# Patient Record
Sex: MALE | Race: BLACK OR AFRICAN AMERICAN | NOT HISPANIC OR LATINO | Employment: OTHER | URBAN - METROPOLITAN AREA
[De-identification: names, ages, dates, MRNs, and addresses within clinical notes are randomized per-mention and may not be internally consistent; named-entity substitution may affect disease eponyms.]

---

## 2017-05-02 RX ORDER — AMLODIPINE AND BENAZEPRIL HYDROCHLORIDE 10; 20 MG/1; MG/1
1 CAPSULE ORAL DAILY
Qty: 90 CAPSULE | Refills: 0 | Status: SHIPPED | OUTPATIENT
Start: 2017-05-02 | End: 2017-08-30 | Stop reason: SDUPTHER

## 2017-05-02 RX ORDER — ATORVASTATIN CALCIUM 40 MG/1
40 TABLET, FILM COATED ORAL DAILY
Qty: 90 TABLET | Refills: 0 | Status: SHIPPED | OUTPATIENT
Start: 2017-05-02 | End: 2017-08-30 | Stop reason: SDUPTHER

## 2017-05-04 ENCOUNTER — LAB VISIT (OUTPATIENT)
Dept: LAB | Facility: OTHER | Age: 67
End: 2017-05-04
Attending: INTERNAL MEDICINE
Payer: MEDICARE

## 2017-05-04 ENCOUNTER — OFFICE VISIT (OUTPATIENT)
Dept: INTERNAL MEDICINE | Facility: CLINIC | Age: 67
End: 2017-05-04
Payer: MEDICARE

## 2017-05-04 VITALS
DIASTOLIC BLOOD PRESSURE: 90 MMHG | SYSTOLIC BLOOD PRESSURE: 158 MMHG | HEART RATE: 73 BPM | WEIGHT: 293.88 LBS | HEIGHT: 71 IN | BODY MASS INDEX: 41.14 KG/M2 | OXYGEN SATURATION: 95 %

## 2017-05-04 DIAGNOSIS — E11.9 CONTROLLED TYPE 2 DIABETES MELLITUS WITHOUT COMPLICATION, WITHOUT LONG-TERM CURRENT USE OF INSULIN: ICD-10-CM

## 2017-05-04 DIAGNOSIS — Z86.711 HISTORY OF PULMONARY EMBOLISM: ICD-10-CM

## 2017-05-04 DIAGNOSIS — E66.01 MORBID OBESITY WITH BMI OF 40.0-44.9, ADULT: ICD-10-CM

## 2017-05-04 DIAGNOSIS — I10 ESSENTIAL HYPERTENSION: ICD-10-CM

## 2017-05-04 DIAGNOSIS — Z80.42 FAMILY HISTORY OF PROSTATE CANCER: ICD-10-CM

## 2017-05-04 DIAGNOSIS — Z79.01 CURRENT USE OF LONG TERM ANTICOAGULATION: ICD-10-CM

## 2017-05-04 DIAGNOSIS — E11.9 TYPE 2 DIABETES MELLITUS WITHOUT COMPLICATION: ICD-10-CM

## 2017-05-04 DIAGNOSIS — E78.00 PURE HYPERCHOLESTEROLEMIA: ICD-10-CM

## 2017-05-04 DIAGNOSIS — R74.8 ELEVATED LIVER ENZYMES: Primary | ICD-10-CM

## 2017-05-04 DIAGNOSIS — I10 ESSENTIAL HYPERTENSION: Primary | ICD-10-CM

## 2017-05-04 LAB
ALBUMIN SERPL BCP-MCNC: 3.7 G/DL
ALP SERPL-CCNC: 68 U/L
ALT SERPL W/O P-5'-P-CCNC: 120 U/L
ANION GAP SERPL CALC-SCNC: 8 MMOL/L
AST SERPL-CCNC: 118 U/L
BILIRUB SERPL-MCNC: 0.4 MG/DL
BUN SERPL-MCNC: 9 MG/DL
CALCIUM SERPL-MCNC: 9.6 MG/DL
CHLORIDE SERPL-SCNC: 103 MMOL/L
CHOLEST/HDLC SERPL: 4.5 {RATIO}
CO2 SERPL-SCNC: 32 MMOL/L
COMPLEXED PSA SERPL-MCNC: 2 NG/ML
CREAT SERPL-MCNC: 0.8 MG/DL
EST. GFR  (AFRICAN AMERICAN): >60 ML/MIN/1.73 M^2
EST. GFR  (NON AFRICAN AMERICAN): >60 ML/MIN/1.73 M^2
GLUCOSE SERPL-MCNC: 73 MG/DL
HDL/CHOLESTEROL RATIO: 22.4 %
HDLC SERPL-MCNC: 161 MG/DL
HDLC SERPL-MCNC: 36 MG/DL
LDLC SERPL CALC-MCNC: 101.8 MG/DL
NONHDLC SERPL-MCNC: 125 MG/DL
POTASSIUM SERPL-SCNC: 4.9 MMOL/L
PROT SERPL-MCNC: 7.1 G/DL
SODIUM SERPL-SCNC: 143 MMOL/L
TRIGL SERPL-MCNC: 116 MG/DL

## 2017-05-04 PROCEDURE — 3080F DIAST BP >= 90 MM HG: CPT | Mod: S$GLB,,, | Performed by: INTERNAL MEDICINE

## 2017-05-04 PROCEDURE — 80053 COMPREHEN METABOLIC PANEL: CPT

## 2017-05-04 PROCEDURE — 99999 PR PBB SHADOW E&M-EST. PATIENT-LVL III: CPT | Mod: PBBFAC,,, | Performed by: INTERNAL MEDICINE

## 2017-05-04 PROCEDURE — 3077F SYST BP >= 140 MM HG: CPT | Mod: S$GLB,,, | Performed by: INTERNAL MEDICINE

## 2017-05-04 PROCEDURE — 1157F ADVNC CARE PLAN IN RCRD: CPT | Mod: S$GLB,,, | Performed by: INTERNAL MEDICINE

## 2017-05-04 PROCEDURE — 84153 ASSAY OF PSA TOTAL: CPT

## 2017-05-04 PROCEDURE — 3046F HEMOGLOBIN A1C LEVEL >9.0%: CPT | Mod: S$GLB,,, | Performed by: INTERNAL MEDICINE

## 2017-05-04 PROCEDURE — 83036 HEMOGLOBIN GLYCOSYLATED A1C: CPT

## 2017-05-04 PROCEDURE — 36415 COLL VENOUS BLD VENIPUNCTURE: CPT

## 2017-05-04 PROCEDURE — 99215 OFFICE O/P EST HI 40 MIN: CPT | Mod: S$GLB,,, | Performed by: INTERNAL MEDICINE

## 2017-05-04 PROCEDURE — 1160F RVW MEDS BY RX/DR IN RCRD: CPT | Mod: S$GLB,,, | Performed by: INTERNAL MEDICINE

## 2017-05-04 PROCEDURE — 1126F AMNT PAIN NOTED NONE PRSNT: CPT | Mod: S$GLB,,, | Performed by: INTERNAL MEDICINE

## 2017-05-04 PROCEDURE — 1159F MED LIST DOCD IN RCRD: CPT | Mod: S$GLB,,, | Performed by: INTERNAL MEDICINE

## 2017-05-04 PROCEDURE — 4010F ACE/ARB THERAPY RXD/TAKEN: CPT | Mod: S$GLB,,, | Performed by: INTERNAL MEDICINE

## 2017-05-04 PROCEDURE — 80061 LIPID PANEL: CPT

## 2017-05-04 RX ORDER — METFORMIN HYDROCHLORIDE 1000 MG/1
TABLET ORAL
Qty: 180 TABLET | Refills: 1 | Status: ON HOLD | OUTPATIENT
Start: 2017-05-04 | End: 2017-12-15 | Stop reason: HOSPADM

## 2017-05-04 NOTE — MR AVS SNAPSHOT
[facility]  [address]  [place] [address]  Phone: [phone]  Fax: [phone]                  Serafin Tapia   2017 10:20 AM   Office Visit    Description:  Male : 1950   Provider:  Hector Fuchs MD   Department:  [facility]           Reason for Visit     Hypertension           Diagnoses this Visit        Comments    Essential hypertension    -  Primary     Controlled type 2 diabetes mellitus without complication, without long-term current use of insulin         Pure hypercholesterolemia         Morbid obesity with BMI of 40.0-44.9, adult         History of pulmonary embolism         Current use of long term anticoagulation         Family history of prostate cancer                To Do List           Future Appointments        Provider Department Dept Phone    2017 12:30 PM LAB, SAME DAY [facility] [phone]    2017 11:00 AM Hector Fuchs MD [facility] [phone]      Goals (5 Years of Data)     None      Follow-Up and Disposition     Return in about 3 months (around 2017), or if symptoms worsen or fail to improve.      [facility]     [facility] Nurse Care Line -  Assistance  Unless otherwise directed by your provider, please contact [facility], our nurse care line that is available for  assistance.     Registered nurses in the [facility] provide: appointment scheduling, clinical advisement, health education, and other advisory services.  Call: [phone] (toll free)               Medications           Message regarding Medications     Verify the changes and/or additions to your medication regime listed below are the same as discussed with your clinician today.  If any of these changes or additions are incorrect, please notify your healthcare provider.             Verify that the below list of medications is an accurate representation of the  "medications you are currently taking.  If none reported, the list may be blank. If incorrect, please contact your healthcare provider. Carry this list with you in case of emergency.           Current Medications     amlodipine-benazepril 10-20mg (LOTREL) 10-20 mg per capsule Take 1 capsule by mouth once daily.    atorvastatin (LIPITOR) 40 MG tablet Take 1 tablet (40 mg total) by mouth once daily.    carvedilol (COREG) 25 MG tablet Take 1 tablet (25 mg total) by mouth 2 (two) times daily with meals.    glimepiride (AMARYL) 4 MG tablet Take 1 tablet (4 mg total) by mouth before breakfast.    hydrochlorothiazide (HYDRODIURIL) 25 MG tablet TAKE ONE TABLET BY MOUTH DAILY    metformin (GLUCOPHAGE) 1000 MG tablet Take 1 tablet (1,000 mg total) by mouth 2 (two) times daily with meals.    rivaroxaban (XARELTO) 20 mg Tab Take 1 tablet (20 mg total) by mouth once daily.    spironolactone (ALDACTONE) 25 MG tablet Take 1 tablet (25 mg total) by mouth once daily.           Clinical Reference Information           Your Vitals Were     BP Pulse Height Weight SpO2 BMI    158/90 73 5' 11" (1.803 m) 133.3 kg (293 lb 14 oz) 95% 40.99 kg/m2      Blood Pressure          Most Recent Value    BP  (!)  158/90      Allergies as of 5/4/2017     No Known Allergies      Immunizations Administered on Date of Encounter - 5/4/2017     None      Orders Placed During Today's Visit     Future Labs/Procedures Expected by Expires    Comprehensive metabolic panel  5/4/2017 7/3/2018    PSA, Screening  5/4/2017 7/3/2018      Language Assistance Services     ATTENTION: Language assistance services are available, free of charge. Please call [phone].      ATENCIÓN: Si humblela j luis, tiene a dsouza disposición servicios gratuitos de asistencia lingüística. Llame al [phone].     LIGIA Ý: N?u b?n nói Ti?ng Vi?t, có các d?ch v? h? tr? ngôn ng? mi?n phí dành cho b?n. G?i s? [phone].         [facility] complies with applicable " Federal civil rights laws and does not discriminate on the basis of race, color, national origin, age, disability, or sex.

## 2017-05-04 NOTE — PROGRESS NOTES
"Subjective:       Patient ID: Serafin Tapia is a 66 y.o. male.    Chief Complaint: Hypertension    HPI Comments:   Pt here for f/u HTN and other issues.     Last visit was 1 yr ago.    C/o 'disequilibrium' x 1 mo.  This is intermittent and occurs sometimes upon bending over and rising up.  Sx is transient.  No LOC or falls.  No HA.      Pt believes he is currently taking 7 different meds.  Our list has 8.  He acknowledges missing his meds approx 2x/wk.  He is unsure if he is taking spirono.        Hypertension       Review of Systems   Constitutional: Negative.    HENT: Negative.    Eyes: Negative.    Respiratory: Negative.    Cardiovascular: Negative.    Gastrointestinal: Negative.    Genitourinary: Negative.    Musculoskeletal: Negative.    Skin: Negative.    Neurological: Negative.    Psychiatric/Behavioral: Negative.        Objective:       Vitals:    05/04/17 1043   BP: (!) 158/90   Pulse: 73   SpO2: 95%   Weight: 133.3 kg (293 lb 14 oz)   Height: 5' 11" (1.803 m)     Physical Exam   Constitutional: He is oriented to person, place, and time. He appears well-developed and well-nourished. No distress.   HENT:   Head: Normocephalic and atraumatic.   Right Ear: Tympanic membrane, external ear and ear canal normal.   Left Ear: Tympanic membrane, external ear and ear canal normal.   Mouth/Throat: Oropharynx is clear and moist and mucous membranes are normal. No oropharyngeal exudate or posterior oropharyngeal erythema.   Eyes: Conjunctivae and EOM are normal. Pupils are equal, round, and reactive to light.   Neck: Normal range of motion. Neck supple. No thyromegaly present.   Cardiovascular: Normal rate, regular rhythm and normal heart sounds.  Exam reveals no gallop and no friction rub.    No murmur heard.  Pulmonary/Chest: Effort normal and breath sounds normal. No respiratory distress. He has no wheezes. He has no rhonchi. He has no rales.   Lymphadenopathy:     He has no cervical adenopathy.   Neurological: He is " alert and oriented to person, place, and time.   Skin: He is not diaphoretic.       Assessment:       1. Essential hypertension    2. Controlled type 2 diabetes mellitus without complication, without long-term current use of insulin    3. Pure hypercholesterolemia    4. Morbid obesity with BMI of 40.0-44.9, adult    5. History of pulmonary embolism    6. Current use of long term anticoagulation    7. Family history of prostate cancer        Plan:           HTN - Uncontrolled and pt is not consistently taking his meds and seemingly also not taking all that are prescribed.  Pt is aware of the risks of uncontrolled HTN.  Pt to let us know his accurate med list and then we can advise on his tx.    DM2 -  Controlled when last tested 1 yr ago.  Cont current tx and check new A1c.    HLD - Cont statin and check lipids.    Obesity - Some improvement since last visit.  Pt advised to exercise more and to lose weight.      H/o PE - Pt to cont indefinite anticoagulation on Xarelto.      Fam h/o prost CA - Chk PSA.

## 2017-05-05 LAB
ESTIMATED AVG GLUCOSE: 146 MG/DL
HBA1C MFR BLD HPLC: 6.7 %

## 2017-05-05 NOTE — TELEPHONE ENCOUNTER
Pt inform of liverresults and to d/c if he is drinking any etoh. Pt deny any etoh at this time and is schedule for labs and U/s on 05/09. Pt states he could not make it on today.FYI.

## 2017-05-05 NOTE — TELEPHONE ENCOUNTER
Inform pt that I am covering for Dr Fuchs who isn't available today. His labs indicate liver enzymes to be high. Is he drinking etoh? If so needs to stop. Also needs to get repeat labs done today or this weekend. Please schedule as well as liver u/s.

## 2017-05-08 RX ORDER — HYDROCHLOROTHIAZIDE 25 MG/1
25 TABLET ORAL DAILY
Qty: 90 TABLET | Refills: 1 | Status: ON HOLD | OUTPATIENT
Start: 2017-05-08 | End: 2017-11-13 | Stop reason: HOSPADM

## 2017-05-09 ENCOUNTER — HOSPITAL ENCOUNTER (OUTPATIENT)
Dept: RADIOLOGY | Facility: OTHER | Age: 67
Discharge: HOME | End: 2017-05-09
Attending: INTERNAL MEDICINE
Payer: MEDICARE

## 2017-05-09 DIAGNOSIS — R74.8 ELEVATED LIVER ENZYMES: ICD-10-CM

## 2017-05-09 PROCEDURE — 76705 ECHO EXAM OF ABDOMEN: CPT | Mod: 26,,, | Performed by: RADIOLOGY

## 2017-05-09 PROCEDURE — 76705 ECHO EXAM OF ABDOMEN: CPT | Mod: TC

## 2017-05-10 ENCOUNTER — TELEPHONE (OUTPATIENT)
Dept: INTERNAL MEDICINE | Facility: CLINIC | Age: 67
End: 2017-05-10

## 2017-05-10 NOTE — TELEPHONE ENCOUNTER
Pt inform of U/S was normal but his liver test remains abnormal also dr suggest that he does not take any OTC ibuprophen/naproxen. Pt agrees and verbally understand and states he is not currently taking any of these meds.RJ.

## 2017-05-15 NOTE — TELEPHONE ENCOUNTER
I called Radiology Dept to ask them to go over pt's U/S another time to see if he might have hepatic steatosis as a cause of his abnormal LFTs.  I was told this is present on pt's scan.  Therefore, no further w/u is needed.  Pt has previously been counseled on need for weight loss and this remains true still with this finding.

## 2017-05-18 ENCOUNTER — OFFICE VISIT (OUTPATIENT)
Dept: INTERNAL MEDICINE | Facility: CLINIC | Age: 67
End: 2017-05-18
Payer: MEDICARE

## 2017-05-18 VITALS
RESPIRATION RATE: 18 BRPM | HEIGHT: 70 IN | WEIGHT: 283.5 LBS | SYSTOLIC BLOOD PRESSURE: 122 MMHG | BODY MASS INDEX: 40.59 KG/M2 | HEART RATE: 76 BPM | OXYGEN SATURATION: 95 % | DIASTOLIC BLOOD PRESSURE: 74 MMHG

## 2017-05-18 DIAGNOSIS — Z79.01 CURRENT USE OF LONG TERM ANTICOAGULATION: ICD-10-CM

## 2017-05-18 DIAGNOSIS — Z86.711 HISTORY OF PULMONARY EMBOLISM: ICD-10-CM

## 2017-05-18 DIAGNOSIS — Z80.42 FAMILY HISTORY OF PROSTATE CANCER: ICD-10-CM

## 2017-05-18 DIAGNOSIS — E66.01 MORBID OBESITY WITH BMI OF 40.0-44.9, ADULT: ICD-10-CM

## 2017-05-18 DIAGNOSIS — K63.5 POLYP OF COLON, UNSPECIFIED PART OF COLON, UNSPECIFIED TYPE: ICD-10-CM

## 2017-05-18 DIAGNOSIS — E78.00 PURE HYPERCHOLESTEROLEMIA: ICD-10-CM

## 2017-05-18 DIAGNOSIS — I10 ESSENTIAL HYPERTENSION: ICD-10-CM

## 2017-05-18 DIAGNOSIS — E11.9 CONTROLLED TYPE 2 DIABETES MELLITUS WITHOUT COMPLICATION, WITHOUT LONG-TERM CURRENT USE OF INSULIN: ICD-10-CM

## 2017-05-18 DIAGNOSIS — Z00.00 ENCOUNTER FOR PREVENTIVE HEALTH EXAMINATION: Primary | ICD-10-CM

## 2017-05-18 PROCEDURE — G0439 PPPS, SUBSEQ VISIT: HCPCS | Mod: S$GLB,,, | Performed by: NURSE PRACTITIONER

## 2017-05-18 PROCEDURE — 99999 PR PBB SHADOW E&M-EST. PATIENT-LVL III: CPT | Mod: PBBFAC,,, | Performed by: NURSE PRACTITIONER

## 2017-05-18 PROCEDURE — 99499 UNLISTED E&M SERVICE: CPT | Mod: S$GLB,,, | Performed by: NURSE PRACTITIONER

## 2017-05-18 PROCEDURE — 3074F SYST BP LT 130 MM HG: CPT | Mod: S$GLB,,, | Performed by: NURSE PRACTITIONER

## 2017-05-18 PROCEDURE — 3078F DIAST BP <80 MM HG: CPT | Mod: S$GLB,,, | Performed by: NURSE PRACTITIONER

## 2017-05-18 NOTE — MR AVS SNAPSHOT
[facility]  [address]  [place] [address]  Phone: [phone]  Fax: [phone]                  Serafin Tapia   2017 10:30 AM   Office Visit    Description:  Male : 1950   Provider:  Janie Cervantes NP   Department:  [facility]           Reason for Visit     Health Risk Assessment           Diagnoses this Visit        Comments    Encounter for preventive health examination    -  Primary     Controlled type 2 diabetes mellitus without complication, without long-term current use of insulin         Essential hypertension         Polyp of colon, unspecified part of colon, unspecified type         Pure hypercholesterolemia         Morbid obesity with BMI of 40.0-44.9, adult         Current use of long term anticoagulation         History of pulmonary embolism                To Do List           Future Appointments        Provider Department Dept Phone    2017 10:45 AM Vidhya Osorio OD [facility] [phone]    2017 11:00 AM Hector Fuchs MD [facility] [phone]      Goals (5 Years of Data)     None      Follow-Up and Disposition     Return in about 1 year (around 2018) for your next Health Risk Assessment visit.      [facility]     [facility] Nurse Care Line -  Assistance  Unless otherwise directed by your provider, please contact [facility], our nurse care line that is available for  assistance.     Registered nurses in the [facility] provide: appointment scheduling, clinical advisement, health education, and other advisory services.  Call: [phone] (toll free)               Medications           Message regarding Medications     Verify the changes and/or additions to your medication regime listed below are the same as discussed with your clinician today.  If any of these changes or additions are incorrect, please notify your healthcare provider.             Verify  "that the below list of medications is an accurate representation of the medications you are currently taking.  If none reported, the list may be blank. If incorrect, please contact your healthcare provider. Carry this list with you in case of emergency.           Current Medications     amlodipine-benazepril 10-20mg (LOTREL) 10-20 mg per capsule Take 1 capsule by mouth once daily.    atorvastatin (LIPITOR) 40 MG tablet Take 1 tablet (40 mg total) by mouth once daily.    carvedilol (COREG) 25 MG tablet Take 1 tablet (25 mg total) by mouth 2 (two) times daily with meals.    glimepiride (AMARYL) 4 MG tablet Take 1 tablet (4 mg total) by mouth before breakfast.    hydrochlorothiazide (HYDRODIURIL) 25 MG tablet Take 1 tablet (25 mg total) by mouth once daily.    metformin (GLUCOPHAGE) 1000 MG tablet TAKE ONE TABLET BY MOUTH TWICE A DAY WITH MEALS    rivaroxaban (XARELTO) 20 mg Tab Take 1 tablet (20 mg total) by mouth once daily.    spironolactone (ALDACTONE) 25 MG tablet Take 1 tablet (25 mg total) by mouth once daily.           Clinical Reference Information           Your Vitals Were     BP Pulse Resp Height Weight SpO2    122/74 (BP Location: Left arm, Patient Position: Sitting, BP Method: Manual) 76 18 5' 10" (1.778 m) 128.6 kg (283 lb 8.2 oz) 95%    BMI                40.68 kg/m2          Blood Pressure          Most Recent Value    BP  122/74      Allergies as of 5/18/2017     No Known Allergies      Immunizations Administered on Date of Encounter - 5/18/2017     None      Instructions      Diet: Diabetes  Food is an important tool that you can use to control diabetes and stay healthy. Eating well-balanced meals in the correct amounts will help you control your blood glucose levels and prevent low blood sugar reactions. It will also help you reduce the health risks of diabetes. There is no one specific diet that is right for everyone with diabetes. But there are general guidelines to follow. A registered dietitian " (CHANELL) will create a tailored diet approach thats just right for you. He or she will also help you plan healthy meals and snacks. If you have any questions, call your dietitian for advice.    Guidelines for success  Talk with your healthcare provider before starting a diabetes diet or weight loss program. If you haven't talked with a dietitian yet, ask your provider for a referral. The following guidelines can help you succeed:  · Select foods from the 6 food groups below. Your dietitian will help you find food choices within each group. He or she will also show you serving sizes and how many servings you can have at each meal.  ¨ Grains, beans, and starchy vegetables  ¨ Vegetables  ¨ Fruit  ¨ Milk or yogurt  ¨ Meat, poultry, fish, or tofu  ¨ Healthy fats  · Check your blood sugar levels as directed by your provider. Take any medicine as prescribed by your provider.  · Learn to read food labels and pick the right portion sizes.  · Eat only the amount of food in your meal plan. Eat about the same amount of food at regular times each day. Dont skip meals. Eat meals 4 to 5 hours apart, with snacks in between.  · Limit alcohol. It raises blood sugar levels. Drink water or calorie-free diet drinks that use safe sweeteners.  · Eat less fat to help lower your risk of heart disease. Use nonfat or low-fat dairy products and lean meats. Avoid fried foods. Use cooking oils that are unsaturated, such as olive, canola, or peanut oil.  · Talk with your dietitian about safe sugar substitutes.  · Avoid added salt. It can contribute to high blood pressure, which can cause heart disease. People with diabetes already have a risk of high blood pressure and heart disease.  · Stay at a healthy weight. If you need to lose weight, cut down on your portion sizes. But dont skip meals. Exercise is an important part of any weight management program. Talk with your provider about an exercise program thats right for you.  · For more  information about the best diet plan for you, talk with a registered dietitian (RD). To find an RD in your area, contact:  ¨ Academy of Nutrition and Dietetics www.eatright.org  ¨ The American Diabetes Association 566-068-6725 www.diabetes.org  Date Last Reviewed: 8/1/2016 © 2000-2016 [employer]. [address] [place] [address], [place] [address]. All rights reserved. This information is not intended as a substitute for professional medical care. Always follow your healthcare professional's instructions.        Exercise for a Healthier Heart  You may wonder how you can improve the health of your heart. If youre thinking about exercise, youre on the right track. You dont need to become an athlete, but you do need a certain amount of brisk exercise to help strengthen your heart. If you have been diagnosed with a heart condition, your doctor may recommend exercise to help stabilize your condition. To help make exercise a habit, choose safe, fun activities.     Exercise with a friend. When activity is fun, you're more likely to stick with it.     Be sure to check with your healthcare provider before starting an exercise program.   Why exercise?  Exercising regularly offers many healthy rewards. It can help you do all of the following:  · Improve your blood cholesterol level to help prevent further heart trouble  · Lower your blood pressure to help prevent a stroke or heart attack  · Control diabetes, or reduce your risk of getting this disease  · Improve your heart and lung function  · Reach and maintain a healthy weight  · Make your muscles stronger and more limber so you can stay active  · Prevent falls and fractures by slowing the loss of bone mass (osteoporosis)  · Manage stress better  · Reduce your blood pressure  · Improve your sense of self and your body image  Exercise tips  Ease into your routine. Set small goals. Then build on them.  Exercise on most days. Aim for a total of 150 or more minutes of  moderate to  vigorous intensity activity each week. Consider 40 minutes, 3 to 4 times a week. For best results, activity should last for 40 minutes on average. It is OK to work up to the 40 minute period over time. Examples of moderate-intensity activity is walking 1 mile in 15 minutes or 30 to 45 minutes of yard work.  Step up your daily activity level. Along with your exercise program, try being more active throughout the day. Walk instead of drive. Do more household tasks or yard work.  Choose one or more activities you enjoy. Walking is one of the easiest things you can do. You can also try swimming, riding a bike, dancing, or taking an exercise class.  Stop exercising and call your doctor if you:  · Have chest pain or feel dizzy or lightheaded  · Feel burning, tightness, pressure, or heaviness in your chest, neck, shoulders, back, or arms  · Have unusual shortness of breath  · Have increased joint or muscle pain  · Have palpitations or an irregular heartbeat   Date Last Reviewed: 5/1/2016  © 3037-0300 [employer]. [address] [place] [address]. All rights reserved. This information is not intended as a substitute for professional medical care. Always follow your healthcare professional's instructions.        Counseling and Referral of Other Preventative  (Italic type indicates deductible and co-insurance are waived)    Patient Name: Serafin Tapia  Today's Date: 5/18/2017      SERVICE LIMITATIONS RECOMMENDATION    Vaccines    · Pneumococcal (once after 65)    · Influenza (annually)    · Hepatitis B (if medium/high risk)    · Prevnar 13      Hepatitis B medium/high risk factors:       - End-stage renal disease       - Hemophiliacs who received Factor VII or         IX concentrates       - Clients of institutions for the mentally             retarded       - Persons who live in the same house as          a HepB carrier       - Homosexual men       - Illicit injectable drug abusers      Pneumococcal: N/A  Will receive in 5/2018     Influenza: N/A     Hepatitis B: N/A     Prevnar 13: Done, no repeat necessary    Prostate cancer screening (annually to age 75)     Prostate specific antigen (PSA) Shared decision making with Provider. Sometimes a co-pay may be required if the patient decides to have this test. The USPSTF no longer recommends prostate cancer screening routinely in medicine: every 1 year    Colorectal cancer screening (to age 75)    · Fecal occult blood test (annual)  · Flexible sigmoidoscopy (5y)  · Screening colonoscopy (10y)  · Barium enema   Last done 6/2016, recommend to repeat every 3  years or as recommended by provider    Diabetes self-management training (no USPSTF recommendations)  Requires referral by treating physician for patient with diabetes or renal disease. 10 hours of initial DSMT sessions of no less than 30 minutes each in a continuous 12-month period. 2 hours of follow-up DSMT in subsequent years.  Recommended to patient, declined    Glaucoma screening (no USPSTF recommendation)  Diabetes mellitus, family history   , age 50 or over    American, age 65 or over  Scheduled, see appointments    Medical nutrition therapy for diabetes or renal disease (no recommended schedule)  Requires referral by treating physician for patient with diabetes or renal disease or kidney transplant within the past 3 years.  Can be provided in same year as diabetes self-management training (DSMT), and CMS recommends medical nutrition therapy take place after DSMT. Up to 3 hours for initial year and 2 hours in subsequent years.  Recommended to patient, declined    Cardiovascular screening blood tests (every 5 years)  · Fasting lipid panel  Order as a panel if possible  Last done 5/2017, recommend to repeat every 1  years    Diabetes screening tests (at least every 3 years, Medicare covers annually or at 6-month intervals for prediabetic patients)  · Fasting blood sugar  (FBS) or glucose tolerance test (GTT)  Patient must be diagnosed with one of the following:       - Hypertension       - Dyslipidemia       - Obesity (BMI 30kg/m2)       - Previous elevated impaired FBS or GTT       ... or any two of the following:       - Overweight (BMI 25 but <30)       - Family history of diabetes       - Age 65 or older       - History of gestational diabetes or birth of baby weighing more than 9 pounds  N/A    Abdominal aortic aneurysm screening (once)  · Sonogram   Limited to patients who meet one of the following criteria:       - Men who are 65-75 years old and have smoked more than 100 cigarette in their lifetime       - Anyone with a family history of abdominal aortic aneurysm       - Anyone recommended for screening by the USPSTF  N/A  Completed 5/2017    HIV screening (annually for increased risk patients)  · HIV-1 and HIV-2 by EIA, or WADE, rapid antibody test or oral mucosa transudate  Patients must be at increased risk for HIV infection per USPSTF guidelines or pregnant. Tests covered annually for patient at increased risk or as requested by the patient. Pregnant patients may receive up to 3 tests during pregnancy.  Risks discussed, screening is not recommended    Smoking cessation counseling (up to 8 sessions per year)  Patients must be asymptomatic of tobacco-related conditions to receive as a preventative service.  Non-smoker    Subsequent annual wellness visit  At least 12 months since last AWV  Return in one year     The following information is provided to all patients.  This information is to help you find resources for any of the problems found today that may be affecting your health:                Living healthy guide: www.[facility].[place].gov      Understanding Diabetes: www.diabetes.org      Eating healthy: www.cdc.gov/healthyweight      CDC home safety checklist: www.cdc.gov/steadi/patient.html      Agency on Aging: www.goea.[place].gov      Alcoholics anonymous (AA):  www.aa.org      Physical Activity: www.cherry.nih.gov/il3ekhp      Tobacco use: www.quitwithusla.org          Language Assistance Services     ATTENTION: Language assistance services are available, free of charge. Please call [phone].      ATENCIÓN: Si harrison dietz, tiene a dsouza disposición servicios gratuitos de asistencia lingüística. Llame al [phone].     CHÚ Ý: N?u b?n nói Ti?ng Vi?t, có các d?ch v? h? tr? ngôn ng? mi?n phí dành cho b?n. G?i s? [phone].         [facility] complies with applicable Federal civil rights laws and does not discriminate on the basis of race, color, national origin, age, disability, or sex.

## 2017-05-18 NOTE — PATIENT INSTRUCTIONS
Diet: Diabetes  Food is an important tool that you can use to control diabetes and stay healthy. Eating well-balanced meals in the correct amounts will help you control your blood glucose levels and prevent low blood sugar reactions. It will also help you reduce the health risks of diabetes. There is no one specific diet that is right for everyone with diabetes. But there are general guidelines to follow. A registered dietitian (RD) will create a tailored diet approach thats just right for you. He or she will also help you plan healthy meals and snacks. If you have any questions, call your dietitian for advice.    Guidelines for success  Talk with your healthcare provider before starting a diabetes diet or weight loss program. If you haven't talked with a dietitian yet, ask your provider for a referral. The following guidelines can help you succeed:  · Select foods from the 6 food groups below. Your dietitian will help you find food choices within each group. He or she will also show you serving sizes and how many servings you can have at each meal.  ¨ Grains, beans, and starchy vegetables  ¨ Vegetables  ¨ Fruit  ¨ Milk or yogurt  ¨ Meat, poultry, fish, or tofu  ¨ Healthy fats  · Check your blood sugar levels as directed by your provider. Take any medicine as prescribed by your provider.  · Learn to read food labels and pick the right portion sizes.  · Eat only the amount of food in your meal plan. Eat about the same amount of food at regular times each day. Dont skip meals. Eat meals 4 to 5 hours apart, with snacks in between.  · Limit alcohol. It raises blood sugar levels. Drink water or calorie-free diet drinks that use safe sweeteners.  · Eat less fat to help lower your risk of heart disease. Use nonfat or low-fat dairy products and lean meats. Avoid fried foods. Use cooking oils that are unsaturated, such as olive, canola, or peanut oil.  · Talk with your dietitian about safe sugar substitutes.  · Avoid  added salt. It can contribute to high blood pressure, which can cause heart disease. People with diabetes already have a risk of high blood pressure and heart disease.  · Stay at a healthy weight. If you need to lose weight, cut down on your portion sizes. But dont skip meals. Exercise is an important part of any weight management program. Talk with your provider about an exercise program thats right for you.  · For more information about the best diet plan for you, talk with a registered dietitian (RD). To find an RD in your area, contact:  ¨ Academy of Nutrition and Dietetics www.eatright.org  ¨ The American Diabetes Association [phone] www.diabetes.org  Date Last Reviewed: 8/1/2016 © 2000-2016 [employer]. [address] [place] [address], [place] [address]. All rights reserved. This information is not intended as a substitute for professional medical care. Always follow your healthcare professional's instructions.        Exercise for a Healthier Heart  You may wonder how you can improve the health of your heart. If youre thinking about exercise, youre on the right track. You dont need to become an athlete, but you do need a certain amount of brisk exercise to help strengthen your heart. If you have been diagnosed with a heart condition, your doctor may recommend exercise to help stabilize your condition. To help make exercise a habit, choose safe, fun activities.     Exercise with a friend. When activity is fun, you're more likely to stick with it.     Be sure to check with your healthcare provider before starting an exercise program.   Why exercise?  Exercising regularly offers many healthy rewards. It can help you do all of the following:  · Improve your blood cholesterol level to help prevent further heart trouble  · Lower your blood pressure to help prevent a stroke or heart attack  · Control diabetes, or reduce your risk of getting this disease  · Improve your heart and lung function  · Reach  and maintain a healthy weight  · Make your muscles stronger and more limber so you can stay active  · Prevent falls and fractures by slowing the loss of bone mass (osteoporosis)  · Manage stress better  · Reduce your blood pressure  · Improve your sense of self and your body image  Exercise tips  Ease into your routine. Set small goals. Then build on them.  Exercise on most days. Aim for a total of 150 or more minutes of moderate to  vigorous intensity activity each week. Consider 40 minutes, 3 to 4 times a week. For best results, activity should last for 40 minutes on average. It is OK to work up to the 40 minute period over time. Examples of moderate-intensity activity is walking 1 mile in 15 minutes or 30 to 45 minutes of yard work.  Step up your daily activity level. Along with your exercise program, try being more active throughout the day. Walk instead of drive. Do more household tasks or yard work.  Choose one or more activities you enjoy. Walking is one of the easiest things you can do. You can also try swimming, riding a bike, dancing, or taking an exercise class.  Stop exercising and call your doctor if you:  · Have chest pain or feel dizzy or lightheaded  · Feel burning, tightness, pressure, or heaviness in your chest, neck, shoulders, back, or arms  · Have unusual shortness of breath  · Have increased joint or muscle pain  · Have palpitations or an irregular heartbeat   Date Last Reviewed: 5/1/2016  © 6578-3383 [employer]. [address] [place] [address]. All rights reserved. This information is not intended as a substitute for professional medical care. Always follow your healthcare professional's instructions.        Counseling and Referral of Other Preventative  (Italic type indicates deductible and co-insurance are waived)    Patient Name: Serafin Tapia  Today's Date: 5/18/2017      SERVICE LIMITATIONS RECOMMENDATION    Vaccines    · Pneumococcal (once after 65)    · Influenza  (annually)    · Hepatitis B (if medium/high risk)    · Prevnar 13      Hepatitis B medium/high risk factors:       - End-stage renal disease       - Hemophiliacs who received Factor VII or         IX concentrates       - Clients of institutions for the mentally             retarded       - Persons who live in the same house as          a HepB carrier       - Homosexual men       - Illicit injectable drug abusers     Pneumococcal: N/A  Will receive in 5/2018     Influenza: N/A     Hepatitis B: N/A     Prevnar 13: Done, no repeat necessary    Prostate cancer screening (annually to age 75)     Prostate specific antigen (PSA) Shared decision making with Provider. Sometimes a co-pay may be required if the patient decides to have this test. The USPSTF no longer recommends prostate cancer screening routinely in medicine: every 1 year    Colorectal cancer screening (to age 75)    · Fecal occult blood test (annual)  · Flexible sigmoidoscopy (5y)  · Screening colonoscopy (10y)  · Barium enema   Last done 6/2016, recommend to repeat every 3  years or as recommended by provider    Diabetes self-management training (no USPSTF recommendations)  Requires referral by treating physician for patient with diabetes or renal disease. 10 hours of initial DSMT sessions of no less than 30 minutes each in a continuous 12-month period. 2 hours of follow-up DSMT in subsequent years.  Recommended to patient, declined    Glaucoma screening (no USPSTF recommendation)  Diabetes mellitus, family history   , age 50 or over    American, age 65 or over  Scheduled, see appointments    Medical nutrition therapy for diabetes or renal disease (no recommended schedule)  Requires referral by treating physician for patient with diabetes or renal disease or kidney transplant within the past 3 years.  Can be provided in same year as diabetes self-management training (DSMT), and CMS recommends medical nutrition therapy take place after  DSMT. Up to 3 hours for initial year and 2 hours in subsequent years.  Recommended to patient, declined    Cardiovascular screening blood tests (every 5 years)  · Fasting lipid panel  Order as a panel if possible  Last done 5/2017, recommend to repeat every 1  years    Diabetes screening tests (at least every 3 years, Medicare covers annually or at 6-month intervals for prediabetic patients)  · Fasting blood sugar (FBS) or glucose tolerance test (GTT)  Patient must be diagnosed with one of the following:       - Hypertension       - Dyslipidemia       - Obesity (BMI 30kg/m2)       - Previous elevated impaired FBS or GTT       ... or any two of the following:       - Overweight (BMI 25 but <30)       - Family history of diabetes       - Age 65 or older       - History of gestational diabetes or birth of baby weighing more than 9 pounds  N/A    Abdominal aortic aneurysm screening (once)  · Sonogram   Limited to patients who meet one of the following criteria:       - Men who are 65-75 years old and have smoked more than 100 cigarette in their lifetime       - Anyone with a family history of abdominal aortic aneurysm       - Anyone recommended for screening by the USPSTF  N/A  Completed 5/2017    HIV screening (annually for increased risk patients)  · HIV-1 and HIV-2 by EIA, or WADE, rapid antibody test or oral mucosa transudate  Patients must be at increased risk for HIV infection per USPSTF guidelines or pregnant. Tests covered annually for patient at increased risk or as requested by the patient. Pregnant patients may receive up to 3 tests during pregnancy.  Risks discussed, screening is not recommended    Smoking cessation counseling (up to 8 sessions per year)  Patients must be asymptomatic of tobacco-related conditions to receive as a preventative service.  Non-smoker    Subsequent annual wellness visit  At least 12 months since last AWV  Return in one year     The following information is provided to all  patients.  This information is to help you find resources for any of the problems found today that may be affecting your health:                Living healthy guide: www.[facility]      Understanding Diabetes: www.diabetes.org      Eating healthy: www.cdc.gov/healthyweight      CDC home safety checklist: www.cdc.gov/steadi/patient.html      Agency on Aging: www.goea.[place].[facility]      Alcoholics anonymous (AA): www.aa.org      Physical Activity: www.cherry.nih.gov/dm0yibh      Tobacco use: www.quitwithusla.org

## 2017-05-18 NOTE — PROGRESS NOTES
"Serafin Tapia presented for a  Medicare AWV and comprehensive Health Risk Assessment today. The following components were reviewed and updated:    · Medical history  · Family History  · Social history  · Allergies and Current Medications  · Health Risk Assessment  · Health Maintenance  · Care Team     ** See Completed Assessments for Annual Wellness Visit within the encounter summary.**       The following assessments were completed:  · Living Situation  · CAGE  · Depression Screening  · Timed Get Up and Go  · Whisper Test  · Cognitive Function Screening  · Nutrition Screening  · ADL Screening   · PAQ Screening      Vitals:    05/18/17 1029   BP: 122/74   BP Location: Left arm   Patient Position: Sitting   BP Method: Manual   Pulse: 76   Resp: 18   SpO2: 95%   Weight: 128.6 kg (283 lb 8.2 oz)   Height: 5' 10" (1.778 m)     Body mass index is 40.68 kg/(m^2).  Physical Exam   Constitutional: He is oriented to person, place, and time. He appears well-developed and well-nourished. No distress.   HENT:   Head: Normocephalic.   Right Ear: External ear normal.   Left Ear: External ear normal.   Nose: Nose normal.   Eyes: Conjunctivae are normal. No scleral icterus.   Neck: Normal range of motion. Neck supple.   Cardiovascular: Normal rate, regular rhythm, normal heart sounds and intact distal pulses.  Exam reveals no gallop and no friction rub.    No murmur heard.  Pulses:       Dorsalis pedis pulses are 2+ on the right side, and 2+ on the left side.        Posterior tibial pulses are 1+ on the right side, and 1+ on the left side.   Pulmonary/Chest: Effort normal and breath sounds normal. No respiratory distress. He has no wheezes. He has no rales. He exhibits no tenderness.   Abdominal: Soft. Bowel sounds are normal.   Musculoskeletal: Normal range of motion. He exhibits no edema.   Feet:   Right Foot:   Protective Sensation: 8 sites tested. 8 sites sensed.   Skin Integrity: Positive for callus and dry skin. Negative for " ulcer, blister, skin breakdown, erythema or warmth.   Left Foot:   Protective Sensation: 8 sites tested. 8 sites sensed.   Skin Integrity: Positive for callus and dry skin. Negative for ulcer, blister, skin breakdown, erythema or warmth.   Neurological: He is alert and oriented to person, place, and time.   Skin: Skin is warm and dry. He is not diaphoretic. No erythema.   Psychiatric: He has a normal mood and affect. His behavior is normal. Judgment and thought content normal.   Vitals reviewed.        Diagnoses and health risks identified today and associated recommendations/orders:    1. Controlled type 2 diabetes mellitus without complication, without long-term current use of insulin  Chronic.  Controlled.  Last Hemoglobin A1C was 6.7% from 5/4/2017. Treated with metformin and glimepiride.  Encouraged to increase exercise as tolerated to at least 2 hours and 30 minutes of moderate-intensity aerobic activity per week and  2 days per week of muscle-strengthening activities and to improve diet to diabetic diet. Education provided.  Encouraged to continue treatment plan.  Monitored by PCP.      2. Morbid obesity with BMI of 40.0-44.9, adult  Chronic.  Weight has been improving.  He has lost 30 pounds in the past year from eating smaller food portions.  Encouraged to increase exercise as tolerated and improve diet to heart healthy, diabetic diet.  Education provided.  Declined medical fitness referral, states he will think about it.   Monitored by PCP.     3. Encounter for preventive health examination  Annual Health Risk Assessment (HRA) visit today.  Counseling and referral of health maintenance and preventative health measures performed.  Patient given annual wellness paperwork to take home.  Encouraged to return in 1 year for subsequent HRA visit.     4. Essential hypertension  Chronic.  Controlled.  Treated with amlodipine-benazepril, carvedilol, HCTZ, and spironolactone.  Encouraged to increase exercise as  tolerated to at least 2 hours and 30 minutes of moderate-intensity aerobic activity per week and  2 days per week of muscle-strengthening activities and to improve diet to heart healthy, low sodium diet.  Education provided.  Encouraged to continue treatment plan.  Monitored by PCP.     5. Polyp of colon, unspecified part of colon, unspecified type  Last colonoscopy was on 6/13/2016 with [facility].  Report shows 2 polyps that were removed.  Recommended repeat colonoscopy in 2 years, per report.  Chronic.  Stable.  Monitored by Gastroenterology.     6. Pure hypercholesterolemia  Chronic.  Stable and controlled.  Treated with atorvastatin.  Encouraged patient to continue treatment plan. Monitored by PCP.      7. Current use of long term anticoagulation  Chronic.  Stable.  On rivaroxaban since May 2014.  Monitored by PCP.      8. History of pulmonary embolism  Diagnosed in 05/2014 with PE.  Stable and controlled.  Treated with rivaroxaban.  Encouraged to continue treatment plan.  Monitored by PCP.      9. Family history of prostate cancer  Father had prostate cancer. Stable.  Last PSA = 2.0 from 5/4/2017.  Monitored by PCP.      Provided Serafin with a 5-10 year written screening schedule and personal prevention plan. Recommendations were developed using the USPSTF age appropriate recommendations. Education, counseling, and referrals were provided as needed. After Visit Summary printed and given to patient which includes a list of additional screenings\tests needed.    Return in about 1 year (around 5/18/2018) for your next Health Risk Assessment visit.    Janie Cervantes NP

## 2017-05-31 ENCOUNTER — OFFICE VISIT (OUTPATIENT)
Dept: OPTOMETRY | Facility: CLINIC | Age: 67
End: 2017-05-31
Payer: MEDICARE

## 2017-05-31 DIAGNOSIS — E11.9 TYPE 2 DIABETES MELLITUS WITHOUT OPHTHALMIC MANIFESTATIONS: Primary | ICD-10-CM

## 2017-05-31 DIAGNOSIS — H25.13 NUCLEAR SCLEROSIS, BILATERAL: ICD-10-CM

## 2017-05-31 DIAGNOSIS — H40.053 OCULAR HYPERTENSION, BILATERAL: ICD-10-CM

## 2017-05-31 DIAGNOSIS — H52.4 PRESBYOPIA: ICD-10-CM

## 2017-05-31 PROCEDURE — 99999 PR PBB SHADOW E&M-EST. PATIENT-LVL I: CPT | Mod: PBBFAC,,, | Performed by: OPTOMETRIST

## 2017-05-31 PROCEDURE — 99499 UNLISTED E&M SERVICE: CPT | Mod: S$GLB,,, | Performed by: OPTOMETRIST

## 2017-05-31 PROCEDURE — 92014 COMPRE OPH EXAM EST PT 1/>: CPT | Mod: S$GLB,,, | Performed by: OPTOMETRIST

## 2017-05-31 NOTE — PROGRESS NOTES
"HPI     Diabetic Eye Exam    Additional comments: overdue for diabetic eye check           Comments   Last eye exam was 1/7/15 with Dr. Osorio.  Patient states no vision changes since last exam. Happy with +1.75-+2.00   OTC readers. No distance vision complaints. Will see floaters "every now   and then."  Patient denies diplopia, headaches, flashes, and pain.    Hemoglobin A1C       Date                     Value               Ref Range             Status                05/04/2017               6.7 (H)             4.5 - 6.2 %           Final                    Last edited by Graciela Rodriguez on 5/31/2017 10:37 AM. (History)        ROS     Negative for: Constitutional, Gastrointestinal, Neurological, Skin,   Genitourinary, Musculoskeletal, HENT, Endocrine, Cardiovascular, Eyes,   Respiratory, Psychiatric, Allergic/Imm, Heme/Lymph    Last edited by Vidhya Osorio, OD on 5/31/2017 11:17 AM. (History)        Assessment /Plan     For exam results, see Encounter Report.    Type 2 diabetes mellitus without ophthalmic manifestations    Nuclear sclerosis, bilateral    Ocular hypertension, bilateral    Presbyopia            1.  No retinopathy--monitor yearly.   Educated pt eye health correlates with blood sugar control.    2.  Educated on cataracts and affects on vision.  Early-monitor.  3.  Eye pressure stable OU.  Family history of glaucoma.  Thin cornea.  Optic nerves healthy OU.  Continue to monitor yearly.  4.  No rx given.  Continue with OTC readers.      RTC 1 year for dm exam.                 "

## 2017-06-07 ENCOUNTER — TELEPHONE (OUTPATIENT)
Dept: INTERNAL MEDICINE | Facility: CLINIC | Age: 67
End: 2017-06-07

## 2017-06-07 NOTE — TELEPHONE ENCOUNTER
Pt daughter is requesting that you give her a call to discuss personal matters she can be reached at [phone].

## 2017-06-07 NOTE — TELEPHONE ENCOUNTER
----- Message from Guille Price sent at 6/7/2017  4:51 PM CDT -----  Contact: patient daughter Raj gallo_ 1st Request   _ 2nd Request   _ 3rd Request     Who: VALDO VEGA [[id number]]    Why: patients daughter is requesting a call back in reference to personal matters.    What Number to Call Back: [phone]    When to Expect a call back: (Before the end of the day)   -- if call after 3:00 call back will be tomorrow.

## 2017-06-08 ENCOUNTER — HOSPITAL ENCOUNTER (OUTPATIENT)
Dept: RADIOLOGY | Facility: OTHER | Age: 67
Discharge: HOME | End: 2017-06-08
Attending: INTERNAL MEDICINE
Payer: MEDICARE

## 2017-06-08 ENCOUNTER — OFFICE VISIT (OUTPATIENT)
Dept: INTERNAL MEDICINE | Facility: CLINIC | Age: 67
End: 2017-06-08
Payer: MEDICARE

## 2017-06-08 VITALS
WEIGHT: 280.19 LBS | DIASTOLIC BLOOD PRESSURE: 80 MMHG | OXYGEN SATURATION: 97 % | HEART RATE: 78 BPM | SYSTOLIC BLOOD PRESSURE: 122 MMHG | HEIGHT: 70 IN | BODY MASS INDEX: 40.11 KG/M2

## 2017-06-08 DIAGNOSIS — M54.50 CHRONIC MIDLINE LOW BACK PAIN WITHOUT SCIATICA: ICD-10-CM

## 2017-06-08 DIAGNOSIS — G89.29 CHRONIC MIDLINE LOW BACK PAIN WITHOUT SCIATICA: Primary | ICD-10-CM

## 2017-06-08 DIAGNOSIS — M54.50 CHRONIC MIDLINE LOW BACK PAIN WITHOUT SCIATICA: Primary | ICD-10-CM

## 2017-06-08 DIAGNOSIS — G89.29 CHRONIC MIDLINE LOW BACK PAIN WITHOUT SCIATICA: ICD-10-CM

## 2017-06-08 DIAGNOSIS — I10 ESSENTIAL HYPERTENSION: ICD-10-CM

## 2017-06-08 PROCEDURE — 99999 PR PBB SHADOW E&M-EST. PATIENT-LVL III: CPT | Mod: PBBFAC,,, | Performed by: INTERNAL MEDICINE

## 2017-06-08 PROCEDURE — 72110 X-RAY EXAM L-2 SPINE 4/>VWS: CPT | Mod: 26,,, | Performed by: RADIOLOGY

## 2017-06-08 PROCEDURE — 1159F MED LIST DOCD IN RCRD: CPT | Mod: S$GLB,,, | Performed by: INTERNAL MEDICINE

## 2017-06-08 PROCEDURE — 99214 OFFICE O/P EST MOD 30 MIN: CPT | Mod: S$GLB,,, | Performed by: INTERNAL MEDICINE

## 2017-06-08 PROCEDURE — 1125F AMNT PAIN NOTED PAIN PRSNT: CPT | Mod: S$GLB,,, | Performed by: INTERNAL MEDICINE

## 2017-06-08 PROCEDURE — 72110 X-RAY EXAM L-2 SPINE 4/>VWS: CPT | Mod: TC

## 2017-06-08 RX ORDER — GLIMEPIRIDE 4 MG/1
4 TABLET ORAL
Qty: 90 TABLET | Refills: 3 | Status: ON HOLD | OUTPATIENT
Start: 2017-06-08 | End: 2017-11-13 | Stop reason: HOSPADM

## 2017-06-08 NOTE — PROGRESS NOTES
"Subjective:       Patient ID: Serafin Tapia is a 66 y.o. male.    Chief Complaint: Hypertension      Pt here for f/u HTN and other issues.    He is accompanied by his sister.    Pt continues to have midline LBP.  This is moderate, sharp, nonradiating, and worse w/walking.  It is absent at rest.  He doesn't take anything for it.  No LE weakness or numbness.  He is being less active on account of this.           Review of Systems   Constitutional: Negative.    HENT: Negative.    Eyes: Negative.    Respiratory: Negative.    Musculoskeletal: Positive for back pain.       Objective:       Vitals:    06/08/17 1052   BP: 122/80   Pulse: 78   SpO2: 97%   Weight: 127.1 kg (280 lb 3.3 oz)   Height: 5' 10" (1.778 m)     Physical Exam   Constitutional: He is oriented to person, place, and time. He appears well-developed and well-nourished. No distress.   HENT:   Head: Normocephalic and atraumatic.   Right Ear: Tympanic membrane, external ear and ear canal normal.   Left Ear: Tympanic membrane, external ear and ear canal normal.   Mouth/Throat: Oropharynx is clear and moist and mucous membranes are normal. No oropharyngeal exudate or posterior oropharyngeal erythema.   Eyes: Conjunctivae and EOM are normal. Pupils are equal, round, and reactive to light.   Neck: Normal range of motion. Neck supple. No thyromegaly present.   Cardiovascular: Normal rate, regular rhythm and normal heart sounds.  Exam reveals no gallop and no friction rub.    No murmur heard.  Pulmonary/Chest: Effort normal and breath sounds normal. No respiratory distress. He has no wheezes. He has no rhonchi. He has no rales.   Musculoskeletal:        Lumbar back: He exhibits normal range of motion, no tenderness and no bony tenderness.   Lymphadenopathy:     He has no cervical adenopathy.   Neurological: He is alert and oriented to person, place, and time.   Reflex Scores:       Patellar reflexes are 0 on the right side and 0 on the left side.  5/5 strength " throughout BLE   Skin: He is not diaphoretic.       Assessment:       1. Chronic midline low back pain without sciatica    2. Essential hypertension        Plan:           HTN - At goal.  Cont current tx.    LBP - Chronic.  Stable.  No red flags.  Likely DDD/DJD.  Will get x-rays.  Pt advised to do PT.  OTC meds PRN.

## 2017-06-08 NOTE — TELEPHONE ENCOUNTER
Sorry i thought that she may have been able to discuss this with her. I did call pt to ask him if it was ok but i got no answer. Ill let marci know we have to have a Involvment of Care form and permission from him.

## 2017-06-09 ENCOUNTER — PATIENT MESSAGE (OUTPATIENT)
Dept: INTERNAL MEDICINE | Facility: CLINIC | Age: 67
End: 2017-06-09

## 2017-08-04 ENCOUNTER — OFFICE VISIT (OUTPATIENT)
Dept: INTERNAL MEDICINE | Facility: CLINIC | Age: 67
End: 2017-08-04
Payer: MEDICARE

## 2017-08-04 VITALS
WEIGHT: 277.13 LBS | SYSTOLIC BLOOD PRESSURE: 114 MMHG | HEART RATE: 66 BPM | OXYGEN SATURATION: 95 % | HEIGHT: 70 IN | BODY MASS INDEX: 39.67 KG/M2 | DIASTOLIC BLOOD PRESSURE: 70 MMHG

## 2017-08-04 DIAGNOSIS — M47.816 SPONDYLOSIS OF LUMBAR REGION WITHOUT MYELOPATHY OR RADICULOPATHY: Primary | ICD-10-CM

## 2017-08-04 DIAGNOSIS — I10 ESSENTIAL HYPERTENSION: ICD-10-CM

## 2017-08-04 PROCEDURE — 3008F BODY MASS INDEX DOCD: CPT | Mod: S$GLB,,, | Performed by: INTERNAL MEDICINE

## 2017-08-04 PROCEDURE — 1159F MED LIST DOCD IN RCRD: CPT | Mod: S$GLB,,, | Performed by: INTERNAL MEDICINE

## 2017-08-04 PROCEDURE — 1125F AMNT PAIN NOTED PAIN PRSNT: CPT | Mod: S$GLB,,, | Performed by: INTERNAL MEDICINE

## 2017-08-04 PROCEDURE — 99214 OFFICE O/P EST MOD 30 MIN: CPT | Mod: S$GLB,,, | Performed by: INTERNAL MEDICINE

## 2017-08-04 PROCEDURE — 99999 PR PBB SHADOW E&M-EST. PATIENT-LVL III: CPT | Mod: PBBFAC,,, | Performed by: INTERNAL MEDICINE

## 2017-08-04 NOTE — PROGRESS NOTES
"Subjective:       Patient ID: Serafin Tapia is a 66 y.o. male.    Chief Complaint: Dizziness      Pt here for f/u lumbar back pain.  He is interested to know the results of the x-rays he had a couple of months ago.  Back pain is stable, unchanged.  He hasn't started any PT.     No new c/o.           Review of Systems   Constitutional: Negative.    HENT: Negative.    Eyes: Negative.    Respiratory: Negative.    Musculoskeletal: Positive for back pain.       Objective:       Vitals:    08/04/17 1057   BP: 114/70   Pulse: 66   SpO2: 95%   Weight: 125.7 kg (277 lb 1.9 oz)   Height: 5' 10" (1.778 m)     Physical Exam   Constitutional: He is oriented to person, place, and time. He appears well-developed and well-nourished. No distress.   HENT:   Head: Normocephalic and atraumatic.   Eyes: Conjunctivae and EOM are normal. Pupils are equal, round, and reactive to light.   Neurological: He is alert and oriented to person, place, and time.   Skin: No rash noted. He is not diaphoretic.   Psychiatric: He has a normal mood and affect. His behavior is normal. Thought content normal.       Assessment:       1. Spondylosis of lumbar region without myelopathy or radiculopathy    2. Essential hypertension        Plan:           Lumbar DJD - Pt never rec'd MyChart message with x-ray results.  We discussed results and pt again was advised to do PT.  He plans to arrange for this.     HTN - At goal.  Cont current tx.       "

## 2017-08-23 ENCOUNTER — PATIENT MESSAGE (OUTPATIENT)
Dept: INTERNAL MEDICINE | Facility: CLINIC | Age: 67
End: 2017-08-23

## 2017-08-25 PROBLEM — M47.816 SPONDYLOSIS OF LUMBAR REGION WITHOUT MYELOPATHY OR RADICULOPATHY: Status: ACTIVE | Noted: 2017-08-25

## 2017-08-26 ENCOUNTER — HOSPITAL ENCOUNTER (EMERGENCY)
Facility: OTHER | Age: 67
Discharge: HOME | End: 2017-08-26
Attending: EMERGENCY MEDICINE
Payer: MEDICARE

## 2017-08-26 VITALS
HEIGHT: 70 IN | WEIGHT: 274 LBS | RESPIRATION RATE: 16 BRPM | HEART RATE: 80 BPM | TEMPERATURE: 99 F | BODY MASS INDEX: 39.22 KG/M2 | DIASTOLIC BLOOD PRESSURE: 76 MMHG | SYSTOLIC BLOOD PRESSURE: 177 MMHG | OXYGEN SATURATION: 96 %

## 2017-08-26 DIAGNOSIS — G89.29 CHRONIC BILATERAL LOW BACK PAIN WITHOUT SCIATICA: Primary | ICD-10-CM

## 2017-08-26 DIAGNOSIS — R53.83 FATIGUE: ICD-10-CM

## 2017-08-26 DIAGNOSIS — M54.50 CHRONIC BILATERAL LOW BACK PAIN WITHOUT SCIATICA: Primary | ICD-10-CM

## 2017-08-26 DIAGNOSIS — R29.898 LOWER EXTREMITY WEAKNESS: ICD-10-CM

## 2017-08-26 LAB
ALBUMIN SERPL BCP-MCNC: 3.8 G/DL
ALP SERPL-CCNC: 82 U/L
ALT SERPL W/O P-5'-P-CCNC: 167 U/L
ANION GAP SERPL CALC-SCNC: 10 MMOL/L
AST SERPL-CCNC: 225 U/L
BACTERIA #/AREA URNS HPF: NORMAL /HPF
BASOPHILS # BLD AUTO: 0 K/UL
BASOPHILS NFR BLD: 0 %
BILIRUB SERPL-MCNC: 0.4 MG/DL
BILIRUB UR QL STRIP: NEGATIVE
BUN SERPL-MCNC: 21 MG/DL
CALCIUM SERPL-MCNC: 9.7 MG/DL
CHLORIDE SERPL-SCNC: 104 MMOL/L
CLARITY UR: CLEAR
CO2 SERPL-SCNC: 28 MMOL/L
COLOR UR: YELLOW
CREAT SERPL-MCNC: 0.8 MG/DL
DIFFERENTIAL METHOD: ABNORMAL
EOSINOPHIL # BLD AUTO: 0.1 K/UL
EOSINOPHIL NFR BLD: 1.3 %
ERYTHROCYTE [DISTWIDTH] IN BLOOD BY AUTOMATED COUNT: 14.2 %
EST. GFR  (AFRICAN AMERICAN): >60 ML/MIN/1.73 M^2
EST. GFR  (NON AFRICAN AMERICAN): >60 ML/MIN/1.73 M^2
GLUCOSE SERPL-MCNC: 147 MG/DL
GLUCOSE UR QL STRIP: NEGATIVE
HCT VFR BLD AUTO: 38.3 %
HGB BLD-MCNC: 13 G/DL
HGB UR QL STRIP: ABNORMAL
HYALINE CASTS #/AREA URNS LPF: 1 /LPF
KETONES UR QL STRIP: NEGATIVE
LEUKOCYTE ESTERASE UR QL STRIP: NEGATIVE
LYMPHOCYTES # BLD AUTO: 1.6 K/UL
LYMPHOCYTES NFR BLD: 33.5 %
MCH RBC QN AUTO: 28.1 PG
MCHC RBC AUTO-ENTMCNC: 33.9 G/DL
MCV RBC AUTO: 83 FL
MICROSCOPIC COMMENT: NORMAL
MONOCYTES # BLD AUTO: 0.4 K/UL
MONOCYTES NFR BLD: 8 %
NEUTROPHILS # BLD AUTO: 2.6 K/UL
NEUTROPHILS NFR BLD: 57 %
NITRITE UR QL STRIP: NEGATIVE
PH UR STRIP: 6 [PH] (ref 5–8)
PLATELET # BLD AUTO: 285 K/UL
PMV BLD AUTO: 11 FL
POTASSIUM SERPL-SCNC: 4.6 MMOL/L
PROT SERPL-MCNC: 7.4 G/DL
PROT UR QL STRIP: ABNORMAL
RBC # BLD AUTO: 4.62 M/UL
RBC #/AREA URNS HPF: 0 /HPF (ref 0–4)
SODIUM SERPL-SCNC: 142 MMOL/L
SP GR UR STRIP: 1.01 (ref 1–1.03)
SQUAMOUS #/AREA URNS HPF: 5 /HPF
URN SPEC COLLECT METH UR: ABNORMAL
UROBILINOGEN UR STRIP-ACNC: NEGATIVE EU/DL
WBC # BLD AUTO: 4.63 K/UL

## 2017-08-26 PROCEDURE — 99284 EMERGENCY DEPT VISIT MOD MDM: CPT | Mod: 25

## 2017-08-26 PROCEDURE — 93010 ELECTROCARDIOGRAM REPORT: CPT | Mod: ,,, | Performed by: INTERNAL MEDICINE

## 2017-08-26 PROCEDURE — 80053 COMPREHEN METABOLIC PANEL: CPT

## 2017-08-26 PROCEDURE — 81000 URINALYSIS NONAUTO W/SCOPE: CPT

## 2017-08-26 PROCEDURE — 93005 ELECTROCARDIOGRAM TRACING: CPT

## 2017-08-26 PROCEDURE — 85025 COMPLETE CBC W/AUTO DIFF WBC: CPT

## 2017-08-26 RX ORDER — METHOCARBAMOL 500 MG/1
1000 TABLET, FILM COATED ORAL 3 TIMES DAILY
Qty: 30 TABLET | Refills: 0 | Status: SHIPPED | OUTPATIENT
Start: 2017-08-26 | End: 2017-08-31

## 2017-08-26 NOTE — ED TRIAGE NOTES
Pt reports gradual onset of lower ext weakness over the last few months. Denies any trauma/injury. Reports low back pain with walking.

## 2017-08-26 NOTE — ED PROVIDER NOTES
"Encounter Date: 8/26/2017    SCRIBE #1 NOTE: I, Santa Miles, am scribing for, and in the presence of, Dr. Garcia.       History     Chief Complaint   Patient presents with    Extremity Weakness     + bilateral upper and lower extremity weakness progressing x four months. " I saw my doctor for this and he said it was becuase of all of the medications I am on". + recent falls, denies hitting head or LOC. Denies chest pain, SOB, N/V, abdominal pains, HA, blurred vision.  + intermittent dizziness upon standing       Time seen by provider: 6:58 PM on 08/26/2017    Serafin Tapia is a 66 y.o. male with DM2, HTN, and HLD who presents to the ED with an onset of gradually worsening BUE and BLE weakness over the last 4-5 months. He states that he has difficulty getting up and with lifting things. Per sister-in-law, the family is concerned for a weight loss, loss of appetite, depression, gait changes, and wears diapers for urinary incontinence. The patient states that he wears adult diapers while out of the house but "not for dribbling." He denies numbness/tingling or any other symptoms at this time. No pertinent SHx noted.      The history is provided by the patient and a relative (JOSE A).     Review of patient's allergies indicates:  No Known Allergies  Past Medical History:   Diagnosis Date    Asthma     Diabetes mellitus type 2 in obese 5/3/2014    Elevated troponin 5/3/2014    Hyperlipidemia     Hypertension     Obesity     Pulmonary embolism 05/2014    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     Past Surgical History:   Procedure Laterality Date    TONSILLECTOMY       Family History   Problem Relation Age of Onset    Diabetes Mother     Heart disease Mother     Hypertension Mother     Stroke Mother     Heart disease Father     Hypertension Father     Diabetes Father     Cancer Father     Cataracts Sister     Hypertension Sister     Diabetes Sister     Glaucoma " Maternal Aunt     Glaucoma Paternal Aunt     Hypertension Brother     Diabetes Brother     No Known Problems Maternal Grandmother     No Known Problems Maternal Grandfather     No Known Problems Paternal Grandmother     No Known Problems Paternal Grandfather     Hypertension Son     Hypertension Daughter      Social History   Substance Use Topics    Smoking status: Former Smoker     Types: Cigarettes    Smokeless tobacco: Never Used      Comment: Quit smoking as a teenager    Alcohol use Yes      Comment: 1-2 drinks per week     Review of Systems   Constitutional: Negative for activity change, appetite change, chills, diaphoresis and fever.   HENT: Negative for congestion, sore throat and trouble swallowing.    Eyes: Negative for photophobia and visual disturbance.   Respiratory: Negative for cough, chest tightness and shortness of breath.    Cardiovascular: Negative for chest pain.   Gastrointestinal: Negative for abdominal pain, nausea and vomiting.   Endocrine: Negative for polydipsia, polyphagia and polyuria.   Genitourinary: Negative for difficulty urinating and flank pain.   Musculoskeletal: Negative for back pain and neck pain.   Skin: Negative for pallor.   Neurological: Positive for weakness (BUE & BLE). Negative for numbness (or tingling) and headaches.   Psychiatric/Behavioral: Negative for confusion.     Physical Exam     Initial Vitals [08/26/17 1729]   BP Pulse Resp Temp SpO2   (!) 182/84 88 16 98.4 °F (36.9 °C) 96 %      MAP       116.67         Physical Exam    Nursing note and vitals reviewed.  Constitutional: He appears well-developed and well-nourished. He is Obese . No distress.   HENT:   Head: Normocephalic and atraumatic.   Eyes: Conjunctivae and EOM are normal. Pupils are equal, round, and reactive to light.   Neck: Normal range of motion. Neck supple.   Cardiovascular: Normal rate, regular rhythm and normal heart sounds. Exam reveals no gallop and no friction rub.    No murmur  heard.  Pulmonary/Chest: Effort normal and breath sounds normal. He has no wheezes. He has no rhonchi. He has no rales.   Abdominal: Soft. Normal appearance and bowel sounds are normal. There is no tenderness.   Genitourinary:   Genitourinary Comments: Diminished rectal tone.    Musculoskeletal: He exhibits tenderness.        Right ankle: Normal. Normal range of motion.        Left ankle: Normal. Normal range of motion.        Lumbar back: He exhibits pain.        Right hand: Motor /Testing: The patient has normal right wrist strength.        Left hand: Motor /Testing: The patient has normal left wrist strength.   Neurological: He is alert and oriented to person, place, and time. No cranial nerve deficit or sensory deficit.   Normal  strength. Normal finger-to-nose. 5/5 UE strength.  Diminished flexion strength to bilateral hips. Normal plantar flexion. Slow, unsteady gait.    Skin: Skin is warm and dry.   Psychiatric: He has a normal mood and affect. His behavior is normal. Thought content normal.   Denies depressed mood, no current worsened stressors or SI       ED Course   Procedures  Labs Reviewed   URINALYSIS - Abnormal; Notable for the following:        Result Value    Protein, UA Trace (*)     Occult Blood UA 3+ (*)     All other components within normal limits   CBC W/ AUTO DIFFERENTIAL - Abnormal; Notable for the following:     Hemoglobin 13.0 (*)     Hematocrit 38.3 (*)     All other components within normal limits   COMPREHENSIVE METABOLIC PANEL - Abnormal; Notable for the following:     Glucose 147 (*)      (*)      (*)     All other components within normal limits   URINALYSIS MICROSCOPIC     Imaging Results          MRI Lumbar Spine Without Contrast (Final result)  Result time 08/26/17 21:55:20    Final result by Cristian Jacobsen MD (08/26/17 21:55:20)                 Impression:       1.  No evidence of fracture or listhesis of the lumbar spine.    2. L4-L5 Central disc extrusion with  significant narrowing of the lateral recesses.  Associated mild spinal canal narrowing.    3. Moderate bilateral neural foramina narrowing at the L4-5 level.        Electronically signed by: GIOVANNI TESFAYE MD  Date:     08/26/17  Time:    21:55              Narrative:    Exam: [id number]  08/26/17  21:02:24 [id number] ([facility]) : MRI LUMBAR SPINE WITHOUT CONTRAST    Technique:    Multiplanar and multisequence MRI of the lumbar spine was obtained without intravenous contrast.    Comparison:    CT scan of the lumbar spine dated 08/26/2017.    Findings:      There is straightening of the normal lumbar lordosis.  The vertebral body heights are maintained.  The bone marrow signal is within normal limits.  There is hypertrophy of the posterior elements.    The conus terminates at level of L1.  The cauda equina nerve roots are within normal limits.  There are no intraspinal collections.    There is multilevel disc desiccation.  This is most significant at the L4-L5 level.  Evaluation of individual disc levels reveals the following:    L1-L2, there is minimal disc bulge and facet hypertrophy.  The spinal canal and the neural foraminal are within normal limits.    L2-L3, there is minimal disc bulge along with facet hypertrophy and ligamentum flavum hypertrophy.  The spinal canal and the neural foraminal are within normal limits.    L3-L4, there is minimal disc bulge along with facet hypertrophy and ligamentum flavum hypertrophy.  The spinal canal is within normal limits.  There is mild bilateral neural foraminal narrowing.      L4-L5, there is a central disc extrusion.  There is significant narrowing of the lateral recesses. There is mild narrowing of the spinal canal.  There is moderate bilateral neural foraminal narrowing.    L5-S1, there is minimal disc bulge along with facet hypertrophy and ligamentum flavum hypertrophy.  The spinal canal and the neural foraminal are within normal limits.    The visualized paraspinal soft tissues are  within normal limits.                             CT Lumbar Spine Without Contrast (Final result)  Result time 08/26/17 19:56:12    Final result by Cristian Jacobsen MD (08/26/17 19:56:12)                 Impression:       No evidence of acute fracture or listhesis of the lumbar spine.    Multilevel degenerative changes of the lumbar spine with central disc extrusion at the L4-L5 level with associated moderate to severe spinal canal narrowing.  Noncontrast MRI of the lumbar spine may be obtained for further assessment.    Atherosclerotic disease of the abdominal aorta.              Electronically signed by: CRISTIAN JACOBSEN MD  Date:     08/26/17  Time:    19:56              Narrative:    Exam: [id number]  08/26/17  19:30:13 [id number] ([facility]) : CT LUMBAR SPINE WITHOUT CONTRAST    Technique:    Axial CT scan of the lumbar spine was performed without intravenous contrast. Coronal and Sagittal Reformats were also obtained.     Comparison:    X-rays of the lumbar spine dated 6/8/17.    Findings:      There are scattered atherosclerotic changes in the abdominal aorta and its branching vessels.  There is no evidence of lymphadenopathy in the retroperitoneal regions.  Subcentimeter lymph nodes are present. These are not enlarged by size criteria.  There is colonic diverticula without evidence of acute diverticulitis.  The visualized portions of the kidneys are unremarkable.    There is straightening of the normal lumbar lordosis.  There are 5 lumbar-type vertebral bodies.  The vertebral body heights are maintained.  There is hypertrophy of the posterior elements.    There is disc desiccation at multiple levels.  Evaluation of individual disc levels reveals the following:    L1-L2, there is a disc bulge along with facet hypertrophy and ligamentum flavum hypertrophy.  The spinal canal appears to be within normal limits.  The neural foraminal are within normal limits.    L2-3, there is diffuse disc bulge along with facet hypertrophy and  ligamentum hypertrophy.  The spinal canal is within normal limits.  There is mild bilateral neural foraminal narrowing.    L3-4, there is diffuse disc bulge along with facet hypertrophy and ligamentum flavum hypertrophy.  There is mild spinal canal narrowing.  There is mild bilateral neural foraminal narrowing.    L4-5, there is diffuse disc bulge along with facet hypertrophy and ligamentum flavum hypertrophy.  There is partially calcified extruded disc.  There is suggestion of a moderate to severe spinal canal narrowing.  There is moderate bilateral neural foraminal narrowing.    L5-S1, there is diffuse disc bulge along with facet hypertrophy and ligamentum hypertrophy.  The spinal canal is within normal limits.  There is moderate bilateral neural foraminal narrowing.                            EKG Readings: (Independently Interpreted)   Initial Reading: No STEMI.   Normal sinus rhythm at a rate of 88 bpm with 3 PVC's present.         Medical Decision Making:   History:   Old Medical Records: I decided to obtain old medical records.  Initial Assessment:   Urgent evaluation of 66-year-old gentleman brought in with complaints of generalized weakness.  Patient presents with his sister-in-law, who endorses family concerns for weight loss, abnormal gait, concern for depression.  Patient endorses chronic back pain, and reports general weakness to bilateral upper and lower extremities.  Patient has been seen PCP for same complaints, had lumbar imaging notable for stenosis at L4-S1. On exam pt does have slowed narrow gait and diminished rectal tone with weakened B hip flexion strength. Will plan to obtain imaging, consider MRI to spinal cord pathology.  Clinical Tests:   Lab Tests: Ordered and Reviewed  Radiological Study: Ordered and Reviewed  Medical Tests: Reviewed and Ordered  ED Management:  Pt made aware of elevated liver enzymes . UA w 3+ blood noted and need to follow w PCP. Lumbar MRI w mild canal narrowing and  plan established to follow up with back and spine clinic to establish need for PT and Neuro for further eval of perceived muscle weakness.            Scribe Attestation:   Scribe #1: I performed the above scribed service and the documentation accurately describes the services I performed. I attest to the accuracy of the note.    Attending Attestation:           Physician Attestation for Scribe:  Physician Attestation Statement for Scribe #1: I, Dr. Garcia, reviewed documentation, as scribed by Santa Miles in my presence, and it is both accurate and complete.                 ED Course     Clinical Impression:     1. Chronic bilateral low back pain without sciatica    2. Fatigue    3. Lower extremity weakness          Disposition:   Disposition: Discharged  Condition: Fair                        Destiny Garcia MD  08/27/17 1039

## 2017-08-30 DIAGNOSIS — I10 ESSENTIAL HYPERTENSION: Primary | ICD-10-CM

## 2017-08-30 DIAGNOSIS — E78.5 HYPERLIPIDEMIA, UNSPECIFIED HYPERLIPIDEMIA TYPE: ICD-10-CM

## 2017-08-30 RX ORDER — AMLODIPINE AND BENAZEPRIL HYDROCHLORIDE 10; 20 MG/1; MG/1
1 CAPSULE ORAL DAILY
Qty: 90 CAPSULE | Refills: 1 | Status: ON HOLD | OUTPATIENT
Start: 2017-08-30 | End: 2017-12-15 | Stop reason: HOSPADM

## 2017-08-30 RX ORDER — ATORVASTATIN CALCIUM 40 MG/1
40 TABLET, FILM COATED ORAL DAILY
Qty: 90 TABLET | Refills: 1 | Status: ON HOLD | OUTPATIENT
Start: 2017-08-30 | End: 2017-11-13 | Stop reason: HOSPADM

## 2017-08-30 NOTE — TELEPHONE ENCOUNTER
----- Message from Collene Tariq sent at 8/30/2017  9:54 AM CDT -----  Contact: Patient   x_  1st Request  _  2nd Request  _  3rd Request    Please refill the medication(s) listed below. Please call the patient when the prescription(s) is ready for  at the phone number (___[phone] .    Medication #1 amlodipine-benazepril 10-20mg (LOTREL) 10-20 mg per capsule 90 capsule     Medication #2 atorvastatin (LIPITOR) 40 MG tablet 90 tablet       Preferred Pharmacy:

## 2017-08-31 ENCOUNTER — OFFICE VISIT (OUTPATIENT)
Dept: SPINE | Facility: CLINIC | Age: 67
End: 2017-08-31
Attending: PHYSICAL MEDICINE & REHABILITATION
Payer: MEDICARE

## 2017-08-31 VITALS
SYSTOLIC BLOOD PRESSURE: 147 MMHG | WEIGHT: 270 LBS | HEIGHT: 70 IN | DIASTOLIC BLOOD PRESSURE: 65 MMHG | BODY MASS INDEX: 38.65 KG/M2 | HEART RATE: 80 BPM

## 2017-08-31 DIAGNOSIS — M48.061 SPINAL STENOSIS, LUMBAR: ICD-10-CM

## 2017-08-31 DIAGNOSIS — M51.36 DDD (DEGENERATIVE DISC DISEASE), LUMBAR: ICD-10-CM

## 2017-08-31 DIAGNOSIS — R53.1 GENERALIZED WEAKNESS: Primary | ICD-10-CM

## 2017-08-31 PROCEDURE — 3078F DIAST BP <80 MM HG: CPT | Mod: S$GLB,,, | Performed by: PHYSICAL MEDICINE & REHABILITATION

## 2017-08-31 PROCEDURE — 99204 OFFICE O/P NEW MOD 45 MIN: CPT | Mod: S$GLB,,, | Performed by: PHYSICAL MEDICINE & REHABILITATION

## 2017-08-31 PROCEDURE — 3077F SYST BP >= 140 MM HG: CPT | Mod: S$GLB,,, | Performed by: PHYSICAL MEDICINE & REHABILITATION

## 2017-08-31 PROCEDURE — 1159F MED LIST DOCD IN RCRD: CPT | Mod: S$GLB,,, | Performed by: PHYSICAL MEDICINE & REHABILITATION

## 2017-08-31 PROCEDURE — 3008F BODY MASS INDEX DOCD: CPT | Mod: S$GLB,,, | Performed by: PHYSICAL MEDICINE & REHABILITATION

## 2017-08-31 PROCEDURE — 1125F AMNT PAIN NOTED PAIN PRSNT: CPT | Mod: S$GLB,,, | Performed by: PHYSICAL MEDICINE & REHABILITATION

## 2017-08-31 PROCEDURE — 99999 PR PBB SHADOW E&M-EST. PATIENT-LVL III: CPT | Mod: PBBFAC,,, | Performed by: PHYSICAL MEDICINE & REHABILITATION

## 2017-08-31 NOTE — PROGRESS NOTES
Subjective:      Patient ID: Serafin Tapia is a 66 y.o. male.    Chief Complaint: Low-back Pain    Mr Tapia is a 67 yo male here for evaluation of low back pain.  He was in the ER for generalized weakness weight loss and functional decline on 8/26/2017.  He had an MRi lumbar spine.  No evidence of cauda equina.  He has low back pain pain since March 2017.  He feels like the pain was tolerable prior to that.  The pain is just in the lower back in the center of the back.  He does not have pain going down his legs.  He does feel like his arms and legs are weak.  He is limping.  He has had gait changes.  He feels like his balance is shaky.  He does not have numbness and tingling.  He has not had any changes in handwriting or ability to do buttons.  The back pain is not constant.  It goes away with sitting.  The pain is achy and pins and needles.  The pain is 0/10, pain is 6/10 with walking, he has to sit or stand still to get relief.  The legs do not get weaker with walking.  He feels like leg weakness is about the same.  He  Was given a muscle relaxer at er, but has not filled it.      X-ray lumbar 6/8/2017  There is normal alignment of the lumbar spine.  Vertebral body heights are preserved.  No fracture or dislocation.  There is mild disc space narrowing throughout, most pronounced at L4-S1, with facet degeneration L3-S1 and suggestion of foraminal stenoses L4-S1.  Impression       Disc and facet degeneration of the lower lumbar spine.      MRI lumbar 8/26/2017  There is straightening of the normal lumbar lordosis.  The vertebral body heights are maintained.  The bone marrow signal is within normal limits.  There is hypertrophy of the posterior elements.    The conus terminates at level of L1.  The cauda equina nerve roots are within normal limits.  There are no intraspinal collections.    There is multilevel disc desiccation.  This is most significant at the L4-L5 level.  Evaluation of individual disc levels reveals  the following:    L1-L2, there is minimal disc bulge and facet hypertrophy.  The spinal canal and the neural foraminal are within normal limits.    L2-L3, there is minimal disc bulge along with facet hypertrophy and ligamentum flavum hypertrophy.  The spinal canal and the neural foraminal are within normal limits.    L3-L4, there is minimal disc bulge along with facet hypertrophy and ligamentum flavum hypertrophy.  The spinal canal is within normal limits.  There is mild bilateral neural foraminal narrowing.      L4-L5, there is a central disc extrusion.  There is significant narrowing of the lateral recesses. There is mild narrowing of the spinal canal.  There is moderate bilateral neural foraminal narrowing.    L5-S1, there is minimal disc bulge along with facet hypertrophy and ligamentum flavum hypertrophy.  The spinal canal and the neural foraminal are within normal limits.    The visualized paraspinal soft tissues are within normal limits.  Impression        1.  No evidence of fracture or listhesis of the lumbar spine.    2. L4-L5 Central disc extrusion with significant narrowing of the lateral recesses.  Associated mild spinal canal narrowing.    3. Moderate bilateral neural foramina narrowing at the L4-5 level.    CT lumbar 8/26/2017  Findings:      There are scattered atherosclerotic changes in the abdominal aorta and its branching vessels.  There is no evidence of lymphadenopathy in the retroperitoneal regions.  Subcentimeter lymph nodes are present. These are not enlarged by size criteria.  There is colonic diverticula without evidence of acute diverticulitis.  The visualized portions of the kidneys are unremarkable.    There is straightening of the normal lumbar lordosis.  There are 5 lumbar-type vertebral bodies.  The vertebral body heights are maintained.  There is hypertrophy of the posterior elements.    There is disc desiccation at multiple levels.  Evaluation of individual disc levels reveals the  following:    L1-L2, there is a disc bulge along with facet hypertrophy and ligamentum flavum hypertrophy.  The spinal canal appears to be within normal limits.  The neural foraminal are within normal limits.    L2-3, there is diffuse disc bulge along with facet hypertrophy and ligamentum hypertrophy.  The spinal canal is within normal limits.  There is mild bilateral neural foraminal narrowing.    L3-4, there is diffuse disc bulge along with facet hypertrophy and ligamentum flavum hypertrophy.  There is mild spinal canal narrowing.  There is mild bilateral neural foraminal narrowing.    L4-5, there is diffuse disc bulge along with facet hypertrophy and ligamentum flavum hypertrophy.  There is partially calcified extruded disc.  There is suggestion of a moderate to severe spinal canal narrowing.  There is moderate bilateral neural foraminal narrowing.    L5-S1, there is diffuse disc bulge along with facet hypertrophy and ligamentum hypertrophy.  The spinal canal is within normal limits.  There is moderate bilateral neural foraminal narrowing.  Impression        No evidence of acute fracture or listhesis of the lumbar spine.    Multilevel degenerative changes of the lumbar spine with central disc extrusion at the L4-L5 level with associated moderate to severe spinal canal narrowing.  Noncontrast MRI of the lumbar spine may be obtained for further assessment.    Atherosclerotic disease of the abdominal aorta.      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*    Past Surgical History:  No date: TONSILLECTOMY    Review of patient's family history indicates:    Diabetes                       Mother                    Heart disease                  Mother                    Hypertension                    Mother                    Stroke                         Mother                    Heart disease                  Father                    Hypertension                   Father                    Diabetes                       Father                    Cancer                         Father                    Cataracts                      Sister                    Hypertension                   Sister                    Diabetes                       Sister                    Glaucoma                       Maternal Aunt             Glaucoma                       Paternal Aunt             Hypertension                   Brother                   Diabetes                       Brother                   No Known Problems              Maternal Grandmother      No Known Problems              Maternal Grandfather      No Known Problems              Paternal Grandmother      No Known Problems              Paternal Grandfather      Hypertension                   Son                       Hypertension                   Daughter                    Social History    Marital status: Single              Spouse name:                       Years of education:                 Number of children:               Occupational History  Occupation          Employer            Comment               Retired constructi*                         Social History Main Topics    Smoking status: Former Smoker                                                                Packs/day: 0.00      Years: 0.00           Types: Cigarettes    Smokeless tobacco: Never Used                        Comment: Quit smoking as a teenager    Alcohol use: Yes                Comment: 1-2 drinks per week    Drug use: No              Sexual activity: Yes               Partners with: Female       Birth control/protection: Condom    Social History Narrative    Lives w/daughter.         Current Outpatient Prescriptions:  amlodipine-benazepril 10-20mg (LOTREL) 10-20 mg  per capsule, Take 1 capsule by mouth once daily., Disp: 90 capsule, Rfl: 1  atorvastatin (LIPITOR) 40 MG tablet, Take 1 tablet (40 mg total) by mouth once daily., Disp: 90 tablet, Rfl: 1  carvedilol (COREG) 25 MG tablet, Take 1 tablet (25 mg total) by mouth 2 (two) times daily with meals., Disp: 180 tablet, Rfl: 1  glimepiride (AMARYL) 4 MG tablet, Take 1 tablet (4 mg total) by mouth before breakfast., Disp: 90 tablet, Rfl: 3  hydrochlorothiazide (HYDRODIURIL) 25 MG tablet, Take 1 tablet (25 mg total) by mouth once daily., Disp: 90 tablet, Rfl: 1  metformin (GLUCOPHAGE) 1000 MG tablet, TAKE ONE TABLET BY MOUTH TWICE A DAY WITH MEALS, Disp: 180 tablet, Rfl: 1  methocarbamol (ROBAXIN) 500 MG Tab, Take 2 tablets (1,000 mg total) by mouth 3 (three) times daily., Disp: 30 tablet, Rfl: 0  rivaroxaban (XARELTO) 20 mg Tab, Take 1 tablet (20 mg total) by mouth once daily., Disp: 30 tablet, Rfl: 3  spironolactone (ALDACTONE) 25 MG tablet, Take 1 tablet (25 mg total) by mouth once daily., Disp: 90 tablet, Rfl: 1    No current facility-administered medications for this visit.       Review of patient's allergies indicates:  No Known Allergies          Review of Systems   Constitution: Positive for weakness and weight loss.   Cardiovascular: Negative for chest pain.   Respiratory: Negative for shortness of breath.    Musculoskeletal: Positive for back pain and muscle weakness. Negative for joint pain and joint swelling.   Gastrointestinal: Negative for abdominal pain and bowel incontinence.   Genitourinary: Negative for bladder incontinence.   Neurological: Positive for disturbances in coordination. Negative for numbness.         Objective:        General: Serafin is well-developed, well-nourished, appears stated age, in no acute distress, alert and oriented to time, place and person.     General    Vitals reviewed.  Constitutional: He is oriented to person, place, and time. He appears well-developed and well-nourished.   HENT:    Head: Normocephalic and atraumatic.   Pulmonary/Chest: Effort normal.   Neurological: He is alert and oriented to person, place, and time.   Psychiatric: He has a normal mood and affect. His behavior is normal. Judgment and thought content normal.     General Musculoskeletal Exam   Gait: normal     Right Ankle/Foot Exam     Tests   Heel Walk: unable to perform  Tiptoe Walk: able to perform    Left Ankle/Foot Exam     Tests   Heel Walk: unable to perform  Tiptoe Walk: able to perform  Back (L-Spine & T-Spine) / Neck (C-Spine) Exam     Tenderness Posterior midline palpation reveals tenderness of the Lower L-Spine.     Back (L-Spine & T-Spine) Range of Motion   Extension: 20   Flexion: 80   Lateral Bend Right: 10   Lateral Bend Left: 10   Rotation Right: 30   Rotation Left: 30     Spinal Sensation   Right Side Sensation  C-Spine Level: normal   L-Spine Level: normal  S-Spine Level: normal  Left Side Sensation  C-Spine Level: normal  L-Spine Level: normal  S-Spine Level: normal    Back (L-Spine & T-Spine) Tests   Right Side Tests  Straight leg raise:      Sitting SLR: > 70 degrees      Left Side Tests  Straight leg raise:     Sitting SLR: > 70 degrees          Other He has no scoliosis .  Spinal Kyphosis:  Absent      Muscle Strength   Right Upper Extremity   Biceps: 5/5/5   Deltoid:  5/5  Triceps:  5/5  Wrist Extension: 5/5/5   Finger Flexors:  5/5  Left Upper Extremity  Biceps: 5/5/5   Deltoid:  5/5  Triceps:  5/5  Wrist Extension: 5/5/5   Finger Flexors:  5/5  Right Lower Extremity   Hip Flexion: 3/5   Quadriceps:  5/5   Anterior tibial:  5/5/5  Left Lower Extremity   Hip Flexion: 3/5   Quadriceps:  5/5   Anterior tibial:  5/5/5     Reflexes     Left Side  Biceps:  2+  Triceps:  2+  Brachioradialis:  2+  Quadriceps:  2+  Achilles:  2+  Left Munson's Sign:  Absent  Babinski Sign:  absent    Right Side   Biceps:  2+  Triceps:  2+  Brachioradialis:  2+  Quadriceps:  2+  Achilles:  2+  Right Musnon's Sign:   absent  Babinski Sign:  absent    Vascular Exam     Right Pulses        Carotid:                  2+    Left Pulses        Carotid:                  2+              Assessment:       1. Generalized weakness    2. Spinal stenosis, lumbar    3. DDD (degenerative disc disease), lumbar           Plan:       Orders Placed This Encounter    MRI Cervical Spine Without Contrast    MRI Thoracic Spine Without Contrast    Ambulatory Referral to Physical/Occupational Therapy     More than 50% of the total time of 45 minutes was spent in counseling on diagnosis and treatment options. He was in the ER for generalized weakness weight loss and functional decline on 8/26/2017.  MRi of the lumbar spine was reviewed.  Weakness is upper and lower extremity.  We need to get cervical and thoracic imaging, to rule out myelopathy.  We discussed back pain and claudication.  He has symptoms of claudication, but pain is midline and non radiating in lower lumbar, he does have moderate stenosis at L4-5.    We discussed the benefits of therapy and exercise and continuing to move.  1.  MRI cervical and thoracic  2.  PT for gait training, flexion exercises core strengthening and general strengthening at [facility]  3.  Tylenol 2 pills twice a day  4.  He would like to see Dr. Falcon  5.  We discussed following with Dr. Fuchs about the concerns about decline  6.  He will wait on robaxin, he has not gotten it filled  7.  rtc 4-6 weeks      Follow-up: Return in about 4 weeks (around 9/28/2017). If there are any questions prior to this, the patient was instructed to contact the office.

## 2017-09-01 ENCOUNTER — TELEPHONE (OUTPATIENT)
Dept: NEUROLOGY | Facility: CLINIC | Age: 67
End: 2017-09-01

## 2017-09-01 NOTE — TELEPHONE ENCOUNTER
LM for patient to contact our office to cancel appt. 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 Also patient is scheduled as a EP, and has not seen . Should continue care with current treating doctor/.

## 2017-09-07 ENCOUNTER — HOSPITAL ENCOUNTER (OUTPATIENT)
Dept: RADIOLOGY | Facility: OTHER | Age: 67
Discharge: HOME | End: 2017-09-07
Attending: PHYSICAL MEDICINE & REHABILITATION
Payer: MEDICARE

## 2017-09-07 DIAGNOSIS — M48.061 SPINAL STENOSIS, LUMBAR: ICD-10-CM

## 2017-09-07 DIAGNOSIS — R53.1 GENERALIZED WEAKNESS: ICD-10-CM

## 2017-09-07 PROCEDURE — 72146 MRI CHEST SPINE W/O DYE: CPT | Mod: 26,,, | Performed by: RADIOLOGY

## 2017-09-07 PROCEDURE — 72146 MRI CHEST SPINE W/O DYE: CPT | Mod: TC

## 2017-09-07 PROCEDURE — 72141 MRI NECK SPINE W/O DYE: CPT | Mod: TC

## 2017-09-07 PROCEDURE — 72141 MRI NECK SPINE W/O DYE: CPT | Mod: 26,,, | Performed by: RADIOLOGY

## 2017-09-13 NOTE — TELEPHONE ENCOUNTER
----- Message from Ron Hernandez sent at 5/2/2017 11:57 AM CDT -----  Contact: VALDO VEGA [[id number]]  X_  1st Request  _  2nd Request  _  3rd Request    Please refill the medication(s) listed below. Please call the patient when the prescription(s) is ready for  at the phone number [phone]    Medication #1    amlodipine-benazepril 10-20mg (LOTREL) 10-20 mg per capsule 90 capsule 1 8/24/2016  No  Sig - Route: Take 1 capsule by mouth once daily. - Oral  Class: Normal  Order: [id number]  Date/Time Signed: 8/24/2016  8:16 AM      E-Prescribing Status: Receipt confirmed by pharmacy (8/24/2016  8:16 AM CDT)        Medication #2     Disp Refills Start End MOODY  XARELTO 20 mg Tab (Discontinued) 30 tablet 5 11/27/2015 7/5/2016 No  Sig: TAKE ONE TABLET BY MOUTH DAILY  Class: Normal  Reason for Discontinue: Reorder  Order: [id number]  Date/Time Signed: 11/27/2015 12:55 PM      E-Prescribing Status: Receipt confirmed by pharmacy (11/27/2015 12:55 PM CST)    Medication #3     atorvastatin (LIPITOR) 40 MG tablet 90 tablet 3 8/24/2016  No  Sig - Route: Take 1 tablet (40 mg total) by mouth once daily. - Oral  Class: Normal  Order: [id number]  Date/Time Signed: 8/24/2016  8:16 AM      E-Prescribing Status: Receipt confirmed by pharmacy (8/24/2016  8:16 AM CDT)              Preferred Pharmacy:    [id number] - [address]   Statement Selected

## 2017-09-18 ENCOUNTER — TELEPHONE (OUTPATIENT)
Dept: SPINE | Facility: CLINIC | Age: 67
End: 2017-09-18

## 2017-09-18 DIAGNOSIS — M51.36 DDD (DEGENERATIVE DISC DISEASE), LUMBAR: ICD-10-CM

## 2017-09-18 DIAGNOSIS — M48.061 SPINAL STENOSIS, LUMBAR: ICD-10-CM

## 2017-09-18 DIAGNOSIS — R53.1 GENERALIZED WEAKNESS: Primary | ICD-10-CM

## 2017-09-18 NOTE — TELEPHONE ENCOUNTER
Patient wants PT orders for [id number] [facility] [id number]  ----- Message from Sumaya Argueta sent at 9/18/2017 11:02 AM CDT -----  _x  1st Request  _  2nd Request  _  3rd Request        Who:  arcadio    Why: pt. Needs referral for physical therapy [facility]. Please call to discuss     What Number to Call Back:[phone]    When to Expect a call back: (With in 24 hours)

## 2017-09-18 NOTE — TELEPHONE ENCOUNTER
Pt orders were reprinted for gait training, flexion exercises core strengthening and general strengthening and will be faxed to [facility] [place]

## 2017-09-20 ENCOUNTER — TELEPHONE (OUTPATIENT)
Dept: NEUROLOGY | Facility: CLINIC | Age: 67
End: 2017-09-20

## 2017-09-20 ENCOUNTER — LAB VISIT (OUTPATIENT)
Dept: LAB | Facility: OTHER | Age: 67
End: 2017-09-20
Attending: PSYCHIATRY & NEUROLOGY
Payer: MEDICARE

## 2017-09-20 ENCOUNTER — OFFICE VISIT (OUTPATIENT)
Dept: NEUROLOGY | Facility: CLINIC | Age: 67
End: 2017-09-20
Payer: MEDICARE

## 2017-09-20 VITALS
HEART RATE: 84 BPM | BODY MASS INDEX: 39.33 KG/M2 | WEIGHT: 274.69 LBS | HEIGHT: 70 IN | SYSTOLIC BLOOD PRESSURE: 157 MMHG | DIASTOLIC BLOOD PRESSURE: 73 MMHG

## 2017-09-20 DIAGNOSIS — E11.42 CONTROLLED TYPE 2 DIABETES MELLITUS WITH DIABETIC POLYNEUROPATHY, WITHOUT LONG-TERM CURRENT USE OF INSULIN: Primary | ICD-10-CM

## 2017-09-20 DIAGNOSIS — E78.00 PURE HYPERCHOLESTEROLEMIA: ICD-10-CM

## 2017-09-20 DIAGNOSIS — I10 ESSENTIAL HYPERTENSION: ICD-10-CM

## 2017-09-20 DIAGNOSIS — M47.816 SPONDYLOSIS OF LUMBAR REGION WITHOUT MYELOPATHY OR RADICULOPATHY: ICD-10-CM

## 2017-09-20 DIAGNOSIS — E11.42 CONTROLLED TYPE 2 DIABETES MELLITUS WITH DIABETIC POLYNEUROPATHY, WITHOUT LONG-TERM CURRENT USE OF INSULIN: ICD-10-CM

## 2017-09-20 LAB
ERYTHROCYTE [SEDIMENTATION RATE] IN BLOOD BY WESTERGREN METHOD: 23 MM/HR
VIT B12 SERPL-MCNC: 207 PG/ML

## 2017-09-20 PROCEDURE — 99204 OFFICE O/P NEW MOD 45 MIN: CPT | Mod: S$GLB,,, | Performed by: PSYCHIATRY & NEUROLOGY

## 2017-09-20 PROCEDURE — 3077F SYST BP >= 140 MM HG: CPT | Mod: S$GLB,,, | Performed by: PSYCHIATRY & NEUROLOGY

## 2017-09-20 PROCEDURE — 3008F BODY MASS INDEX DOCD: CPT | Mod: S$GLB,,, | Performed by: PSYCHIATRY & NEUROLOGY

## 2017-09-20 PROCEDURE — 82607 VITAMIN B-12: CPT

## 2017-09-20 PROCEDURE — 1159F MED LIST DOCD IN RCRD: CPT | Mod: S$GLB,,, | Performed by: PSYCHIATRY & NEUROLOGY

## 2017-09-20 PROCEDURE — 3078F DIAST BP <80 MM HG: CPT | Mod: S$GLB,,, | Performed by: PSYCHIATRY & NEUROLOGY

## 2017-09-20 PROCEDURE — 86038 ANTINUCLEAR ANTIBODIES: CPT

## 2017-09-20 PROCEDURE — 3044F HG A1C LEVEL LT 7.0%: CPT | Mod: S$GLB,,, | Performed by: PSYCHIATRY & NEUROLOGY

## 2017-09-20 PROCEDURE — 36415 COLL VENOUS BLD VENIPUNCTURE: CPT

## 2017-09-20 PROCEDURE — 99999 PR PBB SHADOW E&M-EST. PATIENT-LVL III: CPT | Mod: PBBFAC,,, | Performed by: PSYCHIATRY & NEUROLOGY

## 2017-09-20 PROCEDURE — 4010F ACE/ARB THERAPY RXD/TAKEN: CPT | Mod: S$GLB,,, | Performed by: PSYCHIATRY & NEUROLOGY

## 2017-09-20 PROCEDURE — 1125F AMNT PAIN NOTED PAIN PRSNT: CPT | Mod: S$GLB,,, | Performed by: PSYCHIATRY & NEUROLOGY

## 2017-09-20 PROCEDURE — 83519 RIA NONANTIBODY: CPT

## 2017-09-20 PROCEDURE — 85651 RBC SED RATE NONAUTOMATED: CPT

## 2017-09-20 PROCEDURE — 99499 UNLISTED E&M SERVICE: CPT | Mod: S$GLB,,, | Performed by: PSYCHIATRY & NEUROLOGY

## 2017-09-20 NOTE — PATIENT INSTRUCTIONS
Discussed with patient.  His symptoms of weakness in the lower extremities appears to be a combination of a diabetic peripheral polyneuropathy and lumbar spine disease with associated radiculopathy.  We will get an EMG/NCS done, lower extremities.  Blood work included B12, myasthenia gravis panel, HALLE and sedimentation rate.

## 2017-09-20 NOTE — PROGRESS NOTES
Subjective:       Patient ID: Serafin Tapia is a 66 y.o. male.    Chief Complaint:  Numbness and Back Pain      History of Present Illness  HPI   This is a 66-year-old -American male who was referred for evaluation of weakness of the lower extremities and chronic low back pain.  The patient reports that he first noted problems about a year ago however over the past 6 months symptoms have progressively become worse.  He lives alone and is able to take care of his day-to-day needs at home without any problems as he does not have to climb any steps.  He has had no falls.  However he does have difficulty putting on his socks as he has to call his daughter who lives a couple of houses down, to help him out.  The weakness is primarily distally as he reports that he tends to drag his feet primarily on the right.  He has had back problems for a couple of years and this fluctuates in intensity.  He has occasional pain radiating down the right leg.  He has had burning and numbness in both feet though more prominent on the right.  He denies any problems with the upper extremities.  He ambulates using a cane for stability.  He has had a history of diabetes for a few years.  His last hemoglobin A1c was 6.7.  He denies any history of trauma.  He denies any bowel or bladder difficulties.    An MRI scan of the cervical spine done on 09/7/2017 showed multilevel degenerative changes of the cervical spine with moderate to severe spinal canal narrowing extending from C3-C6.  No evidence of cord edema.  Variable moderate to severe neural foraminal narrowing extending from C4-C6. An MRI scan of the thoracic spine showed multilevel degenerative disc disease however, this does not result in central canal or neural foraminal stenosis.  An MRI scan of the lumbar spine showed no evidence of fracture or listhesis of the lumbar spine.  L4-L5 Central disc extrusion with significant narrowing of the lateral recesses.  Associated mild spinal  canal narrowing.  Moderate bilateral neural foramina narrowing at the L4-5 level.       Review of Systems  Review of Systems   HENT: Negative.  Negative for hearing loss.    Eyes: Negative.  Negative for visual disturbance.   Respiratory: Negative.  Negative for shortness of breath.    Cardiovascular: Negative.  Negative for chest pain and palpitations.   Gastrointestinal: Negative.    Endocrine: Negative.    Genitourinary: Negative.    Musculoskeletal: Positive for back pain, gait problem and neck pain.   Skin: Negative.    Allergic/Immunologic: Negative.    Neurological: Positive for weakness and numbness. Negative for dizziness, tremors, seizures, syncope, speech difficulty and headaches.   Hematological: Negative.    Psychiatric/Behavioral: Negative.  Negative for decreased concentration and sleep disturbance.       Objective:      Neurologic Exam     Mental Status   Oriented to person, place, and time.   Registration: recalls 3 of 3 objects. Follows 3 step commands.   Attention: normal. Concentration: normal.   Speech: speech is normal   Level of consciousness: alert  Knowledge: good.   Able to name object. Able to read. Able to repeat. Able to write. Normal comprehension.     Cranial Nerves   Cranial nerves II through XII intact.     Motor Exam   Muscle bulk: normal  Overall muscle tone: normal    Strength   Strength 5/5 except as noted. The patient has weakness of lower extremities predominantly distal, with dorsiflexion affected more than plantar flexion, with right greater than left weakness.  He also has some proximal muscle weakness mild difficulty getting up from a chair.  He can ambulate without assistance slowly and tends to drag his right foot a little.  He uses a cane for stability.  He has difficulty getting up a step    No weakness in the upper extremities..     Sensory Exam   Right leg light touch: decreased from toes  Left leg light touch: decreased from toes  Right arm vibration: normal  Left  arm vibration: normal  Right leg vibration: decreased from ankle (Vibration sense less than 5 seconds at the toes)  Left leg vibration: decreased from ankle  Right arm proprioception: normal  Left arm proprioception: normal  Right leg proprioception: decreased from toes  Left leg proprioception: decreased from toes  Right arm pinprick: normal  Left arm pinprick: normal  Right leg pinprick: decreased from ankle  Left leg pinprick: decreased from ankle    Gait, Coordination, and Reflexes     Gait  Gait: (Walks slowly and tends to drag his feet especially the right)    Coordination   Positive Romberg's test: Equivocal with eyes closed.  Finger to nose coordination: normal    Tremor   Resting tremor: absent  Intention tremor: absent  Action tremor: absent    Reflexes   Right brachioradialis: 0  Left brachioradialis: 0  Right biceps: 0  Left biceps: 0  Right triceps: 0  Left triceps: 0  Right patellar: 0  Left patellar: 0  Right achilles: 0  Left achilles: 0  Right plantar: normal  Left plantar: normal      Physical Exam   Constitutional: He is oriented to person, place, and time. He appears well-developed and well-nourished.   HENT:   Head: Normocephalic and atraumatic.   Neck: Normal range of motion. Neck supple. Carotid bruit is not present.   Neurological: He is oriented to person, place, and time. He has a normal Finger-Nose-Finger Test. Positive Romberg's test: Equivocal with eyes closed.   Reflex Scores:       Tricep reflexes are 0 on the right side and 0 on the left side.       Bicep reflexes are 0 on the right side and 0 on the left side.       Brachioradialis reflexes are 0 on the right side and 0 on the left side.       Patellar reflexes are 0 on the right side and 0 on the left side.       Achilles reflexes are 0 on the right side and 0 on the left side.  Psychiatric: His speech is normal.   Vitals reviewed.        Assessment:        1. Controlled type 2 diabetes mellitus with diabetic polyneuropathy, without  long-term current use of insulin    2. 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 Essential hypertension    4. Pure hypercholesterolemia            Plan:       Discussed with patient.  His symptoms of weakness in the lower extremities appears to be a combination of a diabetic peripheral polyneuropathy and lumbar spine disease with associated radiculopathy.  We will get an EMG/NCS done, lower extremities.  Blood work included B12, myasthenia gravis panel, HALLE and sedimentation rate.  He will follow-up after the testing.

## 2017-09-21 LAB — ANA SER QL IF: NORMAL

## 2017-09-22 RX ORDER — SPIRONOLACTONE 25 MG/1
TABLET ORAL
Qty: 90 TABLET | Refills: 0 | Status: ON HOLD | OUTPATIENT
Start: 2017-09-22 | End: 2017-12-15 | Stop reason: HOSPADM

## 2017-09-25 LAB
ACHR BIND AB SER-SCNC: 0 NMOL/L
ACHR MOD AB/ACHR TOTAL SFR SER: 0 %
STRIA MUS AB TITR SER: NEGATIVE TITER
VGCC-N BIND AB SER-SCNC: NORMAL PMOL/L

## 2017-10-29 ENCOUNTER — HOSPITAL ENCOUNTER (INPATIENT)
Facility: OTHER | Age: 67
LOS: 15 days | Discharge: NURSING, REHAB OR LONG-TERM CARE | DRG: 637 | End: 2017-11-13
Attending: EMERGENCY MEDICINE | Admitting: HOSPITALIST
Payer: MEDICARE

## 2017-10-29 DIAGNOSIS — E53.8 FOLATE DEFICIENCY: ICD-10-CM

## 2017-10-29 DIAGNOSIS — E55.9 VITAMIN D DEFICIENCY: ICD-10-CM

## 2017-10-29 DIAGNOSIS — E03.9 HYPOTHYROIDISM (ACQUIRED): ICD-10-CM

## 2017-10-29 DIAGNOSIS — M62.82 NON-TRAUMATIC RHABDOMYOLYSIS: ICD-10-CM

## 2017-10-29 DIAGNOSIS — E78.5 DYSLIPIDEMIA: ICD-10-CM

## 2017-10-29 DIAGNOSIS — N17.9 ACUTE KIDNEY INJURY: ICD-10-CM

## 2017-10-29 DIAGNOSIS — I51.89 DIASTOLIC DYSFUNCTION: ICD-10-CM

## 2017-10-29 DIAGNOSIS — Z86.711 HISTORY OF PULMONARY EMBOLISM: ICD-10-CM
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
## 2017-10-29 DIAGNOSIS — E66.01 MORBID OBESITY WITH BMI OF 40.0-44.9, ADULT: ICD-10-CM

## 2017-10-29 DIAGNOSIS — T38.3X5A: Primary | ICD-10-CM

## 2017-10-29 DIAGNOSIS — E11.42 CONTROLLED TYPE 2 DIABETES MELLITUS WITH DIABETIC POLYNEUROPATHY, WITHOUT LONG-TERM CURRENT USE OF INSULIN: ICD-10-CM

## 2017-10-29 DIAGNOSIS — I10 ESSENTIAL HYPERTENSION: ICD-10-CM

## 2017-10-29 LAB
ALBUMIN SERPL BCP-MCNC: 3.3 G/DL
ALP SERPL-CCNC: 85 U/L
ALT SERPL W/O P-5'-P-CCNC: 98 U/L
ANION GAP SERPL CALC-SCNC: 10 MMOL/L
AST SERPL-CCNC: 118 U/L
BACTERIA #/AREA URNS HPF: NORMAL /HPF
BASOPHILS # BLD AUTO: 0 K/UL
BASOPHILS NFR BLD: 0 %
BILIRUB SERPL-MCNC: 0.4 MG/DL
BILIRUB UR QL STRIP: NEGATIVE
BNP SERPL-MCNC: 92 PG/ML
BUN SERPL-MCNC: 43 MG/DL
CALCIUM SERPL-MCNC: 9.5 MG/DL
CHLORIDE SERPL-SCNC: 98 MMOL/L
CLARITY UR: CLEAR
CO2 SERPL-SCNC: 29 MMOL/L
COLOR UR: YELLOW
CREAT SERPL-MCNC: 2.3 MG/DL
CREAT UR-MCNC: 67 MG/DL
DIFFERENTIAL METHOD: ABNORMAL
EOSINOPHIL # BLD AUTO: 0 K/UL
EOSINOPHIL NFR BLD: 0.1 %
ERYTHROCYTE [DISTWIDTH] IN BLOOD BY AUTOMATED COUNT: 13.7 %
EST. GFR  (AFRICAN AMERICAN): 33 ML/MIN/1.73 M^2
EST. GFR  (NON AFRICAN AMERICAN): 29 ML/MIN/1.73 M^2
GLUCOSE SERPL-MCNC: 257 MG/DL
GLUCOSE UR QL STRIP: NEGATIVE
HCT VFR BLD AUTO: 44.1 %
HGB BLD-MCNC: 14.4 G/DL
HGB UR QL STRIP: ABNORMAL
HYALINE CASTS #/AREA URNS LPF: 1 /LPF
KETONES UR QL STRIP: NEGATIVE
LEUKOCYTE ESTERASE UR QL STRIP: NEGATIVE
LYMPHOCYTES # BLD AUTO: 0.6 K/UL
LYMPHOCYTES NFR BLD: 8.7 %
MCH RBC QN AUTO: 27.6 PG
MCHC RBC AUTO-ENTMCNC: 32.7 G/DL
MCV RBC AUTO: 85 FL
MICROSCOPIC COMMENT: NORMAL
MONOCYTES # BLD AUTO: 0.2 K/UL
MONOCYTES NFR BLD: 3.6 %
NEUTROPHILS # BLD AUTO: 5.9 K/UL
NEUTROPHILS NFR BLD: 87.6 %
NITRITE UR QL STRIP: NEGATIVE
PH UR STRIP: 6 [PH] (ref 5–8)
PLATELET # BLD AUTO: 335 K/UL
PMV BLD AUTO: 11.4 FL
POCT GLUCOSE: 113 MG/DL (ref 70–110)
POCT GLUCOSE: 123 MG/DL (ref 70–110)
POCT GLUCOSE: 180 MG/DL (ref 70–110)
POCT GLUCOSE: 37 MG/DL (ref 70–110)
POCT GLUCOSE: 69 MG/DL (ref 70–110)
POCT GLUCOSE: 80 MG/DL (ref 70–110)
POCT GLUCOSE: 88 MG/DL (ref 70–110)
POCT GLUCOSE: 99 MG/DL (ref 70–110)
POTASSIUM SERPL-SCNC: 4.9 MMOL/L
PROT SERPL-MCNC: 7.2 G/DL
PROT UR QL STRIP: ABNORMAL
RBC # BLD AUTO: 5.21 M/UL
RBC #/AREA URNS HPF: 2 /HPF (ref 0–4)
SODIUM SERPL-SCNC: 137 MMOL/L
SODIUM UR-SCNC: 53 MMOL/L
SP GR UR STRIP: 1.01 (ref 1–1.03)
SQUAMOUS #/AREA URNS HPF: 6 /HPF
URN SPEC COLLECT METH UR: ABNORMAL
UROBILINOGEN UR STRIP-ACNC: NEGATIVE EU/DL
UUN UR-MCNC: 428 MG/DL
WBC # BLD AUTO: 6.76 K/UL
WBC #/AREA URNS HPF: 1 /HPF (ref 0–5)

## 2017-10-29 PROCEDURE — 96374 THER/PROPH/DIAG INJ IV PUSH: CPT

## 2017-10-29 PROCEDURE — 25000003 PHARM REV CODE 250: Performed by: HOSPITALIST

## 2017-10-29 PROCEDURE — 11000001 HC ACUTE MED/SURG PRIVATE ROOM

## 2017-10-29 PROCEDURE — 99285 EMERGENCY DEPT VISIT HI MDM: CPT | Mod: 25

## 2017-10-29 PROCEDURE — 84540 ASSAY OF URINE/UREA-N: CPT

## 2017-10-29 PROCEDURE — 36415 COLL VENOUS BLD VENIPUNCTURE: CPT

## 2017-10-29 PROCEDURE — 80053 COMPREHEN METABOLIC PANEL: CPT

## 2017-10-29 PROCEDURE — 81000 URINALYSIS NONAUTO W/SCOPE: CPT

## 2017-10-29 PROCEDURE — 83036 HEMOGLOBIN GLYCOSYLATED A1C: CPT

## 2017-10-29 PROCEDURE — 96361 HYDRATE IV INFUSION ADD-ON: CPT

## 2017-10-29 PROCEDURE — 99223 1ST HOSP IP/OBS HIGH 75: CPT | Mod: ,,, | Performed by: HOSPITALIST

## 2017-10-29 PROCEDURE — 82962 GLUCOSE BLOOD TEST: CPT

## 2017-10-29 PROCEDURE — 85025 COMPLETE CBC W/AUTO DIFF WBC: CPT

## 2017-10-29 PROCEDURE — 25000003 PHARM REV CODE 250

## 2017-10-29 PROCEDURE — 63600175 PHARM REV CODE 636 W HCPCS: Performed by: HOSPITALIST

## 2017-10-29 PROCEDURE — 82570 ASSAY OF URINE CREATININE: CPT

## 2017-10-29 PROCEDURE — 84300 ASSAY OF URINE SODIUM: CPT

## 2017-10-29 PROCEDURE — 83880 ASSAY OF NATRIURETIC PEPTIDE: CPT

## 2017-10-29 RX ORDER — ACETAMINOPHEN 325 MG/1
650 TABLET ORAL EVERY 4 HOURS PRN
Status: DISCONTINUED | OUTPATIENT
Start: 2017-10-29 | End: 2017-11-13 | Stop reason: HOSPADM

## 2017-10-29 RX ORDER — ONDANSETRON 2 MG/ML
4 INJECTION INTRAMUSCULAR; INTRAVENOUS EVERY 12 HOURS PRN
Status: DISCONTINUED | OUTPATIENT
Start: 2017-10-29 | End: 2017-11-13 | Stop reason: HOSPADM

## 2017-10-29 RX ORDER — DEXTROSE MONOHYDRATE AND SODIUM CHLORIDE 5; .9 G/100ML; G/100ML
INJECTION, SOLUTION INTRAVENOUS CONTINUOUS
Status: DISCONTINUED | OUTPATIENT
Start: 2017-10-29 | End: 2017-10-31

## 2017-10-29 RX ORDER — IBUPROFEN 200 MG
16 TABLET ORAL
Status: DISCONTINUED | OUTPATIENT
Start: 2017-10-29 | End: 2017-11-13 | Stop reason: HOSPADM

## 2017-10-29 RX ORDER — OXYCODONE HYDROCHLORIDE 5 MG/1
10 TABLET ORAL EVERY 4 HOURS PRN
Status: DISCONTINUED | OUTPATIENT
Start: 2017-10-29 | End: 2017-11-13 | Stop reason: HOSPADM

## 2017-10-29 RX ORDER — ATORVASTATIN CALCIUM 20 MG/1
40 TABLET, FILM COATED ORAL DAILY
Status: DISCONTINUED | OUTPATIENT
Start: 2017-10-29 | End: 2017-11-01

## 2017-10-29 RX ORDER — CARVEDILOL 12.5 MG/1
25 TABLET ORAL 2 TIMES DAILY WITH MEALS
Status: DISCONTINUED | OUTPATIENT
Start: 2017-10-29 | End: 2017-11-13 | Stop reason: HOSPADM

## 2017-10-29 RX ORDER — IBUPROFEN 200 MG
24 TABLET ORAL
Status: DISCONTINUED | OUTPATIENT
Start: 2017-10-29 | End: 2017-11-13 | Stop reason: HOSPADM

## 2017-10-29 RX ORDER — OXYCODONE HYDROCHLORIDE 5 MG/1
5 TABLET ORAL EVERY 4 HOURS PRN
Status: DISCONTINUED | OUTPATIENT
Start: 2017-10-29 | End: 2017-11-13 | Stop reason: HOSPADM

## 2017-10-29 RX ORDER — HEPARIN SODIUM 5000 [USP'U]/ML
5000 INJECTION, SOLUTION INTRAVENOUS; SUBCUTANEOUS EVERY 8 HOURS
Status: DISCONTINUED | OUTPATIENT
Start: 2017-10-29 | End: 2017-10-31

## 2017-10-29 RX ORDER — DEXTROSE 50 % IN WATER (D50W) INTRAVENOUS SYRINGE
Status: COMPLETED
Start: 2017-10-29 | End: 2017-10-29

## 2017-10-29 RX ORDER — GLUCAGON 1 MG
1 KIT INJECTION
Status: DISCONTINUED | OUTPATIENT
Start: 2017-10-29 | End: 2017-11-13 | Stop reason: HOSPADM

## 2017-10-29 RX ADMIN — DEXTROSE MONOHYDRATE 25 G: 25 INJECTION, SOLUTION INTRAVENOUS at 09:10

## 2017-10-29 RX ADMIN — OXYCODONE HYDROCHLORIDE 5 MG: 5 TABLET ORAL at 09:10

## 2017-10-29 RX ADMIN — HEPARIN SODIUM 5000 UNITS: 5000 INJECTION, SOLUTION INTRAVENOUS; SUBCUTANEOUS at 09:10

## 2017-10-29 RX ADMIN — DEXTROSE AND SODIUM CHLORIDE: 5; .9 INJECTION, SOLUTION INTRAVENOUS at 02:10

## 2017-10-29 RX ADMIN — ATORVASTATIN CALCIUM 40 MG: 20 TABLET, FILM COATED ORAL at 05:10

## 2017-10-29 RX ADMIN — CARVEDILOL 25 MG: 12.5 TABLET, FILM COATED ORAL at 05:10

## 2017-10-29 NOTE — ASSESSMENT & PLAN NOTE
Hypoglycemia due to decreased clearance due to renal failure.  Will start intravenous dextrose as sulfonylurea has a long half life to prevent further hypoglycemia in the setting of renal failure.

## 2017-10-29 NOTE — PLAN OF CARE
10/29/17 1226   ED Admissions Case Approval   ED Admissions Case Approval CM Approved  (Inpatient for [facility])

## 2017-10-29 NOTE — HPI
Patient is a 66 year-old male with hypertension, diabetes mellitus type II, dyslipidemia who presents to the emergency department by the emergency medical services after he was found to be confused with capillary blood glucose measurement of 40 mg/dL.  Patient was given an ampule of dextrose with resolution of hypoglycemia and normalization of his mental status.  However while in the emergency department he had recurrence of hypoglycemia requiring further intervenous dextrose.  He denies any recent changes in his medications or recent illnesses.  He reports no changes in his diet.  He reports that he has not been checking his blood glucose as his home glucometer is broken.

## 2017-10-29 NOTE — ASSESSMENT & PLAN NOTE
Trigger for kidney injury unclear.  Clinically euvolemic or mildly hypovolemic on exam.  Check renal ultrasound and urine electrolytes.  Hold spironolactone, ACE inhibitor, and diuretic therapy for now.  Will give gentle fluid challenge.  Consult nephrology for evaluation and further recommendations.

## 2017-10-29 NOTE — ED NOTES
Arrived by EMS. Per EMS, family called 911 after finding pt confused and not responding. CBg 44 on arrival. Given 1 amp D50 and brought CBG up to 74. Pt awake and alert on arrival. NAD. Will cont to monitor.

## 2017-10-29 NOTE — ASSESSMENT & PLAN NOTE
Continue with amlodipine and carvedilol for blood pressure control.  Adjust further as needed to maintain reasonable blood pressure control.

## 2017-10-29 NOTE — SUBJECTIVE & OBJECTIVE
Past Medical History:   Diagnosis Date    Asthma     Diabetes mellitus type 2 in obese 5/3/2014    Elevated troponin 5/3/2014    Hyperlipidemia     Hypertension     Obesity     Pulmonary embolism 05/2014    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       Past Surgical History:   Procedure Laterality Date    TONSILLECTOMY         Review of patient's allergies indicates:  No Known Allergies    No current facility-administered medications on file prior to encounter.      Current Outpatient Prescriptions on File Prior to Encounter   Medication Sig    amlodipine-benazepril 10-20mg (LOTREL) 10-20 mg per capsule Take 1 capsule by mouth once daily.    atorvastatin (LIPITOR) 40 MG tablet Take 1 tablet (40 mg total) by mouth once daily.    carvedilol (COREG) 25 MG tablet Take 1 tablet (25 mg total) by mouth 2 (two) times daily with meals.    glimepiride (AMARYL) 4 MG tablet Take 1 tablet (4 mg total) by mouth before breakfast.    hydrochlorothiazide (HYDRODIURIL) 25 MG tablet Take 1 tablet (25 mg total) by mouth once daily.    metformin (GLUCOPHAGE) 1000 MG tablet TAKE ONE TABLET BY MOUTH TWICE A DAY WITH MEALS    rivaroxaban (XARELTO) 20 mg Tab Take 1 tablet (20 mg total) by mouth once daily.    spironolactone (ALDACTONE) 25 MG tablet TAKE ONE TABLET BY MOUTH DAILY     Family History     Problem Relation (Age of Onset)    Cancer Father    Cataracts Sister    Diabetes Mother, Father, Sister, Brother    Glaucoma Maternal Aunt, Paternal Aunt    Heart disease Mother, Father    Hypertension Mother, Father, Sister, Brother, Son, Daughter    No Known Problems Maternal Grandmother, Maternal Grandfather, Paternal Grandmother, Paternal Grandfather    Stroke Mother        Social History Main Topics    Smoking status: Former Smoker     Types: Cigarettes    Smokeless tobacco: Never Used      Comment: Quit smoking as a teenager    Alcohol use Yes      Comment: 1-2 drinks per week     Drug use: No    Sexual activity: Yes     Partners: Female     Birth control/ protection: Condom     Review of Systems   Constitutional: Negative for chills, diaphoresis, fatigue and fever.   HENT: Negative for congestion, ear pain, hearing loss and tinnitus.    Eyes: Negative for photophobia, pain, discharge and visual disturbance.   Respiratory: Negative for chest tightness, shortness of breath and wheezing.    Cardiovascular: Negative for chest pain, palpitations and leg swelling.   Gastrointestinal: Negative for abdominal distention, abdominal pain, blood in stool, constipation, diarrhea, nausea and vomiting.   Endocrine: Negative for cold intolerance, heat intolerance, polydipsia, polyphagia and polyuria.   Genitourinary: Negative for dysuria, flank pain, frequency, hematuria and urgency.   Musculoskeletal: Negative for arthralgias, back pain, gait problem, myalgias and neck pain.   Skin: Negative for color change, pallor, rash and wound.   Allergic/Immunologic: Negative for environmental allergies, food allergies and immunocompromised state.   Neurological: Negative for seizures, syncope, facial asymmetry, weakness and headaches.   Psychiatric/Behavioral: Positive for confusion. Negative for agitation, behavioral problems and hallucinations.     Objective:     Vital Signs (Most Recent):  Temp: 98.8 °F (37.1 °C) (10/29/17 1334)  Pulse: 78 (10/29/17 1334)  Resp: 13 (10/29/17 1334)  BP: (!) 141/97 (10/29/17 1334)  SpO2: 98 % (10/29/17 1334) Vital Signs (24h Range):  Temp:  [97.6 °F (36.4 °C)-98.8 °F (37.1 °C)] 98.8 °F (37.1 °C)  Pulse:  [71-78] 78  Resp:  [13-21] 13  SpO2:  [90 %-98 %] 98 %  BP: (127-188)/(70-97) 141/97     Weight: 122.5 kg (270 lb)  Body mass index is 38.74 kg/m².    Physical Exam   Constitutional: He is oriented to person, place, and time. He appears well-developed and well-nourished. No distress.   HENT:   Head: Normocephalic and atraumatic.   Nose: Nose normal.   Mouth/Throat: Oropharynx is  clear and moist. No oropharyngeal exudate.   Eyes: Conjunctivae and EOM are normal. Pupils are equal, round, and reactive to light. Right eye exhibits no discharge. Left eye exhibits no discharge. No scleral icterus.   Neck: Normal range of motion. Neck supple. No JVD present. No tracheal deviation present. No thyromegaly present.   Cardiovascular: Normal rate, regular rhythm, normal heart sounds and intact distal pulses.  Exam reveals no gallop and no friction rub.    No murmur heard.  Pulmonary/Chest: Effort normal and breath sounds normal. No stridor. No respiratory distress. He has no wheezes. He has no rales. He exhibits no tenderness.   Abdominal: Soft. Bowel sounds are normal. He exhibits no distension and no mass. There is no tenderness. There is no rebound and no guarding.   Musculoskeletal: Normal range of motion. He exhibits edema. He exhibits no tenderness.   Trace bilateral lower extremity edema.   Lymphadenopathy:     He has no cervical adenopathy.   Neurological: He is alert and oriented to person, place, and time. He has normal reflexes. He displays normal reflexes. No cranial nerve deficit. He exhibits normal muscle tone. Coordination normal.   Skin: Skin is warm and dry. No rash noted. He is not diaphoretic. No erythema. No pallor.   Psychiatric: He has a normal mood and affect. His behavior is normal. Judgment and thought content normal.        Significant Labs: All pertinent labs within the past 24 hours have been reviewed.    Significant Imaging: I have reviewed all pertinent imaging results/findings within the past 24 hours.

## 2017-10-29 NOTE — ED PROVIDER NOTES
Encounter Date: 10/29/2017    SCRIBE #1 NOTE: I, Mehdi Montoya, am scribing for, and in the presence of, Dr. Dominguez.       History     Chief Complaint   Patient presents with    Hypoglycemia     pt with low glucose 44 given one amp d 50 glucose only 74.     9:22 AM    Patient is a 66 y.o. male with a history of Dm, HTN, and HLD who presents to the ED via EMS with complaint of hypoglycemia. Symptoms began this morning. His daughter reports finding him confused in bed unable to talk or get out of bed. She checked his blood sugar which showed a CBG of 40. He was given one amp of D50 by EMS en route. He also reports a cough beginning this morning, as well as a history of chronic weakness. He denies fever, chills, congestion, shortness of breath, nausea, vomiting, or diarrhea. He takes amlodipine-benazapine 10-20 mg and metformin 1000 mg BID, and is compliant with medication regimen. He does not take insulin. He last took metformin yesterday evening, and reports only eating a small amount off food later last night. He reports no history of CAD, asthma,COPD or known allergies. He denies use of tobacco, alcohol, or illicit drugs. His daughter reports that he lives on his own, and expresses concern about his living situation with regard to his chronic weakness.       The history is provided by a relative and the patient (daughter).     Review of patient's allergies indicates:  No Known Allergies  Past Medical History:   Diagnosis Date    Asthma     Diabetes mellitus type 2 in obese 5/3/2014    Elevated troponin 5/3/2014    Hyperlipidemia     Hypertension     Obesity     Pulmonary embolism 05/2014    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     Past Surgical History:   Procedure Laterality Date    TONSILLECTOMY       Family History   Problem Relation Age of Onset    Diabetes Mother     Heart disease Mother     Hypertension Mother     Stroke Mother     Heart disease Father      Hypertension Father     Diabetes Father     Cancer Father     Cataracts Sister     Hypertension Sister     Diabetes Sister     Glaucoma Maternal Aunt     Glaucoma Paternal Aunt     Hypertension Brother     Diabetes Brother     No Known Problems Maternal Grandmother     No Known Problems Maternal Grandfather     No Known Problems Paternal Grandmother     No Known Problems Paternal Grandfather     Hypertension Son     Hypertension Daughter      Social History   Substance Use Topics    Smoking status: Former Smoker     Types: Cigarettes    Smokeless tobacco: Never Used      Comment: Quit smoking as a teenager    Alcohol use Yes      Comment: 1-2 drinks per week     Review of Systems   Constitutional: Negative for fever.   HENT: Negative for sore throat.    Respiratory: Positive for cough. Negative for shortness of breath.    Cardiovascular: Negative for chest pain.   Gastrointestinal: Negative for nausea.   Genitourinary: Negative for dysuria.   Musculoskeletal: Negative for back pain.   Skin: Negative for rash.   Neurological: Positive for weakness (chronic).   Hematological: Does not bruise/bleed easily.   Psychiatric/Behavioral: Positive for confusion (resolved).       Physical Exam     Initial Vitals   BP Pulse Resp Temp SpO2   -- -- -- -- --      MAP       --         Physical Exam    Nursing note and vitals reviewed.  Constitutional: He appears well-developed and well-nourished. He is not diaphoretic. No distress.   HENT:   Head: Normocephalic and atraumatic.   Mouth/Throat: Oropharynx is clear and moist.   Eyes: Conjunctivae and EOM are normal. Pupils are equal, round, and reactive to light.   Conjunctiva are pink, clear, and intact.   Neck: Normal range of motion. Neck supple.   Cardiovascular: Normal rate, regular rhythm and normal heart sounds.   Normal S1, S2. No murmurs, no rubs, no gallops.    Pulmonary/Chest: Breath sounds normal. No respiratory distress. He has no wheezes. He has no  rhonchi. He has no rales.   Clear to ascultation bilaterally.   Abdominal: Soft. There is no tenderness.   Musculoskeletal: Normal range of motion. He exhibits no edema.   Neurological: He is alert and oriented to person, place, and time.   Skin:   Warm and dry. No rashes, no lesions, or skin tenting.   Psychiatric: He has a normal mood and affect. His behavior is normal. Judgment and thought content normal.         ED Course   Procedures  Labs Reviewed   CBC W/ AUTO DIFFERENTIAL - Abnormal; Notable for the following:        Result Value    Lymph # 0.6 (*)     Mono # 0.2 (*)     Gran% 87.6 (*)     Lymph% 8.7 (*)     Mono% 3.6 (*)     All other components within normal limits   COMPREHENSIVE METABOLIC PANEL - Abnormal; Notable for the following:     Glucose 257 (*)     BUN, Bld 43 (*)     Creatinine 2.3 (*)     Albumin 3.3 (*)      (*)     ALT 98 (*)     eGFR if  33 (*)     eGFR if non  29 (*)     All other components within normal limits   URINALYSIS - Abnormal; Notable for the following:     Protein, UA Trace (*)     Occult Blood UA 3+ (*)     All other components within normal limits   POCT GLUCOSE - Abnormal; Notable for the following:     POCT Glucose 37 (*)     All other components within normal limits   POCT GLUCOSE - Abnormal; Notable for the following:     POCT Glucose 180 (*)     All other components within normal limits   POCT GLUCOSE - Abnormal; Notable for the following:     POCT Glucose 123 (*)     All other components within normal limits   POCT GLUCOSE - Abnormal; Notable for the following:     POCT Glucose 113 (*)     All other components within normal limits   POCT GLUCOSE - Abnormal; Notable for the following:     POCT Glucose 69 (*)     All other components within normal limits   B-TYPE NATRIURETIC PEPTIDE   URINALYSIS MICROSCOPIC   SODIUM, URINE, RANDOM   CREATININE, URINE, RANDOM   UREA NITROGEN, URINE, RANDOM   HEMOGLOBIN A1C   POCT GLUCOSE   POCT GLUCOSE    POCT GLUCOSE   POCT GLUCOSE MONITORING CONTINUOUS   POCT GLUCOSE MONITORING CONTINUOUS     Imaging Results          US Retroperitoneal Complete (Final result)  Result time 10/29/17 15:01:31    Final result by Bret Currie MD (10/29/17 15:01:31)                 Impression:         1. Slightly decreased perfusion to the bilateral kidneys with otherwise normal resistive indices, which could be artifactual versus sequela of nonspecific acute medical renal disease. Correlate clinically.    2. Otherwise, normal renal morphology bilaterally without hydronephrosis.      Electronically signed by: BRET CURRIE MD, MD  Date:     10/29/17  Time:    15:01              Narrative:    COMPARISON: CT thorax 5/2/14 and right upper quadrant ultrasound 5/9/17    FINDINGS:     The kidneys are normal in size measuring 10.4 cm on the right and 11 cm on the left. There is good corticomedullary differentiation and normal cortical thickness.  No solid renal masses, nephrolithiasis, or hydronephrosis. Perfusion to the kidneys is slightly decreased. Resistive indices are normal and as follow: 0.63 on the right and 0.67 on the left. The urinary bladder outline is within normal limits.                             X-Ray Chest AP Portable (Final result)  Result time 10/29/17 09:51:08    Final result by Fabián Doan MD (10/29/17 09:51:08)                 Impression:     Scattered interstitial opacities in the right lung, possibly asymmetric pulmonary edema or infectious process.      Electronically signed by: FABIÁN DOAN MD  Date:     10/29/17  Time:    09:51              Narrative:    Chest x-ray, 1 view    Comparison: 5/2/14.    Findings: Scattered interstitial opacities noted in the right lung. No confluent consolidation, pleural effusion, or pneumothorax.  Cardiac silhouette is unremarkable.                                   X-Rays:   Independently Interpreted Readings:   Chest X-Ray: No acute findings visualized.     Medical Decision  Making:   Independently Interpreted Test(s):   I have ordered and independently interpreted X-rays - see prior notes.  Clinical Tests:   Lab Tests: Ordered and Reviewed  Radiological Study: Reviewed and Ordered  ED Management:  12:10 PM - Consulted and discussed the case with Dr. Colon who agrees to admit the patient for further evaluation and treatment of acute kidney disease which may be causing metformin to remain in his system a bit longer and causing him to become hypoglycemic.    12:21 PM - Consulted and discussed the case with Dr. Bardley who will admit the patient to Dr. Bradley.            Scribe Attestation:   Scribe #1: I performed the above scribed service and the documentation accurately describes the services I performed. I attest to the accuracy of the note.    Attending Attestation:             Attending ED Notes:   Emergent evaluation of 66-year-old male with past medical history of diabetes on oral diabetic agents presents emergency department with complaint of hypoglycemia.  Patient is afebrile, nontoxic-appearing with stable vital signs.  Repeat oxygen saturation is 97% on room air.  Patient in no acute respiratory distress.  Urinary analysis reveals 3+ blood.  There is no elevation of white blood cell count.  H&H within normal limits.  No acute findings on CMP except for elevation in liver enzymes, BUN/creatinine of 43 and 2.3, albumin 3.3.  No acute findings on chest x-ray.  The patient is extensively counseled on his diagnosis and treatment including all diagnostic, laboratory and physical exam findings.  The patient is admitted in stable condition.          ED Course      Clinical Impression:     1. Adverse reaction to oral hypoglycemic drug, initial encounter    2. Acute kidney injury                                 Jose Raul Obrien MD  10/29/17 [id number]

## 2017-10-29 NOTE — H&P
[facility] Hospital Medicine  History & Physical    Patient Name: Serafin Tapia  MRN: [id number]  Admission Date: 10/29/2017  Attending Physician: Jose Raul Obrien MD   Primary Care Provider: Hector Fuchs MD (Inactive)         Patient information was obtained from patient and past medical records.     Subjective:     Principal Problem:Adverse reaction to oral hypoglycemic drug, initial encounter    Chief Complaint:   Chief Complaint   Patient presents with    Hypoglycemia     pt with low glucose 44 given one amp d 50 glucose only 74.        HPI: Patient is a 66 year-old woman with a history hypertension, diabetes mellitus type II, dyslipidemia who presents to the emergency department by the emergency medical services after he was found to be confused with capillary blood glucose measurement of 40 mg/dL.  Patient was given an ampule of dextrose with resolution of hypoglycemia and normalization of his mental status.  However while in the emergency department he had recurrence of hypoglycemia requiring further intervenous dextrose.  He denies any recent changes in his medications or recent illnesses.  He reports no changes in his diet.  He reports that he has not been checking his blood glucose as his home glucometer is broken.    Past Medical History:   Diagnosis Date    Asthma     Diabetes mellitus type 2 in obese 5/3/2014    Elevated troponin 5/3/2014    Hyperlipidemia     Hypertension     Obesity     Pulmonary embolism 05/2014    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       Past Surgical History:   Procedure Laterality Date    TONSILLECTOMY         Review of patient's allergies indicates:  No Known Allergies    No current facility-administered medications on file prior to encounter.      Current Outpatient Prescriptions on File Prior to Encounter   Medication Sig    amlodipine-benazepril 10-20mg (LOTREL) 10-20 mg per capsule Take 1 capsule by  mouth once daily.    atorvastatin (LIPITOR) 40 MG tablet Take 1 tablet (40 mg total) by mouth once daily.    carvedilol (COREG) 25 MG tablet Take 1 tablet (25 mg total) by mouth 2 (two) times daily with meals.    glimepiride (AMARYL) 4 MG tablet Take 1 tablet (4 mg total) by mouth before breakfast.    hydrochlorothiazide (HYDRODIURIL) 25 MG tablet Take 1 tablet (25 mg total) by mouth once daily.    metformin (GLUCOPHAGE) 1000 MG tablet TAKE ONE TABLET BY MOUTH TWICE A DAY WITH MEALS    rivaroxaban (XARELTO) 20 mg Tab Take 1 tablet (20 mg total) by mouth once daily.    spironolactone (ALDACTONE) 25 MG tablet TAKE ONE TABLET BY MOUTH DAILY     Family History     Problem Relation (Age of Onset)    Cancer Father    Cataracts Sister    Diabetes Mother, Father, Sister, Brother    Glaucoma Maternal Aunt, Paternal Aunt    Heart disease Mother, Father    Hypertension Mother, Father, Sister, Brother, Son, Daughter    No Known Problems Maternal Grandmother, Maternal Grandfather, Paternal Grandmother, Paternal Grandfather    Stroke Mother        Social History Main Topics    Smoking status: Former Smoker     Types: Cigarettes    Smokeless tobacco: Never Used      Comment: Quit smoking as a teenager    Alcohol use Yes      Comment: 1-2 drinks per week    Drug use: No    Sexual activity: Yes     Partners: Female     Birth control/ protection: Condom     Review of Systems   Constitutional: Negative for chills, diaphoresis, fatigue and fever.   HENT: Negative for congestion, ear pain, hearing loss and tinnitus.    Eyes: Negative for photophobia, pain, discharge and visual disturbance.   Respiratory: Negative for chest tightness, shortness of breath and wheezing.    Cardiovascular: Negative for chest pain, palpitations and leg swelling.   Gastrointestinal: Negative for abdominal distention, abdominal pain, blood in stool, constipation, diarrhea, nausea and vomiting.   Endocrine: Negative for cold intolerance, heat  intolerance, polydipsia, polyphagia and polyuria.   Genitourinary: Negative for dysuria, flank pain, frequency, hematuria and urgency.   Musculoskeletal: Negative for arthralgias, back pain, gait problem, myalgias and neck pain.   Skin: Negative for color change, pallor, rash and wound.   Allergic/Immunologic: Negative for environmental allergies, food allergies and immunocompromised state.   Neurological: Negative for seizures, syncope, facial asymmetry, weakness and headaches.   Psychiatric/Behavioral: Positive for confusion. Negative for agitation, behavioral problems and hallucinations.     Objective:     Vital Signs (Most Recent):  Temp: 98.8 °F (37.1 °C) (10/29/17 1334)  Pulse: 78 (10/29/17 1334)  Resp: 13 (10/29/17 1334)  BP: (!) 141/97 (10/29/17 1334)  SpO2: 98 % (10/29/17 1334) Vital Signs (24h Range):  Temp:  [97.6 °F (36.4 °C)-98.8 °F (37.1 °C)] 98.8 °F (37.1 °C)  Pulse:  [71-78] 78  Resp:  [13-21] 13  SpO2:  [90 %-98 %] 98 %  BP: (127-188)/(70-97) 141/97     Weight: 122.5 kg (270 lb)  Body mass index is 38.74 kg/m².    Physical Exam   Constitutional: He is oriented to person, place, and time. He appears well-developed and well-nourished. No distress.   HENT:   Head: Normocephalic and atraumatic.   Nose: Nose normal.   Mouth/Throat: Oropharynx is clear and moist. No oropharyngeal exudate.   Eyes: Conjunctivae and EOM are normal. Pupils are equal, round, and reactive to light. Right eye exhibits no discharge. Left eye exhibits no discharge. No scleral icterus.   Neck: Normal range of motion. Neck supple. No JVD present. No tracheal deviation present. No thyromegaly present.   Cardiovascular: Normal rate, regular rhythm, normal heart sounds and intact distal pulses.  Exam reveals no gallop and no friction rub.    No murmur heard.  Pulmonary/Chest: Effort normal and breath sounds normal. No stridor. No respiratory distress. He has no wheezes. He has no rales. He exhibits no tenderness.   Abdominal: Soft.  Bowel sounds are normal. He exhibits no distension and no mass. There is no tenderness. There is no rebound and no guarding.   Musculoskeletal: Normal range of motion. He exhibits edema. He exhibits no tenderness.   Trace bilateral lower extremity edema.   Lymphadenopathy:     He has no cervical adenopathy.   Neurological: He is alert and oriented to person, place, and time. He has normal reflexes. He displays normal reflexes. No cranial nerve deficit. He exhibits normal muscle tone. Coordination normal.   Skin: Skin is warm and dry. No rash noted. He is not diaphoretic. No erythema. No pallor.   Psychiatric: He has a normal mood and affect. His behavior is normal. Judgment and thought content normal.        Significant Labs: All pertinent labs within the past 24 hours have been reviewed.    Significant Imaging: I have reviewed all pertinent imaging results/findings within the past 24 hours.    Assessment/Plan:     * Adverse reaction to oral hypoglycemic drug, initial encounter    Hypoglycemia due to decreased clearance due to renal failure.  Will start intravenous dextrose as sulfonylurea has a long half life to prevent further hypoglycemia in the setting of renal failure.        Acute kidney injury    Trigger for kidney injury unclear.  Clinically euvolemic or mildly hypovolemic on exam.  Check renal ultrasound and urine electrolytes.  Hold spironolactone, ACE inhibitor, and diuretic therapy for now.  Will give gentle fluid challenge.  Consult nephrology for evaluation and further recommendations.        Essential hypertension    Continue with amlodipine and carvedilol for blood pressure control.  Adjust further as needed to maintain reasonable blood pressure control.        Dyslipidemia    Continue with statin therapy.          Diastolic dysfunction    Well compensated without shortness of breath and only trace lower extremity edema. Continue with medical therapy with beta-blocker therapy and will monitor volume  status.        Controlled type 2 diabetes mellitus with diabetic polyneuropathy, without long-term current use of insulin    Well controlled based on recent hemoglobin A1c.  Holding metformin and sulfonylurea and now.  Restart sliding scale insulin once hypoglycemia resolves.        History of pulmonary embolism    Pulmonary embolus in 2014.  Hold anticoagulation in the setting of renal failure.          VTE Risk Mitigation     DVT prophylaxis.  Subcutaneous heparin.               Eleno Bradley MD  Department of Hospital Medicine   [facility]

## 2017-10-29 NOTE — ED NOTES
Dr. Obrien notified and aware of pt decreasing CBG 88. Will reassess in 30 minutes per MD request.

## 2017-10-29 NOTE — ASSESSMENT & PLAN NOTE
Well controlled based on recent hemoglobin A1c.  Holding metformin and sulfonylurea and now.  Restart sliding scale insulin once hypoglycemia resolves.

## 2017-10-29 NOTE — ASSESSMENT & PLAN NOTE
Well compensated without shortness of breath and only trace lower extremity edema. Continue with medical therapy with beta-blocker therapy and will monitor volume status.

## 2017-10-29 NOTE — ED NOTES
Rounding on the patient has been done. The patient has been updated on the plan of care and current status. Pain was assessed and is currently a 0/10. Comfort positioning and restroom needs were addressed. Assisted pt to use urinal. 500 cc clear yellow urine voided per pt.  Necessary items were placed with in reach and was advised when a reassessment would take place. The call bell remains at the bedside for any additional patient needs.  Informed pt that a lunch tray has been ordered, awaiting delivery. Pt without c/o. The patient is resting comfortably on the stretcher, respirations are even and unlabored, skin warm and dry. EKG NSR with frequent PVCs. Will continue to monitor.

## 2017-10-30 LAB
ANION GAP SERPL CALC-SCNC: 8 MMOL/L
BASOPHILS # BLD AUTO: 0.01 K/UL
BASOPHILS NFR BLD: 0.2 %
BUN SERPL-MCNC: 38 MG/DL
CALCIUM SERPL-MCNC: 8.9 MG/DL
CHLORIDE SERPL-SCNC: 102 MMOL/L
CO2 SERPL-SCNC: 30 MMOL/L
CREAT SERPL-MCNC: 1.7 MG/DL
DIFFERENTIAL METHOD: ABNORMAL
EOSINOPHIL # BLD AUTO: 0.1 K/UL
EOSINOPHIL NFR BLD: 1.6 %
ERYTHROCYTE [DISTWIDTH] IN BLOOD BY AUTOMATED COUNT: 13.9 %
EST. GFR  (AFRICAN AMERICAN): 48 ML/MIN/1.73 M^2
EST. GFR  (NON AFRICAN AMERICAN): 41 ML/MIN/1.73 M^2
ESTIMATED AVG GLUCOSE: 111 MG/DL
GLUCOSE SERPL-MCNC: 68 MG/DL
HBA1C MFR BLD HPLC: 5.5 %
HCT VFR BLD AUTO: 35.2 %
HGB BLD-MCNC: 11.4 G/DL
LYMPHOCYTES # BLD AUTO: 1.7 K/UL
LYMPHOCYTES NFR BLD: 26.2 %
MAGNESIUM SERPL-MCNC: 1.7 MG/DL
MCH RBC QN AUTO: 27.5 PG
MCHC RBC AUTO-ENTMCNC: 32.4 G/DL
MCV RBC AUTO: 85 FL
MONOCYTES # BLD AUTO: 0.4 K/UL
MONOCYTES NFR BLD: 6.5 %
NEUTROPHILS # BLD AUTO: 4.1 K/UL
NEUTROPHILS NFR BLD: 65.2 %
PHOSPHATE SERPL-MCNC: 3.7 MG/DL
PLATELET # BLD AUTO: 328 K/UL
PMV BLD AUTO: 11.3 FL
POCT GLUCOSE: 104 MG/DL (ref 70–110)
POCT GLUCOSE: 127 MG/DL (ref 70–110)
POCT GLUCOSE: 135 MG/DL (ref 70–110)
POCT GLUCOSE: 150 MG/DL (ref 70–110)
POCT GLUCOSE: 69 MG/DL (ref 70–110)
POCT GLUCOSE: 69 MG/DL (ref 70–110)
POCT GLUCOSE: 72 MG/DL (ref 70–110)
POCT GLUCOSE: 73 MG/DL (ref 70–110)
POCT GLUCOSE: 84 MG/DL (ref 70–110)
POTASSIUM SERPL-SCNC: 4.6 MMOL/L
RBC # BLD AUTO: 4.14 M/UL
SODIUM SERPL-SCNC: 140 MMOL/L
URATE SERPL-MCNC: 9.7 MG/DL
WBC # BLD AUTO: 6.33 K/UL

## 2017-10-30 PROCEDURE — 25000003 PHARM REV CODE 250: Performed by: NURSE PRACTITIONER

## 2017-10-30 PROCEDURE — 99233 SBSQ HOSP IP/OBS HIGH 50: CPT | Mod: ,,, | Performed by: HOSPITALIST

## 2017-10-30 PROCEDURE — 83735 ASSAY OF MAGNESIUM: CPT

## 2017-10-30 PROCEDURE — 85025 COMPLETE CBC W/AUTO DIFF WBC: CPT

## 2017-10-30 PROCEDURE — 84550 ASSAY OF BLOOD/URIC ACID: CPT

## 2017-10-30 PROCEDURE — 63600175 PHARM REV CODE 636 W HCPCS: Performed by: HOSPITALIST

## 2017-10-30 PROCEDURE — 84100 ASSAY OF PHOSPHORUS: CPT

## 2017-10-30 PROCEDURE — 11000001 HC ACUTE MED/SURG PRIVATE ROOM

## 2017-10-30 PROCEDURE — 80048 BASIC METABOLIC PNL TOTAL CA: CPT

## 2017-10-30 PROCEDURE — 36415 COLL VENOUS BLD VENIPUNCTURE: CPT

## 2017-10-30 PROCEDURE — 25000003 PHARM REV CODE 250: Performed by: HOSPITALIST

## 2017-10-30 RX ADMIN — HEPARIN SODIUM 5000 UNITS: 5000 INJECTION, SOLUTION INTRAVENOUS; SUBCUTANEOUS at 09:10

## 2017-10-30 RX ADMIN — HEPARIN SODIUM 5000 UNITS: 5000 INJECTION, SOLUTION INTRAVENOUS; SUBCUTANEOUS at 05:10

## 2017-10-30 RX ADMIN — DEXTROSE AND SODIUM CHLORIDE: 5; .9 INJECTION, SOLUTION INTRAVENOUS at 11:10

## 2017-10-30 RX ADMIN — CARVEDILOL 25 MG: 12.5 TABLET, FILM COATED ORAL at 08:10

## 2017-10-30 RX ADMIN — ATORVASTATIN CALCIUM 40 MG: 20 TABLET, FILM COATED ORAL at 08:10

## 2017-10-30 RX ADMIN — DEXTROSE AND SODIUM CHLORIDE: 5; .9 INJECTION, SOLUTION INTRAVENOUS at 05:10

## 2017-10-30 RX ADMIN — HEPARIN SODIUM 5000 UNITS: 5000 INJECTION, SOLUTION INTRAVENOUS; SUBCUTANEOUS at 01:10

## 2017-10-30 NOTE — ASSESSMENT & PLAN NOTE
Pulmonary embolus in 2014.  Hold anticoagulation in the setting of renal failure.  May be reasonable to discontinue in light of remote history pulmonary embolus and now increase risk of bleeding complications due to impaired kidney function.

## 2017-10-30 NOTE — SUBJECTIVE & OBJECTIVE
Interval History: No acute events.  Patient continues to require dextrose infusion to maintain reasonable capillary blood glucose measurements.      Review of Systems   Constitutional: Negative for chills and fever.   Respiratory: Negative for shortness of breath.    Cardiovascular: Negative for chest pain.   Gastrointestinal: Negative for abdominal pain, constipation, diarrhea, nausea and vomiting.     Objective:     Vital Signs (Most Recent):  Temp: 98.2 °F (36.8 °C) (10/30/17 1118)  Pulse: 79 (10/30/17 1400)  Resp: 18 (10/30/17 1118)  BP: (!) 122/59 (10/30/17 1118)  SpO2: (!) 90 % (10/30/17 1118) Vital Signs (24h Range):  Temp:  [96.9 °F (36.1 °C)-98.3 °F (36.8 °C)] 98.2 °F (36.8 °C)  Pulse:  [60-83] 79  Resp:  [18-20] 18  SpO2:  [90 %-92 %] 90 %  BP: ()/(47-60) 122/59     Weight: 114.9 kg (253 lb 6.4 oz)  Body mass index is 36.36 kg/m².    Intake/Output Summary (Last 24 hours) at 10/30/17 1544  Last data filed at 10/30/17 1000   Gross per 24 hour   Intake                0 ml   Output              500 ml   Net             -500 ml      Physical Exam   Constitutional: He is oriented to person, place, and time.   Cardiovascular: Normal rate, regular rhythm, normal heart sounds and intact distal pulses.  Exam reveals no gallop and no friction rub.    No murmur heard.  Pulmonary/Chest: Effort normal and breath sounds normal. No respiratory distress. He has no wheezes. He has no rales.   Abdominal: Soft. Bowel sounds are normal. He exhibits no distension. There is no tenderness.   Musculoskeletal: Normal range of motion. He exhibits no edema or tenderness.   Neurological: He is alert and oriented to person, place, and time.   Skin: Skin is warm and dry. No rash noted. No erythema. No pallor.       Significant Labs: All pertinent labs within the past 24 hours have been reviewed.    Significant Imaging: I have reviewed all pertinent imaging results/findings within the past 24 hours.

## 2017-10-30 NOTE — PLAN OF CARE
Problem: Patient Care Overview  Goal: Plan of Care Review  Outcome: Ongoing (interventions implemented as appropriate)  No significant events overnight. Remains free from fall, injury, and skin breakdown.Patient hypotensive, though aysymtomatic. NP notified and patient monitored closely. Positions self with assistance. Tele maintained; all alarms active and audible. Plan of care reviewed with patient and all questions answered. Bed low, locked w/ bed alarm on. Call light within reach. Purposeful rounding performed. Resting comfortably in bed, no other complaints at this time.

## 2017-10-30 NOTE — HOSPITAL COURSE
Patient admitted to the hospital for treatment of acute renal failure and refractory hypoglycemia secondary to sulfonylurea use.  Patient given isotonic intravenous with dextrose fluids with improvement in serum creatinine.  The patient also has suspected rhabdomyolysis with initial CPK >5000.  Aggressive hydration was not tolerated well,  and patient clinically has signs of fluid overload with pulmonary and peripheral edema.  He was then diuresed with Lasix IV and was negative about 4L during his hospitalization.   The patient was treated for possible decompensated diastolic heart failure.  CPK remained elevated, and was 5553 on the day of discharge.   The patient will have Metformin resumed, but will not be restarted on the sulfonylurea.  CRP and ESR were done and elevated, so an HALLE was also performed and this was negative.  Patient may have an underlying neurodegenerative disorder which is linked to the persistently elevated CPK.  The patient is deconditioned and will benefit from continued PT/OT in a skilled nursing setting.  He will also need Neurology follow up.

## 2017-10-30 NOTE — ASSESSMENT & PLAN NOTE
Hypoglycemia due to decreased clearance due to renal failure.  Will continue with intravenous dextrose as his sulfonylureas has a long half life to prevent further hypoglycemia in the setting of renal failure.  Once capillary blood glucose measurements consistent normal or mildly elevated will discontinue dextrose infusion.

## 2017-10-30 NOTE — PROGRESS NOTES
[facility] Hospital Medicine  Progress Note    Patient Name: Serafin Tapia  MRN: [id number]  Patient Class: IP- Inpatient   Admission Date: 10/29/2017  Length of Stay: 1 days  Attending Physician: Eleno Bradley MD  Primary Care Provider: Hector Fuchs MD (Inactive)        Subjective:     Principal Problem:Adverse reaction to oral hypoglycemic drug, initial encounter    HPI:  Patient is a 66 year-old woman with a history hypertension, diabetes mellitus type II, dyslipidemia who presents to the emergency department by the emergency medical services after he was found to be confused with capillary blood glucose measurement of 40 mg/dL.  Patient was given an ampule of dextrose with resolution of hypoglycemia and normalization of his mental status.  However while in the emergency department he had recurrence of hypoglycemia requiring further intervenous dextrose.  He denies any recent changes in his medications or recent illnesses.  He reports no changes in his diet.  He reports that he has not been checking his blood glucose as his home glucometer is broken.    Hospital Course:  Patient admitted to the hospital for treatment of acute renal failure and refractory hypoglycemia secondary to sulfonylurea use.  Patient given isotonic intravenous with dextrose fluids with improvement in serum creatinine.    Interval History: No acute events.  Patient continues to require dextrose infusion to maintain reasonable capillary blood glucose measurements.      Review of Systems   Constitutional: Negative for chills and fever.   Respiratory: Negative for shortness of breath.    Cardiovascular: Negative for chest pain.   Gastrointestinal: Negative for abdominal pain, constipation, diarrhea, nausea and vomiting.     Objective:     Vital Signs (Most Recent):  Temp: 98.2 °F (36.8 °C) (10/30/17 1118)  Pulse: 79 (10/30/17 1400)  Resp: 18 (10/30/17 1118)  BP: (!) 122/59 (10/30/17 1118)  SpO2: (!) 90 % (10/30/17  1118) Vital Signs (24h Range):  Temp:  [96.9 °F (36.1 °C)-98.3 °F (36.8 °C)] 98.2 °F (36.8 °C)  Pulse:  [60-83] 79  Resp:  [18-20] 18  SpO2:  [90 %-92 %] 90 %  BP: ()/(47-60) 122/59     Weight: 114.9 kg (253 lb 6.4 oz)  Body mass index is 36.36 kg/m².    Intake/Output Summary (Last 24 hours) at 10/30/17 154  Last data filed at 10/30/17 1000   Gross per 24 hour   Intake                0 ml   Output              500 ml   Net             -500 ml      Physical Exam   Constitutional: He is oriented to person, place, and time.   Cardiovascular: Normal rate, regular rhythm, normal heart sounds and intact distal pulses.  Exam reveals no gallop and no friction rub.    No murmur heard.  Pulmonary/Chest: Effort normal and breath sounds normal. No respiratory distress. He has no wheezes. He has no rales.   Abdominal: Soft. Bowel sounds are normal. He exhibits no distension. There is no tenderness.   Musculoskeletal: Normal range of motion. He exhibits no edema or tenderness.   Neurological: He is alert and oriented to person, place, and time.   Skin: Skin is warm and dry. No rash noted. No erythema. No pallor.       Significant Labs: All pertinent labs within the past 24 hours have been reviewed.    Significant Imaging: I have reviewed all pertinent imaging results/findings within the past 24 hours.    Assessment/Plan:      * Adverse reaction to oral hypoglycemic drug, initial encounter    Hypoglycemia due to decreased clearance due to renal failure.  Will continue with intravenous dextrose as his sulfonylureas has a long half life to prevent further hypoglycemia in the setting of renal failure.  Once capillary blood glucose measurements consistent normal or mildly elevated will discontinue dextrose infusion.        Acute kidney injury    Improving.  Trigger for kidney injury unclear but likely multifactorial including non-steroidal anti-inflammatory drugs use.   Clinically euvolemic or mildly hypovolemic on exam.  Renal  ultrasound without evidence of obstruction.  Continue to hold spironolactone, ACE inhibitor, and diuretic therapy for now.  Will continue with gentle isotonic intravenous fluids.        Essential hypertension    Discontinue amlodipine to avoid hypotension.  Continue with carvedilol and intravenous fluids for now.  Adjust further as needed to maintain reasonable blood pressure control.        Dyslipidemia    Continue with statin therapy.          Diastolic dysfunction    Well compensated without shortness of breath and only trace lower extremity edema. Continue with medical therapy with beta-blocker therapy and will monitor volume status.        Controlled type 2 diabetes mellitus with diabetic polyneuropathy, without long-term current use of insulin    Well controlled based on recent hemoglobin A1c.  Holding metformin and sulfonylurea and now.  Restart sliding scale insulin once hypoglycemia resolves.        History of pulmonary embolism    Pulmonary embolus in 2014.  Hold anticoagulation in the setting of renal failure.  May be reasonable to discontinue in light of remote history pulmonary embolus and now increase risk of bleeding complications due to impaired kidney function.          VTE Risk Mitigation         Ordered     heparin (porcine) injection 5,000 Units  Every 8 hours     Route:  Subcutaneous        10/29/17 1529     Medium Risk of VTE  Once      10/29/17 1529              Eleno Bradley MD  Department of Hospital Medicine   [facility]

## 2017-10-30 NOTE — ASSESSMENT & PLAN NOTE
Discontinue amlodipine to avoid hypotension.  Continue with carvedilol and intravenous fluids for now.  Adjust further as needed to maintain reasonable blood pressure control.

## 2017-10-30 NOTE — ASSESSMENT & PLAN NOTE
Improving.  Trigger for kidney injury unclear but likely multifactorial including non-steroidal anti-inflammatory drugs use.   Clinically euvolemic or mildly hypovolemic on exam.  Renal ultrasound without evidence of obstruction.  Continue to hold spironolactone, ACE inhibitor, and diuretic therapy for now.  Will continue with gentle isotonic intravenous fluids.

## 2017-10-30 NOTE — PLAN OF CARE
10/30/17 1346   Discharge Assessment   Assessment Type Discharge Planning Assessment   Assessment information obtained from? Patient;Caregiver;Medical Record   Prior to hospitilization cognitive status: Alert/Oriented   Prior to hospitalization functional status: Assistive Equipment   Current cognitive status: Alert/Oriented   Current Functional Status: Assistive Equipment   Lives With child(keke), adult   Able to Return to Prior Arrangements yes   Is patient able to care for self after discharge? Yes   Patient currently being followed by outpatient case management? No   Patient currently receives any other outside agency services? No   Equipment Currently Used at Home other (see comments)  (diabetic supplies)   Discharge Plan A Home with family   Patient/Family In Agreement With Plan yes
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
## 2017-10-31 PROBLEM — E79.0 HYPERURICEMIA: Status: ACTIVE | Noted: 2017-10-31

## 2017-10-31 PROBLEM — R29.6 FALLS: Status: ACTIVE | Noted: 2017-10-31

## 2017-10-31 LAB
ANION GAP SERPL CALC-SCNC: 7 MMOL/L
BASOPHILS # BLD AUTO: 0 K/UL
BASOPHILS NFR BLD: 0 %
BUN SERPL-MCNC: 20 MG/DL
CALCIUM SERPL-MCNC: 8.9 MG/DL
CHLORIDE SERPL-SCNC: 105 MMOL/L
CO2 SERPL-SCNC: 30 MMOL/L
CREAT SERPL-MCNC: 0.8 MG/DL
DIFFERENTIAL METHOD: ABNORMAL
EOSINOPHIL # BLD AUTO: 0.1 K/UL
EOSINOPHIL NFR BLD: 2.2 %
ERYTHROCYTE [DISTWIDTH] IN BLOOD BY AUTOMATED COUNT: 14 %
EST. GFR  (AFRICAN AMERICAN): >60 ML/MIN/1.73 M^2
EST. GFR  (NON AFRICAN AMERICAN): >60 ML/MIN/1.73 M^2
GLUCOSE SERPL-MCNC: 112 MG/DL
HCT VFR BLD AUTO: 35.2 %
HGB BLD-MCNC: 11.6 G/DL
LYMPHOCYTES # BLD AUTO: 0.9 K/UL
LYMPHOCYTES NFR BLD: 18.4 %
MAGNESIUM SERPL-MCNC: 1.7 MG/DL
MCH RBC QN AUTO: 27.9 PG
MCHC RBC AUTO-ENTMCNC: 33 G/DL
MCV RBC AUTO: 85 FL
MONOCYTES # BLD AUTO: 0.3 K/UL
MONOCYTES NFR BLD: 6.9 %
NEUTROPHILS # BLD AUTO: 3.3 K/UL
NEUTROPHILS NFR BLD: 72.3 %
PHOSPHATE SERPL-MCNC: 3 MG/DL
PLATELET # BLD AUTO: 279 K/UL
PMV BLD AUTO: 10.8 FL
POCT GLUCOSE: 100 MG/DL (ref 70–110)
POCT GLUCOSE: 113 MG/DL (ref 70–110)
POCT GLUCOSE: 114 MG/DL (ref 70–110)
POCT GLUCOSE: 129 MG/DL (ref 70–110)
POCT GLUCOSE: 147 MG/DL (ref 70–110)
POCT GLUCOSE: 197 MG/DL (ref 70–110)
POTASSIUM SERPL-SCNC: 4.2 MMOL/L
RBC # BLD AUTO: 4.16 M/UL
SODIUM SERPL-SCNC: 142 MMOL/L
WBC # BLD AUTO: 4.61 K/UL

## 2017-10-31 PROCEDURE — 97166 OT EVAL MOD COMPLEX 45 MIN: CPT

## 2017-10-31 PROCEDURE — G8978 MOBILITY CURRENT STATUS: HCPCS | Mod: CK

## 2017-10-31 PROCEDURE — 99233 SBSQ HOSP IP/OBS HIGH 50: CPT | Mod: ,,, | Performed by: HOSPITALIST

## 2017-10-31 PROCEDURE — 11000001 HC ACUTE MED/SURG PRIVATE ROOM

## 2017-10-31 PROCEDURE — 84100 ASSAY OF PHOSPHORUS: CPT

## 2017-10-31 PROCEDURE — 85025 COMPLETE CBC W/AUTO DIFF WBC: CPT

## 2017-10-31 PROCEDURE — 36415 COLL VENOUS BLD VENIPUNCTURE: CPT

## 2017-10-31 PROCEDURE — 97530 THERAPEUTIC ACTIVITIES: CPT

## 2017-10-31 PROCEDURE — 97116 GAIT TRAINING THERAPY: CPT

## 2017-10-31 PROCEDURE — 63600175 PHARM REV CODE 636 W HCPCS: Performed by: HOSPITALIST

## 2017-10-31 PROCEDURE — 80048 BASIC METABOLIC PNL TOTAL CA: CPT

## 2017-10-31 PROCEDURE — 97162 PT EVAL MOD COMPLEX 30 MIN: CPT

## 2017-10-31 PROCEDURE — 25000003 PHARM REV CODE 250: Performed by: HOSPITALIST

## 2017-10-31 PROCEDURE — 25000003 PHARM REV CODE 250: Performed by: NURSE PRACTITIONER

## 2017-10-31 PROCEDURE — G8979 MOBILITY GOAL STATUS: HCPCS | Mod: CI

## 2017-10-31 PROCEDURE — 83735 ASSAY OF MAGNESIUM: CPT

## 2017-10-31 PROCEDURE — 97161 PT EVAL LOW COMPLEX 20 MIN: CPT

## 2017-10-31 RX ORDER — METFORMIN HYDROCHLORIDE 500 MG/1
500 TABLET ORAL 2 TIMES DAILY WITH MEALS
Status: DISCONTINUED | OUTPATIENT
Start: 2017-10-31 | End: 2017-11-09

## 2017-10-31 RX ORDER — BENAZEPRIL HYDROCHLORIDE 10 MG/1
10 TABLET ORAL DAILY
Status: DISCONTINUED | OUTPATIENT
Start: 2017-10-31 | End: 2017-11-13 | Stop reason: HOSPADM

## 2017-10-31 RX ORDER — AMLODIPINE BESYLATE 5 MG/1
10 TABLET ORAL DAILY
Status: DISCONTINUED | OUTPATIENT
Start: 2017-10-31 | End: 2017-11-13 | Stop reason: HOSPADM

## 2017-10-31 RX ADMIN — CARVEDILOL 25 MG: 12.5 TABLET, FILM COATED ORAL at 08:10

## 2017-10-31 RX ADMIN — CARVEDILOL 25 MG: 12.5 TABLET, FILM COATED ORAL at 05:10

## 2017-10-31 RX ADMIN — RIVAROXABAN 20 MG: 10 TABLET, FILM COATED ORAL at 05:10

## 2017-10-31 RX ADMIN — ATORVASTATIN CALCIUM 40 MG: 20 TABLET, FILM COATED ORAL at 08:10

## 2017-10-31 RX ADMIN — HEPARIN SODIUM 5000 UNITS: 5000 INJECTION, SOLUTION INTRAVENOUS; SUBCUTANEOUS at 05:10

## 2017-10-31 RX ADMIN — OXYCODONE HYDROCHLORIDE 10 MG: 5 TABLET ORAL at 08:10

## 2017-10-31 RX ADMIN — METFORMIN HYDROCHLORIDE 500 MG: 500 TABLET, FILM COATED ORAL at 01:10

## 2017-10-31 NOTE — PLAN OF CARE
Problem: Physical Therapy Goal  Goal: Physical Therapy Goal  Goals to be met by 11-10-17.  1. Sup<>sit mod I  2. Sit<>stand with RW mod I  3. amb 150' with RW mod I    -    Comments: Physical therapy eval completed.  Recommend ortho consult for L knee pain and home with OP PT.

## 2017-10-31 NOTE — PROGRESS NOTES
[facility] Hospital Medicine  Progress Note    Patient Name: Serafin Tapia  MRN: [id number]  Patient Class: IP- Inpatient   Admission Date: 10/29/2017  Length of Stay: 2 days  Attending Physician: Dedrick Price MD  Primary Care Provider: John Vargas MD        Subjective:     Principal Problem:Adverse reaction to oral hypoglycemic drug, initial encounter    HPI:  Patient is a 66 year-old woman with a history hypertension, diabetes mellitus type II, dyslipidemia who presents to the emergency department by the emergency medical services after he was found to be confused with capillary blood glucose measurement of 40 mg/dL.  Patient was given an ampule of dextrose with resolution of hypoglycemia and normalization of his mental status.  However while in the emergency department he had recurrence of hypoglycemia requiring further intervenous dextrose.  He denies any recent changes in his medications or recent illnesses.  He reports no changes in his diet.  He reports that he has not been checking his blood glucose as his home glucometer is broken.    Hospital Course:  Patient admitted to the hospital for treatment of acute renal failure and refractory hypoglycemia secondary to sulfonylurea use.  Patient given isotonic intravenous with dextrose fluids with improvement in serum creatinine.        BILAT RENAL US 10/29/17:  IMPRESSION:  1. Slightly decreased perfusion to the bilateral kidneys with otherwise normal resistive indices, which could be artifactual versus sequela of nonspecific acute medical renal disease. Correlate clinically.  2. Otherwise, normal renal morphology bilaterally without hydronephrosis.        CXR 10/29/17:  Findings: Scattered interstitial opacities noted in the right lung. No confluent consolidation, pleural effusion, or pneumothorax.  Cardiac silhouette is unremarkable.  IMPRESSION:  Scattered interstitial opacities in the right lung, possibly asymmetric pulmonary edema or  infectious process.            Interval History:   Pt new to me.  Doing better.  Son states he has had some falls.     Review of Systems   Constitutional: Negative for appetite change and fever.   Respiratory: Negative for cough and shortness of breath.    Cardiovascular: Negative for chest pain and palpitations.   Gastrointestinal: Negative for nausea and vomiting.   Skin: Negative for color change and rash.   Neurological: Negative for dizziness and weakness.   Psychiatric/Behavioral: Negative for agitation and confusion.     Objective:     Vital Signs (Most Recent):  Temp: 98.4 °F (36.9 °C) (10/31/17 1130)  Pulse: 74 (10/31/17 1130)  Resp: 20 (10/31/17 0834)  BP: 135/68 (10/31/17 1130)  SpO2: 95 % (10/31/17 1130) Vital Signs (24h Range):  Temp:  [97.8 °F (36.6 °C)-98.5 °F (36.9 °C)] 98.4 °F (36.9 °C)  Pulse:  [70-82] 74  Resp:  [18-20] 20  SpO2:  [92 %-96 %] 95 %  BP: (106-143)/(58-73) 135/68     Weight: 116.6 kg (257 lb 1.6 oz)  Body mass index is 36.89 kg/m².    Intake/Output Summary (Last 24 hours) at 10/31/17 1304  Last data filed at 10/31/17 0607   Gross per 24 hour   Intake          1799.17 ml   Output             2200 ml   Net          -400.83 ml      Physical Exam   Constitutional: He is oriented to person, place, and time. He appears well-developed and well-nourished.   HENT:   Head: Normocephalic and atraumatic.   Eyes: Conjunctivae are normal. Pupils are equal, round, and reactive to light.   Neck: Normal range of motion. Neck supple.   Cardiovascular: Normal rate and regular rhythm.    Pulmonary/Chest: Effort normal and breath sounds normal.   Abdominal: Soft.   Neurological: He is alert and oriented to person, place, and time.   Skin: Skin is warm and dry.       Significant Labs:   A1C:   Recent Labs  Lab 10/29/17  1409   HGBA1C 5.5     BMP:   Recent Labs  Lab 10/31/17  0541   *      K 4.2      CO2 30*   BUN 20   CREATININE 0.8   CALCIUM 8.9   MG 1.7     CBC:   Recent Labs  Lab  10/30/17  0432 10/31/17  0541   WBC 6.33 4.61   HGB 11.4* 11.6*   HCT 35.2* 35.2*    279       Significant Imaging: I have reviewed all pertinent imaging results/findings within the past 24 hours.    Assessment/Plan:      * Adverse reaction to oral hypoglycemic drug, initial encounter    - Hypoglycemia due to decreased clearance due to renal failure.   - Rehydrated and renal function at baseline.   - Discontinue sulfonylurea.    - Appears resolved.         Falls    - PT OT          Acute kidney injury    - Improving.    - Trigger for kidney injury unclear but likely multifactorial including non-steroidal anti-inflammatory drugs use.    - Renal ultrasound without evidence of obstruction.    - Discontinue HCTZ, spironolactone.          History of pulmonary embolism    - Pulmonary embolus in 2014.    - Held anticoagulation in the setting of renal failure, now resolved.   - Restart Xarelto.   - Follow up with PCP.         Controlled type 2 diabetes mellitus with diabetic polyneuropathy, without long-term current use of insulin    - A1c = 5.5%.  - Well controlled.    - Holding metformin and sulfonylurea for now.    - Sulfonylurea to be discontinued.   - Metformin to 500 mg BID.   - Patient in agreement and follow up with PCP.         Diastolic dysfunction    - Well compensated without shortness of breath and only trace lower extremity edema.   - Continue with medical therapy with beta-blocker therapy and will monitor volume status.        Dyslipidemia    - Continue with statin therapy.          Essential hypertension    - Restart home meds   - Discontinue HCTZ / spironolactone.          VTE Risk Mitigation         Ordered     rivaroxaban tablet 20 mg  With dinner     Route:  Oral        10/31/17 1303     Medium Risk of VTE  Once      10/29/17 1529              Dedrick Price MD  Department of Hospital Medicine   [facility]

## 2017-10-31 NOTE — ASSESSMENT & PLAN NOTE
- Well compensated without shortness of breath and only trace lower extremity edema.   - Continue with medical therapy with beta-blocker therapy and will monitor volume status.

## 2017-10-31 NOTE — ASSESSMENT & PLAN NOTE
- Hypoglycemia due to decreased clearance due to renal failure.   - Rehydrated and renal function at baseline.   - Discontinue sulfonylurea.    - Appears resolved.

## 2017-10-31 NOTE — PT/OT/SLP EVAL
Physical Therapy  Evaluation and Treatment    Serafin Tapia   MRN: [id number]   Admitting Diagnosis: Adverse reaction to oral hypoglycemic drug, initial encounter    PT Received On: 10/31/17  PT Start Time: 1533     PT Stop Time: 1616    PT Total Time (min): 43 min       Billable Minutes:  Evaluation 28 and Gait [id number]    Diagnosis: Adverse reaction to oral hypoglycemic drug, initial encounter      Past Medical History:   Diagnosis Date    Asthma     Diabetes mellitus type 2 in obese 5/3/2014    Elevated troponin 5/3/2014    Hyperlipidemia     Hypertension     Obesity     Pulmonary embolism 05/2014    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      Past Surgical History:   Procedure Laterality Date    TONSILLECTOMY         Referring physician: KATHE Price  Date referred to PT: 10-31-17    General Precautions: Standard, fall  Orthopedic Precautions:     Braces:         Do you have any cultural, spiritual, Roman Catholic conflicts, given your current situation?: none     Patient History:  Lives With: alone  Living Arrangements: apartment  Home Layout: Able to live on 1st floor  Transportation Available: car  Living Environment Comment: pt lives alone in apt  amb with with either a cane or RW and drives.  Has daughter and son who looks after him   Equipment Currently Used at Home: shower chair, walker, rolling, cane, straight  DME owned (not currently used): none    Previous Level of Function:  Ambulation Skills: needs device  Transfer Skills: needs device  ADL Skills: needs device  Work/Leisure Activity: needs device    Subjective:  Communicated with patient prior to session.    Chief Complaint: generalized weakness and L knee pain  Patient goals: to improve mobility    Pain/Comfort  Pain Rating 1: 1/10  Location - Side 1: Left  Location 1: knee  Pain Addressed 1: Distraction  Pain Rating Post-Intervention 1: 8/10 (pain in L knee increased greatly with ambulation)      Objective:    Patient found with: peripheral IV     Cognitive Exam:  Oriented to: Person, Place, Time and Situation    Follows Commands/attention: Follows multistep  commands  Communication: clear/fluent  Safety awareness/insight to disability: intact    Physical Exam:  Postural examination/scapula alignment: Head forward    Skin integrity: Visible skin intact  Edema: None noted B LE    Sensation:   Intact      Lower Extremity Range of Motion:  Right Lower Extremity: WFL  Left Lower Extremity: WFL    Lower Extremity Strength:  Right Lower Extremity: WNL  Left Lower Extremity: WNL     Fine motor coordination:  Intact    Gross motor coordination: WFL    Functional Mobility:  Bed Mobility:       Transfers:  Sit <> Stand Assistance: Minimum Assistance  Sit <> Stand Assistive Device: Rolling Walker    Gait:   Gait Distance: 60  Assistance 1: Stand by Assistance  Gait Assistive Device: Rolling walker      Balance:   Static Sit: GOOD+: Takes MAXIMAL challenges from all directions.    Dynamic Sit: GOOD: Maintains balance through MODERATE excursions of active trunk movement  Static Stand: FAIR+: Takes MINIMAL challenges from all directions  Dynamic stand: FAIR+: Needs CLOSE SUPERVISION during gait and is able to right self with minor LOB      AM-PAC 6 CLICK MOBILITY  How much help from another person does this patient currently need?   1 = Unable, Total/Dependent Assistance  2 = A lot, Maximum/Moderate Assistance  3 = A little, Minimum/Contact Guard/Supervision  4 = None, Modified [place]/Independent    Turning over in bed (including adjusting bedclothes, sheets and blankets)?: 3  Sitting down on and standing up from a chair with arms (e.g., wheelchair, bedside commode, etc.): 3  Moving from lying on back to sitting on the side of the bed?: 3  Moving to and from a bed to a chair (including a wheelchair)?: 3  Need to walk in hospital room?: 3  Climbing 3-5 steps with a railing?: 3  Total Score: 18     AM-PAC Raw Score CMS G-Code  Modifier Level of Impairment Assistance   6 % Total / Unable   7 - 9 CM 80 - 100% Maximal Assist   10 - 14 CL 60 - 80% Moderate Assist   15 - 19 CK 40 - 60% Moderate Assist   20 - 22 CJ 20 - 40% Minimal Assist   23 CI 1-20% SBA / CGA   24 CH 0% Independent/ Mod I     Patient left up in chair with all lines intact, call button in reach, nurse notified and son and OT present.    Assessment:   Serafin Tapia is a 66 y.o. male with a medical diagnosis of Adverse reaction to oral hypoglycemic drug, initial encounter. Pt describes a recent decline in general strength and episodes of acute loss of strength to the point of not being able to move arms and legs.  Upon evaluation his LE muscle strength is normal which is consistent with the generalized weakness being related to hypoglycemia that is currently being addressed medically.  When he ambulated he complained of severe L knee pain that significantly limited is ability to walk.   We feel that he would benefit from an orthopedic consult to evaluate the cause of his knee pain which is likely due to arthritis given his age and history of physical activity as a .  He will benefit from hospital PT and OP PT to improve functional mobility and safety.      Rehab identified problem list/impairments: Rehab identified problem list/impairments: weakness, impaired functional mobilty, gait instability, pain, decreased lower extremity function, impaired balance    Rehab potential is excellent.    Activity tolerance: Good    Discharge recommendations: Discharge Facility/Level Of Care Needs: home, outpatient PT     Barriers to discharge: Barriers to Discharge: Decreased caregiver support    Equipment recommendations: Equipment Needed After Discharge: none     GOALS:    Physical Therapy Goals        Problem: Physical Therapy Goal    Goal Priority Disciplines Outcome Goal Variances Interventions   Physical Therapy Goal     PT/OT, PT      Description:  Goals to be  met by 11-10-17.  1. Sup<>sit mod I  2. Sit<>stand with RW mod I  3. amb 150' with RW mod I                      PLAN:    Patient to be seen daily to address the above listed problems via gait training, therapeutic activities, therapeutic exercises  Plan of Care expires: 11/30/17  Plan of Care reviewed with: patient, son    Functional Assessment Tool Used: AM-PAC  Score: 18  Functional Limitation: Mobility: Walking and moving around  Mobility: Walking and Moving Around Current Status (): CK  Mobility: Walking and Moving Around Goal Status (): DARVIN Broussard, PT  10/31/2017

## 2017-10-31 NOTE — ASSESSMENT & PLAN NOTE
- A1c = 5.5%.  - Well controlled.    - Holding metformin and sulfonylurea for now.    - Sulfonylurea to be discontinued.   - Metformin to 500 mg BID.   - Patient in agreement and follow up with PCP.

## 2017-10-31 NOTE — ASSESSMENT & PLAN NOTE
- Improving.    - Trigger for kidney injury unclear but likely multifactorial including non-steroidal anti-inflammatory drugs use.    - Renal ultrasound without evidence of obstruction.    - Discontinue HCTZ, spironolactone.

## 2017-10-31 NOTE — PROGRESS NOTES
"Nephrology  Progress Note    Admit Date: 10/29/2017   LOS: 2 days     SUBJECTIVE:     Follow-up For:  Adverse reaction to oral hypoglycemic drug, initial encounter/CLARE    Interval History:     Uneventful night.  Eating regular diet.  Multiple family members at bedside.  Questions/concerns addressed.  No CP/SOB.  Renal fxn back to baseline and UOP adequate.      Review of Systems:  Constitutional: No fever or chills  Respiratory: No cough or shortness of breath  Cardiovascular: No chest pain or palpitations  Gastrointestinal: No nausea or vomiting  Neurological: No confusion or weakness    OBJECTIVE:     Vital Signs Range (Last 24H):  /67   Pulse 72   Temp 98 °F (36.7 °C)   Resp 20   Ht 5' 10" (1.778 m)   Wt 116.6 kg (257 lb 1.6 oz)   SpO2 96%   BMI 36.89 kg/m²     Temp:  [97.8 °F (36.6 °C)-98.5 °F (36.9 °C)]   Pulse:  [70-82]   Resp:  [18-20]   BP: (106-143)/(58-73)   SpO2:  [90 %-96 %]     I & O (Last 24H):  Intake/Output Summary (Last 24 hours) at 10/31/17 0934  Last data filed at 10/31/17 0607   Gross per 24 hour   Intake          1799.17 ml   Output             2700 ml   Net          -900.83 ml       Physical Exam:  General appearance: Well developed, well nourished  Eyes:  Conjunctivae/corneas clear. PERRL.  Lungs: Normal respiratory effort,   clear to auscultation bilaterally   Heart: Regular rate and rhythm, S1, S2 normal, no murmur, rub or tj.  Abdomen: Soft, non-tender non-distended; bowel sounds normal; no masses,  no organomegaly, obese  Extremities: No cyanosis or clubbing. Trace edema.    Skin: Skin color, texture, turgor normal. No rashes or lesions  Neurologic: Normal strength and tone. No focal numbness or weakness     Laboratory Data:    Recent Labs  Lab 10/31/17  0541   WBC 4.61   RBC 4.16*   HGB 11.6*   HCT 35.2*      MCV 85   MCH 27.9   MCHC 33.0       BMP:   Recent Labs  Lab 10/31/17  0541   *      K 4.2      CO2 30*   BUN 20   CREATININE 0.8   CALCIUM " 8.9   MG 1.7     Lab Results   Component Value Date    CALCIUM 8.9 10/31/2017    PHOS 3.0 10/31/2017       POCT Glucose   Date Value Ref Range Status   10/31/2017 100 70 - 110 mg/dL Final   10/31/2017 113 (H) 70 - 110 mg/dL Final   10/31/2017 129 (H) 70 - 110 mg/dL Final   10/30/2017 150 (H) 70 - 110 mg/dL Final   10/30/2017 135 (H) 70 - 110 mg/dL Final   10/30/2017 84 70 - 110 mg/dL Final   10/30/2017 72 70 - 110 mg/dL Final   10/30/2017 73 70 - 110 mg/dL Final   10/29/2017 104 70 - 110 mg/dL Final   10/29/2017 127 (H) 70 - 110 mg/dL Final   10/29/2017 69 (L) 70 - 110 mg/dL Final   10/29/2017 69 (L) 70 - 110 mg/dL Final   10/29/2017 80 70 - 110 mg/dL Final   10/29/2017 69 (L) 70 - 110 mg/dL Final   10/29/2017 99 70 - 110 mg/dL Final   10/29/2017 113 (H) 70 - 110 mg/dL Final   10/29/2017 88 70 - 110 mg/dL Final   10/29/2017 123 (H) 70 - 110 mg/dL Final   10/29/2017 180 (H) 70 - 110 mg/dL Final   10/29/2017 37 (LL) 70 - 110 mg/dL Final           Medications:  Medication list was reviewed and changes noted under Assessment/Plan.    Diagnostic Results:        ASSESSMENT/PLAN:     1. Resolved nonoliguric multifactorial CLARE unspecified on essentially normal renal hx secondary to NSAIDS, hypoglycemia, labile BP, while on ACE/HCTZ/Roberto (N17.9):  U/S and urine lytes noted.  Renal fxn back to baseline and UOP excellent.  Holding NSAIDS/HCTZ/Roberto/ACE for now but restart low dose ACE.  Renally dose meds, avoid nephrotoxins, and monitor I/O's closely.  2. Hypoglycemia secondary to poor oral intake while on Amaryl (T38.3X5A):  Hold Metformin and would not use amaryl type agents in this age group.  A1C perfect so suspect can get away with gentle DDP4/Metformin only when needed. DC IVF's and monitor CBG Q3.     3. Hx of HTN (I95.9, I10):  Hypotension resolved with IVF's.  DC and monitor.  Restart ACE with hold parameters for BP meds until SBP >140.    4. Mild hyperuricemia likely from dehydration/CLARE (E79.0):  No need for  treatment at this time.  Diet modification and recheck by PCP few weeks.          Will follow from afar for Renal needs  Ok to DC from our perspective.   Thanks  See above and call with questions.

## 2017-10-31 NOTE — PLAN OF CARE
Problem: Patient Care Overview  Goal: Plan of Care Review  Outcome: Ongoing (interventions implemented as appropriate)  Pt remains free from injury or falls. Vital signs stable throughout night on room air. Positions self independently and with assist. Telemetry maintained, blood sugar checked every 3 hours per order.  No complaints of pain or nausea.  Bed in low locked position and call light within reach.  Will continue to monitor.

## 2017-10-31 NOTE — SUBJECTIVE & OBJECTIVE
Interval History:   Pt new to me.  Doing better.  Son states he has had some falls.     Review of Systems   Constitutional: Negative for appetite change and fever.   Respiratory: Negative for cough and shortness of breath.    Cardiovascular: Negative for chest pain and palpitations.   Gastrointestinal: Negative for nausea and vomiting.   Skin: Negative for color change and rash.   Neurological: Negative for dizziness and weakness.   Psychiatric/Behavioral: Negative for agitation and confusion.     Objective:     Vital Signs (Most Recent):  Temp: 98.4 °F (36.9 °C) (10/31/17 1130)  Pulse: 74 (10/31/17 1130)  Resp: 20 (10/31/17 0834)  BP: 135/68 (10/31/17 1130)  SpO2: 95 % (10/31/17 1130) Vital Signs (24h Range):  Temp:  [97.8 °F (36.6 °C)-98.5 °F (36.9 °C)] 98.4 °F (36.9 °C)  Pulse:  [70-82] 74  Resp:  [18-20] 20  SpO2:  [92 %-96 %] 95 %  BP: (106-143)/(58-73) 135/68     Weight: 116.6 kg (257 lb 1.6 oz)  Body mass index is 36.89 kg/m².    Intake/Output Summary (Last 24 hours) at 10/31/17 1304  Last data filed at 10/31/17 0607   Gross per 24 hour   Intake          1799.17 ml   Output             2200 ml   Net          -400.83 ml      Physical Exam   Constitutional: He is oriented to person, place, and time. He appears well-developed and well-nourished.   HENT:   Head: Normocephalic and atraumatic.   Eyes: Conjunctivae are normal. Pupils are equal, round, and reactive to light.   Neck: Normal range of motion. Neck supple.   Cardiovascular: Normal rate and regular rhythm.    Pulmonary/Chest: Effort normal and breath sounds normal.   Abdominal: Soft.   Neurological: He is alert and oriented to person, place, and time.   Skin: Skin is warm and dry.       Significant Labs:   A1C:   Recent Labs  Lab 10/29/17  1409   HGBA1C 5.5     BMP:   Recent Labs  Lab 10/31/17  0541   *      K 4.2      CO2 30*   BUN 20   CREATININE 0.8   CALCIUM 8.9   MG 1.7     CBC:   Recent Labs  Lab 10/30/17  0432 10/31/17  0501    WBC 6.33 4.61   HGB 11.4* 11.6*   HCT 35.2* 35.2*    279       Significant Imaging: I have reviewed all pertinent imaging results/findings within the past 24 hours.

## 2017-10-31 NOTE — PHYSICIAN QUERY
"PT Name: Serafin Tapia  MR #: [id number]  Physician Query Form - Renal Clarification     CDS: Nadya Rogers RN, CCDS         Contact information :[phone]  [email or web address]       This form is a permanent document in the medical record.     QueryDate: October 31, 2017    By submitting this query, we are merely seeking further clarification of documentation. Please utilize your independent clinical judgment when addressing the question(s) below.    The Medical record contains the following:   Indicator Supporting Clinical Findings Location in Medical Record    Kidney (Renal) Insufficiency     x Kidney (Renal) Failure / Injury "Acute kidney Injury" H&P 10/29/17    Nephrotoxic Agents     x BUN/Creatinine GFR BUN 43/creatinine 2.3, GFR 33  BUN 38/creatinine 1.7, GFR 48  BUN 20/creatinine 0.8, GFR>60 Lab 10/29/17  Lab 10/30/17  Lab 10/31/17   x Urine: Casts         Eosinophils    Hyaline casts=1     UA report 10/29/17   x Dehydration "Mild hyperuricemia likely from dehydration/CLARE " Nephrology progress note 10/31/17    Nausea/Vomiting      Dialysis/CRRT      Treatment:     x Other:  "Also found to have mild CLARE on essentially normal kidney hx likely secondary to NSAIDS (Ibuprofen 800 mg over past week for knee pain) while on ACE, diuretics with poor oral intake.  Renal fxn improved overnight but I/O's not documented.  Did have some labile hypotension last night requiring saline boluses." Nephrology consult 10/30/17       Provider, please specify the diagnosis or diagnoses associated with above clinical findings.      [ ] Acute Kidney Failure/Injury with Tubular Necrosis    [ ] Other Acute Kidney Failure/Injury (please specify): ____________    [ ] Unspecified Acute Kidney Failure/Injury    [ ] Other (please specify): _______________________________    [ X] Clinically Undetermined    Please document in your progress notes daily for the duration of treatment, until resolved, and include in your discharge " summary.

## 2017-10-31 NOTE — ASSESSMENT & PLAN NOTE
- Pulmonary embolus in 2014.    - Held anticoagulation in the setting of renal failure, now resolved.   - Restart Xarelto.   - Follow up with PCP.

## 2017-10-31 NOTE — PLAN OF CARE
Problem: Occupational Therapy Goal  Goal: Occupational Therapy Goal  Goals to be met by: 11/7/17     Patient will increase functional independence with ADLs by performing:    LE Dressing with Stand-by Assistance and use of AE.  Grooming while standing at sink with Stand-by Assistance.  Toileting from bedside commode with Stand-by Assistance for hygiene and clothing management.   Supine to sit with Stand-by Assistance.  Stand pivot transfers with Stand-by Assistance.  Increased functional strength to 3/5 for increased (I) with bed mobility and transfers, as well as ADL.  Patient is (S) with UE therex program.  .    Outcome: Ongoing (interventions implemented as appropriate)  OT eval completed. Patient seen back to back with PT, and raciel from PT eval affecting performance with OT evaluation. Patient requiring Max(A) for bed mobility, Min(A) with transfers, with standing time limited due to pain in (L) knee. Patient unable to bend to attend to LE dressing, reports getting light headed.     Will benefit from further therapy services. Recommend either HH if patient has help from family, due to difficulties with bed mobility, or short inpatient stay at SNF.Will reassess in AM of 11/1/17. Thank you for the consult.    ALLA Villa, JUAN JOSE/L  10/31/2017

## 2017-11-01 PROBLEM — N17.9 ACUTE KIDNEY INJURY: Status: RESOLVED | Noted: 2017-10-29 | Resolved: 2017-11-01

## 2017-11-01 PROBLEM — E03.9 HYPOTHYROIDISM (ACQUIRED): Status: ACTIVE | Noted: 2017-11-01

## 2017-11-01 PROBLEM — R29.898 WEAKNESS OF BOTH HIPS: Status: ACTIVE | Noted: 2017-11-01

## 2017-11-01 PROBLEM — E53.8 VITAMIN B 12 DEFICIENCY: Status: ACTIVE | Noted: 2017-11-01

## 2017-11-01 PROBLEM — M62.82 NON-TRAUMATIC RHABDOMYOLYSIS: Status: ACTIVE | Noted: 2017-11-01

## 2017-11-01 PROBLEM — D51.9 ANEMIA DUE TO VITAMIN B12 DEFICIENCY: Status: ACTIVE | Noted: 2017-11-01

## 2017-11-01 LAB
ANION GAP SERPL CALC-SCNC: 5 MMOL/L
BUN SERPL-MCNC: 16 MG/DL
CALCIUM SERPL-MCNC: 9.1 MG/DL
CHLORIDE SERPL-SCNC: 104 MMOL/L
CK MB SERPL-MCNC: 188 NG/ML
CK MB SERPL-RTO: 3.2 %
CK SERPL-CCNC: 4936 U/L
CK SERPL-CCNC: 5889 U/L
CK SERPL-CCNC: 5889 U/L
CO2 SERPL-SCNC: 30 MMOL/L
CREAT SERPL-MCNC: 0.7 MG/DL
CRP SERPL-MCNC: 95.8 MG/L
ERYTHROCYTE [SEDIMENTATION RATE] IN BLOOD BY WESTERGREN METHOD: 34 MM/HR
EST. GFR  (AFRICAN AMERICAN): >60 ML/MIN/1.73 M^2
EST. GFR  (NON AFRICAN AMERICAN): >60 ML/MIN/1.73 M^2
GLUCOSE SERPL-MCNC: 97 MG/DL
LDH SERPL L TO P-CCNC: 529 U/L
MAGNESIUM SERPL-MCNC: 1.5 MG/DL
PHOSPHATE SERPL-MCNC: 3.1 MG/DL
POCT GLUCOSE: 116 MG/DL (ref 70–110)
POCT GLUCOSE: 173 MG/DL (ref 70–110)
POCT GLUCOSE: 173 MG/DL (ref 70–110)
POCT GLUCOSE: 98 MG/DL (ref 70–110)
POTASSIUM SERPL-SCNC: 4 MMOL/L
SODIUM SERPL-SCNC: 139 MMOL/L
T4 FREE SERPL-MCNC: 0.86 NG/DL
TROPONIN I SERPL DL<=0.01 NG/ML-MCNC: 0.03 NG/ML
TSH SERPL DL<=0.005 MIU/L-ACNC: 8.52 UIU/ML

## 2017-11-01 PROCEDURE — 99232 SBSQ HOSP IP/OBS MODERATE 35: CPT | Mod: ,,, | Performed by: HOSPITALIST

## 2017-11-01 PROCEDURE — 25000003 PHARM REV CODE 250: Performed by: HOSPITALIST

## 2017-11-01 PROCEDURE — 63600175 PHARM REV CODE 636 W HCPCS: Performed by: HOSPITALIST

## 2017-11-01 PROCEDURE — 86580 TB INTRADERMAL TEST: CPT | Performed by: HOSPITALIST

## 2017-11-01 PROCEDURE — 25000003 PHARM REV CODE 250: Performed by: NURSE PRACTITIONER

## 2017-11-01 PROCEDURE — 97530 THERAPEUTIC ACTIVITIES: CPT

## 2017-11-01 PROCEDURE — 85651 RBC SED RATE NONAUTOMATED: CPT

## 2017-11-01 PROCEDURE — 97535 SELF CARE MNGMENT TRAINING: CPT

## 2017-11-01 PROCEDURE — 83615 LACTATE (LD) (LDH) ENZYME: CPT

## 2017-11-01 PROCEDURE — 82550 ASSAY OF CK (CPK): CPT

## 2017-11-01 PROCEDURE — 80048 BASIC METABOLIC PNL TOTAL CA: CPT

## 2017-11-01 PROCEDURE — 97110 THERAPEUTIC EXERCISES: CPT

## 2017-11-01 PROCEDURE — 11000001 HC ACUTE MED/SURG PRIVATE ROOM

## 2017-11-01 PROCEDURE — 86140 C-REACTIVE PROTEIN: CPT

## 2017-11-01 PROCEDURE — 97116 GAIT TRAINING THERAPY: CPT

## 2017-11-01 PROCEDURE — 83735 ASSAY OF MAGNESIUM: CPT

## 2017-11-01 PROCEDURE — 82085 ASSAY OF ALDOLASE: CPT

## 2017-11-01 PROCEDURE — 86038 ANTINUCLEAR ANTIBODIES: CPT

## 2017-11-01 PROCEDURE — 84439 ASSAY OF FREE THYROXINE: CPT

## 2017-11-01 PROCEDURE — 84484 ASSAY OF TROPONIN QUANT: CPT

## 2017-11-01 PROCEDURE — 84443 ASSAY THYROID STIM HORMONE: CPT

## 2017-11-01 PROCEDURE — 36415 COLL VENOUS BLD VENIPUNCTURE: CPT

## 2017-11-01 PROCEDURE — 82553 CREATINE MB FRACTION: CPT

## 2017-11-01 PROCEDURE — 84100 ASSAY OF PHOSPHORUS: CPT

## 2017-11-01 RX ORDER — MAGNESIUM SULFATE HEPTAHYDRATE 40 MG/ML
2 INJECTION, SOLUTION INTRAVENOUS ONCE
Status: COMPLETED | OUTPATIENT
Start: 2017-11-01 | End: 2017-11-01

## 2017-11-01 RX ORDER — SODIUM CHLORIDE 9 MG/ML
INJECTION, SOLUTION INTRAVENOUS CONTINUOUS
Status: DISCONTINUED | OUTPATIENT
Start: 2017-11-01 | End: 2017-11-07

## 2017-11-01 RX ORDER — LANOLIN ALCOHOL/MO/W.PET/CERES
400 CREAM (GRAM) TOPICAL ONCE
Status: COMPLETED | OUTPATIENT
Start: 2017-11-01 | End: 2017-11-01

## 2017-11-01 RX ORDER — LEVOTHYROXINE SODIUM 25 UG/1
25 TABLET ORAL
Status: DISCONTINUED | OUTPATIENT
Start: 2017-11-02 | End: 2017-11-13 | Stop reason: HOSPADM

## 2017-11-01 RX ADMIN — OXYCODONE HYDROCHLORIDE 10 MG: 5 TABLET ORAL at 11:11

## 2017-11-01 RX ADMIN — ATORVASTATIN CALCIUM 40 MG: 20 TABLET, FILM COATED ORAL at 08:11

## 2017-11-01 RX ADMIN — Medication 400 MG: at 08:11

## 2017-11-01 RX ADMIN — CARVEDILOL 25 MG: 12.5 TABLET, FILM COATED ORAL at 05:11

## 2017-11-01 RX ADMIN — BENAZEPRIL HYDROCHLORIDE 10 MG: 10 TABLET, FILM COATED ORAL at 08:11

## 2017-11-01 RX ADMIN — RIVAROXABAN 20 MG: 10 TABLET, FILM COATED ORAL at 06:11

## 2017-11-01 RX ADMIN — SODIUM CHLORIDE: 0.9 INJECTION, SOLUTION INTRAVENOUS at 08:11

## 2017-11-01 RX ADMIN — METFORMIN HYDROCHLORIDE 500 MG: 500 TABLET, FILM COATED ORAL at 08:11

## 2017-11-01 RX ADMIN — CARVEDILOL 25 MG: 12.5 TABLET, FILM COATED ORAL at 08:11

## 2017-11-01 RX ADMIN — SODIUM CHLORIDE: 0.9 INJECTION, SOLUTION INTRAVENOUS at 05:11

## 2017-11-01 RX ADMIN — MAGNESIUM SULFATE IN WATER 2 G: 40 INJECTION, SOLUTION INTRAVENOUS at 11:11

## 2017-11-01 RX ADMIN — METFORMIN HYDROCHLORIDE 500 MG: 500 TABLET, FILM COATED ORAL at 05:11

## 2017-11-01 RX ADMIN — TUBERCULIN PURIFIED PROTEIN DERIVATIVE 5 UNITS: 5 INJECTION, SOLUTION INTRADERMAL at 11:11

## 2017-11-01 RX ADMIN — AMLODIPINE BESYLATE 10 MG: 5 TABLET ORAL at 08:11

## 2017-11-01 NOTE — PT/OT/SLP PROGRESS
"Occupational Therapy  Treatment    Serafin Tapia   MRN: [id number]   Admitting Diagnosis: Adverse reaction to oral hypoglycemic drug, initial encounter    OT Date of Treatment: 11/01/17   OT Start Time: 1118  OT Stop Time: 1212  OT Total Time (min): 54 min    Billable Minutes:  Self Care/Home Management 30 and Therapeutic Activity 15 (stepped out while RN starting IV; discuss with MD and son regarding discharge plan)    General Precautions: Standard, fall, diabetic  Orthopedic Precautions: N/A  Braces: N/A    Do you have any cultural, spiritual, Tenriism conflicts, given your current situation?: none reported    Subjective:  Communicated with RN prior to session.  My leg doesn't hurt as much if I don't bend it; I'm not breaking out in sweats if it doesn't hurt as much. My friend is giving me a lift chair. If I would have one I could use it during the day, and get up to open the door. I wouldn't sleep in it though."    Pain/Comfort  Pain Rating 1: 0/10 in rest  Location - Side 1: Left  Location 1: knee  Pain Addressed 1: Distraction, Reposition, Cessation of Activity  Pain Rating Post-Intervention 1: 8/10 with transfer. 5/10 with functional mobility.    Objective:  Patient found with: telemetry, peripheral IV     Functional Mobility:  Bed Mobility:  Did not occur this AM; patient up in chair pre and post session.    Transfers:   Sit <> Stand Assistance: Moderate Assistance - Mod(A) to bring trunk forward; min(A) to stand.   Sit <> Stand Assistive Device: Rolling Walker  Toilet Transfer Assistance: Contact Guard Assistance  Toilet Transfer Assistive Device: bedside commode; placed over commode.    Functional Ambulation: CGA with RW. (A) needed to manage IV pole.    Activities of Daily Living:  Feeding Level of Assistance: Independent  UE Dressing Level of Assistance: Supervision  LE Dressing Level of Assistance: Moderate assistance (with use of AE )  LE adaptive equipment: Reacher and Sock aid. Patient stating at home he " "would take socks of in standing, by using tandem stance on sock and pulling feet back. Unsuccessful with attempt using this technique this session.  Grooming Position: Seated  Grooming Level of Assistance: Supervision  Toileting Where Assessed: Bedside commode (placed over toilet)  Toileting Level of Assistance: Activity did not occur (false alarm; no hygiene performed)     Reviewed home situation regarding cooking; patient reports using barstool in the kitchen, as he is limited in using AE in standing due to LE pain.    AM-PAC 6 CLICK ADL   How much help from another person does this patient currently need?   1 = Unable, Total/Dependent Assistance  2 = A lot, Maximum/Moderate Assistance  3 = A little, Minimum/Contact Guard/Supervision  4 = None, Modified [place]/Independent    Putting on and taking off regular lower body clothing? : 2  Bathing (including washing, rinsing, drying)?: 2  Toileting, which includes using toilet, bedpan, or urinal? : 2  Putting on and taking off regular upper body clothing?: 3  Taking care of personal grooming such as brushing teeth?: 3  Eating meals?: 4  Total Score: 16     AM-PAC Raw Score CMS "G-Code Modifier Level of Impairment Assistance   6 % Total / Unable   7 - 8 CM 80 - 100% Maximal Assist   9-13 CL 60 - 80% Moderate Assist   14 - 19 CK 40 - 60% Moderate Assist   20 - 22 CJ 20 - 40% Minimal Assist   23 CI 1-20% SBA / CGA   24 CH 0% Independent/ Mod I       Patient left up in chair with all lines intact, call button in reach, RN notified and Son present    ASSESSMENT:  Serafin Tapia is a 66 y.o. male with a medical diagnosis of Adverse reaction to oral hypoglycemic drug, initial encounter and presents with proximal weakness, affecting (I) with transfers, and ADL. Patient able to use AE for LE dressing to don/doff socks, but needs (A) to complete sit to stand transfer to be able to manage pants. Patient would benefit from therapy services to address goals and return to " living at home (I)ly    Rehab identified problem list/impairments: Rehab identified problem list/impairments: weakness, impaired self care skills, decreased lower extremity function, decreased upper extremity function, gait instability, impaired functional mobilty, pain    Rehab potential is good.  Activity tolerance: Good  Discharge recommendations: Discharge Facility/Level Of Care Needs: nursing facility, skilled   Barriers to discharge: Barriers to Discharge: Decreased caregiver support  Equipment recommendations: bath bench, dressing device, hip kit     GOALS:    Occupational Therapy Goals        Problem: Occupational Therapy Goal    Goal Priority Disciplines Outcome Interventions   Occupational Therapy Goal     OT, PT/OT Ongoing (interventions implemented as appropriate)    Description:  Goals to be met by: 11/7/17     Patient will increase functional independence with ADLs by performing:    LE Dressing with Stand-by Assistance and use of AE. In progress; not met  Grooming while standing at sink with Stand-by Assistance. NOT MET  Toileting from bedside commode with Stand-by Assistance for hygiene and clothing management.  NOT MET  Supine to sit with Stand-by Assistance.   Stand pivot transfers with Stand-by Assistance. NOT MET  Increased functional strength to 3/5 for increased (I) with bed mobility and transfers, as well as ADL. NOT MET  Patient is (S) with UE therex program.   .                       Plan:  Patient to be seen 6 x/week to address the above listed problems via self-care/home management, therapeutic exercises, therapeutic activities  Plan of Care expires: 01/30/18  Plan of Care reviewed with: patient, jessica ARCHULETA KATHE Raygoza, OTR/L  11/01/2017

## 2017-11-01 NOTE — PROGRESS NOTES
[facility] Hospital Medicine  Progress Note    Patient Name: Serafin Tapia  MRN: [id number]  Patient Class: IP- Inpatient   Admission Date: 10/29/2017  Length of Stay: 3 days  Attending Physician: Dedrick Price MD  Primary Care Provider: John Vargas MD        Subjective:     Principal Problem:Adverse reaction to oral hypoglycemic drug, initial encounter    HPI:  Patient is a 66 year-old woman with a history hypertension, diabetes mellitus type II, dyslipidemia who presents to the emergency department by the emergency medical services after he was found to be confused with capillary blood glucose measurement of 40 mg/dL.  Patient was given an ampule of dextrose with resolution of hypoglycemia and normalization of his mental status.  However while in the emergency department he had recurrence of hypoglycemia requiring further intervenous dextrose.  He denies any recent changes in his medications or recent illnesses.  He reports no changes in his diet.  He reports that he has not been checking his blood glucose as his home glucometer is broken.    Hospital Course:  Patient admitted to the hospital for treatment of acute renal failure and refractory hypoglycemia secondary to sulfonylurea use.  Patient given isotonic intravenous with dextrose fluids with improvement in serum creatinine.        BILAT RENAL US 10/29/17:  IMPRESSION:  1. Slightly decreased perfusion to the bilateral kidneys with otherwise normal resistive indices, which could be artifactual versus sequela of nonspecific acute medical renal disease. Correlate clinically.  2. Otherwise, normal renal morphology bilaterally without hydronephrosis.        CXR 10/29/17:  Findings: Scattered interstitial opacities noted in the right lung. No confluent consolidation, pleural effusion, or pneumothorax.  Cardiac silhouette is unremarkable.  IMPRESSION:  Scattered interstitial opacities in the right lung, possibly asymmetric pulmonary edema or  infectious process.            Interval History:   Doing okay.  Labs better.  Has some muscular weakness noted on exam.   Discussed with son and PT.      Review of Systems   Constitutional: Negative for appetite change and fever.   Respiratory: Negative for cough and shortness of breath.    Cardiovascular: Negative for chest pain and palpitations.   Gastrointestinal: Negative for nausea and vomiting.   Skin: Negative for color change and rash.   Neurological: Negative for dizziness and weakness.   Psychiatric/Behavioral: Negative for agitation and confusion.     Objective:     Vital Signs (Most Recent):  Temp: 98.2 °F (36.8 °C) (11/01/17 1142)  Pulse: 66 (11/01/17 1200)  Resp: 18 (11/01/17 1142)  BP: 120/65 (11/01/17 1142)  SpO2: 98 % (11/01/17 1142) Vital Signs (24h Range):  Temp:  [98.1 °F (36.7 °C)-98.9 °F (37.2 °C)] 98.2 °F (36.8 °C)  Pulse:  [65-91] 66  Resp:  [18] 18  SpO2:  [91 %-98 %] 98 %  BP: (120-174)/(65-80) 120/65     Weight: 116.6 kg (257 lb 1.6 oz)  Body mass index is 36.89 kg/m².    Intake/Output Summary (Last 24 hours) at 11/01/17 1230  Last data filed at 11/01/17 1000   Gross per 24 hour   Intake                0 ml   Output              200 ml   Net             -200 ml      Physical Exam   Constitutional: He is oriented to person, place, and time. He appears well-developed and well-nourished.   HENT:   Head: Normocephalic and atraumatic.   Eyes: Conjunctivae are normal. Pupils are equal, round, and reactive to light.   Neck: Normal range of motion. Neck supple.   Cardiovascular: Normal rate and regular rhythm.    Pulmonary/Chest: Effort normal and breath sounds normal.   Abdominal: Soft.   Neurological: He is alert and oriented to person, place, and time.   Skin: Skin is warm and dry.   Has some proximal hip and shoulder weakness bilat.     Significant Labs:   BMP:   Recent Labs  Lab 11/01/17  0510   GLU 97      K 4.0      CO2 30*   BUN 16   CREATININE 0.7   CALCIUM 9.1   MG 1.5*      CBC:   Recent Labs  Lab 10/31/17  0541   WBC 4.61   HGB 11.6*   HCT 35.2*          Significant Imaging: I have reviewed all pertinent imaging results/findings within the past 24 hours.    Assessment/Plan:      * Adverse reaction to oral hypoglycemic drug, initial encounter    - Hypoglycemia due to decreased clearance due to renal failure.   - Rehydrated and renal function at baseline.   - Discontinue sulfonylurea.    - Appears resolved.         Weakness of both hips    - Check CK  - PT OT recs SNF          Falls    - PT OT          Acute kidney injury    - Resolved.    - Trigger for kidney injury unclear but likely multifactorial including non-steroidal anti-inflammatory drugs use.    - Renal ultrasound without evidence of obstruction.    - Discontinue HCTZ, spironolactone.          History of pulmonary embolism    - Pulmonary embolus in 2014.    - Held anticoagulation in the setting of renal failure, now resolved.   - Xarelto.   - Follow up with PCP.         Controlled type 2 diabetes mellitus with diabetic polyneuropathy, without long-term current use of insulin    - A1c = 5.5%.  - Well controlled.    - Sulfonylurea to be discontinued.   - Metformin to 500 mg BID.   - Patient in agreement and follow up with PCP.         Diastolic dysfunction    - Well compensated without shortness of breath and only trace lower extremity edema.   - Continue with medical therapy with beta-blocker therapy and will monitor volume status.        Dyslipidemia    - Continue with statin therapy.          Essential hypertension    - Restart home meds   - Discontinue HCTZ / spironolactone.  - Good control on current regimen.          VTE Risk Mitigation         Ordered     rivaroxaban tablet 20 mg  With dinner     Route:  Oral        10/31/17 1303     Medium Risk of VTE  Once      10/29/17 1529              Dedrick Price MD  Department of Hospital Medicine   [facility]

## 2017-11-01 NOTE — ASSESSMENT & PLAN NOTE
- Resolved.    - Trigger for kidney injury unclear but likely multifactorial including non-steroidal anti-inflammatory drugs use.    - Renal ultrasound without evidence of obstruction.    - Discontinue HCTZ, spironolactone.

## 2017-11-01 NOTE — SUBJECTIVE & OBJECTIVE
Interval History:   Doing okay.  Labs better.  Has some muscular weakness noted on exam.   Discussed with son and PT.      Review of Systems   Constitutional: Negative for appetite change and fever.   Respiratory: Negative for cough and shortness of breath.    Cardiovascular: Negative for chest pain and palpitations.   Gastrointestinal: Negative for nausea and vomiting.   Skin: Negative for color change and rash.   Neurological: Negative for dizziness and weakness.   Psychiatric/Behavioral: Negative for agitation and confusion.     Objective:     Vital Signs (Most Recent):  Temp: 98.2 °F (36.8 °C) (11/01/17 1142)  Pulse: 66 (11/01/17 1200)  Resp: 18 (11/01/17 1142)  BP: 120/65 (11/01/17 1142)  SpO2: 98 % (11/01/17 1142) Vital Signs (24h Range):  Temp:  [98.1 °F (36.7 °C)-98.9 °F (37.2 °C)] 98.2 °F (36.8 °C)  Pulse:  [65-91] 66  Resp:  [18] 18  SpO2:  [91 %-98 %] 98 %  BP: (120-174)/(65-80) 120/65     Weight: 116.6 kg (257 lb 1.6 oz)  Body mass index is 36.89 kg/m².    Intake/Output Summary (Last 24 hours) at 11/01/17 1230  Last data filed at 11/01/17 1000   Gross per 24 hour   Intake                0 ml   Output              200 ml   Net             -200 ml      Physical Exam   Constitutional: He is oriented to person, place, and time. He appears well-developed and well-nourished.   HENT:   Head: Normocephalic and atraumatic.   Eyes: Conjunctivae are normal. Pupils are equal, round, and reactive to light.   Neck: Normal range of motion. Neck supple.   Cardiovascular: Normal rate and regular rhythm.    Pulmonary/Chest: Effort normal and breath sounds normal.   Abdominal: Soft.   Neurological: He is alert and oriented to person, place, and time.   Skin: Skin is warm and dry.   Has some proximal hip and shoulder weakness bilat.     Significant Labs:   BMP:   Recent Labs  Lab 11/01/17  0510   GLU 97      K 4.0      CO2 30*   BUN 16   CREATININE 0.7   CALCIUM 9.1   MG 1.5*     CBC:   Recent Labs  Lab  10/31/17  0541   WBC 4.61   HGB 11.6*   HCT 35.2*          Significant Imaging: I have reviewed all pertinent imaging results/findings within the past 24 hours.

## 2017-11-01 NOTE — PLAN OF CARE
Problem: Occupational Therapy Goal  Goal: Occupational Therapy Goal  Goals to be met by: 11/7/17     Patient will increase functional independence with ADLs by performing:    LE Dressing with Stand-by Assistance and use of AE. In progress; not met  Grooming while standing at sink with Stand-by Assistance. NOT MET  Toileting from bedside commode with Stand-by Assistance for hygiene and clothing management.  NOT MET  Supine to sit with Stand-by Assistance.   Stand pivot transfers with Stand-by Assistance. NOT MET  Increased functional strength to 3/5 for increased (I) with bed mobility and transfers, as well as ADL. NOT MET  Patient is (S) with UE therex program.   .     Outcome: Ongoing (interventions implemented as appropriate)  Patient with fluctuating pain in (L) knee during session. Conitnues to need (A) with sit to stand transfers due to poor proximal and core strength. Recommend SNF.     JUAN JOSE Thompson/L  11/1/2017

## 2017-11-01 NOTE — PLAN OF CARE
Problem: Patient Care Overview  Goal: Plan of Care Review  Outcome: Ongoing (interventions implemented as appropriate)  Plan of care reviewed with pt. NAD noted at this time. Denies any cp or sob. Resp even and unlabored. Incentive spirometer encouraged while awake. VSS, afebrile. Denies N/V. AAO x 4. PPP sylvie. SCD's remain in place. Pain controlled with current pain meds. Accu checks q 3 hrs remain WNL. Remains free from injury. Safety precautions remain in place. Call light in reach. Will continue to monitor.

## 2017-11-01 NOTE — PLAN OF CARE
Problem: Physical Therapy Goal  Goal: Physical Therapy Goal  Goals to be met by 11-10-17.  1. Sup<>sit mod I  2. Sit<>stand with RW mod I  3. amb 150' with RW mod I     Outcome: Ongoing (interventions implemented as appropriate)    Patient required Max A for transfer from bedside chair. 75 feet with RW with CGA/Min A

## 2017-11-01 NOTE — ASSESSMENT & PLAN NOTE
- Has proximal muscle weakness.  - CK, LDH elevated, aldolase pending.  - Discontinue atorvastatin.  - ESR = 34; CRP = 95.8.  - HALLE; anti-Cinthia-1 pending.   - NS IVF  - Monitor CPK

## 2017-11-01 NOTE — ASSESSMENT & PLAN NOTE
- Pulmonary embolus in 2014.    - Held anticoagulation in the setting of renal failure, now resolved.   - Xarelto.   - Follow up with PCP.

## 2017-11-01 NOTE — PT/OT/SLP EVAL
Occupational Therapy  Evaluation    Serafin Tapia   MRN: [id number]   Admitting Diagnosis: Adverse reaction to oral hypoglycemic drug, initial encounter    OT Date of Treatment: 10/31/17   OT Start Time: 1607  OT Stop Time: 1722  OT Total Time (min): 75 min    Billable Minutes:  Evaluation 45  Therapeutic Activity 30    Diagnosis: Adverse reaction to oral hypoglycemic drug, initial encounter     Past Medical History:   Diagnosis Date    Asthma     Diabetes mellitus type 2 in obese 5/3/2014    Elevated troponin 5/3/2014    Hyperlipidemia     Hypertension     Obesity     Pulmonary embolism 05/2014    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      Past Surgical History:   Procedure Laterality Date    TONSILLECTOMY         Referring physician: Albert  Date referred to OT: 10/31/7    General Precautions: Standard, fall  Orthopedic Precautions: N/A  Braces: N/A    Do you have any cultural, spiritual, Restorationist conflicts, given your current situation?: none reported     Patient History:   Patient lives alone in apartment, with daughter living nearby, who might be able to stop by in AM before she goes to work, and will help patient with LE dressing as needed at that time (socks), patient is able to alen pants, doff socks using a long-handled shoe horn. Patient owns a RW and cane, uses either dependent on level of pain in (L)knee, and also uses a shower chair and grabbar in the tub/shower set up. He reports having slid off the bed a few times, and reports difficulty getting in and out of bed, and has unsuccesfullky attempted use of a stepstool for this. patient is an , and has been attending outpatient PT, to focus on improving his strenght, which in both UE and LE, has been decreasing as of march of this year. Patient does his own cooking and grocery shopping. He is a retired  and . He has been considering moving in with his family,  however, it would still mean he would be home alone during the day. Patient report that he has only a few chairs in the house that are tall enough, with a straight back, that he can (I)ly can get to stand from.   Equipment Currently Used at Home: grab bar, shower chair, walker, rolling, cane, straight, 3-in-1 commode    Dominant hand: left    Subjective:  Communicated with RN, PT prior to session.  I've been getting weaker since March. My daughter asked me to drill a hole, and I couldn't even manage the drill. I haven't been falling though, I only slip out of the bed. I don't bend over because I get light headed. I get light headed when I first get up out of the bed. We have been thinking about me moving in with my family, but I will still be home alone during the day.    Chief Complaint: pain in (L)knee  Patient/Family stated goals: to get stronger to be able to be alone during the day.    Pain/Comfort  Pain Rating 1: 8/10 (with transfeers pain increasing to 8/10)  Location - Side 1: Left  Location 1: knee  Pain Addressed 1: Reposition, Distraction  Pain Rating Post-Intervention 1: 1/10 (in rest)    Objective:  Patient found with: telemetry    Cognitive Exam:  Oriented to: Person, Place, Time and Situation  Follows Commands/attention: Follows multistep  commands  Communication: clear/fluent  Memory:  No Deficits noted  Safety awareness/insight to disability: intact  Coping skills/emotional control: Appropriate to situation    Visual/perceptual:  Intact    Physical Exam:  Postural examination/scapula alignment: Rounded shoulder and Posterior pelvic tilt  Skin integrity: Visible skin intact    Upper Extremity Range of Motion:  Right Upper Extremity: WFL  Left Upper Extremity: WFL    Upper Extremity Strength:  Right Upper Extremity: 3-/5 forward shoulder flexion, with improving strenght distally  Left Upper Extremity: 3-/5 distal strength, but worse than (R) . 4-/5 distal strength.   Strength: 4/5 left, 5/5  "right.    Fine motor coordination:   Intact    Gross motor coordination: decreased trunk strength, affecting mobility and transfers    Functional Mobility:  Bed Mobility: Due to decreased proximal LE  strength, patient unable to (I) flex hip to be able to initiate log rolling.  Rolling/Turning to Left: With side rail, Moderate assistance  Supine to Sit: Maximum Assistance (unable to elevate trunk)  Sit to Supine: Moderate Assistance (unable to elevate LE into bed.)    Transfers:  Sit <> Stand Assistance: Moderate Assistance ((A) needed for flexion at weight and forward weight shift due to decreased abdominal strenght.)  Sit <> Stand Assistive Device: Rolling Walker (Once standing, patient shifting to lean onto window sill, due to increased pain. Decreased standing tolerance, limiting abiliity to use UE in functional task.)  Bed <> Chair Technique: Stand Pivot  Bed <> Chair Transfer Assistance: Contact Guard Assistance  Bed <> Chair Assistive Device: Rolling Walker    Functional Ambulation: CGA with RW once standing, but limited in duration, due to pain in (L)knee.  C/o light headedness after transfer supine >sit,   146/78 in seated  152/67 in supine  155/73 in seated after 2nd time of bed mobility, with patient stating: "I don't always get dizzy". Unable to take BP in standing, due to decreased standing tolerance.    Activities of Daily Living:  Feeding Level of Assistance: Independent  UE Dressing Level of Assistance: Supervision  LE Dressing Level of Assistance: Maximum assistance  Grooming Position: Seated  Grooming Level of Assistance: Supervision  Toileting Level of Assistance: Activity did not occur (has been using 3-1 with nursing). Reports normally performing hygiene in seated.    Balance:   Static Sit: FAIR: Maintains without assist, but unable to take any challenges   Dynamic Sit: FAIR+: Maintains balance through MINIMAL excursions of active trunk motion  Static Stand: FAIR+: Takes MINIMAL challenges from " "all directions  Dynamic stand: FAIR: Needs CONTACT GUARD during gait    Therapeutic Activities and Exercises:  Problem solving regarding bed mobility, with use of log rolling, however, patient continues to need max(A). Review of BP with mobility (see above). Discussed case with MD after completion of eval/treat. Returned to room, to discuss ability of family to be at the house, to consider home health before return to outpatient, however, son stating only able to stay 1-2 days. Patient stating ":I can leave the key somewhere".    AM-PAC 6 CLICK ADL  How much help from another person does this patient currently need?  1 = Unable, Total/Dependent Assistance  2 = A lot, Maximum/Moderate Assistance  3 = A little, Minimum/Contact Guard/Supervision  4 = None, Modified [place]/Independent    Putting on and taking off regular lower body clothing? : 1  Bathing (including washing, rinsing, drying)?: 2  Toileting, which includes using toilet, bedpan, or urinal? : 2  Putting on and taking off regular upper body clothing?: 3  Taking care of personal grooming such as brushing teeth?: 3  Eating meals?: 4  Total Score: 15    AM-PAC Raw Score CMS "G-Code Modifier Level of Impairment Assistance   6 % Total / Unable   7 - 9 CM 80 - 100% Maximal Assist   10-14 CL 60 - 80% Moderate Assist   15 - 19 CK 40 - 60% Moderate Assist   20 - 22 CJ 20 - 40% Minimal Assist   23 CI 1-20% SBA / CGA   24 CH 0% Independent/ Mod I       Patient left up in chair with all lines intact, call button in reach, RN and MD notified and son presents    Assessment:  Serafin Tapia is a 66 y.o. male with a medical diagnosis of Adverse reaction to oral hypoglycemic drug, initial encounter and presents with overal weakness, worse proximal than distally, affecting (I) with bedmobility, transfers, and basic & instrumental ADL. Due to OT evaluation taken place right after patient's participation in PT evaluation, patient's decreased levels of performance " could be related to that. Will work with PT for next OT session to take place prior to their session. Patient would continue to benefit from therapy services to address goals.     Rehab identified problem list/impairments: Rehab identified problem list/impairments: weakness, impaired endurance, impaired self care skills, gait instability, impaired functional mobilty, decreased upper extremity function, decreased lower extremity function, pain    Rehab potential is good.  Activity tolerance: Good  Discharge recommendations: Discharge Facility/Level Of Care Needs: nursing facility, skilled, based on performance during evaluation. Recommend patient and family to consider options to have increased assistance during day time while patient is regaining strength and subsequent (I) with activities, at which time therapy intervention at home, prior to returning to OPPT would be an option.  Barriers to discharge: Barriers to Discharge: Decreased caregiver support  Equipment recommendations:  (recommend consideration to use TTB instead of Showerchair)     GOALS:    Occupational Therapy Goals        Problem: Occupational Therapy Goal    Goal Priority Disciplines Outcome Interventions   Occupational Therapy Goal     OT, PT/OT Ongoing (interventions implemented as appropriate)    Description:  Goals to be met by: 11/7/17     Patient will increase functional independence with ADLs by performing:    LE Dressing with Stand-by Assistance and use of AE.  Grooming while standing at sink with Stand-by Assistance.  Toileting from bedside commode with Stand-by Assistance for hygiene and clothing management.   Supine to sit with Stand-by Assistance.  Stand pivot transfers with Stand-by Assistance.  Increased functional strength to 3/5 for increased (I) with bed mobility and transfers, as well as ADL.  Patient is (S) with UE therex program.  .                      PLAN:  Patient to be seen 6 x/week to address the above listed problems via  self-care/home management, therapeutic exercises, therapeutic activities  Plan of Care expires: 01/30/18  Plan of Care reviewed with: patient, son    J J Dwight Olguin, OTR/L  11/01/2017

## 2017-11-01 NOTE — PT/OT/SLP PROGRESS
"Physical Therapy  Treatment    Serafin Tapia   MRN: [id number]   Admitting Diagnosis: Adverse reaction to oral hypoglycemic drug, initial encounter    PT Received On: 11/01/17  PT Start Time: 1423     PT Stop Time: 1501    PT Total Time (min): 38 min       Billable Minutes:  Gait [id number], Therapeutic Activity 15 and Therapeutic Exercise 8    Treatment Type: Treatment  PT/PTA: PTA     PTA Visit Number: 1       General Precautions: Standard, fall, diabetic  Orthopedic Precautions: N/A   Braces: N/A    Do you have any cultural, spiritual, Rastafari conflicts, given your current situation?: none     Subjective:  Communicated with nurse prior to session. Patient stated " I just have been getting weaker at home" patient also reported my left knee gives me problems.     Pain/Comfort  Pain Rating 1: 5/10  Location - Side 1: Left  Location 1: knee  Pain Addressed 1: Distraction, Reposition, Cessation of Activity  Pain Rating Post-Intervention 1: 5/10    Objective:   Patient found with: telemetry, peripheral IV seated in bedside chair.     Functional Mobility:  Bed Mobility:     NT     Transfers: patient was educated on proper technique to bring trunk forward.   Sit <> Stand Assistance: Maximum Assistance (required Max A to pull trunk forward then Moderate A to elevate hips and stand X 2 trials )  Sit <> Stand Assistive Device: Rolling Walker    Gait:  Patient demo antalgic gait secondary pain in left knee; followed with bedside chair for safety. Constant verbal cues to stay inside walker  Gait Distance:  (75 feet )  Assistance 1: Contact Guard Assistance, Minimum assistance  Gait Assistive Device: Rolling walker  Gait Pattern: reciprocal  Gait Deviation(s): decreased kadeem, decreased step length (antalgic gait secondary to increase pain in left knee )    Therapeutic Activities and Exercises:  Patient performed LAQ, AP, QS, GS X 20 reps AROM     AM-PAC 6 CLICK MOBILITY  How much help from another person does this patient " currently need?   1 = Unable, Total/Dependent Assistance  2 = A lot, Maximum/Moderate Assistance  3 = A little, Minimum/Contact Guard/Supervision  4 = None, Modified [place]/Independent    Turning over in bed (including adjusting bedclothes, sheets and blankets)?: 2  Sitting down on and standing up from a chair with arms (e.g., wheelchair, bedside commode, etc.): 2  Moving from lying on back to sitting on the side of the bed?: 2  Moving to and from a bed to a chair (including a wheelchair)?: 3  Need to walk in hospital room?: 3  Climbing 3-5 steps with a railing?: 2  Total Score: 14    AM-PAC Raw Score CMS G-Code Modifier Level of Impairment Assistance   6 % Total / Unable   7 - 9 CM 80 - 100% Maximal Assist   10 - 14 CL 60 - 80% Moderate Assist   15 - 19 CK 40 - 60% Moderate Assist   20 - 22 CJ 20 - 40% Minimal Assist   23 CI 1-20% SBA / CGA   24 CH 0% Independent/ Mod I     Patient left up in chair with all lines intact, call button in reach and nurse notified.    Assessment:  Serafin Tapia is a 66 y.o. male with a medical diagnosis of Adverse reaction to oral hypoglycemic drug, initial encounter patient demo antalgic gait, difficulty with transfers. Patient will cont. To benefit from skilled PT services to increase strength, endurance and functional mobility.     Rehab identified problem list/impairments: Rehab identified problem list/impairments: weakness, impaired endurance, impaired self care skills, gait instability, impaired functional mobilty, impaired balance, pain, decreased ROM    Rehab potential is good.    Activity tolerance: Good    Discharge recommendations: Discharge Facility/Level Of Care Needs: nursing facility, skilled     Barriers to discharge: Barriers to Discharge: Decreased caregiver support    Equipment recommendations: Equipment Needed After Discharge:  (possibly RW )     GOALS:    Physical Therapy Goals        Problem: Physical Therapy Goal    Goal Priority Disciplines Outcome  Goal Variances Interventions   Physical Therapy Goal     PT/OT, PT      Description:  Goals to be met by 11-10-17.  1. Sup<>sit mod I  2. Sit<>stand with RW mod I  3. amb 150' with RW mod I                      PLAN:    Patient to be seen daily  to address the above listed problems via gait training, therapeutic activities, therapeutic exercises  Plan of Care expires: 11/30/17  Plan of Care reviewed with: patient, jessica MAC Kyle, PTA  11/01/2017

## 2017-11-01 NOTE — PROGRESS NOTES
"Nephrology  Progress Note    Admit Date: 10/29/2017   LOS: 3 days     SUBJECTIVE:     Follow-up For:  Adverse reaction to oral hypoglycemic drug, initial encounter/CLARE    Interval History:     Uneventful night.  Renal fxn remains stable.  No CP/SOB.  Working with therapy about leg weakness.      Review of Systems:  Constitutional: No fever or chills  Respiratory: No cough or shortness of breath  Cardiovascular: No chest pain or palpitations  Gastrointestinal: No nausea or vomiting  Neurological: No confusion or weakness    OBJECTIVE:     Vital Signs Range (Last 24H):  /69   Pulse 73   Temp 98.4 °F (36.9 °C)   Resp 18   Ht 5' 10" (1.778 m)   Wt 116.6 kg (257 lb 1.6 oz)   SpO2 (!) 91%   BMI 36.89 kg/m²     Temp:  [98 °F (36.7 °C)-98.9 °F (37.2 °C)]   Pulse:  [70-91]   Resp:  [18-20]   BP: (128-174)/(65-80)   SpO2:  [91 %-96 %]     I & O (Last 24H):No intake or output data in the 24 hours ending 11/01/17 0753    Physical Exam:  General appearance: Well developed, well nourished  Eyes:  Conjunctivae/corneas clear. PERRL.  Lungs: Normal respiratory effort,   clear to auscultation bilaterally   Heart: Regular rate and rhythm, S1, S2 normal, no murmur, rub or tj.  Abdomen: Soft, non-tender non-distended; bowel sounds normal; no masses,  no organomegaly, obese  Extremities: No cyanosis or clubbing. Trace edema.    Skin: Skin color, texture, turgor normal. No rashes or lesions  Neurologic: Normal strength and tone. No focal numbness or weakness     Laboratory Data:  No results for input(s): WBC, RBC, HGB, HCT, PLT, MCV, MCH, MCHC in the last 24 hours.    BMP:     Recent Labs  Lab 11/01/17  0510   GLU 97      K 4.0      CO2 30*   BUN 16   CREATININE 0.7   CALCIUM 9.1   MG 1.5*     Lab Results   Component Value Date    CALCIUM 9.1 11/01/2017    PHOS 3.1 11/01/2017       POCT Glucose   Date Value Ref Range Status   11/01/2017 98 70 - 110 mg/dL Final   11/01/2017 116 (H) 70 - 110 mg/dL Final "   10/31/2017 114 (H) 70 - 110 mg/dL Final   10/31/2017 147 (H) 70 - 110 mg/dL Final   10/31/2017 197 (H) 70 - 110 mg/dL Final   10/31/2017 100 70 - 110 mg/dL Final   10/31/2017 113 (H) 70 - 110 mg/dL Final   10/31/2017 129 (H) 70 - 110 mg/dL Final   10/30/2017 150 (H) 70 - 110 mg/dL Final   10/30/2017 135 (H) 70 - 110 mg/dL Final   10/30/2017 84 70 - 110 mg/dL Final   10/30/2017 72 70 - 110 mg/dL Final   10/30/2017 73 70 - 110 mg/dL Final   10/29/2017 104 70 - 110 mg/dL Final   10/29/2017 127 (H) 70 - 110 mg/dL Final   10/29/2017 69 (L) 70 - 110 mg/dL Final   10/29/2017 69 (L) 70 - 110 mg/dL Final   10/29/2017 80 70 - 110 mg/dL Final   10/29/2017 69 (L) 70 - 110 mg/dL Final   10/29/2017 99 70 - 110 mg/dL Final   10/29/2017 113 (H) 70 - 110 mg/dL Final   10/29/2017 88 70 - 110 mg/dL Final   10/29/2017 123 (H) 70 - 110 mg/dL Final   10/29/2017 180 (H) 70 - 110 mg/dL Final   10/29/2017 37 (LL) 70 - 110 mg/dL Final           Medications:  Medication list was reviewed and changes noted under Assessment/Plan.    Diagnostic Results:        ASSESSMENT/PLAN:     1. Resolved nonoliguric multifactorial CLARE unspecified on essentially normal renal hx secondary to NSAIDS, hypoglycemia, labile BP, while on ACE/HCTZ/Roberto (N17.9):  U/S and urine lytes noted.  Renal fxn back to baseline and UOP excellent.  Holding NSAIDS/HCTZ/Roberto for now but restarted low dose ACE and remains stable.  Renally dose meds, avoid nephrotoxins, and monitor I/O's closely.  2. Hypoglycemia secondary to poor oral intake while on Amaryl (T38.3X5A):  Held Metformin and would not use amaryl type agents in this age group.  A1C perfect so suspect can get away with gentle DDP4/Metformin only.     3. Hx of HTN (I95.9, I10):  Hypotension resolved with IVF's.  DC and monitor.  Restarted ACE/CCB with hold parameters for BP meds until SBP >140/90.    4. Mild hyperuricemia likely from dehydration/CLARE (E79.0):  No need for treatment at this time.  Diet modification  and recheck by PCP few weeks.          Will sign off.  Ok to DC from our perspective.   Thanks  See above and call with questions.       Patient seen and examined with NP. Labs reviewed. I personally made plan as above. NP acted solely as a scribe.

## 2017-11-01 NOTE — ASSESSMENT & PLAN NOTE
- A1c = 5.5%.  - Well controlled.    - Sulfonylurea to be discontinued.   - Metformin to 500 mg BID.   - Patient in agreement and follow up with PCP.

## 2017-11-02 PROBLEM — E53.8 FOLATE DEFICIENCY: Status: ACTIVE | Noted: 2017-11-02

## 2017-11-02 PROBLEM — E55.9 VITAMIN D DEFICIENCY: Status: ACTIVE | Noted: 2017-11-02

## 2017-11-02 LAB
25(OH)D3+25(OH)D2 SERPL-MCNC: 5 NG/ML
ALBUMIN SERPL BCP-MCNC: 2.6 G/DL
ALP SERPL-CCNC: 60 U/L
ALT SERPL W/O P-5'-P-CCNC: 68 U/L
ANA SER QL IF: NORMAL
ANION GAP SERPL CALC-SCNC: 5 MMOL/L
AST SERPL-CCNC: 81 U/L
BILIRUB DIRECT SERPL-MCNC: 0.2 MG/DL
BILIRUB SERPL-MCNC: 0.3 MG/DL
BUN SERPL-MCNC: 17 MG/DL
CALCIUM SERPL-MCNC: 9.1 MG/DL
CHLORIDE SERPL-SCNC: 106 MMOL/L
CK SERPL-CCNC: 4609 U/L
CO2 SERPL-SCNC: 30 MMOL/L
CREAT SERPL-MCNC: 0.7 MG/DL
EST. GFR  (AFRICAN AMERICAN): >60 ML/MIN/1.73 M^2
EST. GFR  (NON AFRICAN AMERICAN): >60 ML/MIN/1.73 M^2
FERRITIN SERPL-MCNC: 344 NG/ML
FOLATE SERPL-MCNC: 3.1 NG/ML
GLUCOSE SERPL-MCNC: 110 MG/DL
HCYS SERPL-SCNC: 15.9 UMOL/L
IRON SERPL-MCNC: 31 UG/DL
MAGNESIUM SERPL-MCNC: 2 MG/DL
PHOSPHATE SERPL-MCNC: 3.6 MG/DL
POCT GLUCOSE: 104 MG/DL (ref 70–110)
POCT GLUCOSE: 120 MG/DL (ref 70–110)
POCT GLUCOSE: 125 MG/DL (ref 70–110)
POCT GLUCOSE: 155 MG/DL (ref 70–110)
POCT GLUCOSE: 182 MG/DL (ref 70–110)
POCT GLUCOSE: 95 MG/DL (ref 70–110)
POTASSIUM SERPL-SCNC: 4.8 MMOL/L
PROT SERPL-MCNC: 6.1 G/DL
SATURATED IRON: 14 %
SODIUM SERPL-SCNC: 141 MMOL/L
TOTAL IRON BINDING CAPACITY: 221 UG/DL
TRANSFERRIN SERPL-MCNC: 149 MG/DL

## 2017-11-02 PROCEDURE — 80048 BASIC METABOLIC PNL TOTAL CA: CPT

## 2017-11-02 PROCEDURE — 97110 THERAPEUTIC EXERCISES: CPT

## 2017-11-02 PROCEDURE — 82306 VITAMIN D 25 HYDROXY: CPT

## 2017-11-02 PROCEDURE — 82728 ASSAY OF FERRITIN: CPT

## 2017-11-02 PROCEDURE — 83921 ORGANIC ACID SINGLE QUANT: CPT

## 2017-11-02 PROCEDURE — 97535 SELF CARE MNGMENT TRAINING: CPT

## 2017-11-02 PROCEDURE — 84100 ASSAY OF PHOSPHORUS: CPT

## 2017-11-02 PROCEDURE — 83540 ASSAY OF IRON: CPT

## 2017-11-02 PROCEDURE — 86235 NUCLEAR ANTIGEN ANTIBODY: CPT

## 2017-11-02 PROCEDURE — 99232 SBSQ HOSP IP/OBS MODERATE 35: CPT | Mod: ,,, | Performed by: HOSPITALIST

## 2017-11-02 PROCEDURE — 80076 HEPATIC FUNCTION PANEL: CPT

## 2017-11-02 PROCEDURE — 83735 ASSAY OF MAGNESIUM: CPT

## 2017-11-02 PROCEDURE — 97530 THERAPEUTIC ACTIVITIES: CPT

## 2017-11-02 PROCEDURE — 25000003 PHARM REV CODE 250: Performed by: HOSPITALIST

## 2017-11-02 PROCEDURE — 63600175 PHARM REV CODE 636 W HCPCS: Performed by: HOSPITALIST

## 2017-11-02 PROCEDURE — 83090 ASSAY OF HOMOCYSTEINE: CPT

## 2017-11-02 PROCEDURE — 82550 ASSAY OF CK (CPK): CPT

## 2017-11-02 PROCEDURE — 11000001 HC ACUTE MED/SURG PRIVATE ROOM

## 2017-11-02 PROCEDURE — 82746 ASSAY OF FOLIC ACID SERUM: CPT

## 2017-11-02 PROCEDURE — 25000003 PHARM REV CODE 250: Performed by: NURSE PRACTITIONER

## 2017-11-02 RX ORDER — CYANOCOBALAMIN 1000 UG/ML
1000 INJECTION, SOLUTION INTRAMUSCULAR; SUBCUTANEOUS DAILY
Status: COMPLETED | OUTPATIENT
Start: 2017-11-02 | End: 2017-11-06

## 2017-11-02 RX ORDER — ERGOCALCIFEROL 1.25 MG/1
50000 CAPSULE ORAL
Status: DISCONTINUED | OUTPATIENT
Start: 2017-11-02 | End: 2017-11-13 | Stop reason: HOSPADM

## 2017-11-02 RX ORDER — FOLIC ACID 1 MG/1
1 TABLET ORAL DAILY
Status: DISCONTINUED | OUTPATIENT
Start: 2017-11-02 | End: 2017-11-13 | Stop reason: HOSPADM

## 2017-11-02 RX ORDER — FUROSEMIDE 10 MG/ML
20 INJECTION INTRAMUSCULAR; INTRAVENOUS ONCE
Status: COMPLETED | OUTPATIENT
Start: 2017-11-02 | End: 2017-11-02

## 2017-11-02 RX ADMIN — METFORMIN HYDROCHLORIDE 500 MG: 500 TABLET, FILM COATED ORAL at 04:11

## 2017-11-02 RX ADMIN — RIVAROXABAN 20 MG: 10 TABLET, FILM COATED ORAL at 08:11

## 2017-11-02 RX ADMIN — OXYCODONE HYDROCHLORIDE 10 MG: 5 TABLET ORAL at 04:11

## 2017-11-02 RX ADMIN — FUROSEMIDE 20 MG: 10 INJECTION, SOLUTION INTRAMUSCULAR; INTRAVENOUS at 12:11

## 2017-11-02 RX ADMIN — METFORMIN HYDROCHLORIDE 500 MG: 500 TABLET, FILM COATED ORAL at 08:11

## 2017-11-02 RX ADMIN — SODIUM CHLORIDE: 0.9 INJECTION, SOLUTION INTRAVENOUS at 08:11

## 2017-11-02 RX ADMIN — ERGOCALCIFEROL 50000 UNITS: 1.25 CAPSULE ORAL at 11:11

## 2017-11-02 RX ADMIN — SODIUM CHLORIDE: 0.9 INJECTION, SOLUTION INTRAVENOUS at 05:11

## 2017-11-02 RX ADMIN — CARVEDILOL 25 MG: 12.5 TABLET, FILM COATED ORAL at 08:11

## 2017-11-02 RX ADMIN — LEVOTHYROXINE SODIUM 25 MCG: 25 TABLET ORAL at 05:11

## 2017-11-02 RX ADMIN — CARVEDILOL 25 MG: 12.5 TABLET, FILM COATED ORAL at 04:11

## 2017-11-02 RX ADMIN — FOLIC ACID 1 MG: 1 TABLET ORAL at 11:11

## 2017-11-02 RX ADMIN — CYANOCOBALAMIN 1000 MCG: 1000 INJECTION, SOLUTION INTRAMUSCULAR; SUBCUTANEOUS at 12:11

## 2017-11-02 NOTE — PLAN OF CARE
Problem: Physical Therapy Goal  Goal: Physical Therapy Goal  Goals to be met by 11-10-17.  1. Sup<>sit mod I  2. Sit<>stand with RW mod I  3. amb 150' with RW mod I     Outcome: Ongoing (interventions implemented as appropriate)    Still required Min A to Moderate A  with transfers

## 2017-11-02 NOTE — SUBJECTIVE & OBJECTIVE
Interval History:   Doing okay.    Review of Systems   Constitutional: Negative for appetite change and fever.   Respiratory: Negative for cough and shortness of breath.    Cardiovascular: Negative for chest pain and palpitations.   Gastrointestinal: Negative for nausea and vomiting.   Skin: Negative for color change and rash.   Neurological: Negative for dizziness and weakness.   Psychiatric/Behavioral: Negative for agitation and confusion.     Objective:     Vital Signs (Most Recent):  Temp: 97.6 °F (36.4 °C) (11/02/17 1156)  Pulse: (!) 57 (11/02/17 1156)  Resp: 20 (11/02/17 1156)  BP: (!) 157/70 (11/02/17 1156)  SpO2: (!) 94 % (11/02/17 1156) Vital Signs (24h Range):  Temp:  [97.5 °F (36.4 °C)-97.9 °F (36.6 °C)] 97.6 °F (36.4 °C)  Pulse:  [53-81] 57  Resp:  [18-20] 20  SpO2:  [91 %-94 %] 94 %  BP: (100-157)/(55-70) 157/70     Weight: 116.6 kg (257 lb 1.6 oz)  Body mass index is 36.89 kg/m².    Intake/Output Summary (Last 24 hours) at 11/02/17 1212  Last data filed at 11/02/17 0600   Gross per 24 hour   Intake           2182.5 ml   Output             1250 ml   Net            932.5 ml      Physical Exam   Constitutional: He is oriented to person, place, and time. He appears well-developed and well-nourished.   HENT:   Head: Normocephalic and atraumatic.   Eyes: Conjunctivae are normal. Pupils are equal, round, and reactive to light.   Neck: Normal range of motion. Neck supple.   Cardiovascular: Normal rate and regular rhythm.    Pulmonary/Chest: Effort normal and breath sounds normal.   Abdominal: Soft.   Neurological: He is alert and oriented to person, place, and time.   Proximal muscular weakness shoulder and hip bilat.   Skin: Skin is warm and dry.       Significant Labs:   BMP:   Recent Labs  Lab 11/02/17  0505         K 4.8      CO2 30*   BUN 17   CREATININE 0.7   CALCIUM 9.1   MG 2.0     CBC: No results for input(s): WBC, HGB, HCT, PLT in the last 48 hours.    Significant Imaging: I have  reviewed all pertinent imaging results/findings within the past 24 hours.

## 2017-11-02 NOTE — PT/OT/SLP PROGRESS
Occupational Therapy  Treatment    Serafin Tapia   MRN: [id number]   Admitting Diagnosis: Adverse reaction to oral hypoglycemic drug, initial encounter    OT Date of Treatment: 11/02/17   OT Start Time: 1129  OT Stop Time: 1155  OT Total Time (min): 26 min    Billable Minutes:  Self Care/Home Management 11 and Therapeutic Exercise 15    General Precautions: Standard, fall, diabetic  Orthopedic Precautions: N/A  Braces: N/A    Do you have any cultural, spiritual, Presybeterian conflicts, given your current situation?: none reported    Subjective:  Communicated with RN prior to session.  I feel a little better; they gave me more medicine.     Pain/Comfort  Pain Rating 1: 0/10  Location - Side 1: Left  Location 1: knee  Pain Addressed 1: Reposition, Cessation of Activity  Pain Rating Post-Intervention 1: 7/10 (with transfers and mobility. Returns to no c/o pain in seated.)    Objective:  Patient found with: peripheral IV, telemetry     Functional Mobility:  Transfers:   Sit <> Stand Assistance: Moderate Assistance. (A) needed with forward weight shift and to elevate to elevate to standing. Able to control descend.  Sit <> Stand Assistive Device: Rolling Walker    Functional Ambulation: CGA with RW to bathroom.    Activities of Daily Living:  Feeding Level of Assistance: Activity did not occur  UE Dressing Level of Assistance: Activity did not occur  LE Dressing Level of Assistance: Activity did not occur  Grooming Position: Seated to wash face; as patient unable to complete 2 task in standing at sink.   Grooming Level of Assistance: Supervision  Toileting Level of Assistance: Activity did not occur  Bathing Where Assessed: Standing sinkside  Bathing Level of Assistance: Maximum assistance. For bathing of privates at sink. Patient needing standing rest breaks during part of this task.    Balance:   Static Sit: FAIR+: Able to take MINIMAL challenges from all directions  Dynamic Sit: FAIR+: Maintains balance through MINIMAL  "excursions of active trunk motion  Static Stand: FAIR: Maintains without assist but unable to take challenges  Dynamic stand: FAIR: Needs CONTACT GUARD during gait    Therapeutic Activities and Exercises:  UE AAROM while patient holding to weighted dowel stick against gravity, but able to control descend of movements 2 x 3 reps shoulder flexion and chest presses with 3# dowel stick. 2 x 7 with 3# dowel stick elbow curls. Rest breaks between sets.     Abdominal crunches x 3 to prep for weight shifting in seated, with min(A) needed, as patient stating he's been sleeping in the bed side chair, because "the bed is so uncomfortable". Educated patient on importance to prevent skin breakdown.    AM-PAC 6 CLICK ADL   How much help from another person does this patient currently need?   1 = Unable, Total/Dependent Assistance  2 = A lot, Maximum/Moderate Assistance  3 = A little, Minimum/Contact Guard/Supervision  4 = None, Modified [place]/Independent    Putting on and taking off regular lower body clothing? : 2  Bathing (including washing, rinsing, drying)?: 2  Toileting, which includes using toilet, bedpan, or urinal? : 2  Putting on and taking off regular upper body clothing?: 3  Taking care of personal grooming such as brushing teeth?: 3  Eating meals?: 4  Total Score: 16     AM-PAC Raw Score CMS "G-Code Modifier Level of Impairment Assistance   6 % Total / Unable   7 - 8 CM 80 - 100% Maximal Assist   9-13 CL 60 - 80% Moderate Assist   14 - 19 CK 40 - 60% Moderate Assist   20 - 22 CJ 20 - 40% Minimal Assist   23 CI 1-20% SBA / CGA   24 CH 0% Independent/ Mod I       Patient left up in chair with all lines and call bell intact. PCT present to take vitals.    ASSESSMENT:  Serafin Tapia is a 66 y.o. male with a medical diagnosis of Adverse reaction to oral hypoglycemic drug, initial encounter and presents with weakness (proximal > distally), affecting (I) with transfers and task performance. Patient motivated to " participate in therapy to regain function and functionality and would benefit from futher therapy intervention to reach goals.     Rehab identified problem list/impairments: Rehab identified problem list/impairments: weakness, impaired endurance, impaired self care skills, decreased lower extremity function, decreased upper extremity function, impaired functional mobilty, gait instability, pain  Rehab potential is good.  Activity tolerance: Good  Discharge recommendations: Discharge Facility/Level Of Care Needs: nursing facility, skilled   Barriers to discharge: Barriers to Discharge: Decreased caregiver support  Equipment recommendations: hip kit, dressing device, bath bench (recommend TTB; patient has shower chair in tub set up with grabbar.)     GOALS:    Occupational Therapy Goals        Problem: Occupational Therapy Goal    Goal Priority Disciplines Outcome Interventions   Occupational Therapy Goal     OT, PT/OT Ongoing (interventions implemented as appropriate)    Description:  Goals to be met by: 11/7/17     Patient will increase functional independence with ADLs by performing:    LE Dressing with Stand-by Assistance and use of AE. In progress; NOT MET  Grooming while standing at sink with Stand-by Assistance. In progress; NOT MET  Toileting from bedside commode with Stand-by Assistance for hygiene and clothing management. In progress; NOT MET  Supine to sit with Stand-by Assistance. In Progress; NOT MET  Stand pivot transfers with Stand-by Assistance. In Progress, NOT MET  Increased functional strength to 3/5 for increased (I) with bed mobility and transfers, as well as ADL. In progress: NOT MET  Patient is (S) with UE therex program. In progress; NOT MET  .                        Plan:  Patient to be seen 6 x/week to address the above listed problems via self-care/home management, therapeutic exercises, therapeutic activities  Plan of Care expires: 01/30/18  Plan of Care reviewed with: zachary Quintanilla  Wilfrido Davis OTR/L   11/02/2017

## 2017-11-02 NOTE — PLAN OF CARE
Problem: Occupational Therapy Goal  Goal: Occupational Therapy Goal  Goals to be met by: 11/7/17     Patient will increase functional independence with ADLs by performing:    LE Dressing with Stand-by Assistance and use of AE. In progress; NOT MET  Grooming while standing at sink with Stand-by Assistance. In progress; NOT MET  Toileting from bedside commode with Stand-by Assistance for hygiene and clothing management. In progress; NOT MET  Supine to sit with Stand-by Assistance. In Progress; NOT MET  Stand pivot transfers with Stand-by Assistance. In Progress, NOT MET  Increased functional strength to 3/5 for increased (I) with bed mobility and transfers, as well as ADL. In progress: NOT MET  Patient is (S) with UE therex program. In progress; NOT MET  .      Outcome: Ongoing (interventions implemented as appropriate)  Patient improving with overall strength, however, still not proficient for use with transfers and ADL. Recommend SNF stay before return to home.     JUAN JOSE Thompson/L  11/2/2017

## 2017-11-02 NOTE — PLAN OF CARE
Problem: Patient Care Overview  Goal: Plan of Care Review  No significant events this shift. Free from falls, skin breakdown or injury. Sitting up in chair at bedside. NADN. Resp even and unlabored. Generalized weakness noted with knee pain controlled by po pain management. Safety measures in use. Telemetry noted in sinus shawn, hr 62. PIV to right forearm infusing Ns@150. Bed in lowest position, siderails up, cb in reach.

## 2017-11-02 NOTE — PROGRESS NOTES
[facility] Hospital Medicine  Progress Note    Patient Name: Serafin Tapia  MRN: [id number]  Patient Class: IP- Inpatient   Admission Date: 10/29/2017  Length of Stay: 4 days  Attending Physician: Dedrick Price MD  Primary Care Provider: John Vargas MD        Subjective:     Principal Problem:Adverse reaction to oral hypoglycemic drug, initial encounter    HPI:  Patient is a 66 year-old woman with a history hypertension, diabetes mellitus type II, dyslipidemia who presents to the emergency department by the emergency medical services after he was found to be confused with capillary blood glucose measurement of 40 mg/dL.  Patient was given an ampule of dextrose with resolution of hypoglycemia and normalization of his mental status.  However while in the emergency department he had recurrence of hypoglycemia requiring further intervenous dextrose.  He denies any recent changes in his medications or recent illnesses.  He reports no changes in his diet.  He reports that he has not been checking his blood glucose as his home glucometer is broken.    Hospital Course:  Patient admitted to the hospital for treatment of acute renal failure and refractory hypoglycemia secondary to sulfonylurea use.  Patient given isotonic intravenous with dextrose fluids with improvement in serum creatinine.        BILAT RENAL US 10/29/17:  IMPRESSION:  1. Slightly decreased perfusion to the bilateral kidneys with otherwise normal resistive indices, which could be artifactual versus sequela of nonspecific acute medical renal disease. Correlate clinically.  2. Otherwise, normal renal morphology bilaterally without hydronephrosis.        CXR 10/29/17:  Findings: Scattered interstitial opacities noted in the right lung. No confluent consolidation, pleural effusion, or pneumothorax.  Cardiac silhouette is unremarkable.  IMPRESSION:  Scattered interstitial opacities in the right lung, possibly asymmetric pulmonary edema or  infectious process.            Interval History:   Doing okay.    Review of Systems   Constitutional: Negative for appetite change and fever.   Respiratory: Negative for cough and shortness of breath.    Cardiovascular: Negative for chest pain and palpitations.   Gastrointestinal: Negative for nausea and vomiting.   Skin: Negative for color change and rash.   Neurological: Negative for dizziness and weakness.   Psychiatric/Behavioral: Negative for agitation and confusion.     Objective:     Vital Signs (Most Recent):  Temp: 97.6 °F (36.4 °C) (11/02/17 1156)  Pulse: (!) 57 (11/02/17 1156)  Resp: 20 (11/02/17 1156)  BP: (!) 157/70 (11/02/17 1156)  SpO2: (!) 94 % (11/02/17 1156) Vital Signs (24h Range):  Temp:  [97.5 °F (36.4 °C)-97.9 °F (36.6 °C)] 97.6 °F (36.4 °C)  Pulse:  [53-81] 57  Resp:  [18-20] 20  SpO2:  [91 %-94 %] 94 %  BP: (100-157)/(55-70) 157/70     Weight: 116.6 kg (257 lb 1.6 oz)  Body mass index is 36.89 kg/m².    Intake/Output Summary (Last 24 hours) at 11/02/17 1212  Last data filed at 11/02/17 0600   Gross per 24 hour   Intake           2182.5 ml   Output             1250 ml   Net            932.5 ml      Physical Exam   Constitutional: He is oriented to person, place, and time. He appears well-developed and well-nourished.   HENT:   Head: Normocephalic and atraumatic.   Eyes: Conjunctivae are normal. Pupils are equal, round, and reactive to light.   Neck: Normal range of motion. Neck supple.   Cardiovascular: Normal rate and regular rhythm.    Pulmonary/Chest: Effort normal and breath sounds normal.   Abdominal: Soft.   Neurological: He is alert and oriented to person, place, and time.   Proximal muscular weakness shoulder and hip bilat.   Skin: Skin is warm and dry.       Significant Labs:   BMP:   Recent Labs  Lab 11/02/17  0505         K 4.8      CO2 30*   BUN 17   CREATININE 0.7   CALCIUM 9.1   MG 2.0     CBC: No results for input(s): WBC, HGB, HCT, PLT in the last 48  hours.    Significant Imaging: I have reviewed all pertinent imaging results/findings within the past 24 hours.    Assessment/Plan:      * Adverse reaction to oral hypoglycemic drug, initial encounter    - Hypoglycemia due to decreased clearance due to renal failure.   - Rehydrated and renal function at baseline.   - Discontinue sulfonylurea.    - Appears resolved.         Vitamin D deficiency    - Replace          Folate deficiency    - Replace.          Anemia due to vitamin B12 deficiency    - Ferritin elevated.    - Also replacing folic acid.         Vitamin B 12 deficiency    - MMA pending.  Homocysteine WNL.   - Start B-12 replacement.          Hypothyroidism (acquired)    - Start levothyroxine 25          Non-traumatic rhabdomyolysis    - Has proximal muscle weakness.  - CK, LDH elevated, aldolase pending.  - Discontinue atorvastatin.  - ESR = 34; CRP = 95.8.  - HALLE; anti-Cinthia-1 pending.   - NS IVF  - Monitor CPK              Weakness of both hips    - Check CK  - PT OT recs SNF          Falls    - PT OT          History of pulmonary embolism    - Pulmonary embolus in 2014.    - Held anticoagulation in the setting of renal failure, now resolved.   - Xarelto.   - Follow up with PCP.         Controlled type 2 diabetes mellitus with diabetic polyneuropathy, without long-term current use of insulin    - A1c = 5.5%.  - Well controlled.    - Sulfonylurea to be discontinued.   - Metformin to 500 mg BID.   - Patient in agreement and follow up with PCP.         Diastolic dysfunction    - Well compensated without shortness of breath and only trace lower extremity edema.   - Continue with medical therapy with beta-blocker therapy and will monitor volume status.        Dyslipidemia    - Atorvastatin discontinued for elevated CPK.    - May consider pravastatin in future pending resolution of elevated CPK.          Essential hypertension    - Restart home meds   - Discontinue HCTZ / spironolactone.  - Good control on current  regimen.          VTE Risk Mitigation         Ordered     rivaroxaban tablet 20 mg  With dinner     Route:  Oral        10/31/17 1303     Medium Risk of VTE  Once      10/29/17 1529              Dedrick Price MD  Department of Hospital Medicine   [facility]

## 2017-11-02 NOTE — PT/OT/SLP PROGRESS
"Physical Therapy  Treatment    Serafin Tapia   MRN: [id number]   Admitting Diagnosis: Adverse reaction to oral hypoglycemic drug, initial encounter    PT Received On: 11/02/17  PT Start Time: 1355     PT Stop Time: 1420    PT Total Time (min): 25 min       Billable Minutes:  Therapeutic Activity 15 and Therapeutic Exercise 10    Treatment Type: Treatment  PT/PTA: PTA     PTA Visit Number: 2       General Precautions: Standard, fall, diabetic  Orthopedic Precautions: N/A   Braces: N/A    Do you have any cultural, spiritual, [employer]ian conflicts, given your current situation?: none     Subjective:  Communicated with nurse prior to session. Patient stated " I have been walking several times today"     Pain/Comfort  Pain Rating 1: 5/10  Location - Side 1: Bilateral  Location - Orientation 1: generalized  Location 1: knee  Pain Addressed 1: Reposition, Cessation of Activity  Pain Rating Post-Intervention 1: 4/10    Objective:   Patient found with: peripheral IV, telemetry seated in bedside chair     Functional Mobility:  Bed Mobility:     NT     Transfers: X 5 trials verbal cues for hand placement and proper technique.   Sit <> Stand Assistance: Minimum Assistance, Moderate Assistance (patient fluctuated with transfers)  Sit <> Stand Assistive Device: Rolling Walker    Gait:   Gait Distance:  (Marching in place with RW )  Assistance 1: Contact Guard Assistance    Therapeutic Activities and Exercises:  Patient performed AP, LAQ, Hip Flexion (AAROM) X 20 reps B LE     AM-PAC 6 CLICK MOBILITY  How much help from another person does this patient currently need?   1 = Unable, Total/Dependent Assistance  2 = A lot, Maximum/Moderate Assistance  3 = A little, Minimum/Contact Guard/Supervision  4 = None, Modified [place]/Independent    Turning over in bed (including adjusting bedclothes, sheets and blankets)?: 2  Sitting down on and standing up from a chair with arms (e.g., wheelchair, bedside commode, etc.): 2  Moving from lying " on back to sitting on the side of the bed?: 2  Moving to and from a bed to a chair (including a wheelchair)?: 3  Need to walk in hospital room?: 3  Climbing 3-5 steps with a railing?: 2  Total Score: 14    AM-PAC Raw Score CMS G-Code Modifier Level of Impairment Assistance   6 % Total / Unable   7 - 9 CM 80 - 100% Maximal Assist   10 - 14 CL 60 - 80% Moderate Assist   15 - 19 CK 40 - 60% Moderate Assist   20 - 22 CJ 20 - 40% Minimal Assist   23 CI 1-20% SBA / CGA   24 CH 0% Independent/ Mod I     Patient left up in chair with all lines intact, call button in reach, nurse notified and brother present.    Assessment:  Serafin Tapia is a 66 y.o. male with a medical diagnosis of Adverse reaction to oral hypoglycemic drug, initial encounter patient complained of low back pain and left knee pain. Patient still presents with bilateral upper and lower extremity weakness. Patient will cont. To benefit from skilled PT services to increase strength, endurance and functional mobility.     Rehab identified problem list/impairments: Rehab identified problem list/impairments: weakness, impaired endurance, gait instability, impaired functional mobilty, impaired balance, pain, decreased ROM, edema, decreased lower extremity function    Rehab potential is good.    Activity tolerance: Good    Discharge recommendations: Discharge Facility/Level Of Care Needs: nursing facility, skilled     Barriers to discharge: Barriers to Discharge: Decreased caregiver support    Equipment recommendations: Equipment Needed After Discharge:  (TBD pending SNF discharge )     GOALS:    Physical Therapy Goals        Problem: Physical Therapy Goal    Goal Priority Disciplines Outcome Goal Variances Interventions   Physical Therapy Goal     PT/OT, PT Ongoing (interventions implemented as appropriate)     Description:  Goals to be met by 11-10-17.  1. Sup<>sit mod I  2. Sit<>stand with RW mod I  3. amb 150' with RW mod I                      PLAN:     Patient to be seen daily  to address the above listed problems via gait training, therapeutic activities, therapeutic exercises  Plan of Care expires: 11/30/17  Plan of Care reviewed with: patient         Deirdre Wright, PTA  11/02/2017

## 2017-11-03 PROBLEM — E83.42 HYPOMAGNESEMIA: Status: ACTIVE | Noted: 2017-11-03

## 2017-11-03 PROBLEM — R74.01 ELEVATED TRANSAMINASE LEVEL: Status: ACTIVE | Noted: 2017-11-03

## 2017-11-03 LAB
ALDOLASE SERPL-CCNC: 29.2 U/L
ANION GAP SERPL CALC-SCNC: 11 MMOL/L
BUN SERPL-MCNC: 14 MG/DL
CALCIUM SERPL-MCNC: 8.6 MG/DL
CHLORIDE SERPL-SCNC: 106 MMOL/L
CK SERPL-CCNC: 5131 U/L
CO2 SERPL-SCNC: 22 MMOL/L
CREAT SERPL-MCNC: 0.6 MG/DL
EST. GFR  (AFRICAN AMERICAN): >60 ML/MIN/1.73 M^2
EST. GFR  (NON AFRICAN AMERICAN): >60 ML/MIN/1.73 M^2
GLUCOSE SERPL-MCNC: 78 MG/DL
MAGNESIUM SERPL-MCNC: 1.5 MG/DL
PHOSPHATE SERPL-MCNC: 3.2 MG/DL
POCT GLUCOSE: 102 MG/DL (ref 70–110)
POCT GLUCOSE: 112 MG/DL (ref 70–110)
POCT GLUCOSE: 93 MG/DL (ref 70–110)
POTASSIUM SERPL-SCNC: 3.9 MMOL/L
SODIUM SERPL-SCNC: 139 MMOL/L

## 2017-11-03 PROCEDURE — 25000003 PHARM REV CODE 250: Performed by: NURSE PRACTITIONER

## 2017-11-03 PROCEDURE — 97530 THERAPEUTIC ACTIVITIES: CPT

## 2017-11-03 PROCEDURE — 36415 COLL VENOUS BLD VENIPUNCTURE: CPT

## 2017-11-03 PROCEDURE — 83735 ASSAY OF MAGNESIUM: CPT

## 2017-11-03 PROCEDURE — 63600175 PHARM REV CODE 636 W HCPCS: Performed by: HOSPITALIST

## 2017-11-03 PROCEDURE — 25000003 PHARM REV CODE 250: Performed by: HOSPITALIST

## 2017-11-03 PROCEDURE — 99232 SBSQ HOSP IP/OBS MODERATE 35: CPT | Mod: ,,, | Performed by: HOSPITALIST

## 2017-11-03 PROCEDURE — 97535 SELF CARE MNGMENT TRAINING: CPT

## 2017-11-03 PROCEDURE — 80048 BASIC METABOLIC PNL TOTAL CA: CPT

## 2017-11-03 PROCEDURE — 11000001 HC ACUTE MED/SURG PRIVATE ROOM

## 2017-11-03 PROCEDURE — 82550 ASSAY OF CK (CPK): CPT

## 2017-11-03 PROCEDURE — 84100 ASSAY OF PHOSPHORUS: CPT

## 2017-11-03 PROCEDURE — 97116 GAIT TRAINING THERAPY: CPT

## 2017-11-03 RX ORDER — MAGNESIUM SULFATE HEPTAHYDRATE 40 MG/ML
2 INJECTION, SOLUTION INTRAVENOUS ONCE
Status: COMPLETED | OUTPATIENT
Start: 2017-11-03 | End: 2017-11-03

## 2017-11-03 RX ADMIN — CARVEDILOL 25 MG: 12.5 TABLET, FILM COATED ORAL at 04:11

## 2017-11-03 RX ADMIN — BENAZEPRIL HYDROCHLORIDE 10 MG: 10 TABLET, FILM COATED ORAL at 09:11

## 2017-11-03 RX ADMIN — MAGNESIUM SULFATE IN WATER 2 G: 40 INJECTION, SOLUTION INTRAVENOUS at 12:11

## 2017-11-03 RX ADMIN — AMLODIPINE BESYLATE 10 MG: 5 TABLET ORAL at 09:11

## 2017-11-03 RX ADMIN — OXYCODONE HYDROCHLORIDE 10 MG: 5 TABLET ORAL at 04:11

## 2017-11-03 RX ADMIN — LEVOTHYROXINE SODIUM 25 MCG: 25 TABLET ORAL at 06:11

## 2017-11-03 RX ADMIN — CARVEDILOL 25 MG: 12.5 TABLET, FILM COATED ORAL at 09:11

## 2017-11-03 RX ADMIN — FOLIC ACID 1 MG: 1 TABLET ORAL at 09:11

## 2017-11-03 RX ADMIN — METFORMIN HYDROCHLORIDE 500 MG: 500 TABLET, FILM COATED ORAL at 04:11

## 2017-11-03 RX ADMIN — CYANOCOBALAMIN 1000 MCG: 1000 INJECTION, SOLUTION INTRAMUSCULAR; SUBCUTANEOUS at 09:11

## 2017-11-03 RX ADMIN — METFORMIN HYDROCHLORIDE 500 MG: 500 TABLET, FILM COATED ORAL at 09:11

## 2017-11-03 NOTE — PT/OT/SLP PROGRESS
Physical Therapy  Treatment    Serafin Tapia   MRN: [id number]   Admitting Diagnosis: Adverse reaction to oral hypoglycemic drug, initial encounter    PT Received On: 11/03/17  PT Start Time: 1118     PT Stop Time: 1141    PT Total Time (min): 23 min       Billable Minutes:  Gait [id number] and Therapeutic Activity 8    Treatment Type: Treatment  PT/PTA: PT     PTA Visit Number: 2       General Precautions: Standard, fall, diabetic  Orthopedic Precautions: N/A   Braces:      Do you have any cultural, spiritual, Taoism conflicts, given your current situation?: none     Subjective:  Communicated with patient prior to session.      Pain/Comfort  Pain Rating 1: 2/10  Location - Side 1: Left  Location 1: knee  Pain Addressed 1: Pre-medicate for activity, Distraction  Pain Rating Post-Intervention 1: 4/10    Objective:   Patient found with: peripheral IV, telemetry    Functional Mobility:  Bed Mobility:        Transfers:  Sit <> Stand Assistance: Minimum Assistance  Sit <> Stand Assistive Device: Rolling Walker    Gait:   Gait Distance: 30  Assistance 1: Contact Guard Assistance  Gait Assistive Device: Rolling walker  Gait Pattern: reciprocal  Gait Deviation(s): decreased kadeem, decreased step length      Balance:   Static Sit: FAIR+: Able to take MINIMAL challenges from all directions  Dynamic Sit: FAIR+: Maintains balance through MINIMAL excursions of active trunk motion  Static Stand: FAIR: Maintains without assist but unable to take challenges  Dynamic stand: POOR: N/A       AM-PAC 6 CLICK MOBILITY  How much help from another person does this patient currently need?   1 = Unable, Total/Dependent Assistance  2 = A lot, Maximum/Moderate Assistance  3 = A little, Minimum/Contact Guard/Supervision  4 = None, Modified [place]/Independent    Turning over in bed (including adjusting bedclothes, sheets and blankets)?: 2  Sitting down on and standing up from a chair with arms (e.g., wheelchair, bedside commode, etc.):  2  Moving from lying on back to sitting on the side of the bed?: 2  Moving to and from a bed to a chair (including a wheelchair)?: 3  Need to walk in hospital room?: 3  Climbing 3-5 steps with a railing?: 2  Total Score: 14    AM-PAC Raw Score CMS G-Code Modifier Level of Impairment Assistance   6 % Total / Unable   7 - 9 CM 80 - 100% Maximal Assist   10 - 14 CL 60 - 80% Moderate Assist   15 - 19 CK 40 - 60% Moderate Assist   20 - 22 CJ 20 - 40% Minimal Assist   23 CI 1-20% SBA / CGA   24 CH 0% Independent/ Mod I     Patient left up in chair with all lines intact, call button in reach, nurse notified and nurse present.    Assessment:  Serafin Tapia is a 67 y.o. male with a medical diagnosis of Adverse reaction to oral hypoglycemic drug, initial encounter and presents with pain and impaired edurance and functional mobility.  Pt will continue to benefit from physical therapy to regain full independent functional mobility.    Rehab identified problem list/impairments: Rehab identified problem list/impairments: weakness, impaired endurance, gait instability, impaired balance, impaired functional mobilty    Rehab potential is excellent.    Activity tolerance: Fair    Discharge recommendations: Discharge Facility/Level Of Care Needs: nursing facility, skilled     Barriers to discharge: Barriers to Discharge: Decreased caregiver support    Equipment recommendations: Equipment Needed After Discharge: none     GOALS:    Physical Therapy Goals        Problem: Physical Therapy Goal    Goal Priority Disciplines Outcome Goal Variances Interventions   Physical Therapy Goal     PT/OT, PT Ongoing (interventions implemented as appropriate)     Description:  Goals to be met by 11-10-17.  1. Sup<>sit mod I  2. Sit<>stand with RW mod I  3. amb 150' with RW mod I                      PLAN:    Patient to be seen daily  to address the above listed problems via gait training, therapeutic activities, therapeutic exercises  Plan of  Care expires: 11/30/17  Plan of Care reviewed with: patient         Jass Broussard, PT  11/03/2017

## 2017-11-03 NOTE — SUBJECTIVE & OBJECTIVE
Interval History:   Doing about the same.         Review of Systems   Constitutional: Negative for appetite change and fever.   Respiratory: Negative for cough and shortness of breath.    Cardiovascular: Negative for chest pain and palpitations.   Gastrointestinal: Negative for nausea and vomiting.   Skin: Negative for color change and rash.   Neurological: Negative for dizziness and weakness.   Psychiatric/Behavioral: Negative for agitation and confusion.     Objective:     Vital Signs (Most Recent):  Temp: 97.6 °F (36.4 °C) (11/03/17 0721)  Pulse: 64 (11/03/17 1200)  Resp: 18 (11/03/17 0721)  BP: 135/63 (11/03/17 0721)  SpO2: (!) 93 % (11/03/17 0721) Vital Signs (24h Range):  Temp:  [97.6 °F (36.4 °C)-98.3 °F (36.8 °C)] 97.6 °F (36.4 °C)  Pulse:  [57-78] 64  Resp:  [18] 18  SpO2:  [91 %-97 %] 93 %  BP: (112-143)/(55-65) 135/63     Weight: 116.6 kg (257 lb 1.6 oz)  Body mass index is 36.89 kg/m².    Intake/Output Summary (Last 24 hours) at 11/03/17 1239  Last data filed at 11/03/17 0600   Gross per 24 hour   Intake             1670 ml   Output              900 ml   Net              770 ml      Physical Exam   Constitutional: He is oriented to person, place, and time. He appears well-developed and well-nourished.   HENT:   Head: Normocephalic and atraumatic.   Eyes: Conjunctivae are normal. Pupils are equal, round, and reactive to light.   Neck: Normal range of motion. Neck supple.   Cardiovascular: Normal rate and regular rhythm.    Pulmonary/Chest: Effort normal and breath sounds normal.   Abdominal: Soft.   Neurological: He is alert and oriented to person, place, and time.   Proximal muscular weakness shoulder and hip bilat.   Skin: Skin is warm and dry.       Significant Labs:   BMP:   Recent Labs  Lab 11/03/17  0546   GLU 78      K 3.9      CO2 22*   BUN 14   CREATININE 0.6   CALCIUM 8.6*   MG 1.5*     Cardiac Markers: No results for input(s): CKMB, MYOGLOBIN, BNP, TROPISTAT in the last 48  hours.    Significant Imaging: I have reviewed all pertinent imaging results/findings within the past 24 hours.

## 2017-11-03 NOTE — PLAN OF CARE
Problem: Physical Therapy Goal  Goal: Physical Therapy Goal  Goals to be met by 11-10-17.  1. Sup<>sit mod I  2. Sit<>stand with RW mod I  3. amb 150' with RW mod I     -    Comments: Good participation in therapy. Pt reports that L knee pain is well controlled with pain meds.  Pt continues to have poor endurance.  Discussed case with Dr. Price. Recommend SNF.

## 2017-11-03 NOTE — PLAN OF CARE
Problem: Occupational Therapy Goal  Goal: Occupational Therapy Goal  Goals to be met by: 11/7/17     Patient will increase functional independence with ADLs by performing:    LE Dressing with Stand-by Assistance and use of AE. In progress; NOT MET  Grooming while standing at sink with Stand-by Assistance. In progress; NOT MET  Toileting from bedside commode with Stand-by Assistance for hygiene and clothing management. In progress; NOT MET  Supine to sit with Stand-by Assistance. In Progress; NOT MET  Stand pivot transfers with Stand-by Assistance. In Progress, NOT MET  Increased functional strength to 3/5 for increased (I) with bed mobility and transfers, as well as ADL. In progress: NOT MET  Patient is (S) with UE therex program. In progress; NOT MET  .       Outcome: Ongoing (interventions implemented as appropriate)  The patient reported fatigue after a long day OOB.  He was Mod A sit>stand/CGA stand>sit, ambulated chair around bed to get back into bed SBA with RW, with self-care Total A toilet clean-up and Mod A LBD with use of AE.  RAMON Collins 11/3/2017

## 2017-11-03 NOTE — PT/OT/SLP PROGRESS
Occupational Therapy  Treatment    Serafin Tapia   MRN: [id number]   Admitting Diagnosis: Adverse reaction to oral hypoglycemic drug, initial encounter    OT Date of Treatment: 11/03/17   OT Start Time: 1359  OT Stop Time: 1441  OT Total Time (min): 42 min    Billable Minutes:  Self Care/Home Management 42    General Precautions: Standard, diabetic, fall  Orthopedic Precautions: N/A  Braces: N/A    Do you have any cultural, spiritual, Synagogue conflicts, given your current situation?: none reported    Subjective:  The patient was found seated in the chair tired from a long day OOB.  He was agreeable to OT treatment, although activities were limited due to low endurance during the session.    Pain/Comfort  Pain Rating 1: 0/10  Pain Rating Post-Intervention 1: 0/10 (knee and low back pain reported with sit>stand, susided with ambulation and rest)    Objective:  Patient found with: peripheral IV, telemetry     Functional Mobility:  Bed Mobility:  Sit to Supine: Moderate Assistance (not able to lift legs into bed, assist with lifting legs needed to reposition in bed)    Transfers:    Mod A sit>stand/CGA stand>sit with RW      Functional Ambulation: ambulated chair>around bed to get back into bed close SBA.  He reported fatigue, back stiffness and an inability to do more than go back to bed.    Activities of Daily Living:    LE Dressing Level of Assistance:  (Supervision doff/don socks, Max A don/doff slacks with AE seated in chair with RW used for standing)    Grooming Level of Assistance:  (declined task)  Toileting Level of Assistance: Total assistance (defication clean-up standing EOB pt leaning on wall for support)       Balance:   Static Sit: Fair + able to reposition seated edge of chair and perform LBD tasks with difficulty  Dynamic Sit: FAIR+: Maintains balance through MINIMAL excursions of active trunk motion  Static Stand: POOR+: Needs MINIMAL assist to maintain (external support on RW or wall needed to retain  "balance standing)  Dynamic stand: FAIR+: Needs CLOSE SUPERVISION during gait and is able to right self with minor LOB wit RW    Therapeutic Activities and Exercises:  VC needed to pace activity for fatigue.    AM-PAC 6 CLICK ADL   How much help from another person does this patient currently need?   1 = Unable, Total/Dependent Assistance  2 = A lot, Maximum/Moderate Assistance  3 = A little, Minimum/Contact Guard/Supervision  4 = None, Modified [place]/Independent    Putting on and taking off regular lower body clothing? : 2  Bathing (including washing, rinsing, drying)?: 2  Toileting, which includes using toilet, bedpan, or urinal? : 2  Putting on and taking off regular upper body clothing?: 3  Taking care of personal grooming such as brushing teeth?: 3  Eating meals?: 4  Total Score: 16     AM-PAC Raw Score CMS "G-Code Modifier Level of Impairment Assistance   6 % Total / Unable   7 - 8 CM 80 - 100% Maximal Assist   9-13 CL 60 - 80% Moderate Assist   14 - 19 CK 40 - 60% Moderate Assist   20 - 22 CJ 20 - 40% Minimal Assist   23 CI 1-20% SBA / CGA   24 CH 0% Independent/ Mod I       Patient left supine with all lines intact, call button in reach, bed alarm on and nurse notified    ASSESSMENT:  Serafin Tapia is a 67 y.o. male with a medical diagnosis of Adverse reaction to oral hypoglycemic drug, initial encounter and presents with weakness, impaired endurance, impaired self care skills, impaired functional mobility, gait instability, impaired balance, decreased lower extremity function, pain, decreased safety awareness effected performance during the session. He was Mod A sit>stand/CGA stand>sit, ambulated chair around bed to get back into bed SBA with RW, with self-care Total A toilet clean-up and Mod A LBD with use of AE.  Continuation of OT treatment is needed to maximize function while in the hospital.   .  Rehab identified problem list/impairments: Rehab identified problem list/impairments: weakness, " impaired endurance, impaired self care skills, impaired functional mobility, gait instability, impaired balance, decreased lower extremity function, pain, decreased safety awareness    Rehab potential is good.    Activity tolerance: Fair    Discharge recommendations: Discharge Facility/Level Of Care Needs: nursing facility, skilled     Barriers to discharge: Barriers to Discharge: Decreased caregiver support    Equipment recommendations:  (TBD)     GOALS:    Occupational Therapy Goals        Problem: Occupational Therapy Goal    Goal Priority Disciplines Outcome Interventions   Occupational Therapy Goal     OT, PT/OT Ongoing (interventions implemented as appropriate)    Description:  Goals to be met by: 11/7/17     Patient will increase functional independence with ADLs by performing:    LE Dressing with Stand-by Assistance and use of AE. In progress; NOT MET  Grooming while standing at sink with Stand-by Assistance. In progress; NOT MET  Toileting from bedside commode with Stand-by Assistance for hygiene and clothing management. In progress; NOT MET  Supine to sit with Stand-by Assistance. In Progress; NOT MET  Stand pivot transfers with Stand-by Assistance. In Progress, NOT MET  Increased functional strength to 3/5 for increased (I) with bed mobility and transfers, as well as ADL. In progress: NOT MET  Patient is (S) with UE therex program. In progress; NOT MET  .                        Plan:  Patient to be seen 6 x/week to address the above listed problems via self-care/home management, therapeutic activities, therapeutic exercises  Plan of Care expires: 11/30/17  Plan of Care reviewed with: patient         RAMON Collins  11/03/2017

## 2017-11-03 NOTE — PLAN OF CARE
Problem: Patient Care Overview  Goal: Plan of Care Review  No significant events this shift. Free from falls, skin breakdown or injury. Ambulated with PT in halls, tolerated well. Pain controlled by po pain management. PIV to right forearm intact and infusing Ns@150cc/hr. Remains on telemetry in SB-Sr. Generalized weakness and edema noted, 2+. NADN. Safety measures on going. Bed in low position, siderails up x2, cb in reach

## 2017-11-03 NOTE — ASSESSMENT & PLAN NOTE
- Atorvastatin discontinued for elevated CPK.    - May consider pravastatin in future pending resolution of elevated CPK.

## 2017-11-03 NOTE — ASSESSMENT & PLAN NOTE
- Has proximal muscle weakness.  - CK, LDH elevated, aldolase pending.  - Discontinue atorvastatin.  - ESR = 34; CRP = 95.8.  - HALLE negative; anti-Cinthia-1 pending.   - NS IVF  - Monitor CPK

## 2017-11-03 NOTE — PLAN OF CARE
[facility]    HOME HEALTH ORDERS  FACE TO FACE ENCOUNTER    Patient Name: Serafin Tapia  YOB: 1950    PCP: John Vargas MD   PCP Address: [address] [address] / [facility] [address]  PCP Phone Number: [phone]  PCP Fax: [phone]    Encounter Date: 11/03/2017    Admit to Home Health    Diagnoses:  Active Hospital Problems    Diagnosis  POA    *Adverse reaction to oral hypoglycemic drug, initial encounter [T38.3X5A]  Yes    Hypomagnesemia [E83.42]  Yes    Elevated transaminase level [R74.0]  Yes    Folate deficiency [E53.8]  Yes    Vitamin D deficiency [E55.9]  Yes    Weakness of both hips [R29.898]  Yes    Non-traumatic rhabdomyolysis [M62.82]  Yes    Hypothyroidism (acquired) [E03.9]  Yes    Vitamin B 12 deficiency [E53.8]  Yes    Anemia due to vitamin B12 deficiency [D51.9]  Yes    Falls [W19.XXXA]  Yes    Hyperuricemia [E79.0]  Yes    History of pulmonary embolism [Z86.711]  Yes    Controlled type 2 diabetes mellitus with diabetic polyneuropathy, without long-term current use of insulin [E11.42]  Yes    Diastolic dysfunction [I51.9]  Yes    Dyslipidemia [E78.5]  Yes    Essential hypertension [I10]  Yes      Resolved Hospital Problems    Diagnosis Date Resolved POA    Acute kidney injury [N17.9] 11/01/2017 Yes       Future Appointments  Date Time Provider Department Center   12/5/2017 10:20 AM Craig Avina MD [facility] NEURO [place]           I have seen and examined this patient face to face today. My clinical findings that support the need for the home health skilled services and home bound status are the following:  {SELECT AT LEAST ONE HOME HEALTH QUALIFYING STATUS:34733}    Allergies:Review of patient's allergies indicates:  No Known Allergies    Diet: {diet:695196123}    Activities: {activity:85711}    Nursing:   SN to complete comprehensive assessment including routine vital signs. Instruct on disease process and s/s of  complications to report to MD. Review/verify medication list sent home with the patient at time of discharge  and instruct patient/caregiver as needed. Frequency may be adjusted depending on start of care date.    Notify MD if SBP > 160 or < 90; DBP > 90 or < 50; HR > 120 or < 50; Temp > 101; Other:   ***      CONSULTS:    { CONSULTS:55594}    MISCELLANEOUS CARE:  { MISC:95369}    WOUND CARE ORDERS  { WOUND CARE ORDERS:48490}      Medications: Review discharge medications with patient and family and provide education.      Current Discharge Medication List      CONTINUE these medications which have NOT CHANGED    Details   amlodipine-benazepril 10-20mg (LOTREL) 10-20 mg per capsule Take 1 capsule by mouth once daily.  Qty: 90 capsule, Refills: 1    Associated Diagnoses: Essential hypertension      atorvastatin (LIPITOR) 40 MG tablet Take 1 tablet (40 mg total) by mouth once daily.  Qty: 90 tablet, Refills: 1    Associated Diagnoses: Hyperlipidemia, unspecified hyperlipidemia type      carvedilol (COREG) 25 MG tablet Take 1 tablet (25 mg total) by mouth 2 (two) times daily with meals.  Qty: 180 tablet, Refills: 1      glimepiride (AMARYL) 4 MG tablet Take 1 tablet (4 mg total) by mouth before breakfast.  Qty: 90 tablet, Refills: 3      hydrochlorothiazide (HYDRODIURIL) 25 MG tablet Take 1 tablet (25 mg total) by mouth once daily.  Qty: 90 tablet, Refills: 1      metformin (GLUCOPHAGE) 1000 MG tablet TAKE ONE TABLET BY MOUTH TWICE A DAY WITH MEALS  Qty: 180 tablet, Refills: 1      rivaroxaban (XARELTO) 20 mg Tab Take 1 tablet (20 mg total) by mouth once daily.  Qty: 30 tablet, Refills: 3      spironolactone (ALDACTONE) 25 MG tablet TAKE ONE TABLET BY MOUTH DAILY  Qty: 90 tablet, Refills: 0             I certify that this patient is confined to his home and needs { Certification Criteria:90080}.

## 2017-11-03 NOTE — PROGRESS NOTES
[facility] Hospital Medicine  Progress Note    Patient Name: Serafin Tapia  MRN: [id number]  Patient Class: IP- Inpatient   Admission Date: 10/29/2017  Length of Stay: 5 days  Attending Physician: Dedrick Price MD  Primary Care Provider: John Vargas MD        Subjective:     Principal Problem:Adverse reaction to oral hypoglycemic drug, initial encounter    HPI:  Patient is a 66 year-old woman with a history hypertension, diabetes mellitus type II, dyslipidemia who presents to the emergency department by the emergency medical services after he was found to be confused with capillary blood glucose measurement of 40 mg/dL.  Patient was given an ampule of dextrose with resolution of hypoglycemia and normalization of his mental status.  However while in the emergency department he had recurrence of hypoglycemia requiring further intervenous dextrose.  He denies any recent changes in his medications or recent illnesses.  He reports no changes in his diet.  He reports that he has not been checking his blood glucose as his home glucometer is broken.    Hospital Course:  Patient admitted to the hospital for treatment of acute renal failure and refractory hypoglycemia secondary to sulfonylurea use.  Patient given isotonic intravenous with dextrose fluids with improvement in serum creatinine.        BILAT RENAL US 10/29/17:  IMPRESSION:  1. Slightly decreased perfusion to the bilateral kidneys with otherwise normal resistive indices, which could be artifactual versus sequela of nonspecific acute medical renal disease. Correlate clinically.  2. Otherwise, normal renal morphology bilaterally without hydronephrosis.        CXR 10/29/17:  Findings: Scattered interstitial opacities noted in the right lung. No confluent consolidation, pleural effusion, or pneumothorax.  Cardiac silhouette is unremarkable.  IMPRESSION:  Scattered interstitial opacities in the right lung, possibly asymmetric pulmonary edema or  infectious process.            Interval History:   Doing about the same.         Review of Systems   Constitutional: Negative for appetite change and fever.   Respiratory: Negative for cough and shortness of breath.    Cardiovascular: Negative for chest pain and palpitations.   Gastrointestinal: Negative for nausea and vomiting.   Skin: Negative for color change and rash.   Neurological: Negative for dizziness and weakness.   Psychiatric/Behavioral: Negative for agitation and confusion.     Objective:     Vital Signs (Most Recent):  Temp: 97.6 °F (36.4 °C) (11/03/17 0721)  Pulse: 64 (11/03/17 1200)  Resp: 18 (11/03/17 0721)  BP: 135/63 (11/03/17 0721)  SpO2: (!) 93 % (11/03/17 0721) Vital Signs (24h Range):  Temp:  [97.6 °F (36.4 °C)-98.3 °F (36.8 °C)] 97.6 °F (36.4 °C)  Pulse:  [57-78] 64  Resp:  [18] 18  SpO2:  [91 %-97 %] 93 %  BP: (112-143)/(55-65) 135/63     Weight: 116.6 kg (257 lb 1.6 oz)  Body mass index is 36.89 kg/m².    Intake/Output Summary (Last 24 hours) at 11/03/17 1239  Last data filed at 11/03/17 0600   Gross per 24 hour   Intake             1670 ml   Output              900 ml   Net              770 ml      Physical Exam   Constitutional: He is oriented to person, place, and time. He appears well-developed and well-nourished.   HENT:   Head: Normocephalic and atraumatic.   Eyes: Conjunctivae are normal. Pupils are equal, round, and reactive to light.   Neck: Normal range of motion. Neck supple.   Cardiovascular: Normal rate and regular rhythm.    Pulmonary/Chest: Effort normal and breath sounds normal.   Abdominal: Soft.   Neurological: He is alert and oriented to person, place, and time.   Proximal muscular weakness shoulder and hip bilat.   Skin: Skin is warm and dry.       Significant Labs:   BMP:   Recent Labs  Lab 11/03/17  0546   GLU 78      K 3.9      CO2 22*   BUN 14   CREATININE 0.6   CALCIUM 8.6*   MG 1.5*     Cardiac Markers: No results for input(s): CKMB, MYOGLOBIN, BNP,  TROPISTAT in the last 48 hours.    Significant Imaging: I have reviewed all pertinent imaging results/findings within the past 24 hours.    Assessment/Plan:      * Adverse reaction to oral hypoglycemic drug, initial encounter    - Hypoglycemia due to decreased clearance due to renal failure.   - Rehydrated and renal function at baseline.   - Discontinue sulfonylurea.    - Appears resolved.         Elevated transaminase level    - Hepatitis panel negative.   - Suspect from rhabdo.           Vitamin D deficiency    - Replace          Folate deficiency    - Replace.          Anemia due to vitamin B12 deficiency    - Ferritin elevated.    - Also replacing folic acid.         Vitamin B 12 deficiency    - MMA pending.  Homocysteine WNL.   - Start B-12 replacement.          Hypothyroidism (acquired)    - Start levothyroxine 25          Non-traumatic rhabdomyolysis    - Has proximal muscle weakness.  - CK, LDH elevated, aldolase pending.  - Discontinue atorvastatin.  - ESR = 34; CRP = 95.8.  - HALLE negative; anti-Cinthia-1 pending.   - NS IVF  - Monitor CPK              Weakness of both hips    - Check CK  - PT OT recs SNF          Falls    - PT OT          History of pulmonary embolism    - Pulmonary embolus in 2014.    - Held anticoagulation in the setting of renal failure, now resolved.   - Xarelto.   - Follow up with PCP.         Controlled type 2 diabetes mellitus with diabetic polyneuropathy, without long-term current use of insulin    - A1c = 5.5%.  - Well controlled.    - Sulfonylurea to be discontinued.   - Metformin to 500 mg BID.   - Patient in agreement and follow up with PCP.         Diastolic dysfunction    - Well compensated without shortness of breath and only trace lower extremity edema.   - Continue with medical therapy with beta-blocker therapy and will monitor volume status.        Dyslipidemia    - Atorvastatin discontinued for elevated CPK.    - May consider pravastatin in future pending resolution of  elevated CPK.          Essential hypertension    - Restart home meds   - Discontinue HCTZ / spironolactone.  - Good control on current regimen.          VTE Risk Mitigation         Ordered     rivaroxaban tablet 20 mg  With dinner     Route:  Oral        10/31/17 1303     Medium Risk of VTE  Once      10/29/17 1529              Dedrick Price MD  Department of Hospital Medicine   [facility]

## 2017-11-04 LAB
ANION GAP SERPL CALC-SCNC: 6 MMOL/L
BUN SERPL-MCNC: 12 MG/DL
CALCIUM SERPL-MCNC: 8.5 MG/DL
CHLORIDE SERPL-SCNC: 108 MMOL/L
CK SERPL-CCNC: 5457 U/L
CO2 SERPL-SCNC: 27 MMOL/L
CREAT SERPL-MCNC: 0.6 MG/DL
EST. GFR  (AFRICAN AMERICAN): >60 ML/MIN/1.73 M^2
EST. GFR  (NON AFRICAN AMERICAN): >60 ML/MIN/1.73 M^2
GLUCOSE SERPL-MCNC: 99 MG/DL
MAGNESIUM SERPL-MCNC: 1.6 MG/DL
METHYLMALONATE SERPL-SCNC: 0.14 UMOL/L
PHOSPHATE SERPL-MCNC: 3.6 MG/DL
POCT GLUCOSE: 107 MG/DL (ref 70–110)
POCT GLUCOSE: 122 MG/DL (ref 70–110)
POCT GLUCOSE: 132 MG/DL (ref 70–110)
POTASSIUM SERPL-SCNC: 4.5 MMOL/L
SODIUM SERPL-SCNC: 141 MMOL/L
TB INDURATION 48 - 72 HR READ: 0 MM

## 2017-11-04 PROCEDURE — 36415 COLL VENOUS BLD VENIPUNCTURE: CPT

## 2017-11-04 PROCEDURE — 99232 SBSQ HOSP IP/OBS MODERATE 35: CPT | Mod: ,,, | Performed by: HOSPITALIST

## 2017-11-04 PROCEDURE — 25000003 PHARM REV CODE 250: Performed by: HOSPITALIST

## 2017-11-04 PROCEDURE — 11000001 HC ACUTE MED/SURG PRIVATE ROOM

## 2017-11-04 PROCEDURE — 84100 ASSAY OF PHOSPHORUS: CPT

## 2017-11-04 PROCEDURE — 83735 ASSAY OF MAGNESIUM: CPT

## 2017-11-04 PROCEDURE — 63600175 PHARM REV CODE 636 W HCPCS: Performed by: HOSPITALIST

## 2017-11-04 PROCEDURE — 80048 BASIC METABOLIC PNL TOTAL CA: CPT

## 2017-11-04 PROCEDURE — 99900035 HC TECH TIME PER 15 MIN (STAT)

## 2017-11-04 PROCEDURE — 25000003 PHARM REV CODE 250: Performed by: NURSE PRACTITIONER

## 2017-11-04 PROCEDURE — 82550 ASSAY OF CK (CPK): CPT

## 2017-11-04 PROCEDURE — 27000221 HC OXYGEN, UP TO 24 HOURS

## 2017-11-04 RX ORDER — MAGNESIUM SULFATE HEPTAHYDRATE 40 MG/ML
2 INJECTION, SOLUTION INTRAVENOUS ONCE
Status: COMPLETED | OUTPATIENT
Start: 2017-11-04 | End: 2017-11-04

## 2017-11-04 RX ORDER — FUROSEMIDE 10 MG/ML
20 INJECTION INTRAMUSCULAR; INTRAVENOUS ONCE
Status: COMPLETED | OUTPATIENT
Start: 2017-11-04 | End: 2017-11-04

## 2017-11-04 RX ORDER — FUROSEMIDE 10 MG/ML
10 INJECTION INTRAMUSCULAR; INTRAVENOUS ONCE
Status: COMPLETED | OUTPATIENT
Start: 2017-11-04 | End: 2017-11-04

## 2017-11-04 RX ORDER — MAGNESIUM SULFATE HEPTAHYDRATE 40 MG/ML
2 INJECTION, SOLUTION INTRAVENOUS ONCE
Status: DISCONTINUED | OUTPATIENT
Start: 2017-11-04 | End: 2017-11-04

## 2017-11-04 RX ADMIN — CYANOCOBALAMIN 1000 MCG: 1000 INJECTION, SOLUTION INTRAMUSCULAR; SUBCUTANEOUS at 08:11

## 2017-11-04 RX ADMIN — MAGNESIUM SULFATE IN WATER 2 G: 40 INJECTION, SOLUTION INTRAVENOUS at 10:11

## 2017-11-04 RX ADMIN — FUROSEMIDE 20 MG: 10 INJECTION, SOLUTION INTRAMUSCULAR; INTRAVENOUS at 10:11

## 2017-11-04 RX ADMIN — FUROSEMIDE 10 MG: 10 INJECTION, SOLUTION INTRAMUSCULAR; INTRAVENOUS at 10:11

## 2017-11-04 RX ADMIN — FOLIC ACID 1 MG: 1 TABLET ORAL at 08:11

## 2017-11-04 RX ADMIN — SODIUM CHLORIDE: 0.9 INJECTION, SOLUTION INTRAVENOUS at 05:11

## 2017-11-04 RX ADMIN — CARVEDILOL 25 MG: 12.5 TABLET, FILM COATED ORAL at 05:11

## 2017-11-04 RX ADMIN — SODIUM CHLORIDE: 0.9 INJECTION, SOLUTION INTRAVENOUS at 10:11

## 2017-11-04 RX ADMIN — LEVOTHYROXINE SODIUM 25 MCG: 25 TABLET ORAL at 05:11

## 2017-11-04 RX ADMIN — METFORMIN HYDROCHLORIDE 500 MG: 500 TABLET, FILM COATED ORAL at 08:11

## 2017-11-04 RX ADMIN — RIVAROXABAN 20 MG: 10 TABLET, FILM COATED ORAL at 05:11

## 2017-11-04 RX ADMIN — METFORMIN HYDROCHLORIDE 500 MG: 500 TABLET, FILM COATED ORAL at 05:11

## 2017-11-04 NOTE — ASSESSMENT & PLAN NOTE
- Has proximal muscle weakness.  - CK, LDH elevated, aldolase pending.  - Discontinue atorvastatin.  - ESR = 34; CRP = 95.8.  - HALLE negative; anti-Cinthia-1 pending.   - NS IVF @ 150  - Give Lasix 30 mg IV for mild SOB, mild rales.  May need dose of Lasix daily while on IVF.   - Monitor CPK

## 2017-11-04 NOTE — PLAN OF CARE
Problem: Patient Care Overview  Goal: Plan of Care Review  Outcome: Ongoing (interventions implemented as appropriate)  Plan of care reviewed with the pt. Pt resting comfortably in bed with no complaints at this time. VSS. Pt remains free from falls and injuries. Accuchecks completed per order. Medication given as ordered. Telemetry monitoring maintained. IV fluids infusing. Pt voids spontaneously without difficulty. Purposeful rounding performed. Bed in lowest position. Call light in reach. Will continue to monitor.

## 2017-11-04 NOTE — PROGRESS NOTES
[facility] Hospital Medicine  Progress Note    Patient Name: Serafin Tapia  MRN: [id number]  Patient Class: IP- Inpatient   Admission Date: 10/29/2017  Length of Stay: 6 days  Attending Physician: Dedrick Price MD  Primary Care Provider: John Vargas MD        Subjective:     Principal Problem:Adverse reaction to oral hypoglycemic drug, initial encounter    HPI:  Patient is a 66 year-old woman with a history hypertension, diabetes mellitus type II, dyslipidemia who presents to the emergency department by the emergency medical services after he was found to be confused with capillary blood glucose measurement of 40 mg/dL.  Patient was given an ampule of dextrose with resolution of hypoglycemia and normalization of his mental status.  However while in the emergency department he had recurrence of hypoglycemia requiring further intervenous dextrose.  He denies any recent changes in his medications or recent illnesses.  He reports no changes in his diet.  He reports that he has not been checking his blood glucose as his home glucometer is broken.    Hospital Course:  Patient admitted to the hospital for treatment of acute renal failure and refractory hypoglycemia secondary to sulfonylurea use.  Patient given isotonic intravenous with dextrose fluids with improvement in serum creatinine.        BILAT RENAL US 10/29/17:  IMPRESSION:  1. Slightly decreased perfusion to the bilateral kidneys with otherwise normal resistive indices, which could be artifactual versus sequela of nonspecific acute medical renal disease. Correlate clinically.  2. Otherwise, normal renal morphology bilaterally without hydronephrosis.        CXR 10/29/17:  Findings: Scattered interstitial opacities noted in the right lung. No confluent consolidation, pleural effusion, or pneumothorax.  Cardiac silhouette is unremarkable.  IMPRESSION:  Scattered interstitial opacities in the right lung, possibly asymmetric pulmonary edema or  infectious process.            Interval History:   IVF held overnight for patient getting a little SOB.   He reports a little SOB.  Otherwise okay.       Review of Systems   Constitutional: Negative for appetite change and fever.   Respiratory: Positive for shortness of breath. Negative for cough.    Cardiovascular: Negative for chest pain and palpitations.   Gastrointestinal: Negative for nausea and vomiting.   Skin: Negative for color change and rash.   Neurological: Negative for dizziness and weakness.   Psychiatric/Behavioral: Negative for agitation and confusion.     Objective:     Vital Signs (Most Recent):  Temp: 98.2 °F (36.8 °C) (11/04/17 0705)  Pulse: 73 (11/04/17 0933)  Resp: 18 (11/04/17 0933)  BP: (!) 105/53 (11/04/17 0705)  SpO2: 95 % (11/04/17 0933) Vital Signs (24h Range):  Temp:  [97.4 °F (36.3 °C)-98.3 °F (36.8 °C)] 98.2 °F (36.8 °C)  Pulse:  [58-76] 73  Resp:  [16-18] 18  SpO2:  [92 %-97 %] 95 %  BP: (105-144)/(53-69) 105/53     Weight: 116.6 kg (257 lb 1.6 oz)  Body mass index is 36.89 kg/m².    Intake/Output Summary (Last 24 hours) at 11/04/17 1020  Last data filed at 11/04/17 0947   Gross per 24 hour   Intake             2100 ml   Output              975 ml   Net             1125 ml      Physical Exam   Constitutional: He is oriented to person, place, and time. He appears well-developed and well-nourished.   HENT:   Head: Normocephalic and atraumatic.   Eyes: Conjunctivae are normal. Pupils are equal, round, and reactive to light.   Neck: Normal range of motion. Neck supple.   Cardiovascular: Normal rate and regular rhythm.    Pulmonary/Chest: Effort normal. He has rales.   Mild rales at bases.   Abdominal: Soft.   Neurological: He is alert and oriented to person, place, and time.   Proximal muscular weakness shoulder and hip bilat.   Skin: Skin is warm and dry.       Significant Labs:   BMP:   Recent Labs  Lab 11/04/17  0318   GLU 99      K 4.5      CO2 27   BUN 12   CREATININE 0.6    CALCIUM 8.5*   MG 1.6     Cardiac Markers: No results for input(s): CKMB, MYOGLOBIN, BNP, TROPISTAT in the last 48 hours.    Significant Imaging: I have reviewed all pertinent imaging results/findings within the past 24 hours.    Assessment/Plan:      * Adverse reaction to oral hypoglycemic drug, initial encounter    - Hypoglycemia due to decreased clearance due to renal failure.   - Rehydrated and renal function at baseline.   - Discontinue sulfonylurea.    - Appears resolved.         Elevated transaminase level    - Hepatitis panel negative.   - Suspect from rhabdo.           Vitamin D deficiency    - Replace          Folate deficiency    - Replace.          Anemia due to vitamin B12 deficiency    - Ferritin elevated.    - Also replacing folic acid.         Vitamin B 12 deficiency    - MMA pending.  Homocysteine WNL.   - Start B-12 replacement.          Hypothyroidism (acquired)    - Start levothyroxine 25          Non-traumatic rhabdomyolysis    - Has proximal muscle weakness.  - CK, LDH elevated, aldolase pending.  - Discontinue atorvastatin.  - ESR = 34; CRP = 95.8.  - HALLE negative; anti-Cinthia-1 pending.   - NS IVF @ 150  - Give Lasix 30 mg IV for mild SOB, mild rales.  May need dose of Lasix daily while on IVF.   - Monitor CPK              Weakness of both hips    - Check CK  - PT OT recs SNF          Falls    - PT OT          History of pulmonary embolism    - Pulmonary embolus in 2014.    - Held anticoagulation in the setting of renal failure, now resolved.   - Xarelto.   - Follow up with PCP.         Controlled type 2 diabetes mellitus with diabetic polyneuropathy, without long-term current use of insulin    - A1c = 5.5%.  - Well controlled.    - Sulfonylurea to be discontinued.   - Metformin to 500 mg BID.   - Patient in agreement and follow up with PCP.         Diastolic dysfunction    - Well compensated without shortness of breath and only trace lower extremity edema.   - Continue with medical therapy  with beta-blocker therapy and will monitor volume status.        Dyslipidemia    - Atorvastatin discontinued for elevated CPK.    - May consider pravastatin in future pending resolution of elevated CPK.          Essential hypertension    - Restart home meds   - Discontinue HCTZ / spironolactone.  - Good control on current regimen.          VTE Risk Mitigation         Ordered     rivaroxaban tablet 20 mg  With dinner     Route:  Oral        10/31/17 1303     Medium Risk of VTE  Once      10/29/17 1529              Dedrick Price MD  Department of Hospital Medicine   [facility]

## 2017-11-04 NOTE — PLAN OF CARE
Problem: Patient Care Overview  Goal: Plan of Care Review  Outcome: Ongoing (interventions implemented as appropriate)  POC updated and reviewed with pt. Verbalized understanding. pt's VS stable throughout this shift. Pt felt SOB, PRN oxygen given per order. No falls or injury.  PPD skin test read at 0012, normal reading. Hourly rounding done. Call light within reach. TM

## 2017-11-05 LAB
ANION GAP SERPL CALC-SCNC: 8 MMOL/L
BUN SERPL-MCNC: 11 MG/DL
CALCIUM SERPL-MCNC: 8.5 MG/DL
CHLORIDE SERPL-SCNC: 107 MMOL/L
CK SERPL-CCNC: 5025 U/L
CO2 SERPL-SCNC: 28 MMOL/L
CREAT SERPL-MCNC: 0.6 MG/DL
EST. GFR  (AFRICAN AMERICAN): >60 ML/MIN/1.73 M^2
EST. GFR  (NON AFRICAN AMERICAN): >60 ML/MIN/1.73 M^2
GLUCOSE SERPL-MCNC: 97 MG/DL
MAGNESIUM SERPL-MCNC: 1.6 MG/DL
PHOSPHATE SERPL-MCNC: 3.4 MG/DL
POCT GLUCOSE: 103 MG/DL (ref 70–110)
POCT GLUCOSE: 105 MG/DL (ref 70–110)
POCT GLUCOSE: 128 MG/DL (ref 70–110)
POCT GLUCOSE: 161 MG/DL (ref 70–110)
POCT GLUCOSE: 98 MG/DL (ref 70–110)
POTASSIUM SERPL-SCNC: 4.1 MMOL/L
SODIUM SERPL-SCNC: 143 MMOL/L

## 2017-11-05 PROCEDURE — 36415 COLL VENOUS BLD VENIPUNCTURE: CPT

## 2017-11-05 PROCEDURE — 99232 SBSQ HOSP IP/OBS MODERATE 35: CPT | Mod: ,,, | Performed by: HOSPITALIST

## 2017-11-05 PROCEDURE — 11000001 HC ACUTE MED/SURG PRIVATE ROOM

## 2017-11-05 PROCEDURE — 83735 ASSAY OF MAGNESIUM: CPT

## 2017-11-05 PROCEDURE — 97535 SELF CARE MNGMENT TRAINING: CPT

## 2017-11-05 PROCEDURE — 97530 THERAPEUTIC ACTIVITIES: CPT

## 2017-11-05 PROCEDURE — 25000003 PHARM REV CODE 250: Performed by: NURSE PRACTITIONER

## 2017-11-05 PROCEDURE — 94761 N-INVAS EAR/PLS OXIMETRY MLT: CPT

## 2017-11-05 PROCEDURE — 80048 BASIC METABOLIC PNL TOTAL CA: CPT

## 2017-11-05 PROCEDURE — 27000221 HC OXYGEN, UP TO 24 HOURS

## 2017-11-05 PROCEDURE — 82550 ASSAY OF CK (CPK): CPT

## 2017-11-05 PROCEDURE — 84100 ASSAY OF PHOSPHORUS: CPT

## 2017-11-05 PROCEDURE — 63600175 PHARM REV CODE 636 W HCPCS: Performed by: HOSPITALIST

## 2017-11-05 PROCEDURE — 25000003 PHARM REV CODE 250: Performed by: HOSPITALIST

## 2017-11-05 PROCEDURE — 97110 THERAPEUTIC EXERCISES: CPT

## 2017-11-05 RX ORDER — FUROSEMIDE 10 MG/ML
20 INJECTION INTRAMUSCULAR; INTRAVENOUS ONCE
Status: COMPLETED | OUTPATIENT
Start: 2017-11-05 | End: 2017-11-05

## 2017-11-05 RX ORDER — MAGNESIUM SULFATE HEPTAHYDRATE 40 MG/ML
2 INJECTION, SOLUTION INTRAVENOUS ONCE
Status: COMPLETED | OUTPATIENT
Start: 2017-11-05 | End: 2017-11-05

## 2017-11-05 RX ORDER — FUROSEMIDE 10 MG/ML
40 INJECTION INTRAMUSCULAR; INTRAVENOUS ONCE
Status: COMPLETED | OUTPATIENT
Start: 2017-11-05 | End: 2017-11-05

## 2017-11-05 RX ORDER — FUROSEMIDE 10 MG/ML
40 INJECTION INTRAMUSCULAR; INTRAVENOUS ONCE
Status: DISCONTINUED | OUTPATIENT
Start: 2017-11-05 | End: 2017-11-05

## 2017-11-05 RX ADMIN — SODIUM CHLORIDE: 0.9 INJECTION, SOLUTION INTRAVENOUS at 10:11

## 2017-11-05 RX ADMIN — FUROSEMIDE 40 MG: 10 INJECTION, SOLUTION INTRAVENOUS at 11:11

## 2017-11-05 RX ADMIN — CARVEDILOL 25 MG: 12.5 TABLET, FILM COATED ORAL at 05:11

## 2017-11-05 RX ADMIN — MAGNESIUM SULFATE IN WATER 2 G: 40 INJECTION, SOLUTION INTRAVENOUS at 11:11

## 2017-11-05 RX ADMIN — FUROSEMIDE 20 MG: 10 INJECTION, SOLUTION INTRAMUSCULAR; INTRAVENOUS at 05:11

## 2017-11-05 RX ADMIN — CARVEDILOL 25 MG: 12.5 TABLET, FILM COATED ORAL at 08:11

## 2017-11-05 RX ADMIN — FOLIC ACID 1 MG: 1 TABLET ORAL at 08:11

## 2017-11-05 RX ADMIN — CYANOCOBALAMIN 1000 MCG: 1000 INJECTION, SOLUTION INTRAMUSCULAR; SUBCUTANEOUS at 08:11

## 2017-11-05 RX ADMIN — METFORMIN HYDROCHLORIDE 500 MG: 500 TABLET, FILM COATED ORAL at 05:11

## 2017-11-05 RX ADMIN — SODIUM CHLORIDE: 0.9 INJECTION, SOLUTION INTRAVENOUS at 05:11

## 2017-11-05 RX ADMIN — RIVAROXABAN 20 MG: 10 TABLET, FILM COATED ORAL at 05:11

## 2017-11-05 RX ADMIN — LEVOTHYROXINE SODIUM 25 MCG: 25 TABLET ORAL at 05:11

## 2017-11-05 RX ADMIN — METFORMIN HYDROCHLORIDE 500 MG: 500 TABLET, FILM COATED ORAL at 08:11

## 2017-11-05 RX ADMIN — SODIUM CHLORIDE: 0.9 INJECTION, SOLUTION INTRAVENOUS at 11:11

## 2017-11-05 NOTE — SUBJECTIVE & OBJECTIVE
Interval History: Doing okay.  No SOB now, but at night states breathing feels little more difficult than usual.      Review of Systems   Constitutional: Negative for appetite change and fever.   Respiratory: Negative for cough and shortness of breath.    Cardiovascular: Negative for chest pain and palpitations.   Gastrointestinal: Negative for nausea and vomiting.   Skin: Negative for color change and rash.   Neurological: Negative for dizziness and weakness.   Psychiatric/Behavioral: Negative for agitation and confusion.     Objective:     Vital Signs (Most Recent):  Temp: 97.6 °F (36.4 °C) (11/05/17 0725)  Pulse: 67 (11/05/17 1000)  Resp: 18 (11/05/17 0855)  BP: (!) 117/58 (11/05/17 0725)  SpO2: 97 % (11/05/17 0855) Vital Signs (24h Range):  Temp:  [97.5 °F (36.4 °C)-98.5 °F (36.9 °C)] 97.6 °F (36.4 °C)  Pulse:  [62-75] 67  Resp:  [16-18] 18  SpO2:  [94 %-97 %] 97 %  BP: (117-139)/(57-76) 117/58     Weight: 116.6 kg (257 lb 1.6 oz)  Body mass index is 36.89 kg/m².    Intake/Output Summary (Last 24 hours) at 11/05/17 1057  Last data filed at 11/05/17 0955   Gross per 24 hour   Intake             2240 ml   Output             1150 ml   Net             1090 ml      Physical Exam   Constitutional: He is oriented to person, place, and time. He appears well-developed and well-nourished.   HENT:   Head: Normocephalic and atraumatic.   Eyes: Conjunctivae are normal. Pupils are equal, round, and reactive to light.   Neck: Normal range of motion. Neck supple.   Cardiovascular: Normal rate and regular rhythm.    Pulmonary/Chest: Effort normal. He has rales.   Mild rales at bases?.   Abdominal: Soft.   Musculoskeletal: He exhibits edema.   Mild to arms.   Neurological: He is alert and oriented to person, place, and time.   Proximal muscular weakness shoulder and hip bilat.   Skin: Skin is warm and dry.       Significant Labs:   CMP:   Recent Labs  Lab 11/04/17 0318 11/05/17  0236    143   K 4.5 4.1    107   CO2  27 28   GLU 99 97   BUN 12 11   CREATININE 0.6 0.6   CALCIUM 8.5* 8.5*   ANIONGAP 6* 8   EGFRNONAA >60 >60       Significant Imaging: I have reviewed all pertinent imaging results/findings within the past 24 hours.

## 2017-11-05 NOTE — PLAN OF CARE
Problem: Patient Care Overview  Goal: Plan of Care Review  Outcome: Ongoing (interventions implemented as appropriate)  Pt in no distress on 1.5 L Nc, will continue to monitor.

## 2017-11-05 NOTE — PT/OT/SLP PROGRESS
Physical Therapy  Treatment    Serafin Tapia   MRN: [id number]   Admitting Diagnosis: Adverse reaction to oral hypoglycemic drug, initial encounter    PT Received On: 11/05/17  PT Start Time: 1708     PT Stop Time: 1737    PT Total Time (min): 29 min       Billable Minutes:  Therapeutic Activity 19 and Therapeutic Exercise 10    Treatment Type: Treatment  PT/PTA: PTA     PTA Visit Number: 1       General Precautions: Standard, fall, diabetic  Orthopedic Precautions: N/A   Braces: N/A    Do you have any cultural, spiritual, Holiness conflicts, given your current situation?: none     Subjective:  Communicated with nurse prior to session.  Pt c/o fatigue and weakness this afternoon.  Family was present earlier in day and requesting that he be allowed to sit up for [employer] game and to visit. No family present now.    Pain/Comfort  Pain Rating 1: 0/10  Pain Rating Post-Intervention 1: 0/10    Objective:   Patient found with: peripheral IV, telemetry    Functional Mobility:  Bed Mobility:   Scooting/Bridging: Minimum Assistance (with bed in trendelenburg and pt using UE's to assist on rails and min.A for LE's)  Sit to Supine: Maximum Assistance, With leg lift    Transfers:  Sit <> Stand Assistance: Moderate Assistance (x 2 people from chair (attempted with max/totA x 1, but pt unable to achieve full stand position))  Sit <> Stand Assistive Device: Rolling Walker  Bed <> Chair Technique: Stand Pivot  Bed <> Chair Assistance: Moderate Assistance (x 1 (SBA x 1 for safety))  Bed <> Chair Assistive Device: Rolling Walker    Gait:   Gait Distance: a few steps only from chair to bed  Assistance 1: Minimum assistance, Moderate assistance  Gait Assistive Device: Rolling walker    Stairs:  n/a    Balance:   Static Sit: FAIR+: Able to take MINIMAL challenges from all directions  Dynamic Sit: FAIR+: Maintains balance through MINIMAL excursions of active trunk motion  Static Stand: FAIR: Maintains without assist but unable to take  challenges  Dynamic stand: POOR: N/A     Therapeutic Activities and Exercises:  Pt required max.A for leaning forward from reclined chair position, sit to stand attempted with RW and max/totA x 1, but pt unable to achieve full upright position. Sit to stand with RW and modA x 2, pt stood ~3 min. For hygeine purposes 2/2 having a BM. PCT present to assist. Pt needing to sit and rest ~5 min., perf'd AAROM LAQ's x 5 ea. , stood again with modA x 2, able to take a few steps to bedside with RW and modA x 1(+1 for safety). Sit to supine with max.A at LE's. Perf'd scooting up in bed with use of UE's and LE's and bed in trendelenburg.  Perf'd AAROM ex's of AP's, QS, heelslides x 10 ea. In supine.    AM-PAC 6 CLICK MOBILITY  How much help from another person does this patient currently need?   1 = Unable, Total/Dependent Assistance  2 = A lot, Maximum/Moderate Assistance  3 = A little, Minimum/Contact Guard/Supervision  4 = None, Modified [place]/Independent    Turning over in bed (including adjusting bedclothes, sheets and blankets)?: 2  Sitting down on and standing up from a chair with arms (e.g., wheelchair, bedside commode, etc.): 2  Moving from lying on back to sitting on the side of the bed?: 2  Moving to and from a bed to a chair (including a wheelchair)?: 2  Need to walk in hospital room?: 2  Climbing 3-5 steps with a railing?: 1  Total Score: 11    AM-PAC Raw Score CMS G-Code Modifier Level of Impairment Assistance   6 % Total / Unable   7 - 9 CM 80 - 100% Maximal Assist   10 - 14 CL 60 - 80% Moderate Assist   15 - 19 CK 40 - 60% Moderate Assist   20 - 22 CJ 20 - 40% Minimal Assist   23 CI 1-20% SBA / CGA   24 CH 0% Independent/ Mod I     Patient left HOB elevated with all lines intact, call button in reach, nurse notified and LE's elevated on pillow.    Assessment:  Serafin Tapia is a 67 y.o. male with a medical diagnosis of Adverse reaction to oral hypoglycemic drug, initial encounter and presents with  decreased mobility, decreased stength/endurance. Pt motivated in session.    Rehab identified problem list/impairments: Rehab identified problem list/impairments: weakness, impaired endurance, impaired self care skills, impaired functional mobilty, gait instability, impaired balance, decreased coordination, edema, decreased lower extremity function, decreased ROM, decreased safety awareness    Rehab potential is good.    Activity tolerance: Good    Discharge recommendations: Discharge Facility/Level Of Care Needs: nursing facility, skilled     Barriers to discharge: Barriers to Discharge: Decreased caregiver support    Equipment recommendations: Equipment Needed After Discharge:  (TBD)     GOALS:    Physical Therapy Goals        Problem: Physical Therapy Goal    Goal Priority Disciplines Outcome Goal Variances Interventions   Physical Therapy Goal     PT/OT, PT Ongoing (interventions implemented as appropriate)     Description:  Goals to be met by 11-10-17.  1. Sup<>sit mod I  2. Sit<>stand with RW mod I  3. amb 150' with RW mod I                      PLAN:    Patient to be seen daily  to address the above listed problems via gait training, therapeutic activities, therapeutic exercises  Plan of Care expires: 11/30/17  Plan of Care reviewed with: patient         Roselyn Saleem, PTA  11/05/2017

## 2017-11-05 NOTE — ASSESSMENT & PLAN NOTE
- Has proximal muscle weakness.  - CK persistent at ~5000.  - Discontinue atorvastatin.  - ESR = 34; CRP = 95.8.  - HALLE negative; anti-Cinthia-1 pending.   - NS IVF @ 150  - Will give Lasix to avoid fluid overload.  - I&O   - Monitor CPK daily.

## 2017-11-05 NOTE — PROGRESS NOTES
[facility] Hospital Medicine  Progress Note    Patient Name: Serafin Tapia  MRN: [id number]  Patient Class: IP- Inpatient   Admission Date: 10/29/2017  Length of Stay: 7 days  Attending Physician: Dedrick Price MD  Primary Care Provider: John Vargas MD        Subjective:     Principal Problem:Adverse reaction to oral hypoglycemic drug, initial encounter    HPI:  Patient is a 66 year-old woman with a history hypertension, diabetes mellitus type II, dyslipidemia who presents to the emergency department by the emergency medical services after he was found to be confused with capillary blood glucose measurement of 40 mg/dL.  Patient was given an ampule of dextrose with resolution of hypoglycemia and normalization of his mental status.  However while in the emergency department he had recurrence of hypoglycemia requiring further intervenous dextrose.  He denies any recent changes in his medications or recent illnesses.  He reports no changes in his diet.  He reports that he has not been checking his blood glucose as his home glucometer is broken.    Hospital Course:  Patient admitted to the hospital for treatment of acute renal failure and refractory hypoglycemia secondary to sulfonylurea use.  Patient given isotonic intravenous with dextrose fluids with improvement in serum creatinine.        BILAT RENAL US 10/29/17:  IMPRESSION:  1. Slightly decreased perfusion to the bilateral kidneys with otherwise normal resistive indices, which could be artifactual versus sequela of nonspecific acute medical renal disease. Correlate clinically.  2. Otherwise, normal renal morphology bilaterally without hydronephrosis.        CXR 10/29/17:  Findings: Scattered interstitial opacities noted in the right lung. No confluent consolidation, pleural effusion, or pneumothorax.  Cardiac silhouette is unremarkable.  IMPRESSION:  Scattered interstitial opacities in the right lung, possibly asymmetric pulmonary edema or  infectious process.            Interval History: Doing okay.  No SOB now, but at night states breathing feels little more difficult than usual.      Review of Systems   Constitutional: Negative for appetite change and fever.   Respiratory: Negative for cough and shortness of breath.    Cardiovascular: Negative for chest pain and palpitations.   Gastrointestinal: Negative for nausea and vomiting.   Skin: Negative for color change and rash.   Neurological: Negative for dizziness and weakness.   Psychiatric/Behavioral: Negative for agitation and confusion.     Objective:     Vital Signs (Most Recent):  Temp: 97.6 °F (36.4 °C) (11/05/17 0725)  Pulse: 67 (11/05/17 1000)  Resp: 18 (11/05/17 0855)  BP: (!) 117/58 (11/05/17 0725)  SpO2: 97 % (11/05/17 0855) Vital Signs (24h Range):  Temp:  [97.5 °F (36.4 °C)-98.5 °F (36.9 °C)] 97.6 °F (36.4 °C)  Pulse:  [62-75] 67  Resp:  [16-18] 18  SpO2:  [94 %-97 %] 97 %  BP: (117-139)/(57-76) 117/58     Weight: 116.6 kg (257 lb 1.6 oz)  Body mass index is 36.89 kg/m².    Intake/Output Summary (Last 24 hours) at 11/05/17 1057  Last data filed at 11/05/17 0955   Gross per 24 hour   Intake             2240 ml   Output             1150 ml   Net             1090 ml      Physical Exam   Constitutional: He is oriented to person, place, and time. He appears well-developed and well-nourished.   HENT:   Head: Normocephalic and atraumatic.   Eyes: Conjunctivae are normal. Pupils are equal, round, and reactive to light.   Neck: Normal range of motion. Neck supple.   Cardiovascular: Normal rate and regular rhythm.    Pulmonary/Chest: Effort normal. He has rales.   Mild rales at bases?.   Abdominal: Soft.   Musculoskeletal: He exhibits edema.   Mild to arms.   Neurological: He is alert and oriented to person, place, and time.   Proximal muscular weakness shoulder and hip bilat.   Skin: Skin is warm and dry.       Significant Labs:   CMP:   Recent Labs  Lab 11/04/17  0318 11/05/17  0236    143    K 4.5 4.1    107   CO2 27 28   GLU 99 97   BUN 12 11   CREATININE 0.6 0.6   CALCIUM 8.5* 8.5*   ANIONGAP 6* 8   EGFRNONAA >60 >60       Significant Imaging: I have reviewed all pertinent imaging results/findings within the past 24 hours.    Assessment/Plan:      * Adverse reaction to oral hypoglycemic drug, initial encounter    - Hypoglycemia due to decreased clearance due to renal failure.   - Rehydrated and renal function at baseline.   - Discontinue sulfonylurea.    - Resolved.         Elevated transaminase level    - Hepatitis panel negative.   - Suspect from rhabdo.           Hypomagnesemia    - Monitor and replace electrolytes PRN.             Vitamin D deficiency    - Replace          Folate deficiency    - Replace.          Anemia due to vitamin B12 deficiency    - Ferritin elevated.    - Also replacing folic acid.         Vitamin B 12 deficiency    - MMA WNL.  Homocysteine WNL.   - B-12 1000 mcg daily for 5 days.            Hypothyroidism (acquired)    - Start levothyroxine 25          Non-traumatic rhabdomyolysis    - Has proximal muscle weakness.  - CK persistent at ~5000.  - Discontinue atorvastatin.  - ESR = 34; CRP = 95.8.  - HALLE negative; anti-Cinthia-1 pending.   - NS IVF @ 150  - Will give Lasix to avoid fluid overload.  - I&O   - Monitor CPK daily.               Weakness of both hips    - Check CK  - PT OT recs SNF          Falls    - PT OT          History of pulmonary embolism    - Pulmonary embolus in 2014.    - Held anticoagulation in the setting of renal failure, now resolved.   - Xarelto.   - Follow up with PCP.         Controlled type 2 diabetes mellitus with diabetic polyneuropathy, without long-term current use of insulin    - A1c = 5.5%.  - Well controlled.    - Sulfonylurea to be discontinued.   - Metformin to 500 mg BID.   - Patient in agreement and follow up with PCP.   - Appears well controlled on metformin.           Diastolic dysfunction    - Well compensated without shortness of  breath and only trace lower extremity edema.   - Continue with medical therapy with beta-blocker therapy and will monitor volume status.        Dyslipidemia    - Atorvastatin discontinued for elevated CPK.    - May consider pravastatin in future pending resolution of elevated CPK.          Essential hypertension    - Restart home meds   - Discontinue HCTZ / spironolactone.  - Good control on current regimen.          VTE Risk Mitigation         Ordered     rivaroxaban tablet 20 mg  With dinner     Route:  Oral        10/31/17 1303     Medium Risk of VTE  Once      10/29/17 1529              Dedrick Price MD  Department of Hospital Medicine   [facility]

## 2017-11-05 NOTE — ASSESSMENT & PLAN NOTE
- Hypoglycemia due to decreased clearance due to renal failure.   - Rehydrated and renal function at baseline.   - Discontinue sulfonylurea.    - Resolved.

## 2017-11-05 NOTE — ASSESSMENT & PLAN NOTE
- A1c = 5.5%.  - Well controlled.    - Sulfonylurea to be discontinued.   - Metformin to 500 mg BID.   - Patient in agreement and follow up with PCP.   - Appears well controlled on metformin.

## 2017-11-05 NOTE — PLAN OF CARE
Problem: Physical Therapy Goal  Goal: Physical Therapy Goal  Goals to be met by 11-10-17.  1. Sup<>sit mod I  2. Sit<>stand with RW mod I  3. amb 150' with RW mod I     Pt with slow progression towards goals today, limited by weakness and fatigue. Sat up ~7 hours today (pt and family requesting he sit up in family waiting room and watch [employer] game). Pt modA x 2 for sit to stand from chair, modA with RW for a few steps to bedside,sit to supine with max.A at LE's. Recommend cont PT in SNF.

## 2017-11-05 NOTE — PT/OT/SLP PROGRESS
Occupational Therapy  Treatment     Justen Grossman   MRN: [id number]   Admitting Diagnosis: Large bowel perforation     OT Date of Treatment: 11/05/17   OT Start Time: 0923  OT Stop Time: 1006  OT Total Time (min): 43 min     Billable Minutes:  Self Care/Home Management 17 and Therapeutic Activity 26     General Precautions: Standard, fall  Orthopedic Precautions: N/A  Braces: N/A     Do you have any cultural, spiritual, Restoration conflicts, given your current situation?: none     Subjective:  The patient was found supine in bed concerned about generalized body edema (reported that he had discussed this with his  Physician).  He was agreeable to OT treatment.     Pain/Comfort  Pain Rating 1: 0/10  Pain Rating Post-Intervention 1: 0/10      Supine in bed beginning of session: Sp02 94% 2L 02  Seated in chair at end of session Sp02 92% RA (02 left off at pt request, nurse notified)     Objective:  Patient found with: telemetry, peripheral IV     Functional Mobility:  Bed Mobility:  Supine to Sit: Maximum Assistance     Transfers:    CGA sit><stand (from very high bed) with RW  CGA bed>chair transfer with RW     Functional Ambulation: none     Activities of Daily Living:  Feeding Level of Assistance: Activity did not occur (did not want to eat breakfast, Mi drink coffee seated in chair)  UE Dressing Level of Assistance:  (don hospital gown as robe sitting EOB)  LE Dressing Level of Assistance: Maximum assistance  Grooming Position: Seated, EOB  Grooming Level of Assistance: Stand by assistance (brush teeth, wash face and hands - slow response frequent  VC to initiate)     Balance:   Static Sit: FAIR: Maintains without assist, but unable to take any challenges   Dynamic Sit: FAIR: Cannot move trunk without losing balance  Static Stand: POOR+: Needs MINIMAL assist to maintain  (UE support on RW)  Dynamic stand: FAIR: Needs CONTACT GUARD during gait with bed>chair transfer using RW     Therapeutic Activities and  "Exercises:  Hand gripping exercises to reduce hand edema (washcloth gripper provided)     AM-PAC 6 CLICK ADL   How much help from another person does this patient currently need?   1 = Unable, Total/Dependent Assistance  2 = A lot, Maximum/Moderate Assistance  3 = A little, Minimum/Contact Guard/Supervision  4 = None, Modified [place]/Independent     Putting on and taking off regular lower body clothing? : 1  Bathing (including washing, rinsing, drying)?: 1  Toileting, which includes using toilet, bedpan, or urinal? : 1  Putting on and taking off regular upper body clothing?: 2  Taking care of personal grooming such as brushing teeth?: 3  Eating meals?: 4  Total Score: 12      AM-PAC Raw Score CMS "G-Code Modifier Level of Impairment Assistance   6 % Total / Unable   7 - 8 CM 80 - 100% Maximal Assist   9-13 CL 60 - 80% Moderate Assist   14 - 19 CK 40 - 60% Moderate Assist   20 - 22 CJ 20 - 40% Minimal Assist   23 CI 1-20% SBA / CGA   24 CH 0% Independent/ Mod I         Patient left up in chair with all lines intact, call button in reach and nurse notified     ASSESSMENT:  Justen Grossman is a 74 y.o. male with a medical diagnosis of Large bowel perforation and presents with weakness, impaired endurance, gait instability, impaired self care skills, impaired functional mobility, impaired balance, decreased lower extremity function, decreased safety awareness, edema effected performance during the session.  He moves very slowly with VC and encouragement needed to initiate action during most tasks.  He was Max A supine>sit EOB, sat EOB with self-care (G/H SBA, UBD Max A) 36 minutes Supervision Assist), transferred bed>[facility] (very high bed) CGA with RW and was given hand gripping exercise to assist with hand edema. Continuation of OT treatment is needed to maximize function while in the hospital.   .  Rehab identified problem list/impairments: Rehab identified problem list/impairments: weakness, impaired " endurance, gait instability, impaired self care skills, impaired functional mobility, impaired balance, decreased lower extremity function, decreased safety awareness, edema     Rehab potential is good.     Activity tolerance: Fair     Discharge recommendations: Discharge Facility/Level Of Care Needs: nursing facility, skilled      Barriers to discharge: Barriers to Discharge: Decreased caregiver support     Equipment recommendations: walker, rolling, 3-in-1 commode      GOALS:          Occupational Therapy Goals                 Problem: Occupational Therapy Goal      Goal Priority Disciplines Outcome Interventions   Occupational Therapy Goal       OT, PT/OT Ongoing (interventions implemented as appropriate)     Description:  Goals to be met by: 11/16/2017     Patient will increase functional independence with ADLs by performing:     UE Dressing with Supervision.  LE Dressing with Moderate Assistance.  Grooming while seated with Stand-by Assistance.(MET 11/5/17)      REVISED GOAL: G/H seated EOB with set-up assist  Toileting from bedside commode with Minimal Assistance for hygiene and clothing management.   Sitting at edge of bed x20 minutes with Supervision. (MET 11/5/17)  Supine to sit with Stand-by Assistance.  Stand pivot transfers with Contact Guard Assistance.  Toilet transfer to bedside commode with Contact Guard Assistance.  Upper extremity exercise program x10 reps per handout, with supervision.                              Plan:  Patient to be seen 6 x/week to address the above listed problems via self-care/home management, therapeutic activities, therapeutic exercises  Plan of Care expires: 12/02/17  Plan of Care reviewed with: patient           RAMON Collins

## 2017-11-05 NOTE — PLAN OF CARE
Problem: Patient Care Overview  Goal: Plan of Care Review  Outcome: Ongoing (interventions implemented as appropriate)  Pt on NC 2L;SPO2 97% decreased flow to 1L.Will continue to monitor.

## 2017-11-05 NOTE — PLAN OF CARE
Problem: Occupational Therapy Goal  Goal: Occupational Therapy Goal  Goals to be met by: 11/7/17     Patient will increase functional independence with ADLs by performing:    LE Dressing with Stand-by Assistance and use of AE. In progress; NOT MET  Grooming while standing at sink with Stand-by Assistance. In progress; NOT MET  Toileting from bedside commode with Stand-by Assistance for hygiene and clothing management. In progress; NOT MET  Supine to sit with Stand-by Assistance. In Progress; NOT MET  Stand pivot transfers with Stand-by Assistance. In Progress, NOT MET  Increased functional strength to 3/5 for increased (I) with bed mobility and transfers, as well as ADL. In progress: NOT MET  Patient is (S) with UE therex program. In progress; NOT MET  .       Outcome: Ongoing (interventions implemented as appropriate)  Outcome: Ongoing (interventions implemented as appropriate)  The patient reported concern about generalized edema.  He was Max A supine>sit EOB, sat EOB with self-care (G/H SBA, UBD Max A) 36 minutes Supervision Assist), transferred bed>[facility] (very high bed) CGA with RW and was given hand gripping exercise to assist with hand edema.  RAMON Collins 11/5/2017

## 2017-11-06 LAB
ANION GAP SERPL CALC-SCNC: 5 MMOL/L
BUN SERPL-MCNC: 9 MG/DL
CALCIUM SERPL-MCNC: 8.6 MG/DL
CHLORIDE SERPL-SCNC: 107 MMOL/L
CK SERPL-CCNC: 4793 U/L
CO2 SERPL-SCNC: 31 MMOL/L
CREAT SERPL-MCNC: 0.6 MG/DL
EST. GFR  (AFRICAN AMERICAN): >60 ML/MIN/1.73 M^2
EST. GFR  (NON AFRICAN AMERICAN): >60 ML/MIN/1.73 M^2
GLUCOSE SERPL-MCNC: 97 MG/DL
MAGNESIUM SERPL-MCNC: 1.3 MG/DL
PHOSPHATE SERPL-MCNC: 3.7 MG/DL
POCT GLUCOSE: 106 MG/DL (ref 70–110)
POCT GLUCOSE: 135 MG/DL (ref 70–110)
POCT GLUCOSE: 138 MG/DL (ref 70–110)
POCT GLUCOSE: 92 MG/DL (ref 70–110)
POTASSIUM SERPL-SCNC: 4.2 MMOL/L
SODIUM SERPL-SCNC: 143 MMOL/L

## 2017-11-06 PROCEDURE — 97110 THERAPEUTIC EXERCISES: CPT

## 2017-11-06 PROCEDURE — 25000003 PHARM REV CODE 250: Performed by: HOSPITALIST

## 2017-11-06 PROCEDURE — 97530 THERAPEUTIC ACTIVITIES: CPT

## 2017-11-06 PROCEDURE — 25000003 PHARM REV CODE 250: Performed by: NURSE PRACTITIONER

## 2017-11-06 PROCEDURE — 99232 SBSQ HOSP IP/OBS MODERATE 35: CPT | Mod: ,,, | Performed by: HOSPITALIST

## 2017-11-06 PROCEDURE — 63600175 PHARM REV CODE 636 W HCPCS: Performed by: HOSPITALIST

## 2017-11-06 PROCEDURE — 36415 COLL VENOUS BLD VENIPUNCTURE: CPT

## 2017-11-06 PROCEDURE — 83735 ASSAY OF MAGNESIUM: CPT

## 2017-11-06 PROCEDURE — 11000001 HC ACUTE MED/SURG PRIVATE ROOM

## 2017-11-06 PROCEDURE — 80048 BASIC METABOLIC PNL TOTAL CA: CPT

## 2017-11-06 PROCEDURE — 82550 ASSAY OF CK (CPK): CPT

## 2017-11-06 PROCEDURE — 99900035 HC TECH TIME PER 15 MIN (STAT)

## 2017-11-06 PROCEDURE — 84100 ASSAY OF PHOSPHORUS: CPT

## 2017-11-06 RX ORDER — FUROSEMIDE 10 MG/ML
40 INJECTION INTRAMUSCULAR; INTRAVENOUS ONCE
Status: COMPLETED | OUTPATIENT
Start: 2017-11-06 | End: 2017-11-06

## 2017-11-06 RX ADMIN — CARVEDILOL 25 MG: 12.5 TABLET, FILM COATED ORAL at 08:11

## 2017-11-06 RX ADMIN — CARVEDILOL 25 MG: 12.5 TABLET, FILM COATED ORAL at 05:11

## 2017-11-06 RX ADMIN — CYANOCOBALAMIN 1000 MCG: 1000 INJECTION, SOLUTION INTRAMUSCULAR; SUBCUTANEOUS at 08:11

## 2017-11-06 RX ADMIN — MAGNESIUM SULFATE HEPTAHYDRATE 3 G: 500 INJECTION, SOLUTION INTRAMUSCULAR; INTRAVENOUS at 01:11

## 2017-11-06 RX ADMIN — METFORMIN HYDROCHLORIDE 500 MG: 500 TABLET, FILM COATED ORAL at 08:11

## 2017-11-06 RX ADMIN — RIVAROXABAN 20 MG: 10 TABLET, FILM COATED ORAL at 05:11

## 2017-11-06 RX ADMIN — LEVOTHYROXINE SODIUM 25 MCG: 25 TABLET ORAL at 05:11

## 2017-11-06 RX ADMIN — SODIUM CHLORIDE: 0.9 INJECTION, SOLUTION INTRAVENOUS at 04:11

## 2017-11-06 RX ADMIN — SODIUM CHLORIDE: 0.9 INJECTION, SOLUTION INTRAVENOUS at 01:11

## 2017-11-06 RX ADMIN — METFORMIN HYDROCHLORIDE 500 MG: 500 TABLET, FILM COATED ORAL at 05:11

## 2017-11-06 RX ADMIN — FOLIC ACID 1 MG: 1 TABLET ORAL at 08:11

## 2017-11-06 RX ADMIN — SODIUM CHLORIDE: 0.9 INJECTION, SOLUTION INTRAVENOUS at 08:11

## 2017-11-06 RX ADMIN — FUROSEMIDE 40 MG: 10 INJECTION, SOLUTION INTRAVENOUS at 01:11

## 2017-11-06 NOTE — PROGRESS NOTES
[facility] Hospital Medicine  Progress Note    Patient Name: Serafin Tapia  MRN: [id number]  Patient Class: IP- Inpatient   Admission Date: 10/29/2017  Length of Stay: 8 days  Attending Physician: Dedrick Price MD  Primary Care Provider: John Vargas MD        Subjective:     Principal Problem:Adverse reaction to oral hypoglycemic drug, initial encounter    HPI:  Patient is a 66 year-old woman with a history hypertension, diabetes mellitus type II, dyslipidemia who presents to the emergency department by the emergency medical services after he was found to be confused with capillary blood glucose measurement of 40 mg/dL.  Patient was given an ampule of dextrose with resolution of hypoglycemia and normalization of his mental status.  However while in the emergency department he had recurrence of hypoglycemia requiring further intervenous dextrose.  He denies any recent changes in his medications or recent illnesses.  He reports no changes in his diet.  He reports that he has not been checking his blood glucose as his home glucometer is broken.    Hospital Course:  Patient admitted to the hospital for treatment of acute renal failure and refractory hypoglycemia secondary to sulfonylurea use.  Patient given isotonic intravenous with dextrose fluids with improvement in serum creatinine.        BILAT RENAL US 10/29/17:  IMPRESSION:  1. Slightly decreased perfusion to the bilateral kidneys with otherwise normal resistive indices, which could be artifactual versus sequela of nonspecific acute medical renal disease. Correlate clinically.  2. Otherwise, normal renal morphology bilaterally without hydronephrosis.        CXR 10/29/17:  Findings: Scattered interstitial opacities noted in the right lung. No confluent consolidation, pleural effusion, or pneumothorax.  Cardiac silhouette is unremarkable.  IMPRESSION:  Scattered interstitial opacities in the right lung, possibly asymmetric pulmonary edema or  infectious process.            Interval History:   Feels about the same.  CK declining today.      Review of Systems   Constitutional: Negative for appetite change and fever.   Respiratory: Negative for cough and shortness of breath.    Cardiovascular: Negative for chest pain and palpitations.   Gastrointestinal: Negative for nausea and vomiting.   Skin: Negative for color change and rash.   Neurological: Negative for dizziness and weakness.   Psychiatric/Behavioral: Negative for agitation and confusion.     Objective:     Vital Signs (Most Recent):  Temp: 98 °F (36.7 °C) (11/06/17 1103)  Pulse: 65 (11/06/17 1103)  Resp: 18 (11/06/17 1103)  BP: 126/65 (11/06/17 1103)  SpO2: (!) 94 % (11/06/17 1103) Vital Signs (24h Range):  Temp:  [98 °F (36.7 °C)-99.1 °F (37.3 °C)] 98 °F (36.7 °C)  Pulse:  [64-83] 65  Resp:  [16-18] 18  SpO2:  [90 %-97 %] 94 %  BP: (126-146)/(63-66) 126/65     Weight: 116.6 kg (257 lb 1.6 oz)  Body mass index is 36.89 kg/m².    Intake/Output Summary (Last 24 hours) at 11/06/17 1243  Last data filed at 11/06/17 0951   Gross per 24 hour   Intake             1000 ml   Output             3300 ml   Net            -2300 ml      Physical Exam   Constitutional: He is oriented to person, place, and time. He appears well-developed and well-nourished.   HENT:   Head: Normocephalic and atraumatic.   Eyes: Conjunctivae are normal. Pupils are equal, round, and reactive to light.   Neck: Normal range of motion. Neck supple.   Cardiovascular: Normal rate and regular rhythm.    Pulmonary/Chest: Effort normal and breath sounds normal. He has no rales.   Mild rales at bases?.   Abdominal: Soft.   Musculoskeletal: He exhibits edema.   Mild to arms.   Neurological: He is alert and oriented to person, place, and time.   Proximal muscular weakness shoulder and hip bilat.   Skin: Skin is warm and dry.       Significant Labs:   BMP:   Recent Labs  Lab 11/06/17  0500   GLU 97      K 4.2      CO2 31*   BUN 9    CREATININE 0.6   CALCIUM 8.6*   MG 1.3*     CBC: No results for input(s): WBC, HGB, HCT, PLT in the last 48 hours.  Cardiac Markers: No results for input(s): CKMB, MYOGLOBIN, BNP, TROPISTAT in the last 48 hours.    Significant Imaging: I have reviewed all pertinent imaging results/findings within the past 24 hours.    Assessment/Plan:      * Adverse reaction to oral hypoglycemic drug, initial encounter    - Hypoglycemia due to decreased clearance due to renal failure.   - Rehydrated and renal function at baseline.   - Discontinue sulfonylurea.    - Resolved.         Elevated transaminase level    - Hepatitis panel negative.   - Suspect from rhabdo.           Hypomagnesemia    - Monitor and replace electrolytes PRN.             Vitamin D deficiency    - Replace          Folate deficiency    - Replace.          Anemia due to vitamin B12 deficiency    - Ferritin elevated.    - Also replacing folic acid.         Vitamin B 12 deficiency    - MMA WNL.  Homocysteine WNL.   - B-12 1000 mcg daily for 5 days.            Hypothyroidism (acquired)    - Start levothyroxine 25          Non-traumatic rhabdomyolysis    - Has proximal muscle weakness.  - CK little lower today at 4793.  - Discontinued atorvastatin.  - ESR = 34; CRP = 95.8.  - HALLE negative; anti-Cinthia-1 pending.   - NS IVF @ 150  - Will give Lasix to avoid fluid overload.  - I&O   - Monitor CPK daily.   - If trend of decreasing continues may be able to send to SNF soon.               Weakness of both hips    - Check CK  - PT OT recs SNF          Falls    - PT OT          History of pulmonary embolism    - Pulmonary embolus in 2014.    - Held anticoagulation in the setting of renal failure, now resolved.   - Xarelto.   - Follow up with PCP.         Controlled type 2 diabetes mellitus with diabetic polyneuropathy, without long-term current use of insulin    - A1c = 5.5%.  - Well controlled.    - Sulfonylurea to be discontinued.   - Metformin to 500 mg BID.   - Patient in  agreement and follow up with PCP.   - Appears well controlled on metformin.           Diastolic dysfunction    - Well compensated without shortness of breath and only trace lower extremity edema.   - Continue with medical therapy with beta-blocker therapy and will monitor volume status.        Dyslipidemia    - Atorvastatin discontinued for elevated CPK.    - May consider pravastatin in future pending resolution of elevated CPK.          Essential hypertension    - Restart home meds   - Discontinue HCTZ / spironolactone.  - Good control on current regimen.          VTE Risk Mitigation         Ordered     rivaroxaban tablet 20 mg  With dinner     Route:  Oral        10/31/17 1303     Medium Risk of VTE  Once      10/29/17 1529              Dedrick Price MD  Department of Hospital Medicine   [facility]

## 2017-11-06 NOTE — PLAN OF CARE
Discharge Planning:  Patient admitted on 10-30-17  LOS-day 8  Chart reviewed  Care plan discussed  Discussed care plan with treatment team  Discussed care plan with the attending Dr Price  Current dispo , SNF vs Home with home health  SNF packet fwd via . [facility]  Consulting SNF's are:  1. [facility] - accepted  2. [facility]-pending  3. [facility]- pending  4. [facility] - pending  5. [facility]- pending   Case management  to follow   Consults following are: nephrology, PT, OT, case mgt

## 2017-11-06 NOTE — PLAN OF CARE
Problem: Occupational Therapy Goal  Goal: Occupational Therapy Goal  Goals to be met by: 11/7/17     Patient will increase functional independence with ADLs by performing:    LE Dressing with Stand-by Assistance and use of AE. In progress; NOT MET  Grooming while standing at sink with Stand-by Assistance. In progress; NOT MET  Toileting from bedside commode with Stand-by Assistance for hygiene and clothing management. In progress; NOT MET  Supine to sit with Stand-by Assistance. In Progress; NOT MET  Stand pivot transfers with Stand-by Assistance. In Progress, NOT MET  Increased functional strength to 3/5 for increased (I) with bed mobility and transfers, as well as ADL. In progress: NOT MET  Patient is (S) with UE therex program. In progress; NOT MET  .       Outcome: Ongoing (interventions implemented as appropriate)  Pt required increased assistance for sit > stand transfer from bedside chair today after sitting up for the day. Pt complained of increased difficulty secondary to edema in RLE.  Pt would benefit from skilled occupational therapy intervention for increased activity tolerance and independence in ADL's.

## 2017-11-06 NOTE — PT/OT/SLP PROGRESS
"Occupational Therapy  Treatment    Serafin Tapia   MRN: [id number]   Admitting Diagnosis: Adverse reaction to oral hypoglycemic drug, initial encounter    OT Date of Treatment: 11/06/17   OT Start Time: 1502  OT Stop Time: 1523  OT Total Time (min): 21 min    Billable Minutes:  Therapeutic Exercise 21    General Precautions: Standard, fall, diabetic  Orthopedic Precautions: N/A  Braces: N/A    Do you have any cultural, spiritual, Mosque conflicts, given your current situation?: none reported    Subjective:  Communicated with nursing prior to session.  Pt cooperative and agreeable to treatment.   Pain/Comfort  Pain Rating 1: 0/10  Pain Rating Post-Intervention 1: 0/10    Objective:  Patient found with: peripheral IV, telemetry     Functional Mobility:  Bed Mobility:       Transfers:        Functional Ambulation: n/a      Therapeutic Activities and Exercises:  8 sets of 10 with BUE's using orange thera-band exercises for increased strength and endurance and overall activity tolerance for ADL's and functional transfers. Education on HEP and issued thera-band. Education on OT POC.     AM-PAC 6 CLICK ADL   How much help from another person does this patient currently need?   1 = Unable, Total/Dependent Assistance  2 = A lot, Maximum/Moderate Assistance  3 = A little, Minimum/Contact Guard/Supervision  4 = None, Modified [place]/Independent    Putting on and taking off regular lower body clothing? : 2  Bathing (including washing, rinsing, drying)?: 2  Toileting, which includes using toilet, bedpan, or urinal? : 2  Putting on and taking off regular upper body clothing?: 3  Taking care of personal grooming such as brushing teeth?: 3  Eating meals?: 4  Total Score: 16     AM-PAC Raw Score CMS "G-Code Modifier Level of Impairment Assistance   6 % Total / Unable   7 - 8 CM 80 - 100% Maximal Assist   9-13 CL 60 - 80% Moderate Assist   14 - 19 CK 40 - 60% Moderate Assist   20 - 22 CJ 20 - 40% Minimal Assist   23 CI " 1-20% SBA / CGA   24 CH 0% Independent/ Mod I       Patient left HOB elevated with call button in reach, bed alarm on and nursing and dauuter present present    ASSESSMENT:  Serafin Tapia is a 67 y.o. male with a medical diagnosis of Adverse reaction to oral hypoglycemic drug, initial encounter and presents with the below impairments. Pt participated in thera-band exercises for BUE's with occasional support at elbow and shoulder of LUE. Pt utilized UE to aide LUE for /grasp. Pt completed 8 sets of 10 for increased strength and endurance and overall activity tolerance with ADL's and functional transfers.  Pt would benefit from skilled occupational therapy intervention for increased activity tolerance and independence in ADL's.    Rehab identified problem list/impairments: Rehab identified problem list/impairments: weakness, impaired endurance, impaired functional mobilty, decreased safety awareness, pain, impaired self care skills, decreased upper extremity function, decreased lower extremity function, decreased ROM, edema, impaired fine motor    Rehab potential is good.    Activity tolerance: Good    Discharge recommendations: Discharge Facility/Level Of Care Needs: nursing facility, skilled     Barriers to discharge: Barriers to Discharge: Decreased caregiver support    Equipment recommendations:  (TBD)     GOALS:    Occupational Therapy Goals        Problem: Occupational Therapy Goal    Goal Priority Disciplines Outcome Interventions   Occupational Therapy Goal     OT, PT/OT Ongoing (interventions implemented as appropriate)    Description:  Goals to be met by: 11/7/17     Patient will increase functional independence with ADLs by performing:    LE Dressing with Stand-by Assistance and use of AE. In progress; NOT MET  Grooming while standing at sink with Stand-by Assistance. In progress; NOT MET  Toileting from bedside commode with Stand-by Assistance for hygiene and clothing management. In progress; NOT  MET  Supine to sit with Stand-by Assistance. In Progress; NOT MET  Stand pivot transfers with Stand-by Assistance. In Progress, NOT MET  Increased functional strength to 3/5 for increased (I) with bed mobility and transfers, as well as ADL. In progress: NOT MET  Patient is (S) with UE therex program. In progress; NOT MET  .                        Plan:  Patient to be seen 6 x/week to address the above listed problems via self-care/home management, therapeutic activities, therapeutic exercises  Plan of Care expires: 11/30/17  Plan of Care reviewed with: patient         Maddison BLOOD Stanislav, OT  11/06/2017

## 2017-11-06 NOTE — SUBJECTIVE & OBJECTIVE
Interval History:   Feels about the same.  CK declining today.      Review of Systems   Constitutional: Negative for appetite change and fever.   Respiratory: Negative for cough and shortness of breath.    Cardiovascular: Negative for chest pain and palpitations.   Gastrointestinal: Negative for nausea and vomiting.   Skin: Negative for color change and rash.   Neurological: Negative for dizziness and weakness.   Psychiatric/Behavioral: Negative for agitation and confusion.     Objective:     Vital Signs (Most Recent):  Temp: 98 °F (36.7 °C) (11/06/17 1103)  Pulse: 65 (11/06/17 1103)  Resp: 18 (11/06/17 1103)  BP: 126/65 (11/06/17 1103)  SpO2: (!) 94 % (11/06/17 1103) Vital Signs (24h Range):  Temp:  [98 °F (36.7 °C)-99.1 °F (37.3 °C)] 98 °F (36.7 °C)  Pulse:  [64-83] 65  Resp:  [16-18] 18  SpO2:  [90 %-97 %] 94 %  BP: (126-146)/(63-66) 126/65     Weight: 116.6 kg (257 lb 1.6 oz)  Body mass index is 36.89 kg/m².    Intake/Output Summary (Last 24 hours) at 11/06/17 1243  Last data filed at 11/06/17 0951   Gross per 24 hour   Intake             1000 ml   Output             3300 ml   Net            -2300 ml      Physical Exam   Constitutional: He is oriented to person, place, and time. He appears well-developed and well-nourished.   HENT:   Head: Normocephalic and atraumatic.   Eyes: Conjunctivae are normal. Pupils are equal, round, and reactive to light.   Neck: Normal range of motion. Neck supple.   Cardiovascular: Normal rate and regular rhythm.    Pulmonary/Chest: Effort normal and breath sounds normal. He has no rales.   Mild rales at bases?.   Abdominal: Soft.   Musculoskeletal: He exhibits edema.   Mild to arms.   Neurological: He is alert and oriented to person, place, and time.   Proximal muscular weakness shoulder and hip bilat.   Skin: Skin is warm and dry.       Significant Labs:   BMP:   Recent Labs  Lab 11/06/17  0500   GLU 97      K 4.2      CO2 31*   BUN 9   CREATININE 0.6   CALCIUM 8.6*    MG 1.3*     CBC: No results for input(s): WBC, HGB, HCT, PLT in the last 48 hours.  Cardiac Markers: No results for input(s): CKMB, MYOGLOBIN, BNP, TROPISTAT in the last 48 hours.    Significant Imaging: I have reviewed all pertinent imaging results/findings within the past 24 hours.

## 2017-11-06 NOTE — PLAN OF CARE
Problem: Physical Therapy Goal  Goal: Physical Therapy Goal  Goals to be met by 11-10-17.  1. Sup<>sit mod I  2. Sit<>stand with RW mod I  3. amb 150' with RW mod I     Outcome: Ongoing (interventions implemented as appropriate)    Slow progression towards goals.

## 2017-11-06 NOTE — PLAN OF CARE
Problem: Occupational Therapy Goal  Goal: Occupational Therapy Goal  Goals to be met by: 11/7/17     Patient will increase functional independence with ADLs by performing:    LE Dressing with Stand-by Assistance and use of AE. In progress; NOT MET  Grooming while standing at sink with Stand-by Assistance. In progress; NOT MET  Toileting from bedside commode with Stand-by Assistance for hygiene and clothing management. In progress; NOT MET  Supine to sit with Stand-by Assistance. In Progress; NOT MET  Stand pivot transfers with Stand-by Assistance. In Progress, NOT MET  Increased functional strength to 3/5 for increased (I) with bed mobility and transfers, as well as ADL. In progress: NOT MET  Patient is (S) with UE therex program. In progress; NOT MET  .       Outcome: Ongoing (interventions implemented as appropriate)  Pt participated in thera-band exercises for BUE's with occasional support at elbow and shoulder of LUE. Pt utilized UE to aide LUE for /grasp. Pt completed 8 sets of 10.  Pt would benefit from skilled occupational therapy intervention for increased activity tolerance and independence in ADL's.

## 2017-11-06 NOTE — PT/OT/SLP PROGRESS
"Physical Therapy  Treatment    Serafin Tapia   MRN: [id number]   Admitting Diagnosis: Adverse reaction to oral hypoglycemic drug, initial encounter    PT Received On: 11/06/17  PT Start Time: 0747     PT Stop Time: 0821    PT Total Time (min): 34 min       Billable Minutes:  Therapeutic Activity 25 and Therapeutic Exercise 9    Treatment Type: Treatment  PT/PTA: PTA     PTA Visit Number: 2       General Precautions: Standard, fall, diabetic  Orthopedic Precautions: N/A   Braces: N/A    Do you have any cultural, spiritual, Mormon conflicts, given your current situation?: none     Subjective:  Communicated with nurse prior to session. Patient stated " I'm really swollen I can't move my arms much"  Patient also complained of right great toe pain nurse was notified.     Pain/Comfort  Pain Rating 1: 6/10  Location - Side 1: Right  Location - Orientation 1: generalized  Location 1:  (great toe)  Pain Addressed 1: Distraction, Cessation of Activity  Pain Rating Post-Intervention 1: 3/10    Objective:   Patient found with: telemetry, peripheral IV supine in bed with oxygen 1L and increase edema noted nurse was aware.     Functional Mobility:  Bed Mobility:   Scooting/Bridging: Stand by Assistance (scoot to EOB)  Supine to Sit: Maximum Assistance, With side rail trunk and B LE management     Transfers:  Sit <> Stand Assistance: Minimum Assistance, Moderate Assistance (elevated bed X 4 trials )  Sit <> Stand Assistive Device: Rolling Walker  Bed <> Chair Technique: Stand Pivot  Bed <> Chair Assistance: Minimum Assistance  Bed <> Chair Assistive Device: Rolling Walker    Gait:   Gait Distance:  (5 steps with rolling walker )  Assistance 1: Minimum assistance, Moderate assistance  Gait Assistive Device: Rolling walker  Gait Pattern: reciprocal  Gait Deviation(s): decreased kadeem, decreased step length    Therapeutic Activities and Exercises:  Patient sat on EOB for 15 minutes performed AP, LAQ, Hip Flexion X 15 reps B LE " AAROM  Patient required assistance to alen/doff gown limited ROM in B UE's and LE's   Patient static stood at bedside with RW with CGA/Min A forward flex trunk required verbal and tactile cues for upright posture.     AM-PAC 6 CLICK MOBILITY  How much help from another person does this patient currently need?   1 = Unable, Total/Dependent Assistance  2 = A lot, Maximum/Moderate Assistance  3 = A little, Minimum/Contact Guard/Supervision  4 = None, Modified [place]/Independent    Turning over in bed (including adjusting bedclothes, sheets and blankets)?: 2  Sitting down on and standing up from a chair with arms (e.g., wheelchair, bedside commode, etc.): 2  Moving from lying on back to sitting on the side of the bed?: 2  Moving to and from a bed to a chair (including a wheelchair)?: 2  Need to walk in hospital room?: 2  Climbing 3-5 steps with a railing?: 1  Total Score: 11    AM-PAC Raw Score CMS G-Code Modifier Level of Impairment Assistance   6 % Total / Unable   7 - 9 CM 80 - 100% Maximal Assist   10 - 14 CL 60 - 80% Moderate Assist   15 - 19 CK 40 - 60% Moderate Assist   20 - 22 CJ 20 - 40% Minimal Assist   23 CI 1-20% SBA / CGA   24 CH 0% Independent/ Mod I     Patient left up in chair with all lines intact, call button in reach and nurse notified.    Assessment:  Serafin Tapia is a 67 y.o. male with a medical diagnosis of Adverse reaction to oral hypoglycemic drug, initial encounter patient with increase edema entire body, increase pain in right LE/great toe with ROM. Patient was unable to ambulate on this day and will cont. To benefit from skilled PT services to increase strength, endurance and functional mobility.     Rehab identified problem list/impairments: Rehab identified problem list/impairments: weakness, impaired endurance, impaired functional mobilty, gait instability, pain, decreased lower extremity function, decreased upper extremity function, edema, decreased ROM    Rehab potential is  fair.    Activity tolerance: Fair    Discharge recommendations: Discharge Facility/Level Of Care Needs: nursing facility, skilled     Barriers to discharge: Barriers to Discharge: Decreased caregiver support    Equipment recommendations: Equipment Needed After Discharge:  (TBD pending skilled Inpatient therapy setting )     GOALS:    Physical Therapy Goals        Problem: Physical Therapy Goal    Goal Priority Disciplines Outcome Goal Variances Interventions   Physical Therapy Goal     PT/OT, PT Ongoing (interventions implemented as appropriate)     Description:  Goals to be met by 11-10-17.  1. Sup<>sit mod I  2. Sit<>stand with RW mod I  3. amb 150' with RW mod I                      PLAN:    Patient to be seen daily  to address the above listed problems via gait training, therapeutic activities, therapeutic exercises  Plan of Care expires: 11/30/17  Plan of Care reviewed with: patient         Deirdre M Kyle, PTA  11/06/2017

## 2017-11-06 NOTE — ASSESSMENT & PLAN NOTE
- Has proximal muscle weakness.  - CK little lower today at 4793.  - Discontinued atorvastatin.  - ESR = 34; CRP = 95.8.  - HALLE negative; anti-Cinthia-1 pending.   - NS IVF @ 150  - Will give Lasix to avoid fluid overload.  - I&O   - Monitor CPK daily.   - If trend of decreasing continues may be able to send to SNF soon.

## 2017-11-06 NOTE — PLAN OF CARE
Problem: Patient Care Overview  Goal: Plan of Care Review  Outcome: Ongoing (interventions implemented as appropriate)  Pt in no distress on 1.5L [place], will continue to monitor.

## 2017-11-06 NOTE — PT/OT/SLP PROGRESS
Occupational Therapy  Treatment    Serafin Tapia   MRN: [id number]   Admitting Diagnosis: Adverse reaction to oral hypoglycemic drug, initial encounter    OT Date of Treatment: 11/06/17   OT Start Time: 1424  OT Stop Time: 1500  OT Total Time (min): 36 min    Billable Minutes:  Therapeutic Activity 36    General Precautions: Standard, fall, diabetic  Orthopedic Precautions: N/A  Braces: N/A    Do you have any cultural, spiritual, Pentecostal conflicts, given your current situation?: none reported    Subjective:  Communicated with nursing prior to session.  Pt agreeable to treatment, however, complaining on increased RLE edema and decreased mobility.   Pain/Comfort  Pain Rating 1: 0/10  Pain Rating Post-Intervention 1: 0/10    Objective:  Patient found with: peripheral IV, telemetry     Functional Mobility:  Bed Mobility:  Sit to Supine: Moderate Assistance, With side rail (for BLE's )    Transfers:   Sit <> Stand Assistance: Maximum Assistance (with assist of 2 from bedside chair. x3 attempts prior with one person assist)  Sit <> Stand Assistive Device: Rolling Walker  Bed <> Chair Technique: Stand Pivot (with side stepping)  Bed <> Chair Transfer Assistance: Contact Guard Assistance  Bed <> Chair Assistive Device: Rolling Walker    Balance:   Static Sit: GOOD-: Takes MODERATE challenges from all directions but inconsistently  Static Stand: FAIR: Maintains without assist but unable to take challenges  CGA for side steps with transfers    Therapeutic Activities and Exercises:  Functional transfers, education on transferees and body positioning, bed mobility and OT POC.     AM-PAC 6 CLICK ADL   How much help from another person does this patient currently need?   1 = Unable, Total/Dependent Assistance  2 = A lot, Maximum/Moderate Assistance  3 = A little, Minimum/Contact Guard/Supervision  4 = None, Modified [place]/Independent    Putting on and taking off regular lower body clothing? : 2  Bathing (including washing,  "rinsing, drying)?: 2  Toileting, which includes using toilet, bedpan, or urinal? : 2  Putting on and taking off regular upper body clothing?: 3  Taking care of personal grooming such as brushing teeth?: 3  Eating meals?: 4  Total Score: 16     AM-PAC Raw Score CMS "G-Code Modifier Level of Impairment Assistance   6 % Total / Unable   7 - 8 CM 80 - 100% Maximal Assist   9-13 CL 60 - 80% Moderate Assist   14 - 19 CK 40 - 60% Moderate Assist   20 - 22 CJ 20 - 40% Minimal Assist   23 CI 1-20% SBA / CGA   24 CH 0% Independent/ Mod I       Patient left HOB elevated with call button in reach, bed alarm on and nursing notified    ASSESSMENT:  Serafin Tapia is a 67 y.o. male with a medical diagnosis of Adverse reaction to oral hypoglycemic drug, initial encounter and presents with the below impairments. Pt required increased assistance for sit > stand transfer from bedside chair today after sitting up for the day. Pt complained of increased difficulty secondary to edema in RLE.  Pt would benefit from skilled occupational therapy intervention for increased activity tolerance and independence in ADL's.    Rehab identified problem list/impairments: Rehab identified problem list/impairments: weakness, impaired endurance, impaired functional mobilty, decreased safety awareness, pain, impaired self care skills, decreased upper extremity function, decreased lower extremity function, decreased ROM, edema, impaired fine motor    Rehab potential is good-    Activity tolerance: Good-    Discharge recommendations: Discharge Facility/Level Of Care Needs: nursing facility, skilled     Barriers to discharge: Barriers to Discharge: Decreased caregiver support    Equipment recommendations:  (TBD)     GOALS:    Occupational Therapy Goals        Problem: Occupational Therapy Goal    Goal Priority Disciplines Outcome Interventions   Occupational Therapy Goal     OT, PT/OT Ongoing (interventions implemented as appropriate)    Description:  " Goals to be met by: 11/7/17     Patient will increase functional independence with ADLs by performing:    LE Dressing with Stand-by Assistance and use of AE. In progress; NOT MET  Grooming while standing at sink with Stand-by Assistance. In progress; NOT MET  Toileting from bedside commode with Stand-by Assistance for hygiene and clothing management. In progress; NOT MET  Supine to sit with Stand-by Assistance. In Progress; NOT MET  Stand pivot transfers with Stand-by Assistance. In Progress, NOT MET  Increased functional strength to 3/5 for increased (I) with bed mobility and transfers, as well as ADL. In progress: NOT MET  Patient is (S) with UE therex program. In progress; NOT MET  .                        Plan:  Patient to be seen 6 x/week to address the above listed problems via self-care/home management, therapeutic activities, therapeutic exercises  Plan of Care expires: 11/30/17  Plan of Care reviewed with: patient         Agawayne JEREMI [place], OT  11/06/2017

## 2017-11-06 NOTE — PLAN OF CARE
11/06/17 1104   Discharge Reassessment   Assessment Type Discharge Planning Reassessment   Provided patient/caregiver education on the expected discharge date and the discharge plan Yes   Do you have any problems affording any of your prescribed medications? No   Discharge Plan A Skilled Nursing Facility   Discharge Plan B Home Health   Can the patient answer the patient profile reliably? Yes, cognitively intact   How does the patient rate their overall health at the present time? Fair   Describe the patient's ability to walk at the present time. Walks with the help of equipment   How often would a person be available to care for the patient? Whenever needed   Number of comorbid conditions (as recorded on the chart) Four   During the past month, has the patient often been bothered by feeling down, depressed or hopeless? No   During the past month, has the patient often been bothered by little interest or pleasure in doing things? No

## 2017-11-07 LAB
ANION GAP SERPL CALC-SCNC: 4 MMOL/L
BUN SERPL-MCNC: 6 MG/DL
CALCIUM SERPL-MCNC: 8.5 MG/DL
CHLORIDE SERPL-SCNC: 105 MMOL/L
CK SERPL-CCNC: 4855 U/L
CO2 SERPL-SCNC: 32 MMOL/L
CREAT SERPL-MCNC: 0.5 MG/DL
ENA JO1 AB SER IA-ACNC: <1 INDEX
EST. GFR  (AFRICAN AMERICAN): >60 ML/MIN/1.73 M^2
EST. GFR  (NON AFRICAN AMERICAN): >60 ML/MIN/1.73 M^2
GLUCOSE SERPL-MCNC: 89 MG/DL
MAGNESIUM SERPL-MCNC: 1.5 MG/DL
PHOSPHATE SERPL-MCNC: 3.5 MG/DL
POCT GLUCOSE: 118 MG/DL (ref 70–110)
POCT GLUCOSE: 134 MG/DL (ref 70–110)
POCT GLUCOSE: 169 MG/DL (ref 70–110)
POCT GLUCOSE: 85 MG/DL (ref 70–110)
POTASSIUM SERPL-SCNC: 3.6 MMOL/L
SODIUM SERPL-SCNC: 141 MMOL/L

## 2017-11-07 PROCEDURE — 94761 N-INVAS EAR/PLS OXIMETRY MLT: CPT

## 2017-11-07 PROCEDURE — 36415 COLL VENOUS BLD VENIPUNCTURE: CPT

## 2017-11-07 PROCEDURE — 97530 THERAPEUTIC ACTIVITIES: CPT

## 2017-11-07 PROCEDURE — 11000001 HC ACUTE MED/SURG PRIVATE ROOM

## 2017-11-07 PROCEDURE — 25000003 PHARM REV CODE 250: Performed by: HOSPITALIST

## 2017-11-07 PROCEDURE — 25000003 PHARM REV CODE 250: Performed by: NURSE PRACTITIONER

## 2017-11-07 PROCEDURE — 97110 THERAPEUTIC EXERCISES: CPT

## 2017-11-07 PROCEDURE — 97535 SELF CARE MNGMENT TRAINING: CPT

## 2017-11-07 PROCEDURE — 83735 ASSAY OF MAGNESIUM: CPT

## 2017-11-07 PROCEDURE — 63600175 PHARM REV CODE 636 W HCPCS: Performed by: HOSPITALIST

## 2017-11-07 PROCEDURE — 82550 ASSAY OF CK (CPK): CPT

## 2017-11-07 PROCEDURE — 80048 BASIC METABOLIC PNL TOTAL CA: CPT

## 2017-11-07 PROCEDURE — 84100 ASSAY OF PHOSPHORUS: CPT

## 2017-11-07 PROCEDURE — 99233 SBSQ HOSP IP/OBS HIGH 50: CPT | Mod: ,,, | Performed by: HOSPITALIST

## 2017-11-07 PROCEDURE — 27000221 HC OXYGEN, UP TO 24 HOURS

## 2017-11-07 RX ORDER — FUROSEMIDE 10 MG/ML
80 INJECTION INTRAMUSCULAR; INTRAVENOUS ONCE
Status: DISCONTINUED | OUTPATIENT
Start: 2017-11-07 | End: 2017-11-07

## 2017-11-07 RX ORDER — FUROSEMIDE 10 MG/ML
80 INJECTION INTRAMUSCULAR; INTRAVENOUS ONCE
Status: COMPLETED | OUTPATIENT
Start: 2017-11-07 | End: 2017-11-07

## 2017-11-07 RX ADMIN — FUROSEMIDE 80 MG: 10 INJECTION, SOLUTION INTRAVENOUS at 02:11

## 2017-11-07 RX ADMIN — CARVEDILOL 25 MG: 12.5 TABLET, FILM COATED ORAL at 08:11

## 2017-11-07 RX ADMIN — METFORMIN HYDROCHLORIDE 500 MG: 500 TABLET, FILM COATED ORAL at 05:11

## 2017-11-07 RX ADMIN — AMLODIPINE BESYLATE 10 MG: 5 TABLET ORAL at 08:11

## 2017-11-07 RX ADMIN — SODIUM CHLORIDE: 0.9 INJECTION, SOLUTION INTRAVENOUS at 05:11

## 2017-11-07 RX ADMIN — LEVOTHYROXINE SODIUM 25 MCG: 25 TABLET ORAL at 05:11

## 2017-11-07 RX ADMIN — METFORMIN HYDROCHLORIDE 500 MG: 500 TABLET, FILM COATED ORAL at 08:11

## 2017-11-07 RX ADMIN — BENAZEPRIL HYDROCHLORIDE 10 MG: 10 TABLET, FILM COATED ORAL at 08:11

## 2017-11-07 RX ADMIN — CARVEDILOL 25 MG: 12.5 TABLET, FILM COATED ORAL at 05:11

## 2017-11-07 RX ADMIN — RIVAROXABAN 20 MG: 10 TABLET, FILM COATED ORAL at 05:11

## 2017-11-07 RX ADMIN — FOLIC ACID 1 MG: 1 TABLET ORAL at 08:11

## 2017-11-07 NOTE — PT/OT/SLP PROGRESS
"Physical Therapy  Treatment    Serafin Tapia   MRN: [id number]   Admitting Diagnosis: Adverse reaction to oral hypoglycemic drug, initial encounter    PT Received On: 11/07/17  PT Start Time: 1430     PT Stop Time: 1500    PT Total Time (min): 30 min       Billable Minutes:  Therapeutic Activity 30    Treatment Type: Treatment  PT/PTA: PTA     PTA Visit Number: 3       General Precautions: Standard, fall, diabetic  Orthopedic Precautions: N/A   Braces: N/A    Do you have any cultural, spiritual, Yarsani conflicts, given your current situation?: none     Subjective:  Communicated with nurse prior to session. Patient stated " I need to pee" patient also reported that he was feeling nauseated towards end of session.     Pain/Comfort  Pain Rating 1:  (complained of pain in R LE )    Objective:   Patient found with: peripheral IV, telemetry seated in bedside chair with daughter present     Functional Mobility:  Bed Mobility:   Sit to Supine: Maximum Assistance, With assist of  2    Transfers: patient required maximum assist to bring trunk forward in bedside chair   Sit <> Stand Assistance: Maximum Assistance of 2 people for hand placement and hip elevation  Sit <> Stand Assistive Device: Rolling Walker    Gait:     unable to ambulate secondary to pain in R LE nurse was notified     Therapeutic Activities and Exercises:  Patient performed AP, LAQ X 20 reps B LE AROM then AAROM/PROM hip flexion.     Patient required total A to use urinal in bedside chair     AM-PAC 6 CLICK MOBILITY  How much help from another person does this patient currently need?   1 = Unable, Total/Dependent Assistance  2 = A lot, Maximum/Moderate Assistance  3 = A little, Minimum/Contact Guard/Supervision  4 = None, Modified [facility]/Independent    Turning over in bed (including adjusting bedclothes, sheets and blankets)?: 2  Sitting down on and standing up from a chair with arms (e.g., wheelchair, bedside commode, etc.): 2  Moving from lying on back " to sitting on the side of the bed?: 2  Moving to and from a bed to a chair (including a wheelchair)?: 3  Need to walk in hospital room?: 1  Climbing 3-5 steps with a railing?: 1  Total Score: 11    AM-PAC Raw Score CMS G-Code Modifier Level of Impairment Assistance   6 % Total / Unable   7 - 9 CM 80 - 100% Maximal Assist   10 - 14 CL 60 - 80% Moderate Assist   15 - 19 CK 40 - 60% Moderate Assist   20 - 22 CJ 20 - 40% Minimal Assist   23 CI 1-20% SBA / CGA   24 CH 0% Independent/ Mod I     Patient left supine with all lines intact, call button in reach, nurse notified and nurse and daughter  present.    Assessment:  Serafin Tapia is a 67 y.o. male with a medical diagnosis of Adverse reaction to oral hypoglycemic drug, initial encounter  Patient required maximum assistance of 2 people for sit to stand transfer on this day. Patient still requires maximum assistance of 1 to 2 people for bed mobility. Patient is unable to gait train secondary to pain in RLE. Patient will cont. To benefit from skilled PT services to increase strength, endurance and functional mobility.     Rehab identified problem list/impairments: Rehab identified problem list/impairments: weakness, impaired endurance, decreased ROM, impaired balance, pain, edema    Rehab potential is good.    Activity tolerance: Fair    Discharge recommendations: Discharge Facility/Level Of Care Needs: nursing facility, skilled     Barriers to discharge: Barriers to Discharge: Decreased caregiver support    Equipment recommendations: Equipment Needed After Discharge:  (TBD pending skilled Inpatient therapy setting )     GOALS:    Physical Therapy Goals        Problem: Physical Therapy Goal    Goal Priority Disciplines Outcome Goal Variances Interventions   Physical Therapy Goal     PT/OT, PT Ongoing (interventions implemented as appropriate)     Description:  Goals to be met by 11-10-17.  1. Sup<>sit mod I  2. Sit<>stand with RW mod I  3. amb 150' with RW mod I                       PLAN:    Patient to be seen daily  to address the above listed problems via gait training, therapeutic activities, therapeutic exercises  Plan of Care expires: 11/30/17  Plan of Care reviewed with: patient         Deirdre M Kyle, PTA  11/07/2017

## 2017-11-07 NOTE — PLAN OF CARE
Problem: Patient Care Overview  Goal: Plan of Care Review  Outcome: Ongoing (interventions implemented as appropriate)  Patient on oxygen; no change in therapy

## 2017-11-07 NOTE — PT/OT/SLP PROGRESS
Occupational Therapy  Treatment    Serafin Tapia   MRN: [id number]   Admitting Diagnosis: Adverse reaction to oral hypoglycemic drug, initial encounter    OT Date of Treatment: 11/07/17   OT Start Time: 1113  OT Stop Time: 1227  OT Total Time (min): 74 min    Billable Minutes:  Self Care/Home Management 14, Therapeutic Activity 30, Therapeutic Exercise 30 and Total Time 74    General Precautions: Standard, fall, diabetic  Orthopedic Precautions: N/A  Braces: N/A    Do you have any cultural, spiritual, Alevism conflicts, given your current situation?: none reported    Subjective:  Communicated with nurse prior to session.    Pain/Comfort  Pain Rating 1: 8/10 (shoulder pain with ROM)  Location - Side 1: Bilateral  Location - Orientation 1: generalized  Location 1: shoulder  Pain Addressed 1: Reposition, Nurse notified (moist warm compress x 10 min B shld )  Pain Rating Post-Intervention 1: 5/10  Pain Rating 2:  (c/o R leg pain with WB during t/f but not rated  not at rest)  Location - Side 2: Right  Location - Orientation 2: generalized  Location 2: leg  Pain Addressed 2: Reposition, Distraction, Nurse notified  Pain Rating Post-Intervention 2:  (none at rest but pain with weightbearing during transfers)    Objective:  Patient found with: oxygen, telemetry, peripheral IV     Functional Mobility:  Bed Mobility:  Scooting/Bridging: Moderate Assistance  Supine to Sit: Maximum Assistance (HOB elevated and bed rails used )  Sit to Supine: Moderate Assistance (BLE assist)    Transfers:   Sit <> Stand Assistance: Moderate Assistance (from low BS chair; mod assist fro elevated bed and BSc)  Sit <> Stand Assistive Device: Rolling Walker  Bed <> Chair Technique: Stand Pivot  Bed <> Chair Transfer Assistance: Minimum Assistance  Bed <> Chair Assistive Device: Rolling Walker  Toilet Transfer Technique: Stand Pivot  Toilet Transfer Assistance: Moderate Assistance (pain RLE )  Toilet Transfer Assistive Device: Rolling Walker, bedside  commode    Functional Ambulation: NA    Activities of Daily Living:  Feeding Level of Assistance: Set-up Assistance    Grooming Position: bedside chair     Toileting Where Assessed: Bedside commode  Toileting Level of Assistance: Maximum assistance (setup urinal seated on BSc;  max assist for resr hygiene and clothes management after BM)            Balance:   Static Sit: FAIR+: Able to take MINIMAL challenges from all directions  Dynamic Sit: FAIR+: Maintains balance through MINIMAL excursions of active trunk motion  Static Stand: POOR+: Needs MINIMAL assist to maintain  Dynamic stand: POOR: N/A    Therapeutic Activities and Exercises:   Pt. Instructed in bed mobility with max assist supine>sit  with HOB elevated and need for bed rail use. He performed sit<>stand from raised EOB with max assist to RW x 1 trial and mod assist x 2nd trial . Bed<>BSC stand step t/tf with RW and min assist-mod assist on return to bed from BSC 2/2 increased fatigue. Completed toileting with max assist for clothes management and rear hygiene after BM; setup for uriunal use seated on BSC.  Bed >chair Stand step t/f with RW and min-mod assist; slow and painful with WB'ing on R foot. Pt. received low load stretch B shld all planes 4 sec hold; followed by AAROM ex with 4 sec hold and slow eccentric contraction using dowel (cane) 5 reps x 2 sets with assist to achieve full ROM while seated in BS chair.  10 moist warm compress applied to B shld while performing B hand active pumping ex for edema and AROM ex.  Low load stretch to B shld after warm compress.        AM-PAC 6 CLICK ADL   How much help from another person does this patient currently need?   1 = Unable, Total/Dependent Assistance  2 = A lot, Maximum/Moderate Assistance  3 = A little, Minimum/Contact Guard/Supervision  4 = None, Modified [place]/Independent    Putting on and taking off regular lower body clothing? : 2  Bathing (including washing, rinsing, drying)?: 2  Toileting,  "which includes using toilet, bedpan, or urinal? : 2  Putting on and taking off regular upper body clothing?: 3  Taking care of personal grooming such as brushing teeth?: 3  Eating meals?: 4  Total Score: 16     AM-PAC Raw Score CMS "G-Code Modifier Level of Impairment Assistance   6 % Total / Unable   7 - 8 CM 80 - 100% Maximal Assist   9-13 CL 60 - 80% Moderate Assist   14 - 19 CK 40 - 60% Moderate Assist   20 - 22 CJ 20 - 40% Minimal Assist   23 CI 1-20% SBA / CGA   24 CH 0% Independent/ Mod I       Patient left up in chair with all lines intact, call button in reach and nurse notified    ASSESSMENT:  Serafin Tapia is a 67 y.o. male with a medical diagnosis of Adverse reaction to oral hypoglycemic drug, initial encounter and presents with  slow progress 2/2 stiffness BUE, edema, pain, decreased ROM , decreased strength and endurance , impaired standing balance limiting indep and safety in ADLs and fx mobility. Pt. appeared frustrated by  Stiffness, edema and pain in R leg and R foot. Pain and stiffness improved with warm compress to B shlds and pt reported pain down to 5/10 from  8/10. Pt. would benefit  SNF. Continue with OT POC.      Rehab identified problem list/impairments: Rehab identified problem list/impairments: weakness, gait instability, decreased upper extremity function, decreased ROM, decreased lower extremity function, impaired balance, impaired endurance, pain, impaired self care skills, impaired functional mobilty, edema    Rehab potential is good.    Activity tolerance: Fair    Discharge recommendations: Discharge Facility/Level Of Care Needs: nursing facility, skilled     Barriers to discharge: Barriers to Discharge: Decreased caregiver support    Equipment recommendations:  (TBD)     GOALS:    Occupational Therapy Goals        Problem: Occupational Therapy Goal    Goal Priority Disciplines Outcome Interventions   Occupational Therapy Goal     OT, PT/OT Ongoing (interventions implemented as " appropriate)    Description:  Goals to be met by: 11/7/17     Patient will increase functional independence with ADLs by performing:    LE Dressing with Stand-by Assistance and use of AE. In progress; NOT MET  Grooming while standing at sink with Stand-by Assistance. In progress; NOT MET  Toileting from bedside commode with Stand-by Assistance for hygiene and clothing management. In progress; NOT MET  Supine to sit with Stand-by Assistance. In Progress; NOT MET  Stand pivot transfers with Stand-by Assistance. In Progress, NOT MET  Increased functional strength to 3/5 for increased (I) with bed mobility and transfers, as well as ADL. In progress: NOT MET  Patient is (S) with UE therex program. In progress; NOT MET  .                        Plan:  Patient to be seen 6 x/week to address the above listed problems via self-care/home management, therapeutic activities, therapeutic exercises  Plan of Care expires: 11/30/17  Plan of Care reviewed with: patient         Genevieve Haider OT  11/07/2017

## 2017-11-07 NOTE — PLAN OF CARE
Problem: Physical Therapy Goal  Goal: Physical Therapy Goal  Goals to be met by 11-10-17.  1. Sup<>sit mod I  2. Sit<>stand with RW mod I  3. amb 150' with RW mod I     Outcome: Ongoing (interventions implemented as appropriate)    Limited with PT secondary to right knee pain increase edema.

## 2017-11-07 NOTE — PLAN OF CARE
Problem: Occupational Therapy Goal  Goal: Occupational Therapy Goal  Goals to be met by: 11/7/17     Patient will increase functional independence with ADLs by performing:    LE Dressing with Stand-by Assistance and use of AE. In progress; NOT MET  Grooming while standing at sink with Stand-by Assistance. In progress; NOT MET  Toileting from bedside commode with Stand-by Assistance for hygiene and clothing management. In progress; NOT MET  Supine to sit with Stand-by Assistance. In Progress; NOT MET  Stand pivot transfers with Stand-by Assistance. In Progress, NOT MET  Increased functional strength to 3/5 for increased (I) with bed mobility and transfers, as well as ADL. In progress: NOT MET  Patient is (S) with UE therex program. In progress; NOT MET  .       Outcome: Ongoing (interventions implemented as appropriate)  Pt. making slow progress 2/2 stiffness BUE, edema, pain, decreased ROM , decreased strength and endurance , impaired standing balance limiting indep and safety in ADLs and fx mobility. Pt. Requires max assist supine>sit  with HOB elevated and need for bed rail use. Pt. appeared frustrated by  Stiffness, edema and pain in R leg and arms. He performed sit<>stand from \raised EOB with max assist to RW x 1 trial and mod assist x 2nd trial . Bed<>BSC stand step t/tf with RW and min assist-mod assist on return [place] [facility] [place]2 increased fatigue. Completed toileting with max assist for clothes management and rear hygiene after BM; setup for uriunal use seated on C.  Bed >chair Stand step t/f with RW and min-mod assist; slow and painful with WB'ing on R foot. Pt. received low load stretch B shld all planes 4 sec hold; followed by AAROM ex with 4 sec hold and slow eccentric contraction using dowel (cane) 5 reps x 2 sets with assist to achieve full ROM while seated in BS chair.  10 moist warm compress applied to B shld while performing B hand active pumping ex for edema and AROM ex.  Low load stretch to B  shld after warm compress.  Pt. Reported pain down to 5/10 from  8/10  In B shld after warm compress and stretch.  Pt. would benefit  SNF. Continue with OT POC.

## 2017-11-08 LAB
ANION GAP SERPL CALC-SCNC: 6 MMOL/L
BUN SERPL-MCNC: 8 MG/DL
CALCIUM SERPL-MCNC: 8.4 MG/DL
CHLORIDE SERPL-SCNC: 104 MMOL/L
CK SERPL-CCNC: 4555 U/L
CO2 SERPL-SCNC: 34 MMOL/L
CREAT SERPL-MCNC: 0.6 MG/DL
EST. GFR  (AFRICAN AMERICAN): >60 ML/MIN/1.73 M^2
EST. GFR  (NON AFRICAN AMERICAN): >60 ML/MIN/1.73 M^2
GLUCOSE SERPL-MCNC: 104 MG/DL
MAGNESIUM SERPL-MCNC: 1.2 MG/DL
PHOSPHATE SERPL-MCNC: 3.7 MG/DL
POCT GLUCOSE: 109 MG/DL (ref 70–110)
POCT GLUCOSE: 112 MG/DL (ref 70–110)
POCT GLUCOSE: 131 MG/DL (ref 70–110)
POCT GLUCOSE: 135 MG/DL (ref 70–110)
POCT GLUCOSE: 138 MG/DL (ref 70–110)
POTASSIUM SERPL-SCNC: 3.5 MMOL/L
SODIUM SERPL-SCNC: 144 MMOL/L

## 2017-11-08 PROCEDURE — 84100 ASSAY OF PHOSPHORUS: CPT

## 2017-11-08 PROCEDURE — 63600175 PHARM REV CODE 636 W HCPCS: Performed by: HOSPITALIST

## 2017-11-08 PROCEDURE — 36415 COLL VENOUS BLD VENIPUNCTURE: CPT

## 2017-11-08 PROCEDURE — 99233 SBSQ HOSP IP/OBS HIGH 50: CPT | Mod: ,,, | Performed by: HOSPITALIST

## 2017-11-08 PROCEDURE — 11000001 HC ACUTE MED/SURG PRIVATE ROOM

## 2017-11-08 PROCEDURE — 94761 N-INVAS EAR/PLS OXIMETRY MLT: CPT

## 2017-11-08 PROCEDURE — 97535 SELF CARE MNGMENT TRAINING: CPT

## 2017-11-08 PROCEDURE — 80048 BASIC METABOLIC PNL TOTAL CA: CPT

## 2017-11-08 PROCEDURE — 83735 ASSAY OF MAGNESIUM: CPT

## 2017-11-08 PROCEDURE — 25000003 PHARM REV CODE 250: Performed by: NURSE PRACTITIONER

## 2017-11-08 PROCEDURE — 25000003 PHARM REV CODE 250: Performed by: HOSPITALIST

## 2017-11-08 PROCEDURE — 82550 ASSAY OF CK (CPK): CPT

## 2017-11-08 PROCEDURE — 97802 MEDICAL NUTRITION INDIV IN: CPT

## 2017-11-08 PROCEDURE — 97110 THERAPEUTIC EXERCISES: CPT

## 2017-11-08 RX ORDER — FUROSEMIDE 10 MG/ML
80 INJECTION INTRAMUSCULAR; INTRAVENOUS ONCE
Status: COMPLETED | OUTPATIENT
Start: 2017-11-08 | End: 2017-11-08

## 2017-11-08 RX ADMIN — AMLODIPINE BESYLATE 10 MG: 5 TABLET ORAL at 08:11

## 2017-11-08 RX ADMIN — SODIUM BICARBONATE: 84 INJECTION, SOLUTION INTRAVENOUS at 02:11

## 2017-11-08 RX ADMIN — RIVAROXABAN 20 MG: 10 TABLET, FILM COATED ORAL at 05:11

## 2017-11-08 RX ADMIN — METFORMIN HYDROCHLORIDE 500 MG: 500 TABLET, FILM COATED ORAL at 08:11

## 2017-11-08 RX ADMIN — CARVEDILOL 25 MG: 12.5 TABLET, FILM COATED ORAL at 08:11

## 2017-11-08 RX ADMIN — BENAZEPRIL HYDROCHLORIDE 10 MG: 10 TABLET, FILM COATED ORAL at 08:11

## 2017-11-08 RX ADMIN — METFORMIN HYDROCHLORIDE 500 MG: 500 TABLET, FILM COATED ORAL at 05:11

## 2017-11-08 RX ADMIN — FOLIC ACID 1 MG: 1 TABLET ORAL at 08:11

## 2017-11-08 RX ADMIN — CARVEDILOL 25 MG: 12.5 TABLET, FILM COATED ORAL at 05:11

## 2017-11-08 RX ADMIN — FUROSEMIDE 80 MG: 10 INJECTION, SOLUTION INTRAVENOUS at 10:11

## 2017-11-08 RX ADMIN — LEVOTHYROXINE SODIUM 25 MCG: 25 TABLET ORAL at 06:11

## 2017-11-08 NOTE — PROGRESS NOTES
"[facility]  Adult Nutrition  Progress Note    SUMMARY     Recommendations    Recommendation/Intervention:   1. Consider 2000kcal diet to better meet needs   2. Consider Boost Glucose Control supplement if PO intake does not improve    Goals:   1. Meet >85% EEN and EPN   2. Prevent >2% wt loss x 1 week   3. Promote nutrition related labs wnl    Nutrition Goal Status: new  Communication of CHANELL Recs: other (comment) (care plan)    Reason for Assessment    Reason for Assessment: length of stay    Diagnosis:   1. Adverse reaction to oral hypoglycemic drug, initial encounter    2. Acute kidney injury    3. Controlled type 2 diabetes mellitus with diabetic polyneuropathy, without long-term current use of insulin    4. Diastolic dysfunction    5. Dyslipidemia    6. Essential hypertension    7. Fall, initial encounter    8. History of pulmonary embolism    9. Folate deficiency      Past Medical History:   Diagnosis Date    Asthma     Diabetes mellitus type 2 in obese 5/3/2014    Elevated troponin 5/3/2014    Hyperlipidemia     Hypertension     Hypothyroidism (acquired) 11/1/2017    Obesity     Pulmonary embolism 05/2014    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       General Information Comments: Pt endorses decreased appetite, finishing ~50% of meals. Pt c/o "coughing up fluid" past couple of days, which has affected PO intake. Pt endorses recent significant wt loss, does state he has been trying to cut back on portion sizes, but unclear if loss was fully intentional. Per chart, pt has had ~13% wt loss x 6 months. Denies any difficulty chewing/swallowing. Pt has not been checking blood sugar because of broken home glucometer.     Nutrition Discharge Planning: d/c on DM diet    Nutrition Prescription Ordered    Current Diet Order: DM 1800kcal    Evaluation of Received Nutrients/Fluid Intake    % Intake of Estimated Energy Needs: 25 - 50 %  % Meal Intake: 50%   "   Nutrition Risk Screen     Nutrition Risk Screen: no indicators present    Nutrition/Diet History    Patient Reported Diet/Restrictions/Preferences: diabetic diet  Factors Affecting Nutritional Intake: decreased appetite    Labs/Tests/Procedures/Meds     Pertinent Labs Reviewed: reviewed  Lab Results   Component Value Date     11/08/2017    K 3.5 11/08/2017     11/08/2017    CO2 34 (H) 11/08/2017    BUN 8 11/08/2017    CREATININE 0.6 11/08/2017    CALCIUM 8.4 (L) 11/08/2017    ESTGFRAFRICA >60 11/08/2017    EGFRNONAA >60 11/08/2017     Lab Results   Component Value Date    ALT 68 (H) 11/02/2017    AST 81 (H) 11/02/2017    ALKPHOS 60 11/02/2017     POCT Glucose   Date Value Ref Range Status   11/08/2017 135 (H) 70 - 110 mg/dL Final   11/08/2017 109 70 - 110 mg/dL Final   11/07/2017 138 (H) 70 - 110 mg/dL Final   11/07/2017 169 (H) 70 - 110 mg/dL Final   11/07/2017 134 (H) 70 - 110 mg/dL Final   11/07/2017 118 (H) 70 - 110 mg/dL Final   11/07/2017 85 70 - 110 mg/dL Final   11/06/2017 92 70 - 110 mg/dL Final   11/06/2017 138 (H) 70 - 110 mg/dL Final   11/06/2017 135 (H) 70 - 110 mg/dL Final   11/06/2017 106 70 - 110 mg/dL Final   11/05/2017 103 70 - 110 mg/dL Final   11/05/2017 161 (H) 70 - 110 mg/dL Final     Lab Results   Component Value Date    HGBA1C 5.5 10/29/2017     Lab Results   Component Value Date    HCT 35.2 (L) 10/31/2017     Lab Results   Component Value Date    HGB 11.6 (L) 10/31/2017       Pertinent Medications Reviewed: reviewed  Scheduled Meds:   amLODIPine  10 mg Oral Daily    benazepril  10 mg Oral Daily    carvedilol  25 mg Oral BID WM    ergocalciferol  50,000 Units Oral Q7 Days    folic acid  1 mg Oral Daily    levothyroxine  25 mcg Oral Before breakfast    metFORMIN  500 mg Oral BID WM    rivaroxaban  20 mg Oral Daily with dinner       Physical Findings    Overall Physical Appearance: obese  Oral/Mouth Cavity: tooth/teeth loose, tooh/teeth broken  Skin:  "intact    Anthropometrics    Temp: 98.7 °F (37.1 °C)  Height: 5' 10" (177.8 cm)  Weight Method: Bed Scale  Weight: 116.6 kg (257 lb 1.6 oz)  Ideal Body Weight (IBW), Male: 166 lb  % Ideal Body Weight, Male (lb): 154.88 lb  BMI (Calculated): 37  BMI Grade: 35 - 39.9 - obesity - grade II     Estimated/Assessed Needs    Weight Used For Calorie Calculations: 116.6 kg (257 lb 0.9 oz)   Height (cm): 177.8 cm  Energy Calorie Requirements (kcal): 2335  Energy Need Method: [place]-St Jeor (stress factor 1.2)    Weight Used For Protein Calculations: 116.6 kg (257 lb 0.9 oz)  Protein Requirements:  gm/d (0.8-1 gm/kg)    Fluid Requirements (mL): 2435 (or per team)  Fluid Need Method: RDA Method     Assessment and Plan    * Adverse reaction to oral hypoglycemic drug, initial encounter    Nutrition Diagnosis:  Inadequate oral intake    Related to (etiology):   Decreased appetite    Signs and Symptoms (as evidenced by):   Pt report of decreased appetite, finishing ~50% of meals since admission, significant wt loss PTA     Interventions/Recommendations (treatment strategy):  2000kcal DM diet + consider Boost Glucose Control if PO intake does not improve    Nutrition Diagnosis Status:   New            Monitor and Evaluation    Food and Nutrient Intake: energy intake, food and beverage intake  Food and Nutrient Adminstration: diet order  Physical Activity and Function: nutrition-related ADLs and IADLs  Anthropometric Measurements: weight, weight change  Biochemical Data, Medical Tests and Procedures: electrolyte and renal panel, gastrointestinal profile, glucose/endocrine profile, inflammatory profile  Nutrition-Focused Physical Findings: overall appearance, extremities, muscles and bones, skin    Nutrition Risk    Level of Risk: other (see comments) (f/u 1x/wk)    Nutrition Follow-Up    RD Follow-up?: Yes    Araceli Gerard, MS, RD, LDN   Dietitian, [facility]  [phone]      "

## 2017-11-08 NOTE — PLAN OF CARE
Problem: Fall Risk (Adult)  Intervention: Reduce Risk/Promote Restraint Free Environment  Patient reminded to call for assistance.

## 2017-11-08 NOTE — PLAN OF CARE
Problem: Patient Care Overview  Goal: Plan of Care Review  Outcome: Ongoing (interventions implemented as appropriate)  Recommendation/Intervention:   1. Consider 2000kcal diet to better meet needs   2. Consider Boost Glucose Control supplement if PO intake does not improve     Goals:   1. Meet >85% EEN and EPN   2. Prevent >2% wt loss x 1 week   3. Promote nutrition related labs wnl

## 2017-11-08 NOTE — PLAN OF CARE
Problem: Fall Risk (Adult)  Goal: Identify Related Risk Factors and Signs and Symptoms  Related risk factors and signs and symptoms are identified upon initiation of Human [employer] Clinical Practice Guideline (CPG)   Outcome: Ongoing (interventions implemented as appropriate)  Patient has remained free from injury or fall. Patient has had no issues today.

## 2017-11-08 NOTE — PLAN OF CARE
"Problem: Occupational Therapy Goal  Goal: Occupational Therapy Goal  Goals reviewed and updated. Goals to be met by: 11/15/17     Patient will increase functional independence with ADLs by performing:    LE Dressing with Stand-by Assistance and use of AE. In progress; NOT MET  Grooming while standing at sink with Stand-by Assistance. In progress; NOT MET  Toileting from bedside commode with Stand-by Assistance for hygiene and clothing management. In progress; NOT MET  Supine to sit with Stand-by Assistance. In Progress; NOT MET  Stand pivot transfers with Stand-by Assistance. In Progress, NOT MET  Increased functional strength to 3/5 for increased (I) with bed mobility and transfers, as well as ADL. In progress: NOT MET  Patient is (S) with UE therex program. In progress; NOT MET  .       Outcome: Ongoing (interventions implemented as appropriate)  Patient fatiqued this session, due to having had "rough night", but agreeable to therapy this PM. Patient tolerated sitting at EOB with NCo2 x 3 liters in place during session. Unable to take steps along side EOB while erect, due to poor activity tolerance.     Comments: Patient will continue to benefit from OT services to help regain function and independence. Recommend SNF placement.     JUAN JOSE Thompson/L  11/8/2017       "

## 2017-11-08 NOTE — PT/OT/SLP PROGRESS
"Occupational Therapy      Serafin Tapia  MRN: [id number]    Patient not seen today secondary to Other (patient not feeling good, and requesting another time for therapy today). Will follow-up later this PM, or tmw if needed.  "I'm so swollen, but I'm trying to keep my mood up".    JUAN JOSE Singh/L   11/8/2017  "

## 2017-11-08 NOTE — PHYSICIAN QUERY
"PT Name: Serafin Tapia  MR #: [id number]    Physician Query Form - Nutrition Clarification     CDS: Nadya Rogers RN, CCDS         Contact information :[phone]  [email or web address]       This form is a permanent document in the medical record.     Query Date: November 8, 2017    By submitting this query, we are merely seeking further clarification of documentation.. Please utilize your independent clinical judgment when addressing the question(s) below.    The Medical record contains the following:   Indicators  Supporting Clinical Findings Location in Medical Record   x % of Estimated Energy Intake over a time frame from p.o., TF, or TPN "% Intake of Estimated Energy Needs: 25 - 50 %  % Meal Intake: 50%" RD consult 11/8/17   x Weight Status over a time frame "Per chart, pt has had ~13% wt loss x 6 months" RD consult 11/8/17    Subcutaneous Fat and/or Muscle Loss      Fluid Accumulation or Edema      Reduced  Strength     x Wt / BMI / Usual Body Weight BMI 37 RD consult 11/8/17    Delayed Wound Healing / Failure to Thrive     x Acute or Chronic Illness "history hypertension, diabetes mellitus type II, dyslipidemia" H&P 10/29/17    Medication     x Treatment "2000kcal DM diet + consider Boost Glucose Control if PO intake does not improve" RD consult 11/8/17   x Other "Pt endorses decreased appetite, finishing ~50% of meals. Pt c/o "coughing up fluid" past couple of days, which has affected PO intake."  "Pt report of decreased appetite, finishing ~50% of meals since admission, significant wt loss PTA "  "Pt endorses recent significant wt loss, does state he has been trying to cut back on portion sizes, but unclear if loss was fully intentional. Per chart, pt has had ~13% wt loss x 6 months." RD consult 11/8/17     AND / NO Clinical Characteristics (October 2011)  A minimum of two characteristics is recommended for diagnosing either moderate or severe malnutrition   Mild Malnutrition Moderate " Malnutrition Severe Malnutrition   Energy Intake from p.o., TF or TPN. < 75% intake of estimated energy needs for less than 7 days < 75% intake of estimated energy needs for greater than 7 days < 50% intake of estimated energy needs for > 5 days   Weight Loss 1-2% in 1 month  5% in 3 months  7.5% in 6 months  10% in 1 year 1-2 % in 1 week  5% in 1 month  7.5% in 3 months  10% in 6 months  20% in 1 year > 2% in 1 week  > 5% in 1 month  > 7.5% in 3 months  > 10% in 6 months  > 20% in 1 year   Physical Findings     None *Mild subcutaneous fat and/or muscle loss  *Mild fluid accumulation  *Stage II decubitus  *Surgical wound or non-healing wound *Mod/severe subcutaneous fat and/or muscle loss  *Mod/severe fluid accumulation  *Stage III or IV decubitus  *Non-healing surgical wound     Provider, please specify diagnosis or diagnoses associated with above clinical findings.    [ ] Mild Protein-Calorie Malnutrition    [ ] Moderate Protein-Calorie Malnutrition    [ ] Abnormal Weight Loss    [ ] Other Nutritional Diagnosis (please specify): ____________________________________    [ ] Other: ________________________________    [x ] Clinically Undetermined    Please document in your progress notes daily for the duration of treatment until resolved and include in your discharge summary.

## 2017-11-08 NOTE — PT/OT/SLP PROGRESS
"Physical Therapy      Serafin Tapia  MRN: [id number]    Patient not seen today secondary to  (Pt reporting "I didn't sleep well last night, can we do this another time?" Pt educated that therapy may attempt again today if time permits. Pt lunch tray present but pt not interested. Lights off in room and patient in supine position. RN notified.). Will follow-up as appropriate.    Aylin Dawson, PT    "

## 2017-11-08 NOTE — PT/OT/SLP PROGRESS
Occupational Therapy  Treatment    Serafin Tapia   MRN: [id number]   Admitting Diagnosis: Adverse reaction to oral hypoglycemic drug, initial encounter    OT Date of Treatment: 11/08/17   OT Start Time: 1629  OT Stop Time: 1703  OT Total Time (min): 34 min    Billable Minutes:  Self Care/Home Management 20 and Therapeutic Exercise 14    General Precautions: Standard, fall, diabetic  Orthopedic Precautions: N/A  Braces: N/A    Do you have any cultural, spiritual, Scientology conflicts, given your current situation?: none reported    Subjective:  Communicated with RN prior to session.  My feet feel a bit numb. (heels floated at end of session). I couldn't sleep. I was gagging, and they finally took the IV, and once that was stopped I started feeling better.    Pain/Comfort  Pain Rating 1: 6/10  Location - Side 1: Left  Location 1: knee  Pain Addressed 1: Reposition, Distraction  Pain Rating Post-Intervention 1: 6/10  Patient also c/o pain (L) ear due to O2 tubing. Nursing notified, who stated to get tube sleeves. [place] placed around tubing in meantime, with improvement with pain per patient report.    Objective:  Patient found with: oxygen, telemetry (NCo2 x 3Liters)     Functional Mobility:  Bed Mobility:  Rolling/Turning Right: Maximum assistance, With side rail  Supine to Sit: Maximum Assistance, WIth side rail  Sit to Supine: Maximum Assistance    Transfers:   Sit <> Stand Assistance: Moderate Assistance (from EOB; slightly elevated. )  Sit <> Stand Assistive Device: No Assistive Device (initally, as therapist providing frontal (A), however once cleared from EOB, patient reaching for walker nearby to lean on; unable to erect trunk.)    Functional Ambulation: Unable to participate in functional mobility, but during descend to sitting EOB, patient able to take step to HOB while leaning with elbows on RW. Attempted to stand, but unable to tolerate.    Activities of Daily Living:  Feeding Level of Assistance: Activity did  "not occur (reports increased difficulty with coordination when eating other than sitting EOB)  UE Dressing Level of Assistance: Moderate assistance  LE Dressing Level of Assistance: Activity did not occur  Grooming Position:  (supported seating)  Grooming Level of Assistance: Moderate assistance (decreased reach to head due to edema)  Toileting Level of Assistance:  (spillage with use of urinal while in supine.)   Bathing Level of Assistance: Maximum assistance in standing for rear washing. Able to wash privates while sitting on edge of EOB.    Balance:   Static Sit: FAIR+: Able to take MINIMAL challenges from all directions  Dynamic Sit: FAIR+: Maintains balance through MINIMAL excursions of active trunk motion  Static Stand: POOR: Needs MODERATE assist to maintain  Dynamic stand: 0: N/A    Therapeutic Activities and Exercises:  AAROM 1 x 10 reps (B)UE all planes with gentle stretching overhead shoulder flexion. Educated patient on importance of doing elbow curls throughout the day, for activity tolerance. Patient verbalized agreement.    AM-PAC 6 CLICK ADL   How much help from another person does this patient currently need?   1 = Unable, Total/Dependent Assistance  2 = A lot, Maximum/Moderate Assistance  3 = A little, Minimum/Contact Guard/Supervision  4 = None, Modified [place]/Independent    Putting on and taking off regular lower body clothing? : 1  Bathing (including washing, rinsing, drying)?: 2  Toileting, which includes using toilet, bedpan, or urinal? : 2  Putting on and taking off regular upper body clothing?: 3  Taking care of personal grooming such as brushing teeth?: 3  Eating meals?: 3  Total Score: 14     AM-PAC Raw Score CMS "G-Code Modifier Level of Impairment Assistance   6 % Total / Unable   7 - 8 CM 80 - 100% Maximal Assist   9-13 CL 60 - 80% Moderate Assist   14 - 19 CK 40 - 60% Moderate Assist   20 - 22 CJ 20 - 40% Minimal Assist   23 CI 1-20% SBA / CGA   24 CH 0% Independent/ Mod " I       Patient left supine with HOB elevated with all lines intact, call button in reach, bed alarm on and RN notified    ASSESSMENT:  Serafin Tapia is a 67 y.o. male with a medical diagnosis of Adverse reaction to oral hypoglycemic drug, initial encounter and presents with increasing edema since this therapist worked with patient last, affecting activity tolerance, transfers and ability to attend to functional activities. Patient would continue to benefit from therapeutic intervention to help regain (I) and reach goals, which were reviewed and which remain appropriate.    Rehab identified problem list/impairments: Rehab identified problem list/impairments: weakness, impaired endurance, impaired sensation, impaired self care skills, impaired balance, gait instability, impaired functional mobilty, decreased upper extremity function, decreased lower extremity function, edema, impaired cardiopulmonary response to activity    Rehab potential is good.  Activity tolerance: Fair  Discharge recommendations: Discharge Facility/Level Of Care Needs: nursing facility, skilled   Barriers to discharge: Barriers to Discharge: Decreased caregiver support  Equipment recommendations:  (TBD at next level of care)     GOALS:    Occupational Therapy Goals        Problem: Occupational Therapy Goal    Goal Priority Disciplines Outcome Interventions   Occupational Therapy Goal     OT, PT/OT Ongoing (interventions implemented as appropriate)    Description:  Goals reviewed and updated. Goals to be met by: 11/15/17     Patient will increase functional independence with ADLs by performing:    LE Dressing with Stand-by Assistance and use of AE. In progress; NOT MET  Grooming while standing at sink with Stand-by Assistance. In progress; NOT MET  Toileting from bedside commode with Stand-by Assistance for hygiene and clothing management. In progress; NOT MET  Supine to sit with Stand-by Assistance. In Progress; NOT MET  Stand pivot transfers with  Stand-by Assistance. In Progress, NOT MET  Increased functional strength to 3/5 for increased (I) with bed mobility and transfers, as well as ADL. In progress: NOT MET  Patient is (S) with UE therex program. In progress; NOT MET  .                         Plan:  Patient to be seen 6 x/week to address the above listed problems via self-care/home management, therapeutic exercises, therapeutic activities  Plan of Care expires: 11/30/17  Plan of Care reviewed with: patient         JUAN JOSE Singh/SAMUEL   11/08/2017

## 2017-11-08 NOTE — ASSESSMENT & PLAN NOTE
Nutrition Diagnosis:  Inadequate oral intake    Related to (etiology):   Decreased appetite    Signs and Symptoms (as evidenced by):   Pt report of decreased appetite, finishing ~50% of meals since admission, significant wt loss PTA     Interventions/Recommendations (treatment strategy):  2000kcal DM diet + consider Boost Glucose Control if PO intake does not improve    Nutrition Diagnosis Status:   New

## 2017-11-09 PROBLEM — E87.70 FLUID OVERLOAD: Status: ACTIVE | Noted: 2017-11-09

## 2017-11-09 LAB
ANION GAP SERPL CALC-SCNC: 7 MMOL/L
BUN SERPL-MCNC: 7 MG/DL
CALCIUM SERPL-MCNC: 8.9 MG/DL
CHLORIDE SERPL-SCNC: 100 MMOL/L
CK SERPL-CCNC: 5974 U/L
CO2 SERPL-SCNC: 35 MMOL/L
CREAT SERPL-MCNC: 0.6 MG/DL
EST. GFR  (AFRICAN AMERICAN): >60 ML/MIN/1.73 M^2
EST. GFR  (NON AFRICAN AMERICAN): >60 ML/MIN/1.73 M^2
GLUCOSE SERPL-MCNC: 101 MG/DL
MAGNESIUM SERPL-MCNC: 1.2 MG/DL
PHOSPHATE SERPL-MCNC: 3.5 MG/DL
POCT GLUCOSE: 101 MG/DL (ref 70–110)
POCT GLUCOSE: 109 MG/DL (ref 70–110)
POCT GLUCOSE: 123 MG/DL (ref 70–110)
POCT GLUCOSE: 89 MG/DL (ref 70–110)
POTASSIUM SERPL-SCNC: 3.7 MMOL/L
SODIUM SERPL-SCNC: 142 MMOL/L

## 2017-11-09 PROCEDURE — 97530 THERAPEUTIC ACTIVITIES: CPT

## 2017-11-09 PROCEDURE — 84100 ASSAY OF PHOSPHORUS: CPT

## 2017-11-09 PROCEDURE — 83735 ASSAY OF MAGNESIUM: CPT

## 2017-11-09 PROCEDURE — 11000001 HC ACUTE MED/SURG PRIVATE ROOM

## 2017-11-09 PROCEDURE — 36415 COLL VENOUS BLD VENIPUNCTURE: CPT

## 2017-11-09 PROCEDURE — 97110 THERAPEUTIC EXERCISES: CPT

## 2017-11-09 PROCEDURE — 94761 N-INVAS EAR/PLS OXIMETRY MLT: CPT

## 2017-11-09 PROCEDURE — 99233 SBSQ HOSP IP/OBS HIGH 50: CPT | Mod: ,,, | Performed by: HOSPITALIST

## 2017-11-09 PROCEDURE — 25000003 PHARM REV CODE 250: Performed by: NURSE PRACTITIONER

## 2017-11-09 PROCEDURE — 25000003 PHARM REV CODE 250: Performed by: HOSPITALIST

## 2017-11-09 PROCEDURE — 99900035 HC TECH TIME PER 15 MIN (STAT)

## 2017-11-09 PROCEDURE — 97535 SELF CARE MNGMENT TRAINING: CPT

## 2017-11-09 PROCEDURE — 86038 ANTINUCLEAR ANTIBODIES: CPT

## 2017-11-09 PROCEDURE — 63600175 PHARM REV CODE 636 W HCPCS: Performed by: HOSPITALIST

## 2017-11-09 PROCEDURE — 82550 ASSAY OF CK (CPK): CPT

## 2017-11-09 PROCEDURE — 80048 BASIC METABOLIC PNL TOTAL CA: CPT

## 2017-11-09 PROCEDURE — 27000221 HC OXYGEN, UP TO 24 HOURS

## 2017-11-09 RX ORDER — FUROSEMIDE 10 MG/ML
80 INJECTION INTRAMUSCULAR; INTRAVENOUS ONCE
Status: COMPLETED | OUTPATIENT
Start: 2017-11-09 | End: 2017-11-09

## 2017-11-09 RX ADMIN — LEVOTHYROXINE SODIUM 25 MCG: 25 TABLET ORAL at 07:11

## 2017-11-09 RX ADMIN — RIVAROXABAN 20 MG: 10 TABLET, FILM COATED ORAL at 05:11

## 2017-11-09 RX ADMIN — CARVEDILOL 25 MG: 12.5 TABLET, FILM COATED ORAL at 05:11

## 2017-11-09 RX ADMIN — CARVEDILOL 25 MG: 12.5 TABLET, FILM COATED ORAL at 09:11

## 2017-11-09 RX ADMIN — FOLIC ACID 1 MG: 1 TABLET ORAL at 09:11

## 2017-11-09 RX ADMIN — FUROSEMIDE 80 MG: 10 INJECTION, SOLUTION INTRAVENOUS at 09:11

## 2017-11-09 RX ADMIN — ERGOCALCIFEROL 50000 UNITS: 1.25 CAPSULE ORAL at 09:11

## 2017-11-09 NOTE — ASSESSMENT & PLAN NOTE
Address the importance of a prudent 2000 ADA, 2 gram sodium diet upon discharge with some aerobic exercise

## 2017-11-09 NOTE — PT/OT/SLP PROGRESS
Occupational Therapy  Treatment    Serafin Tapia   MRN: [id number]   Admitting Diagnosis: Non-traumatic rhabdomyolysis    OT Date of Treatment: 11/09/17   OT Start Time: 1153  OT Stop Time: 1253  OT Total Time (min): 60 min    Billable Minutes:  Self Care/Home Management 15, Therapeutic Activity 15, Therapeutic Exercise 30 and Total Time 60    General Precautions: Standard, fall, diabetic  Orthopedic Precautions: N/A  Braces: N/A    Do you have any cultural, spiritual, Catholic conflicts, given your current situation?: none reported    Subjective:  Communicated with nurse prior to session.    Pain/Comfort  Pain Rating 1:  (npo rated but only with shld stretchs)  Location - Side 1: Bilateral  Location - Orientation 1: generalized  Location 1: shoulder  Pain Addressed 1: Reposition, Distraction  Pain Rating Post-Intervention 1: 0/10    Objective:  Patient found with: telemetry, oxygen (3 L NC)     Functional Mobility:  Bed Mobility:  Sit to Supine: Moderate Assistance, With side rail (for legs only to lift to bed)    Transfers:   Sit <> Stand Assistance: Maximum Assistance (2 person assist from low bs chair)  Sit <> Stand Assistive Device: Rolling Walker  Bed <> Chair Transfer Assistance: Minimum Assistance  Toilet Transfer Assistive Device:  (to fatigued after sitting up in BS chair for few hrs; not safe)    Functional Ambulation: NA    Activities of Daily Living:  Feeding Level of Assistance: Set-up Assistance (in bed)      Grooming Position: Seated, EOB  Grooming Level of Assistance: Minimum assistance (decreased reach to head to comb hair but washed face, brushed teeth seated EOB)     Toileting Level of Assistance:  (used urinal setup in bed)      Balance:   Static Sit: FAIR+: Able to take MINIMAL challenges from all directions  Dynamic Sit: FAIR: Cannot move trunk without losing balance  Static Stand: POOR+: Needs MINIMAL assist to maintain  Dynamic stand: POOR: N/A    Therapeutic Activities and Exercises:   Max  "assist x 2 sit>stand from low BS chair; min assist SPT with RW back to bed.  Sat EOB for grooming min assist to reach/comb hair; setup/sup wash face. Brushed teeth; AAROM dowel ex supine in bed:  5 reps with 4 sec stretch hold and slow reversal; AROM 10 x 2 sets chest press, hor add/abd, ext/int rot, seated upright in bed with HOB elevated for elbow flex/ext 10 x 2 sets supervision.     AM-PAC 6 CLICK ADL   How much help from another person does this patient currently need?   1 = Unable, Total/Dependent Assistance  2 = A lot, Maximum/Moderate Assistance  3 = A little, Minimum/Contact Guard/Supervision  4 = None, Modified [place]/Independent    Putting on and taking off regular lower body clothing? : 1  Bathing (including washing, rinsing, drying)?: 2  Toileting, which includes using toilet, bedpan, or urinal? : 2  Putting on and taking off regular upper body clothing?: 3  Taking care of personal grooming such as brushing teeth?: 3  Eating meals?: 3  Total Score: 14     AM-PAC Raw Score CMS "G-Code Modifier Level of Impairment Assistance   6 % Total / Unable   7 - 8 CM 80 - 100% Maximal Assist   9-13 CL 60 - 80% Moderate Assist   14 - 19 CK 40 - 60% Moderate Assist   20 - 22 CJ 20 - 40% Minimal Assist   23 CI 1-20% SBA / CGA   24 CH 0% Independent/ Mod I       Patient left supine with all lines intact, call button in reach, bed alarm on and nurse notified    ASSESSMENT:  Serafin Tapia is a 67 y.o. male with a medical diagnosis of Non-traumatic rhabdomyolysis and presents with Slow progress; weak and fatigued after sitting up in BS chair a few hrs.  Pt. Is still very weak, stiff, depress affect.  Continue with OT POc.      Rehab identified problem list/impairments: Rehab identified problem list/impairments: weakness, impaired functional mobilty, decreased safety awareness, impaired coordination, pain, decreased coordination, gait instability, impaired endurance, impaired balance, decreased upper extremity " function, decreased lower extremity function, impaired self care skills, decreased ROM, impaired fine motor, impaired joint extensibility    Rehab potential is good.    Activity tolerance: Fair    Discharge recommendations: Discharge Facility/Level Of Care Needs: nursing facility, skilled     Barriers to discharge: Barriers to Discharge: Decreased caregiver support    Equipment recommendations:  (TBD)     GOALS:    Occupational Therapy Goals        Problem: Occupational Therapy Goal    Goal Priority Disciplines Outcome Interventions   Occupational Therapy Goal     OT, PT/OT Ongoing (interventions implemented as appropriate)    Description:  Goals reviewed and updated. Goals to be met by: 11/15/17     Patient will increase functional independence with ADLs by performing:    LE Dressing with Stand-by Assistance and use of AE. In progress; NOT MET  Grooming while standing at sink with Stand-by Assistance. In progress; NOT MET  Toileting from bedside commode with Stand-by Assistance for hygiene and clothing management. In progress; NOT MET  Supine to sit with Stand-by Assistance. In Progress; NOT MET  Stand pivot transfers with Stand-by Assistance. In Progress, NOT MET  Increased functional strength to 3/5 for increased (I) with bed mobility and transfers, as well as ADL. In progress: NOT MET  Patient is (S) with UE therex program. In progress; NOT MET  .                         Plan:  Patient to be seen 6 x/week to address the above listed problems via self-care/home management, therapeutic activities, therapeutic exercises  Plan of Care expires: 12/30/17  Plan of Care reviewed with: patient         Genevieve Vitaly, OT  11/09/2017

## 2017-11-09 NOTE — ASSESSMENT & PLAN NOTE
- Hypoglycemia due to decreased clearance due to renal failure.   - Rehydrated and renal function at baseline.   - Discontinued sulfonylurea.    - Resolved.

## 2017-11-09 NOTE — SUBJECTIVE & OBJECTIVE
Interval History: The patient has complaints of cough and difficulty lying flat    Review of Systems   Constitutional: Negative for appetite change and fever.   Respiratory: Positive for cough and shortness of breath.    Cardiovascular: Positive for leg swelling. Negative for chest pain.   Gastrointestinal: Negative for nausea and vomiting.   Skin: Negative for color change and rash.   Neurological: Negative for dizziness and weakness.   Psychiatric/Behavioral: Negative for agitation and confusion.     Objective:     Vital Signs (Most Recent):  Temp: 96.3 °F (35.7 °C) (11/09/17 0724)  Pulse: 71 (11/09/17 0724)  Resp: 16 (11/09/17 0724)  BP: 131/70 (11/09/17 0724)  SpO2: 95 % (11/09/17 0724) Vital Signs (24h Range):  Temp:  [96.3 °F (35.7 °C)-98.7 °F (37.1 °C)] 96.3 °F (35.7 °C)  Pulse:  [62-87] 71  Resp:  [14-18] 16  SpO2:  [92 %-96 %] 95 %  BP: (101-161)/(51-74) 131/70     Weight: 123.7 kg (272 lb 11.3 oz)  Body mass index is 39.13 kg/m².    Intake/Output Summary (Last 24 hours) at 11/09/17 0726  Last data filed at 11/09/17 0100   Gross per 24 hour   Intake             1080 ml   Output             3125 ml   Net            -2045 ml      Physical Exam   Constitutional: He is oriented to person, place, and time. He appears well-developed and well-nourished.   HENT:   Head: Normocephalic and atraumatic.   Eyes: Conjunctivae are normal. Pupils are equal, round, and reactive to light.   Neck: Normal range of motion. Neck supple.   Unable to assess for the presence of JVD as the patient is obese   Cardiovascular: Normal rate, regular rhythm, normal heart sounds and intact distal pulses.  Exam reveals no gallop and no friction rub.    No murmur heard.  Pulmonary/Chest: Effort normal and breath sounds normal. He has no rales.   Diminished BS at the bases   Abdominal: Soft. Bowel sounds are normal.   Musculoskeletal: He exhibits edema.   Mild to arms.   Neurological: He is alert and oriented to person, place, and time.    Proximal muscular weakness shoulder and hip bilat.   Skin: Skin is warm and dry.   Psychiatric: He has a normal mood and affect. His behavior is normal. Judgment and thought content normal.         Significant Imaging: I have reviewed and interpreted all pertinent imaging results/findings within the past 24 hours.

## 2017-11-09 NOTE — SUBJECTIVE & OBJECTIVE
Interval History: The patient continues to have labored breathing, SOB, and cough    Review of Systems   Constitutional: Positive for fatigue.   Respiratory: Positive for cough, choking and shortness of breath.    Cardiovascular: Positive for leg swelling.     Objective:     Vital Signs (Most Recent):  Temp: 96.3 °F (35.7 °C) (11/09/17 0724)  Pulse: 71 (11/09/17 0724)  Resp: 16 (11/09/17 0724)  BP: 131/70 (11/09/17 0724)  SpO2: 95 % (11/09/17 0724) Vital Signs (24h Range):  Temp:  [96.3 °F (35.7 °C)-98.7 °F (37.1 °C)] 96.3 °F (35.7 °C)  Pulse:  [62-87] 71  Resp:  [14-18] 16  SpO2:  [92 %-96 %] 95 %  BP: (101-161)/(51-74) 131/70     Weight: 123.7 kg (272 lb 11.3 oz)  Body mass index is 39.13 kg/m².    Intake/Output Summary (Last 24 hours) at 11/09/17 0745  Last data filed at 11/09/17 0100   Gross per 24 hour   Intake             1080 ml   Output             2825 ml   Net            -1745 ml      Physical Exam   Constitutional: He is oriented to person, place, and time. He appears well-developed and well-nourished.   Morbidly obese male with breathlessness and SOB   HENT:   Head: Normocephalic and atraumatic.   Eyes: Conjunctivae are normal. Pupils are equal, round, and reactive to light.   Neck: Normal range of motion. Neck supple.   Unable to assess for the presence of JVD as the patient is obese   Cardiovascular: Normal rate, regular rhythm, normal heart sounds and intact distal pulses.  Exam reveals no gallop and no friction rub.    No murmur heard.  Pulmonary/Chest: Breath sounds normal. He has no rales.   Diminished BS at the bases   Abdominal: Soft. Bowel sounds are normal.   Musculoskeletal: He exhibits edema.   Upper extremity edema bilaterally and 2+ pitting edema of legs bilaterally   Neurological: He is alert and oriented to person, place, and time.   Proximal muscular weakness shoulder and hip bilat.   Skin: Skin is warm and dry.   Psychiatric: He has a normal mood and affect. His behavior is normal.  Judgment and thought content normal.       Significant Labs:     CMP:   Recent Labs  Lab 11/08/17  0523      K 3.5      CO2 34*      BUN 8   CREATININE 0.6   CALCIUM 8.4*   ANIONGAP 6*   EGFRNONAA >60

## 2017-11-09 NOTE — PLAN OF CARE
Problem: Occupational Therapy Goal  Goal: Occupational Therapy Goal  Goals reviewed and updated. Goals to be met by: 11/15/17     Patient will increase functional independence with ADLs by performing:    LE Dressing with Stand-by Assistance and use of AE. In progress; NOT MET  Grooming while standing at sink with Stand-by Assistance. In progress; NOT MET  Toileting from bedside commode with Stand-by Assistance for hygiene and clothing management. In progress; NOT MET  Supine to sit with Stand-by Assistance. In Progress; NOT MET  Stand pivot transfers with Stand-by Assistance. In Progress, NOT MET  Increased functional strength to 3/5 for increased (I) with bed mobility and transfers, as well as ADL. In progress: NOT MET  Patient is (S) with UE therex program. In progress; NOT MET  .        Outcome: Ongoing (interventions implemented as appropriate)  Slow progress; weak and fatigued after sitting up in BS chair a few hrs.  Max assist x 2 siot>stand from low BS chair; min assist SPT with RW back to bed.  Sat EOB for grooming min assist to reach/comb hair; setup/sup wash face. Brushed teeth; AAROM dowel ex supine in bed:  5 reps with 4 sec stretch hold and slow reversal; AROM 10 x 2 sets chest press, hor add/abd, ext/int rot, seated upright in bed with HOB elevated for elbow flex/ext 10 x 2 sets supervision.  Pt. Still very weak, stiff, depress affect.  Continue with OT POc.

## 2017-11-09 NOTE — ASSESSMENT & PLAN NOTE
Unsure if patient has a decompensation of chronic diastolic dysfunction  Will give Lasix 80mg IV x1  Monitor strict I/Os and may need to decrease IV fluids

## 2017-11-09 NOTE — PT/OT/SLP PROGRESS
"Physical Therapy  Treatment    Serafin Tapia   MRN: [id number]   Admitting Diagnosis: Non-traumatic rhabdomyolysis    PT Received On: 11/09/17  PT Start Time: 0834     PT Stop Time: 0909    PT Total Time (min): 35 min       Billable Minutes:  Therapeutic Activity 20 and Therapeutic Exercise 15    Treatment Type: Treatment  PT/PTA: PT       General Precautions: Standard, fall, diabetic    Do you have any cultural, spiritual, Mormon conflicts, given your current situation?: none     Subjective:  Communicated with RN prior to session. Floor nurse, Marcel, not on floor after session, communicated with second floor nurse, Marga.    Pt reports that he was "sleepy" this morning. After the session, he stated that his most limiting factor during activity and transfers is a feeling of weakness and stiffness in his extremities.     Pain/Comfort  Pain Rating 1: 0/10  Pain Rating Post-Intervention 1: 0/10    Objective:   Patient found with: oxygen, telemetry ((3L))     Edema: BLE 4+ (ankle, dorsal foot)    SpO2:  98% sitting at rest EOB (3L O2)  95% sitting at rest in chair (room air)  92-95% with light activity and rest breaks sitting in chair (room air)  97% sitting at rest in chair (3L O2) O2 donned after session     MMT:  RLE: DF 5/5; knee extension 4-/5; hip flexion 2+/5  LLE: DF 5/5; knee extension 4/5; hip flexion 2+/5    Functional Mobility:  Bed Mobility:   Supine to Sit: Maximum Assistance  HOB slightly elevated; pt required verbal cues for hand placement, log roll technique and safety. Increased time needed to perform task and achieve upright seated posture with BLE on floor for support.     Transfers:  Sit <> Stand Assistance: Maximum Assistance (2 attemps from lowest bed height, 1 attempt elevated bed (25.5") verbal and tactile cues for upright posture and hand placement on walker)  Sit <> Stand Assistive Device: Rolling Walker (Bariatric)  Bed <> Chair Technique: Stand Pivot (~3 small shuffled steps during transfer, " assistance needed to prevent postural sway. Pt required verbal cues for safety and proper technique to perform stand <sit. Required verbal cues for proper hand placement and controlled descent into chair. Pt able to navigate walker without assistance during transfer)  Bed <> Chair Assistance: Moderate Assistance (at trunk)  Bed <> Chair Assistive Device: Rolling Walker (bariatric)    Balance:   Static Sit: FAIR: Maintains without assist, but unable to take any challenges   Dynamic Sit: FAIR+: Maintains balance through MINIMAL excursions of active trunk motion  Static Stand: POOR: Needs MODERATE assist to maintain  Dynamic stand: POOR: N/A     Therapeutic Activities and Exercises:  Pt performed 1 set of 10 reps of BLE    1. Ankle pumps  2. Long arc quads    Pt required verbal cues, tactile cues and visual cues for exercise and breathing techniques in order to complete.     AM-PAC 6 CLICK MOBILITY  How much help from another person does this patient currently need?   1 = Unable, Total/Dependent Assistance  2 = A lot, Maximum/Moderate Assistance  3 = A little, Minimum/Contact Guard/Supervision  4 = None, Modified [place]/Independent    Turning over in bed (including adjusting bedclothes, sheets and blankets)?: 2  Sitting down on and standing up from a chair with arms (e.g., wheelchair, bedside commode, etc.): 2  Moving from lying on back to sitting on the side of the bed?: 2  Moving to and from a bed to a chair (including a wheelchair)?: 2  Need to walk in hospital room?: 2  Climbing 3-5 steps with a railing?: 1  Total Score: 11    AM-PAC Raw Score CMS G-Code Modifier Level of Impairment Assistance   6 % Total / Unable   7 - 9 CM 80 - 100% Maximal Assist   10 - 14 CL 60 - 80% Moderate Assist   15 - 19 CK 40 - 60% Moderate Assist   20 - 22 CJ 20 - 40% Minimal Assist   23 CI 1-20% SBA / CGA   24 CH 0% Independent/ Mod I     Patient left up in chair with call button in reach and 2nd floor nurse (Marga)  "notified.    Assessment:  Serafin Tapia is a 67 y.o. male with a medical diagnosis of Non-traumatic rhabdomyolysis and presents with below impairments. Pt goals updated this session. Pt performing gait and transfers with minimal assistance 10/31 with PT evaluation, throughout the course of his stay he has been gradually declining in functional mobility and strength of BLE. At this time he is unable to perform gait. Noted in labs in chart review pt CPK levels 5889 on 11/2/2017 and 5974 11/9/2017.  Pt continues to require maximum assist for bed mobility and transfers at this time. Pt unable to perform gait secondary to a feeling of "weakness" in pt BLE. Pt using a bariatric walker with improvements with transfers. PTA pt was mod (I) with all mobility and ADLs including cooking, driving, and grocery shopping with the use of a RW. Patient will continue to benefit from skilled PT services to increase strength, endurance and functional mobility.     Rehab identified problem list/impairments: Rehab identified problem list/impairments: weakness, impaired endurance, impaired self care skills, impaired functional mobilty, gait instability, impaired balance, decreased lower extremity function, decreased upper extremity function, pain, decreased ROM, edema, impaired cardiopulmonary response to activity    Rehab potential is good.    Activity tolerance: Good    Discharge recommendations: Discharge Facility/Level Of Care Needs: nursing facility, skilled     Barriers to discharge: Barriers to Discharge: Decreased caregiver support    Equipment recommendations: Equipment Needed After Discharge:  (TBD)     GOALS:    Physical Therapy Goals        Problem: Physical Therapy Goal    Goal Priority Disciplines Outcome Goal Variances Interventions   Physical Therapy Goal     PT/OT, PT Ongoing (interventions implemented as appropriate)     Description:  Goals to be met by 11-10-17.  1. Sup<>sit mod I  2. Sit<>stand with RW mod I  3. amb 150' " with RW mod I    Discontinue above goals secondary to decline in function status    Goals to be met by: 2017    Patient will increase functional independence with mobility by performin. Supine to sit with min A.  2. Sit to supine with min A.   3. Rolling R and L with min A.   5. Sit <> stand with min A  4. Ambulate 30' with rolling walker CGA                  PLAN:    Patient to be seen daily  to address the above listed problems via gait training, therapeutic activities, therapeutic exercises, neuromuscular re-education  Plan of Care expires: 17  Plan of Care reviewed with: patient    Lizz Lagos, SPT  2017     I certify that I was present in the room directing the student in service delivery and guiding them using my skilled judgment. As the co-signing therapist I have reviewed the students documentation and am responsible for the treatment, assessment, and plan.     Aylin Dawson, PT, DPT  2017

## 2017-11-09 NOTE — PLAN OF CARE
Problem: Patient Care Overview  Goal: Plan of Care Review  Outcome: Ongoing (interventions implemented as appropriate)  No injuries. SR on telemetry. Plan of care reviewed with patient. Verbalized understanding. Vitals stable. Will continue to monitor.

## 2017-11-09 NOTE — ASSESSMENT & PLAN NOTE
- Has proximal muscle weakness.  - CPK has made minimal progress.  -Change IVFs to NS with 50mEq of Sodium Bicarb at 150cc/hr and continue to follow electrolytes

## 2017-11-09 NOTE — PROGRESS NOTES
[facility] Hospital Medicine  Progress Note    Patient Name: Serafin Tapia  MRN: [id number]  Patient Class: IP- Inpatient   Admission Date: 10/29/2017  Length of Stay: 11 days  Attending Physician: Henrietta Currie MD  Primary Care Provider: John Vargas MD        Subjective:     Principal Problem:Non-traumatic rhabdomyolysis    HPI:  Patient is a 66 year-old male with hypertension, diabetes mellitus type II, dyslipidemia who presents to the emergency department by the emergency medical services after he was found to be confused with capillary blood glucose measurement of 40 mg/dL.  Patient was given an ampule of dextrose with resolution of hypoglycemia and normalization of his mental status.  However while in the emergency department he had recurrence of hypoglycemia requiring further intervenous dextrose.  He denies any recent changes in his medications or recent illnesses.  He reports no changes in his diet.  He reports that he has not been checking his blood glucose as his home glucometer is broken.    Hospital Course:  Patient admitted to the hospital for treatment of acute renal failure and refractory hypoglycemia secondary to sulfonylurea use.  Patient given isotonic intravenous with dextrose fluids with improvement in serum creatinine.  The patient also has suspected rhabdomyolysis with initial CPK >5000.  Aggressive hydration not tolerated well and patient clinically has signs of fluid overload with pulmonary and peripheral edema.  Suspect decompensated diastolic heart failure, so fluids discontinued and patient will continue IV Lasix.        Interval History: The patient continues to have labored breathing, SOB, and cough    Review of Systems   Constitutional: Positive for fatigue.   Respiratory: Positive for cough, choking and shortness of breath.    Cardiovascular: Positive for leg swelling.     Objective:     Vital Signs (Most Recent):  Temp: 96.3 °F (35.7 °C) (11/09/17  0724)  Pulse: 71 (11/09/17 0724)  Resp: 16 (11/09/17 0724)  BP: 131/70 (11/09/17 0724)  SpO2: 95 % (11/09/17 0724) Vital Signs (24h Range):  Temp:  [96.3 °F (35.7 °C)-98.7 °F (37.1 °C)] 96.3 °F (35.7 °C)  Pulse:  [62-87] 71  Resp:  [14-18] 16  SpO2:  [92 %-96 %] 95 %  BP: (101-161)/(51-74) 131/70     Weight: 123.7 kg (272 lb 11.3 oz)  Body mass index is 39.13 kg/m².    Intake/Output Summary (Last 24 hours) at 11/09/17 0745  Last data filed at 11/09/17 0100   Gross per 24 hour   Intake             1080 ml   Output             2825 ml   Net            -1745 ml      Physical Exam   Constitutional: He is oriented to person, place, and time. He appears well-developed and well-nourished.   Morbidly obese male with breathlessness and SOB   HENT:   Head: Normocephalic and atraumatic.   Eyes: Conjunctivae are normal. Pupils are equal, round, and reactive to light.   Neck: Normal range of motion. Neck supple.   Unable to assess for the presence of JVD as the patient is obese   Cardiovascular: Normal rate, regular rhythm, normal heart sounds and intact distal pulses.  Exam reveals no gallop and no friction rub.    No murmur heard.  Pulmonary/Chest: Breath sounds normal. He has no rales.   Diminished BS at the bases   Abdominal: Soft. Bowel sounds are normal.   Musculoskeletal: He exhibits edema.   Upper extremity edema bilaterally and 2+ pitting edema of legs bilaterally   Neurological: He is alert and oriented to person, place, and time.   Proximal muscular weakness shoulder and hip bilat.   Skin: Skin is warm and dry.   Psychiatric: He has a normal mood and affect. His behavior is normal. Judgment and thought content normal.       Significant Labs:     CMP:   Recent Labs  Lab 11/08/17  0523      K 3.5      CO2 34*      BUN 8   CREATININE 0.6   CALCIUM 8.4*   ANIONGAP 6*   EGFRNONAA >60           Assessment/Plan:      * Non-traumatic rhabdomyolysis    - Has proximal muscle weakness.  - CPK has made minimal  progress.  -Discontinue IV fluids  -Starting to question this diagnosis, with elevated ESR and CRP will check HALLE for other etiologies of elevated CPK                Adverse reaction to oral hypoglycemic drug, initial encounter    - Hypoglycemia due to decreased clearance due to renal failure.   - Rehydrated and renal function at baseline.   - Discontinued sulfonylurea.    - Resolved.         Acute diastolic heart failure    Clinical signs of fluid overload  Continue Lasix 80mg IV  Follow electrolytes        Elevated transaminase level    - Hepatitis panel negative.   - Suspect from rhabdo.           Hypomagnesemia    Oral replacement of magnesium and continue to monitor.             Vitamin D deficiency    Continue Vitamin D supplementation          Folate deficiency    - Replace.          Anemia due to vitamin B12 deficiency    - Ferritin elevated.    - Also replacing folic acid.   -No need for transfusion        Vitamin B 12 deficiency    - MMA WNL.  Homocysteine WNL.   - B-12 1000 mcg daily for 5 days, then stop.            Hypothyroidism (acquired)    - Continue levothyroxine 25mcg          Weakness of both hips    - Check CK  - PT OT recs SNF          Falls    - PT OT          History of pulmonary embolism    - Pulmonary embolus in 2014.    - Held anticoagulation in the setting of renal failure, now resolved.   - Xarelto.   - Follow up with PCP.         Morbid obesity with BMI of 40.0-44.9, adult    Address the importance of a prudent 2000 ADA, 2 gram sodium diet upon discharge with some aerobic exercise          Controlled type 2 diabetes mellitus with diabetic polyneuropathy, without long-term current use of insulin    - A1c = 5.5%.  - Well controlled.    - Discontinue Metformin             Dyslipidemia    - Atorvastatin discontinued for elevated CPK.    -Continue to hold          Essential hypertension    Discontinued HCTZ / spironolactone.  Good control on current regimen.          VTE Risk Mitigation          Ordered     rivaroxaban tablet 20 mg  With dinner     Route:  Oral        10/31/17 1303     Medium Risk of VTE  Once      10/29/17 1529              Henrietta Currie MD  Department of Hospital Medicine   [facility]

## 2017-11-09 NOTE — PLAN OF CARE
Problem: Physical Therapy Goal  Goal: Physical Therapy Goal  Goals to be met by 11-10-17.  1. Sup<>sit mod I  2. Sit<>stand with RW mod I  3. amb 150' with RW mod I    Discontinue above goals secondary to decline in function status    Goals to be met by: 2017    Patient will increase functional independence with mobility by performin. Supine to sit with min A.  2. Sit to supine with min A.   3. Rolling R and L with min A.   5. Sit <> stand with min A  4. Ambulate 30' with rolling walker CGA     Outcome: Ongoing (interventions implemented as appropriate)  Pt tolerated session well, goals updated. Please see progress note for details, POC, and recommendations.

## 2017-11-09 NOTE — PROGRESS NOTES
[facility] Hospital Medicine  Progress Note    Patient Name: Serafin Tapia  MRN: [id number]  Patient Class: IP- Inpatient   Admission Date: 10/29/2017  Length of Stay: 11 days  Attending Physician: Henrietta Currie MD  Primary Care Provider: John Vargas MD        Subjective:     Principal Problem:Non-traumatic rhabdomyolysis    HPI:  Patient is a 66 year-old male with hypertension, diabetes mellitus type II, dyslipidemia who presents to the emergency department by the emergency medical services after he was found to be confused with capillary blood glucose measurement of 40 mg/dL.  Patient was given an ampule of dextrose with resolution of hypoglycemia and normalization of his mental status.  However while in the emergency department he had recurrence of hypoglycemia requiring further intervenous dextrose.  He denies any recent changes in his medications or recent illnesses.  He reports no changes in his diet.  He reports that he has not been checking his blood glucose as his home glucometer is broken.    Hospital Course:  Patient admitted to the hospital for treatment of acute renal failure and refractory hypoglycemia secondary to sulfonylurea use.  Patient given isotonic intravenous with dextrose fluids with improvement in serum creatinine.  The patient also has suspected rhabdomyolysis with initial CPK >5000.  Aggressive hydration not tolerated well and patient clinically has signs of fluid overload with pulmonary and peripheral edema.         Interval History: The patient has complaints of cough and difficulty lying flat    Review of Systems   Constitutional: Negative for appetite change and fever.   Respiratory: Positive for cough and shortness of breath.    Cardiovascular: Positive for leg swelling. Negative for chest pain.   Gastrointestinal: Negative for nausea and vomiting.   Skin: Negative for color change and rash.   Neurological: Negative for dizziness and weakness.    Psychiatric/Behavioral: Negative for agitation and confusion.     Objective:     Vital Signs (Most Recent):  Temp: 96.3 °F (35.7 °C) (11/09/17 0724)  Pulse: 71 (11/09/17 0724)  Resp: 16 (11/09/17 0724)  BP: 131/70 (11/09/17 0724)  SpO2: 95 % (11/09/17 0724) Vital Signs (24h Range):  Temp:  [96.3 °F (35.7 °C)-98.7 °F (37.1 °C)] 96.3 °F (35.7 °C)  Pulse:  [62-87] 71  Resp:  [14-18] 16  SpO2:  [92 %-96 %] 95 %  BP: (101-161)/(51-74) 131/70     Weight: 123.7 kg (272 lb 11.3 oz)  Body mass index is 39.13 kg/m².    Intake/Output Summary (Last 24 hours) at 11/09/17 0726  Last data filed at 11/09/17 0100   Gross per 24 hour   Intake             1080 ml   Output             3125 ml   Net            -2045 ml      Physical Exam   Constitutional: He is oriented to person, place, and time. He appears well-developed and well-nourished.   HENT:   Head: Normocephalic and atraumatic.   Eyes: Conjunctivae are normal. Pupils are equal, round, and reactive to light.   Neck: Normal range of motion. Neck supple.   Unable to assess for the presence of JVD as the patient is obese   Cardiovascular: Normal rate, regular rhythm, normal heart sounds and intact distal pulses.  Exam reveals no gallop and no friction rub.    No murmur heard.  Pulmonary/Chest: Effort normal and breath sounds normal. He has no rales.   Diminished BS at the bases   Abdominal: Soft. Bowel sounds are normal.   Musculoskeletal: He exhibits edema.   Mild to arms.   Neurological: He is alert and oriented to person, place, and time.   Proximal muscular weakness shoulder and hip bilat.   Skin: Skin is warm and dry.   Psychiatric: He has a normal mood and affect. His behavior is normal. Judgment and thought content normal.         Significant Imaging: I have reviewed and interpreted all pertinent imaging results/findings within the past 24 hours.    Assessment/Plan:      * Non-traumatic rhabdomyolysis    - Has proximal muscle weakness.  - CPK has made minimal  progress.  -Change IVFs to NS with 50mEq of Sodium Bicarb at 150cc/hr and continue to follow electrolytes              Adverse reaction to oral hypoglycemic drug, initial encounter    - Hypoglycemia due to decreased clearance due to renal failure.   - Rehydrated and renal function at baseline.   - Discontinued sulfonylurea.    - Resolved.         Fluid overload    Unsure if patient has a decompensation of chronic diastolic dysfunction  Will give Lasix 80mg IV x1  Monitor strict I/Os and may need to decrease IV fluids          Elevated transaminase level    - Hepatitis panel negative.   - Suspect from rhabdo.           Hypomagnesemia    Oral replacement of magnesium and continue to monitor.             Vitamin D deficiency    Continue Vitamin D supplementation          Folate deficiency    - Replace.          Anemia due to vitamin B12 deficiency    - Ferritin elevated.    - Also replacing folic acid.   -No need for transfusion        Vitamin B 12 deficiency    - MMA WNL.  Homocysteine WNL.   - B-12 1000 mcg daily for 5 days, then stop.            Hypothyroidism (acquired)    - Continue levothyroxine 25mcg          History of pulmonary embolism    - Pulmonary embolus in 2014.    - Held anticoagulation in the setting of renal failure, now resolved.   - Xarelto.   - Follow up with PCP.         Morbid obesity with BMI of 40.0-44.9, adult    Address the importance of a prudent 2000 ADA, 2 gram sodium diet upon discharge with some aerobic exercise          Controlled type 2 diabetes mellitus with diabetic polyneuropathy, without long-term current use of insulin    - A1c = 5.5%.  - Well controlled.    - Discontinue Metformin             Dyslipidemia    - Atorvastatin discontinued for elevated CPK.    -Continue to hold          Essential hypertension    Discontinued HCTZ / spironolactone.  Good control on current regimen.          VTE Risk Mitigation         Ordered     rivaroxaban tablet 20 mg  With dinner     Route:  Oral         10/31/17 1303     Medium Risk of VTE  Once      10/29/17 1529              Henrietta Currie MD  Department of Hospital Medicine   [facility]

## 2017-11-09 NOTE — ASSESSMENT & PLAN NOTE
- Has proximal muscle weakness.  - CPK has made minimal progress.  -Discontinue IV fluids  -Starting to question this diagnosis, with elevated ESR and CRP will check HALLE for other etiologies of elevated CPK

## 2017-11-09 NOTE — PLAN OF CARE
"Problem: Fall Risk (Adult)  Goal: Absence of Falls  Patient will demonstrate the desired outcomes by discharge/transition of care.   Outcome: Ongoing (interventions implemented as appropriate)  Patient has remained free from fall or injury today. Patient requires two person assist at this time. He has remained very "tired" today despite that he was able to work with PT/OT. Remains at 2 liters Nc. Will continue to monitor.       "

## 2017-11-10 LAB
ANA SER QL IF: NORMAL
ANION GAP SERPL CALC-SCNC: 6 MMOL/L
BNP SERPL-MCNC: 33 PG/ML
BUN SERPL-MCNC: 9 MG/DL
CALCIUM SERPL-MCNC: 9.1 MG/DL
CHLORIDE SERPL-SCNC: 96 MMOL/L
CK SERPL-CCNC: 5549 U/L
CO2 SERPL-SCNC: 39 MMOL/L
CREAT SERPL-MCNC: 0.6 MG/DL
EST. GFR  (AFRICAN AMERICAN): >60 ML/MIN/1.73 M^2
EST. GFR  (NON AFRICAN AMERICAN): >60 ML/MIN/1.73 M^2
GLUCOSE SERPL-MCNC: 112 MG/DL
MAGNESIUM SERPL-MCNC: 1.3 MG/DL
PHOSPHATE SERPL-MCNC: 3.7 MG/DL
POCT GLUCOSE: 105 MG/DL (ref 70–110)
POCT GLUCOSE: 127 MG/DL (ref 70–110)
POCT GLUCOSE: 143 MG/DL (ref 70–110)
POCT GLUCOSE: 150 MG/DL (ref 70–110)
POTASSIUM SERPL-SCNC: 3.9 MMOL/L
SODIUM SERPL-SCNC: 141 MMOL/L

## 2017-11-10 PROCEDURE — 83735 ASSAY OF MAGNESIUM: CPT

## 2017-11-10 PROCEDURE — 83880 ASSAY OF NATRIURETIC PEPTIDE: CPT

## 2017-11-10 PROCEDURE — 80048 BASIC METABOLIC PNL TOTAL CA: CPT

## 2017-11-10 PROCEDURE — 63600175 PHARM REV CODE 636 W HCPCS: Performed by: HOSPITALIST

## 2017-11-10 PROCEDURE — 25000003 PHARM REV CODE 250: Performed by: NURSE PRACTITIONER

## 2017-11-10 PROCEDURE — 82550 ASSAY OF CK (CPK): CPT

## 2017-11-10 PROCEDURE — 97535 SELF CARE MNGMENT TRAINING: CPT

## 2017-11-10 PROCEDURE — 27000221 HC OXYGEN, UP TO 24 HOURS

## 2017-11-10 PROCEDURE — 84100 ASSAY OF PHOSPHORUS: CPT

## 2017-11-10 PROCEDURE — 99900035 HC TECH TIME PER 15 MIN (STAT)

## 2017-11-10 PROCEDURE — 11000001 HC ACUTE MED/SURG PRIVATE ROOM

## 2017-11-10 PROCEDURE — 36415 COLL VENOUS BLD VENIPUNCTURE: CPT

## 2017-11-10 PROCEDURE — 99233 SBSQ HOSP IP/OBS HIGH 50: CPT | Mod: ,,, | Performed by: HOSPITALIST

## 2017-11-10 PROCEDURE — 25000003 PHARM REV CODE 250: Performed by: HOSPITALIST

## 2017-11-10 PROCEDURE — 94761 N-INVAS EAR/PLS OXIMETRY MLT: CPT

## 2017-11-10 RX ORDER — LANOLIN ALCOHOL/MO/W.PET/CERES
400 CREAM (GRAM) TOPICAL 2 TIMES DAILY
Status: DISCONTINUED | OUTPATIENT
Start: 2017-11-10 | End: 2017-11-13 | Stop reason: HOSPADM

## 2017-11-10 RX ORDER — FUROSEMIDE 10 MG/ML
40 INJECTION INTRAMUSCULAR; INTRAVENOUS ONCE
Status: COMPLETED | OUTPATIENT
Start: 2017-11-10 | End: 2017-11-10

## 2017-11-10 RX ADMIN — LEVOTHYROXINE SODIUM 25 MCG: 25 TABLET ORAL at 05:11

## 2017-11-10 RX ADMIN — CARVEDILOL 25 MG: 12.5 TABLET, FILM COATED ORAL at 04:11

## 2017-11-10 RX ADMIN — Medication 400 MG: at 09:11

## 2017-11-10 RX ADMIN — RIVAROXABAN 20 MG: 10 TABLET, FILM COATED ORAL at 04:11

## 2017-11-10 RX ADMIN — FUROSEMIDE 40 MG: 10 INJECTION, SOLUTION INTRAVENOUS at 01:11

## 2017-11-10 RX ADMIN — Medication 400 MG: at 01:11

## 2017-11-10 RX ADMIN — FOLIC ACID 1 MG: 1 TABLET ORAL at 08:11

## 2017-11-10 RX ADMIN — CARVEDILOL 25 MG: 12.5 TABLET, FILM COATED ORAL at 08:11

## 2017-11-10 NOTE — PLAN OF CARE
Problem: Patient Care Overview  Goal: Plan of Care Review  Outcome: Ongoing (interventions implemented as appropriate)  Plan of care reviewed with patient.  VSS on 2 L/m O2.  Patient voids to urinal.  Patient denies pain.  Call bell within reach, no needs at this time, will continue to monitor.

## 2017-11-10 NOTE — ASSESSMENT & PLAN NOTE
Clinical signs of fluid overload  Continue Lasix 80mg IV  Follow electrolytes and will review CXR today which shows some signs of clearing

## 2017-11-10 NOTE — PT/OT/SLP PROGRESS
"Physical Therapy  Treatment    Serafin Tapia   MRN: [id number]   Admitting Diagnosis: Non-traumatic rhabdomyolysis    PT Received On: 11/10/17  PT Start Time: 1307     PT Stop Time: 1340    PT Total Time (min): 33 min       Billable Minutes:  Gait Training 15 and Therapeutic Activity 18    Treatment Type: Treatment  PT/PTA: PT     PTA Visit Number: 3       General Precautions: Standard, diabetic, fall  Orthopedic Precautions: N/A   Braces:      Do you have any cultural, spiritual, Church conflicts, given your current situation?: none     Subjective:  Communicated with RN prior to session, RN okayed pt to eat lunch after session.    Pt reports that he was "feeling okay" today, pt stated that he was tired from his OT session earlier today. Pt will often rush transfers, stating "hurry, hurry," or "let me go, I'm sitting." Pt educated on appropriate expression of wants and needs during activities for safety.     Pain/Comfort  Pain Rating 1: 0/10  Pain Addressed 1: Nurse notified  Pain Rating Post-Intervention 1: 0/10    Objective:   Patient found with: oxygen, peripheral IV, telemetry ((2L O2))    Functional Mobility:  Transfers:  Sit <> Stand Assistance: Moderate Assistance (with assist of 2)  Sit <> Stand Assistive Device: Rolling Walker (bariatric)  Sit <> stand: 2 attempts, 1st unsuccessful. Verbal and visual cues for forward weight shift before pushing into standing. Verbal and tactile cues for hand placement on walker for upright posture, verbal cues to keep eyes open when standing achieved.    Bed <> Chair Technique: Stand Pivot  Bed <> Chair Assistance: Moderate Assistance   Bed <> Chair Assistive Device: Rolling Walker (Bariatric)  Chair to bed x1 trial: Assistance of two; Mod assist for Rolling walker management: verbal and tactile cues for placement of feet, technique and safety for controlled decent to EOB    Gait:   Gait Distance: 3 steps    Assistance 1: With assist of two, Moderate assistance  Gait " Assistive Device: Rolling walker (Bariatric)  Gait Pattern: reciprocal  Gait Deviation(s): decreased kadeem, increased time in double stance, decreased step length, decreased weight-shifting ability, forward lean  3 steps x 1 trial: Verbal and tactile cues for walker management, step sequencing, and appropriate weight shift    Balance:   Static Sit: NORMAL: No deviations seen in posture held statically  Dynamic Sit: FAIR+: Maintains balance through MINIMAL excursions of active trunk motion  Static Stand: FAIR: Maintains without assist but unable to take challenges required CGA secondary to safety  Dynamic stand: POOR: N/A     AM-PAC 6 CLICK MOBILITY  How much help from another person does this patient currently need?   1 = Unable, Total/Dependent Assistance  2 = A lot, Maximum/Moderate Assistance  3 = A little, Minimum/Contact Guard/Supervision  4 = None, Modified [place]/Independent    Turning over in bed (including adjusting bedclothes, sheets and blankets)?: 2  Sitting down on and standing up from a chair with arms (e.g., wheelchair, bedside commode, etc.): 2  Moving from lying on back to sitting on the side of the bed?: 2  Moving to and from a bed to a chair (including a wheelchair)?: 2  Need to walk in hospital room?: 2  Climbing 3-5 steps with a railing?: 1  Total Score: 11    AM-PAC Raw Score CMS G-Code Modifier Level of Impairment Assistance   6 % Total / Unable   7 - 9 CM 80 - 100% Maximal Assist   10 - 14 CL 60 - 80% Moderate Assist   15 - 19 CK 40 - 60% Moderate Assist   20 - 22 CJ 20 - 40% Minimal Assist   23 CI 1-20% SBA / CGA   24 CH 0% Independent/ Mod I     Patient left sitting EOB to eat lunch  with call button in reach and RN notified.    Assessment:  Serafin Tapia is a 67 y.o. male with a medical diagnosis of Non-traumatic rhabdomyolysis and presents with below impairments. Pt demonstrated an increased tolerance to standing transfers and gait, although pt reported that he was tired  from his previous OT session. Pt able to take limited steps before fatiguing and requiring rest. Pt requires frequent long rest breaks in chair for recovery between activities and trials. Pt would benefit from continued skilled PT to maximize independence and safety with functional mobility.    Rehab identified problem list/impairments: Rehab identified problem list/impairments: weakness, impaired endurance, impaired self care skills, impaired balance, gait instability, impaired functional mobilty, decreased coordination, decreased upper extremity function, decreased lower extremity function    Rehab potential is good.    Activity tolerance: Good    Discharge recommendations: Discharge Facility/Level Of Care Needs: nursing facility, skilled     Barriers to discharge: Barriers to Discharge: Decreased caregiver support    Equipment recommendations: Equipment Needed After Discharge: walker, rolling (Bariatric)     GOALS:    Physical Therapy Goals        Problem: Physical Therapy Goal    Goal Priority Disciplines Outcome Goal Variances Interventions   Physical Therapy Goal     PT/OT, PT Ongoing (interventions implemented as appropriate)     Description:  Goals to be met by 11-10-17.  1. Sup<>sit mod I  2. Sit<>stand with RW mod I  3. amb 150' with RW mod I    Discontinue above goals secondary to decline in function status    Goals to be met by: 2017    Patient will increase functional independence with mobility by performin. Supine to sit with min A.  2. Sit to supine with min A.   3. Rolling R and L with min A.   5. Sit <> stand with min A  4. Ambulate 30' with rolling walker CGA                          PLAN:    Patient to be seen daily  to address the above listed problems via gait training, therapeutic activities, neuromuscular re-education  Plan of Care expires: 17  Plan of Care reviewed with: patient         Lizz Demond, SPT  11/10/2017

## 2017-11-10 NOTE — PLAN OF CARE
Problem: Occupational Therapy Goal  Goal: Occupational Therapy Goal  Goals reviewed and updated. Goals to be met by: 11/15/17     Patient will increase functional independence with ADLs by performing:    LE Dressing with Stand-by Assistance and use of AE. In progress; NOT MET  Grooming while standing at sink with Stand-by Assistance. In progress; NOT MET  Toileting from bedside commode with Stand-by Assistance for hygiene and clothing management. In progress; NOT MET  Supine to sit with Stand-by Assistance. In Progress; NOT MET  Stand pivot transfers with Stand-by Assistance. In Progress, NOT MET  Increased functional strength to 3/5 for increased (I) with bed mobility and transfers, as well as ADL. In progress: NOT MET  Patient is (S) with UE therex program. In progress; NOT MET  .        Outcome: Ongoing (interventions implemented as appropriate)  The patient was tired with low energy and activity tolerance during OT treatment this morning.  He was Max A supine>sit EOB, Mod/Min A sit>stand/CGA stand>sit and CGA bed>chair transfer with RW.  He was set-up assist -sitting EOB for G/H and Total A toilet hygiene standing EOB.  RAMON Collins 11/10/2017

## 2017-11-10 NOTE — SUBJECTIVE & OBJECTIVE
Interval History: The patient has improved respirations today    Review of Systems   Constitutional: Positive for fatigue.   Respiratory: Positive for cough, choking and shortness of breath.    Cardiovascular: Positive for leg swelling.     Objective:     Vital Signs (Most Recent):  Temp: 98.3 °F (36.8 °C) (11/10/17 1559)  Pulse: 73 (11/10/17 1559)  Resp: 18 (11/10/17 1133)  BP: 124/62 (11/10/17 1559)  SpO2: 96 % (11/10/17 1559) Vital Signs (24h Range):  Temp:  [97.6 °F (36.4 °C)-98.4 °F (36.9 °C)] 98.3 °F (36.8 °C)  Pulse:  [61-76] 73  Resp:  [16-18] 18  SpO2:  [93 %-98 %] 96 %  BP: (109-131)/(54-69) 124/62     Weight: 120.9 kg (266 lb 9.6 oz)  Body mass index is 38.25 kg/m².    Intake/Output Summary (Last 24 hours) at 11/10/17 1631  Last data filed at 11/10/17 1400   Gross per 24 hour   Intake             2020 ml   Output             2001 ml   Net               19 ml      Physical Exam   Constitutional: He is oriented to person, place, and time. He appears well-developed and well-nourished.   Morbidly obese male with breathlessness and SOB   HENT:   Head: Normocephalic and atraumatic.   Eyes: Conjunctivae are normal. Pupils are equal, round, and reactive to light.   Neck: Normal range of motion. Neck supple.   Unable to assess for the presence of JVD as the patient is obese   Cardiovascular: Normal rate, regular rhythm, normal heart sounds and intact distal pulses.  Exam reveals no gallop and no friction rub.    No murmur heard.  Pulmonary/Chest: Breath sounds normal. He has no rales.   Diminished BS at the bases   Abdominal: Soft. Bowel sounds are normal.   Musculoskeletal: He exhibits edema.   Upper extremity edema bilaterally and 2+ pitting edema of legs bilaterally   Neurological: He is alert and oriented to person, place, and time.   Proximal muscular weakness shoulder and hip bilat.   Skin: Skin is warm and dry.   Psychiatric: He has a normal mood and affect. His behavior is normal. Judgment and thought  content normal.       Significant Labs:   CBC: No results for input(s): WBC, HGB, HCT, PLT in the last 48 hours.  CMP:   Recent Labs  Lab 11/09/17  0512 11/10/17  0422    141   K 3.7 3.9    96   CO2 35* 39*    112*   BUN 7* 9   CREATININE 0.6 0.6   CALCIUM 8.9 9.1   ANIONGAP 7* 6*   EGFRNONAA >60 >60       Significant Imaging: I have reviewed and interpreted all pertinent imaging results/findings within the past 24 hours.

## 2017-11-10 NOTE — SUBJECTIVE & OBJECTIVE
Interval History: The patient still complains of some SOB and cough with labored respirations last pm    Review of Systems   Constitutional: Positive for fatigue.   Respiratory: Positive for cough, choking and shortness of breath.    Cardiovascular: Positive for leg swelling.     Objective:     Vital Signs (Most Recent):  Temp: 97.9 °F (36.6 °C) (11/10/17 0430)  Pulse: 70 (11/10/17 0430)  Resp: 16 (11/09/17 2003)  BP: 131/69 (11/10/17 0430)  SpO2: 95 % (11/10/17 0430) Vital Signs (24h Range):  Temp:  [96.3 °F (35.7 °C)-98.4 °F (36.9 °C)] 97.9 °F (36.6 °C)  Pulse:  [64-78] 70  Resp:  [16] 16  SpO2:  [95 %-98 %] 95 %  BP: (103-131)/(54-70) 131/69     Weight: 120.9 kg (266 lb 9.6 oz)  Body mass index is 38.25 kg/m².    Intake/Output Summary (Last 24 hours) at 11/10/17 0557  Last data filed at 11/10/17 0500   Gross per 24 hour   Intake             1300 ml   Output             2750 ml   Net            -1450 ml      Physical Exam   Constitutional: He is oriented to person, place, and time. He appears well-developed and well-nourished.   Morbidly obese male with breathlessness and SOB   HENT:   Head: Normocephalic and atraumatic.   Eyes: Conjunctivae are normal. Pupils are equal, round, and reactive to light.   Neck: Normal range of motion. Neck supple.   Unable to assess for the presence of JVD as the patient is obese   Cardiovascular: Normal rate, regular rhythm, normal heart sounds and intact distal pulses.  Exam reveals no gallop and no friction rub.    No murmur heard.  Pulmonary/Chest: Breath sounds normal. He has no rales.   Diminished BS at the bases   Abdominal: Soft. Bowel sounds are normal.   Musculoskeletal: He exhibits edema.   Upper extremity edema bilaterally and 2+ pitting edema of legs bilaterally   Neurological: He is alert and oriented to person, place, and time.   Proximal muscular weakness shoulder and hip bilat.   Skin: Skin is warm and dry.   Psychiatric: He has a normal mood and affect. His behavior  is normal. Judgment and thought content normal.       Significant Labs: All pertinent labs within the past 24 hours have been reviewed.    Significant Imaging: I have reviewed and interpreted all pertinent imaging results/findings within the past 24 hours.

## 2017-11-10 NOTE — PLAN OF CARE
Problem: Patient Care Overview  Goal: Plan of Care Review  Outcome: Ongoing (interventions implemented as appropriate)  Pt in no distress on 3L Nc, will continue to monitor.

## 2017-11-10 NOTE — ASSESSMENT & PLAN NOTE
- Has proximal muscle weakness.  - CPK elevated more today.  -Discontinued IV fluids  -Question this diagnosis, with elevated ESR and CRP checked HALLE for other etiologies of elevated CPK and this is still pending  -

## 2017-11-10 NOTE — PLAN OF CARE
Problem: Patient Care Overview  Goal: Plan of Care Review  Outcome: Ongoing (interventions implemented as appropriate)  Pt free from falls, injury, and skin breakdown this shift. VS stable on O2 2L, re-positions per self. Tele maintained, all alarms active and audible. Blood sugars checked. No complaints this shift. I/O recorded. All questions answered. Bed locked and low, call light within reach, bed alarm set, purposeful hourly rounding completed. Will continue to monitor

## 2017-11-10 NOTE — PT/OT/SLP PROGRESS
Occupational Therapy  Treatment    Serafin Tapia   MRN: [id number]   Admitting Diagnosis: Non-traumatic rhabdomyolysis    OT Date of Treatment: 11/10/17   OT Start Time: 1052  OT Stop Time: 1128  OT Total Time (min): 36 min    Billable Minutes:  Self Care/Home Management 36    General Precautions: Standard, diabetic, fall  Orthopedic Precautions: N/A  Braces: N/A    Do you have any cultural, spiritual, Mosque conflicts, given your current situation?: none reported    Subjective:  He was found supine in bed reporting to be very sleepy.  He agreed to OT treatment, uncertain whether he was going to be able to participate.    Pain/Comfort  Pain Rating 1: 0/10  Pain Rating Post-Intervention 1: 0/10    Objective:  Patient found with: telemetry, peripheral IV     Functional Mobility:  Bed Mobility:  Supine to Sit: Maximum Assistance    Transfers:    Mod/Min A sit>stand with RW  CGA (poor safety)stand>sit with RW  CGA bed>chair transfer with RW    Functional Ambulation: none    Activities of Daily Living:     Grooming Position: EOB, Seated  Grooming Level of Assistance:  (wash face and hands, brush teeth, mouthwash)  Toileting Level of Assistance: Set-up Assistance    Balance:   Static Sit: FAIR: Maintains without assist, but unable to take any challenges   Dynamic Sit: FAIR+: Maintains balance through MINIMAL excursions of active trunk motion  Static Stand: POOR: Needs MODERATE assist to maintain (UE support on RW)  Dynamic stand: FAIR: Needs CONTACT GUARD during gait with bed>chair transfer using RW    Therapeutic Activities and Exercises:  RW safety with standing and self-care    AM-PAC 6 CLICK ADL   How much help from another person does this patient currently need?   1 = Unable, Total/Dependent Assistance  2 = A lot, Maximum/Moderate Assistance  3 = A little, Minimum/Contact Guard/Supervision  4 = None, Modified [place]cock/Independent    Putting on and taking off regular lower body clothing? : 1  Bathing (including  "washing, rinsing, drying)?: 2  Toileting, which includes using toilet, bedpan, or urinal? : 1  Putting on and taking off regular upper body clothing?: 3  Taking care of personal grooming such as brushing teeth?: 3  Eating meals?: 3  Total Score: 13     AM-PAC Raw Score CMS "G-Code Modifier Level of Impairment Assistance   6 % Total / Unable   7 - 8 CM 80 - 100% Maximal Assist   9-13 CL 60 - 80% Moderate Assist   14 - 19 CK 40 - 60% Moderate Assist   20 - 22 CJ 20 - 40% Minimal Assist   23 CI 1-20% SBA / CGA   24 CH 0% Independent/ Mod I       Patient left up in chair with all lines intact, call button in reach and nurse notified    ASSESSMENT:  Serafin Tapia is a 67 y.o. male with a medical diagnosis of Non-traumatic rhabdomyolysis and presents with weakness, impaired endurance, impaired functional mobility, gait instability, impaired balance, decreased upper extremity function, decreased lower extremity function, decreased safety awareness, impaired fine motor, orthopedic precautions effecting performance during the session..  The patient was tired with low energy and activity tolerance during OT treatment this morning.  He was Max A supine>sit EOB, Mod/Min A sit>stand/CGA stand>sit and CGA bed>chair transfer with RW.  He was set-up assist -sitting EOB for G/H and Total A toilet hygiene standing EOB.    Rehab identified problem list/impairments: Rehab identified problem list/impairments: weakness, impaired endurance, impaired functional mobility, gait instability, impaired balance, decreased upper extremity function, decreased lower extremity function, decreased safety awareness, impaired fine motor, orthopedic precautions    Rehab potential is good.    Activity tolerance: Fair    Discharge recommendations: Discharge Facility/Level Of Care Needs: nursing facility, skilled     Barriers to discharge: Barriers to Discharge: Decreased caregiver support    Equipment recommendations:  (TBD)     GOALS:    " Occupational Therapy Goals        Problem: Occupational Therapy Goal    Goal Priority Disciplines Outcome Interventions   Occupational Therapy Goal     OT, PT/OT Ongoing (interventions implemented as appropriate)    Description:  Goals reviewed and updated. Goals to be met by: 11/15/17     Patient will increase functional independence with ADLs by performing:    LE Dressing with Stand-by Assistance and use of AE. In progress; NOT MET  Grooming while standing at sink with Stand-by Assistance. In progress; NOT MET  Toileting from bedside commode with Stand-by Assistance for hygiene and clothing management. In progress; NOT MET  Supine to sit with Stand-by Assistance. In Progress; NOT MET  Stand pivot transfers with Stand-by Assistance. In Progress, NOT MET  Increased functional strength to 3/5 for increased (I) with bed mobility and transfers, as well as ADL. In progress: NOT MET  Patient is (S) with UE therex program. In progress; NOT MET  .                         Plan:  Patient to be seen 6 x/week to address the above listed problems via self-care/home management, therapeutic activities, therapeutic exercises  Plan of Care expires: 11/30/17  Plan of Care reviewed with: patient         RAMON Collins  11/10/2017

## 2017-11-10 NOTE — PLAN OF CARE
Problem: Physical Therapy Goal  Goal: Physical Therapy Goal  Goals to be met by 11-10-17.  1. Sup<>sit mod I  2. Sit<>stand with RW mod I  3. amb 150' with RW mod I    Discontinue above goals secondary to decline in function status    Goals to be met by: 2017    Patient will increase functional independence with mobility by performin. Supine to sit with min A.  2. Sit to supine with min A.   3. Rolling R and L with min A.   5. Sit <> stand with min A  4. Ambulate 30' with rolling walker CGA         Outcome: Ongoing (interventions implemented as appropriate)  Pt demonstrated increased tolerance to activity, continues to progress toward goals.

## 2017-11-10 NOTE — PROGRESS NOTES
[facility] Hospital Medicine  Progress Note    Patient Name: Serafin Tapia  MRN: [id number]  Patient Class: IP- Inpatient   Admission Date: 10/29/2017  Length of Stay: 12 days  Attending Physician: Henrietta Currie MD  Primary Care Provider: John Vargas MD        Subjective:     Principal Problem:Non-traumatic rhabdomyolysis    HPI:  Patient is a 66 year-old male with hypertension, diabetes mellitus type II, dyslipidemia who presents to the emergency department by the emergency medical services after he was found to be confused with capillary blood glucose measurement of 40 mg/dL.  Patient was given an ampule of dextrose with resolution of hypoglycemia and normalization of his mental status.  However while in the emergency department he had recurrence of hypoglycemia requiring further intervenous dextrose.  He denies any recent changes in his medications or recent illnesses.  He reports no changes in his diet.  He reports that he has not been checking his blood glucose as his home glucometer is broken.    Hospital Course:  Patient admitted to the hospital for treatment of acute renal failure and refractory hypoglycemia secondary to sulfonylurea use.  Patient given isotonic intravenous with dextrose fluids with improvement in serum creatinine.  The patient also has suspected rhabdomyolysis with initial CPK >5000.  Aggressive hydration not tolerated well and patient clinically has signs of fluid overload with pulmonary and peripheral edema.  Suspect decompensated diastolic heart failure, so fluids discontinued and patient will continue IV Lasix.  CPK remains elevated.        Interval History: The patient still complains of some SOB and cough with labored respirations last pm    Review of Systems   Constitutional: Positive for fatigue.   Respiratory: Positive for cough, choking and shortness of breath.    Cardiovascular: Positive for leg swelling.     Objective:     Vital Signs (Most  Recent):  Temp: 97.9 °F (36.6 °C) (11/10/17 0430)  Pulse: 70 (11/10/17 0430)  Resp: 16 (11/09/17 2003)  BP: 131/69 (11/10/17 0430)  SpO2: 95 % (11/10/17 0430) Vital Signs (24h Range):  Temp:  [96.3 °F (35.7 °C)-98.4 °F (36.9 °C)] 97.9 °F (36.6 °C)  Pulse:  [64-78] 70  Resp:  [16] 16  SpO2:  [95 %-98 %] 95 %  BP: (103-131)/(54-70) 131/69     Weight: 120.9 kg (266 lb 9.6 oz)  Body mass index is 38.25 kg/m².    Intake/Output Summary (Last 24 hours) at 11/10/17 0557  Last data filed at 11/10/17 0500   Gross per 24 hour   Intake             1300 ml   Output             2750 ml   Net            -1450 ml      Physical Exam   Constitutional: He is oriented to person, place, and time. He appears well-developed and well-nourished.   Morbidly obese male with breathlessness and SOB   HENT:   Head: Normocephalic and atraumatic.   Eyes: Conjunctivae are normal. Pupils are equal, round, and reactive to light.   Neck: Normal range of motion. Neck supple.   Unable to assess for the presence of JVD as the patient is obese   Cardiovascular: Normal rate, regular rhythm, normal heart sounds and intact distal pulses.  Exam reveals no gallop and no friction rub.    No murmur heard.  Pulmonary/Chest: Breath sounds normal. He has no rales.   Diminished BS at the bases   Abdominal: Soft. Bowel sounds are normal.   Musculoskeletal: He exhibits edema.   Upper extremity edema bilaterally and 2+ pitting edema of legs bilaterally   Neurological: He is alert and oriented to person, place, and time.   Proximal muscular weakness shoulder and hip bilat.   Skin: Skin is warm and dry.   Psychiatric: He has a normal mood and affect. His behavior is normal. Judgment and thought content normal.       Significant Labs: All pertinent labs within the past 24 hours have been reviewed.    Significant Imaging: I have reviewed and interpreted all pertinent imaging results/findings within the past 24 hours.    Assessment/Plan:      * Non-traumatic rhabdomyolysis     - Has proximal muscle weakness.  - CPK elevated more today.  -Discontinued IV fluids  -Question this diagnosis, with elevated ESR and CRP checked HALLE for other etiologies of elevated CPK and this is still pending  -                Adverse reaction to oral hypoglycemic drug, initial encounter    - Hypoglycemia due to decreased clearance due to renal failure.   - Rehydrated and renal function at baseline.   - Discontinued sulfonylurea.    - Resolved.         Acute diastolic heart failure    Clinical signs of fluid overload  Continue Lasix 80mg IV  Follow electrolytes and will review CXR today which shows some signs of clearing        Elevated transaminase level    - Hepatitis panel negative.   - Suspect from rhabdo.           Hypomagnesemia    Oral replacement of magnesium and continue to monitor.             Vitamin D deficiency    Continue Vitamin D supplementation          Folate deficiency    - Replace.          Anemia due to vitamin B12 deficiency    - Ferritin elevated.    - Also replacing folic acid.   -No need for transfusion        Vitamin B 12 deficiency    - MMA WNL.  Homocysteine WNL.   - B-12 1000 mcg daily for 5 days, then stop.            Hypothyroidism (acquired)    - Continue levothyroxine 25mcg          Weakness of both hips    - Check CK  - PT OT recs SNF          Falls    - PT OT          History of pulmonary embolism    - Pulmonary embolus in 2014.    - Held anticoagulation in the setting of renal failure, now resolved.   - Xarelto.   - Follow up with PCP.         Morbid obesity with BMI of 40.0-44.9, adult    Address the importance of a prudent 2000 ADA, 2 gram sodium diet upon discharge with some aerobic exercise          Controlled type 2 diabetes mellitus with diabetic polyneuropathy, without long-term current use of insulin    - A1c = 5.5%.  - Well controlled.    - Discontinue Metformin             Dyslipidemia    - Atorvastatin discontinued for elevated CPK.    -Continue to hold           Essential hypertension    Discontinued HCTZ / spironolactone.  Good control on current regimen.          VTE Risk Mitigation         Ordered     rivaroxaban tablet 20 mg  With dinner     Route:  Oral        10/31/17 1303     Medium Risk of VTE  Once      10/29/17 1529              Henrietta Currie MD  Department of Hospital Medicine   [facility]

## 2017-11-11 LAB
ANION GAP SERPL CALC-SCNC: 8 MMOL/L
BUN SERPL-MCNC: 11 MG/DL
CALCIUM SERPL-MCNC: 9.2 MG/DL
CHLORIDE SERPL-SCNC: 94 MMOL/L
CK SERPL-CCNC: 4620 U/L
CO2 SERPL-SCNC: 41 MMOL/L
CREAT SERPL-MCNC: 0.6 MG/DL
EST. GFR  (AFRICAN AMERICAN): >60 ML/MIN/1.73 M^2
EST. GFR  (NON AFRICAN AMERICAN): >60 ML/MIN/1.73 M^2
GLUCOSE SERPL-MCNC: 112 MG/DL
MAGNESIUM SERPL-MCNC: 1.5 MG/DL
PHOSPHATE SERPL-MCNC: 3.7 MG/DL
POCT GLUCOSE: 117 MG/DL (ref 70–110)
POCT GLUCOSE: 127 MG/DL (ref 70–110)
POCT GLUCOSE: 154 MG/DL (ref 70–110)
POTASSIUM SERPL-SCNC: 3.7 MMOL/L
SODIUM SERPL-SCNC: 143 MMOL/L

## 2017-11-11 PROCEDURE — 27000221 HC OXYGEN, UP TO 24 HOURS

## 2017-11-11 PROCEDURE — 25000003 PHARM REV CODE 250: Performed by: NURSE PRACTITIONER

## 2017-11-11 PROCEDURE — 25000003 PHARM REV CODE 250: Performed by: HOSPITALIST

## 2017-11-11 PROCEDURE — 83735 ASSAY OF MAGNESIUM: CPT

## 2017-11-11 PROCEDURE — 36415 COLL VENOUS BLD VENIPUNCTURE: CPT

## 2017-11-11 PROCEDURE — 99232 SBSQ HOSP IP/OBS MODERATE 35: CPT | Mod: ,,, | Performed by: HOSPITALIST

## 2017-11-11 PROCEDURE — 94761 N-INVAS EAR/PLS OXIMETRY MLT: CPT

## 2017-11-11 PROCEDURE — 82550 ASSAY OF CK (CPK): CPT

## 2017-11-11 PROCEDURE — 11000001 HC ACUTE MED/SURG PRIVATE ROOM

## 2017-11-11 PROCEDURE — 80048 BASIC METABOLIC PNL TOTAL CA: CPT

## 2017-11-11 PROCEDURE — 99900035 HC TECH TIME PER 15 MIN (STAT)

## 2017-11-11 PROCEDURE — 84100 ASSAY OF PHOSPHORUS: CPT

## 2017-11-11 RX ADMIN — Medication 400 MG: at 08:11

## 2017-11-11 RX ADMIN — FOLIC ACID 1 MG: 1 TABLET ORAL at 08:11

## 2017-11-11 RX ADMIN — CARVEDILOL 25 MG: 12.5 TABLET, FILM COATED ORAL at 04:11

## 2017-11-11 RX ADMIN — RIVAROXABAN 20 MG: 10 TABLET, FILM COATED ORAL at 06:11

## 2017-11-11 RX ADMIN — LEVOTHYROXINE SODIUM 25 MCG: 25 TABLET ORAL at 06:11

## 2017-11-11 RX ADMIN — BENAZEPRIL HYDROCHLORIDE 10 MG: 10 TABLET, FILM COATED ORAL at 08:11

## 2017-11-11 RX ADMIN — CARVEDILOL 25 MG: 12.5 TABLET, FILM COATED ORAL at 08:11

## 2017-11-11 RX ADMIN — AMLODIPINE BESYLATE 10 MG: 5 TABLET ORAL at 08:11

## 2017-11-11 RX ADMIN — Medication 400 MG: at 09:11

## 2017-11-11 NOTE — PLAN OF CARE
Problem: Patient Care Overview  Goal: Plan of Care Review  Outcome: Ongoing (interventions implemented as appropriate)  Pt in no distress on 1L Nc, will continue to monitor.

## 2017-11-11 NOTE — PLAN OF CARE
Problem: Patient Care Overview  Goal: Plan of Care Review  Outcome: Ongoing (interventions implemented as appropriate)  PLAN OF CARE REVIEWED WITH PT.  PT AA+OX4.  ABLE TO MAKE NEEDS KNOWN.  DOES NOT APPEAR TO BE IN ANY DISTRESS.  NO C/O PAIN.  REQUIRES MODERATE ASSIST WITH ADLS.  PT HAS PERIODS OF INCONT.  JAI CARE PROVIDED.  PT REMAINS FREE FROM FALLS, INJURY, AND TRAUMA.  FALL PRECAUTIONS IN PLACE.  BED IN LOWEST POSITION WITH WHEELS LOCKED.  CALL LIGHT WITHIN REACH.  WILL CONTINUE TO MONITOR.

## 2017-11-12 LAB
ANION GAP SERPL CALC-SCNC: 5 MMOL/L
BUN SERPL-MCNC: 10 MG/DL
CALCIUM SERPL-MCNC: 9 MG/DL
CHLORIDE SERPL-SCNC: 95 MMOL/L
CK SERPL-CCNC: 4890 U/L
CO2 SERPL-SCNC: 41 MMOL/L
CREAT SERPL-MCNC: 0.6 MG/DL
EST. GFR  (AFRICAN AMERICAN): >60 ML/MIN/1.73 M^2
EST. GFR  (NON AFRICAN AMERICAN): >60 ML/MIN/1.73 M^2
GLUCOSE SERPL-MCNC: 103 MG/DL
MAGNESIUM SERPL-MCNC: 1.7 MG/DL
PHOSPHATE SERPL-MCNC: 3.3 MG/DL
POCT GLUCOSE: 109 MG/DL (ref 70–110)
POCT GLUCOSE: 138 MG/DL (ref 70–110)
POCT GLUCOSE: 145 MG/DL (ref 70–110)
POCT GLUCOSE: 171 MG/DL (ref 70–110)
POTASSIUM SERPL-SCNC: 3.9 MMOL/L
SODIUM SERPL-SCNC: 141 MMOL/L

## 2017-11-12 PROCEDURE — 97110 THERAPEUTIC EXERCISES: CPT

## 2017-11-12 PROCEDURE — 11000001 HC ACUTE MED/SURG PRIVATE ROOM

## 2017-11-12 PROCEDURE — 84100 ASSAY OF PHOSPHORUS: CPT

## 2017-11-12 PROCEDURE — 25000003 PHARM REV CODE 250: Performed by: NURSE PRACTITIONER

## 2017-11-12 PROCEDURE — 99232 SBSQ HOSP IP/OBS MODERATE 35: CPT | Mod: ,,, | Performed by: HOSPITALIST

## 2017-11-12 PROCEDURE — 97530 THERAPEUTIC ACTIVITIES: CPT

## 2017-11-12 PROCEDURE — 36415 COLL VENOUS BLD VENIPUNCTURE: CPT

## 2017-11-12 PROCEDURE — 27000221 HC OXYGEN, UP TO 24 HOURS

## 2017-11-12 PROCEDURE — 80048 BASIC METABOLIC PNL TOTAL CA: CPT

## 2017-11-12 PROCEDURE — 97116 GAIT TRAINING THERAPY: CPT

## 2017-11-12 PROCEDURE — 25000003 PHARM REV CODE 250: Performed by: HOSPITALIST

## 2017-11-12 PROCEDURE — 82550 ASSAY OF CK (CPK): CPT

## 2017-11-12 PROCEDURE — 99900035 HC TECH TIME PER 15 MIN (STAT)

## 2017-11-12 PROCEDURE — 83735 ASSAY OF MAGNESIUM: CPT

## 2017-11-12 RX ADMIN — FOLIC ACID 1 MG: 1 TABLET ORAL at 08:11

## 2017-11-12 RX ADMIN — LEVOTHYROXINE SODIUM 25 MCG: 25 TABLET ORAL at 05:11

## 2017-11-12 RX ADMIN — CARVEDILOL 25 MG: 12.5 TABLET, FILM COATED ORAL at 07:11

## 2017-11-12 RX ADMIN — RIVAROXABAN 20 MG: 10 TABLET, FILM COATED ORAL at 06:11

## 2017-11-12 RX ADMIN — Medication 400 MG: at 08:11

## 2017-11-12 RX ADMIN — BENAZEPRIL HYDROCHLORIDE 10 MG: 10 TABLET, FILM COATED ORAL at 08:11

## 2017-11-12 RX ADMIN — CARVEDILOL 25 MG: 12.5 TABLET, FILM COATED ORAL at 04:11

## 2017-11-12 RX ADMIN — AMLODIPINE BESYLATE 10 MG: 5 TABLET ORAL at 08:11

## 2017-11-12 NOTE — PLAN OF CARE
Problem: Fall Risk (Adult)  Goal: Absence of Falls  Patient will demonstrate the desired outcomes by discharge/transition of care.   Outcome: Ongoing (interventions implemented as appropriate)  Patient is a fall risk.  Socks,bracelet and bed alarm in place.    Problem: Patient Care Overview  Goal: Plan of Care Review  Outcome: Ongoing (interventions implemented as appropriate)  Current plan of care reviewed with the patient.  All questions answered.  He is not too interested in speaking this evening.    Problem: Infection, Risk/Actual (Adult)  Goal: Infection Prevention/Resolution  Patient will demonstrate the desired outcomes by discharge/transition of care.   Outcome: Ongoing (interventions implemented as appropriate)  No temps or chills voiced/noted.  WBCs wnl.    Problem: Pressure Ulcer Risk (Johny Scale) (Adult,Obstetrics,Pediatric)  Goal: Skin Integrity  Patient will demonstrate the desired outcomes by discharge/transition of care.   Outcome: Ongoing (interventions implemented as appropriate)  Turning q two hours (or less) to prevent skin breakdown.

## 2017-11-12 NOTE — SUBJECTIVE & OBJECTIVE
Interval History: The patient has no complaints today    Review of Systems   Respiratory: Positive for shortness of breath.    Cardiovascular: Positive for leg swelling.     Objective:     Vital Signs (Most Recent):  Temp: 98.5 °F (36.9 °C) (11/11/17 1944)  Pulse: 67 (11/11/17 2000)  Resp: 20 (11/11/17 1944)  BP: (!) 111/58 (11/11/17 1944)  SpO2: (!) 94 % (11/11/17 1944) Vital Signs (24h Range):  Temp:  [97.9 °F (36.6 °C)-98.6 °F (37 °C)] 98.5 °F (36.9 °C)  Pulse:  [58-78] 67  Resp:  [16-20] 20  SpO2:  [83 %-98 %] 94 %  BP: (107-130)/(53-62) 111/58     Weight: 123.8 kg (272 lb 14.4 oz)  Body mass index is 39.16 kg/m².    Intake/Output Summary (Last 24 hours) at 11/11/17 2035  Last data filed at 11/11/17 1948   Gross per 24 hour   Intake             1220 ml   Output             1200 ml   Net               20 ml      Physical Exam   Constitutional: He is oriented to person, place, and time. He appears well-developed and well-nourished.   Morbidly obese male with breathlessness and SOB   HENT:   Head: Normocephalic and atraumatic.   Eyes: Conjunctivae are normal. Pupils are equal, round, and reactive to light.   Neck: Normal range of motion. Neck supple.   Unable to assess for the presence of JVD as the patient is obese   Cardiovascular: Normal rate, regular rhythm, normal heart sounds and intact distal pulses.  Exam reveals no gallop and no friction rub.    No murmur heard.  Pulmonary/Chest: Breath sounds normal. He has no rales.   Diminished BS at the bases   Abdominal: Soft. Bowel sounds are normal.   Musculoskeletal: He exhibits edema.   Upper extremity edema has decreased bilaterally and 1+ pitting edema of legs    Neurological: He is alert and oriented to person, place, and time.   Proximal muscular weakness shoulder and hip bilat.   Skin: Skin is warm and dry.   Psychiatric: He has a normal mood and affect. His behavior is normal. Judgment and thought content normal.       Significant Labs:   CBC: No results for  input(s): WBC, HGB, HCT, PLT in the last 48 hours.  CMP:   Recent Labs  Lab 11/10/17  0422 11/11/17  0447    143   K 3.9 3.7   CL 96 94*   CO2 39* 41*   * 112*   BUN 9 11   CREATININE 0.6 0.6   CALCIUM 9.1 9.2   ANIONGAP 6* 8   EGFRNONAA >60 >60       Significant Imaging: I have reviewed and interpreted all pertinent imaging results/findings within the past 24 hours.

## 2017-11-12 NOTE — SUBJECTIVE & OBJECTIVE
Interval History: The patient still has some SOB and weakness.    Review of Systems   Respiratory: Positive for shortness of breath.    Cardiovascular: Positive for leg swelling.     Objective:     Vital Signs (Most Recent):  Temp: 97.6 °F (36.4 °C) (11/12/17 1626)  Pulse: 75 (11/12/17 1626)  Resp: 17 (11/12/17 1626)  BP: 123/60 (11/12/17 1626)  SpO2: (!) 94 % (11/12/17 1626) Vital Signs (24h Range):  Temp:  [97.6 °F (36.4 °C)-98.5 °F (36.9 °C)] 97.6 °F (36.4 °C)  Pulse:  [61-82] 75  Resp:  [16-20] 17  SpO2:  [83 %-96 %] 94 %  BP: (103-123)/(53-60) 123/60     Weight: 120.7 kg (266 lb 3.2 oz)  Body mass index is 38.2 kg/m².    Intake/Output Summary (Last 24 hours) at 11/12/17 1649  Last data filed at 11/12/17 0800   Gross per 24 hour   Intake              400 ml   Output              800 ml   Net             -400 ml      Physical Exam   Constitutional: He is oriented to person, place, and time. He appears well-developed and well-nourished.   Morbidly obese male with breathlessness and SOB   HENT:   Head: Normocephalic and atraumatic.   Eyes: Conjunctivae are normal. Pupils are equal, round, and reactive to light.   Neck: Normal range of motion. Neck supple.   Unable to assess for the presence of JVD as the patient is obese   Cardiovascular: Normal rate, regular rhythm, normal heart sounds and intact distal pulses.  Exam reveals no gallop and no friction rub.    No murmur heard.  Pulmonary/Chest: Breath sounds normal. He has no rales.   Diminished BS at the bases   Abdominal: Soft. Bowel sounds are normal.   Musculoskeletal: He exhibits edema.   Upper extremity edema has decreased bilaterally and 1+ pitting edema of legs which is decreasing    Neurological: He is alert and oriented to person, place, and time.   Proximal muscular weakness shoulder and hip bilat.   Skin: Skin is warm and dry.   Psychiatric: He has a normal mood and affect. His behavior is normal. Judgment and thought content normal.       Significant  Labs:   CBC: No results for input(s): WBC, HGB, HCT, PLT in the last 48 hours.  CMP:   Recent Labs  Lab 11/11/17  0447 11/12/17  0433    141   K 3.7 3.9   CL 94* 95   CO2 41* 41*   * 103   BUN 11 10   CREATININE 0.6 0.6   CALCIUM 9.2 9.0   ANIONGAP 8 5*   EGFRNONAA >60 >60       Significant Imaging: I have reviewed and interpreted all pertinent imaging results/findings within the past 24 hours.

## 2017-11-12 NOTE — PT/OT/SLP PROGRESS
Physical Therapy  Treatment    Serafin Tapia   MRN: [id number]   Admitting Diagnosis: Non-traumatic rhabdomyolysis    PT Received On: 11/12/17  PT Start Time: 1049     PT Stop Time: 1133    PT Total Time (min): 44 min       Billable Minutes:  Gait [id number], Therapeutic Activity 15 and Therapeutic Exercise 19    Treatment Type: Treatment  PT/PTA: PT     PTA Visit Number: 3       General Precautions: Standard, diabetic, fall  Orthopedic Precautions: N/A   Braces:      Do you have any cultural, spiritual, L[employer] day conflicts, given your current situation?: none     Subjective:  Communicated with patient prior to session.      Pain/Comfort  Pain Rating 1: 0/10  Pain Rating Post-Intervention 1:  (pt report muscle pain with movement and exercise)    Objective:   Patient found with: oxygen, peripheral IV, telemetry    Functional Mobility:  Bed Mobility:        Transfers:  Sit <> Stand Assistance: Moderate Assistance  Sit <> Stand Assistive Device: Rolling Walker    Gait:   Gait Distance: 3 steps from Bed to BSC  Assistance 1: Moderate assistance  Gait Assistive Device: Rolling walker      Balance:   Static Sit: FAIR-: Maintains without assist but inconsistent   Dynamic Sit: FAIR+: Maintains balance through MINIMAL excursions of active trunk motion  Static Stand: POOR: Needs MODERATE assist to maintain  Dynamic stand: POOR: N/A     Therapeutic Activities and Exercises:  In BS chair: ap x 20; B TKE  With QS x 10; B SLR x 15; gs x 10     AM-PAC 6 CLICK MOBILITY  How much help from another person does this patient currently need?   1 = Unable, Total/Dependent Assistance  2 = A lot, Maximum/Moderate Assistance  3 = A little, Minimum/Contact Guard/Supervision  4 = None, Modified [place]/Independent    Turning over in bed (including adjusting bedclothes, sheets and blankets)?: 2  Sitting down on and standing up from a chair with arms (e.g., wheelchair, bedside commode, etc.): 2  Moving from lying on back to sitting on the side  of the bed?: 2  Moving to and from a bed to a chair (including a wheelchair)?: 2  Need to walk in hospital room?: 2  Climbing 3-5 steps with a railing?: 1  Total Score: 11    AM-PAC Raw Score CMS G-Code Modifier Level of Impairment Assistance   6 % Total / Unable   7 - 9 CM 80 - 100% Maximal Assist   10 - 14 CL 60 - 80% Moderate Assist   15 - 19 CK 40 - 60% Moderate Assist   20 - 22 CJ 20 - 40% Minimal Assist   23 CI 1-20% SBA / CGA   24 CH 0% Independent/ Mod I     Patient left up in chair with all lines intact, call button in reach, nurse notified and brother present.    Assessment:  Serafin Tapia is a 67 y.o. male with a medical diagnosis of Non-traumatic rhabdomyolysis and presents with increasing weakness and decreasing functional mobility since admission.  This seems to be greater than normally seen with a diagnosis of statin induced rhabdomyolysis.  He will continue to benefit from hospital PT and SNF to improve functional mobility.      Rehab identified problem list/impairments: Rehab identified problem list/impairments: weakness, impaired endurance, gait instability, impaired balance, decreased lower extremity function, decreased upper extremity function, pain, impaired fine motor    Rehab potential is good.    Activity tolerance: Fair    Discharge recommendations: Discharge Facility/Level Of Care Needs: nursing facility, skilled     Barriers to discharge: Barriers to Discharge: Decreased caregiver support    Equipment recommendations: Equipment Needed After Discharge: walker, rolling     GOALS:    Physical Therapy Goals        Problem: Physical Therapy Goal    Goal Priority Disciplines Outcome Goal Variances Interventions   Physical Therapy Goal     PT/OT, PT Ongoing (interventions implemented as appropriate)     Description:  Goals to be met by 11-10-17.  1. Sup<>sit mod I  2. Sit<>stand with RW mod I  3. amb 150' with RW mod I    Discontinue above goals secondary to decline in function  status    Goals to be met by: 2017    Patient will increase functional independence with mobility by performin. Supine to sit with min A.  2. Sit to supine with min A.   3. Rolling R and L with min A.   5. Sit <> stand with min A  4. Ambulate 30' with rolling walker CGA                          PLAN:    Patient to be seen daily  to address the above listed problems via gait training, therapeutic activities, therapeutic exercises, neuromuscular re-education  Plan of Care expires: 17  Plan of Care reviewed with: patient         Jass Broussard, PT  2017

## 2017-11-12 NOTE — PT/OT/SLP PROGRESS
Occupational Therapy  Treatment    Serafin Tapia   MRN: [id number]   Admitting Diagnosis: Non-traumatic rhabdomyolysis    OT Date of Treatment: 11/12/17   OT Start Time: 1535  OT Stop Time: 1612  OT Total Time (min): 37 min    Billable Minutes:  Therapeutic Activity 22 and Therapeutic Exercise 15    General Precautions: Standard, diabetic, fall  Orthopedic Precautions: N/A  Braces: N/A    Do you have any cultural, spiritual, Worship conflicts, given your current situation?: none reported    Subjective:  The patient was seen on the second attempt.  He was agreeable to OT treatment, but wanted to finish watching the [employer] football game too.    Pain/Comfort  Pain Rating 1: 0/10 (seated in chair)  Location - Side 1: Bilateral  Location 1: shoulder (elbow)  Pain Addressed 1: Reposition, Cessation of Activity  Pain Rating Post-Intervention 1: 2/10 (burning sensation after UB exercise)    Objective:  Patient found with: telemetry, peripheral IV, oxygen     Functional Mobility:  Bed Mobility:  Sit to Supine: Moderate Assistance    Transfers:    Total A (Mod A x3) sit>stand from chair with RW  Min A stand>sit with RW at EOB  Min A chair>bed transfer with RW once in standing    Functional Ambulation: 6 steps with transfer chair>bed.  He stepped quickly, knees beginning to buckle with need to sit after 4 steps.    Activities of Daily Living:  None     Balance:   Static Sit: FAIR-: Maintains without assist but inconsistent    Dynamic Sit: FAIR: Cannot move trunk without losing balance (UE support needed to assist balance seated Edge of Chair)  Static Stand: POOR: Needs MODERATE assist to maintain (UE support on RW needed)  Dynamic stand: FAIR: Needs CONTACT GUARD during gait 6 steps with RW    Therapeutic Activities and Exercises:  UE orange theraband exercises in bed (HHA LUE/Min A RUE) 12 reps shoulder and elbow planes of movement    AM-PAC 6 CLICK ADL   How much help from another person does this patient currently need?   1 =  "Unable, Total/Dependent Assistance  2 = A lot, Maximum/Moderate Assistance  3 = A little, Minimum/Contact Guard/Supervision  4 = None, Modified [place]/Independent    Putting on and taking off regular lower body clothing? : 1  Bathing (including washing, rinsing, drying)?: 1  Toileting, which includes using toilet, bedpan, or urinal? : 2  Putting on and taking off regular upper body clothing?: 3  Taking care of personal grooming such as brushing teeth?: 3  Eating meals?: 4  Total Score: 14     AM-PAC Raw Score CMS "G-Code Modifier Level of Impairment Assistance   6 % Total / Unable   7 - 8 CM 80 - 100% Maximal Assist   9-13 CL 60 - 80% Moderate Assist   14 - 19 CK 40 - 60% Moderate Assist   20 - 22 CJ 20 - 40% Minimal Assist   23 CI 1-20% SBA / CGA   24 CH 0% Independent/ Mod I       Patient left supine with all lines intact, call button in reach, nurse notified and family present    ASSESSMENT:  Serafin Tapia is a 67 y.o. male with a medical diagnosis of Non-traumatic rhabdomyolysis and presents with weakness, impaired endurance, impaired self care skills, impaired functional mobility, gait instability, impaired balance, decreased upper extremity function, decreased lower extremity function, decreased safety awareness, pain, decreased ROM effected performance during the session.  He continues to she progressive loss of motor function.  He was total A sit>stand (Mod A x3) from the chair/Min A chair>bed transfer once standing (6 steps to need to sit due to low of leg strength), and Mod A sit>supine in bed.  UE exercise was included in the session once in the bed.   Continuation of OT treatment is needed to maximize function while in the hospital.    Rehab identified problem list/impairments: Rehab identified problem list/impairments: weakness, impaired endurance, impaired self care skills, impaired functional mobility, gait instability, impaired balance, decreased upper extremity function, decreased lower " extremity function, decreased safety awareness, pain, decreased ROM    Rehab potential is fair.    Activity tolerance: Fair    Discharge recommendations: Discharge Facility/Level Of Care Needs: nursing facility, skilled     Barriers to discharge: Barriers to Discharge: Decreased caregiver support    Equipment recommendations: walker, rolling     GOALS:    Occupational Therapy Goals        Problem: Occupational Therapy Goal    Goal Priority Disciplines Outcome Interventions   Occupational Therapy Goal     OT, PT/OT Ongoing (interventions implemented as appropriate)    Description:  Goals reviewed and updated. Goals to be met by: 11/15/17     Patient will increase functional independence with ADLs by performing:    LE Dressing with Stand-by Assistance and use of AE. In progress; NOT MET  Grooming while standing at sink with Stand-by Assistance. In progress; NOT MET  Toileting from bedside commode with Stand-by Assistance for hygiene and clothing management. In progress; NOT MET  Supine to sit with Stand-by Assistance. In Progress; NOT MET  Stand pivot transfers with Stand-by Assistance. In Progress, NOT MET  Increased functional strength to 3/5 for increased (I) with bed mobility and transfers, as well as ADL. In progress: NOT MET  Patient is (S) with UE therex program. In progress; NOT MET  .                         Plan:  Patient to be seen 6 x/week to address the above listed problems via self-care/home management, therapeutic activities, therapeutic exercises  Plan of Care expires: 11/30/17  Plan of Care reviewed with: patient         RAMON Collins  11/12/2017

## 2017-11-12 NOTE — PROGRESS NOTES
[facility] Hospital Medicine  Progress Note    Patient Name: Serafin Tapia  MRN: [id number]  Patient Class: IP- Inpatient   Admission Date: 10/29/2017  Length of Stay: 13 days  Attending Physician: Henrietta Currie MD  Primary Care Provider: John Vargas MD        Subjective:     Principal Problem:Non-traumatic rhabdomyolysis    HPI:  Patient is a 66 year-old male with hypertension, diabetes mellitus type II, dyslipidemia who presents to the emergency department by the emergency medical services after he was found to be confused with capillary blood glucose measurement of 40 mg/dL.  Patient was given an ampule of dextrose with resolution of hypoglycemia and normalization of his mental status.  However while in the emergency department he had recurrence of hypoglycemia requiring further intervenous dextrose.  He denies any recent changes in his medications or recent illnesses.  He reports no changes in his diet.  He reports that he has not been checking his blood glucose as his home glucometer is broken.    Hospital Course:  Patient admitted to the hospital for treatment of acute renal failure and refractory hypoglycemia secondary to sulfonylurea use.  Patient given isotonic intravenous with dextrose fluids with improvement in serum creatinine.  The patient also has suspected rhabdomyolysis with initial CPK >5000.  Aggressive hydration not tolerated well and patient clinically has signs of fluid overload with pulmonary and peripheral edema.  Suspect decompensated diastolic heart failure, so fluids discontinued and patient will continue IV Lasix.  CPK remains elevated, but is decreasing.        Interval History: The patient has no complaints today    Review of Systems   Respiratory: Positive for shortness of breath.    Cardiovascular: Positive for leg swelling.     Objective:     Vital Signs (Most Recent):  Temp: 98.5 °F (36.9 °C) (11/11/17 1944)  Pulse: 67 (11/11/17 2000)  Resp: 20 (11/11/17  1944)  BP: (!) 111/58 (11/11/17 1944)  SpO2: (!) 94 % (11/11/17 1944) Vital Signs (24h Range):  Temp:  [97.9 °F (36.6 °C)-98.6 °F (37 °C)] 98.5 °F (36.9 °C)  Pulse:  [58-78] 67  Resp:  [16-20] 20  SpO2:  [83 %-98 %] 94 %  BP: (107-130)/(53-62) 111/58     Weight: 123.8 kg (272 lb 14.4 oz)  Body mass index is 39.16 kg/m².    Intake/Output Summary (Last 24 hours) at 11/11/17 2035  Last data filed at 11/11/17 1948   Gross per 24 hour   Intake             1220 ml   Output             1200 ml   Net               20 ml      Physical Exam   Constitutional: He is oriented to person, place, and time. He appears well-developed and well-nourished.   Morbidly obese male with breathlessness and SOB   HENT:   Head: Normocephalic and atraumatic.   Eyes: Conjunctivae are normal. Pupils are equal, round, and reactive to light.   Neck: Normal range of motion. Neck supple.   Unable to assess for the presence of JVD as the patient is obese   Cardiovascular: Normal rate, regular rhythm, normal heart sounds and intact distal pulses.  Exam reveals no gallop and no friction rub.    No murmur heard.  Pulmonary/Chest: Breath sounds normal. He has no rales.   Diminished BS at the bases   Abdominal: Soft. Bowel sounds are normal.   Musculoskeletal: He exhibits edema.   Upper extremity edema has decreased bilaterally and 1+ pitting edema of legs    Neurological: He is alert and oriented to person, place, and time.   Proximal muscular weakness shoulder and hip bilat.   Skin: Skin is warm and dry.   Psychiatric: He has a normal mood and affect. His behavior is normal. Judgment and thought content normal.       Significant Labs:   CBC: No results for input(s): WBC, HGB, HCT, PLT in the last 48 hours.  CMP:   Recent Labs  Lab 11/10/17  0422 11/11/17  0447    143   K 3.9 3.7   CL 96 94*   CO2 39* 41*   * 112*   BUN 9 11   CREATININE 0.6 0.6   CALCIUM 9.1 9.2   ANIONGAP 6* 8   EGFRNONAA >60 >60       Significant Imaging: I have reviewed  and interpreted all pertinent imaging results/findings within the past 24 hours.    Assessment/Plan:      * Non-traumatic rhabdomyolysis    - Has proximal muscle weakness.  - CPK elevated more today.  -Discontinued IV fluids  -Question this diagnosis, with elevated ESR and CRP checked HALLE for other etiologies of elevated CPK and this is still pending  -                Adverse reaction to oral hypoglycemic drug, initial encounter    - Hypoglycemia due to decreased clearance due to renal failure.   - Rehydrated and renal function at baseline.   - Discontinued sulfonylurea.    - Resolved.         Acute diastolic heart failure    Decreased peripheral edema  CXR improved  Discontinue Lasix          Elevated transaminase level    - Hepatitis panel negative.   - Suspect from rhabdo.           Hypomagnesemia    Oral replacement of magnesium and continue to monitor.             Vitamin D deficiency    Continue Vitamin D supplementation          Folate deficiency    - Replace.          Anemia due to vitamin B12 deficiency    - Ferritin elevated.    - Also replacing folic acid.   -No need for transfusion        Vitamin B 12 deficiency    - MMA WNL.  Homocysteine WNL.   - B-12 1000 mcg daily for 5 days, then stop.            Hypothyroidism (acquired)    - Continue levothyroxine 25mcg          Weakness of both hips    - Check CK  - PT OT recs SNF          Falls    - PT OT          History of pulmonary embolism    - Pulmonary embolus in 2014.    - Held anticoagulation in the setting of renal failure, now resolved.   - Xarelto.   - Follow up with PCP.         Morbid obesity with BMI of 40.0-44.9, adult    Address the importance of a prudent 2000 ADA, 2 gram sodium diet upon discharge with some aerobic exercise          Controlled type 2 diabetes mellitus with diabetic polyneuropathy, without long-term current use of insulin    - A1c = 5.5%.  - Well controlled.    - Continue to hold Metformin             Dyslipidemia    -  Atorvastatin discontinued for elevated CPK.    -Continue to hold          Essential hypertension    Discontinued HCTZ / spironolactone.  Good control on current regimen.          VTE Risk Mitigation         Ordered     rivaroxaban tablet 20 mg  With dinner     Route:  Oral        10/31/17 1303     Medium Risk of VTE  Once      10/29/17 1529              Henrietta Currie MD  Department of Hospital Medicine   [facility]

## 2017-11-12 NOTE — PROGRESS NOTES
[facility] Hospital Medicine  Progress Note    Patient Name: Serafin Tapia  MRN: [id number]  Patient Class: IP- Inpatient   Admission Date: 10/29/2017  Length of Stay: 14 days  Attending Physician: Henrietta Currie MD  Primary Care Provider: John Vargas MD        Subjective:     Principal Problem:Non-traumatic rhabdomyolysis    HPI:  Patient is a 66 year-old male with hypertension, diabetes mellitus type II, dyslipidemia who presents to the emergency department by the emergency medical services after he was found to be confused with capillary blood glucose measurement of 40 mg/dL.  Patient was given an ampule of dextrose with resolution of hypoglycemia and normalization of his mental status.  However while in the emergency department he had recurrence of hypoglycemia requiring further intervenous dextrose.  He denies any recent changes in his medications or recent illnesses.  He reports no changes in his diet.  He reports that he has not been checking his blood glucose as his home glucometer is broken.    Hospital Course:  Patient admitted to the hospital for treatment of acute renal failure and refractory hypoglycemia secondary to sulfonylurea use.  Patient given isotonic intravenous with dextrose fluids with improvement in serum creatinine.  The patient also has suspected rhabdomyolysis with initial CPK >5000.  Aggressive hydration not tolerated well and patient clinically has signs of fluid overload with pulmonary and peripheral edema.  Suspect decompensated diastolic heart failure, so fluids discontinued and patient will continue IV Lasix.  CPK remains elevated, but is slowly decreasing.  The patient is deconditioned and will benefit from continued PT/OT.  Discharge planning in progress.        Interval History: The patient still has some SOB and weakness.    Review of Systems   Respiratory: Positive for shortness of breath.    Cardiovascular: Positive for leg swelling.     Objective:      Vital Signs (Most Recent):  Temp: 97.6 °F (36.4 °C) (11/12/17 1626)  Pulse: 75 (11/12/17 1626)  Resp: 17 (11/12/17 1626)  BP: 123/60 (11/12/17 1626)  SpO2: (!) 94 % (11/12/17 1626) Vital Signs (24h Range):  Temp:  [97.6 °F (36.4 °C)-98.5 °F (36.9 °C)] 97.6 °F (36.4 °C)  Pulse:  [61-82] 75  Resp:  [16-20] 17  SpO2:  [83 %-96 %] 94 %  BP: (103-123)/(53-60) 123/60     Weight: 120.7 kg (266 lb 3.2 oz)  Body mass index is 38.2 kg/m².    Intake/Output Summary (Last 24 hours) at 11/12/17 1649  Last data filed at 11/12/17 0800   Gross per 24 hour   Intake              400 ml   Output              800 ml   Net             -400 ml      Physical Exam   Constitutional: He is oriented to person, place, and time. He appears well-developed and well-nourished.   Morbidly obese male with breathlessness and SOB   HENT:   Head: Normocephalic and atraumatic.   Eyes: Conjunctivae are normal. Pupils are equal, round, and reactive to light.   Neck: Normal range of motion. Neck supple.   Unable to assess for the presence of JVD as the patient is obese   Cardiovascular: Normal rate, regular rhythm, normal heart sounds and intact distal pulses.  Exam reveals no gallop and no friction rub.    No murmur heard.  Pulmonary/Chest: Breath sounds normal. He has no rales.   Diminished BS at the bases   Abdominal: Soft. Bowel sounds are normal.   Musculoskeletal: He exhibits edema.   Upper extremity edema has decreased bilaterally and 1+ pitting edema of legs which is decreasing    Neurological: He is alert and oriented to person, place, and time.   Proximal muscular weakness shoulder and hip bilat.   Skin: Skin is warm and dry.   Psychiatric: He has a normal mood and affect. His behavior is normal. Judgment and thought content normal.       Significant Labs:   CBC: No results for input(s): WBC, HGB, HCT, PLT in the last 48 hours.  CMP:   Recent Labs  Lab 11/11/17  0447 11/12/17  0433    141   K 3.7 3.9   CL 94* 95   CO2 41* 41*   *  103   BUN 11 10   CREATININE 0.6 0.6   CALCIUM 9.2 9.0   ANIONGAP 8 5*   EGFRNONAA >60 >60       Significant Imaging: I have reviewed and interpreted all pertinent imaging results/findings within the past 24 hours.    Assessment/Plan:      * Non-traumatic rhabdomyolysis    - Has proximal muscle weakness.  - CPK elevated more today.  -Discontinued IV fluids  -Question this diagnosis, with elevated ESR and CRP checked HALLE, but it is negative                 Adverse reaction to oral hypoglycemic drug, initial encounter    - Hypoglycemia due to decreased clearance due to renal failure.   - Rehydrated and renal function at baseline.   - Discontinued sulfonylurea.    - Resolved.         Acute diastolic heart failure    Decreased peripheral edema  CXR improved  Discontinue Lasix          Elevated transaminase level    - Hepatitis panel negative.   - Suspect from rhabdo.           Hypomagnesemia    Oral replacement of magnesium and continue to monitor.             Vitamin D deficiency    Continue Vitamin D supplementation          Folate deficiency    - Replace.          Anemia due to vitamin B12 deficiency    - Ferritin elevated.    - Also replacing folic acid.   -No need for transfusion        Vitamin B 12 deficiency    - MMA WNL.  Homocysteine WNL.   - B-12 1000 mcg daily for 5 days, then stop.            Hypothyroidism (acquired)    - Continue levothyroxine 25mcg          Weakness of both hips    - Check CK  - PT OT recs SNF          Falls    - PT OT          History of pulmonary embolism    - Pulmonary embolus in 2014.    - Held anticoagulation in the setting of renal failure, now resolved.   - Xarelto.   - Follow up with PCP.         Morbid obesity with BMI of 40.0-44.9, adult    Address the importance of a prudent 2000 ADA, 2 gram sodium diet upon discharge with some aerobic exercise          Controlled type 2 diabetes mellitus with diabetic polyneuropathy, without long-term current use of insulin    - A1c = 5.5%.  -  Well controlled.    - Continue to hold Metformin             Dyslipidemia    - Atorvastatin discontinued for elevated CPK.    -Continue to hold          Essential hypertension    Discontinued HCTZ / spironolactone.  Good control on current regimen.          VTE Risk Mitigation         Ordered     rivaroxaban tablet 20 mg  With dinner     Route:  Oral        10/31/17 1303     Medium Risk of VTE  Once      10/29/17 1529              Henrietta Currie MD  Department of Hospital Medicine   [facility]

## 2017-11-12 NOTE — PLAN OF CARE
Problem: Physical Therapy Goal  Goal: Physical Therapy Goal  Goals to be met by 11-10-17.  1. Sup<>sit mod I  2. Sit<>stand with RW mod I  3. amb 150' with RW mod I    Discontinue above goals secondary to decline in function status    Goals to be met by: 2017    Patient will increase functional independence with mobility by performin. Supine to sit with min A.  2. Sit to supine with min A.   3. Rolling R and L with min A.   5. Sit <> stand with min A  4. Ambulate 30' with rolling walker CGA         -    Comments: Good participation in therapy.  Pt with significant decline in general muscle strength and functional mobility since admission 10-29-17.   Now needing high level of assistance for sit<>stand and ambulating short distances.

## 2017-11-12 NOTE — PLAN OF CARE
Problem: Occupational Therapy Goal  Goal: Occupational Therapy Goal  Goals reviewed and updated. Goals to be met by: 11/15/17     Patient will increase functional independence with ADLs by performing:    LE Dressing with Stand-by Assistance and use of AE. In progress; NOT MET  Grooming while standing at sink with Stand-by Assistance. In progress; NOT MET  Toileting from bedside commode with Stand-by Assistance for hygiene and clothing management. In progress; NOT MET  Supine to sit with Stand-by Assistance. In Progress; NOT MET  Stand pivot transfers with Stand-by Assistance. In Progress, NOT MET  Increased functional strength to 3/5 for increased (I) with bed mobility and transfers, as well as ADL. In progress: NOT MET  Patient is (S) with UE therex program. In progress; NOT MET  .        Outcome: Ongoing (interventions implemented as appropriate)  The kevon coinues to she progressive loss of motor function.  He was total A sit>stand (Mod A x3) from the chair/Min A chair>bed transfer once standing (6 steps to need to sit due to low of leg strength), and Mod A sit>supine in bed.  UE exercise was included in the session once in the bed.  RAMON Collins 11/12/2017

## 2017-11-12 NOTE — ASSESSMENT & PLAN NOTE
- Has proximal muscle weakness.  - CPK elevated more today.  -Discontinued IV fluids  -Question this diagnosis, with elevated ESR and CRP checked HALLE, but it is negative

## 2017-11-13 VITALS
OXYGEN SATURATION: 90 % | SYSTOLIC BLOOD PRESSURE: 109 MMHG | TEMPERATURE: 97 F | WEIGHT: 268.81 LBS | RESPIRATION RATE: 18 BRPM | DIASTOLIC BLOOD PRESSURE: 57 MMHG | HEART RATE: 77 BPM | HEIGHT: 70 IN | BODY MASS INDEX: 38.48 KG/M2

## 2017-11-13 PROBLEM — E83.42 HYPOMAGNESEMIA: Status: RESOLVED | Noted: 2017-11-03 | Resolved: 2017-11-13

## 2017-11-13 PROBLEM — R74.01 ELEVATED TRANSAMINASE LEVEL: Status: RESOLVED | Noted: 2017-11-03 | Resolved: 2017-11-13

## 2017-11-13 LAB
ANION GAP SERPL CALC-SCNC: 3 MMOL/L
BUN SERPL-MCNC: 11 MG/DL
CALCIUM SERPL-MCNC: 9.1 MG/DL
CHLORIDE SERPL-SCNC: 95 MMOL/L
CK SERPL-CCNC: 5553 U/L
CO2 SERPL-SCNC: 42 MMOL/L
CREAT SERPL-MCNC: 0.6 MG/DL
EST. GFR  (AFRICAN AMERICAN): >60 ML/MIN/1.73 M^2
EST. GFR  (NON AFRICAN AMERICAN): >60 ML/MIN/1.73 M^2
GLUCOSE SERPL-MCNC: 105 MG/DL
MAGNESIUM SERPL-MCNC: 1.7 MG/DL
PHOSPHATE SERPL-MCNC: 3.2 MG/DL
POCT GLUCOSE: 115 MG/DL (ref 70–110)
POTASSIUM SERPL-SCNC: 4.1 MMOL/L
SODIUM SERPL-SCNC: 140 MMOL/L

## 2017-11-13 PROCEDURE — 25000003 PHARM REV CODE 250: Performed by: HOSPITALIST

## 2017-11-13 PROCEDURE — 97535 SELF CARE MNGMENT TRAINING: CPT

## 2017-11-13 PROCEDURE — 84100 ASSAY OF PHOSPHORUS: CPT

## 2017-11-13 PROCEDURE — 27000221 HC OXYGEN, UP TO 24 HOURS

## 2017-11-13 PROCEDURE — 99239 HOSP IP/OBS DSCHRG MGMT >30: CPT | Mod: ,,, | Performed by: HOSPITALIST

## 2017-11-13 PROCEDURE — 25000003 PHARM REV CODE 250: Performed by: NURSE PRACTITIONER

## 2017-11-13 PROCEDURE — 36415 COLL VENOUS BLD VENIPUNCTURE: CPT

## 2017-11-13 PROCEDURE — 97530 THERAPEUTIC ACTIVITIES: CPT

## 2017-11-13 PROCEDURE — 80048 BASIC METABOLIC PNL TOTAL CA: CPT

## 2017-11-13 PROCEDURE — 83735 ASSAY OF MAGNESIUM: CPT

## 2017-11-13 PROCEDURE — 94761 N-INVAS EAR/PLS OXIMETRY MLT: CPT

## 2017-11-13 PROCEDURE — 97110 THERAPEUTIC EXERCISES: CPT

## 2017-11-13 PROCEDURE — 82550 ASSAY OF CK (CPK): CPT

## 2017-11-13 RX ORDER — FOLIC ACID 1 MG/1
1 TABLET ORAL DAILY
Qty: 30 TABLET | Refills: 0 | Status: SHIPPED | OUTPATIENT
Start: 2017-11-14 | End: 2017-12-28 | Stop reason: SDUPTHER

## 2017-11-13 RX ORDER — LEVOTHYROXINE SODIUM 25 UG/1
25 TABLET ORAL
Qty: 30 TABLET | Refills: 11 | Status: SHIPPED | OUTPATIENT
Start: 2017-11-14 | End: 2017-12-28 | Stop reason: SDUPTHER

## 2017-11-13 RX ORDER — FUROSEMIDE 20 MG/1
20 TABLET ORAL DAILY
Qty: 30 TABLET | Refills: 11
Start: 2017-11-13 | End: 2017-12-28 | Stop reason: SDUPTHER

## 2017-11-13 RX ADMIN — LEVOTHYROXINE SODIUM 25 MCG: 25 TABLET ORAL at 05:11

## 2017-11-13 RX ADMIN — BENAZEPRIL HYDROCHLORIDE 10 MG: 10 TABLET, FILM COATED ORAL at 09:11

## 2017-11-13 RX ADMIN — AMLODIPINE BESYLATE 10 MG: 5 TABLET ORAL at 09:11

## 2017-11-13 RX ADMIN — Medication 400 MG: at 09:11

## 2017-11-13 RX ADMIN — CARVEDILOL 25 MG: 12.5 TABLET, FILM COATED ORAL at 09:11

## 2017-11-13 RX ADMIN — FOLIC ACID 1 MG: 1 TABLET ORAL at 09:11

## 2017-11-13 NOTE — PLAN OF CARE
[facility]  [address]  [place] [address]  [phone] after hours  [phone]  Fax      Facility Transfer Orders                        11/13/2017    Admit to: SNF    Diagnoses:  Active Hospital Problems    Diagnosis  POA    *Non-traumatic rhabdomyolysis [M62.82]  Yes     Priority: 1 - High    Adverse reaction to oral hypoglycemic drug, initial encounter [T38.3X5A]  Yes     Priority: 2     Acute diastolic heart failure [I50.31]  Yes     Priority: 3     Folate deficiency [E53.8]  Yes    Vitamin D deficiency [E55.9]  Yes    Weakness of both hips [R29.898]  Yes    Hypothyroidism (acquired) [E03.9]  Yes    Vitamin B 12 deficiency [E53.8]  Yes    Anemia due to vitamin B12 deficiency [D51.9]  Yes    Falls [W19.XXXA]  Yes    Hyperuricemia [E79.0]  Yes    History of pulmonary embolism [Z86.711]  Yes    Controlled type 2 diabetes mellitus with diabetic polyneuropathy, without long-term current use of insulin [E11.42]  Yes    Morbid obesity with BMI of 40.0-44.9, adult [E66.01, Z68.41]  Not Applicable    Dyslipidemia [E78.5]  Yes    Essential hypertension [I10]  Yes      Resolved Hospital Problems    Diagnosis Date Resolved POA    Hypomagnesemia [E83.42] 11/13/2017 Yes    Elevated transaminase level [R74.0] 11/13/2017 Yes    Acute kidney injury [N17.9] 11/01/2017 Yes       Allergies:Review of patient's allergies indicates:  No Known Allergies    Vitals:      Every shift (Skilled Nursing patients)    Diet: 2 gram sodium, 2000 ADA    Activity:      - Up in a chair each morning as tolerated   - Ambulate with assistance to bathroom    Nursing Precautions:        - Fall precautions     - Decubitus precautions:        -  for positioning   - Pressure reducing foam mattress   - Turn patient every two hours. Use wedge pillows to anchor patient    CONSULTS:      PT to evaluate and treat - five times a week     OT to evaluate and treat - five times a  week    LABS:  CMP, CPK Qweek    Medications: Discontinue all previous medication orders, if any. See new list below.     Serafin Tapia   Home Medication Instructions ELVIA:[id number]    Printed on:11/13/17 1412   Medication Information                      amlodipine-benazepril 10-20mg (LOTREL) 10-20 mg per capsule  Take 1 capsule by mouth once daily.             carvedilol (COREG) 25 MG tablet  Take 1 tablet (25 mg total) by mouth 2 (two) times daily with meals.             folic acid (FOLVITE) 1 MG tablet  Take 1 tablet (1 mg total) by mouth once daily.             furosemide (LASIX) 20 MG tablet  Take 1 tablet (20 mg total) by mouth once daily.             levothyroxine (SYNTHROID) 25 MCG tablet  Take 1 tablet (25 mcg total) by mouth before breakfast.             metformin (GLUCOPHAGE) 1000 MG tablet  TAKE ONE TABLET BY MOUTH TWICE A DAY WITH MEALS             rivaroxaban (XARELTO) 20 mg Tab  Take 1 tablet (20 mg total) by mouth once daily.             spironolactone (ALDACTONE) 25 MG tablet  TAKE ONE TABLET BY MOUTH DAILY                       _________________________________  Henrietta Currie MD  11/13/2017

## 2017-11-13 NOTE — PT/OT/SLP PROGRESS
"Physical Therapy  Treatment    Serafin Tapia   MRN: [id number]   Admitting Diagnosis: Non-traumatic rhabdomyolysis    PT Received On: 11/13/17  PT Start Time: 1015     PT Stop Time: 1040    PT Total Time (min): 25 min       Billable Minutes:  Therapeutic Activity 12 and Therapeutic Exercise 13    Treatment Type: Treatment  PT/PTA: PTA     PTA Visit Number: 1       General Precautions: Standard, diabetic, fall  Orthopedic Precautions: N/A   Braces: N/A    Do you have any cultural, spiritual, Congregational conflicts, given your current situation?: none     Subjective:  Communicated with nurse prior to session.  Pt up in chair , just finished bathing with nurse aide, pt requesting to "scoot back in chair" (recline as well).     Pain/Comfort  Pain Rating 1: 0/10  Pain Rating Post-Intervention 1: 0/10    Objective:   Patient found with: telemetry, peripheral IV, oxygen    Functional Mobility:  Bed Mobility:        Transfers:  Sit <> Stand Assistance: Maximum Assistance (x 2 persons, unable to fully achieve upright position)  Sit <> Stand Assistive Device: Rolling Walker    Gait:   Gait Distance: unable to at this time, pt just had a bath up in chair and may be fatigued, will attempt later    Stairs:  n/a    Balance:   Static Sit: FAIR+: Able to take MINIMAL challenges from all directions  Dynamic Sit: FAIR+: Maintains balance through MINIMAL excursions of active trunk motion  Static Stand: n/a  Dynamic stand: n/a    Therapeutic Activities and Exercises:  Attempting to stand with RW x 2 attempts with max.A x 2 people (ns aide assisting), but unable to achieve full upright position. Pt. Fatigued and requesting to recline in chair. Pt. inst'd in scooting back in chair using UE's to assist (took pt a few minutes to perform with max. Cueing for technique). Pt. perf'd seated heelraises,AROM/AAROM LAQ's, and PROM hip flex x 10 ea., reclined LE ex's of QS, and hip abd/add x 10 ea.     AM-PAC 6 CLICK MOBILITY  How much help from another " person does this patient currently need?   1 = Unable, Total/Dependent Assistance  2 = A lot, Maximum/Moderate Assistance  3 = A little, Minimum/Contact Guard/Supervision  4 = None, Modified [place]/Independent    Turning over in bed (including adjusting bedclothes, sheets and blankets)?: 2  Sitting down on and standing up from a chair with arms (e.g., wheelchair, bedside commode, etc.): 2  Moving from lying on back to sitting on the side of the bed?: 2  Moving to and from a bed to a chair (including a wheelchair)?: 2  Need to walk in hospital room?: 2  Climbing 3-5 steps with a railing?: 1  Total Score: 11    AM-PAC Raw Score CMS G-Code Modifier Level of Impairment Assistance   6 % Total / Unable   7 - 9 CM 80 - 100% Maximal Assist   10 - 14 CL 60 - 80% Moderate Assist   15 - 19 CK 40 - 60% Moderate Assist   20 - 22 CJ 20 - 40% Minimal Assist   23 CI 1-20% SBA / CGA   24 CH 0% Independent/ Mod I     Patient left up in chair with all lines intact, call button in reach and nurse notified.    Assessment:  Serafin Tapia is a 67 y.o. male with a medical diagnosis of Non-traumatic rhabdomyolysis and presents with dec mobility, dec strength, jb hip flexors. Pt with good participation, though unable to stand up fully at this time. Will return with OT after pt rests.    Rehab identified problem list/impairments: Rehab identified problem list/impairments: weakness, impaired endurance, impaired functional mobilty, impaired self care skills, gait instability, impaired balance, decreased upper extremity function, decreased lower extremity function, edema, decreased coordination, decreased ROM    Rehab potential is good.    Activity tolerance: Fair    Discharge recommendations: Discharge Facility/Level Of Care Needs: nursing facility, skilled     Barriers to discharge: Barriers to Discharge: Decreased caregiver support    Equipment recommendations: Equipment Needed After Discharge: walker, rolling (bariatric)      GOALS:    Physical Therapy Goals        Problem: Physical Therapy Goal    Goal Priority Disciplines Outcome Goal Variances Interventions   Physical Therapy Goal     PT/OT, PT Ongoing (interventions implemented as appropriate)     Description:  Goals to be met by 11-10-17.  1. Sup<>sit mod I  2. Sit<>stand with RW mod I  3. amb 150' with RW mod I    Discontinue above goals secondary to decline in function status    Goals to be met by: 2017    Patient will increase functional independence with mobility by performin. Supine to sit with min A.  2. Sit to supine with min A.   3. Rolling R and L with min A.   5. Sit <> stand with min A  4. Ambulate 30' with rolling walker CGA                          PLAN:    Patient to be seen daily  to address the above listed problems via gait training, therapeutic activities, therapeutic exercises, neuromuscular re-education  Plan of Care expires: 17  Plan of Care reviewed with: patient         Roselyn Monge, PTA  2017  [id number]  There Act 15 min.  Pt sat up ~5 hrs today, sit to stand (with OT assisting) with max.A x 2 for 2 attempts,lots of cueing for hand and foot placement, manual assistance needed to place feet in position,  pt able to stand on second attempt with bariatric RW, pt took a few small steps chair to bed with modA x 2, unable to fully step to bed, sat quickly at EOB, sit to supine with max.A x 2 at trunk and LE's.   Roselyn Monge, PTA  2017

## 2017-11-13 NOTE — PLAN OF CARE
11/13/17 1705   Final Note   Assessment Type Final Discharge Note   Discharge Disposition SNF   Hospital Follow Up  Appt(s) scheduled? Yes   Discharge plans and expectations educations in teach back method with documentation complete? Yes   Right Care Referral Info   Post Acute Recommendation SNF / Sub-Acute Rehab   Referral Type SNF   Facility Name [facility]

## 2017-11-13 NOTE — PLAN OF CARE
Problem: Patient Care Overview  Goal: Plan of Care Review  Outcome: Ongoing (interventions implemented as appropriate)  Pt up in chair on NC 2 L sat's 98%.No changes in pt status .Will continue to monitor.

## 2017-11-13 NOTE — PLAN OF CARE
11/13/17 1703   Final Note   Assessment Type Discharge Planning Reassessment   Discharge Disposition SNF   Hospital Follow Up  Appt(s) scheduled? Yes   Discharge plans and expectations educations in teach back method with documentation complete? Yes   Right Care Referral Info   Post Acute Recommendation SNF / Sub-Acute Rehab   Referral Type SNF   Facility Name [facility]

## 2017-11-13 NOTE — PT/OT/SLP PROGRESS
"Occupational Therapy  Treatment    Serafin Tapia   MRN: [id number]   Admitting Diagnosis: Non-traumatic rhabdomyolysis    OT Date of Treatment: 11/13/17   OT Start Time: 0755  OT Stop Time: 0842  OT Total Time (min): 47 min    Billable Minutes:  Self Care/Home Management 23 and Therapeutic Activity 24    General Precautions: Standard, diabetic, fall  Orthopedic Precautions: N/A  Braces: N/A    Do you have any cultural, spiritual, Christianity conflicts, given your current situation?: none reported    Subjective:  Communicated with RN (Nava) prior to session.  Pt reports, "I'm going to try and sit up through lunch."    Pain/Comfort  Pain Rating 1: 0/10  Location - Side 1: Bilateral  Location - Orientation 1: generalized  Location 1: shoulder  Pain Addressed 1: Reposition, Distraction  Pain Rating Post-Intervention 1: 3/10    Objective:  Patient found with: telemetry, peripheral IV, oxygen     Functional Mobility:  Bed Mobility:  Scooting/Bridging: Moderate Assistance (min (A) to EOB, mod (A) to position self in bedside chair)  Supine to Sit: Maximum Assistance (for trunk elevation)    Transfers:   Sit <> Stand Assistance: Minimum Assistance (from elevated EOB x2 trials)  Sit <> Stand Assistive Device: Rolling Walker (cues for hand placemnt)  Bed <> Chair Technique: Stand Pivot  Bed <> Chair Transfer Assistance: Minimum Assistance (required cues for safety- mild impulsivity during transfer secondary to B LE weakness )  Bed <> Chair Assistive Device: Rolling Walker    Functional Ambulation: pt took 3-4 steps within stand-turn transfer from EOB to bedside chair using RW with min (A)    Activities of Daily Living:  Feeding Level of Assistance: Independent (pt demonstrated ability to self-feed using utensils; pt reports increased difficulty holding utensils secondary to swelling. pt able to (I)'ly hold and maneuver utensils- did provide education on builtup handle if needed; pt declines need at this time)  Feeding adaptive " "equipment: None  UE Dressing Level of Assistance: Moderate assistance ((A) to lace gown across his backside)  UE adaptive equipment: None  LE Dressing Level of Assistance: Total assistance (to doff/don sylvie socks while seated EOB)  LE adaptive equipment: None  Grooming Position: Seated  Grooming Level of Assistance: Supervision (s/u for oral hygiene and face washing s/p breakfast)    Balance:   Static Sit: FAIR+: Able to take MINIMAL challenges from all directions  Dynamic Sit: FAIR+: Maintains balance through MINIMAL excursions of active trunk motion  Static Stand: POOR+: Needs MINIMAL assist to maintain  Dynamic stand: POOR: min (A) for stand-pivot transfer     Therapeutic Activities and Exercises:  Pt motivated for OOB and UIC through lunch this date.     AM-PAC 6 CLICK ADL   How much help from another person does this patient currently need?   1 = Unable, Total/Dependent Assistance  2 = A lot, Maximum/Moderate Assistance  3 = A little, Minimum/Contact Guard/Supervision  4 = None, Modified [place]/Independent    Putting on and taking off regular lower body clothing? : 1  Bathing (including washing, rinsing, drying)?: 1  Toileting, which includes using toilet, bedpan, or urinal? : 2  Putting on and taking off regular upper body clothing?: 3  Taking care of personal grooming such as brushing teeth?: 3  Eating meals?: 4  Total Score: 14     AM-PAC Raw Score CMS "G-Code Modifier Level of Impairment Assistance   6 % Total / Unable   7 - 8 CM 80 - 100% Maximal Assist   9-13 CL 60 - 80% Moderate Assist   14 - 19 CK 40 - 60% Moderate Assist   20 - 22 CJ 20 - 40% Minimal Assist   23 CI 1-20% SBA / CGA   24 CH 0% Independent/ Mod I       Patient left up in chair with all lines intact, call button in reach and RN notified    ASSESSMENT:  Serafin Tapia is a 67 y.o. male with a medical diagnosis of Non-traumatic rhabdomyolysis. Pt is making steady progress towards his OT goals. Pt continues to require min (A) for " functional transfers from elevated surface. Min (A) also required for stand-turn transfer from EOB to bedside chair with cues for safety secondary to impulsivity to sit down. Pt remains motivated for UIC. Pt continues to be most limited by decreased strength and endurance. Feel that pt remains appropriate for acute OT services. Continue to recommend SNF upon D/C to maximize return to PLOF.     Rehab identified problem list/impairments: Rehab identified problem list/impairments: weakness, impaired endurance, impaired self care skills, impaired functional mobilty, gait instability, impaired balance, decreased upper extremity function, decreased lower extremity function, decreased coordination, pain, decreased ROM, edema, impaired cardiopulmonary response to activity    Rehab potential is good.    Activity tolerance: Good    Discharge recommendations: Discharge Facility/Level Of Care Needs: nursing facility, skilled     Barriers to discharge: Barriers to Discharge: Decreased caregiver support    Equipment recommendations: walker, rolling (bariatric)     GOALS:    Occupational Therapy Goals        Problem: Occupational Therapy Goal    Goal Priority Disciplines Outcome Interventions   Occupational Therapy Goal     OT, PT/OT Ongoing (interventions implemented as appropriate)    Description:  Goals reviewed and updated. Goals to be met by: 11/15/17     Patient will increase functional independence with ADLs by performing:    LE Dressing with Stand-by Assistance and use of AE. In progress; NOT MET  Grooming while standing at sink with Stand-by Assistance. In progress; NOT MET  Toileting from bedside commode with Stand-by Assistance for hygiene and clothing management. In progress; NOT MET  Supine to sit with Stand-by Assistance. In Progress; NOT MET  Stand pivot transfers with Stand-by Assistance. In Progress, NOT MET  Increased functional strength to 3/5 for increased (I) with bed mobility and transfers, as well as ADL. In  progress: NOT MET  Patient is (S) with UE therex program. In progress; NOT MET  .                         Plan:  Patient to be seen 6 x/week to address the above listed problems via self-care/home management, therapeutic activities, therapeutic exercises  Plan of Care expires: 11/30/17  Plan of Care reviewed with: patient         Dipti LEONA Mustafa, OTR/SAMUEL  11/13/2017

## 2017-11-13 NOTE — PLAN OF CARE
Problem: Occupational Therapy Goal  Goal: Occupational Therapy Goal  Goals reviewed and updated. Goals to be met by: 11/15/17     Patient will increase functional independence with ADLs by performing:    LE Dressing with Stand-by Assistance and use of AE. In progress; NOT MET  Grooming while standing at sink with Stand-by Assistance. In progress; NOT MET  Toileting from bedside commode with Stand-by Assistance for hygiene and clothing management. In progress; NOT MET  Supine to sit with Stand-by Assistance. In Progress; NOT MET  Stand pivot transfers with Stand-by Assistance. In Progress, NOT MET  Increased functional strength to 3/5 for increased (I) with bed mobility and transfers, as well as ADL. In progress: NOT MET  Patient is (S) with UE therex program. In progress; NOT MET  .        Outcome: Ongoing (interventions implemented as appropriate)  Pt is making steady progress towards his OT goals. Goals remain appropriate at this time.     Dipti Mustafa, JUAN JOSE/SAMUEL  11/13/2017

## 2017-11-13 NOTE — DISCHARGE SUMMARY
[facility] Hospital Medicine  Discharge Summary      Patient Name: Serafin Tapia  MRN: [id number]  Admission Date: 10/29/2017  Hospital Length of Stay: 15 days  Discharge Date and Time:  11/13/2017 1:32 PM  Attending Physician: Henrietta Currie MD   Discharging Provider: Henrietta Currie MD  Primary Care Provider: John Vargas MD      HPI:   Patient is a 66 year-old male with hypertension, diabetes mellitus type II, dyslipidemia who presents to the emergency department by the emergency medical services after he was found to be confused with capillary blood glucose measurement of 40 mg/dL.  Patient was given an ampule of dextrose with resolution of hypoglycemia and normalization of his mental status.  However while in the emergency department he had recurrence of hypoglycemia requiring further intervenous dextrose.  He denies any recent changes in his medications or recent illnesses.  He reports no changes in his diet.  He reports that he has not been checking his blood glucose as his home glucometer is broken.    * No surgery found *      Hospital Course:   Patient admitted to the hospital for treatment of acute renal failure and refractory hypoglycemia secondary to sulfonylurea use.  Patient given isotonic intravenous with dextrose fluids with improvement in serum creatinine.  The patient also has suspected rhabdomyolysis with initial CPK >5000.  Aggressive hydration was not tolerated well,  and patient clinically has signs of fluid overload with pulmonary and peripheral edema.  He was then diuresed with Lasix IV and was negative about 4L during his hospitalization.   The patient was treated for possible decompensated diastolic heart failure.  CPK remained elevated, and was 5553 on the day of discharge.   The patient will have Metformin resumed, but will not be restarted on the sulfonylurea.  CRP and ESR were done and elevated, so an HALLE was also performed and this was negative.  Patient may  have an underlying neurodegenerative disorder which is linked to the persistently elevated CPK.  The patient is deconditioned and will benefit from continued PT/OT in a skilled nursing setting.  He will also need Neurology follow up.           Consults:   Consults         Status Ordering Provider     Inpatient consult to Nephrology-[place]  Once     Provider:  Daniel Colon MD    Completed ZAINA MARTINEZ     Inpatient consult to Social Work/Case Management  Once     Provider:  (Not yet assigned)    PABLO King          No new Assessment & Plan notes have been filed under this hospital service since the last note was generated.  Service: Hospital Medicine    Final Active Diagnoses:    Diagnosis Date Noted POA    PRINCIPAL PROBLEM:  Non-traumatic rhabdomyolysis [M62.82] 11/01/2017 Yes    Adverse reaction to oral hypoglycemic drug, initial encounter [T38.3X5A] 10/29/2017 Yes    Acute diastolic heart failure [I50.31] 02/19/2015 Yes    Folate deficiency [E53.8] 11/02/2017 Yes    Vitamin D deficiency [E55.9] 11/02/2017 Yes    Weakness of both hips [R29.898] 11/01/2017 Yes    Hypothyroidism (acquired) [E03.9] 11/01/2017 Yes    Vitamin B 12 deficiency [E53.8] 11/01/2017 Yes    Anemia due to vitamin B12 deficiency [D51.9] 11/01/2017 Yes    Falls [W19.XXXA] 10/31/2017 Yes    Hyperuricemia [E79.0] 10/31/2017 Yes    History of pulmonary embolism [Z86.711] 05/04/2017 Yes    Controlled type 2 diabetes mellitus with diabetic polyneuropathy, without long-term current use of insulin [E11.42] 05/04/2017 Yes    Morbid obesity with BMI of 40.0-44.9, adult [E66.01, Z68.41] 05/04/2017 Not Applicable    Dyslipidemia [E78.5] 05/05/2014 Yes    Essential hypertension [I10] 05/03/2014 Yes      Problems Resolved During this Admission:    Diagnosis Date Noted Date Resolved POA    Hypomagnesemia [E83.42] 11/03/2017 11/13/2017 Yes    Elevated transaminase level [R74.0] 11/03/2017 11/13/2017 Yes    Acute kidney  injury [N17.9] 10/29/2017 11/01/2017 Yes       Discharged Condition: good    Disposition: Skilled Nursing Facility    Follow Up:  Follow-up Information     John Vargas MD In 2 weeks.    Specialty:  Internal Medicine  Contact information:  [address]  [address]  [facility] [address]  [phone]                 Patient Instructions:     Diet general   Order Specific Question Answer Comments   Total calories: 2000 Calorie    Na restriction, if any: 2gNa      Activity as tolerated         Significant Diagnostic Studies: Labs:   CMP   Recent Labs  Lab 11/12/17  0433 11/13/17  0534    140   K 3.9 4.1   CL 95 95   CO2 41* 42*    105   BUN 10 11   CREATININE 0.6 0.6   CALCIUM 9.0 9.1   ANIONGAP 5* 3*   ESTGFRAFRICA >60 >60   EGFRNONAA >60 >60    and CBC No results for input(s): WBC, HGB, HCT, PLT in the last 48 hours.    Pending Diagnostic Studies:     None         Medications:  Reconciled Home Medications:   Current Discharge Medication List      START taking these medications    Details   folic acid (FOLVITE) 1 MG tablet Take 1 tablet (1 mg total) by mouth once daily.  Qty: 30 tablet, Refills: 0      furosemide (LASIX) 20 MG tablet Take 1 tablet (20 mg total) by mouth once daily.  Qty: 30 tablet, Refills: 11      levothyroxine (SYNTHROID) 25 MCG tablet Take 1 tablet (25 mcg total) by mouth before breakfast.  Qty: 30 tablet, Refills: 11         CONTINUE these medications which have NOT CHANGED    Details   amlodipine-benazepril 10-20mg (LOTREL) 10-20 mg per capsule Take 1 capsule by mouth once daily.  Qty: 90 capsule, Refills: 1    Associated Diagnoses: Essential hypertension      carvedilol (COREG) 25 MG tablet Take 1 tablet (25 mg total) by mouth 2 (two) times daily with meals.  Qty: 180 tablet, Refills: 1      metformin (GLUCOPHAGE) 1000 MG tablet TAKE ONE TABLET BY MOUTH TWICE A DAY WITH MEALS  Qty: 180 tablet, Refills: 1      rivaroxaban (XARELTO) 20 mg Tab Take 1 tablet (20 mg total) by  mouth once daily.  Qty: 30 tablet, Refills: 3      spironolactone (ALDACTONE) 25 MG tablet TAKE ONE TABLET BY MOUTH DAILY  Qty: 90 tablet, Refills: 0         STOP taking these medications       atorvastatin (LIPITOR) 40 MG tablet Comments:   Reason for Stopping:         glimepiride (AMARYL) 4 MG tablet Comments:   Reason for Stopping:         hydrochlorothiazide (HYDRODIURIL) 25 MG tablet Comments:   Reason for Stopping:               Indwelling Lines/Drains at time of discharge:   Lines/Drains/Airways          No matching active lines, drains, or airways          Time spent on the discharge of patient: 35 minutes  Patient was seen and examined on the date of discharge and determined to be suitable for discharge.         Henrietta Currie MD  Department of Hospital Medicine  [facility]

## 2017-11-14 ENCOUNTER — PATIENT OUTREACH (OUTPATIENT)
Dept: ADMINISTRATIVE | Facility: CLINIC | Age: 67
End: 2017-11-14

## 2017-11-14 LAB — POCT GLUCOSE: 127 MG/DL (ref 70–110)

## 2017-11-14 NOTE — PT/OT/SLP DISCHARGE
Occupational Therapy Discharge Summary    Serafin Tapia  MRN: [id number]   Non-traumatic rhabdomyolysis   Patient Discharged from acute Occupational Therapy on 11/13/2017.  Please refer to prior OT note dated on 11/13/2017 for functional status.     Assessment:   Patient appropriate for care in another setting.  GOALS:    Occupational Therapy Goals        Problem: Occupational Therapy Goal    Goal Priority Disciplines Outcome Interventions   Occupational Therapy Goal     OT, PT/OT Ongoing (interventions implemented as appropriate)    Description:  Goals reviewed and updated. Goals to be met by: 11/15/17     Patient will increase functional independence with ADLs by performing:    LE Dressing with Stand-by Assistance and use of AE. In progress; NOT MET  Grooming while standing at sink with Stand-by Assistance. In progress; NOT MET  Toileting from bedside commode with Stand-by Assistance for hygiene and clothing management. In progress; NOT MET  Supine to sit with Stand-by Assistance. In Progress; NOT MET  Stand pivot transfers with Stand-by Assistance. In Progress, NOT MET  Increased functional strength to 3/5 for increased (I) with bed mobility and transfers, as well as ADL. In progress: NOT MET  Patient is (S) with UE therex program. In progress; NOT MET  .                       Reasons for Discontinuation of Therapy Services  Transfer to alternate level of care.      Plan:  Patient Discharged to: Skilled Nursing Facility.    Dipti Mustafa, OTR/L  11/14/2017

## 2017-11-14 NOTE — PLAN OF CARE
11/13/17 1818   Medicare Message   Important Message from Medicare regarding Discharge Appeal Rights Given to patient/caregiver;Explained to patient/caregiver;Signed/date by patient/caregiver   Date IMM was signed 11/13/17   Time IMM was signed 1215        11/13/17 1818   Medicare Message   Important Message from Medicare regarding Discharge Appeal Rights Given to patient/caregiver;Explained to patient/caregiver;Signed/date by patient/caregiver   Date IMM was signed 11/13/17   Time IMM was signed 1215

## 2017-11-14 NOTE — PT/OT/SLP DISCHARGE
Physical Therapy Discharge Summary    Serafin Tapia  MRN: [id number]   Non-traumatic rhabdomyolysis   Patient Discharged from acute Physical Therapy on 17.  Please refer to prior PT noted date on 17 for functional status.     Assessment:   Goals partially met.  GOALS:    Physical Therapy Goals        Problem: Physical Therapy Goal    Goal Priority Disciplines Outcome Goal Variances Interventions   Physical Therapy Goal     PT/OT, PT Ongoing (interventions implemented as appropriate)     Description:  Goals to be met by 11-10-17.  1. Sup<>sit mod I  2. Sit<>stand with RW mod I  3. amb 150' with RW mod I    Discontinue above goals secondary to decline in function status    Goals to be met by: 2017    Patient will increase functional independence with mobility by performin. Supine to sit with min A.  2. Sit to supine with min A.   3. Rolling R and L with min A.   5. Sit <> stand with min A  4. Ambulate 30' with rolling walker CGA                        Reasons for Discontinuation of Therapy Services  Transfer to alternate level of care.      Plan:  Patient Discharged to: Skilled Nursing Facility.    PT of record not available for documentation.

## 2017-12-05 ENCOUNTER — TELEPHONE (OUTPATIENT)
Dept: INTERNAL MEDICINE | Facility: CLINIC | Age: 67
End: 2017-12-05

## 2017-12-05 NOTE — TELEPHONE ENCOUNTER
----- Message from Colleen Tariq sent at 12/5/2017  8:42 AM CST -----  Contact: Raj/[phone]  x_  1st Request  _  2nd Request  _  3rd Request        Who: Raj/Daughter    Why: Requesting a call back in regards to the patient getting a hospital follow up he was in the hospital and then form there he went to Rehab. His daughter called and stated that he need to see Dr. Vargas before January. Please call him or his family member.     Pt. Is a former pt of 's    What Number to Call Back: [phone] or [phone] Raj/Daughter    When to Expect a call back: (Within 24 hours)    Please return the call at earliest convenience. Thanks!

## 2017-12-05 NOTE — TELEPHONE ENCOUNTER
Pt has been scheduled accordingly. Appt slip mailed. Patient has no further questions or concerns.

## 2017-12-07 ENCOUNTER — PATIENT OUTREACH (OUTPATIENT)
Dept: ADMINISTRATIVE | Facility: CLINIC | Age: 67
End: 2017-12-07

## 2017-12-07 ENCOUNTER — TELEPHONE (OUTPATIENT)
Dept: INTERNAL MEDICINE | Facility: CLINIC | Age: 67
End: 2017-12-07

## 2017-12-07 RX ORDER — ASCORBIC ACID 500 MG
500 TABLET ORAL 2 TIMES DAILY
COMMUNITY
End: 2017-12-29 | Stop reason: SDUPTHER

## 2017-12-07 RX ORDER — MULTIVITAMIN
1 TABLET ORAL DAILY
COMMUNITY

## 2017-12-07 RX ORDER — ZINC SULFATE 50(220)MG
220 CAPSULE ORAL DAILY
COMMUNITY
End: 2020-07-24 | Stop reason: SDUPTHER

## 2017-12-07 NOTE — TELEPHONE ENCOUNTER
Sorry I would like to help but pt has not yet established care with me. I recommend contacting pt current PCP, pulmonologist/cardiolgist/hospitalist from most recent visit

## 2017-12-07 NOTE — TELEPHONE ENCOUNTER
----- Message from Colleen Tariq sent at 12/6/2017  3:11 PM CST -----  Contact: [facility]  x_  1st Request  _  2nd Request  _  3rd Request        Who: Bella    Why: Requesting a call back in regards to pt was on oxygen, but not discharged with the oxygen and would like to know if his appointment could be pushed up to a sooner date and time. Please call.     What Number to Call Back: [phone] Ext [phone]    When to Expect a call back: (Within 24 hours)    Please return the call at earliest convenience. Thanks!

## 2017-12-07 NOTE — TELEPHONE ENCOUNTER
Please forward this important TCC information to your provider in order to maximize the post discharge care delivery of this patient.     C3 nurse spoke with Ms. Juan JLEIGH ANN at [facility], for a TCC Post Acute 2 discharge follow up call. Patient was discharged home from SNF on 12/3/17.     During SNF discharge medications review, Ms. Arita reports that patient was instructed to take zinc sulfate 220mg QD, multivitamin QD, and vitamin C 500mg BID.  These medications were not previously listed among current meds in [facility].     Please contact pt directly with any clarification or care advice.     The patient does not have a scheduled HOSFU appointment with John Vargas MD within 7 days post acute discharge date, 12/3/17. C3 nurse was unable to schedule HOSFU appointment in [facility].   Please contact patient and schedule follow up appointment using HOSFU visit type on or before 12/10/17.     Respectfully,   Ginger DEL RIO, RN   [facility] C[facility]   [email or web address]           Please advise/authorize?  Pt has appt scheduled 12/29/2017

## 2017-12-07 NOTE — TELEPHONE ENCOUNTER
Spoke with this pt Home Health nurse. The pt has recently been in the hospital was on o2 prn while in the hospital. Had not order to go home on o2. Home health check pt o2 sat it was 87-88% at rest. They was check to see if you wanted to order home o2. Please adivise and authorize

## 2017-12-08 ENCOUNTER — PATIENT OUTREACH (OUTPATIENT)
Dept: ADMINISTRATIVE | Facility: CLINIC | Age: 67
End: 2017-12-08

## 2017-12-08 ENCOUNTER — NURSE TRIAGE (OUTPATIENT)
Dept: ADMINISTRATIVE | Facility: CLINIC | Age: 67
End: 2017-12-08

## 2017-12-08 NOTE — PROGRESS NOTES
Unable to complete TCC Post Acute Discharge follow up call.  Pt triaged per OOC protocol, due to symptomms.  See triage note.

## 2017-12-08 NOTE — TELEPHONE ENCOUNTER
"C3 nurse contacted Serafin Tapia for a TCC Post Acute Discharge follow up call.  Patient was discharged from [facility] on 12/3/17.    Pt was audibly having difficulty breathing during call.  Pt states that difficulty breathing "comes and goes" over last month.  Pt also has a cough, producing "phlegm." States this episode began about 30 minutes ago.  States he has someone in the home with him now.  Reports he was on oxygen off and on while in hospital, but does not have any oxygen now.  Pt also reports swelling to right foot that extends up to his knee, as well as pain to right foot, rating 7-10/10.  Patient advised to call 911 per OOC protocol.  Pt verbalized understanding.  C3 nurse offered to call 911 for pt.  Pt declined and states he is able to call and will.    Please contact pt directly with any further care advice or clarification.      Reason for Disposition   SEVERE difficulty breathing (e.g., struggling for each breath, speaks in single words, pulse > 120)    Protocols used: ST BREATHING DIFFICULTY-A-OH    "

## 2017-12-10 ENCOUNTER — HOSPITAL ENCOUNTER (INPATIENT)
Facility: HOSPITAL | Age: 67
LOS: 4 days | Discharge: HOME HEALTH | DRG: 641 | End: 2017-12-15
Attending: EMERGENCY MEDICINE | Admitting: HOSPITALIST
Payer: MEDICARE

## 2017-12-10 DIAGNOSIS — L97.509 FOOT ULCER: ICD-10-CM

## 2017-12-10 DIAGNOSIS — E87.70 HYPERVOLEMIA: ICD-10-CM

## 2017-12-10 DIAGNOSIS — L97.511 SKIN ULCER OF RIGHT FOOT, LIMITED TO BREAKDOWN OF SKIN: ICD-10-CM

## 2017-12-10 DIAGNOSIS — M79.89 SWELLING OF RIGHT LOWER EXTREMITY: ICD-10-CM

## 2017-12-10 DIAGNOSIS — R74.8 ELEVATED LIVER ENZYMES: Primary | ICD-10-CM

## 2017-12-10 DIAGNOSIS — R06.02 SHORTNESS OF BREATH: ICD-10-CM

## 2017-12-10 LAB
ALBUMIN SERPL BCP-MCNC: 3.3 G/DL
ALP SERPL-CCNC: 86 U/L
ALT SERPL W/O P-5'-P-CCNC: 96 U/L
ANION GAP SERPL CALC-SCNC: 8 MMOL/L
AST SERPL-CCNC: 94 U/L
BASOPHILS # BLD AUTO: 0.02 K/UL
BASOPHILS NFR BLD: 0.3 %
BILIRUB SERPL-MCNC: 0.2 MG/DL
BNP SERPL-MCNC: 21 PG/ML
BUN SERPL-MCNC: 17 MG/DL
CALCIUM SERPL-MCNC: 9.7 MG/DL
CHLORIDE SERPL-SCNC: 99 MMOL/L
CK SERPL-CCNC: 4251 U/L
CO2 SERPL-SCNC: 36 MMOL/L
CREAT SERPL-MCNC: 0.7 MG/DL
CRP SERPL-MCNC: 16.7 MG/L
DIFFERENTIAL METHOD: ABNORMAL
EOSINOPHIL # BLD AUTO: 0.2 K/UL
EOSINOPHIL NFR BLD: 2.2 %
ERYTHROCYTE [DISTWIDTH] IN BLOOD BY AUTOMATED COUNT: 14 %
EST. GFR  (AFRICAN AMERICAN): >60 ML/MIN/1.73 M^2
EST. GFR  (NON AFRICAN AMERICAN): >60 ML/MIN/1.73 M^2
GLUCOSE SERPL-MCNC: 129 MG/DL
HCT VFR BLD AUTO: 38.3 %
HGB BLD-MCNC: 12.2 G/DL
IMM GRANULOCYTES # BLD AUTO: 0.02 K/UL
IMM GRANULOCYTES NFR BLD AUTO: 0.3 %
INR PPP: 1.3
LYMPHOCYTES # BLD AUTO: 1.6 K/UL
LYMPHOCYTES NFR BLD: 23.6 %
MCH RBC QN AUTO: 27.6 PG
MCHC RBC AUTO-ENTMCNC: 31.9 G/DL
MCV RBC AUTO: 87 FL
MONOCYTES # BLD AUTO: 0.4 K/UL
MONOCYTES NFR BLD: 5.8 %
NEUTROPHILS # BLD AUTO: 4.7 K/UL
NEUTROPHILS NFR BLD: 67.8 %
NRBC BLD-RTO: 0 /100 WBC
PLATELET # BLD AUTO: 435 K/UL
PMV BLD AUTO: 10.5 FL
POTASSIUM SERPL-SCNC: 4.1 MMOL/L
PROT SERPL-MCNC: 7.1 G/DL
PROTHROMBIN TIME: 13.6 SEC
RBC # BLD AUTO: 4.42 M/UL
SODIUM SERPL-SCNC: 143 MMOL/L
TROPONIN I SERPL DL<=0.01 NG/ML-MCNC: 0.03 NG/ML
WBC # BLD AUTO: 6.92 K/UL

## 2017-12-10 PROCEDURE — 93005 ELECTROCARDIOGRAM TRACING: CPT

## 2017-12-10 PROCEDURE — 85025 COMPLETE CBC W/AUTO DIFF WBC: CPT

## 2017-12-10 PROCEDURE — 80053 COMPREHEN METABOLIC PANEL: CPT

## 2017-12-10 PROCEDURE — 99285 EMERGENCY DEPT VISIT HI MDM: CPT | Mod: ,,, | Performed by: EMERGENCY MEDICINE

## 2017-12-10 PROCEDURE — 85610 PROTHROMBIN TIME: CPT

## 2017-12-10 PROCEDURE — 96374 THER/PROPH/DIAG INJ IV PUSH: CPT

## 2017-12-10 PROCEDURE — 99285 EMERGENCY DEPT VISIT HI MDM: CPT | Mod: 25

## 2017-12-10 PROCEDURE — 11000001 HC ACUTE MED/SURG PRIVATE ROOM

## 2017-12-10 PROCEDURE — 85651 RBC SED RATE NONAUTOMATED: CPT

## 2017-12-10 PROCEDURE — 83880 ASSAY OF NATRIURETIC PEPTIDE: CPT

## 2017-12-10 PROCEDURE — 87040 BLOOD CULTURE FOR BACTERIA: CPT

## 2017-12-10 PROCEDURE — 82550 ASSAY OF CK (CPK): CPT

## 2017-12-10 PROCEDURE — 96375 TX/PRO/DX INJ NEW DRUG ADDON: CPT

## 2017-12-10 PROCEDURE — 86140 C-REACTIVE PROTEIN: CPT

## 2017-12-10 PROCEDURE — 93010 ELECTROCARDIOGRAM REPORT: CPT | Mod: ,,, | Performed by: INTERNAL MEDICINE

## 2017-12-10 PROCEDURE — 84484 ASSAY OF TROPONIN QUANT: CPT

## 2017-12-10 RX ORDER — MORPHINE SULFATE 4 MG/ML
4 INJECTION, SOLUTION INTRAMUSCULAR; INTRAVENOUS
Status: COMPLETED | OUTPATIENT
Start: 2017-12-10 | End: 2017-12-11

## 2017-12-11 PROBLEM — R06.02 SHORTNESS OF BREATH: Status: ACTIVE | Noted: 2017-12-11

## 2017-12-11 PROBLEM — L97.511 SKIN ULCER OF RIGHT FOOT, LIMITED TO BREAKDOWN OF SKIN: Status: ACTIVE | Noted: 2017-12-11

## 2017-12-11 PROBLEM — E87.70 HYPERVOLEMIA: Status: ACTIVE | Noted: 2017-12-11

## 2017-12-11 PROBLEM — R53.1 WEAKNESS: Status: ACTIVE | Noted: 2017-12-11

## 2017-12-11 PROBLEM — R05.9 COUGH: Status: ACTIVE | Noted: 2017-12-11

## 2017-12-11 PROBLEM — R74.8 ELEVATED LIVER ENZYMES: Status: ACTIVE | Noted: 2017-12-11

## 2017-12-11 LAB
ERYTHROCYTE [SEDIMENTATION RATE] IN BLOOD BY WESTERGREN METHOD: 24 MM/HR
HIV 1+2 AB+HIV1 P24 AG SERPL QL IA: NEGATIVE
LDH SERPL L TO P-CCNC: 470 U/L
POCT GLUCOSE: 100 MG/DL (ref 70–110)
POCT GLUCOSE: 110 MG/DL (ref 70–110)
POCT GLUCOSE: 142 MG/DL (ref 70–110)
POCT GLUCOSE: 77 MG/DL (ref 70–110)
T4 FREE SERPL-MCNC: 0.9 NG/DL
TSH SERPL DL<=0.005 MIU/L-ACNC: 4.82 UIU/ML
VIT B12 SERPL-MCNC: 503 PG/ML

## 2017-12-11 PROCEDURE — 97161 PT EVAL LOW COMPLEX 20 MIN: CPT

## 2017-12-11 PROCEDURE — 83516 IMMUNOASSAY NONANTIBODY: CPT | Mod: 59

## 2017-12-11 PROCEDURE — 99223 1ST HOSP IP/OBS HIGH 75: CPT | Mod: GC,,, | Performed by: INTERNAL MEDICINE

## 2017-12-11 PROCEDURE — 99232 SBSQ HOSP IP/OBS MODERATE 35: CPT | Mod: ,,, | Performed by: HOSPITALIST

## 2017-12-11 PROCEDURE — 63600175 PHARM REV CODE 636 W HCPCS: Performed by: EMERGENCY MEDICINE

## 2017-12-11 PROCEDURE — 25000003 PHARM REV CODE 250: Performed by: EMERGENCY MEDICINE

## 2017-12-11 PROCEDURE — 97530 THERAPEUTIC ACTIVITIES: CPT

## 2017-12-11 PROCEDURE — 84443 ASSAY THYROID STIM HORMONE: CPT

## 2017-12-11 PROCEDURE — 83520 IMMUNOASSAY QUANT NOS NONAB: CPT

## 2017-12-11 PROCEDURE — 36415 COLL VENOUS BLD VENIPUNCTURE: CPT

## 2017-12-11 PROCEDURE — 11000001 HC ACUTE MED/SURG PRIVATE ROOM

## 2017-12-11 PROCEDURE — 82085 ASSAY OF ALDOLASE: CPT

## 2017-12-11 PROCEDURE — 99222 1ST HOSP IP/OBS MODERATE 55: CPT | Mod: ,,, | Performed by: PHYSICIAN ASSISTANT

## 2017-12-11 PROCEDURE — 99222 1ST HOSP IP/OBS MODERATE 55: CPT | Mod: ,,, | Performed by: PODIATRIST

## 2017-12-11 PROCEDURE — 82607 VITAMIN B-12: CPT

## 2017-12-11 PROCEDURE — 94761 N-INVAS EAR/PLS OXIMETRY MLT: CPT

## 2017-12-11 PROCEDURE — 97166 OT EVAL MOD COMPLEX 45 MIN: CPT

## 2017-12-11 PROCEDURE — 83615 LACTATE (LD) (LDH) ENZYME: CPT

## 2017-12-11 PROCEDURE — 63600175 PHARM REV CODE 636 W HCPCS: Performed by: HOSPITALIST

## 2017-12-11 PROCEDURE — 86235 NUCLEAR ANTIGEN ANTIBODY: CPT

## 2017-12-11 PROCEDURE — 84439 ASSAY OF FREE THYROXINE: CPT

## 2017-12-11 PROCEDURE — 25000003 PHARM REV CODE 250: Performed by: PHYSICIAN ASSISTANT

## 2017-12-11 PROCEDURE — 86703 HIV-1/HIV-2 1 RESULT ANTBDY: CPT

## 2017-12-11 RX ORDER — INSULIN ASPART 100 [IU]/ML
0-5 INJECTION, SOLUTION INTRAVENOUS; SUBCUTANEOUS
Status: DISCONTINUED | OUTPATIENT
Start: 2017-12-11 | End: 2017-12-15 | Stop reason: HOSPADM

## 2017-12-11 RX ORDER — CARVEDILOL 25 MG/1
25 TABLET ORAL 2 TIMES DAILY WITH MEALS
Status: DISCONTINUED | OUTPATIENT
Start: 2017-12-11 | End: 2017-12-11

## 2017-12-11 RX ORDER — FUROSEMIDE 10 MG/ML
40 INJECTION INTRAMUSCULAR; INTRAVENOUS DAILY
Status: COMPLETED | OUTPATIENT
Start: 2017-12-11 | End: 2017-12-12

## 2017-12-11 RX ORDER — AMLODIPINE AND BENAZEPRIL HYDROCHLORIDE 5; 10 MG/1; MG/1
2 CAPSULE ORAL DAILY
Status: DISCONTINUED | OUTPATIENT
Start: 2017-12-11 | End: 2017-12-12

## 2017-12-11 RX ORDER — LEVOTHYROXINE SODIUM 25 UG/1
25 TABLET ORAL
Status: DISCONTINUED | OUTPATIENT
Start: 2017-12-11 | End: 2017-12-15 | Stop reason: HOSPADM

## 2017-12-11 RX ORDER — ACETAMINOPHEN 325 MG/1
650 TABLET ORAL EVERY 4 HOURS PRN
Status: DISCONTINUED | OUTPATIENT
Start: 2017-12-11 | End: 2017-12-15 | Stop reason: HOSPADM

## 2017-12-11 RX ORDER — PROCHLORPERAZINE MALEATE 10 MG
10 TABLET ORAL EVERY 6 HOURS PRN
Status: DISCONTINUED | OUTPATIENT
Start: 2017-12-11 | End: 2017-12-15 | Stop reason: HOSPADM

## 2017-12-11 RX ORDER — ONDANSETRON 4 MG/1
4 TABLET, ORALLY DISINTEGRATING ORAL EVERY 8 HOURS PRN
Status: DISCONTINUED | OUTPATIENT
Start: 2017-12-11 | End: 2017-12-15 | Stop reason: HOSPADM

## 2017-12-11 RX ORDER — RAMELTEON 8 MG/1
8 TABLET ORAL NIGHTLY PRN
Status: DISCONTINUED | OUTPATIENT
Start: 2017-12-11 | End: 2017-12-15 | Stop reason: HOSPADM

## 2017-12-11 RX ORDER — GLUCAGON 1 MG
1 KIT INJECTION
Status: DISCONTINUED | OUTPATIENT
Start: 2017-12-11 | End: 2017-12-15 | Stop reason: HOSPADM

## 2017-12-11 RX ORDER — FOLIC ACID 1 MG/1
1 TABLET ORAL DAILY
Status: DISCONTINUED | OUTPATIENT
Start: 2017-12-11 | End: 2017-12-15 | Stop reason: HOSPADM

## 2017-12-11 RX ORDER — SODIUM CHLORIDE 0.9 % (FLUSH) 0.9 %
5 SYRINGE (ML) INJECTION
Status: DISCONTINUED | OUTPATIENT
Start: 2017-12-11 | End: 2017-12-15 | Stop reason: HOSPADM

## 2017-12-11 RX ORDER — HYDROCODONE BITARTRATE AND ACETAMINOPHEN 5; 325 MG/1; MG/1
1 TABLET ORAL EVERY 6 HOURS PRN
Status: DISCONTINUED | OUTPATIENT
Start: 2017-12-11 | End: 2017-12-15 | Stop reason: HOSPADM

## 2017-12-11 RX ORDER — ASCORBIC ACID 500 MG
500 TABLET ORAL 2 TIMES DAILY
Status: DISCONTINUED | OUTPATIENT
Start: 2017-12-11 | End: 2017-12-15 | Stop reason: HOSPADM

## 2017-12-11 RX ORDER — SPIRONOLACTONE 25 MG/1
25 TABLET ORAL DAILY
Status: DISCONTINUED | OUTPATIENT
Start: 2017-12-11 | End: 2017-12-12

## 2017-12-11 RX ORDER — HYDROMORPHONE HYDROCHLORIDE 2 MG/ML
0.5 INJECTION, SOLUTION INTRAMUSCULAR; INTRAVENOUS; SUBCUTANEOUS
Status: ACTIVE | OUTPATIENT
Start: 2017-12-11 | End: 2017-12-11

## 2017-12-11 RX ORDER — FUROSEMIDE 20 MG/1
20 TABLET ORAL DAILY
Status: DISCONTINUED | OUTPATIENT
Start: 2017-12-11 | End: 2017-12-11

## 2017-12-11 RX ORDER — IPRATROPIUM BROMIDE AND ALBUTEROL SULFATE 2.5; .5 MG/3ML; MG/3ML
3 SOLUTION RESPIRATORY (INHALATION)
Status: DISCONTINUED | OUTPATIENT
Start: 2017-12-11 | End: 2017-12-15 | Stop reason: HOSPADM

## 2017-12-11 RX ORDER — IBUPROFEN 200 MG
24 TABLET ORAL
Status: DISCONTINUED | OUTPATIENT
Start: 2017-12-11 | End: 2017-12-15 | Stop reason: HOSPADM

## 2017-12-11 RX ORDER — AMOXICILLIN 250 MG
1 CAPSULE ORAL 2 TIMES DAILY PRN
Status: DISCONTINUED | OUTPATIENT
Start: 2017-12-11 | End: 2017-12-15 | Stop reason: HOSPADM

## 2017-12-11 RX ORDER — ZINC SULFATE 50(220)MG
220 CAPSULE ORAL DAILY
Status: DISCONTINUED | OUTPATIENT
Start: 2017-12-11 | End: 2017-12-15 | Stop reason: HOSPADM

## 2017-12-11 RX ORDER — IBUPROFEN 200 MG
16 TABLET ORAL
Status: DISCONTINUED | OUTPATIENT
Start: 2017-12-11 | End: 2017-12-15 | Stop reason: HOSPADM

## 2017-12-11 RX ORDER — MORPHINE SULFATE 2 MG/ML
4 INJECTION, SOLUTION INTRAMUSCULAR; INTRAVENOUS EVERY 4 HOURS PRN
Status: DISCONTINUED | OUTPATIENT
Start: 2017-12-11 | End: 2017-12-15 | Stop reason: HOSPADM

## 2017-12-11 RX ADMIN — CARVEDILOL 25 MG: 25 TABLET, FILM COATED ORAL at 10:12

## 2017-12-11 RX ADMIN — Medication 220 MG: at 10:12

## 2017-12-11 RX ADMIN — RIVAROXABAN 20 MG: 20 TABLET, FILM COATED ORAL at 10:12

## 2017-12-11 RX ADMIN — THERA TABS 1 TABLET: TAB at 10:12

## 2017-12-11 RX ADMIN — OXYCODONE HYDROCHLORIDE AND ACETAMINOPHEN 500 MG: 500 TABLET ORAL at 09:12

## 2017-12-11 RX ADMIN — FOLIC ACID 1 MG: 1 TABLET ORAL at 10:12

## 2017-12-11 RX ADMIN — HYDROCODONE BITARTRATE AND ACETAMINOPHEN 1 TABLET: 5; 325 TABLET ORAL at 11:12

## 2017-12-11 RX ADMIN — FUROSEMIDE 20 MG: 20 TABLET ORAL at 10:12

## 2017-12-11 RX ADMIN — MORPHINE SULFATE 4 MG: 4 INJECTION, SOLUTION INTRAMUSCULAR; INTRAVENOUS at 12:12

## 2017-12-11 RX ADMIN — AMLODIPINE AND BENAZEPRIL HYDROCHLORIDE 2 CAPSULE: 5; 10 CAPSULE ORAL at 10:12

## 2017-12-11 RX ADMIN — SPIRONOLACTONE 25 MG: 25 TABLET, FILM COATED ORAL at 10:12

## 2017-12-11 RX ADMIN — VANCOMYCIN HYDROCHLORIDE 1750 MG: 10 INJECTION, POWDER, LYOPHILIZED, FOR SOLUTION INTRAVENOUS at 01:12

## 2017-12-11 RX ADMIN — LEVOTHYROXINE SODIUM 25 MCG: 25 TABLET ORAL at 06:12

## 2017-12-11 RX ADMIN — FUROSEMIDE 40 MG: 10 INJECTION, SOLUTION INTRAMUSCULAR; INTRAVENOUS at 05:12

## 2017-12-11 RX ADMIN — MORPHINE SULFATE 4 MG: 2 INJECTION, SOLUTION INTRAMUSCULAR; INTRAVENOUS at 07:12

## 2017-12-11 RX ADMIN — OXYCODONE HYDROCHLORIDE AND ACETAMINOPHEN 500 MG: 500 TABLET ORAL at 10:12

## 2017-12-11 NOTE — ASSESSMENT & PLAN NOTE
- AST 94, ALT 96  - Chronic; unclear etiology  - Not currently on statin; discontinued during last admission  - Alk phos, T bili WNL  - albumin 3.3

## 2017-12-11 NOTE — CONSULTS
[facility]  Podiatry  Consult Note    Patient Name: Serafin Tapia  MRN: [id number]  Admission Date: 12/10/2017  Hospital Length of Stay: 0 days  Attending Physician: Jaime Webb MD  Primary Care Provider: John Vargas MD     Inpatient consult to Podiatry  Consult performed by: DARREL ALBARRAN  Consult ordered by: JUANITO ROBERTSON  Reason for consult: wound  Assessment/Recommendations: See a/p        Subjective:     History of Present Illness:  Pt is a 68 y/o male  has a past medical history of Asthma; Diabetes mellitus type 2 in obese; Elevated troponin; Hyperlipidemia; Hypertension; Hypothyroidism (acquired); Obesity; Pulmonary embolism; and Spondylosis of lumbar region without myelopathy or radiculopathy. Admitted with multiple complants/SOB and consulted to podiatry for non healing right dorsal foot wound. Pt relates he has had this wound for 10 years and has not been seeing a doctor for it. States he injured his right foot 10 year ago and dropped metal object on it. States it has never been infected and he has never taken ABX for it. States the wound is painful. Denies N/V/F/C, SOI. No other pedal complaints at this time.     Scheduled Meds:   amlodipine-benazepril 5-10 mg  2 capsule Oral Daily    ascorbic acid (vitamin C)  500 mg Oral BID    carvedilol  25 mg Oral BID WM    folic acid  1 mg Oral Daily    furosemide  20 mg Oral Daily    HYDROmorphone  0.5 mg Intravenous ED 1 Time    levothyroxine  25 mcg Oral Before breakfast    multivitamin  1 tablet Oral Daily    rivaroxaban  1 tablet Oral Daily    spironolactone  25 mg Oral Daily    zinc sulfate  220 mg Oral Daily     Continuous Infusions:   PRN Meds:acetaminophen, dextrose 50%, dextrose 50%, glucagon (human recombinant), glucose, glucose, hydrocodone-acetaminophen 5-325mg, influenza, insulin aspart, morphine, ondansetron, prochlorperazine, ramelteon, senna-docusate 8.6-50 mg, sodium chloride 0.9%    Review of  patient's allergies indicates:  No Known Allergies     Past Medical History:   Diagnosis Date    Asthma     Diabetes mellitus type 2 in obese 5/3/2014    Elevated troponin 5/3/2014    Hyperlipidemia     Hypertension     Hypothyroidism (acquired) 11/1/2017    Obesity     Pulmonary embolism 05/2014    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     Past Surgical History:   Procedure Laterality Date    TONSILLECTOMY         Family History     Problem Relation (Age of Onset)    Cancer Father    Cataracts Sister    Diabetes Mother, Father, Sister, Brother    Glaucoma Maternal Aunt, Paternal Aunt    Heart disease Mother, Father    Hypertension Mother, Father, Sister, Brother, Son, Daughter    No Known Problems Maternal Grandmother, Maternal Grandfather, Paternal Grandmother, Paternal Grandfather    Stroke Mother        Social History Main Topics    Smoking status: Former Smoker     Types: Cigarettes    Smokeless tobacco: Never Used      Comment: Quit smoking as a teenager    Alcohol use Yes      Comment: 1-2 drinks per week    Drug use: No    Sexual activity: Yes     Partners: Female     Birth control/ protection: Condom     Review of Systems   Skin: Positive for wound.   All other systems reviewed and are negative.    Objective:     Vital Signs (Most Recent):  Temp: 97.7 °F (36.5 °C) (12/11/17 1146)  Pulse: 70 (12/11/17 1146)  Resp: 16 (12/11/17 1146)  BP: (!) 93/52 (12/11/17 1146)  SpO2: 95 % (12/11/17 1146) Vital Signs (24h Range):  Temp:  [97.6 °F (36.4 °C)-98.5 °F (36.9 °C)] 97.7 °F (36.5 °C)  Pulse:  [70-90] 70  Resp:  [16-22] 16  SpO2:  [91 %-97 %] 95 %  BP: ()/(52-72) 93/52     Weight: 124.7 kg (275 lb)  Body mass index is 39.46 kg/m².    Foot Exam    General  General Appearance: appears stated age and healthy   Orientation: alert and oriented to person, place, and time   Affect: appropriate       Right Foot/Ankle     Inspection and Palpation  Ecchymosis:  none  Tenderness: none (Periwound)  Swelling: none   Skin Exam: ulcer; no drainage, no cellulitis and no erythema     Neurovascular  Dorsalis pedis: absent  Posterior tibial: absent  Saphenous nerve sensation: diminished  Tibial nerve sensation: diminished  Superficial peroneal nerve sensation: diminished  Deep peroneal nerve sensation: diminished  Sural nerve sensation: diminished      Left Foot/Ankle      Inspection and Palpation  Ecchymosis: none  Tenderness: none   Swelling: none   Skin Exam: skin intact;     Neurovascular  Dorsalis pedis: absent  Posterior tibial: absent  Saphenous nerve sensation: diminished  Tibial nerve sensation: diminished  Superficial peroneal nerve sensation: diminished  Deep peroneal nerve sensation: diminished  Sural nerve sensation: diminished            Laboratory:  A1C:   Recent Labs  Lab 10/29/17  1409   HGBA1C 5.5     BMP:   Recent Labs  Lab 12/10/17  2239   *      K 4.1   CL 99   CO2 36*   BUN 17   CREATININE 0.7   CALCIUM 9.7     CBC:   Recent Labs  Lab 12/10/17  2239   WBC 6.92   RBC 4.42*   HGB 12.2*   HCT 38.3*   *   MCV 87   MCH 27.6   MCHC 31.9*     CMP:   Recent Labs  Lab 12/10/17  2239   *   CALCIUM 9.7   ALBUMIN 3.3*   PROT 7.1      K 4.1   CO2 36*   CL 99   BUN 17   CREATININE 0.7   ALKPHOS 86   ALT 96*   AST 94*   BILITOT 0.2     Coagulation:   Recent Labs  Lab 12/10/17  2239   INR 1.3*     CRP:   Recent Labs  Lab 12/10/17  2239   CRP 16.7*     ESR:   Recent Labs  Lab 12/10/17  2239   SEDRATE 24*     Wound Cultures: No results for input(s): LABAERO in the last 4320 hours.    Diagnostic Results:  X-Ray: I have reviewed all pertinent results/findings within the past 24 hours.  ordered        Clinical Findings:  Dry eschar noted to dorsal lateral aspect of right foot measures 1.8x1.2x superficial. Eschar is well adhered without drainage. No periwound SOI noted. Wound without bogginess/fluctuance/crepitus noted, stable.     Assessment/Plan:      * Shortness of breath    Per primary        Skin ulcer of right foot, limited to breakdown of skin    -Pt with wound to dorsal lateral aspect of right foot, no SOI noted, stable eschar  -Pulses trace, ABIs ordered  -Xray ordered, consider MRI as this is a chronic (>10 year) wound  -Wound dressed with betadine, nursing to perform daily dressing changes, orders placed.   -Podiatry will follow         WB status: WBAT  Offloading device: Darco shoe, ordered.   Upon D/C pt will need to f/u within 1 week to podiatry clinic  Pt will need to paint wound at home with betadine daily, keep wound dry.            Morbid obesity with BMI of 40.0-44.9, adult    Per primary        Controlled type 2 diabetes mellitus with diabetic polyneuropathy, without long-term current use of insulin    Per primary            Thank you for your consult. I will follow-up with patient. Please contact us if you have any additional questions.    Ele Wilson MD  Podiatry  [facility]

## 2017-12-11 NOTE — ASSESSMENT & PLAN NOTE
Pt is a 68 yo male with DM II, PHILLIP,  HTN, DLD, h/o PE, asthma, hypothyroidism presenting due to worsening shortness of breath sx c/w HF.  Pt exhibits proximal weakness of the lower extremity. In the setting of the elevated CPKs with w/o for an inflammatory myositis as detailed below. Also for necrotizing myopathy given statin exposure. Pt also has a component of deconditioning as well. If w/u is unrevealing he would need further neurology evaluation to rule a neurogenic myopathy.  HALLE and Cinthia 1 negative     Plan  - Myomarker 3 panel, LDH, aldolase, HIV, TSH/free T4, magnesium, and phosphorus levels, HMG co A reductase ab  - Trend CPKs   - Please obtain an MRI of the thighs and EMG  to evaluate for myositis. Pt will likely need a muscle biopsy as well.   - will continue to follow

## 2017-12-11 NOTE — SUBJECTIVE & OBJECTIVE
Past Medical History:   Diagnosis Date    Asthma     Diabetes mellitus type 2 in obese 5/3/2014    Elevated troponin 5/3/2014    Hyperlipidemia     Hypertension     Hypothyroidism (acquired) 11/1/2017    Obesity     Pulmonary embolism 05/2014    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       Past Surgical History:   Procedure Laterality Date    TONSILLECTOMY         Review of patient's allergies indicates:  No Known Allergies    No current facility-administered medications on file prior to encounter.      Current Outpatient Prescriptions on File Prior to Encounter   Medication Sig    amlodipine-benazepril 10-20mg (LOTREL) 10-20 mg per capsule Take 1 capsule by mouth once daily.    carvedilol (COREG) 25 MG tablet Take 1 tablet (25 mg total) by mouth 2 (two) times daily with meals.    folic acid (FOLVITE) 1 MG tablet Take 1 tablet (1 mg total) by mouth once daily.    furosemide (LASIX) 20 MG tablet Take 1 tablet (20 mg total) by mouth once daily.    levothyroxine (SYNTHROID) 25 MCG tablet Take 1 tablet (25 mcg total) by mouth before breakfast.    metformin (GLUCOPHAGE) 1000 MG tablet TAKE ONE TABLET BY MOUTH TWICE A DAY WITH MEALS    rivaroxaban (XARELTO) 20 mg Tab Take 1 tablet (20 mg total) by mouth once daily.    spironolactone (ALDACTONE) 25 MG tablet TAKE ONE TABLET BY MOUTH DAILY    ascorbic acid, vitamin C, (VITAMIN C) 500 MG tablet Take 500 mg by mouth 2 (two) times daily. PER [facility]    multivitamin (ONE DAILY MULTIVITAMIN) per tablet Take 1 tablet by mouth once daily. PER [facility]    zinc sulfate (ZINCATE) 220 (50) mg capsule Take 220 mg by mouth once daily. PER [facility]     Family History     Problem Relation (Age of Onset)    Cancer Father    Cataracts Sister    Diabetes Mother, Father, Sister, Brother    Glaucoma Maternal Aunt, Paternal Aunt    Heart disease Mother, Father    Hypertension Mother, Father, Sister, Brother,  Son, Daughter    No Known Problems Maternal Grandmother, Maternal Grandfather, Paternal Grandmother, Paternal Grandfather    Stroke Mother        Social History Main Topics    Smoking status: Former Smoker     Types: Cigarettes    Smokeless tobacco: Never Used      Comment: Quit smoking as a teenager    Alcohol use Yes      Comment: 1-2 drinks per week    Drug use: No    Sexual activity: Yes     Partners: Female     Birth control/ protection: Condom     Review of Systems   Constitutional: Positive for fatigue. Negative for chills, diaphoresis and fever.   HENT: Negative for congestion.    Eyes: Negative for pain and visual disturbance.   Respiratory: Positive for cough and shortness of breath (mild). Negative for choking, chest tightness and wheezing.         Orthopnea   Cardiovascular: Positive for leg swelling (BLE). Negative for chest pain and palpitations.   Gastrointestinal: Negative for abdominal pain, diarrhea, nausea and vomiting.   Genitourinary: Negative for difficulty urinating, dysuria, frequency and urgency.   Musculoskeletal: Positive for gait problem. Negative for arthralgias, back pain, myalgias, neck pain and neck stiffness.   Skin: Positive for wound (R foot ). Negative for rash.   Neurological: Positive for dizziness (on exertion) and weakness (BL, upper and lower). Negative for tremors, seizures, syncope, facial asymmetry, speech difficulty, light-headedness, numbness and headaches.   Psychiatric/Behavioral: Negative for confusion. The patient is not nervous/anxious.      Objective:     Vital Signs (Most Recent):  Temp: 98.3 °F (36.8 °C) (12/11/17 0425)  Pulse: 83 (12/11/17 0425)  Resp: 20 (12/11/17 0425)  BP: (!) 157/70 (12/11/17 0425)  SpO2: 97 % (12/11/17 0425) Vital Signs (24h Range):  Temp:  [98.1 °F (36.7 °C)-98.5 °F (36.9 °C)] 98.3 °F (36.8 °C)  Pulse:  [76-90] 83  Resp:  [19-22] 20  SpO2:  [91 %-97 %] 97 %  BP: (120-157)/(63-72) 157/70     Weight: 124.7 kg (275 lb)  Body mass index  is 39.46 kg/m².    Physical Exam   Constitutional: He is oriented to person, place, and time. He appears well-developed and well-nourished.   Obese male in NAD   HENT:   Head: Normocephalic and atraumatic.   Eyes: Conjunctivae and EOM are normal.   Neck: Normal range of motion. Neck supple. No JVD present. No tracheal deviation present. No thyromegaly present.   Cardiovascular: Normal rate, regular rhythm and intact distal pulses.    No murmur heard.  Distant heart sounds  2+ pitting edema BLE (below knees)   Pulmonary/Chest: Effort normal and breath sounds normal. No stridor. No respiratory distress. He has no wheezes.   Distant lung sounds  Mild crackles to L lung   Abdominal: Soft. Bowel sounds are normal. He exhibits distension. There is no tenderness.   Musculoskeletal: Normal range of motion.   Lymphadenopathy:     He has no cervical adenopathy.   Neurological: He is alert and oriented to person, place, and time. He displays no tremor. No cranial nerve deficit or sensory deficit. Gait abnormal. Coordination normal.   5/5 Strength BUE  4/5 strength BLE (R>L)   Skin: Skin is warm and dry.   Eschar noted to R dorsal foot  Mild TTP around wound  No sacral ulcer   Psychiatric: He has a normal mood and affect. His behavior is normal.   Nursing note and vitals reviewed.        CRANIAL NERVES     CN III, IV, VI   Extraocular motions are normal.        Significant Labs:   CBC:   Recent Labs  Lab 12/10/17  2239   WBC 6.92   HGB 12.2*   HCT 38.3*   *     CMP:   Recent Labs  Lab 12/10/17  2239      K 4.1   CL 99   CO2 36*   *   BUN 17   CREATININE 0.7   CALCIUM 9.7   PROT 7.1   ALBUMIN 3.3*   BILITOT 0.2   ALKPHOS 86   AST 94*   ALT 96*   ANIONGAP 8   EGFRNONAA >60.0       Significant Imaging: I have reviewed all pertinent imaging results/findings within the past 24 hours.

## 2017-12-11 NOTE — HPI
Pt is a 66 y/o male  has a past medical history of Asthma; Diabetes mellitus type 2 in obese; Elevated troponin; Hyperlipidemia; Hypertension; Hypothyroidism (acquired); Obesity; Pulmonary embolism; and Spondylosis of lumbar region without myelopathy or radiculopathy. Admitted with multiple complants/SOB and consulted to podiatry for non healing right dorsal foot wound. Pt relates he has had this wound for 10 years and has not been seeing a doctor for it. States he injured his right foot 10 year ago and dropped metal object on it. States it has never been infected and he has never taken ABX for it. States the wound is painful. Denies N/V/F/C, SOI. No other pedal complaints at this time.

## 2017-12-11 NOTE — ASSESSMENT & PLAN NOTE
- no sign of PNA on CXR, pt denies SOB, fever, chills  - Start Duo-neb Q6, Acapella treatment Q6

## 2017-12-11 NOTE — PT/OT/SLP EVAL
Physical Therapy Evaluation    Patient Name:  Serafin Tapia   MRN:  [id number]    Recommendations:     Discharge Recommendations:  nursing facility, skilled   Discharge Equipment Recommendations: bedside commode   Barriers to discharge: Decreased caregiver support    Assessment:     Serafin Tapia is a 67 y.o. male admitted with a medical diagnosis of Shortness of breath.  He presents with the following impairments/functional limitations:  weakness, impaired endurance, impaired self care skills, impaired functional mobilty, gait instability.  Patient requires at least Max A for all bed mobility and transfers. Patient able to bear weight in standing and able to perform stand pivot transfer w/ Max A. Patient has good sitting balance. Patient reports constant foot pain that is increased w/ weight bearing, also a sore on the dorsal aspect of the R foot is present. Patient lives alone and was mostly dependant prior to admission.    Rehab Prognosis:  Fair; patient would benefit from acute skilled PT services to address these deficits and reach maximum level of function.      Recent Surgery: * No surgery found *      Plan:     During this hospitalization, patient to be seen 3 x/week to address the above listed problems via gait training, therapeutic activities, therapeutic exercises, neuromuscular re-education  · Plan of Care Expires:  01/10/18   Plan of Care Reviewed with: patient    Subjective     Communicated with nurse prior to session.  Patient found supine upon PT entry to room, agreeable to evaluation.      Chief Complaint: Decreased functional mobility and self care skills  Patient comments/goals: to get better  Pain/Comfort:  · Pain Rating 1:  (13/10)  · Location - Side 1: Right  · Location - Orientation 1: generalized  · Location 1: foot  · Pain Addressed 1: Nurse notified    Patients cultural, spiritual, Jainism conflicts given the current situation: none stated     Living Environment:  Patient lives alone in a  single story apartment w/ 0STE. Patient is dependant in self care skills and functional mobility. Patient reports that his family comes by to help him daily  Prior to admission, patients level of function was dependant.  Patient has the following equipment: walker, rolling, cane, straight, wheelchair.  DME owned (not currently used): rolling walker, wheelchair and SPC.  Upon discharge, patient will have assistance from family intermittently.    Objective:     Patient found with:  (all lines intact)     General Precautions: Standard, fall   Orthopedic Precautions:N/A   Braces:       Exams:  · Cognitive Exam:  Patient is oriented to Person, Place, Time and Situation and follows 100% of all commands   · Fine Motor Coordination:    · -       Intact  · Gross Motor Coordination:  WFL  · Sensation:    · -       Intact  · RLE ROM: WFL  · RLE Strength: WFL  · LLE ROM: WFL  · LLE Strength: WFL    Functional Mobility:  · Bed Mobility:     · Supine to Sit: maximal assistance  · Transfers:     · Sit to Stand:  maximal assistance with no AD (1 failed attempt w/ RW)  · Bed to Chair: maximal assistance with  no AD  using  Stand Pivot    AM-PAC 6 CLICK MOBILITY  Total Score:11       Therapeutic Activities and Exercises:   PT Eval completed  Patient assisted w/ stand pivot transfer to chair    Patient left up in chair with all lines intact, call button in reach and nurse notified.    GOALS:    Physical Therapy Goals        Problem: Physical Therapy Goal    Goal Priority Disciplines Outcome Goal Variances Interventions   Physical Therapy Goal     PT/OT, PT Ongoing (interventions implemented as appropriate)     Description:  Goals to be met by: 17    Patient will increase functional independence with mobility by performin. Supine to sit with MInimal Assistance  2. Sit to supine with MInimal Assistance  3. Rolling to Left and Right with Minimal Assistance.  4. Sit to stand transfer with Minimal Assistance w/ RW  5. Bed to  chair transfer with Moderate Assistance using Rolling Walker  6. Gait  x 10 feet with Moderate Assistance using Rolling Walker.   7. Lower extremity exercise program x20 reps per handout, with supervision                      History:     Past Medical History:   Diagnosis Date    Asthma     Diabetes mellitus type 2 in obese 5/3/2014    Elevated troponin 5/3/2014    Hyperlipidemia     Hypertension     Hypothyroidism (acquired) 11/1/2017    Obesity     Pulmonary embolism 05/2014    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       Past Surgical History:   Procedure Laterality Date    TONSILLECTOMY         Clinical Decision Making:     History  Co-morbidities and personal factors that may impact the plan of care Examination  Body Structures and Functions, activity limitations and participation restrictions that may impact the plan of care Clinical Presentation   Decision Making/ Complexity Score   Co-morbidities:   [] Time since onset of injury / illness / exacerbation  [] Status of current condition  []Patient's cognitive status and safety concerns    [] Multiple Medical Problems (see med hx)  Personal Factors:   [] Patient's age  [x] Prior Level of function   [x] Patient's home situation (environment and family support)  [] Patient's level of motivation  [] Expected progression of patient      HISTORY:(criteria)    [] 00530 - no personal factors/history    [x] 52461 - has 1-2 personal factor/comorbidity     [] 91014 - has >3 personal factor/comorbidity     Body Regions:  [] Objective examination findings  [] Head     []  Neck  [] Trunk   [] Upper Extremity  [] Lower Extremity    Body Systems:  [] For communication ability, affect, cognition, language, and learning style: the assessment of the ability to make needs known, consciousness, orientation (person, place, and time), expected emotional /behavioral responses, and learning preferences (eg, learning barriers,  education  needs)  [] For the neuromuscular system: a general assessment of gross coordinated movement (eg, balance, gait, locomotion, transfers, and transitions) and motor function  (motor control and motor learning)  [x] For the musculoskeletal system: the assessment of gross symmetry, gross range of motion, gross strength, height, and weight  [] For the integumentary system: the assessment of pliability(texture), presence of scar formation, skin color, and skin integrity  [] For cardiovascular/pulmonary system: the assessment of heart rate, respiratory rate, blood pressure, and edema     Activity limitations:    [] Patient's cognitive status and saf ety concerns          [] Status of current condition      [] Weight bearing restriction  [] Cardiopulmunary Restriction    Participation Restrictions:   [] Goals and goal agreement with the patient     [] Rehab potential (prognosis) and probable outcome      Examination of Body System: (criteria)    [x] 55325 - addressing 1-2 elements    [] 18369 - addressing a total of 3 or more elements     [] 47826 -  Addressing a total of 4 or more elements         Clinical Presentation: (criteria)  Stable - 13778     On examination of body system using standardized tests and measures patient presents with 1-2 elements from any of the following: body structures and functions, activity limitations, and/or participation restrictions.  Leading to a clinical presentation that is considered stable and/or uncomplicated                              Clinical Decision Making  (Eval Complexity):  Low- 27038     Time Tracking:     PT Received On: 12/11/17  PT Start Time: 0811     PT Stop Time: 0830  PT Total Time (min): 19 min     Billable Minutes: Evaluation 10 Min and Therapeutic Activity 9 Min      Jaswant Argueta, PT  12/11/2017

## 2017-12-11 NOTE — ASSESSMENT & PLAN NOTE
-Pt with wound to dorsal lateral aspect of right foot, no SOI noted, stable eschar  -Pulses trace, ABIs ordered  -Xray ordered, consider MRI as this is a chronic (>10 year) wound  -Wound dressed with betadine, nursing to perform daily dressing changes, orders placed.   -Podiatry will follow         WB status: WBAT  Offloading device: Darco shoe, ordered.   Upon D/C pt will need to f/u within 1 week to podiatry clinic  Pt will need to paint wound at home with betadine daily, keep wound dry.

## 2017-12-11 NOTE — ASSESSMENT & PLAN NOTE
- Last HgA1c 5.5 on 10/29  - Home med: meformin 1000 mg BID; held  - blood glucose AC/HS  - ISS  - repeat A1C

## 2017-12-11 NOTE — ED NOTES
Patient identifiers verified and correct for Serafin Tapia.    LOC: The patient is awake, alert and aware of environment with an appropriate affect, the patient is oriented x 3 and speaking appropriately.  APPEARANCE: Patient resting comfortably and in no acute distress, patient is clean and well groomed, patient's clothing is properly fastened.  SKIN: The skin is warm and dry, color consistent with ethnicity, patient has normal skin turgor and moist mucus membranes, right foot with necrotic wound  MUSCULOSKELETAL: Patient with generalized weakness  RESPIRATORY: Airway is open and patent, respirations are spontaneous, patient has a normal effort and rate, no accessory muscle use noted, bilateral breath sounds clear   CARDIAC: Patient has a normal rate and regular rhythm, 2+ pitting edema to BLEnoted, capillary refill < 3 seconds.  ABDOMEN: Soft and non tender to palpation, no distention noted, normoactive bowel sounds present in all four quadrants.  NEUROLOGIC: PERRL, 3mm bilaterally, eyes open spontaneously, behavior appropriate to situation, follows commands, facial expression symmetrical, bilateral hand grasp equal and even, purposeful motor response noted, normal sensation in all extremities when touched with a finger.

## 2017-12-11 NOTE — HOSPITAL COURSE
12/11/2017 admitted overnight, pt reported LE swelling,R foot pain, SOB and coughing. Reported since his admission to [facility], he was discharge to SNF to further rehabilitation. He has been home for <7 days and he was then taken back to ED and admitted last night. He report LE swelling that has been worsening. Denies myalgias. Report cough that is productive but denies fever, chills. Report compliance with lasix at home. Pt with persistent elevation in CPK since last admission, rheumatology consulted for evaluation. IV lasix started. PT/OT ordered, Podiatry consulted, according to early assessment, pt is severely debilitated

## 2017-12-11 NOTE — ASSESSMENT & PLAN NOTE
Hypervolemia  SOB with increasing peripheral edema since d/c from SNF x1 weeks ago  - BNP 21, CXR clear, Troponin negative  - mild crackles to L lung  - renal function normal  - ESR 24, CRP 16.7  - 2D ECHO ordered  - strict I's and O's, daily weights, fluid restriction 1500  - US of lower ext ordered to r/o DVT  - IV lasix 40mg QD for now

## 2017-12-11 NOTE — ASSESSMENT & PLAN NOTE
Hypervolemia  SOB with increasing peripheral edema since d/c from SNF x1 weeks ago  - BNP 21, CXR clear, Troponin negative  - mild crackles to L lung  - renal function normal  - ESR 24, CRP 16.7  - 2D ECHO ordered  - admitted 10/29-11/13/2017 for hypoglycemia and discovered rhabdomyolysis.    - CPK elevated for 4 days and was 5553 at discharge.  Likely autoimmune process involved.    - Peripheral edema during hospital stay.  No ECHO on file, but assumed heart failure so patient treated for diastolic heart failure.  Resolved with IV lasix.  - strict I's and O's, daily weights, fluid restriction 1500

## 2017-12-11 NOTE — SUBJECTIVE & OBJECTIVE
Scheduled Meds:   amlodipine-benazepril 5-10 mg  2 capsule Oral Daily    ascorbic acid (vitamin C)  500 mg Oral BID    carvedilol  25 mg Oral BID WM    folic acid  1 mg Oral Daily    furosemide  20 mg Oral Daily    HYDROmorphone  0.5 mg Intravenous ED 1 Time    levothyroxine  25 mcg Oral Before breakfast    multivitamin  1 tablet Oral Daily    rivaroxaban  1 tablet Oral Daily    spironolactone  25 mg Oral Daily    zinc sulfate  220 mg Oral Daily     Continuous Infusions:   PRN Meds:acetaminophen, dextrose 50%, dextrose 50%, glucagon (human recombinant), glucose, glucose, hydrocodone-acetaminophen 5-325mg, influenza, insulin aspart, morphine, ondansetron, prochlorperazine, ramelteon, senna-docusate 8.6-50 mg, sodium chloride 0.9%    Review of patient's allergies indicates:  No Known Allergies     Past Medical History:   Diagnosis Date    Asthma     Diabetes mellitus type 2 in obese 5/3/2014    Elevated troponin 5/3/2014    Hyperlipidemia     Hypertension     Hypothyroidism (acquired) 11/1/2017    Obesity     Pulmonary embolism 05/2014    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     Past Surgical History:   Procedure Laterality Date    TONSILLECTOMY         Family History     Problem Relation (Age of Onset)    Cancer Father    Cataracts Sister    Diabetes Mother, Father, Sister, Brother    Glaucoma Maternal Aunt, Paternal Aunt    Heart disease Mother, Father    Hypertension Mother, Father, Sister, Brother, Son, Daughter    No Known Problems Maternal Grandmother, Maternal Grandfather, Paternal Grandmother, Paternal Grandfather    Stroke Mother        Social History Main Topics    Smoking status: Former Smoker     Types: Cigarettes    Smokeless tobacco: Never Used      Comment: Quit smoking as a teenager    Alcohol use Yes      Comment: 1-2 drinks per week    Drug use: No    Sexual activity: Yes     Partners: Female     Birth control/ protection: Condom      Review of Systems   Skin: Positive for wound.   All other systems reviewed and are negative.    Objective:     Vital Signs (Most Recent):  Temp: 97.7 °F (36.5 °C) (12/11/17 1146)  Pulse: 70 (12/11/17 1146)  Resp: 16 (12/11/17 1146)  BP: (!) 93/52 (12/11/17 1146)  SpO2: 95 % (12/11/17 1146) Vital Signs (24h Range):  Temp:  [97.6 °F (36.4 °C)-98.5 °F (36.9 °C)] 97.7 °F (36.5 °C)  Pulse:  [70-90] 70  Resp:  [16-22] 16  SpO2:  [91 %-97 %] 95 %  BP: ()/(52-72) 93/52     Weight: 124.7 kg (275 lb)  Body mass index is 39.46 kg/m².    Foot Exam    General  General Appearance: appears stated age and healthy   Orientation: alert and oriented to person, place, and time   Affect: appropriate       Right Foot/Ankle     Inspection and Palpation  Ecchymosis: none  Tenderness: none (Periwound)  Swelling: none   Skin Exam: ulcer; no drainage, no cellulitis and no erythema     Neurovascular  Dorsalis pedis: absent  Posterior tibial: absent  Saphenous nerve sensation: diminished  Tibial nerve sensation: diminished  Superficial peroneal nerve sensation: diminished  Deep peroneal nerve sensation: diminished  Sural nerve sensation: diminished      Left Foot/Ankle      Inspection and Palpation  Ecchymosis: none  Tenderness: none   Swelling: none   Skin Exam: skin intact;     Neurovascular  Dorsalis pedis: absent  Posterior tibial: absent  Saphenous nerve sensation: diminished  Tibial nerve sensation: diminished  Superficial peroneal nerve sensation: diminished  Deep peroneal nerve sensation: diminished  Sural nerve sensation: diminished            Laboratory:  A1C:   Recent Labs  Lab 10/29/17  1409   HGBA1C 5.5     BMP:   Recent Labs  Lab 12/10/17  2239   *      K 4.1   CL 99   CO2 36*   BUN 17   CREATININE 0.7   CALCIUM 9.7     CBC:   Recent Labs  Lab 12/10/17  2239   WBC 6.92   RBC 4.42*   HGB 12.2*   HCT 38.3*   *   MCV 87   MCH 27.6   MCHC 31.9*     CMP:   Recent Labs  Lab 12/10/17  2239   *    CALCIUM 9.7   ALBUMIN 3.3*   PROT 7.1      K 4.1   CO2 36*   CL 99   BUN 17   CREATININE 0.7   ALKPHOS 86   ALT 96*   AST 94*   BILITOT 0.2     Coagulation:   Recent Labs  Lab 12/10/17  2239   INR 1.3*     CRP:   Recent Labs  Lab 12/10/17  2239   CRP 16.7*     ESR:   Recent Labs  Lab 12/10/17  2239   SEDRATE 24*     Wound Cultures: No results for input(s): LABAERO in the last 4320 hours.    Diagnostic Results:  X-Ray: I have reviewed all pertinent results/findings within the past 24 hours.  ordered        Clinical Findings:  Dry eschar noted to dorsal lateral aspect of right foot measures 1.8x1.2x superficial. Eschar is well adhered without drainage. No periwound SOI noted. Wound without bogginess/fluctuance/crepitus noted, stable.

## 2017-12-11 NOTE — ASSESSMENT & PLAN NOTE
- continue levothyroxine 25 mcg  - Last TSH 8.518 with normal Free T4  - May have subacute hypothyroidism; vanna dooley for further reccs

## 2017-12-11 NOTE — PLAN OF CARE
John Vargas MD  [address] / [place] [address]    Future Appointments  Date Time Provider Department Center   12/29/2017 8:00 AM John Vargas MD [facility]         [id number] - [place], [place] - [address] [facility]  [address] [facility] [place] [address]  Phone: [phone] Fax: [phone]      Payor: [employer] MEDICARE / Plan: SimpleRelevance 65 / Product Type: Medicare Advantage /     Patient recently discharged from [facility] skilled [facility] and discussed with .   to reach out to N to see if patient has any available skilled days.       12/11/17 1526   Discharge Assessment   Assessment Type Discharge Planning Assessment   Prior to hospitilization cognitive status: Alert/Oriented   Prior to hospitalization functional status: Assistive Equipment;Needs Assistance   Current cognitive status: Alert/Oriented   Current Functional Status: Assistive Equipment;Needs Assistance   Lives With alone   Able to Return to Prior Arrangements unable to determine at this time (comments)   Is patient able to care for self after discharge? Unable to determine at this time (comments)   Who are your caregiver(s) and their phone number(s)? (Raj Tapia daughter [phone])   Patient's perception of discharge disposition skilled nursing facility   Equipment Currently Used at Home cane, straight;walker, rolling;wheelchair   Does the patient have transportation home? Yes   Transportation Available family or friend will provide   Discharge Plan A Skilled Nursing Facility   Discharge Plan B Group home;Home;Home Health   Patient/Family In Agreement With Plan yes

## 2017-12-11 NOTE — PLAN OF CARE
Problem: Occupational Therapy Goal  Goal: Occupational Therapy Goal  Goals to be met by: 1/15/17    Patient will increase functional independence with ADLs by performing:    UE Dressing with [place].  LE Dressing with Minimal Assistance.  Grooming while seated with Minimal Assistance.  Toileting from toilet with Moderate Assistance for hygiene and clothing management.   Toilet transfer to toilet with Minimal Assistance.    Outcome: Ongoing (interventions implemented as appropriate)  Eval complete and goals set.  Cont with POC  Joy Pedroza OT  12/11/2017

## 2017-12-11 NOTE — SUBJECTIVE & OBJECTIVE
Past Medical History:   Diagnosis Date    Asthma     Diabetes mellitus type 2 in obese 5/3/2014    Elevated troponin 5/3/2014    Hyperlipidemia     Hypertension     Hypothyroidism (acquired) 11/1/2017    Obesity     Pulmonary embolism 05/2014    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       Past Surgical History:   Procedure Laterality Date    TONSILLECTOMY         Review of patient's allergies indicates:  No Known Allergies    No current facility-administered medications on file prior to encounter.      Current Outpatient Prescriptions on File Prior to Encounter   Medication Sig    amlodipine-benazepril 10-20mg (LOTREL) 10-20 mg per capsule Take 1 capsule by mouth once daily.    carvedilol (COREG) 25 MG tablet Take 1 tablet (25 mg total) by mouth 2 (two) times daily with meals.    folic acid (FOLVITE) 1 MG tablet Take 1 tablet (1 mg total) by mouth once daily.    furosemide (LASIX) 20 MG tablet Take 1 tablet (20 mg total) by mouth once daily.    levothyroxine (SYNTHROID) 25 MCG tablet Take 1 tablet (25 mcg total) by mouth before breakfast.    metformin (GLUCOPHAGE) 1000 MG tablet TAKE ONE TABLET BY MOUTH TWICE A DAY WITH MEALS    rivaroxaban (XARELTO) 20 mg Tab Take 1 tablet (20 mg total) by mouth once daily.    spironolactone (ALDACTONE) 25 MG tablet TAKE ONE TABLET BY MOUTH DAILY    ascorbic acid, vitamin C, (VITAMIN C) 500 MG tablet Take 500 mg by mouth 2 (two) times daily. PER [facility]    multivitamin (ONE DAILY MULTIVITAMIN) per tablet Take 1 tablet by mouth once daily. PER [facility]    zinc sulfate (ZINCATE) 220 (50) mg capsule Take 220 mg by mouth once daily. PER [facility]     Family History     Problem Relation (Age of Onset)    Cancer Father    Cataracts Sister    Diabetes Mother, Father, Sister, Brother    Glaucoma Maternal Aunt, Paternal Aunt    Heart disease Mother, Father    Hypertension Mother, Father, Sister, Brother,  Son, Daughter    No Known Problems Maternal Grandmother, Maternal Grandfather, Paternal Grandmother, Paternal Grandfather    Stroke Mother        Social History Main Topics    Smoking status: Former Smoker     Types: Cigarettes    Smokeless tobacco: Never Used      Comment: Quit smoking as a teenager    Alcohol use Yes      Comment: 1-2 drinks per week    Drug use: No    Sexual activity: Yes     Partners: Female     Birth control/ protection: Condom     Review of Systems   Constitutional: Positive for activity change and fatigue. Negative for chills, diaphoresis and fever.   HENT: Negative for ear pain and sore throat.    Eyes: Negative for pain and visual disturbance.   Respiratory: Positive for shortness of breath (mild). Negative for cough, choking and wheezing.         Orthopnea   Cardiovascular: Positive for leg swelling (BLE). Negative for chest pain and palpitations.   Gastrointestinal: Negative for abdominal pain, diarrhea, nausea and vomiting.   Endocrine: Negative for heat intolerance and polydipsia.   Genitourinary: Negative for difficulty urinating, dysuria, frequency and urgency.   Musculoskeletal: Positive for gait problem. Negative for arthralgias, back pain, myalgias, neck pain and neck stiffness.   Skin: Positive for wound (R foot ). Negative for rash.   Neurological: Positive for dizziness (on exertion) and weakness (BL, upper and lower). Negative for tremors, seizures, syncope, facial asymmetry, speech difficulty, light-headedness, numbness and headaches.   Psychiatric/Behavioral: Negative for confusion. The patient is not nervous/anxious.      Objective:     Vital Signs (Most Recent):  Temp: 98.3 °F (36.8 °C) (12/11/17 0425)  Pulse: 83 (12/11/17 0425)  Resp: 20 (12/11/17 0425)  BP: 139/72 (12/11/17 0201)  SpO2: 97 % (12/11/17 0425) Vital Signs (24h Range):  Temp:  [98.1 °F (36.7 °C)-98.5 °F (36.9 °C)] 98.3 °F (36.8 °C)  Pulse:  [76-90] 83  Resp:  [19-22] 20  SpO2:  [91 %-97 %] 97 %  BP:  (120-145)/(63-72) 139/72     Weight: 124.7 kg (275 lb)  Body mass index is 38.9 kg/m².    Physical Exam   Constitutional: He is oriented to person, place, and time. He appears well-developed and well-nourished.   Obese male in NAD   HENT:   Head: Normocephalic and atraumatic.   Eyes: Conjunctivae and EOM are normal.   Neck: Normal range of motion. Neck supple. No JVD present. No tracheal deviation present. No thyromegaly present.   Cardiovascular: Normal rate, regular rhythm and intact distal pulses.    No murmur heard.  Distant heart sounds  2+ pitting edema BLE (below knees)   Pulmonary/Chest: Effort normal and breath sounds normal. No stridor. No respiratory distress. He has no wheezes.   Distant lung sounds  Mild crackles to L lung   Abdominal: Soft. Bowel sounds are normal. He exhibits distension. There is no tenderness.   Musculoskeletal: Normal range of motion.   Lymphadenopathy:     He has no cervical adenopathy.   Neurological: He is alert and oriented to person, place, and time. He displays no tremor. No cranial nerve deficit or sensory deficit. Gait abnormal. Coordination normal.   5/5 Strength BUE  4/5 strength BLE (R>L)   Skin: Skin is warm and dry.   Eschar noted to R dorsal foot  Mild TTP around wound  No sacral ulcer   Psychiatric: He has a normal mood and affect. His behavior is normal.   Nursing note and vitals reviewed.        CRANIAL NERVES     CN III, IV, VI   Extraocular motions are normal.        Significant Labs:   CBC:   Recent Labs  Lab 12/10/17  2239   WBC 6.92   HGB 12.2*   HCT 38.3*   *     CMP:   Recent Labs  Lab 12/10/17  2239      K 4.1   CL 99   CO2 36*   *   BUN 17   CREATININE 0.7   CALCIUM 9.7   PROT 7.1   ALBUMIN 3.3*   BILITOT 0.2   ALKPHOS 86   AST 94*   ALT 96*   ANIONGAP 8   EGFRNONAA >60.0     TSH:   Recent Labs  Lab 11/01/17  1428   TSH 8.518*     All pertinent labs within the past 24 hours have been reviewed.    Significant Imaging: I have reviewed all  pertinent imaging results/findings within the past 24 hours.

## 2017-12-11 NOTE — ASSESSMENT & PLAN NOTE
- CPK 4251 today-- baseline [id number] during previous admission  - Treated for rhabdomyolysis while inpatient, but no change in values  - HALLE negative but needs further rheumatology workup for possible autoimmune process.   - Statin discontinued in setting of rhabdo and elevated liver enzymes during last admission  - no seizures, no recent increased exertion  - No recent drug use   - Renal function normal

## 2017-12-11 NOTE — ASSESSMENT & PLAN NOTE
Hypervolemia  SOB with increasing peripheral edema since d/c from SNF x1 weeks ago  - BNP 21, CXR clear, Troponin negative  - mild crackles to L lung  - ESR 24, CRP 16.7  - 2D ECHO ordered  - strict I's and O's, daily weights, fluid restriction 1500  - US of lower ext ordered to r/o DVT  - IV lasix 40mg QD for now

## 2017-12-11 NOTE — PROGRESS NOTES
[facility] Hospital Medicine  Progress Note    Patient Name: Serafin Tapia  MRN: [id number]  Patient Class: IP- Inpatient   Admission Date: 12/10/2017  Length of Stay: 0 days  Attending Physician: Jaime Webb MD  Primary Care Provider: John Vargas MD    [facility] Medicine Team: [facility] HOSP MED L Jaime Webb MD    Subjective:     Principal Problem:Shortness of breath    HPI:  Serafin Tapia is a 67 y.o. male with PMHx of HTN, HLD, diabetes mellitus and obesity who presents to the ED with an onset of intermittent SOB x 1 month with associated sx of constant leg swelling, leg sore, generalized weakness. Per brother, the home health nurses have visited twice since his discharge from [facility] 11/13. Pt was not sent home with O2 and he currently wears adult diapers for enuresis. Pt endorses painful sore with erythema on right foot that busted while at home. Per brother, he cleans the wound with medicated pads, spray, and various packing materials every 2.5 days. Pt reports the swelling is exacerbated with standing. Pt also reports the SOB is exacerbated when he lays down. Per brother, he cannot get comfortable and requests admittance to the hospital. Denies fever, urinary retention, chest pain, anasarca, abd distension. Pt self-reports hx of blood clots and is currently on blood thinners. No pertinent surgical history.    Hospital Course:  12/11/2017 admitted overnight, pt reported LE swelling,R foot pain, SOB and coughing. Reported since his admission to [facility], he was discharge to SNF to further rehabilitation. He has been home for <7 days and he was then taken back to ED and admitted last night. He report LE swelling that has been worsening. Denies myalgias. Report cough that is productive but denies fever, chills. Report compliance with lasix at home. Pt with persistent elevation in CPK since last admission, rheumatology consulted for evaluation. IV lasix started. PT/OT ordered,  Podiatry consulted, according to early assessment, pt is severely debilitated     Past Medical History:   Diagnosis Date    Asthma     Diabetes mellitus type 2 in obese 5/3/2014    Elevated troponin 5/3/2014    Hyperlipidemia     Hypertension     Hypothyroidism (acquired) 11/1/2017    Obesity     Pulmonary embolism 05/2014    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       Past Surgical History:   Procedure Laterality Date    TONSILLECTOMY         Review of patient's allergies indicates:  No Known Allergies    No current facility-administered medications on file prior to encounter.      Current Outpatient Prescriptions on File Prior to Encounter   Medication Sig    amlodipine-benazepril 10-20mg (LOTREL) 10-20 mg per capsule Take 1 capsule by mouth once daily.    carvedilol (COREG) 25 MG tablet Take 1 tablet (25 mg total) by mouth 2 (two) times daily with meals.    folic acid (FOLVITE) 1 MG tablet Take 1 tablet (1 mg total) by mouth once daily.    furosemide (LASIX) 20 MG tablet Take 1 tablet (20 mg total) by mouth once daily.    levothyroxine (SYNTHROID) 25 MCG tablet Take 1 tablet (25 mcg total) by mouth before breakfast.    metformin (GLUCOPHAGE) 1000 MG tablet TAKE ONE TABLET BY MOUTH TWICE A DAY WITH MEALS    rivaroxaban (XARELTO) 20 mg Tab Take 1 tablet (20 mg total) by mouth once daily.    spironolactone (ALDACTONE) 25 MG tablet TAKE ONE TABLET BY MOUTH DAILY    ascorbic acid, vitamin C, (VITAMIN C) 500 MG tablet Take 500 mg by mouth 2 (two) times daily. PER [facility]    multivitamin (ONE DAILY MULTIVITAMIN) per tablet Take 1 tablet by mouth once daily. PER [facility]    zinc sulfate (ZINCATE) 220 (50) mg capsule Take 220 mg by mouth once daily. PER [facility]     Family History     Problem Relation (Age of Onset)    Cancer Father    Cataracts Sister    Diabetes Mother, Father, Sister, Brother    Glaucoma Maternal Aunt, Paternal  Aunt    Heart disease Mother, Father    Hypertension Mother, Father, Sister, Brother, Son, Daughter    No Known Problems Maternal Grandmother, Maternal Grandfather, Paternal Grandmother, Paternal Grandfather    Stroke Mother        Social History Main Topics    Smoking status: Former Smoker     Types: Cigarettes    Smokeless tobacco: Never Used      Comment: Quit smoking as a teenager    Alcohol use Yes      Comment: 1-2 drinks per week    Drug use: No    Sexual activity: Yes     Partners: Female     Birth control/ protection: Condom     Review of Systems   Constitutional: Positive for fatigue. Negative for chills, diaphoresis and fever.   HENT: Negative for congestion.    Eyes: Negative for pain and visual disturbance.   Respiratory: Positive for cough and shortness of breath (mild). Negative for choking, chest tightness and wheezing.         Orthopnea   Cardiovascular: Positive for leg swelling (BLE). Negative for chest pain and palpitations.   Gastrointestinal: Negative for abdominal pain, diarrhea, nausea and vomiting.   Genitourinary: Negative for difficulty urinating, dysuria, frequency and urgency.   Musculoskeletal: Positive for gait problem. Negative for arthralgias, back pain, myalgias, neck pain and neck stiffness.   Skin: Positive for wound (R foot ). Negative for rash.   Neurological: Positive for dizziness (on exertion) and weakness (BL, upper and lower). Negative for tremors, seizures, syncope, facial asymmetry, speech difficulty, light-headedness, numbness and headaches.   Psychiatric/Behavioral: Negative for confusion. The patient is not nervous/anxious.      Objective:     Vital Signs (Most Recent):  Temp: 98.3 °F (36.8 °C) (12/11/17 0425)  Pulse: 83 (12/11/17 0425)  Resp: 20 (12/11/17 0425)  BP: (!) 157/70 (12/11/17 0425)  SpO2: 97 % (12/11/17 0425) Vital Signs (24h Range):  Temp:  [98.1 °F (36.7 °C)-98.5 °F (36.9 °C)] 98.3 °F (36.8 °C)  Pulse:  [76-90] 83  Resp:  [19-22] 20  SpO2:  [91 %-97  %] 97 %  BP: (120-157)/(63-72) 157/70     Weight: 124.7 kg (275 lb)  Body mass index is 39.46 kg/m².    Physical Exam   Constitutional: He is oriented to person, place, and time. He appears well-developed and well-nourished.   Obese male in NAD   HENT:   Head: Normocephalic and atraumatic.   Eyes: Conjunctivae and EOM are normal.   Neck: Normal range of motion. Neck supple. No JVD present. No tracheal deviation present. No thyromegaly present.   Cardiovascular: Normal rate, regular rhythm and intact distal pulses.    No murmur heard.  Distant heart sounds  2+ pitting edema BLE (below knees)   Pulmonary/Chest: Effort normal and breath sounds normal. No stridor. No respiratory distress. He has no wheezes.   Distant lung sounds  Mild crackles to L lung   Abdominal: Soft. Bowel sounds are normal. He exhibits distension. There is no tenderness.   Musculoskeletal: Normal range of motion.   Lymphadenopathy:     He has no cervical adenopathy.   Neurological: He is alert and oriented to person, place, and time. He displays no tremor. No cranial nerve deficit or sensory deficit. Gait abnormal. Coordination normal.   5/5 Strength BUE  4/5 strength BLE (R>L)   Skin: Skin is warm and dry.   Eschar noted to R dorsal foot  Mild TTP around wound  No sacral ulcer   Psychiatric: He has a normal mood and affect. His behavior is normal.   Nursing note and vitals reviewed.        CRANIAL NERVES     CN III, IV, VI   Extraocular motions are normal.        Significant Labs:   CBC:   Recent Labs  Lab 12/10/17  2239   WBC 6.92   HGB 12.2*   HCT 38.3*   *     CMP:   Recent Labs  Lab 12/10/17  2239      K 4.1   CL 99   CO2 36*   *   BUN 17   CREATININE 0.7   CALCIUM 9.7   PROT 7.1   ALBUMIN 3.3*   BILITOT 0.2   ALKPHOS 86   AST 94*   ALT 96*   ANIONGAP 8   EGFRNONAA >60.0       Significant Imaging: I have reviewed all pertinent imaging results/findings within the past 24 hours.       Assessment/Plan:      * Shortness of  breath    Hypervolemia  SOB with increasing peripheral edema since d/c from SNF x1 weeks ago  - BNP 21, CXR clear, Troponin negative  - mild crackles to L lung  - ESR 24, CRP 16.7  - 2D ECHO ordered  - strict I's and O's, daily weights, fluid restriction 1500  - US of lower ext ordered to r/o DVT  - IV lasix 40mg QD for now        Skin ulcer of right foot, limited to breakdown of skin    - R foot ulcer that began during last admission  - Eschar noted to R dorsal foot.  No signs of infection.  - Podiatry consulted,   - XR of R foot  Done  - will consider MRI foot        Weakness    - proximal weakness to BL hips and shoulders.  Increased gait instability  - Cannot perform ADLs; uses diapers  - Weakness started 3/2017 and noted to become worse in October.  - denies numbness, tingling, pain  - Unclear etiology  - Neuromuscular dz vs autoimmune vs cervical myelopathy  -  PT/OT --- will continue here   - MRI cervical spine and lumbar spine from 9/2017 show multilevel degenerative disc disease with neural foraminal stenosis. No spinal cord edema.    - HALLE negative  - rheum consult place. Will consider neuro consult         Non-traumatic rhabdomyolysis    - CPK 4251 today-- baseline [id number] during previous admission - admitted 10/29-11/13/2017 for hypoglycemia and discovered rhabdomyolysis.   - Treated for rhabdomyolysis while inpatient, but no change in values  - HALLE negative but needs further rheumatology workup for possible autoimmune process.   - Statin discontinued in setting of rhabdo and elevated liver enzymes during last admission  - Renal function normal  - Rheumatology consult         Essential hypertension    - Continue amlodipine-benazepril 10-20 mg, carvedilol 25 mg BIDWM         Controlled type 2 diabetes mellitus with diabetic polyneuropathy, without long-term current use of insulin    - Last HgA1c 5.5 on 10/29  - Home med: meformin 1000 mg BID; held  - blood glucose AC/HS  - ISS  - repeat A1C         Dyslipidemia    - statin discontinued x1 month ago in setting of rhabdo and elevated liver enzymes  - Lipid panel ordered for AM        Cough    - no sign of PNA on CXR, pt denies SOB, fever, chills  - Start Duo-neb Q6, Acapella treatment Q6          Elevated liver enzymes    - AST 94, ALT 96  - Chronic; unclear etiology  - Not currently on statin; discontinued during last admission  - Alk phos, T bili WNL  - albumin 3.3   - monitor        History of pulmonary embolism    - Pulmonary embolus in 2014.    - Held anticoagulation in the setting of renal failure, now resolved.   - Xarelto.         Anemia due to vitamin B12 deficiency    H/H 12.2/38.3  - baseline Hg 11-14  - No signs of bleeding  - 9/20/2017 it was 207; repeat B12 for AM draw  - Not currently taking B12  - On folate, vitamin C, zinc        Hypothyroidism (acquired)    - continue levothyroxine 25 mcg  - Last TSH 8.518 with normal Free T4          Morbid obesity with BMI of 40.0-44.9, adult    - Sedentary lifestyle and poor functional mobility          VTE Risk Mitigation         Ordered     rivaroxaban tablet 20 mg  Daily     Route:  Oral        12/11/17 0311     High Risk of VTE  Once      12/11/17 0316     Place ROBINSON hose  Until discontinued      12/11/17 0316     Place sequential compression device  Until discontinued      12/11/17 0311              Jaime Webb MD  Department of Hospital Medicine   [facility]

## 2017-12-11 NOTE — ASSESSMENT & PLAN NOTE
- statin discontinued x1 month ago in setting of rhabdo and elevated liver enzymes  - Lipid panel ordered for AM

## 2017-12-11 NOTE — PLAN OF CARE
Problem: Patient Care Overview  Goal: Plan of Care Review  Safety:  call light in reach, patient oriented to room & instructed how to notify nurse if assistance is needed, questions & concerns addressed, bed in lowest position with wheels locked & side rails up X 2, fall precautions followed, patient free from fall & injury thus far this shift;  VTE/bleeding precautions maintained.  Activity:  bedrest, weight shifted at least every other hour.  Neurological:  patient A&O X 4, follows commands, equal  strength & dorsi/plantarflexion, neuro checks performed as ordered & WDL.  Respiratory:  patient on NC @ 2L/M, denies SOB.  Cardiac:  Denies chest pain, BP stable.  HR stable.  NSR on telemetry.  Afebrile this shift.  GI:  Patient tolerates PO intake well, denies nausea, LBM 12/10/17.  :  patient voids clear yellow urine without foul odor spontaneously & without difficulty, adequate output for shift. Currently in a brief.  Skin:  One skin break noted to Rt upper foot, see Josue Oleary's note. Devices:  PIV CDI, negative for s/sx of infection & infiltration.  Pain:  patient denies pain. Brother @ bedside. Will continue to monitor patient.

## 2017-12-11 NOTE — ED NOTES
Patient's son reports patient was recently hospitalized at [facility] for hypoglycemia. Patient released from SNF last Sunday. Patient having persistent SOB, increased swelling, productive cough at night with clear phlegm.  Patient also has wound on right foot that is non healing.

## 2017-12-11 NOTE — SUBJECTIVE & OBJECTIVE
Past Medical History:   Diagnosis Date    Asthma     Diabetes mellitus type 2 in obese 5/3/2014    Elevated troponin 5/3/2014    Hyperlipidemia     Hypertension     Hypothyroidism (acquired) 11/1/2017    Obesity     Pulmonary embolism 05/2014    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       Past Surgical History:   Procedure Laterality Date    TONSILLECTOMY         Immunization History   Administered Date(s) Administered    Hepatitis A, Adult 09/15/2005    PPD Test 11/01/2017    Pneumococcal Conjugate - 13 Valent 12/22/2015, 05/05/2017    Tdap 05/04/2017    Zoster 12/22/2015    influenza - Quadrivalent 11/11/2014, 09/23/2015       Review of patient's allergies indicates:  No Known Allergies  Current Facility-Administered Medications   Medication Frequency    acetaminophen tablet 650 mg Q4H PRN    amlodipine-benazepril 5-10 mg per capsule 2 capsule Daily    ascorbic acid (vitamin C) tablet 500 mg BID    carvedilol tablet 25 mg BID WM    dextrose 50% injection 12.5 g PRN    dextrose 50% injection 25 g PRN    folic acid tablet 1 mg Daily    furosemide tablet 20 mg Daily    glucagon (human recombinant) injection 1 mg PRN    glucose chewable tablet 16 g PRN    glucose chewable tablet 24 g PRN    hydrocodone-acetaminophen 5-325mg per tablet 1 tablet Q6H PRN    hydromorphone (PF) injection 0.5 mg ED 1 Time    influenza (FLUZONE HIGH-DOSE) vaccine 0.5 mL vaccine x 1 dose    insulin aspart pen 0-5 Units QID (AC + HS) PRN    levothyroxine tablet 25 mcg Before breakfast    morphine injection 4 mg Q4H PRN    multivitamin tablet 1 tablet Daily    ondansetron disintegrating tablet 4 mg Q8H PRN    prochlorperazine tablet 10 mg Q6H PRN    ramelteon tablet 8 mg Nightly PRN    rivaroxaban tablet 20 mg Daily    senna-docusate 8.6-50 mg per tablet 1 tablet BID PRN    sodium chloride 0.9% flush 5 mL PRN    spironolactone tablet 25 mg Daily    zinc sulfate  capsule 220 mg Daily     Family History     Problem Relation (Age of Onset)    Cancer Father    Cataracts Sister    Diabetes Mother, Father, Sister, Brother    Glaucoma Maternal Aunt, Paternal Aunt    Heart disease Mother, Father    Hypertension Mother, Father, Sister, Brother, Son, Daughter    No Known Problems Maternal Grandmother, Maternal Grandfather, Paternal Grandmother, Paternal Grandfather    Stroke Mother        Social History Main Topics    Smoking status: Former Smoker     Types: Cigarettes    Smokeless tobacco: Never Used      Comment: Quit smoking as a teenager    Alcohol use Yes      Comment: 1-2 drinks per week    Drug use: No    Sexual activity: Yes     Partners: Female     Birth control/ protection: Condom     Review of Systems   Constitutional: Positive for activity change and unexpected weight change. Negative for appetite change, chills, diaphoresis, fatigue and fever.   HENT: Negative for hearing loss, mouth sores, nosebleeds, sinus pain, sinus pressure and trouble swallowing.    Eyes: Negative for pain, redness and visual disturbance.   Respiratory: Positive for cough, choking and shortness of breath. Negative for chest tightness and wheezing.    Cardiovascular: Positive for leg swelling. Negative for chest pain and palpitations.   Gastrointestinal: Negative for abdominal distention, abdominal pain, blood in stool, diarrhea, nausea and vomiting.   Genitourinary: Positive for enuresis. Negative for difficulty urinating, scrotal swelling and testicular pain.   Musculoskeletal: Positive for gait problem. Negative for arthralgias and joint swelling.   Skin: Positive for wound. Negative for color change, pallor and rash.   Neurological: Positive for weakness. Negative for tremors, seizures, syncope, facial asymmetry, speech difficulty, light-headedness, numbness and headaches.     Objective:     Vital Signs (Most Recent):  Temp: 97.6 °F (36.4 °C) (12/11/17 0735)  Pulse: 74 (12/11/17  0735)  Resp: 16 (12/11/17 0735)  BP: 116/60 (12/11/17 0735)  SpO2: 96 % (12/11/17 0735)  O2 Device (Oxygen Therapy): nasal cannula (12/11/17 0735) Vital Signs (24h Range):  Temp:  [97.6 °F (36.4 °C)-98.5 °F (36.9 °C)] 97.6 °F (36.4 °C)  Pulse:  [74-90] 74  Resp:  [16-22] 16  SpO2:  [91 %-97 %] 96 %  BP: (116-157)/(60-72) 116/60     Weight: 124.7 kg (275 lb) (12/11/17 0425)  Body mass index is 39.46 kg/m².  Body surface area is 2.48 meters squared.    No intake or output data in the 24 hours ending 12/11/17 1117    Physical Exam   Constitutional: He is oriented to person, place, and time and well-developed, well-nourished, and in no distress.   HENT:   Mouth/Throat: No oropharyngeal exudate.   Eyes: Conjunctivae and EOM are normal. No scleral icterus.   Neck: Normal range of motion. Neck supple.   Cardiovascular: Normal rate, regular rhythm, normal heart sounds and intact distal pulses.    Pulmonary/Chest: Effort normal and breath sounds normal. No respiratory distress. He has no wheezes. He has no rales. He exhibits no tenderness.   Abdominal: Soft. Bowel sounds are normal. He exhibits no distension. There is no tenderness. There is no rebound and no guarding.       Right Side Rheumatological Exam     Muscle Strength (0-5 scale):  Neck Flexion:  5  Neck Extension: 5  : 5/5   Quadriceps:  1   Distal Lower Extremity: 5    Left Side Rheumatological Exam     Muscle Strength (0-5 scale):  Neck Flexion:  5  Neck Extension: 5  :  5/5   Quadriceps:  1   Distal Lower Extremity: 5      Lymphadenopathy:     He has no cervical adenopathy.   Neurological: He is alert and oriented to person, place, and time.   Pt has proximal b/l LE weakness involving the hip flexors. 1/5.     UE 5/5.    Skin: Skin is warm. No rash noted. No erythema. No pallor.     Chronic venous stasis b/l  No rashes on the face, shoulders, thighs, abdomen, legs.   Dry skin involving the hands- no findings of Gottron's papules, mechanics hands.   Left  foot cooler than the right.    Musculoskeletal: He exhibits edema. He exhibits no tenderness or deformity.   Pt has 2+ pitting edema up to the b/l knees.   Right foot has a open wound, no drainage.     No synovitis on exam.            Significant Labs:  CBC:   Recent Labs  Lab 12/10/17  2239   WBC 6.92   HGB 12.2*   HCT 38.3*   *     CMP:   Recent Labs  Lab 12/10/17  2239   *   CALCIUM 9.7   ALBUMIN 3.3*   PROT 7.1      K 4.1   CO2 36*   CL 99   BUN 17   CREATININE 0.7   ALKPHOS 86   ALT 96*   AST 94*   BILITOT 0.2     Coagulation:   Recent Labs  Lab 12/10/17  2239   LABPROT 13.6*   INR 1.3*     CPK:   Recent Labs  Lab 12/10/17  2239   CPK 4251*     CRP:   Recent Labs  Lab 12/10/17  2239   CRP 16.7*     ESR:   Recent Labs  Lab 12/10/17  2239   SEDRATE 24*     All pertinent lab results from the last 24 hours have been reviewed.    Significant Imaging:  Imaging results within the past 24 hours have been reviewed.     Narrative   XR foot    History: .    Left foot 3 views:    No fractures or dislocations.  Mild hindfoot valgus.  Mild degenerative changes.  Calcaneal spurs.  Soft tissue swelling about the right foot.  No radiopaque foreign body or soft tissue gas.   Impression       No acute fracture or bone destruction.    Additional findings as above.       XR Chest: 12/11      Findings:     Heart and lungs unchanged when allowing for differences in technique and positioning.   Impression          No acute findings in the chest.  No significant change from prior.

## 2017-12-11 NOTE — ASSESSMENT & PLAN NOTE
- R foot ulcer that began during last admission  - Eschar noted to R dorsal foot.  No signs of infection.  - Podiatry consulted,   - XR of R foot  Done  - will consider MRI foot

## 2017-12-11 NOTE — ASSESSMENT & PLAN NOTE
H/H 12.2/38.3  - baseline Hg 11-14  - No signs of bleeding  - 9/20/2017 it was 207; repeat B12 for AM draw  - Not currently taking B12  - On folate, vitamin C, zinc

## 2017-12-11 NOTE — HPI
Serafin Tapia is a 67 y.o. male with PMHx of HTN, HLD, diabetes mellitus and obesity who presents to the ED with an onset of intermittent SOB x 1 month with associated sx of constant leg swelling, leg sore, generalized weakness. Per brother, the home health nurses have visited twice since his discharge from [facility] 11/13. Pt was not sent home with O2 and he currently wears adult diapers for enuresis. Pt endorses painful sore with erythema on right foot that busted while at home. Per brother, he cleans the wound with medicated pads, spray, and various packing materials every 2.5 days. Pt reports the swelling is exacerbated with standing. Pt also reports the SOB is exacerbated when he lays down. Per brother, he cannot get comfortable and requests admittance to the hospital. Denies fever, urinary retention, chest pain, anasarca, abd distension. Pt self-reports hx of blood clots and is currently on blood thinners. No pertinent surgical history.

## 2017-12-11 NOTE — ASSESSMENT & PLAN NOTE
- proximal weakness to BL hips and shoulders.  Increased gait instability  - Cannot perform ADLs; uses diapers  - Weakness started 3/2017 and noted to become worse in October.  - denies numbness, tingling, pain  - Unclear etiology  - Neuromuscular dz vs autoimmune vs cervical myelopathy  -  PT/OT --- will continue here   - MRI cervical spine and lumbar spine from 9/2017 show multilevel degenerative disc disease with neural foraminal stenosis. No spinal cord edema.    - HALLE negative  - rheum consult place. Will consider neuro consult

## 2017-12-11 NOTE — CONSULTS
[facility]  Rheumatology  Consult Note    Patient Name: Serafin Tapia  MRN: [id number]  Admission Date: 12/10/2017  Hospital Length of Stay: 0 days  Code Status: Full Code   Attending Provider: Severo Webb MD  Primary Care Physician: John Vargas MD  Principal Problem:Shortness of breath    Inpatient consult to Rheumatology  Consult performed by: OLIVIER TOBIAS  Consult ordered by: SEVERO WEBB  Reason for consult: elevated cpk         Subjective:     HPI: Pt is a 66 yo male with DM II, PHILLIP,  HTN, DLD, h/o PE, asthma, hypothyroidism presenting due to worsening shortness of breath. Pt was recently admitted to [facility] for persistent hypoglycemia( BG 40s) which was attributed to sulfonylurea  use.  During that admission he was found to have a CPK of 4000 which intermittently fluctuated in the 5000s (peak 5974). He was treated for suspected rhabdomyolysis and underwent aggressive hydration. Mr. Tapia subsequently developed diffuse anasarca, pulmonary edema. He was then diuresed with IV lasix. His CPK at the time of discharge was 5553.  He was noted to have an elevated esr and crp so an HALLE was done, which was negative. A neurodegenerative disorder was suspected at that time.     Mr. Arzola sts that prior to his hospitalization on 10/29 at [facility] he was doing fairly well.  He reports being able to walk without assistance, nevertheless he does reports feeling weakness sine March 2017.  He sts that he is now unable to walk due to weakness. He is unable to lift his thighs against gravity. He notes some SOB. He denies difficulty with over the head tasks but notes climbing stairs can be difficult. He denies any fevers, chills, Rashes, mucosal ulcers, hematuria, dysphagia, vision changes, Raynaud's, n/v/d.   Pt reports a 60-70 pound weight loss since March.      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  He developed a wound on the right foot which he reports healed. He sts that after his swelling while hospitalized caused the wound to flare up.     Reports a family history of RA (brother) and SLE (cousins). No h/o psoriasis.     Past Medical History:   Diagnosis Date    Asthma     Diabetes mellitus type 2 in obese 5/3/2014    Elevated troponin 5/3/2014    Hyperlipidemia     Hypertension     Hypothyroidism (acquired) 11/1/2017    Obesity     Pulmonary embolism 05/2014    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       Past Surgical History:   Procedure Laterality Date    TONSILLECTOMY         Immunization History   Administered Date(s) Administered    Hepatitis A, Adult 09/15/2005    PPD Test 11/01/2017    Pneumococcal Conjugate - 13 Valent 12/22/2015, 05/05/2017    Tdap 05/04/2017    Zoster 12/22/2015    influenza - Quadrivalent 11/11/2014, 09/23/2015       Review of patient's allergies indicates:  No Known Allergies  Current Facility-Administered Medications   Medication Frequency    acetaminophen tablet 650 mg Q4H PRN    amlodipine-benazepril 5-10 mg per capsule 2 capsule Daily    ascorbic acid (vitamin C) tablet 500 mg BID    carvedilol tablet 25 mg BID WM    dextrose 50% injection 12.5 g PRN    dextrose 50% injection 25 g PRN    folic acid tablet 1 mg Daily    furosemide tablet 20 mg Daily    glucagon (human recombinant) injection 1 mg PRN    glucose chewable tablet 16 g PRN    glucose chewable tablet 24 g PRN    hydrocodone-acetaminophen 5-325mg per tablet 1 tablet Q6H PRN    hydromorphone (PF) injection 0.5 mg ED 1 Time    influenza (FLUZONE HIGH-DOSE) vaccine 0.5 mL vaccine x 1 dose    insulin aspart pen 0-5 Units QID (AC + HS) PRN    levothyroxine tablet 25 mcg Before breakfast    morphine injection 4 mg Q4H PRN    multivitamin tablet 1 tablet Daily    ondansetron disintegrating tablet 4 mg Q8H PRN    prochlorperazine tablet 10 mg Q6H PRN     ramelteon tablet 8 mg Nightly PRN    rivaroxaban tablet 20 mg Daily    senna-docusate 8.6-50 mg per tablet 1 tablet BID PRN    sodium chloride 0.9% flush 5 mL PRN    spironolactone tablet 25 mg Daily    zinc sulfate capsule 220 mg Daily     Family History     Problem Relation (Age of Onset)    Cancer Father    Cataracts Sister    Diabetes Mother, Father, Sister, Brother    Glaucoma Maternal Aunt, Paternal Aunt    Heart disease Mother, Father    Hypertension Mother, Father, Sister, Brother, Son, Daughter    No Known Problems Maternal Grandmother, Maternal Grandfather, Paternal Grandmother, Paternal Grandfather    Stroke Mother        Social History Main Topics    Smoking status: Former Smoker     Types: Cigarettes    Smokeless tobacco: Never Used      Comment: Quit smoking as a teenager    Alcohol use Yes      Comment: 1-2 drinks per week    Drug use: No    Sexual activity: Yes     Partners: Female     Birth control/ protection: Condom     Review of Systems   Constitutional: Positive for activity change and unexpected weight change. Negative for appetite change, chills, diaphoresis, fatigue and fever.   HENT: Negative for hearing loss, mouth sores, nosebleeds, sinus pain, sinus pressure and trouble swallowing.    Eyes: Negative for pain, redness and visual disturbance.   Respiratory: Positive for cough, choking and shortness of breath. Negative for chest tightness and wheezing.    Cardiovascular: Positive for leg swelling. Negative for chest pain and palpitations.   Gastrointestinal: Negative for abdominal distention, abdominal pain, blood in stool, diarrhea, nausea and vomiting.   Genitourinary: Positive for enuresis. Negative for difficulty urinating, scrotal swelling and testicular pain.   Musculoskeletal: Positive for gait problem. Negative for arthralgias and joint swelling.   Skin: Positive for wound. Negative for color change, pallor and rash.   Neurological: Positive for weakness. Negative for  tremors, seizures, syncope, facial asymmetry, speech difficulty, light-headedness, numbness and headaches.     Objective:     Vital Signs (Most Recent):  Temp: 97.6 °F (36.4 °C) (12/11/17 0735)  Pulse: 74 (12/11/17 0735)  Resp: 16 (12/11/17 0735)  BP: 116/60 (12/11/17 0735)  SpO2: 96 % (12/11/17 0735)  O2 Device (Oxygen Therapy): nasal cannula (12/11/17 0735) Vital Signs (24h Range):  Temp:  [97.6 °F (36.4 °C)-98.5 °F (36.9 °C)] 97.6 °F (36.4 °C)  Pulse:  [74-90] 74  Resp:  [16-22] 16  SpO2:  [91 %-97 %] 96 %  BP: (116-157)/(60-72) 116/60     Weight: 124.7 kg (275 lb) (12/11/17 0425)  Body mass index is 39.46 kg/m².  Body surface area is 2.48 meters squared.    No intake or output data in the 24 hours ending 12/11/17 1117    Physical Exam   Constitutional: He is oriented to person, place, and time and well-developed, well-nourished, and in no distress.   HENT:   Mouth/Throat: No oropharyngeal exudate.   Eyes: Conjunctivae and EOM are normal. No scleral icterus.   Neck: Normal range of motion. Neck supple.   Cardiovascular: Normal rate, regular rhythm, normal heart sounds and intact distal pulses.    Pulmonary/Chest: Effort normal and breath sounds normal. No respiratory distress. He has no wheezes. He has no rales. He exhibits no tenderness.   Abdominal: Soft. Bowel sounds are normal. He exhibits no distension. There is no tenderness. There is no rebound and no guarding.       Right Side Rheumatological Exam     Muscle Strength (0-5 scale):  Neck Flexion:  5  Neck Extension: 5  : 5/5   Quadriceps:  1   Distal Lower Extremity: 5    Left Side Rheumatological Exam     Muscle Strength (0-5 scale):  Neck Flexion:  5  Neck Extension: 5  :  5/5   Quadriceps:  1   Distal Lower Extremity: 5      Lymphadenopathy:     He has no cervical adenopathy.   Neurological: He is alert and oriented to person, place, and time.   Pt has proximal b/l LE weakness involving the hip flexors. 1/5.     UE 5/5.    Skin: Skin is warm. No  rash noted. No erythema. No pallor.     Chronic venous stasis b/l  No rashes on the face, shoulders, thighs, abdomen, legs.   Dry skin involving the hands- no findings of Gottron's papules, mechanics hands.   Left foot cooler than the right.    Musculoskeletal: He exhibits edema. He exhibits no tenderness or deformity.   Pt has 2+ pitting edema up to the b/l knees.   Right foot has a open wound, no drainage.     No synovitis on exam.            Significant Labs:  CBC:   Recent Labs  Lab 12/10/17  2239   WBC 6.92   HGB 12.2*   HCT 38.3*   *     CMP:   Recent Labs  Lab 12/10/17  2239   *   CALCIUM 9.7   ALBUMIN 3.3*   PROT 7.1      K 4.1   CO2 36*   CL 99   BUN 17   CREATININE 0.7   ALKPHOS 86   ALT 96*   AST 94*   BILITOT 0.2     Coagulation:   Recent Labs  Lab 12/10/17  2239   LABPROT 13.6*   INR 1.3*     CPK:   Recent Labs  Lab 12/10/17  2239   CPK 4251*     CRP:   Recent Labs  Lab 12/10/17  2239   CRP 16.7*     ESR:   Recent Labs  Lab 12/10/17  2239   SEDRATE 24*     All pertinent lab results from the last 24 hours have been reviewed.    Significant Imaging:  Imaging results within the past 24 hours have been reviewed.     Narrative   XR foot    History: .    Left foot 3 views:    No fractures or dislocations.  Mild hindfoot valgus.  Mild degenerative changes.  Calcaneal spurs.  Soft tissue swelling about the right foot.  No radiopaque foreign body or soft tissue gas.   Impression       No acute fracture or bone destruction.    Additional findings as above.       XR Chest: 12/11      Findings:     Heart and lungs unchanged when allowing for differences in technique and positioning.   Impression          No acute findings in the chest.  No significant change from prior.           Assessment/Plan:     Weakness    Pt is a 66 yo male with DM II, PHILLIP,  HTN, DLD, h/o PE, asthma, hypothyroidism presenting due to worsening shortness of breath sx c/w HF.  Pt exhibits proximal weakness of the lower  extremity. In the setting of the elevated CPKs with w/o for an inflammatory myositis as detailed below. Also for necrotizing myopathy given statin exposure. Pt also has a component of deconditioning as well. HALLE and Cinthia 1 negative     Plan  - Myomarker 3 panel, LDH, aldolase, HIV, TSH/free T4, magnesium, and phosphorus levels, HMG co A reductase ab  - Trend CPKs   - Please obtain an MRI of the thighs and EMG  to evaluate for myositis. Pt will likely need a muscle biopsy as well.   - will continue to follow             Thank you for your consult. I will follow-up with patient. Please contact us if you have any additional questions.    Robles Lozano MD  Rheumatology  [facility]    RHEUMATOLOGY ATTENDING:  Patient presented, personally interviewed and examined, medical records reviewed.  67 yr old male with multiple morbidities (DMII, dyslipidemia, HTN, hypothyroidism, asthma as well as a hx of PE and more recently enuresis)  admitted for progressive SOB. We are being consulted for elevated CPK first noted during prior [facility] hospitalization on 11/1 and sustained through today. CPK maxed at 5974 with lowest today at 4251. Patient had been on atorvastatin until [facility] hospitalization.  Patient is profoundly weak in the lower extremities. He noted the onset of weakness in March and it has progressively gotten worse to the point where he cannot walk now. He denies significant upper extremity or neck weakness. May have had some upper dysphagia but not c/o it now. He denies distal weakness. He denies skin rashes. He has lost 60 - 70 lbs since March. There is a hx of SLE in some cousins and his brother has RA.   Exam with very mild upper proximal muscle weakness (4+/5); lower extremity proximal muscles 2+5; upper and lower distal strength 5/5.  Has some expiratory wheezes; has 2+ pedal & pretibial LE edema; has eschar on dorsolateral aspect of R foot (trauma in remote past).   Labs with  negative HALLE; Cinthia-1 neg; aldolase 29.2 (11/1/17) ESR 24; CRP 16.7; Hg 12.2; Ht 38.3; plt 435; ; alb. 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  Rx: labs as per Dr. Lozano including HMGCR, myomarker panel, MRI, EMG/NCV and muscle bx.  Findings discussed with patient and Dr. Lozano whose note and assessment I agree with.

## 2017-12-11 NOTE — PLAN OF CARE
Problem: Physical Therapy Goal  Goal: Physical Therapy Goal  Goals to be met by: 17    Patient will increase functional independence with mobility by performin. Supine to sit with MInimal Assistance  2. Sit to supine with MInimal Assistance  3. Rolling to Left and Right with Minimal Assistance.  4. Sit to stand transfer with Minimal Assistance w/ RW  5. Bed to chair transfer with Moderate Assistance using Rolling Walker  6. Gait  x 10 feet with Moderate Assistance using Rolling Walker.   7. Lower extremity exercise program x20 reps per handout, with supervision    Outcome: Ongoing (interventions implemented as appropriate)  PT Goals established following initial vikash Argueta, PT  2017

## 2017-12-11 NOTE — ED PROVIDER NOTES
"Encounter Date: 12/10/2017    SCRIBE #1 NOTE: I, Deirdre Rocha, am scribing for, and in the presence of, Dr. Kuhn.       History     Chief Complaint   Patient presents with    Multiple Complaints     brother reports pt is "making no progress with his walking". states pt is retaining fluid, has a sore on the right foot, has a cough and SOB. pt was recently at [facility] and [facility].        12/10/2017 10:03 PM     Chief complaint: Multiple Complaints      Serafin Tapia is a 67 y.o. male with PMHx of HTN, HLD, diabetes mellitus and obesity who presents to the ED with an onset of intermittent SOB x 1 month with associated sx of constant leg swelling, leg sore, generalized weakness. Per brother, the home health nurses have visited twice since his discharge from [facility] 11/13.  He was discharged from [facility] about 1 week ago.  He has had pulse ox of 87 to 88 percent when home health has been there. Pt was not sent home with O2 and he currently wears adult diapers for enuresis. Pt endorses painful sore with erythema on right foot that busted while at home. It developed while he was in the hospital being hydrated for rhabdo and developed volume overload. Per brother, he cleans the wound with medicated pads, spray, and various packing materials every 2.5 days. Pt reports the swelling is exacerbated with standing. Pt also reports the SOB is exacerbated when he lays down. Per brother, he cannot get comfortable and requests admittance to the hospital. Denies fever, urinary retention, chest pain, anasarca, abd distension. Pt self-reports hx of blood clots and is currently on blood thinners. No pertinent surgical history.      The history is provided by the patient and a relative (Brother).     Review of patient's allergies indicates:  No Known Allergies  Past Medical History:   Diagnosis Date    Asthma     Diabetes mellitus type 2 in obese 5/3/2014    Elevated troponin 5/3/2014    Hyperlipidemia     Hypertension     " Hypothyroidism (acquired) 11/1/2017    Obesity     Pulmonary embolism 05/2014    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     Past Surgical History:   Procedure Laterality Date    TONSILLECTOMY       Family History   Problem Relation Age of Onset    Diabetes Mother     Heart disease Mother     Hypertension Mother     Stroke Mother     Heart disease Father     Hypertension Father     Diabetes Father     Cancer Father     Cataracts Sister     Hypertension Sister     Diabetes Sister     Glaucoma Maternal Aunt     Glaucoma Paternal Aunt     Hypertension Brother     Diabetes Brother     No Known Problems Maternal Grandmother     No Known Problems Maternal Grandfather     No Known Problems Paternal Grandmother     No Known Problems Paternal Grandfather     Hypertension Son     Hypertension Daughter      Social History   Substance Use Topics    Smoking status: Former Smoker     Types: Cigarettes    Smokeless tobacco: Never Used      Comment: Quit smoking as a teenager    Alcohol use Yes      Comment: 1-2 drinks per week     Review of Systems   Constitutional: Negative for fever.   HENT: Negative for rhinorrhea.    Eyes: Negative for redness.   Respiratory: Positive for shortness of breath.    Cardiovascular: Positive for leg swelling. Negative for chest pain.   Gastrointestinal: Negative for abdominal distention.   Genitourinary: Positive for enuresis. Negative for decreased urine volume.   Skin: Positive for color change (Erythema, Right Foot) and wound (Sore, Right Foot.).   Neurological: Positive for weakness.   Psychiatric/Behavioral: Negative for confusion.       Physical Exam     Initial Vitals [12/10/17 1708]   BP Pulse Resp Temp SpO2   (!) 145/66 76 (!) 22 98.5 °F (36.9 °C) (!) 94 %      MAP       92.33         Physical Exam    Nursing note and vitals reviewed.  Constitutional: He appears well-developed and well-nourished. No distress.   Pt is in a  wheelchair.   HENT:   Head: Normocephalic and atraumatic.   Mouth/Throat: Oropharynx is clear and moist.   Eyes: EOM are normal. Pupils are equal, round, and reactive to light.   Neck: Normal range of motion. Neck supple.   Cardiovascular: Normal rate and regular rhythm. Exam reveals no gallop and no friction rub.    Murmur heard.   Systolic murmur is present with a grade of 2/6   Pulmonary/Chest: No respiratory distress. He has no wheezes. He has no rhonchi. He has rales.   Bibasilar rales. Distant breath sounds.    Abdominal: Soft. He exhibits no distension. There is no tenderness.   Musculoskeletal: Normal range of motion.   2+ pitting edema of the bilateral knees. Right foot is swollen.  Pain with movement of the 4th toe.   Neurological: He is alert and oriented to person, place, and time. He has normal strength. No cranial nerve deficit or sensory deficit.   Skin: Skin is warm and dry.   Erythema of the right foot with ulceration over dorsum just proximal to the 4th MTP with surrounding erythema. This area is exquisitely tender to touch. There is an eschar tender to touch.   Psychiatric: His behavior is normal. Thought content normal.         ED Course   Procedures  Labs Reviewed   CBC W/ AUTO DIFFERENTIAL - Abnormal; Notable for the following:        Result Value    RBC 4.42 (*)     Hemoglobin 12.2 (*)     Hematocrit 38.3 (*)     MCHC 31.9 (*)     Platelets 435 (*)     All other components within normal limits   COMPREHENSIVE METABOLIC PANEL - Abnormal; Notable for the following:     CO2 36 (*)     Glucose 129 (*)     Albumin 3.3 (*)     AST 94 (*)     ALT 96 (*)     All other components within normal limits   PROTIME-INR - Abnormal; Notable for the following:     Prothrombin Time 13.6 (*)     INR 1.3 (*)     All other components within normal limits   CK - Abnormal; Notable for the following:     CPK 4251 (*)     All other components within normal limits   SEDIMENTATION RATE, MANUAL - Abnormal; Notable for  the following:     Sed Rate 24 (*)     All other components within normal limits   C-REACTIVE PROTEIN - Abnormal; Notable for the following:     CRP 16.7 (*)     All other components within normal limits   CULTURE, BLOOD   CULTURE, BLOOD   TROPONIN I   B-TYPE NATRIURETIC PEPTIDE     EKG Readings: (Independently Interpreted)   Initial Reading: No STEMI. Rhythm: Normal Sinus Rhythm. Heart Rate: 73 bpm. T Waves Flipped: III and AVF.   Changed compared to EKG from August.     Imaging Results          X-Ray Foot Complete Right (Final result)  Result time 12/11/17 00:36:16    Final result by Tera Lopez MD (12/11/17 00:36:16)                 Impression:      No acute fracture or bone destruction.    Additional findings as above.      Electronically signed by: TERA LOPEZ MD  Date:     12/11/17  Time:    00:36              Narrative:    History: .    Left foot 3 views:    No fractures or dislocations.  Mild hindfoot valgus.  Mild degenerative changes.  Calcaneal spurs.  Soft tissue swelling about the right foot.  No radiopaque foreign body or soft tissue gas.                             X-Ray Chest PA And Lateral (Final result)  Result time 12/11/17 00:28:32    Final result by Tera Lopez MD (12/11/17 00:28:32)                 Impression:         No acute findings in the chest.  No significant change from prior.        Electronically signed by: TERA LOPZE MD  Date:     12/11/17  Time:    00:28              Narrative:    Chest PA and Lateral    Indication:CHF.    Comparison:November 9, 2017.    Findings:     Heart and lungs unchanged when allowing for differences in technique and positioning.                              X-Rays:   Independently Interpreted Readings:   Other Readings:  CXR, showed no giovanni pulm edema.  Some increased markings.    Medical Decision Making:   History:   Old Medical Records: I decided to obtain old medical records.  Old Records Summarized: other records.       <> Summary of  Records: Discharge summary from recent admittance to [facility] from 10/29 to 11/13 for acute renal failure and refractory hypoglycemia secondary to sulfonylurea use. Also suspected Rhabdo with CPK greater than 5000. Pt did not tolerate hydration well and developed volume overload. Multiple telephone encounters from the past several days from home health regarding O2SATs in the upper 80s.  Initial Assessment:   This is an emergent evaluation of an 67 y.o. Male with recent admittance with c/o progressive weakness, SOB, painful right foot, LE swelling. Concerns for worsening volume overload and cellulitis with ulceration.   Independently Interpreted Test(s):   I have ordered and independently interpreted EKG Reading(s) - see prior notes  Clinical Tests:   Lab Tests: Reviewed and Ordered  Radiological Study: Ordered and Reviewed  Medical Tests: Ordered and Reviewed  ED Management:  Given that patient is a diabetic, he will most likely require admittance for IV abx. Will check labs for kidney function; BMP, ESR, CRP. Will check CXR and foot Xray.            Scribe Attestation:   Scribe #1: I performed the above scribed service and the documentation accurately describes the services I performed. I attest to the accuracy of the note.    Attending Attestation:           Physician Attestation for Scribe:      Comments: I, Dr. Lou Sanders, personally performed the services described in this documentation. All medical record entries made by the scribe were at my direction and in my presence.  I have reviewed the chart and agree that the record reflects my personal performance and is accurate and complete. Lou Sanders MD.     Attending ED Notes:   Discussed with Dr. Mon who wants to hold off on abx for now until DVT study can be completed.  I orderd a lower extremity ultrasound and pt was taken for ultrasound prior to my being able to tell patient of the plan.          ED Course      Clinical  Impression:   The primary encounter diagnosis was Elevated liver enzymes. Diagnoses of Shortness of breath, Foot ulcer, Swelling of right lower extremity, Hypervolemia, and Skin ulcer of right foot, limited to breakdown of skin were also pertinent to this visit.    Disposition:   Disposition: Admitted                        Lou Sanders MD  12/13/17 1342       Lou Sanders MD  12/13/17 1343       Lou Sanders MD  12/13/17 1348

## 2017-12-11 NOTE — ASSESSMENT & PLAN NOTE
Pt is a 66 yo male with DM II, PHILLIP,  HTN, DLD, h/o PE, asthma, hypothyroidism presenting due to worsening shortness of breath sx c/w HF.  Pt exhibits proximal weakness of the lower extremity. In the setting of the elevated CPKs with w/o for an inflammatory myositis as detailed below. Also for necrotizing myopathy given statin exposure. Pt also has a component of deconditioning as well.  HALLE and Cinthia 1 negative     Plan  - f/u Myomarker 3 panel,  HMG co A reductase ab  - Plan for EMG/NCS. Will need muscle biopsy as well.   - no further intervention at this time.   - Continue PT/OT  - Will have pt return to Rheumatology Clinic in 4 weeks for further evaluation of weakness. Will repeat aldolase, CPK, CBC, CMP.

## 2017-12-11 NOTE — PT/OT/SLP EVAL
"Occupational Therapy   Evaluation    Name: Serafin Tapia  MRN: [id number]  Admitting Diagnosis:  Shortness of breath      Recommendations:     Discharge Recommendations: nursing facility, skilled  Discharge Equipment Recommendations:  bedside commode  Barriers to discharge:       History:     Occupational Profile:  Living Environment: Pt lives alone and with multiple siblings who "come over to clean me up and give me food"  Previous level of function: Pt received max A for self care completion  Equipment Owned:  walker, rolling, wheelchair, cane, straight  Assistance upon Discharge: Pt would benefit from increased and consistent care     Past Medical History:   Diagnosis Date    Asthma     Diabetes mellitus type 2 in obese 5/3/2014    Elevated troponin 5/3/2014    Hyperlipidemia     Hypertension     Hypothyroidism (acquired) 11/1/2017    Obesity     Pulmonary embolism 05/2014    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       Past Surgical History:   Procedure Laterality Date    TONSILLECTOMY         Subjective     Chief Complaint: weakness  Patient/Family stated goals: return to home  Communicated with: Rn prior to session.  Pain/Comfort:  · Pain Rating 1:  (pt did not complain of pain)    Objective:     Patient found with:  (all lines intact)    General Precautions: Standard, fall   Orthopedic Precautions:N/A   Braces: N/A     Occupational Performance:    Bed Mobility:    · Patient completed Sit to Supine with maximal assistance    Functional Mobility/Transfers:  · Patient completed Sit <> Stand Transfer with maximal assistance  with  no assistive device   · Patient completed Bed <> Chair Transfer using Stand Pivot technique with maximal assistance with no assistive device x2    Activities of Daily Living:  · Bathing: maximal assistance    · UB Dressing: moderate assistance    · LB Dressing: total assistance    · Toileting: total assistance      Cognitive/Visual " "Perceptual:  Cognitive/Psychosocial Skills:     -       Oriented to: Person, Place, Time and Situation   -       Follows Commands/attention:Follows two-step commands  -       Communication: clear/fluent  -       Safety awareness/insight to disability: intact   -       Mood/Affect/Coping skills/emotional control: Appropriate to situation    Physical Exam:  Poor balance    Patient left supine with all lines intact and call button in reach    AMPA 6 Click:  [facility] Total Score: 12    Treatment & Education:  Pt educated on safety, importance of increased participation for gains, transfer training.  Sit to stand with max a x2 with 4 attempts. Chair to bed transfer with Max Ax2.  Bed mobility Max A x2.   Education:    Assessment:     Serafin Tapia is a 67 y.o. male with a medical diagnosis of Shortness of breath.  He presents with poor performance and tolerance of functional tasks.  Performance deficits affecting function are weakness, impaired endurance, impaired self care skills, impaired functional mobilty, gait instability.      Rehab Prognosis:  fair; patient would benefit from acute skilled OT services to address these deficits and reach maximum level of function.         Clinical Decision Makin.  OT Mod:  "Pt evaluation falls under moderate complexity for evaluation coding due to identification of 3-5 performance deficits noted as stated above. Eval required Min/Mod assistance to complete on this date and detailed assessment(s) were utilized. Moreover, an expanded review of history and occupational profile obtained with additional review of cognitive, physical and psychosocial hx."     Plan:     Patient to be seen 3 x/week to address the above listed problems via self-care/home management, therapeutic activities, therapeutic exercises  · Plan of Care Expires: 18  · Plan of Care Reviewed with: patient    This Plan of care has been discussed with the patient who was involved in its development and " understands and is in agreement with the identified goals and treatment plan    GOALS:    Occupational Therapy Goals        Problem: Occupational Therapy Goal    Goal Priority Disciplines Outcome Interventions   Occupational Therapy Goal     OT, PT/OT Ongoing (interventions implemented as appropriate)    Description:  Goals to be met by: 1/15/17    Patient will increase functional independence with ADLs by performing:    UE Dressing with [place].  LE Dressing with Minimal Assistance.  Grooming while seated with Minimal Assistance.  Toileting from toilet with Moderate Assistance for hygiene and clothing management.   Toilet transfer to toilet with Minimal Assistance.                      Time Tracking:     OT Date of Treatment: 12/11/17  OT Start Time: 1000  OT Stop Time: 1015  OT Total Time (min): 15 min    Billable Minutes:Evaluation 15    Joy Pedroza, OT  12/11/2017

## 2017-12-11 NOTE — H&P
"[facility] Hospital Medicine  History & Physical    Patient Name: Serafin Tapia  MRN: [id number]  Admission Date: 12/10/2017  Attending Physician: Jaime Webb MD   Primary Care Provider: John Vargas MD    [facility] Medicine Team: Networked reference to record PCT  Josue Oleary PA-C     Patient information was obtained from patient, past medical records and ER records.     Subjective:     Principal Problem:Shortness of breath    Chief Complaint:   Chief Complaint   Patient presents with    Multiple Complaints     brother reports pt is "making no progress with his walking". states pt is retaining fluid, has a sore on the right foot, has a cough and SOB. pt was recently at [facility] and [facility].         HPI: Serafin Tapia is a 67 y.o. male with PMHx of HTN, HLD, diabetes mellitus and obesity who presents to the ED with an onset of intermittent SOB x 1 month with associated sx of constant leg swelling, leg sore, generalized weakness. Per brother, the home health nurses have visited twice since his discharge from [facility] 11/13. Pt was not sent home with O2 and he currently wears adult diapers for enuresis. Pt endorses painful sore with erythema on right foot that busted while at home. Per brother, he cleans the wound with medicated pads, spray, and various packing materials every 2.5 days. Pt reports the swelling is exacerbated with standing. Pt also reports the SOB is exacerbated when he lays down. Per brother, he cannot get comfortable and requests admittance to the hospital. Denies fever, urinary retention, chest pain, anasarca, abd distension. Pt self-reports hx of blood clots and is currently on blood thinners. No pertinent surgical history.    Past Medical History:   Diagnosis Date    Asthma     Diabetes mellitus type 2 in obese 5/3/2014    Elevated troponin 5/3/2014    Hyperlipidemia     Hypertension     Hypothyroidism (acquired) 11/1/2017    Obesity     Pulmonary embolism " 05/2014    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       Past Surgical History:   Procedure Laterality Date    TONSILLECTOMY         Review of patient's allergies indicates:  No Known Allergies    No current facility-administered medications on file prior to encounter.      Current Outpatient Prescriptions on File Prior to Encounter   Medication Sig    amlodipine-benazepril 10-20mg (LOTREL) 10-20 mg per capsule Take 1 capsule by mouth once daily.    carvedilol (COREG) 25 MG tablet Take 1 tablet (25 mg total) by mouth 2 (two) times daily with meals.    folic acid (FOLVITE) 1 MG tablet Take 1 tablet (1 mg total) by mouth once daily.    furosemide (LASIX) 20 MG tablet Take 1 tablet (20 mg total) by mouth once daily.    levothyroxine (SYNTHROID) 25 MCG tablet Take 1 tablet (25 mcg total) by mouth before breakfast.    metformin (GLUCOPHAGE) 1000 MG tablet TAKE ONE TABLET BY MOUTH TWICE A DAY WITH MEALS    rivaroxaban (XARELTO) 20 mg Tab Take 1 tablet (20 mg total) by mouth once daily.    spironolactone (ALDACTONE) 25 MG tablet TAKE ONE TABLET BY MOUTH DAILY    ascorbic acid, vitamin C, (VITAMIN C) 500 MG tablet Take 500 mg by mouth 2 (two) times daily. PER [facility]    multivitamin (ONE DAILY MULTIVITAMIN) per tablet Take 1 tablet by mouth once daily. PER [facility]    zinc sulfate (ZINCATE) 220 (50) mg capsule Take 220 mg by mouth once daily. PER [facility]     Family History     Problem Relation (Age of Onset)    Cancer Father    Cataracts Sister    Diabetes Mother, Father, Sister, Brother    Glaucoma Maternal Aunt, Paternal Aunt    Heart disease Mother, Father    Hypertension Mother, Father, Sister, Brother, Son, Daughter    No Known Problems Maternal Grandmother, Maternal Grandfather, Paternal Grandmother, Paternal Grandfather    Stroke Mother        Social History Main Topics    Smoking status: Former Smoker     Types: Cigarettes    Smokeless  tobacco: Never Used      Comment: Quit smoking as a teenager    Alcohol use Yes      Comment: 1-2 drinks per week    Drug use: No    Sexual activity: Yes     Partners: Female     Birth control/ protection: Condom     Review of Systems   Constitutional: Positive for activity change and fatigue. Negative for chills, diaphoresis and fever.   HENT: Negative for ear pain and sore throat.    Eyes: Negative for pain and visual disturbance.   Respiratory: Positive for shortness of breath (mild). Negative for cough, choking and wheezing.         Orthopnea   Cardiovascular: Positive for leg swelling (BLE). Negative for chest pain and palpitations.   Gastrointestinal: Negative for abdominal pain, diarrhea, nausea and vomiting.   Endocrine: Negative for heat intolerance and polydipsia.   Genitourinary: Negative for difficulty urinating, dysuria, frequency and urgency.   Musculoskeletal: Positive for gait problem. Negative for arthralgias, back pain, myalgias, neck pain and neck stiffness.   Skin: Positive for wound (R foot ). Negative for rash.   Neurological: Positive for dizziness (on exertion) and weakness (BL, upper and lower). Negative for tremors, seizures, syncope, facial asymmetry, speech difficulty, light-headedness, numbness and headaches.   Psychiatric/Behavioral: Negative for confusion. The patient is not nervous/anxious.      Objective:     Vital Signs (Most Recent):  Temp: 98.3 °F (36.8 °C) (12/11/17 0425)  Pulse: 83 (12/11/17 0425)  Resp: 20 (12/11/17 0425)  BP: (!) 157/70 (12/11/17 0425)  SpO2: 97 % (12/11/17 0425) Vital Signs (24h Range):  Temp:  [98.1 °F (36.7 °C)-98.5 °F (36.9 °C)] 98.3 °F (36.8 °C)  Pulse:  [76-90] 83  Resp:  [19-22] 20  SpO2:  [91 %-97 %] 97 %  BP: (120-157)/(63-72) 157/70     Weight: 124.7 kg (275 lb)  Body mass index is 39.46 kg/m².    Physical Exam   Constitutional: He is oriented to person, place, and time. He appears well-developed and well-nourished.   Obese male in NAD   HENT:    Head: Normocephalic and atraumatic.   Eyes: Conjunctivae and EOM are normal.   Neck: Normal range of motion. Neck supple. No JVD present. No tracheal deviation present. No thyromegaly present.   Cardiovascular: Normal rate, regular rhythm and intact distal pulses.    No murmur heard.  Distant heart sounds  2+ pitting edema BLE (below knees)   Pulmonary/Chest: Effort normal and breath sounds normal. No stridor. No respiratory distress. He has no wheezes.   Distant lung sounds  Mild crackles to L lung   Abdominal: Soft. Bowel sounds are normal. He exhibits distension. There is no tenderness.   Musculoskeletal: Normal range of motion.   Lymphadenopathy:     He has no cervical adenopathy.   Neurological: He is alert and oriented to person, place, and time. He displays no tremor. No cranial nerve deficit or sensory deficit. Gait abnormal. Coordination normal.   5/5 Strength BUE  4/5 strength BLE (R>L)   Skin: Skin is warm and dry.   Eschar noted to R dorsal foot  Mild TTP around wound  No sacral ulcer   Psychiatric: He has a normal mood and affect. His behavior is normal.   Nursing note and vitals reviewed.        CRANIAL NERVES     CN III, IV, VI   Extraocular motions are normal.        Significant Labs:   CBC:   Recent Labs  Lab 12/10/17  2239   WBC 6.92   HGB 12.2*   HCT 38.3*   *     CMP:   Recent Labs  Lab 12/10/17  2239      K 4.1   CL 99   CO2 36*   *   BUN 17   CREATININE 0.7   CALCIUM 9.7   PROT 7.1   ALBUMIN 3.3*   BILITOT 0.2   ALKPHOS 86   AST 94*   ALT 96*   ANIONGAP 8   EGFRNONAA >60.0       Significant Imaging: I have reviewed all pertinent imaging results/findings within the past 24 hours.   X-ray Chest Pa And Lateral    Result Date: 12/11/2017  Chest PA and Lateral Indication:CHF. Comparison:November 9, 2017. Findings: Heart and lungs unchanged when allowing for differences in technique and positioning.   No acute findings in the chest.  No significant change from prior.      X-ray  Foot Complete Right    Result Date: 12/11/2017  Left foot 3 views: No fractures or dislocations.  Mild hindfoot valgus.  Mild degenerative changes.  Calcaneal spurs.  Soft tissue swelling about the right foot.  No radiopaque foreign body or soft tissue gas.    No acute fracture or bone destruction. Additional findings as above.     Us Lower Extremity Veins Right    Result Date: 12/11/2017  No evidence of acute DVT in the right lower extremity.    Assessment/Plan:     * Shortness of breath    Hypervolemia  SOB with increasing peripheral edema since d/c from SNF x1 weeks ago  - BNP 21, CXR clear, Troponin negative  - mild crackles to L lung  - renal function normal  - ESR 24, CRP 16.7  - 2D ECHO ordered  - admitted 10/29-11/13/2017 for hypoglycemia and discovered rhabdomyolysis.    - CPK elevated for 4 days and was 5553 at discharge.  Likely autoimmune process involved.    - Peripheral edema during hospital stay.  No ECHO on file, but assumed heart failure so patient treated for diastolic heart failure.  Resolved with IV lasix.  - strict I's and O's, daily weights, fluid restriction 1500  - US of lower ext ordered to r/o DVT        Skin ulcer of right foot, limited to breakdown of skin    - R foot ulcer that began during last admission  - Eschar noted to R dorsal foot.  No signs of infection.  - Podiatry consulted for wound care.  - XR of R foot WNL        Weakness    - proximal weakness to BL hips and shoulders.  Increased gait instability  - Cannot perform ADLs; uses diapers  - Weakness started 3/2017 and noted to become worse in October.  - denies numbness, tingling, pain  - Unclear etiology  - Neuromuscular dz vs autoimmune vs cervical myelopathy  -  PT/OT --- will continue here   - MRI cervical spine and lumbar spine from 9/2017 show multilevel degenerative disc disease with neural foraminal stenosis. No spinal cord edema.    -Consider Neurosurgical consult for etiology of weakness.   - HALLE negative         Elevated liver enzymes    - AST 94, ALT 96  - Chronic; unclear etiology  - Not currently on statin; discontinued during last admission  - Alk phos, T bili WNL  - albumin 3.3         Anemia due to vitamin B12 deficiency    H/H 12.2/38.3  - baseline Hg 11-14  - No signs of bleeding  - 9/20/2017 it was 207; repeat B12 for AM draw  - Not currently taking B12  - On folate, vitamin C, zinc        Hypothyroidism (acquired)    - continue levothyroxine 25 mcg  - Last TSH 8.518 with normal Free T4  - May have subacute hypothyroidism; curbside endo for further reccs        Non-traumatic rhabdomyolysis    - CPK 4251 today-- baseline 5554-7404 during previous admission  - Treated for rhabdomyolysis while inpatient, but no change in values  - HLALE negative but needs further rheumatology workup for possible autoimmune process.   - Statin discontinued in setting of rhabdo and elevated liver enzymes during last admission  - no seizures, no recent increased exertion  - No recent drug use   - Renal function normal        Morbid obesity with BMI of 40.0-44.9, adult    - Sedentary lifestyle and poor functional mobility        Controlled type 2 diabetes mellitus with diabetic polyneuropathy, without long-term current use of insulin    - Last HgA1c 5.5 on 10/29  - Home med: meformin 1000 mg BID; held  - blood glucose AC/HS  - ISS        Dyslipidemia    - statin discontinued x1 month ago in setting of rhabdo and elevated liver enzymes  - Lipid panel ordered for AM        Essential hypertension    - Continue amlodipine-benazepril 10-20 mg, carvedilol 25 mg BIDWM, furosemide 20 mg once daily          VTE Risk Mitigation         Ordered     rivaroxaban tablet 20 mg  Daily     Route:  Oral        12/11/17 0311     High Risk of VTE  Once      12/11/17 0316     Place ROBINSON hose  Until discontinued      12/11/17 0316     Place sequential compression device  Until discontinued      12/11/17 0311             Josue Oleary PA-C  Department of  Hospital Medicine [facility]

## 2017-12-11 NOTE — ASSESSMENT & PLAN NOTE
- CPK 4251 today-- baseline [id number] during previous admission - admitted 10/29-11/13/2017 for hypoglycemia and discovered rhabdomyolysis.   - Treated for rhabdomyolysis while inpatient, but no change in values  - HALLE negative but needs further rheumatology workup for possible autoimmune process.   - Statin discontinued in setting of rhabdo and elevated liver enzymes during last admission  - Renal function normal  - Rheumatology consult

## 2017-12-11 NOTE — ASSESSMENT & PLAN NOTE
- proximal weakness to BL hips and shoulders.  Increased gait instability  - Cannot perform ADLs; uses diapers  - Weakness started 3/2017 and noted to become worse in October.  - denies numbness, tingling, pain  - Unclear etiology  - Neuromuscular dz vs autoimmune vs cervical myelopathy  -  PT/OT --- will continue here   - MRI cervical spine and lumbar spine from 9/2017 show multilevel degenerative disc disease with neural foraminal stenosis. No spinal cord edema.    -Consider Neurosurgical consult for etiology of weakness.   - HALLE negative

## 2017-12-11 NOTE — ASSESSMENT & PLAN NOTE
- Pulmonary embolus in 2014.    - Held anticoagulation in the setting of renal failure, now resolved.   - Xarelto.

## 2017-12-11 NOTE — HPI
Leonidas is a 68 yo male with DM II, PHILLIP,  HTN, DLD, h/o PE, asthma, hypothyroidism presenting due to worsening shortness of breath. Leonidas was recently admitted to [facility] for persistent hypoglycemia( BG 40s) which was attributed to sulfonylurea  use.  During that admission he was found to have a CPK of 4000 which intermittently fluctuated in the 5000s (peak 5974). He was treated for suspected rhabdomyolysis and underwent aggressive hydration. Mr. Tapia subsequently developed diffuse anasarca, pulmonary edema. He was then diuresed with IV lasix. His CPK at the time of discharge was 5553.  He was noted to have an elevated esr and crp so an HALLE was done, which was negative. A neurodegenerative disorder was suspected at that time.     Mr. Arzola sts that prior to his hospitalization on 10/29 at [facility] he was doing fairly well.  He reports being able to walk without assistance, nevertheless he does reports feeling weakness sine March 2017.  He sts that he is now unable to walk due to weakness. He is unable to lift his thighs against gravity. He notes some SOB. He denies difficulty with over the head tasks but notes climbing stairs can be difficult. He denies any fevers, chills, Rashes, mucosal ulcers, hematuria, dysphagia, vision changes, Raynaud's, n/v/d.   Pt reports a 60-70 pound weight loss since March.      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 He developed a wound on the right foot which he reports healed. He sts that after his swelling while hospitalized caused the wound to flare up.     Reports a family history of RA (brother) and SLE (cousins). No h/o psoriasis.

## 2017-12-12 ENCOUNTER — DOCUMENTATION ONLY (OUTPATIENT)
Dept: CARDIOLOGY | Facility: CLINIC | Age: 67
End: 2017-12-12

## 2017-12-12 LAB
ALBUMIN SERPL BCP-MCNC: 2.9 G/DL
ALP SERPL-CCNC: 75 U/L
ALT SERPL W/O P-5'-P-CCNC: 86 U/L
ANION GAP SERPL CALC-SCNC: 8 MMOL/L
AST SERPL-CCNC: 79 U/L
BASOPHILS # BLD AUTO: 0.01 K/UL
BASOPHILS NFR BLD: 0.2 %
BILIRUB SERPL-MCNC: 0.2 MG/DL
BUN SERPL-MCNC: 18 MG/DL
CALCIUM SERPL-MCNC: 9.1 MG/DL
CHLORIDE SERPL-SCNC: 98 MMOL/L
CHOLEST SERPL-MCNC: 206 MG/DL
CHOLEST/HDLC SERPL: 5.7 {RATIO}
CK SERPL-CCNC: 2888 U/L
CO2 SERPL-SCNC: 35 MMOL/L
CREAT SERPL-MCNC: 0.7 MG/DL
DIASTOLIC DYSFUNCTION: NO
DIFFERENTIAL METHOD: ABNORMAL
EOSINOPHIL # BLD AUTO: 0.1 K/UL
EOSINOPHIL NFR BLD: 2.4 %
ERYTHROCYTE [DISTWIDTH] IN BLOOD BY AUTOMATED COUNT: 14 %
EST. GFR  (AFRICAN AMERICAN): >60 ML/MIN/1.73 M^2
EST. GFR  (NON AFRICAN AMERICAN): >60 ML/MIN/1.73 M^2
ESTIMATED AVG GLUCOSE: 117 MG/DL
ESTIMATED PA SYSTOLIC PRESSURE: 29.83
GLUCOSE SERPL-MCNC: 103 MG/DL
HBA1C MFR BLD HPLC: 5.7 %
HCT VFR BLD AUTO: 36.3 %
HDLC SERPL-MCNC: 36 MG/DL
HDLC SERPL: 17.5 %
HGB BLD-MCNC: 11.2 G/DL
IMM GRANULOCYTES # BLD AUTO: 0.02 K/UL
IMM GRANULOCYTES NFR BLD AUTO: 0.4 %
LDLC SERPL CALC-MCNC: 148.2 MG/DL
LYMPHOCYTES # BLD AUTO: 1.2 K/UL
LYMPHOCYTES NFR BLD: 22.4 %
MAGNESIUM SERPL-MCNC: 1.8 MG/DL
MCH RBC QN AUTO: 27.1 PG
MCHC RBC AUTO-ENTMCNC: 30.9 G/DL
MCV RBC AUTO: 88 FL
MONOCYTES # BLD AUTO: 0.4 K/UL
MONOCYTES NFR BLD: 7.7 %
NEUTROPHILS # BLD AUTO: 3.6 K/UL
NEUTROPHILS NFR BLD: 66.9 %
NONHDLC SERPL-MCNC: 170 MG/DL
NRBC BLD-RTO: 0 /100 WBC
PHOSPHATE SERPL-MCNC: 4.4 MG/DL
PLATELET # BLD AUTO: 348 K/UL
PMV BLD AUTO: 10.5 FL
POCT GLUCOSE: 102 MG/DL (ref 70–110)
POCT GLUCOSE: 110 MG/DL (ref 70–110)
POCT GLUCOSE: 133 MG/DL (ref 70–110)
POTASSIUM SERPL-SCNC: 4.7 MMOL/L
PROT SERPL-MCNC: 6.3 G/DL
RBC # BLD AUTO: 4.14 M/UL
RETIRED EF AND QEF - SEE NOTES: 65 (ref 55–65)
SODIUM SERPL-SCNC: 141 MMOL/L
TRICUSPID VALVE REGURGITATION: NORMAL
TRIGL SERPL-MCNC: 109 MG/DL
WBC # BLD AUTO: 5.32 K/UL

## 2017-12-12 PROCEDURE — 80061 LIPID PANEL: CPT

## 2017-12-12 PROCEDURE — 85025 COMPLETE CBC W/AUTO DIFF WBC: CPT

## 2017-12-12 PROCEDURE — 99900035 HC TECH TIME PER 15 MIN (STAT)

## 2017-12-12 PROCEDURE — 99233 SBSQ HOSP IP/OBS HIGH 50: CPT | Mod: ,,, | Performed by: HOSPITALIST

## 2017-12-12 PROCEDURE — C8929 TTE W OR WO FOL WCON,DOPPLER: HCPCS

## 2017-12-12 PROCEDURE — A9585 GADOBUTROL INJECTION: HCPCS | Performed by: HOSPITALIST

## 2017-12-12 PROCEDURE — 27000173 HC ACAPELLA DEVICE DH OR DM

## 2017-12-12 PROCEDURE — 63600175 PHARM REV CODE 636 W HCPCS: Performed by: HOSPITALIST

## 2017-12-12 PROCEDURE — 82550 ASSAY OF CK (CPK): CPT

## 2017-12-12 PROCEDURE — 25500020 PHARM REV CODE 255: Performed by: HOSPITALIST

## 2017-12-12 PROCEDURE — 94664 DEMO&/EVAL PT USE INHALER: CPT

## 2017-12-12 PROCEDURE — 94640 AIRWAY INHALATION TREATMENT: CPT

## 2017-12-12 PROCEDURE — 36415 COLL VENOUS BLD VENIPUNCTURE: CPT

## 2017-12-12 PROCEDURE — 25000003 PHARM REV CODE 250: Performed by: PHYSICIAN ASSISTANT

## 2017-12-12 PROCEDURE — 27000221 HC OXYGEN, UP TO 24 HOURS

## 2017-12-12 PROCEDURE — 84100 ASSAY OF PHOSPHORUS: CPT

## 2017-12-12 PROCEDURE — 83516 IMMUNOASSAY NONANTIBODY: CPT

## 2017-12-12 PROCEDURE — 93306 TTE W/DOPPLER COMPLETE: CPT | Mod: 26,,, | Performed by: INTERNAL MEDICINE

## 2017-12-12 PROCEDURE — 11000001 HC ACUTE MED/SURG PRIVATE ROOM

## 2017-12-12 PROCEDURE — 83036 HEMOGLOBIN GLYCOSYLATED A1C: CPT

## 2017-12-12 PROCEDURE — 25000242 PHARM REV CODE 250 ALT 637 W/ HCPCS: Performed by: HOSPITALIST

## 2017-12-12 PROCEDURE — 80053 COMPREHEN METABOLIC PANEL: CPT

## 2017-12-12 PROCEDURE — 94761 N-INVAS EAR/PLS OXIMETRY MLT: CPT

## 2017-12-12 PROCEDURE — 83735 ASSAY OF MAGNESIUM: CPT

## 2017-12-12 RX ORDER — GADOBUTROL 604.72 MG/ML
10 INJECTION INTRAVENOUS
Status: COMPLETED | OUTPATIENT
Start: 2017-12-12 | End: 2017-12-12

## 2017-12-12 RX ADMIN — LEVOTHYROXINE SODIUM 25 MCG: 25 TABLET ORAL at 05:12

## 2017-12-12 RX ADMIN — AMLODIPINE AND BENAZEPRIL HYDROCHLORIDE 2 CAPSULE: 5; 10 CAPSULE ORAL at 09:12

## 2017-12-12 RX ADMIN — THERA TABS 1 TABLET: TAB at 09:12

## 2017-12-12 RX ADMIN — SPIRONOLACTONE 25 MG: 25 TABLET, FILM COATED ORAL at 09:12

## 2017-12-12 RX ADMIN — IPRATROPIUM BROMIDE AND ALBUTEROL SULFATE 3 ML: .5; 3 SOLUTION RESPIRATORY (INHALATION) at 01:12

## 2017-12-12 RX ADMIN — OXYCODONE HYDROCHLORIDE AND ACETAMINOPHEN 500 MG: 500 TABLET ORAL at 09:12

## 2017-12-12 RX ADMIN — HYDROCODONE BITARTRATE AND ACETAMINOPHEN 1 TABLET: 5; 325 TABLET ORAL at 12:12

## 2017-12-12 RX ADMIN — Medication 220 MG: at 09:12

## 2017-12-12 RX ADMIN — RIVAROXABAN 20 MG: 20 TABLET, FILM COATED ORAL at 09:12

## 2017-12-12 RX ADMIN — IPRATROPIUM BROMIDE AND ALBUTEROL SULFATE 3 ML: .5; 3 SOLUTION RESPIRATORY (INHALATION) at 08:12

## 2017-12-12 RX ADMIN — FOLIC ACID 1 MG: 1 TABLET ORAL at 09:12

## 2017-12-12 RX ADMIN — HYDROCODONE BITARTRATE AND ACETAMINOPHEN 1 TABLET: 5; 325 TABLET ORAL at 06:12

## 2017-12-12 RX ADMIN — FUROSEMIDE 40 MG: 10 INJECTION, SOLUTION INTRAMUSCULAR; INTRAVENOUS at 09:12

## 2017-12-12 RX ADMIN — OXYCODONE HYDROCHLORIDE AND ACETAMINOPHEN 500 MG: 500 TABLET ORAL at 08:12

## 2017-12-12 RX ADMIN — GADOBUTROL 10 ML: 604.72 INJECTION INTRAVENOUS at 04:12

## 2017-12-12 NOTE — PROGRESS NOTES
[facility] Hospital Medicine  Progress Note    Patient Name: Serafin Tapia  MRN: [id number]  Patient Class: IP- Inpatient   Admission Date: 12/10/2017  Length of Stay: 1 days  Attending Physician: Pk Rosario MD  Primary Care Provider: John Vargas MD    [facility] Medicine Team: [facility] HOSP MED L Jaime Webb MD    Subjective:     Principal Problem:Shortness of breath    HPI:  Serafin Tapia is a 67 y.o. male with PMHx of HTN, HLD, diabetes mellitus and obesity who presents to the ED with an onset of intermittent SOB x 1 month with associated sx of constant leg swelling, leg sore, generalized weakness. Per brother, the home health nurses have visited twice since his discharge from [facility] 11/13. Pt was not sent home with O2 and he currently wears adult diapers for enuresis. Pt endorses painful sore with erythema on right foot that busted while at home. Per brother, he cleans the wound with medicated pads, spray, and various packing materials every 2.5 days. Pt reports the swelling is exacerbated with standing. Pt also reports the SOB is exacerbated when he lays down. Per brother, he cannot get comfortable and requests admittance to the hospital. Denies fever, urinary retention, chest pain, anasarca, abd distension. Pt self-reports hx of blood clots and is currently on blood thinners. No pertinent surgical history.    Hospital Course:  12/11/2017 admitted overnight, pt reported LE swelling,R foot pain, SOB and coughing. Reported since his admission to [facility], he was discharge to SNF to further rehabilitation. He has been home for <7 days and he was then taken back to ED and admitted last night. He report LE swelling that has been worsening. Denies myalgias. Report cough that is productive but denies fever, chills. Report compliance with lasix at home. Pt with persistent elevation in CPK since last admission, rheumatology consulted for evaluation. IV lasix started. PT/OT ordered,  "Podiatry consulted, according to early assessment, pt is severely debilitated     Pt feels that breathing is worse today. Does not feel worsening of SOB when laying flat. Has proximal muscle weakness and is unable to maneuver himself in bed. No myalgias. No Cp. Has LE edema and pain at R foot ulcer.     BP (!) 104/52 (BP Location: Right arm, Patient Position: Lying)   Pulse 82   Temp 98.5 °F (36.9 °C) (Oral)   Resp 16   Ht 5' 10" (1.778 m)   Wt 124.5 kg (274 lb 7.6 oz)   SpO2 (!) 93%   BMI 39.38 kg/m²     Physical Exam   Constitutional: He is oriented to person, place, and time. He appears well-developed and well-nourished.   Obese male in NAD   HENT:   Head: Normocephalic and atraumatic.   Eyes: Conjunctivae and EOM are normal.   Neck: Normal range of motion. Neck supple. No tracheal deviation present. No thyromegaly present.   Cardiovascular: Normal rate, regular rhythm and intact distal pulses.    No murmur heard.  Distant heart sounds  2+ pitting edema in RLE but less in LLE  Pulmonary/Chest: Effort normal and breath sounds normal. No stridor. No respiratory distress. He has no wheezes.   Distant lung sounds w/ mild crackles at bases. Cough sounds wet  Abdominal: Soft. Bowel sounds are normal. There is no tenderness.   Musculoskeletal: Dec function but good strength in UEs  Neurological: He is alert and oriented to person, place, and time. He displays no tremor. No cranial nerve deficit or sensory deficit. Gait abnormal. Coordination normal.   5/5 Strength BUE  4/5 strength BLE (R>L)   Skin: Skin is warm and dry.   R dorsal foot dressing c/d/i  Mild TTP around wound  No sacral ulcer   Psychiatric: He has a normal mood and affect. His behavior is normal.   Nursing note and vitals reviewed.      Recent Labs  Lab 12/12/17  0451   WBC 5.32   RBC 4.14*   HGB 11.2*   HCT 36.3*      MCV 88   MCH 27.1   MCHC 30.9*     All labs reviewed and pertinent ones discussed below    All imaging reviewed and " discussed below    Assessment/Plan:      * Shortness of breath  Unclear etiology. Possible from hypervolemia but no e/o effusions or vasc congestion on xr. TTE nl, LE US neg, Cr nl, RUQUS w/o liver abnormality. Possible MSK weak from underlying rheum condition causing restrictive lung dz vs ILD from rheum dz. Tn negative. ESR 24, CRP 16.7. Alb 2.9  -Rheum consult, EMG and muscle Bx       Hypervolemia  SOB with increasing peripheral edema since d/c from SNF x1 weeks ago. MRI b/l showing edema. BNP 21, CXR clear. Will stop lasix as pt seems to have improvement in peripheral edema.         Skin ulcer of right foot, limited to breakdown of skin    R foot ulcer that began during last admission. Eschar noted to R dorsal foot. Edema with mild concern for infection, but no abx for now. ESR 24  - Podiatry consulted, ABIs pending  - XR of R foot  Done  - will consider MRI foot      Weakness    proximal weakness to BL hips and shoulders.  Increased gait instability. Cannot perform ADLs; uses diapers. Weakness started 3/2017 and noted to become worse in October. +numbness, tingling, pain. Unclear etiology concern for polmyositis w/ CK elevation vs inclusion body myositis, Neuromuscular dz vs autoimmune vs cervical myelopathy. MRI cervical spine and lumbar spine from 9/2017 show multilevel degenerative disc disease with neural foraminal stenosis. No spinal cord edema.  - HH PT/OT --- will continue here     -f/u rheum labs: myomarker 3 panel pending, , aldolase 29, HIV/HALLE/Cinthia-1/AChRAbs/striated musc abs negative, TSH 4.8, Ft4 0.9, Mg/phos nl, HMGCoAredAb in process. EMG ordered      Non-traumatic rhabdomyolysis    CPK 4251 admit , improved to 2k-- baseline 4382-6299 during previous admission - admitted 10/29-11/13/2017 for hypoglycemia and discovered rhabdomyolysis. Statin discontinued in setting of rhabdo and elevated liver enzymes during last admission. Renal function normal        Essential hypertension    BP on low  side, will hold meds and monitor        Controlled type 2 diabetes mellitus with diabetic polyneuropathy, without long-term current use of insulin    Last HgA1c 5.5 on 10/29. Home med: meformin 1000 mg BID; held. stop glucose checks        Dyslipidemia    statin discontinued x1 month ago in setting of rhabdo and elevated liver enzymes. Lipid panel       Cough    no sign of PNA on CXR, pt denies SOB, fever, chills  -Duo-neb Q6, Acapella treatment Q6      Elevated liver enzymes    AST 94, ALT 96 at admit, improved this am. Chronic; unclear etiology, likely related to muscle issue and not liver.   -CTM      History of pulmonary embolism    Pulmonary embolus in 2014.  - Xarelto.       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 No signs of bleeding. 9/20/2017 B12 207; repeat 503. Not currently taking B12. MMA previously nl. Ferritin previously elevated w/ low Fe; possible ACID.   - On folate (previously <3 on labs), vitamin C, zinc      Hypothyroidism (acquired)    - continue levothyroxine 25 mcg      Morbid obesity with BMI of 40.0-44.9, adult    Sedentary lifestyle and poor functional mobility. Has used SNF days and would be >100$/d copay. Pt to have brother assist with daily function when appropriate for discharge.         VTE Risk Mitigation         Ordered     rivaroxaban tablet 20 mg  Daily     Route:  Oral        12/11/17 0311     High Risk of VTE  Once      12/11/17 0316     Place ROBINSON hose  Until discontinued      12/11/17 0316     Place sequential compression device  Until discontinued      12/11/17 0311          Pk Rosario MD  Department of Hospital Medicine   [facility]

## 2017-12-12 NOTE — PROGRESS NOTES
Pt arrived to MRI via bed, alert on 2L nc and continuous telemetry. Changed to MRI compatible telemetry and informed centralized monitoring.

## 2017-12-12 NOTE — PROGRESS NOTES
Pt tx off unit via stretcher to echo lab for 2D Echo. Pt on 2L n/c, cardiac monitor in place. Medications administered prior to transfer. Pt in no acute distress at this time. ID and Fall risk bands in place.

## 2017-12-12 NOTE — PLAN OF CARE
Problem: Diabetes, Type 2 (Adult)  Goal: Signs and Symptoms of Listed Potential Problems Will be Absent, Minimized or Managed (Diabetes, Type 2)  Signs and symptoms of listed potential problems will be absent, minimized or managed by discharge/transition of care (reference Diabetes, Type 2 (Adult) CPG).   Outcome: Ongoing (interventions implemented as appropriate)   12/11/17 7857   Diabetes, Type 2   Problems Assessed (Type 2 Diabetes) all   Problems Present (Type 2 Diabetes) situational response       Problem: Patient Care Overview  Goal: Plan of Care Review  Outcome: Ongoing (interventions implemented as appropriate)  JOSE MURPHYL

## 2017-12-12 NOTE — PLAN OF CARE
10:01 AM  TOÑITO contacted Meggan with [facility] to indicate how many SNF days pt has left. Meggan stated pt used 21 days and will now have a copay. Updated CM.     Richa Lanier LMSW   [facility]  Ext [phone]

## 2017-12-12 NOTE — PROGRESS NOTES
Spoke w/ BEE Cyr on call with RICHARD r/t administration of antihypertensive and diuretics with b/p 105/99, HR 70s. MD okay with administration. Pt to be escorted off unit to echo lab, cardiac monitor in place. Gunnar continue to closely monitor pt.

## 2017-12-12 NOTE — PLAN OF CARE
Problem: Patient Care Overview  Goal: Plan of Care Review  Outcome: Ongoing (interventions implemented as appropriate)  Safety:  call light in reach, patient oriented to room & instructed how to notify nurse if assistance is needed, questions & concerns addressed, bed in lowest position with wheels locked & side rails up X 2, fall precautions followed, patient free from fall & injury thus far this shift;  VTE/bleeding precautions maintained.  Activity:  patient on bed rest, weight shifted at least every other hour.  Neurological:  patient A&O X 4, follows commands, equal  strength & dorsi/plantarflexion, neuro checks performed as ordered & WDL.  Respiratory:  patient tolerates NC @ 2L/M without distress, denies SOB.  Cardiac:  Denies chest pain, BP stable.  HR stable.  NSR on telemetry.  Afebrile this shift.  GI:  Patient tolerates PO intake well, denies nausea, LBM 12/11/17.  :  patient voids clear yellow urine without foul odor spontaneously & without difficulty, adequate output for shift.  Skin:  CDI.  Devices:  PIV CDI, negative for s/sx of infection & infiltration.  Pain:  patient denies pain.  Will continue to monitor patient.

## 2017-12-12 NOTE — PROGRESS NOTES
Patient identified by 2 identifiers. Denies previous reactions to blood transfusions and allergies reviewed.  Procedure explained & consent obtained.  18 g IV in place to Lt FA, flushed w/ 10cc NS pre & post contrast administration.  3cc Optison administered, echo images obtained.  Pt tolerated procedure well.

## 2017-12-13 PROBLEM — M79.89 SWELLING OF RIGHT LOWER EXTREMITY: Status: ACTIVE | Noted: 2017-12-13

## 2017-12-13 LAB
ALBUMIN SERPL BCP-MCNC: 2.9 G/DL
ALDOLASE SERPL-CCNC: 44 U/L
ALLENS TEST: ABNORMAL
ALP SERPL-CCNC: 79 U/L
ALT SERPL W/O P-5'-P-CCNC: 91 U/L
ANION GAP SERPL CALC-SCNC: 7 MMOL/L
AST SERPL-CCNC: 81 U/L
BASOPHILS # BLD AUTO: 0.02 K/UL
BASOPHILS NFR BLD: 0.4 %
BILIRUB SERPL-MCNC: 0.2 MG/DL
BUN SERPL-MCNC: 16 MG/DL
CALCIUM SERPL-MCNC: 9.3 MG/DL
CHLORIDE SERPL-SCNC: 98 MMOL/L
CK SERPL-CCNC: 2544 U/L
CO2 SERPL-SCNC: 35 MMOL/L
CREAT SERPL-MCNC: 0.7 MG/DL
DELSYS: ABNORMAL
DIFFERENTIAL METHOD: ABNORMAL
EOSINOPHIL # BLD AUTO: 0.1 K/UL
EOSINOPHIL NFR BLD: 1.8 %
ERYTHROCYTE [DISTWIDTH] IN BLOOD BY AUTOMATED COUNT: 14.1 %
EST. GFR  (AFRICAN AMERICAN): >60 ML/MIN/1.73 M^2
EST. GFR  (NON AFRICAN AMERICAN): >60 ML/MIN/1.73 M^2
GLUCOSE SERPL-MCNC: 102 MG/DL
HCO3 UR-SCNC: 41.7 MMOL/L (ref 24–28)
HCT VFR BLD AUTO: 37.7 %
HGB BLD-MCNC: 11.5 G/DL
IMM GRANULOCYTES # BLD AUTO: 0.01 K/UL
IMM GRANULOCYTES NFR BLD AUTO: 0.2 %
LYMPHOCYTES # BLD AUTO: 1.2 K/UL
LYMPHOCYTES NFR BLD: 21.5 %
MAGNESIUM SERPL-MCNC: 1.6 MG/DL
MCH RBC QN AUTO: 27.5 PG
MCHC RBC AUTO-ENTMCNC: 30.5 G/DL
MCV RBC AUTO: 90 FL
MONOCYTES # BLD AUTO: 0.5 K/UL
MONOCYTES NFR BLD: 8.1 %
NEUTROPHILS # BLD AUTO: 3.9 K/UL
NEUTROPHILS NFR BLD: 68 %
NRBC BLD-RTO: 0 /100 WBC
PCO2 BLDA: 74.1 MMHG (ref 35–45)
PH SMN: 7.36 [PH] (ref 7.35–7.45)
PHOSPHATE SERPL-MCNC: 3.6 MG/DL
PLATELET # BLD AUTO: 327 K/UL
PMV BLD AUTO: 10.6 FL
PO2 BLDA: 67 MMHG (ref 80–100)
POC BE: 16 MMOL/L
POC SATURATED O2: 91 % (ref 95–100)
POC TCO2: 44 MMOL/L (ref 23–27)
POTASSIUM SERPL-SCNC: 4.8 MMOL/L
PROT SERPL-MCNC: 6.2 G/DL
RBC # BLD AUTO: 4.18 M/UL
SAMPLE: ABNORMAL
SITE: ABNORMAL
SODIUM SERPL-SCNC: 140 MMOL/L
WBC # BLD AUTO: 5.71 K/UL

## 2017-12-13 PROCEDURE — 11000001 HC ACUTE MED/SURG PRIVATE ROOM

## 2017-12-13 PROCEDURE — 27000221 HC OXYGEN, UP TO 24 HOURS

## 2017-12-13 PROCEDURE — 94664 DEMO&/EVAL PT USE INHALER: CPT

## 2017-12-13 PROCEDURE — 97110 THERAPEUTIC EXERCISES: CPT

## 2017-12-13 PROCEDURE — 92610 EVALUATE SWALLOWING FUNCTION: CPT

## 2017-12-13 PROCEDURE — G8997 SWALLOW GOAL STATUS: HCPCS | Mod: CJ

## 2017-12-13 PROCEDURE — 25500020 PHARM REV CODE 255: Performed by: HOSPITALIST

## 2017-12-13 PROCEDURE — 93922 UPR/L XTREMITY ART 2 LEVELS: CPT | Performed by: SURGERY

## 2017-12-13 PROCEDURE — 94761 N-INVAS EAR/PLS OXIMETRY MLT: CPT

## 2017-12-13 PROCEDURE — 25000242 PHARM REV CODE 250 ALT 637 W/ HCPCS: Performed by: HOSPITALIST

## 2017-12-13 PROCEDURE — 82550 ASSAY OF CK (CPK): CPT

## 2017-12-13 PROCEDURE — 36600 WITHDRAWAL OF ARTERIAL BLOOD: CPT

## 2017-12-13 PROCEDURE — A9585 GADOBUTROL INJECTION: HCPCS | Performed by: HOSPITALIST

## 2017-12-13 PROCEDURE — 84100 ASSAY OF PHOSPHORUS: CPT

## 2017-12-13 PROCEDURE — 97535 SELF CARE MNGMENT TRAINING: CPT

## 2017-12-13 PROCEDURE — 94640 AIRWAY INHALATION TREATMENT: CPT

## 2017-12-13 PROCEDURE — 97530 THERAPEUTIC ACTIVITIES: CPT

## 2017-12-13 PROCEDURE — 25000003 PHARM REV CODE 250: Performed by: PHYSICIAN ASSISTANT

## 2017-12-13 PROCEDURE — 99233 SBSQ HOSP IP/OBS HIGH 50: CPT | Mod: ,,, | Performed by: PODIATRIST

## 2017-12-13 PROCEDURE — 99232 SBSQ HOSP IP/OBS MODERATE 35: CPT | Mod: GC,,, | Performed by: HOSPITALIST

## 2017-12-13 PROCEDURE — G8996 SWALLOW CURRENT STATUS: HCPCS | Mod: CK

## 2017-12-13 PROCEDURE — 85025 COMPLETE CBC W/AUTO DIFF WBC: CPT

## 2017-12-13 PROCEDURE — 82803 BLOOD GASES ANY COMBINATION: CPT

## 2017-12-13 PROCEDURE — 36415 COLL VENOUS BLD VENIPUNCTURE: CPT

## 2017-12-13 PROCEDURE — 99900035 HC TECH TIME PER 15 MIN (STAT)

## 2017-12-13 PROCEDURE — 83735 ASSAY OF MAGNESIUM: CPT

## 2017-12-13 PROCEDURE — 80053 COMPREHEN METABOLIC PANEL: CPT

## 2017-12-13 RX ORDER — GADOBUTROL 604.72 MG/ML
10 INJECTION INTRAVENOUS
Status: COMPLETED | OUTPATIENT
Start: 2017-12-13 | End: 2017-12-13

## 2017-12-13 RX ADMIN — FOLIC ACID 1 MG: 1 TABLET ORAL at 09:12

## 2017-12-13 RX ADMIN — OXYCODONE HYDROCHLORIDE AND ACETAMINOPHEN 500 MG: 500 TABLET ORAL at 09:12

## 2017-12-13 RX ADMIN — IPRATROPIUM BROMIDE AND ALBUTEROL SULFATE 3 ML: .5; 3 SOLUTION RESPIRATORY (INHALATION) at 05:12

## 2017-12-13 RX ADMIN — Medication 220 MG: at 09:12

## 2017-12-13 RX ADMIN — LEVOTHYROXINE SODIUM 25 MCG: 25 TABLET ORAL at 05:12

## 2017-12-13 RX ADMIN — IPRATROPIUM BROMIDE AND ALBUTEROL SULFATE 3 ML: .5; 3 SOLUTION RESPIRATORY (INHALATION) at 07:12

## 2017-12-13 RX ADMIN — IPRATROPIUM BROMIDE AND ALBUTEROL SULFATE 3 ML: .5; 3 SOLUTION RESPIRATORY (INHALATION) at 01:12

## 2017-12-13 RX ADMIN — HYDROCODONE BITARTRATE AND ACETAMINOPHEN 1 TABLET: 5; 325 TABLET ORAL at 09:12

## 2017-12-13 RX ADMIN — HYDROCODONE BITARTRATE AND ACETAMINOPHEN 1 TABLET: 5; 325 TABLET ORAL at 08:12

## 2017-12-13 RX ADMIN — OXYCODONE HYDROCHLORIDE AND ACETAMINOPHEN 500 MG: 500 TABLET ORAL at 08:12

## 2017-12-13 RX ADMIN — HYDROCODONE BITARTRATE AND ACETAMINOPHEN 1 TABLET: 5; 325 TABLET ORAL at 01:12

## 2017-12-13 RX ADMIN — RIVAROXABAN 20 MG: 20 TABLET, FILM COATED ORAL at 09:12

## 2017-12-13 RX ADMIN — GADOBUTROL 10 ML: 604.72 INJECTION INTRAVENOUS at 04:12

## 2017-12-13 RX ADMIN — THERA TABS 1 TABLET: TAB at 09:12

## 2017-12-13 NOTE — PLAN OF CARE
Met w pt and called brotherNegro re: future dc plans.  Per LOGAN Chaudhry in [facility], pt will have a high daily copay at SNF should they decide on this.  CM spoke with brother, Negro, who was not real interested in discussing future dc plans w me. However,he is open to paying the higher copay.  CM mentioned other options as well : [facility] program  Brother would like to speak with LOGAN Chaudhry in about an hour.      Cm touched base w daughter, Raj and she stated that she, Flo,and brother, Jose Raul, all care for him.  Option C : family/hh     12/13/17 1040   Right Care Assessment   Can the patient answer the patient profile reliably? Yes, cognitively intact   How often would a person be available to care for the patient? Whenever needed   Describe the patient's ability to walk at the present time. Major restrictions/daily assistance from another person   How does the patient rate their overall health at the present time? Fair   Number of comorbid conditions (as recorded on the chart) Two   During the past month, has the patient often been bothered by feeling down, depressed or hopeless? No   During the past month, has the patient often been bothered by little interest or pleasure in doing things? No

## 2017-12-13 NOTE — PLAN OF CARE
Problem: Patient Care Overview  Goal: Plan of Care Review  Pt following plan of care well. Pt remained free from falls: bed kept low position, call bell in reach and audible, non skid socks bilaterally. Incontinence care performed when necessary: unable to maintain strict outputs, though pt voiding without complications frequently (briefs saturated). Pt reports worsening in breathing status. Wet cough noted at noon, fair and unproductive. Suction placed at bedside, pt encouraged to cough and expectorate when able. Pt currently remains on 2L n/c. Will continue to monitor breathing status closely. Pt received 2d Echo this shift. Pending MIHIR and EMG per rheumatology reqs. Heel boots in place to prevent heel breakdown. Wound care performed to Right foot wound. Turned pt q2, no new skin breakdown evident. BSs monitored, no SSI required this shift. Pt with good appetite.

## 2017-12-13 NOTE — PT/OT/SLP EVAL
"Speech Language Pathology Evaluation  Bedside Swallow    Patient Name:  Serafin Tapia   MRN:  [id number]   [id number] B    Admitting Diagnosis: Shortness of breath    Recommendations:                 General Recommendations:  Dysphagia therapy and Modified barium swallow study  Diet recommendations:  Dental Soft, [place] Thick   Aspiration Precautions:   · Thicken all liquids to nectar thick consistency: 6oz: 1 packet thickener  · Pt requires assistance to thicken liquids  · NO straws  · PO meds whole, 1 @ a time, with cup sip nectar thickened water   · Fully awake and alert for PO intake  · Fully upright position for PO intake  · Small bites/ sips  · Slow rate of eating/ drinking  · 1 bite/ sip @ a time  · Refrain from talking prior to swallow completion   · Remain upright for 20-30 min following PO intake   · Discontinue PO upon: coughing, throat clearing, choking, wet vocal quality, wet vocal quality, watery eyes, reddened facial features  General Precautions: Standard, aspiration, nectar thick, fall, respiratory    History:     Past Medical History:   Diagnosis Date    Asthma     Diabetes mellitus type 2 in obese 5/3/2014    Elevated troponin 5/3/2014    Hyperlipidemia     Hypertension     Hypothyroidism (acquired) 11/1/2017    Obesity     Pulmonary embolism 05/2014    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       Past Surgical History:   Procedure Laterality Date    TONSILLECTOMY       Prior Intubation HX: Pt without intubations this hospitalization.     Prior diet: Per pt, dental soft diet and thin liquids. Reported he also ate regular consistencies that were able to be become soft/ soggy when sucked on.     Subjective     "A little later after lunch and around 9:30-12pm I would get SOB."    Objective:     Oral Musculature Evaluation  · Oral Musculature: WFL  · Dentition: scattered dentition (Pt with 2 upper teeth, both of which are loose. Does not have upper or lower " dentures. )  · Mucosal Quality: good  · Mandibular Strength and Mobility: WFL  · Oral Labial Strength and Mobility: WFL  · Lingual Strength and Mobility: WFL  · Velar Elevation: WFL  · Buccal Strength and Mobility: WFL  · Volitional Cough: Weak  · Volitional Swallow: WFL  · Voice Prior to PO Intake: Dry, hoarse    Bedside Swallow Eval:   Consistencies Assessed:  · Thin water- ~6oz: tsp x2, cup sip x9   · Nectar thickened water- ~12oz: cup sip x5, consecutive cup sip x2   · Pureed chocolate pudding- tsp x2  · Dental soft strawberry shortcake from lunch tray- tsp bite x8    Oral Phase:   · WFL    Pharyngeal Phase:   · Thin liquids- Throat clear observed with thin cup sip as liquid wash x2, following pureed and dental soft x1, each.   · Nectar thick liquids- Concern for intermittent wet vocal quality observed following cup sips in isolation, however unable to determine if wet vocal quality 2/2 initiation of phonation prior to swallow completion. With consecutive cup sips as liquid wash following dental soft x2, throat clear and delayed cough observed.   · Pureed- No overt clinical signs of aspiration  · Dental soft- No overt clinical signs of aspiration    Treatment:   Pt awake and alert upon entry. Education provided re: role of SLP, definition/ risks/ overt clinical signs of aspiration, thickened liquids, MBSS, and SLP POC. White board updated. No further questions.     Results discussed with NSG and Dr. Rosario: reported nectar thick/ dental soft and recommendation for MBSS to be completed to determine safest, least restrictive diet.     Assessment:     Serafin Tapia is a 67 y.o. male with an SLP diagnosis of dysphagia.     Goals:    SLP Goals        Problem: SLP Goal    Goal Priority Disciplines Outcome   SLP Goal     SLP Ongoing (interventions implemented as appropriate)   Description:  Speech Language Pathology  Goal expected to be met by 12/20:  1. Pt will participate in MBSS in order to determine safest, least  restrictive diet.                     Plan:     · Patient to be seen:  5 x/week   · Plan of Care expires:  01/11/18  · Plan of Care reviewed with:  patient   · SLP Follow-Up:  Yes       Discharge recommendations:  nursing facility, skilled     Time Tracking:     SLP Treatment Date:   12/06/17  Speech Start Time:  1435  Speech Stop Time:  1511     Speech Total Time (min):  36 min    Billable Minutes: Eval Swallow and Oral Function 23 and Seld Care/Home Management Training 13    TOM Elliott, CCC-SLP  [phone]  12/13/2017

## 2017-12-13 NOTE — PLAN OF CARE
Problem: Patient Care Overview  Goal: Plan of Care Review  Pt following plan of care well, white board and pt questions addressed. Pt remained free from falls: bed kept low position, call bell in reach and audible, non skid socks bilaterally, bed alarm activated. PT/OT consulted. Pt continues to have lingering coughing. Early this morning, pt appeared to have mild choking episode after first bite of food (excessive coughing noted). Sats remained stable on n/c support. Relayed concern to MD to order SLP eval. SLP evaluated with reqs to demote pt to dental soft with nectar thick consistencies. MBSS pending.  Heel boots remained in place to prevent heel breakdown. Pt turned q2, incontinence care performed as necessary. Outstanding EMG needing completion. Rheumatology continues to follow.    Respiratory status to be closely monitored, O2 to be weaned for O2 maintained <88%. RT continues to perform scheduled nebulization treatments. ABG obtained indicating respiratory acidosis with CO2 elevated at 74.1, MD notified. Will continue to closely monitor pt.

## 2017-12-13 NOTE — PLAN OF CARE
Problem: Patient Care Overview  Goal: Plan of Care Review  Outcome: Ongoing (interventions implemented as appropriate)  Plan of care reviewed, pt verbalizes understanding. VSS. AAOx4. Denies SOB, LOC, chills, dizziness, n/v. Pain controlled with PRN medication NORCO; tolerated well with full relief. Remained free of falls/trauma/injury; call bell and personal belongings within reach. Aseptic technique utilized throughout. Turned Q2. No new skin breakdown. Pt progressing towards goals. See flowsheet for assessment and I/Os. No acute events.

## 2017-12-13 NOTE — PROGRESS NOTES
[facility] Hospital Medicine  Progress Note    Patient Name: Serafin Tapia  MRN: [id number]  Patient Class: IP- Inpatient   Admission Date: 12/10/2017  Length of Stay: 2 days  Attending Physician: Pk Rosario MD  Primary Care Provider: John Vargas MD    [facility] Medicine Team: [facility] HOSP MED L Jaime Webb MD    Subjective:     Principal Problem:Shortness of breath    HPI:  Serafin Tapia is a 67 y.o. male with PMHx of HTN, HLD, diabetes mellitus and obesity who presents to the ED with an onset of intermittent SOB x 1 month with associated sx of constant leg swelling, leg sore, generalized weakness. Per brother, the home health nurses have visited twice since his discharge from [facility] 11/13. Pt was not sent home with O2 and he currently wears adult diapers for enuresis. Pt endorses painful sore with erythema on right foot that busted while at home. Per brother, he cleans the wound with medicated pads, spray, and various packing materials every 2.5 days. Pt reports the swelling is exacerbated with standing. Pt also reports the SOB is exacerbated when he lays down. Per brother, he cannot get comfortable and requests admittance to the hospital. Denies fever, urinary retention, chest pain, anasarca, abd distension. Pt self-reports hx of blood clots and is currently on blood thinners. No pertinent surgical history.    Hospital Course:  12/11/2017 admitted overnight, pt reported LE swelling,R foot pain, SOB and coughing. Reported since his admission to [facility], he was discharge to SNF to further rehabilitation. He has been home for <7 days and he was then taken back to ED and admitted last night. He report LE swelling that has been worsening. Denies myalgias. Report cough that is productive but denies fever, chills. Report compliance with lasix at home. Pt with persistent elevation in CPK since last admission, rheumatology consulted for evaluation. IV lasix started. PT/OT ordered,  "Podiatry consulted, according to early assessment, pt is severely debilitated     12/12 Pt feels that breathing is worse today. Does not feel worsening of SOB when laying flat. Has proximal muscle weakness and is unable to maneuver himself in bed. No myalgias. No Cp. Has LE edema and pain at R foot ulcer.     Pt says breathing is improved. LBP and causes him to lay down and breathing worse at that time. Still feels weak and has trouble maneuvering in bed. No f/c.     /73 (BP Location: Right arm, Patient Position: Sitting)   Pulse 91   Temp 98.5 °F (36.9 °C) (Oral)   Resp 18   Ht 5' 10" (1.778 m)   Wt 115.6 kg (254 lb 13.6 oz)   SpO2 (!) 94%   BMI 36.57 kg/m²     Physical Exam   Constitutional: He is oriented to person, place, and time. He appears well-developed and well-nourished.   Obese male in NAD   HENT:   Head: Normocephalic and atraumatic.   Eyes: Conjunctivae and EOM are normal.   Neck: Normal range of motion. Neck supple. No tracheal deviation present. No thyromegaly present.   Cardiovascular: Normal rate, regular rhythm and intact distal pulses.    No murmur heard.  Distant heart sounds  1+ pitting edema in RLE and less in LLE  Pulmonary/Chest: Effort normal and breath sounds normal. No stridor. No respiratory distress. He has no wheezes.   Distant lung sounds. Raspy cough.  Abdominal: Soft. Bowel sounds are normal. There is no tenderness.   Musculoskeletal: Dec function but good strength in UEs  Neurological: He is alert and oriented to person, place, and time. He displays no tremor. No cranial nerve deficit or sensory deficit. Gait abnormal. Coordination normal.   5/5 Strength BUE  4/5 strength BLE (R>L) 2/2 pain  Skin: Skin is warm and dry.   R dorsal foot dressing c/d/i  Mild TTP around wound  Psychiatric: He has a normal mood and affect. His behavior is normal.   Nursing note and vitals reviewed.      Recent Labs  Lab 12/13/17  0445   WBC 5.71   RBC 4.18*   HGB 11.5*   HCT 37.7*    "   MCV 90   MCH 27.5   MCHC 30.5*     All labs reviewed and pertinent ones discussed below    All imaging reviewed and discussed below    Assessment/Plan:      * Shortness of breath  Unclear etiology. Possible from hypervolemia but no e/o effusions or vasc congestion on xr. TTE nl, LE US neg, Cr nl, RUQUS w/o liver abnormality. Possible MSK weak from underlying rheum condition causing restrictive lung dz vs ILD from rheum dz. Tn negative. ESR 24, CRP 16.7. Alb 2.9  -Rheum consult, EMG (unclear why this study was not completed yesterday, called and left message with EMG  and tried to contact a second time w/o success). Plan for muscle Bx out-pt.   -Likely from MSK weakness and poor diaphragmatic excursion. Statin myopathy, rheum cause, myositis, thyroid (TSH high but T4 nl)      Hypervolemia  SOB with increasing peripheral edema since d/c from SNF x1 weeks ago. MRI b/l showing edema. BNP 21, CXR clear. improvement in peripheral edema.         Skin ulcer of right foot, limited to breakdown of skin    R foot ulcer that began during last admission. Eschar noted to R dorsal foot. Edema with mild concern for infection, but no abx for now. ESR 24  - Podiatry consulted, ABIs mostly nl  - XR of R foot  Done      Weakness    proximal weakness to BL hips and shoulders.  Increased gait instability. Cannot perform ADLs; uses diapers. Weakness started 3/2017 and noted to become worse in October. +numbness, tingling, pain. Unclear etiology concern for polmyositis w/ CK elevation vs inclusion body myositis, Neuromuscular dz vs autoimmune vs cervical myelopathy. MRI cervical spine and lumbar spine from 9/2017 show multilevel degenerative disc disease with neural foraminal stenosis. No spinal cord edema.  -  PT/OT --- will continue here     -f/u rheum labs: myomarker 3 panel pending, , aldolase 29, HIV/HALLE/Cinthia-1/AChRAbs/striated musc abs negative, TSH 4.8, Ft4 0.9, Mg/phos nl, HMGCoAredAb in process. EMG  ordered  -likely statin induced vs autoimmune, will need out-pt f/u      Non-traumatic rhabdomyolysis    CPK 4251 admit , improved to 2k-- baseline 3760-1032 during previous admission - admitted 10/29-11/13/2017 for hypoglycemia and discovered rhabdomyolysis. Statin discontinued in setting of rhabdo and elevated liver enzymes during last admission. Renal function normal        Essential hypertension    BP on low side, will hold meds and monitor        Controlled type 2 diabetes mellitus with diabetic polyneuropathy, without long-term current use of insulin    Last HgA1c 5.5 on 10/29. Home med: meformin 1000 mg BID; held. stop glucose checks        Dyslipidemia    statin discontinued x1 month ago in setting of rhabdo and elevated liver enzymes. Lipid panel       Cough    no sign of PNA on CXR, pt denies SOB, fever, chills  -Duo-neb Q6, Acapella treatment Q6  -speech eval and some concerns aspiration, downgrade diet and MBS ordered.       Elevated liver enzymes    AST 94, ALT 96 at admit, improved this am. Chronic; unclear etiology, likely related to muscle issue and not liver.   -CTM      History of pulmonary embolism    Pulmonary embolus in 2014.  - Xarelto.       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 No signs of bleeding. 9/20/2017 B12 207; repeat 503. Not currently taking B12. MMA previously nl. Ferritin previously elevated w/ low Fe; possible ACID.   - On folate (previously <3 on labs), vitamin C, zinc      Hypothyroidism (acquired)    - continue levothyroxine 25 mcg      Morbid obesity with BMI of 40.0-44.9, adult    Sedentary lifestyle and poor functional mobility. Has used SNF days and would be >100$/d copay. Pt to have brother assist with daily function when appropriate for discharge.         VTE Risk Mitigation         Ordered     rivaroxaban tablet 20 mg  Daily     Route:  Oral        12/11/17 0311     High Risk of VTE  Once      12/11/17 0316     Place ROBINSON hose  Until discontinued       12/11/17 0316     Place sequential compression device  Until discontinued      12/11/17 0311          Pk Rosario MD  Department of Hospital Medicine   [facility]

## 2017-12-13 NOTE — PT/OT/SLP PROGRESS
Physical Therapy Treatment    Patient Name:  Serafin Tapia   MRN:  [id number]    Recommendations:     Discharge Recommendations:  nursing facility, skilled   Discharge Equipment Recommendations: bedside commode   Barriers to discharge: Decreased caregiver support    Assessment:     Serafin Tapia is a 67 y.o. male admitted with a medical diagnosis of Shortness of breath.  He presents with the following impairments/functional limitations:  weakness, impaired endurance, impaired self care skills, impaired functional mobilty, gait instability. Pt continues to require max assist for bed mobility. Pt unable to perform t/fs on this date 2* to pain (R) foot. Pt will continue to benefit from PT services at this time. Continue with PT POC as indicated.     Rehab Prognosis: fair; patient would benefit from acute skilled PT services to address these deficits and reach maximum level of function.      Recent Surgery: * No surgery found *      Plan:     During this hospitalization, patient to be seen 3 x/week to address the above listed problems via gait training, therapeutic activities, neuromuscular re-education, therapeutic exercises  · Plan of Care Expires:  01/10/18   Plan of Care Reviewed with: patient    Subjective     Communicated with nursing prior to session.  Patient found supine in bed with all lines intact upon PT entry to room, agreeable to treatment.      Chief Complaint: Right foot pain  Patient comments/goals: none stated  Pain/Comfort:  · Pain Rating 1:  (Pt did not rate. )  · Location - Side 1: Right  · Location - Orientation 1: generalized  · Location 1: foot  · Pain Addressed 1: Reposition, Distraction  · Pain Rating Post-Intervention 1:  (Pt did not rate. )    Patients cultural, spiritual, Muslim conflicts given the current situation: none stated    Objective:     Patient found with:  (all lines intact)     General Precautions: Standard, aspiration, fall, nectar thick, respiratory   Orthopedic Precautions:N/A    Braces: N/A     Functional Mobility:  · Bed Mobility:     · Scooting: maximal assistance  · Supine to Sit: maximal assistance  · Sit to Supine: maximal assistance  · Balance: Pt sat EOB ~15 min with BUE support and SBA-Mod A 2* to (R) lateral lean.       AM-PAC 6 CLICK MOBILITY  Turning over in bed (including adjusting bedclothes, sheets and blankets)?: 2  Sitting down on and standing up from a chair with arms (e.g., wheelchair, bedside commode, etc.): 2  Moving from lying on back to sitting on the side of the bed?: 2  Moving to and from a bed to a chair (including a wheelchair)?: 2  Need to walk in hospital room?: 2  Climbing 3-5 steps with a railing?: 1  Total Score: 11       Therapeutic Activities and Exercises:   B LE therex 2x10 reps with assistance: AP, QS, GS, Hip Abd/Add, Hip Flexion, and LAQ.     Patient left supine with all lines intact and call button in reach..    GOALS:    Physical Therapy Goals        Problem: Physical Therapy Goal    Goal Priority Disciplines Outcome Goal Variances Interventions   Physical Therapy Goal     PT/OT, PT Ongoing (interventions implemented as appropriate)     Description:  Goals to be met by: 17    Patient will increase functional independence with mobility by performin. Supine to sit with MInimal Assistance  2. Sit to supine with MInimal Assistance  3. Rolling to Left and Right with Minimal Assistance.  4. Sit to stand transfer with Minimal Assistance w/ RW  5. Bed to chair transfer with Moderate Assistance using Rolling Walker  6. Gait  x 10 feet with Moderate Assistance using Rolling Walker.   7. Lower extremity exercise program x20 reps per handout, with supervision                      Time Tracking:     PT Received On: 17  PT Start Time: 1500     PT Stop Time: 1528  PT Total Time (min): 28 min     Billable Minutes: Therapeutic Activity 18 and Therapeutic Exercise 10    Treatment Type: Treatment  PT/PTA: PTA     PTA Visit Number: 1     Sophie  Eloisa, PTA  12/13/2017

## 2017-12-13 NOTE — SUBJECTIVE & OBJECTIVE
Interval History:   No issues or changes.   Pt still has weakness but he is now moving his lower legs more compared to admission.   CPK trending down w/o intervention, today cpk stable.   MRI did not support diagnosis of myositis.   MBBS normal     Current Facility-Administered Medications   Medication Frequency    acetaminophen tablet 650 mg Q4H PRN    albuterol-ipratropium 2.5mg-0.5mg/3mL nebulizer solution 3 mL Q6H WAKE    ascorbic acid (vitamin C) tablet 500 mg BID    dextrose 50% injection 12.5 g PRN    dextrose 50% injection 25 g PRN    folic acid tablet 1 mg Daily    glucagon (human recombinant) injection 1 mg PRN    glucose chewable tablet 16 g PRN    glucose chewable tablet 24 g PRN    hydrocodone-acetaminophen 5-325mg per tablet 1 tablet Q6H PRN    influenza (FLUZONE HIGH-DOSE) vaccine 0.5 mL vaccine x 1 dose    insulin aspart pen 0-5 Units QID (AC + HS) PRN    levothyroxine tablet 25 mcg Before breakfast    morphine injection 4 mg Q4H PRN    multivitamin tablet 1 tablet Daily    ondansetron disintegrating tablet 4 mg Q8H PRN    prochlorperazine tablet 10 mg Q6H PRN    ramelteon tablet 8 mg Nightly PRN    rivaroxaban tablet 20 mg Daily    senna-docusate 8.6-50 mg per tablet 1 tablet BID PRN    sodium chloride 0.9% flush 5 mL PRN    zinc sulfate capsule 220 mg Daily     Objective:     Vital Signs (Most Recent):  Temp: 98.4 °F (36.9 °C) (12/13/17 0904)  Pulse: 93 (12/13/17 0904)  Resp: 18 (12/13/17 0904)  BP: 119/67 (12/13/17 0904)  SpO2: 95 % (12/13/17 0904)  O2 Device (Oxygen Therapy): nasal cannula (12/13/17 0904) Vital Signs (24h Range):  Temp:  [97.8 °F (36.6 °C)-99.5 °F (37.5 °C)] 98.4 °F (36.9 °C)  Pulse:  [60-93] 93  Resp:  [16-18] 18  SpO2:  [93 %-96 %] 95 %  BP: (102-136)/(52-67) 119/67     Weight: 115.6 kg (254 lb 13.6 oz) (12/13/17 0400)  Body mass index is 36.57 kg/m².  Body surface area is 2.39 meters squared.      Intake/Output Summary (Last 24 hours) at 12/13/17  0954  Last data filed at 12/13/17 0553   Gross per 24 hour   Intake              325 ml   Output                0 ml   Net              325 ml       Physical Exam   Constitutional: He is oriented to person, place, and time and well-developed, well-nourished, and in no distress.   HENT:   Mouth/Throat: No oropharyngeal exudate.   Eyes: Conjunctivae and EOM are normal. No scleral icterus.   Neck: Normal range of motion. Neck supple.   Cardiovascular: Normal rate, regular rhythm, normal heart sounds and intact distal pulses.    Pulmonary/Chest: Effort normal and breath sounds normal. No respiratory distress. He has no wheezes. He has no rales. He exhibits no tenderness.   Abdominal: Soft. Bowel sounds are normal. He exhibits no distension. There is no tenderness. There is no rebound and no guarding.       Right Side Rheumatological Exam     Muscle Strength (0-5 scale):  Neck Flexion:  5  Neck Extension: 5  : 5/5   Quadriceps:  1   Distal Lower Extremity: 5    Left Side Rheumatological Exam     Muscle Strength (0-5 scale):  Neck Flexion:  5  Neck Extension: 5  :  5/5   Quadriceps:  1   Distal Lower Extremity: 5      Lymphadenopathy:     He has no cervical adenopathy.   Neurological: He is alert and oriented to person, place, and time.   Pt has proximal b/l LE weakness involving the hip flexors. 1/5.     UE 5/5.    Skin: Skin is warm. No rash noted. No erythema. No pallor.     Chronic venous stasis b/l  No rashes on the face, shoulders, thighs, abdomen, legs.   Dry skin involving the hands- no findings of Gottron's papules, mechanics hands.   Left foot cooler than the right.    Musculoskeletal: He exhibits edema. He exhibits no tenderness or deformity.   Pt has 2+ pitting edema up to the b/l knees.   Right foot has a open wound, no drainage.     No synovitis on exam.            Significant Labs:  CBC:   Recent Labs  Lab 12/13/17  0445   WBC 5.71   HGB 11.5*   HCT 37.7*        CMP:   Recent Labs  Lab  12/13/17  0445      CALCIUM 9.3   ALBUMIN 2.9*   PROT 6.2      K 4.8   CO2 35*   CL 98   BUN 16   CREATININE 0.7   ALKPHOS 79   ALT 91*   AST 81*   BILITOT 0.2     CRP: No results for input(s): CRP in the last 24 hours.  ESR: No results for input(s): SEDRATE in the last 24 hours.  Urinalysis: No results for input(s): COLORU, CLARITYU, SPECGRAV, PHUR, PROTEINUA, GLUCOSEU, BILIRUBINCON, BLOODU, WBCU, RBCU, BACTERIA, MUCUS, NITRITE, LEUKOCYTESUR, UROBILINOGEN, HYALINECASTS in the last 24 hours.  Urine protein creatinine: No results for input(s): UTPCR in the last 24 hours.  All pertinent lab results from the last 24 hours have been reviewed.       Ref. Range 11/13/2017 05:34 12/10/2017 22:39 12/12/2017 08:58 12/13/2017 04:45 12/14/2017 11:15 12/15/2017 09:37   CPK Latest Ref Range: 20 - 200 U/L 5553 (H) 4251 (H) 2888 (H) 2544 (H) 2344 (H) 2513 (H)     Significant Imaging:  Imaging results within the past 24 hours have been reviewed.     MRI  R LE w/o w/ contrast 12/13/17:   Impression         No imaging findings to suggest osteomyelitis.    Cellulitis versus noninfectious edema along the dorsum of the foot.  No focal drainable collections.            LE : 12/12/17:     Impression         Diffuse circumferential subcutaneous edema about the bilateral lower extremities. There is minimal diffuse intramuscular edema with more mild fluid tracking along the superficial fascial planes. No abnormal muscular enhancement or age advanced muscular atrophy is appreciated. Findings are favored to be related to volume status as opposed to myositis. Further evaluation as warranted.    No evidence of diffuse bone marrow process.

## 2017-12-13 NOTE — SUBJECTIVE & OBJECTIVE
Interval Hx:  Patient seen at bedside for dressing change.  Patient Denies F/C/N/V.  States his ulcer is getting better.  Denies pain in B/L feet.    Scheduled Meds:   albuterol-ipratropium 2.5mg-0.5mg/3mL  3 mL Nebulization Q6H WAKE    ascorbic acid (vitamin C)  500 mg Oral BID    folic acid  1 mg Oral Daily    levothyroxine  25 mcg Oral Before breakfast    multivitamin  1 tablet Oral Daily    rivaroxaban  1 tablet Oral Daily    zinc sulfate  220 mg Oral Daily     Continuous Infusions:   PRN Meds:acetaminophen, dextrose 50%, dextrose 50%, glucagon (human recombinant), glucose, glucose, hydrocodone-acetaminophen 5-325mg, influenza, insulin aspart, morphine, ondansetron, prochlorperazine, ramelteon, senna-docusate 8.6-50 mg, sodium chloride 0.9%    Review of patient's allergies indicates:  No Known Allergies     Past Medical History:   Diagnosis Date    Asthma     Diabetes mellitus type 2 in obese 5/3/2014    Elevated troponin 5/3/2014    Hyperlipidemia     Hypertension     Hypothyroidism (acquired) 11/1/2017    Obesity     Pulmonary embolism 05/2014    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     Past Surgical History:   Procedure Laterality Date    TONSILLECTOMY         Family History     Problem Relation (Age of Onset)    Cancer Father    Cataracts Sister    Diabetes Mother, Father, Sister, Brother    Glaucoma Maternal Aunt, Paternal Aunt    Heart disease Mother, Father    Hypertension Mother, Father, Sister, Brother, Son, Daughter    No Known Problems Maternal Grandmother, Maternal Grandfather, Paternal Grandmother, Paternal Grandfather    Stroke Mother        Social History Main Topics    Smoking status: Former Smoker     Types: Cigarettes    Smokeless tobacco: Never Used      Comment: Quit smoking as a teenager    Alcohol use Yes      Comment: 1-2 drinks per week    Drug use: No    Sexual activity: Yes     Partners: Female     Birth control/ protection:  Condom     Review of Systems   Skin: Positive for wound.   All other systems reviewed and are negative.    Objective:     Vital Signs (Most Recent):  Temp: 98.4 °F (36.9 °C) (12/13/17 0904)  Pulse: 93 (12/13/17 0904)  Resp: 18 (12/13/17 0904)  BP: 119/67 (12/13/17 0904)  SpO2: 95 % (12/13/17 0904) Vital Signs (24h Range):  Temp:  [97.8 °F (36.6 °C)-99.5 °F (37.5 °C)] 98.4 °F (36.9 °C)  Pulse:  [60-93] 93  Resp:  [16-18] 18  SpO2:  [93 %-96 %] 95 %  BP: (102-136)/(52-67) 119/67     Weight: 115.6 kg (254 lb 13.6 oz)  Body mass index is 36.57 kg/m².    Foot Exam    General  General Appearance: appears stated age and healthy   Orientation: alert and oriented to person, place, and time   Affect: appropriate       Right Foot/Ankle     Inspection and Palpation  Ecchymosis: none  Tenderness: none (Periwound)  Swelling: none   Skin Exam: ulcer; no drainage, no cellulitis and no erythema     Neurovascular  Dorsalis pedis: absent  Posterior tibial: absent  Saphenous nerve sensation: diminished  Tibial nerve sensation: diminished  Superficial peroneal nerve sensation: diminished  Deep peroneal nerve sensation: diminished  Sural nerve sensation: diminished      Left Foot/Ankle      Inspection and Palpation  Ecchymosis: none  Tenderness: none   Swelling: none   Skin Exam: skin intact;     Neurovascular  Dorsalis pedis: absent  Posterior tibial: absent  Saphenous nerve sensation: diminished  Tibial nerve sensation: diminished  Superficial peroneal nerve sensation: diminished  Deep peroneal nerve sensation: diminished  Sural nerve sensation: diminished        Right foot          Laboratory:  A1C:     Recent Labs  Lab 10/29/17  1409 12/12/17  0451   HGBA1C 5.5 5.7*     BMP:     Recent Labs  Lab 12/13/17  0445         K 4.8   CL 98   CO2 35*   BUN 16   CREATININE 0.7   CALCIUM 9.3   MG 1.6     CBC:     Recent Labs  Lab 12/13/17  0445   WBC 5.71   RBC 4.18*   HGB 11.5*   HCT 37.7*      MCV 90   MCH 27.5   MCHC  30.5*     CMP:     Recent Labs  Lab 12/13/17  0445      CALCIUM 9.3   ALBUMIN 2.9*   PROT 6.2      K 4.8   CO2 35*   CL 98   BUN 16   CREATININE 0.7   ALKPHOS 79   ALT 91*   AST 81*   BILITOT 0.2     Coagulation:     Recent Labs  Lab 12/10/17  2239   INR 1.3*     CRP:     Recent Labs  Lab 12/10/17  2239   CRP 16.7*     ESR:     Recent Labs  Lab 12/10/17  2239   SEDRATE 24*     Wound Cultures: No results for input(s): LABAERO in the last 4320 hours.    Diagnostic Results:  MRI Right foot:  No imaging findings to suggest osteomyelitis.    Cellulitis versus noninfectious edema along the dorsum of the foot.  No focal drainable collections.    MRI Left foot  Diffuse circumferential subcutaneous edema about the bilateral lower extremities. There is minimal diffuse intramuscular edema with more mild fluid tracking along the superficial fascial planes. No abnormal muscular enhancement or age advanced muscular atrophy is appreciated. Findings are favored to be related to volume status as opposed to myositis. Further evaluation as warranted.    No evidence of diffuse bone marrow process.    MIHIR:  Impression:    Right Leg: Segmental pressures are mildly reduced at DPA,however the PVR waveforms suggest minimal peripheral arterial occlusive disease.     Left Leg: Segmental pressures and are mildly reduced at DPA,however the PVR waveforms suggest minimal peripheral arterial occlusive disease.    Clinical Findings:  Dry eschar noted to dorsal lateral aspect of right foot measures 1.8x1.2x superficial. Eschar is well adhered without drainage. No periwound SOI noted. Wound without bogginess/fluctuance/crepitus noted, stable.

## 2017-12-13 NOTE — PLAN OF CARE
Problem: SLP Goal  Goal: SLP Goal  Outcome: Ongoing (interventions implemented as appropriate)  Clinical evaluation of swallow complete. Recommend diet downgrade to dental soft diet with nectar thick liquids. Recommend MBSS to be completed tomorrow to determine safest, least restrictive diet.     TOM Elliott, CCC-SLP  [phone]  12/13/2017

## 2017-12-13 NOTE — PLAN OF CARE
Problem: Physical Therapy Goal  Goal: Physical Therapy Goal  Goals to be met by: 17    Patient will increase functional independence with mobility by performin. Supine to sit with MInimal Assistance  2. Sit to supine with MInimal Assistance  3. Rolling to Left and Right with Minimal Assistance.  4. Sit to stand transfer with Minimal Assistance w/ RW  5. Bed to chair transfer with Moderate Assistance using Rolling Walker  6. Gait  x 10 feet with Moderate Assistance using Rolling Walker.   7. Lower extremity exercise program x20 reps per handout, with supervision     Goals remain appropriate at time. Continue with PT POC as indicated.

## 2017-12-13 NOTE — PROGRESS NOTES
[facility]  Podiatry  Progress Note    Patient Name: Serafin Tapia  MRN: [id number]  Admission Date: 12/10/2017  Hospital Length of Stay: 2 days  Attending Physician: Pk Rosario MD  Primary Care Provider: John Vargas MD   Interval Hx:  Patient seen at bedside for dressing change.  Patient Denies F/C/N/V.  States his ulcer is getting better.  Denies pain in B/L feet.    Scheduled Meds:   albuterol-ipratropium 2.5mg-0.5mg/3mL  3 mL Nebulization Q6H WAKE    ascorbic acid (vitamin C)  500 mg Oral BID    folic acid  1 mg Oral Daily    levothyroxine  25 mcg Oral Before breakfast    multivitamin  1 tablet Oral Daily    rivaroxaban  1 tablet Oral Daily    zinc sulfate  220 mg Oral Daily     Continuous Infusions:   PRN Meds:acetaminophen, dextrose 50%, dextrose 50%, glucagon (human recombinant), glucose, glucose, hydrocodone-acetaminophen 5-325mg, influenza, insulin aspart, morphine, ondansetron, prochlorperazine, ramelteon, senna-docusate 8.6-50 mg, sodium chloride 0.9%    Review of patient's allergies indicates:  No Known Allergies     Past Medical History:   Diagnosis Date    Asthma     Diabetes mellitus type 2 in obese 5/3/2014    Elevated troponin 5/3/2014    Hyperlipidemia     Hypertension     Hypothyroidism (acquired) 11/1/2017    Obesity     Pulmonary embolism 05/2014    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     Past Surgical History:   Procedure Laterality Date    TONSILLECTOMY         Family History     Problem Relation (Age of Onset)    Cancer Father    Cataracts Sister    Diabetes Mother, Father, Sister, Brother    Glaucoma Maternal Aunt, Paternal Aunt    Heart disease Mother, Father    Hypertension Mother, Father, Sister, Brother, Son, Daughter    No Known Problems Maternal Grandmother, Maternal Grandfather, Paternal Grandmother, Paternal Grandfather    Stroke Mother        Social History Main Topics    Smoking status: Former  Smoker     Types: Cigarettes    Smokeless tobacco: Never Used      Comment: Quit smoking as a teenager    Alcohol use Yes      Comment: 1-2 drinks per week    Drug use: No    Sexual activity: Yes     Partners: Female     Birth control/ protection: Condom     Review of Systems   Skin: Positive for wound.   All other systems reviewed and are negative.    Objective:     Vital Signs (Most Recent):  Temp: 98.4 °F (36.9 °C) (12/13/17 0904)  Pulse: 93 (12/13/17 0904)  Resp: 18 (12/13/17 0904)  BP: 119/67 (12/13/17 0904)  SpO2: 95 % (12/13/17 0904) Vital Signs (24h Range):  Temp:  [97.8 °F (36.6 °C)-99.5 °F (37.5 °C)] 98.4 °F (36.9 °C)  Pulse:  [60-93] 93  Resp:  [16-18] 18  SpO2:  [93 %-96 %] 95 %  BP: (102-136)/(52-67) 119/67     Weight: 115.6 kg (254 lb 13.6 oz)  Body mass index is 36.57 kg/m².    Foot Exam    General  General Appearance: appears stated age and healthy   Orientation: alert and oriented to person, place, and time   Affect: appropriate       Right Foot/Ankle     Inspection and Palpation  Ecchymosis: none  Tenderness: none (Periwound)  Swelling: none   Skin Exam: ulcer; no drainage, no cellulitis and no erythema     Neurovascular  Dorsalis pedis: absent  Posterior tibial: absent  Saphenous nerve sensation: diminished  Tibial nerve sensation: diminished  Superficial peroneal nerve sensation: diminished  Deep peroneal nerve sensation: diminished  Sural nerve sensation: diminished      Left Foot/Ankle      Inspection and Palpation  Ecchymosis: none  Tenderness: none   Swelling: none   Skin Exam: skin intact;     Neurovascular  Dorsalis pedis: absent  Posterior tibial: absent  Saphenous nerve sensation: diminished  Tibial nerve sensation: diminished  Superficial peroneal nerve sensation: diminished  Deep peroneal nerve sensation: diminished  Sural nerve sensation: diminished        Right foot          Laboratory:  A1C:     Recent Labs  Lab 10/29/17  1409 12/12/17  0451   HGBA1C 5.5 5.7*     BMP:     Recent  Labs  Lab 12/13/17 0445         K 4.8   CL 98   CO2 35*   BUN 16   CREATININE 0.7   CALCIUM 9.3   MG 1.6     CBC:     Recent Labs  Lab 12/13/17 0445   WBC 5.71   RBC 4.18*   HGB 11.5*   HCT 37.7*      MCV 90   MCH 27.5   MCHC 30.5*     CMP:     Recent Labs  Lab 12/13/17 0445      CALCIUM 9.3   ALBUMIN 2.9*   PROT 6.2      K 4.8   CO2 35*   CL 98   BUN 16   CREATININE 0.7   ALKPHOS 79   ALT 91*   AST 81*   BILITOT 0.2     Coagulation:     Recent Labs  Lab 12/10/17  2239   INR 1.3*     CRP:     Recent Labs  Lab 12/10/17  2239   CRP 16.7*     ESR:     Recent Labs  Lab 12/10/17  2239   SEDRATE 24*     Wound Cultures: No results for input(s): LABAERO in the last 4320 hours.    Diagnostic Results:  MRI Right foot:  No imaging findings to suggest osteomyelitis.    Cellulitis versus noninfectious edema along the dorsum of the foot.  No focal drainable collections.    MRI Left foot  Diffuse circumferential subcutaneous edema about the bilateral lower extremities. There is minimal diffuse intramuscular edema with more mild fluid tracking along the superficial fascial planes. No abnormal muscular enhancement or age advanced muscular atrophy is appreciated. Findings are favored to be related to volume status as opposed to myositis. Further evaluation as warranted.    No evidence of diffuse bone marrow process.    MIHIR:  Impression:    Right Leg: Segmental pressures are mildly reduced at DPA,however the PVR waveforms suggest minimal peripheral arterial occlusive disease.     Left Leg: Segmental pressures and are mildly reduced at DPA,however the PVR waveforms suggest minimal peripheral arterial occlusive disease.    Clinical Findings:  Dry eschar noted to dorsal lateral aspect of right foot measures 1.8x1.2x superficial. Eschar is well adhered without drainage. No periwound SOI noted. Wound without bogginess/fluctuance/crepitus noted, stable.     Assessment/Plan:     * Shortness of breath     Per primary        Skin ulcer of right foot, limited to breakdown of skin    Serafin Tapia is a 67 y.o. male with Ulcer right dorsal lateral foot, stable, no SOI.  -MRI negative for osteo, suspicious for cellulitis of dorsal foot.  -Pulses trace, ABIs show minimal PAD  -Wound dressed with betadine, nursing to perform daily dressing changes, orders placed.   -Podiatry will follow     WB status: WBAT  Offloading device: Darco shoe.   Upon D/C pt will need to f/u within 1 week to podiatry clinic  Pt will need to paint wound at home with betadine daily, keep wound dry.            Morbid obesity with BMI of 40.0-44.9, adult    Per primary        Controlled type 2 diabetes mellitus with diabetic polyneuropathy, without long-term current use of insulin    Per primary            Lb Rosales MD  Podiatry  [facility]

## 2017-12-13 NOTE — ASSESSMENT & PLAN NOTE
Serafin Tapia is a 67 y.o. male with Ulcer right dorsal lateral foot, stable, no SOI.  -MRI negative for osteo, suspicious for cellulitis of dorsal foot.  -Pulses trace, ABIs ordered, pending  -Wound dressed with betadine, nursing to perform daily dressing changes, orders placed.   -Podiatry will follow     WB status: WBAT  Offloading device: Darco shoe.   Upon D/C pt will need to f/u within 1 week to podiatry clinic  Pt will need to paint wound at home with betadine daily, keep wound dry.

## 2017-12-14 LAB — CK SERPL-CCNC: 2344 U/L

## 2017-12-14 PROCEDURE — 82550 ASSAY OF CK (CPK): CPT

## 2017-12-14 PROCEDURE — 25000003 PHARM REV CODE 250: Performed by: PHYSICIAN ASSISTANT

## 2017-12-14 PROCEDURE — 36415 COLL VENOUS BLD VENIPUNCTURE: CPT

## 2017-12-14 PROCEDURE — 94640 AIRWAY INHALATION TREATMENT: CPT

## 2017-12-14 PROCEDURE — 85613 RUSSELL VIPER VENOM DILUTED: CPT

## 2017-12-14 PROCEDURE — 27000173 HC ACAPELLA DEVICE DH OR DM

## 2017-12-14 PROCEDURE — 94761 N-INVAS EAR/PLS OXIMETRY MLT: CPT

## 2017-12-14 PROCEDURE — 25000242 PHARM REV CODE 250 ALT 637 W/ HCPCS: Performed by: HOSPITALIST

## 2017-12-14 PROCEDURE — 27000221 HC OXYGEN, UP TO 24 HOURS

## 2017-12-14 PROCEDURE — 92611 MOTION FLUOROSCOPY/SWALLOW: CPT

## 2017-12-14 PROCEDURE — 86147 CARDIOLIPIN ANTIBODY EA IG: CPT

## 2017-12-14 PROCEDURE — 85598 HEXAGNAL PHOSPH PLTLT NEUTRL: CPT

## 2017-12-14 PROCEDURE — 94664 DEMO&/EVAL PT USE INHALER: CPT

## 2017-12-14 PROCEDURE — 86146 BETA-2 GLYCOPROTEIN ANTIBODY: CPT | Mod: 59

## 2017-12-14 PROCEDURE — 99232 SBSQ HOSP IP/OBS MODERATE 35: CPT | Mod: ,,, | Performed by: HOSPITALIST

## 2017-12-14 PROCEDURE — 11000001 HC ACUTE MED/SURG PRIVATE ROOM

## 2017-12-14 RX ADMIN — THERA TABS 1 TABLET: TAB at 11:12

## 2017-12-14 RX ADMIN — Medication 220 MG: at 11:12

## 2017-12-14 RX ADMIN — OXYCODONE HYDROCHLORIDE AND ACETAMINOPHEN 500 MG: 500 TABLET ORAL at 10:12

## 2017-12-14 RX ADMIN — IPRATROPIUM BROMIDE AND ALBUTEROL SULFATE 3 ML: .5; 3 SOLUTION RESPIRATORY (INHALATION) at 08:12

## 2017-12-14 RX ADMIN — LEVOTHYROXINE SODIUM 25 MCG: 25 TABLET ORAL at 05:12

## 2017-12-14 RX ADMIN — OXYCODONE HYDROCHLORIDE AND ACETAMINOPHEN 500 MG: 500 TABLET ORAL at 11:12

## 2017-12-14 RX ADMIN — FOLIC ACID 1 MG: 1 TABLET ORAL at 11:12

## 2017-12-14 RX ADMIN — HYDROCODONE BITARTRATE AND ACETAMINOPHEN 1 TABLET: 5; 325 TABLET ORAL at 01:12

## 2017-12-14 RX ADMIN — IPRATROPIUM BROMIDE AND ALBUTEROL SULFATE 3 ML: .5; 3 SOLUTION RESPIRATORY (INHALATION) at 01:12

## 2017-12-14 RX ADMIN — RIVAROXABAN 20 MG: 20 TABLET, FILM COATED ORAL at 11:12

## 2017-12-14 NOTE — PROGRESS NOTES
[facility] Hospital Medicine  Progress Note    Patient Name: Serafin Tapia  MRN: [id number]  Patient Class: IP- Inpatient   Admission Date: 12/10/2017  Length of Stay: 3 days  Attending Physician: Pk Rosario MD  Primary Care Provider: John Vargas MD    [facility] Medicine Team: [facility] HOSP MED L Jaime Webb MD    Subjective:     Principal Problem:Shortness of breath    HPI:  Serafin Tapia is a 67 y.o. male with PMHx of HTN, HLD, diabetes mellitus and obesity who presents to the ED with an onset of intermittent SOB x 1 month with associated sx of constant leg swelling, leg sore, generalized weakness. Per brother, the home health nurses have visited twice since his discharge from [facility] 11/13. Pt was not sent home with O2 and he currently wears adult diapers for enuresis. Pt endorses painful sore with erythema on right foot that busted while at home. Per brother, he cleans the wound with medicated pads, spray, and various packing materials every 2.5 days. Pt reports the swelling is exacerbated with standing. Pt also reports the SOB is exacerbated when he lays down. Per brother, he cannot get comfortable and requests admittance to the hospital. Denies fever, urinary retention, chest pain, anasarca, abd distension. Pt self-reports hx of blood clots and is currently on blood thinners. No pertinent surgical history.    Hospital Course:  12/11/2017 admitted overnight, pt reported LE swelling,R foot pain, SOB and coughing. Reported since his admission to [facility], he was discharge to SNF to further rehabilitation. He has been home for <7 days and he was then taken back to ED and admitted last night. He report LE swelling that has been worsening. Denies myalgias. Report cough that is productive but denies fever, chills. Report compliance with lasix at home. Pt with persistent elevation in CPK since last admission, rheumatology consulted for evaluation. IV lasix started. PT/OT ordered,  "Podiatry consulted, according to early assessment, pt is severely debilitated     12/12 Pt feels that breathing is worse today. Does not feel worsening of SOB when laying flat. Has proximal muscle weakness and is unable to maneuver himself in bed. No myalgias. No Cp. Has LE edema and pain at R foot ulcer.     12/13: Pt says breathing is improved. LBP and causes him to lay down and breathing worse at that time. Still feels weak and has trouble maneuvering in bed. No f/c.     12/14 felling better today and not requiring O2. Less pain in LLE. Still w/ weakness but good spirits.     /70 (BP Location: Left arm, Patient Position: Lying)   Pulse 86   Temp 97.9 °F (36.6 °C) (Oral)   Resp 12   Ht 5' 10" (1.778 m)   Wt 115.7 kg (255 lb)   SpO2 (!) 92%   BMI 36.59 kg/m²     Physical Exam   Constitutional: He is oriented to person, place, and time. He appears well-developed and well-nourished.   Obese male in NAD   HENT:   Head: Normocephalic and atraumatic.   Eyes: Conjunctivae and EOM are normal.   Neck: Normal range of motion. Neck supple. No tracheal deviation present. No thyromegaly present.   Cardiovascular: Normal rate, regular rhythm and intact distal pulses.    No murmur heard.  Distant heart sounds  1+ pitting edema in RLE and less in LLE  Pulmonary/Chest: Effort normal and breath sounds normal. No stridor. No respiratory distress. He has no wheezes.   Distant lung sounds. Raspy cough.  Abdominal: Soft. Bowel sounds are normal. There is no tenderness.   Musculoskeletal: Dec function but good strength in UEs  Neurological: He is alert and oriented to person, place, and time. He displays no tremor. No cranial nerve deficit or sensory deficit. Gait abnormal. Coordination normal.   5/5 Strength BUE  4/5 strength BLE (R>L) 2/2 pain  Skin: Skin is warm and dry.   R dorsal foot dressing c/d/i  Mild TTP around wound  Psychiatric: He has a normal mood and affect. His behavior is normal.   Nursing note and vitals " reviewed.    No results for input(s): WBC, RBC, HGB, HCT, PLT, MCV, MCH, MCHC in the last 24 hours.  All labs reviewed and pertinent ones discussed below    All imaging reviewed and discussed below    Assessment/Plan:      * Shortness of breath  Unclear etiology. Possible from hypervolemia but no e/o effusions or vasc congestion on xr. TTE nl, LE US neg, Cr nl, RUQUS w/o liver abnormality. Possible MSK weak from underlying rheum condition causing restrictive lung dz vs ILD from rheum dz. Tn negative. ESR 24, CRP 16.7. Alb 2.9  -Rheum consult, EMG (unclear why this study was not completed yesterday, called and left message with EMG  and tried to contact a second time w/o success). Plan for muscle Bx out-pt.   -Likely from MSK weakness and poor diaphragmatic excursion. Statin myopathy, rheum cause, myositis, thyroid (TSH high but T4 nl)  -ABG shows e/o chronic hypoxemia, hypercapnia, likely OHS      Hypervolemia  SOB with increasing peripheral edema since d/c from SNF x1 weeks ago. MRI b/l showing edema. BNP 21, CXR clear. improvement in peripheral edema.         Skin ulcer of right foot, limited to breakdown of skin    R foot ulcer that began during last admission. Eschar noted to R dorsal foot. Edema with mild concern for infection, but no abx for now. ESR 24  - Podiatry consulted, ABIs mostly nl  - XR of R foot  Done      Weakness    proximal weakness to BL hips and shoulders.  Increased gait instability. Cannot perform ADLs; uses diapers. Weakness started 3/2017 and noted to become worse in October. +numbness, tingling, pain. Unclear etiology concern for polmyositis w/ CK elevation vs inclusion body myositis, Neuromuscular dz vs autoimmune vs cervical myelopathy. MRI cervical spine and lumbar spine from 9/2017 show multilevel degenerative disc disease with neural foraminal stenosis. No spinal cord edema.  - HH PT/OT --- will continue here     -f/u rheum labs: myomarker 3 panel pending, , aldolase  29, HIV/HALLE/Cinthia-1/AChRAbs/striated musc abs negative, TSH 4.8, Ft4 0.9, Mg/phos nl, HMGCoAredAb in process. EMG ordered  -likely statin induced vs autoimmune, will need out-pt f/u      Non-traumatic rhabdomyolysis    CPK 4251 admit , improved to 2k-- baseline [id number] during previous admission - admitted 10/29-11/13/2017 for hypoglycemia and discovered rhabdomyolysis. Statin discontinued in setting of rhabdo and elevated liver enzymes during last admission. Renal function normal        Essential hypertension    BP on low side, will hold meds and monitor        Controlled type 2 diabetes mellitus with diabetic polyneuropathy, without long-term current use of insulin    Last HgA1c 5.5 on 10/29. Home med: meformin 1000 mg BID; held. stop glucose checks        Dyslipidemia    statin discontinued x1 month ago in setting of rhabdo and elevated liver enzymes. Lipid panel       Cough    no sign of PNA on CXR, pt denies SOB, fever, chills  -Duo-neb Q6, Acapella treatment Q6  -speech eval and some concerns aspiration, cleared MBS and back on regular diet.       Elevated liver enzymes    AST 94, ALT 96 at admit, improved this am. Chronic; unclear etiology, likely related to muscle issue and not liver.   -CTM      History of pulmonary embolism    Pulmonary embolus in 2014.  - Xarelto.       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 No signs of bleeding. 9/20/2017 B12 207; repeat 503. Not currently taking B12. MMA previously nl. Ferritin previously elevated w/ low Fe; possible ACID.   - On folate (previously <3 on labs), vitamin C, zinc      Hypothyroidism (acquired)    - continue levothyroxine 25 mcg      Morbid obesity with BMI of 40.0-44.9, adult    Sedentary lifestyle and poor functional mobility. Has used SNF days and would be >100$/d copay. Pt to have brother assist with daily function when appropriate for discharge.         VTE Risk Mitigation         Ordered     rivaroxaban tablet 20 mg  Daily     Route:   Oral        12/11/17 0311     High Risk of VTE  Once      12/11/17 0316     Place ROBINSON hose  Until discontinued      12/11/17 0316     Place sequential compression device  Until discontinued      12/11/17 0311          Pk Rosario MD  Department of Hospital Medicine   [facility]

## 2017-12-14 NOTE — PLAN OF CARE
Problem: SLP Goal  Goal: SLP Goal  Speech Language Pathology Goals  Goals expected to be met by 12/21  1. Pt will tolerate Dental soft diet w/ thin liquids w/ no overt s/s of aspiration.  2. Pt will use safe swallow strategies independently following education.    Speech Language Pathology  Goal expected to be met by 12/20:  1. Pt will participate in MBSS in order to determine safest, least restrictive diet.  -Goal Met  Outcome: Ongoing (interventions implemented as appropriate)  MBSS completed.  REC:  Advance pt diet to Dental Soft diet w/ thin liquids, oral meds whole 1 at a time, upright, small bites/sips, multiple swallows per sip, aspiration precautions.  Goals updated.  Will review results w/ team.    Rachel Galvin CCC-SLP  12/14/2017

## 2017-12-14 NOTE — PLAN OF CARE
Problem: Patient Care Overview  Goal: Plan of Care Review  Outcome: Ongoing (interventions implemented as appropriate)  Plan of care reviewed with pt, verbalized understanding. VSS. AAOx4. Turned Q2 with wedge, no new skin breakdown. Remained free from trauma/fall/injury. No BMs this shift, passing flatus and bowel sounds normal active. Nectar thick fluids continued; Explained and demonstrated how to use thickener, verbalized understanding. No acute events.

## 2017-12-14 NOTE — PROCEDURES
Modified Barium Swallow    Patient Name:  Serafin Tapia   MRN:  [id number]      Recommendations:     Recommendations:                General Recommendations:  Dysphagia therapy  Diet recommendations:  Dental Soft, Thin   Aspiration Precautions: 1 bite/sip at a time, Double swallow with each bite/sip, HOB to 90 degrees, Meds whole 1 at a time, Small bites/sips and Standard aspiration precautions   General Precautions: Standard, aspiration, fall, nectar thick, respiratory  Communication strategies:  none    Referral     Reason for Referral  Patient was referred for a Modified Barium Swallow Study to assess the efficiency of his/her swallow function, rule out aspiration and make recommendations regarding safe dietary consistencies, effective compensatory strategies, and safe eating environment.     Diagnosis: Shortness of breath       History:     Past Medical History:   Diagnosis Date    Asthma     Diabetes mellitus type 2 in obese 5/3/2014    Elevated troponin 5/3/2014    Hyperlipidemia     Hypertension     Hypothyroidism (acquired) 11/1/2017    Obesity     Pulmonary embolism 05/2014    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       Objective:     Current Respiratory Status: 12/14/17    Alert: yes    Cooperative: yes    Follows Directions: yes    Visualization  · Patient was seen in the lateral view    Oral Peripheral Examination  · Oral Musculature: WFL  · Dentition: scattered dentition  · Mucosal Quality: good  · Mandibular Strength and Mobility: WFL  · Oral Labial Strength and Mobility: WFL  · Lingual Strength and Mobility: WFL  · Velar Elevation: WFL  · Buccal Strength and Mobility: WFL  · Volitional Cough: adequate  · Volitional Swallow: timely  · Voice Prior to PO Intake: clear    Consistencies Assessed  · Thin tsp sip x1, cup sip x1, straw sip x1  · Nectar thick tsp sip x1, cup sip x1  · Thin-Honey Thick tsp sip x1  · Puree tsp bite x1  · Solids half of a cracker coated in  pudding barium    Oral Preparation/Oral Phase  · Poor bolus formation and control w/ all consistencies  · Decreased base of tongue mobility  · Premature spillage w/ pooling in the valleculae w/ all trials w/ spillover to the pyriforms w/ thin and nectar consistencies only  · No presence of naso-pharyngeal reflux  · Mastication was adequate    Pharyngeal Phase   Pt demonstrated mild pharyngeal dysphagia characterized by delayed swallow reflex initiation speed and reduced/inconsistent epiglottic inversion.  Pt demonstrated no penetration/aspiration w/ any consistency.  He exhibited mild vallecular stasis following the swallow w/ thin, nectar and honey thick liquids reduced and eliminated w/ cued multiple swallows.  Pt epiglottic inversion reduced d/t presence of cervical osteophyte.  Hyolaryngeal elevation and pharyngeal constriction were adequate    Cervical Esophageal Phase  · UES appeared to accommodate all bolus types without stasis or retrograde movement observed     Assessment:     Impressions  ·   Patient demonstrates mild Oropharyngeal  dysphagia characterized by reduced oral bolus formation and control, decreased tongue base retraction/driving force, delayed swallow reflex initiation, and reduced/inconsistent epiglottic inversion.  He exhibited no penetration/aspiration w/ any trial and benefited from multiple swallows per sip to clear mild thin liquid stasis.    Prognosis: Good    Barriers:  · None    Plan  Cont Skilled Speech services for ongoing monitoring of diet tolerance and pt and family education.    Education  Results were discussed with patient.  He indicated good understanding of the information provided and agreed w/ recommendations.  He was able to recall aspiration precautions via teachback independently.    Goals:    SLP Goals        Problem: SLP Goal    Goal Priority Disciplines Outcome   SLP Goal     SLP Ongoing (interventions implemented as appropriate)   Description:  Speech Language  Pathology Goals  Goals expected to be met by 12/21  1. Pt will tolerate Dental soft diet w/ thin liquids w/ no overt s/s of aspiration.  2. Pt will use safe swallow strategies independently following education.    Speech Language Pathology  Goal expected to be met by 12/20:  1. Pt will participate in MBSS in order to determine safest, least restrictive diet.  -Goal Met                    Plan:   · Patient to be seen:  Therapy Frequency: 5 x/week   · Plan of Care expires:  01/11/17  · Plan of Care reviewed with:  patient        Discharge recommendations:  nursing facility, skilled   Barriers to Discharge:  unknown    Time Tracking:   SLP Treatment Date:   12/14/17  Speech Start Time:  1023  Speech Stop Time:  1043     Speech Total Time (min):  20 min    Rachel Galvin CCC-SLP  12/14/2017

## 2017-12-15 ENCOUNTER — PATIENT OUTREACH (OUTPATIENT)
Dept: INTERNAL MEDICINE | Facility: CLINIC | Age: 67
End: 2017-12-15

## 2017-12-15 VITALS
RESPIRATION RATE: 18 BRPM | OXYGEN SATURATION: 88 % | HEIGHT: 70 IN | BODY MASS INDEX: 36.51 KG/M2 | DIASTOLIC BLOOD PRESSURE: 65 MMHG | HEART RATE: 91 BPM | WEIGHT: 255 LBS | SYSTOLIC BLOOD PRESSURE: 139 MMHG | TEMPERATURE: 98 F

## 2017-12-15 LAB
CARDIOLIPIN IGG SER IA-ACNC: <9.4 GPL
CARDIOLIPIN IGM SER IA-ACNC: <9.4 MPL
CK SERPL-CCNC: 2513 U/L
HMGCR ANTIBODY: >200 UNITS (ref 0–19)

## 2017-12-15 PROCEDURE — 94664 DEMO&/EVAL PT USE INHALER: CPT

## 2017-12-15 PROCEDURE — 82550 ASSAY OF CK (CPK): CPT

## 2017-12-15 PROCEDURE — 99900035 HC TECH TIME PER 15 MIN (STAT)

## 2017-12-15 PROCEDURE — 92526 ORAL FUNCTION THERAPY: CPT

## 2017-12-15 PROCEDURE — 97535 SELF CARE MNGMENT TRAINING: CPT

## 2017-12-15 PROCEDURE — 99239 HOSP IP/OBS DSCHRG MGMT >30: CPT | Mod: ,,, | Performed by: HOSPITALIST

## 2017-12-15 PROCEDURE — 25000003 PHARM REV CODE 250: Performed by: PHYSICIAN ASSISTANT

## 2017-12-15 PROCEDURE — 94640 AIRWAY INHALATION TREATMENT: CPT

## 2017-12-15 PROCEDURE — 36415 COLL VENOUS BLD VENIPUNCTURE: CPT

## 2017-12-15 PROCEDURE — 94761 N-INVAS EAR/PLS OXIMETRY MLT: CPT

## 2017-12-15 PROCEDURE — 25000242 PHARM REV CODE 250 ALT 637 W/ HCPCS: Performed by: HOSPITALIST

## 2017-12-15 RX ORDER — ASCORBIC ACID 500 MG
500 TABLET ORAL 2 TIMES DAILY
COMMUNITY
Start: 2017-12-15

## 2017-12-15 RX ORDER — LEVOTHYROXINE SODIUM 25 UG/1
25 TABLET ORAL
Qty: 30 TABLET | Refills: 11 | Status: SHIPPED | OUTPATIENT
Start: 2017-12-16 | End: 2018-01-17 | Stop reason: SDUPTHER

## 2017-12-15 RX ORDER — ZINC SULFATE 50(220)MG
220 CAPSULE ORAL DAILY
COMMUNITY
Start: 2017-12-16 | End: 2017-12-28 | Stop reason: SDUPTHER

## 2017-12-15 RX ADMIN — IPRATROPIUM BROMIDE AND ALBUTEROL SULFATE 3 ML: .5; 3 SOLUTION RESPIRATORY (INHALATION) at 11:12

## 2017-12-15 RX ADMIN — RIVAROXABAN 20 MG: 20 TABLET, FILM COATED ORAL at 09:12

## 2017-12-15 RX ADMIN — IPRATROPIUM BROMIDE AND ALBUTEROL SULFATE 3 ML: .5; 3 SOLUTION RESPIRATORY (INHALATION) at 07:12

## 2017-12-15 RX ADMIN — LEVOTHYROXINE SODIUM 25 MCG: 25 TABLET ORAL at 05:12

## 2017-12-15 RX ADMIN — FOLIC ACID 1 MG: 1 TABLET ORAL at 09:12

## 2017-12-15 RX ADMIN — THERA TABS 1 TABLET: TAB at 09:12

## 2017-12-15 RX ADMIN — OXYCODONE HYDROCHLORIDE AND ACETAMINOPHEN 500 MG: 500 TABLET ORAL at 09:12

## 2017-12-15 RX ADMIN — Medication 220 MG: at 09:12

## 2017-12-15 NOTE — PT/OT/SLP PROGRESS
"Speech Language Pathology Treatment    Patient Name:  Serafin Tapia   MRN:  [id number]  Admitting Diagnosis: Shortness of breath    Recommendations:                 General Recommendations:  Dysphagia therapy  Diet recommendations:  Dental Soft, Liquid Diet Level: Thin   Aspiration Precautions: 1 bite/sip at a time, Double swallow with each bite/sip, HOB to 90 degrees, Meds whole 1 at a time, Small bites/sips and Standard aspiration precautions   General Precautions: Standard, aspiration, fall  Communication strategies:  none    Subjective     "It was fine."  Pt stated re: breakfast meal  Patient goals: none stated     Pain/Comfort:  · Pain Rating 1: 0/10  · Pain Rating Post-Intervention 1: 0/10    Objective:     Has the patient been evaluated by SLP for swallowing?   Yes  Keep patient NPO? No   Current Respiratory Status: room air      Pt was seated upright for optimal swallowing safety.  He reported no difficulty w/ am meal.  He was provided education re: MBSS results, normal swallow mechanics, s/s of aspiration, risk of aspiration and aspiration precautions.  He indicated good understanding of the information provided.  He was offered and accepted po trials of thin liquids by cup edge which he tolerated well using cued double swallows.  He declined soft solid trials.  HOB was lowered per pt comfort.    Assessment:     Serafin Tapia is a 67 y.o. male with an SLP diagnosis of Dysphagia.  He presents with good tolerance of current diet.  He would benefit from ongoing Skilled Speech services for monitoring of diet tolerance.    Goals:    SLP Goals        Problem: SLP Goal    Goal Priority Disciplines Outcome   SLP Goal     SLP Ongoing (interventions implemented as appropriate)   Description:  Speech Language Pathology Goals  Goals expected to be met by 12/21  1. Pt will tolerate Dental soft diet w/ thin liquids w/ no overt s/s of aspiration.  2. Pt will use safe swallow strategies independently following " education.    Speech Language Pathology  Goal expected to be met by 12/20:  1. Pt will participate in MBSS in order to determine safest, least restrictive diet.  -Goal Met                    Plan:     · Patient to be seen:  5 x/week   · Plan of Care expires:  01/11/17  · Plan of Care reviewed with:  patient   · SLP Follow-Up:  Yes       Discharge recommendations:  nursing facility, skilled   Barriers to Discharge:  Level of Skilled Assistance Needed SNF    Time Tracking:     SLP Treatment Date:   12/15/17  Speech Start Time:  1138  Speech Stop Time:  1155     Speech Total Time (min):  17 min    Billable Minutes: Treatment Swallowing Dysfunction 9 and Seld Care/Home Management Training 8    Rachel Galvin CCC-SLP  12/15/2017

## 2017-12-15 NOTE — PT/OT/SLP PROGRESS
Physical Therapy      Patient Name:  Serafin Tapai   MRN:  [id number]    Patient not seen today secondary to pt being seen by speech first attempt, and in care of nursing second attempt. Will follow-up at next scheduled visit per PT POC.    Sophie Amin PTA

## 2017-12-15 NOTE — PROGRESS NOTES
[facility]  Podiatry  Progress Note    Patient Name: Serafin Tapia  MRN: [id number]  Admission Date: 12/10/2017  Hospital Length of Stay: 4 days  Attending Physician: Pk Rosario MD  Primary Care Provider: John Vragas MD   Interval Hx: Pt. Seen bedside no offloading boots in place. Bandage intact dorsal right foot.   Scheduled Meds:   albuterol-ipratropium 2.5mg-0.5mg/3mL  3 mL Nebulization Q6H WAKE    ascorbic acid (vitamin C)  500 mg Oral BID    folic acid  1 mg Oral Daily    levothyroxine  25 mcg Oral Before breakfast    multivitamin  1 tablet Oral Daily    rivaroxaban  1 tablet Oral Daily    zinc sulfate  220 mg Oral Daily     Continuous Infusions:  PRN Meds:acetaminophen, dextrose 50%, dextrose 50%, glucagon (human recombinant), glucose, glucose, hydrocodone-acetaminophen 5-325mg, influenza, insulin aspart, morphine, ondansetron, prochlorperazine, ramelteon, senna-docusate 8.6-50 mg, sodium chloride 0.9%    Review of patient's allergies indicates:  No Known Allergies     Past Medical History:   Diagnosis Date    Asthma     Diabetes mellitus type 2 in obese 5/3/2014    Elevated troponin 5/3/2014    Hyperlipidemia     Hypertension     Hypothyroidism (acquired) 11/1/2017    Obesity     Pulmonary embolism 05/2014    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     Past Surgical History:   Procedure Laterality Date    TONSILLECTOMY         Family History     Problem Relation (Age of Onset)    Cancer Father    Cataracts Sister    Diabetes Mother, Father, Sister, Brother    Glaucoma Maternal Aunt, Paternal Aunt    Heart disease Mother, Father    Hypertension Mother, Father, Sister, Brother, Son, Daughter    No Known Problems Maternal Grandmother, Maternal Grandfather, Paternal Grandmother, Paternal Grandfather    Stroke Mother        Social History Main Topics    Smoking status: Former Smoker     Types: Cigarettes    Smokeless tobacco: Never  Used      Comment: Quit smoking as a teenager    Alcohol use Yes      Comment: 1-2 drinks per week    Drug use: No    Sexual activity: Yes     Partners: Female     Birth control/ protection: Condom     Review of Systems   Skin: Positive for wound.   All other systems reviewed and are negative.    Objective:     Vital Signs (Most Recent):  Temp: 97.5 °F (36.4 °C) (12/15/17 1156)  Pulse: 91 (12/15/17 1156)  Resp: 18 (12/15/17 1156)  BP: 139/65 (12/15/17 1156)  SpO2: (!) 88 % (12/15/17 1156) Vital Signs (24h Range):  Temp:  [97.4 °F (36.3 °C)-98.5 °F (36.9 °C)] 97.5 °F (36.4 °C)  Pulse:  [] 91  Resp:  [15-20] 18  SpO2:  [88 %-95 %] 88 %  BP: (128-144)/(65-74) 139/65     Weight: 115.7 kg (255 lb)  Body mass index is 36.59 kg/m².    Foot Exam    General  General Appearance: appears stated age and healthy   Orientation: alert and oriented to person, place, and time   Affect: appropriate       Right Foot/Ankle     Inspection and Palpation  Ecchymosis: none  Tenderness: none (Periwound)  Swelling: none   Skin Exam: ulcer; no drainage, no cellulitis and no erythema     Neurovascular  Dorsalis pedis: absent  Posterior tibial: absent  Saphenous nerve sensation: diminished  Tibial nerve sensation: diminished  Superficial peroneal nerve sensation: diminished  Deep peroneal nerve sensation: diminished  Sural nerve sensation: diminished      Left Foot/Ankle      Inspection and Palpation  Ecchymosis: none  Tenderness: none   Swelling: none   Skin Exam: skin intact;     Neurovascular  Dorsalis pedis: absent  Posterior tibial: absent  Saphenous nerve sensation: diminished  Tibial nerve sensation: diminished  Superficial peroneal nerve sensation: diminished  Deep peroneal nerve sensation: diminished  Sural nerve sensation: diminished          12/15/17        Laboratory:  A1C:     Recent Labs  Lab 10/29/17  1409 12/12/17  0451   HGBA1C 5.5 5.7*     BMP:     Recent Labs  Lab 12/13/17  0445         K 4.8   CL 98    CO2 35*   BUN 16   CREATININE 0.7   CALCIUM 9.3   MG 1.6     CBC:     Recent Labs  Lab 12/13/17 0445   WBC 5.71   RBC 4.18*   HGB 11.5*   HCT 37.7*      MCV 90   MCH 27.5   MCHC 30.5*     CMP:     Recent Labs  Lab 12/13/17 0445      CALCIUM 9.3   ALBUMIN 2.9*   PROT 6.2      K 4.8   CO2 35*   CL 98   BUN 16   CREATININE 0.7   ALKPHOS 79   ALT 91*   AST 81*   BILITOT 0.2     Coagulation:     Recent Labs  Lab 12/10/17  2239   INR 1.3*     CRP:     Recent Labs  Lab 12/10/17  2239   CRP 16.7*     ESR:     Recent Labs  Lab 12/10/17  2239   SEDRATE 24*     Wound Cultures: No results for input(s): LABAERO in the last 4320 hours.    Diagnostic Results:  X-Ray: I have reviewed all pertinent results/findings within the past 24 hours.  ordered      Clinical Findings:  Dry eschar noted to dorsal lateral aspect of right foot measures 1.8x1.2x superficial. Eschar is well adhered without drainage. No periwound SOI noted. Wound without bogginess/fluctuance/crepitus noted, stable.     Assessment/Plan:     * Shortness of breath    Per primary        Skin ulcer of right foot, limited to breakdown of skin    Serafin Tapia is a 67 y.o. male with Ulcer right dorsal lateral foot, stable, no SOI.  -MRI negative for osteo, suspicious for cellulitis of dorsal foot.  -Wound dressed with betadine, nursing to perform daily dressing changes, orders placed.   -Podiatry will follow     WB status: WBAT  Offloading device: Darco shoe.   Upon D/C pt will need to f/u within 1 week to podiatry clinic  Pt will need to paint wound at home with betadine daily, keep wound dry.    Roxanna Tyler DPM PGY-3  Pager: [phone]            Morbid obesity with BMI of 40.0-44.9, adult    Per primary        Controlled type 2 diabetes mellitus with diabetic polyneuropathy, without long-term current use of insulin    Per primary            Roxanna Tyler MD  Podiatry  [facility]

## 2017-12-15 NOTE — PLAN OF CARE
Future Appointments  Date Time Provider Department Center   12/29/2017 8:00 AM John Vargas MD [facility] IM [facility]   MSW Jef notified that MD is writing hh orders  CM spoke with pt and called his brother, Jose Raul, from the room at   [facility] Brother [phone]     So he will  pt later today once hh set up per MSW.     12/15/17 1107   Final Note   Assessment Type Final Discharge Note   Discharge Disposition Home-Health   Hospital Follow Up  Appt(s) scheduled? Yes   Discharge plans and expectations educations in teach back method with documentation complete? Yes   Right Care Referral Info   Post Acute Recommendation Home-care

## 2017-12-15 NOTE — PLAN OF CARE
[facility]    HOME HEALTH ORDERS  FACE TO FACE ENCOUNTER    Patient Name: Serafin Tapia  YOB: 1950    PCP: John Vargas MD   PCP Address: [address] [address] / [facility] [address]  PCP Phone Number: [phone]  PCP Fax: [phone]    Discharging Team(s):    [facility] HOSP MED L  [facility] RHEUMATOLOGY    Encounter Date: 12/15/2017    Admit to Home Health    Diagnoses:  Active Hospital Problems    Diagnosis  POA    *Shortness of breath [R06.02]  Yes    Swelling of right lower extremity [M79.89]  Yes    Hypervolemia [E87.70]  Yes    Elevated liver enzymes [R74.8]  Unknown    Weakness [R53.1]  Yes    Skin ulcer of right foot, limited to breakdown of skin [L97.511]  Yes    Cough [R05]  Yes    Non-traumatic rhabdomyolysis [M62.82]  Yes    Anemia due to vitamin B12 deficiency [D51.9]  Yes    Hypothyroidism (acquired) [E03.9]  Yes    Controlled type 2 diabetes mellitus with diabetic polyneuropathy, without long-term current use of insulin [E11.42]  Yes    Morbid obesity with BMI of 40.0-44.9, adult [E66.01, Z68.41]  Not Applicable    History of pulmonary embolism [Z86.711]  Yes    Dyslipidemia [E78.5]  Yes    Essential hypertension [I10]  Yes      Resolved Hospital Problems    Diagnosis Date Resolved POA   No resolved problems to display.       Future Appointments  Date Time Provider Department Center   12/29/2017 8:00 AM [facility]           I have seen and examined this patient face to face today. My clinical findings that support the need for the home health skilled services and home bound status are the following:  Weakness/numbness causing balance and gait disturbance due to Weakness/Debility making it taxing to leave home.    Allergies:Review of patient's allergies indicates:  No Known Allergies    Diet: regular diet    Activities: activity as tolerated    Nursing:   SN to complete comprehensive assessment including  routine vital signs. Instruct on disease process and s/s of complications to report to MD. Review/verify medication list sent home with the patient at time of discharge  and instruct patient/caregiver as needed. Frequency may be adjusted depending on start of care date.    Notify MD if SBP > 160 or < 90; DBP > 90 or < 50; HR > 120 or < 50; Temp > 101; Other:         CONSULTS:    Physical Therapy to evaluate and treat. Evaluate for home safety and equipment needs; Establish/upgrade home exercise program. Perform / instruct on therapeutic exercises, gait training, transfer training, and Range of Motion.  Occupational Therapy to evaluate and treat. Evaluate home environment for safety and equipment needs. Perform/Instruct on transfers, ADL training, ROM, and therapeutic exercises.   to evaluate for community resources/long-range planning.  Aide to provide assistance with personal care, ADLs, and vital signs.    MISCELLANEOUS CARE:  Wound Care Orders:  yes:  Neuropathic Wound:  Location: R foot    Consult ET nurse        Apply the following to wound:   Other: betadine (frequency), daily    WOUND CARE ORDERS  yes:  Foot Ulcer:  Location: R foot            Medications: Review discharge medications with patient and family and provide education.      Current Discharge Medication List      START taking these medications    Details   multivitamin (THERAGRAN) tablet Take 1 tablet by mouth once daily.         CONTINUE these medications which have CHANGED    Details   !! ascorbic acid, vitamin C, (VITAMIN C) 500 MG tablet Take 1 tablet (500 mg total) by mouth 2 (two) times daily.      !! levothyroxine (SYNTHROID) 25 MCG tablet Take 1 tablet (25 mcg total) by mouth before breakfast.  Qty: 30 tablet, Refills: 11      !! rivaroxaban (XARELTO) 20 mg Tab Take 1 tablet (20 mg total) by mouth once daily.  Qty: 30 tablet, Refills: 3      !! zinc sulfate (ZINCATE) 220 (50) mg capsule Take 1 capsule (220 mg total) by mouth  once daily.       !! - Potential duplicate medications found. Please discuss with provider.      CONTINUE these medications which have NOT CHANGED    Details   folic acid (FOLVITE) 1 MG tablet Take 1 tablet (1 mg total) by mouth once daily.  Qty: 30 tablet, Refills: 0      furosemide (LASIX) 20 MG tablet Take 1 tablet (20 mg total) by mouth once daily.  Qty: 30 tablet, Refills: 11      !! levothyroxine (SYNTHROID) 25 MCG tablet Take 1 tablet (25 mcg total) by mouth before breakfast.  Qty: 30 tablet, Refills: 11      !! rivaroxaban (XARELTO) 20 mg Tab Take 1 tablet (20 mg total) by mouth once daily.  Qty: 30 tablet, Refills: 3      !! ascorbic acid, vitamin C, (VITAMIN C) 500 MG tablet Take 500 mg by mouth 2 (two) times daily. PER [facility]      multivitamin (ONE DAILY MULTIVITAMIN) per tablet Take 1 tablet by mouth once daily. PER [facility]      !! zinc sulfate (ZINCATE) 220 (50) mg capsule Take 220 mg by mouth once daily. PER [facility]       !! - Potential duplicate medications found. Please discuss with provider.      STOP taking these medications       amlodipine-benazepril 10-20mg (LOTREL) 10-20 mg per capsule Comments:   Reason for Stopping:         carvedilol (COREG) 25 MG tablet Comments:   Reason for Stopping:         metformin (GLUCOPHAGE) 1000 MG tablet Comments:   Reason for Stopping:         spironolactone (ALDACTONE) 25 MG tablet Comments:   Reason for Stopping:               I certify that this patient is confined to his home and needs physical therapy and occupational therapy.

## 2017-12-15 NOTE — PROGRESS NOTES
[facility] is committed to your overall health.  To help you get the most out of each of your visits, we will review your information to make sure you are up to date on all of your recommended tests and/or procedures.       Your PCP  John Vargas MD   found that you may be due for:       Health Maintenance Due   Topic Date Due    Urine Microalbumin  11/11/2015    Influenza Vaccine  08/01/2017             If you have had any of the above done at another facility, please bring the records or information with you so that your record at [facility] will be complete.  If you would like to schedule any of these, please contact me.     If you are currently taking medication, please bring it with you to your appointment for review.     Also, if you have any type of Advanced Directives, please bring them with you to your office visit so we may scan them into your chart.     Thank you for Choosing [facility] for your healthcare needs.      Additional Information  If you have questions, you can email [email or web address] or call [phone]  to talk to our [facility] staff. Remember, [facility] is NOT to be used for urgent needs. For medical emergencies, dial 911.

## 2017-12-15 NOTE — ASSESSMENT & PLAN NOTE
Serafin Tapia is a 67 y.o. male with Ulcer right dorsal lateral foot, stable, no SOI.  -MRI negative for osteo, suspicious for cellulitis of dorsal foot.  -Wound dressed with betadine, nursing to perform daily dressing changes, orders placed.   -Podiatry will follow     WB status: WBAT  Offloading device: Darco shoe.   Upon D/C pt will need to f/u within 1 week to podiatry clinic  Pt will need to paint wound at home with betadine daily, keep wound dry.    Roxanna Tyler DPM PGY-3  Pager: [phone]

## 2017-12-15 NOTE — PROGRESS NOTES
[facility]  Rheumatology  Progress Note    Patient Name: Serafin Tapia  MRN: [id number]  Admission Date: 12/10/2017  Hospital Length of Stay: 4 days  Code Status: Full Code   Attending Provider: No att. providers found  Primary Care Physician: John Vargas MD  Principal Problem: Shortness of breath    Subjective:     HPI: Pt is a 66 yo male with DM II, PHILLIP,  HTN, DLD, h/o PE, asthma, hypothyroidism presenting due to worsening shortness of breath. Pt was recently admitted to [facility] for persistent hypoglycemia( BG 40s) which was attributed to sulfonylurea  use.  During that admission he was found to have a CPK of 4000 which intermittently fluctuated in the 5000s (peak 5974). He was treated for suspected rhabdomyolysis and underwent aggressive hydration. Mr. Tapia subsequently developed diffuse anasarca, pulmonary edema. He was then diuresed with IV lasix. His CPK at the time of discharge was 5553.  He was noted to have an elevated esr and crp so an HALLE was done, which was negative. A neurodegenerative disorder was suspected at that time.     Mr. Arzola sts that prior to his hospitalization on 10/29 at [facility], operated by [facility] he was doing fairly well.  He reports being able to walk without assistance, nevertheless he does reports feeling weakness sine March 2017.  He sts that he is now unable to walk due to weakness. He is unable to lift his thighs against gravity. He notes some SOB. He denies difficulty with over the head tasks but notes climbing stairs can be difficult. He denies any fevers, chills, Rashes, mucosal ulcers, hematuria, dysphagia, vision changes, Raynaud's, n/v/d.   Pt reports a 60-70 pound weight loss since March.      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 He developed a wound on the right foot which he reports healed. He sts that after his swelling while hospitalized caused the wound to flare up.      Reports a family history of RA (brother) and SLE (cousins). No h/o psoriasis.     Interval History:   No issues or changes.   Pt still has weakness but he is now moving his lower legs more compared to admission.   CPK trending down w/o intervention, today cpk stable.   MRI did not support diagnosis of myositis.   MBBS normal     Current Facility-Administered Medications   Medication Frequency    acetaminophen tablet 650 mg Q4H PRN    albuterol-ipratropium 2.5mg-0.5mg/3mL nebulizer solution 3 mL Q6H WAKE    ascorbic acid (vitamin C) tablet 500 mg BID    dextrose 50% injection 12.5 g PRN    dextrose 50% injection 25 g PRN    folic acid tablet 1 mg Daily    glucagon (human recombinant) injection 1 mg PRN    glucose chewable tablet 16 g PRN    glucose chewable tablet 24 g PRN    hydrocodone-acetaminophen 5-325mg per tablet 1 tablet Q6H PRN    influenza (FLUZONE HIGH-DOSE) vaccine 0.5 mL vaccine x 1 dose    insulin aspart pen 0-5 Units QID (AC + HS) PRN    levothyroxine tablet 25 mcg Before breakfast    morphine injection 4 mg Q4H PRN    multivitamin tablet 1 tablet Daily    ondansetron disintegrating tablet 4 mg Q8H PRN    prochlorperazine tablet 10 mg Q6H PRN    ramelteon tablet 8 mg Nightly PRN    rivaroxaban tablet 20 mg Daily    senna-docusate 8.6-50 mg per tablet 1 tablet BID PRN    sodium chloride 0.9% flush 5 mL PRN    zinc sulfate capsule 220 mg Daily     Objective:     Vital Signs (Most Recent):  Temp: 98.4 °F (36.9 °C) (12/13/17 0904)  Pulse: 93 (12/13/17 0904)  Resp: 18 (12/13/17 0904)  BP: 119/67 (12/13/17 0904)  SpO2: 95 % (12/13/17 0904)  O2 Device (Oxygen Therapy): nasal cannula (12/13/17 0904) Vital Signs (24h Range):  Temp:  [97.8 °F (36.6 °C)-99.5 °F (37.5 °C)] 98.4 °F (36.9 °C)  Pulse:  [60-93] 93  Resp:  [16-18] 18  SpO2:  [93 %-96 %] 95 %  BP: (102-136)/(52-67) 119/67     Weight: 115.6 kg (254 lb 13.6 oz) (12/13/17 0400)  Body mass index is 36.57 kg/m².  Body surface area  is 2.39 meters squared.      Intake/Output Summary (Last 24 hours) at 12/13/17 0954  Last data filed at 12/13/17 0553   Gross per 24 hour   Intake              325 ml   Output                0 ml   Net              325 ml       Physical Exam   Constitutional: He is oriented to person, place, and time and well-developed, well-nourished, and in no distress.   HENT:   Mouth/Throat: No oropharyngeal exudate.   Eyes: Conjunctivae and EOM are normal. No scleral icterus.   Neck: Normal range of motion. Neck supple.   Cardiovascular: Normal rate, regular rhythm, normal heart sounds and intact distal pulses.    Pulmonary/Chest: Effort normal and breath sounds normal. No respiratory distress. He has no wheezes. He has no rales. He exhibits no tenderness.   Abdominal: Soft. Bowel sounds are normal. He exhibits no distension. There is no tenderness. There is no rebound and no guarding.       Right Side Rheumatological Exam     Muscle Strength (0-5 scale):  Neck Flexion:  5  Neck Extension: 5  : 5/5   Quadriceps:  1   Distal Lower Extremity: 5    Left Side Rheumatological Exam     Muscle Strength (0-5 scale):  Neck Flexion:  5  Neck Extension: 5  :  5/5   Quadriceps:  1   Distal Lower Extremity: 5      Lymphadenopathy:     He has no cervical adenopathy.   Neurological: He is alert and oriented to person, place, and time.   Pt has proximal b/l LE weakness involving the hip flexors. 1/5.     UE 5/5.    Skin: Skin is warm. No rash noted. No erythema. No pallor.     Chronic venous stasis b/l  No rashes on the face, shoulders, thighs, abdomen, legs.   Dry skin involving the hands- no findings of Gottron's papules, mechanics hands.   Left foot cooler than the right.    Musculoskeletal: He exhibits edema. He exhibits no tenderness or deformity.   Pt has 2+ pitting edema up to the b/l knees.   Right foot has a open wound, no drainage.     No synovitis on exam.            Significant Labs:  CBC:   Recent Labs  Lab 12/13/17  0857    WBC 5.71   HGB 11.5*   HCT 37.7*        CMP:   Recent Labs  Lab 12/13/17  0445      CALCIUM 9.3   ALBUMIN 2.9*   PROT 6.2      K 4.8   CO2 35*   CL 98   BUN 16   CREATININE 0.7   ALKPHOS 79   ALT 91*   AST 81*   BILITOT 0.2     CRP: No results for input(s): CRP in the last 24 hours.  ESR: No results for input(s): SEDRATE in the last 24 hours.  Urinalysis: No results for input(s): COLORU, CLARITYU, SPECGRAV, PHUR, PROTEINUA, GLUCOSEU, BILIRUBINCON, BLOODU, WBCU, RBCU, BACTERIA, MUCUS, NITRITE, LEUKOCYTESUR, UROBILINOGEN, HYALINECASTS in the last 24 hours.  Urine protein creatinine: No results for input(s): UTPCR in the last 24 hours.  All pertinent lab results from the last 24 hours have been reviewed.       Ref. Range 11/13/2017 05:34 12/10/2017 22:39 12/12/2017 08:58 12/13/2017 04:45 12/14/2017 11:15 12/15/2017 09:37   CPK Latest Ref Range: 20 - 200 U/L 5553 (H) 4251 (H) 2888 (H) 2544 (H) 2344 (H) 2513 (H)     Significant Imaging:  Imaging results within the past 24 hours have been reviewed.     MRI  R LE w/o w/ contrast 12/13/17:   Impression         No imaging findings to suggest osteomyelitis.    Cellulitis versus noninfectious edema along the dorsum of the foot.  No focal drainable collections.            LE : 12/12/17:     Impression         Diffuse circumferential subcutaneous edema about the bilateral lower extremities. There is minimal diffuse intramuscular edema with more mild fluid tracking along the superficial fascial planes. No abnormal muscular enhancement or age advanced muscular atrophy is appreciated. Findings are favored to be related to volume status as opposed to myositis. Further evaluation as warranted.    No evidence of diffuse bone marrow process.           Assessment/Plan:     Weakness    Pt is a 66 yo male with DM II, PHILLIP,  HTN, DLD, h/o PE, asthma, hypothyroidism presenting due to worsening shortness of breath sx c/w HF.  Pt exhibits proximal weakness of the lower  extremity. In the setting of the elevated CPKs with w/o for an inflammatory myositis as detailed below. Also for necrotizing myopathy given statin exposure. Pt also has a component of deconditioning as well.  HALLE and Cinthia 1 negative     Plan  - f/u Myomarker 3 panel,  HMG co A reductase ab  - Plan for EMG/NCS. Will need muscle biopsy as well.   - no further intervention at this time.   - Continue PT/OT  - Will have pt return to Rheumatology Clinic in 4 weeks for further evaluation of weakness. Will repeat aldolase, CPK, CBC, CMP.               Robles Lozano MD  Rheumatology  [facility]    Patient presented, but discharged before we could see him again on rounds.  We will follow him in our clinic.

## 2017-12-15 NOTE — SUBJECTIVE & OBJECTIVE
Interval Hx: Pt. Seen bedside no offloading boots in place. Bandage intact dorsal right foot.   Scheduled Meds:   albuterol-ipratropium 2.5mg-0.5mg/3mL  3 mL Nebulization Q6H WAKE    ascorbic acid (vitamin C)  500 mg Oral BID    folic acid  1 mg Oral Daily    levothyroxine  25 mcg Oral Before breakfast    multivitamin  1 tablet Oral Daily    rivaroxaban  1 tablet Oral Daily    zinc sulfate  220 mg Oral Daily     Continuous Infusions:  PRN Meds:acetaminophen, dextrose 50%, dextrose 50%, glucagon (human recombinant), glucose, glucose, hydrocodone-acetaminophen 5-325mg, influenza, insulin aspart, morphine, ondansetron, prochlorperazine, ramelteon, senna-docusate 8.6-50 mg, sodium chloride 0.9%    Review of patient's allergies indicates:  No Known Allergies     Past Medical History:   Diagnosis Date    Asthma     Diabetes mellitus type 2 in obese 5/3/2014    Elevated troponin 5/3/2014    Hyperlipidemia     Hypertension     Hypothyroidism (acquired) 11/1/2017    Obesity     Pulmonary embolism 05/2014    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     Past Surgical History:   Procedure Laterality Date    TONSILLECTOMY         Family History     Problem Relation (Age of Onset)    Cancer Father    Cataracts Sister    Diabetes Mother, Father, Sister, Brother    Glaucoma Maternal Aunt, Paternal Aunt    Heart disease Mother, Father    Hypertension Mother, Father, Sister, Brother, Son, Daughter    No Known Problems Maternal Grandmother, Maternal Grandfather, Paternal Grandmother, Paternal Grandfather    Stroke Mother        Social History Main Topics    Smoking status: Former Smoker     Types: Cigarettes    Smokeless tobacco: Never Used      Comment: Quit smoking as a teenager    Alcohol use Yes      Comment: 1-2 drinks per week    Drug use: No    Sexual activity: Yes     Partners: Female     Birth control/ protection: Condom     Review of Systems   Skin: Positive for wound.    All other systems reviewed and are negative.    Objective:     Vital Signs (Most Recent):  Temp: 97.5 °F (36.4 °C) (12/15/17 1156)  Pulse: 91 (12/15/17 1156)  Resp: 18 (12/15/17 1156)  BP: 139/65 (12/15/17 1156)  SpO2: (!) 88 % (12/15/17 1156) Vital Signs (24h Range):  Temp:  [97.4 °F (36.3 °C)-98.5 °F (36.9 °C)] 97.5 °F (36.4 °C)  Pulse:  [] 91  Resp:  [15-20] 18  SpO2:  [88 %-95 %] 88 %  BP: (128-144)/(65-74) 139/65     Weight: 115.7 kg (255 lb)  Body mass index is 36.59 kg/m².    Foot Exam    General  General Appearance: appears stated age and healthy   Orientation: alert and oriented to person, place, and time   Affect: appropriate       Right Foot/Ankle     Inspection and Palpation  Ecchymosis: none  Tenderness: none (Periwound)  Swelling: none   Skin Exam: ulcer; no drainage, no cellulitis and no erythema     Neurovascular  Dorsalis pedis: absent  Posterior tibial: absent  Saphenous nerve sensation: diminished  Tibial nerve sensation: diminished  Superficial peroneal nerve sensation: diminished  Deep peroneal nerve sensation: diminished  Sural nerve sensation: diminished      Left Foot/Ankle      Inspection and Palpation  Ecchymosis: none  Tenderness: none   Swelling: none   Skin Exam: skin intact;     Neurovascular  Dorsalis pedis: absent  Posterior tibial: absent  Saphenous nerve sensation: diminished  Tibial nerve sensation: diminished  Superficial peroneal nerve sensation: diminished  Deep peroneal nerve sensation: diminished  Sural nerve sensation: diminished          12/15/17        Laboratory:  A1C:     Recent Labs  Lab 10/29/17  1409 12/12/17  0451   HGBA1C 5.5 5.7*     BMP:     Recent Labs  Lab 12/13/17  0445         K 4.8   CL 98   CO2 35*   BUN 16   CREATININE 0.7   CALCIUM 9.3   MG 1.6     CBC:     Recent Labs  Lab 12/13/17  0445   WBC 5.71   RBC 4.18*   HGB 11.5*   HCT 37.7*      MCV 90   MCH 27.5   MCHC 30.5*     CMP:     Recent Labs  Lab 12/13/17  0445       CALCIUM 9.3   ALBUMIN 2.9*   PROT 6.2      K 4.8   CO2 35*   CL 98   BUN 16   CREATININE 0.7   ALKPHOS 79   ALT 91*   AST 81*   BILITOT 0.2     Coagulation:     Recent Labs  Lab 12/10/17  2239   INR 1.3*     CRP:     Recent Labs  Lab 12/10/17  2239   CRP 16.7*     ESR:     Recent Labs  Lab 12/10/17  2239   SEDRATE 24*     Wound Cultures: No results for input(s): LABAERO in the last 4320 hours.    Diagnostic Results:  X-Ray: I have reviewed all pertinent results/findings within the past 24 hours.  ordered      Clinical Findings:  Dry eschar noted to dorsal lateral aspect of right foot measures 1.8x1.2x superficial. Eschar is well adhered without drainage. No periwound SOI noted. Wound without bogginess/fluctuance/crepitus noted, stable.

## 2017-12-15 NOTE — PLAN OF CARE
12:38 PM  SW received home health orders. Faxed orders to [facility] as pt's insurance chooses home health company. Informed Meggan of home health orders sent.     Richa Lanier LMSW   [facility]  Ext [phone]

## 2017-12-15 NOTE — PROGRESS NOTES
Pt with increased gait instability; Cannot perform ADLs; uses diapers. Primary diagnosis at this time coded as SOB. Not a candidate for DMHF program.    Removed from suspect list.

## 2017-12-15 NOTE — PLAN OF CARE
Problem: SLP Goal  Goal: SLP Goal  Speech Language Pathology Goals  Goals expected to be met by 12/21  1. Pt will tolerate Dental soft diet w/ thin liquids w/ no overt s/s of aspiration.  2. Pt will use safe swallow strategies independently following education.    Speech Language Pathology  Goal expected to be met by 12/20:  1. Pt will participate in MBSS in order to determine safest, least restrictive diet.  -Goal Met   Outcome: Ongoing (interventions implemented as appropriate)  Pt is tolerating current diet.  Cont ST per POC.    Rachel Galvin CCC-SLP  12/15/2017

## 2017-12-16 LAB
B2 GLYCOPROT1 IGA SER QL: <9 SAU
B2 GLYCOPROT1 IGG SER QL: <9 SGU
B2 GLYCOPROT1 IGM SER QL: <9 SMU
BACTERIA BLD CULT: NORMAL
BACTERIA BLD CULT: NORMAL

## 2017-12-16 NOTE — DISCHARGE SUMMARY
[facility] Hospital Medicine  Discharge Summary      Patient Name: Serafin Tapia  MRN: [id number]  Admission Date: 12/10/2017  Hospital Length of Stay: 4 days  Discharge Date and Time:  12/15/2017 7:54 PM  Attending Physician: No att. providers found   Discharging Provider: Pk Rosario MD  Primary Care Provider: John Vargas MD    [facility] Medicine Team: [facility] [place] [facility] to the [facility] Pk Rosario MD    HPI: Serafin Tapia is a 67 y.o. male with PMHx of HTN, HLD, diabetes mellitus and obesity who presents to the ED with an onset of intermittent SOB x 1 month with associated sx of constant leg swelling, leg sore, generalized weakness. Per brother, the home health nurses have visited twice since his discharge from [facility] 11/13. Pt was not sent home with O2 and he currently wears adult diapers for enuresis. Pt endorses painful sore with erythema on right foot that busted while at home. Per brother, he cleans the wound with medicated pads, spray, and various packing materials every 2.5 days. Pt reports the swelling is exacerbated with standing. Pt also reports the SOB is exacerbated when he lays down. Per brother, he cannot get comfortable and requests admittance to the hospital. Denies fever, urinary retention, chest pain, anasarca, abd distension. Pt self-reports hx of blood clots and is currently on blood thinners. No pertinent surgical history.     Hospital Course:  12/11/2017 admitted overnight, pt reported LE swelling,R foot pain, SOB and coughing. Reported since his admission to [facility], he was discharge to SNF to further rehabilitation. He has been home for <7 days and he was then taken back to ED and admitted last night. He report LE swelling that has been worsening. Denies myalgias. Report cough that is productive but denies fever, chills. Report compliance with lasix at home. Pt with persistent elevation in CPK since last admission, rheumatology consulted for evaluation. IV lasix started.  "PT/OT ordered, Podiatry consulted, according to early assessment, pt is severely debilitated      12/12 Pt feels that breathing is worse today. Does not feel worsening of SOB when laying flat. Has proximal muscle weakness and is unable to maneuver himself in bed. No myalgias. No Cp. Has LE edema and pain at R foot ulcer.      12/13: Pt says breathing is improved. LBP and causes him to lay down and breathing worse at that time. Still feels weak and has trouble maneuvering in bed. No f/c.      12/14 felling better today and not requiring O2. Less pain in LLE. Still w/ weakness but good spirits.      Improved today off home o2 w/o sob    /65 (BP Location: Right arm, Patient Position: Lying)   Pulse 91   Temp 97.5 °F (36.4 °C) (Oral)   Resp 18   Ht 5' 10" (1.778 m)   Wt 115.7 kg (255 lb)   SpO2 (!) 88%   BMI 36.59 kg/m² \    Constitutional: He is oriented to person, place, and time. He appears well-developed and well-nourished.   Obese male in NAD   HENT:   Head: Normocephalic and atraumatic.   Eyes: Conjunctivae and EOM are normal.   Neck: Normal range of motion. Neck supple. No tracheal deviation present. No thyromegaly present.   Cardiovascular: Normal rate, regular rhythm and intact distal pulses.    No murmur heard.  Distant heart sounds  1+ pitting edema in RLE and less in LLE  Pulmonary/Chest: Effort normal and breath sounds normal. No stridor. No respiratory distress. He has no wheezes.   Distant lung sounds. Raspy cough.  Abdominal: Soft. Bowel sounds are normal. There is no tenderness.   Musculoskeletal: Dec function but good strength in UEs  Neurological: He is alert and oriented to person, place, and time. He displays no tremor. No cranial nerve deficit or sensory deficit. Gait abnormal. Coordination normal.   5/5 Strength BUE  4/5 strength BLE (R>L) 2/2 pain  Skin: Skin is warm and dry.   R dorsal foot dressing c/d/i  Mild TTP around wound  Psychiatric: He has a normal mood and affect. His " behavior is normal.   Nursing note and vitals reviewed.    * Shortness of breath  Unclear etiology. Possible from hypervolemia but no e/o effusions or vasc congestion on xr. TTE nl, LE US neg, Cr nl, RUQUS w/o liver abnormality. Possible MSK weak from underlying rheum condition causing restrictive lung dz vs ILD from rheum dz. Tn negative. ESR 24, CRP 16.7. Alb 2.9  -Rheum consult, EMG (unclear why this study was not completed yesterday, called and left message with EMG  and tried to contact a second time w/o success). Plan for muscle Bx out-pt.   -Likely from MSK weakness and poor diaphragmatic excursion. Statin myopathy, rheum cause, myositis, thyroid (TSH high but T4 nl)  -ABG shows e/o chronic hypoxemia, hypercapnia, likely OHS     Hypervolemia  SOB with increasing peripheral edema since d/c from SNF x1 weeks ago. MRI b/l showing edema. BNP 21, CXR clear. improvement in peripheral edema.        Skin ulcer of right foot, limited to breakdown of skin     R foot ulcer that began during last admission. Eschar noted to R dorsal foot. Edema with mild concern for infection, but no abx for now. ESR 24  - Podiatry consulted, ABIs mostly nl  - XR of R foot  Done       Weakness     proximal weakness to BL hips and shoulders.  Increased gait instability. Cannot perform ADLs; uses diapers. Weakness started 3/2017 and noted to become worse in October. +numbness, tingling, pain. Unclear etiology concern for polmyositis w/ CK elevation vs inclusion body myositis, Neuromuscular dz vs autoimmune vs cervical myelopathy. MRI cervical spine and lumbar spine from 9/2017 show multilevel degenerative disc disease with neural foraminal stenosis. No spinal cord edema.  -  PT/OT --- will continue here     -f/u rheum labs: myomarker 3 panel pending, , aldolase 29, HIV/HALLE/Cinthia-1/AChRAbs/striated musc abs negative, TSH 4.8, Ft4 0.9, Mg/phos nl, HMGCoAredAb in process. EMG ordered  -likely statin induced vs autoimmune, will  need out-pt f/u       Non-traumatic rhabdomyolysis     CPK 4251 admit , improved to 2k-- baseline [id number] during previous admission - admitted 10/29-11/13/2017 for hypoglycemia and discovered rhabdomyolysis. Statin discontinued in setting of rhabdo and elevated liver enzymes during last admission. Renal function normal       Essential hypertension     BP on low side, will hold meds and monitor       Controlled type 2 diabetes mellitus with diabetic polyneuropathy, without long-term current use of insulin     Last HgA1c 5.5 on 10/29. Home med: meformin 1000 mg BID; held. stop glucose checks       Dyslipidemia     statin discontinued x1 month ago in setting of rhabdo and elevated liver enzymes. Lipid panel        Cough     no sign of PNA on CXR, pt denies SOB, fever, chills  -Duo-neb Q6, Acapella treatment Q6  -speech eval and some concerns aspiration, cleared MBS and back on regular diet.        Elevated liver enzymes     AST 94, ALT 96 at admit, improved this am. Chronic; unclear etiology, likely related to muscle issue and not liver.   -CTM       History of pulmonary embolism     Pulmonary embolus in 2014.  - Xarelto.        Anemia due to folate deficiency, ACID     Hgb 11-12. No signs of bleeding. 9/20/2017 B12 207; repeat 503. Not currently taking B12. MMA previously nl. Ferritin previously elevated w/ low Fe; possible ACID.   - On folate (previously <3 on labs), vitamin C, zinc       Hypothyroidism (acquired)     - continue levothyroxine 25 mcg       Morbid obesity with BMI of 40.0-44.9, adult     Sedentary lifestyle and poor functional mobility. Has used SNF days and would be >100$/d copay. Pt to have brother assist with daily function when appropriate for discharge.          Consults         Status Ordering Provider     Inpatient consult to Podiatry  Once     Provider:  (Not yet assigned)    Completed JUANITO ROBERTSON     Inpatient consult to Rheumatology  Once     Provider:  (Not yet assigned)     Completed SEVERO HICKMAN          Final Active Diagnoses:    Diagnosis Date Noted POA    PRINCIPAL PROBLEM:  Shortness of breath [R06.02] 12/11/2017 Yes    Swelling of right lower extremity [M79.89] 12/13/2017 Yes    Hypervolemia [E87.70] 12/11/2017 Yes    Elevated liver enzymes [R74.8] 12/11/2017 Unknown    Weakness [R53.1] 12/11/2017 Yes    Skin ulcer of right foot, limited to breakdown of skin [L97.511] 12/11/2017 Yes    Cough [R05] 12/11/2017 Yes    Non-traumatic rhabdomyolysis [M62.82] 11/01/2017 Yes    Anemia due to vitamin B12 deficiency [D51.9] 11/01/2017 Yes    Hypothyroidism (acquired) [E03.9] 11/01/2017 Yes    Controlled type 2 diabetes mellitus with diabetic polyneuropathy, without long-term current use of insulin [E11.42] 05/04/2017 Yes    Morbid obesity with BMI of 40.0-44.9, adult [E66.01, Z68.41] 05/04/2017 Not Applicable    History of pulmonary embolism [Z86.711] 05/04/2017 Yes    Dyslipidemia [E78.5] 05/05/2014 Yes    Essential hypertension [I10] 05/03/2014 Yes      Problems Resolved During this Admission:    Diagnosis Date Noted Date Resolved POA      Discharged Condition: stable    Disposition: Home or Self Care w/ home health    Follow Up: pods in 1 wk, PCP, rheum    Patient Instructions:     Diet general       Medications:  Reconciled Home Medications:   Discharge Medication List as of 12/15/2017  2:19 PM      START taking these medications    Details   multivitamin (THERAGRAN) tablet Take 1 tablet by mouth once daily., Starting Sat 12/16/2017, OTC         CONTINUE these medications which have CHANGED    Details   !! ascorbic acid, vitamin C, (VITAMIN C) 500 MG tablet Take 1 tablet (500 mg total) by mouth 2 (two) times daily., Starting Fri 12/15/2017, OTC      !! levothyroxine (SYNTHROID) 25 MCG tablet Take 1 tablet (25 mcg total) by mouth before breakfast., Starting Sat 12/16/2017, Until Sun 12/16/2018, Normal      !! rivaroxaban (XARELTO) 20 mg Tab Take 1 tablet (20 mg total)  by mouth once daily., Starting Sat 12/16/2017, Normal      !! zinc sulfate (ZINCATE) 220 (50) mg capsule Take 1 capsule (220 mg total) by mouth once daily., Starting Sat 12/16/2017, OTC       !! - Potential duplicate medications found. Please discuss with provider.      CONTINUE these medications which have NOT CHANGED    Details   folic acid (FOLVITE) 1 MG tablet Take 1 tablet (1 mg total) by mouth once daily., Starting Tue 11/14/2017, Until Wed 11/14/2018, Normal      furosemide (LASIX) 20 MG tablet Take 1 tablet (20 mg total) by mouth once daily., Starting Mon 11/13/2017, Until Tue 11/13/2018, No Print      !! levothyroxine (SYNTHROID) 25 MCG tablet Take 1 tablet (25 mcg total) by mouth before breakfast., Starting Tue 11/14/2017, Until Wed 11/14/2018, Normal      !! rivaroxaban (XARELTO) 20 mg Tab Take 1 tablet (20 mg total) by mouth once daily., Starting u 6/8/2017, Normal      !! ascorbic acid, vitamin C, (VITAMIN C) 500 MG tablet Take 500 mg by mouth 2 (two) times daily. PER [facility], Historical Med      multivitamin (ONE DAILY MULTIVITAMIN) per tablet Take 1 tablet by mouth once daily. PER [facility], Historical Med      !! zinc sulfate (ZINCATE) 220 (50) mg capsule Take 220 mg by mouth once daily. PER [facility], Historical Med       !! - Potential duplicate medications found. Please discuss with provider.      STOP taking these medications       amlodipine-benazepril 10-20mg (LOTREL) 10-20 mg per capsule Comments:   Reason for Stopping:         carvedilol (COREG) 25 MG tablet Comments:   Reason for Stopping:         metformin (GLUCOPHAGE) 1000 MG tablet Comments:   Reason for Stopping:         spironolactone (ALDACTONE) 25 MG tablet Comments:   Reason for Stopping:               Significant Diagnostic Studies: Labs: All labs within the past 24 hours have been reviewed    Pending Diagnostic Studies:     Procedure Component Value Units Date/Time    Beta-2 glycoprotein antibodies  [[id number]] Collected:  12/14/17 0444    Order Status:  Sent Lab Status:  In process Updated:  12/14/17 0546    Specimen:  Blood from Blood     DRVVT [[id number]] Collected:  12/14/17 0444    Order Status:  Sent Lab Status:  In process Updated:  12/14/17 0546    Specimen:  Blood from Blood     MyoMarker Panel 3 [[id number]] Collected:  12/11/17 1148    Order Status:  Sent Lab Status:  In process Updated:  12/11/17 1209    Specimen:  Blood         Indwelling Lines/Drains at time of discharge:   Lines/Drains/Airways          No matching active lines, drains, or airways          Time spent on the discharge of patient: >30 minutes  Patient was seen and examined on the date of discharge and determined to be suitable for discharge.         Pk Rosario MD  Department of Hospital Medicine  [facility]

## 2017-12-18 LAB — APTT HEX PL PPP: NEGATIVE S

## 2017-12-19 LAB — LA PPP-IMP: NEGATIVE

## 2017-12-28 ENCOUNTER — OFFICE VISIT (OUTPATIENT)
Dept: INTERNAL MEDICINE | Facility: CLINIC | Age: 67
End: 2017-12-28
Attending: INTERNAL MEDICINE
Payer: MEDICARE

## 2017-12-28 ENCOUNTER — LAB VISIT (OUTPATIENT)
Dept: LAB | Facility: HOSPITAL | Age: 67
End: 2017-12-28
Attending: INTERNAL MEDICINE
Payer: MEDICARE

## 2017-12-28 VITALS
BODY MASS INDEX: 39.37 KG/M2 | RESPIRATION RATE: 16 BRPM | HEART RATE: 100 BPM | WEIGHT: 275 LBS | SYSTOLIC BLOOD PRESSURE: 140 MMHG | TEMPERATURE: 98 F | DIASTOLIC BLOOD PRESSURE: 105 MMHG | HEIGHT: 70 IN

## 2017-12-28 DIAGNOSIS — Z00.00 ANNUAL PHYSICAL EXAM: ICD-10-CM

## 2017-12-28 DIAGNOSIS — R53.1 WEAKNESS: ICD-10-CM

## 2017-12-28 DIAGNOSIS — E03.9 HYPOTHYROIDISM (ACQUIRED): ICD-10-CM

## 2017-12-28 DIAGNOSIS — E11.42 CONTROLLED TYPE 2 DIABETES MELLITUS WITH DIABETIC POLYNEUROPATHY, WITHOUT LONG-TERM CURRENT USE OF INSULIN: ICD-10-CM

## 2017-12-28 DIAGNOSIS — E66.01 SEVERE OBESITY (BMI 35.0-39.9) WITH COMORBIDITY: ICD-10-CM

## 2017-12-28 DIAGNOSIS — S91.301D OPEN WOUND OF RIGHT FOOT, SUBSEQUENT ENCOUNTER: ICD-10-CM

## 2017-12-28 DIAGNOSIS — D53.9 NUTRITIONAL ANEMIA: ICD-10-CM

## 2017-12-28 DIAGNOSIS — R74.8 ABNORMAL CK: ICD-10-CM

## 2017-12-28 DIAGNOSIS — M62.81 GENERALIZED MUSCLE WEAKNESS: Primary | ICD-10-CM

## 2017-12-28 DIAGNOSIS — Z86.711 HISTORY OF PULMONARY EMBOLISM: ICD-10-CM

## 2017-12-28 LAB
25(OH)D3+25(OH)D2 SERPL-MCNC: 13 NG/ML
ALBUMIN SERPL BCP-MCNC: 3.1 G/DL
ALP SERPL-CCNC: 86 U/L
ALT SERPL W/O P-5'-P-CCNC: 99 U/L
ANION GAP SERPL CALC-SCNC: 9 MMOL/L
AST SERPL-CCNC: 100 U/L
BASOPHILS # BLD AUTO: 0.02 K/UL
BASOPHILS NFR BLD: 0.4 %
BILIRUB SERPL-MCNC: 0.2 MG/DL
BUN SERPL-MCNC: 11 MG/DL
CALCIUM SERPL-MCNC: 10 MG/DL
CHLORIDE SERPL-SCNC: 100 MMOL/L
CO2 SERPL-SCNC: 34 MMOL/L
CREAT SERPL-MCNC: 0.6 MG/DL
CRP SERPL-MCNC: 9.5 MG/L
DIFFERENTIAL METHOD: ABNORMAL
EOSINOPHIL # BLD AUTO: 0.1 K/UL
EOSINOPHIL NFR BLD: 1.9 %
ERYTHROCYTE [DISTWIDTH] IN BLOOD BY AUTOMATED COUNT: 14.6 %
ERYTHROCYTE [SEDIMENTATION RATE] IN BLOOD BY WESTERGREN METHOD: 21 MM/HR
EST. GFR  (AFRICAN AMERICAN): >60 ML/MIN/1.73 M^2
EST. GFR  (NON AFRICAN AMERICAN): >60 ML/MIN/1.73 M^2
FERRITIN SERPL-MCNC: 287 NG/ML
GLUCOSE SERPL-MCNC: 119 MG/DL
HCT VFR BLD AUTO: 41.7 %
HGB BLD-MCNC: 12.9 G/DL
IMM GRANULOCYTES # BLD AUTO: 0 K/UL
IMM GRANULOCYTES NFR BLD AUTO: 0 %
IRON SERPL-MCNC: 37 UG/DL
LYMPHOCYTES # BLD AUTO: 1.4 K/UL
LYMPHOCYTES NFR BLD: 26 %
MCH RBC QN AUTO: 27.1 PG
MCHC RBC AUTO-ENTMCNC: 30.9 G/DL
MCV RBC AUTO: 88 FL
MONOCYTES # BLD AUTO: 0.3 K/UL
MONOCYTES NFR BLD: 6.1 %
NEUTROPHILS # BLD AUTO: 3.5 K/UL
NEUTROPHILS NFR BLD: 65.6 %
NRBC BLD-RTO: 0 /100 WBC
PLATELET # BLD AUTO: 425 K/UL
PMV BLD AUTO: 10.3 FL
POTASSIUM SERPL-SCNC: 4.7 MMOL/L
PROT SERPL-MCNC: 7 G/DL
RBC # BLD AUTO: 4.76 M/UL
SATURATED IRON: 14 %
SODIUM SERPL-SCNC: 143 MMOL/L
TOTAL IRON BINDING CAPACITY: 269 UG/DL
TRANSFERRIN SERPL-MCNC: 182 MG/DL
WBC # BLD AUTO: 5.26 K/UL

## 2017-12-28 PROCEDURE — 83540 ASSAY OF IRON: CPT

## 2017-12-28 PROCEDURE — 82306 VITAMIN D 25 HYDROXY: CPT

## 2017-12-28 PROCEDURE — 85025 COMPLETE CBC W/AUTO DIFF WBC: CPT

## 2017-12-28 PROCEDURE — 85651 RBC SED RATE NONAUTOMATED: CPT

## 2017-12-28 PROCEDURE — 82728 ASSAY OF FERRITIN: CPT

## 2017-12-28 PROCEDURE — 99999 PR PBB SHADOW E&M-EST. PATIENT-LVL III: CPT | Mod: PBBFAC,,, | Performed by: INTERNAL MEDICINE

## 2017-12-28 PROCEDURE — 80053 COMPREHEN METABOLIC PANEL: CPT

## 2017-12-28 PROCEDURE — 36415 COLL VENOUS BLD VENIPUNCTURE: CPT | Mod: PO

## 2017-12-28 PROCEDURE — 99499 UNLISTED E&M SERVICE: CPT | Mod: S$GLB,,, | Performed by: INTERNAL MEDICINE

## 2017-12-28 PROCEDURE — 86140 C-REACTIVE PROTEIN: CPT

## 2017-12-28 PROCEDURE — 99215 OFFICE O/P EST HI 40 MIN: CPT | Mod: S$GLB,,, | Performed by: INTERNAL MEDICINE

## 2017-12-28 RX ORDER — FUROSEMIDE 20 MG/1
20 TABLET ORAL DAILY
Qty: 30 TABLET | Refills: 2 | Status: SHIPPED | OUTPATIENT
Start: 2017-12-28 | End: 2018-04-10 | Stop reason: SDUPTHER

## 2017-12-28 RX ORDER — FOLIC ACID 1 MG/1
1 TABLET ORAL DAILY
Qty: 30 TABLET | Refills: 0 | Status: SHIPPED | OUTPATIENT
Start: 2017-12-28 | End: 2018-01-31 | Stop reason: SDUPTHER

## 2017-12-28 NOTE — PROGRESS NOTES
Subjective:      Serafin Tapia is a 67 y.o. male who presents for annual exam, a worsening right foot wound with pain and generalized weakness. He is accompanied by his brother.    Patient with HTN, HLD and controlled diabetes who presents to [facility].  He recently has 2 hospitalizations for hypoglycemia and later SOB.     Foot wound was evaluated by podiatry during hospitalization but appears to have worsened. He has increasing tenderness surrounding the lesion. Patient says that wound care has not assessed his wound. Denies fever or chills, no sweats. Recent foot MRI showed no signs of osteomyelitis. Patient was seen in Neurology clinic for progressive weakness affecting lower extremities greater than upper extremities. Progressive weakness worsened a few months ago and patient now unable to ambulate. He was evaluated by neurology but has not continued with the workup. BP is normal and patient has been taken off diabetes medication.    Family History:  family history includes Cancer in his father; Cataracts in his sister; Diabetes in his brother, father, mother, and sister; Glaucoma in his maternal aunt and paternal aunt; Heart disease in his father and mother; Hypertension in his brother, daughter, father, mother, sister, and son; No Known Problems in his maternal grandfather, maternal grandmother, paternal grandfather, and paternal grandmother; Stroke in his mother.    Health Maintenance:  Health Maintenance       Date Due Completion Date    Urine Microalbumin 11/11/2015 11/11/2014    Influenza Vaccine 08/01/2017 9/23/2015    Override on 9/23/2015: Done    Pneumococcal (65+) (2 of 2 - PPSV23) 05/05/2018 5/5/2017    Eye Exam 05/31/2018 5/31/2017    Hemoglobin A1c 06/12/2018 12/12/2017    Colonoscopy 06/13/2018 6/13/2016 (Done)    Override on 6/13/2016: Done    Foot Exam 12/11/2018 12/11/2017    Override on 5/18/2017: Done    Override on 9/23/2015: Done    Lipid Panel 12/12/2018 12/12/2017    TETANUS VACCINE  2027        Eye exam: 2017  Dental Exam: not recently    Tdap: 2017  Prevnar 12 2017  Shingles: 2015    Hepatitis C testin2014    Body mass index is 39.46 kg/m².    ADL's: impaired due to weakness, arrives in wheelchair  Memory: no issues  Mental health: no complaints  Falls: none  Nutrition: no issues  Home Safety:    Meds:   Current Outpatient Prescriptions:     ascorbic acid, vitamin C, (VITAMIN C) 500 MG tablet, Take 500 mg by mouth 2 (two) times daily. PER [facility], Disp: , Rfl:     ascorbic acid, vitamin C, (VITAMIN C) 500 MG tablet, Take 1 tablet (500 mg total) by mouth 2 (two) times daily., Disp: , Rfl:     folic acid (FOLVITE) 1 MG tablet, Take 1 tablet (1 mg total) by mouth once daily., Disp: 30 tablet, Rfl: 0    furosemide (LASIX) 20 MG tablet, Take 1 tablet (20 mg total) by mouth once daily., Disp: 30 tablet, Rfl: 2    levothyroxine (SYNTHROID) 25 MCG tablet, Take 1 tablet (25 mcg total) by mouth before breakfast., Disp: 30 tablet, Rfl: 11    multivitamin (ONE DAILY MULTIVITAMIN) per tablet, Take 1 tablet by mouth once daily. PER [facility], Disp: , Rfl:     multivitamin (THERAGRAN) tablet, Take 1 tablet by mouth once daily., Disp: , Rfl:     rivaroxaban (XARELTO) 20 mg Tab, Take 1 tablet (20 mg total) by mouth once daily., Disp: 30 tablet, Rfl: 3    zinc sulfate (ZINCATE) 220 (50) mg capsule, Take 220 mg by mouth once daily. PER [facility], Disp: , Rfl:     PMHx:   Past Medical History:   Diagnosis Date    Asthma     Diabetes mellitus type 2 in obese 5/3/2014    Elevated troponin 5/3/2014    Hyperlipidemia     Hypertension     Hypothyroidism (acquired) 2017    Obesity     Pulmonary embolism 2014    Spondylosis of lumbar region without myelopathy or radiculopathy 2017       PSHx:  Past Surgical History:   Procedure Laterality Date    TONSILLECTOMY         SocHx:   Social History     Social History    Marital status:  Single     Spouse name: N/A    Number of children: N/A    Years of education: N/A     Occupational History    Retired       Social History Main Topics    Smoking status: Former Smoker     Types: Cigarettes    Smokeless tobacco: Never Used      Comment: Quit smoking as a teenager    Alcohol use Yes      Comment: 1-2 drinks per week    Drug use: No    Sexual activity: Yes     Partners: Female     Birth control/ protection: Condom     Other Topics Concern    None     Social History Narrative    Lives w/daughter.        Review of Systems   Constitutional: Negative for chills, fatigue and fever.   HENT: Negative for congestion, rhinorrhea and sinus pressure.    Eyes: Negative for visual disturbance.   Respiratory: Negative for cough, chest tightness and shortness of breath.    Cardiovascular: Negative for chest pain, palpitations and leg swelling.   Gastrointestinal: Negative for abdominal pain, constipation, diarrhea, nausea and vomiting.   Genitourinary: Negative for dysuria.   Musculoskeletal: Negative for arthralgias and myalgias.   Skin: Negative for rash.   Neurological: Negative for dizziness, weakness, light-headedness and headaches.   Hematological: Negative for adenopathy.       Objective:      Physical Exam   Constitutional: He is oriented to person, place, and time. Vital signs are normal. He appears well-developed and well-nourished. No distress.   HENT:   Head: Normocephalic and atraumatic.   Right Ear: Hearing and external ear normal.   Left Ear: Hearing and external ear normal.   Nose: Nose normal.   Mouth/Throat: Uvula is midline, oropharynx is clear and moist and mucous membranes are normal. No oropharyngeal exudate.   Eyes: EOM and lids are normal. No scleral icterus.   Neck: Normal range of motion. Neck supple. No thyroid mass and no thyromegaly present.   Cardiovascular: Normal rate, regular rhythm, normal heart sounds, intact distal pulses and normal pulses.    No murmur  heard.  Pulmonary/Chest: Effort normal and breath sounds normal. He has no wheezes.   Abdominal: Soft. Bowel sounds are normal. He exhibits no distension. There is no tenderness.   Musculoskeletal: Normal range of motion. He exhibits no edema.   Lymphadenopathy:     He has no cervical adenopathy.        Right: No supraclavicular adenopathy present.        Left: No supraclavicular adenopathy present.   Neurological: He is alert and oriented to person, place, and time.   Unable to lift legs against gravity,    Skin: Skin is warm, dry and intact. Lesion (5x3.5 cm lesion with mildly erythematous borders, some granulation, +ttp, serous drainage on bandage, no swelling, + eschar at center of wound) noted. No rash noted.   Psychiatric: He has a normal mood and affect.   Vitals reviewed.      Assessment:       1. Generalized muscle weakness    2. Open wound of right foot, subsequent encounter    3. Hypothyroidism (acquired)    4. Anemia due to vitamin B12 deficiency, unspecified B12 deficiency type    5. Controlled type 2 diabetes mellitus with diabetic polyneuropathy, without long-term current use of insulin    6. History of pulmonary embolism    7. Abnormal CK    8. Severe obesity (BMI 35.0-39.9) with comorbidity    9. Annual physical exam    10. Family history of prostate cancer        Plan:       1. Generalized muscle weaknes  - Vitamin D; Future  - Ferritin; Future  - Iron and TIBC; Future  - SUBSEQUENT HOME HEALTH ORDERS  - continue workup with Neurology    2. Open wound of right foot, subsequent encounter  - Sedimentation rate, manual; Future  - C-reactive protein; Future  - subsequent home health orders for wound car    3. Hypothyroidism (acquired)  - Comprehensive metabolic panel; Future  - continue synthroid 25 mcg daily    4. Nutritional Anemia  -  Check cbc    5. Controlled type 2 diabetes mellitus with diabetic polyneuropathy, without long-term current use of insulin  - medication stopped after hypoglycemic,  last HbA1c 5.7  - CBC auto differential; Future    6. History of pulmonary embolism  - PE on 2014, on long term therapy with Xarelto    7. Abnormal CK  - CK; Future    8. Severe obesity (BMI 35.0-39.9) with comorbidity  - patient has lost weight but unable to exercise due to weakness, will monitor    9. Annual physical exam  - reviewed recent labs, check cbc, cmp    RTC in 3 months or sooner if needed    Meghan Davies MD          Over 50% of 40 minute visit was spent reviewing medical records, education and discussion of patient's medical conditions and medical management.

## 2017-12-29 ENCOUNTER — TELEPHONE (OUTPATIENT)
Dept: INTERNAL MEDICINE | Facility: CLINIC | Age: 67
End: 2017-12-29

## 2017-12-29 PROBLEM — R06.02 SHORTNESS OF BREATH: Status: RESOLVED | Noted: 2017-12-11 | Resolved: 2017-12-29

## 2017-12-29 PROBLEM — T38.3X5A: Status: RESOLVED | Noted: 2017-10-29 | Resolved: 2017-12-29

## 2017-12-29 PROBLEM — E66.01 MORBID OBESITY WITH BMI OF 40.0-44.9, ADULT: Status: RESOLVED | Noted: 2017-05-04 | Resolved: 2017-12-29

## 2017-12-29 LAB
ANTI-PM/SCL AB: <20 UNITS
ANTI-SS-A 52 KD AB, IGG: <20 UNITS
EJ AB SER QL: NEGATIVE
ENA JO1 AB SER IA-ACNC: <20 UNITS
ENA SM+RNP AB SER IA-ACNC: <20 UNITS
FIBRILLARIN (U3 RNP): NEGATIVE
KU AB SER QL: NEGATIVE
MDA-5 (P140): <20 UNITS
MI2 AB SER QL: NEGATIVE
NXP-2 (P140): <20 UNITS
OJ AB SER QL: NEGATIVE
PL12 AB SER QL: NEGATIVE
PL7 AB SER QL: NEGATIVE
SRP AB SERPL QL: NEGATIVE
TIF1 GAMMA (P155/140): <20 UNITS
U2 SNRNP: NEGATIVE

## 2017-12-29 NOTE — TELEPHONE ENCOUNTER
Spoke with [facility], patient has home health services through [facility]. Contacted [facility], wound care orders faxed and will be intiated.

## 2018-01-01 DIAGNOSIS — R74.8 ELEVATED LIVER ENZYMES: ICD-10-CM

## 2018-01-01 DIAGNOSIS — E55.9 VITAMIN D INSUFFICIENCY: Primary | ICD-10-CM

## 2018-01-01 RX ORDER — ERGOCALCIFEROL 1.25 MG/1
50000 CAPSULE ORAL
Qty: 4 CAPSULE | Refills: 1 | Status: SHIPPED | OUTPATIENT
Start: 2018-01-01 | End: 2018-02-27 | Stop reason: SDUPTHER

## 2018-01-02 ENCOUNTER — TELEPHONE (OUTPATIENT)
Dept: INTERNAL MEDICINE | Facility: CLINIC | Age: 68
End: 2018-01-02

## 2018-01-02 ENCOUNTER — DOCUMENTATION ONLY (OUTPATIENT)
Dept: TRANSPLANT | Facility: CLINIC | Age: 68
End: 2018-01-02

## 2018-01-02 RX ORDER — TRAMADOL HYDROCHLORIDE 50 MG/1
50 TABLET ORAL EVERY 12 HOURS PRN
Qty: 20 TABLET | Refills: 0 | Status: SHIPPED | OUTPATIENT
Start: 2018-01-02 | End: 2018-01-12

## 2018-01-02 NOTE — NURSING
Pt records reviewed.   Pt will be referred to Hepatology.    Initial referral received  from the workque.   Referring Provider/diagnosis   SYLVAIN OLIVARES   Diagnosis: Elevated liver enzymes       Referral letter sent to provider and patient.

## 2018-01-02 NOTE — LETTER
January 2, 2018    Serafin Tapia  [id number]  [place] [address]      Dear Serafin Tapia:    Your doctor has referred you to the [facility]. You will be contacted by our office and an initial appointment will then be scheduled for you.    We look forward to seeing you soon. If you have any further questions, please contact us at [phone].       Sincerely,        [facility]   [address] [place], [place] [id number] [phone]

## 2018-01-02 NOTE — TELEPHONE ENCOUNTER
----- Message from Wally Nagel sent at 1/2/2018 11:41 AM CST -----  Contact: Patient [phone]  The patient states that he saw you on Thursday and you were supposed to call in a prescription for Tramadol to the [facility] [address] and it hasn't been called in.    Thank you

## 2018-01-10 ENCOUNTER — TELEPHONE (OUTPATIENT)
Dept: INTERNAL MEDICINE | Facility: CLINIC | Age: 68
End: 2018-01-10

## 2018-01-10 NOTE — TELEPHONE ENCOUNTER
Spoke with patient. Results reviewed per Dr. Davies notes. Verbalized understanding. Will call with further concerns

## 2018-01-10 NOTE — TELEPHONE ENCOUNTER
----- Message from Meghan Davies MD sent at 1/1/2018  7:03 PM CST -----  Message sent via patient portal.    - Vitamin D, Hepatology

## 2018-01-15 ENCOUNTER — OFFICE VISIT (OUTPATIENT)
Dept: SURGERY | Facility: CLINIC | Age: 68
End: 2018-01-15
Payer: MEDICARE

## 2018-01-15 ENCOUNTER — OFFICE VISIT (OUTPATIENT)
Dept: PODIATRY | Facility: CLINIC | Age: 68
End: 2018-01-15
Payer: MEDICARE

## 2018-01-15 ENCOUNTER — INITIAL CONSULT (OUTPATIENT)
Dept: RHEUMATOLOGY | Facility: CLINIC | Age: 68
End: 2018-01-15
Payer: MEDICARE

## 2018-01-15 ENCOUNTER — OFFICE VISIT (OUTPATIENT)
Dept: HEPATOLOGY | Facility: CLINIC | Age: 68
End: 2018-01-15
Payer: MEDICARE

## 2018-01-15 VITALS
DIASTOLIC BLOOD PRESSURE: 84 MMHG | HEIGHT: 70 IN | SYSTOLIC BLOOD PRESSURE: 141 MMHG | BODY MASS INDEX: 39.37 KG/M2 | HEART RATE: 104 BPM | WEIGHT: 275 LBS

## 2018-01-15 VITALS — SYSTOLIC BLOOD PRESSURE: 150 MMHG | DIASTOLIC BLOOD PRESSURE: 94 MMHG | HEART RATE: 92 BPM | RESPIRATION RATE: 20 BRPM

## 2018-01-15 VITALS
DIASTOLIC BLOOD PRESSURE: 83 MMHG | WEIGHT: 275 LBS | TEMPERATURE: 97 F | SYSTOLIC BLOOD PRESSURE: 154 MMHG | RESPIRATION RATE: 16 BRPM | HEIGHT: 70 IN | HEART RATE: 93 BPM | OXYGEN SATURATION: 96 % | BODY MASS INDEX: 39.37 KG/M2

## 2018-01-15 VITALS
HEIGHT: 70 IN | SYSTOLIC BLOOD PRESSURE: 161 MMHG | WEIGHT: 275 LBS | BODY MASS INDEX: 39.37 KG/M2 | DIASTOLIC BLOOD PRESSURE: 80 MMHG | TEMPERATURE: 98 F | HEART RATE: 97 BPM

## 2018-01-15 DIAGNOSIS — L97.512 RIGHT FOOT ULCER, WITH FAT LAYER EXPOSED: Primary | ICD-10-CM

## 2018-01-15 DIAGNOSIS — E03.9 HYPOTHYROIDISM (ACQUIRED): ICD-10-CM

## 2018-01-15 DIAGNOSIS — R74.8 ELEVATED CK: ICD-10-CM

## 2018-01-15 DIAGNOSIS — G72.0 STATIN MYOPATHY: ICD-10-CM

## 2018-01-15 DIAGNOSIS — G72.9 MYOPATHY: Primary | ICD-10-CM

## 2018-01-15 DIAGNOSIS — M62.82 NON-TRAUMATIC RHABDOMYOLYSIS: ICD-10-CM

## 2018-01-15 DIAGNOSIS — L97.511 SKIN ULCER OF RIGHT FOOT, LIMITED TO BREAKDOWN OF SKIN: ICD-10-CM

## 2018-01-15 DIAGNOSIS — E11.51 TYPE II DIABETES MELLITUS WITH PERIPHERAL CIRCULATORY DISORDER: ICD-10-CM

## 2018-01-15 DIAGNOSIS — E78.5 DYSLIPIDEMIA: ICD-10-CM

## 2018-01-15 DIAGNOSIS — T46.6X5A STATIN MYOPATHY: ICD-10-CM

## 2018-01-15 DIAGNOSIS — R74.8 ELEVATED LIVER ENZYMES: Primary | ICD-10-CM

## 2018-01-15 DIAGNOSIS — I10 ESSENTIAL HYPERTENSION: ICD-10-CM

## 2018-01-15 DIAGNOSIS — R53.1 WEAKNESS: Primary | ICD-10-CM

## 2018-01-15 DIAGNOSIS — L97.519 ULCER OF RIGHT FOOT, UNSPECIFIED ULCER STAGE: ICD-10-CM

## 2018-01-15 PROCEDURE — 99214 OFFICE O/P EST MOD 30 MIN: CPT | Mod: 25,S$GLB,, | Performed by: PODIATRIST

## 2018-01-15 PROCEDURE — 99999 PR PBB SHADOW E&M-EST. PATIENT-LVL V: CPT | Mod: PBBFAC,,, | Performed by: NURSE PRACTITIONER

## 2018-01-15 PROCEDURE — 11042 DBRDMT SUBQ TIS 1ST 20SQCM/<: CPT | Mod: S$GLB,,, | Performed by: PODIATRIST

## 2018-01-15 PROCEDURE — 99999 PR PBB SHADOW E&M-EST. PATIENT-LVL IV: CPT | Mod: PBBFAC,,, | Performed by: PODIATRIST

## 2018-01-15 PROCEDURE — 99999 PR PBB SHADOW E&M-EST. PATIENT-LVL III: CPT | Mod: PBBFAC,,, | Performed by: SURGERY

## 2018-01-15 PROCEDURE — 87075 CULTR BACTERIA EXCEPT BLOOD: CPT

## 2018-01-15 PROCEDURE — 99999 PR PBB SHADOW E&M-EST. PATIENT-LVL V: CPT | Mod: PBBFAC,,, | Performed by: INTERNAL MEDICINE

## 2018-01-15 PROCEDURE — 99215 OFFICE O/P EST HI 40 MIN: CPT | Mod: 25,S$GLB,, | Performed by: INTERNAL MEDICINE

## 2018-01-15 PROCEDURE — 87077 CULTURE AEROBIC IDENTIFY: CPT

## 2018-01-15 PROCEDURE — 99499 UNLISTED E&M SERVICE: CPT | Mod: S$GLB,,, | Performed by: PODIATRIST

## 2018-01-15 PROCEDURE — 99204 OFFICE O/P NEW MOD 45 MIN: CPT | Mod: S$GLB,,, | Performed by: NURSE PRACTITIONER

## 2018-01-15 PROCEDURE — 87186 SC STD MICRODIL/AGAR DIL: CPT

## 2018-01-15 PROCEDURE — 87070 CULTURE OTHR SPECIMN AEROBIC: CPT

## 2018-01-15 PROCEDURE — 99203 OFFICE O/P NEW LOW 30 MIN: CPT | Mod: S$GLB,,, | Performed by: SURGERY

## 2018-01-15 PROCEDURE — 99499 UNLISTED E&M SERVICE: CPT | Mod: S$GLB,,, | Performed by: INTERNAL MEDICINE

## 2018-01-15 PROCEDURE — 99499 UNLISTED E&M SERVICE: CPT | Mod: S$GLB,,, | Performed by: NURSE PRACTITIONER

## 2018-01-15 PROCEDURE — 96372 THER/PROPH/DIAG INJ SC/IM: CPT | Mod: S$GLB,,, | Performed by: INTERNAL MEDICINE

## 2018-01-15 RX ORDER — AMLODIPINE AND BENAZEPRIL HYDROCHLORIDE 10; 20 MG/1; MG/1
1 CAPSULE ORAL DAILY
COMMUNITY
End: 2018-02-19 | Stop reason: SDUPTHER

## 2018-01-15 RX ORDER — KETOROLAC TROMETHAMINE 30 MG/ML
15 INJECTION, SOLUTION INTRAMUSCULAR; INTRAVENOUS
Status: DISCONTINUED | OUTPATIENT
Start: 2018-01-15 | End: 2018-01-15

## 2018-01-15 RX ORDER — FERROUS SULFATE 325(65) MG
325 TABLET ORAL 3 TIMES DAILY
Qty: 270 TABLET | Refills: 0 | COMMUNITY
Start: 2018-01-15 | End: 2018-02-15 | Stop reason: ALTCHOICE

## 2018-01-15 RX ORDER — LIDOCAINE HYDROCHLORIDE 10 MG/ML
1 INJECTION, SOLUTION EPIDURAL; INFILTRATION; INTRACAUDAL; PERINEURAL ONCE
Status: CANCELLED | OUTPATIENT
Start: 2018-01-15 | End: 2018-01-15

## 2018-01-15 RX ORDER — KETOROLAC TROMETHAMINE 30 MG/ML
60 INJECTION, SOLUTION INTRAMUSCULAR; INTRAVENOUS
Status: COMPLETED | OUTPATIENT
Start: 2018-01-15 | End: 2018-01-15

## 2018-01-15 RX ORDER — KETOROLAC TROMETHAMINE 30 MG/ML
30 INJECTION, SOLUTION INTRAMUSCULAR; INTRAVENOUS
Status: DISCONTINUED | OUTPATIENT
Start: 2018-01-15 | End: 2018-01-15

## 2018-01-15 RX ADMIN — KETOROLAC TROMETHAMINE 30 MG: 30 INJECTION, SOLUTION INTRAMUSCULAR; INTRAVENOUS at 02:01

## 2018-01-15 ASSESSMENT — ROUTINE ASSESSMENT OF PATIENT INDEX DATA (RAPID3)
PAIN SCORE: 10
TOTAL RAPID3 SCORE: 8.66
PSYCHOLOGICAL DISTRESS SCORE: 1.1
MDHAQ FUNCTION SCORE: 2.1
PATIENT GLOBAL ASSESSMENT SCORE: 9
AM STIFFNESS SCORE: 1, YES
FATIGUE SCORE: 6
WHEN YOU AWAKENED IN THE MORNING OVER THE LAST WEEK, PLEASE INDICATE THE AMOUNT OF TIME IT TAKES UNTIL YOU ARE AS LIMBER AS YOU WILL BE FOR THE DAY: 2-4 HRS

## 2018-01-15 NOTE — PROGRESS NOTES
Patient, Serafin Tapia (MRN #[id number]), presented with a recorded BMI of 39.46 kg/m^2 and a documented comorbidity(s):  - Diabetes Mellitus Type 2  to which the severe obesity is a contributing factor. This is consistent with the definition of severe obesity (BMI 35.0-35.9) with comorbidity (ICD-10 E66.01, Z68.35). The patient's severe obesity was monitored, evaluated, addressed and/or treated. This addendum to the medical record is made on 01/15/2018.

## 2018-01-15 NOTE — PROGRESS NOTES
CC:   right  foot wound    HPI:   Serafin Tapia is a 67 y.o. male who presents to clinic today for evaluation of right dorsal foot wound. Patient states wound has been present a few months but getting worse in the past two weeks.  He is receiving Home health wound care.   No acute reported to the area.     Date of Initial encounter of wound:  1/15/18      PCP:  John Vargas MD  Last PCP visit:    Chief Complaint   Patient presents with    toe ulcer     12/28/17 dr Davies    Diabetes Mellitus     rt foot           Past Medical History:   Diagnosis Date    Asthma     Diabetes mellitus type 2 in obese 5/3/2014    Elevated troponin 5/3/2014    Hyperlipidemia     Hypertension     Hypothyroidism (acquired) 11/1/2017    Obesity     Pulmonary embolism 05/2014    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             Current Outpatient Prescriptions on File Prior to Visit   Medication Sig Dispense Refill    amlodipine-benazepril 10-20mg (LOTREL) 10-20 mg per capsule Take 1 capsule by mouth once daily.      ascorbic acid, vitamin C, (VITAMIN C) 500 MG tablet Take 1 tablet (500 mg total) by mouth 2 (two) times daily.      ergocalciferol (ERGOCALCIFEROL) 50,000 unit Cap Take 1 capsule (50,000 Units total) by mouth every 7 days. 4 capsule 1    ferrous sulfate 325 mg (65 mg iron) Tab tablet Take 1 tablet (325 mg total) by mouth 3 (three) times daily. 270 tablet 0    folic acid (FOLVITE) 1 MG tablet Take 1 tablet (1 mg total) by mouth once daily. 30 tablet 0    furosemide (LASIX) 20 MG tablet Take 1 tablet (20 mg total) by mouth once daily. 30 tablet 2    levothyroxine (SYNTHROID) 25 MCG tablet Take 1 tablet (25 mcg total) by mouth before breakfast. 30 tablet 11    multivitamin (ONE DAILY MULTIVITAMIN) per tablet Take 1 tablet by mouth once daily. PER [facility]      rivaroxaban (XARELTO) 20 mg Tab Take 1 tablet (20 mg total) by mouth once daily. 30 tablet 3     "zinc sulfate (ZINCATE) 220 (50) mg capsule Take 220 mg by mouth once daily. PER [facility]      [DISCONTINUED] multivitamin (THERAGRAN) tablet Take 1 tablet by mouth once daily.       Current Facility-Administered Medications on File Prior to Visit   Medication Dose Route Frequency Provider Last Rate Last Dose    [DISCONTINUED] ketorolac injection 15 mg  15 mg Intravenous 1 time in Clinic/HOD Samson Chiang MD           Review of patient's allergies indicates:  No Known Allergies        Social History     Social History    Marital status: Single     Spouse name: N/A    Number of children: N/A    Years of education: N/A     Occupational History    Retired       Social History Main Topics    Smoking status: Former Smoker     Types: Cigarettes    Smokeless tobacco: Never Used      Comment: Quit smoking as a teenager    Alcohol use 0.6 - 1.2 oz/week     1 - 2 Cans of beer per week      Comment: 1-2 drinks occasionally    Drug use: No    Sexual activity: Yes     Partners: Female     Birth control/ protection: Condom     Other Topics Concern    Not on file     Social History Narrative    Lives w/daughter.            ROS:  Negative for fever, chills, nights sweats, nausea, vomiting, disorientation        EXAM:  Vitals:    01/15/18 1332   BP: (!) 141/84   Pulse: 104   Weight: 124.7 kg (275 lb)   Height: 5' 10" (1.778 m)        General:   fatigued, no distress    Right Lower extremity physical exam:  Vascular: Dorsalis Pedis:  absent   Posterior Tibial:  absent  capillary refill time:  3 seconds  Temperature of toes cool to touch  Hair on toes:  absent    There is minimal edema.  no varicosities.      Neurological:     sharp dull - R decreased and light touch - R decreased  SWMF: Absent      Musculoskeletal:    Right foot: no gross deformity      Dermatological:     Location:   Dorsal right foot  Wound: Dimensions: 3.1 cm x 1.9 cm x 0.2cm (pre debridement).    See post " "debride size below  Depth: 2 mm.  Base: open, minimal, serous drainage and fibrotic base  Exudate: Serous  Deisy-wound: Normal  Deisy-wound erythema: none   Edema: {Mild   Wound site does not probe to bone  Odor is present   Cellulitis: none  right foot      12/15/17    1/15/18          12/13/17 [facility] Impression:    Right Leg: Segmental pressures are mildly reduced at DPA,however the PVR waveforms suggest minimal peripheral arterial occlusive disease.         ASSESSMENT / PLAN:     Right foot ulcer, with fat layer exposed  -     Aerobic culture  -     Culture, Anaerobic    Type II diabetes mellitus with peripheral circulatory disorder  -     Aerobic culture  -     Culture, Anaerobic      I counseled the patient on the patient's conditions, their implications and medical management.    Discussed appropriate home care of this wound., Wound redressed.    Offloading:   surgical shoe      Follow-up:Patient is to return to the clinic in one week for follow-up but should call [facility] immediately if any signs of infection, such as fever, chills, sweats, increased redness or pain.    Short-term goals include maintaining good offloading and minimizing bioburden, promoting granulation and epithelialization to healing.  Long-term goals include keeping the wound healed by good offloading and medical management under the direction of internist.        Wound Debridement  Date/Time: 1/15/2018 2:33 PM  Performed by: MATTY ANDERSON  Authorized by: MATTY ANDERSON     Time out: Immediately prior to procedure a "time out" was called to verify the correct patient, procedure, equipment, support staff and site/side marked as required.    Consent Done?:  Yes (Verbal)    Preparation: Patient was prepped and draped in usual sterile fashion    Local anesthesia used?: No      Wound Details:    Location:  Right foot    Location:  Right Dorsal Foot    Type of Debridement:  Excisional       Length (cm):  3.2       Area (sq cm):  6.4       Width (cm):  " 2       Percent Debrided (%):  100       Depth (cm):  0.2       Total Area Debrided (sq cm):  6.4    Depth of debridement:  Subcutaneous tissue    Tissue debrided:  Subcutaneous, Epidermis and Dermis    Devitalized tissue debrided:  Sough, Fibrin, Callus and Biofilm    Instruments:  Curette    Bleeding:  Minimal  Hemostasis Achieved: Yes    Method Used:  Pressure  Patient tolerance:  Patient tolerated the procedure well with no immediate complications     The wound was irrigated with sterile normal saline.  Iodosorb was applied.

## 2018-01-15 NOTE — PROGRESS NOTES
I have personally performed a face to face diagnostic evaluation on this patient. I have reviewed and agree with today's findings and the care plan outlined by Joleen Matthews NP      My findings are as follows:  Patient presents with elevated AST/ALT. Chronic liver disease work up negative. U/S shows normal liver morphology. Likely related to on-going muscle injury. Will refer to rheumatology.

## 2018-01-15 NOTE — LETTER
January 15, 2018      Meghan Davies MD  [address]  [place] [address]           [facility] - Hepatology  [address]  [place] [address]  Phone: [phone]  Fax: [phone]          Patient: Serafin Tapia   MR Number: [id number]   YOB: 1950   Date of Visit: 1/15/2018       Dear Dr. Meghan Davies:    Thank you for referring Serafin Tapia to me for evaluation. Attached you will find relevant portions of my assessment and plan of care.    If you have questions, please do not hesitate to call me. I look forward to following Serafin Tapia along with you.    Sincerely,    Joleen Matthews, NP    Enclosure  CC:  No Recipients    If you would like to receive this communication electronically, please contact [email or web address] or [phone] to request more information on [employer] Link access.    For providers and/or their staff who would like to refer a patient to [facility], please contact us through our one-stop-shop provider referral line, [facility] , at [phone].    If you feel you have received this communication in error or would no longer like to receive these types of communications, please e-mail [email or web address]

## 2018-01-15 NOTE — PROGRESS NOTES
Chief Complaint   Patient presents with    Disease Management       Patient was referred by Dr.Alicia ISABEL Matthews    History of presenting illness    Patient with long standing :  HTN, HLD, diabetes mellitus and obesity,asthma,hypothyroidism    He was seen in the hospital due to worsening shortness of breath      Prior to that he was at [facility] for hypoglycemia due to sulfonylurea and he was noted to have CK of 4000 to 5000.He got hydration and he developed anasarca and pulmonary edema and then got diuresed.  He was diagnosed to have rhabdomyolysis, His CPK at the time of discharge was 5553.   He had elevated ESR,CRP  HALLE negative     Since March 2017 he has been weak  Prior to the hospitalisations he was very mobile,no weakness,ambulated with no asistance  During this admission he couldn't walk,he couldn't stand,he couldn't lift thighs,couldnt climb stairs    He denies any fevers, chills, rashes, mucosal ulcers, hematuria, dysphagia, vision changes, Raynaud's, n/v/d.     Pt reports a 60-70 pound weight loss since March.       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 He developed a wound on the right foot which he reports healed. He sts that after his swelling while hospitalized caused the wound to flare up.      He had MRI inpatient and that didn't support myositis  Cks trended down to 2513     Rheumatology team sent off : myomarker panel and HMG co-a reductase antibodies and asked him to come back  EMG and muscle biopsy planned but didn't happen  His sob was attributed to heart failure  Necrotising myopathy due to statins considered  Deconditioning considered  PT suggested      Today he comes back  -feeling better  -he gets only 2 days of home PT  He has days when he can stand up and walk 30 feet and there are days when he is weak  He is off statins for > 6 weeks  He is moving in the right direction but not completely  normal  The wound on the foot has gotten worse again,it is foul smelling    LABS THAT WERE PENDING    Myomarker panel negative  APLAS negative  HMG co-a reductase ab > 200 units     Past history : HTN, HLD, diabetes mellitus and obesity,asthma,hypothyroidism    Family history:RA (brother) and SLE (cousins).    Social history : not a current smoker and alcoholic    Review of Systems   Constitutional: Negative for activity change, appetite change, chills, diaphoresis, fatigue, fever and unexpected weight change.   HENT: Negative for congestion, dental problem, drooling, ear discharge, ear pain, facial swelling, hearing loss, mouth sores, nosebleeds, postnasal drip, rhinorrhea, sinus pain, sinus pressure, sneezing, sore throat, tinnitus, trouble swallowing and voice change.    Eyes: Negative for photophobia, pain, discharge, redness, itching and visual disturbance.   Respiratory: Negative for apnea, cough, choking, chest tightness, shortness of breath, wheezing and stridor.    Cardiovascular: Negative for chest pain, palpitations and leg swelling.   Gastrointestinal: Negative for abdominal distention, abdominal pain, anal bleeding, blood in stool, constipation, diarrhea, nausea, rectal pain and vomiting.   Endocrine: Negative for cold intolerance, heat intolerance, polydipsia, polyphagia and polyuria.   Genitourinary: Negative for decreased urine volume, difficulty urinating, dysuria, enuresis, flank pain, frequency, genital sores, hematuria and urgency.   Musculoskeletal: Negative for arthralgias, back pain, gait problem, joint swelling, myalgias, neck pain and neck stiffness.   Skin: Negative for color change, pallor, rash and wound.   Allergic/Immunologic: Negative for environmental allergies, food allergies and immunocompromised state.   Neurological: Positive for weakness. Negative for dizziness, tremors, seizures, syncope, facial asymmetry, speech difficulty, light-headedness, numbness and headaches.    Hematological: Negative for adenopathy. Does not bruise/bleed easily.   Psychiatric/Behavioral: Negative for agitation, behavioral problems, confusion, decreased concentration, dysphoric mood, hallucinations, self-injury, sleep disturbance and suicidal ideas. The patient is not nervous/anxious and is not hyperactive.         Physical Exam     LUCIANO-28 tender joint count: 0  LUCIANO-28 swollen joint count: 0     UE    B/l he can use his hand  5/5  Forearm strength :flexion and extension 5/5  Biceps b/l 4/5,rest 5/5    LE    B/L ankle DF,PF 5/5  Leg flexion and extension 4/5  Thigh flexion and extension 4/5    Physical Exam   Constitutional: He is oriented to person, place, and time and well-developed, well-nourished, and in no distress. No distress.   HENT:   Head: Normocephalic.   Mouth/Throat: Oropharynx is clear and moist.   Eyes: Conjunctivae are normal. Pupils are equal, round, and reactive to light. Right eye exhibits no discharge. Left eye exhibits no discharge. No scleral icterus.   Neck: Normal range of motion. No thyromegaly present.   Cardiovascular: Normal rate, regular rhythm, normal heart sounds and intact distal pulses.    Pulmonary/Chest: Effort normal and breath sounds normal. No stridor.   Abdominal: Soft. Bowel sounds are normal.   Lymphadenopathy:     He has no cervical adenopathy.   Neurological: He is alert and oriented to person, place, and time.   Skin: Skin is warm. No rash noted. He is not diaphoretic.     Psychiatric: Affect and judgment normal.   Musculoskeletal: Normal range of motion.       Laboratory abnormalities noted    Vit d low,replacement started  Iron deficiency    Mild anemia :   Renal function normal    ALT 99  H/H 12.9/41.7  CRP 9.5  ESR 21    Assessment     67 year old black male comes with weakness in the proximal and distal upper and lower extremities with sob   Patient seen inpatient last month and was evaluated extensively by our rheumatology team  From all the w/u  he seems to be HALLE and armaan-1 negative,myomarker panel negative  He has very high titres of HMG-COA reductase antibodies positivity  He has been on statins for > 10 years with no change in dose of the medication,no change in type of statin    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 Myopathy    2. Statin myopathy        Reviewed all inpatient records    New problem to the clinic    Plan    Will rpt all the labs today : ck,aldolase,ESR,CRP,CBC,CMP    He has a foul smelling wound on the right foot that needs immediate attention,want to make sure there is no infection before I immunosuppress him  Walked to the podiatry and wound clinic and they talked to him,today 1.45 he has an appointment    Will arrange for muscle biopsy    Will arrange for IVIG infusions,IgA sent off  Discussed side effects of IVIG     See him back in 4 weeks to discuss biopsy results and talk about immunosuppression if the wound is not infected    Arrange for more aggressive physical therapy,have given him a PT referral    Serafin was seen today for disease management.    Diagnoses and all orders for this visit:    Myopathy  -     CK; Future  -     Aldolase; Future  -     CBC auto differential; Future  -     Comprehensive metabolic panel; Future  -     Ambulatory referral to Physical Medicine Rehab  -     Sedimentation rate, manual; Future  -     C-reactive protein; Standing  -     Ambulatory referral to Physical Medicine Rehab  -     Ambulatory consult to Physical Therapy  -     IMMUNOGLOBULINS (IGG, IGA, IGM) QUANTITATIVE; Future    Statin myopathy  -     Ambulatory referral to Neurosurgery  -     IMMUNOGLOBULINS (IGG, IGA, IGM)  QUANTITATIVE; Future  -     Medication Pre-Authorization    Other orders  -     ferrous sulfate 325 mg (65 mg iron) Tab tablet; Take 1 tablet (325 mg total) by mouth 3 (three) times daily.  -     ketorolac injection 15 mg; Inject 15 mg into the vein one time.      Follow up in 4 weeks     Notes will be sent to PCP

## 2018-01-15 NOTE — LETTER
January 15, 2018      Samson Chiang MD  [address] [address]           [facility] - General Surgery  [address]  [place] [address]  Phone: [phone]          Patient: Serafin Tapia   MR Number: [id number]   YOB: 1950   Date of Visit: 1/15/2018       Dear Dr. Samson Chiang:    Thank you for referring Serafin Tapia to me for evaluation. Attached you will find relevant portions of my assessment and plan of care.    If you have questions, please do not hesitate to call me. I look forward to following Serafin Tapia along with you.    Sincerely,    Felton Foster Jr., MD    Enclosure  CC:  No Recipients    If you would like to receive this communication electronically, please contact [email or web address] or [phone] to request more information on [employer] Link access.    For providers and/or their staff who would like to refer a patient to [facility], please contact us through our one-stop-shop provider referral line, [facility], at [phone].    If you feel you have received this communication in error or would no longer like to receive these types of communications, please e-mail [email or web address]

## 2018-01-15 NOTE — LETTER
January 15, 2018      Joleen Matthews, NP  [address] [address]           [facility] - Rheumatology  [address]  [place] [address]  Phone: [phone]  Fax: [phone]          Patient: Serafin Tapia   MR Number: [id number]   YOB: 1950   Date of Visit: 1/15/2018       Dear Joleen Matthews:    Thank you for referring Serafin Tapia to me for evaluation. Attached you will find relevant portions of my assessment and plan of care.    If you have questions, please do not hesitate to call me. I look forward to following Serafin Tapia along with you.    Sincerely,    Samson Chiang MD    Enclosure  CC:  No Recipients    If you would like to receive this communication electronically, please contact [email or web address] or [phone] to request more information on [employer] Link access.    For providers and/or their staff who would like to refer a patient to [facility], please contact us through our one-stop-shop provider referral line, [facility], at [phone].    If you feel you have received this communication in error or would no longer like to receive these types of communications, please e-mail [email or web address]

## 2018-01-15 NOTE — Clinical Note
Please advise. Unsure of basis for nephrology consult. CLARE resolved now. Pt and brother would like to cancel appt if not necessary. They thought it was a nerve specialist. Ok to cancel or did you want him to see them for something else?  Joleen

## 2018-01-15 NOTE — PROGRESS NOTES
[facility] VISIT NEW PT NOTE    REFERRING PROVIDER: Meghan Davies MD     CHIEF COMPLAINT: elevated liver enzymes    HPI: This is a 67 y.o. Black or  male with PMH as below referred for elevated liver enzymes. He is here with his brother today. He was hospitalized  10/29-11/13/2017 for hypoglycemia and discovered rhabdomyolysis. Statin discontinued in setting of rhabdo. CPK 4251 admit, improved to 2k during admission in 12/2017 for diabetic wound. Baseline [id number] during previous admission. His statin was d/c'd in 10/2017 with first admission but CK remains elevated despite this. He was evaluated by rheumatology in hospital but has not had any outpt f/u with them. Unclear etiology concern for polmyositis w/ CK elevation vs inclusion body myositis, Neuromuscular dz vs autoimmune vs cervical myelopathy. Rhabdo thought to be statin induced vs autoimmune. He remains off statin and CK has improved some but remains elevated. Will facilitate rheumatology f/u.    His transaminases remain elevated from muscle etiology. He has had negative viral hepatitis panel and normal abd u/s in 5/2017. Ferritin nl. His TSH was also elevated in Nov-Dec with normal free T4. Brother reports continued muscle weakness. He is wheelchair bound currently. He also has a right diabetic foot wound that brother thinks is getting worse. Currently getting home wound care but not following in wound care clinic. Will facilitate. He has an appt with nephrology but they are unsure reason for that. Cr WNL. He did have CLARE in 10/2017 when admitted with rhabdo but Cr has returned to baseline. Pt and brother thought they were going see nerve specialist.    Pt denies any significant alcohol intake. 2-3 beers occasionally, no daily or heavy intake. Denies jaundice, dark urine, hematemesis, melena, slowed mentation, abdominal distention.          Review of patient's allergies indicates:  No Known Allergies    Current Outpatient  Prescriptions on File Prior to Visit   Medication Sig Dispense Refill    ascorbic acid, vitamin C, (VITAMIN C) 500 MG tablet Take 1 tablet (500 mg total) by mouth 2 (two) times daily.      ergocalciferol (ERGOCALCIFEROL) 50,000 unit Cap Take 1 capsule (50,000 Units total) by mouth every 7 days. 4 capsule 1    folic acid (FOLVITE) 1 MG tablet Take 1 tablet (1 mg total) by mouth once daily. 30 tablet 0    furosemide (LASIX) 20 MG tablet Take 1 tablet (20 mg total) by mouth once daily. 30 tablet 2    levothyroxine (SYNTHROID) 25 MCG tablet Take 1 tablet (25 mcg total) by mouth before breakfast. 30 tablet 11    multivitamin (ONE DAILY MULTIVITAMIN) per tablet Take 1 tablet by mouth once daily. PER [facility]      rivaroxaban (XARELTO) 20 mg Tab Take 1 tablet (20 mg total) by mouth once daily. 30 tablet 3    zinc sulfate (ZINCATE) 220 (50) mg capsule Take 220 mg by mouth once daily. PER [facility]      [DISCONTINUED] multivitamin (THERAGRAN) tablet Take 1 tablet by mouth once daily.       No current facility-administered medications on file prior to visit.        PMHX:  has a past medical history of Asthma; Diabetes mellitus type 2 in obese (5/3/2014); Elevated troponin (5/3/2014); Hyperlipidemia; Hypertension; Hypothyroidism (acquired) (11/1/2017); Obesity; Pulmonary embolism (05/2014); and Spondylosis of lumbar region without myelopathy or radiculopathy (8/25/2017).    PSHX:  has a past surgical history that includes Tonsillectomy.    FAMILY HISTORY: Negative for liver disease, reviewed in [facility]    SOCIAL HISTORY:   History   Smoking Status    Former Smoker    Types: Cigarettes   Smokeless Tobacco    Never Used     Comment: Quit smoking as a teenager       History   Alcohol Use    0.6 - 1.2 oz/week    1 - 2 Cans of beer per week     Comment: 1-2 drinks occasionally       History   Drug Use No         ROS:   GENERAL: Denies fever, chills, weight loss/gain, (+) fatigue  HEENT: Denies  "headaches, dizziness, vision/hearing changes  CARDIOVASCULAR: Denies chest pain, palpitations, (+) edema, stable on lasix  RESPIRATORY: (+) dyspnea, no cough  GI: Denies abdominal pain, rectal bleeding, nausea, vomiting. No change in bowel pattern or color  : Denies dysuria, hematuria   SKIN: Denies rash, itching, (+) foot wound  NEURO: Denies confusion, memory loss, or mood changes, (+) weakness  PSYCH: Denies depression or anxiety  HEME/LYMPH: Denies easy bruising or bleeding        PHYSICAL EXAM:   Friendly Black or  male, in no acute distress; alert and oriented to person, place and time, seated in wheelchair  VITALS: BP (!) 154/83 (BP Location: Left arm, Patient Position: Sitting, BP Method: Medium (Automatic))   Pulse 93   Temp 96.9 °F (36.1 °C) (Oral)   Resp 16   Ht 5' 10" (1.778 m)   Wt 124.7 kg (275 lb)   SpO2 96%   BMI 39.46 kg/m²   HENT: Normocephalic, without obvious abnormality. Oral mucosa pink and moist. Dentition good.  EYES: Sclerae anicteric. No conjunctival pallor.   NECK: Supple. No masses or cervical adenopathy.  CARDIOVASCULAR: Regular rate and rhythm. No murmurs.  RESPIRATORY: Normal respiratory effort. BBS CTA. No wheezes or crackles.  GI: Soft, non-tender, non-distended. No hepatosplenomegaly. No masses palpable. No ascites.  EXTREMITIES:  No clubbing, cyanosis or edema.  SKIN: Warm and dry. No jaundice. No rashes noted to exposed skin. No telangectasias noted. No palmar erythema.  NEURO:  No asterixis.  PSYCH:  Memory intact. Thought and speech pattern appropriate. Behavior normal. No depression or anxiety noted.      RECENT LABS:    Labs:  Lab Results   Component Value Date    WBC 5.26 12/28/2017    HGB 12.9 (L) 12/28/2017    HCT 41.7 12/28/2017     (H) 12/28/2017    CHOL 206 (H) 12/12/2017    TRIG 109 12/12/2017    HDL 36 (L) 12/12/2017     12/28/2017    K 4.7 12/28/2017    CREATININE 0.6 12/28/2017    ALT 99 (H) 12/28/2017     (H) 12/28/2017 "    ALKPHOS 86 12/28/2017    BILITOT 0.2 12/28/2017    ALBUMIN 3.1 (L) 12/28/2017    INR 1.3 (H) 12/10/2017       DIAGNOSTIC STUDIES:  ABD. U/S- 5/9/17  FINDINGS:    Pancreas (visualized portion): Normal    Liver: Normal size with homogeneous echotexture.  No focal lesions.      Gallbladder: No calculi.  No wall thickening.  No sonographic Dennis sign.       Bile ducts: Common bile duct is not dilated, 3 mm.  No dilated intrahepatic radicles.     Right Kidney: 10.8 cm. No focal abnormalities. No pelvocaliectasis.    General: No ascites.   Impression       No significant abnormality.       ASSESSMENT:  67 y.o. Black or  male with:  1.  Elevated liver enzymes, due to rhabdomyolysis, possible statin induced vs autoimmune per hospital d/c summary but CK remains elevated despite him being off statin x 1.5 months  -- needs f/u with rheumatology  -- remain off statin  -- normal abd u/s and negative viral hepatitis panel, normal ferritin  2. Rhabdomyolysis   -- CK remains elevated so need to evaluate for secondary causes. F/u with rheumatology  3. Diabetic foot wound  -- referral to wound care  4. Dyslipidemia  -- recommend to remain off statin since rhabdo could have been related to statin use  5. Hypothyroid  -- TSH elevated, repeat today      EDUCATION:   Reassured pt and brother that transaminases are elevated d/t muscle etiology and not liver cause. Will ensure enzymes normalize once CK elevation is fully evaluated by rheumatology.      PLAN:  1. Labs today  2. Referral to rheumatology  3. Referral to wound care  4. Unsure of basis for nephrology consult. Will d/w referring provider. CLARE resolved now. Pt and brother would like to cancel appt if not necessary  5. Will trend enzymes with rheumatology labs and ensure they normalize      Thank you for allowing me to participate in the care of NATALIE Sharif

## 2018-01-15 NOTE — PROGRESS NOTES
History & Physical    SUBJECTIVE:     History of Present Illness:  Patient is a 67 y.o. male presents with Bilateral lower>upper extremity weakness.  Followed by leyla who has sent him over and asked for a muscle biopsy.  The muscle weakness bagan last year and has improved somewhat but is still present.  He has some days that are better than others.    Chief Complaint   Patient presents with    Consult       Review of patient's allergies indicates:  No Known Allergies    Current Outpatient Prescriptions   Medication Sig Dispense Refill    amlodipine-benazepril 10-20mg (LOTREL) 10-20 mg per capsule Take 1 capsule by mouth once daily.      ascorbic acid, vitamin C, (VITAMIN C) 500 MG tablet Take 1 tablet (500 mg total) by mouth 2 (two) times daily.      ergocalciferol (ERGOCALCIFEROL) 50,000 unit Cap Take 1 capsule (50,000 Units total) by mouth every 7 days. 4 capsule 1    ferrous sulfate 325 mg (65 mg iron) Tab tablet Take 1 tablet (325 mg total) by mouth 3 (three) times daily. 270 tablet 0    folic acid (FOLVITE) 1 MG tablet Take 1 tablet (1 mg total) by mouth once daily. 30 tablet 0    furosemide (LASIX) 20 MG tablet Take 1 tablet (20 mg total) by mouth once daily. 30 tablet 2    levothyroxine (SYNTHROID) 25 MCG tablet Take 1 tablet (25 mcg total) by mouth before breakfast. 30 tablet 11    multivitamin (ONE DAILY MULTIVITAMIN) per tablet Take 1 tablet by mouth once daily. PER [facility]      rivaroxaban (XARELTO) 20 mg Tab Take 1 tablet (20 mg total) by mouth once daily. 30 tablet 3    zinc sulfate (ZINCATE) 220 (50) mg capsule Take 220 mg by mouth once daily. PER [facility]       No current facility-administered medications for this visit.        Past Medical History:   Diagnosis Date    Asthma     Diabetes mellitus type 2 in obese 5/3/2014    Elevated troponin 5/3/2014    Hyperlipidemia     Hypertension     Hypothyroidism (acquired) 11/1/2017    Obesity     Pulmonary embolism  05/2014    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     Past Surgical History:   Procedure Laterality Date    TONSILLECTOMY       Family History   Problem Relation Age of Onset    Diabetes Mother     Heart disease Mother     Hypertension Mother     Stroke Mother     Heart disease Father     Hypertension Father     Diabetes Father     Cancer Father     Cataracts Sister     Hypertension Sister     Diabetes Sister     Glaucoma Maternal Aunt     Glaucoma Paternal Aunt     Hypertension Brother     Diabetes Brother     No Known Problems Maternal Grandmother     No Known Problems Maternal Grandfather     No Known Problems Paternal Grandmother     No Known Problems Paternal Grandfather     Hypertension Son     Hypertension Daughter     Cirrhosis Neg Hx      Social History   Substance Use Topics    Smoking status: Former Smoker     Types: Cigarettes    Smokeless tobacco: Never Used      Comment: Quit smoking as a teenager    Alcohol use 0.6 - 1.2 oz/week     1 - 2 Cans of beer per week      Comment: 1-2 drinks occasionally        Review of Systems:  Review of Systems   Constitutional: Negative for activity change, appetite change, chills, diaphoresis, fatigue and fever.   HENT: Negative for congestion, sinus pressure, sneezing, sore throat and tinnitus.    Eyes: Negative for pain, discharge, redness, itching and visual disturbance.   Respiratory: Negative for apnea, cough, choking, chest tightness and shortness of breath.    Cardiovascular: Negative for chest pain, palpitations and leg swelling.   Gastrointestinal: Negative for abdominal distention, abdominal pain, blood in stool, constipation, diarrhea and nausea.   Musculoskeletal: Positive for gait problem. Negative for arthralgias and back pain.   Neurological: Positive for weakness. Negative for dizziness, facial asymmetry, light-headedness and headaches.   Psychiatric/Behavioral: Negative for agitation,  "behavioral problems and confusion.       OBJECTIVE:     Vital Signs (Most Recent)  Temp: 98.3 °F (36.8 °C) (01/15/18 1534)  Pulse: 97 (01/15/18 1534)  BP: (!) 161/80 (01/15/18 1534)  5' 10" (1.778 m)  124.7 kg (275 lb)     Physical Exam:  Physical Exam   Constitutional: He is oriented to person, place, and time. He appears well-developed and well-nourished. No distress.   HENT:   Head: Normocephalic and atraumatic.   Right Ear: External ear normal.   Left Ear: External ear normal.   Eyes: EOM are normal. Right eye exhibits no discharge. Left eye exhibits no discharge. No scleral icterus.   Neck: Neck supple.   Cardiovascular: Normal rate, regular rhythm and normal heart sounds.  Exam reveals no gallop and no friction rub.    No murmur heard.  Pulmonary/Chest: Effort normal and breath sounds normal. No respiratory distress. He has no wheezes. He has no rales.   Abdominal: Soft. Bowel sounds are normal. He exhibits no distension and no mass. There is no tenderness. There is no rebound and no guarding. No hernia.   Musculoskeletal: Normal range of motion.   B Extremtiy weakness.  L>U   Neurological: He is alert and oriented to person, place, and time.   Skin: Skin is warm and dry. No rash noted. He is not diaphoretic. No erythema. No pallor.   Psychiatric: He has a normal mood and affect. His behavior is normal. Judgment and thought content normal.       ASSESSMENT/PLAN:     Extremity weakness    PLAN:Plan     To OR for muscle biopsy  Consent obtained       Felton Foster MD    "

## 2018-01-16 ENCOUNTER — TELEPHONE (OUTPATIENT)
Dept: INTERNAL MEDICINE | Facility: CLINIC | Age: 68
End: 2018-01-16

## 2018-01-16 DIAGNOSIS — G72.0 STATIN MYOPATHY: ICD-10-CM

## 2018-01-16 DIAGNOSIS — E03.9 HYPOTHYROIDISM (ACQUIRED): Primary | ICD-10-CM

## 2018-01-16 DIAGNOSIS — T46.6X5A STATIN MYOPATHY: ICD-10-CM

## 2018-01-16 RX ORDER — FAMOTIDINE 10 MG/ML
20 INJECTION INTRAVENOUS 2 TIMES DAILY
Status: CANCELLED | OUTPATIENT
Start: 2018-02-19

## 2018-01-16 RX ORDER — ACETAMINOPHEN 325 MG/1
650 TABLET ORAL
Status: CANCELLED | OUTPATIENT
Start: 2018-02-19

## 2018-01-16 RX ORDER — SODIUM CHLORIDE 0.9 % (FLUSH) 0.9 %
10 SYRINGE (ML) INJECTION
Status: CANCELLED | OUTPATIENT
Start: 2018-02-19

## 2018-01-16 RX ORDER — HEPARIN 100 UNIT/ML
500 SYRINGE INTRAVENOUS
Status: CANCELLED | OUTPATIENT
Start: 2018-02-19

## 2018-01-16 NOTE — TELEPHONE ENCOUNTER
Spoke with patient regarding Nephrology referral since there was no referral to Nephrology entered. Patient was told that since he was an established patient with Neurology prior to hospitalizations, he could call and make Neurology appt. Not sure if misunderstood but will cancel Neurology appointment.    TSH also previously measured during hospitalization. TSH was repeated after discharge and remains elevated, FT4 is normal. Could increase to synthroid 37 mcg daily.     Called on 1/17 but left message. During exam, denied cold intolerance, no weight gain. No symptoms but will increase to levothyroxine 50 mcg daily. Repeat labs in 6 weeks.

## 2018-01-17 ENCOUNTER — TELEPHONE (OUTPATIENT)
Dept: INTERNAL MEDICINE | Facility: CLINIC | Age: 68
End: 2018-01-17

## 2018-01-17 RX ORDER — LEVOTHYROXINE SODIUM 50 UG/1
25 CAPSULE ORAL
Qty: 30 TABLET | Refills: 2 | Status: SHIPPED | OUTPATIENT
Start: 2018-01-17 | End: 2018-02-20 | Stop reason: SDUPTHER

## 2018-01-17 NOTE — TELEPHONE ENCOUNTER
Hold Xarelto on 1/20 and 1/21 before procedure and hold the day of procedure. Consider resuming on 1/24.

## 2018-01-17 NOTE — TELEPHONE ENCOUNTER
----- Message from Mary Dejesus RN sent at 1/16/2018  9:42 AM CST -----  Pt is scheduled to have a muscle biopsy with Dr. Foster on Monday, 1/22/18.  Dr. Foster is wanting recommendations on when pt can stop his xarelto prior to proceeding.      Please advise.    Thanks,  Mary

## 2018-01-18 ENCOUNTER — TELEPHONE (OUTPATIENT)
Dept: INFECTIOUS DISEASES | Facility: CLINIC | Age: 68
End: 2018-01-18

## 2018-01-18 LAB
BACTERIA SPEC AEROBE CULT: NORMAL

## 2018-01-19 ENCOUNTER — TELEPHONE (OUTPATIENT)
Dept: SURGERY | Facility: CLINIC | Age: 68
End: 2018-01-19

## 2018-01-19 DIAGNOSIS — E11.51 TYPE II DIABETES MELLITUS WITH PERIPHERAL CIRCULATORY DISORDER: ICD-10-CM

## 2018-01-19 DIAGNOSIS — L97.512 RIGHT FOOT ULCER, WITH FAT LAYER EXPOSED: Primary | ICD-10-CM

## 2018-01-19 LAB — BACTERIA SPEC ANAEROBE CULT: NORMAL

## 2018-01-19 NOTE — TELEPHONE ENCOUNTER
----- Message from Meghan Davies MD sent at 1/16/2018  1:54 PM CST -----  Should be ok to hold Xarelto on 1/20 and 1/21. May resume 2 days after the procedure if no complications.    LP    ----- Message -----  From: Kelsi Munson LPN  Sent: 1/16/2018  10:00 AM  To: Meghan Davies MD    When can patient stop xeralto prior to proceeding with muscle biopsy with Dr Foster?      ----- Message -----  From: Mary Dejesus RN  Sent: 1/16/2018   9:42 AM  To: Keke DIALLO Staff    Pt is scheduled to have a muscle biopsy with Dr. Foster on Monday, 1/22/18.  Dr. Foster is wanting recommendations on when pt can stop his xarelto prior to proceeding.      Please advise.    Thanks,  Mary

## 2018-01-22 ENCOUNTER — OFFICE VISIT (OUTPATIENT)
Dept: PODIATRY | Facility: CLINIC | Age: 68
End: 2018-01-22
Payer: MEDICARE

## 2018-01-22 ENCOUNTER — ANESTHESIA (OUTPATIENT)
Dept: SURGERY | Facility: HOSPITAL | Age: 68
End: 2018-01-22
Payer: MEDICARE

## 2018-01-22 ENCOUNTER — HOSPITAL ENCOUNTER (OUTPATIENT)
Facility: HOSPITAL | Age: 68
Discharge: HOME | End: 2018-01-22
Attending: SURGERY | Admitting: SURGERY
Payer: MEDICARE

## 2018-01-22 ENCOUNTER — SURGERY (OUTPATIENT)
Age: 68
End: 2018-01-22

## 2018-01-22 ENCOUNTER — ANESTHESIA EVENT (OUTPATIENT)
Dept: SURGERY | Facility: HOSPITAL | Age: 68
End: 2018-01-22
Payer: MEDICARE

## 2018-01-22 VITALS
HEART RATE: 91 BPM | DIASTOLIC BLOOD PRESSURE: 92 MMHG | SYSTOLIC BLOOD PRESSURE: 164 MMHG | BODY MASS INDEX: 39.37 KG/M2 | HEIGHT: 70 IN | WEIGHT: 275 LBS

## 2018-01-22 VITALS
SYSTOLIC BLOOD PRESSURE: 136 MMHG | HEIGHT: 70 IN | OXYGEN SATURATION: 95 % | DIASTOLIC BLOOD PRESSURE: 76 MMHG | WEIGHT: 275 LBS | TEMPERATURE: 98 F | RESPIRATION RATE: 16 BRPM | HEART RATE: 92 BPM | BODY MASS INDEX: 39.37 KG/M2

## 2018-01-22 DIAGNOSIS — R53.1 WEAKNESS: ICD-10-CM

## 2018-01-22 DIAGNOSIS — E11.51 TYPE II DIABETES MELLITUS WITH PERIPHERAL CIRCULATORY DISORDER: ICD-10-CM

## 2018-01-22 DIAGNOSIS — L97.512 RIGHT FOOT ULCER, WITH FAT LAYER EXPOSED: Primary | ICD-10-CM

## 2018-01-22 LAB
POCT GLUCOSE: 71 MG/DL (ref 70–110)
POCT GLUCOSE: 96 MG/DL (ref 70–110)

## 2018-01-22 PROCEDURE — 36000704 HC OR TIME LEV I 1ST 15 MIN: Performed by: SURGERY

## 2018-01-22 PROCEDURE — 27200750 HC INSULATED NEEDLE/ STIMUPLEX: Performed by: ANESTHESIOLOGY

## 2018-01-22 PROCEDURE — 71000015 HC POSTOP RECOV 1ST HR: Performed by: SURGERY

## 2018-01-22 PROCEDURE — 63600175 PHARM REV CODE 636 W HCPCS: Performed by: ANESTHESIOLOGY

## 2018-01-22 PROCEDURE — 25000003 PHARM REV CODE 250: Performed by: SURGERY

## 2018-01-22 PROCEDURE — 71000044 HC DOSC ROUTINE RECOVERY FIRST HOUR: Performed by: SURGERY

## 2018-01-22 PROCEDURE — 37000008 HC ANESTHESIA 1ST 15 MINUTES: Performed by: SURGERY

## 2018-01-22 PROCEDURE — 11042 DBRDMT SUBQ TIS 1ST 20SQCM/<: CPT | Mod: S$GLB,,, | Performed by: PODIATRIST

## 2018-01-22 PROCEDURE — 36000705 HC OR TIME LEV I EA ADD 15 MIN: Performed by: SURGERY

## 2018-01-22 PROCEDURE — 82962 GLUCOSE BLOOD TEST: CPT | Performed by: SURGERY

## 2018-01-22 PROCEDURE — 99499 UNLISTED E&M SERVICE: CPT | Mod: S$GLB,,, | Performed by: PODIATRIST

## 2018-01-22 PROCEDURE — D9220A PRA ANESTHESIA: Mod: ANES,,, | Performed by: ANESTHESIOLOGY

## 2018-01-22 PROCEDURE — 20205 DEEP MUSCLE BIOPSY: CPT | Mod: GC,,, | Performed by: SURGERY

## 2018-01-22 PROCEDURE — 37000009 HC ANESTHESIA EA ADD 15 MINS: Performed by: SURGERY

## 2018-01-22 PROCEDURE — D9220A PRA ANESTHESIA: Mod: CRNA,,, | Performed by: NURSE ANESTHETIST, CERTIFIED REGISTERED

## 2018-01-22 PROCEDURE — 99999 PR PBB SHADOW E&M-EST. PATIENT-LVL III: CPT | Mod: PBBFAC,,, | Performed by: PODIATRIST

## 2018-01-22 RX ORDER — PROPOFOL 10 MG/ML
VIAL (ML) INTRAVENOUS CONTINUOUS PRN
Status: DISCONTINUED | OUTPATIENT
Start: 2018-01-22 | End: 2018-01-22

## 2018-01-22 RX ORDER — ONDANSETRON 2 MG/ML
INJECTION INTRAMUSCULAR; INTRAVENOUS
Status: DISCONTINUED | OUTPATIENT
Start: 2018-01-22 | End: 2018-01-22

## 2018-01-22 RX ORDER — FENTANYL CITRATE 50 UG/ML
INJECTION, SOLUTION INTRAMUSCULAR; INTRAVENOUS
Status: DISCONTINUED | OUTPATIENT
Start: 2018-01-22 | End: 2018-01-22

## 2018-01-22 RX ORDER — DOCUSATE SODIUM 100 MG/1
100 CAPSULE, LIQUID FILLED ORAL 2 TIMES DAILY
Refills: 0 | COMMUNITY
Start: 2018-01-22 | End: 2018-02-15 | Stop reason: ALTCHOICE

## 2018-01-22 RX ORDER — CEFAZOLIN SODIUM 1 G/3ML
2 INJECTION, POWDER, FOR SOLUTION INTRAMUSCULAR; INTRAVENOUS
Status: DISCONTINUED | OUTPATIENT
Start: 2018-01-22 | End: 2018-01-22 | Stop reason: HOSPADM

## 2018-01-22 RX ORDER — LIDOCAINE HCL/PF 100 MG/5ML
SYRINGE (ML) INTRAVENOUS
Status: DISCONTINUED | OUTPATIENT
Start: 2018-01-22 | End: 2018-01-22

## 2018-01-22 RX ORDER — LIDOCAINE HYDROCHLORIDE 10 MG/ML
1 INJECTION, SOLUTION EPIDURAL; INFILTRATION; INTRACAUDAL; PERINEURAL ONCE
Status: DISCONTINUED | OUTPATIENT
Start: 2018-01-22 | End: 2018-01-22 | Stop reason: HOSPADM

## 2018-01-22 RX ORDER — OXYCODONE AND ACETAMINOPHEN 5; 325 MG/1; MG/1
1-2 TABLET ORAL EVERY 4 HOURS PRN
Qty: 15 TABLET | Refills: 0 | Status: SHIPPED | OUTPATIENT
Start: 2018-01-22 | End: 2018-05-09

## 2018-01-22 RX ORDER — LIDOCAINE HYDROCHLORIDE 10 MG/ML
INJECTION INFILTRATION; PERINEURAL
Status: DISCONTINUED | OUTPATIENT
Start: 2018-01-22 | End: 2018-01-22 | Stop reason: HOSPADM

## 2018-01-22 RX ORDER — CEFAZOLIN SODIUM 1 G/3ML
INJECTION, POWDER, FOR SOLUTION INTRAMUSCULAR; INTRAVENOUS
Status: DISCONTINUED | OUTPATIENT
Start: 2018-01-22 | End: 2018-01-22

## 2018-01-22 RX ORDER — MIDAZOLAM HYDROCHLORIDE 1 MG/ML
INJECTION, SOLUTION INTRAMUSCULAR; INTRAVENOUS
Status: DISCONTINUED | OUTPATIENT
Start: 2018-01-22 | End: 2018-01-22

## 2018-01-22 RX ADMIN — MIDAZOLAM HYDROCHLORIDE 1 MG: 1 INJECTION, SOLUTION INTRAMUSCULAR; INTRAVENOUS at 10:01

## 2018-01-22 RX ADMIN — PROPOFOL 50 MCG/KG/MIN: 10 INJECTION, EMULSION INTRAVENOUS at 10:01

## 2018-01-22 RX ADMIN — ONDANSETRON 4 MG: 2 INJECTION INTRAMUSCULAR; INTRAVENOUS at 10:01

## 2018-01-22 RX ADMIN — CEFAZOLIN 2 G: 330 INJECTION, POWDER, FOR SOLUTION INTRAMUSCULAR; INTRAVENOUS at 10:01

## 2018-01-22 RX ADMIN — FENTANYL CITRATE 100 MCG: 50 INJECTION, SOLUTION INTRAMUSCULAR; INTRAVENOUS at 10:01

## 2018-01-22 RX ADMIN — LIDOCAINE HYDROCHLORIDE 60 MG: 20 INJECTION, SOLUTION INTRAVENOUS at 10:01

## 2018-01-22 RX ADMIN — LIDOCAINE HYDROCHLORIDE 5 ML: 10 INJECTION, SOLUTION INFILTRATION; PERINEURAL at 11:01

## 2018-01-22 NOTE — PROGRESS NOTES
Patient non-ambulatory at home and transfers with assistance with brother.  Discussed risk for falling if attempt to bear weight on right leg after femoral nerve block.  Patient voiced understanding of risk of falling and stated that he transfers all of the time with assistance and uses wheelchair for getting around.  Will defer placement of knee immobilizer.

## 2018-01-22 NOTE — PROGRESS NOTES
CC:   right  foot wound    HPI:   Serafin Tapia is a 67 y.o. male who presents to clinic today for evaluation of right dorsal foot wound. Patient states wound has been present a few months.   He is receiving Home health wound care.   His family is changing his bandages at home using Iodosorb.   No acute trauma reported to the area.     Date of Initial encounter of wound:  1/15/18        PCP:  Meghan Davies MD  Last PCP visit:    Chief Complaint   Patient presents with    Right foot ulcer, with fat layer exposed     rt foot ulcer          Past Medical History:   Diagnosis Date    Asthma     Diabetes mellitus type 2 in obese 5/3/2014    Elevated troponin 5/3/2014    Hyperlipidemia     Hypertension     Hypothyroidism (acquired) 11/1/2017    Obesity     Pulmonary embolism 05/2014    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             Current Outpatient Prescriptions on File Prior to Visit   Medication Sig Dispense Refill    amlodipine-benazepril 10-20mg (LOTREL) 10-20 mg per capsule Take 1 capsule by mouth once daily.      ascorbic acid, vitamin C, (VITAMIN C) 500 MG tablet Take 1 tablet (500 mg total) by mouth 2 (two) times daily.      docusate sodium (COLACE) 100 MG capsule Take 1 capsule (100 mg total) by mouth 2 (two) times daily.  0    ergocalciferol (ERGOCALCIFEROL) 50,000 unit Cap Take 1 capsule (50,000 Units total) by mouth every 7 days. 4 capsule 1    ferrous sulfate 325 mg (65 mg iron) Tab tablet Take 1 tablet (325 mg total) by mouth 3 (three) times daily. 270 tablet 0    folic acid (FOLVITE) 1 MG tablet Take 1 tablet (1 mg total) by mouth once daily. 30 tablet 0    furosemide (LASIX) 20 MG tablet Take 1 tablet (20 mg total) by mouth once daily. 30 tablet 2    levothyroxine 50 mcg Cap Take 25 mcg by mouth before breakfast. 30 tablet 2    multivitamin (ONE DAILY MULTIVITAMIN) per tablet Take 1 tablet by mouth once daily. PER [facility]       oxyCODONE-acetaminophen (PERCOCET) 5-325 mg per tablet Take 1-2 tablets by mouth every 4 (four) hours as needed. 15 tablet 0    rivaroxaban (XARELTO) 20 mg Tab Take 1 tablet (20 mg total) by mouth once daily. (Patient taking differently: Take 20 mg by mouth once daily. ) 30 tablet 3    zinc sulfate (ZINCATE) 220 (50) mg capsule Take 220 mg by mouth once daily. PER [facility]       Current Facility-Administered Medications on File Prior to Visit   Medication Dose Route Frequency Provider Last Rate Last Dose    [DISCONTINUED] ceFAZolin injection 2 g  2 g Intravenous On Call Procedure Felton Foster Jr., MD        [DISCONTINUED] ceFAZolin injection    PRN Silver Hernández MD   2 g at 01/22/18 1023    [DISCONTINUED] fentaNYL injection    PRN Silver Hernández MD   100 mcg at 01/22/18 1016    [DISCONTINUED] lidocaine (cardiac) injection    PRN Silver Hernández MD   60 mg at 01/22/18 1028    [DISCONTINUED] lidocaine (PF) 10 mg/ml (1%) injection 10 mg  1 mL Intradermal Once Felton Foster Jr., MD        [DISCONTINUED] lidocaine HCL 10 mg/ml (1%) injection    PRN Felton Foster Jr., MD   5 mL at 01/22/18 1101    [DISCONTINUED] midazolam injection   Intravenous PRN Silver Hernández MD   1 mg at 01/22/18 1023    [DISCONTINUED] ondansetron injection    PRN Silver Hernández MD   4 mg at 01/22/18 1056    [DISCONTINUED] propofol (DIPRIVAN) 10 mg/mL infusion    Continuous PRN Silver Hernández MD   Stopped at 01/22/18 1051       Review of patient's allergies indicates:  No Known Allergies        Social History     Social History    Marital status: Single     Spouse name: N/A    Number of children: N/A    Years of education: N/A     Occupational History    Retired       Social History Main Topics    Smoking status: Former Smoker     Types: Cigarettes    Smokeless tobacco: Never Used      Comment: Quit smoking as a teenager    Alcohol use 0.6 - 1.2 oz/week     1 -  "2 Cans of beer per week      Comment: 1-2 drinks occasionally    Drug use: No    Sexual activity: Yes     Partners: Female     Birth control/ protection: Condom     Other Topics Concern    Not on file     Social History Narrative    Lives w/daughter.            ROS:  Negative for fever, chills, nights sweats, nausea, vomiting, disorientation        EXAM:  Vitals:    01/22/18 1530   BP: (!) 164/92   Pulse: 91   Weight: 124.7 kg (275 lb)   Height: 5' 10" (1.778 m)        General:   fatigued, no distress    Right Lower extremity physical exam:  Vascular: Dorsalis Pedis:  absent   Posterior Tibial:  absent  capillary refill time:  3 seconds  Temperature of toes cool to touch  Hair on toes:  absent    There is minimal edema.  no varicosities.      Neurological:     sharp dull - R decreased and light touch - R decreased  SWMF: Absent      Musculoskeletal:    Right foot: no gross deformity      Dermatological:     Location:   Dorsal right foot  Wound: Dimensions: 2.9 cm x 1.9 cm x 0.2cm (pre debridement).    See post debride size below  Depth: 2 mm.  Base: open, minimal, serous drainage and fibrotic base; 40% granular   Exudate: minimal Serous  Deisy-wound: Normal  Deisy-wound erythema: none   Edema: {Mild   Wound site does not probe to bone  Odor is not present   Cellulitis: none  right foot      12/15/17    1/15/18          12/13/17 MIHIR Impression:    Right Leg: Segmental pressures are mildly reduced at DPA,however the PVR waveforms suggest minimal peripheral arterial occlusive disease.       Microbiology:  Aerobic Bacterial Culture CITROBACTER KOSERI   Moderate        Aerobic Bacterial Culture ENTEROBACTER AEROGENES   Moderate        Aerobic Bacterial Culture PROTEUS MIRABILIS   Moderate                 ASSESSMENT / PLAN:     Right foot ulcer, with fat layer exposed    Type II diabetes mellitus with peripheral circulatory disorder           I counseled the patient on the patient's conditions, their implications and " "medical management.    Discussed appropriate home care of this wound., Wound redressed.    Offloading:   surgical shoe      Follow-up:Patient is to return to the clinic in one week for follow-up but should call [facility] immediately if any signs of infection, such as fever, chills, sweats, increased redness or pain.    Short-term goals include maintaining good offloading and minimizing bioburden, promoting granulation and epithelialization to healing.  Long-term goals include keeping the wound healed by good offloading and medical management under the direction of internist.        Wound Debridement  Date/Time: 1/22/2018 4:28 PM  Performed by: MATTY ANDERSON  Authorized by: MATTY ANDERSON     Time out: Immediately prior to procedure a "time out" was called to verify the correct patient, procedure, equipment, support staff and site/side marked as required.    Consent Done?:  Yes (Verbal)    Preparation: Patient was prepped and draped in usual sterile fashion    Local anesthesia used?: No      Wound Details:    Location:  Right foot    Type of Debridement:  Excisional       Length (cm):  3       Area (sq cm):  6       Width (cm):  2       Percent Debrided (%):  100       Depth (cm):  0.2       Total Area Debrided (sq cm):  6    Depth of debridement:  Subcutaneous tissue    Tissue debrided:  Subcutaneous, Epidermis and Dermis    Devitalized tissue debrided:  Sough, Fibrin, Callus and Biofilm    Instruments:  Forceps and Blade    Bleeding:  Minimal  Patient tolerance:  Patient tolerated the procedure well with no immediate complications     The wound was irrigated with sterile normal saline.  Iodosorb and football dresisng was applied.         "

## 2018-01-22 NOTE — DISCHARGE INSTRUCTIONS
Non-weight bearing to RLE without full assist for 24 hours    Ok to remove dressing in 48 hours and shower.  Steri strips stay on for 2 weeks.  No soaking or swimming      Discharge Instructions: After Your Surgery  Youve just had surgery. During surgery, you were given medicine called anesthesia to keep you relaxed and free of pain. After surgery, you may have some pain or nausea. This is common. Here are some tips for feeling better and getting well after surgery.     Stay on schedule with your medicine.   Going home  Your healthcare provider will show you how to take care of yourself when you go home. He or she will also answer your questions. Have an adult family member or friend drive you home. For the first 24 hours after your surgery:  · Do not drive or use heavy equipment.  · Do not make important decisions or sign legal papers.  · Do not drink alcohol.  · Have someone stay with you, if needed. He or she can watch for problems and help keep you safe.  Be sure to go to all follow-up visits with your healthcare provider. And rest after your surgery for as long as your healthcare provider tells you to.  Coping with pain  If you have pain after surgery, pain medicine will help you feel better. Take it as told, before pain becomes severe. Also, ask your healthcare provider or pharmacist about other ways to control pain. This might be with heat, ice, or relaxation. And follow any other instructions your surgeon or nurse gives you.  Tips for taking pain medicine  To get the best relief possible, remember these points:  · Pain medicines can upset your stomach. Taking them with a little food may help.  · Most pain relievers taken by mouth need at least 20 to 30 minutes to start to work.  · Taking medicine on a schedule can help you remember to take it. Try to time your medicine so that you can take it before starting an activity. This might be before you get dressed, go for a walk, or sit down for  dinner.  · Constipation is a common side effect of pain medicines. Call your healthcare provider before taking any medicines such as laxatives or stool softeners to help ease constipation. Also ask if you should skip any foods. Drinking lots of fluids and eating foods such as fruits and vegetables that are high in fiber can also help. Remember, do not take laxatives unless your surgeon has prescribed them.  · Drinking alcohol and taking pain medicine can cause dizziness and slow your breathing. It can even be deadly. Do not drink alcohol while taking pain medicine.  · Pain medicine can make you react more slowly to things. Do not drive or run machinery while taking pain medicine.  Your healthcare provider may tell you to take acetaminophen to help ease your pain. Ask him or her how much you are supposed to take each day. Acetaminophen or other pain relievers may interact with your prescription medicines or other over-the-counter (OTC) medicines. Some prescription medicines have acetaminophen and other ingredients. Using both prescription and OTC acetaminophen for pain can cause you to overdose. Read the labels on your OTC medicines with care. This will help you to clearly know the list of ingredients, how much to take, and any warnings. It may also help you not take too much acetaminophen. If you have questions or do not understand the information, ask your pharmacist or healthcare provider to explain it to you before you take the OTC medicine.  Managing nausea  Some people have an upset stomach after surgery. This is often because of anesthesia, pain, or pain medicine, or the stress of surgery. These tips will help you handle nausea and eat healthy foods as you get better. If you were on a special food plan before surgery, ask your healthcare provider if you should follow it while you get better. These tips may help:  · Do not push yourself to eat. Your body will tell you when to eat and how much.  · Start off with  clear liquids and soup. They are easier to digest.  · Next try semi-solid foods, such as mashed potatoes, applesauce, and gelatin, as you feel ready.  · Slowly move to solid foods. Dont eat fatty, rich, or spicy foods at first.  · Do not force yourself to have 3 large meals a day. Instead eat smaller amounts more often.  · Take pain medicines with a small amount of solid food, such as crackers or toast, to avoid nausea.     Call your surgeon if  · You still have pain an hour after taking medicine. The medicine may not be strong enough.  · You feel too sleepy, dizzy, or groggy. The medicine may be too strong.  · You have side effects like nausea, vomiting, or skin changes, such as rash, itching, or hives.       If you have obstructive sleep apnea  You were given anesthesia medicine during surgery to keep you comfortable and free of pain. After surgery, you may have more apnea spells because of this medicine and other medicines you were given. The spells may last longer than usual.   At home:  · Keep using the continuous positive airway pressure (CPAP) device when you sleep. Unless your healthcare provider tells you not to, use it when you sleep, day or night. CPAP is a common device used to treat obstructive sleep apnea.  · Talk with your provider before taking any pain medicine, muscle relaxants, or sedatives. Your provider will tell you about the possible dangers of taking these medicines.  Date Last Reviewed: 12/1/2016  © 8417-7820 [employer]. [address] [place] [address], [place] [address]. All rights reserved. This information is not intended as a substitute for professional medical care. Always follow your healthcare professional's instructions.

## 2018-01-22 NOTE — H&P (VIEW-ONLY)
History & Physical    SUBJECTIVE:     History of Present Illness:  Patient is a 67 y.o. male presents with Bilateral lower>upper extremity weakness.  Followed by leyla who has sent him over and asked for a muscle biopsy.  The muscle weakness bagan last year and has improved somewhat but is still present.  He has some days that are better than others.    Chief Complaint   Patient presents with    Consult       Review of patient's allergies indicates:  No Known Allergies    Current Outpatient Prescriptions   Medication Sig Dispense Refill    amlodipine-benazepril 10-20mg (LOTREL) 10-20 mg per capsule Take 1 capsule by mouth once daily.      ascorbic acid, vitamin C, (VITAMIN C) 500 MG tablet Take 1 tablet (500 mg total) by mouth 2 (two) times daily.      ergocalciferol (ERGOCALCIFEROL) 50,000 unit Cap Take 1 capsule (50,000 Units total) by mouth every 7 days. 4 capsule 1    ferrous sulfate 325 mg (65 mg iron) Tab tablet Take 1 tablet (325 mg total) by mouth 3 (three) times daily. 270 tablet 0    folic acid (FOLVITE) 1 MG tablet Take 1 tablet (1 mg total) by mouth once daily. 30 tablet 0    furosemide (LASIX) 20 MG tablet Take 1 tablet (20 mg total) by mouth once daily. 30 tablet 2    levothyroxine (SYNTHROID) 25 MCG tablet Take 1 tablet (25 mcg total) by mouth before breakfast. 30 tablet 11    multivitamin (ONE DAILY MULTIVITAMIN) per tablet Take 1 tablet by mouth once daily. PER [facility]      rivaroxaban (XARELTO) 20 mg Tab Take 1 tablet (20 mg total) by mouth once daily. 30 tablet 3    zinc sulfate (ZINCATE) 220 (50) mg capsule Take 220 mg by mouth once daily. PER [facility]       No current facility-administered medications for this visit.        Past Medical History:   Diagnosis Date    Asthma     Diabetes mellitus type 2 in obese 5/3/2014    Elevated troponin 5/3/2014    Hyperlipidemia     Hypertension     Hypothyroidism (acquired) 11/1/2017    Obesity     Pulmonary embolism  05/2014    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     Past Surgical History:   Procedure Laterality Date    TONSILLECTOMY       Family History   Problem Relation Age of Onset    Diabetes Mother     Heart disease Mother     Hypertension Mother     Stroke Mother     Heart disease Father     Hypertension Father     Diabetes Father     Cancer Father     Cataracts Sister     Hypertension Sister     Diabetes Sister     Glaucoma Maternal Aunt     Glaucoma Paternal Aunt     Hypertension Brother     Diabetes Brother     No Known Problems Maternal Grandmother     No Known Problems Maternal Grandfather     No Known Problems Paternal Grandmother     No Known Problems Paternal Grandfather     Hypertension Son     Hypertension Daughter     Cirrhosis Neg Hx      Social History   Substance Use Topics    Smoking status: Former Smoker     Types: Cigarettes    Smokeless tobacco: Never Used      Comment: Quit smoking as a teenager    Alcohol use 0.6 - 1.2 oz/week     1 - 2 Cans of beer per week      Comment: 1-2 drinks occasionally        Review of Systems:  Review of Systems   Constitutional: Negative for activity change, appetite change, chills, diaphoresis, fatigue and fever.   HENT: Negative for congestion, sinus pressure, sneezing, sore throat and tinnitus.    Eyes: Negative for pain, discharge, redness, itching and visual disturbance.   Respiratory: Negative for apnea, cough, choking, chest tightness and shortness of breath.    Cardiovascular: Negative for chest pain, palpitations and leg swelling.   Gastrointestinal: Negative for abdominal distention, abdominal pain, blood in stool, constipation, diarrhea and nausea.   Musculoskeletal: Positive for gait problem. Negative for arthralgias and back pain.   Neurological: Positive for weakness. Negative for dizziness, facial asymmetry, light-headedness and headaches.   Psychiatric/Behavioral: Negative for agitation,  "behavioral problems and confusion.       OBJECTIVE:     Vital Signs (Most Recent)  Temp: 98.3 °F (36.8 °C) (01/15/18 1534)  Pulse: 97 (01/15/18 1534)  BP: (!) 161/80 (01/15/18 1534)  5' 10" (1.778 m)  124.7 kg (275 lb)     Physical Exam:  Physical Exam   Constitutional: He is oriented to person, place, and time. He appears well-developed and well-nourished. No distress.   HENT:   Head: Normocephalic and atraumatic.   Right Ear: External ear normal.   Left Ear: External ear normal.   Eyes: EOM are normal. Right eye exhibits no discharge. Left eye exhibits no discharge. No scleral icterus.   Neck: Neck supple.   Cardiovascular: Normal rate, regular rhythm and normal heart sounds.  Exam reveals no gallop and no friction rub.    No murmur heard.  Pulmonary/Chest: Effort normal and breath sounds normal. No respiratory distress. He has no wheezes. He has no rales.   Abdominal: Soft. Bowel sounds are normal. He exhibits no distension and no mass. There is no tenderness. There is no rebound and no guarding. No hernia.   Musculoskeletal: Normal range of motion.   B Extremtiy weakness.  L>U   Neurological: He is alert and oriented to person, place, and time.   Skin: Skin is warm and dry. No rash noted. He is not diaphoretic. No erythema. No pallor.   Psychiatric: He has a normal mood and affect. His behavior is normal. Judgment and thought content normal.       ASSESSMENT/PLAN:     Extremity weakness    PLAN:Plan     To OR for muscle biopsy  Consent obtained       Felton Foster MD    "

## 2018-01-22 NOTE — OP NOTE
[facility]  Surgery Department  Operative Note    SUMMARY     Date of Procedure: 1/22/2018     Procedure: Procedure(s) (LRB):  BIOPSY-MUSCLE (Right)     Surgeon(s) and Role:     * Nehemias Canseco MD - Resident - Assisting     * Felton Foster Jr., MD - Primary        Pre-Operative Diagnosis: Weakness [R53.1]    Post-Operative Diagnosis: Post-Op Diagnosis Codes:     * Weakness [R53.1]    Anesthesia: Local MAC    Technical Procedures Used: 68 y/o M referred for lower> upper muscle weakness for biopsy.  Pt did consent to the procedure.  He was taken to the OR, placed supine where he was sedated. The right leg was prepped and draped.  A timeout was completed.   An incision was made over the lateral margin of the vastus lateralis.  This was carried deep to the tensor fascia and the muscle fascia which were each opened.  A chord of muscle was elevated in line with its fibers, tied with silk ties both proximally and distally, and cut.  Cut surfaces were cauterized.  Hemostasis was achieved.  The muscle fascia layers were closed with 2-0 vicryl and skin was closed in layers with 3-0 vicryl and 4-0 moncryl.  Dressing were applied.  Pt tolerated the procedure without apparent complication.  All instrument and sponge counts were correct.  Pt was transported to PACU in stable condition.      Description of the Findings of the Procedure: Edematous tissue, [id number] of muscle excisional biopsy    Significant Surgical Tasks Conducted by the Assistant(s), if Applicable: Assist    Complications: No    Estimated Blood Loss (EBL): minimal           Implants: * No implants in log *    Specimens:   Specimen (12h ago through future)    Start     Ordered    01/22/18 1058  Specimen to Pathology - Surgery  Once     Comments:  1. Right Thigh- MUSCLE BIOPSY-FRESH PLEASE SEND TO [facility]      01/22/18 4742                  Condition: Good    Disposition: PACU - hemodynamically stable.    Attestation: I was  present and scrubbed for the entire procedure.

## 2018-01-22 NOTE — PROGRESS NOTES
Patient tolerating oral liquids without difficulty. No apparent s&s of distress noted at this time, no complaints voiced at this time. Discharge instructions reviewed with patient/family/friend with good verbal feedback received. Reinforced no weight bearing for 24 hours to right lower extremity. Patient ready for discharge.

## 2018-01-22 NOTE — TRANSFER OF CARE
"Anesthesia Transfer of Care Note    Patient: Serafin Tapia    Procedure(s) Performed: Procedure(s) (LRB):  BIOPSY-MUSCLE (Right)    Patient location: PACU    Anesthesia Type: regional and MAC    Transport from OR: Transported from OR on 6-10 L/min O2 by face mask with adequate spontaneous ventilation    Post pain: adequate analgesia    Post assessment: no apparent anesthetic complications and tolerated procedure well    Post vital signs: stable    Level of consciousness: lethargic, awake and alert    Nausea/Vomiting: no nausea/vomiting    Complications: none    Transfer of care protocol was followed      Last vitals:   Visit Vitals  BP (!) 157/107 (BP Location: Right arm, Patient Position: Lying)   Pulse 93   Temp 36.5 °C (97.7 °F) (Oral)   Resp 16   Ht 5' 10" (1.778 m)   Wt 124.7 kg (275 lb)   SpO2 100%   BMI 39.46 kg/m²     "

## 2018-01-22 NOTE — BRIEF OP NOTE
[facility]  Brief Operative Note     SUMMARY     Surgery Date: 1/22/2018     Surgeon(s) and Role:     * Nehemias Canseco MD - Resident - Assisting     * Felton Foster Jr., MD - Primary        Pre-op Diagnosis:  Weakness [R53.1]    Post-op Diagnosis:  Post-Op Diagnosis Codes:     * Weakness [R53.1]    Procedure(s) (LRB):  BIOPSY-MUSCLE (Right)    Anesthesia: Local MAC    Description of the findings of the procedure: Right vastus lateralis muscle biopsy    Findings/Key Components: As above, edematous tissue superficial and within muscle compartment    Estimated Blood Loss: minimal         Specimens:   Specimen (12h ago through future)    Start     Ordered    01/22/18 1058  Specimen to Pathology - Surgery  Once     Comments:  1. Right Thigh- MUSCLE BIOPSY-FRESH PLEASE SEND TO [facility]      01/22/18 1059          Discharge Note    SUMMARY     Admit Date: 1/22/2018    Discharge Date and Time:  01/22/2018 11:18 AM    Hospital Course (synopsis of major diagnoses, care, treatment, and services provided during the course of the hospital stay): OP surgery     Final Diagnosis: Post-Op Diagnosis Codes:     * Weakness [R53.1]    Disposition: Home or Self Care    Follow Up/Patient Instructions:     Medications:  Reconciled Home Medications:   Current Discharge Medication List      START taking these medications    Details   docusate sodium (COLACE) 100 MG capsule Take 1 capsule (100 mg total) by mouth 2 (two) times daily.  Refills: 0      oxyCODONE-acetaminophen (PERCOCET) 5-325 mg per tablet Take 1-2 tablets by mouth every 4 (four) hours as needed.  Qty: 15 tablet, Refills: 0         CONTINUE these medications which have NOT CHANGED    Details   furosemide (LASIX) 20 MG tablet Take 1 tablet (20 mg total) by mouth once daily.  Qty: 30 tablet, Refills: 2      rivaroxaban (XARELTO) 20 mg Tab Take 1 tablet (20 mg total) by mouth once daily.  Qty: 30 tablet, Refills: 3      amlodipine-benazepril  10-20mg (LOTREL) 10-20 mg per capsule Take 1 capsule by mouth once daily.      ascorbic acid, vitamin C, (VITAMIN C) 500 MG tablet Take 1 tablet (500 mg total) by mouth 2 (two) times daily.      ergocalciferol (ERGOCALCIFEROL) 50,000 unit Cap Take 1 capsule (50,000 Units total) by mouth every 7 days.  Qty: 4 capsule, Refills: 1    Associated Diagnoses: Vitamin D insufficiency      ferrous sulfate 325 mg (65 mg iron) Tab tablet Take 1 tablet (325 mg total) by mouth 3 (three) times daily.  Qty: 270 tablet, Refills: 0      folic acid (FOLVITE) 1 MG tablet Take 1 tablet (1 mg total) by mouth once daily.  Qty: 30 tablet, Refills: 0      levothyroxine 50 mcg Cap Take 25 mcg by mouth before breakfast.  Qty: 30 tablet, Refills: 2      multivitamin (ONE DAILY MULTIVITAMIN) per tablet Take 1 tablet by mouth once daily. PER [facility]      zinc sulfate (ZINCATE) 220 (50) mg capsule Take 220 mg by mouth once daily. PER [facility]             Discharge Procedure Orders  Notify your health care provider if you experience any of the following:  increased confusion or weakness     Notify your health care provider if you experience any of the following:  persistent dizziness, light-headedness, or visual disturbances     Notify your health care provider if you experience any of the following:  worsening rash     Notify your health care provider if you experience any of the following:  severe persistent headache     Notify your health care provider if you experience any of the following:  difficulty breathing or increased cough     Notify your health care provider if you experience any of the following:  redness, tenderness, or signs of infection (pain, swelling, redness, odor or green/yellow discharge around incision site)     Notify your health care provider if you experience any of the following:  severe uncontrolled pain     Notify your health care provider if you experience any of the following:  persistent nausea  and vomiting or diarrhea     Notify your health care provider if you experience any of the following:  temperature >100.4     Remove dressing in 48 hours   Order Comments: Ok to remove dressing in 48 hours and shower.  Steri strips stay on for 2 weeks.  No soaking or swimming     Activity as tolerated   Order Comments: Non-weight bearing to RLE without full assist for 24 hours       Follow-up Information     Felton Foster Jr, MD. Schedule an appointment as soon as possible for a visit in 2 weeks.    Specialties:  General Surgery, Bariatrics  Contact information:  [address]  [facility] [address] [phone]

## 2018-01-22 NOTE — ANESTHESIA PROCEDURE NOTES
Femoral Nerve Single Injection Block    Patient location during procedure: OR    Reason for block: primary anesthetic   Diagnosis: right thigh pain   Start time: 1/22/2018 10:28 AM  Timeout: 1/22/2018 10:27 AM   End time: 1/22/2018 10:30 AM  Staffing  Anesthesiologist: KEKE RESENDEZ  Resident/CRNA: JAYA ROBERTS  Performed: resident/CRNA   Preanesthetic Checklist  Completed: patient identified, site marked, surgical consent, pre-op evaluation, timeout performed, IV checked, risks and benefits discussed and monitors and equipment checked  Peripheral Block  Patient position: supine  Prep: ChloraPrep  Patient monitoring: heart rate, cardiac monitor, continuous pulse ox, continuous capnometry and frequent blood pressure checks  Block type: femoral  Laterality: right  Injection technique: single shot  Needle  Needle type: Stimuplex   Needle gauge: 22 G  Needle length: 2 in  Needle localization: anatomical landmarks and ultrasound guidance   -ultrasound image captured on disc.  Assessment  Injection assessment: negative aspiration, negative parasthesia and local visualized surrounding nerve  Paresthesia pain: none  Heart rate change: no  Slow fractionated injection: yes  Medications:  Bolus administered: 20 mL of 2.0 mepivacaine  Epinephrine added: 3.75 mcg/mL (1/300,000)  Additional Notes  VSS.  [facility] RN monitoring vitals throughout procedure.  Patient tolerated procedure well.

## 2018-01-22 NOTE — INTERVAL H&P NOTE
Lower > Upper muscle weakness  Will plan on right (primary)  vs left thigh biopsy    The patient has been examined and the H&P has been reviewed:    I concur with the findings and no changes have occurred since H&P was written.    Anesthesia/Surgery risks, benefits and alternative options discussed and understood by patient/family.          Active Hospital Problems    Diagnosis  POA    Weakness [R53.1]  Yes      Resolved Hospital Problems    Diagnosis Date Resolved POA   No resolved problems to display.

## 2018-01-22 NOTE — ANESTHESIA PREPROCEDURE EVALUATION
01/22/2018  Serafin Tapia is a 67 y.o., male with history of CPK elevation.  Thought to be due to statin exposure but patient has been off statin for months and continues with symptoms and lab abnormalities.  Here for muscle biopsy.    Anesthesia Evaluation    I have reviewed the Patient Summary Reports.    I have reviewed the Nursing Notes.   I have reviewed the Medications.     Review of Systems  Anesthesia Hx:  No problems with previous Anesthesia    Cardiovascular:   Hypertension Echo result from 12/17 reviewed.  CONCLUSIONS     1 - Normal left ventricular systolic function (EF 60-65%).     2 - No wall motion abnormalities.     3 - Normal left ventricular diastolic function.     4 - Normal right ventricular systolic function .     5 - The estimated PA systolic pressure is 30 mmHg.     6 - Mild tricuspid regurgitation   Pulmonary:   Asthma    Musculoskeletal:   Arthritis     Neurological:   Neuromuscular Disease,    Endocrine:   Diabetes Hypothyroidism        Physical Exam  General:  Obesity, Well nourished    Airway/Jaw/Neck:  Airway Findings: Mouth Opening: Normal Tongue: Normal  General Airway Assessment: Adult  Mallampati: III  Improves to II, III with phonation.  TM Distance: Normal, at least 6 cm      Dental:  Dental Findings: Periodontal disease, Severe Numerous missing. All left are loose   Chest/Lungs:  Chest/Lungs Findings: Clear to auscultation     Heart/Vascular:  Heart Findings: Rate: Normal  Rhythm: Regular Rhythm  Sounds: Normal        Mental Status:  Mental Status Findings:  Cooperative, Alert and Oriented         Anesthesia Plan  Type of Anesthesia, risks & benefits discussed:  Anesthesia Type:  general  Patient's Preference:   Intra-op Monitoring Plan:   Intra-op Monitoring Plan Comments:   Post Op Pain Control Plan:   Post Op Pain Control Plan Comments:   Induction:   IV  Beta Blocker:   Patient is not currently on a Beta-Blocker (No further documentation required).       Informed Consent: Patient understands risks and agrees with Anesthesia plan.  Questions answered. Anesthesia consent signed with patient.  ASA Score: 3     Day of Surgery Review of History & Physical:    H&P update referred to the surgeon.     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  Still possible that he has some other type of inherited myopathy or other intrinsic muscle disorder.  This could potentially predisposed him to a hyperkalemic crisis if exposed to certain anesthetics.  Will plan to avoid inhaled agents except for Nitrous Oxide as well as avoid succinylcholine.  Will avoid non depolarizer NMB as much as possible.          Ready For Surgery From Anesthesia Perspective.

## 2018-01-22 NOTE — ANESTHESIA RELEASE NOTE
Anesthesia Release from PACU Note    Patient name: Serafin Tapia    Procedure(s): Procedure(s) (LRB):  BIOPSY-MUSCLE (Right)    Anesthesia type: mac    Post pain: adequate analgesia    Post assessment: no apparent complications    Last vitals:   Vitals:    01/22/18 1145   BP: 135/81   Pulse: 90   Resp: 16   Temp:        Post vital signs: stable    Level of consciousness: alert     Nausea/Vomiting: no nausea/no vomiting    Complications: none    Airway Patency:  patent    Respiratory: unassisted    Cardiovascular: stable and blood pressure at baseline    Hydration: euvolemic

## 2018-01-22 NOTE — ANESTHESIA POSTPROCEDURE EVALUATION
"Anesthesia Post Evaluation    Patient: Serafin Tapia    Procedure(s) Performed: Procedure(s) (LRB):  BIOPSY-MUSCLE (Right)    Final Anesthesia Type: MAC  Patient participation: No - Unable to Participate, Other Reason (see comments)  Level of consciousness: awake  Post-procedure vital signs: reviewed and stable  Pain management: adequate  Airway patency: patent  PONV status at discharge: No PONV  Anesthetic complications: no      Cardiovascular status: stable  Respiratory status: unassisted  Hydration status: euvolemic  Follow-up not needed.        Visit Vitals  /81 (BP Location: Right arm, Patient Position: Sitting)   Pulse 90   Temp 36.4 °C (97.6 °F) (Skin)   Resp 16   Ht 5' 10" (1.778 m)   Wt 124.7 kg (275 lb)   SpO2 100%   BMI 39.46 kg/m²       Pain/Hadley Score: Pain Assessment Performed: Yes (1/22/2018 11:23 AM)  Presence of Pain: denies (1/22/2018 11:23 AM)  Hadley Score: 10 (1/22/2018 11:23 AM)      "

## 2018-01-30 ENCOUNTER — PATIENT MESSAGE (OUTPATIENT)
Dept: INTERNAL MEDICINE | Facility: CLINIC | Age: 68
End: 2018-01-30

## 2018-01-31 ENCOUNTER — TELEPHONE (OUTPATIENT)
Dept: INTERNAL MEDICINE | Facility: CLINIC | Age: 68
End: 2018-01-31

## 2018-01-31 DIAGNOSIS — R53.1 WEAKNESS: ICD-10-CM

## 2018-01-31 DIAGNOSIS — T46.6X5A STATIN MYOPATHY: ICD-10-CM

## 2018-01-31 DIAGNOSIS — G72.0 STATIN MYOPATHY: ICD-10-CM

## 2018-01-31 DIAGNOSIS — L97.511 SKIN ULCER OF RIGHT FOOT, LIMITED TO BREAKDOWN OF SKIN: Primary | ICD-10-CM

## 2018-01-31 RX ORDER — FOLIC ACID 1 MG/1
1 TABLET ORAL DAILY
Qty: 30 TABLET | Refills: 5 | Status: SHIPPED | OUTPATIENT
Start: 2018-01-31 | End: 2018-04-10 | Stop reason: SDUPTHER

## 2018-01-31 NOTE — TELEPHONE ENCOUNTER
Hi Dr. Barrera:         This is Serafin Tapia.  Dr. Mccarthy wants to schedule me for some infusion therapy to try and reboot my immune system. It'll require five daily sessions. Since my mobility is so limited, I would like to have it done in the hospital. Dr. Mccarthy claims the insurance company won't pay for it but I don't think she even submitted it or inquired.           I believe the infusion will help, but I  can't afford to travel back and forth. I'm also concerned that a compromised immune system needs a sterile environment.  Any help you can provide would be greatly appreciated.  Thanks.     Message forwarded to Dr barrera

## 2018-01-31 NOTE — TELEPHONE ENCOUNTER
----- Message from Kathryn Abdi sent at 1/31/2018 12:26 PM CST -----  Contact: pt [phone]  RX request - refill or new RX.  Is this a refill or new RX:  refill  RX name and strength: folic acid (FOLVITE) 1 MG tablet  Directions:   Is this a 30 day or 90 day RX:    Pharmacy name and phone # [id number] [phone]  Comments:

## 2018-01-31 NOTE — TELEPHONE ENCOUNTER
Spoke with patient, instructed to contact medical services number to ask if they can provide 5 days of transport for IVIG infusion due to muscle weakness and need for w/c transport. Also, patient can notify [facility] that PCP to Dr Davies.

## 2018-01-31 NOTE — TELEPHONE ENCOUNTER
Admission would likely not be possible if it is just for the infusions. Will contact [employer] rep to determine other methods to arrange help with transportation.

## 2018-01-31 NOTE — TELEPHONE ENCOUNTER
----- Message from Jo-Ann Escudero sent at 1/31/2018 12:00 PM CST -----  Contact: saul with [facility] [phone] ext [phone]  _  1st Request  _  2nd Request  _  3rd Request        Who: saul with [facility] [phone] ext [phone]    Why: Requesting a call back in regards to recertification order for home health. Faxed it on Monday and didn't get it back. Please call     What Number to Call Back:saul with [facility] [phone] ext [phone]    When to Expect a call back: (Within 24 hours)    Please return the call at earliest convenience. Thanks!

## 2018-01-31 NOTE — TELEPHONE ENCOUNTER
----- Message from Jose Ramon Butler sent at 1/31/2018  1:58 PM CST -----  Contact: Bella - [facility] at [phone] ext [phone]  Bella calling to request orders to be put in to recertify pt for home health wound care.

## 2018-02-02 DIAGNOSIS — E11.9 TYPE 2 DIABETES MELLITUS WITHOUT COMPLICATION: ICD-10-CM

## 2018-02-05 ENCOUNTER — OFFICE VISIT (OUTPATIENT)
Dept: SURGERY | Facility: CLINIC | Age: 68
End: 2018-02-05
Payer: MEDICARE

## 2018-02-05 ENCOUNTER — OFFICE VISIT (OUTPATIENT)
Dept: PODIATRY | Facility: CLINIC | Age: 68
End: 2018-02-05
Payer: MEDICARE

## 2018-02-05 VITALS
HEIGHT: 70 IN | HEIGHT: 70 IN | TEMPERATURE: 98 F | DIASTOLIC BLOOD PRESSURE: 89 MMHG | HEART RATE: 86 BPM | BODY MASS INDEX: 39.37 KG/M2 | WEIGHT: 275 LBS | HEART RATE: 83 BPM | SYSTOLIC BLOOD PRESSURE: 183 MMHG | DIASTOLIC BLOOD PRESSURE: 88 MMHG | TEMPERATURE: 98 F | SYSTOLIC BLOOD PRESSURE: 155 MMHG

## 2018-02-05 DIAGNOSIS — Z09 POSTOP CHECK: ICD-10-CM

## 2018-02-05 DIAGNOSIS — L97.512 RIGHT FOOT ULCER, WITH FAT LAYER EXPOSED: Primary | ICD-10-CM

## 2018-02-05 DIAGNOSIS — E11.51 TYPE II DIABETES MELLITUS WITH PERIPHERAL CIRCULATORY DISORDER: ICD-10-CM

## 2018-02-05 PROCEDURE — 99999 PR PBB SHADOW E&M-EST. PATIENT-LVL III: CPT | Mod: PBBFAC,,, | Performed by: PODIATRIST

## 2018-02-05 PROCEDURE — 11042 DBRDMT SUBQ TIS 1ST 20SQCM/<: CPT | Mod: S$GLB,,, | Performed by: PODIATRIST

## 2018-02-05 PROCEDURE — 99999 PR PBB SHADOW E&M-EST. PATIENT-LVL II: CPT | Mod: PBBFAC,,, | Performed by: SURGERY

## 2018-02-05 PROCEDURE — 99024 POSTOP FOLLOW-UP VISIT: CPT | Mod: S$GLB,,, | Performed by: SURGERY

## 2018-02-05 PROCEDURE — 99499 UNLISTED E&M SERVICE: CPT | Mod: S$GLB,,, | Performed by: PODIATRIST

## 2018-02-05 NOTE — PROGRESS NOTES
"Serafin Tapia is a 67 y.o. male patient.   1. Postop check      Past Medical History:   Diagnosis Date    Asthma     Diabetes mellitus type 2 in obese 5/3/2014    Elevated troponin 5/3/2014    Hyperlipidemia     Hypertension     Hypothyroidism (acquired) 11/1/2017    Obesity     Postop check 2/5/2018    Pulmonary embolism 05/2014    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     Past Surgical History Pertinent Negatives:   Procedure Date Noted    ABDOMINAL SURGERY 05/02/2014    APPENDECTOMY 05/02/2014    BACK SURGERY 05/02/2014    BRAIN SURGERY 05/02/2014    BREAST SURGERY 05/02/2014    CARDIAC SURGERY 05/02/2014    COLON SURGERY 05/02/2014    COSMETIC SURGERY 05/02/2014    EYE SURGERY 05/02/2014    FRACTURE SURGERY 05/02/2014    GASTRECTOMY 05/02/2014    HERNIA REPAIR 05/02/2014    HYSTERECTOMY 05/02/2014    JOINT REPLACEMENT 05/02/2014    KIDNEY TRANSPLANT 05/02/2014    MASTECTOMY 05/02/2014    SKIN BIOPSY 05/02/2014    VASCULAR SURGERY 05/02/2014       Review of patient's allergies indicates:  No Known Allergies  There are no hospital problems to display for this patient.    Blood pressure (!) 183/88, pulse 83, temperature 97.6 °F (36.4 °C), height 5' 10" (1.778 m), weight 124.7 kg (275 lb).    Subjective   Doing well.  No pain.  Incision well healed.  Pathology pending    Objective   NAD  R lateral thigh incision well healed, no infection     Assessment & Plan   66 yo M 2 weeks s/p R lateral thigh muscle biopsy    Regular diet and activity  F/u PRN    Scott Hale MD  2/5/2018     I have personally taken the history and examined this patient and agree with the resident's note as stated above.         Felton Foster MD      "

## 2018-02-05 NOTE — PROGRESS NOTES
Chief Complaint   Patient presents with    Foot Ulcer     PCP Dr. Davies 12/28/17    Foot Problem    Follow-up    Wound Care    Dressing Change             HPI:   Serafin Tapia is a 67 y.o. male who presents to clinic today for evaluation of right dorsal foot wound. Patient states wound has been present a few months.   He is receiving Home health wound care.   His family is changing his bandages at home using Santyl.   No acute trauma reported to the area.     Date of Initial encounter of wound:  1/15/18    No new issues.           PCP:  Meghan Davies MD  Last PCP visit:    Chief Complaint   Patient presents with    Foot Ulcer     PCP Dr. Davies 12/28/17    Foot Problem    Follow-up    Wound Care    Dressing Change          Past Medical History:   Diagnosis Date    Asthma     Diabetes mellitus type 2 in obese 5/3/2014    Elevated troponin 5/3/2014    Hyperlipidemia     Hypertension     Hypothyroidism (acquired) 11/1/2017    Obesity     Pulmonary embolism 05/2014    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             Current Outpatient Prescriptions on File Prior to Visit   Medication Sig Dispense Refill    amlodipine-benazepril 10-20mg (LOTREL) 10-20 mg per capsule Take 1 capsule by mouth once daily.      ascorbic acid, vitamin C, (VITAMIN C) 500 MG tablet Take 1 tablet (500 mg total) by mouth 2 (two) times daily.      docusate sodium (COLACE) 100 MG capsule Take 1 capsule (100 mg total) by mouth 2 (two) times daily.  0    ergocalciferol (ERGOCALCIFEROL) 50,000 unit Cap Take 1 capsule (50,000 Units total) by mouth every 7 days. 4 capsule 1    ferrous sulfate 325 mg (65 mg iron) Tab tablet Take 1 tablet (325 mg total) by mouth 3 (three) times daily. 270 tablet 0    folic acid (FOLVITE) 1 MG tablet Take 1 tablet (1 mg total) by mouth once daily. 30 tablet 5    furosemide (LASIX) 20 MG tablet Take 1 tablet (20 mg total) by mouth once daily. 30 tablet 2  "   levothyroxine 50 mcg Cap Take 25 mcg by mouth before breakfast. 30 tablet 2    multivitamin (ONE DAILY MULTIVITAMIN) per tablet Take 1 tablet by mouth once daily. PER [facility]      oxyCODONE-acetaminophen (PERCOCET) 5-325 mg per tablet Take 1-2 tablets by mouth every 4 (four) hours as needed. 15 tablet 0    rivaroxaban (XARELTO) 20 mg Tab Take 1 tablet (20 mg total) by mouth once daily. (Patient taking differently: Take 20 mg by mouth once daily. ) 30 tablet 3    zinc sulfate (ZINCATE) 220 (50) mg capsule Take 220 mg by mouth once daily. PER [facility]       No current facility-administered medications on file prior to visit.        Review of patient's allergies indicates:  No Known Allergies        Social History     Social History    Marital status: Single     Spouse name: N/A    Number of children: N/A    Years of education: N/A     Occupational History    Retired       Social History Main Topics    Smoking status: Former Smoker     Types: Cigarettes    Smokeless tobacco: Never Used      Comment: Quit smoking as a teenager    Alcohol use 0.6 - 1.2 oz/week     1 - 2 Cans of beer per week      Comment: 1-2 drinks occasionally    Drug use: No    Sexual activity: Yes     Partners: Female     Birth control/ protection: Condom     Other Topics Concern    Not on file     Social History Narrative    Lives w/daughter.            ROS:  Negative for fever, chills, nights sweats, nausea, vomiting, disorientation        EXAM:  Vitals:    02/05/18 1502   BP: (!) 155/89   Pulse: 86   Temp: 98.3 °F (36.8 °C)   Height: 5' 10" (1.778 m)        General:   fatigued, no distress    Right Lower extremity physical exam:  Vascular: Dorsalis Pedis:  absent   Posterior Tibial:  absent  capillary refill time:  3 seconds  Temperature of toes cool to touch  Hair on toes:  absent    There is minimal edema.  no varicosities.      Neurological:     sharp dull - R decreased and light touch - " "R decreased  SWMF: Absent      Musculoskeletal:    Right foot: no gross deformity      Dermatological:     Location:   Dorsal right foot  Wound: Dimensions: 0.9 cm x 0.7cm x 0.2cm (pre debridement).    See post debride size below  Depth: 1 mm.  Base: 100% granular.   Exudate:  none  Deisy-wound: Normal  Deisy-wound erythema: none   Edema: {Mild   Wound site does not probe to bone  Odor is not present   Cellulitis: none  right foot      12/15/17    1/15/18    2/5/18                12/13/17 MIHIR Impression:    Right Leg: Segmental pressures are mildly reduced at DPA,however the PVR waveforms suggest minimal peripheral arterial occlusive disease.       Microbiology:  Aerobic Bacterial Culture CITROBACTER KOSERI   Moderate        Aerobic Bacterial Culture ENTEROBACTER AEROGENES   Moderate        Aerobic Bacterial Culture PROTEUS MIRABILIS   Moderate                 ASSESSMENT / PLAN:     Right foot ulcer, with fat layer exposed  -     SUBSEQUENT HOME HEALTH ORDERS    Type II diabetes mellitus with peripheral circulatory disorder  -     SUBSEQUENT HOME HEALTH ORDERS           I counseled the patient on the patient's conditions, their implications and medical management.    Discussed appropriate home care of this wound., Wound redressed.    Offloading:   surgical shoe      Follow-up:Patient is to return to the clinic in one week for follow-up but should call [facility] immediately if any signs of infection, such as fever, chills, sweats, increased redness or pain.    Short-term goals include maintaining good offloading and minimizing bioburden, promoting granulation and epithelialization to healing.  Long-term goals include keeping the wound healed by good offloading and medical management under the direction of internist.        Wound Debridement  Date/Time: 2/5/2018 3:23 PM  Performed by: MATTY ANDERSON  Authorized by: MATTY ANDERSON     Time out: Immediately prior to procedure a "time out" was called to verify the correct " patient, procedure, equipment, support staff and site/side marked as required.    Consent Done?:  Yes (Verbal)    Preparation: Patient was prepped and draped in usual sterile fashion    Local anesthesia used?: No      Wound Details:    Location:  Right foot    Location:  Right Dorsal Foot    Type of Debridement:  Excisional       Length (cm):  2       Area (sq cm):  1.6       Width (cm):  0.8       Percent Debrided (%):  100       Depth (cm):  0.1       Total Area Debrided (sq cm):  1.6    Depth of debridement:  Subcutaneous tissue    Tissue debrided:  Subcutaneous, Epidermis and Dermis    Devitalized tissue debrided:  Sough, Fibrin, Callus and Biofilm    Instruments:  Curette    Bleeding:  Minimal  Hemostasis Achieved: Yes    Method Used:  Pressure  Patient tolerance:  Patient tolerated the procedure well with no immediate complications     The wound was irrigated with sterile normal saline.  Triple antibiotics was applied.      - With patient's permission, Annika Eisenberg MA assisted me with the application of a football dressing under my direct supervision. Darco shoe applied and patient instructed to never walk without the Darco shoe on the foot.         follow up 2 weeks or sooner if concerned.

## 2018-02-05 NOTE — LETTER
February 5, 2018      Joleen Matthews, NP  [address] [address]           [facility]  [address]  [place] [address]  Phone: [phone]          Patient: Serafin Tapia   MR Number: [id number]   YOB: 1950   Date of Visit: 2/5/2018       Dear Joleen Matthews:    Thank you for referring Serafin Tapia to me for evaluation. Attached you will find relevant portions of my assessment and plan of care.    If you have questions, please do not hesitate to call me. I look forward to following Serafin Tapia along with you.    Sincerely,    Lizzie Salguero, MORGAN    Enclosure  CC:  No Recipients    If you would like to receive this communication electronically, please contact [email or web address] or [phone] to request more information on [facility] access.    For providers and/or their staff who would like to refer a patient to [facility], please contact us through our one-stop-shop provider referral line, Tenzin Mercer, at [phone].    If you feel you have received this communication in error or would no longer like to receive these types of communications, please e-mail [email or web address]

## 2018-02-15 ENCOUNTER — OFFICE VISIT (OUTPATIENT)
Dept: RHEUMATOLOGY | Facility: CLINIC | Age: 68
End: 2018-02-15
Payer: MEDICARE

## 2018-02-15 ENCOUNTER — LAB VISIT (OUTPATIENT)
Dept: LAB | Facility: HOSPITAL | Age: 68
End: 2018-02-15
Attending: INTERNAL MEDICINE
Payer: MEDICARE

## 2018-02-15 VITALS — DIASTOLIC BLOOD PRESSURE: 83 MMHG | HEIGHT: 70 IN | SYSTOLIC BLOOD PRESSURE: 142 MMHG | HEART RATE: 81 BPM

## 2018-02-15 DIAGNOSIS — G72.49 INFLAMMATORY MYOPATHY: ICD-10-CM

## 2018-02-15 DIAGNOSIS — G72.49 INFLAMMATORY MYOPATHY: Primary | ICD-10-CM

## 2018-02-15 DIAGNOSIS — G72.49 IMMUNE MYOPATHY: ICD-10-CM

## 2018-02-15 LAB
ALBUMIN SERPL BCP-MCNC: 3.5 G/DL
ALP SERPL-CCNC: 80 U/L
ALT SERPL W/O P-5'-P-CCNC: 106 U/L
ANION GAP SERPL CALC-SCNC: 7 MMOL/L
AST SERPL-CCNC: 107 U/L
BASOPHILS # BLD AUTO: 0.01 K/UL
BASOPHILS NFR BLD: 0.2 %
BILIRUB SERPL-MCNC: 0.3 MG/DL
BUN SERPL-MCNC: 14 MG/DL
CALCIUM SERPL-MCNC: 10.2 MG/DL
CHLORIDE SERPL-SCNC: 102 MMOL/L
CK SERPL-CCNC: 4515 U/L
CO2 SERPL-SCNC: 34 MMOL/L
CREAT SERPL-MCNC: 0.9 MG/DL
CRP SERPL-MCNC: 6.5 MG/L
DIFFERENTIAL METHOD: ABNORMAL
EOSINOPHIL # BLD AUTO: 0.1 K/UL
EOSINOPHIL NFR BLD: 1.2 %
ERYTHROCYTE [DISTWIDTH] IN BLOOD BY AUTOMATED COUNT: 14.6 %
ERYTHROCYTE [SEDIMENTATION RATE] IN BLOOD BY WESTERGREN METHOD: 15 MM/HR
EST. GFR  (AFRICAN AMERICAN): >60 ML/MIN/1.73 M^2
EST. GFR  (NON AFRICAN AMERICAN): >60 ML/MIN/1.73 M^2
GLUCOSE SERPL-MCNC: 127 MG/DL
HCT VFR BLD AUTO: 43.6 %
HGB BLD-MCNC: 13.8 G/DL
IMM GRANULOCYTES # BLD AUTO: 0.01 K/UL
IMM GRANULOCYTES NFR BLD AUTO: 0.2 %
LYMPHOCYTES # BLD AUTO: 1.7 K/UL
LYMPHOCYTES NFR BLD: 33.2 %
MCH RBC QN AUTO: 27.1 PG
MCHC RBC AUTO-ENTMCNC: 31.7 G/DL
MCV RBC AUTO: 86 FL
MONOCYTES # BLD AUTO: 0.3 K/UL
MONOCYTES NFR BLD: 6.5 %
NEUTROPHILS # BLD AUTO: 3 K/UL
NEUTROPHILS NFR BLD: 58.7 %
NRBC BLD-RTO: 0 /100 WBC
PLATELET # BLD AUTO: 304 K/UL
PMV BLD AUTO: 11.1 FL
POTASSIUM SERPL-SCNC: 4.7 MMOL/L
PROT SERPL-MCNC: 7.4 G/DL
RBC # BLD AUTO: 5.09 M/UL
SODIUM SERPL-SCNC: 143 MMOL/L
WBC # BLD AUTO: 5.09 K/UL

## 2018-02-15 PROCEDURE — 1125F AMNT PAIN NOTED PAIN PRSNT: CPT | Mod: S$GLB,,, | Performed by: INTERNAL MEDICINE

## 2018-02-15 PROCEDURE — 99999 PR PBB SHADOW E&M-EST. PATIENT-LVL III: CPT | Mod: PBBFAC,,, | Performed by: INTERNAL MEDICINE

## 2018-02-15 PROCEDURE — 82550 ASSAY OF CK (CPK): CPT

## 2018-02-15 PROCEDURE — 3008F BODY MASS INDEX DOCD: CPT | Mod: S$GLB,,, | Performed by: INTERNAL MEDICINE

## 2018-02-15 PROCEDURE — 85025 COMPLETE CBC W/AUTO DIFF WBC: CPT

## 2018-02-15 PROCEDURE — 85651 RBC SED RATE NONAUTOMATED: CPT

## 2018-02-15 PROCEDURE — 1159F MED LIST DOCD IN RCRD: CPT | Mod: S$GLB,,, | Performed by: INTERNAL MEDICINE

## 2018-02-15 PROCEDURE — 80053 COMPREHEN METABOLIC PANEL: CPT

## 2018-02-15 PROCEDURE — 99214 OFFICE O/P EST MOD 30 MIN: CPT | Mod: S$GLB,,, | Performed by: INTERNAL MEDICINE

## 2018-02-15 PROCEDURE — 36415 COLL VENOUS BLD VENIPUNCTURE: CPT

## 2018-02-15 PROCEDURE — 86140 C-REACTIVE PROTEIN: CPT

## 2018-02-15 PROCEDURE — 82085 ASSAY OF ALDOLASE: CPT

## 2018-02-15 PROCEDURE — 99499 UNLISTED E&M SERVICE: CPT | Mod: S$GLB,,, | Performed by: INTERNAL MEDICINE

## 2018-02-15 RX ORDER — OMEPRAZOLE 40 MG/1
40 CAPSULE, DELAYED RELEASE ORAL DAILY
Qty: 30 CAPSULE | Refills: 3 | Status: SHIPPED | OUTPATIENT
Start: 2018-02-15 | End: 2018-07-09 | Stop reason: SDUPTHER

## 2018-02-15 RX ORDER — SULFAMETHOXAZOLE AND TRIMETHOPRIM 800; 160 MG/1; MG/1
TABLET ORAL
Qty: 15 TABLET | Refills: 3 | Status: SHIPPED | OUTPATIENT
Start: 2018-02-15 | End: 2018-08-09

## 2018-02-15 RX ORDER — ALENDRONATE SODIUM 70 MG/1
70 TABLET ORAL
Qty: 4 TABLET | Refills: 3 | Status: SHIPPED | OUTPATIENT
Start: 2018-02-15 | End: 2018-04-10 | Stop reason: SDUPTHER

## 2018-02-15 RX ORDER — PREDNISONE 20 MG/1
20 TABLET ORAL 3 TIMES DAILY
Qty: 90 TABLET | Refills: 2 | Status: SHIPPED | OUTPATIENT
Start: 2018-02-15 | End: 2018-03-17

## 2018-02-15 RX ORDER — FERROUS SULFATE, DRIED 160(50) MG
1 TABLET, EXTENDED RELEASE ORAL 2 TIMES DAILY WITH MEALS
Qty: 60 TABLET | Refills: 3 | COMMUNITY
Start: 2018-02-15 | End: 2018-02-21 | Stop reason: SDUPTHER

## 2018-02-15 ASSESSMENT — ROUTINE ASSESSMENT OF PATIENT INDEX DATA (RAPID3)
AM STIFFNESS SCORE: 1, YES
MDHAQ FUNCTION SCORE: 2.4
WHEN YOU AWAKENED IN THE MORNING OVER THE LAST WEEK, PLEASE INDICATE THE AMOUNT OF TIME IT TAKES UNTIL YOU ARE AS LIMBER AS YOU WILL BE FOR THE DAY: 20 MINUTES
PSYCHOLOGICAL DISTRESS SCORE: 3.3
PATIENT GLOBAL ASSESSMENT SCORE: 7
TOTAL RAPID3 SCORE: 7
FATIGUE SCORE: 0
PAIN SCORE: 6

## 2018-02-15 NOTE — PROGRESS NOTES
Chief Complaint   Patient presents with    Appointment       Patient is here for a follow up    History of presenting illness    Since I saw him :    He did not get the IVIG  His insurance has terminated and he says nobody called him from the infusion center  He did not get physical therapy    He has not improved,he still needs support to stand and walk  He is wasting muscles in his legs and thighs    His last labs :    ESR 20  CMP : ,,mild hypoalbuminemia   Mild anemia 13.6/42.7  plts 375  Aldolase 30.4  CK 3810  No IgA deficiency    His foot has done really well  No infection as per podiatry    Muscle biopsy : necrotising myopathy    Initial evaluation :    Patient with long standing :  HTN, HLD, diabetes mellitus and obesity,asthma,hypothyroidism    He was seen in the hospital due to worsening shortness of breath    Prior to that he was at [facility] for hypoglycemia due to sulfonylurea and he was noted to have CK of 4000 to 5000.He got hydration and he developed anasarca and pulmonary edema and then got diuresed.  He was diagnosed to have rhabdomyolysis, His CPK at the time of discharge was 5553.   He had elevated ESR,CRP  HALLE negative     Since March 2017 he has been weak  Prior to the hospitalisations he was very mobile,no weakness,ambulated with no asistance  During this admission he couldn't walk,he couldn't stand,he couldn't lift thighs,couldnt climb stairs    He denies any fevers, chills, rashes, mucosal ulcers, hematuria, dysphagia, vision changes, Raynaud's, n/v/d.     Pt reports a 60-70 pound weight loss since March.       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 He developed a wound on the right foot which he reports healed. He sts that after his swelling while hospitalized caused the wound to flare up.      He had MRI inpatient and that didn't support myositis  Cks trended down to  3867     Rheumatology team sent off : myomarker panel and HMG co-a reductase antibodies and asked him to come back  EMG and muscle biopsy planned but didn't happen  His sob was attributed to heart failure  Necrotising myopathy due to statins considered  Deconditioning considered  PT suggested      He then came back 4 weeks ago :  -feeling better  -he gets only 2 days of home PT  He has days when he can stand up and walk 30 feet and there are days when he is weak  He is off statins for > 6 weeks  He is moving in the right direction but not completely normal  The wound on the foot has gotten worse again,it is foul smelling    LABS THAT WERE PENDING    Myomarker panel negative  APLAS negative  HMG co-a reductase ab > 200 units     Past history : HTN, HLD, diabetes mellitus and obesity,asthma,hypothyroidism    Family history:RA (brother) and SLE (cousins).    Social history : not a current smoker and alcoholic    Review of Systems   Constitutional: Negative for activity change, appetite change, chills, diaphoresis, fatigue, fever and unexpected weight change.   HENT: Negative for congestion, dental problem, drooling, ear discharge, ear pain, facial swelling, hearing loss, mouth sores, nosebleeds, postnasal drip, rhinorrhea, sinus pain, sinus pressure, sneezing, sore throat, tinnitus, trouble swallowing and voice change.    Eyes: Negative for photophobia, pain, discharge, redness, itching and visual disturbance.   Respiratory: Negative for apnea, cough, choking, chest tightness, shortness of breath, wheezing and stridor.    Cardiovascular: Negative for chest pain, palpitations and leg swelling.   Gastrointestinal: Negative for abdominal distention, abdominal pain, anal bleeding, blood in stool, constipation, diarrhea, nausea, rectal pain and vomiting.   Endocrine: Negative for cold intolerance, heat intolerance, polydipsia, polyphagia and polyuria.   Genitourinary: Negative for decreased urine volume, difficulty  urinating, dysuria, enuresis, flank pain, frequency, genital sores, hematuria and urgency.   Musculoskeletal: Negative for arthralgias, back pain, gait problem, joint swelling, myalgias, neck pain and neck stiffness.   Skin: Negative for color change, pallor, rash and wound.   Allergic/Immunologic: Negative for environmental allergies, food allergies and immunocompromised state.   Neurological: Positive for weakness. Negative for dizziness, tremors, seizures, syncope, facial asymmetry, speech difficulty, light-headedness, numbness and headaches.   Hematological: Negative for adenopathy. Does not bruise/bleed easily.   Psychiatric/Behavioral: Negative for agitation, behavioral problems, confusion, decreased concentration, dysphoric mood, hallucinations, self-injury, sleep disturbance and suicidal ideas. The patient is not nervous/anxious and is not hyperactive.         Physical Exam     LUCIANO-28 tender joint count: 0  LUCIANO-28 swollen joint count: 0     UE    B/l he can use his hand  5/5  Forearm strength :flexion and extension 5/5  Biceps b/l 4/5,rest 5/5    LE    B/L ankle DF,PF 5/5  Leg flexion and extension 4/5  Thigh flexion and extension 4/5    Physical Exam   Constitutional: He is oriented to person, place, and time and well-developed, well-nourished, and in no distress. No distress.   HENT:   Head: Normocephalic.   Mouth/Throat: Oropharynx is clear and moist.   Eyes: Conjunctivae are normal. Pupils are equal, round, and reactive to light. Right eye exhibits no discharge. Left eye exhibits no discharge. No scleral icterus.   Neck: Normal range of motion. No thyromegaly present.   Cardiovascular: Normal rate, regular rhythm, normal heart sounds and intact distal pulses.    Pulmonary/Chest: Effort normal and breath sounds normal. No stridor.   Abdominal: Soft. Bowel sounds are normal.   Lymphadenopathy:     He has no cervical adenopathy.   Neurological: He is alert and oriented to person, place, and time.    Skin: Skin is warm. No rash noted. He is not diaphoretic.     Psychiatric: Affect and judgment normal.   Musculoskeletal: Normal range of motion.       Laboratory abnormalities noted    Vit d low,replacement started  Iron deficiency    Mild anemia :   Renal function normal    ALT 99  H/H 12.9/41.7  CRP 9.5  ESR 21    Assessment     67 year old black male comes with weakness in the proximal and distal upper and lower extremities with sob   Patient seen inpatient last month and was evaluated extensively by our rheumatology team  From all the w/u he seems to be HALLE and armaan-1 negative,myomarker panel negative  He has very high titres of HMG-COA reductase antibodies positivity  Muscle biopsy positive for necrotising myopathy  He has been on statins for > 10 years with no change in dose of the medication,no change in type of statin    He has immune mediated necrotising myopathy  Drug/toxin induced myopathy on the differential as per path report    He needs aggressive treatment    1. Inflammatory myopathy    2. Immune myopathy          Plan    Will rpt all the labs today : ck,aldolase,ESR,CRP,CBC,CMP    Had podiatry team come to the office today to take a look at the wound  They think there is no more infection    Patient cannot go for IVIG infusions,cannot make the trip  Suggested prednisone 60 mg daily for 4 weeks  Will introduce steroid sparing agent in 4 weeks     Calcium vit d,fosamax,omeprazole all ordered  PCP prophylaxis sent    Podiatry follow ups and diabetes control very important,emphasised    See him back in 4 weeks     Arrange for more aggressive physical therapy,have given him a PT referral    Serafin was seen today for appointment.    Diagnoses and all orders for this visit:    Inflammatory myopathy  -     CK; Future  -     Aldolase; Future  -     CBC auto differential; Future  -     Comprehensive metabolic panel; Future  -     Sedimentation rate, manual; Future  -     C-reactive protein;  Standing    Immune myopathy    Other orders  -     predniSONE (DELTASONE) 20 MG tablet; Take 1 tablet (20 mg total) by mouth 3 (three) times daily.  -     omeprazole (PRILOSEC) 40 MG capsule; Take 1 capsule (40 mg total) by mouth once daily.  -     alendronate (FOSAMAX) 70 MG tablet; Take 1 tablet (70 mg total) by mouth every 7 days.  -     calcium-vitamin D3 (CALCIUM 500 + D) 500 mg(1,250mg) -200 unit per tablet; Take 1 tablet by mouth 2 (two) times daily with meals.  -     sulfamethoxazole-trimethoprim 800-160mg (BACTRIM DS) 800-160 mg Tab; 1 tab 3 times a week      Follow up in 4 weeks

## 2018-02-16 LAB — ALDOLASE SERPL-CCNC: 30.2 U/L

## 2018-02-19 ENCOUNTER — PATIENT MESSAGE (OUTPATIENT)
Dept: INTERNAL MEDICINE | Facility: CLINIC | Age: 68
End: 2018-02-19

## 2018-02-19 ENCOUNTER — PATIENT MESSAGE (OUTPATIENT)
Dept: RHEUMATOLOGY | Facility: CLINIC | Age: 68
End: 2018-02-19

## 2018-02-19 RX ORDER — AMLODIPINE AND BENAZEPRIL HYDROCHLORIDE 10; 20 MG/1; MG/1
1 CAPSULE ORAL DAILY
Qty: 30 CAPSULE | Refills: 2 | Status: SHIPPED | OUTPATIENT
Start: 2018-02-19 | End: 2018-04-10

## 2018-02-19 NOTE — TELEPHONE ENCOUNTER
----- Message from Wally Nagel sent at 2/19/2018  1:43 PM CST -----  Contact: Patient [phone]  Is this a refill or new RX:  Refill    RX name and strength: amlodipine-benazepril 10-20mg (LOTREL) 10-20 mg per capsule    Pharmacy name and phone # [id number] - [facility] [address] [facility] [phone] (phone) [phone] (Fax)

## 2018-02-19 NOTE — TELEPHONE ENCOUNTER
irena requesting in-patient physical therapy. Reports Dr Davies stated this would be a great help at visit today. Message sent to Dr Davies.

## 2018-02-20 RX ORDER — LEVOTHYROXINE SODIUM 50 UG/1
50 CAPSULE ORAL
Qty: 30 CAPSULE | Refills: 2 | Status: SHIPPED | OUTPATIENT
Start: 2018-02-20 | End: 2018-06-06 | Stop reason: SDUPTHER

## 2018-02-20 RX ORDER — METFORMIN HYDROCHLORIDE 1000 MG/1
1000 TABLET ORAL 2 TIMES DAILY WITH MEALS
Qty: 180 TABLET | Refills: 0 | Status: SHIPPED | OUTPATIENT
Start: 2018-02-20 | End: 2019-12-19 | Stop reason: SDUPTHER

## 2018-02-20 NOTE — TELEPHONE ENCOUNTER
Spoke with patient, since starting Prednisone blood sugars have been between 160 and low 200's. Latest reading 222 today and 227 yesterday evening.  Would like to know if he should restart his Metformin 1000mg he was taking previously. Message sent to Dr Davies.

## 2018-02-20 NOTE — TELEPHONE ENCOUNTER
Spoke with patient's brother. He will notify patient that ok to start metformin.    Previously on metformin and amaryl, stopped amaryl due to hypoglycemia and metformin stopped last year. Currently on prednisone 20mg tid.      OK to resume metformin but advise patient to monitor BG closely. Discussed that might not be effective and he may need to be on insulin for better BG control,while he takes the steroid.

## 2018-02-20 NOTE — TELEPHONE ENCOUNTER
Spoke with patient's brother. PT and PM&R were arranged by Rheum. PM&R would need to evaluate him and his needs.

## 2018-02-21 DIAGNOSIS — R53.1 WEAKNESS: ICD-10-CM

## 2018-02-21 DIAGNOSIS — G72.9 MYOPATHY: Primary | ICD-10-CM

## 2018-02-21 RX ORDER — FERROUS SULFATE, DRIED 160(50) MG
1 TABLET, EXTENDED RELEASE ORAL 2 TIMES DAILY WITH MEALS
Qty: 60 TABLET | Refills: 3 | COMMUNITY
Start: 2018-02-21 | End: 2023-03-08

## 2018-02-21 NOTE — TELEPHONE ENCOUNTER
Spoke with patient, has been taking medication as presribed. Instructed to check b/p readings twice daily and call in few days with results.

## 2018-02-21 NOTE — TELEPHONE ENCOUNTER
"----- Message from Elif Ruben sent at 2/21/2018 12:47 PM CST -----  Contact: Self   [phone]  RX request - refill or new RX.  Is this a refill or new RX:  refill  RX name and strength: Calcium-vitamin D3 (CALCIUM 500 + D) 500 mg(1,250mg) -200 unit per tablet  Directions:   Is this a 30 day or 90 day RX:  30  Pharmacy name and phone # (DON'T enter "on file" or "in chart"): [facility] [phone] (Phone)  Or  [phone] (Fax)  Comments:        "

## 2018-02-22 ENCOUNTER — PATIENT MESSAGE (OUTPATIENT)
Dept: RHEUMATOLOGY | Facility: CLINIC | Age: 68
End: 2018-02-22

## 2018-02-26 ENCOUNTER — PATIENT MESSAGE (OUTPATIENT)
Dept: INTERNAL MEDICINE | Facility: CLINIC | Age: 68
End: 2018-02-26

## 2018-02-27 ENCOUNTER — TELEPHONE (OUTPATIENT)
Dept: INTERNAL MEDICINE | Facility: CLINIC | Age: 68
End: 2018-02-27

## 2018-02-27 DIAGNOSIS — E55.9 VITAMIN D INSUFFICIENCY: ICD-10-CM

## 2018-02-27 RX ORDER — ERGOCALCIFEROL 1.25 MG/1
50000 CAPSULE ORAL
Qty: 4 CAPSULE | Refills: 0 | Status: SHIPPED | OUTPATIENT
Start: 2018-02-27 | End: 2018-08-09

## 2018-02-27 NOTE — TELEPHONE ENCOUNTER
Patient feels Alendronate is making his CBG levels higher on Saturdays when he takes it.  Normal CBG range reported as , but on Saturday levels or 150 or greater.

## 2018-02-27 NOTE — TELEPHONE ENCOUNTER
Spoke with patient, instructed to begin OTC vitamin D at 2000units daily per Dr Davies instructions.

## 2018-02-27 NOTE — TELEPHONE ENCOUNTER
Spoke with patient.    The elevation in blood sugars is related to the prednisone. Fosamax is not commonly associated with elevated blood sugars although can rarely be reported.     on day of Fosamax but later in the day, it was back to 100's. Ok to continue fosamax.    Patient to keep PM&R appt on 2/28

## 2018-02-27 NOTE — TELEPHONE ENCOUNTER
"----- Message from Dana Friedman sent at 2/27/2018 11:45 AM CST -----  Contact: Self/[phone]  RX request - refill or new RX.  Is this a refill or new RX:  refill  RX name and strength: ergocalciferol (ERGOCALCIFEROL) 50,000 unit Cap  Directions: Take 1 capsule (50,000 Units total) by mouth every 7 days. - Oral  Is this a 30 day or 90 day RX:    Pharmacy name and phone # (DON'T enter "on file" or "in chart"): [facility] : [phone] (Phone) or [phone] (Fax)  Comments:        "

## 2018-02-28 ENCOUNTER — LAB VISIT (OUTPATIENT)
Dept: LAB | Facility: HOSPITAL | Age: 68
End: 2018-02-28
Attending: INTERNAL MEDICINE
Payer: MEDICARE

## 2018-02-28 ENCOUNTER — INITIAL CONSULT (OUTPATIENT)
Dept: PHYSICAL MEDICINE AND REHAB | Facility: CLINIC | Age: 68
End: 2018-02-28
Payer: MEDICARE

## 2018-02-28 VITALS
WEIGHT: 243.94 LBS | HEART RATE: 92 BPM | BODY MASS INDEX: 34.15 KG/M2 | SYSTOLIC BLOOD PRESSURE: 179 MMHG | DIASTOLIC BLOOD PRESSURE: 88 MMHG | HEIGHT: 71 IN

## 2018-02-28 DIAGNOSIS — G72.0 STATIN MYOPATHY: Primary | ICD-10-CM

## 2018-02-28 DIAGNOSIS — E03.9 HYPOTHYROIDISM (ACQUIRED): ICD-10-CM

## 2018-02-28 DIAGNOSIS — R29.898 WEAKNESS OF EXTREMITY: ICD-10-CM

## 2018-02-28 DIAGNOSIS — R26.81 GAIT INSTABILITY: ICD-10-CM

## 2018-02-28 DIAGNOSIS — T46.6X5A STATIN MYOPATHY: Primary | ICD-10-CM

## 2018-02-28 LAB — TSH SERPL DL<=0.005 MIU/L-ACNC: 1.18 UIU/ML

## 2018-02-28 PROCEDURE — 3079F DIAST BP 80-89 MM HG: CPT | Mod: S$GLB,,, | Performed by: PHYSICAL MEDICINE & REHABILITATION

## 2018-02-28 PROCEDURE — 84443 ASSAY THYROID STIM HORMONE: CPT

## 2018-02-28 PROCEDURE — 99999 PR PBB SHADOW E&M-EST. PATIENT-LVL III: CPT | Mod: PBBFAC,,, | Performed by: PHYSICAL MEDICINE & REHABILITATION

## 2018-02-28 PROCEDURE — 99499 UNLISTED E&M SERVICE: CPT | Mod: S$GLB,,, | Performed by: PHYSICAL MEDICINE & REHABILITATION

## 2018-02-28 PROCEDURE — 99214 OFFICE O/P EST MOD 30 MIN: CPT | Mod: S$GLB,,, | Performed by: PHYSICAL MEDICINE & REHABILITATION

## 2018-02-28 PROCEDURE — 3077F SYST BP >= 140 MM HG: CPT | Mod: S$GLB,,, | Performed by: PHYSICAL MEDICINE & REHABILITATION

## 2018-02-28 PROCEDURE — 36415 COLL VENOUS BLD VENIPUNCTURE: CPT

## 2018-02-28 NOTE — PROGRESS NOTES
Subjective:       Patient ID: Serafin Tapia is a 67 y.o. male.    Chief Complaint: Gait Problem and Extremity Weakness    HPI   Mr. Tapia is coming first time to clinic for evaluation of generalized weakness,   Legs > arm weakness     Referred by his Rheumatologist, Dr. Chiang for decondition and   Intensive PT. Pt would like to get into IP Rehab.     Patient is coming with his brother, that is helping him for a mobility,and ADLs,   Since pt lives alone in apartment.   Patient with PMH significant for HTN, HLD, diabetes mellitus and obesity,asthma,hypothyroidism  Patient reports beening sick since October-November 2017, and has been hospitalized multiple time since Oct. 2017 for muscle weakness in both legs > arms.   He was d/c to [facility] in Nov 2017, and had HH, PT+OT   According to Rheum note patient is being treated for inflammatory, immune   Myopathy.   Muscle bx, showed necrotising myopathy.   He has immune mediated necrotising myopathy  Drug/toxin induced myopathy on the differential as per path report  He has been on statins for > 10 years with no change in dose of the medication, no change in type of statin. He is off statins.  He did not receive IVIG infusions, and Prednisone 60 mg daily for 4 weeks was started, with plan to introduce steroid sparing agent in 4 weeks   Calcium vit D,fosamax,omeprazole given.  Deconditioning considered, and PT suggested, by Rheum.       He lost a significant > 60 -70 lbs wt ( since 03/17) and has a significant muscle wasting jb in legs, and thighs, cannot  ankles, .he has b/l foot drop.  Patient reports significant  improvment of his muscle strength, he feels much better since all new medications were started.     Prior Functional status:   Prior to the hospitalisations he was very mobile,no weakness,ambulated with no asistance  During initial hospitalization, he couldn't walk,he couldn't stand,he couldn't lift thighs,couldnt climb stairs.   He is  now able to get up, and walk 50-60 ft with RW, with MDA of his brother, and WC in behind him.  He is able to stand up, for several minutes, < 5 minutes, before he needs to sit down. He can roll himself in manual  WC.   He has all DME: RW, hospital bed, manual wheel chair.  Social Hx:   He lives alone in apartment, not a current smoker and alcoholic.   He is here for evaluation for IP Rehab.    Past Medical History:   Diagnosis Date    Asthma     Diabetes mellitus type 2 in obese 5/3/2014    Elevated troponin 5/3/2014    Hyperlipidemia     Hypertension     Hypothyroidism (acquired) 11/1/2017    Obesity     Postop check 2/5/2018    Pulmonary embolism 05/2014    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       Past Surgical History:   Procedure Laterality Date    TONSILLECTOMY         Family History   Problem Relation Age of Onset    Diabetes Mother     Heart disease Mother     Hypertension Mother     Stroke Mother     Heart disease Father     Hypertension Father     Diabetes Father     Cancer Father     Cataracts Sister     Hypertension Sister     Diabetes Sister     Glaucoma Maternal Aunt     Glaucoma Paternal Aunt     Hypertension Brother     Diabetes Brother     No Known Problems Maternal Grandmother     No Known Problems Maternal Grandfather     No Known Problems Paternal Grandmother     No Known Problems Paternal Grandfather     Hypertension Son     Hypertension Daughter     Cirrhosis Neg Hx        Social History     Social History    Marital status: Single     Spouse name: N/A    Number of children: N/A    Years of education: N/A     Occupational History    Retired       Social History Main Topics    Smoking status: Former Smoker     Types: Cigarettes    Smokeless tobacco: Never Used      Comment: Quit smoking as a teenager    Alcohol use 0.6 - 1.2 oz/week     1 - 2 Cans of beer per week      Comment: 1-2 drinks  occasionally    Drug use: No    Sexual activity: Yes     Partners: Female     Birth control/ protection: Condom     Other Topics Concern    None     Social History Narrative    Lives w/daughter.        Current Outpatient Prescriptions   Medication Sig Dispense Refill    alendronate (FOSAMAX) 70 MG tablet Take 1 tablet (70 mg total) by mouth every 7 days. 4 tablet 3    amlodipine-benazepril 10-20mg (LOTREL) 10-20 mg per capsule Take 1 capsule by mouth once daily. 30 capsule 2    ascorbic acid, vitamin C, (VITAMIN C) 500 MG tablet Take 1 tablet (500 mg total) by mouth 2 (two) times daily.      calcium-vitamin D3 (CALCIUM 500 + D) 500 mg(1,250mg) -200 unit per tablet Take 1 tablet by mouth 2 (two) times daily with meals. 60 tablet 3    ergocalciferol (ERGOCALCIFEROL) 50,000 unit Cap Take 1 capsule (50,000 Units total) by mouth every 7 days. 4 capsule 0    folic acid (FOLVITE) 1 MG tablet Take 1 tablet (1 mg total) by mouth once daily. 30 tablet 5    furosemide (LASIX) 20 MG tablet Take 1 tablet (20 mg total) by mouth once daily. 30 tablet 2    levothyroxine 50 mcg Cap Take 50 mcg by mouth before breakfast. 30 capsule 2    metFORMIN (GLUCOPHAGE) 1000 MG tablet Take 1 tablet (1,000 mg total) by mouth 2 (two) times daily with meals. 180 tablet 0    multivitamin (ONE DAILY MULTIVITAMIN) per tablet Take 1 tablet by mouth once daily. PER [facility]      omeprazole (PRILOSEC) 40 MG capsule Take 1 capsule (40 mg total) by mouth once daily. 30 capsule 3    oxyCODONE-acetaminophen (PERCOCET) 5-325 mg per tablet Take 1-2 tablets by mouth every 4 (four) hours as needed. 15 tablet 0    predniSONE (DELTASONE) 20 MG tablet Take 1 tablet (20 mg total) by mouth 3 (three) times daily. 90 tablet 2    rivaroxaban (XARELTO) 20 mg Tab Take 1 tablet (20 mg total) by mouth once daily. (Patient taking differently: Take 20 mg by mouth once daily. ) 30 tablet 3    sulfamethoxazole-trimethoprim 800-160mg (BACTRIM DS)  800-160 mg Tab 1 tab 3 times a week 15 tablet 3    zinc sulfate (ZINCATE) 220 (50) mg capsule Take 220 mg by mouth once daily. PER [facility]       No current facility-administered medications for this visit.        Review of patient's allergies indicates:  No Known Allergies    Review of Systems   Constitutional: Negative for appetite change and fatigue.   Eyes: Negative for visual disturbance.   Respiratory: Negative for shortness of breath.    Cardiovascular: Negative for chest pain.   Gastrointestinal: Negative for constipation and diarrhea.   Genitourinary: Negative for dysuria, frequency and urgency.   Musculoskeletal: Positive for back pain, gait problem and myalgias. Negative for arthralgias, joint swelling, neck pain and neck stiffness.   Neurological: Negative for dizziness, tremors, weakness, numbness and headaches.   Psychiatric/Behavioral: Negative for dysphoric mood.   All other systems reviewed and are negative.        Objective:      Physical Exam      GENERAL: The patient is alert, oriented, pleasant.  HEENT: PERRLA  NECK: supple, no masses,    CV: S1S2, RRR, no murmurs, rales.  LUNGS: CTA bilateral.   ABDOMEN: soft, non-tender, non distended, bowel sounds nl.  EXTREMITIES: no edema, +2 pulses DP, b/l  SKIN: no skin rash, no skin breakdowns.  MUSCULOSKELETAL:   Gait not ambulatory, came in manual WC, able to get up from chair with MDA, and able to stand up against exam table for 2-3 minutes, before he asked to sit down.   Decreased active range of motion in all joints x4 extremities, able to lift legs juts against gravity, but weaker roximal more than distal, although has b/l foot drop.  Both UE appeared stronger.  Muscle strength 3/5 throughout in b/l LE, and 4- in B/l UE.  + muscle atrophy b/l x 4 extr.  No  joint laxity throughout x4 extremities.   NEUROLOGIC: Cranial nerves II through XII intact.   Deep tendon reflexes is normal, +2 in the upper and lower extremities bilaterally.    Muscle tone is normal to flaccid.   Sensory is intact to light touch and pinprick throughout x4 extremities.       Assessment:       1. Statin myopathy    2. Gait instability    3. Weakness of extremity        Plan:          Statin myopathy    Gait instability    Weakness of extremity    Mr. Tapia, is 68 y/o male with gait instability, BLE > UE weakness, secondary to I   immune mediated necrotising myopathy  (Muscle bx, showed necrotising myopathy).   Drug/toxin (steroids) induced myopathy on the differential as per path report.   He was in SN ( [facility]), and is recently d/c to home with , [facility], tel.therapist Danilo, [phone].  And now he is requesting an intensive IP Rehab.   I discussed at length the possible way of entry to IP Rehab.   One option would be to contact and get  PT and PT notes,and see patient   Functional level, that seems to be low, but at least he is now able to tolerate 3 hrs of therapy.  The other way would be to get to outpatient PT/OT, but afraid that family is not able to transfer him ( he is [facility] for transfers and mobility).  And/or if he is accepted to hospital for acute disease worsening.    I promised that I will discussed with his current therapist , and submit to out IP Rehab for approval, depending of their report.  He and his brother voiced understanding.     RTC in 1- 2 months.     Total time spent face to face with patient was 45 minutes.   More than 50% of that time was spent in counseling on diagnosis , prognosis and treatment options.   I reviewed Primary care , and other specialty's notes to better coordinate patient's  care.   All questions were answered, and patient voiced understanding.

## 2018-02-28 NOTE — LETTER
March 4, 2018      Meghan Davies MD  [address]  [place] [address]           [facility]  [address]  [place] [address]  Phone: [phone]          Patient: Serafin Tapia   MR Number: [id number]   YOB: 1950   Date of Visit: 2/28/2018       Dear Dr. Meghan Davies:    Thank you for referring Serafin Tapia to me for evaluation. Attached you will find relevant portions of my assessment and plan of care.    If you have questions, please do not hesitate to call me. I look forward to following Serafin Tapia along with you.    Sincerely,    Margie Cardoso MD    Enclosure  CC:  No Recipients    If you would like to receive this communication electronically, please contact [email or web address] or [phone] to request more information on [employer] access.    For providers and/or their staff who would like to refer a patient to [facility], please contact us through our one-stop-shop provider referral line, [facility] , at [phone].    If you feel you have received this communication in error or would no longer like to receive these types of communications, please e-mail [email or web address]

## 2018-03-19 ENCOUNTER — OFFICE VISIT (OUTPATIENT)
Dept: RHEUMATOLOGY | Facility: CLINIC | Age: 68
End: 2018-03-19
Payer: MEDICARE

## 2018-03-19 ENCOUNTER — LAB VISIT (OUTPATIENT)
Dept: LAB | Facility: HOSPITAL | Age: 68
End: 2018-03-19
Attending: INTERNAL MEDICINE
Payer: MEDICARE

## 2018-03-19 VITALS
SYSTOLIC BLOOD PRESSURE: 128 MMHG | DIASTOLIC BLOOD PRESSURE: 80 MMHG | HEART RATE: 93 BPM | BODY MASS INDEX: 33.14 KG/M2 | WEIGHT: 231.5 LBS | HEIGHT: 70 IN

## 2018-03-19 DIAGNOSIS — M60.9 MYOSITIS, UNSPECIFIED MYOSITIS TYPE, UNSPECIFIED SITE: Primary | ICD-10-CM

## 2018-03-19 DIAGNOSIS — G72.49 IMMUNE MYOPATHY: ICD-10-CM

## 2018-03-19 DIAGNOSIS — M60.9 MYOSITIS, UNSPECIFIED MYOSITIS TYPE, UNSPECIFIED SITE: ICD-10-CM

## 2018-03-19 DIAGNOSIS — G72.9 MYOPATHY: ICD-10-CM

## 2018-03-19 LAB
ALBUMIN SERPL BCP-MCNC: 3.6 G/DL
ALP SERPL-CCNC: 70 U/L
ALT SERPL W/O P-5'-P-CCNC: 134 U/L
ANION GAP SERPL CALC-SCNC: 10 MMOL/L
AST SERPL-CCNC: 80 U/L
BASOPHILS # BLD AUTO: 0.01 K/UL
BASOPHILS NFR BLD: 0.1 %
BILIRUB SERPL-MCNC: 0.3 MG/DL
BUN SERPL-MCNC: 19 MG/DL
CALCIUM SERPL-MCNC: 10.2 MG/DL
CHLORIDE SERPL-SCNC: 102 MMOL/L
CK SERPL-CCNC: 1770 U/L
CO2 SERPL-SCNC: 31 MMOL/L
CREAT SERPL-MCNC: 0.7 MG/DL
CRP SERPL-MCNC: 1.6 MG/L
DIFFERENTIAL METHOD: ABNORMAL
EOSINOPHIL # BLD AUTO: 0 K/UL
EOSINOPHIL NFR BLD: 0.5 %
ERYTHROCYTE [DISTWIDTH] IN BLOOD BY AUTOMATED COUNT: 15.5 %
ERYTHROCYTE [SEDIMENTATION RATE] IN BLOOD BY WESTERGREN METHOD: 0 MM/HR
EST. GFR  (AFRICAN AMERICAN): >60 ML/MIN/1.73 M^2
EST. GFR  (NON AFRICAN AMERICAN): >60 ML/MIN/1.73 M^2
GLUCOSE SERPL-MCNC: 103 MG/DL
HCT VFR BLD AUTO: 46.2 %
HGB BLD-MCNC: 15.1 G/DL
IMM GRANULOCYTES # BLD AUTO: 0.03 K/UL
IMM GRANULOCYTES NFR BLD AUTO: 0.4 %
LYMPHOCYTES # BLD AUTO: 4.2 K/UL
LYMPHOCYTES NFR BLD: 50.5 %
MCH RBC QN AUTO: 27.9 PG
MCHC RBC AUTO-ENTMCNC: 32.7 G/DL
MCV RBC AUTO: 85 FL
MONOCYTES # BLD AUTO: 0.6 K/UL
MONOCYTES NFR BLD: 6.7 %
NEUTROPHILS # BLD AUTO: 3.5 K/UL
NEUTROPHILS NFR BLD: 41.8 %
NRBC BLD-RTO: 0 /100 WBC
PLATELET # BLD AUTO: 273 K/UL
PMV BLD AUTO: 11.1 FL
POTASSIUM SERPL-SCNC: 4.6 MMOL/L
PROT SERPL-MCNC: 6.5 G/DL
RBC # BLD AUTO: 5.42 M/UL
SODIUM SERPL-SCNC: 143 MMOL/L
WBC # BLD AUTO: 8.25 K/UL

## 2018-03-19 PROCEDURE — 82550 ASSAY OF CK (CPK): CPT

## 2018-03-19 PROCEDURE — 80053 COMPREHEN METABOLIC PANEL: CPT

## 2018-03-19 PROCEDURE — 99214 OFFICE O/P EST MOD 30 MIN: CPT | Mod: S$GLB,,, | Performed by: INTERNAL MEDICINE

## 2018-03-19 PROCEDURE — 3074F SYST BP LT 130 MM HG: CPT | Mod: CPTII,S$GLB,, | Performed by: INTERNAL MEDICINE

## 2018-03-19 PROCEDURE — 85651 RBC SED RATE NONAUTOMATED: CPT

## 2018-03-19 PROCEDURE — 85025 COMPLETE CBC W/AUTO DIFF WBC: CPT

## 2018-03-19 PROCEDURE — 3079F DIAST BP 80-89 MM HG: CPT | Mod: CPTII,S$GLB,, | Performed by: INTERNAL MEDICINE

## 2018-03-19 PROCEDURE — 99499 UNLISTED E&M SERVICE: CPT | Mod: S$GLB,,, | Performed by: INTERNAL MEDICINE

## 2018-03-19 PROCEDURE — 86140 C-REACTIVE PROTEIN: CPT

## 2018-03-19 PROCEDURE — 82085 ASSAY OF ALDOLASE: CPT

## 2018-03-19 PROCEDURE — 99999 PR PBB SHADOW E&M-EST. PATIENT-LVL III: CPT | Mod: PBBFAC,,, | Performed by: INTERNAL MEDICINE

## 2018-03-19 PROCEDURE — 36415 COLL VENOUS BLD VENIPUNCTURE: CPT

## 2018-03-19 ASSESSMENT — ROUTINE ASSESSMENT OF PATIENT INDEX DATA (RAPID3)
MDHAQ FUNCTION SCORE: 2.1
PAIN SCORE: 0
AM STIFFNESS SCORE: 0, NO
FATIGUE SCORE: 0
PSYCHOLOGICAL DISTRESS SCORE: 1.1
PATIENT GLOBAL ASSESSMENT SCORE: 3
TOTAL RAPID3 SCORE: 3.33

## 2018-03-19 NOTE — PROGRESS NOTES
Chief Complaint   Patient presents with    Appointment     inflammatory myopathy       Patient is here for a follow up    History of presenting illness    67 year old black male with HTN,HLD,obesity,LVH,DM,PE,LS spine disease,hypothyroidism, necrotising immune mediated myopathy for a follow up    He has done better than last time  He has been on 60 mg prednisone with definite improvement in symptoms    He couldn't get into PT  He is wasting muscles in his legs and thighs    His last labs :    CRP normal  ESR 15      Normal GFR  Mild anemia 13.8/43.6  Aldolase 30.2  CK 4515  No IgA deficiency    Foot continues to be infection free    Muscle biopsy : necrotising myopathy    Initial evaluation :    Patient with long standing :  HTN, HLD, diabetes mellitus and obesity,asthma,hypothyroidism    He was seen in the hospital due to worsening shortness of breath    Prior to that he was at [facility] for hypoglycemia due to sulfonylurea and he was noted to have CK of 4000 to 5000.He got hydration and he developed anasarca and pulmonary edema and then got diuresed.  He was diagnosed to have rhabdomyolysis, His CPK at the time of discharge was 5553.   He had elevated ESR,CRP  HALLE negative     Since March 2017 he has been weak  Prior to the hospitalisations he was very mobile,no weakness,ambulated with no asistance  During this admission he couldn't walk,he couldn't stand,he couldn't lift thighs,couldnt climb stairs    He denies any fevers, chills, rashes, mucosal ulcers, hematuria, dysphagia, vision changes, Raynaud's, n/v/d.     Pt reports a 60-70 pound weight loss since March.       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 He developed a wound on the right foot which he reports healed. He sts that after his swelling while hospitalized caused the wound to flare up.      He had MRI inpatient and that didn't support myositis  Cks  trended down to 2513     Rheumatology team sent off : myomarker panel and HMG co-a reductase antibodies and asked him to come back  EMG and muscle biopsy planned but didn't happen  His sob was attributed to heart failure  Necrotising myopathy due to statins considered  Deconditioning considered  PT suggested      Myomarker panel negative  APLAS negative  HMG co-a reductase ab > 200 units     Muscle biopsy as detailed above    Past history : HTN, HLD, diabetes mellitus and obesity,asthma,hypothyroidism    Family history:RA (brother) and SLE (cousins).    Social history : not a current smoker and alcoholic    Review of Systems   Constitutional: Negative for activity change, appetite change, chills, diaphoresis, fatigue, fever and unexpected weight change.   HENT: Negative for congestion, dental problem, drooling, ear discharge, ear pain, facial swelling, hearing loss, mouth sores, nosebleeds, postnasal drip, rhinorrhea, sinus pain, sinus pressure, sneezing, sore throat, tinnitus, trouble swallowing and voice change.    Eyes: Negative for photophobia, pain, discharge, redness, itching and visual disturbance.   Respiratory: Negative for apnea, cough, choking, chest tightness, shortness of breath, wheezing and stridor.    Cardiovascular: Negative for chest pain, palpitations and leg swelling.   Gastrointestinal: Negative for abdominal distention, abdominal pain, anal bleeding, blood in stool, constipation, diarrhea, nausea, rectal pain and vomiting.   Endocrine: Negative for cold intolerance, heat intolerance, polydipsia, polyphagia and polyuria.   Genitourinary: Negative for decreased urine volume, difficulty urinating, dysuria, enuresis, flank pain, frequency, genital sores, hematuria and urgency.   Musculoskeletal: Negative for arthralgias, back pain, gait problem, joint swelling, myalgias, neck pain and neck stiffness.   Skin: Negative for color change, pallor, rash and wound.   Allergic/Immunologic: Negative for  environmental allergies, food allergies and immunocompromised state.   Neurological: Positive for weakness. Negative for dizziness, tremors, seizures, syncope, facial asymmetry, speech difficulty, light-headedness, numbness and headaches.   Hematological: Negative for adenopathy. Does not bruise/bleed easily.   Psychiatric/Behavioral: Negative for agitation, behavioral problems, confusion, decreased concentration, dysphoric mood, hallucinations, self-injury, sleep disturbance and suicidal ideas. The patient is not nervous/anxious and is not hyperactive.         Physical Exam     LUCIANO-28 tender joint count: 0  LUCIANO-28 swollen joint count: 0     UE    B/l he can use his hand  5/5  Forearm strength :flexion and extension 5/5  Biceps b/l 4/5,rest 5/5    LE    B/L ankle DF,PF 5/5  Leg flexion and extension 4/5  Thigh flexion and extension 4/5     Physical Exam   Constitutional: He is oriented to person, place, and time and well-developed, well-nourished, and in no distress. No distress.   HENT:   Head: Normocephalic.   Mouth/Throat: Oropharynx is clear and moist.   Eyes: Conjunctivae are normal. Pupils are equal, round, and reactive to light. Right eye exhibits no discharge. Left eye exhibits no discharge. No scleral icterus.   Neck: Normal range of motion. No thyromegaly present.   Cardiovascular: Normal rate, regular rhythm, normal heart sounds and intact distal pulses.    Pulmonary/Chest: Effort normal and breath sounds normal. No stridor.   Abdominal: Soft. Bowel sounds are normal.   Lymphadenopathy:     He has no cervical adenopathy.   Neurological: He is alert and oriented to person, place, and time.   Skin: Skin is warm. No rash noted. He is not diaphoretic.     Psychiatric: Affect and judgment normal.   Musculoskeletal: Normal range of motion.         Assessment     67 year old black male with HTN,HLD,obesity,LVH,DM,PE,LS spine disease,hypothyroidism, necrotising immune mediated myopathy  He presented with :  weakness in the proximal and distal upper and lower extremities with sob     HALLE and armaan-1 negative,myomarker panel negative  He has very high titres of HMG-COA reductase antibodies positivity  Muscle biopsy positive for necrotising myopathy  He has been on statins for > 10 years with no change in dose of the medication,no change in type of statin    He has immune mediated necrotising myopathy  Drug/toxin induced myopathy on the differential as per path report    He has shown great improvement with 60 mg daily prednisone for 4 weeks       1. Myositis, unspecified myositis type, unspecified site    2. Immune myopathy          Plan    Will rpt all the labs today : ck,aldolase,ESR,CRP,CBC,CMP    Calcium vit d,fosamax,omeprazole all UTD  PCP prophylaxis sent    Podiatry follow ups and diabetes control very important,emphasised    Arrange for more aggressive physical therapy    IF LABS SHOW IMPROVEMENT,WILL TAPER PREDNISONE AND OFFER STEROID SPARING AGENTS     DM,HTN,HLD management aggressive while on steroids     Serafin was seen today for appointment.    Diagnoses and all orders for this visit:    Myositis, unspecified myositis type, unspecified site  -     CK; Future  -     Aldolase; Future  -     Sedimentation rate, manual; Future  -     C-reactive protein; Standing  -     CBC auto differential; Future  -     Comprehensive metabolic panel; Future    Immune myopathy      Follow up in 8 WEEKS     TOLD HIM PT IS VERY IMPORTANT

## 2018-03-20 ENCOUNTER — DOCUMENTATION ONLY (OUTPATIENT)
Dept: RHEUMATOLOGY | Facility: CLINIC | Age: 68
End: 2018-03-20

## 2018-03-20 NOTE — PROGRESS NOTES
Called patient  Cks trending down well  CK 1770  Will continue high dose prednisone for 2 more weeks and then start taper  He will start PT soon  Will introduce mtx and taper prednisone

## 2018-03-21 LAB — ALDOLASE SERPL-CCNC: 29.5 U/L

## 2018-04-02 ENCOUNTER — OFFICE VISIT (OUTPATIENT)
Dept: PHYSICAL MEDICINE AND REHAB | Facility: CLINIC | Age: 68
End: 2018-04-02
Payer: MEDICARE

## 2018-04-02 VITALS
HEIGHT: 70 IN | BODY MASS INDEX: 33.5 KG/M2 | SYSTOLIC BLOOD PRESSURE: 139 MMHG | DIASTOLIC BLOOD PRESSURE: 81 MMHG | HEART RATE: 96 BPM | WEIGHT: 234 LBS

## 2018-04-02 DIAGNOSIS — T46.6X5A STATIN MYOPATHY: ICD-10-CM

## 2018-04-02 DIAGNOSIS — R29.898 WEAKNESS OF EXTREMITY: ICD-10-CM

## 2018-04-02 DIAGNOSIS — R53.1 WEAKNESS: ICD-10-CM

## 2018-04-02 DIAGNOSIS — G72.0 STATIN MYOPATHY: ICD-10-CM

## 2018-04-02 DIAGNOSIS — R26.81 GAIT INSTABILITY: Primary | ICD-10-CM

## 2018-04-02 PROCEDURE — 3079F DIAST BP 80-89 MM HG: CPT | Mod: CPTII,S$GLB,, | Performed by: PHYSICAL MEDICINE & REHABILITATION

## 2018-04-02 PROCEDURE — 99499 UNLISTED E&M SERVICE: CPT | Mod: S$GLB,,, | Performed by: PHYSICAL MEDICINE & REHABILITATION

## 2018-04-02 PROCEDURE — 99999 PR PBB SHADOW E&M-EST. PATIENT-LVL III: CPT | Mod: PBBFAC,,, | Performed by: PHYSICAL MEDICINE & REHABILITATION

## 2018-04-02 PROCEDURE — 99214 OFFICE O/P EST MOD 30 MIN: CPT | Mod: S$GLB,,, | Performed by: PHYSICAL MEDICINE & REHABILITATION

## 2018-04-02 PROCEDURE — 3075F SYST BP GE 130 - 139MM HG: CPT | Mod: CPTII,S$GLB,, | Performed by: PHYSICAL MEDICINE & REHABILITATION

## 2018-04-02 NOTE — PROGRESS NOTES
Subjective:       Patient ID: Serafin Tapia is a 67 y.o. male.    Chief Complaint: Extremity Weakness    Extremity Weakness    Pertinent negatives include no numbness.      Mr. Tapia returns to clinic for generalized weakness in Legs more than in arms weakness.  [facility] 02/28/18.  Referred by his Rheumatologist, Dr. Chiang for decondition and   Intensive PT.  On LCV, he express his wishes to get into IP Rehab, but today, he states that he   Want to go to outpatient PT [facility].  He reports that he is getting slowly better, but still cannot get up easily, nor he can walk any longer distance.   He is able to walk ~ 30-40 ft, with RW, 3-4 times per day.   He is able to stand still ~ 5 minutes.  Now, after HH, PT, he would like to get to outpatient PT.     Patient is coming with his brother, that is helping him for a mobility,and ADLs,   Since pt lives alone in apartment.   Patient with PMH significant for HTN, HLD, diabetes mellitus and obesity, asthma, and hypothyroidism  Patient reports beening sick since October-November 2017, and has been hospitalized multiple time since Oct. 2017 for muscle weakness in both legs > arms. He states that weakness came very quickly, and is improving very slow.   He was d/c to [facility] in Nov 2017, and had HH, PT+OT   According to Rheum note patient is being treated for inflammatory, immune   Myopathy.   Muscle bx, showed necrotising myopathy.   He has immune mediated necrotising myopathy  Drug/toxin induced myopathy on the differential as per path report  He has been on statins for > 10 years with no change in dose of the medication, no change in type of statin. He is off statins.  He did not receive IVIG infusions, and Prednisone 60 mg daily for 4 weeks was started, with plan to introduce steroid sparing agent in 4 weeks   Calcium vit D, fosamax,omeprazole given.  Deconditioning considered, and PT suggested, by Rheum.       He lost a significant weight > 60 -70 lbs  wt ( since 03/17) and has a significant muscle wasting jb in legs, and thighs, cannot  ankles, .  He has b/l foot drop ( does not wear braces).   Patient reports significant  improvment of his muscle strength, he feels much better since all new medications were started.     Prior Functional status:   Prior to the hospitalisations he was very mobile,no weakness,ambulated with no asistance  During initial hospitalization, he couldn't walk,he couldn't stand,he couldn't lift thighs,couldnt climb stairs.   He is now able to get up, and walk 50-60 ft with RW, with MDA of his brother, and WC in behind him.  He is able to stand up, for several minutes, < 5 minutes, before he needs to sit down. He can roll himself in manual  WC.   He has all DME: RW, hospital bed, manual wheel chair.  Social Hx:   He lives alone in apartment, not a current smoker and alcoholic.   He is here for evaluation for IP Rehab.    Past Medical History:   Diagnosis Date    Asthma     Diabetes mellitus type 2 in obese 5/3/2014    Elevated troponin 5/3/2014    Hyperlipidemia     Hypertension     Hypothyroidism (acquired) 11/1/2017    Obesity     Postop check 2/5/2018    Pulmonary embolism 05/2014    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       Past Surgical History:   Procedure Laterality Date    TONSILLECTOMY         Family History   Problem Relation Age of Onset    Diabetes Mother     Heart disease Mother     Hypertension Mother     Stroke Mother     Heart disease Father     Hypertension Father     Diabetes Father     Cancer Father     Cataracts Sister     Hypertension Sister     Diabetes Sister     Glaucoma Maternal Aunt     Glaucoma Paternal Aunt     Hypertension Brother     Diabetes Brother     No Known Problems Maternal Grandmother     No Known Problems Maternal Grandfather     No Known Problems Paternal Grandmother     No Known Problems Paternal Grandfather      Hypertension Son     Hypertension Daughter     Cirrhosis Neg Hx        Social History     Social History    Marital status: Single     Spouse name: N/A    Number of children: N/A    Years of education: N/A     Occupational History    Retired       Social History Main Topics    Smoking status: Former Smoker     Types: Cigarettes    Smokeless tobacco: Never Used      Comment: Quit smoking as a teenager    Alcohol use 0.6 - 1.2 oz/week     1 - 2 Cans of beer per week      Comment: 1-2 drinks occasionally    Drug use: No    Sexual activity: Yes     Partners: Female     Birth control/ protection: Condom     Other Topics Concern    None     Social History Narrative    Lives w/daughter.        Current Outpatient Prescriptions   Medication Sig Dispense Refill    alendronate (FOSAMAX) 70 MG tablet Take 1 tablet (70 mg total) by mouth every 7 days. 4 tablet 3    amlodipine-benazepril 10-20mg (LOTREL) 10-20 mg per capsule Take 1 capsule by mouth once daily. 30 capsule 2    ascorbic acid, vitamin C, (VITAMIN C) 500 MG tablet Take 1 tablet (500 mg total) by mouth 2 (two) times daily.      calcium-vitamin D3 (CALCIUM 500 + D) 500 mg(1,250mg) -200 unit per tablet Take 1 tablet by mouth 2 (two) times daily with meals. 60 tablet 3    ergocalciferol (ERGOCALCIFEROL) 50,000 unit Cap Take 1 capsule (50,000 Units total) by mouth every 7 days. 4 capsule 0    folic acid (FOLVITE) 1 MG tablet Take 1 tablet (1 mg total) by mouth once daily. 30 tablet 5    furosemide (LASIX) 20 MG tablet Take 1 tablet (20 mg total) by mouth once daily. 30 tablet 2    levothyroxine 50 mcg Cap Take 50 mcg by mouth before breakfast. 30 capsule 2    metFORMIN (GLUCOPHAGE) 1000 MG tablet Take 1 tablet (1,000 mg total) by mouth 2 (two) times daily with meals. 180 tablet 0    multivitamin (ONE DAILY MULTIVITAMIN) per tablet Take 1 tablet by mouth once daily. PER [facility]      omeprazole (PRILOSEC) 40 MG capsule  Take 1 capsule (40 mg total) by mouth once daily. 30 capsule 3    oxyCODONE-acetaminophen (PERCOCET) 5-325 mg per tablet Take 1-2 tablets by mouth every 4 (four) hours as needed. 15 tablet 0    rivaroxaban (XARELTO) 20 mg Tab Take 1 tablet (20 mg total) by mouth once daily. (Patient taking differently: Take 20 mg by mouth once daily. ) 30 tablet 3    sulfamethoxazole-trimethoprim 800-160mg (BACTRIM DS) 800-160 mg Tab 1 tab 3 times a week 15 tablet 3    zinc sulfate (ZINCATE) 220 (50) mg capsule Take 220 mg by mouth once daily. PER [facility]       No current facility-administered medications for this visit.        Review of patient's allergies indicates:  No Known Allergies    Review of Systems   Constitutional: Negative for appetite change and fatigue.   Eyes: Negative for visual disturbance.   Respiratory: Negative for shortness of breath.    Cardiovascular: Negative for chest pain.   Gastrointestinal: Negative for constipation and diarrhea.   Genitourinary: Negative for dysuria, frequency and urgency.   Musculoskeletal: Positive for back pain, extremity weakness, gait problem and myalgias. Negative for arthralgias, joint swelling, neck pain and neck stiffness.   Neurological: Negative for dizziness, tremors, weakness, numbness and headaches.   Psychiatric/Behavioral: Negative for dysphoric mood.   All other systems reviewed and are negative.        Objective:      Physical Exam      GENERAL: The patient is alert, oriented, pleasant.  HEENT: PERRLA  NECK: supple, no masses,    CV: S1S2, RRR, no murmurs, rales.  LUNGS: CTA bilateral.   ABDOMEN: soft, non-tender, non distended, bowel sounds nl.  EXTREMITIES: no edema, +2 pulses DP, b/l  SKIN: no skin rash, no skin breakdowns.  MUSCULOSKELETAL:   Gait not ambulatory, came in manual WC, able to get up from chair with MDA, and able to stand up against exam table for 2-3 minutes, before he asked to sit down.   Decreased active range of motion in all joints  x4 extremities, able to lift legs juts against gravity, but weaker roximal more than distal, although has b/l foot drop.  Both UE appeared stronger.  Muscle strength 3/5 throughout in b/l LE, and 4- in B/l UE.  + muscle atrophy b/l x 4 extr.  No  joint laxity throughout x4 extremities.   NEUROLOGIC: Cranial nerves II through XII intact.   Deep tendon reflexes is normal, +2 in the upper and lower extremities bilaterally.   Muscle tone is normal to flaccid.   Sensory is intact to light touch and pinprick throughout x4 extremities.       Assessment:       1. Gait instability    2. Weakness    3. Statin myopathy    4. Weakness of extremity        Plan:          Gait instability  -     Ambulatory Referral to Physical/Occupational Therapy    Weakness  -     Ambulatory Referral to Physical/Occupational Therapy    Statin myopathy  -     Ambulatory Referral to Physical/Occupational Therapy    Weakness of extremity  -     Ambulatory Referral to Physical/Occupational Therapy    Mr. Tapia, is 68 y/o male with gait instability, BLE > UE weakness, secondary to  immune mediated necrotising myopathy  (Muscle bx, showed necrotising myopathy).   Drug/toxin (steroids) induced myopathy on the differential as per path report.   He was in [facility]), and is d/c'd home with , [facility], (tel.therapist Danilo, [phone].), that he completed.  And now he is requesting outpatient PT, at [facility].  He does not want to go any longer to [facility].  Now he wants to get to outpatient PT.    RTC in 2-3 months.     Total time spent face to face with patient was 25 minutes.   More than 50% of that time was spent in counseling on diagnosis , prognosis and treatment options.   I reviewed Primary care , and other specialty's notes to better coordinate patient's  care.   All questions were answered, and patient voiced understanding.

## 2018-04-09 ENCOUNTER — TELEPHONE (OUTPATIENT)
Dept: PHYSICAL MEDICINE AND REHAB | Facility: CLINIC | Age: 68
End: 2018-04-09

## 2018-04-09 NOTE — TELEPHONE ENCOUNTER
----- Message from Aleshia Bernstein sent at 4/6/2018  4:17 PM CDT -----  Contact: pt @ [phone]  Calling to ask that the referral that was to be faxed to [employer] for the patient to get Physical Therapy be re-faxed, because [employer] is saying that it was not received. Please call [facility] patient when done.

## 2018-04-10 ENCOUNTER — LAB VISIT (OUTPATIENT)
Dept: LAB | Facility: OTHER | Age: 68
End: 2018-04-10
Attending: INTERNAL MEDICINE
Payer: MEDICARE

## 2018-04-10 ENCOUNTER — OFFICE VISIT (OUTPATIENT)
Dept: INTERNAL MEDICINE | Facility: CLINIC | Age: 68
End: 2018-04-10
Attending: INTERNAL MEDICINE
Payer: MEDICARE

## 2018-04-10 VITALS
WEIGHT: 239.44 LBS | HEART RATE: 111 BPM | HEIGHT: 70 IN | SYSTOLIC BLOOD PRESSURE: 160 MMHG | BODY MASS INDEX: 34.28 KG/M2 | DIASTOLIC BLOOD PRESSURE: 84 MMHG

## 2018-04-10 DIAGNOSIS — E11.9 TYPE 2 DIABETES MELLITUS WITHOUT COMPLICATION, WITHOUT LONG-TERM CURRENT USE OF INSULIN: ICD-10-CM

## 2018-04-10 DIAGNOSIS — Z86.711 HISTORY OF PULMONARY EMBOLISM: Primary | ICD-10-CM

## 2018-04-10 DIAGNOSIS — E03.9 HYPOTHYROIDISM (ACQUIRED): ICD-10-CM

## 2018-04-10 DIAGNOSIS — E78.00 PURE HYPERCHOLESTEROLEMIA: ICD-10-CM

## 2018-04-10 DIAGNOSIS — E55.9 VITAMIN D INSUFFICIENCY: ICD-10-CM

## 2018-04-10 DIAGNOSIS — I10 ESSENTIAL HYPERTENSION: ICD-10-CM

## 2018-04-10 PROBLEM — Z09 POSTOP CHECK: Status: RESOLVED | Noted: 2018-02-05 | Resolved: 2018-04-10

## 2018-04-10 LAB
ESTIMATED AVG GLUCOSE: 157 MG/DL
HBA1C MFR BLD HPLC: 7.1 %

## 2018-04-10 PROCEDURE — 36415 COLL VENOUS BLD VENIPUNCTURE: CPT

## 2018-04-10 PROCEDURE — 3045F PR MOST RECENT HEMOGLOBIN A1C LEVEL 7.0-9.0%: CPT | Mod: CPTII,S$GLB,, | Performed by: INTERNAL MEDICINE

## 2018-04-10 PROCEDURE — 83036 HEMOGLOBIN GLYCOSYLATED A1C: CPT

## 2018-04-10 PROCEDURE — 99999 PR PBB SHADOW E&M-EST. PATIENT-LVL IV: CPT | Mod: PBBFAC,,, | Performed by: INTERNAL MEDICINE

## 2018-04-10 PROCEDURE — 3079F DIAST BP 80-89 MM HG: CPT | Mod: CPTII,S$GLB,, | Performed by: INTERNAL MEDICINE

## 2018-04-10 PROCEDURE — 99214 OFFICE O/P EST MOD 30 MIN: CPT | Mod: S$GLB,,, | Performed by: INTERNAL MEDICINE

## 2018-04-10 PROCEDURE — 3077F SYST BP >= 140 MM HG: CPT | Mod: CPTII,S$GLB,, | Performed by: INTERNAL MEDICINE

## 2018-04-10 RX ORDER — AMLODIPINE AND BENAZEPRIL HYDROCHLORIDE 5; 20 MG/1; MG/1
2 CAPSULE ORAL DAILY
Qty: 180 CAPSULE | Refills: 2 | Status: SHIPPED | OUTPATIENT
Start: 2018-04-10 | End: 2019-05-02 | Stop reason: SDUPTHER

## 2018-04-10 RX ORDER — FUROSEMIDE 20 MG/1
20 TABLET ORAL DAILY
Qty: 30 TABLET | Refills: 2 | Status: SHIPPED | OUTPATIENT
Start: 2018-04-10 | End: 2018-08-09 | Stop reason: SDUPTHER

## 2018-04-10 RX ORDER — AMLODIPINE AND BENAZEPRIL HYDROCHLORIDE 5; 20 MG/1; MG/1
1 CAPSULE ORAL DAILY
Qty: 180 CAPSULE | Refills: 2 | Status: SHIPPED | OUTPATIENT
Start: 2018-04-10 | End: 2018-04-10 | Stop reason: SDUPTHER

## 2018-04-10 RX ORDER — ALENDRONATE SODIUM 70 MG/1
70 TABLET ORAL
Qty: 4 TABLET | Refills: 3 | Status: SHIPPED | OUTPATIENT
Start: 2018-04-10 | End: 2018-06-06 | Stop reason: SDUPTHER

## 2018-04-10 RX ORDER — FOLIC ACID 1 MG/1
1 TABLET ORAL DAILY
Qty: 30 TABLET | Refills: 5 | Status: SHIPPED | OUTPATIENT
Start: 2018-04-10 | End: 2018-11-13

## 2018-04-10 RX ORDER — PREDNISONE 20 MG/1
20 TABLET ORAL DAILY
COMMUNITY
End: 2018-05-01 | Stop reason: SDUPTHER

## 2018-04-10 NOTE — PROGRESS NOTES
Subjective:       Patient ID: Serafin Tapia is a 67 y.o. male.    Chief Complaint: Establish Care    HPI  Here to establish care    He has immune mediated necrotizing myopathy being worked up by rheum. Followed in PM&R by Dr Cardoso. He continues to exercise as much as he can and his stamina, strength, and coordination continue to improve. He was up to using a shital but after hospitalization several months prior he had a setback in regards to overall strength. Symptoms are much worse in proximal muscle groups. He is on chronic steroids and therefore on alendronate for ppx.  His mood is stable. His brother is his primary caregiver. They have a great relationship.     DM  FBG today 150 which for him is on the high side. Due for eye exam. Denies neuropathy.   Asthma- no acute issues. Denies frequent SOB, FLOWERS, chest tightness limited by LE weakness.   Hypothyroidism  Pt denies unexplained lyn gain/loss, palpitations, tremors, diarrhea, constipation, changes to hair/skin, heat/cold intolerance, or dysphagia.     Review of Systems   Constitutional: Negative for appetite change, chills, fever and unexpected weight change.   HENT: Negative for hearing loss, sore throat and trouble swallowing.    Eyes: Negative for visual disturbance.   Respiratory: Negative for cough, chest tightness and shortness of breath.    Cardiovascular: Negative for chest pain and leg swelling.   Gastrointestinal: Negative for abdominal pain, blood in stool, constipation, diarrhea, nausea and vomiting.   Endocrine: Negative for polydipsia and polyuria.   Genitourinary: Negative for decreased urine volume, difficulty urinating, dysuria, frequency and urgency.   Musculoskeletal: Positive for gait problem and myalgias.   Skin: Negative for rash.   Neurological: Positive for weakness. Negative for dizziness, tremors, seizures, syncope, speech difficulty, light-headedness and numbness.   Psychiatric/Behavioral: The patient is not nervous/anxious.        Past  "Medical History:   Diagnosis Date    Asthma     Diabetes mellitus type 2 in obese 5/3/2014    Elevated troponin 5/3/2014    Hyperlipidemia     Hypertension     Hypothyroidism (acquired) 11/1/2017    Obesity     Postop check 2/5/2018    Pulmonary embolism 05/2014    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     Past Surgical History:   Procedure Laterality Date    TONSILLECTOMY       Family History   Problem Relation Age of Onset    Diabetes Mother     Heart disease Mother     Hypertension Mother     Stroke Mother     Heart disease Father     Hypertension Father     Diabetes Father     Cancer Father     Cataracts Sister     Hypertension Sister     Diabetes Sister     Glaucoma Maternal Aunt     Glaucoma Paternal Aunt     Hypertension Brother     Diabetes Brother     No Known Problems Maternal Grandmother     No Known Problems Maternal Grandfather     No Known Problems Paternal Grandmother     No Known Problems Paternal Grandfather     Hypertension Son     Hypertension Daughter     Cirrhosis Neg Hx      Social History     Social History    Marital status: Single     Spouse name: N/A    Number of children: N/A    Years of education: N/A     Occupational History    Retired       Social History Main Topics    Smoking status: Former Smoker     Types: Cigarettes    Smokeless tobacco: Never Used      Comment: Quit smoking as a teenager    Alcohol use 0.6 - 1.2 oz/week     1 - 2 Cans of beer per week      Comment: 1-2 drinks occasionally    Drug use: No    Sexual activity: Yes     Partners: Female     Birth control/ protection: Condom     Other Topics Concern    Not on file     Social History Narrative    Lives w/daughter.          Objective:      Vitals:    04/10/18 1450   BP: (!) 160/84   Pulse: (!) 111   Weight: 108.6 kg (239 lb 6.7 oz)   Height: 5' 10" (1.778 m)      Physical Exam   Constitutional: He is oriented to person, " place, and time. He appears well-developed and well-nourished. No distress.   HENT:   Head: Normocephalic and atraumatic.   Mouth/Throat: Oropharynx is clear and moist. No oropharyngeal exudate.   Eyes: Conjunctivae and EOM are normal. Pupils are equal, round, and reactive to light. No scleral icterus.   Neck: No thyromegaly present.   Cardiovascular: Normal rate, regular rhythm and normal heart sounds.    No murmur heard.  Pulmonary/Chest: Effort normal and breath sounds normal. He has no wheezes. He has no rales.   Abdominal: Soft. He exhibits no distension. There is no tenderness.   Musculoskeletal: He exhibits no edema or tenderness.   UE and LE proximal weakness >> distal .    Lymphadenopathy:     He has no cervical adenopathy.   Neurological: He is alert and oriented to person, place, and time.   Skin: Skin is warm and dry.   Psychiatric: He has a normal mood and affect. His behavior is normal.       Assessment:       1. History of pulmonary embolism    2. Type 2 diabetes mellitus without complication, without long-term current use of insulin    3. Hypothyroidism (acquired)    4. Vitamin D insufficiency    5. Essential hypertension    6. Pure hypercholesterolemia        Plan:       Serafin was seen today for establish care.    Diagnoses and all orders for this visit:    History of pulmonary embolism  -     Ambulatory Referral to Hematology / Oncology    Type 2 diabetes mellitus without complication, without long-term current use of insulin  -     Hemoglobin A1c; Future    Hypothyroidism (acquired)  clinically euthyroid. update labs    Vitamin D insufficiency  Continue supplementation    Essential hypertension  -     amlodipine-benazepril 5-20 mg (LOTREL) 5-20 mg per capsule; Take 2 capsules by mouth once daily.  f/u in 3-4 weeks for nurse visit for BP check    Pure hypercholesterolemia  -statin is one of leading culprits in differential for his myopathy. Continue to hold and low cholesterol diet.                     Side effects of medication(s) were discussed in detail and patient voiced understanding.  Patient will call back for any issues or complications.

## 2018-04-10 NOTE — PATIENT INSTRUCTIONS
Contact Dr Mccall at [facility] for screening colonoscopy  Low-Salt Diet  This diet removes foods that are high in salt. It also limits the amount of salt you use when cooking. It is most often used for people with high blood pressure, edema (fluid retention), and kidney, liver, or heart disease.  Table salt contains the mineral sodium. Your body needs sodium to work normally. But too much sodium can make your health problems worse. Your healthcare provider is recommending a low-salt (also called low-sodium) diet for you. Your total daily allowance of salt is 1,500 to 2,300 milligrams (mg). It is less than 1 teaspoon of table salt. This means you can have only about 500 to 700 mg of sodium at each meal. People with certain health problems should limit salt intake to the lower end of the recommended range.    When you cook, dont add much salt. If you can cook without using salt, even better. Dont add salt to your food at the table.  When shopping, read food labels. Salt is often called sodium on the label. Choose foods that are salt-free, low salt, or very low salt. Note that foods with reduced salt may not lower your salt intake enough.    Beans, potatoes, and pasta  Ok: Dry beans, split peas, lentils, potatoes, rice, macaroni, pasta, spaghetti without added salt  Avoid: Potato chips, tortilla chips, and similar products  Breads and cereals  Ok: Low-sodium breads, rolls, cereals, and cakes; low-salt crackers, matzo crackers  Avoid: Salted crackers, pretzels, popcorn, Beninese toast, pancakes, muffins  Dairy  Ok: Milk, chocolate milk, hot chocolate mix, low-salt cheeses, and yogurt  Avoid: Processed cheese and cheese spreads; Roquefort, Camembert, and cottage cheese; buttermilk, instant breakfast drink  Desserts  Ok: Ice cream, frozen yogurt, juice bars, gelatin, cookies and pies, sugar, honey, jelly, hard candy  Avoid: Most pies, cakes and cookies prepared or processed with salt; instant pudding  Drinks  Ok: Tea,  coffee, fizzy (carbonated) drinks, juices  Avoid: Flavored coffees, electrolyte replacement drinks, sports drinks  Meats  Ok: All fresh meat, fish, poultry, low-salt tuna, eggs, egg substitute  Avoid: Smoked, pickled, brine-cured, or salted meats and fish. This includes villa, chipped beef, corned beef, hot dogs, deli meats, ham, kosher meats, salt pork, sausage, canned tuna, salted codfish, smoked salmon, herring, sardines, or anchovies.  Seasonings and spices  Ok: Most seasonings are okay. Good substitutes for salt include: fresh herb blends, hot sauce, lemon, garlic, arshad, vinegar, dry mustard, parsley, cilantro, horseradish, tomato paste, regular margarine, mayonnaise, unsalted butter, cream cheese, vegetable oil, cream, low-salt salad dressing and gravy.  Avoid: Regular ketchup, relishes, pickles, soy sauce, teriyaki sauce, Worcestershire sauce, BBQ sauce, tartar sauce, meat tenderizer, chili sauce, regular gravy, regular salad dressing, salted butter  Soups  Ok: Low-salt soups and broths made with allowed foods  Avoid: Bouillon cubes, soups with smoked or salted meats, regular soup and broth  Vegetables  Ok: Most vegetables are okay; also low-salt tomato and vegetable juices  Avoid: Sauerkraut and other brine-soaked vegetables; pickles and other pickled vegetables; tomato juice, olives  Date Last Reviewed: 8/1/2016 © 2000-2017 "[employer]. [address] [place] [address], [place] [address]. All rights reserved. This information is not intended as a substitute for professional medical care. Always follow your healthcare professional's instructions.

## 2018-05-01 RX ORDER — PREDNISONE 20 MG/1
TABLET ORAL
Qty: 90 TABLET | Refills: 1 | Status: SHIPPED | OUTPATIENT
Start: 2018-05-01 | End: 2018-06-19 | Stop reason: SDUPTHER

## 2018-05-03 DIAGNOSIS — E55.9 VITAMIN D INSUFFICIENCY: ICD-10-CM

## 2018-05-03 NOTE — TELEPHONE ENCOUNTER
----- Message from Mary Mccormack sent at 5/3/2018 11:29 AM CDT -----  Contact: VALDO VEGA [[id number]]      Can the clinic reply in [facility]: no    Please refill the medication(s) listed below. Please call the patient when the prescription(s) is ready for  at this phone number   [phone] (home)     Medication #1 ergocalciferol (ERGOCALCIFEROL) 50,000 unit Cap       Preferred Pharmacy:   [id number] [address] [address]  Phone: [phone] Fax: [phone]

## 2018-05-04 RX ORDER — ERGOCALCIFEROL 1.25 MG/1
50000 CAPSULE ORAL
Qty: 4 CAPSULE | Refills: 0 | OUTPATIENT
Start: 2018-05-04
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
## 2018-05-09 ENCOUNTER — CLINICAL SUPPORT (OUTPATIENT)
Dept: INTERNAL MEDICINE | Facility: CLINIC | Age: 68
End: 2018-05-09
Payer: MEDICARE

## 2018-05-09 ENCOUNTER — INITIAL CONSULT (OUTPATIENT)
Dept: HEMATOLOGY/ONCOLOGY | Facility: CLINIC | Age: 68
End: 2018-05-09
Attending: INTERNAL MEDICINE
Payer: MEDICARE

## 2018-05-09 VITALS
DIASTOLIC BLOOD PRESSURE: 90 MMHG | SYSTOLIC BLOOD PRESSURE: 164 MMHG | RESPIRATION RATE: 20 BRPM | OXYGEN SATURATION: 95 % | TEMPERATURE: 99 F | WEIGHT: 237.88 LBS | BODY MASS INDEX: 34.06 KG/M2 | HEART RATE: 111 BPM | HEIGHT: 70 IN

## 2018-05-09 VITALS — OXYGEN SATURATION: 96 % | HEART RATE: 100 BPM | DIASTOLIC BLOOD PRESSURE: 96 MMHG | SYSTOLIC BLOOD PRESSURE: 160 MMHG

## 2018-05-09 DIAGNOSIS — Z79.01 CHRONIC ANTICOAGULATION: ICD-10-CM

## 2018-05-09 DIAGNOSIS — G72.89 NECROTIZING MYOPATHY: ICD-10-CM

## 2018-05-09 DIAGNOSIS — I26.99 OTHER PULMONARY EMBOLISM WITHOUT ACUTE COR PULMONALE, UNSPECIFIED CHRONICITY: Primary | ICD-10-CM

## 2018-05-09 DIAGNOSIS — R29.898 WEAKNESS OF EXTREMITY: ICD-10-CM

## 2018-05-09 PROCEDURE — 3080F DIAST BP >= 90 MM HG: CPT | Mod: CPTII,S$GLB,, | Performed by: INTERNAL MEDICINE

## 2018-05-09 PROCEDURE — 99999 PR PBB SHADOW E&M-EST. PATIENT-LVL II: CPT | Mod: PBBFAC,,,

## 2018-05-09 PROCEDURE — 99204 OFFICE O/P NEW MOD 45 MIN: CPT | Mod: S$GLB,,, | Performed by: INTERNAL MEDICINE

## 2018-05-09 PROCEDURE — 3077F SYST BP >= 140 MM HG: CPT | Mod: CPTII,S$GLB,, | Performed by: INTERNAL MEDICINE

## 2018-05-09 PROCEDURE — 99999 PR PBB SHADOW E&M-EST. PATIENT-LVL III: CPT | Mod: PBBFAC,,, | Performed by: INTERNAL MEDICINE

## 2018-05-09 PROCEDURE — 99499 UNLISTED E&M SERVICE: CPT | Mod: S$GLB,,, | Performed by: INTERNAL MEDICINE

## 2018-05-09 RX ORDER — METOPROLOL SUCCINATE 25 MG/1
25 TABLET, EXTENDED RELEASE ORAL DAILY
Qty: 90 TABLET | Refills: 1 | Status: SHIPPED | OUTPATIENT
Start: 2018-05-09 | End: 2019-01-24 | Stop reason: SDUPTHER

## 2018-05-09 NOTE — PROGRESS NOTES
Serafin Tapia 67 y.o. male is here today for Blood Pressure check.   History of HTN yes.    Review of patient's allergies indicates:  No Known Allergies  Creatinine   Date Value Ref Range Status   03/19/2018 0.7 0.5 - 1.4 mg/dL Final     Sodium   Date Value Ref Range Status   03/19/2018 143 136 - 145 mmol/L Final     Potassium   Date Value Ref Range Status   03/19/2018 4.6 3.5 - 5.1 mmol/L Final   ]  Patient verifies taking blood pressure medications on a regular bases at the same time of the day.     Current Outpatient Prescriptions:     alendronate (FOSAMAX) 70 MG tablet, Take 1 tablet (70 mg total) by mouth every 7 days., Disp: 4 tablet, Rfl: 3    amlodipine-benazepril 5-20 mg (LOTREL) 5-20 mg per capsule, Take 2 capsules by mouth once daily., Disp: 180 capsule, Rfl: 2    ascorbic acid, vitamin C, (VITAMIN C) 500 MG tablet, Take 1 tablet (500 mg total) by mouth 2 (two) times daily., Disp: , Rfl:     calcium-vitamin D3 (CALCIUM 500 + D) 500 mg(1,250mg) -200 unit per tablet, Take 1 tablet by mouth 2 (two) times daily with meals., Disp: 60 tablet, Rfl: 3    ergocalciferol (ERGOCALCIFEROL) 50,000 unit Cap, Take 1 capsule (50,000 Units total) by mouth every 7 days., Disp: 4 capsule, Rfl: 0    folic acid (FOLVITE) 1 MG tablet, Take 1 tablet (1 mg total) by mouth once daily., Disp: 30 tablet, Rfl: 5    furosemide (LASIX) 20 MG tablet, Take 1 tablet (20 mg total) by mouth once daily., Disp: 30 tablet, Rfl: 2    levothyroxine 50 mcg Cap, Take 50 mcg by mouth before breakfast., Disp: 30 capsule, Rfl: 2    metFORMIN (GLUCOPHAGE) 1000 MG tablet, Take 1 tablet (1,000 mg total) by mouth 2 (two) times daily with meals. (Patient taking differently: Take 1,000 mg by mouth daily with breakfast. ), Disp: 180 tablet, Rfl: 0    metoprolol succinate (TOPROL-XL) 25 MG 24 hr tablet, Take 1 tablet (25 mg total) by mouth once daily., Disp: 90 tablet, Rfl: 1    multivitamin (ONE DAILY MULTIVITAMIN) per tablet, Take 1 tablet by  mouth once daily. PER [facility], Disp: , Rfl:     omeprazole (PRILOSEC) 40 MG capsule, Take 1 capsule (40 mg total) by mouth once daily., Disp: 30 capsule, Rfl: 3    predniSONE (DELTASONE) 20 MG tablet, TAKE 1 TABLET (20 MG TOTAL) BY MOUTH 3 (THREE) TIMES DAILY., Disp: 90 tablet, Rfl: 1    rivaroxaban (XARELTO) 20 mg Tab, Take 1 tablet (20 mg total) by mouth once daily., Disp: 90 tablet, Rfl: 2    sulfamethoxazole-trimethoprim 800-160mg (BACTRIM DS) 800-160 mg Tab, 1 tab 3 times a week, Disp: 15 tablet, Rfl: 3    zinc sulfate (ZINCATE) 220 (50) mg capsule, Take 220 mg by mouth once daily. PER [facility], Disp: , Rfl:   Does patient have record of home blood pressure readings no. Readings have been averaging n/a.   Last dose of blood pressure medication was taken at 7:30am.  Patient is asymptomatic.   Complains of no complaints.    BP: (!) 160/96 , Pulse: 100.    Blood pressure reading after 15 minutes was 140/98, Pulse 107.  Dr. Vargas notified.     Pt to start taking metoprolol succinate 25mg 24 hr tablet, take 1 tablet by mouth daily. Pt scheduled 2 week nurse visit to recheck bp. Pt demonstrated verbal understanding of information and had no further questions or concerns at this time.

## 2018-05-09 NOTE — LETTER
May 9, 2018      John Vargas MD  [address]  [address]  [facility] [address]           [facility]  [address], [address]  [facility] [address]  Phone: [phone]          Patient: Serafin Tapia   MR Number: [id number]   YOB: 1950   Date of Visit: 5/9/2018       Dear Dr. John Vargas:    Thank you for referring Serafin Tapia to me for evaluation. Attached you will find relevant portions of my assessment and plan of care.    If you have questions, please do not hesitate to call me. I look forward to following Serafin Tapia along with you.    Sincerely,    Prince Pimentel MD    Enclosure  CC:  No Recipients    If you would like to receive this communication electronically, please contact [email or web address] or [phone] to request more information on [facility] Link access.    For providers and/or their staff who would like to refer a patient to [facility], please contact us through our one-stop-shop provider referral line, [facility], at [phone].    If you feel you have received this communication in error or would no longer like to receive these types of communications, please e-mail [email or web address]

## 2018-05-22 DIAGNOSIS — Z92.29 HX OF LONG TERM USE OF BLOOD THINNERS: Primary | ICD-10-CM

## 2018-05-22 NOTE — TELEPHONE ENCOUNTER
----- Message from Yessenia Avendaño sent at 5/22/2018 12:37 PM CDT -----  Contact: VALDO VEGA [[id number]]  Can the clinic reply in [facility]: No      Please refill the medication(s) listed below. The patient can be reached at this phone number [phone] once it is called into the pharmacy.      Medication #1 rivaroxaban (XARELTO) 20 mg Tab      Preferred Pharmacy: [id number] - [address]

## 2018-06-06 RX ORDER — ALENDRONATE SODIUM 70 MG/1
70 TABLET ORAL
Qty: 4 TABLET | Refills: 3 | Status: SHIPPED | OUTPATIENT
Start: 2018-06-06 | End: 2018-10-06 | Stop reason: SDUPTHER

## 2018-06-06 RX ORDER — LEVOTHYROXINE SODIUM 50 UG/1
50 CAPSULE ORAL
Qty: 90 CAPSULE | Refills: 2 | Status: SHIPPED | OUTPATIENT
Start: 2018-06-06 | End: 2019-06-19 | Stop reason: SDUPTHER

## 2018-06-06 NOTE — TELEPHONE ENCOUNTER
----- Message from Mary Mccormack sent at 6/6/2018 11:51 AM CDT -----  Contact: VALDO VEGA [[id number]]      Can the clinic reply in [facility]: no    Please refill the medication(s) listed below. Please call the patient when the prescription(s) is ready for  at this phone number   [phone] (home)       Medication #1 levothyroxine 50 mcg Cap     Medication #2 alendronate (FOSAMAX) 70 MG tablet    Preferred Pharmacy:   [id number] [address] [address]  Phone: [phone] Fax: [phone]

## 2018-06-19 NOTE — TELEPHONE ENCOUNTER
----- Message from Yessenia Avendaño sent at 6/19/2018  1:47 PM CDT -----  Contact: VALDO VEGA [[id number]]  Can the clinic reply in [facility]: No      Please refill the medication(s) listed below. The patient can be reached at this phone number (_[phone]_) once it is called into the pharmacy.      Medication #1 predniSONE (DELTASONE) 20 MG tablet      Preferred Pharmacy: [id number] - [address]

## 2018-06-20 RX ORDER — PREDNISONE 20 MG/1
TABLET ORAL
Qty: 90 TABLET | Refills: 1 | Status: SHIPPED | OUTPATIENT
Start: 2018-06-20 | End: 2019-02-19

## 2018-06-22 ENCOUNTER — TELEPHONE (OUTPATIENT)
Dept: ADMINISTRATIVE | Facility: CLINIC | Age: 68
End: 2018-06-22

## 2018-07-09 RX ORDER — OMEPRAZOLE 40 MG/1
40 CAPSULE, DELAYED RELEASE ORAL DAILY
Qty: 90 CAPSULE | Refills: 2 | Status: SHIPPED | OUTPATIENT
Start: 2018-07-09 | End: 2019-04-24 | Stop reason: SDUPTHER

## 2018-07-09 RX ORDER — SULFAMETHOXAZOLE AND TRIMETHOPRIM 800; 160 MG/1; MG/1
TABLET ORAL
Qty: 15 TABLET | Refills: 3 | OUTPATIENT
Start: 2018-07-09

## 2018-07-09 NOTE — TELEPHONE ENCOUNTER
----- Message from Sophie Kaminski sent at 7/9/2018 11:49 AM CDT -----  Contact: Pt  Can the clinic reply in [facility]:Nop     Please refill the medication(s) listed below. Please call the patient when the prescription(s) is ready for  at the phone number [phone]    Medication #1:sulfamethoxazole-trimethoprim 800-160mg (BACTRIM DS) 800-160 mg Tab    Medication #2:omeprazole (PRILOSEC) 40 MG capsule      Preferred Pharmacy:[id number] - [facility] [address] [facility]

## 2018-07-11 ENCOUNTER — TELEPHONE (OUTPATIENT)
Dept: INTERNAL MEDICINE | Facility: CLINIC | Age: 68
End: 2018-07-11

## 2018-07-11 NOTE — TELEPHONE ENCOUNTER
----- Message from Wally Nagel sent at 7/6/2018 11:26 AM CDT -----  Contact: [facility]/ Bella [phone] ext [phone]  She received the denial of signature for an order and she wants to talk about it.    Thank you

## 2018-07-11 NOTE — TELEPHONE ENCOUNTER
Spoke with Bella with [facility], noted orders in chart for patient to be recertified but she reports order was not signed and it was declined by [facility].  Orders will be refaxed for signature.

## 2018-07-25 RX ORDER — SULFAMETHOXAZOLE AND TRIMETHOPRIM 800; 160 MG/1; MG/1
TABLET ORAL
Qty: 15 TABLET | Refills: 3 | OUTPATIENT
Start: 2018-07-25

## 2018-07-25 NOTE — TELEPHONE ENCOUNTER
----- Message from Gini Ojeda sent at 7/25/2018  2:00 PM CDT -----  Contact: pt  Can the clinic reply in [facility]: no    Please refill the medication(s) listed below. Please call the patient when the prescription(s) is ready for  at this phone number      [phone]    Medication #1sulfamethoxazole-trimethoprim 800-160mg (BACTRIM DS) 800-160 mg Tab  Medication #2    Preferred Pharmacy:[id number] - [place] [id number]

## 2018-07-27 NOTE — TELEPHONE ENCOUNTER
Left message in regards to informing that in order to get refill on abx he would need to be evaluated in the office and to call back if we would like to schedule an appt.

## 2018-08-01 ENCOUNTER — TELEPHONE (OUTPATIENT)
Dept: INTERNAL MEDICINE | Facility: CLINIC | Age: 68
End: 2018-08-01

## 2018-08-01 NOTE — TELEPHONE ENCOUNTER
Assisted pt with scheduling appt. Pt demonstrated verbal understanding of information and had no further questions or concerns at this time.

## 2018-08-01 NOTE — TELEPHONE ENCOUNTER
----- Message from Joanna Baca sent at 8/1/2018 12:36 PM CDT -----  Contact: Pt   Name of Who is Calling: VALDO VEGA [[id number]]      What is the request in detail: Patient is requesting a callback from staff to get an appointment so as possible, states he is out of his medication sulfamethoxazole-trimethoprim 800-160mg (BACTRIM DS) 800-160 mg Tab.Please contact to further discuss and advise      Can the clinic reply by [facility]: No       What Number to [facility] Back [facility] not [facility]:  [phone]

## 2018-08-03 DIAGNOSIS — E11.9 TYPE 2 DIABETES MELLITUS WITHOUT COMPLICATION, UNSPECIFIED WHETHER LONG TERM INSULIN USE: ICD-10-CM

## 2018-08-09 ENCOUNTER — OFFICE VISIT (OUTPATIENT)
Dept: HEMATOLOGY/ONCOLOGY | Facility: CLINIC | Age: 68
End: 2018-08-09
Payer: MEDICARE

## 2018-08-09 ENCOUNTER — CLINICAL SUPPORT (OUTPATIENT)
Dept: INTERNAL MEDICINE | Facility: CLINIC | Age: 68
End: 2018-08-09
Attending: INTERNAL MEDICINE
Payer: MEDICARE

## 2018-08-09 ENCOUNTER — LAB VISIT (OUTPATIENT)
Dept: LAB | Facility: OTHER | Age: 68
End: 2018-08-09
Attending: INTERNAL MEDICINE
Payer: MEDICARE

## 2018-08-09 VITALS
RESPIRATION RATE: 18 BRPM | DIASTOLIC BLOOD PRESSURE: 81 MMHG | HEART RATE: 93 BPM | OXYGEN SATURATION: 96 % | HEIGHT: 70 IN | TEMPERATURE: 98 F | SYSTOLIC BLOOD PRESSURE: 174 MMHG | WEIGHT: 256.63 LBS | BODY MASS INDEX: 36.74 KG/M2

## 2018-08-09 DIAGNOSIS — G72.49 IMMUNE MYOPATHY: ICD-10-CM

## 2018-08-09 DIAGNOSIS — I26.99 OTHER PULMONARY EMBOLISM WITHOUT ACUTE COR PULMONALE, UNSPECIFIED CHRONICITY: ICD-10-CM

## 2018-08-09 DIAGNOSIS — Z86.711 HISTORY OF PULMONARY EMBOLISM: Primary | ICD-10-CM

## 2018-08-09 DIAGNOSIS — E11.9 TYPE 2 DIABETES MELLITUS WITHOUT COMPLICATION, UNSPECIFIED WHETHER LONG TERM INSULIN USE: ICD-10-CM

## 2018-08-09 DIAGNOSIS — Z79.01 CHRONIC ANTICOAGULATION: ICD-10-CM

## 2018-08-09 LAB
ALBUMIN SERPL BCP-MCNC: 4.3 G/DL
ALP SERPL-CCNC: 62 U/L
ALT SERPL W/O P-5'-P-CCNC: 25 U/L
ANION GAP SERPL CALC-SCNC: 12 MMOL/L
AST SERPL-CCNC: 21 U/L
BASOPHILS # BLD AUTO: 0.01 K/UL
BASOPHILS NFR BLD: 0.1 %
BILIRUB SERPL-MCNC: 0.4 MG/DL
BUN SERPL-MCNC: 12 MG/DL
CALCIUM SERPL-MCNC: 9.9 MG/DL
CHLORIDE SERPL-SCNC: 100 MMOL/L
CO2 SERPL-SCNC: 32 MMOL/L
CREAT SERPL-MCNC: 0.8 MG/DL
DIFFERENTIAL METHOD: ABNORMAL
EOSINOPHIL # BLD AUTO: 0.1 K/UL
EOSINOPHIL NFR BLD: 0.7 %
ERYTHROCYTE [DISTWIDTH] IN BLOOD BY AUTOMATED COUNT: 13 %
EST. GFR  (AFRICAN AMERICAN): >60 ML/MIN/1.73 M^2
EST. GFR  (NON AFRICAN AMERICAN): >60 ML/MIN/1.73 M^2
GLUCOSE SERPL-MCNC: 84 MG/DL
HCT VFR BLD AUTO: 42.1 %
HGB BLD-MCNC: 13.8 G/DL
LYMPHOCYTES # BLD AUTO: 3.8 K/UL
LYMPHOCYTES NFR BLD: 44 %
MCH RBC QN AUTO: 29.7 PG
MCHC RBC AUTO-ENTMCNC: 32.8 G/DL
MCV RBC AUTO: 91 FL
MONOCYTES # BLD AUTO: 0.6 K/UL
MONOCYTES NFR BLD: 7 %
NEUTROPHILS # BLD AUTO: 4.1 K/UL
NEUTROPHILS NFR BLD: 48 %
PLATELET # BLD AUTO: 315 K/UL
PMV BLD AUTO: 10.6 FL
POTASSIUM SERPL-SCNC: 3.8 MMOL/L
PROT SERPL-MCNC: 7.5 G/DL
RBC # BLD AUTO: 4.65 M/UL
SODIUM SERPL-SCNC: 144 MMOL/L
WBC # BLD AUTO: 8.62 K/UL

## 2018-08-09 PROCEDURE — 36415 COLL VENOUS BLD VENIPUNCTURE: CPT

## 2018-08-09 PROCEDURE — 3077F SYST BP >= 140 MM HG: CPT | Mod: CPTII,S$GLB,, | Performed by: INTERNAL MEDICINE

## 2018-08-09 PROCEDURE — 99999 PR PBB SHADOW E&M-EST. PATIENT-LVL III: CPT | Mod: PBBFAC,,, | Performed by: INTERNAL MEDICINE

## 2018-08-09 PROCEDURE — 99214 OFFICE O/P EST MOD 30 MIN: CPT | Mod: S$GLB,,, | Performed by: INTERNAL MEDICINE

## 2018-08-09 PROCEDURE — 80053 COMPREHEN METABOLIC PANEL: CPT

## 2018-08-09 PROCEDURE — 85025 COMPLETE CBC W/AUTO DIFF WBC: CPT

## 2018-08-09 PROCEDURE — 3079F DIAST BP 80-89 MM HG: CPT | Mod: CPTII,S$GLB,, | Performed by: INTERNAL MEDICINE
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
## 2018-08-13 ENCOUNTER — OFFICE VISIT (OUTPATIENT)
Dept: RHEUMATOLOGY | Facility: CLINIC | Age: 68
End: 2018-08-13
Payer: MEDICARE

## 2018-08-13 ENCOUNTER — TELEPHONE (OUTPATIENT)
Dept: INTERNAL MEDICINE | Facility: CLINIC | Age: 68
End: 2018-08-13

## 2018-08-13 ENCOUNTER — LAB VISIT (OUTPATIENT)
Dept: LAB | Facility: HOSPITAL | Age: 68
End: 2018-08-13
Attending: INTERNAL MEDICINE
Payer: MEDICARE

## 2018-08-13 VITALS
SYSTOLIC BLOOD PRESSURE: 189 MMHG | BODY MASS INDEX: 36.79 KG/M2 | HEART RATE: 99 BPM | DIASTOLIC BLOOD PRESSURE: 101 MMHG | HEIGHT: 70 IN | WEIGHT: 257 LBS

## 2018-08-13 DIAGNOSIS — T46.6X5A STATIN MYOPATHY: ICD-10-CM

## 2018-08-13 DIAGNOSIS — Z79.52 ON PREDNISONE THERAPY: ICD-10-CM

## 2018-08-13 DIAGNOSIS — T46.6X5A STATIN MYOPATHY: Primary | ICD-10-CM

## 2018-08-13 DIAGNOSIS — G72.0 STATIN MYOPATHY: Primary | ICD-10-CM

## 2018-08-13 DIAGNOSIS — G72.0 STATIN MYOPATHY: ICD-10-CM

## 2018-08-13 LAB
CK SERPL-CCNC: 324 U/L
CRP SERPL-MCNC: 2.5 MG/L
ERYTHROCYTE [SEDIMENTATION RATE] IN BLOOD BY WESTERGREN METHOD: 13 MM/HR

## 2018-08-13 PROCEDURE — 3077F SYST BP >= 140 MM HG: CPT | Mod: CPTII,S$GLB,, | Performed by: INTERNAL MEDICINE

## 2018-08-13 PROCEDURE — 82550 ASSAY OF CK (CPK): CPT

## 2018-08-13 PROCEDURE — 86140 C-REACTIVE PROTEIN: CPT

## 2018-08-13 PROCEDURE — 99214 OFFICE O/P EST MOD 30 MIN: CPT | Mod: S$GLB,,, | Performed by: INTERNAL MEDICINE

## 2018-08-13 PROCEDURE — 3080F DIAST BP >= 90 MM HG: CPT | Mod: CPTII,S$GLB,, | Performed by: INTERNAL MEDICINE

## 2018-08-13 PROCEDURE — 99499 UNLISTED E&M SERVICE: CPT | Mod: S$GLB,,, | Performed by: INTERNAL MEDICINE

## 2018-08-13 PROCEDURE — 85652 RBC SED RATE AUTOMATED: CPT

## 2018-08-13 PROCEDURE — 99999 PR PBB SHADOW E&M-EST. PATIENT-LVL IV: CPT | Mod: PBBFAC,,, | Performed by: INTERNAL MEDICINE

## 2018-08-13 PROCEDURE — 82085 ASSAY OF ALDOLASE: CPT

## 2018-08-13 PROCEDURE — 36415 COLL VENOUS BLD VENIPUNCTURE: CPT

## 2018-08-13 RX ORDER — METHOTREXATE 2.5 MG/1
TABLET ORAL
Qty: 25 TABLET | Refills: 3 | Status: SHIPPED | OUTPATIENT
Start: 2018-08-13 | End: 2018-11-13

## 2018-08-13 RX ORDER — FOLIC ACID 1 MG/1
1 TABLET ORAL DAILY
Qty: 30 TABLET | Refills: 3 | Status: SHIPPED | OUTPATIENT
Start: 2018-08-13 | End: 2018-09-11 | Stop reason: SDUPTHER

## 2018-08-13 RX ORDER — SULFAMETHOXAZOLE AND TRIMETHOPRIM 800; 160 MG/1; MG/1
1 TABLET ORAL
Qty: 12 TABLET | Refills: 3 | Status: SHIPPED | OUTPATIENT
Start: 2018-08-13 | End: 2018-11-13

## 2018-08-13 RX ORDER — PREDNISONE 5 MG/1
TABLET ORAL
Qty: 350 TABLET | Refills: 3 | Status: SHIPPED | OUTPATIENT
Start: 2018-08-13 | End: 2018-11-13

## 2018-08-13 ASSESSMENT — ROUTINE ASSESSMENT OF PATIENT INDEX DATA (RAPID3)
PATIENT GLOBAL ASSESSMENT SCORE: 0
TOTAL RAPID3 SCORE: .89
AM STIFFNESS SCORE: 0, NO
FATIGUE SCORE: 0
MDHAQ FUNCTION SCORE: .8
PAIN SCORE: 0

## 2018-08-13 NOTE — PROGRESS NOTES
Chief Complaint   Patient presents with    Disease Management     myositis       Patient is here for a follow up    History of presenting illness    67 year old black male with HTN,HLD,obesity,LVH,DM,PE,LS spine disease,hypothyroidism, necrotising immune mediated myopathy for a follow up    He has been on 60 mg prednisone with definite improvement in symptoms  He missed last couple of visits and hence has been on the high dose prednisone     But he can get up with minimal support  He is not longer using a walker or wheel chair  He uses a cane only because he wants to prevent falls  He feels he needs to get back some strength back    He couldn't get into PT    His last labs :  Normal CBC,CMP : 8/2018    Last ck 4 months ago was 1770  Last aldolase was 29.5    Foot continues to be infection free    Muscle biopsy : necrotising myopathy    Initial evaluation :    Patient with long standing :  HTN, HLD, diabetes mellitus and obesity,asthma,hypothyroidism    He was seen in the hospital due to worsening shortness of breath    Prior to that he was at [facility] for hypoglycemia due to sulfonylurea and he was noted to have CK of 4000 to 5000.He got hydration and he developed anasarca and pulmonary edema and then got diuresed.  He was diagnosed to have rhabdomyolysis, His CPK at the time of discharge was 5553.   He had elevated ESR,CRP  HALLE negative     Since March 2017 he has been weak  Prior to the hospitalisations he was very mobile,no weakness,ambulated with no asistance  During this admission he couldn't walk,he couldn't stand,he couldn't lift thighs,couldnt climb stairs    He denies any fevers, chills, rashes, mucosal ulcers, hematuria, dysphagia, vision changes, Raynaud's, n/v/d.     Pt reports a 60-70 pound weight loss since March.       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 He developed a wound on the right foot which he  reports healed. He sts that after his swelling while hospitalized caused the wound to flare up.      He had MRI inpatient and that didn't support myositis  Cks trended down to 2513     Rheumatology team sent off : myomarker panel and HMG co-a reductase antibodies and asked him to come back  EMG and muscle biopsy planned but didn't happen  His sob was attributed to heart failure  Necrotising myopathy due to statins considered  Deconditioning considered  PT suggested      Myomarker panel negative  APLAS negative  HMG co-a reductase ab > 200 units     Muscle biopsy as detailed above    Past history : HTN, HLD, diabetes mellitus and obesity,asthma,hypothyroidism    Family history:RA (brother) and SLE (cousins).    Social history : not a current smoker and alcoholic    Review of Systems   Constitutional: Negative for activity change, appetite change, chills, diaphoresis, fatigue, fever and unexpected weight change.   HENT: Negative for congestion, dental problem, drooling, ear discharge, ear pain, facial swelling, hearing loss, mouth sores, nosebleeds, postnasal drip, rhinorrhea, sinus pressure, sinus pain, sneezing, sore throat, tinnitus, trouble swallowing and voice change.    Eyes: Negative for photophobia, pain, discharge, redness, itching and visual disturbance.   Respiratory: Negative for apnea, cough, choking, chest tightness, shortness of breath, wheezing and stridor.    Cardiovascular: Negative for chest pain, palpitations and leg swelling.   Gastrointestinal: Negative for abdominal distention, abdominal pain, anal bleeding, blood in stool, constipation, diarrhea, nausea, rectal pain and vomiting.   Endocrine: Negative for cold intolerance, heat intolerance, polydipsia, polyphagia and polyuria.   Genitourinary: Negative for decreased urine volume, difficulty urinating, dysuria, enuresis, flank pain, frequency, genital sores, hematuria and urgency.   Musculoskeletal: Negative for arthralgias, back pain, gait  problem, joint swelling, myalgias, neck pain and neck stiffness.   Skin: Negative for color change, pallor, rash and wound.   Allergic/Immunologic: Negative for environmental allergies, food allergies and immunocompromised state.   Neurological: Positive for weakness. Negative for dizziness, tremors, seizures, syncope, facial asymmetry, speech difficulty, light-headedness, numbness and headaches.   Hematological: Negative for adenopathy. Does not bruise/bleed easily.   Psychiatric/Behavioral: Negative for agitation, behavioral problems, confusion, decreased concentration, dysphoric mood, hallucinations, self-injury, sleep disturbance and suicidal ideas. The patient is not nervous/anxious and is not hyperactive.         Physical Exam     LUCIANO-28 tender joint count: 0  LUCIANO-28 swollen joint count: 0     5/5 strength all extremities     Physical Exam   Constitutional: He is oriented to person, place, and time and well-developed, well-nourished, and in no distress. No distress.   HENT:   Head: Normocephalic.   Mouth/Throat: Oropharynx is clear and moist.   Eyes: Conjunctivae are normal. Pupils are equal, round, and reactive to light. Right eye exhibits no discharge. Left eye exhibits no discharge. No scleral icterus.   Neck: Normal range of motion. No thyromegaly present.   Cardiovascular: Normal rate, regular rhythm, normal heart sounds and intact distal pulses.    Pulmonary/Chest: Effort normal and breath sounds normal. No stridor.   Abdominal: Soft. Bowel sounds are normal.   Lymphadenopathy:     He has no cervical adenopathy.   Neurological: He is alert and oriented to person, place, and time.   Skin: Skin is warm. No rash noted. He is not diaphoretic.     Psychiatric: Affect and judgment normal.   Musculoskeletal: Normal range of motion.         Assessment     67 year old black male with HTN,HLD,obesity,LVH,DM,PE,LS spine disease,hypothyroidism, necrotising immune mediated myopathy  He presented with : weakness in  the proximal and distal upper and lower extremities with sob     HALLE and armaan-1 negative,myomarker panel negative  He has very high titres of HMG-COA reductase antibodies positivity  Muscle biopsy positive for necrotising myopathy  He has been on statins for > 10 years with no change in dose of the medication,no change in type of statin    He has immune mediated necrotising myopathy  Drug/toxin induced myopathy on the differential as per path report    He has shown great improvement with 60 mg daily prednisone   Unfortunately has been on the high dose since feb 2018  He has not complied to follow ups and labs since march 2018      1. Statin myopathy    2. On prednisone therapy          Plan    Will rpt all the labs today : ck,aldolase    Calcium vit d,fosamax,omeprazole all UTD  PCP prophylaxis UTD  DEXA ordered    Podiatry follow ups and diabetes control very important,emphasised    Arrange for more aggressive physical therapy    Start prednisone taper : 5 mg every 2 weeks   In 22 weeks he will be off prednisone    Introduce mtx : 5 tabs weekly  Discussed side effects  Common side effects :  Bone marrow suppression,aplastic anemia,pancytopenia  No renal dysfunction,ascites and pleural effusions limiting clearance of the drug  Hepatotoxicity,fibrosis and cirrhosis  Lung disease,cardiac issues like chest pain,thrombosis,effusions,pericarditis  Nausea,vomiting,diarrhea,ulcerative stomatitis, hemorrhagic enteritis and death from intestinal perforation   Malignant lymphomas  Rash,alopecia  Opportunistic infections like PCP pneumonia  Dizziness,headaches,cognitive issues    DM,HTN,HLD management aggressive while on steroids     Serafin was seen today for disease management.    Diagnoses and all orders for this visit:    Statin myopathy  -     CK; Future  -     Aldolase; Future  -     Sedimentation rate; Future  -     C-reactive protein; Standing    On prednisone therapy  -     DXA Bone Density Spine And Hip; Future    Other  orders  -     predniSONE (DELTASONE) 5 MG tablet; To taper 5 mg every 2 weeks  -     methotrexate 2.5 MG Tab; Take 5 tabs weekly  -     folic acid (FOLVITE) 1 MG tablet; Take 1 tablet (1 mg total) by mouth once daily.  -     sulfamethoxazole-trimethoprim 800-160mg (BACTRIM DS) 800-160 mg Tab; Take 1 tablet by mouth every Mon, Wed, Fri.      Follow up in 3 months

## 2018-08-14 LAB — ALDOLASE SERPL-CCNC: 6 U/L

## 2018-08-14 NOTE — TELEPHONE ENCOUNTER
Spoke w/ pt and informed him of lab results  Pt verbally understands and has no further questions '

## 2018-08-21 ENCOUNTER — HOSPITAL ENCOUNTER (OUTPATIENT)
Dept: RADIOLOGY | Facility: OTHER | Age: 68
Discharge: HOME | End: 2018-08-21
Attending: INTERNAL MEDICINE
Payer: MEDICARE

## 2018-08-21 DIAGNOSIS — Z79.52 ON PREDNISONE THERAPY: ICD-10-CM

## 2018-08-21 PROCEDURE — 77080 DXA BONE DENSITY AXIAL: CPT | Mod: 26,,, | Performed by: RADIOLOGY

## 2018-08-21 PROCEDURE — 77080 DXA BONE DENSITY AXIAL: CPT | Mod: TC

## 2018-09-11 DIAGNOSIS — E79.0 HYPERURICEMIA: Primary | ICD-10-CM

## 2018-09-11 RX ORDER — FOLIC ACID 1 MG/1
1 TABLET ORAL DAILY
Qty: 30 TABLET | Refills: 3 | Status: SHIPPED | OUTPATIENT
Start: 2018-09-11 | End: 2018-10-11

## 2018-09-11 NOTE — TELEPHONE ENCOUNTER
----- Message from Jo-Ann Escudero sent at 9/11/2018  9:17 AM CDT -----  Contact: pt  Can the clinic reply in [facility]: no      Please refill the medication(s) listed below. The patient can be reached at this phone number (_[phone]) once it is called into the pharmacy.      Medication #1folic acid (FOLVITE) 1 MG tablet       Medication #2      Preferred Pharmacy:[facility]

## 2018-09-12 ENCOUNTER — OFFICE VISIT (OUTPATIENT)
Dept: PHYSICAL MEDICINE AND REHAB | Facility: CLINIC | Age: 68
End: 2018-09-12
Payer: MEDICARE

## 2018-09-12 VITALS
WEIGHT: 264.69 LBS | BODY MASS INDEX: 37.89 KG/M2 | HEART RATE: 100 BPM | SYSTOLIC BLOOD PRESSURE: 187 MMHG | HEIGHT: 70 IN | DIASTOLIC BLOOD PRESSURE: 91 MMHG

## 2018-09-12 DIAGNOSIS — R29.898 WEAKNESS OF EXTREMITY: ICD-10-CM

## 2018-09-12 DIAGNOSIS — T46.6X5A STATIN MYOPATHY: ICD-10-CM

## 2018-09-12 DIAGNOSIS — G72.0 STATIN MYOPATHY: ICD-10-CM

## 2018-09-12 DIAGNOSIS — R29.898 WEAKNESS OF BOTH HIPS: ICD-10-CM

## 2018-09-12 DIAGNOSIS — R26.81 GAIT INSTABILITY: Primary | ICD-10-CM

## 2018-09-12 DIAGNOSIS — M47.816 SPONDYLOSIS OF LUMBAR REGION WITHOUT MYELOPATHY OR RADICULOPATHY: ICD-10-CM

## 2018-09-12 PROCEDURE — 3077F SYST BP >= 140 MM HG: CPT | Mod: CPTII,,, | Performed by: PHYSICAL MEDICINE & REHABILITATION

## 2018-09-12 PROCEDURE — 3080F DIAST BP >= 90 MM HG: CPT | Mod: CPTII,,, | Performed by: PHYSICAL MEDICINE & REHABILITATION

## 2018-09-12 PROCEDURE — 99213 OFFICE O/P EST LOW 20 MIN: CPT | Mod: S$PBB,,, | Performed by: PHYSICAL MEDICINE & REHABILITATION

## 2018-09-12 PROCEDURE — 99999 PR PBB SHADOW E&M-EST. PATIENT-LVL III: CPT | Mod: PBBFAC,,, | Performed by: PHYSICAL MEDICINE & REHABILITATION

## 2018-09-12 PROCEDURE — 99213 OFFICE O/P EST LOW 20 MIN: CPT | Mod: PBBFAC | Performed by: PHYSICAL MEDICINE & REHABILITATION

## 2018-09-12 PROCEDURE — 3288F FALL RISK ASSESSMENT DOCD: CPT | Mod: CPTII,,, | Performed by: PHYSICAL MEDICINE & REHABILITATION

## 2018-09-12 NOTE — PROGRESS NOTES
Subjective:       Patient ID: Serafin Tapia is a 67 y.o. male.    Chief Complaint: Gait Problem    Extremity Weakness    Pertinent negatives include no numbness.      Mr. Tapia returns to clinic for generalized weakness in Legs more than in arms weakness.  [facility] 04/02/18.  Referred by his Rheumatologist, Dr. Chiang for deconditioning and intensive PT. On LCV, he was referred to outpatient PT [facility], but did not go, since he did not have a transport. In meantime, he became stronger, his motor strenght improved, and he will be able now to go to outpatient  PT. He completed HH, PT and he is now able to get up on his own, and he can walk some ( 1-2 rooms) distance .   He is able to walk ~  ft, with RW, 3-4 times per day.   He is able to stand still >15 minutes.  Nevertheless he is not at his baseline.  Now, he would like to get to outpatient PT, at [facility].     Patient reports that he does not want to depend on his brother, that is helping him for all mobilities, and ADLs,   Since pt lives alone in apartment.   Patient with PMH significant for HTN, HLD, diabetes mellitus and obesity, asthma, and hypothyroidism  Patient reports beening sick since October-November 2017, and has been hospitalized multiple time since Oct. 2017 for muscle weakness in both legs > arms. He states that weakness came very quickly, and is improving very slow.   He was d/c to [facility] in Nov 2017, and had HH, PT+OT. According to Rheum note patient is being treated for inflammatory, immune myopathy.   Muscle bx, showed necrotising myopathy.   He has immune mediated necrotising myopathy  Drug /toxin induced myopathy on the differential as per path report  He has been on statins for > 10 years with no change in dose of the medication, no change in type of statin. He is off statins.  He did not receive IVIG infusions, and now on Prednisone taper off, Calcium vit D, fosamax,omeprazole given.  Deconditioning  considered, and PT suggested, by Rheum.       He lost a significant weight > 60 -70 lbs wt ( since 03/17) and has a significant muscle wasting jb in legs, and thighs, cannot  ankles.  He has b/l foot drop ( does not wear braces).   Patient reports significant  improvment of his muscle strength, he feels much better since all new medications were started.     Prior Functional status:  Prior to the hospitalisations he was very mobile,no weakness,ambulated with no asistance  During initial hospitalization, he couldn't walk,he couldn't stand,he couldn't lift thighs,couldnt climb stairs.   He is now able to get up, and walk 50-60 ft with RW, with MDA of his brother, and WC in behind him.  He is able to stand up, for several minutes, < 5 minutes, before he needs to sit down. He can roll himself in manual  WC.   He has all DME: RW, hospital bed, manual wheel chair.  Social Hx:   He lives alone in apartment, not a current smoker and alcoholic.   He is here for f/u and treatment of functional decline.    Past Medical History:   Diagnosis Date    Asthma     Diabetes mellitus type 2 in obese 5/3/2014    Elevated troponin 5/3/2014    Gait instability 2/28/2018    Hyperlipidemia     Hypertension     Hypothyroidism (acquired) 11/1/2017    Obesity     Postop check 2/5/2018    Pulmonary embolism 05/2014    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    Statin myopathy 1/16/2018       Past Surgical History:   Procedure Laterality Date    BIOPSY-MUSCLE Right 1/22/2018    Performed by Felton Foster Jr., MD at [facility] OR [address] [place] [address]    TONSILLECTOMY         Family History   Problem Relation Age of Onset    Diabetes Mother     Heart disease Mother     Hypertension Mother     Stroke Mother     Heart disease Father     Hypertension Father     Diabetes Father     Cancer Father     Cataracts Sister     Hypertension Sister     Diabetes Sister     Glaucoma Maternal Aunt      Glaucoma Paternal Aunt     Hypertension Brother     Diabetes Brother     No Known Problems Maternal Grandmother     No Known Problems Maternal Grandfather     No Known Problems Paternal Grandmother     No Known Problems Paternal Grandfather     Hypertension Son     Hypertension Daughter     Cirrhosis Neg Hx        Social History     Socioeconomic History    Marital status: Single     Spouse name: None    Number of children: None    Years of education: None    Highest education level: None   Social Needs    Financial resource strain: None    Food insecurity - worry: None    Food insecurity - inability: None    Transportation needs - medical: None    Transportation needs - non-medical: None   Occupational History    Occupation: Retired    Tobacco Use    Smoking status: Former Smoker     Types: Cigarettes    Smokeless tobacco: Never Used    Tobacco comment: Quit smoking as a teenager   Substance and Sexual Activity    Alcohol use: Yes     Alcohol/week: 0.6 - 1.2 oz     Types: 1 - 2 Cans of beer per week     Comment: 1-2 drinks occasionally    Drug use: No    Sexual activity: Yes     Partners: Female     Birth control/protection: Condom   Other Topics Concern    None   Social History Narrative    Lives w/daughter.        Current Outpatient Medications   Medication Sig Dispense Refill    alendronate (FOSAMAX) 70 MG tablet Take 1 tablet (70 mg total) by mouth every 7 days. 4 tablet 3    amlodipine-benazepril 5-20 mg (LOTREL) 5-20 mg per capsule Take 2 capsules by mouth once daily. 180 capsule 2    ascorbic acid, vitamin C, (VITAMIN C) 500 MG tablet Take 1 tablet (500 mg total) by mouth 2 (two) times daily.      calcium-vitamin D3 (CALCIUM 500 + D) 500 mg(1,250mg) -200 unit per tablet Take 1 tablet by mouth 2 (two) times daily with meals. 60 tablet 3    folic acid (FOLVITE) 1 MG tablet Take 1 tablet (1 mg total) by mouth once daily. 30 tablet 5    folic acid (FOLVITE) 1 MG  tablet Take 1 tablet (1 mg total) by mouth once daily. 30 tablet 3    furosemide (LASIX) 20 MG tablet Take 1 tablet (20 mg total) by mouth once daily. 30 tablet 2    levothyroxine 50 mcg Cap Take 50 mcg by mouth before breakfast. 90 capsule 2    metFORMIN (GLUCOPHAGE) 1000 MG tablet Take 1 tablet (1,000 mg total) by mouth 2 (two) times daily with meals. (Patient taking differently: Take 1,000 mg by mouth daily with breakfast. ) 180 tablet 0    methotrexate 2.5 MG Tab Take 5 tabs weekly 25 tablet 3    metoprolol succinate (TOPROL-XL) 25 MG 24 hr tablet Take 1 tablet (25 mg total) by mouth once daily. 90 tablet 1    multivitamin (ONE DAILY MULTIVITAMIN) per tablet Take 1 tablet by mouth once daily. PER [facility]      omeprazole (PRILOSEC) 40 MG capsule Take 1 capsule (40 mg total) by mouth once daily. 90 capsule 2    predniSONE (DELTASONE) 20 MG tablet Take one tab 3 times a day 90 tablet 1    predniSONE (DELTASONE) 5 MG tablet To taper 5 mg every 2 weeks 350 tablet 3    rivaroxaban (XARELTO) 20 mg Tab Take 1 tablet (20 mg total) by mouth once daily. 90 tablet 3    sulfamethoxazole-trimethoprim 800-160mg (BACTRIM DS) 800-160 mg Tab Take 1 tablet by mouth every Mon, Wed, Fri. 12 tablet 3    zinc sulfate (ZINCATE) 220 (50) mg capsule Take 220 mg by mouth once daily. PER [facility]       No current facility-administered medications for this visit.        Review of patient's allergies indicates:  No Known Allergies    Review of Systems   Constitutional: Negative for appetite change and fatigue.   Eyes: Negative for visual disturbance.   Respiratory: Negative for shortness of breath.    Cardiovascular: Negative for chest pain.   Gastrointestinal: Negative for constipation and diarrhea.   Genitourinary: Negative for dysuria, frequency and urgency.   Musculoskeletal: Positive for back pain, extremity weakness, gait problem and myalgias. Negative for arthralgias, joint swelling, neck pain and neck  stiffness.   Neurological: Negative for dizziness, tremors, weakness, numbness and headaches.   Psychiatric/Behavioral: Negative for dysphoric mood.   All other systems reviewed and are negative.        Objective:      Physical Exam      GENERAL: The patient is alert, oriented, pleasant.  HEENT: PERRLA  NECK: supple, no masses,    CV: S1S2, RRR, no murmurs, rales.  LUNGS: CTA bilateral.   ABDOMEN: soft, non-tender, non distended, bowel sounds nl.  EXTREMITIES: no edema, +2 pulses DP, b/l  SKIN: no skin rash, no skin breakdowns.  MUSCULOSKELETAL:   Gait not ambulatory, came in manual WC, able to get up from chair with MDA, and able to stand up against exam table for 2-3 minutes, before he asked to sit down.   Decreased active range of motion in all joints x4 extremities, able to lift legs juts against gravity, but weaker roximal more than distal, although has b/l foot drop.  Both UE appeared stronger.  Muscle strength 3/5 throughout in b/l LE, and 4- in B/l UE.  + muscle atrophy b/l x 4 extr.  No  joint laxity throughout x4 extremities.   NEUROLOGIC: Cranial nerves II through XII intact.   Deep tendon reflexes is normal, +2 in the upper and lower extremities bilaterally.   Muscle tone is normal to flaccid.   Sensory is intact to light touch and pinprick throughout x4 extremities.       Assessment:       1. Gait instability    2. 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 Weakness of extremity    4. Weakness of both hips    5. Statin myopathy        Plan:          Gait instability  -     Ambulatory Referral to Physical/Occupational Therapy    Spondylosis of lumbar region without myelopathy or radiculopathy  -     Ambulatory Referral to Physical/Occupational Therapy    Weakness of extremity  -     Ambulatory Referral to Physical/Occupational Therapy    Weakness of both hips  -     Ambulatory Referral to Physical/Occupational Therapy    Statin myopathy  -     Ambulatory Referral to  Physical/Occupational Therapy    Mr. Tapia, is 68 y/o male with gait instability, BLE > UE weakness, secondary to  immune mediated necrotising myopathy  (Muscle bx, showed necrotising myopathy).   Drug/toxin (steroids) induced myopathy on the differential as per path report.   He was in [facility]), and is d/c'd home with , [facility], (tel.therapist Danilo, [phone].), that he completed.  And now he wants to go to outpatient PT,at [facility].  He did not have transportation to get there, and now he will be getting transportation on his own.   Will refer to [facility] outpatient PT for gait instability.      RTC prn.     Total time spent face to face with patient was 15 minutes.   More than 50% of that time was spent in counseling on diagnosis , prognosis and treatment options.   I reviewed Primary care , and other specialty's notes to better coordinate patient's  care.   All questions were answered, and patient voiced understanding.

## 2018-10-08 RX ORDER — ALENDRONATE SODIUM 70 MG/1
70 TABLET ORAL
Qty: 4 TABLET | Refills: 2 | Status: SHIPPED | OUTPATIENT
Start: 2018-10-08 | End: 2019-02-19

## 2018-11-02 DIAGNOSIS — E11.9 TYPE 2 DIABETES MELLITUS WITHOUT COMPLICATION: ICD-10-CM

## 2018-11-08 ENCOUNTER — TELEPHONE (OUTPATIENT)
Dept: HEMATOLOGY/ONCOLOGY | Facility: CLINIC | Age: 68
End: 2018-11-08

## 2018-11-08 NOTE — TELEPHONE ENCOUNTER
----- Message from Lizz Mujica sent at 11/8/2018  3:35 PM CST -----  Contact: PT  PT is calling regarding a call he received saying that the appointment he had tomorrow needs to be rescheduled.       Callback: [phone]

## 2018-11-08 NOTE — TELEPHONE ENCOUNTER
Returned call and spoke with  Tapia. Mr Tapia rescheduled his scheduled appointment with Dr Pimentel on 11/9/18. His appointment has been rescheduled to 11/14/18. Patient given his appointment times.

## 2018-11-13 ENCOUNTER — LAB VISIT (OUTPATIENT)
Dept: LAB | Facility: HOSPITAL | Age: 68
End: 2018-11-13
Attending: INTERNAL MEDICINE
Payer: MEDICARE

## 2018-11-13 ENCOUNTER — OFFICE VISIT (OUTPATIENT)
Dept: RHEUMATOLOGY | Facility: CLINIC | Age: 68
End: 2018-11-13
Payer: MEDICARE

## 2018-11-13 VITALS
HEART RATE: 76 BPM | DIASTOLIC BLOOD PRESSURE: 102 MMHG | WEIGHT: 270 LBS | SYSTOLIC BLOOD PRESSURE: 202 MMHG | BODY MASS INDEX: 38.65 KG/M2 | HEIGHT: 70 IN

## 2018-11-13 DIAGNOSIS — G72.0 STATIN MYOPATHY: ICD-10-CM

## 2018-11-13 DIAGNOSIS — T46.6X5A STATIN MYOPATHY: ICD-10-CM

## 2018-11-13 DIAGNOSIS — G72.9 MYOPATHY: ICD-10-CM

## 2018-11-13 DIAGNOSIS — G72.9 MYOPATHY: Primary | ICD-10-CM

## 2018-11-13 LAB
ALBUMIN SERPL BCP-MCNC: 3.7 G/DL
ALP SERPL-CCNC: 71 U/L
ALT SERPL W/O P-5'-P-CCNC: 15 U/L
ANION GAP SERPL CALC-SCNC: 9 MMOL/L
AST SERPL-CCNC: 13 U/L
BASOPHILS # BLD AUTO: 0.02 K/UL
BASOPHILS NFR BLD: 0.3 %
BILIRUB SERPL-MCNC: 0.3 MG/DL
BUN SERPL-MCNC: 10 MG/DL
CALCIUM SERPL-MCNC: 9.6 MG/DL
CHLORIDE SERPL-SCNC: 100 MMOL/L
CK SERPL-CCNC: 202 U/L
CO2 SERPL-SCNC: 35 MMOL/L
CREAT SERPL-MCNC: 0.7 MG/DL
CRP SERPL-MCNC: 4 MG/L
DIFFERENTIAL METHOD: ABNORMAL
EOSINOPHIL # BLD AUTO: 0.1 K/UL
EOSINOPHIL NFR BLD: 1.8 %
ERYTHROCYTE [DISTWIDTH] IN BLOOD BY AUTOMATED COUNT: 14.1 %
ERYTHROCYTE [SEDIMENTATION RATE] IN BLOOD BY WESTERGREN METHOD: 25 MM/HR
EST. GFR  (AFRICAN AMERICAN): >60 ML/MIN/1.73 M^2
EST. GFR  (NON AFRICAN AMERICAN): >60 ML/MIN/1.73 M^2
GLUCOSE SERPL-MCNC: 150 MG/DL
HCT VFR BLD AUTO: 40.5 %
HGB BLD-MCNC: 12.9 G/DL
IMM GRANULOCYTES # BLD AUTO: 0.03 K/UL
IMM GRANULOCYTES NFR BLD AUTO: 0.5 %
LYMPHOCYTES # BLD AUTO: 2.1 K/UL
LYMPHOCYTES NFR BLD: 31.2 %
MCH RBC QN AUTO: 28.4 PG
MCHC RBC AUTO-ENTMCNC: 31.9 G/DL
MCV RBC AUTO: 89 FL
MONOCYTES # BLD AUTO: 0.5 K/UL
MONOCYTES NFR BLD: 7.5 %
NEUTROPHILS # BLD AUTO: 3.9 K/UL
NEUTROPHILS NFR BLD: 58.7 %
NRBC BLD-RTO: 0 /100 WBC
PLATELET # BLD AUTO: 311 K/UL
PMV BLD AUTO: 11 FL
POTASSIUM SERPL-SCNC: 4.3 MMOL/L
PROT SERPL-MCNC: 6.9 G/DL
RBC # BLD AUTO: 4.55 M/UL
SODIUM SERPL-SCNC: 144 MMOL/L
WBC # BLD AUTO: 6.66 K/UL

## 2018-11-13 PROCEDURE — 3080F DIAST BP >= 90 MM HG: CPT | Mod: CPTII,S$GLB,, | Performed by: INTERNAL MEDICINE

## 2018-11-13 PROCEDURE — 99499 UNLISTED E&M SERVICE: CPT | Mod: S$GLB,,, | Performed by: INTERNAL MEDICINE

## 2018-11-13 PROCEDURE — 86140 C-REACTIVE PROTEIN: CPT

## 2018-11-13 PROCEDURE — 36415 COLL VENOUS BLD VENIPUNCTURE: CPT

## 2018-11-13 PROCEDURE — 99214 OFFICE O/P EST MOD 30 MIN: CPT | Mod: S$GLB,,, | Performed by: INTERNAL MEDICINE

## 2018-11-13 PROCEDURE — 1100F PTFALLS ASSESS-DOCD GE2>/YR: CPT | Mod: CPTII,S$GLB,, | Performed by: INTERNAL MEDICINE

## 2018-11-13 PROCEDURE — 82550 ASSAY OF CK (CPK): CPT

## 2018-11-13 PROCEDURE — 3077F SYST BP >= 140 MM HG: CPT | Mod: CPTII,S$GLB,, | Performed by: INTERNAL MEDICINE

## 2018-11-13 PROCEDURE — 85025 COMPLETE CBC W/AUTO DIFF WBC: CPT

## 2018-11-13 PROCEDURE — 99999 PR PBB SHADOW E&M-EST. PATIENT-LVL III: CPT | Mod: PBBFAC,,, | Performed by: INTERNAL MEDICINE

## 2018-11-13 PROCEDURE — 80053 COMPREHEN METABOLIC PANEL: CPT

## 2018-11-13 PROCEDURE — 85652 RBC SED RATE AUTOMATED: CPT

## 2018-11-13 PROCEDURE — 3288F FALL RISK ASSESSMENT DOCD: CPT | Mod: CPTII,S$GLB,, | Performed by: INTERNAL MEDICINE

## 2018-11-13 RX ORDER — PREDNISONE 5 MG/1
TABLET ORAL
Qty: 350 TABLET | Refills: 1 | Status: SHIPPED | OUTPATIENT
Start: 2018-11-13 | End: 2019-02-19

## 2018-11-13 RX ORDER — METHOTREXATE 2.5 MG/1
TABLET ORAL
Qty: 40 TABLET | Refills: 3 | Status: SHIPPED | OUTPATIENT
Start: 2018-11-13 | End: 2019-01-23 | Stop reason: SDUPTHER

## 2018-11-13 RX ORDER — FOLIC ACID 1 MG/1
1 TABLET ORAL DAILY
Qty: 30 TABLET | Refills: 5 | Status: SHIPPED | OUTPATIENT
Start: 2018-11-13 | End: 2019-02-19

## 2018-11-13 ASSESSMENT — ROUTINE ASSESSMENT OF PATIENT INDEX DATA (RAPID3)
PAIN SCORE: 0
TOTAL RAPID3 SCORE: .89
FATIGUE SCORE: 7
AM STIFFNESS SCORE: 1, YES
PSYCHOLOGICAL DISTRESS SCORE: 0
PATIENT GLOBAL ASSESSMENT SCORE: 0
MDHAQ FUNCTION SCORE: .8

## 2018-11-13 NOTE — PROGRESS NOTES
Chief Complaint   Patient presents with    Disease Management       Patient is here for a follow up    History of presenting illness    68 year old black male with HTN,HLD,obesity,LVH,DM,PE,LS spine disease,hypothyroidism, necrotising immune mediated myopathy for a follow up    He did well on 60 mg prednisone  We did labs and he had normal aldolase,normal inf markers   Ck trended down to 324    We started to taper prednisone  We are cutting 5 mg every 2 weeks  We are down to 25 mg a day    We introduced mtx 5 tabs weekly 8/2018  Folic acid 1 tab daily    dexa normal    He couldn't get into PT  Overall he is doing well  He came to the appointment all by himself  He uses a cane to balance  Sometimes bending over and turning around quick,getting up quick : some difficulty   No major weakness  He drives independently   Swallowing good  No sob    Foot continues to be infection free    Muscle biopsy : necrotising myopathy    Initial evaluation :    Patient with long standing :  HTN, HLD, diabetes mellitus and obesity,asthma,hypothyroidism    He was seen in the hospital due to worsening shortness of breath    Prior to that he was at [facility] for hypoglycemia due to sulfonylurea and he was noted to have CK of 4000 to 5000.He got hydration and he developed anasarca and pulmonary edema and then got diuresed.  He was diagnosed to have rhabdomyolysis, His CPK at the time of discharge was 5553.   He had elevated ESR,CRP  HALLE negative     Since March 2017 he has been weak  Prior to the hospitalisations he was very mobile,no weakness,ambulated with no asistance  During this admission he couldn't walk,he couldn't stand,he couldn't lift thighs,couldnt climb stairs    He denies any fevers, chills, rashes, mucosal ulcers, hematuria, dysphagia, vision changes, Raynaud's, n/v/d.     Pt reports a 60-70 pound weight loss since March.       In addition, Mr Tapia suffered a work related accident 15 yrs ago after a construction  platform fell across his b/l feet. He developed a wound on the right foot which he reports healed. He sts that after his swelling while hospitalized caused the wound to flare up.      He had MRI inpatient and that didn't support myositis  Cks trended down to 2513     Rheumatology team sent off : myomarker panel and HMG co-a reductase antibodies and asked him to come back  EMG and muscle biopsy planned but didn't happen  His sob was attributed to heart failure  Necrotising myopathy due to statins considered  Deconditioning considered  PT suggested      Myomarker panel negative  APLAS negative  HMG co-a reductase ab > 200 units     Muscle biopsy as detailed above    Past history : HTN, HLD, diabetes mellitus and obesity,asthma,hypothyroidism    Family history:RA (brother) and SLE (cousins).    Social history : not a current smoker and alcoholic    Review of Systems   Constitutional: Negative for activity change, appetite change, chills, diaphoresis, fatigue, fever and unexpected weight change.   HENT: Negative for congestion, dental problem, drooling, ear discharge, ear pain, facial swelling, hearing loss, mouth sores, nosebleeds, postnasal drip, rhinorrhea, sinus pressure, sinus pain, sneezing, sore throat, tinnitus, trouble swallowing and voice change.    Eyes: Negative for photophobia, pain, discharge, redness, itching and visual disturbance.   Respiratory: Negative for apnea, cough, choking, chest tightness, shortness of breath, wheezing and stridor.    Cardiovascular: Negative for chest pain, palpitations and leg swelling.   Gastrointestinal: Negative for abdominal distention, abdominal pain, anal bleeding, blood in stool, constipation, diarrhea, nausea, rectal pain and vomiting.   Endocrine: Negative for cold intolerance, heat intolerance, polydipsia, polyphagia and polyuria.   Genitourinary: Negative for decreased urine volume, difficulty urinating, dysuria, enuresis, flank pain, frequency, genital sores,  hematuria and urgency.   Musculoskeletal: Negative for arthralgias, back pain, gait problem, joint swelling, myalgias, neck pain and neck stiffness.   Skin: Negative for color change, pallor, rash and wound.   Allergic/Immunologic: Negative for environmental allergies, food allergies and immunocompromised state.   Neurological: Positive for weakness. Negative for dizziness, tremors, seizures, syncope, facial asymmetry, speech difficulty, light-headedness, numbness and headaches.   Hematological: Negative for adenopathy. Does not bruise/bleed easily.   Psychiatric/Behavioral: Negative for agitation, behavioral problems, confusion, decreased concentration, dysphoric mood, hallucinations, self-injury, sleep disturbance and suicidal ideas. The patient is not nervous/anxious and is not hyperactive.         Physical Exam   5/5 strength all extremities     Physical Exam   Constitutional: He is oriented to person, place, and time and well-developed, well-nourished, and in no distress. No distress.   HENT:   Head: Normocephalic.   Mouth/Throat: Oropharynx is clear and moist.   Eyes: Conjunctivae are normal. Pupils are equal, round, and reactive to light. Right eye exhibits no discharge. Left eye exhibits no discharge. No scleral icterus.   Neck: Normal range of motion. No thyromegaly present.   Cardiovascular: Normal rate, regular rhythm, normal heart sounds and intact distal pulses.    Pulmonary/Chest: Effort normal and breath sounds normal. No stridor.   Abdominal: Soft. Bowel sounds are normal.   Lymphadenopathy:     He has no cervical adenopathy.   Neurological: He is alert and oriented to person, place, and time.   Skin: Skin is warm. No rash noted. He is not diaphoretic.     Psychiatric: Affect and judgment normal.   Musculoskeletal: Normal range of motion.         Assessment     68 year old black male with HTN,HLD,obesity,LVH,DM,PE,LS spine disease,hypothyroidism, necrotising immune mediated myopathy  He presented  with : weakness in the proximal and distal upper and lower extremities with sob     HALLE and armaan-1 negative,myomarker panel negative  He has very high titres of HMG-COA reductase antibodies positivity  Muscle biopsy positive for necrotising myopathy  He has been on statins for > 10 years with no change in dose of the medication,no change in type of statin    He has immune mediated necrotising myopathy  Drug/toxin induced myopathy on the differential as per path report    He took steroids prednisone 60 mg for a very long time  This was not the plan  Finally when we saw him we started to taper the prednisone  Last labs looked great  He is down to 25 mg prednisone  He takes mtx 5 tabs weekly  He feels good      1. Myopathy    2. Statin myopathy          Plan    Will rpt all the labs today : ck,aldolase,ESR,CRP      dexa nml  Calcium vit d,fosamax,omeprazole all UTD  Stop bactrim     Podiatry follow ups and diabetes control very important,emphasised    Arrange for more aggressive physical therapy    Continue prednisone taper : 5 mg every 2 weeks   Completely taper it off    Labs today and titrate mtx to 8 tabs weekly  CBC,CMP today    DM,HTN,HLD management aggressive while on steroids     Today's BP very high,suggested to be cautious     Serafin was seen today for disease management.    Diagnoses and all orders for this visit:    Myopathy  -     CBC auto differential; Future  -     Comprehensive metabolic panel; Future  -     Sedimentation rate; Future  -     C-reactive protein; Standing  -     CK; Future  -     Aldolase; Future    Statin myopathy    Other orders  -     methotrexate 2.5 MG Tab; Take 8 tabs weekly.  -     folic acid (FOLVITE) 1 MG tablet; Take 1 tablet (1 mg total) by mouth once daily.  -     predniSONE (DELTASONE) 5 MG tablet; To taper 5 mg every 2 weeks      Follow up in 3 months

## 2018-11-14 ENCOUNTER — OFFICE VISIT (OUTPATIENT)
Dept: HEMATOLOGY/ONCOLOGY | Facility: CLINIC | Age: 68
End: 2018-11-14
Payer: MEDICARE

## 2018-11-14 ENCOUNTER — LAB VISIT (OUTPATIENT)
Dept: LAB | Facility: OTHER | Age: 68
End: 2018-11-14
Attending: INTERNAL MEDICINE
Payer: MEDICARE

## 2018-11-14 VITALS
WEIGHT: 281.75 LBS | HEIGHT: 70 IN | SYSTOLIC BLOOD PRESSURE: 217 MMHG | HEART RATE: 94 BPM | OXYGEN SATURATION: 92 % | DIASTOLIC BLOOD PRESSURE: 99 MMHG | RESPIRATION RATE: 16 BRPM | BODY MASS INDEX: 40.34 KG/M2

## 2018-11-14 DIAGNOSIS — I10 ESSENTIAL HYPERTENSION: ICD-10-CM

## 2018-11-14 DIAGNOSIS — Z86.711 HISTORY OF PULMONARY EMBOLISM: Primary | ICD-10-CM

## 2018-11-14 DIAGNOSIS — G72.49 IMMUNE MYOPATHY: ICD-10-CM

## 2018-11-14 DIAGNOSIS — Z86.711 HISTORY OF PULMONARY EMBOLISM: ICD-10-CM

## 2018-11-14 LAB
ALBUMIN SERPL BCP-MCNC: 4 G/DL
ALP SERPL-CCNC: 65 U/L
ALT SERPL W/O P-5'-P-CCNC: 13 U/L
ANION GAP SERPL CALC-SCNC: 10 MMOL/L
AST SERPL-CCNC: 12 U/L
BILIRUB SERPL-MCNC: 0.4 MG/DL
BUN SERPL-MCNC: 10 MG/DL
CALCIUM SERPL-MCNC: 10 MG/DL
CHLORIDE SERPL-SCNC: 100 MMOL/L
CO2 SERPL-SCNC: 36 MMOL/L
CREAT SERPL-MCNC: 0.7 MG/DL
ERYTHROCYTE [DISTWIDTH] IN BLOOD BY AUTOMATED COUNT: 14.5 %
EST. GFR  (AFRICAN AMERICAN): >60 ML/MIN/1.73 M^2
EST. GFR  (NON AFRICAN AMERICAN): >60 ML/MIN/1.73 M^2
GLUCOSE SERPL-MCNC: 124 MG/DL
HCT VFR BLD AUTO: 41.5 %
HGB BLD-MCNC: 13.4 G/DL
MCH RBC QN AUTO: 28.5 PG
MCHC RBC AUTO-ENTMCNC: 32.3 G/DL
MCV RBC AUTO: 88 FL
NEUTROPHILS # BLD AUTO: 4.3 K/UL
PLATELET # BLD AUTO: 339 K/UL
PMV BLD AUTO: 10.8 FL
POTASSIUM SERPL-SCNC: 3.6 MMOL/L
PROT SERPL-MCNC: 7.2 G/DL
RBC # BLD AUTO: 4.71 M/UL
SODIUM SERPL-SCNC: 146 MMOL/L
WBC # BLD AUTO: 7.79 K/UL

## 2018-11-14 PROCEDURE — 3077F SYST BP >= 140 MM HG: CPT | Mod: CPTII,S$GLB,, | Performed by: INTERNAL MEDICINE

## 2018-11-14 PROCEDURE — 85027 COMPLETE CBC AUTOMATED: CPT

## 2018-11-14 PROCEDURE — 3080F DIAST BP >= 90 MM HG: CPT | Mod: CPTII,S$GLB,, | Performed by: INTERNAL MEDICINE

## 2018-11-14 PROCEDURE — 36415 COLL VENOUS BLD VENIPUNCTURE: CPT

## 2018-11-14 PROCEDURE — 80053 COMPREHEN METABOLIC PANEL: CPT

## 2018-11-14 PROCEDURE — 99999 PR PBB SHADOW E&M-EST. PATIENT-LVL III: CPT | Mod: PBBFAC,,, | Performed by: INTERNAL MEDICINE

## 2018-11-14 PROCEDURE — 1101F PT FALLS ASSESS-DOCD LE1/YR: CPT | Mod: CPTII,S$GLB,, | Performed by: INTERNAL MEDICINE

## 2018-11-14 PROCEDURE — 99214 OFFICE O/P EST MOD 30 MIN: CPT | Mod: S$GLB,,, | Performed by: INTERNAL MEDICINE

## 2018-11-14 NOTE — PROGRESS NOTES
Patient, Serafin Tapia (MRN #[id number]), presented with a recorded BMI of 40.43 kg/m^2 consistent with the definition of morbid obesity (ICD-10 E66.01). The patient's morbid obesity was monitored, evaluated, addressed and/or treated. This addendum to the medical record is made on 11/14/2018.
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
## 2018-12-17 DIAGNOSIS — E11.9 TYPE 2 DIABETES MELLITUS WITHOUT COMPLICATION: ICD-10-CM

## 2019-01-17 ENCOUNTER — PATIENT OUTREACH (OUTPATIENT)
Dept: ADMINISTRATIVE | Facility: HOSPITAL | Age: 69
End: 2019-01-17

## 2019-01-17 NOTE — PROGRESS NOTES
Spoke with patient regarding overdue labs, patient scheduled for lab appointment on the same day as an already scheduled appointment for patient convenience.    Patient completed eye exam in 2018, hm updated    Patient completed podiatry appointment in 2018, hm updated.

## 2019-01-23 DIAGNOSIS — M62.82 NON-TRAUMATIC RHABDOMYOLYSIS: ICD-10-CM

## 2019-01-23 DIAGNOSIS — I10 ESSENTIAL HYPERTENSION: Primary | ICD-10-CM

## 2019-01-23 RX ORDER — METOPROLOL SUCCINATE 25 MG/1
25 TABLET, EXTENDED RELEASE ORAL DAILY
Qty: 90 TABLET | Refills: 1 | Status: CANCELLED | OUTPATIENT
Start: 2019-01-23 | End: 2020-01-23

## 2019-01-23 RX ORDER — FUROSEMIDE 20 MG/1
20 TABLET ORAL DAILY
Qty: 30 TABLET | Refills: 2 | Status: CANCELLED | OUTPATIENT
Start: 2019-01-23

## 2019-01-24 DIAGNOSIS — I10 ESSENTIAL HYPERTENSION: Primary | ICD-10-CM

## 2019-01-24 RX ORDER — FUROSEMIDE 20 MG/1
20 TABLET ORAL DAILY
Qty: 90 TABLET | Refills: 2 | Status: SHIPPED | OUTPATIENT
Start: 2019-01-24 | End: 2019-04-18 | Stop reason: SDUPTHER

## 2019-01-24 RX ORDER — METOPROLOL SUCCINATE 25 MG/1
25 TABLET, EXTENDED RELEASE ORAL DAILY
Qty: 90 TABLET | Refills: 2 | Status: SHIPPED | OUTPATIENT
Start: 2019-01-24 | End: 2019-04-24

## 2019-01-24 NOTE — TELEPHONE ENCOUNTER
----- Message from Christel Caldwell sent at 1/23/2019  4:15 PM CST -----  Contact: VALDO VEGA [[id number]]  Can the clinic reply in [facility]:n    Please refill the medication(s) listed below.     Please call the patient when the prescription(s) is ready for  at the phone number   [phone]    Medication #1:metoprolol succinate (TOPROL-XL) 25 MG 24 hr tablet    Medication #2: methotrexate 2.5 MG Tab      Medication #3: furosemide (LASIX) 20 MG tablet    Preferred Pharmacy: [id number] [address] [phone]

## 2019-01-24 NOTE — TELEPHONE ENCOUNTER
----- Message from Linn May sent at 1/24/2019  2:45 PM CST -----  Contact: pt  Can the clinic reply in [facility]: No   Please refill the medication(s) listed below. The patient can be reached at this phone number once it is called into the pharmacy [phone].    Medication #1furosemide (LASIX) 20 MG tablet    Medication #2metoprolol succinate (TOPROL-XL) 25 MG 24 hr tablet    Preferred Pharmacy:[id number] - [place] [id number] [facility]

## 2019-01-25 RX ORDER — METHOTREXATE 2.5 MG/1
TABLET ORAL
Qty: 40 TABLET | Refills: 3 | Status: SHIPPED | OUTPATIENT
Start: 2019-01-25 | End: 2019-02-19

## 2019-02-15 ENCOUNTER — OFFICE VISIT (OUTPATIENT)
Dept: HEMATOLOGY/ONCOLOGY | Facility: CLINIC | Age: 69
End: 2019-02-15
Payer: MEDICARE

## 2019-02-15 VITALS
BODY MASS INDEX: 40.87 KG/M2 | SYSTOLIC BLOOD PRESSURE: 195 MMHG | DIASTOLIC BLOOD PRESSURE: 93 MMHG | RESPIRATION RATE: 17 BRPM | HEIGHT: 70 IN | HEART RATE: 97 BPM | WEIGHT: 285.5 LBS | OXYGEN SATURATION: 93 % | TEMPERATURE: 98 F

## 2019-02-15 DIAGNOSIS — Z86.711 HISTORY OF PULMONARY EMBOLISM: Primary | ICD-10-CM

## 2019-02-15 DIAGNOSIS — I10 ESSENTIAL HYPERTENSION: ICD-10-CM

## 2019-02-15 DIAGNOSIS — Z79.01 CHRONIC ANTICOAGULATION: ICD-10-CM

## 2019-02-15 PROCEDURE — 3080F PR MOST RECENT DIASTOLIC BLOOD PRESSURE >= 90 MM HG: ICD-10-PCS | Mod: CPTII,S$GLB,, | Performed by: INTERNAL MEDICINE

## 2019-02-15 PROCEDURE — 99499 UNLISTED E&M SERVICE: CPT | Mod: S$GLB,,, | Performed by: INTERNAL MEDICINE

## 2019-02-15 PROCEDURE — 1101F PT FALLS ASSESS-DOCD LE1/YR: CPT | Mod: CPTII,S$GLB,, | Performed by: INTERNAL MEDICINE

## 2019-02-15 PROCEDURE — 99499 RISK ADDL DX/OHS AUDIT: ICD-10-PCS | Mod: S$GLB,,, | Performed by: INTERNAL MEDICINE

## 2019-02-15 PROCEDURE — 3077F SYST BP >= 140 MM HG: CPT | Mod: CPTII,S$GLB,, | Performed by: INTERNAL MEDICINE

## 2019-02-15 PROCEDURE — 99214 PR OFFICE/OUTPT VISIT, EST, LEVL IV, 30-39 MIN: ICD-10-PCS | Mod: S$GLB,,, | Performed by: INTERNAL MEDICINE

## 2019-02-15 PROCEDURE — 1101F PR PT FALLS ASSESS DOC 0-1 FALLS W/OUT INJ PAST YR: ICD-10-PCS | Mod: CPTII,S$GLB,, | Performed by: INTERNAL MEDICINE

## 2019-02-15 PROCEDURE — 3077F PR MOST RECENT SYSTOLIC BLOOD PRESSURE >= 140 MM HG: ICD-10-PCS | Mod: CPTII,S$GLB,, | Performed by: INTERNAL MEDICINE

## 2019-02-15 PROCEDURE — 99214 OFFICE O/P EST MOD 30 MIN: CPT | Mod: S$GLB,,, | Performed by: INTERNAL MEDICINE

## 2019-02-15 PROCEDURE — 3080F DIAST BP >= 90 MM HG: CPT | Mod: CPTII,S$GLB,, | Performed by: INTERNAL MEDICINE

## 2019-02-15 PROCEDURE — 99999 PR PBB SHADOW E&M-EST. PATIENT-LVL III: ICD-10-PCS | Mod: PBBFAC,,, | Performed by: INTERNAL MEDICINE

## 2019-02-15 PROCEDURE — 99999 PR PBB SHADOW E&M-EST. PATIENT-LVL III: CPT | Mod: PBBFAC,,, | Performed by: INTERNAL MEDICINE

## 2019-02-15 NOTE — PROGRESS NOTES
Patient, Serafin Tapia (MRN #[id number]), presented with a recorded BMI of 40.96 kg/m^2 consistent with the definition of morbid obesity (ICD-10 E66.01). The patient's morbid obesity was monitored, evaluated, addressed and/or treated. This addendum to the medical record is made on 02/15/2019.
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
## 2019-02-19 ENCOUNTER — LAB VISIT (OUTPATIENT)
Dept: LAB | Facility: HOSPITAL | Age: 69
End: 2019-02-19
Attending: INTERNAL MEDICINE
Payer: MEDICARE

## 2019-02-19 ENCOUNTER — OFFICE VISIT (OUTPATIENT)
Dept: RHEUMATOLOGY | Facility: CLINIC | Age: 69
End: 2019-02-19
Payer: MEDICARE

## 2019-02-19 VITALS
SYSTOLIC BLOOD PRESSURE: 183 MMHG | DIASTOLIC BLOOD PRESSURE: 95 MMHG | HEIGHT: 70 IN | BODY MASS INDEX: 42.13 KG/M2 | HEART RATE: 97 BPM | WEIGHT: 294.31 LBS

## 2019-02-19 DIAGNOSIS — G72.0 STATIN MYOPATHY: Primary | ICD-10-CM

## 2019-02-19 DIAGNOSIS — M62.82 NON-TRAUMATIC RHABDOMYOLYSIS: ICD-10-CM

## 2019-02-19 DIAGNOSIS — T46.6X5A STATIN MYOPATHY: Primary | ICD-10-CM

## 2019-02-19 DIAGNOSIS — E11.9 TYPE 2 DIABETES MELLITUS WITHOUT COMPLICATION: ICD-10-CM

## 2019-02-19 LAB
CHOLEST SERPL-MCNC: 221 MG/DL
CHOLEST/HDLC SERPL: 5 {RATIO}
ESTIMATED AVG GLUCOSE: 171 MG/DL
HBA1C MFR BLD HPLC: 7.6 %
HDLC SERPL-MCNC: 44 MG/DL
HDLC SERPL: 19.9 %
LDLC SERPL CALC-MCNC: 158.6 MG/DL
NONHDLC SERPL-MCNC: 177 MG/DL
TRIGL SERPL-MCNC: 92 MG/DL

## 2019-02-19 PROCEDURE — 1101F PR PT FALLS ASSESS DOC 0-1 FALLS W/OUT INJ PAST YR: ICD-10-PCS | Mod: CPTII,S$GLB,, | Performed by: INTERNAL MEDICINE

## 2019-02-19 PROCEDURE — 99499 UNLISTED E&M SERVICE: CPT | Mod: S$GLB,,, | Performed by: INTERNAL MEDICINE

## 2019-02-19 PROCEDURE — 3077F PR MOST RECENT SYSTOLIC BLOOD PRESSURE >= 140 MM HG: ICD-10-PCS | Mod: CPTII,S$GLB,, | Performed by: INTERNAL MEDICINE

## 2019-02-19 PROCEDURE — 36415 COLL VENOUS BLD VENIPUNCTURE: CPT

## 2019-02-19 PROCEDURE — 99999 PR PBB SHADOW E&M-EST. PATIENT-LVL III: ICD-10-PCS | Mod: PBBFAC,,, | Performed by: INTERNAL MEDICINE

## 2019-02-19 PROCEDURE — 99214 OFFICE O/P EST MOD 30 MIN: CPT | Mod: S$GLB,,, | Performed by: INTERNAL MEDICINE

## 2019-02-19 PROCEDURE — 99999 PR PBB SHADOW E&M-EST. PATIENT-LVL III: CPT | Mod: PBBFAC,,, | Performed by: INTERNAL MEDICINE

## 2019-02-19 PROCEDURE — 3080F DIAST BP >= 90 MM HG: CPT | Mod: CPTII,S$GLB,, | Performed by: INTERNAL MEDICINE

## 2019-02-19 PROCEDURE — 99499 RISK ADDL DX/OHS AUDIT: ICD-10-PCS | Mod: S$GLB,,, | Performed by: INTERNAL MEDICINE

## 2019-02-19 PROCEDURE — 3077F SYST BP >= 140 MM HG: CPT | Mod: CPTII,S$GLB,, | Performed by: INTERNAL MEDICINE

## 2019-02-19 PROCEDURE — 3080F PR MOST RECENT DIASTOLIC BLOOD PRESSURE >= 90 MM HG: ICD-10-PCS | Mod: CPTII,S$GLB,, | Performed by: INTERNAL MEDICINE

## 2019-02-19 PROCEDURE — 1101F PT FALLS ASSESS-DOCD LE1/YR: CPT | Mod: CPTII,S$GLB,, | Performed by: INTERNAL MEDICINE

## 2019-02-19 PROCEDURE — 99214 PR OFFICE/OUTPT VISIT, EST, LEVL IV, 30-39 MIN: ICD-10-PCS | Mod: S$GLB,,, | Performed by: INTERNAL MEDICINE

## 2019-02-19 PROCEDURE — 80061 LIPID PANEL: CPT

## 2019-02-19 PROCEDURE — 83036 HEMOGLOBIN GLYCOSYLATED A1C: CPT

## 2019-02-19 RX ORDER — METHOTREXATE 2.5 MG/1
TABLET ORAL
Qty: 40 TABLET | Refills: 3 | Status: SHIPPED | OUTPATIENT
Start: 2019-02-19 | End: 2019-05-23

## 2019-02-19 RX ORDER — ALENDRONATE SODIUM 70 MG/1
70 TABLET ORAL
Qty: 4 TABLET | Refills: 2 | Status: SHIPPED | OUTPATIENT
Start: 2019-02-19 | End: 2019-02-19

## 2019-02-19 RX ORDER — FOLIC ACID 1 MG/1
1 TABLET ORAL DAILY
Qty: 30 TABLET | Refills: 5 | Status: SHIPPED | OUTPATIENT
Start: 2019-02-19 | End: 2019-05-23

## 2019-02-19 NOTE — PROGRESS NOTES
Chief Complaint   Patient presents with    Follow-up       Patient is here for a follow up    History of presenting illness    68 year old black male with HTN,HLD,obesity,LVH,DM,PE,LS spine disease,hypothyroidism, necrotising immune mediated myopathy for a follow up    He did well on 60 mg prednisone  We started to taper prednisone    He is off the prednisone since jan 2019    mtx started : now at 8 tabs weekly and folic acid     dexa normal    He is doing great    Foot continues to be infection free    Muscle biopsy : necrotising myopathy    Initial evaluation :    Patient with long standing :  HTN, HLD, diabetes mellitus and obesity,asthma,hypothyroidism    He was seen in the hospital due to worsening shortness of breath    Prior to that he was at [facility] for hypoglycemia due to sulfonylurea and he was noted to have CK of 4000 to 5000.He got hydration and he developed anasarca and pulmonary edema and then got diuresed.  He was diagnosed to have rhabdomyolysis, His CPK at the time of discharge was 5553.   He had elevated ESR,CRP  HALLE negative     Since March 2017 he has been weak  Prior to the hospitalisations he was very mobile,no weakness,ambulated with no asistance  During this admission he couldn't walk,he couldn't stand,he couldn't lift thighs,couldnt climb stairs    He denies any fevers, chills, rashes, mucosal ulcers, hematuria, dysphagia, vision changes, Raynaud's, n/v/d.     Pt reports a 60-70 pound weight loss since March.       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 He developed a wound on the right foot which he reports healed. He sts that after his swelling while hospitalized caused the wound to flare up.      He had MRI inpatient and that didn't support myositis  Cks trended down to 2513     Rheumatology team sent off : myomarker panel and HMG co-a reductase antibodies and asked him to come back  EMG and  muscle biopsy planned but didn't happen  His sob was attributed to heart failure  Necrotising myopathy due to statins considered  Deconditioning considered  PT suggested      Myomarker panel negative  APLAS negative  HMG co-a reductase ab > 200 units     Muscle biopsy as detailed above    Past history : HTN, HLD, diabetes mellitus and obesity,asthma,hypothyroidism    Family history:RA (brother) and SLE (cousins).    Social history : not a current smoker and alcoholic    Review of Systems   Constitutional: Negative for activity change, appetite change, chills, diaphoresis, fatigue, fever and unexpected weight change.   HENT: Negative for congestion, dental problem, drooling, ear discharge, ear pain, facial swelling, hearing loss, mouth sores, nosebleeds, postnasal drip, rhinorrhea, sinus pressure, sinus pain, sneezing, sore throat, tinnitus, trouble swallowing and voice change.    Eyes: Negative for photophobia, pain, discharge, redness, itching and visual disturbance.   Respiratory: Negative for apnea, cough, choking, chest tightness, shortness of breath, wheezing and stridor.    Cardiovascular: Negative for chest pain, palpitations and leg swelling.   Gastrointestinal: Negative for abdominal distention, abdominal pain, anal bleeding, blood in stool, constipation, diarrhea, nausea, rectal pain and vomiting.   Endocrine: Negative for cold intolerance, heat intolerance, polydipsia, polyphagia and polyuria.   Genitourinary: Negative for decreased urine volume, difficulty urinating, dysuria, enuresis, flank pain, frequency, genital sores, hematuria and urgency.   Musculoskeletal: Negative for arthralgias, back pain, gait problem, joint swelling, myalgias, neck pain and neck stiffness.   Skin: Negative for color change, pallor, rash and wound.   Allergic/Immunologic: Negative for environmental allergies, food allergies and immunocompromised state.   Neurological: Positive for weakness. Negative for dizziness, tremors,  seizures, syncope, facial asymmetry, speech difficulty, light-headedness, numbness and headaches.   Hematological: Negative for adenopathy. Does not bruise/bleed easily.   Psychiatric/Behavioral: Negative for agitation, behavioral problems, confusion, decreased concentration, dysphoric mood, hallucinations, self-injury, sleep disturbance and suicidal ideas. The patient is not nervous/anxious and is not hyperactive.         Physical Exam     LUCIANO-28 tender joint count: 0  LUCIANO-28 swollen joint count: 0     5/5 strength all extremities     Physical Exam   Constitutional: He is oriented to person, place, and time and well-developed, well-nourished, and in no distress. No distress.   HENT:   Head: Normocephalic.   Mouth/Throat: Oropharynx is clear and moist.   Eyes: Conjunctivae are normal. Pupils are equal, round, and reactive to light. Right eye exhibits no discharge. Left eye exhibits no discharge. No scleral icterus.   Neck: Normal range of motion. No thyromegaly present.   Cardiovascular: Normal rate, regular rhythm, normal heart sounds and intact distal pulses.    Pulmonary/Chest: Effort normal and breath sounds normal. No stridor.   Abdominal: Soft. Bowel sounds are normal.   Lymphadenopathy:     He has no cervical adenopathy.   Neurological: He is alert and oriented to person, place, and time.   Skin: Skin is warm. No rash noted. He is not diaphoretic.     Psychiatric: Affect and judgment normal.   Musculoskeletal: Normal range of motion.         Assessment     68 year old black male with HTN,HLD,obesity,LVH,DM,PE,LS spine disease,hypothyroidism, necrotising immune mediated myopathy  He presented with : weakness in the proximal and distal upper and lower extremities with sob     HALLE and armaan-1 negative,myomarker panel negative  He has very high titres of HMG-COA reductase antibodies positivity  Muscle biopsy positive for necrotising myopathy  He has been on statins for > 10 years with no change in dose of the  medication,no change in type of statin    He has immune mediated necrotising myopathy  Drug/toxin induced myopathy on the differential as per path report    He took steroids prednisone 60 mg for a very long time  This was not the plan    Finally when we saw him we started to taper the prednisone  Last labs looked great  Last ck 202  Inflammatory markers also better,ESR 25,CRP nml  Mild anemia 12.9/40.9  Normal white and plt count  nml liver and kidney function    We have tapered off the prednisone  He is now on mtx 8 tabs weekly and folic acid    1. Statin myopathy    2. Non-traumatic rhabdomyolysis          Plan    Will rpt all the labs today : ck,aldolase,ESR,CRP    dexa nml  Calcium vit d  Stopped bactrim   Go off fosamax and the omeprazole    Podiatry follow ups and diabetes control very important,emphasised  Have always asked him to do aggressive physical therapy  Continue mtx 8 tabs weekly and folic acid 1 mg daily  CBC,CMP today    DM,HTN,HLD management aggressive while on steroids     Today's BP very high,suggested to be cautious     Lose weight     Serafin was seen today for follow-up.    Diagnoses and all orders for this visit:    Statin myopathy  -     CBC auto differential; Future  -     Comprehensive metabolic panel; Future  -     Sedimentation rate; Future  -     C-reactive protein; Future  -     CK; Future  -     Aldolase; Future    Non-traumatic rhabdomyolysis  -     methotrexate 2.5 MG Tab; Take 8 tabs weekly    Other orders  -     Discontinue: alendronate (FOSAMAX) 70 MG tablet; Take 1 tablet (70 mg total) by mouth every 7 days.  -     folic acid (FOLVITE) 1 MG tablet; Take 1 tablet (1 mg total) by mouth once daily.      Follow up in 3 months

## 2019-02-25 ENCOUNTER — OFFICE VISIT (OUTPATIENT)
Dept: PHYSICAL MEDICINE AND REHAB | Facility: CLINIC | Age: 69
End: 2019-02-25
Payer: MEDICARE

## 2019-02-25 VITALS
HEIGHT: 70 IN | BODY MASS INDEX: 41.18 KG/M2 | WEIGHT: 287.69 LBS | SYSTOLIC BLOOD PRESSURE: 178 MMHG | DIASTOLIC BLOOD PRESSURE: 96 MMHG | HEART RATE: 91 BPM

## 2019-02-25 DIAGNOSIS — R29.898 WEAKNESS OF BOTH HIPS: ICD-10-CM

## 2019-02-25 DIAGNOSIS — G72.0 STATIN MYOPATHY: ICD-10-CM

## 2019-02-25 DIAGNOSIS — T46.6X5A STATIN MYOPATHY: ICD-10-CM

## 2019-02-25 DIAGNOSIS — M47.816 SPONDYLOSIS OF LUMBAR REGION WITHOUT MYELOPATHY OR RADICULOPATHY: ICD-10-CM

## 2019-02-25 DIAGNOSIS — R26.81 GAIT INSTABILITY: Primary | ICD-10-CM

## 2019-02-25 DIAGNOSIS — R53.1 WEAKNESS: ICD-10-CM

## 2019-02-25 DIAGNOSIS — R29.898 WEAKNESS OF EXTREMITY: ICD-10-CM

## 2019-02-25 PROCEDURE — 1101F PR PT FALLS ASSESS DOC 0-1 FALLS W/OUT INJ PAST YR: ICD-10-PCS | Mod: CPTII,S$GLB,, | Performed by: PHYSICAL MEDICINE & REHABILITATION

## 2019-02-25 PROCEDURE — 99212 PR OFFICE/OUTPT VISIT, EST, LEVL II, 10-19 MIN: ICD-10-PCS | Mod: S$GLB,,, | Performed by: PHYSICAL MEDICINE & REHABILITATION

## 2019-02-25 PROCEDURE — 3080F PR MOST RECENT DIASTOLIC BLOOD PRESSURE >= 90 MM HG: ICD-10-PCS | Mod: CPTII,S$GLB,, | Performed by: PHYSICAL MEDICINE & REHABILITATION

## 2019-02-25 PROCEDURE — 3077F SYST BP >= 140 MM HG: CPT | Mod: CPTII,S$GLB,, | Performed by: PHYSICAL MEDICINE & REHABILITATION

## 2019-02-25 PROCEDURE — 1101F PT FALLS ASSESS-DOCD LE1/YR: CPT | Mod: CPTII,S$GLB,, | Performed by: PHYSICAL MEDICINE & REHABILITATION

## 2019-02-25 PROCEDURE — 3080F DIAST BP >= 90 MM HG: CPT | Mod: CPTII,S$GLB,, | Performed by: PHYSICAL MEDICINE & REHABILITATION

## 2019-02-25 PROCEDURE — 99212 OFFICE O/P EST SF 10 MIN: CPT | Mod: S$GLB,,, | Performed by: PHYSICAL MEDICINE & REHABILITATION

## 2019-02-25 PROCEDURE — 3077F PR MOST RECENT SYSTOLIC BLOOD PRESSURE >= 140 MM HG: ICD-10-PCS | Mod: CPTII,S$GLB,, | Performed by: PHYSICAL MEDICINE & REHABILITATION

## 2019-02-25 PROCEDURE — 99999 PR PBB SHADOW E&M-EST. PATIENT-LVL III: ICD-10-PCS | Mod: PBBFAC,,, | Performed by: PHYSICAL MEDICINE & REHABILITATION

## 2019-02-25 PROCEDURE — 99999 PR PBB SHADOW E&M-EST. PATIENT-LVL III: CPT | Mod: PBBFAC,,, | Performed by: PHYSICAL MEDICINE & REHABILITATION

## 2019-02-25 NOTE — PROGRESS NOTES
Subjective:       Patient ID: Serafin Tapia is a 68 y.o. male.    Chief Complaint: gait instability    Extremity Weakness    Pertinent negatives include no numbness.      Mr. Tapia returns to clinic for generalized weakness in Legs more than in arms weakness.  [facility]  09/12/18.   Since [facility], he reports significant improvement of his weakness.  Referred by his Rheumatologist, Dr. Chiang for deconditioning and intensive PT, that he was prescribed.  However, he did not have a transport, and he did not attend, but exercised on his own in his house, and eventually his strength returned and he is now able to walk even w/o cane/walker.      On [facility], he was referred to outpatient PT [facility], but did not go, since he did not have a transport.   In meantime, he became stronger, his motor strenght improved, and he will be able now to go to outpatient  PT.   He completed HH, PT and he is now able to get up on his own, and he can walk some ( 1-2 rooms) distance .   He is able to walk ~  ft, with RW, 3-4 times per day.   He is able to stand still >15 minutes.  Nevertheless he is not at his baseline.  Now, he would like to get to outpatient PT, at [facility].     Patient reports that he does not want to depend on his brother, that is helping him for all mobilities, and ADLs,   Since pt lives alone in apartment.   Patient with PMH significant for HTN, HLD, diabetes mellitus and obesity, asthma, and hypothyroidism  Patient reports beening sick since October-November 2017, and has been hospitalized multiple time since Oct. 2017 for muscle weakness in both legs > arms. He states that weakness came very quickly, and is improving very slow.   He was d/c to [facility] in Nov 2017, and had HH, PT+OT. According to Rheum note patient is being treated for inflammatory, immune myopathy.   Muscle bx, showed necrotising myopathy.   He has immune mediated necrotising myopathy  Drug /toxin induced myopathy on the  differential as per path report  He has been on statins for > 10 years with no change in dose of the medication, no change in type of statin. He is off statins.  He did not receive IVIG infusions, and now on Prednisone taper off, Calcium vit D, fosamax,omeprazole given.  Deconditioning considered, and PT suggested, by Rheum.       He lost a significant weight > 60 -70 lbs wt ( since 03/17) and has a significant muscle wasting jb in legs, and thighs, cannot  ankles.  He has b/l foot drop ( does not wear braces).   Patient reports significant  improvment of his muscle strength, he feels much better since all new medications were started.     Prior Functional status:  Prior to the hospitalisations he was very mobile,no weakness,ambulated with no asistance  During initial hospitalization, he couldn't walk,he couldn't stand,he couldn't lift thighs,couldnt climb stairs.   He is now able to get up, and walk 50-60 ft with RW, with MDA of his brother, and WC in behind him.  He is able to stand up, for several minutes, < 5 minutes, before he needs to sit down. He can roll himself in manual  WC.   He has all DME: RW, hospital bed, manual wheel chair.  Social Hx:   He lives alone in apartment, not a current smoker and alcoholic.   He is here for f/u ..    Past Medical History:   Diagnosis Date    Asthma     Diabetes mellitus type 2 in obese 5/3/2014    Elevated troponin 5/3/2014    Gait instability 2/28/2018    Hyperlipidemia     Hypertension     Hypothyroidism (acquired) 11/1/2017    Obesity     Postop check 2/5/2018    Pulmonary embolism 05/2014    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    Statin myopathy 1/16/2018       Past Surgical History:   Procedure Laterality Date    BIOPSY-MUSCLE Right 1/22/2018    Performed by Felton Foster Jr., [facility] at [facility] OR [address] [place] [address]    TONSILLECTOMY         Family History   Problem Relation Age of Onset    Diabetes Mother     Heart  disease Mother     Hypertension Mother     Stroke Mother     Heart disease Father     Hypertension Father     Diabetes Father     Cancer Father     Cataracts Sister     Hypertension Sister     Diabetes Sister     Glaucoma Maternal Aunt     Glaucoma Paternal Aunt     Hypertension Brother     Diabetes Brother     No Known Problems Maternal Grandmother     No Known Problems Maternal Grandfather     No Known Problems Paternal Grandmother     No Known Problems Paternal Grandfather     Hypertension Son     Hypertension Daughter     Cirrhosis Neg Hx        Social History     Socioeconomic History    Marital status:      Spouse name: None    Number of children: None    Years of education: None    Highest education level: None   Social Needs    Financial resource strain: None    Food insecurity - worry: None    Food insecurity - inability: None    Transportation needs - medical: None    Transportation needs - non-medical: None   Occupational History    Occupation: Retired    Tobacco Use    Smoking status: Former Smoker     Types: Cigarettes    Smokeless tobacco: Never Used    Tobacco comment: Quit smoking as a teenager   Substance and Sexual Activity    Alcohol use: Yes     Alcohol/week: 0.6 - 1.2 oz     Types: 1 - 2 Cans of beer per week     Comment: 1-2 drinks occasionally    Drug use: No    Sexual activity: Yes     Partners: Female     Birth control/protection: Condom   Other Topics Concern    None   Social History Narrative    Lives w/daughter.        Current Outpatient Medications   Medication Sig Dispense Refill    amlodipine-benazepril 5-20 mg (LOTREL) 5-20 mg per capsule Take 2 capsules by mouth once daily. 180 capsule 2    ascorbic acid, vitamin C, (VITAMIN C) 500 MG tablet Take 1 tablet (500 mg total) by mouth 2 (two) times daily.      calcium-vitamin D3 (CALCIUM 500 + D) 500 mg(1,250mg) -200 unit per tablet Take 1 tablet by mouth 2 (two) times daily  with meals. 60 tablet 3    folic acid (FOLVITE) 1 MG tablet Take 1 tablet (1 mg total) by mouth once daily. 30 tablet 5    furosemide (LASIX) 20 MG tablet Take 1 tablet (20 mg total) by mouth once daily. 90 tablet 2    levothyroxine 50 mcg Cap Take 50 mcg by mouth before breakfast. 90 capsule 2    metFORMIN (GLUCOPHAGE) 1000 MG tablet Take 1 tablet (1,000 mg total) by mouth 2 (two) times daily with meals. (Patient taking differently: Take 1,000 mg by mouth daily with breakfast. ) 180 tablet 0    methotrexate 2.5 MG Tab Take 8 tabs weekly 40 tablet 3    metoprolol succinate (TOPROL-XL) 25 MG 24 hr tablet Take 1 tablet (25 mg total) by mouth once daily. 90 tablet 2    multivitamin (ONE DAILY MULTIVITAMIN) per tablet Take 1 tablet by mouth once daily. PER [facility]      omeprazole (PRILOSEC) 40 MG capsule Take 1 capsule (40 mg total) by mouth once daily. 90 capsule 2    zinc sulfate (ZINCATE) 220 (50) mg capsule Take 220 mg by mouth once daily. PER [facility]       No current facility-administered medications for this visit.        Review of patient's allergies indicates:  No Known Allergies    Review of Systems   Constitutional: Negative for appetite change and fatigue.   Eyes: Negative for visual disturbance.   Respiratory: Negative for shortness of breath.    Cardiovascular: Negative for chest pain.   Gastrointestinal: Negative for constipation and diarrhea.   Genitourinary: Negative for dysuria, frequency and urgency.   Musculoskeletal: Positive for back pain, extremity weakness, gait problem and myalgias. Negative for arthralgias, joint swelling, neck pain and neck stiffness.   Neurological: Negative for dizziness, tremors, weakness, numbness and headaches.   Psychiatric/Behavioral: Negative for dysphoric mood.   All other systems reviewed and are negative.        Objective:      Physical Exam      GENERAL: The patient is alert, oriented, pleasant.  HEENT: PERRLA  NECK: supple, no masses,     CV: S1S2, RRR, no murmurs, rales.  LUNGS: CTA bilateral.   ABDOMEN: soft, non-tender, non distended, bowel sounds nl.  EXTREMITIES: no edema, +2 pulses DP, b/l  SKIN: no skin rash, no skin breakdowns.  MUSCULOSKELETAL:   Gait mbulatory, walks with and without cane,   Gait wnl.   Muscle strength 5/5 throughout in b/l LE, and 5/5 in B/l UE.  No  joint laxity throughout x4 extremities.   NEUROLOGIC: Cranial nerves II through XII intact.   Deep tendon reflexes is normal, +2 in the upper and lower extremities bilaterally.   Muscle tone is normal to flaccid.   Sensory is intact to light touch and pinprick throughout x4 extremities.       Assessment:       1. Gait instability    2. Weakness of extremity    3. Spondylosis of lumbar region without myelopathy or radiculopathy    4. Weakness of both hips    5. Statin myopathy    6. Weakness        Plan:          Gait instability    Weakness of extremity    Spondylosis of lumbar region without myelopathy or radiculopathy    Weakness of both hips    Statin myopathy    Weakness    Mr. Tapia, is 66 y/o male with gait instability, BLE > UE weakness, secondary to  immune mediated necrotising myopathy  (Muscle bx, showed necrotising myopathy).   Drug/toxin (steroids) induced myopathy on the differential as per path report.   He was in [facility]), and is d/c'd home with , [facility], (tel.therapist Danilo, [phone].), that he completed.  Was refered to outpatient PT,at [facility], but he did not go, since he did not have a transportation.  He improved on his own.        RTC prn.     Total time spent face to face with patient was 10 minutes.   More than 50% of that time was spent in counseling on diagnosis , prognosis and treatment options.   I reviewed Primary care , and other specialty's notes to better coordinate patient's  care.   All questions were answered, and patient voiced understanding.

## 2019-02-28 ENCOUNTER — LAB VISIT (OUTPATIENT)
Dept: LAB | Facility: OTHER | Age: 69
End: 2019-02-28
Payer: MEDICARE

## 2019-02-28 DIAGNOSIS — G72.0 STATIN MYOPATHY: ICD-10-CM

## 2019-02-28 DIAGNOSIS — T46.6X5A STATIN MYOPATHY: ICD-10-CM

## 2019-02-28 LAB
ALBUMIN SERPL BCP-MCNC: 4 G/DL
ALP SERPL-CCNC: 82 U/L
ALT SERPL W/O P-5'-P-CCNC: 18 U/L
ANION GAP SERPL CALC-SCNC: 10 MMOL/L
AST SERPL-CCNC: 13 U/L
BASOPHILS # BLD AUTO: 0.01 K/UL
BASOPHILS NFR BLD: 0.2 %
BILIRUB SERPL-MCNC: 0.3 MG/DL
BUN SERPL-MCNC: 7 MG/DL
CALCIUM SERPL-MCNC: 9.6 MG/DL
CHLORIDE SERPL-SCNC: 100 MMOL/L
CK SERPL-CCNC: 162 U/L
CO2 SERPL-SCNC: 32 MMOL/L
CREAT SERPL-MCNC: 0.8 MG/DL
CRP SERPL-MCNC: 7.2 MG/L
DIFFERENTIAL METHOD: ABNORMAL
EOSINOPHIL # BLD AUTO: 0.1 K/UL
EOSINOPHIL NFR BLD: 1.4 %
ERYTHROCYTE [DISTWIDTH] IN BLOOD BY AUTOMATED COUNT: 13.9 %
ERYTHROCYTE [SEDIMENTATION RATE] IN BLOOD: 12 MM/HR
EST. GFR  (AFRICAN AMERICAN): >60 ML/MIN/1.73 M^2
EST. GFR  (NON AFRICAN AMERICAN): >60 ML/MIN/1.73 M^2
GLUCOSE SERPL-MCNC: 127 MG/DL
HCT VFR BLD AUTO: 43.8 %
HGB BLD-MCNC: 13.8 G/DL
LYMPHOCYTES # BLD AUTO: 1.8 K/UL
LYMPHOCYTES NFR BLD: 42.4 %
MCH RBC QN AUTO: 27.1 PG
MCHC RBC AUTO-ENTMCNC: 31.5 G/DL
MCV RBC AUTO: 86 FL
MONOCYTES # BLD AUTO: 0.4 K/UL
MONOCYTES NFR BLD: 9 %
NEUTROPHILS # BLD AUTO: 2 K/UL
NEUTROPHILS NFR BLD: 47 %
PLATELET # BLD AUTO: 301 K/UL
PMV BLD AUTO: 10.7 FL
POTASSIUM SERPL-SCNC: 3.7 MMOL/L
PROT SERPL-MCNC: 7.2 G/DL
RBC # BLD AUTO: 5.1 M/UL
SODIUM SERPL-SCNC: 142 MMOL/L
WBC # BLD AUTO: 4.22 K/UL

## 2019-02-28 PROCEDURE — 85651 RBC SED RATE NONAUTOMATED: CPT

## 2019-02-28 PROCEDURE — 82085 ASSAY OF ALDOLASE: CPT

## 2019-02-28 PROCEDURE — 82550 ASSAY OF CK (CPK): CPT

## 2019-02-28 PROCEDURE — 85025 COMPLETE CBC W/AUTO DIFF WBC: CPT

## 2019-02-28 PROCEDURE — 86140 C-REACTIVE PROTEIN: CPT

## 2019-02-28 PROCEDURE — 36415 COLL VENOUS BLD VENIPUNCTURE: CPT

## 2019-02-28 PROCEDURE — 80053 COMPREHEN METABOLIC PANEL: CPT

## 2019-03-01 LAB — ALDOLASE SERPL-CCNC: 1.7 U/L

## 2019-04-04 ENCOUNTER — PES CALL (OUTPATIENT)
Dept: ADMINISTRATIVE | Facility: CLINIC | Age: 69
End: 2019-04-04

## 2019-04-18 DIAGNOSIS — I10 ESSENTIAL HYPERTENSION: ICD-10-CM

## 2019-04-18 RX ORDER — FUROSEMIDE 20 MG/1
20 TABLET ORAL DAILY
Qty: 90 TABLET | Refills: 0 | Status: ON HOLD | OUTPATIENT
Start: 2019-04-18 | End: 2019-09-12 | Stop reason: SDUPTHER

## 2019-04-18 NOTE — TELEPHONE ENCOUNTER
----- Message from Linn May sent at 4/18/2019 12:22 PM CDT -----  Contact: pt   Can the clinic reply in [facility]:     Please refill the medication(s) listed below. The patient can be reached at this phone number once it is called into the pharmacy [phone]..    Medication #1 furosemide (LASIX) 20 MG tablet    Medication #2    Preferred Pharmacy: [id number] - [place] [id number] [facility]

## 2019-04-24 ENCOUNTER — OFFICE VISIT (OUTPATIENT)
Dept: INTERNAL MEDICINE | Facility: CLINIC | Age: 69
End: 2019-04-24
Attending: INTERNAL MEDICINE
Payer: MEDICARE

## 2019-04-24 VITALS
WEIGHT: 284.81 LBS | SYSTOLIC BLOOD PRESSURE: 143 MMHG | DIASTOLIC BLOOD PRESSURE: 80 MMHG | BODY MASS INDEX: 40.77 KG/M2 | OXYGEN SATURATION: 93 % | HEART RATE: 93 BPM | HEIGHT: 70 IN

## 2019-04-24 DIAGNOSIS — E03.9 HYPOTHYROIDISM (ACQUIRED): ICD-10-CM

## 2019-04-24 DIAGNOSIS — N47.1 PHIMOSIS: ICD-10-CM

## 2019-04-24 DIAGNOSIS — I10 ESSENTIAL HYPERTENSION: ICD-10-CM

## 2019-04-24 DIAGNOSIS — E11.42 CONTROLLED TYPE 2 DIABETES MELLITUS WITH DIABETIC POLYNEUROPATHY, WITHOUT LONG-TERM CURRENT USE OF INSULIN: Primary | ICD-10-CM

## 2019-04-24 PROCEDURE — 3045F PR MOST RECENT HEMOGLOBIN A1C LEVEL 7.0-9.0%: CPT | Mod: CPTII,S$GLB,, | Performed by: INTERNAL MEDICINE

## 2019-04-24 PROCEDURE — 3079F PR MOST RECENT DIASTOLIC BLOOD PRESSURE 80-89 MM HG: ICD-10-PCS | Mod: CPTII,S$GLB,, | Performed by: INTERNAL MEDICINE

## 2019-04-24 PROCEDURE — 3077F SYST BP >= 140 MM HG: CPT | Mod: CPTII,S$GLB,, | Performed by: INTERNAL MEDICINE

## 2019-04-24 PROCEDURE — 1101F PR PT FALLS ASSESS DOC 0-1 FALLS W/OUT INJ PAST YR: ICD-10-PCS | Mod: CPTII,S$GLB,, | Performed by: INTERNAL MEDICINE

## 2019-04-24 PROCEDURE — 99999 PR PBB SHADOW E&M-EST. PATIENT-LVL IV: ICD-10-PCS | Mod: PBBFAC,,, | Performed by: INTERNAL MEDICINE

## 2019-04-24 PROCEDURE — 99214 OFFICE O/P EST MOD 30 MIN: CPT | Mod: S$GLB,,, | Performed by: INTERNAL MEDICINE

## 2019-04-24 PROCEDURE — 3045F PR MOST RECENT HEMOGLOBIN A1C LEVEL 7.0-9.0%: ICD-10-PCS | Mod: CPTII,S$GLB,, | Performed by: INTERNAL MEDICINE

## 2019-04-24 PROCEDURE — 3079F DIAST BP 80-89 MM HG: CPT | Mod: CPTII,S$GLB,, | Performed by: INTERNAL MEDICINE

## 2019-04-24 PROCEDURE — 99499 RISK ADDL DX/OHS AUDIT: ICD-10-PCS | Mod: S$GLB,,, | Performed by: INTERNAL MEDICINE

## 2019-04-24 PROCEDURE — 1101F PT FALLS ASSESS-DOCD LE1/YR: CPT | Mod: CPTII,S$GLB,, | Performed by: INTERNAL MEDICINE

## 2019-04-24 PROCEDURE — 99499 UNLISTED E&M SERVICE: CPT | Mod: S$GLB,,, | Performed by: INTERNAL MEDICINE

## 2019-04-24 PROCEDURE — 99214 PR OFFICE/OUTPT VISIT, EST, LEVL IV, 30-39 MIN: ICD-10-PCS | Mod: S$GLB,,, | Performed by: INTERNAL MEDICINE

## 2019-04-24 PROCEDURE — 3077F PR MOST RECENT SYSTOLIC BLOOD PRESSURE >= 140 MM HG: ICD-10-PCS | Mod: CPTII,S$GLB,, | Performed by: INTERNAL MEDICINE

## 2019-04-24 PROCEDURE — 99999 PR PBB SHADOW E&M-EST. PATIENT-LVL IV: CPT | Mod: PBBFAC,,, | Performed by: INTERNAL MEDICINE

## 2019-04-24 RX ORDER — CLOTRIMAZOLE 1 G/ML
SOLUTION TOPICAL 2 TIMES DAILY
Qty: 30 ML | Refills: 1 | Status: SHIPPED | OUTPATIENT
Start: 2019-04-24 | End: 2021-10-14

## 2019-04-24 RX ORDER — METOPROLOL SUCCINATE 50 MG/1
50 TABLET, EXTENDED RELEASE ORAL DAILY
Qty: 90 TABLET | Refills: 2 | Status: SHIPPED | OUTPATIENT
Start: 2019-04-24 | End: 2019-12-18

## 2019-04-24 RX ORDER — TRIAMCINOLONE ACETONIDE 1 MG/G
CREAM TOPICAL 2 TIMES DAILY
Qty: 15 G | Refills: 1 | Status: SHIPPED | OUTPATIENT
Start: 2019-04-24 | End: 2021-10-14

## 2019-04-24 NOTE — PROGRESS NOTES
"Subjective:       Patient ID: Serafin Tapia is a 68 y.o. male.    Chief Complaint: Annual Exam    Here for routine f/u    Patient presents today for routine evaluation, physical, and labs. Patient has no major concerns or complaints today.     Discussed possible medication non adherence per insurance fill rates. Pt states he has meds from old Rx from prior prescribor and different pharmacies allowing him to not have to fill as often.    Pt denies fatigue, unexplained lyn gain/loss, palpitations, tremors, diarrhea, constipation, changes to hair/skin, heat/cold intolerance, or dysphagia.     He has had difficulty fully retracting his foreskin for the past 5-6 months. He denies recent acute worsening of pain or urinary retention.       Review of Systems   Constitutional: Negative for appetite change, chills, fever and unexpected weight change.   HENT: Negative for hearing loss, sore throat and trouble swallowing.    Eyes: Negative for visual disturbance.   Respiratory: Negative for cough, chest tightness and shortness of breath.    Cardiovascular: Negative for chest pain and leg swelling.   Gastrointestinal: Negative for abdominal pain, blood in stool, constipation, diarrhea, nausea and vomiting.   Endocrine: Negative for polydipsia and polyuria.   Genitourinary: Negative for decreased urine volume, difficulty urinating, dysuria, frequency and urgency.   Musculoskeletal: Negative for gait problem.   Skin: Negative for rash.   Neurological: Negative for dizziness and numbness.   Psychiatric/Behavioral: The patient is not nervous/anxious.        Objective:      Vitals:    04/24/19 1355   BP: (!) 143/80   Pulse: 93   SpO2: (!) 93%   Weight: 129.2 kg (284 lb 13.4 oz)   Height: 5' 10" (1.778 m)      Physical Exam   Constitutional: He is oriented to person, place, and time. He appears well-developed and well-nourished. No distress.   HENT:   Head: Normocephalic and atraumatic.   Mouth/Throat: Oropharynx is clear and moist. " No oropharyngeal exudate.   Eyes: Pupils are equal, round, and reactive to light. Conjunctivae and EOM are normal. No scleral icterus.   Neck: No thyromegaly present.   Cardiovascular: Normal rate, regular rhythm and normal heart sounds.   No murmur heard.  Pulmonary/Chest: Effort normal and breath sounds normal. He has no wheezes. He has no rales.   Abdominal: Soft. He exhibits no distension. There is no tenderness.   Musculoskeletal: He exhibits no edema (1+ pitting bilateral LE) or tenderness.   Lymphadenopathy:     He has no cervical adenopathy.   Neurological: He is alert and oriented to person, place, and time.   Skin: Skin is warm and dry.   Psychiatric: He has a normal mood and affect. His behavior is normal.       Assessment:       1. Controlled type 2 diabetes mellitus with diabetic polyneuropathy, without long-term current use of insulin    2. Phimosis    3. Essential hypertension    4. Hypothyroidism (acquired)        Plan:       Serafin was seen today for annual exam.    Diagnoses and all orders for this visit:    Controlled type 2 diabetes mellitus with diabetic polyneuropathy, without long-term current use of insulin  -     Hemoglobin A1c; Future  -     Microalbumin/creatinine urine ratio; Future    Phimosis  -     Ambulatory referral to Urology        clotrimazole (LOTRIMIN) 1 % Soln; Apply topically 2 (two) times daily.  -     triamcinolone acetonide 0.1% (KENALOG) 0.1 % cream; Apply topically 2 (two) times daily.    Essential hypertension  -     metoprolol succinate (TOPROL-XL) 50 MG 24 hr tablet; Take 1 tablet (50 mg total) by mouth once daily.    Hypothyroidism (acquired)  -     TSH; Future    Other orders  -       RTC in 6 months or sooner prn           Side effects of medication(s) were discussed in detail and patient voiced understanding.  Patient will call back for any issues or complications.

## 2019-04-24 NOTE — PATIENT INSTRUCTIONS
Evolocumab injection  What is this medicine?  EVOLOCUMAB (e voe BRIANA ue mab) is known as a PCSK9 inhibitor. It is used to lower the level of cholesterol in the blood. This medicine is only for patients whose cholesterol is not controlled by diet and other cholesterol-lowering therapy.  How should I use this medicine?  This medicine is for injection under the skin. You will be taught how to prepare and give this medicine. Use exactly as directed. Take your medicine at regular intervals. Do not take your medicine more often than directed.  It is important that you put your used needles and syringes in a special sharps container. Do not put them in a trash can. If you do not have a sharps container, call your pharmacist or health care provider to get one.  Talk to your pediatrician regarding the use of this medicine in children. While this drug may be prescribed for children as young as 13 years for selected conditions, precautions do apply.  What side effects may I notice from receiving this medicine?  Side effects that you should report to your doctor or health care professional as soon as possible:  · allergic reactions like skin rash, itching or hives, swelling of the face, lips, or tongue  · signs and symptoms of infection like fever or chills; cough; sore throat; pain or trouble passing urine  Side effects that usually do not require medical attention (report these to your doctor or health care professional if they continue or are bothersome):  · diarrhea  · nausea  · muscle pain  · pain, redness, or irritation at site where injected  What may interact with this medicine?  Interactions are not expected.  What if I miss a dose?  If you miss a dose, take it as soon as you can if there are more than 7 days until the next scheduled dose, or skip the missed dose and take the next dose according to your original schedule. Do not take double or extra doses.  Where should I keep my medicine?  Keep out of the reach of  children.  You will be instructed on how to store this medicine. Throw away any unused medicine after the expiration date on the label.  What should I tell my health care provider before I take this medicine?  They need to know if you have any of these conditions:  · an unusual or allergic reaction to evolocumab, other medicines, foods, dyes, or preservatives  · pregnant or trying to get pregnant  · breast-feeding  What should I watch for while using this medicine?  You may need blood work while you are taking this medicine.  NOTE:This sheet is a summary. It may not cover all possible information. If you have questions about this medicine, talk to your doctor, pharmacist, or health care provider. Copyright© 2017 [employer]      Please schedule appt to see Dr Mccall at [facility] for screening colonscopy.

## 2019-04-25 DIAGNOSIS — I10 ESSENTIAL HYPERTENSION: Primary | ICD-10-CM

## 2019-04-25 RX ORDER — AMLODIPINE AND BENAZEPRIL HYDROCHLORIDE 5; 20 MG/1; MG/1
2 CAPSULE ORAL DAILY
Qty: 180 CAPSULE | Refills: 2 | Status: CANCELLED | OUTPATIENT
Start: 2019-04-25 | End: 2020-04-24

## 2019-05-01 NOTE — TELEPHONE ENCOUNTER
Please contact pt and give him contact info for [facility] he is due for f/u colonoscopy with Dr Mccall.

## 2019-05-02 ENCOUNTER — OFFICE VISIT (OUTPATIENT)
Dept: UROLOGY | Facility: CLINIC | Age: 69
End: 2019-05-02
Attending: INTERNAL MEDICINE
Payer: MEDICARE

## 2019-05-02 VITALS
HEART RATE: 86 BPM | SYSTOLIC BLOOD PRESSURE: 173 MMHG | BODY MASS INDEX: 40.66 KG/M2 | DIASTOLIC BLOOD PRESSURE: 95 MMHG | HEIGHT: 70 IN | WEIGHT: 284 LBS

## 2019-05-02 DIAGNOSIS — N47.1 PHIMOSIS: Primary | ICD-10-CM

## 2019-05-02 LAB
BILIRUB SERPL-MCNC: ABNORMAL MG/DL
BLOOD URINE, POC: ABNORMAL
COLOR, POC UA: YELLOW
GLUCOSE UR QL STRIP: ABNORMAL
KETONES UR QL STRIP: ABNORMAL
LEUKOCYTE ESTERASE URINE, POC: ABNORMAL
NITRITE, POC UA: ABNORMAL
PH, POC UA: 6
PROTEIN, POC: ABNORMAL
SPECIFIC GRAVITY, POC UA: 1.01
UROBILINOGEN, POC UA: ABNORMAL

## 2019-05-02 PROCEDURE — 3077F PR MOST RECENT SYSTOLIC BLOOD PRESSURE >= 140 MM HG: ICD-10-PCS | Mod: CPTII,S$GLB,, | Performed by: UROLOGY

## 2019-05-02 PROCEDURE — 3080F PR MOST RECENT DIASTOLIC BLOOD PRESSURE >= 90 MM HG: ICD-10-PCS | Mod: CPTII,S$GLB,, | Performed by: UROLOGY

## 2019-05-02 PROCEDURE — 3080F DIAST BP >= 90 MM HG: CPT | Mod: CPTII,S$GLB,, | Performed by: UROLOGY

## 2019-05-02 PROCEDURE — 99203 OFFICE O/P NEW LOW 30 MIN: CPT | Mod: 25,S$GLB,, | Performed by: UROLOGY

## 2019-05-02 PROCEDURE — 81002 POCT URINE DIPSTICK WITHOUT MICROSCOPE: ICD-10-PCS | Mod: S$GLB,,, | Performed by: UROLOGY

## 2019-05-02 PROCEDURE — 1101F PT FALLS ASSESS-DOCD LE1/YR: CPT | Mod: CPTII,S$GLB,, | Performed by: UROLOGY

## 2019-05-02 PROCEDURE — 1101F PR PT FALLS ASSESS DOC 0-1 FALLS W/OUT INJ PAST YR: ICD-10-PCS | Mod: CPTII,S$GLB,, | Performed by: UROLOGY

## 2019-05-02 PROCEDURE — 99203 PR OFFICE/OUTPT VISIT, NEW, LEVL III, 30-44 MIN: ICD-10-PCS | Mod: 25,S$GLB,, | Performed by: UROLOGY

## 2019-05-02 PROCEDURE — 3077F SYST BP >= 140 MM HG: CPT | Mod: CPTII,S$GLB,, | Performed by: UROLOGY

## 2019-05-02 PROCEDURE — 81002 URINALYSIS NONAUTO W/O SCOPE: CPT | Mod: S$GLB,,, | Performed by: UROLOGY

## 2019-05-02 RX ORDER — AMLODIPINE AND BENAZEPRIL HYDROCHLORIDE 5; 20 MG/1; MG/1
2 CAPSULE ORAL DAILY
Qty: 90 CAPSULE | Refills: 3 | Status: ON HOLD | OUTPATIENT
Start: 2019-05-02 | End: 2019-09-12 | Stop reason: HOSPADM

## 2019-05-02 RX ORDER — ASPIRIN 81 MG/1
81 TABLET ORAL DAILY
COMMUNITY

## 2019-05-02 NOTE — LETTER
May 2, 2019      John Vargas MD  [address]  [address]  [facility] [address]           [facility] [address]  [address] [facility] [address]  [facility] [address]  Phone: [phone]  Fax: [phone]          Patient: Serafin Tapia   MR Number: [id number]   YOB: 1950   Date of Visit: 5/2/2019       Dear Dr. John Vargas:    Thank you for referring Serafin Tapia to me for evaluation. Attached you will find relevant portions of my assessment and plan of care.    If you have questions, please do not hesitate to call me. I look forward to following Serafin Tapia along with you.    Sincerely,    Nhan Ruth MD    Enclosure  CC:  No Recipients    If you would like to receive this communication electronically, please contact [email or web address] or [phone] to request more information on [employer] access.    For providers and/or their staff who would like to refer a patient to [facility], please contact us through our one-stop-shop provider referral line, [facility], at [phone].    If you feel you have received this communication in error or would no longer like to receive these types of communications, please e-mail [email or web address]

## 2019-05-02 NOTE — PROGRESS NOTES
"Subjective:      Serafin Tapia is a 68 y.o. male who was referred by Jonh Vargas MD for evaluation of phimosis.      He reports that he has had difficulty retracting his foreskin for about 2 years.  He does have diabetes.  His PCP prescribed Lotrimin and triamcinolone last week, however he has not started using these.  He states he is unable to see the head of his penis at all.  He denies any prior issues with this.    The following portions of the patient's history were reviewed and updated as appropriate: allergies, current medications, past family history, past medical history, past social history, past surgical history and problem list.    Review of Systems  Constitutional: no fever or chills  ENT: no nasal congestion or sore throat  Respiratory: no cough or shortness of breath  Cardiovascular: no chest pain or palpitations  Gastrointestinal: no nausea or vomiting, tolerating diet  Genitourinary: as per HPI  Hematologic/Lymphatic: no easy bruising or lymphadenopathy  Musculoskeletal: no arthralgias or myalgias  Neurological: no seizures or tremors  Behavioral/Psych: no auditory or visual hallucinations     Objective:   Vitals: BP (!) 173/95 (BP Location: Left arm, Patient Position: Sitting, BP Method: Large (Automatic))   Pulse 86   Ht 5' 10" (1.778 m)   Wt 128.8 kg (284 lb)   BMI 40.75 kg/m²     Physical Exam   General: alert and oriented, no acute distress  Head: normocephalic, atraumatic  Neck: supple, no lymphadenopathy, normal ROM, no masses  Respiratory: Symmetric expansion, non-labored breathing  Cardiovascular: regular rate and rhythm, nomal pulses, no peripheral edema  Abdomen: soft, non tender, non distended, no palpable masses, no hernias, no hepatomegaly or splenomegaly  Genitourinary:   Penis:  Edema and tightening of foreskin, unable to visualize glans  Scrotum: no rashes or skin changes;   Testes: descended bilaterally, no masses, nontender, normal epididymides bilaterally, no " hydroceles  Skin: normal coloration and turgor, no rashes, no suspicious skin lesions noted  Neuro: alert and oriented x3, no gross deficits  Psych: normal judgment and insight, normal mood/affect and non-anxious    Lab Review   Urinalysis demonstrates negative for all components  Lab Results   Component Value Date    WBC 4.22 02/28/2019    HGB 13.8 (L) 02/28/2019    HCT 43.8 02/28/2019    MCV 86 02/28/2019     02/28/2019     Lab Results   Component Value Date    CREATININE 0.8 02/28/2019    BUN 7 (L) 02/28/2019     Lab Results   Component Value Date    PSA 2.0 05/04/2017       Assessment:     1. Phimosis        Plan:   1. Instructed him to start using the 2 prescribed medications and to apply these to the tight foreskin twice daily  2. It does not respond with above, will consider alternative treatment with betamethasone  3. Explained the circumcision may be indicated if medical treatment is unsuccessful  4. Follow-up in 1 month

## 2019-05-02 NOTE — TELEPHONE ENCOUNTER
----- Message from Juanita Palacio LPN sent at 2019 11:36 AM CDT -----  Regarding: medication refill    The message below is from the [employer]        Serafin Tapia MRN [id number]      current Rx on file for Lotrel is  , patient is due for refill . Please ask Dr Vargas to authorize refills . The patient was in on 19 for f/u.    Thanks,    Juanita LUCAS LPN  Clinical Care Coordinator  Internal Medicine  [facility]

## 2019-05-08 ENCOUNTER — IMMUNIZATION (OUTPATIENT)
Dept: PHARMACY | Facility: CLINIC | Age: 69
End: 2019-05-08
Payer: MEDICARE

## 2019-05-17 ENCOUNTER — OFFICE VISIT (OUTPATIENT)
Dept: HEMATOLOGY/ONCOLOGY | Facility: CLINIC | Age: 69
End: 2019-05-17
Payer: MEDICARE

## 2019-05-17 VITALS
HEIGHT: 70 IN | DIASTOLIC BLOOD PRESSURE: 89 MMHG | WEIGHT: 280.88 LBS | SYSTOLIC BLOOD PRESSURE: 190 MMHG | TEMPERATURE: 97 F | OXYGEN SATURATION: 97 % | RESPIRATION RATE: 16 BRPM | BODY MASS INDEX: 40.21 KG/M2 | HEART RATE: 90 BPM

## 2019-05-17 DIAGNOSIS — I10 ESSENTIAL HYPERTENSION: ICD-10-CM

## 2019-05-17 DIAGNOSIS — Z86.711 HISTORY OF PULMONARY EMBOLUS (PE): Primary | ICD-10-CM

## 2019-05-17 PROCEDURE — 3077F PR MOST RECENT SYSTOLIC BLOOD PRESSURE >= 140 MM HG: ICD-10-PCS | Mod: CPTII,S$GLB,, | Performed by: INTERNAL MEDICINE

## 2019-05-17 PROCEDURE — 3077F SYST BP >= 140 MM HG: CPT | Mod: CPTII,S$GLB,, | Performed by: INTERNAL MEDICINE

## 2019-05-17 PROCEDURE — 99215 PR OFFICE/OUTPT VISIT, EST, LEVL V, 40-54 MIN: ICD-10-PCS | Mod: S$GLB,,, | Performed by: INTERNAL MEDICINE

## 2019-05-17 PROCEDURE — 3079F DIAST BP 80-89 MM HG: CPT | Mod: CPTII,S$GLB,, | Performed by: INTERNAL MEDICINE

## 2019-05-17 PROCEDURE — 3079F PR MOST RECENT DIASTOLIC BLOOD PRESSURE 80-89 MM HG: ICD-10-PCS | Mod: CPTII,S$GLB,, | Performed by: INTERNAL MEDICINE

## 2019-05-17 PROCEDURE — 99999 PR PBB SHADOW E&M-EST. PATIENT-LVL III: CPT | Mod: PBBFAC,,, | Performed by: INTERNAL MEDICINE

## 2019-05-17 PROCEDURE — 99999 PR PBB SHADOW E&M-EST. PATIENT-LVL III: ICD-10-PCS | Mod: PBBFAC,,, | Performed by: INTERNAL MEDICINE

## 2019-05-17 PROCEDURE — 1101F PT FALLS ASSESS-DOCD LE1/YR: CPT | Mod: CPTII,S$GLB,, | Performed by: INTERNAL MEDICINE

## 2019-05-17 PROCEDURE — 99215 OFFICE O/P EST HI 40 MIN: CPT | Mod: S$GLB,,, | Performed by: INTERNAL MEDICINE

## 2019-05-17 PROCEDURE — 1101F PR PT FALLS ASSESS DOC 0-1 FALLS W/OUT INJ PAST YR: ICD-10-PCS | Mod: CPTII,S$GLB,, | Performed by: INTERNAL MEDICINE
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
## 2019-05-20 ENCOUNTER — TELEPHONE (OUTPATIENT)
Dept: INTERNAL MEDICINE | Facility: CLINIC | Age: 69
End: 2019-05-20

## 2019-05-20 NOTE — TELEPHONE ENCOUNTER
Please schedule pt for nurse visit and ask pt to bring in all his medications to confirm what he is/is not taking

## 2019-05-20 NOTE — TELEPHONE ENCOUNTER
----- Message from Prince Pimentel MD sent at 5/17/2019  3:46 PM CDT -----  Deniz Vargas,     Looks like Mr Willie's BP has been elevated every time he returns for doctor's visit. Will you be able to see him to adjust the BP meds? thanks

## 2019-05-21 NOTE — TELEPHONE ENCOUNTER
Spoke to Mr. Tapia to confirm appt date and time for nurse visit.  Instructed to bring medications with him.  Patient states understanding with no further questions or concerns.

## 2019-05-22 ENCOUNTER — TELEPHONE (OUTPATIENT)
Dept: INTERNAL MEDICINE | Facility: CLINIC | Age: 69
End: 2019-05-22

## 2019-05-22 DIAGNOSIS — R09.89 UNEQUAL BLOOD PRESSURE IN UPPER EXTREMITIES: Primary | ICD-10-CM

## 2019-05-22 NOTE — TELEPHONE ENCOUNTER
----- Message from Joanna Baca sent at 5/22/2019  9:45 AM CDT -----  Contact: Pt   Name of Who is Calling: VALDO VEGA [[id number]]      What is the request in detail: Patient is requesting to reschedule his nurse visit. Please contact to further discuss and advise      Can the clinic reply by [facility]: No       What Number to Call Back if not in [facility]: [phone]

## 2019-05-23 ENCOUNTER — LAB VISIT (OUTPATIENT)
Dept: LAB | Facility: HOSPITAL | Age: 69
End: 2019-05-23
Attending: INTERNAL MEDICINE
Payer: MEDICARE

## 2019-05-23 ENCOUNTER — OFFICE VISIT (OUTPATIENT)
Dept: RHEUMATOLOGY | Facility: CLINIC | Age: 69
End: 2019-05-23
Payer: MEDICARE

## 2019-05-23 ENCOUNTER — CLINICAL SUPPORT (OUTPATIENT)
Dept: INTERNAL MEDICINE | Facility: CLINIC | Age: 69
End: 2019-05-23
Payer: MEDICARE

## 2019-05-23 VITALS
BODY MASS INDEX: 40.97 KG/M2 | DIASTOLIC BLOOD PRESSURE: 96 MMHG | SYSTOLIC BLOOD PRESSURE: 188 MMHG | HEART RATE: 51 BPM | WEIGHT: 286.19 LBS | HEIGHT: 70 IN

## 2019-05-23 VITALS — OXYGEN SATURATION: 97 % | SYSTOLIC BLOOD PRESSURE: 120 MMHG | HEART RATE: 84 BPM | DIASTOLIC BLOOD PRESSURE: 74 MMHG

## 2019-05-23 DIAGNOSIS — T46.6X5A STATIN MYOPATHY: ICD-10-CM

## 2019-05-23 DIAGNOSIS — T46.6X5A STATIN MYOPATHY: Primary | ICD-10-CM

## 2019-05-23 DIAGNOSIS — G72.0 STATIN MYOPATHY: Primary | ICD-10-CM

## 2019-05-23 DIAGNOSIS — G72.0 STATIN MYOPATHY: ICD-10-CM

## 2019-05-23 DIAGNOSIS — M62.82 NON-TRAUMATIC RHABDOMYOLYSIS: ICD-10-CM

## 2019-05-23 LAB
ALBUMIN SERPL BCP-MCNC: 4.1 G/DL (ref 3.5–5.2)
ALP SERPL-CCNC: 112 U/L (ref 55–135)
ALT SERPL W/O P-5'-P-CCNC: 44 U/L (ref 10–44)
ANION GAP SERPL CALC-SCNC: 10 MMOL/L (ref 8–16)
AST SERPL-CCNC: 34 U/L (ref 10–40)
BASOPHILS # BLD AUTO: 0.02 K/UL (ref 0–0.2)
BASOPHILS NFR BLD: 0.4 % (ref 0–1.9)
BILIRUB SERPL-MCNC: 0.3 MG/DL (ref 0.1–1)
BUN SERPL-MCNC: 8 MG/DL (ref 8–23)
CALCIUM SERPL-MCNC: 10.8 MG/DL (ref 8.7–10.5)
CHLORIDE SERPL-SCNC: 97 MMOL/L (ref 95–110)
CK SERPL-CCNC: 193 U/L (ref 20–200)
CO2 SERPL-SCNC: 35 MMOL/L (ref 23–29)
CREAT SERPL-MCNC: 0.8 MG/DL (ref 0.5–1.4)
CRP SERPL-MCNC: 12.9 MG/L (ref 0–8.2)
DIFFERENTIAL METHOD: ABNORMAL
EOSINOPHIL # BLD AUTO: 0.2 K/UL (ref 0–0.5)
EOSINOPHIL NFR BLD: 3 % (ref 0–8)
ERYTHROCYTE [DISTWIDTH] IN BLOOD BY AUTOMATED COUNT: 14.6 % (ref 11.5–14.5)
ERYTHROCYTE [SEDIMENTATION RATE] IN BLOOD BY WESTERGREN METHOD: 49 MM/HR (ref 0–23)
EST. GFR  (AFRICAN AMERICAN): >60 ML/MIN/1.73 M^2
EST. GFR  (NON AFRICAN AMERICAN): >60 ML/MIN/1.73 M^2
GLUCOSE SERPL-MCNC: 130 MG/DL (ref 70–110)
HCT VFR BLD AUTO: 44.8 % (ref 40–54)
HGB BLD-MCNC: 14.4 G/DL (ref 14–18)
IMM GRANULOCYTES # BLD AUTO: 0.01 K/UL (ref 0–0.04)
IMM GRANULOCYTES NFR BLD AUTO: 0.2 % (ref 0–0.5)
LYMPHOCYTES # BLD AUTO: 1.7 K/UL (ref 1–4.8)
LYMPHOCYTES NFR BLD: 33.7 % (ref 18–48)
MCH RBC QN AUTO: 26.9 PG (ref 27–31)
MCHC RBC AUTO-ENTMCNC: 32.1 G/DL (ref 32–36)
MCV RBC AUTO: 84 FL (ref 82–98)
MONOCYTES # BLD AUTO: 0.4 K/UL (ref 0.3–1)
MONOCYTES NFR BLD: 8 % (ref 4–15)
NEUTROPHILS # BLD AUTO: 2.7 K/UL (ref 1.8–7.7)
NEUTROPHILS NFR BLD: 54.7 % (ref 38–73)
NRBC BLD-RTO: 0 /100 WBC
PLATELET # BLD AUTO: 310 K/UL (ref 150–350)
PMV BLD AUTO: 11 FL (ref 9.2–12.9)
POTASSIUM SERPL-SCNC: 4.3 MMOL/L (ref 3.5–5.1)
PROT SERPL-MCNC: 8 G/DL (ref 6–8.4)
RBC # BLD AUTO: 5.36 M/UL (ref 4.6–6.2)
SODIUM SERPL-SCNC: 142 MMOL/L (ref 136–145)
WBC # BLD AUTO: 4.99 K/UL (ref 3.9–12.7)

## 2019-05-23 PROCEDURE — 99999 PR PBB SHADOW E&M-EST. PATIENT-LVL IV: CPT | Mod: PBBFAC,,,

## 2019-05-23 PROCEDURE — 3077F PR MOST RECENT SYSTOLIC BLOOD PRESSURE >= 140 MM HG: ICD-10-PCS | Mod: CPTII,S$GLB,, | Performed by: INTERNAL MEDICINE

## 2019-05-23 PROCEDURE — 85025 COMPLETE CBC W/AUTO DIFF WBC: CPT

## 2019-05-23 PROCEDURE — 99214 PR OFFICE/OUTPT VISIT, EST, LEVL IV, 30-39 MIN: ICD-10-PCS | Mod: S$GLB,,, | Performed by: INTERNAL MEDICINE

## 2019-05-23 PROCEDURE — 3080F DIAST BP >= 90 MM HG: CPT | Mod: CPTII,S$GLB,, | Performed by: INTERNAL MEDICINE

## 2019-05-23 PROCEDURE — 86140 C-REACTIVE PROTEIN: CPT

## 2019-05-23 PROCEDURE — 82550 ASSAY OF CK (CPK): CPT

## 2019-05-23 PROCEDURE — 99214 OFFICE O/P EST MOD 30 MIN: CPT | Mod: S$GLB,,, | Performed by: INTERNAL MEDICINE

## 2019-05-23 PROCEDURE — 1101F PR PT FALLS ASSESS DOC 0-1 FALLS W/OUT INJ PAST YR: ICD-10-PCS | Mod: CPTII,S$GLB,, | Performed by: INTERNAL MEDICINE

## 2019-05-23 PROCEDURE — 85652 RBC SED RATE AUTOMATED: CPT

## 2019-05-23 PROCEDURE — 99499 RISK ADDL DX/OHS AUDIT: ICD-10-PCS | Mod: S$GLB,,, | Performed by: INTERNAL MEDICINE

## 2019-05-23 PROCEDURE — 99999 PR PBB SHADOW E&M-EST. PATIENT-LVL III: CPT | Mod: PBBFAC,,, | Performed by: INTERNAL MEDICINE

## 2019-05-23 PROCEDURE — 99999 PR PBB SHADOW E&M-EST. PATIENT-LVL IV: ICD-10-PCS | Mod: PBBFAC,,,

## 2019-05-23 PROCEDURE — 99499 UNLISTED E&M SERVICE: CPT | Mod: S$GLB,,, | Performed by: INTERNAL MEDICINE

## 2019-05-23 PROCEDURE — 3080F PR MOST RECENT DIASTOLIC BLOOD PRESSURE >= 90 MM HG: ICD-10-PCS | Mod: CPTII,S$GLB,, | Performed by: INTERNAL MEDICINE

## 2019-05-23 PROCEDURE — 99999 PR PBB SHADOW E&M-EST. PATIENT-LVL III: ICD-10-PCS | Mod: PBBFAC,,, | Performed by: INTERNAL MEDICINE

## 2019-05-23 PROCEDURE — 3077F SYST BP >= 140 MM HG: CPT | Mod: CPTII,S$GLB,, | Performed by: INTERNAL MEDICINE

## 2019-05-23 PROCEDURE — 36415 COLL VENOUS BLD VENIPUNCTURE: CPT

## 2019-05-23 PROCEDURE — 82085 ASSAY OF ALDOLASE: CPT

## 2019-05-23 PROCEDURE — 1101F PT FALLS ASSESS-DOCD LE1/YR: CPT | Mod: CPTII,S$GLB,, | Performed by: INTERNAL MEDICINE

## 2019-05-23 PROCEDURE — 80053 COMPREHEN METABOLIC PANEL: CPT

## 2019-05-23 RX ORDER — METHOTREXATE 2.5 MG/1
TABLET ORAL
Qty: 120 TABLET | Refills: 2 | Status: ON HOLD | OUTPATIENT
Start: 2019-05-23 | End: 2019-09-12 | Stop reason: SDUPTHER

## 2019-05-23 RX ORDER — FOLIC ACID 1 MG/1
1 TABLET ORAL DAILY
Qty: 90 TABLET | Refills: 2 | Status: SHIPPED | OUTPATIENT
Start: 2019-05-23 | End: 2019-09-23

## 2019-05-23 ASSESSMENT — ROUTINE ASSESSMENT OF PATIENT INDEX DATA (RAPID3)
PSYCHOLOGICAL DISTRESS SCORE: 0
PAIN SCORE: 0
MDHAQ FUNCTION SCORE: .6
AM STIFFNESS SCORE: 0, NO
PATIENT GLOBAL ASSESSMENT SCORE: 0
TOTAL RAPID3 SCORE: .67
FATIGUE SCORE: 0

## 2019-05-23 NOTE — PROGRESS NOTES
Serafin Tapia 68 y.o. male is here today for Blood Pressure check.   History of HTN yes.    Review of patient's allergies indicates:  No Known Allergies  Creatinine   Date Value Ref Range Status   05/23/2019 0.8 0.5 - 1.4 mg/dL Final     Sodium   Date Value Ref Range Status   05/23/2019 142 136 - 145 mmol/L Final     Potassium   Date Value Ref Range Status   05/23/2019 4.3 3.5 - 5.1 mmol/L Final   ]  Patient verifies taking blood pressure medications on a regular bases at the same time of the day.     Current Outpatient Medications:     amlodipine-benazepril 5-20 mg (LOTREL) 5-20 mg per capsule, Take 2 capsules by mouth once daily., Disp: 90 capsule, Rfl: 3    ascorbic acid, vitamin C, (VITAMIN C) 500 MG tablet, Take 1 tablet (500 mg total) by mouth 2 (two) times daily., Disp: , Rfl:     aspirin (ECOTRIN) 81 MG EC tablet, Take 81 mg by mouth once daily., Disp: , Rfl:     calcium-vitamin D3 (CALCIUM 500 + D) 500 mg(1,250mg) -200 unit per tablet, Take 1 tablet by mouth 2 (two) times daily with meals., Disp: 60 tablet, Rfl: 3    clotrimazole (LOTRIMIN) 1 % Soln, Apply topically 2 (two) times daily., Disp: 30 mL, Rfl: 1    folic acid (FOLVITE) 1 MG tablet, Take 1 tablet (1 mg total) by mouth once daily., Disp: 90 tablet, Rfl: 2    furosemide (LASIX) 20 MG tablet, Take 1 tablet (20 mg total) by mouth once daily., Disp: 90 tablet, Rfl: 0    levothyroxine 50 mcg Cap, Take 50 mcg by mouth before breakfast., Disp: 90 capsule, Rfl: 2    metFORMIN (GLUCOPHAGE) 1000 MG tablet, Take 1 tablet (1,000 mg total) by mouth 2 (two) times daily with meals. (Patient taking differently: Take 1,000 mg by mouth daily with breakfast. ), Disp: 180 tablet, Rfl: 0    methotrexate 2.5 MG Tab, Take 8 tabs weekly, Disp: 120 tablet, Rfl: 2    metoprolol succinate (TOPROL-XL) 50 MG 24 hr tablet, Take 1 tablet (50 mg total) by mouth once daily., Disp: 90 tablet, Rfl: 2    multivitamin (ONE DAILY MULTIVITAMIN) per tablet, Take 1 tablet by  mouth once daily. PER [facility] , Disp: , Rfl:     triamcinolone acetonide 0.1% (KENALOG) 0.1 % cream, Apply topically 2 (two) times daily., Disp: 15 g, Rfl: 1    zinc sulfate (ZINCATE) 220 (50) mg capsule, Take 220 mg by mouth once daily. PER [facility], Disp: , Rfl:   Does patient have record of home blood pressure readings yes. Readings have been averaging 160s-190s sbp of left arm 80-90s dbp of left arm.   Last dose of blood pressure medication was taken at 7:00 am.  Patient is asymptomatic.   Pt denies h/a, dizziness, sob, chest pain, and numbness & tingling of extremities.    BP: 120/74 , Pulse: 84 left arm. B/P 160/80 left arm.    Blood pressure reading after 15 minutes was 160/90 left arm, 120/74 right arm.  Dr. Vargas notified.

## 2019-05-23 NOTE — Clinical Note
Does patient have record of home blood pressure readings yes. Readings have been averaging 160s-190s sbp of left arm 80-90s dbp of left arm. Last dose of blood pressure medication was taken at 7:00am.Patient is asymptomatic. Pt denies h/a, dizziness, sob, chest pain, and numbness & tingling of extremities.BP: 120/74 , Pulse: 84 left arm. B/P 160/80 left arm.Blood pressure reading after 15 minutes was 160/90 left arm, 120/74 right arm.Dr. Vargas notified.

## 2019-05-23 NOTE — TELEPHONE ENCOUNTER
Does patient have record of home blood pressure readings yes. Readings have been averaging 160s-190s sbp of left arm 80-90s dbp of left arm.   Last dose of blood pressure medication was taken at 7:00 am.  Patient is asymptomatic.   Pt denies h/a, dizziness, sob, chest pain, and numbness & tingling of extremities.     BP: 120/74 , Pulse: 84 right arm. B/P 160/80 left arm.     Blood pressure reading after 15 minutes was 160/90 left arm, 120/74 right arm.    Dr. Vargas notified.

## 2019-05-23 NOTE — PROGRESS NOTES
Chief Complaint   Patient presents with    Disease Management       Patient is here for a follow up    History of presenting illness    68 year old black male with HTN,HLD,obesity,LVH,DM,PE,LS spine disease,hypothyroidism, necrotising immune mediated myopathy for a follow up    He did well on 60 mg prednisone  We started to taper prednisone    He is off the prednisone since jan 2019    mtx started : now at 8 tabs weekly and folic acid     dexa normal    He is doing great    Foot continues to be infection free    2/2019    CK,aldolase,ESR,CRP,CBC,CMP all r./rashawn  Mild anemia 13.8/43.8 H/H     Muscle biopsy : necrotising myopathy    Initial evaluation :    Patient with long standing :  HTN, HLD, diabetes mellitus and obesity,asthma,hypothyroidism    He was seen in the hospital due to worsening shortness of breath    Prior to that he was at [facility] for hypoglycemia due to sulfonylurea and he was noted to have CK of 4000 to 5000.He got hydration and he developed anasarca and pulmonary edema and then got diuresed.  He was diagnosed to have rhabdomyolysis, His CPK at the time of discharge was 5553.   He had elevated ESR,CRP  HALLE negative     Since March 2017 he has been weak  Prior to the hospitalisations he was very mobile,no weakness,ambulated with no asistance  During this admission he couldn't walk,he couldn't stand,he couldn't lift thighs,couldnt climb stairs    He denies any fevers, chills, rashes, mucosal ulcers, hematuria, dysphagia, vision changes, Raynaud's, n/v/d.     Pt reports a 60-70 pound weight loss since March.       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 He developed a wound on the right foot which he reports healed. He sts that after his swelling while hospitalized caused the wound to flare up.      He had MRI inpatient and that didn't support myositis  Cks trended down to 2513     Rheumatology team sent off :  myomarker panel and HMG co-a reductase antibodies and asked him to come back  EMG and muscle biopsy planned but didn't happen  His sob was attributed to heart failure  Necrotising myopathy due to statins considered  Deconditioning considered  PT suggested      Myomarker panel negative  APLAS negative  HMG co-a reductase ab > 200 units     Muscle biopsy as detailed above    He sees hematology   Had PE in 2014  Off xarelto  Clot was attributed to his trip to [place]    Past history : HTN, HLD, diabetes mellitus and obesity,asthma,hypothyroidism    Family history:RA (brother) and SLE (cousins).    Social history : not a current smoker and alcoholic    Review of Systems   Constitutional: Negative for activity change, appetite change, chills, diaphoresis, fatigue, fever and unexpected weight change.   HENT: Negative for congestion, dental problem, drooling, ear discharge, ear pain, facial swelling, hearing loss, mouth sores, nosebleeds, postnasal drip, rhinorrhea, sinus pressure, sinus pain, sneezing, sore throat, tinnitus, trouble swallowing and voice change.    Eyes: Negative for photophobia, pain, discharge, redness, itching and visual disturbance.   Respiratory: Negative for apnea, cough, choking, chest tightness, shortness of breath, wheezing and stridor.    Cardiovascular: Negative for chest pain, palpitations and leg swelling.   Gastrointestinal: Negative for abdominal distention, abdominal pain, anal bleeding, blood in stool, constipation, diarrhea, nausea, rectal pain and vomiting.   Endocrine: Negative for cold intolerance, heat intolerance, polydipsia, polyphagia and polyuria.   Genitourinary: Negative for decreased urine volume, difficulty urinating, dysuria, enuresis, flank pain, frequency, genital sores, hematuria and urgency.   Musculoskeletal: Negative for arthralgias, back pain, gait problem, joint swelling, myalgias, neck pain and neck stiffness.   Skin: Negative for color change, pallor, rash and  wound.   Allergic/Immunologic: Negative for environmental allergies, food allergies and immunocompromised state.   Neurological: Positive for weakness. Negative for dizziness, tremors, seizures, syncope, facial asymmetry, speech difficulty, light-headedness, numbness and headaches.   Hematological: Negative for adenopathy. Does not bruise/bleed easily.   Psychiatric/Behavioral: Negative for agitation, behavioral problems, confusion, decreased concentration, dysphoric mood, hallucinations, self-injury, sleep disturbance and suicidal ideas. The patient is not nervous/anxious and is not hyperactive.         Physical Exam     LUCIANO-28 tender joint count: 0  LUCIANO-28 swollen joint count: 0     5/5 strength all extremities     Physical Exam   Constitutional: He is oriented to person, place, and time and well-developed, well-nourished, and in no distress. No distress.   HENT:   Head: Normocephalic.   Mouth/Throat: Oropharynx is clear and moist.   Eyes: Conjunctivae are normal. Pupils are equal, round, and reactive to light. Right eye exhibits no discharge. Left eye exhibits no discharge. No scleral icterus.   Neck: Normal range of motion. No thyromegaly present.   Cardiovascular: Normal rate, regular rhythm, normal heart sounds and intact distal pulses.    Pulmonary/Chest: Effort normal and breath sounds normal. No stridor.   Abdominal: Soft. Bowel sounds are normal.   Lymphadenopathy:     He has no cervical adenopathy.   Neurological: He is alert and oriented to person, place, and time.   Skin: Skin is warm. No rash noted. He is not diaphoretic.     Psychiatric: Affect and judgment normal.   Musculoskeletal: Normal range of motion.         Assessment     68 year old black male with HTN,HLD,obesity,LVH,DM,PE,LS spine disease,hypothyroidism, necrotising immune mediated myopathy  He presented with : weakness in the proximal and distal upper and lower extremities with sob     HALLE and armaan-1 negative,myomarker panel negative  He has  very high titres of HMG-COA reductase antibodies positivity  Muscle biopsy positive for necrotising myopathy  He has been on statins for > 10 years with no change in dose of the medication,no change in type of statin    He has immune mediated necrotising myopathy  Drug/toxin induced myopathy on the differential as per path report    He took steroids prednisone 60 mg for a very long time  This was not the plan    Finally when we saw him we started to taper the prednisone    We have tapered off the prednisone  He is now on mtx 8 tabs weekly and folic acid    HIS LAST LABS 2/2019 looked great  Mild anemia     1. Statin myopathy    2. Non-traumatic rhabdomyolysis          Plan    Will rpt all the labs today : ck,aldolase,ESR,CRP    dexa nml  Calcium vit d  Stopped bactrim   STOPPED fosamax and the omeprazole    Podiatry follow ups and diabetes control very important,emphasised  Have always asked him to do aggressive physical therapy  Continue mtx 8 tabs weekly and folic acid 1 mg daily  CBC,CMP today    DM,HTN,HLD management     Today's BP very high,suggested to be cautious     Lose weight     Serafin was seen today for disease management.    Diagnoses and all orders for this visit:    Statin myopathy  -     CBC auto differential; Future  -     Comprehensive metabolic panel; Future  -     Sedimentation rate; Future  -     C-reactive protein; Future  -     CK; Future  -     Aldolase; Future    Non-traumatic rhabdomyolysis  -     methotrexate 2.5 MG Tab; Take 8 tabs weekly    Other orders  -     folic acid (FOLVITE) 1 MG tablet; Take 1 tablet (1 mg total) by mouth once daily.      Follow up in 4 months

## 2019-05-24 LAB — ALDOLASE SERPL-CCNC: 3.1 U/L (ref 1.2–7.6)

## 2019-05-29 NOTE — TELEPHONE ENCOUNTER
Please let pt know due to unequal pressures we need to get some additional studies. Please schedule ECHO

## 2019-06-07 ENCOUNTER — DOCUMENTATION ONLY (OUTPATIENT)
Dept: INTERNAL MEDICINE | Facility: CLINIC | Age: 69
End: 2019-06-07

## 2019-06-07 NOTE — PROGRESS NOTES
Outside records received and reviewed    Colonoscopy done by Dr mario 5/6/19    Several wide mouth diverticuli (non-bleeding) in sigmoid, 5mm and 2mm polyp proximal ascending    Please let me know when you would like to schedule this by responding to this message and it will go to my support staff. If you have additional questions or concerns they will forward those on to me.

## 2019-06-17 ENCOUNTER — OFFICE VISIT (OUTPATIENT)
Dept: UROLOGY | Facility: CLINIC | Age: 69
End: 2019-06-17
Attending: UROLOGY
Payer: MEDICARE

## 2019-06-17 VITALS
WEIGHT: 286.19 LBS | SYSTOLIC BLOOD PRESSURE: 187 MMHG | HEIGHT: 70 IN | DIASTOLIC BLOOD PRESSURE: 83 MMHG | HEART RATE: 78 BPM | BODY MASS INDEX: 40.97 KG/M2

## 2019-06-17 DIAGNOSIS — N47.1 PHIMOSIS: Primary | ICD-10-CM

## 2019-06-17 PROCEDURE — 1101F PT FALLS ASSESS-DOCD LE1/YR: CPT | Mod: CPTII,S$GLB,, | Performed by: UROLOGY

## 2019-06-17 PROCEDURE — 99214 OFFICE O/P EST MOD 30 MIN: CPT | Mod: S$GLB,,, | Performed by: UROLOGY

## 2019-06-17 PROCEDURE — 3077F PR MOST RECENT SYSTOLIC BLOOD PRESSURE >= 140 MM HG: ICD-10-PCS | Mod: CPTII,S$GLB,, | Performed by: UROLOGY

## 2019-06-17 PROCEDURE — 3079F DIAST BP 80-89 MM HG: CPT | Mod: CPTII,S$GLB,, | Performed by: UROLOGY

## 2019-06-17 PROCEDURE — 3079F PR MOST RECENT DIASTOLIC BLOOD PRESSURE 80-89 MM HG: ICD-10-PCS | Mod: CPTII,S$GLB,, | Performed by: UROLOGY

## 2019-06-17 PROCEDURE — 1101F PR PT FALLS ASSESS DOC 0-1 FALLS W/OUT INJ PAST YR: ICD-10-PCS | Mod: CPTII,S$GLB,, | Performed by: UROLOGY

## 2019-06-17 PROCEDURE — 99214 PR OFFICE/OUTPT VISIT, EST, LEVL IV, 30-39 MIN: ICD-10-PCS | Mod: S$GLB,,, | Performed by: UROLOGY

## 2019-06-17 PROCEDURE — 3077F SYST BP >= 140 MM HG: CPT | Mod: CPTII,S$GLB,, | Performed by: UROLOGY

## 2019-06-17 RX ORDER — BETAMETHASONE DIPROPIONATE 0.5 MG/G
CREAM TOPICAL 2 TIMES DAILY
Qty: 45 G | Refills: 1 | Status: SHIPPED | OUTPATIENT
Start: 2019-06-17 | End: 2021-10-14

## 2019-06-17 NOTE — PROGRESS NOTES
"Subjective:      Serafin Tapia is a 68 y.o. male who returns today regarding his phimosis.    He reports no changes since last visit in May.  He has been using Lotrimin and triamcinolone as prescribed.  No new complaints today.    The following portions of the patient's history were reviewed and updated as appropriate: allergies, current medications, past family history, past medical history, past social history, past surgical history and problem list.    Review of Systems  A comprehensive multipoint review of systems was negative except as otherwise stated in the HPI.     Objective:   Vitals: BP (!) 187/83 (BP Location: Left arm, Patient Position: Sitting, BP Method: Large (Automatic))   Pulse 78   Ht 5' 10" (1.778 m)   Wt 129.8 kg (286 lb 2.5 oz)   BMI 41.06 kg/m²     Physical Exam   General: alert and oriented, no acute distress  Respiratory: Symmetric expansion, non-labored breathing  Genitourinary:  Significant phimosis and induration of foreskin, unable to visualize glans  Skin: normal coloration and turgor, no rashes, no suspicious skin lesions noted  Neuro: no gross deficits  Psych: normal judgment and insight, normal mood/affect and non-anxious    Lab Review     Lab Results   Component Value Date    WBC 4.99 05/23/2019    HGB 14.4 05/23/2019    HCT 44.8 05/23/2019    MCV 84 05/23/2019     05/23/2019     Lab Results   Component Value Date    CREATININE 0.8 05/23/2019    BUN 8 05/23/2019     Lab Results   Component Value Date    PSA 2.0 05/04/2017       Assessment and Plan:   1. Phimosis    Will change to betamethasone b.i.d.  Understands he will need circumcision if above it is unsuccessful  Follow-up in 2 months     "

## 2019-06-19 DIAGNOSIS — E03.9 HYPOTHYROIDISM (ACQUIRED): Primary | ICD-10-CM

## 2019-06-19 RX ORDER — LEVOTHYROXINE SODIUM 50 UG/1
50 CAPSULE ORAL
Qty: 90 CAPSULE | Refills: 2 | Status: SHIPPED | OUTPATIENT
Start: 2019-06-19 | End: 2019-08-12 | Stop reason: SDUPTHER

## 2019-06-24 RX ORDER — LEVOTHYROXINE SODIUM 50 UG/1
TABLET ORAL
Qty: 90 TABLET | Refills: 2 | Status: SHIPPED | OUTPATIENT
Start: 2019-06-24 | End: 2020-06-10

## 2019-07-05 NOTE — PLAN OF CARE
Problem: Fall Risk (Adult)  Goal: Absence of Falls  Patient will demonstrate the desired outcomes by discharge/transition of care.   Outcome: Ongoing (interventions implemented as appropriate)  Pt is free of falls and injuries per shift , fall precautions maintained and nursing call within reach .     Problem: Patient Care Overview  Goal: Plan of Care Review  Outcome: Ongoing (interventions implemented as appropriate)  Plan of care reviewed with pt . Pt is awake ,alert and oriented x 4 . Stable V/S. Pt C/o pain in his Lt foot and PRN pain medication given as needed . Pt turned in bed Q 2 hrs . Incontinence care done as needed , skin remained intact . Swallow test done today , pt's diet change to regular diet .        no

## 2019-08-01 ENCOUNTER — PATIENT OUTREACH (OUTPATIENT)
Dept: ADMINISTRATIVE | Facility: HOSPITAL | Age: 69
End: 2019-08-01

## 2019-08-01 DIAGNOSIS — E11.9 DIABETES MELLITUS WITHOUT COMPLICATION: Primary | ICD-10-CM

## 2019-08-08 ENCOUNTER — PATIENT OUTREACH (OUTPATIENT)
Dept: ADMINISTRATIVE | Facility: OTHER | Age: 69
End: 2019-08-08

## 2019-08-12 ENCOUNTER — OFFICE VISIT (OUTPATIENT)
Dept: UROLOGY | Facility: CLINIC | Age: 69
End: 2019-08-12
Attending: UROLOGY
Payer: MEDICARE

## 2019-08-12 VITALS
BODY MASS INDEX: 40.97 KG/M2 | WEIGHT: 286.19 LBS | HEART RATE: 91 BPM | DIASTOLIC BLOOD PRESSURE: 96 MMHG | HEIGHT: 70 IN | SYSTOLIC BLOOD PRESSURE: 194 MMHG

## 2019-08-12 DIAGNOSIS — N47.1 PHIMOSIS: Primary | ICD-10-CM

## 2019-08-12 PROCEDURE — 3080F DIAST BP >= 90 MM HG: CPT | Mod: CPTII,S$GLB,, | Performed by: UROLOGY

## 2019-08-12 PROCEDURE — 1101F PT FALLS ASSESS-DOCD LE1/YR: CPT | Mod: CPTII,S$GLB,, | Performed by: UROLOGY

## 2019-08-12 PROCEDURE — 99214 OFFICE O/P EST MOD 30 MIN: CPT | Mod: S$GLB,,, | Performed by: UROLOGY

## 2019-08-12 PROCEDURE — 1101F PR PT FALLS ASSESS DOC 0-1 FALLS W/OUT INJ PAST YR: ICD-10-PCS | Mod: CPTII,S$GLB,, | Performed by: UROLOGY

## 2019-08-12 PROCEDURE — 3077F PR MOST RECENT SYSTOLIC BLOOD PRESSURE >= 140 MM HG: ICD-10-PCS | Mod: CPTII,S$GLB,, | Performed by: UROLOGY

## 2019-08-12 PROCEDURE — 99214 PR OFFICE/OUTPT VISIT, EST, LEVL IV, 30-39 MIN: ICD-10-PCS | Mod: S$GLB,,, | Performed by: UROLOGY

## 2019-08-12 PROCEDURE — 3077F SYST BP >= 140 MM HG: CPT | Mod: CPTII,S$GLB,, | Performed by: UROLOGY

## 2019-08-12 PROCEDURE — 3080F PR MOST RECENT DIASTOLIC BLOOD PRESSURE >= 90 MM HG: ICD-10-PCS | Mod: CPTII,S$GLB,, | Performed by: UROLOGY

## 2019-08-12 NOTE — H&P (VIEW-ONLY)
"Subjective:      Serafin Tapia is a 68 y.o. male who returns today regarding his phimosis.    He reports no changes since last visit. He has been using betamethasone cream BID without significant relief.    The following portions of the patient's history were reviewed and updated as appropriate: allergies, current medications, past family history, past medical history, past social history, past surgical history and problem list.    Review of Systems  A comprehensive multipoint review of systems was negative except as otherwise stated in the HPI.     Objective:   Vitals: BP (!) 194/96 (BP Location: Left arm, Patient Position: Sitting, BP Method: Large (Automatic))   Pulse 91   Ht 5' 10" (1.778 m)   Wt 129.8 kg (286 lb 2.5 oz)   BMI 41.06 kg/m²     Physical Exam   General: alert and oriented, no acute distress  Respiratory: Symmetric expansion, non-labored breathing  Genitourinary:  Significant phimosis but decreased induration of foreskin, unable to visualize glans  Skin: normal coloration and turgor, no rashes, no suspicious skin lesions noted  Neuro: no gross deficits  Psych: normal judgment and insight, normal mood/affect and non-anxious    Lab Review     Lab Results   Component Value Date    WBC 4.99 05/23/2019    HGB 14.4 05/23/2019    HCT 44.8 05/23/2019    MCV 84 05/23/2019     05/23/2019     Lab Results   Component Value Date    CREATININE 0.8 05/23/2019    BUN 8 05/23/2019     Lab Results   Component Value Date    PSA 2.0 05/04/2017       Assessment and Plan:   1. Phimosis    Failed medical therapy - recommend proceeding with circumcision  The risks and benefits of the procedure were discussed, including but not limited to bleeding, infection, loss of penile sensation, penile shortening, skin bridge, need for further procedures, and complications related to anesthesia.  Informed consent signed and placed in chart.    Will proceed w/ circ   "

## 2019-09-05 ENCOUNTER — OFFICE VISIT (OUTPATIENT)
Dept: PODIATRY | Facility: CLINIC | Age: 69
End: 2019-09-05
Payer: MEDICARE

## 2019-09-05 DIAGNOSIS — E11.9 ENCOUNTER FOR COMPREHENSIVE DIABETIC FOOT EXAMINATION, TYPE 2 DIABETES MELLITUS: Primary | ICD-10-CM

## 2019-09-05 PROCEDURE — 1101F PR PT FALLS ASSESS DOC 0-1 FALLS W/OUT INJ PAST YR: ICD-10-PCS | Mod: CPTII,S$GLB,, | Performed by: PODIATRIST

## 2019-09-05 PROCEDURE — 99999 PR PBB SHADOW E&M-EST. PATIENT-LVL II: CPT | Mod: PBBFAC,,, | Performed by: PODIATRIST

## 2019-09-05 PROCEDURE — 3045F PR MOST RECENT HEMOGLOBIN A1C LEVEL 7.0-9.0%: ICD-10-PCS | Mod: CPTII,S$GLB,, | Performed by: PODIATRIST

## 2019-09-05 PROCEDURE — 99213 OFFICE O/P EST LOW 20 MIN: CPT | Mod: S$GLB,,, | Performed by: PODIATRIST

## 2019-09-05 PROCEDURE — 99213 PR OFFICE/OUTPT VISIT, EST, LEVL III, 20-29 MIN: ICD-10-PCS | Mod: S$GLB,,, | Performed by: PODIATRIST

## 2019-09-05 PROCEDURE — 1101F PT FALLS ASSESS-DOCD LE1/YR: CPT | Mod: CPTII,S$GLB,, | Performed by: PODIATRIST

## 2019-09-05 PROCEDURE — 3045F PR MOST RECENT HEMOGLOBIN A1C LEVEL 7.0-9.0%: CPT | Mod: CPTII,S$GLB,, | Performed by: PODIATRIST

## 2019-09-05 PROCEDURE — 99999 PR PBB SHADOW E&M-EST. PATIENT-LVL II: ICD-10-PCS | Mod: PBBFAC,,, | Performed by: PODIATRIST

## 2019-09-05 NOTE — LETTER
September 10, 2019      John Vargas MD  [address]  [address]  [facility] [address]           [facility]  [address]  [facility] [address]  Phone: [phone]          Patient: Serafin Tapia   MR Number: [id number]   YOB: 1950   Date of Visit: 9/5/2019       Dear Dr. John Vargas:    Thank you for referring Serafin Tapia to me for evaluation. Attached you will find relevant portions of my assessment and plan of care.    If you have questions, please do not hesitate to call me. I look forward to following Serafin Tapia along with you.    Sincerely,    Alvarado Gonzalez, MORGAN    Enclosure  CC:  No Recipients    If you would like to receive this communication electronically, please contact [email or web address] or [phone] to request more information on [employer] access.    For providers and/or their staff who would like to refer a patient to [facility], please contact us through our one-stop-shop provider referral line, [facility], at [phone].    If you feel you have received this communication in error or would no longer like to receive these types of communications, please e-mail [email or web address]

## 2019-09-09 ENCOUNTER — TELEPHONE (OUTPATIENT)
Dept: UROLOGY | Facility: CLINIC | Age: 69
End: 2019-09-09

## 2019-09-09 ENCOUNTER — ANESTHESIA EVENT (OUTPATIENT)
Dept: SURGERY | Facility: OTHER | Age: 69
End: 2019-09-09
Payer: MEDICARE

## 2019-09-09 ENCOUNTER — HOSPITAL ENCOUNTER (OUTPATIENT)
Dept: PREADMISSION TESTING | Facility: OTHER | Age: 69
Discharge: HOME | End: 2019-09-09
Attending: UROLOGY
Payer: MEDICARE

## 2019-09-09 VITALS
HEIGHT: 70 IN | OXYGEN SATURATION: 97 % | DIASTOLIC BLOOD PRESSURE: 90 MMHG | WEIGHT: 285 LBS | BODY MASS INDEX: 40.8 KG/M2 | SYSTOLIC BLOOD PRESSURE: 179 MMHG | HEART RATE: 80 BPM | TEMPERATURE: 99 F | RESPIRATION RATE: 16 BRPM

## 2019-09-09 RX ORDER — ACETAMINOPHEN 500 MG
1000 TABLET ORAL
Status: CANCELLED | OUTPATIENT
Start: 2019-09-09 | End: 2019-09-09

## 2019-09-09 RX ORDER — LIDOCAINE HYDROCHLORIDE 10 MG/ML
0.5 INJECTION, SOLUTION EPIDURAL; INFILTRATION; INTRACAUDAL; PERINEURAL ONCE
Status: CANCELLED | OUTPATIENT
Start: 2019-09-09 | End: 2019-09-09

## 2019-09-09 RX ORDER — SODIUM CHLORIDE, SODIUM LACTATE, POTASSIUM CHLORIDE, CALCIUM CHLORIDE 600; 310; 30; 20 MG/100ML; MG/100ML; MG/100ML; MG/100ML
INJECTION, SOLUTION INTRAVENOUS CONTINUOUS
Status: CANCELLED | OUTPATIENT
Start: 2019-09-09

## 2019-09-09 NOTE — DISCHARGE INSTRUCTIONS
PRE-ADMIT TESTING -  [phone]    [address]  [place] [facility]          Your surgery has been scheduled at [facility]. We are pleased to have the opportunity to serve you. For Further Information please call [phone].    On the day of surgery please report to the Information Desk on the 1st floor.    · CONTACT YOUR PHYSICIAN'S OFFICE THE DAY PRIOR TO YOUR SURGERY TO OBTAIN YOUR ARRIVAL TIME.     · The evening before surgery do not eat anything after 9 p.m. ( this includes hard candy, chewing gum and mints).  You may only have GATORADE, POWERADE AND WATER  from 9 p.m. until you leave your home.   DO NOT DRINK ANY LIQUIDS ON THE WAY TO THE HOSPITAL.      SPECIAL MEDICATION INSTRUCTIONS: TAKE medications checked off by the Anesthesiologist on your Medication List.    Angiogram Patients: Take medications as instructed by your physician, including aspirin.     Surgery Patients:    If you take ASPIRIN - Your PHYSICIAN/SURGEON will need to inform you IF/OR when you need to stop taking aspirin prior to your surgery.     Do Not take any medications containing IBUPROFEN.  Do Not Wear any make-up or dark nail polish   (especially eye make-up) to surgery. If you come to surgery with makeup on you will be required to remove the makeup or nail polish.    Do not shave your surgical area at least 5 days prior to your surgery. The surgical prep will be performed at the hospital according to Infection Control regulations.    Leave all valuables at home.   Do Not wear any jewelry or watches, including any metal in body piercings. Jewelry must be removed prior to coming to the hospital.  There is a possibility that rings that are unable to be removed may be cut off if they are on the surgical extremity.    Contact Lens must be removed before surgery. Either do not wear the contact lens or bring a case and solution for storage.  Please bring a container for eyeglasses or dentures as required.  Bring  any paperwork your physician has provided, such as consent forms,  history and physicals, doctor's orders, etc.   Bring comfortable clothes that are loose fitting to wear upon discharge. Take into consideration the type of surgery being performed.  Maintain your diet as advised per your physician the day prior to surgery.      Adequate rest the night before surgery is advised.   Park in the Parking lot behind the hospital or in the [place] [facility] across the street from the parking lot. Parking is complimentary.  If you will be discharged the same day as your procedure, please arrange for a responsible adult to drive you home or to accompany you if traveling by taxi.   YOU WILL NOT BE PERMITTED TO DRIVE OR TO LEAVE THE HOSPITAL ALONE AFTER SURGERY.   It is strongly recommended that you arrange for someone to remain with you for the first 24 hrs following your surgery.    The Surgeon will speak to your family/visitor after your surgery regarding the outcome of your surgery and post op care.  The Surgeon may speak to you after your surgery, but there is a possibility you may not remember the details.  Please check with your family members regarding the conversation with the Surgeon.    We strongly recommend whoever is bringing you home be present for discharge instructions.  This will ensure a thorough understanding for your post op home care.      Thank you for your cooperation.  The Staff of [facility].                Bathing Instructions with Hibiclens     Shower the evening before and morning of your procedure with Hibiclens:   Wash your face with water and your regular face wash/soap   Apply Hibiclens directly on your skin or on a wet washcloth and wash gently. When showering: Move away from the shower stream when applying Hibiclens to avoid rinsing off too soon.   Rinse thoroughly with warm water   Do not dilute Hibiclens         Dry off as usual, do not use any deodorant,  powder, body lotions, perfume, after shave or cologne.

## 2019-09-09 NOTE — ANESTHESIA PREPROCEDURE EVALUATION
09/09/2019  Serafin Tapia is a 68 y.o., male.    Anesthesia Evaluation    I have reviewed the Patient Summary Reports.    I have reviewed the Nursing Notes.   I have reviewed the Medications.     Review of Systems  Anesthesia Hx:  Denies Family Hx of Anesthesia complications.   Denies Personal Hx of Anesthesia complications.   Social:  Former Smoker    Hematology/Oncology:     Oncology Normal     Cardiovascular:   Hypertension hyperlipidemia    Pulmonary:   Asthma (childhood) asymptomatic H/o PE 2014. Resolved   Renal/:  Renal/ Normal     Hepatic/GI:  Hepatic/GI Normal    Musculoskeletal:   Arthritis     Neurological:   Neuromuscular Disease, (LE weakness)    Endocrine:   Diabetes Hypothyroidism        Physical Exam  General:  Morbid Obesity    Airway/Jaw/Neck:  Airway Findings: Mouth Opening: Normal Tongue: Normal  General Airway Assessment: Adult  Mallampati: II  TM Distance: Normal, at least 6 cm      Dental:  Dental Findings: In tact        Mental Status:  Mental Status Findings:  Alert and Oriented, Cooperative         Anesthesia Plan  Type of Anesthesia, risks & benefits discussed:  Anesthesia Type:  general  Patient's Preference:   Intra-op Monitoring Plan: standard ASA monitors  Intra-op Monitoring Plan Comments:   Post Op Pain Control Plan: multimodal analgesia  Post Op Pain Control Plan Comments:   Induction:   IV  Beta Blocker:         Informed Consent: Patient understands risks and agrees with Anesthesia plan.  Questions answered. Anesthesia consent signed with patient.  ASA Score: 3     Day of Surgery Review of History & Physical:    H&P update referred to the surgeon.     Anesthesia Plan Notes: May labs in epic.  Will not repeat        Ready For Surgery From Anesthesia Perspective.

## 2019-09-10 ENCOUNTER — HOSPITAL ENCOUNTER (OUTPATIENT)
Facility: OTHER | Age: 69
Discharge: HOME | End: 2019-09-12
Attending: UROLOGY | Admitting: UROLOGY
Payer: MEDICARE

## 2019-09-10 ENCOUNTER — ANESTHESIA (OUTPATIENT)
Dept: SURGERY | Facility: OTHER | Age: 69
End: 2019-09-10
Payer: MEDICARE

## 2019-09-10 DIAGNOSIS — R06.02 SHORTNESS OF BREATH: ICD-10-CM

## 2019-09-10 DIAGNOSIS — I10 ESSENTIAL HYPERTENSION: ICD-10-CM

## 2019-09-10 DIAGNOSIS — N47.1 PHIMOSIS: ICD-10-CM

## 2019-09-10 DIAGNOSIS — R09.02 POSTOPERATIVE HYPOXIA: Primary | ICD-10-CM

## 2019-09-10 DIAGNOSIS — M62.82 NON-TRAUMATIC RHABDOMYOLYSIS: ICD-10-CM

## 2019-09-10 DIAGNOSIS — Z98.890 POSTOPERATIVE HYPOXIA: Primary | ICD-10-CM

## 2019-09-10 DIAGNOSIS — I50.9 HEART FAILURE: ICD-10-CM

## 2019-09-10 PROBLEM — G72.49 AUTOIMMUNE NECROTIZING MYOPATHY: Status: ACTIVE | Noted: 2019-09-10

## 2019-09-10 LAB
ANION GAP SERPL CALC-SCNC: 13 MMOL/L (ref 8–16)
BASOPHILS # BLD AUTO: 0.02 K/UL (ref 0–0.2)
BASOPHILS NFR BLD: 0.2 % (ref 0–1.9)
BNP SERPL-MCNC: 28 PG/ML (ref 0–99)
BUN SERPL-MCNC: 12 MG/DL (ref 8–23)
CALCIUM SERPL-MCNC: 9.5 MG/DL (ref 8.7–10.5)
CHLORIDE SERPL-SCNC: 102 MMOL/L (ref 95–110)
CO2 SERPL-SCNC: 30 MMOL/L (ref 23–29)
CREAT SERPL-MCNC: 1 MG/DL (ref 0.5–1.4)
DIFFERENTIAL METHOD: ABNORMAL
EOSINOPHIL # BLD AUTO: 0.1 K/UL (ref 0–0.5)
EOSINOPHIL NFR BLD: 0.7 % (ref 0–8)
ERYTHROCYTE [DISTWIDTH] IN BLOOD BY AUTOMATED COUNT: 14.3 % (ref 11.5–14.5)
EST. GFR  (AFRICAN AMERICAN): >60 ML/MIN/1.73 M^2
EST. GFR  (NON AFRICAN AMERICAN): >60 ML/MIN/1.73 M^2
GLUCOSE SERPL-MCNC: 106 MG/DL (ref 70–110)
HCT VFR BLD AUTO: 41.8 % (ref 40–54)
HGB BLD-MCNC: 13.3 G/DL (ref 14–18)
IMM GRANULOCYTES # BLD AUTO: 0.02 K/UL (ref 0–0.04)
IMM GRANULOCYTES NFR BLD AUTO: 0.2 % (ref 0–0.5)
LYMPHOCYTES # BLD AUTO: 1.7 K/UL (ref 1–4.8)
LYMPHOCYTES NFR BLD: 18.3 % (ref 18–48)
MAGNESIUM SERPL-MCNC: 1.8 MG/DL (ref 1.6–2.6)
MCH RBC QN AUTO: 27.1 PG (ref 27–31)
MCHC RBC AUTO-ENTMCNC: 31.8 G/DL (ref 32–36)
MCV RBC AUTO: 85 FL (ref 82–98)
MONOCYTES # BLD AUTO: 0.4 K/UL (ref 0.3–1)
MONOCYTES NFR BLD: 4.8 % (ref 4–15)
NEUTROPHILS # BLD AUTO: 7 K/UL (ref 1.8–7.7)
NEUTROPHILS NFR BLD: 75.8 % (ref 38–73)
NRBC BLD-RTO: 0 /100 WBC
PHOSPHATE SERPL-MCNC: 4.8 MG/DL (ref 2.7–4.5)
PLATELET # BLD AUTO: 252 K/UL (ref 150–350)
PMV BLD AUTO: 10.9 FL (ref 9.2–12.9)
POCT GLUCOSE: 120 MG/DL (ref 70–110)
POCT GLUCOSE: 150 MG/DL (ref 70–110)
POTASSIUM SERPL-SCNC: 4.1 MMOL/L (ref 3.5–5.1)
RBC # BLD AUTO: 4.91 M/UL (ref 4.6–6.2)
SODIUM SERPL-SCNC: 145 MMOL/L (ref 136–145)
TROPONIN I SERPL DL<=0.01 NG/ML-MCNC: 0.03 NG/ML (ref 0–0.03)
TSH SERPL DL<=0.005 MIU/L-ACNC: 1.75 UIU/ML (ref 0.4–4)
WBC # BLD AUTO: 9.2 K/UL (ref 3.9–12.7)

## 2019-09-10 PROCEDURE — 25000003 PHARM REV CODE 250: Performed by: UROLOGY

## 2019-09-10 PROCEDURE — 37000008 HC ANESTHESIA 1ST 15 MINUTES: Performed by: UROLOGY

## 2019-09-10 PROCEDURE — 88304 TISSUE EXAM BY PATHOLOGIST: CPT | Performed by: PATHOLOGY

## 2019-09-10 PROCEDURE — 36000707: Performed by: UROLOGY

## 2019-09-10 PROCEDURE — 94761 N-INVAS EAR/PLS OXIMETRY MLT: CPT

## 2019-09-10 PROCEDURE — 85025 COMPLETE CBC W/AUTO DIFF WBC: CPT

## 2019-09-10 PROCEDURE — 84443 ASSAY THYROID STIM HORMONE: CPT

## 2019-09-10 PROCEDURE — 88304 TISSUE SPECIMEN TO PATHOLOGY - SURGERY: ICD-10-PCS | Mod: 26,,, | Performed by: PATHOLOGY

## 2019-09-10 PROCEDURE — 80048 BASIC METABOLIC PNL TOTAL CA: CPT

## 2019-09-10 PROCEDURE — 25000003 PHARM REV CODE 250: Performed by: NURSE ANESTHETIST, CERTIFIED REGISTERED

## 2019-09-10 PROCEDURE — 54161 CIRCUM 28 DAYS OR OLDER: CPT | Mod: ,,, | Performed by: UROLOGY

## 2019-09-10 PROCEDURE — 63600175 PHARM REV CODE 636 W HCPCS: Performed by: ANESTHESIOLOGY

## 2019-09-10 PROCEDURE — 63600175 PHARM REV CODE 636 W HCPCS: Performed by: NURSE ANESTHETIST, CERTIFIED REGISTERED

## 2019-09-10 PROCEDURE — 36415 COLL VENOUS BLD VENIPUNCTURE: CPT

## 2019-09-10 PROCEDURE — 37000009 HC ANESTHESIA EA ADD 15 MINS: Performed by: UROLOGY

## 2019-09-10 PROCEDURE — 63600175 PHARM REV CODE 636 W HCPCS

## 2019-09-10 PROCEDURE — 84100 ASSAY OF PHOSPHORUS: CPT

## 2019-09-10 PROCEDURE — 83735 ASSAY OF MAGNESIUM: CPT

## 2019-09-10 PROCEDURE — S0020 INJECTION, BUPIVICAINE HYDRO: HCPCS | Performed by: UROLOGY

## 2019-09-10 PROCEDURE — 25000242 PHARM REV CODE 250 ALT 637 W/ HCPCS

## 2019-09-10 PROCEDURE — 25000003 PHARM REV CODE 250: Performed by: HOSPITALIST

## 2019-09-10 PROCEDURE — 54161 PR CIRCUMCISION - SURGICAL NO CLAMP/DEVICE, 29+ DAYS OF AGE ONLY: ICD-10-PCS | Mod: ,,, | Performed by: UROLOGY

## 2019-09-10 PROCEDURE — 83036 HEMOGLOBIN GLYCOSYLATED A1C: CPT

## 2019-09-10 PROCEDURE — 84484 ASSAY OF TROPONIN QUANT: CPT

## 2019-09-10 PROCEDURE — 25000003 PHARM REV CODE 250: Performed by: ANESTHESIOLOGY

## 2019-09-10 PROCEDURE — 93010 ELECTROCARDIOGRAM REPORT: CPT | Mod: ,,, | Performed by: INTERNAL MEDICINE

## 2019-09-10 PROCEDURE — 93010 EKG 12-LEAD: ICD-10-PCS | Mod: ,,, | Performed by: INTERNAL MEDICINE

## 2019-09-10 PROCEDURE — 88304 TISSUE EXAM BY PATHOLOGIST: CPT | Mod: 26,,, | Performed by: PATHOLOGY

## 2019-09-10 PROCEDURE — 71000039 HC RECOVERY, EACH ADD'L HOUR: Performed by: UROLOGY

## 2019-09-10 PROCEDURE — 83880 ASSAY OF NATRIURETIC PEPTIDE: CPT

## 2019-09-10 PROCEDURE — 71000033 HC RECOVERY, INTIAL HOUR: Performed by: UROLOGY

## 2019-09-10 PROCEDURE — 93005 ELECTROCARDIOGRAM TRACING: CPT

## 2019-09-10 PROCEDURE — 36000706: Performed by: UROLOGY

## 2019-09-10 RX ORDER — SODIUM CHLORIDE 0.9 % (FLUSH) 0.9 %
3 SYRINGE (ML) INJECTION
Status: DISCONTINUED | OUTPATIENT
Start: 2019-09-10 | End: 2019-09-10

## 2019-09-10 RX ORDER — EPHEDRINE SULFATE 50 MG/ML
INJECTION, SOLUTION INTRAVENOUS
Status: DISCONTINUED | OUTPATIENT
Start: 2019-09-10 | End: 2019-09-10

## 2019-09-10 RX ORDER — LIDOCAINE HCL/PF 100 MG/5ML
SYRINGE (ML) INTRAVENOUS
Status: DISCONTINUED | OUTPATIENT
Start: 2019-09-10 | End: 2019-09-10

## 2019-09-10 RX ORDER — METHOTREXATE 2.5 MG/1
20 TABLET ORAL
Status: DISCONTINUED | OUTPATIENT
Start: 2019-09-11 | End: 2019-09-12 | Stop reason: HOSPADM

## 2019-09-10 RX ORDER — IBUPROFEN 200 MG
24 TABLET ORAL
Status: DISCONTINUED | OUTPATIENT
Start: 2019-09-10 | End: 2019-09-12 | Stop reason: HOSPADM

## 2019-09-10 RX ORDER — ROCURONIUM BROMIDE 10 MG/ML
INJECTION, SOLUTION INTRAVENOUS
Status: DISCONTINUED | OUTPATIENT
Start: 2019-09-10 | End: 2019-09-10

## 2019-09-10 RX ORDER — PROPOFOL 10 MG/ML
VIAL (ML) INTRAVENOUS
Status: DISCONTINUED | OUTPATIENT
Start: 2019-09-10 | End: 2019-09-10

## 2019-09-10 RX ORDER — FUROSEMIDE 10 MG/ML
INJECTION INTRAMUSCULAR; INTRAVENOUS
Status: COMPLETED
Start: 2019-09-10 | End: 2019-09-10

## 2019-09-10 RX ORDER — IBUPROFEN 200 MG
16 TABLET ORAL
Status: DISCONTINUED | OUTPATIENT
Start: 2019-09-10 | End: 2019-09-12 | Stop reason: HOSPADM

## 2019-09-10 RX ORDER — ONDANSETRON 2 MG/ML
INJECTION INTRAMUSCULAR; INTRAVENOUS
Status: DISCONTINUED | OUTPATIENT
Start: 2019-09-10 | End: 2019-09-10

## 2019-09-10 RX ORDER — MEPERIDINE HYDROCHLORIDE 25 MG/ML
12.5 INJECTION INTRAMUSCULAR; INTRAVENOUS; SUBCUTANEOUS ONCE AS NEEDED
Status: DISCONTINUED | OUTPATIENT
Start: 2019-09-10 | End: 2019-09-10

## 2019-09-10 RX ORDER — NEOSTIGMINE METHYLSULFATE 1 MG/ML
INJECTION, SOLUTION INTRAVENOUS
Status: DISCONTINUED | OUTPATIENT
Start: 2019-09-10 | End: 2019-09-10

## 2019-09-10 RX ORDER — BENAZEPRIL HYDROCHLORIDE 10 MG/1
40 TABLET ORAL DAILY
Status: DISCONTINUED | OUTPATIENT
Start: 2019-09-10 | End: 2019-09-12 | Stop reason: HOSPADM

## 2019-09-10 RX ORDER — CEFAZOLIN SODIUM 1 G/3ML
INJECTION, POWDER, FOR SOLUTION INTRAMUSCULAR; INTRAVENOUS
Status: DISCONTINUED | OUTPATIENT
Start: 2019-09-10 | End: 2019-09-10

## 2019-09-10 RX ORDER — ALBUTEROL SULFATE 0.83 MG/ML
SOLUTION RESPIRATORY (INHALATION)
Status: COMPLETED
Start: 2019-09-10 | End: 2019-09-10

## 2019-09-10 RX ORDER — SUCCINYLCHOLINE CHLORIDE 20 MG/ML
INJECTION INTRAMUSCULAR; INTRAVENOUS
Status: DISCONTINUED | OUTPATIENT
Start: 2019-09-10 | End: 2019-09-10

## 2019-09-10 RX ORDER — SODIUM CHLORIDE, SODIUM LACTATE, POTASSIUM CHLORIDE, CALCIUM CHLORIDE 600; 310; 30; 20 MG/100ML; MG/100ML; MG/100ML; MG/100ML
INJECTION, SOLUTION INTRAVENOUS CONTINUOUS
Status: DISCONTINUED | OUTPATIENT
Start: 2019-09-10 | End: 2019-09-10

## 2019-09-10 RX ORDER — FENTANYL CITRATE 50 UG/ML
INJECTION, SOLUTION INTRAMUSCULAR; INTRAVENOUS
Status: DISCONTINUED | OUTPATIENT
Start: 2019-09-10 | End: 2019-09-10

## 2019-09-10 RX ORDER — LEVOTHYROXINE SODIUM 25 UG/1
50 TABLET ORAL
Status: DISCONTINUED | OUTPATIENT
Start: 2019-09-11 | End: 2019-09-12 | Stop reason: HOSPADM

## 2019-09-10 RX ORDER — METOPROLOL SUCCINATE 50 MG/1
50 TABLET, EXTENDED RELEASE ORAL DAILY
Status: DISCONTINUED | OUTPATIENT
Start: 2019-09-11 | End: 2019-09-12 | Stop reason: HOSPADM

## 2019-09-10 RX ORDER — FOLIC ACID 1 MG/1
1 TABLET ORAL DAILY
Status: DISCONTINUED | OUTPATIENT
Start: 2019-09-11 | End: 2019-09-12 | Stop reason: HOSPADM

## 2019-09-10 RX ORDER — INSULIN ASPART 100 [IU]/ML
0-5 INJECTION, SOLUTION INTRAVENOUS; SUBCUTANEOUS
Status: DISCONTINUED | OUTPATIENT
Start: 2019-09-10 | End: 2019-09-12 | Stop reason: HOSPADM

## 2019-09-10 RX ORDER — ALBUTEROL SULFATE 0.83 MG/ML
2.5 SOLUTION RESPIRATORY (INHALATION) EVERY 4 HOURS PRN
Status: DISCONTINUED | OUTPATIENT
Start: 2019-09-10 | End: 2019-09-10 | Stop reason: HOSPADM

## 2019-09-10 RX ORDER — FUROSEMIDE 10 MG/ML
20 INJECTION INTRAMUSCULAR; INTRAVENOUS ONCE
Status: COMPLETED | OUTPATIENT
Start: 2019-09-10 | End: 2019-09-10

## 2019-09-10 RX ORDER — PHENYLEPHRINE HYDROCHLORIDE 10 MG/ML
INJECTION INTRAVENOUS
Status: DISCONTINUED | OUTPATIENT
Start: 2019-09-10 | End: 2019-09-10

## 2019-09-10 RX ORDER — KETAMINE HCL IN 0.9 % NACL 50 MG/5 ML
SYRINGE (ML) INTRAVENOUS
Status: DISCONTINUED | OUTPATIENT
Start: 2019-09-10 | End: 2019-09-10

## 2019-09-10 RX ORDER — NALOXONE HCL 0.4 MG/ML
VIAL (ML) INJECTION
Status: DISCONTINUED | OUTPATIENT
Start: 2019-09-10 | End: 2019-09-10

## 2019-09-10 RX ORDER — BACITRACIN ZINC 500 UNIT/G
OINTMENT (GRAM) TOPICAL
Status: DISCONTINUED | OUTPATIENT
Start: 2019-09-10 | End: 2019-09-10 | Stop reason: HOSPADM

## 2019-09-10 RX ORDER — HYDROMORPHONE HYDROCHLORIDE 2 MG/ML
0.4 INJECTION, SOLUTION INTRAMUSCULAR; INTRAVENOUS; SUBCUTANEOUS EVERY 5 MIN PRN
Status: DISCONTINUED | OUTPATIENT
Start: 2019-09-10 | End: 2019-09-10

## 2019-09-10 RX ORDER — GLUCAGON 1 MG
1 KIT INJECTION
Status: DISCONTINUED | OUTPATIENT
Start: 2019-09-10 | End: 2019-09-12 | Stop reason: HOSPADM

## 2019-09-10 RX ORDER — OXYCODONE HYDROCHLORIDE 5 MG/1
5 TABLET ORAL
Status: DISCONTINUED | OUTPATIENT
Start: 2019-09-10 | End: 2019-09-10 | Stop reason: HOSPADM

## 2019-09-10 RX ORDER — ACETAMINOPHEN 500 MG
1000 TABLET ORAL
Status: COMPLETED | OUTPATIENT
Start: 2019-09-10 | End: 2019-09-10

## 2019-09-10 RX ORDER — FUROSEMIDE 20 MG/1
20 TABLET ORAL ONCE
Status: DISCONTINUED | OUTPATIENT
Start: 2019-09-10 | End: 2019-09-10

## 2019-09-10 RX ORDER — HYDROCODONE BITARTRATE AND ACETAMINOPHEN 5; 325 MG/1; MG/1
1 TABLET ORAL EVERY 6 HOURS PRN
Qty: 10 TABLET | Refills: 0 | Status: SHIPPED | OUTPATIENT
Start: 2019-09-10 | End: 2019-09-12 | Stop reason: SDUPTHER

## 2019-09-10 RX ORDER — LIDOCAINE HYDROCHLORIDE 10 MG/ML
0.5 INJECTION, SOLUTION EPIDURAL; INFILTRATION; INTRACAUDAL; PERINEURAL ONCE
Status: DISCONTINUED | OUTPATIENT
Start: 2019-09-10 | End: 2019-09-10 | Stop reason: HOSPADM

## 2019-09-10 RX ORDER — KETOROLAC TROMETHAMINE 30 MG/ML
INJECTION, SOLUTION INTRAMUSCULAR; INTRAVENOUS
Status: DISCONTINUED | OUTPATIENT
Start: 2019-09-10 | End: 2019-09-10

## 2019-09-10 RX ORDER — GLYCOPYRROLATE 0.2 MG/ML
INJECTION INTRAMUSCULAR; INTRAVENOUS
Status: DISCONTINUED | OUTPATIENT
Start: 2019-09-10 | End: 2019-09-10

## 2019-09-10 RX ORDER — AMLODIPINE BESYLATE 5 MG/1
10 TABLET ORAL DAILY
Status: DISCONTINUED | OUTPATIENT
Start: 2019-09-10 | End: 2019-09-11

## 2019-09-10 RX ORDER — FUROSEMIDE 10 MG/ML
40 INJECTION INTRAMUSCULAR; INTRAVENOUS 2 TIMES DAILY
Status: DISCONTINUED | OUTPATIENT
Start: 2019-09-11 | End: 2019-09-12 | Stop reason: HOSPADM

## 2019-09-10 RX ORDER — ONDANSETRON 2 MG/ML
4 INJECTION INTRAMUSCULAR; INTRAVENOUS DAILY PRN
Status: DISCONTINUED | OUTPATIENT
Start: 2019-09-10 | End: 2019-09-10 | Stop reason: HOSPADM

## 2019-09-10 RX ORDER — BUPIVACAINE HYDROCHLORIDE 5 MG/ML
INJECTION, SOLUTION EPIDURAL; INTRACAUDAL
Status: DISCONTINUED | OUTPATIENT
Start: 2019-09-10 | End: 2019-09-10 | Stop reason: HOSPADM

## 2019-09-10 RX ADMIN — NALOXONE HYDROCHLORIDE 80 MCG: 0.4 INJECTION, SOLUTION INTRAMUSCULAR; INTRAVENOUS; SUBCUTANEOUS at 09:09

## 2019-09-10 RX ADMIN — BENAZEPRIL HYDROCHLORIDE 40 MG: 10 TABLET, FILM COATED ORAL at 09:09

## 2019-09-10 RX ADMIN — SUCCINYLCHOLINE CHLORIDE 140 MG: 20 INJECTION, SOLUTION INTRAMUSCULAR; INTRAVENOUS at 07:09

## 2019-09-10 RX ADMIN — FUROSEMIDE 20 MG: 10 INJECTION, SOLUTION INTRAVENOUS at 02:09

## 2019-09-10 RX ADMIN — NEOSTIGMINE METHYLSULFATE 5 MG: 1 INJECTION INTRAVENOUS at 09:09

## 2019-09-10 RX ADMIN — KETOROLAC TROMETHAMINE 30 MG: 30 INJECTION, SOLUTION INTRAMUSCULAR; INTRAVENOUS at 09:09

## 2019-09-10 RX ADMIN — EPHEDRINE SULFATE 10 MG: 50 INJECTION INTRAMUSCULAR; INTRAVENOUS; SUBCUTANEOUS at 09:09

## 2019-09-10 RX ADMIN — PHENYLEPHRINE HYDROCHLORIDE 100 MCG: 10 INJECTION INTRAVENOUS at 08:09

## 2019-09-10 RX ADMIN — ROCURONIUM BROMIDE 20 MG: 10 INJECTION, SOLUTION INTRAVENOUS at 08:09

## 2019-09-10 RX ADMIN — Medication 30 MG: at 08:09

## 2019-09-10 RX ADMIN — LIDOCAINE HYDROCHLORIDE 60 MG: 20 INJECTION, SOLUTION INTRAVENOUS at 07:09

## 2019-09-10 RX ADMIN — PHENYLEPHRINE HYDROCHLORIDE 200 MCG: 10 INJECTION INTRAVENOUS at 08:09

## 2019-09-10 RX ADMIN — FUROSEMIDE 20 MG: 10 INJECTION INTRAMUSCULAR; INTRAVENOUS at 12:09

## 2019-09-10 RX ADMIN — PROPOFOL 200 MG: 10 INJECTION, EMULSION INTRAVENOUS at 07:09

## 2019-09-10 RX ADMIN — CARBOXYMETHYLCELLULOSE SODIUM 2 DROP: 2.5 SOLUTION/ DROPS OPHTHALMIC at 07:09

## 2019-09-10 RX ADMIN — FENTANYL CITRATE 100 MCG: 50 INJECTION, SOLUTION INTRAMUSCULAR; INTRAVENOUS at 07:09

## 2019-09-10 RX ADMIN — ALBUTEROL SULFATE 2.5 MG: 2.5 SOLUTION RESPIRATORY (INHALATION) at 11:09

## 2019-09-10 RX ADMIN — ACETAMINOPHEN 1000 MG: 500 TABLET, FILM COATED ORAL at 05:09

## 2019-09-10 RX ADMIN — SODIUM CHLORIDE, SODIUM LACTATE, POTASSIUM CHLORIDE, AND CALCIUM CHLORIDE: 600; 310; 30; 20 INJECTION, SOLUTION INTRAVENOUS at 08:09

## 2019-09-10 RX ADMIN — CEFAZOLIN 2 G: 330 INJECTION, POWDER, FOR SOLUTION INTRAMUSCULAR; INTRAVENOUS at 08:09

## 2019-09-10 RX ADMIN — ROCURONIUM BROMIDE 10 MG: 10 INJECTION, SOLUTION INTRAVENOUS at 07:09

## 2019-09-10 RX ADMIN — GLYCOPYRROLATE 0.6 MG: 0.2 INJECTION, SOLUTION INTRAMUSCULAR; INTRAVENOUS at 09:09

## 2019-09-10 RX ADMIN — FUROSEMIDE 20 MG: 10 INJECTION, SOLUTION INTRAVENOUS at 12:09

## 2019-09-10 RX ADMIN — GLYCOPYRROLATE 0.2 MG: 0.2 INJECTION, SOLUTION INTRAMUSCULAR; INTRAVENOUS at 08:09

## 2019-09-10 RX ADMIN — SODIUM CHLORIDE, SODIUM LACTATE, POTASSIUM CHLORIDE, AND CALCIUM CHLORIDE: 600; 310; 30; 20 INJECTION, SOLUTION INTRAVENOUS at 07:09

## 2019-09-10 RX ADMIN — ONDANSETRON 4 MG: 2 INJECTION INTRAMUSCULAR; INTRAVENOUS at 08:09

## 2019-09-10 RX ADMIN — AMLODIPINE BESYLATE 10 MG: 5 TABLET ORAL at 05:09

## 2019-09-10 NOTE — TRANSFER OF CARE
"Anesthesia Transfer of Care Note    Patient: Serafin Tapia    Procedure(s) Performed: Procedure(s) (LRB):  CIRCUMCISION (N/A)    Patient location: PACU    Anesthesia Type: general    Transport from OR: Transported from OR on 2-3 L/min O2 by NC with adequate spontaneous ventilation    Post pain: adequate analgesia    Post assessment: no apparent anesthetic complications    Post vital signs: stable    Level of consciousness: awake and alert    Nausea/Vomiting: no nausea/vomiting    Complications: respiratory complications, O2 desaturation following extubation    Transfer of care protocol was followed      Last vitals:   Visit Vitals  BP (!) 200/92 (BP Location: Right arm, Patient Position: Lying)   Pulse 85   Temp 36.7 °C (98.1 °F) (Oral)   Resp 18   Ht 5' 10" (1.778 m)   Wt 129.3 kg (285 lb)   SpO2 96%   BMI 40.89 kg/m²     "

## 2019-09-10 NOTE — PROGRESS NOTES
Called Dr Doan office for orders for extended recovery. Spke withNamita and Chon who informed me that Dr. Ruth and his nurse were gone for the day. Chon gave me Dr. Doan number to contact him on his cell phone.

## 2019-09-10 NOTE — PROGRESS NOTES
Subjective:      Patient ID: Serafin Tapia is a 68 y.o. male.    Chief Complaint: Diabetes Mellitus and Diabetic Foot Exam (4/24 Dr Vargas pcp)    Serafin is a 68 y.o. male who presents to the clinic upon referral from Dr. Vargas  for evaluation and treatment of diabetic feet. 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 Patient relates no major problem with feet. Only complaints today consist of diabetic foot exam.    PCP: John Vargas MD    Date Last Seen by PCP:     Current shoe gear: Tennis shoes    Hemoglobin A1C   Date Value Ref Range Status   02/19/2019 7.6 (H) 4.0 - 5.6 % Final     Comment:     ADA Screening Guidelines:  5.7-6.4%  Consistent with prediabetes  >or=6.5%  Consistent with diabetes  High levels of fetal hemoglobin interfere with the HbA1C  assay. Heterozygous hemoglobin variants (HbS, HgC, etc)do  not significantly interfere with this assay.   However, presence of multiple variants may affect accuracy.     04/10/2018 7.1 (H) 4.0 - 5.6 % Final     Comment:     According to ADA guidelines, hemoglobin A1c <7.0% represents  optimal control in non-pregnant diabetic patients. Different  metrics may apply to specific patient populations.   Standards of Medical Care in Diabetes-2016.  For the purpose of screening for the presence of diabetes:  <5.7%     Consistent with the absence of diabetes  5.7-6.4%  Consistent with increasing risk for diabetes   (prediabetes)  >or=6.5%  Consistent with diabetes  Currently, no consensus exists for use of hemoglobin A1c  for diagnosis of diabetes for children.  This Hemoglobin A1c assay has significant interference with fetal   hemoglobin    (HbF). The results are invalid for patients with abnormal amounts of   HbF,   including those with known Hereditary Persistence   of Fetal Hemoglobin. Heterozygous hemoglobin variants (HbAS, HbAC,   HbAD, HbAE, HbA2) do not significantly interfere with this assay;   however, presence of multiple variants in a sample may impact the %   interference.     12/12/2017 5.7 (H) 4.0 - 5.6 % Final     Comment:     According to ADA guidelines, hemoglobin A1c <7.0% represents  optimal control in non-pregnant diabetic patients. Different  metrics may apply to specific patient populations.   Standards of Medical Care in Diabetes-2016.  For the purpose of screening for the presence of diabetes:  <5.7%     Consistent with the absence of diabetes  5.7-6.4%  Consistent with increasing risk for diabetes   (prediabetes)  >or=6.5%  Consistent with diabetes  Currently, no consensus exists for use of hemoglobin A1c  for diagnosis of diabetes for children.  This Hemoglobin A1c assay has significant interference with fetal   hemoglobin   (HbF). The results are invalid for patients with abnormal amounts of   HbF,   including those with known Hereditary Persistence   of Fetal Hemoglobin. Heterozygous hemoglobin variants (HbAS, HbAC,   HbAD, HbAE, HbA2) do not significantly interfere with this assay;   however, presence of multiple variants in a sample may impact the %   interference.             Review of Systems   Constitution: Negative for chills, fever and malaise/fatigue.   HENT: Negative for hearing loss.    Cardiovascular: Negative for claudication.   Respiratory: Negative for shortness of breath.    Skin: Negative for flushing and rash.   Musculoskeletal: Negative for joint pain and myalgias.   Neurological: Negative for loss of balance, numbness, paresthesias and sensory change.   Psychiatric/Behavioral: Negative for altered mental status.           Objective:      Physical Exam   Cardiovascular:   Pulses:       Dorsalis pedis  pulses are 1+ on the right side, and 1+ on the left side.        Posterior tibial pulses are 2+ on the right side, and 2+ on the left side.   No edema noted to b/L LEs   Musculoskeletal:   Adequate joint ROM noted to all lower extremity muscle groups with no pain or crepitation noted. Muscle strength is 5/5 in all groups bilaterally.     Feet:   Right Foot:   Protective Sensation: 5 sites tested. 5 sites sensed.   Left Foot:   Protective Sensation: 5 sites tested. 5 sites sensed.   Neurological:   Intact gross sensation noted to b/L LEs   Skin:   No open lesion noted b/L  Skin temp is warm to warm from proximal to distal b/L.  Webspaces clean, dry, and intact  Nails x10 short and dystrophic             Assessment:       Encounter Diagnosis   Name Primary?    Encounter for comprehensive diabetic foot examination, type 2 diabetes mellitus Yes         Plan:       Serafin was seen today for diabetes mellitus and diabetic foot exam.    Diagnoses and all orders for this visit:    Encounter for comprehensive diabetic foot examination, type 2 diabetes mellitus      I counseled the patient on his conditions, their implications and medical management.        - Shoe inspection. Diabetic Foot Education. Patient reminded of the importance of good nutrition and blood sugar control to help prevent podiatric complications of diabetes. Patient instructed on proper foot hygeine. We discussed wearing proper shoe gear, daily foot inspections, never walking without protective shoe gear.   RTC in 6 months or sooner if any new pedal problems should arise.

## 2019-09-10 NOTE — PROGRESS NOTES
Pts sister Magnolia called. She was updated on recovery status and given the room number pt will be going to. Magnolia verbalized understanding.

## 2019-09-10 NOTE — PROGRESS NOTES
Called to give report and was informed by Nasrin that pt's room has been changed to 374 due to name alert. Was also informed that nurse Yvonne is in an isolation room and then asked to give 5 minutes.

## 2019-09-10 NOTE — INTERVAL H&P NOTE
The patient has been examined and the H&P has been reviewed:    I concur with the findings and no changes have occurred since H&P was written.    Patient has not taken ASA 81 mg for the past week.    Anesthesia/Surgery risks, benefits and alternative options discussed and understood by patient/family.          Active Hospital Problems    Diagnosis  POA    Phimosis [N47.1]  Yes      Resolved Hospital Problems   No resolved problems to display.

## 2019-09-10 NOTE — PROGRESS NOTES
Called and spoke with Pt brother Jose Raul and updated him on recovery care and informed pt of extended recover. Room number given. Jose Raul verbalized understanding.

## 2019-09-10 NOTE — PROGRESS NOTES
Contacted Dr. Ruth on his cell phone and informed him of O2 sats and interventions taken prior to contacting him. Orders given for extended recovery and consult for hospitalist.

## 2019-09-10 NOTE — PROGRESS NOTES
Called [address] to give report and was informed that nurse Yvonne was on her way to the lab to drop off urine. Was asked to call back in 5 minutes.

## 2019-09-10 NOTE — PROGRESS NOTES
Called and spoke with pt brother Mr. Jose Raul Tapia and updated him on pt's recovery care. Informed him he will receive a text message with room information upon transfer. Mr. Blunt verbalized understanding.

## 2019-09-10 NOTE — PROGRESS NOTES
Spoke with Dr. Escudero (hospitalist) and informed her of oxygen saturation and interventions taken. Dr Escudero said patient will be seen.

## 2019-09-10 NOTE — PROGRESS NOTES
"Called Yvonne Rashid on her spectra link to give report. Yvonne said "I cant take report right now. I will call you back as soon as Im able to. My charge nurse number is [id number]" and then hung up the phone.   "

## 2019-09-10 NOTE — HPI
Patient is a 66 year-old male with hypertension, diabetes mellitus type II, dyslipidemia who underwent surgical treatment of tight phimosis preventing retraction of the foreskin.  Dr. Nhan Ruth circucision today.  Patient tolerated surgery well however patient with oxygen desaturation following surgery.  Patient treated with intravenous furosemide with improvement. Hospital medicine consulted for evaluation of post-operative hypoxia and management of patient's co-morbid conditions.  Patient reports that he did not take his blood pressure medications prior to surgery except for his beta-blocker.  He denies any chest pain or shortness of breath at rest at this time.  He denies significant pain.

## 2019-09-10 NOTE — ANESTHESIA POSTPROCEDURE EVALUATION
Anesthesia Post Evaluation    Patient: Serafin Tapia    Procedure(s) Performed: Procedure(s) (LRB):  CIRCUMCISION (N/A)    Final Anesthesia Type: general  Patient location during evaluation: PACU  Patient participation: Yes- Able to Participate  Level of consciousness: awake and alert  Post-procedure vital signs: reviewed and stable  Pain management: adequate  Airway patency: patent  PONV status at discharge: No PONV  Anesthetic complications: no    Deisy-operative Events Comments: Patient had post extubation respiratory distress. Treated in PACU with incentive spirometry and Lasix. Despite these measures pt required supplemental oxygen in PACU. Decision made for overnight observation.  Cardiovascular status: blood pressure returned to baseline  Respiratory status: unassisted and nasal cannula  Hydration status: euvolemic  Follow-up not needed.          Vitals Value Taken Time   /60 9/10/2019  3:29 PM   Temp 36.5 °C (97.7 °F) 9/10/2019  9:42 AM   Pulse 78 9/10/2019  3:40 PM   Resp 19 9/10/2019  2:30 PM   SpO2 96 % 9/10/2019  3:40 PM   Vitals shown include unvalidated device data.      No case tracking events are documented in the log.      Pain/Hadley Score: Pain Rating Prior to Med Admin: 0 (9/10/2019  5:54 AM)

## 2019-09-11 ENCOUNTER — HOSPITAL ENCOUNTER (OUTPATIENT)
Dept: CARDIOLOGY | Facility: OTHER | Age: 69
Discharge: HOME | End: 2019-09-11
Attending: HOSPITALIST | Admitting: UROLOGY
Payer: MEDICARE

## 2019-09-11 LAB
ANION GAP SERPL CALC-SCNC: 8 MMOL/L (ref 8–16)
ASCENDING AORTA: 3.62 CM
AV INDEX (PROSTH): 0.5
AV MEAN GRADIENT: 12 MMHG
AV PEAK GRADIENT: 19 MMHG
AV VALVE AREA: 1.75 CM2
AV VELOCITY RATIO: 0.48
BSA FOR ECHO PROCEDURE: 2.53 M2
BUN SERPL-MCNC: 12 MG/DL (ref 8–23)
CALCIUM SERPL-MCNC: 9.2 MG/DL (ref 8.7–10.5)
CHLORIDE SERPL-SCNC: 99 MMOL/L (ref 95–110)
CO2 SERPL-SCNC: 34 MMOL/L (ref 23–29)
CREAT SERPL-MCNC: 1 MG/DL (ref 0.5–1.4)
CV ECHO LV RWT: 0.62 CM
DOP CALC AO PEAK VEL: 2.16 M/S
DOP CALC AO VTI: 45.37 CM
DOP CALC LVOT AREA: 3.5 CM2
DOP CALC LVOT DIAMETER: 2.12 CM
DOP CALC LVOT PEAK VEL: 1.04 M/S
DOP CALC LVOT STROKE VOLUME: 79.59 CM3
DOP CALCLVOT PEAK VEL VTI: 22.56 CM
E WAVE DECELERATION TIME: 319.69 MSEC
E/A RATIO: 0.53
ECHO LV POSTERIOR WALL: 1.35 CM (ref 0.6–1.1)
EST. GFR  (AFRICAN AMERICAN): >60 ML/MIN/1.73 M^2
EST. GFR  (NON AFRICAN AMERICAN): >60 ML/MIN/1.73 M^2
ESTIMATED AVG GLUCOSE: 169 MG/DL (ref 68–131)
FRACTIONAL SHORTENING: 45 % (ref 28–44)
GLUCOSE SERPL-MCNC: 142 MG/DL (ref 70–110)
HBA1C MFR BLD HPLC: 7.5 % (ref 4–5.6)
INTERVENTRICULAR SEPTUM: 1.35 CM (ref 0.6–1.1)
IVRT: 0.14 MSEC
LA MAJOR: 6.27 CM
LA MINOR: 5.6 CM
LA WIDTH: 3.33 CM
LEFT ATRIUM SIZE: 4.17 CM
LEFT ATRIUM VOLUME INDEX: 28.8 ML/M2
LEFT ATRIUM VOLUME: 69.83 CM3
LEFT INTERNAL DIMENSION IN SYSTOLE: 2.41 CM (ref 2.1–4)
LEFT VENTRICLE DIASTOLIC VOLUME INDEX: 35.25 ML/M2
LEFT VENTRICLE DIASTOLIC VOLUME: 85.53 ML
LEFT VENTRICLE MASS INDEX: 92 G/M2
LEFT VENTRICLE SYSTOLIC VOLUME INDEX: 8.4 ML/M2
LEFT VENTRICLE SYSTOLIC VOLUME: 20.33 ML
LEFT VENTRICULAR INTERNAL DIMENSION IN DIASTOLE: 4.35 CM (ref 3.5–6)
LEFT VENTRICULAR MASS: 223.65 G
LV LATERAL E/E' RATIO: 4.2 M/S
MAGNESIUM SERPL-MCNC: 1.9 MG/DL (ref 1.6–2.6)
MV PEAK A VEL: 0.79 M/S
MV PEAK E VEL: 0.42 M/S
MV STENOSIS PRESSURE HALF TIME: 93 MS
MV VALVE AREA P 1/2 METHOD: 2.37 CM2
PHOSPHATE SERPL-MCNC: 4.1 MG/DL (ref 2.7–4.5)
POCT GLUCOSE: 112 MG/DL (ref 70–110)
POCT GLUCOSE: 114 MG/DL (ref 70–110)
POCT GLUCOSE: 126 MG/DL (ref 70–110)
POCT GLUCOSE: 128 MG/DL (ref 70–110)
POCT GLUCOSE: 133 MG/DL (ref 70–110)
POTASSIUM SERPL-SCNC: 4.3 MMOL/L (ref 3.5–5.1)
RA MAJOR: 6.01 CM
RA PRESSURE: 3 MMHG
RA WIDTH: 3.33 CM
RIGHT VENTRICULAR END-DIASTOLIC DIMENSION: 4 CM
SINUS: 3.07 CM
SODIUM SERPL-SCNC: 141 MMOL/L (ref 136–145)
STJ: 3 CM
TDI LATERAL: 0.1 M/S
TROPONIN I SERPL DL<=0.01 NG/ML-MCNC: 0.02 NG/ML (ref 0–0.03)

## 2019-09-11 PROCEDURE — 83735 ASSAY OF MAGNESIUM: CPT

## 2019-09-11 PROCEDURE — G0378 HOSPITAL OBSERVATION PER HR: HCPCS

## 2019-09-11 PROCEDURE — 93306 ECHO (CUPID ONLY): ICD-10-PCS | Mod: 26,,, | Performed by: INTERNAL MEDICINE

## 2019-09-11 PROCEDURE — 25000003 PHARM REV CODE 250: Performed by: STUDENT IN AN ORGANIZED HEALTH CARE EDUCATION/TRAINING PROGRAM

## 2019-09-11 PROCEDURE — 84484 ASSAY OF TROPONIN QUANT: CPT

## 2019-09-11 PROCEDURE — 36415 COLL VENOUS BLD VENIPUNCTURE: CPT

## 2019-09-11 PROCEDURE — 63600175 PHARM REV CODE 636 W HCPCS: Performed by: STUDENT IN AN ORGANIZED HEALTH CARE EDUCATION/TRAINING PROGRAM

## 2019-09-11 PROCEDURE — 63600175 PHARM REV CODE 636 W HCPCS: Performed by: HOSPITALIST

## 2019-09-11 PROCEDURE — 84100 ASSAY OF PHOSPHORUS: CPT

## 2019-09-11 PROCEDURE — 93306 TTE W/DOPPLER COMPLETE: CPT

## 2019-09-11 PROCEDURE — 25000003 PHARM REV CODE 250: Performed by: HOSPITALIST

## 2019-09-11 PROCEDURE — 99226 PR SUBSEQUENT OBSERVATION CARE,LEVEL III: ICD-10-PCS | Mod: ,,, | Performed by: HOSPITALIST

## 2019-09-11 PROCEDURE — 93306 TTE W/DOPPLER COMPLETE: CPT | Mod: 26,,, | Performed by: INTERNAL MEDICINE

## 2019-09-11 PROCEDURE — 99226 PR SUBSEQUENT OBSERVATION CARE,LEVEL III: CPT | Mod: ,,, | Performed by: HOSPITALIST

## 2019-09-11 PROCEDURE — 80048 BASIC METABOLIC PNL TOTAL CA: CPT

## 2019-09-11 PROCEDURE — 94761 N-INVAS EAR/PLS OXIMETRY MLT: CPT

## 2019-09-11 RX ORDER — ACETAMINOPHEN 500 MG
1000 TABLET ORAL EVERY 6 HOURS
Status: DISCONTINUED | OUTPATIENT
Start: 2019-09-11 | End: 2019-09-12 | Stop reason: HOSPADM

## 2019-09-11 RX ORDER — BACITRACIN 500 [USP'U]/G
OINTMENT TOPICAL 3 TIMES DAILY
Status: DISCONTINUED | OUTPATIENT
Start: 2019-09-11 | End: 2019-09-12 | Stop reason: HOSPADM

## 2019-09-11 RX ORDER — NIFEDIPINE 30 MG/1
30 TABLET, EXTENDED RELEASE ORAL DAILY
Status: DISCONTINUED | OUTPATIENT
Start: 2019-09-12 | End: 2019-09-12 | Stop reason: HOSPADM

## 2019-09-11 RX ORDER — ENOXAPARIN SODIUM 100 MG/ML
40 INJECTION SUBCUTANEOUS EVERY 24 HOURS
Status: DISCONTINUED | OUTPATIENT
Start: 2019-09-11 | End: 2019-09-12 | Stop reason: HOSPADM

## 2019-09-11 RX ADMIN — BACITRACIN ZINC: 500 OINTMENT TOPICAL at 07:09

## 2019-09-11 RX ADMIN — FUROSEMIDE 40 MG: 10 INJECTION, SOLUTION INTRAVENOUS at 05:09

## 2019-09-11 RX ADMIN — BACITRACIN ZINC: 500 OINTMENT TOPICAL at 09:09

## 2019-09-11 RX ADMIN — AMLODIPINE BESYLATE 10 MG: 5 TABLET ORAL at 09:09

## 2019-09-11 RX ADMIN — LEVOTHYROXINE SODIUM 50 MCG: 25 TABLET ORAL at 05:09

## 2019-09-11 RX ADMIN — ENOXAPARIN SODIUM 40 MG: 100 INJECTION SUBCUTANEOUS at 08:09

## 2019-09-11 RX ADMIN — ACETAMINOPHEN 1000 MG: 500 TABLET ORAL at 08:09

## 2019-09-11 RX ADMIN — ACETAMINOPHEN 1000 MG: 500 TABLET ORAL at 05:09

## 2019-09-11 RX ADMIN — BACITRACIN ZINC: 500 OINTMENT TOPICAL at 05:09

## 2019-09-11 RX ADMIN — BENAZEPRIL HYDROCHLORIDE 40 MG: 10 TABLET, FILM COATED ORAL at 09:09

## 2019-09-11 RX ADMIN — FOLIC ACID 1 MG: 1 TABLET ORAL at 09:09

## 2019-09-11 RX ADMIN — ACETAMINOPHEN 1000 MG: 500 TABLET ORAL at 12:09

## 2019-09-11 RX ADMIN — ACETAMINOPHEN 1000 MG: 500 TABLET ORAL at 11:09

## 2019-09-11 RX ADMIN — FUROSEMIDE 40 MG: 10 INJECTION, SOLUTION INTRAVENOUS at 08:09

## 2019-09-11 RX ADMIN — METOPROLOL SUCCINATE 50 MG: 50 TABLET, EXTENDED RELEASE ORAL at 09:09

## 2019-09-11 RX ADMIN — METHOTREXATE 20 MG: 2.5 TABLET ORAL at 09:09

## 2019-09-11 NOTE — SUBJECTIVE & OBJECTIVE
Past Medical History:   Diagnosis Date    Asthma     childhood    Diabetes mellitus type 2 in obese 5/3/2014    Elevated troponin 5/3/2014    Gait instability 2/28/2018    Hyperlipidemia     Hypertension     Hypothyroidism (acquired) 11/1/2017    Obesity     Pulmonary embolism 05/2014    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    Statin myopathy 1/16/2018    Vertigo        Past Surgical History:   Procedure Laterality Date    BIOPSY-MUSCLE Right 1/22/2018    Performed by Felton Foster Jr., MD at [facility] OR [address] [place] [address]    TONSILLECTOMY         Review of patient's allergies indicates:  No Known Allergies    No current facility-administered medications on file prior to encounter.      Current Outpatient Medications on File Prior to Encounter   Medication Sig    amlodipine-benazepril 5-20 mg (LOTREL) 5-20 mg per capsule Take 2 capsules by mouth once daily.    levothyroxine (SYNTHROID) 50 MCG tablet TAKE 50 MCG BY MOUTH BEFORE BREAKFAST.    metoprolol succinate (TOPROL-XL) 50 MG 24 hr tablet Take 1 tablet (50 mg total) by mouth once daily.    ascorbic acid, vitamin C, (VITAMIN C) 500 MG tablet Take 1 tablet (500 mg total) by mouth 2 (two) times daily.    aspirin (ECOTRIN) 81 MG EC tablet Take 81 mg by mouth once daily.    betamethasone dipropionate (DIPROLENE) 0.05 % cream Apply topically 2 (two) times daily. for 10 days    calcium-vitamin D3 (CALCIUM 500 + D) 500 mg(1,250mg) -200 unit per tablet Take 1 tablet by mouth 2 (two) times daily with meals.    clotrimazole (LOTRIMIN) 1 % Soln Apply topically 2 (two) times daily.    folic acid (FOLVITE) 1 MG tablet Take 1 tablet (1 mg total) by mouth once daily.    furosemide (LASIX) 20 MG tablet Take 1 tablet (20 mg total) by mouth once daily.    metFORMIN (GLUCOPHAGE) 1000 MG tablet Take 1 tablet (1,000 mg total) by mouth 2 (two) times daily with meals. (Patient taking differently: Take 1,000 mg by mouth daily with  breakfast. )    methotrexate 2.5 MG Tab Take 8 tabs weekly    multivitamin (ONE DAILY MULTIVITAMIN) per tablet Take 1 tablet by mouth once daily. PER [facility]     triamcinolone acetonide 0.1% (KENALOG) 0.1 % cream Apply topically 2 (two) times daily.    zinc sulfate (ZINCATE) 220 (50) mg capsule Take 220 mg by mouth once daily. PER [facility]     Family History     Problem Relation (Age of Onset)    Cancer Father    Cataracts Sister    Diabetes Mother, Father, Sister, Brother    Glaucoma Maternal Aunt, Paternal Aunt    Heart disease Mother, Father    Hypertension Mother, Father, Sister, Brother, Son, Daughter    No Known Problems Maternal Grandmother, Maternal Grandfather, Paternal Grandmother, Paternal Grandfather    Stroke Mother        Tobacco Use    Smoking status: Former Smoker     Types: Cigarettes    Smokeless tobacco: Never Used    Tobacco comment: Quit smoking as a teenager   Substance and Sexual Activity    Alcohol use: Yes     Alcohol/week: 0.6 - 1.2 oz     Types: 1 - 2 Cans of beer per week     Comment: 1-2 drinks occasionally    Drug use: No    Sexual activity: Yes     Partners: Female     Birth control/protection: Condom     Review of Systems   Constitutional: Negative for chills, diaphoresis, fatigue and fever.   HENT: Negative for congestion, ear pain, hearing loss and tinnitus.    Eyes: Negative for photophobia, pain, discharge and visual disturbance.   Respiratory: Negative for chest tightness, shortness of breath and wheezing.    Cardiovascular: Negative for chest pain, palpitations and leg swelling.   Gastrointestinal: Negative for abdominal distention, abdominal pain, blood in stool, constipation, diarrhea, nausea and vomiting.   Endocrine: Negative for cold intolerance, heat intolerance, polydipsia, polyphagia and polyuria.   Genitourinary: Negative for dysuria, flank pain, frequency, hematuria and urgency.   Musculoskeletal: Negative for arthralgias, back pain, gait  problem, myalgias and neck pain.   Skin: Negative for color change, pallor, rash and wound.   Allergic/Immunologic: Negative for environmental allergies, food allergies and immunocompromised state.   Neurological: Negative for seizures, syncope, facial asymmetry, weakness and headaches.   Psychiatric/Behavioral: Negative for agitation, behavioral problems, confusion and hallucinations.     Objective:     Vital Signs (Most Recent):  Temp: 98.5 °F (36.9 °C) (09/10/19 1740)  Pulse: 89 (09/10/19 1740)  Resp: 17 (09/10/19 1740)  BP: (!) 202/93 (09/10/19 1740)  SpO2: 95 % (09/10/19 1740) Vital Signs (24h Range):  Temp:  [97.7 °F (36.5 °C)-98.5 °F (36.9 °C)] 98.5 °F (36.9 °C)  Pulse:  [69-91] 89  Resp:  [14-19] 17  SpO2:  [83 %-99 %] 95 %  BP: (129-202)/() 202/93     Weight: 129.3 kg (285 lb)  Body mass index is 40.89 kg/m².    Physical Exam   Constitutional: He is oriented to person, place, and time. He appears well-developed and well-nourished. No distress.   Obese   HENT:   Head: Normocephalic and atraumatic.   Nose: Nose normal.   Mouth/Throat: Oropharynx is clear and moist. No oropharyngeal exudate.   Eyes: Pupils are equal, round, and reactive to light. Conjunctivae and EOM are normal. Right eye exhibits no discharge. Left eye exhibits no discharge. No scleral icterus.   Neck: Normal range of motion. Neck supple. No JVD present. No tracheal deviation present. No thyromegaly present.   Cardiovascular: Normal rate, regular rhythm, normal heart sounds and intact distal pulses. Exam reveals no gallop and no friction rub.   No murmur heard.  Pulmonary/Chest: Effort normal and breath sounds normal. No stridor. No respiratory distress. He has no wheezes. He has no rales. He exhibits no tenderness.   Abdominal: Soft. Bowel sounds are normal. He exhibits no distension and no mass. There is no tenderness. There is no rebound and no guarding.   Genitourinary:   Genitourinary Comments: Circumcised penis with  circumferential dressing in place.   Musculoskeletal: Normal range of motion. He exhibits edema. He exhibits no tenderness.   Trace edema   Lymphadenopathy:     He has no cervical adenopathy.   Neurological: He is alert and oriented to person, place, and time. He has normal reflexes. He displays normal reflexes. No cranial nerve deficit. He exhibits normal muscle tone. Coordination normal.   Skin: Skin is warm and dry. No rash noted. He is not diaphoretic. No erythema. No pallor.   Psychiatric: He has a normal mood and affect. His behavior is normal. Judgment and thought content normal.       Significant Labs: All pertinent labs within the past 24 hours have been reviewed.    Significant Imaging: I have reviewed all pertinent imaging results/findings within the past 24 hours.

## 2019-09-11 NOTE — PLAN OF CARE
09/11/19 1211   Final Note   Assessment Type Final Discharge Note   Anticipated Discharge Disposition Home   Hospital Follow Up  Appt(s) scheduled? Yes   Discharge plans and expectations educations in teach back method with documentation complete? Yes   Right Care Referral Info   Post Acute Recommendation Other   Referral Type see AVS

## 2019-09-11 NOTE — NURSING
Received to room from PACU, RNFRANCY at bedside.     HTN on O2/2L/NC & afebrile.    09/10/19 1740   Vital Signs   Temp 98.5 °F (36.9 °C)   Temp src Oral   Pulse 89   Heart Rate Source Monitor   Resp 17   SpO2 95 %   BP (!) 202/93   MAP (mmHg) 133   BP Location Left arm   Patient Position Sitting     AOx4   Oriented to room, POC, & all equipment. Surgical dressing CDI w/ Urinal in reach. Bowel sounds active, ada diabetic diet initiated, sugars to be monitored ac.   Fall precautions implemented & call light in reach. Resting comfortably in low bed, in NAD.     Dr Bradley, notified of arrival to floor, at bedside. Orders RCVD

## 2019-09-11 NOTE — PLAN OF CARE
Problem: Adult Inpatient Plan of Care  Goal: Plan of Care Review  Outcome: Ongoing (interventions implemented as appropriate)  No significant events overnight. Remains free from fall, injury, and skin breakdown. Voiding without difficulty via urinal. VSS on 2L O2 via NC throughout the night. Glucose checks maintained. Positions self independently. Denies pain. Incision/dressing CDI. SCDs in place. Plan of care reviewed with patient and all questions answered. Bed low, locked. Call light within reach. Purposeful rounding performed. Resting comfortably in bed, no other complaints at this time.

## 2019-09-11 NOTE — SUBJECTIVE & OBJECTIVE
Interval History:     Pain controlled  Tolerating diet with no N/V  Blood pressure improved   No chest pain or SOB  Still on 2 L NC, not endorsing dyspnea    Objective:     Temp:  [97.7 °F (36.5 °C)-98.5 °F (36.9 °C)] 98.2 °F (36.8 °C)  Pulse:  [69-91] 80  Resp:  [14-19] 16  SpO2:  [83 %-99 %] 95 %  BP: (129-202)/() 152/68     Body mass index is 40.89 kg/m².           Drains          None          Physical Exam   Nursing note and vitals reviewed.  Constitutional: He is oriented to person, place, and time. He appears well-developed. No distress.   HENT:   Head: Normocephalic and atraumatic.   Eyes: Pupils are equal, round, and reactive to light. Right eye exhibits no discharge. Left eye exhibits no discharge.   Pulmonary/Chest: Effort normal. No respiratory distress.   Abdominal: Soft. He exhibits no distension. There is no tenderness.   Genitourinary:   Genitourinary Comments: Penile incision clean/dry/intact   Neurological: He is alert and oriented to person, place, and time.   Skin: Skin is warm and dry.     Psychiatric: He has a normal mood and affect. His behavior is normal. Judgment and thought content normal.       Significant Labs:    BMP:  Recent Labs   Lab 09/10/19  1810 09/11/19  0628    141   K 4.1 4.3    99   CO2 30* 34*   BUN 12 12   CREATININE 1.0 1.0   CALCIUM 9.5 9.2       CBC:   Recent Labs   Lab 09/10/19  1810   WBC 9.20   HGB 13.3*   HCT 41.8          All pertinent labs results from the past 24 hours have been reviewed.    Significant Imaging:  CXR 9/10/19: Reviewed

## 2019-09-11 NOTE — PLAN OF CARE
Case mgt.  Await oxygen needs        09/11/19 4452   Discharge Reassessment   Assessment Type Discharge Planning Reassessment   Provided patient/caregiver education on the expected discharge date and the discharge plan Yes   Do you have any problems affording any of your prescribed medications? No   Discharge Plan A Home;Other  (await final o-2 needs )

## 2019-09-11 NOTE — PROCEDURES
"Instructions for measuring Oxygen Saturation  to qualify for Home Oxygen:    Please obtain and document (REPLACE # SIGNS AND COPY AND PASTE TEXT INSIDE QUOTATION MARKS) the following for Home Oxygen:    This must be performed and documented within 2 days of discharge.    "Pulse Oximetry:    86% SpO2 on room air at rest on 9/11/2019                                 If 88% or less, STOP and document.     If 89% or more, measure and  document the following:    "Pulse Oximetry:   #%SpO2 on room air at rest on #date                           #%SpO2  on room air with activity/exercise on #date                      #% SpO2 on #L oxgyen with activity/excercise on #date"    (NOTE:  FOR OXYGEN WITH ACTIVITY - MEDICARE WANTS TO SEE THAT THE OXYGEN INCREASES ONCE A PATIENT HAS WALKED AND IS BACK ON THE OXYGEN  "

## 2019-09-11 NOTE — ASSESSMENT & PLAN NOTE
Patient without any muscle pains at this time.  Stable.  Continue with home dose of weekly methotrexate.

## 2019-09-11 NOTE — ASSESSMENT & PLAN NOTE
68 y.o. male with a history of HTN, HLD, hypothyroidism, type 2 DM who underwent circumcision on 9/11/19. His postoperative course was complicated by oxygen desaturation.    - Ambulate tid  - Strict I's/O's  - Diabetic diet  - Home meds  - BP meds per medicine  - Wean supplemental O2  - Pain control  - PPx: SCD's/ROBINSON's/Lovenox  - Dispo: Possible discharge home today pending medicine recs.

## 2019-09-11 NOTE — HPI
68 y.o. male with a history of HTN, HLD, hypothyroidism, type 2 DM who underwent circumcision on 9/11/19. His postoperative course was complicated by oxygen desaturation.

## 2019-09-11 NOTE — ASSESSMENT & PLAN NOTE
Likely due to marked hypertension resulting in mild diastolic heart failure and also possibly related to anesthesia.  Improving with intravenous diuretics.  Will continue with diuretic therapy and further afterload reduction.  Electrocardiogram without acute ischemic changes.  Troponin normal. Echocardiogram ordered.

## 2019-09-11 NOTE — ASSESSMENT & PLAN NOTE
Patient counseled on the importance of weight loss with changes in diet and exercise.  Outpatient follow-up advised.

## 2019-09-11 NOTE — PROGRESS NOTES
[facility]  Urology  Progress Note    Patient Name: Serafin Tapia  MRN: [id number]  Admission Date: 9/10/2019  Hospital Length of Stay: 0 days  Code Status: Prior   Attending Provider: Nhan Ruth MD   Primary Care Physician: John Vargas MD    Subjective:     HPI:  68 y.o. male with a history of HTN, HLD, hypothyroidism, type 2 DM who underwent circumcision on 9/11/19. His postoperative course was complicated by oxygen desaturation.    Interval History:     Pain controlled  Tolerating diet with no N/V  Blood pressure improved   No chest pain or SOB  Still on 2 L NC, not endorsing dyspnea    Objective:     Temp:  [97.7 °F (36.5 °C)-98.5 °F (36.9 °C)] 98.2 °F (36.8 °C)  Pulse:  [69-91] 80  Resp:  [14-19] 16  SpO2:  [83 %-99 %] 95 %  BP: (129-202)/() 152/68     Body mass index is 40.89 kg/m².           Drains          None          Physical Exam   Nursing note and vitals reviewed.  Constitutional: He is oriented to person, place, and time. He appears well-developed. No distress.   HENT:   Head: Normocephalic and atraumatic.   Eyes: Pupils are equal, round, and reactive to light. Right eye exhibits no discharge. Left eye exhibits no discharge.   Pulmonary/Chest: Effort normal. No respiratory distress.   Abdominal: Soft. He exhibits no distension. There is no tenderness.   Genitourinary:   Genitourinary Comments: Penile incision clean/dry/intact   Neurological: He is alert and oriented to person, place, and time.   Skin: Skin is warm and dry.     Psychiatric: He has a normal mood and affect. His behavior is normal. Judgment and thought content normal.       Significant Labs:    BMP:  Recent Labs   Lab 09/10/19  1810 09/11/19  0628    141   K 4.1 4.3    99   CO2 30* 34*   BUN 12 12   CREATININE 1.0 1.0   CALCIUM 9.5 9.2       CBC:   Recent Labs   Lab 09/10/19  1810   WBC 9.20   HGB 13.3*   HCT 41.8          All pertinent labs results from the past 24 hours have been  reviewed.    Significant Imaging:  CXR 9/10/19: Reviewed      Assessment/Plan:     Phimosis  68 y.o. male with a history of HTN, HLD, hypothyroidism, type 2 DM who underwent circumcision on 9/11/19. His postoperative course was complicated by oxygen desaturation.    - Ambulate tid  - Strict I's/O's  - Diabetic diet  - Home meds  - BP meds per medicine  - Wean supplemental O2  - Pain control  - PPx: SCD's/ROBINSON's/Lovenox  - Dispo: Possible discharge home today pending medicine recs.        VTE Risk Mitigation (From admission, onward)        Ordered     enoxaparin injection 40 mg  Daily      09/11/19 0732     Place sequential compression device  Until discontinued      09/10/19 9933          Jass Argueta MD  Urology  [facility]

## 2019-09-11 NOTE — HOSPITAL COURSE
Patient is a 66 year-old male with hypertension, diabetes mellitus type II, dyslipidemia who underwent surgical treatment of tight phimosis preventing retraction of the foreskin by undergoing circumcision on 9/102019 performed by Dr. Nhan Ruth.  Patient admitted to the hospital for evaluation and treatment of post-operative hypoxia.  Patient with evidence of hypovolemia and with elevated blood pressure.  Echocardiogram showed evidence of mild diastolic heart failure which along with his markedly elevated blood pressure resulted in decompensated heart failure.  Patient clinically improved with intravenous furosemide and with afterload reduction.  Patient was weaned off supplemental oxygen.  Patient stable to discharged home to follow up with his primary care provider for ongoing management of his medical comorbidities particularly his hypertension and also for consideration of further workup as indicated including potentially a sleep study to assess for possible obstructive sleep apnea.

## 2019-09-11 NOTE — CONSULTS
[facility]  Hospital Medicine  Consult Note    Patient Name: Serafin Tapia  MRN: [id number]  Admission Date: 9/10/2019  Hospital Length of Stay: 0 days  Attending Physician: Nhan Ruth MD   Primary Care Provider: John Vargas MD           Patient information was obtained from patient, past medical records and ER records.     Consults  Subjective:     Principal Problem: Postoperative hypoxia    Chief Complaint: No chief complaint on file.       HPI: Patient is a 66 year-old male with hypertension, diabetes mellitus type II, dyslipidemia who underwent surgical treatment of tight phimosis preventing retraction of the foreskin.  Dr. Nhan Ruth circucision today.  Patient tolerated surgery well however patient with oxygen desaturation following surgery.  Patient treated with intravenous furosemide with improvement. Hospital medicine consulted for evaluation of post-operative hypoxia and management of patient's co-morbid conditions.  Patient reports that he did not take his blood pressure medications prior to surgery except for his beta-blocker.  He denies any chest pain or shortness of breath at rest at this time.  He denies significant pain.    Past Medical History:   Diagnosis Date    Asthma     childhood    Diabetes mellitus type 2 in obese 5/3/2014    Elevated troponin 5/3/2014    Gait instability 2/28/2018    Hyperlipidemia     Hypertension     Hypothyroidism (acquired) 11/1/2017    Obesity     Pulmonary embolism 05/2014    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    Statin myopathy 1/16/2018    Vertigo        Past Surgical History:   Procedure Laterality Date    BIOPSY-MUSCLE Right 1/22/2018    Performed by Felton Foster Jr., MD at [facility] OR [address] [place] [address]    TONSILLECTOMY         Review of patient's allergies indicates:  No Known Allergies    No current facility-administered medications on file prior to encounter.      Current  Outpatient Medications on File Prior to Encounter   Medication Sig    amlodipine-benazepril 5-20 mg (LOTREL) 5-20 mg per capsule Take 2 capsules by mouth once daily.    levothyroxine (SYNTHROID) 50 MCG tablet TAKE 50 MCG BY MOUTH BEFORE BREAKFAST.    metoprolol succinate (TOPROL-XL) 50 MG 24 hr tablet Take 1 tablet (50 mg total) by mouth once daily.    ascorbic acid, vitamin C, (VITAMIN C) 500 MG tablet Take 1 tablet (500 mg total) by mouth 2 (two) times daily.    aspirin (ECOTRIN) 81 MG EC tablet Take 81 mg by mouth once daily.    betamethasone dipropionate (DIPROLENE) 0.05 % cream Apply topically 2 (two) times daily. for 10 days    calcium-vitamin D3 (CALCIUM 500 + D) 500 mg(1,250mg) -200 unit per tablet Take 1 tablet by mouth 2 (two) times daily with meals.    clotrimazole (LOTRIMIN) 1 % Soln Apply topically 2 (two) times daily.    folic acid (FOLVITE) 1 MG tablet Take 1 tablet (1 mg total) by mouth once daily.    furosemide (LASIX) 20 MG tablet Take 1 tablet (20 mg total) by mouth once daily.    metFORMIN (GLUCOPHAGE) 1000 MG tablet Take 1 tablet (1,000 mg total) by mouth 2 (two) times daily with meals. (Patient taking differently: Take 1,000 mg by mouth daily with breakfast. )    methotrexate 2.5 MG Tab Take 8 tabs weekly    multivitamin (ONE DAILY MULTIVITAMIN) per tablet Take 1 tablet by mouth once daily. PER [facility]     triamcinolone acetonide 0.1% (KENALOG) 0.1 % cream Apply topically 2 (two) times daily.    zinc sulfate (ZINCATE) 220 (50) mg capsule Take 220 mg by mouth once daily. PER [facility]     Family History     Problem Relation (Age of Onset)    Cancer Father    Cataracts Sister    Diabetes Mother, Father, Sister, Brother    Glaucoma Maternal Aunt, Paternal Aunt    Heart disease Mother, Father    Hypertension Mother, Father, Sister, Brother, Son, Daughter    No Known Problems Maternal Grandmother, Maternal Grandfather, Paternal Grandmother, Paternal Grandfather     Stroke Mother        Tobacco Use    Smoking status: Former Smoker     Types: Cigarettes    Smokeless tobacco: Never Used    Tobacco comment: Quit smoking as a teenager   Substance and Sexual Activity    Alcohol use: Yes     Alcohol/week: 0.6 - 1.2 oz     Types: 1 - 2 Cans of beer per week     Comment: 1-2 drinks occasionally    Drug use: No    Sexual activity: Yes     Partners: Female     Birth control/protection: Condom     Review of Systems   Constitutional: Negative for chills, diaphoresis, fatigue and fever.   HENT: Negative for congestion, ear pain, hearing loss and tinnitus.    Eyes: Negative for photophobia, pain, discharge and visual disturbance.   Respiratory: Negative for chest tightness, shortness of breath and wheezing.    Cardiovascular: Negative for chest pain, palpitations and leg swelling.   Gastrointestinal: Negative for abdominal distention, abdominal pain, blood in stool, constipation, diarrhea, nausea and vomiting.   Endocrine: Negative for cold intolerance, heat intolerance, polydipsia, polyphagia and polyuria.   Genitourinary: Negative for dysuria, flank pain, frequency, hematuria and urgency.   Musculoskeletal: Negative for arthralgias, back pain, gait problem, myalgias and neck pain.   Skin: Negative for color change, pallor, rash and wound.   Allergic/Immunologic: Negative for environmental allergies, food allergies and immunocompromised state.   Neurological: Negative for seizures, syncope, facial asymmetry, weakness and headaches.   Psychiatric/Behavioral: Negative for agitation, behavioral problems, confusion and hallucinations.     Objective:     Vital Signs (Most Recent):  Temp: 98.5 °F (36.9 °C) (09/10/19 1740)  Pulse: 89 (09/10/19 1740)  Resp: 17 (09/10/19 1740)  BP: (!) 202/93 (09/10/19 1740)  SpO2: 95 % (09/10/19 1740) Vital Signs (24h Range):  Temp:  [97.7 °F (36.5 °C)-98.5 °F (36.9 °C)] 98.5 °F (36.9 °C)  Pulse:  [69-91] 89  Resp:  [14-19] 17  SpO2:  [83 %-99 %] 95 %  BP:  (129-202)/() 202/93     Weight: 129.3 kg (285 lb)  Body mass index is 40.89 kg/m².    Physical Exam   Constitutional: He is oriented to person, place, and time. He appears well-developed and well-nourished. No distress.   Obese   HENT:   Head: Normocephalic and atraumatic.   Nose: Nose normal.   Mouth/Throat: Oropharynx is clear and moist. No oropharyngeal exudate.   Eyes: Pupils are equal, round, and reactive to light. Conjunctivae and EOM are normal. Right eye exhibits no discharge. Left eye exhibits no discharge. No scleral icterus.   Neck: Normal range of motion. Neck supple. No JVD present. No tracheal deviation present. No thyromegaly present.   Cardiovascular: Normal rate, regular rhythm, normal heart sounds and intact distal pulses. Exam reveals no gallop and no friction rub.   No murmur heard.  Pulmonary/Chest: Effort normal and breath sounds normal. No stridor. No respiratory distress. He has no wheezes. He has no rales. He exhibits no tenderness.   Abdominal: Soft. Bowel sounds are normal. He exhibits no distension and no mass. There is no tenderness. There is no rebound and no guarding.   Genitourinary:   Genitourinary Comments: Circumcised penis with circumferential dressing in place.   Musculoskeletal: Normal range of motion. He exhibits edema. He exhibits no tenderness.   Trace edema   Lymphadenopathy:     He has no cervical adenopathy.   Neurological: He is alert and oriented to person, place, and time. He has normal reflexes. He displays normal reflexes. No cranial nerve deficit. He exhibits normal muscle tone. Coordination normal.   Skin: Skin is warm and dry. No rash noted. He is not diaphoretic. No erythema. No pallor.   Psychiatric: He has a normal mood and affect. His behavior is normal. Judgment and thought content normal.       Significant Labs: All pertinent labs within the past 24 hours have been reviewed.    Significant Imaging: I have reviewed all pertinent imaging results/findings  within the past 24 hours.    Assessment/Plan:     * Postoperative hypoxia  Likely due to marked hypertension resulting in mild diastolic heart failure and also possibly related to anesthesia.  Improving with intravenous diuretics.  Will continue with diuretic therapy and further afterload reduction.  Electrocardiogram without acute ischemic changes.  Troponin normal. Echocardiogram ordered.    Phimosis  Post-operative care per urology.    Essential hypertension  Poorly controlled.  Restarting home blood pressure medications.  Will monitor and adjust as needed to achieve good blood pressure control.    Controlled type 2 diabetes mellitus with diabetic polyneuropathy, without long-term current use of insulin  Hold oral diabetes medications.  Will treat with as needed subcutaneous insulin.    Autoimmune necrotizing myopathy  Patient without any muscle pains at this time.  Stable.  Continue with home dose of weekly methotrexate.    Hypothyroidism (acquired)  Checking thyroid-stimulating hormone level.  Continue with home dose of levothyroxine.    Severe obesity (BMI 35.0-39.9) with comorbidity  Patient counseled on the importance of weight loss with changes in diet and exercise.  Outpatient follow-up advised.    VTE Risk Mitigation (From admission, onward)        Ordered     Place sequential compression device  Until discontinued      09/10/19 0526        DVT prophylaxis.  Per urology.    Thank you for your consult. I will follow-up with patient. Please contact us if you have any additional questions.    Eleno Bradley MD  Department of Hospital Medicine   [facility]

## 2019-09-11 NOTE — ASSESSMENT & PLAN NOTE
Poorly controlled.  Restarting home blood pressure medications.  Will monitor and adjust as needed to achieve good blood pressure control.

## 2019-09-11 NOTE — PLAN OF CARE
Discharge Planning:  Patient admitted on 9-10-19  LOS-day 1  Chart reviewed today  Discussed care plan with treatment team,  attending Dr Ruth  Consults following are: case mgt, medicine  PCP updated in [facility]: yes  Pharmacy, updated in [facility]: [facility] [address].  Insurance: PHn  DME at home: nr/w, w/c, cane  Current dispo:  Home today, watching o-2 levels  Transportation: has reliable  Case management to follow as needed       09/11/19 1207   Discharge Assessment   Assessment Type Discharge Planning Assessment   Confirmed/corrected address and phone number on facesheet? Yes   Assessment information obtained from? Patient;Caregiver;Medical Record   Communicated expected length of stay with patient/caregiver yes   Prior to hospitilization cognitive status: Alert/Oriented   Prior to hospitalization functional status: Independent   Current cognitive status: Alert/Oriented   Current Functional Status: Independent   Lives With alone   Able to Return to Prior Arrangements yes   Is patient able to care for self after discharge? Yes   Equipment Currently Used at Home cane, straight;wheelchair;walker, rolling   Do you have any problems affording any of your prescribed medications? No   Is the patient taking medications as prescribed? yes   Does the patient have transportation home? Yes   Transportation Anticipated family or friend will provide   Discharge Plan A Home   DME Needed Upon Discharge  none   Patient/Family in Agreement with Plan yes

## 2019-09-12 VITALS
RESPIRATION RATE: 18 BRPM | HEART RATE: 74 BPM | WEIGHT: 285 LBS | DIASTOLIC BLOOD PRESSURE: 67 MMHG | BODY MASS INDEX: 40.8 KG/M2 | HEIGHT: 70 IN | SYSTOLIC BLOOD PRESSURE: 150 MMHG | OXYGEN SATURATION: 92 % | TEMPERATURE: 99 F

## 2019-09-12 LAB
ANION GAP SERPL CALC-SCNC: 6 MMOL/L (ref 8–16)
BUN SERPL-MCNC: 12 MG/DL (ref 8–23)
CALCIUM SERPL-MCNC: 9.2 MG/DL (ref 8.7–10.5)
CHLORIDE SERPL-SCNC: 99 MMOL/L (ref 95–110)
CO2 SERPL-SCNC: 36 MMOL/L (ref 23–29)
CREAT SERPL-MCNC: 0.8 MG/DL (ref 0.5–1.4)
EST. GFR  (AFRICAN AMERICAN): >60 ML/MIN/1.73 M^2
EST. GFR  (NON AFRICAN AMERICAN): >60 ML/MIN/1.73 M^2
GLUCOSE SERPL-MCNC: 131 MG/DL (ref 70–110)
MAGNESIUM SERPL-MCNC: 1.9 MG/DL (ref 1.6–2.6)
PHOSPHATE SERPL-MCNC: 3.9 MG/DL (ref 2.7–4.5)
POCT GLUCOSE: 126 MG/DL (ref 70–110)
POTASSIUM SERPL-SCNC: 4.3 MMOL/L (ref 3.5–5.1)
SODIUM SERPL-SCNC: 141 MMOL/L (ref 136–145)

## 2019-09-12 PROCEDURE — 36415 COLL VENOUS BLD VENIPUNCTURE: CPT

## 2019-09-12 PROCEDURE — 63600175 PHARM REV CODE 636 W HCPCS: Performed by: HOSPITALIST

## 2019-09-12 PROCEDURE — G0378 HOSPITAL OBSERVATION PER HR: HCPCS

## 2019-09-12 PROCEDURE — 99217 PR OBSERVATION CARE DISCHARGE: ICD-10-PCS | Mod: ,,, | Performed by: HOSPITALIST

## 2019-09-12 PROCEDURE — 25000003 PHARM REV CODE 250: Performed by: STUDENT IN AN ORGANIZED HEALTH CARE EDUCATION/TRAINING PROGRAM

## 2019-09-12 PROCEDURE — 84100 ASSAY OF PHOSPHORUS: CPT

## 2019-09-12 PROCEDURE — 83735 ASSAY OF MAGNESIUM: CPT

## 2019-09-12 PROCEDURE — 99217 PR OBSERVATION CARE DISCHARGE: CPT | Mod: ,,, | Performed by: HOSPITALIST

## 2019-09-12 PROCEDURE — 94761 N-INVAS EAR/PLS OXIMETRY MLT: CPT

## 2019-09-12 PROCEDURE — 80048 BASIC METABOLIC PNL TOTAL CA: CPT

## 2019-09-12 PROCEDURE — 25000003 PHARM REV CODE 250: Performed by: HOSPITALIST

## 2019-09-12 RX ORDER — BACITRACIN 500 [USP'U]/G
OINTMENT TOPICAL 2 TIMES DAILY
Qty: 28 G | Refills: 0 | Status: SHIPPED | OUTPATIENT
Start: 2019-09-12 | End: 2021-10-14

## 2019-09-12 RX ORDER — METHOTREXATE 2.5 MG/1
20 TABLET ORAL
Qty: 32 TABLET | Refills: 0
Start: 2019-09-18 | End: 2019-09-19 | Stop reason: SDUPTHER

## 2019-09-12 RX ORDER — BENAZEPRIL HYDROCHLORIDE 40 MG/1
40 TABLET ORAL DAILY
Qty: 30 TABLET | Refills: 0 | Status: SHIPPED | OUTPATIENT
Start: 2019-09-13 | End: 2019-09-12

## 2019-09-12 RX ORDER — NIFEDIPINE 30 MG/1
30 TABLET, EXTENDED RELEASE ORAL DAILY
Qty: 30 TABLET | Refills: 0 | Status: SHIPPED | OUTPATIENT
Start: 2019-09-13 | End: 2019-09-12

## 2019-09-12 RX ORDER — HYDROCODONE BITARTRATE AND ACETAMINOPHEN 5; 325 MG/1; MG/1
1 TABLET ORAL EVERY 6 HOURS PRN
Qty: 10 TABLET | Refills: 0 | Status: SHIPPED | OUTPATIENT
Start: 2019-09-12 | End: 2021-10-14

## 2019-09-12 RX ORDER — BENAZEPRIL HYDROCHLORIDE 40 MG/1
40 TABLET ORAL DAILY
Qty: 30 TABLET | Refills: 0 | Status: SHIPPED | OUTPATIENT
Start: 2019-09-13 | End: 2019-10-22 | Stop reason: SDUPTHER

## 2019-09-12 RX ORDER — NIFEDIPINE 30 MG/1
30 TABLET, EXTENDED RELEASE ORAL DAILY
Qty: 30 TABLET | Refills: 0 | Status: SHIPPED | OUTPATIENT
Start: 2019-09-13 | End: 2019-10-22 | Stop reason: SDUPTHER

## 2019-09-12 RX ORDER — BACITRACIN 500 [USP'U]/G
OINTMENT TOPICAL 2 TIMES DAILY
Qty: 28 G | Refills: 0 | Status: SHIPPED | OUTPATIENT
Start: 2019-09-12 | End: 2019-09-12

## 2019-09-12 RX ADMIN — LEVOTHYROXINE SODIUM 50 MCG: 25 TABLET ORAL at 06:09

## 2019-09-12 RX ADMIN — BACITRACIN ZINC: 500 OINTMENT TOPICAL at 08:09

## 2019-09-12 RX ADMIN — FOLIC ACID 1 MG: 1 TABLET ORAL at 08:09

## 2019-09-12 RX ADMIN — BENAZEPRIL HYDROCHLORIDE 40 MG: 10 TABLET, FILM COATED ORAL at 08:09

## 2019-09-12 RX ADMIN — ACETAMINOPHEN 1000 MG: 500 TABLET ORAL at 06:09

## 2019-09-12 RX ADMIN — FUROSEMIDE 40 MG: 10 INJECTION, SOLUTION INTRAVENOUS at 08:09

## 2019-09-12 RX ADMIN — METOPROLOL SUCCINATE 50 MG: 50 TABLET, EXTENDED RELEASE ORAL at 08:09

## 2019-09-12 RX ADMIN — NIFEDIPINE 30 MG: 30 TABLET, FILM COATED, EXTENDED RELEASE ORAL at 08:09

## 2019-09-12 NOTE — ASSESSMENT & PLAN NOTE
68 y.o. male with a history of HTN, HLD, hypothyroidism, type 2 DM who underwent circumcision on 9/11/19. His postoperative course was complicated by oxygen desaturation.    - Ambulate tid  - Strict I's/O's  - Diabetic diet  - Home meds  - BP meds and diuresis per medicine  - Wean supplemental O2  - Pain control  - Echo 9/11: mild LV diastolic dysfunction, normal LVEF (65%)  - PPx: SCD's/ROBINSON's/Lovenox  - Dispo: Possible discharge home today pending medicine recs.

## 2019-09-12 NOTE — ASSESSMENT & PLAN NOTE
Improving.  Will switch from amlodipine to 10 mg to nifedipine 24 hours 30 mg daily.  Continue with metoprolol succinate 50 mg oral daily and benazepril 40 mg oral daily.  Will monitor and adjust as needed to achieve good blood pressure control.

## 2019-09-12 NOTE — SUBJECTIVE & OBJECTIVE
Interval History:     Pain controlled  Tolerating diet with no N/V  Ambulating  Hypertensive over last 24 hours  No chest pain  Saturated well on 2 L NC overnight, transitioned to room air this AM with no SOB  Voiding without difficulty     Objective:     Temp:  [97.5 °F (36.4 °C)-98.8 °F (37.1 °C)] 97.7 °F (36.5 °C)  Pulse:  [71-78] 75  Resp:  [18-20] 18  SpO2:  [92 %-99 %] 99 %  BP: (135-171)/(60-76) 136/67     Body mass index is 40.89 kg/m².           Drains          None          Physical Exam   Nursing note and vitals reviewed.  Constitutional: He is oriented to person, place, and time. He appears well-developed. No distress.   HENT:   Head: Normocephalic and atraumatic.   Eyes: Pupils are equal, round, and reactive to light. Right eye exhibits no discharge. Left eye exhibits no discharge.   Pulmonary/Chest: Effort normal. No respiratory distress.   Abdominal: Soft. He exhibits no distension. There is no tenderness.   Genitourinary:   Genitourinary Comments: Penile incision clean/dry/intact   Neurological: He is alert and oriented to person, place, and time.   Skin: Skin is warm and dry.     Psychiatric: He has a normal mood and affect. His behavior is normal. Judgment and thought content normal.       Significant Labs:    BMP:  Recent Labs   Lab 09/10/19  1810 09/11/19  0628 09/12/19  0415    141 141   K 4.1 4.3 4.3    99 99   CO2 30* 34* 36*   BUN 12 12 12   CREATININE 1.0 1.0 0.8   CALCIUM 9.5 9.2 9.2       CBC:   Recent Labs   Lab 09/10/19  1810   WBC 9.20   HGB 13.3*   HCT 41.8          All pertinent labs results from the past 24 hours have been reviewed.    Significant Imaging:  Echocardiogram 9/11: Reviewed

## 2019-09-12 NOTE — SUBJECTIVE & OBJECTIVE
Interval History:  Breathing improving with diuresis.    Review of Systems   Constitutional: Negative for chills and fever.   Respiratory: Negative for shortness of breath.    Cardiovascular: Positive for leg swelling. Negative for chest pain.   Gastrointestinal: Negative for abdominal pain, constipation, diarrhea, nausea and vomiting.     Objective:     Vital Signs (Most Recent):  Temp: 97.5 °F (36.4 °C) (09/11/19 1948)  Pulse: 71 (09/11/19 1948)  Resp: 18 (09/11/19 1948)  BP: (!) 145/66 (09/11/19 1948)  SpO2: 96 % (09/11/19 1948) Vital Signs (24h Range):  Temp:  [97.5 °F (36.4 °C)-98.8 °F (37.1 °C)] 97.5 °F (36.4 °C)  Pulse:  [71-80] 71  Resp:  [16-20] 18  SpO2:  [92 %-98 %] 96 %  BP: (130-160)/(58-76) 145/66     Weight: 129.3 kg (285 lb)  Body mass index is 40.89 kg/m².    Intake/Output Summary (Last 24 hours) at 9/11/2019 2016  Last data filed at 9/11/2019 1100  Gross per 24 hour   Intake 240 ml   Output 1750 ml   Net -1510 ml      Physical Exam   Constitutional: He is oriented to person, place, and time. He appears well-developed and well-nourished. No distress.   Obese   HENT:   Head: Normocephalic and atraumatic.   Nose: Nose normal.   Mouth/Throat: Oropharynx is clear and moist. No oropharyngeal exudate.   Eyes: Pupils are equal, round, and reactive to light. Conjunctivae and EOM are normal. Right eye exhibits no discharge. Left eye exhibits no discharge. No scleral icterus.   Neck: Normal range of motion. Neck supple. No JVD present. No tracheal deviation present. No thyromegaly present.   Cardiovascular: Normal rate, regular rhythm, normal heart sounds and intact distal pulses. Exam reveals no gallop and no friction rub.   No murmur heard.  Pulmonary/Chest: Effort normal and breath sounds normal. No stridor. No respiratory distress. He has no wheezes. He has no rales. He exhibits no tenderness.   Abdominal: Soft. Bowel sounds are normal. He exhibits no distension and no mass. There is no tenderness. There  is no rebound and no guarding.   Genitourinary:   Genitourinary Comments: Circumcised penis with circumferential dressing in place.   Musculoskeletal: Normal range of motion. He exhibits edema. He exhibits no tenderness.   Trace edema   Lymphadenopathy:     He has no cervical adenopathy.   Neurological: He is alert and oriented to person, place, and time. He has normal reflexes. He displays normal reflexes. No cranial nerve deficit. He exhibits normal muscle tone. Coordination normal.   Skin: Skin is warm and dry. No rash noted. He is not diaphoretic. No erythema. No pallor.   Psychiatric: He has a normal mood and affect. His behavior is normal. Judgment and thought content normal.       Significant Labs: All pertinent labs within the past 24 hours have been reviewed.    Significant Imaging: I have reviewed all pertinent imaging results/findings within the past 24 hours.

## 2019-09-12 NOTE — PLAN OF CARE
Problem: Adult Inpatient Plan of Care  Goal: Plan of Care Review  Outcome: Ongoing (interventions implemented as appropriate)  Patient on 2L nc. Sats 95%. Pt. in no distress, will continue to monitor.

## 2019-09-12 NOTE — PLAN OF CARE
09/12/19 1029   Final Note   Assessment Type Final Discharge Note   Anticipated Discharge Disposition Home   Hospital Follow Up  Appt(s) scheduled? Yes   Discharge plans and expectations educations in teach back method with documentation complete? Yes   Right Care Referral Info   Post Acute Recommendation Other   Referral Type see AVS

## 2019-09-12 NOTE — DISCHARGE INSTRUCTIONS
Adult Circumcision  Circumcision is a procedure to remove the foreskin, the loose fold of skin that covers the head of the penis. You may have a condition that requires circumcision. Or, you may want to be circumcised for personal reasons. Either way, you will want to know what to expect. Read on to learn more about adult circumcision and how its done.    After the procedure  You will be taken to a recovery area where youll recover from the anesthesia. Nurses will check on you as you rest. They can also give you pain medicine if needed. Your doctor will tell you when its OK for you to go home. This will be the same day. When you dress to go home, wear snug-fitting, brief-style underwear. This will help hold your bandage in place. You will also be given care instructions for when you return home.    What to expect  · You will probably see a crust of blood or yellowish coating around the head of the penis. Do not remove scabs. Its OK if they fall off on their own.  · The penis will swell. It may bleed a little around the incision.  · The head of the penis will be red or black-and-blue.  · You may have pain with urination for the first few days.  · Take pain medicine as instructed by your healthcare provider.  · Healing takes about 2 weeks. The stitches should dissolve on their own.    Caring for your penis  · You may shower 48 hours after surgery. When drying off, gently pat the penis dry.  · Dont take a bath or use a hot tub, Jacuzzi, or swimming pool for 2 weeks after surgery.  · Follow your healthcare providers instructions for bandage care. Change or remove the bandage only when told to do so by your healthcare provider. This will likely be the day after surgery.  · Apply bacitracin twice daily to incision.  · Avoid all sexual activity for 4 to 6 weeks after surgery. An erection can cause the incisions to open. Ask your healthcare provider what you can do to help stop erections.    Follow-up care  Make a  follow-up appointment or as advised.    When to call your healthcare provider  Call the healthcare provider right away if you have any of the following:  · Fever of 100.4°F ( 38°C) or higher  · Increased redness, bruising, or swelling of the penis  · Discharge that is heavy, a greenish color, or lasts more than a week  · Bleeding that isnt controlled by applying gentle pressure  · Inability to urinate       Anesthesia: After Your Surgery  Youve just had surgery. During surgery, you received medication called anesthesia to keep you comfortable and pain-free. After surgery, you may experience some pain or nausea. This is common. Here are some tips for feeling better and recovering after surgery.    Going home  Your doctor or nurse will show you how to take care of yourself when you go home. He or she will also answer your questions. Have an adult family member or friend drive you home. For the first 24 hours after your surgery:    · Do not drive or use heavy equipment.  · Do not make important decisions or sign legal documents.  · Avoid alcohol.  · Have someone stay with you, if needed. He or she can watch for problems and help keep you safe.  · Take your time getting up from a seated or lying position. You may experience dizziness for 24 hours.    Be sure to keep all follow-up appointments with your doctor. And rest after your procedure for as long as your doctor tells you to.    Coping with pain  If you have pain after surgery, pain medication will help you feel better. Take it as directed, before pain becomes severe. Also, ask your doctor or pharmacist about other ways to control pain, such as with heat, ice, and relaxation. And follow any other instructions your surgeon or nurse gives you.    URINARY RETENTION  Should you experience a decrease in your urine output or are unable to urinate following surgery, this can be due to the medications given during surgery.  We recommend you going to the nearest Emergency  Department.    Tips for taking pain medication  To get the best relief possible, remember these points:    · Pain medications can upset your stomach. Taking them with a little food may help.  · Most pain relievers taken by mouth need at least 20 to 30 minutes to take effect.  · Taking medication on a schedule can help you remember to take it. Try to time your medication so that you can take it before beginning an activity, such as dressing, walking, or sitting down for dinner.  · Constipation is a common side effect of pain medications. Contact your doctor before taking any medications like laxatives or stool softeners to help relieve constipation. Also ask about any dietary restrictions, because drinking lots of fluids and eating foods like fruits and vegetables that are high in fiber can also help. Remember, dont take laxatives unless your surgeon has prescribed them.  · Mixing alcohol and pain medication can cause dizziness and slow your breathing. It can even be fatal. Dont drink alcohol while taking pain medication.  · Pain medication can slow your reflexes. Dont drive or operate machinery while taking pain medication.    If your health care provider tells you to take acetaminophen to help relieve your pain, ask him or her how much you are supposed to take each day. (Acetaminophen is the generic name for Tylenol and other brand-name pain relievers.) Acetaminophen or other pain relievers may interact with your prescription medicines or other over-the-counter (OTC) drugs. Some prescription medications contain acetaminophen along with other active ingredients. Using both prescription and OTC acetaminophen for pain can cause you to overdose. The FDA recommends that you read the labels on your OTC medications carefully. This will help you to clearly understand the list of active ingredients, dosing instructions, and any warnings. It may also help you avoid taking too much acetaminophen. If you have questions or  don't understand the information, ask your pharmacist or health care provider to explain it to you before you take the OTC medication.    Managing nausea  Some people have an upset stomach after surgery. This is often due to anesthesia, pain, pain medications, or the stress of surgery. The following tips will help you manage nausea and get good nutrition as you recover. If you were on a special diet before surgery, ask your doctor if you should follow it during recovery. These tips may help:    · Dont push yourself to eat. Your body will tell you when to eat and how much.  · Start off with clear liquids and soup. They are easier to digest.  · Progress to semi-solid foods (mashed potatoes, applesauce, and gelatin) as you feel ready.  · Slowly move to solid foods. Dont eat fatty, rich, or spicy foods at first.  · Dont force yourself to have three large meals a day. Instead, eat smaller amounts more often.  · Take pain medications with a small amount of solid food, such as crackers or toast to avoid nausea.      Call your surgeon if    · You feel too sleepy, dizzy, or groggy (medication may be too strong).  · You have side effects like nausea, vomiting, or skin changes (rash, itching, or hives).     © 7134-2442 [employer]. [address] [place] [address], [place] [address]. All rights reserved. This information is not intended as a substitute for professional medical care. Always follow your healthcare professional's instructions.    PLEASE FOLLOW ANY OTHER INSTRUCTIONS PROVIDED TO YOU BY DR. COX!

## 2019-09-12 NOTE — PROGRESS NOTES
[facility] Hospital Medicine  Progress Note    Patient Name: Serafin Tapia  MRN: [id number]  Patient Class: OP- Observation   Admission Date: 9/10/2019  Length of Stay: 0 days  Attending Physician: Nhan Ruth MD  Primary Care Provider: John Vargas MD        Subjective:     Principal Problem:Postoperative hypoxia    HPI:  Patient is a 66 year-old male with hypertension, diabetes mellitus type II, dyslipidemia who underwent surgical treatment of tight phimosis preventing retraction of the foreskin.  Dr. Nhan Ruth circucision today.  Patient tolerated surgery well however patient with oxygen desaturation following surgery.  Patient treated with intravenous furosemide with improvement. Hospital medicine consulted for evaluation of post-operative hypoxia and management of patient's co-morbid conditions.  Patient reports that he did not take his blood pressure medications prior to surgery except for his beta-blocker.  He denies any chest pain or shortness of breath at rest at this time.  He denies significant pain.    Overview/Hospital Course:  Patient is a 66 year-old male with hypertension, diabetes mellitus type II, dyslipidemia who underwent surgical treatment of tight phimosis preventing retraction of the foreskin by undergoing circumcision on 9/102019 performed by Dr. Nhan Ruth.  Patient admitted to the hospital for evaluation and treatment of post-operative hypoxia.  Patient with evidence of hypervolemia and treated with intravenous furosemide with clinical improvement.     Interval History:  Breathing improving with diuresis.    Review of Systems   Constitutional: Negative for chills and fever.   Respiratory: Negative for shortness of breath.    Cardiovascular: Positive for leg swelling. Negative for chest pain.   Gastrointestinal: Negative for abdominal pain, constipation, diarrhea, nausea and vomiting.     Objective:     Vital Signs (Most Recent):  Temp: 97.5 °F  (36.4 °C) (09/11/19 1948)  Pulse: 71 (09/11/19 1948)  Resp: 18 (09/11/19 1948)  BP: (!) 145/66 (09/11/19 1948)  SpO2: 96 % (09/11/19 1948) Vital Signs (24h Range):  Temp:  [97.5 °F (36.4 °C)-98.8 °F (37.1 °C)] 97.5 °F (36.4 °C)  Pulse:  [71-80] 71  Resp:  [16-20] 18  SpO2:  [92 %-98 %] 96 %  BP: (130-160)/(58-76) 145/66     Weight: 129.3 kg (285 lb)  Body mass index is 40.89 kg/m².    Intake/Output Summary (Last 24 hours) at 9/11/2019 2016  Last data filed at 9/11/2019 1100  Gross per 24 hour   Intake 240 ml   Output 1750 ml   Net -1510 ml      Physical Exam   Constitutional: He is oriented to person, place, and time. He appears well-developed and well-nourished. No distress.   Obese   HENT:   Head: Normocephalic and atraumatic.   Nose: Nose normal.   Mouth/Throat: Oropharynx is clear and moist. No oropharyngeal exudate.   Eyes: Pupils are equal, round, and reactive to light. Conjunctivae and EOM are normal. Right eye exhibits no discharge. Left eye exhibits no discharge. No scleral icterus.   Neck: Normal range of motion. Neck supple. No JVD present. No tracheal deviation present. No thyromegaly present.   Cardiovascular: Normal rate, regular rhythm, normal heart sounds and intact distal pulses. Exam reveals no gallop and no friction rub.   No murmur heard.  Pulmonary/Chest: Effort normal and breath sounds normal. No stridor. No respiratory distress. He has no wheezes. He has no rales. He exhibits no tenderness.   Abdominal: Soft. Bowel sounds are normal. He exhibits no distension and no mass. There is no tenderness. There is no rebound and no guarding.   Genitourinary:   Genitourinary Comments: Circumcised penis with circumferential dressing in place.   Musculoskeletal: Normal range of motion. He exhibits edema. He exhibits no tenderness.   Trace edema   Lymphadenopathy:     He has no cervical adenopathy.   Neurological: He is alert and oriented to person, place, and time. He has normal reflexes. He displays  normal reflexes. No cranial nerve deficit. He exhibits normal muscle tone. Coordination normal.   Skin: Skin is warm and dry. No rash noted. He is not diaphoretic. No erythema. No pallor.   Psychiatric: He has a normal mood and affect. His behavior is normal. Judgment and thought content normal.       Significant Labs: All pertinent labs within the past 24 hours have been reviewed.    Significant Imaging: I have reviewed all pertinent imaging results/findings within the past 24 hours.      Assessment/Plan:      * Postoperative hypoxia  Likely due to marked hypertension resulting in mild diastolic heart failure and also possibly related to anesthesia.  Improving with intravenous diuretics.  Will continue with diuretic therapy and further afterload reduction.  Electrocardiogram without acute ischemic changes. Echocardiogram shows evidence of mild diastolic heart failure.  Continue with diuresis and wean from supplemental oxygen as possible.    Phimosis  Post-operative care per urology.    Essential hypertension  Improving.  Will switch from amlodipine to 10 mg to nifedipine 24 hours 30 mg daily.  Continue with metoprolol succinate 50 mg oral daily and benazepril 40 mg oral daily.  Will monitor and adjust as needed to achieve good blood pressure control.    Controlled type 2 diabetes mellitus with diabetic polyneuropathy, without long-term current use of insulin  Holding oral diabetes medications.  Reasonably well controlled.  Continue with as sliding insulin as needed.    Autoimmune necrotizing myopathy  Patient without any muscle pains at this time.  Stable.  Continue with home dose of weekly methotrexate.    Hypothyroidism (acquired)  Thyroid-stimulating hormone level at goal.  Continue with home dose of levothyroxine.    Severe obesity (BMI 35.0-39.9) with comorbidity  Patient counseled on the importance of weight loss with changes in diet and exercise.  Outpatient follow-up advised.      VTE Risk Mitigation (From  admission, onward)        Ordered     enoxaparin injection 40 mg  Daily      09/11/19 0732     Place sequential compression device  Until discontinued      09/10/19 3085          Eleno Bradley MD  Department of Hospital Medicine   [facility]

## 2019-09-12 NOTE — ASSESSMENT & PLAN NOTE
Holding oral diabetes medications.  Reasonably well controlled.  Continue with as sliding insulin as needed.

## 2019-09-12 NOTE — PROGRESS NOTES
[facility]  Urology  Progress Note    Patient Name: Serafin Tapia  MRN: [id number]  Admission Date: 9/10/2019  Hospital Length of Stay: 0 days  Code Status: Prior   Attending Provider: Nhan Ruth MD   Primary Care Physician: John Vargas MD    Subjective:     HPI:  68 y.o. male with a history of HTN, HLD, hypothyroidism, type 2 DM who underwent circumcision on 9/11/19. His postoperative course was complicated by oxygen desaturation.    Interval History:     Pain controlled  Tolerating diet with no N/V  Ambulating  Hypertensive over last 24 hours  No chest pain  Saturated well on 2 L NC overnight, transitioned to room air this AM with no SOB  Voiding without difficulty, UOP excellent s/p Lasix 40 bid    Objective:     Temp:  [97.5 °F (36.4 °C)-98.8 °F (37.1 °C)] 97.7 °F (36.5 °C)  Pulse:  [71-78] 75  Resp:  [18-20] 18  SpO2:  [92 %-99 %] 99 %  BP: (135-171)/(60-76) 136/67     Body mass index is 40.89 kg/m².           Drains          None          Physical Exam   Nursing note and vitals reviewed.  Constitutional: He is oriented to person, place, and time. He appears well-developed. No distress.   HENT:   Head: Normocephalic and atraumatic.   Eyes: Pupils are equal, round, and reactive to light. Right eye exhibits no discharge. Left eye exhibits no discharge.   Pulmonary/Chest: Effort normal. No respiratory distress.   Abdominal: Soft. He exhibits no distension. There is no tenderness.   Genitourinary:   Genitourinary Comments: Penile incision clean/dry/intact   Neurological: He is alert and oriented to person, place, and time.   Skin: Skin is warm and dry.     Psychiatric: He has a normal mood and affect. His behavior is normal. Judgment and thought content normal.       Significant Labs:    BMP:  Recent Labs   Lab 09/10/19  1810 09/11/19  0628 09/12/19  0415    141 141   K 4.1 4.3 4.3    99 99   CO2 30* 34* 36*   BUN 12 12 12   CREATININE 1.0 1.0 0.8   CALCIUM 9.5 9.2 9.2        CBC:   Recent Labs   Lab 09/10/19  1810   WBC 9.20   HGB 13.3*   HCT 41.8          All pertinent labs results from the past 24 hours have been reviewed.    Significant Imaging:  Echocardiogram 9/11: Reviewed      Assessment/Plan:     Phimosis  68 y.o. male with a history of HTN, HLD, hypothyroidism, type 2 DM who underwent circumcision on 9/11/19. His postoperative course was complicated by oxygen desaturation.    - Ambulate tid  - Strict I's/O's  - Diabetic diet  - Home meds  - BP meds and diuresis per medicine  - Wean supplemental O2  - Pain control  - Echo 9/11: mild LV diastolic dysfunction, normal LVEF (65%)  - PPx: SCD's/ROBINSON's/Lovenox  - Dispo: Possible discharge home today pending medicine recs.        VTE Risk Mitigation (From admission, onward)        Ordered     enoxaparin injection 40 mg  Daily      09/11/19 0732     Place sequential compression device  Until discontinued      09/10/19 4974          Jass Argueta MD  Urology  [facility]

## 2019-09-12 NOTE — PLAN OF CARE
Problem: Adult Inpatient Plan of Care  Goal: Plan of Care Review  Outcome: Ongoing (interventions implemented as appropriate)  No significant events overnight. Remains free from fall, injury, and skin breakdown. Voiding without difficulty via urinal. VSS on 2L O2 via NC throughout the night. Positions self independently. Pain well controlled with PO meds; denies pain. Blood glucose checks maintained. Penile incision CDI, antibiotic ointment applied. SCDs in place. Plan of care reviewed with patient and all questions answered. Bed low, locked. Call light within reach. Purposeful rounding performed. Resting comfortably in bed, no other complaints at this time.

## 2019-09-12 NOTE — ASSESSMENT & PLAN NOTE
Likely due to marked hypertension resulting in mild diastolic heart failure and also possibly related to anesthesia.  Improving with intravenous diuretics.  Will continue with diuretic therapy and further afterload reduction.  Electrocardiogram without acute ischemic changes. Echocardiogram shows evidence of mild diastolic heart failure.  Continue with diuresis and wean from supplemental oxygen as possible.

## 2019-09-15 NOTE — DISCHARGE SUMMARY
[facility]  Hospital Medicine  Discharge Summary      Patient Name: Serafin Tapia  MRN: [id number]  Admission Date: 9/10/2019  Hospital Length of Stay: 0 days  Discharge Date and Time: 9/12/2019 11:44 AM  Attending Physician: Eleno Bradley MD  Discharging Provider: Eleno Bradley MD  Primary Care Provider: John Vargas MD      HPI:   Patient is a 66 year-old male with hypertension, diabetes mellitus type II, dyslipidemia who underwent surgical treatment of tight phimosis preventing retraction of the foreskin.  Dr. Nhan Ruth circucision today.  Patient tolerated surgery well however patient with oxygen desaturation following surgery.  Patient treated with intravenous furosemide with improvement. Hospital medicine consulted for evaluation of post-operative hypoxia and management of patient's co-morbid conditions.  Patient reports that he did not take his blood pressure medications prior to surgery except for his beta-blocker.  He denies any chest pain or shortness of breath at rest at this time.  He denies significant pain.    Procedure(s) (LRB):  CIRCUMCISION (N/A)      Hospital Course:   Patient is a 66 year-old male with hypertension, diabetes mellitus type II, dyslipidemia who underwent surgical treatment of tight phimosis preventing retraction of the foreskin by undergoing circumcision on 9/102019 performed by Dr. Nhan Ruth.  Patient admitted to the hospital for evaluation and treatment of post-operative hypoxia.  Patient with evidence of hypovolemia and with elevated blood pressure.  Echocardiogram showed evidence of mild diastolic heart failure which along with his markedly elevated blood pressure resulted in decompensated heart failure.  Patient clinically improved with intravenous furosemide and with afterload reduction.  Patient was weaned off supplemental oxygen.  Patient stable to discharged home to follow up with his primary care provider for ongoing management  of his medical comorbidities particularly his hypertension and also for consideration of further workup as indicated including potentially a sleep study to assess for possible obstructive sleep apnea.     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 Go in 1 month.    Specialty:  Urology  Why:  Post-op follow-up.  Contact information:  [address]  [facility] [id number] [phone]             Go to Prince Pimentel MD.    Specialty:  Hematology and Oncology  Why:  see as scheduled  Contact information:  [address]  [facility] [id number] [phone]             Go to Samson Chiang MD.    Specialty:  Rheumatology  Why:  see as scheduled  Contact information:  [address] [facility] [id number] [phone]             John Vargas MD. Schedule an appointment as soon as possible for a visit in 2 weeks.    Specialty:  Internal Medicine  Why:  Blood pressure check following post-operative hypoxia resolved with better blood pressure control and diuresis consistent with diastolic heart failure  Contact information:  [address]  [facility] [address]  [facility] [id number] [phone]                 Patient Instructions:      Diet diabetic     Diet diabetic     No driving until:   Order Comments: Off  narcotics     Notify your health care provider if you experience any of the following:  temperature >100.4     Notify your health care provider if you experience any of the following:  persistent nausea and vomiting or diarrhea     Notify your health care provider if you experience any of the following:  severe uncontrolled pain     Notify your health care provider if you experience any of the following:  redness, tenderness, or signs of infection (pain, swelling, redness, odor or green/yellow discharge around incision site)     Notify your health care provider if you experience any of the following:  difficulty breathing or increased cough     Notify your health care provider if you experience any of the following:  severe persistent headache     Notify your health care provider if you experience any of the following:  worsening rash     Notify your health care provider if you experience any of the following:  persistent dizziness, light-headedness, or visual disturbances     Notify your health care provider if you experience any of the following:  increased confusion or weakness     Remove dressing in 24 hours   Order Comments: Do not shower until 48 hours after surgery. Your incisions were closed with absorbable suture. In order to maintain the strength of the absorbable sutures, do not scrub incisions in the shower, but rather, let soapy water run over incisions. Do not submerge incisions in water (no baths, hot tubs, or swimming pools).    If your dressing falls off on its own, you do not need to replace it.    Please apply bacitracin to incision twice daily.     Notify your health care provider if you experience any of the following:  temperature >100.4     Notify your health care provider if you experience any of the following:  persistent nausea and vomiting or diarrhea     Notify your health care provider if you experience any of the following:  severe uncontrolled pain     Notify your health care provider  if you experience any of the following:  redness, tenderness, or signs of infection (pain, swelling, redness, odor or green/yellow discharge around incision site)     Notify your health care provider if you experience any of the following:  difficulty breathing or increased cough     Notify your health care provider if you experience any of the following:  severe persistent headache     Notify your health care provider if you experience any of the following:  worsening rash     Notify your health care provider if you experience any of the following:  persistent dizziness, light-headedness, or visual disturbances     Notify your health care provider if you experience any of the following:  increased confusion or weakness     Activity as tolerated     Activity as tolerated        Medications:  Reconciled Home Medications:      Medication List      START taking these medications    bacitracin 500 unit/gram ointment  Apply topically 2 (two) times daily. Apply to incision.     benazepril 40 MG tablet  Commonly known as:  LOTENSIN  Take 1 tablet (40 mg total) by mouth once daily.     HYDROcodone-acetaminophen 5-325 mg per tablet  Commonly known as:  NORCO  Take 1 tablet by mouth every 6 (six) hours as needed for Pain.     INV furosemide 40 MG Tab  Commonly known as:  LASIX  Take 1 tablet (40 mg total) by mouth once daily.     NIFEdipine 30 MG (OSM) 24 hr tablet  Commonly known as:  PROCARDIA-XL  Take 1 tablet (30 mg total) by mouth once daily.        CHANGE how you take these medications    metFORMIN 1000 MG tablet  Commonly known as:  GLUCOPHAGE  Take 1 tablet (1,000 mg total) by mouth 2 (two) times daily with meals.  What changed:  when to take this     methotrexate 2.5 MG Tab  Take 8 tablets (20 mg total) by mouth every Wednesday. Take 8 tabs weekly  Start taking on:  9/18/2019  What changed:    · how much to take  · how to take this  · when to take this        CONTINUE taking these medications    ascorbic acid  (vitamin C) 500 MG tablet  Commonly known as:  VITAMIN C  Take 1 tablet (500 mg total) by mouth 2 (two) times daily.     aspirin 81 MG EC tablet  Commonly known as:  ECOTRIN  Take 81 mg by mouth once daily.     betamethasone dipropionate 0.05 % cream  Commonly known as:  DIPROLENE  Apply topically 2 (two) times daily. for 10 days     calcium-vitamin D3 500 mg(1,250mg) -200 unit per tablet  Commonly known as:  CALCIUM 500 + D  Take 1 tablet by mouth 2 (two) times daily with meals.     clotrimazole 1 % Soln  Commonly known as:  LOTRIMIN  Apply topically 2 (two) times daily.     folic acid 1 MG tablet  Commonly known as:  FOLVITE  Take 1 tablet (1 mg total) by mouth once daily.     levothyroxine 50 MCG tablet  Commonly known as:  SYNTHROID  TAKE 50 MCG BY MOUTH BEFORE BREAKFAST.     metoprolol succinate 50 MG 24 hr tablet  Commonly known as:  TOPROL-XL  Take 1 tablet (50 mg total) by mouth once daily.     ONE DAILY MULTIVITAMIN per tablet  Generic drug:  multivitamin  Take 1 tablet by mouth once daily. PER [facility]     triamcinolone acetonide 0.1% 0.1 % cream  Commonly known as:  KENALOG  Apply topically 2 (two) times daily.     zinc sulfate 220 (50) mg capsule  Commonly known as:  ZINCATE  Take 220 mg by mouth once daily. PER [facility]        STOP taking these medications    amlodipine-benazepril 5-20 mg 5-20 mg per capsule  Commonly known as:  LOTREL            Indwelling Lines/Drains at time of discharge:   Lines/Drains/Airways          None          Time spent on the discharge of patient: 35 minutes  Patient was seen and examined on the date of discharge and determined to be suitable for discharge.         Eleno Bradley MD  Department of Hospital Medicine  [facility]

## 2019-09-16 ENCOUNTER — TELEPHONE (OUTPATIENT)
Dept: UROLOGY | Facility: CLINIC | Age: 69
End: 2019-09-16

## 2019-09-16 NOTE — TELEPHONE ENCOUNTER
----- Message from Radha Gonzales MA sent at 9/10/2019  9:34 AM CDT -----      ----- Message -----  From: Jass Argueta MD  Sent: 9/10/2019   9:21 AM  To: [facility] Justin Staff    Patient will need 1 month post-op follow-up with Dr. Ruth.    Thanks,  JOSSUE Argueta MD  Urology, PGY-2  Pager: [phone]

## 2019-09-16 NOTE — TELEPHONE ENCOUNTER
----- Message from Radha Gonzales MA sent at 9/10/2019  9:34 AM CDT -----      ----- Message -----  From: Jass Argueta MD  Sent: 9/10/2019   9:21 AM  To: [facility] Justin Staff    Patient will need 1 month post-op follow-up with Dr. Ruth.    Thanks,  JOSSUE Argueta MD  Urology, PGY-2  Pager: [phone]

## 2019-09-19 DIAGNOSIS — M62.82 NON-TRAUMATIC RHABDOMYOLYSIS: ICD-10-CM

## 2019-09-20 ENCOUNTER — PATIENT OUTREACH (OUTPATIENT)
Dept: ADMINISTRATIVE | Facility: OTHER | Age: 69
End: 2019-09-20

## 2019-09-20 ENCOUNTER — OFFICE VISIT (OUTPATIENT)
Dept: HEMATOLOGY/ONCOLOGY | Facility: CLINIC | Age: 69
End: 2019-09-20
Payer: MEDICARE

## 2019-09-20 VITALS
HEART RATE: 80 BPM | HEIGHT: 70 IN | SYSTOLIC BLOOD PRESSURE: 184 MMHG | TEMPERATURE: 97 F | BODY MASS INDEX: 41.1 KG/M2 | WEIGHT: 287.06 LBS | RESPIRATION RATE: 16 BRPM | OXYGEN SATURATION: 99 % | DIASTOLIC BLOOD PRESSURE: 88 MMHG

## 2019-09-20 DIAGNOSIS — Z13.5 ENCOUNTER FOR SCREENING FOR DIABETIC RETINOPATHY: Primary | ICD-10-CM

## 2019-09-20 DIAGNOSIS — M62.82 NON-TRAUMATIC RHABDOMYOLYSIS: ICD-10-CM

## 2019-09-20 DIAGNOSIS — Z86.711 HISTORY OF PULMONARY EMBOLISM: Primary | ICD-10-CM

## 2019-09-20 DIAGNOSIS — I10 ESSENTIAL HYPERTENSION: ICD-10-CM

## 2019-09-20 PROCEDURE — 3077F SYST BP >= 140 MM HG: CPT | Mod: CPTII,S$GLB,, | Performed by: INTERNAL MEDICINE

## 2019-09-20 PROCEDURE — 1101F PT FALLS ASSESS-DOCD LE1/YR: CPT | Mod: CPTII,S$GLB,, | Performed by: INTERNAL MEDICINE

## 2019-09-20 PROCEDURE — 1101F PR PT FALLS ASSESS DOC 0-1 FALLS W/OUT INJ PAST YR: ICD-10-PCS | Mod: CPTII,S$GLB,, | Performed by: INTERNAL MEDICINE

## 2019-09-20 PROCEDURE — 99499 UNLISTED E&M SERVICE: CPT | Mod: S$GLB,,, | Performed by: INTERNAL MEDICINE

## 2019-09-20 PROCEDURE — 3079F PR MOST RECENT DIASTOLIC BLOOD PRESSURE 80-89 MM HG: ICD-10-PCS | Mod: CPTII,S$GLB,, | Performed by: INTERNAL MEDICINE

## 2019-09-20 PROCEDURE — 99999 PR PBB SHADOW E&M-EST. PATIENT-LVL III: CPT | Mod: PBBFAC,,, | Performed by: INTERNAL MEDICINE

## 2019-09-20 PROCEDURE — 99999 PR PBB SHADOW E&M-EST. PATIENT-LVL III: ICD-10-PCS | Mod: PBBFAC,,, | Performed by: INTERNAL MEDICINE

## 2019-09-20 PROCEDURE — 99214 OFFICE O/P EST MOD 30 MIN: CPT | Mod: S$GLB,,, | Performed by: INTERNAL MEDICINE

## 2019-09-20 PROCEDURE — 99214 PR OFFICE/OUTPT VISIT, EST, LEVL IV, 30-39 MIN: ICD-10-PCS | Mod: S$GLB,,, | Performed by: INTERNAL MEDICINE

## 2019-09-20 PROCEDURE — 99499 RISK ADDL DX/OHS AUDIT: ICD-10-PCS | Mod: S$GLB,,, | Performed by: INTERNAL MEDICINE

## 2019-09-20 PROCEDURE — 3077F PR MOST RECENT SYSTOLIC BLOOD PRESSURE >= 140 MM HG: ICD-10-PCS | Mod: CPTII,S$GLB,, | Performed by: INTERNAL MEDICINE

## 2019-09-20 PROCEDURE — 3079F DIAST BP 80-89 MM HG: CPT | Mod: CPTII,S$GLB,, | Performed by: INTERNAL MEDICINE

## 2019-09-20 RX ORDER — METHOTREXATE 2.5 MG/1
20 TABLET ORAL
Qty: 32 TABLET | Refills: 0
Start: 2019-09-25 | End: 2019-09-20

## 2019-09-20 RX ORDER — METHOTREXATE 2.5 MG/1
20 TABLET ORAL
Qty: 40 TABLET | Refills: 4 | Status: SHIPPED | OUTPATIENT
Start: 2019-09-25 | End: 2019-09-23
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
## 2019-09-23 ENCOUNTER — LAB VISIT (OUTPATIENT)
Dept: LAB | Facility: HOSPITAL | Age: 69
End: 2019-09-23
Attending: INTERNAL MEDICINE
Payer: MEDICARE

## 2019-09-23 ENCOUNTER — OFFICE VISIT (OUTPATIENT)
Dept: RHEUMATOLOGY | Facility: CLINIC | Age: 69
End: 2019-09-23
Payer: MEDICARE

## 2019-09-23 VITALS
DIASTOLIC BLOOD PRESSURE: 91 MMHG | HEIGHT: 70 IN | BODY MASS INDEX: 42.32 KG/M2 | HEART RATE: 85 BPM | SYSTOLIC BLOOD PRESSURE: 178 MMHG | WEIGHT: 295.63 LBS

## 2019-09-23 DIAGNOSIS — G72.0 STATIN MYOPATHY: Primary | ICD-10-CM

## 2019-09-23 DIAGNOSIS — G72.0 STATIN MYOPATHY: ICD-10-CM

## 2019-09-23 DIAGNOSIS — T46.6X5A STATIN MYOPATHY: Primary | ICD-10-CM

## 2019-09-23 DIAGNOSIS — T46.6X5A STATIN MYOPATHY: ICD-10-CM

## 2019-09-23 DIAGNOSIS — M62.82 NON-TRAUMATIC RHABDOMYOLYSIS: ICD-10-CM

## 2019-09-23 LAB
ALBUMIN SERPL BCP-MCNC: 4.1 G/DL (ref 3.5–5.2)
ALP SERPL-CCNC: 105 U/L (ref 55–135)
ALT SERPL W/O P-5'-P-CCNC: 24 U/L (ref 10–44)
ANION GAP SERPL CALC-SCNC: 6 MMOL/L (ref 8–16)
AST SERPL-CCNC: 15 U/L (ref 10–40)
BASOPHILS # BLD AUTO: 0.03 K/UL (ref 0–0.2)
BASOPHILS NFR BLD: 0.6 % (ref 0–1.9)
BILIRUB SERPL-MCNC: 0.3 MG/DL (ref 0.1–1)
BUN SERPL-MCNC: 10 MG/DL (ref 8–23)
CALCIUM SERPL-MCNC: 9.6 MG/DL (ref 8.7–10.5)
CHLORIDE SERPL-SCNC: 101 MMOL/L (ref 95–110)
CK SERPL-CCNC: 177 U/L (ref 20–200)
CO2 SERPL-SCNC: 36 MMOL/L (ref 23–29)
CREAT SERPL-MCNC: 0.8 MG/DL (ref 0.5–1.4)
CRP SERPL-MCNC: 15.3 MG/L (ref 0–8.2)
DIFFERENTIAL METHOD: ABNORMAL
EOSINOPHIL # BLD AUTO: 0.1 K/UL (ref 0–0.5)
EOSINOPHIL NFR BLD: 2.2 % (ref 0–8)
ERYTHROCYTE [DISTWIDTH] IN BLOOD BY AUTOMATED COUNT: 14.1 % (ref 11.5–14.5)
ERYTHROCYTE [SEDIMENTATION RATE] IN BLOOD BY WESTERGREN METHOD: 12 MM/HR (ref 0–23)
EST. GFR  (AFRICAN AMERICAN): >60 ML/MIN/1.73 M^2
EST. GFR  (NON AFRICAN AMERICAN): >60 ML/MIN/1.73 M^2
GLUCOSE SERPL-MCNC: 133 MG/DL (ref 70–110)
HCT VFR BLD AUTO: 43 % (ref 40–54)
HGB BLD-MCNC: 13.1 G/DL (ref 14–18)
IMM GRANULOCYTES # BLD AUTO: 0 K/UL (ref 0–0.04)
IMM GRANULOCYTES NFR BLD AUTO: 0 % (ref 0–0.5)
LYMPHOCYTES # BLD AUTO: 1.8 K/UL (ref 1–4.8)
LYMPHOCYTES NFR BLD: 38.9 % (ref 18–48)
MCH RBC QN AUTO: 26.8 PG (ref 27–31)
MCHC RBC AUTO-ENTMCNC: 30.5 G/DL (ref 32–36)
MCV RBC AUTO: 88 FL (ref 82–98)
MONOCYTES # BLD AUTO: 0.4 K/UL (ref 0.3–1)
MONOCYTES NFR BLD: 8.4 % (ref 4–15)
NEUTROPHILS # BLD AUTO: 2.3 K/UL (ref 1.8–7.7)
NEUTROPHILS NFR BLD: 49.9 % (ref 38–73)
NRBC BLD-RTO: 0 /100 WBC
PLATELET # BLD AUTO: 332 K/UL (ref 150–350)
PMV BLD AUTO: 10.8 FL (ref 9.2–12.9)
POTASSIUM SERPL-SCNC: 4 MMOL/L (ref 3.5–5.1)
PROT SERPL-MCNC: 7.5 G/DL (ref 6–8.4)
RBC # BLD AUTO: 4.88 M/UL (ref 4.6–6.2)
SODIUM SERPL-SCNC: 143 MMOL/L (ref 136–145)
WBC # BLD AUTO: 4.63 K/UL (ref 3.9–12.7)

## 2019-09-23 PROCEDURE — 1101F PT FALLS ASSESS-DOCD LE1/YR: CPT | Mod: CPTII,S$GLB,, | Performed by: INTERNAL MEDICINE

## 2019-09-23 PROCEDURE — 3077F PR MOST RECENT SYSTOLIC BLOOD PRESSURE >= 140 MM HG: ICD-10-PCS | Mod: CPTII,S$GLB,, | Performed by: INTERNAL MEDICINE

## 2019-09-23 PROCEDURE — 99499 UNLISTED E&M SERVICE: CPT | Mod: S$GLB,,, | Performed by: INTERNAL MEDICINE

## 2019-09-23 PROCEDURE — 3077F SYST BP >= 140 MM HG: CPT | Mod: CPTII,S$GLB,, | Performed by: INTERNAL MEDICINE

## 2019-09-23 PROCEDURE — 3080F PR MOST RECENT DIASTOLIC BLOOD PRESSURE >= 90 MM HG: ICD-10-PCS | Mod: CPTII,S$GLB,, | Performed by: INTERNAL MEDICINE

## 2019-09-23 PROCEDURE — 86140 C-REACTIVE PROTEIN: CPT

## 2019-09-23 PROCEDURE — 85025 COMPLETE CBC W/AUTO DIFF WBC: CPT

## 2019-09-23 PROCEDURE — 99999 PR PBB SHADOW E&M-EST. PATIENT-LVL III: CPT | Mod: PBBFAC,,, | Performed by: INTERNAL MEDICINE

## 2019-09-23 PROCEDURE — 99214 OFFICE O/P EST MOD 30 MIN: CPT | Mod: S$GLB,,, | Performed by: INTERNAL MEDICINE

## 2019-09-23 PROCEDURE — 80053 COMPREHEN METABOLIC PANEL: CPT

## 2019-09-23 PROCEDURE — 85652 RBC SED RATE AUTOMATED: CPT

## 2019-09-23 PROCEDURE — 1101F PR PT FALLS ASSESS DOC 0-1 FALLS W/OUT INJ PAST YR: ICD-10-PCS | Mod: CPTII,S$GLB,, | Performed by: INTERNAL MEDICINE

## 2019-09-23 PROCEDURE — 82550 ASSAY OF CK (CPK): CPT

## 2019-09-23 PROCEDURE — 99499 RISK ADDL DX/OHS AUDIT: ICD-10-PCS | Mod: S$GLB,,, | Performed by: INTERNAL MEDICINE

## 2019-09-23 PROCEDURE — 36415 COLL VENOUS BLD VENIPUNCTURE: CPT

## 2019-09-23 PROCEDURE — 99214 PR OFFICE/OUTPT VISIT, EST, LEVL IV, 30-39 MIN: ICD-10-PCS | Mod: S$GLB,,, | Performed by: INTERNAL MEDICINE

## 2019-09-23 PROCEDURE — 82085 ASSAY OF ALDOLASE: CPT

## 2019-09-23 PROCEDURE — 99999 PR PBB SHADOW E&M-EST. PATIENT-LVL III: ICD-10-PCS | Mod: PBBFAC,,, | Performed by: INTERNAL MEDICINE

## 2019-09-23 PROCEDURE — 3080F DIAST BP >= 90 MM HG: CPT | Mod: CPTII,S$GLB,, | Performed by: INTERNAL MEDICINE

## 2019-09-23 RX ORDER — METHOTREXATE 2.5 MG/1
TABLET ORAL
Qty: 40 TABLET | Refills: 5 | Status: SHIPPED | OUTPATIENT
Start: 2019-09-23 | End: 2020-09-24

## 2019-09-23 RX ORDER — FOLIC ACID 1 MG/1
1 TABLET ORAL DAILY
Qty: 90 TABLET | Refills: 3 | Status: SHIPPED | OUTPATIENT
Start: 2019-09-23 | End: 2020-12-17

## 2019-09-23 NOTE — PROGRESS NOTES
Chief Complaint   Patient presents with    Follow-up     statin myopathy follow up       Patient is here for a follow up    History of presenting illness    68 year old black male with HTN,HLD,obesity,LVH,DM,PE,LS spine disease,hypothyroidism, necrotising immune mediated myopathy for a follow up    He did well on 60 mg prednisone  We started to taper prednisone    He is off the prednisone since jan 2019    mtx started : now at 8 tabs weekly and folic acid     dexa normal    He is doing great    Foot continues to be infection free    5/2019    CK,aldolase nml    ESR 46  CRP 12.9    CMP,CBC stable    Muscle biopsy : necrotising myopathy    Initial evaluation :    Patient with long standing :  HTN, HLD, diabetes mellitus and obesity,asthma,hypothyroidism    He was seen in the hospital due to worsening shortness of breath    Prior to that he was at [facility] for hypoglycemia due to sulfonylurea and he was noted to have CK of 4000 to 5000.He got hydration and he developed anasarca and pulmonary edema and then got diuresed.  He was diagnosed to have rhabdomyolysis, His CPK at the time of discharge was 5553.   He had elevated ESR,CRP  HALLE negative     Since March 2017 he has been weak  Prior to the hospitalisations he was very mobile,no weakness,ambulated with no asistance  During this admission he couldn't walk,he couldn't stand,he couldn't lift thighs,couldnt climb stairs    He denies any fevers, chills, rashes, mucosal ulcers, hematuria, dysphagia, vision changes, Raynaud's, n/v/d.     Pt reports a 60-70 pound weight loss since March.       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 He developed a wound on the right foot which he reports healed. He sts that after his swelling while hospitalized caused the wound to flare up.      He had MRI inpatient and that didn't support myositis  Cks trended down to 2513     Rheumatology team sent  off : myomarker panel and HMG co-a reductase antibodies and asked him to come back  EMG and muscle biopsy planned but didn't happen  His sob was attributed to heart failure  Necrotising myopathy due to statins considered  Deconditioning considered  PT suggested      Myomarker panel negative  APLAS negative  HMG co-a reductase ab > 200 units     Muscle biopsy as detailed above    He sees hematology   Had PE in 2014  Off xarelto  Clot was attributed to his trip to [place]    Past history : HTN, HLD, diabetes mellitus and obesity,asthma,hypothyroidism    Family history:RA (brother) and SLE (cousins).    Social history : not a current smoker and alcoholic    Review of Systems   Constitutional: Negative for activity change, appetite change, chills, diaphoresis, fatigue, fever and unexpected weight change.   HENT: Negative for congestion, dental problem, drooling, ear discharge, ear pain, facial swelling, hearing loss, mouth sores, nosebleeds, postnasal drip, rhinorrhea, sinus pressure, sinus pain, sneezing, sore throat, tinnitus, trouble swallowing and voice change.    Eyes: Negative for photophobia, pain, discharge, redness, itching and visual disturbance.   Respiratory: Negative for apnea, cough, choking, chest tightness, shortness of breath, wheezing and stridor.    Cardiovascular: Negative for chest pain, palpitations and leg swelling.   Gastrointestinal: Negative for abdominal distention, abdominal pain, anal bleeding, blood in stool, constipation, diarrhea, nausea, rectal pain and vomiting.   Endocrine: Negative for cold intolerance, heat intolerance, polydipsia, polyphagia and polyuria.   Genitourinary: Negative for decreased urine volume, difficulty urinating, dysuria, enuresis, flank pain, frequency, genital sores, hematuria and urgency.   Musculoskeletal: Negative for arthralgias, back pain, gait problem, joint swelling, myalgias, neck pain and neck stiffness.   Skin: Negative for color change, pallor, rash and  wound.   Allergic/Immunologic: Negative for environmental allergies, food allergies and immunocompromised state.   Neurological: Positive for weakness. Negative for dizziness, tremors, seizures, syncope, facial asymmetry, speech difficulty, light-headedness, numbness and headaches.   Hematological: Negative for adenopathy. Does not bruise/bleed easily.   Psychiatric/Behavioral: Negative for agitation, behavioral problems, confusion, decreased concentration, dysphoric mood, hallucinations, self-injury, sleep disturbance and suicidal ideas. The patient is not nervous/anxious and is not hyperactive.         Physical Exam     LUCIANO-28 tender joint count: 0  LUCIANO-28 swollen joint count: 0     5/5 strength all extremities     Physical Exam   Constitutional: He is oriented to person, place, and time and well-developed, well-nourished, and in no distress. No distress.   HENT:   Head: Normocephalic.   Mouth/Throat: Oropharynx is clear and moist.   Eyes: Conjunctivae are normal. Pupils are equal, round, and reactive to light. Right eye exhibits no discharge. Left eye exhibits no discharge. No scleral icterus.   Neck: Normal range of motion. No thyromegaly present.   Cardiovascular: Normal rate, regular rhythm, normal heart sounds and intact distal pulses.    Pulmonary/Chest: Effort normal and breath sounds normal. No stridor.   Abdominal: Soft. Bowel sounds are normal.   Lymphadenopathy:     He has no cervical adenopathy.   Neurological: He is alert and oriented to person, place, and time.   Skin: Skin is warm. No rash noted. He is not diaphoretic.     Psychiatric: Affect and judgment normal.   Musculoskeletal: Normal range of motion.         Assessment     68 year old black male with HTN,HLD,obesity,LVH,DM,PE,LS spine disease,hypothyroidism, necrotising immune mediated myopathy  He presented with : weakness in the proximal and distal upper and lower extremities with sob     HALLE and armaan-1 negative,myomarker panel negative  He has  very high titres of HMG-COA reductase antibodies positivity  Muscle biopsy positive for necrotising myopathy  He has been on statins for > 10 years with no change in dose of the medication,no change in type of statin    He has immune mediated necrotising myopathy  Drug/toxin induced myopathy on the differential as per path report    He took steroids prednisone 60 mg for a very long time  This was not the plan    Finally when we saw him we started to taper the prednisone    We have tapered off the prednisone  He is now on mtx 8 tabs weekly and folic acid    Last ck,aldolase nml  ESR,CRP were high     1. Statin myopathy    2. Non-traumatic rhabdomyolysis          Plan    Will rpt all the labs today : ck,aldolase,ESR,CRP    dexa nml  Calcium vit d  Stopped bactrim   STOPPED fosamax and the omeprazole    Podiatry follow ups and diabetes control very important,emphasised  Have always asked him to do aggressive physical therapy  Continue mtx 8 tabs weekly and folic acid 1 mg daily  CBC,CMP today    DM,HTN,HLD management     Today's BP very high,suggested to be cautious  203/108 today very high and then 178/91  He seems stable  Asked him to go to the ER : He doesn't want to,he will go home and take meds     Lose weight     Serafin was seen today for follow-up.    Diagnoses and all orders for this visit:    Statin myopathy  -     CK; Future  -     Aldolase; Future  -     CBC auto differential; Future  -     Comprehensive metabolic panel; Future  -     Sedimentation rate; Future  -     C-reactive protein; Future    Non-traumatic rhabdomyolysis  -     methotrexate 2.5 MG Tab; Take 8 tabs weekly    Other orders  -     folic acid (FOLVITE) 1 MG tablet; Take 1 tablet (1 mg total) by mouth once daily.        Follow up in 4 months

## 2019-09-25 ENCOUNTER — OFFICE VISIT (OUTPATIENT)
Dept: OPTOMETRY | Facility: CLINIC | Age: 69
End: 2019-09-25
Attending: INTERNAL MEDICINE
Payer: MEDICARE

## 2019-09-25 ENCOUNTER — LAB VISIT (OUTPATIENT)
Dept: LAB | Facility: OTHER | Age: 69
End: 2019-09-25
Attending: INTERNAL MEDICINE
Payer: MEDICARE

## 2019-09-25 DIAGNOSIS — H25.13 NUCLEAR SCLEROSIS OF BOTH EYES: ICD-10-CM

## 2019-09-25 DIAGNOSIS — H43.811 POSTERIOR VITREOUS DETACHMENT OF RIGHT EYE: ICD-10-CM

## 2019-09-25 DIAGNOSIS — H52.4 PRESBYOPIA: ICD-10-CM

## 2019-09-25 DIAGNOSIS — H40.053 BILATERAL OCULAR HYPERTENSION: ICD-10-CM

## 2019-09-25 DIAGNOSIS — E03.9 HYPOTHYROIDISM (ACQUIRED): ICD-10-CM

## 2019-09-25 DIAGNOSIS — E11.9 TYPE 2 DIABETES MELLITUS WITHOUT OPHTHALMIC MANIFESTATIONS: Primary | ICD-10-CM

## 2019-09-25 DIAGNOSIS — E11.42 CONTROLLED TYPE 2 DIABETES MELLITUS WITH DIABETIC POLYNEUROPATHY, WITHOUT LONG-TERM CURRENT USE OF INSULIN: ICD-10-CM

## 2019-09-25 LAB
ALDOLASE SERPL-CCNC: 10.7 U/L (ref 1.2–7.6)
ESTIMATED AVG GLUCOSE: 171 MG/DL (ref 68–131)
HBA1C MFR BLD HPLC: 7.6 % (ref 4–5.6)
TSH SERPL DL<=0.005 MIU/L-ACNC: 1.33 UIU/ML (ref 0.4–4)

## 2019-09-25 PROCEDURE — 99499 RISK ADDL DX/OHS AUDIT: ICD-10-PCS | Mod: S$GLB,,, | Performed by: OPTOMETRIST

## 2019-09-25 PROCEDURE — 84443 ASSAY THYROID STIM HORMONE: CPT

## 2019-09-25 PROCEDURE — 36415 COLL VENOUS BLD VENIPUNCTURE: CPT

## 2019-09-25 PROCEDURE — 99999 PR PBB SHADOW E&M-EST. PATIENT-LVL II: ICD-10-PCS | Mod: PBBFAC,,, | Performed by: OPTOMETRIST

## 2019-09-25 PROCEDURE — 99499 UNLISTED E&M SERVICE: CPT | Mod: S$GLB,,, | Performed by: OPTOMETRIST

## 2019-09-25 PROCEDURE — 92014 COMPRE OPH EXAM EST PT 1/>: CPT | Mod: S$GLB,,, | Performed by: OPTOMETRIST

## 2019-09-25 PROCEDURE — 99999 PR PBB SHADOW E&M-EST. PATIENT-LVL II: CPT | Mod: PBBFAC,,, | Performed by: OPTOMETRIST

## 2019-09-25 PROCEDURE — 92014 PR EYE EXAM, EST PATIENT,COMPREHESV: ICD-10-PCS | Mod: S$GLB,,, | Performed by: OPTOMETRIST

## 2019-09-25 PROCEDURE — 83036 HEMOGLOBIN GLYCOSYLATED A1C: CPT

## 2019-09-25 NOTE — LETTER
September 25, 2019      John Vargas MD  [address]  [address]  [facility] [address]           [facility] [place] [address]  [address]  [facility] [address]  Phone: [phone]  Fax: [phone]          Patient: Serafin Tapia   MR Number: [id number]   YOB: 1950   Date of Visit: 9/25/2019       Dear Dr. John Vargas:    Thank you for referring Serafin Tapia to me for evaluation. Attached you will find relevant portions of my assessment and plan of care.    If you have questions, please do not hesitate to call me. I look forward to following Serafin Tapia along with you.    Sincerely,    Vidhya Osorio, OD    Enclosure  CC:  No Recipients    If you would like to receive this communication electronically, please contact [email or web address] or [phone] to request more information on [employer] access.    For providers and/or their staff who would like to refer a patient to [facility], please contact us through our one-stop-shop provider referral line, [facility], at [phone].    If you feel you have received this communication in error or would no longer like to receive these types of communications, please e-mail [email or web address]

## 2019-09-25 NOTE — PROGRESS NOTES
HPI     Last eye exam was 5/31/17 with Dr. Osorio.  Patient states sees 2 floaters OD for the past month. No flashes of light   OU. Happy using +1.75 OTC readers. Distance vision is fine.  Patient denies diplopia, headaches, and pain.    Hemoglobin A1C       Date                     Value               Ref Range             Status                09/10/2019               7.5 (H)             4.0 - 5.6 %           Final                  Last edited by Graciela Rodriguez on 9/25/2019 10:18 AM. (History)            Assessment /Plan     For exam results, see Encounter Report.    Type 2 diabetes mellitus without ophthalmic manifestations    Posterior vitreous detachment of right eye    Bilateral ocular hypertension    Nuclear sclerosis of both eyes    Presbyopia            1.  No retinopathy--monitor yearly.  BS control.   2.  Educated pt on PVD.   Retina flat and intact OU--no holes, tears, breaks, or RDs.    3.  Longstanding-IOPs stable.  RTC 1 month for glaucoma work-up.  4.  Educated on cataracts and affects on vision.  Patient happy with vision.  Monitor.  5.  Continue with OTC readers.

## 2019-10-04 ENCOUNTER — TELEPHONE (OUTPATIENT)
Dept: INTERNAL MEDICINE | Facility: CLINIC | Age: 69
End: 2019-10-04

## 2019-10-08 ENCOUNTER — PATIENT OUTREACH (OUTPATIENT)
Dept: ADMINISTRATIVE | Facility: HOSPITAL | Age: 69
End: 2019-10-08

## 2019-10-08 DIAGNOSIS — I10 ESSENTIAL HYPERTENSION: Primary | ICD-10-CM

## 2019-10-08 DIAGNOSIS — I10 CHRONIC HYPERTENSION: ICD-10-CM

## 2019-10-08 DIAGNOSIS — E11.9 DIABETES MELLITUS WITHOUT COMPLICATION: Primary | ICD-10-CM

## 2019-10-08 RX ORDER — NIFEDIPINE 30 MG/1
30 TABLET, EXTENDED RELEASE ORAL DAILY
Qty: 30 TABLET | Refills: 0 | Status: CANCELLED | OUTPATIENT
Start: 2019-10-08 | End: 2020-10-07

## 2019-10-08 NOTE — TELEPHONE ENCOUNTER
----- Message from Faith Bah sent at 10/8/2019  1:27 PM CDT -----  Contact: VALDO VEGA [[id number]]                                                    MEDICATION  REFILL  REQUEST      Please refill the medication(s) listed below. Please call the patient when the prescription(s) is ready for  at this phone number   [phone]        Medication #1  NIFEdipine (PROCARDIA-XL) 30 MG (OSM) 24 hr tablet    Preferred Pharmacy:   [id number] - [place] - [address] [facility]     [phone] (Phone)  [phone] (Fax)

## 2019-10-22 ENCOUNTER — TELEPHONE (OUTPATIENT)
Dept: PHARMACY | Facility: CLINIC | Age: 69
End: 2019-10-22

## 2019-10-22 ENCOUNTER — OFFICE VISIT (OUTPATIENT)
Dept: INTERNAL MEDICINE | Facility: CLINIC | Age: 69
End: 2019-10-22
Attending: INTERNAL MEDICINE
Payer: MEDICARE

## 2019-10-22 VITALS
HEART RATE: 97 BPM | SYSTOLIC BLOOD PRESSURE: 158 MMHG | OXYGEN SATURATION: 95 % | WEIGHT: 291.69 LBS | HEIGHT: 70 IN | DIASTOLIC BLOOD PRESSURE: 82 MMHG | BODY MASS INDEX: 41.76 KG/M2

## 2019-10-22 DIAGNOSIS — E11.9 TYPE 2 DIABETES MELLITUS WITHOUT COMPLICATION, UNSPECIFIED WHETHER LONG TERM INSULIN USE: Primary | ICD-10-CM

## 2019-10-22 DIAGNOSIS — E03.9 HYPOTHYROIDISM (ACQUIRED): ICD-10-CM

## 2019-10-22 DIAGNOSIS — G72.0 STATIN MYOPATHY: ICD-10-CM

## 2019-10-22 DIAGNOSIS — T46.6X5A STATIN MYOPATHY: ICD-10-CM

## 2019-10-22 DIAGNOSIS — I10 ESSENTIAL HYPERTENSION: ICD-10-CM

## 2019-10-22 PROCEDURE — 3077F SYST BP >= 140 MM HG: CPT | Mod: CPTII,S$GLB,, | Performed by: INTERNAL MEDICINE

## 2019-10-22 PROCEDURE — 99499 RISK ADDL DX/OHS AUDIT: ICD-10-PCS | Mod: S$GLB,,, | Performed by: INTERNAL MEDICINE

## 2019-10-22 PROCEDURE — 99214 PR OFFICE/OUTPT VISIT, EST, LEVL IV, 30-39 MIN: ICD-10-PCS | Mod: S$GLB,,, | Performed by: INTERNAL MEDICINE

## 2019-10-22 PROCEDURE — 99499 UNLISTED E&M SERVICE: CPT | Mod: S$GLB,,, | Performed by: INTERNAL MEDICINE

## 2019-10-22 PROCEDURE — 99214 OFFICE O/P EST MOD 30 MIN: CPT | Mod: S$GLB,,, | Performed by: INTERNAL MEDICINE

## 2019-10-22 PROCEDURE — 1101F PR PT FALLS ASSESS DOC 0-1 FALLS W/OUT INJ PAST YR: ICD-10-PCS | Mod: CPTII,S$GLB,, | Performed by: INTERNAL MEDICINE

## 2019-10-22 PROCEDURE — 1101F PT FALLS ASSESS-DOCD LE1/YR: CPT | Mod: CPTII,S$GLB,, | Performed by: INTERNAL MEDICINE

## 2019-10-22 PROCEDURE — 3079F PR MOST RECENT DIASTOLIC BLOOD PRESSURE 80-89 MM HG: ICD-10-PCS | Mod: CPTII,S$GLB,, | Performed by: INTERNAL MEDICINE

## 2019-10-22 PROCEDURE — 3079F DIAST BP 80-89 MM HG: CPT | Mod: CPTII,S$GLB,, | Performed by: INTERNAL MEDICINE

## 2019-10-22 PROCEDURE — 3077F PR MOST RECENT SYSTOLIC BLOOD PRESSURE >= 140 MM HG: ICD-10-PCS | Mod: CPTII,S$GLB,, | Performed by: INTERNAL MEDICINE

## 2019-10-22 PROCEDURE — 99999 PR PBB SHADOW E&M-EST. PATIENT-LVL III: ICD-10-PCS | Mod: PBBFAC,,, | Performed by: INTERNAL MEDICINE

## 2019-10-22 PROCEDURE — 99999 PR PBB SHADOW E&M-EST. PATIENT-LVL III: CPT | Mod: PBBFAC,,, | Performed by: INTERNAL MEDICINE

## 2019-10-22 RX ORDER — NIFEDIPINE 30 MG/1
30 TABLET, EXTENDED RELEASE ORAL DAILY
Qty: 90 TABLET | Refills: 3 | Status: SHIPPED | OUTPATIENT
Start: 2019-10-22 | End: 2020-02-13

## 2019-10-22 RX ORDER — BENAZEPRIL HYDROCHLORIDE 40 MG/1
40 TABLET ORAL DAILY
Qty: 90 TABLET | Refills: 3 | Status: SHIPPED | OUTPATIENT
Start: 2019-10-22 | End: 2020-11-02

## 2019-10-22 NOTE — PROGRESS NOTES
"Subjective:       Patient ID: Serafin Tapia is a 68 y.o. male.    Chief Complaint: Follow-up    Here for routine f/u    Not currently taking benazepril and nifedipine due to running out. Denies CP at rest or with exertion, dizziness with exertion, shortness of breath out of proportion to level of activity, frequent or sustained palpitations, orthopnea, PND, or LE edema.      Taking metformin daily instead of BID. Neuropathy/foot pain unchanged. UTD on eye exam. Hx of statin induced myopathy. He is on MTX for this.    Taking levothyroxine appropriately. Pt denies fatigue, unexplained lyn gain/loss, palpitations, tremors, diarrhea, constipation, changes to hair/skin, heat/cold intolerance, changes in voice or dysphagia.         Review of Systems   Constitutional: Negative for appetite change, chills, fever and unexpected weight change.   HENT: Negative for hearing loss, sore throat and trouble swallowing.    Eyes: Negative for visual disturbance.   Respiratory: Negative for cough, chest tightness and shortness of breath.    Cardiovascular: Negative for chest pain and leg swelling.   Gastrointestinal: Negative for abdominal pain, blood in stool, constipation, diarrhea, nausea and vomiting.   Endocrine: Negative for polydipsia and polyuria.   Genitourinary: Negative for decreased urine volume, difficulty urinating, dysuria, frequency and urgency.   Musculoskeletal: Positive for gait problem. Negative for myalgias.   Skin: Negative for rash.   Neurological: Negative for dizziness and numbness.   Psychiatric/Behavioral: The patient is not nervous/anxious.        Objective:      Vitals:    10/22/19 1000   BP: (!) 158/82   BP Location: Left arm   Patient Position: Sitting   BP Method: Large (Manual)   Pulse: 97   SpO2: 95%   Weight: 132.3 kg (291 lb 10.7 oz)   Height: 5' 10" (1.778 m)      Physical Exam   Constitutional: He is oriented to person, place, and time. He appears well-developed and well-nourished. No distress. "   HENT:   Head: Normocephalic and atraumatic.   Mouth/Throat: Oropharynx is clear and moist. No oropharyngeal exudate.   Eyes: Pupils are equal, round, and reactive to light. Conjunctivae and EOM are normal. No scleral icterus.   Neck: No thyromegaly present.   Cardiovascular: Normal rate, regular rhythm and normal heart sounds.   No murmur heard.  Pulmonary/Chest: Effort normal and breath sounds normal. He has no wheezes. He has no rales.   Abdominal: Soft. He exhibits no distension. There is no tenderness.   Musculoskeletal: He exhibits no edema or tenderness.   Lymphadenopathy:     He has no cervical adenopathy.   Neurological: He is alert and oriented to person, place, and time.   Skin: Skin is warm and dry.   Psychiatric: He has a normal mood and affect. His behavior is normal.       Assessment:       1. Type 2 diabetes mellitus without complication, unspecified whether long term insulin use    2. Statin myopathy    3. Hypothyroidism (acquired)    4. Essential hypertension        Plan:       Serafin was seen today for follow-up.    Diagnoses and all orders for this visit:    Type 2 diabetes mellitus without complication, unspecified whether long term insulin use   A1c a little above goal at 7.6 on metformin 1g QAm. Increase to BID and will rpt A1c at f/u  -     evolocumab (REPATHA PUSHTRONEX) 420 mg/3.5 mL Injt; Inject 3.5 mLs (420 mg total) into the skin every 30 days.    Statin myopathy  -     evolocumab (REPATHA PUSHTRONEX) 420 mg/3.5 mL Injt; Inject 3.5 mLs (420 mg total) into the skin every 30 days.    Hypothyroidism (acquired)   Clinically euthyroid. Update TSH at f/u    Essential hypertension   Uncontrolled but not on entire med regimen. f/u in 3-4 weeks for nurse visit for BP check  -     NIFEdipine (PROCARDIA-XL) 30 MG (OSM) 24 hr tablet; Take 1 tablet (30 mg total) by mouth once daily.  -     benazepril (LOTENSIN) 40 MG tablet; Take 1 tablet (40 mg total) by mouth once daily.    Pt given paper Rx to take  to our pharmacy down stairs for following vaccinations:   Flu and shingrix  '  RTC in 6 months or sooner jean carlos Chavis MD  Internal Medicine-[facility]        Side effects of medication(s) were discussed in detail and patient voiced understanding.  Patient will call back for any issues or complications.

## 2019-10-23 ENCOUNTER — IMMUNIZATION (OUTPATIENT)
Dept: PHARMACY | Facility: CLINIC | Age: 69
End: 2019-10-23

## 2019-10-23 ENCOUNTER — IMMUNIZATION (OUTPATIENT)
Dept: PHARMACY | Facility: CLINIC | Age: 69
End: 2019-10-23
Payer: MEDICARE

## 2019-11-01 ENCOUNTER — TELEPHONE (OUTPATIENT)
Dept: INTERNAL MEDICINE | Facility: CLINIC | Age: 69
End: 2019-11-01

## 2019-11-01 NOTE — TELEPHONE ENCOUNTER
----- Message from Mila Ricci sent at 11/1/2019  3:19 PM CDT -----  Contact: Bethany bishop PH   Type:  Patient Returning Call    Who Called: Bethany bishop PH     Who Left Message for Patient: sid     Does the patient know what this is regarding?:    Best Call Back Number:[phone]    Additional Information:

## 2019-11-01 NOTE — TELEPHONE ENCOUNTER
Documentation Only:    Faxed Prior Authorization form and supporting documentation for Repatha to insurance on 11/01/2019.

## 2019-11-01 NOTE — TELEPHONE ENCOUNTER
Attempted to return call to Rachel with [facility], but no answer.  LVM to call the office.

## 2019-11-01 NOTE — TELEPHONE ENCOUNTER
----- Message from Beth Jauregui sent at 11/1/2019  1:16 PM CDT -----  Contact: Rachel ([facility])  Name of Who is Calling: Rachel ([facility])    What is the request in detail: Would like to speak with staff in regards to prior authorization for evolocumab (REPATHA PUSHTRONEX) 420 mg/3.5 mL Injt. Please contact to further discuss and advise      What Number to Call Back: [phone]

## 2019-11-04 ENCOUNTER — CLINICAL SUPPORT (OUTPATIENT)
Dept: OPHTHALMOLOGY | Facility: CLINIC | Age: 69
End: 2019-11-04
Payer: MEDICARE

## 2019-11-04 ENCOUNTER — OFFICE VISIT (OUTPATIENT)
Dept: OPTOMETRY | Facility: CLINIC | Age: 69
End: 2019-11-04
Payer: MEDICARE

## 2019-11-04 DIAGNOSIS — H40.053 BILATERAL OCULAR HYPERTENSION: Primary | ICD-10-CM

## 2019-11-04 PROCEDURE — 92083 HUMPHREY VISUAL FIELD - OU - BOTH EYES: ICD-10-PCS | Mod: S$GLB,,, | Performed by: OPTOMETRIST

## 2019-11-04 PROCEDURE — 92020 PR SPECIAL EYE EVAL,GONIOSCOPY: ICD-10-PCS | Mod: S$GLB,,, | Performed by: OPTOMETRIST

## 2019-11-04 PROCEDURE — 92012 INTRM OPH EXAM EST PATIENT: CPT | Mod: S$GLB,,, | Performed by: OPTOMETRIST

## 2019-11-04 PROCEDURE — 92012 PR EYE EXAM, EST PATIENT,INTERMED: ICD-10-PCS | Mod: S$GLB,,, | Performed by: OPTOMETRIST

## 2019-11-04 PROCEDURE — 92133 CPTRZD OPH DX IMG PST SGM ON: CPT | Mod: S$GLB,,, | Performed by: OPTOMETRIST

## 2019-11-04 PROCEDURE — 92020 GONIOSCOPY: CPT | Mod: S$GLB,,, | Performed by: OPTOMETRIST

## 2019-11-04 PROCEDURE — 92083 EXTENDED VISUAL FIELD XM: CPT | Mod: S$GLB,,, | Performed by: OPTOMETRIST

## 2019-11-04 PROCEDURE — 99999 PR PBB SHADOW E&M-EST. PATIENT-LVL II: CPT | Mod: PBBFAC,,, | Performed by: OPTOMETRIST

## 2019-11-04 PROCEDURE — 92133 POSTERIOR SEGMENT OCT OPTIC NERVE(OCULAR COHERENCE TOMOGRAPHY) - OU - BOTH EYES: ICD-10-PCS | Mod: S$GLB,,, | Performed by: OPTOMETRIST

## 2019-11-04 PROCEDURE — 99999 PR PBB SHADOW E&M-EST. PATIENT-LVL II: ICD-10-PCS | Mod: PBBFAC,,, | Performed by: OPTOMETRIST

## 2019-11-04 NOTE — PROGRESS NOTES
HVF done;rel/fix/OD Fair Good OS coop. Good ./chart checked for latex allergy.-[facility]

## 2019-11-04 NOTE — PROGRESS NOTES
HPI     Glaucoma      Additional comments: 1 mon iop chk/hvf rev/oct              Comments     Patient states he still sees some floaters in OD. No flashes    Type 2 diabetes mellitus without ophthalmic manifestations  Posterior vitreous detachment of right eye  Bilateral ocular hypertension  Nuclear sclerosis of both eyes  Presbyopia    No gtts          Last edited by Jass Ocampo on 11/4/2019 11:05 AM. (History)            Assessment /Plan     For exam results, see Encounter Report.    Bilateral ocular hypertension  -     OCT - Optic Nerve  -     Roland Visual Field - OU - Extended - Both Eyes                1.  All testing normal OU.  IOPs stable OU.  Monitory yearly.  HVF: 11/4/19  OCT: 11/4/19  DFE: 9/25/19  Photos:  Gonio: 11/4/19  Pachy: 531 OD, 527 OS  Initial IOPs: 25 OD, 25 OS  MHx: DM, HTN  FHx: Griselda Bolaños

## 2019-11-20 ENCOUNTER — OFFICE VISIT (OUTPATIENT)
Dept: PODIATRY | Facility: CLINIC | Age: 69
End: 2019-11-20
Attending: INTERNAL MEDICINE
Payer: MEDICARE

## 2019-11-20 ENCOUNTER — TELEPHONE (OUTPATIENT)
Dept: INTERNAL MEDICINE | Facility: CLINIC | Age: 69
End: 2019-11-20

## 2019-11-20 VITALS — HEART RATE: 73 BPM | DIASTOLIC BLOOD PRESSURE: 100 MMHG | SYSTOLIC BLOOD PRESSURE: 208 MMHG

## 2019-11-20 DIAGNOSIS — E11.9 ENCOUNTER FOR COMPREHENSIVE DIABETIC FOOT EXAMINATION, TYPE 2 DIABETES MELLITUS: Primary | ICD-10-CM

## 2019-11-20 DIAGNOSIS — G72.0 STATIN MYOPATHY: Primary | ICD-10-CM

## 2019-11-20 DIAGNOSIS — E78.00 PURE HYPERCHOLESTEROLEMIA: ICD-10-CM

## 2019-11-20 DIAGNOSIS — E11.51 TYPE II DIABETES MELLITUS WITH PERIPHERAL CIRCULATORY DISORDER: ICD-10-CM

## 2019-11-20 DIAGNOSIS — T46.6X5A STATIN MYOPATHY: Primary | ICD-10-CM

## 2019-11-20 PROBLEM — L97.511 SKIN ULCER OF RIGHT FOOT, LIMITED TO BREAKDOWN OF SKIN: Status: RESOLVED | Noted: 2017-12-11 | Resolved: 2019-11-20

## 2019-11-20 PROCEDURE — 1101F PT FALLS ASSESS-DOCD LE1/YR: CPT | Mod: CPTII,S$GLB,, | Performed by: PODIATRIST

## 2019-11-20 PROCEDURE — 99213 PR OFFICE/OUTPT VISIT, EST, LEVL III, 20-29 MIN: ICD-10-PCS | Mod: S$GLB,,, | Performed by: PODIATRIST

## 2019-11-20 PROCEDURE — 1159F MED LIST DOCD IN RCRD: CPT | Mod: S$GLB,,, | Performed by: PODIATRIST

## 2019-11-20 PROCEDURE — 1126F AMNT PAIN NOTED NONE PRSNT: CPT | Mod: S$GLB,,, | Performed by: PODIATRIST

## 2019-11-20 PROCEDURE — 1101F PR PT FALLS ASSESS DOC 0-1 FALLS W/OUT INJ PAST YR: ICD-10-PCS | Mod: CPTII,S$GLB,, | Performed by: PODIATRIST

## 2019-11-20 PROCEDURE — 1159F PR MEDICATION LIST DOCUMENTED IN MEDICAL RECORD: ICD-10-PCS | Mod: S$GLB,,, | Performed by: PODIATRIST

## 2019-11-20 PROCEDURE — 3080F DIAST BP >= 90 MM HG: CPT | Mod: CPTII,S$GLB,, | Performed by: PODIATRIST

## 2019-11-20 PROCEDURE — 99499 UNLISTED E&M SERVICE: CPT | Mod: S$GLB,,, | Performed by: PODIATRIST

## 2019-11-20 PROCEDURE — 99499 RISK ADDL DX/OHS AUDIT: ICD-10-PCS | Mod: S$GLB,,, | Performed by: PODIATRIST

## 2019-11-20 PROCEDURE — 99999 PR PBB SHADOW E&M-EST. PATIENT-LVL IV: ICD-10-PCS | Mod: PBBFAC,,, | Performed by: PODIATRIST

## 2019-11-20 PROCEDURE — 3080F PR MOST RECENT DIASTOLIC BLOOD PRESSURE >= 90 MM HG: ICD-10-PCS | Mod: CPTII,S$GLB,, | Performed by: PODIATRIST

## 2019-11-20 PROCEDURE — 3077F SYST BP >= 140 MM HG: CPT | Mod: CPTII,S$GLB,, | Performed by: PODIATRIST

## 2019-11-20 PROCEDURE — 3077F PR MOST RECENT SYSTOLIC BLOOD PRESSURE >= 140 MM HG: ICD-10-PCS | Mod: CPTII,S$GLB,, | Performed by: PODIATRIST

## 2019-11-20 PROCEDURE — 99999 PR PBB SHADOW E&M-EST. PATIENT-LVL IV: CPT | Mod: PBBFAC,,, | Performed by: PODIATRIST

## 2019-11-20 PROCEDURE — 1126F PR PAIN SEVERITY QUANTIFIED, NO PAIN PRESENT: ICD-10-PCS | Mod: S$GLB,,, | Performed by: PODIATRIST

## 2019-11-20 PROCEDURE — 99213 OFFICE O/P EST LOW 20 MIN: CPT | Mod: S$GLB,,, | Performed by: PODIATRIST

## 2019-11-20 NOTE — PATIENT INSTRUCTIONS
How to Check Your Feet    Below are tips to help you look for foot problems. Try to check your feet at the same time each day, such as when you get out of bed in the morning.    · Check the top of each foot. The tops of toes, back of the heel, and outer edge of the foot can get a lot of rubbing from poor-fitting shoes.    · Check the bottom of each foot. Daily wear and tear often leads to problems at pressure spots.    · Check the toes and nails. Fungal infections often occur between toes. Toenail problems can also be a sign of fungal infections or lead to breaks in the skin.    · Check your shoes, too. Loose objects inside a shoe can injure the foot. Use your hand to feel inside your shoes for things like elmer, loose stitching, or rough areas that could irritate your skin.        Diabetic Foot Care    Diabetes can lead to a number of different foot complications. Fortunately, most of these complications can be prevented with a little extra foot care. If diabetes is not well controlled, the high blood sugar can cause damage to blood vessels and result in poor circulation to the foot. When the skin does not get enough blood flow, it becomes prone to pressure sores and ulcers, which heal slowly.  High blood sugar can also damage nerves, interfering with the ability to feel pain and pressure. When you cant feel your foot normally, it is easy to injure your skin, bones and joints without knowing it. For these reasons diabetes increases the risk of fungal infections, bunions and ulcers. Deep ulcers can lead to bone infection. Gangrene is the most serious foot complication of diabetes. It usually occurs on the tips of the toes as blacked areas of skin. The black area is dead tissue. In severe cases, gangrene spreads to involve the entire toe, other toes and the entire foot. Foot or toe amputation may be required. Good foot care and blood sugar control can prevent this.    Home Care  1. Wear comfortable, proper fitting  shoes.  2. Wash your feet daily with warm water and mild soap.  3. After drying, apply a moisturizing cream or lotion.  4. Check your feet daily for skin breaks, blisters, swelling, or redness. Look between your toes also.  5. Wear cotton socks and change them every day.  6. Trim toe nails carefully and do not cut your cuticles.  7. Strive to keep your blood sugar under control with a combination of medicines, diet and activity.  8. If you smoke and have diabetes, it is very important that you stop. Smoking reduces blood flow to your foot.  9. Avoid activities that increase your risk of foot injury:  · Do not walk barefoot.  · Do not use heating pads or hot water bottles on your feet.  · Do not put your foot in a hot tub without first checking the temperature with your hand.  10) Schedule yearly foot exams.    Follow Up  with your doctor or as advised by our staff. Report any cut, puncture, scrape, other injury, blister, ingrown toenail or ulcer on your foot.    Get Prompt Medical Attention  if any of the following occur:  -- Open ulcer with pus draining from the wound  -- Increasing foot or leg pain  -- New areas of redness or swelling or tender areas of the foot    © 9983-9869 [employer]. [address] [place] [address], [place] [address]. All rights reserved. This information is not intended as a substitute for professional medical care. Always follow your healthcare professional's instructions.

## 2019-11-20 NOTE — PROGRESS NOTES
Subjective:      Patient ID: Serafin Tapia is a 69 y.o. male.    Chief Complaint: Diabetes Mellitus (PCP Kirby Vargas last seen 10/22/19)    Serafin is a 69 y.o. male who presents to the clinic for evaluation and treatment of diabetic feet. Serafin has a past medical history of Asthma, Cataract, Diabetes mellitus type 2 in obese (5/3/2014), Dyslipidemia (5/5/2014), Elevated troponin (5/3/2014), Gait instability (2/28/2018), Hyperlipidemia, Hypertension, Hypothyroidism (acquired) (11/1/2017), Obesity, Ocular hypertension, Pulmonary embolism (05/2014), Spondylosis of lumbar region without myelopathy or radiculopathy (8/25/2017), Statin myopathy (1/16/2018), and Vertigo.    Current shoe gear:  Slip on sandals    Patient relates no major problem with feet. Only complaints today consist of diabetic foot exam.    PCP: John Vargas MD    Date Last Seen by PCP:  Diabetes Mellitus (PCP Kirby Vargas last seen 10/22/19)      Patient Active Problem List   Diagnosis    Essential hypertension    Severe obesity (BMI 35.0-39.9) with comorbidity    LVH (left ventricular hypertrophy)    Family history of prostate cancer    Diverticulosis    Colon polyps    Controlled type 2 diabetes mellitus with diabetic polyneuropathy, without long-term current use of insulin    History of pulmonary embolism    Spondylosis of lumbar region without myelopathy or radiculopathy    Falls    Hyperuricemia    Non-traumatic rhabdomyolysis    Hypothyroidism (acquired)    Vitamin B 12 deficiency    Anemia due to vitamin B12 deficiency    Folate deficiency    Vitamin D insufficiency    Hypervolemia    Elevated liver enzymes    Weakness    Skin ulcer of right foot, limited to breakdown of skin    Cough    Swelling of right lower extremity    Statin myopathy    Weakness of extremity    Phimosis    Postoperative hypoxia    Autoimmune necrotizing myopathy         Current Outpatient Medications on File Prior to Visit    Medication Sig Dispense Refill    ascorbic acid, vitamin C, (VITAMIN C) 500 MG tablet Take 1 tablet (500 mg total) by mouth 2 (two) times daily.      aspirin (ECOTRIN) 81 MG EC tablet Take 81 mg by mouth once daily.      benazepril (LOTENSIN) 40 MG tablet Take 1 tablet (40 mg total) by mouth once daily. 90 tablet 3    clotrimazole (LOTRIMIN) 1 % Soln Apply topically 2 (two) times daily. 30 mL 1    evolocumab (REPATHA PUSHTRONEX) 420 mg/3.5 mL Injt Inject 3.5 mLs (420 mg total) into the skin every 30 days. 10.5 mL 3    folic acid (FOLVITE) 1 MG tablet Take 1 tablet (1 mg total) by mouth once daily. 90 tablet 3    HYDROcodone-acetaminophen (NORCO) 5-325 mg per tablet Take 1 tablet by mouth every 6 (six) hours as needed for Pain. 10 tablet 0    INV furosemide (LASIX) 40 MG Tab Take 1 tablet (40 mg total) by mouth once daily. 30 tablet 0    levothyroxine (SYNTHROID) 50 MCG tablet TAKE 50 MCG BY MOUTH BEFORE BREAKFAST. 90 tablet 2    metFORMIN (GLUCOPHAGE) 1000 MG tablet Take 1 tablet (1,000 mg total) by mouth 2 (two) times daily with meals. (Patient taking differently: Take 1,000 mg by mouth daily with breakfast. ) 180 tablet 0    methotrexate 2.5 MG Tab Take 8 tabs weekly 40 tablet 5    metoprolol succinate (TOPROL-XL) 50 MG 24 hr tablet Take 1 tablet (50 mg total) by mouth once daily. 90 tablet 2    multivitamin (ONE DAILY MULTIVITAMIN) per tablet Take 1 tablet by mouth once daily. PER [facility]       NIFEdipine (PROCARDIA-XL) 30 MG (OSM) 24 hr tablet Take 1 tablet (30 mg total) by mouth once daily. 90 tablet 3    triamcinolone acetonide 0.1% (KENALOG) 0.1 % cream Apply topically 2 (two) times daily. 15 g 1    zinc sulfate (ZINCATE) 220 (50) mg capsule Take 220 mg by mouth once daily. PER [facility]      bacitracin 500 unit/gram ointment Apply topically 2 (two) times daily. Apply to incision. (Patient not taking: Reported on 11/20/2019) 28 g 0    betamethasone dipropionate  (DIPROLENE) 0.05 % cream Apply topically 2 (two) times daily. for 10 days 45 g 1    calcium-vitamin D3 (CALCIUM 500 + D) 500 mg(1,250mg) -200 unit per tablet Take 1 tablet by mouth 2 (two) times daily with meals. 60 tablet 3     No current facility-administered medications on file prior to visit.            Review of patient's allergies indicates:   Allergen Reactions    Statins-hmg-coa reductase inhibitors Other (See Comments)     Possible statin induced autoimmune myopathy           Social History     Socioeconomic History    Marital status:      Spouse name: Not on file    Number of children: Not on file    Years of education: Not on file    Highest education level: Not on file   Occupational History    Occupation: Retired    Social Needs    Financial resource strain: Not on file    Food insecurity:     Worry: Not on file     Inability: Not on file    Transportation needs:     Medical: Not on file     Non-medical: Not on file   Tobacco Use    Smoking status: Former Smoker     Types: Cigarettes    Smokeless tobacco: Never Used    Tobacco comment: Quit smoking as a teenager   Substance and Sexual Activity    Alcohol use: Yes     Alcohol/week: 1.0 - 2.0 standard drinks     Types: 1 - 2 Cans of beer per week     Comment: 1-2 drinks occasionally    Drug use: No    Sexual activity: Yes     Partners: Female     Birth control/protection: Condom   Lifestyle    Physical activity:     Days per week: Not on file     Minutes per session: Not on file    Stress: Not on file   Relationships    Social connections:     Talks on phone: Not on file     Gets together: Not on file     Attends [employer] service: Not on file     Active member of club or organization: Not on file     Attends meetings of clubs or organizations: Not on file     Relationship status: Not on file   Other Topics Concern    Not on file   Social History Narrative    Lives w/daughter.              Hemoglobin A1C    Date Value Ref Range Status   09/25/2019 7.6 (H) 4.0 - 5.6 % Final     Comment:     ADA Screening Guidelines:  5.7-6.4%  Consistent with prediabetes  >or=6.5%  Consistent with diabetes  High levels of fetal hemoglobin interfere with the HbA1C  assay. Heterozygous hemoglobin variants (HbS, HgC, etc)do  not significantly interfere with this assay.   However, presence of multiple variants may affect accuracy.     09/10/2019 7.5 (H) 4.0 - 5.6 % Final     Comment:     ADA Screening Guidelines:  5.7-6.4%  Consistent with prediabetes  >or=6.5%  Consistent with diabetes  High levels of fetal hemoglobin interfere with the HbA1C  assay. Heterozygous hemoglobin variants (HbS, HgC, etc)do  not significantly interfere with this assay.   However, presence of multiple variants may affect accuracy.     02/19/2019 7.6 (H) 4.0 - 5.6 % Final     Comment:     ADA Screening Guidelines:  5.7-6.4%  Consistent with prediabetes  >or=6.5%  Consistent with diabetes  High levels of fetal hemoglobin interfere with the HbA1C  assay. Heterozygous hemoglobin variants (HbS, HgC, etc)do  not significantly interfere with this assay.   However, presence of multiple variants may affect accuracy.                 Review of Systems   Constitution: Negative for chills, fever and malaise/fatigue.   HENT: Negative for hearing loss.    Cardiovascular: Negative for claudication.   Respiratory: Negative for shortness of breath.    Skin: Negative for flushing and rash.   Musculoskeletal: Negative for joint pain and myalgias.   Neurological: Negative for loss of balance, numbness, paresthesias and sensory change.   Psychiatric/Behavioral: Negative for altered mental status.           Objective:        Vitals:    11/20/19 1002   BP: (!) 220/98   BP Location: Left arm   Patient Position: Sitting   BP Method: Large (Automatic)   Pulse: 77         Physical Exam   Cardiovascular: Exam reveals decreased pulses.   Pulses:       Dorsalis pedis pulses are 0 on the right  side, and 1+ on the left side.        Posterior tibial pulses are 1+ on the right side, and 1+ on the left side.   1+ pitting edema noted to b/L LEs.  No tenderness to palpation    Musculoskeletal:   Adequate joint ROM noted to all lower extremity muscle groups with no pain or crepitation noted. Muscle strength is 5/5 in all groups bilaterally.     Feet:   Right Foot:   Protective Sensation: 5 sites tested. 5 sites sensed.   Left Foot:   Protective Sensation: 5 sites tested. 5 sites sensed.   Neurological:   Intact gross sensation noted to b/L LEs   Skin:   No open lesion noted b/L  Skin temp is warm to warm from proximal to distal b/L.  Webspaces clean, dry, and intact  Nails x10 elongated.  No subungual debris                 Assessment:       Encounter Diagnoses   Name Primary?    Encounter for comprehensive diabetic foot examination, type 2 diabetes mellitus Yes    Type II diabetes mellitus with peripheral circulatory disorder          Plan:       Serafin was seen today for diabetes mellitus.    Diagnoses and all orders for this visit:    Encounter for comprehensive diabetic foot examination, type 2 diabetes mellitus    Type II diabetes mellitus with peripheral circulatory disorder          I counseled the patient on his conditions, their implications and medical management.    Shoe inspection. Diabetic Foot Education. Patient reminded of the importance of good nutrition and blood sugar control to help prevent podiatric complications of diabetes. Patient instructed on proper foot hygeine. We discussed wearing proper shoe gear, daily foot inspections, never walking without protective shoe gear.     Annual diabetes foot exam or sooner if concerned.

## 2019-11-20 NOTE — TELEPHONE ENCOUNTER
----- Message from John Torres sent at 11/20/2019 10:50 AM CST -----  Regarding: Repatha  Dr. Vargas and staff,    Mr. Tapia's insurance has denied coverage for Repatha, as they will only cover it if the patient has tried/failed Praluent (their preferred alternative).    They will consider approving Praluent, but per the plan they also need a more recent lipid panel (the last one shown in Epic is dated 02/19/19.    If you would like to schedule the patient for a lipid panel and send over a new Rx for Praluent, we can get that submitted for approval.    Thanks,  Michael Torres  [facility]

## 2019-11-20 NOTE — TELEPHONE ENCOUNTER
Left message on voicemail  ----- Message from John Vargas MD sent at 11/20/2019 12:30 PM CST -----  f/u in 3-4 weeks for nurse visit for BP check  Tell pt he must take all meds as Rx and bring meds with him

## 2019-11-20 NOTE — TELEPHONE ENCOUNTER
Went over medication with patient to verify he's taking all correctly. Some medications he was not.

## 2019-11-20 NOTE — TELEPHONE ENCOUNTER
John Vargas MD; FAIZA Lopez Staff             Dr. Vargas and staff,     Mr. Tapia's insurance has denied coverage for Repatha, as they will only cover it if the patient has tried/failed Praluent (their preferred alternative).     They will consider approving Praluent, but per the plan they also need a more recent lipid panel (the last one shown in Epic is dated 02/19/19.     If you would like to schedule the patient for a lipid panel and send over a new Rx for Praluent, we can get that submitted for approval.     Thanks,   Michael Torres   [facility]

## 2019-11-20 NOTE — LETTER
November 20, 2019      John Vargas MD  [address]  [address]  [facility] [address]           [facility]  [address] [address]  Phone: [phone]  Fax: [phone]          Patient: Serafin Tapia   MR Number: [id number]   YOB: 1950   Date of Visit: 11/20/2019       Dear Dr. John Vargas:    Thank you for referring Serafin Tapia to me for evaluation. Attached you will find relevant portions of my assessment and plan of care.    If you have questions, please do not hesitate to call me. I look forward to following Serafin Tapia along with you.    Sincerely,    Lizzie Salguero DPM    Enclosure  CC:  No Recipients    If you would like to receive this communication electronically, please contact [email or web address] or [phone] to request more information on [facility] [employer] access.    For providers and/or their staff who would like to refer a patient to [facility], please contact us through our one-stop-shop provider referral line, [facility], at [phone].    If you feel you have received this communication in error or would no longer like to receive these types of communications, please e-mail [email or web address]

## 2019-11-21 ENCOUNTER — TELEPHONE (OUTPATIENT)
Dept: PHARMACY | Facility: CLINIC | Age: 69
End: 2019-11-21

## 2019-11-25 NOTE — TELEPHONE ENCOUNTER
Documentation Only:    Submitted electronic Prior Authorization request for for Praluent to insurance on 11/25/2019.

## 2019-12-04 NOTE — TELEPHONE ENCOUNTER
Praluent is approved by the patient's insurance with a high co pay. We will be assisting the patient in applying to the  for assistance.   Sending Dr John Vargas,  a staff message regarding approval and faxing assistance application for their review and signature

## 2019-12-10 ENCOUNTER — TELEPHONE (OUTPATIENT)
Dept: INTERNAL MEDICINE | Facility: CLINIC | Age: 69
End: 2019-12-10

## 2019-12-10 NOTE — TELEPHONE ENCOUNTER
----- Message from Akosua Cohen sent at 12/4/2019  4:44 PM CST -----  Regarding: Praluent Patient Assistance Application and Prescription  We will be assisting the patient in applying to [employer] assistance program for their Praluent prescription . I am faxing the application prescription to you @ [phone] for your review and signature. If this is not a good fax number Please let us know. Please fax completed form back to me @[phone]. Thank you for your Assistance     Akosua Cohen   [id number]

## 2019-12-18 ENCOUNTER — CLINICAL SUPPORT (OUTPATIENT)
Dept: INTERNAL MEDICINE | Facility: CLINIC | Age: 69
End: 2019-12-18
Payer: MEDICARE

## 2019-12-18 ENCOUNTER — TELEPHONE (OUTPATIENT)
Dept: INTERNAL MEDICINE | Facility: CLINIC | Age: 69
End: 2019-12-18

## 2019-12-18 VITALS — DIASTOLIC BLOOD PRESSURE: 90 MMHG | OXYGEN SATURATION: 94 % | SYSTOLIC BLOOD PRESSURE: 168 MMHG | HEART RATE: 90 BPM

## 2019-12-18 DIAGNOSIS — I10 ESSENTIAL HYPERTENSION: ICD-10-CM

## 2019-12-18 PROCEDURE — 99999 PR PBB SHADOW E&M-EST. PATIENT-LVL II: CPT | Mod: PBBFAC,,,

## 2019-12-18 PROCEDURE — 99999 PR PBB SHADOW E&M-EST. PATIENT-LVL II: ICD-10-PCS | Mod: PBBFAC,,,

## 2019-12-18 RX ORDER — METOPROLOL SUCCINATE 50 MG/1
100 TABLET, EXTENDED RELEASE ORAL DAILY
Qty: 90 TABLET | Refills: 2
Start: 2019-12-18 | End: 2020-02-13 | Stop reason: SDUPTHER

## 2019-12-18 NOTE — Clinical Note
Does patient have record of home blood pressure readings no. Readings have been averaging unknown. Last dose of blood pressure medication was taken at benazepril, metoprolol, nifedipine-0700; lasix- yesterday-unable to give time.Patient is asymptomatic. BP: (!) 168/90 , Pulse: 90.Blood pressure reading after 15 minutes was 134/90, Pulse 74.Patient states he drinks two pots of caffeinated coffee per day.

## 2019-12-18 NOTE — TELEPHONE ENCOUNTER
Does patient have record of home blood pressure readings no. Readings have been averaging unknown.   Last dose of blood pressure medication was taken at benazepril, metoprolol, nifedipine-0700; lasix- yesterday-unable to give time.  Patient is asymptomatic.   BP: (!) 168/90 , Pulse: 90.  Blood pressure reading after 15 minutes was 134/90, Pulse 74.  Patient states he drinks two pots of caffeinated coffee per day.

## 2019-12-18 NOTE — PROGRESS NOTES
Serafin Tapia 69 y.o. male is here today for Blood Pressure check.   History of HTN yes.    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     Current Outpatient Medications:     alirocumab (PRALUENT PEN) 150 mg/mL PnIj, Inject 1 mL (150 mg total) into the skin every 14 (fourteen) days., Disp: 6 mL, Rfl: 3    ascorbic acid, vitamin C, (VITAMIN C) 500 MG tablet, Take 1 tablet (500 mg total) by mouth 2 (two) times daily., Disp: , Rfl:     aspirin (ECOTRIN) 81 MG EC tablet, Take 81 mg by mouth once daily., Disp: , Rfl:     bacitracin 500 unit/gram ointment, Apply topically 2 (two) times daily. Apply to incision. (Patient not taking: Reported on 11/20/2019), Disp: 28 g, Rfl: 0    benazepril (LOTENSIN) 40 MG tablet, Take 1 tablet (40 mg total) by mouth once daily., Disp: 90 tablet, Rfl: 3    betamethasone dipropionate (DIPROLENE) 0.05 % cream, Apply topically 2 (two) times daily. for 10 days, Disp: 45 g, Rfl: 1    calcium-vitamin D3 (CALCIUM 500 + D) 500 mg(1,250mg) -200 unit per tablet, Take 1 tablet by mouth 2 (two) times daily with meals., Disp: 60 tablet, Rfl: 3    clotrimazole (LOTRIMIN) 1 % Soln, Apply topically 2 (two) times daily., Disp: 30 mL, Rfl: 1    folic acid (FOLVITE) 1 MG tablet, Take 1 tablet (1 mg total) by mouth once daily., Disp: 90 tablet, Rfl: 3    HYDROcodone-acetaminophen (NORCO) 5-325 mg per tablet, Take 1 tablet by mouth every 6 (six) hours as needed for Pain., Disp: 10 tablet, Rfl: 0    INV furosemide (LASIX) 40 MG  Tab, Take 1 tablet (40 mg total) by mouth once daily., Disp: 30 tablet, Rfl: 0    levothyroxine (SYNTHROID) 50 MCG tablet, TAKE 50 MCG BY MOUTH BEFORE BREAKFAST., Disp: 90 tablet, Rfl: 2    metFORMIN (GLUCOPHAGE) 1000 MG tablet, Take 1 tablet (1,000 mg total) by mouth 2 (two) times daily with meals. (Patient taking differently: Take 1,000 mg by mouth daily with breakfast. ), Disp: 180 tablet, Rfl: 0    methotrexate 2.5 MG Tab, Take 8 tabs weekly, Disp: 40 tablet, Rfl: 5    metoprolol succinate (TOPROL-XL) 50 MG 24 hr tablet, Take 1 tablet (50 mg total) by mouth once daily., Disp: 90 tablet, Rfl: 2    multivitamin (ONE DAILY MULTIVITAMIN) per tablet, Take 1 tablet by mouth once daily. PER [facility] , Disp: , Rfl:     NIFEdipine (PROCARDIA-XL) 30 MG (OSM) 24 hr tablet, Take 1 tablet (30 mg total) by mouth once daily., Disp: 90 tablet, Rfl: 3    triamcinolone acetonide 0.1% (KENALOG) 0.1 % cream, Apply topically 2 (two) times daily., Disp: 15 g, Rfl: 1    zinc sulfate (ZINCATE) 220 (50) mg capsule, Take 220 mg by mouth once daily. PER [facility], Disp: , Rfl:   Does patient have record of home blood pressure readings no. Readings have been averaging unknown.   Last dose of blood pressure medication was taken at benazepril, metoprolol, nifedipine-0700; lasix- yesterday-unable to give time.  Patient is asymptomatic.   BP: (!) 168/90 , Pulse: 90.  Blood pressure reading after 15 minutes was 134/90, Pulse 74.  Patient states he drinks two pots of caffeinated coffee per day.   Dr. Vargas notified.

## 2019-12-19 DIAGNOSIS — E11.42 CONTROLLED TYPE 2 DIABETES MELLITUS WITH DIABETIC POLYNEUROPATHY, WITHOUT LONG-TERM CURRENT USE OF INSULIN: Primary | ICD-10-CM

## 2019-12-19 RX ORDER — METFORMIN HYDROCHLORIDE 1000 MG/1
1000 TABLET ORAL 2 TIMES DAILY WITH MEALS
Qty: 180 TABLET | Refills: 2 | Status: SHIPPED | OUTPATIENT
Start: 2019-12-19 | End: 2021-03-29 | Stop reason: SDUPTHER

## 2019-12-19 NOTE — TELEPHONE ENCOUNTER
Financial [facility] for [id number] PCN: [id number] Id: [id number] GRP: [id number] SAVINGS $ [phone]

## 2019-12-19 NOTE — TELEPHONE ENCOUNTER
----- Message from Mila Ricci sent at 12/19/2019 11:02 AM CST -----  Contact: Pt   Can the clinic reply in [facility]: No     Please refill the medication(s) listed below. The patient can be reached at this phone number [phone] (home)  once it is called into the pharmacy.    Medication #1metFORMIN (GLUCOPHAGE) 1000 MG tablet    Medication #2    Preferred [facility]acy:[id number] - [address]

## 2019-12-19 NOTE — TELEPHONE ENCOUNTER
Contacted and informed patient to double metoprolol to 100 mg daily. Verbalized understanding. Also scheduled 2 week nurse visit.

## 2019-12-23 NOTE — TELEPHONE ENCOUNTER
Praluent in person consultation and injection training scheduled for 12/27/19. Patient has zero injection experience. $0 copay.

## 2019-12-27 NOTE — TELEPHONE ENCOUNTER
Initial Praluent 150mg/ml PENS consult completed on 19 in person upon  of medication. $ 0 copay. Patient will start Praluent  on  2020 and dose on the  and 15th of every month. Address confirmed. Confirmed 2 patient identifiers - name and . Therapy Appropriate.  --Injection experience: none. Patient was given training with demonstration device and comfortable with proceeding. His girlfriend also uses similar injections, so he's seen similar devices before. She did come with him but did not go into the consultation room.    Counseled patient on administration directions:  - Inject 140mg (1 pen) into the skin every 14 days.   - Take out of the refrigerator 30-60 minutes prior to injection.  - Wash hands before and after injection.  - Monthly RX will come with gauze, bandaids, and alcohol swabs.  - Patient may inject in either the tops of the thighs, abdomen- but at least 2 inches away from her belly button, or the outer part of her upper arm.  Patient was instructed to rotate injections sites.  - Patient is to wipe down the injection site with the alcohol pad, wait to dry.  Gently squeeze the area of the cleaned skin and hold it firmly.   Place the pen flat against the raised area of skin that is being squeezed, then push down on the button and release,  in 10-15 seconds you will hear a click and the window will go from from clear to yellow, indicating injection is complete.  - Patient should rotate injection sites.   - Patient will use sharps container; once full, per [place] law, she/ he may lock the sharps container and place in her trash. She/ he can then contact the Pharmacy and we will replace the sharps at no additional charge.    Patient was counseled on possible side effects:  - Injection site reaction: redness, soreness, itching, bruising, which should resolve within 3-5 days.  - flu-like symptoms    DDI: Medication list reviewed. No DDIs. Patient MUST contact myself or provider prior to  starting any new OTC, herbal, or prescription drugs to avoid potential DDIs.    Allergies: statins - he states he was overdosed; therefore, he had the myopathy.    Storage: advised to keep refrigerated for stability until expiration on the box, but room temperature is ok if used within 30 days. Pt advised to keep in the fridge in an area that will not freeze or get too warm.    Diet (low fats, less fried foods), exercise (at least 30 minutes 5 days a week) and weight loss recommendations advised.    Patient did not have any further questions. She was advised to keep a calendar to stay compliant. Consultation included: indication; goals of treatment; administration; storage and handling; side effects; how to handle side effects; the importance of compliance; how to handle missed doses; the importance of laboratory monitoring; the importance of keeping all follow up appointments.  Patient understands to report any medication changes to [facility] and provider. All questions answered and addressed to patients satisfaction. [facility] to contact patient in 3 weeks for refills.

## 2020-01-02 ENCOUNTER — CLINICAL SUPPORT (OUTPATIENT)
Dept: INTERNAL MEDICINE | Facility: CLINIC | Age: 70
End: 2020-01-02
Payer: MEDICARE

## 2020-01-02 ENCOUNTER — TELEPHONE (OUTPATIENT)
Dept: INTERNAL MEDICINE | Facility: CLINIC | Age: 70
End: 2020-01-02

## 2020-01-02 VITALS — OXYGEN SATURATION: 94 % | SYSTOLIC BLOOD PRESSURE: 180 MMHG | DIASTOLIC BLOOD PRESSURE: 100 MMHG | HEART RATE: 87 BPM

## 2020-01-02 PROCEDURE — 99999 PR PBB SHADOW E&M-EST. PATIENT-LVL I: ICD-10-PCS | Mod: PBBFAC,,,

## 2020-01-02 PROCEDURE — 99999 PR PBB SHADOW E&M-EST. PATIENT-LVL I: CPT | Mod: PBBFAC,,,

## 2020-01-02 NOTE — TELEPHONE ENCOUNTER
Please confirm what patient is or is not taking. If taking medications as Rx then increase nifedipine to 3 tablets f/u in 1 weeks for nurse visit for BP check

## 2020-01-02 NOTE — PROGRESS NOTES
Serafin Tapia 69 y.o. male is here today for Blood Pressure check.   History of HTN yes.    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     Current Outpatient Medications:     alirocumab (PRALUENT PEN) 150 mg/mL PnIj, Inject 1 mL (150 mg total) into the skin every 14 (fourteen) days., Disp: 6 mL, Rfl: 3    ascorbic acid, vitamin C, (VITAMIN C) 500 MG tablet, Take 1 tablet (500 mg total) by mouth 2 (two) times daily., Disp: , Rfl:     aspirin (ECOTRIN) 81 MG EC tablet, Take 81 mg by mouth once daily., Disp: , Rfl:     bacitracin 500 unit/gram ointment, Apply topically 2 (two) times daily. Apply to incision. (Patient not taking: Reported on 11/20/2019), Disp: 28 g, Rfl: 0    benazepril (LOTENSIN) 40 MG tablet, Take 1 tablet (40 mg total) by mouth once daily., Disp: 90 tablet, Rfl: 3    betamethasone dipropionate (DIPROLENE) 0.05 % cream, Apply topically 2 (two) times daily. for 10 days, Disp: 45 g, Rfl: 1    calcium-vitamin D3 (CALCIUM 500 + D) 500 mg(1,250mg) -200 unit per tablet, Take 1 tablet by mouth 2 (two) times daily with meals., Disp: 60 tablet, Rfl: 3    clotrimazole (LOTRIMIN) 1 % Soln, Apply topically 2 (two) times daily., Disp: 30 mL, Rfl: 1    folic acid (FOLVITE) 1 MG tablet, Take 1 tablet (1 mg total) by mouth once daily., Disp: 90 tablet, Rfl: 3    HYDROcodone-acetaminophen (NORCO) 5-325 mg per tablet, Take 1 tablet by mouth every 6 (six) hours as needed for Pain., Disp: 10 tablet, Rfl: 0    INV furosemide (LASIX) 40 MG  Tab, Take 1 tablet (40 mg total) by mouth once daily., Disp: 30 tablet, Rfl: 0    levothyroxine (SYNTHROID) 50 MCG tablet, TAKE 50 MCG BY MOUTH BEFORE BREAKFAST., Disp: 90 tablet, Rfl: 2    metFORMIN (GLUCOPHAGE) 1000 MG tablet, Take 1 tablet (1,000 mg total) by mouth 2 (two) times daily with meals., Disp: 180 tablet, Rfl: 2    methotrexate 2.5 MG Tab, Take 8 tabs weekly, Disp: 40 tablet, Rfl: 5    metoprolol succinate (TOPROL-XL) 50 MG 24 hr tablet, Take 2 tablets (100 mg total) by mouth once daily., Disp: 90 tablet, Rfl: 2    multivitamin (ONE DAILY MULTIVITAMIN) per tablet, Take 1 tablet by mouth once daily. PER [facility] , Disp: , Rfl:     NIFEdipine (PROCARDIA-XL) 30 MG (OSM) 24 hr tablet, Take 1 tablet (30 mg total) by mouth once daily., Disp: 90 tablet, Rfl: 3    triamcinolone acetonide 0.1% (KENALOG) 0.1 % cream, Apply topically 2 (two) times daily., Disp: 15 g, Rfl: 1    zinc sulfate (ZINCATE) 220 (50) mg capsule, Take 220 mg by mouth once daily. PER [facility], Disp: , Rfl:   Does patient have record of home blood pressure readings no. Readings have been averaging unknown  Last dose of blood pressure medication was taken at 0800am.  Patient is asymptomatic.   BP: (!) 180/100 , Pulse: 87 .  Blood pressure reading after 15 minutes was 170/96, Pulse 88.  Dr. Vargas notified.

## 2020-01-02 NOTE — TELEPHONE ENCOUNTER
Does patient have record of home blood pressure readings no. Readings have been averaging unknown  Last dose of blood pressure medication was taken at 0800am.  Patient is asymptomatic.   BP: (!) 180/100 , Pulse: 87 .  Blood pressure reading after 15 minutes was 170/96, Pulse 88.

## 2020-01-02 NOTE — Clinical Note
Does patient have record of home blood pressure readings no. Readings have been averaging unknownLast dose of blood pressure medication was taken at 0800am.Patient is asymptomatic. BP: (!) 180/100 , Pulse: 87 .Blood pressure reading after 15 minutes was 170/96, Pulse 88.

## 2020-01-02 NOTE — TELEPHONE ENCOUNTER
Spoke to Mr. Tapia and confirmed that he is taking all medications as prescribed.  Informed Mr. Tapia to increase Nifedipine to 3 tabs. Confirmed appt date and time.  Instructed to call the office for any further questions or concerns.

## 2020-01-09 ENCOUNTER — CLINICAL SUPPORT (OUTPATIENT)
Dept: INTERNAL MEDICINE | Facility: CLINIC | Age: 70
End: 2020-01-09
Payer: MEDICARE

## 2020-01-09 ENCOUNTER — TELEPHONE (OUTPATIENT)
Dept: INTERNAL MEDICINE | Facility: CLINIC | Age: 70
End: 2020-01-09

## 2020-01-09 VITALS — OXYGEN SATURATION: 93 % | HEART RATE: 72 BPM | SYSTOLIC BLOOD PRESSURE: 178 MMHG | DIASTOLIC BLOOD PRESSURE: 106 MMHG

## 2020-01-09 PROCEDURE — 99999 PR PBB SHADOW E&M-EST. PATIENT-LVL III: CPT | Mod: PBBFAC,,,

## 2020-01-09 PROCEDURE — 99999 PR PBB SHADOW E&M-EST. PATIENT-LVL III: ICD-10-PCS | Mod: PBBFAC,,,

## 2020-01-09 NOTE — PROGRESS NOTES
Serafin Tapia 69 y.o. male is here today for Blood Pressure check.   History of HTN yes.    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     Current Outpatient Medications:     benazepril (LOTENSIN) 40 MG tablet, Take 1 tablet (40 mg total) by mouth once daily., Disp: 90 tablet, Rfl: 3    metoprolol succinate (TOPROL-XL) 50 MG 24 hr tablet, Take 2 tablets (100 mg total) by mouth once daily., Disp: 90 tablet, Rfl: 2    NIFEdipine (PROCARDIA-XL) 30 MG (OSM) 24 hr tablet, Take 1 tablet (30 mg total) by mouth once daily., Disp: 90 tablet, Rfl: 3    alirocumab (PRALUENT PEN) 150 mg/mL PnIj, Inject 1 mL (150 mg total) into the skin every 14 (fourteen) days., Disp: 6 mL, Rfl: 3    ascorbic acid, vitamin C, (VITAMIN C) 500 MG tablet, Take 1 tablet (500 mg total) by mouth 2 (two) times daily., Disp: , Rfl:     aspirin (ECOTRIN) 81 MG EC tablet, Take 81 mg by mouth once daily., Disp: , Rfl:     bacitracin 500 unit/gram ointment, Apply topically 2 (two) times daily. Apply to incision. (Patient not taking: Reported on 11/20/2019), Disp: 28 g, Rfl: 0    betamethasone dipropionate (DIPROLENE) 0.05 % cream, Apply topically 2 (two) times daily. for 10 days, Disp: 45 g, Rfl: 1    calcium-vitamin D3 (CALCIUM 500 + D) 500 mg(1,250mg) -200 unit per tablet, Take 1 tablet by mouth 2 (two) times daily with meals., Disp: 60 tablet, Rfl: 3    clotrimazole (LOTRIMIN) 1 % Soln, Apply topically 2 (two) times daily., Disp: 30 mL, Rfl: 1    folic acid (FOLVITE) 1 MG  tablet, Take 1 tablet (1 mg total) by mouth once daily., Disp: 90 tablet, Rfl: 3    HYDROcodone-acetaminophen (NORCO) 5-325 mg per tablet, Take 1 tablet by mouth every 6 (six) hours as needed for Pain., Disp: 10 tablet, Rfl: 0    INV furosemide (LASIX) 40 MG Tab, Take 1 tablet (40 mg total) by mouth once daily., Disp: 30 tablet, Rfl: 0    levothyroxine (SYNTHROID) 50 MCG tablet, TAKE 50 MCG BY MOUTH BEFORE BREAKFAST., Disp: 90 tablet, Rfl: 2    metFORMIN (GLUCOPHAGE) 1000 MG tablet, Take 1 tablet (1,000 mg total) by mouth 2 (two) times daily with meals., Disp: 180 tablet, Rfl: 2    methotrexate 2.5 MG Tab, Take 8 tabs weekly, Disp: 40 tablet, Rfl: 5    multivitamin (ONE DAILY MULTIVITAMIN) per tablet, Take 1 tablet by mouth once daily. PER [facility] , Disp: , Rfl:     triamcinolone acetonide 0.1% (KENALOG) 0.1 % cream, Apply topically 2 (two) times daily., Disp: 15 g, Rfl: 1    zinc sulfate (ZINCATE) 220 (50) mg capsule, Take 220 mg by mouth once daily. PER [facility], Disp: , Rfl:   Does patient have record of home blood pressure readings no. Pt states he moved from his house and hasn't located his machine.   Last dose of blood pressure medication was taken at 10:30am.  Patient is asymptomatic.   Pt denies h/a, dizziness, shortness of breath, chest pain, and numbness and tingling of extremities.  Pt confirmed he started taking nifedipine 30 mg (90 mg total) daily since last Friday or Saturday 01/04/2020  BP: (!) 172/98 , Pulse: 65.  Pt does not take b/p medications on a regular bases at the same time of the day.  Blood pressure reading after 15 minutes was 176/106, Pulse 72.  Dr. Vargas notified.

## 2020-01-09 NOTE — TELEPHONE ENCOUNTER
Does patient have record of home blood pressure readings no. Pt states he moved from his house and hasn't located his machine.   Last dose of blood pressure medication was taken at 10:30am.  Patient is asymptomatic.   Pt denies h/a, dizziness, shortness of breath, chest pain, and numbness and tingling of extremities.  Pt confirmed he started taking nifedipine 30 mg (90 mg total) daily since last Friday or Saturday 01/04/2020  BP: (!) 172/98 , Pulse: 65.  Pt does not take b/p medications on a regular bases at the same time of the day.  Blood pressure reading after 15 minutes was 176/106, Pulse 72.  Dr. Vargas notified.

## 2020-01-10 ENCOUNTER — IMMUNIZATION (OUTPATIENT)
Dept: PHARMACY | Facility: CLINIC | Age: 70
End: 2020-01-10
Payer: MEDICARE

## 2020-01-15 DIAGNOSIS — I10 ESSENTIAL HYPERTENSION: ICD-10-CM

## 2020-01-15 RX ORDER — FUROSEMIDE 40 MG/1
40 TABLET ORAL DAILY
Qty: 90 TABLET | Refills: 1 | Status: SHIPPED | OUTPATIENT
Start: 2020-01-15 | End: 2021-02-23 | Stop reason: SDUPTHER

## 2020-01-24 ENCOUNTER — TELEPHONE (OUTPATIENT)
Dept: INTERNAL MEDICINE | Facility: CLINIC | Age: 70
End: 2020-01-24

## 2020-01-24 NOTE — TELEPHONE ENCOUNTER
----- Message from Abigail Martin sent at 1/24/2020  2:35 PM CST -----  Contact: eugenia  Name of Who is Calling: eugenia from [facility]      What is the request in detail:Per Eugenia she states they need the clinic notes for the  alirocumab (PRALUENT PEN) 150 mg/mL PnIj medication she states it can be faxed over to [phone]      Can the clinic reply by [facility]: no      What Number to Call Back if not in [facility]: [phone]

## 2020-01-27 ENCOUNTER — TELEPHONE (OUTPATIENT)
Dept: PHARMACY | Facility: CLINIC | Age: 70
End: 2020-01-27

## 2020-01-27 NOTE — TELEPHONE ENCOUNTER
DOCUMENTATION ONLY:  Prior authorization for Praluent approved from 1/24/2020 to 7/23/2020    Case ID# [id number]    Co-pay: $100.00    Patient Assistance IS required.    Forward to patient assistance for review. -HBR

## 2020-01-27 NOTE — TELEPHONE ENCOUNTER
Rx call for Praluent refill pt reached, picking up  @ [facility] copay 100.00 @004 no supplies is needed at this time, next inj is . Address and  confirmed. Patient has 0 doses on hand at this time. Patient has not started any new medications, has had no missed doses and no side effects present. Patient is currently taking the medication as directed by doctors instruction Inject 1 mL (150 mg total) into the skin every 14 (fourteen) days. Patient does have a safe place in their residence to keep medication at desired temperature away from small children and pets. Patient also does have the capability of contacting 911 in the event of an emergency. Patient states they do not have any questions or concerns at this time.

## 2020-02-12 ENCOUNTER — TELEPHONE (OUTPATIENT)
Dept: INTERNAL MEDICINE | Facility: CLINIC | Age: 70
End: 2020-02-12

## 2020-02-12 NOTE — TELEPHONE ENCOUNTER
----- Message from Abigail Martin sent at 2/12/2020  2:15 PM CST -----  Contact: Chery   Name of Who is Calling: Chery from [facility]       What is the request in detail: Per Chery she needs some clarification on the patient metoprolol succinate and NIFEdipine (PROCARDIA-XL medication       Can the clinic reply by [facility]: no      What Number to Call Back if not in [facility]: [phone]

## 2020-02-13 DIAGNOSIS — I10 ESSENTIAL HYPERTENSION: Primary | ICD-10-CM

## 2020-02-13 RX ORDER — NIFEDIPINE 90 MG/1
90 TABLET, EXTENDED RELEASE ORAL DAILY
Qty: 90 TABLET | Refills: 0 | Status: SHIPPED | OUTPATIENT
Start: 2020-02-13 | End: 2020-07-31 | Stop reason: SDUPTHER

## 2020-02-13 RX ORDER — METOPROLOL SUCCINATE 50 MG/1
100 TABLET, EXTENDED RELEASE ORAL DAILY
Qty: 180 TABLET | Refills: 0 | Status: SHIPPED | OUTPATIENT
Start: 2020-02-13 | End: 2020-03-26 | Stop reason: SDUPTHER

## 2020-02-13 NOTE — TELEPHONE ENCOUNTER
----- Message from Alivia Ya sent at 2/13/2020  8:05 AM CST -----  Contact: Jersey MAYNARD  Type:  Patient Returning Call    Who Called: Jersey MAYNARD    Who Left Message for Patient: Yesenia     Does the patient know what this is regarding?: Y     Best Call Back Number:  [phone]    Additional Information:

## 2020-02-24 ENCOUNTER — TELEPHONE (OUTPATIENT)
Dept: PHARMACY | Facility: CLINIC | Age: 70
End: 2020-02-24

## 2020-02-27 NOTE — TELEPHONE ENCOUNTER
RX call attempt 2 regarding Praluent refill from [facility]. Patient reached-- patients plans to pickup medication on Friday,  and is aware of where we are located. I informed the patient that he has a copay of $25.00 due at the time of pickup @ 004 and that we are open Monday through Friday 7am to 7pm, closed on weekends. Patient stated he is not in need of any supplies with this refill. Name and  confirmed. Patient has 0 injections on hand at this time. Patient has not started any new medications, has had no missed doses and no side effects present. Patient is currently taking the medication as directed by doctors instruction, Inject 1 mL (150 mg total) into the skin every 14 (fourteen) days. Patient does have a safe place in their residence to keep medication at desired temperature away from small children and pets. Patient also does have the capability of contacting 911 in the event of an emergency. Patient states they do not have any questions or concerns at this time. Patient stated his next injection is scheduled for , 3/1.

## 2020-03-05 NOTE — PROGRESS NOTES
Juanita LUCAS LPN  Clinical Care Coordinator  Internal Medicine  [facility]  [phone]

## 2020-03-24 ENCOUNTER — TELEPHONE (OUTPATIENT)
Dept: PHARMACY | Facility: CLINIC | Age: 70
End: 2020-03-24

## 2020-03-26 DIAGNOSIS — I10 ESSENTIAL HYPERTENSION: ICD-10-CM

## 2020-03-26 NOTE — TELEPHONE ENCOUNTER
----- Message from Charline Reis sent at 3/26/2020  9:48 AM CDT -----  Contact: VALDO VEGA      Can the clinic reply in [facility]: No      Please refill the medication(s) listed below. Please call the patient when the prescription(s) is ready for  at this phone number   [phone]        Medication #1 metoprolol succinate (TOPROL-XL) 50 MG 24 hr tablet      Preferred Pharmacy: [id number] - [address]

## 2020-03-27 RX ORDER — METOPROLOL SUCCINATE 50 MG/1
100 TABLET, EXTENDED RELEASE ORAL DAILY
Qty: 180 TABLET | Refills: 0 | Status: SHIPPED | OUTPATIENT
Start: 2020-03-27 | End: 2020-07-31 | Stop reason: SDUPTHER

## 2020-04-23 ENCOUNTER — TELEPHONE (OUTPATIENT)
Dept: PHARMACY | Facility: CLINIC | Age: 70
End: 2020-04-23

## 2020-05-15 DIAGNOSIS — E11.9 TYPE 2 DIABETES MELLITUS WITHOUT COMPLICATION: ICD-10-CM

## 2020-05-20 ENCOUNTER — TELEPHONE (OUTPATIENT)
Dept: PHARMACY | Facility: CLINIC | Age: 70
End: 2020-05-20

## 2020-05-25 NOTE — TELEPHONE ENCOUNTER
Refill call regarding Praluent at [facility]. Copay $0. Patient is in need of a refill, will  . Patient has not started any new medications including OTC drugs. Patient has not had any medication/ dose or instruction changes. No new allergies or side effects reported with this shipment. Medication is being taken as prescribed by physician and properly stored. Two patient identifiers:  and Address verified. Patient has no questions or concerns for [facility]. Sharps container needed. Next injection due .

## 2020-06-11 ENCOUNTER — LAB VISIT (OUTPATIENT)
Dept: PRIMARY CARE CLINIC | Facility: OTHER | Age: 70
End: 2020-06-11
Payer: MEDICARE

## 2020-06-11 DIAGNOSIS — Z11.59 SCREENING EXAMINATION FOR POLIOMYELITIS: Primary | ICD-10-CM

## 2020-06-11 PROCEDURE — U0003 INFECTIOUS AGENT DETECTION BY NUCLEIC ACID (DNA OR RNA); SEVERE ACUTE RESPIRATORY SYNDROME CORONAVIRUS 2 (SARS-COV-2) (CORONAVIRUS DISEASE [COVID-19]), AMPLIFIED PROBE TECHNIQUE, MAKING USE OF HIGH THROUGHPUT TECHNOLOGIES AS DESCRIBED BY CMS-2020-01-R: HCPCS

## 2020-06-12 LAB — SARS-COV-2 RNA RESP QL NAA+PROBE: NOT DETECTED

## 2020-06-23 ENCOUNTER — TELEPHONE (OUTPATIENT)
Dept: PHARMACY | Facility: CLINIC | Age: 70
End: 2020-06-23

## 2020-07-09 NOTE — TELEPHONE ENCOUNTER
DOCUMENTATION ONLY:  Prior authorization for REPATHA approved from 7/9/2020 to 12/31/2020.    Case ID# [id number]    Co-pay: $0.00, FA on file covers $118.32 copay    Patient Assistance IS NOT required.    Forwarding to [facility] for review and shipment. -HBR

## 2020-07-17 ENCOUNTER — TELEPHONE (OUTPATIENT)
Dept: PHARMACY | Facility: CLINIC | Age: 70
End: 2020-07-17

## 2020-07-27 RX ORDER — ZINC SULFATE 50(220)MG
220 CAPSULE ORAL DAILY
Qty: 90 CAPSULE | Refills: 3 | Status: SHIPPED | OUTPATIENT
Start: 2020-07-27 | End: 2020-10-25

## 2020-07-31 DIAGNOSIS — I10 ESSENTIAL HYPERTENSION: ICD-10-CM

## 2020-07-31 RX ORDER — METOPROLOL SUCCINATE 50 MG/1
100 TABLET, EXTENDED RELEASE ORAL DAILY
Qty: 180 TABLET | Refills: 3 | Status: SHIPPED | OUTPATIENT
Start: 2020-07-31 | End: 2021-03-26 | Stop reason: SDUPTHER

## 2020-07-31 RX ORDER — NIFEDIPINE 90 MG/1
90 TABLET, EXTENDED RELEASE ORAL DAILY
Qty: 90 TABLET | Refills: 3 | Status: SHIPPED | OUTPATIENT
Start: 2020-07-31 | End: 2022-03-14 | Stop reason: SDUPTHER

## 2020-07-31 NOTE — TELEPHONE ENCOUNTER
----- Message from Cindy Vasile sent at 7/31/2020  8:41 AM CDT -----  Contact: Self [phone]  Type: RX Refill Request    Who Called: Self    Have you contacted your pharmacy:    Refill or New Rx: refill    RX Name and Strength: NIFEdipine (PROCARDIA-XL) 90 MG (OSM) 24 hr tablet & metoprolol succinate (TOPROL-XL) 50 MG 24 hr tablet    Preferred Pharmacy with phone number:   [id number] - [place] [id number] [address]  Phone: [phone] Fax: [phone]    Local or Mail Order: Local    Would the patient rather a call back or a response via [facility]? Call back    Best Call Back Number: [phone]

## 2020-07-31 NOTE — TELEPHONE ENCOUNTER
Refill call regarding Praluent at [facility].  Will prepare for  with patient consent on  for patient to  .  Copay 0.00.  Patient next injection due .  No sharps needed.  Patient has not started any new medications including OTC drugs. Patient has not had any medication/ dose or instruction changes. No new allergies or side effects reported with this shipment. Medication is being taken as prescribed by physician and properly stored. Two patient identifiers:  and Address verified. Patient has no questions or concerns for [facility].

## 2020-08-22 ENCOUNTER — TELEPHONE (OUTPATIENT)
Dept: PHARMACY | Facility: CLINIC | Age: 70
End: 2020-08-22

## 2020-09-16 ENCOUNTER — OFFICE VISIT (OUTPATIENT)
Dept: URGENT CARE | Facility: CLINIC | Age: 70
End: 2020-09-16
Payer: MEDICARE

## 2020-09-16 VITALS
OXYGEN SATURATION: 96 % | BODY MASS INDEX: 41.52 KG/M2 | DIASTOLIC BLOOD PRESSURE: 98 MMHG | RESPIRATION RATE: 18 BRPM | SYSTOLIC BLOOD PRESSURE: 190 MMHG | TEMPERATURE: 97 F | HEIGHT: 70 IN | WEIGHT: 290 LBS | HEART RATE: 99 BPM

## 2020-09-16 DIAGNOSIS — E66.01 SEVERE OBESITY (BMI 35.0-39.9) WITH COMORBIDITY: ICD-10-CM

## 2020-09-16 DIAGNOSIS — I10 ESSENTIAL HYPERTENSION: ICD-10-CM

## 2020-09-16 DIAGNOSIS — R05.9 COUGH: ICD-10-CM

## 2020-09-16 DIAGNOSIS — U07.1 COVID-19 VIRUS INFECTION: Primary | ICD-10-CM

## 2020-09-16 DIAGNOSIS — E11.42 CONTROLLED TYPE 2 DIABETES MELLITUS WITH DIABETIC POLYNEUROPATHY, WITHOUT LONG-TERM CURRENT USE OF INSULIN: ICD-10-CM

## 2020-09-16 LAB
CTP QC/QA: YES
SARS-COV-2 RDRP RESP QL NAA+PROBE: POSITIVE

## 2020-09-16 PROCEDURE — U0002: ICD-10-PCS | Mod: QW,S$GLB,, | Performed by: NURSE PRACTITIONER

## 2020-09-16 PROCEDURE — U0002 COVID-19 LAB TEST NON-CDC: HCPCS | Mod: QW,S$GLB,, | Performed by: NURSE PRACTITIONER

## 2020-09-16 PROCEDURE — 99499 RISK ADDL DX/OHS AUDIT: ICD-10-PCS | Mod: S$GLB,,, | Performed by: NURSE PRACTITIONER

## 2020-09-16 PROCEDURE — 99214 OFFICE O/P EST MOD 30 MIN: CPT | Mod: S$GLB,,, | Performed by: NURSE PRACTITIONER

## 2020-09-16 PROCEDURE — 99214 PR OFFICE/OUTPT VISIT, EST, LEVL IV, 30-39 MIN: ICD-10-PCS | Mod: S$GLB,,, | Performed by: NURSE PRACTITIONER

## 2020-09-16 PROCEDURE — 99499 UNLISTED E&M SERVICE: CPT | Mod: S$GLB,,, | Performed by: NURSE PRACTITIONER

## 2020-09-16 NOTE — PROGRESS NOTES
"Subjective:       Patient ID: Serafin Tapia is a 69 y.o. male.    Vitals:  height is 5' 10" (1.778 m) and weight is 131.5 kg (290 lb). His temperature is 96.8 °F (36 °C). His blood pressure is 190/98 (abnormal) and his pulse is 99. His respiration is 18 and oxygen saturation is 96%.     Chief Complaint: COVID-19 Concerns    Pt states he has had a cough for the last few days and some fatigue. Denies any known exposure.     Cough  This is a new problem. The current episode started 1 to 4 weeks ago (1 week). The problem has been unchanged. The problem occurs constantly. The cough is productive of sputum. Pertinent negatives include no chills, ear pain, eye redness, fever, hemoptysis, myalgias, rash, sore throat, shortness of breath or wheezing. Nothing aggravates the symptoms. He has tried OTC cough suppressant for the symptoms. The treatment provided mild relief. There is no history of asthma, bronchitis or pneumonia.       Constitution: Negative for chills, sweating, fatigue and fever.   HENT: Negative for ear pain, congestion, sinus pain, sinus pressure, sore throat and voice change.    Neck: Negative for painful lymph nodes.   Eyes: Negative for eye redness.   Respiratory: Positive for cough and sputum production. Negative for chest tightness, bloody sputum, COPD, shortness of breath, stridor, wheezing and asthma.    Gastrointestinal: Negative for nausea and vomiting.   Musculoskeletal: Negative for muscle ache.   Skin: Negative for rash.   Allergic/Immunologic: Negative for seasonal allergies and asthma.   Hematologic/Lymphatic: Negative for swollen lymph nodes.       Objective:      Physical Exam   Constitutional: He is oriented to person, place, and time. He appears well-developed. He is cooperative.  Non-toxic appearance. He does not appear ill. No distress.      Comments:Hypertensive in clinic. States he took his meds PTA obesity  HENT:   Head: Normocephalic and atraumatic.   Ears:   Right Ear: Hearing, tympanic " membrane, external ear and ear canal normal.   Left Ear: Hearing, tympanic membrane, external ear and ear canal normal.   Nose: No mucosal edema or nasal deformity. No epistaxis. Right sinus exhibits no maxillary sinus tenderness and no frontal sinus tenderness. Left sinus exhibits no maxillary sinus tenderness and no frontal sinus tenderness.   Mouth/Throat: Uvula is midline, oropharynx is clear and moist and mucous membranes are normal. No trismus in the jaw. Normal dentition. No uvula swelling. No posterior oropharyngeal edema.   Eyes: Conjunctivae and lids are normal. No scleral icterus.   Neck: Trachea normal, full passive range of motion without pain and phonation normal. Neck supple. No neck rigidity. No edema and no erythema present.   Cardiovascular: Normal rate, regular rhythm, normal heart sounds and normal pulses.   Pulmonary/Chest: Effort normal and breath sounds normal. No stridor. No respiratory distress. He has no decreased breath sounds. He has no wheezes. He has no rhonchi.   Abdominal: Normal appearance.   Musculoskeletal: Normal range of motion.         General: No deformity.   Neurological: He is alert and oriented to person, place, and time. He exhibits normal muscle tone. Coordination normal.   Skin: Skin is warm, dry, intact, not diaphoretic and not pale. Capillary refill takes less than 2 seconds. Psychiatric: His speech is normal and behavior is normal. Judgment and thought content normal.   Nursing note and vitals reviewed.        Results for orders placed or performed in visit on 09/16/20   POCT COVID-19 Rapid Screening   Result Value Ref Range    POC Rapid COVID Positive (A) Negative     Acceptable Yes      Assessment:       1. COVID-19 virus infection    2. Cough    3. Essential hypertension    4. Controlled type 2 diabetes mellitus with diabetic polyneuropathy, without long-term current use of insulin    5. Severe obesity (BMI 35.0-39.9) with comorbidity        Plan:          COVID-19 virus infection  -     COVID-19 Home Symptom Monitoring  - Duration (days): 14    Cough  -     POCT COVID-19 Rapid Screening    Essential hypertension  -     COVID-19 Home Symptom Monitoring  - Duration (days): 14    Controlled type 2 diabetes mellitus with diabetic polyneuropathy, without long-term current use of insulin  -     COVID-19 Home Symptom Monitoring  - Duration (days): 14    Severe obesity (BMI 35.0-39.9) with comorbidity  -     COVID-19 Home Symptom Monitoring  - Duration (days): 14          Patient Instructions   Your test was POSITIVE for COVID-19 (coronavirus).       Please isolate yourself at home.  You may leave home and/or return to work once the following conditions are met:    If you were not hospitalized and are not severely immunocompromised*:   More than 10 days since symptoms first appeared AND   More than 24 hours fever free without medications AND   Symptoms have improved     If you were hospitalized OR are severely immunocompromised*:   More than 20 days since symptoms first appeared   More than 24 hrs hours fever free without medications   Symptoms have improved    If you had no symptoms but tested postivepositive:   More than 10 days since the date of the first positive test (20 days if severely immunocompromised).   If you develop symptoms, then use the guidelines above.     *Definition of severely immunocompromised:  - Current chemotherapy for cancer  - Untreated HIV with CD4 count less than 200  - Combined primary immunodeficiency disorder  - Prednisone more than 20 mg per day for more than 14 days  - Post-transplant patients    Additional instructions:   Separate yourself from other people and animals in your home.   Call ahead before visiting your doctor.   Wear a facemask when around others.   Cover your coughs and sneezes.   Wash your hands often with soap and water; hand  can be used, too.   Avoid sharing personal household items.   Wipe down  surfaces used daily.   Monitor your symptoms. Seek prompt medical attention if your illness is worsening (e.g., difficulty breathing).    Before seeking care, call your healthcare provider.   If you have a medical emergency and need to call 911, notify the dispatch personnel that you have, or are being evaluated for COVID-19. If possible, put on a facemask before emergency medical services arrive.      Contact Tracing    As one of the next steps, you will receive a call or text from the [place] Department of Health ([facility]) COVID-19 Tracing Team. See the contact information below so you know not to ignore the health departments call. It is important that you contact them back immediately so they can help.      Contact Tracer Number:  [phone]  Caller ID for most carriers: [place] Dept Health     What is contact tracing?  · Contact tracing is a process that helps identify everyone who has been in close contact with an infected person. Contact tracers let those people know they may have been exposed and guide them on next steps. Confidentiality is important for everyone; no one will be told who may have exposed them to the virus.  · Your involvement is important. The more we know about where and how this virus is spreading, the better chance we have at stopping it from spreading further.  What does exposure mean?  · Exposure means you have been within 6 feet for more than 15 minutes with a person who has or had COVID-19.  What kind of questions do the contact tracers ask?  · A contact tracer will confirm your basic contact information including name, address, phone number, and next of kin, as well as asking about any symptoms you may have had. Theyll also ask you how you think you may have gotten sick, such as places where you may have been exposed to the virus, and people you were with. Those names will never be shared with anyone outside of that call, and will only be used to help trace and stop the spread of  the virus.   I have privacy concerns. How will the state use my information?  · Your privacy about your health is important. All calls are completed using call centers that use the appropriate health privacy protection measures (HIPAA compliance), meaning that your patient information is safe. No one will ever ask you any questions related to immigration status. Your health comes first.   Do I have to participate?  · You do not have to participate, but we strongly encourage you to. Contact tracing can help us catch and control new outbreaks as theyre developing to keep your friends and family safe.   What if I dont hear from anyone?  · If you dont receive a call within 24 hours, you can call the number above right away to inquire about your status. That line is open from 8:00 am - 8:00 p.m., 7 days a week.  Contact tracing saves lives! Together, we have the power to beat this virus and keep our loved ones and neighbors safe.    For more information see CDC link below.      https://www.cdc.gov/coronavirus/2019-ncov/hcp/guidance-prevent-spread.html#precautions        Sources:  [facility], [place] [facility] and [facility]           Sincerely,     Mary Ash NP

## 2020-09-16 NOTE — PATIENT INSTRUCTIONS
Your test was POSITIVE for COVID-19 (coronavirus).       Please isolate yourself at home.  You may leave home and/or return to work once the following conditions are met:    If you were not hospitalized and are not severely immunocompromised*:   More than 10 days since symptoms first appeared AND   More than 24 hours fever free without medications AND   Symptoms have improved     If you were hospitalized OR are severely immunocompromised*:   More than 20 days since symptoms first appeared   More than 24 hrs hours fever free without medications   Symptoms have improved    If you had no symptoms but tested postivepositive:   More than 10 days since the date of the first positive test (20 days if severely immunocompromised).   If you develop symptoms, then use the guidelines above.     *Definition of severely immunocompromised:  - Current chemotherapy for cancer  - Untreated HIV with CD4 count less than 200  - Combined primary immunodeficiency disorder  - Prednisone more than 20 mg per day for more than 14 days  - Post-transplant patients    Additional instructions:   Separate yourself from other people and animals in your home.   Call ahead before visiting your doctor.   Wear a facemask when around others.   Cover your coughs and sneezes.   Wash your hands often with soap and water; hand  can be used, too.   Avoid sharing personal household items.   Wipe down surfaces used daily.   Monitor your symptoms. Seek prompt medical attention if your illness is worsening (e.g., difficulty breathing).    Before seeking care, call your healthcare provider.   If you have a medical emergency and need to call 911, notify the dispatch personnel that you have, or are being evaluated for COVID-19. If possible, put on a facemask before emergency medical services arrive.      Contact Tracing    As one of the next steps, you will receive a call or text from the [place] Department of [facility] ([facility]) COVID-19  Tracing Team. See the contact information below so you know not to ignore the health departments call. It is important that you contact them back immediately so they can help.      Contact Tracer Number:  [phone]  Caller ID for most carriers: [facility]     What is contact tracing?  · Contact tracing is a process that helps identify everyone who has been in close contact with an infected person. Contact tracers let those people know they may have been exposed and guide them on next steps. Confidentiality is important for everyone; no one will be told who may have exposed them to the virus.  · Your involvement is important. The more we know about where and how this virus is spreading, the better chance we have at stopping it from spreading further.  What does exposure mean?  · Exposure means you have been within 6 feet for more than 15 minutes with a person who has or had COVID-19.  What kind of questions do the contact tracers ask?  · A contact tracer will confirm your basic contact information including name, address, phone number, and next of kin, as well as asking about any symptoms you may have had. Theyll also ask you how you think you may have gotten sick, such as places where you may have been exposed to the virus, and people you were with. Those names will never be shared with anyone outside of that call, and will only be used to help trace and stop the spread of the virus.   I have privacy concerns. How will the state use my information?  · Your privacy about your health is important. All calls are completed using call centers that use the appropriate health privacy protection measures (HIPAA compliance), meaning that your patient information is safe. No one will ever ask you any questions related to immigration status. Your health comes first.   Do I have to participate?  · You do not have to participate, but we strongly encourage you to. Contact tracing can help us catch and control new  outbreaks as theyre developing to keep your friends and family safe.   What if I dont hear from anyone?  · If you dont receive a call within 24 hours, you can call the number above right away to inquire about your status. That line is open from 8:00 am - 8:00 p.m., 7 days a week.  Contact tracing saves lives! Together, we have the power to beat this virus and keep our loved ones and neighbors safe.    For more information see CDC link below.      https://www.cdc.gov/coronavirus/2019-ncov/hcp/guidance-prevent-spread.html#precautions        Sources:  CDC, [place] [facility] and [facility] [place]           Sincerely,     Mary Ash NP

## 2020-09-24 ENCOUNTER — TELEPHONE (OUTPATIENT)
Dept: PHARMACY | Facility: CLINIC | Age: 70
End: 2020-09-24

## 2020-09-25 ENCOUNTER — TELEPHONE (OUTPATIENT)
Dept: RHEUMATOLOGY | Facility: CLINIC | Age: 70
End: 2020-09-25

## 2020-10-01 ENCOUNTER — TELEPHONE (OUTPATIENT)
Dept: PHARMACY | Facility: CLINIC | Age: 70
End: 2020-10-01

## 2020-11-13 ENCOUNTER — PATIENT MESSAGE (OUTPATIENT)
Dept: PHARMACY | Facility: CLINIC | Age: 70
End: 2020-11-13

## 2020-11-19 ENCOUNTER — TELEPHONE (OUTPATIENT)
Dept: PHARMACY | Facility: CLINIC | Age: 70
End: 2020-11-19

## 2020-11-19 NOTE — TELEPHONE ENCOUNTER
FOR DOCUMENTATION ONLY:  Financial Assistance for Praluent is approved from 11- to 11-  [facility]  BIN: [id number]  PCN: [id number]  Id: [id number]  GRP: [id number]  2500.00 Shiv

## 2020-11-23 NOTE — ADDENDUM NOTE
Addended by: PREET RICHARDS on: 9/18/2017 11:21 AM     Modules accepted: Yue    
no fever and no chills.

## 2020-11-25 ENCOUNTER — SPECIALTY PHARMACY (OUTPATIENT)
Dept: PHARMACY | Facility: CLINIC | Age: 70
End: 2020-11-25

## 2020-11-25 DIAGNOSIS — G72.0 STATIN MYOPATHY: ICD-10-CM

## 2020-11-25 DIAGNOSIS — E78.00 PURE HYPERCHOLESTEROLEMIA: ICD-10-CM

## 2020-11-25 DIAGNOSIS — T46.6X5A STATIN MYOPATHY: ICD-10-CM

## 2020-11-25 RX ORDER — ALIROCUMAB 150 MG/ML
150 INJECTION, SOLUTION SUBCUTANEOUS
Qty: 6 ML | Refills: 3 | Status: SHIPPED | OUTPATIENT
Start: 2020-11-25 | End: 2021-10-14 | Stop reason: SDUPTHER

## 2020-11-25 NOTE — TELEPHONE ENCOUNTER
Spoke with pt regarding refill- his Rx .  Sent refill request to Dr. Vargas today.  Patient's next injection due .

## 2020-11-27 NOTE — TELEPHONE ENCOUNTER
Specialty Pharmacy - Refill Coordination    Specialty Medication Orders Linked to Encounter      Most Recent Value   Medication #1  alirocumab (PRALUENT PEN) 150 mg/mL PnIj (Order#[id number], Rx#[id number])          Refill Questions - Documented Responses      Most Recent Value   Relationship to patient of person spoken to?  Self   HIPAA/medical authority confirmed?  Yes   Any changes in contact preferences or allowed representatives?  No   Has the patient had any insurance changes?  No   Has the patient had any changes to specialty medication, dose, or instructions?  No   Has the patient started taking any new medications, herbals, or supplements?  No   Has the patient been diagnosed with any new medical conditions?  No   Does the patient have any new allergies to medications or foods?  No   Does the patient have any concerns about side effects?  No   Can the patient store medication/sharps container properly (at the correct temperature, away from children/pets, etc.)?  Yes   Can the patient call emergency services (911) in the event of an emergency?  Yes   Does the patient have any concerns or questions about taking or administering this medication as prescribed?  No   How many doses did the patient miss in the past 4 weeks or since the last fill?  0   How many doses does the patient have on hand?  0   How many days does the patient report on hand quantity will last?  0   Does the number of doses/days supply remaining match pharmacy expected amounts?  Yes   Does the patient feel that this medication is effective?  Yes   During the past 4 weeks, has patient missed any activities due to condition or medication?  No   During the past 4 weeks, did patient have any of the following urgent care visits?  None   How will the patient receive the medication?  Pickup   When does the patient need to receive the medication?  11/30/20   Address in [facility] confirmed and updated if neccessary?  Yes   Expected Copay ($)   0   Is the patient able to afford the medication copay?  Yes   Payment Method  zero copay   Days supply of Refill  28   Would patient like to speak to a pharmacist?  No   Do you want to trigger an intervention?  No   Do you want to trigger an additional referral task?  No   Refill activity completed?  Yes   Refill activity plan  Refill scheduled   Shipment/Pickup Date:  11/30/20          Current Outpatient Medications   Medication Sig    alirocumab (PRALUENT PEN) 150 mg/mL PnIj Inject 1 mL (150 mg total) into the skin every 14 (fourteen) days.    ascorbic acid, vitamin C, (VITAMIN C) 500 MG tablet Take 1 tablet (500 mg total) by mouth 2 (two) times daily.    aspirin (ECOTRIN) 81 MG EC tablet Take 81 mg by mouth once daily.    bacitracin 500 unit/gram ointment Apply topically 2 (two) times daily. Apply to incision.    benazepriL (LOTENSIN) 40 MG tablet TAKE ONE TABLET BY MOUTH DAILY     betamethasone dipropionate (DIPROLENE) 0.05 % cream Apply topically 2 (two) times daily. for 10 days    calcium-vitamin D3 (CALCIUM 500 + D) 500 mg(1,250mg) -200 unit per tablet Take 1 tablet by mouth 2 (two) times daily with meals.    clotrimazole (LOTRIMIN) 1 % Soln Apply topically 2 (two) times daily.    folic acid (FOLVITE) 1 MG tablet Take 1 tablet (1 mg total) by mouth once daily.    furosemide (LASIX) 40 MG tablet Take 1 tablet (40 mg total) by mouth once daily.    HYDROcodone-acetaminophen (NORCO) 5-325 mg per tablet Take 1 tablet by mouth every 6 (six) hours as needed for Pain.    levothyroxine (SYNTHROID) 50 MCG tablet TAKE 50 MCG BY MOUTH BEFORE BREAKFAST.    metFORMIN (GLUCOPHAGE) 1000 MG tablet Take 1 tablet (1,000 mg total) by mouth 2 (two) times daily with meals.    methotrexate 2.5 MG Tab TAKE 8 TABLETS BY MOUTH WEEKLY    metoprolol succinate (TOPROL-XL) 50 MG 24 hr tablet Take 2 tablets (100 mg total) by mouth once daily.    multivitamin (ONE DAILY MULTIVITAMIN) per tablet Take 1 tablet by mouth once  daily. PER KIAN MOSHER     NIFEdipine (PROCARDIA-XL) 90 MG (OSM) 24 hr tablet Take 1 tablet (90 mg total) by mouth once daily.    triamcinolone acetonide 0.1% (KENALOG) 0.1 % cream Apply topically 2 (two) times daily.   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     Jessica Khanna  [facility]  [address] [place] [id number]  Phone: [phone]  Fax: [phone]

## 2020-12-04 ENCOUNTER — TELEPHONE (OUTPATIENT)
Dept: INTERNAL MEDICINE | Facility: CLINIC | Age: 70
End: 2020-12-04

## 2020-12-04 NOTE — TELEPHONE ENCOUNTER
----- Message from Nadya Youssef sent at 12/4/2020 11:07 AM CST -----  Contact: VALDO VEGA [[id number]]  Type: RX Refill Request Who Called:  VALDO VEGA [[id number]] RX Name and Strength:methotrexate 2.5 MG Tab Preferred Pharmacy with phone number:  [id number] - [place] [id number] [address]  Phone: [phone] Fax: [phone]    Would the patient rather a call back or a response via My [facility]? Call back Best Call Back Number:[phone] (mobile)   Additional Information:

## 2020-12-09 ENCOUNTER — PATIENT MESSAGE (OUTPATIENT)
Dept: ADMINISTRATIVE | Facility: HOSPITAL | Age: 70
End: 2020-12-09

## 2020-12-09 ENCOUNTER — PATIENT OUTREACH (OUTPATIENT)
Dept: ADMINISTRATIVE | Facility: HOSPITAL | Age: 70
End: 2020-12-09

## 2020-12-11 ENCOUNTER — TELEPHONE (OUTPATIENT)
Dept: RHEUMATOLOGY | Facility: CLINIC | Age: 70
End: 2020-12-11

## 2020-12-11 NOTE — TELEPHONE ENCOUNTER
Tried calling the patient to schedule the patient for the first available so that he can get his medication. The methotrexate will not be refilled until we make an appointment

## 2020-12-11 NOTE — TELEPHONE ENCOUNTER
----- Message from Samson Chiang MD sent at 12/8/2020  3:07 PM CST -----  He has not come for a year  No methotrexate  He has to do asap virtual atleast

## 2020-12-15 DIAGNOSIS — M62.82 NON-TRAUMATIC RHABDOMYOLYSIS: ICD-10-CM

## 2020-12-16 RX ORDER — METHOTREXATE 2.5 MG/1
TABLET ORAL
Qty: 40 TABLET | Refills: 0 | Status: SHIPPED | OUTPATIENT
Start: 2020-12-16 | End: 2021-01-25 | Stop reason: SDUPTHER

## 2020-12-16 NOTE — TELEPHONE ENCOUNTER
Leonor he has not seen us for a year now  Please call him and ask him to do a virtual atleast  I only gave a month supply of mtx

## 2020-12-22 ENCOUNTER — PATIENT MESSAGE (OUTPATIENT)
Dept: PHARMACY | Facility: CLINIC | Age: 70
End: 2020-12-22

## 2020-12-30 ENCOUNTER — SPECIALTY PHARMACY (OUTPATIENT)
Dept: PHARMACY | Facility: CLINIC | Age: 70
End: 2020-12-30

## 2021-01-05 ENCOUNTER — PATIENT MESSAGE (OUTPATIENT)
Dept: ADMINISTRATIVE | Facility: HOSPITAL | Age: 71
End: 2021-01-05

## 2021-01-06 ENCOUNTER — SPECIALTY PHARMACY (OUTPATIENT)
Dept: PHARMACY | Facility: CLINIC | Age: 71
End: 2021-01-06

## 2021-01-27 ENCOUNTER — TELEPHONE (OUTPATIENT)
Dept: RHEUMATOLOGY | Facility: CLINIC | Age: 71
End: 2021-01-27

## 2021-02-03 ENCOUNTER — LAB VISIT (OUTPATIENT)
Dept: LAB | Facility: HOSPITAL | Age: 71
End: 2021-02-03
Attending: INTERNAL MEDICINE
Payer: MEDICARE

## 2021-02-03 ENCOUNTER — PATIENT OUTREACH (OUTPATIENT)
Dept: ADMINISTRATIVE | Facility: OTHER | Age: 71
End: 2021-02-03

## 2021-02-03 ENCOUNTER — OFFICE VISIT (OUTPATIENT)
Dept: RHEUMATOLOGY | Facility: CLINIC | Age: 71
End: 2021-02-03
Payer: MEDICARE

## 2021-02-03 VITALS
HEART RATE: 75 BPM | WEIGHT: 294.13 LBS | BODY MASS INDEX: 42.2 KG/M2 | DIASTOLIC BLOOD PRESSURE: 84 MMHG | SYSTOLIC BLOOD PRESSURE: 154 MMHG

## 2021-02-03 DIAGNOSIS — E11.9 TYPE 2 DIABETES MELLITUS WITHOUT COMPLICATION, WITHOUT LONG-TERM CURRENT USE OF INSULIN: Primary | ICD-10-CM

## 2021-02-03 DIAGNOSIS — T46.6X5A STATIN MYOPATHY: Primary | ICD-10-CM

## 2021-02-03 DIAGNOSIS — G72.0 STATIN MYOPATHY: Primary | ICD-10-CM

## 2021-02-03 DIAGNOSIS — Z13.820 SCREENING FOR OSTEOPOROSIS: ICD-10-CM

## 2021-02-03 DIAGNOSIS — M62.82 NON-TRAUMATIC RHABDOMYOLYSIS: ICD-10-CM

## 2021-02-03 DIAGNOSIS — G72.0 STATIN MYOPATHY: ICD-10-CM

## 2021-02-03 DIAGNOSIS — T46.6X5A STATIN MYOPATHY: ICD-10-CM

## 2021-02-03 LAB
25(OH)D3+25(OH)D2 SERPL-MCNC: 27 NG/ML (ref 30–96)
ALBUMIN SERPL BCP-MCNC: 4.3 G/DL (ref 3.5–5.2)
ALP SERPL-CCNC: 103 U/L (ref 55–135)
ALT SERPL W/O P-5'-P-CCNC: 44 U/L (ref 10–44)
ANION GAP SERPL CALC-SCNC: 5 MMOL/L (ref 8–16)
AST SERPL-CCNC: 38 U/L (ref 10–40)
BASOPHILS # BLD AUTO: 0.02 K/UL (ref 0–0.2)
BASOPHILS NFR BLD: 0.4 % (ref 0–1.9)
BILIRUB SERPL-MCNC: 0.3 MG/DL (ref 0.1–1)
BUN SERPL-MCNC: 12 MG/DL (ref 8–23)
CALCIUM SERPL-MCNC: 9.6 MG/DL (ref 8.7–10.5)
CHLORIDE SERPL-SCNC: 100 MMOL/L (ref 95–110)
CK SERPL-CCNC: 1608 U/L (ref 20–200)
CO2 SERPL-SCNC: 36 MMOL/L (ref 23–29)
CREAT SERPL-MCNC: 0.9 MG/DL (ref 0.5–1.4)
CRP SERPL-MCNC: 15.8 MG/L (ref 0–8.2)
DIFFERENTIAL METHOD: NORMAL
EOSINOPHIL # BLD AUTO: 0.1 K/UL (ref 0–0.5)
EOSINOPHIL NFR BLD: 1.3 % (ref 0–8)
ERYTHROCYTE [DISTWIDTH] IN BLOOD BY AUTOMATED COUNT: 13.4 % (ref 11.5–14.5)
ERYTHROCYTE [SEDIMENTATION RATE] IN BLOOD BY WESTERGREN METHOD: 4 MM/HR (ref 0–23)
EST. GFR  (AFRICAN AMERICAN): >60 ML/MIN/1.73 M^2
EST. GFR  (NON AFRICAN AMERICAN): >60 ML/MIN/1.73 M^2
ESTIMATED AVG GLUCOSE: 266 MG/DL (ref 68–131)
GLUCOSE SERPL-MCNC: 253 MG/DL (ref 70–110)
HBA1C MFR BLD: 10.9 % (ref 4–5.6)
HCT VFR BLD AUTO: 46.1 % (ref 40–54)
HGB BLD-MCNC: 14.8 G/DL (ref 14–18)
IMM GRANULOCYTES # BLD AUTO: 0.01 K/UL (ref 0–0.04)
IMM GRANULOCYTES NFR BLD AUTO: 0.2 % (ref 0–0.5)
LYMPHOCYTES # BLD AUTO: 2 K/UL (ref 1–4.8)
LYMPHOCYTES NFR BLD: 37.7 % (ref 18–48)
MCH RBC QN AUTO: 27.9 PG (ref 27–31)
MCHC RBC AUTO-ENTMCNC: 32.1 G/DL (ref 32–36)
MCV RBC AUTO: 87 FL (ref 82–98)
MONOCYTES # BLD AUTO: 0.3 K/UL (ref 0.3–1)
MONOCYTES NFR BLD: 5.9 % (ref 4–15)
NEUTROPHILS # BLD AUTO: 2.9 K/UL (ref 1.8–7.7)
NEUTROPHILS NFR BLD: 54.5 % (ref 38–73)
NRBC BLD-RTO: 0 /100 WBC
PLATELET # BLD AUTO: 287 K/UL (ref 150–350)
PMV BLD AUTO: 10.9 FL (ref 9.2–12.9)
POTASSIUM SERPL-SCNC: 4.7 MMOL/L (ref 3.5–5.1)
PROT SERPL-MCNC: 7.7 G/DL (ref 6–8.4)
RBC # BLD AUTO: 5.31 M/UL (ref 4.6–6.2)
SODIUM SERPL-SCNC: 141 MMOL/L (ref 136–145)
WBC # BLD AUTO: 5.25 K/UL (ref 3.9–12.7)

## 2021-02-03 PROCEDURE — 86140 C-REACTIVE PROTEIN: CPT

## 2021-02-03 PROCEDURE — 1159F MED LIST DOCD IN RCRD: CPT | Mod: S$GLB,,, | Performed by: INTERNAL MEDICINE

## 2021-02-03 PROCEDURE — 1159F PR MEDICATION LIST DOCUMENTED IN MEDICAL RECORD: ICD-10-PCS | Mod: S$GLB,,, | Performed by: INTERNAL MEDICINE

## 2021-02-03 PROCEDURE — 99999 PR PBB SHADOW E&M-EST. PATIENT-LVL III: ICD-10-PCS | Mod: PBBFAC,,, | Performed by: INTERNAL MEDICINE

## 2021-02-03 PROCEDURE — 82306 VITAMIN D 25 HYDROXY: CPT | Mod: GA

## 2021-02-03 PROCEDURE — 82550 ASSAY OF CK (CPK): CPT

## 2021-02-03 PROCEDURE — 99499 UNLISTED E&M SERVICE: CPT | Mod: S$GLB,,, | Performed by: INTERNAL MEDICINE

## 2021-02-03 PROCEDURE — 99214 OFFICE O/P EST MOD 30 MIN: CPT | Mod: S$GLB,,, | Performed by: INTERNAL MEDICINE

## 2021-02-03 PROCEDURE — 85652 RBC SED RATE AUTOMATED: CPT

## 2021-02-03 PROCEDURE — 36415 COLL VENOUS BLD VENIPUNCTURE: CPT

## 2021-02-03 PROCEDURE — 3079F PR MOST RECENT DIASTOLIC BLOOD PRESSURE 80-89 MM HG: ICD-10-PCS | Mod: CPTII,S$GLB,, | Performed by: INTERNAL MEDICINE

## 2021-02-03 PROCEDURE — 80053 COMPREHEN METABOLIC PANEL: CPT

## 2021-02-03 PROCEDURE — 3079F DIAST BP 80-89 MM HG: CPT | Mod: CPTII,S$GLB,, | Performed by: INTERNAL MEDICINE

## 2021-02-03 PROCEDURE — 83036 HEMOGLOBIN GLYCOSYLATED A1C: CPT | Mod: GA

## 2021-02-03 PROCEDURE — 3077F PR MOST RECENT SYSTOLIC BLOOD PRESSURE >= 140 MM HG: ICD-10-PCS | Mod: CPTII,S$GLB,, | Performed by: INTERNAL MEDICINE

## 2021-02-03 PROCEDURE — 99499 RISK ADDL DX/OHS AUDIT: ICD-10-PCS | Mod: S$GLB,,, | Performed by: INTERNAL MEDICINE

## 2021-02-03 PROCEDURE — 3008F BODY MASS INDEX DOCD: CPT | Mod: CPTII,S$GLB,, | Performed by: INTERNAL MEDICINE

## 2021-02-03 PROCEDURE — 82085 ASSAY OF ALDOLASE: CPT

## 2021-02-03 PROCEDURE — 1126F AMNT PAIN NOTED NONE PRSNT: CPT | Mod: S$GLB,,, | Performed by: INTERNAL MEDICINE

## 2021-02-03 PROCEDURE — 99999 PR PBB SHADOW E&M-EST. PATIENT-LVL III: CPT | Mod: PBBFAC,,, | Performed by: INTERNAL MEDICINE

## 2021-02-03 PROCEDURE — 1126F PR PAIN SEVERITY QUANTIFIED, NO PAIN PRESENT: ICD-10-PCS | Mod: S$GLB,,, | Performed by: INTERNAL MEDICINE

## 2021-02-03 PROCEDURE — 3077F SYST BP >= 140 MM HG: CPT | Mod: CPTII,S$GLB,, | Performed by: INTERNAL MEDICINE

## 2021-02-03 PROCEDURE — 85025 COMPLETE CBC W/AUTO DIFF WBC: CPT

## 2021-02-03 PROCEDURE — 3008F PR BODY MASS INDEX (BMI) DOCUMENTED: ICD-10-PCS | Mod: CPTII,S$GLB,, | Performed by: INTERNAL MEDICINE

## 2021-02-03 PROCEDURE — 99214 PR OFFICE/OUTPT VISIT, EST, LEVL IV, 30-39 MIN: ICD-10-PCS | Mod: S$GLB,,, | Performed by: INTERNAL MEDICINE

## 2021-02-03 RX ORDER — FOLIC ACID 1 MG/1
1000 TABLET ORAL DAILY
Qty: 90 TABLET | Refills: 3 | Status: SHIPPED | OUTPATIENT
Start: 2021-02-03 | End: 2021-07-06

## 2021-02-03 RX ORDER — METHOTREXATE 2.5 MG/1
TABLET ORAL
Qty: 40 TABLET | Refills: 4 | Status: SHIPPED | OUTPATIENT
Start: 2021-02-03 | End: 2021-07-06

## 2021-02-03 ASSESSMENT — ROUTINE ASSESSMENT OF PATIENT INDEX DATA (RAPID3)
AM STIFFNESS SCORE: 0, NO
PAIN SCORE: 0
PSYCHOLOGICAL DISTRESS SCORE: 0
TOTAL RAPID3 SCORE: 0.33
FATIGUE SCORE: 0
MDHAQ FUNCTION SCORE: 0.3
PATIENT GLOBAL ASSESSMENT SCORE: 0

## 2021-02-05 LAB — ALDOLASE SERPL-CCNC: 8.2 U/L (ref 1.2–7.6)

## 2021-02-08 ENCOUNTER — HOSPITAL ENCOUNTER (OUTPATIENT)
Dept: RADIOLOGY | Facility: CLINIC | Age: 71
Discharge: HOME | End: 2021-02-08
Attending: INTERNAL MEDICINE
Payer: MEDICARE

## 2021-02-08 DIAGNOSIS — Z13.820 SCREENING FOR OSTEOPOROSIS: ICD-10-CM

## 2021-02-08 PROCEDURE — 77080 DEXA BONE DENSITY SPINE HIP: ICD-10-PCS | Mod: 26,GA,S$GLB, | Performed by: INTERNAL MEDICINE

## 2021-02-08 PROCEDURE — 77080 DXA BONE DENSITY AXIAL: CPT | Mod: GA,TC,PO

## 2021-02-08 PROCEDURE — 77080 DXA BONE DENSITY AXIAL: CPT | Mod: 26,GA,S$GLB, | Performed by: INTERNAL MEDICINE

## 2021-02-17 ENCOUNTER — PATIENT OUTREACH (OUTPATIENT)
Dept: ADMINISTRATIVE | Facility: HOSPITAL | Age: 71
End: 2021-02-17

## 2021-02-17 DIAGNOSIS — Z13.220 ENCOUNTER FOR LIPID SCREENING FOR CARDIOVASCULAR DISEASE: Primary | ICD-10-CM

## 2021-02-17 DIAGNOSIS — Z13.6 ENCOUNTER FOR LIPID SCREENING FOR CARDIOVASCULAR DISEASE: Primary | ICD-10-CM

## 2021-02-19 ENCOUNTER — PATIENT MESSAGE (OUTPATIENT)
Dept: PHARMACY | Facility: CLINIC | Age: 71
End: 2021-02-19

## 2021-02-23 ENCOUNTER — PATIENT MESSAGE (OUTPATIENT)
Dept: RHEUMATOLOGY | Facility: CLINIC | Age: 71
End: 2021-02-23

## 2021-03-01 ENCOUNTER — SPECIALTY PHARMACY (OUTPATIENT)
Dept: PHARMACY | Facility: CLINIC | Age: 71
End: 2021-03-01

## 2021-03-03 ENCOUNTER — OFFICE VISIT (OUTPATIENT)
Dept: INTERNAL MEDICINE | Facility: CLINIC | Age: 71
End: 2021-03-03
Attending: INTERNAL MEDICINE
Payer: MEDICARE

## 2021-03-03 VITALS
HEART RATE: 82 BPM | OXYGEN SATURATION: 91 % | HEIGHT: 70 IN | SYSTOLIC BLOOD PRESSURE: 162 MMHG | BODY MASS INDEX: 41.54 KG/M2 | WEIGHT: 290.13 LBS | DIASTOLIC BLOOD PRESSURE: 88 MMHG

## 2021-03-03 DIAGNOSIS — I50.9 HEART FAILURE, UNSPECIFIED HF CHRONICITY, UNSPECIFIED HEART FAILURE TYPE: ICD-10-CM

## 2021-03-03 DIAGNOSIS — M35.9 SYSTEMIC INVOLVEMENT OF CONNECTIVE TISSUE, UNSPECIFIED: ICD-10-CM

## 2021-03-03 DIAGNOSIS — E11.42 CONTROLLED TYPE 2 DIABETES MELLITUS WITH DIABETIC POLYNEUROPATHY, WITHOUT LONG-TERM CURRENT USE OF INSULIN: ICD-10-CM

## 2021-03-03 DIAGNOSIS — I26.99 OTHER PULMONARY EMBOLISM WITHOUT ACUTE COR PULMONALE, UNSPECIFIED CHRONICITY: ICD-10-CM

## 2021-03-03 DIAGNOSIS — E11.9 TYPE 2 DIABETES MELLITUS WITHOUT COMPLICATION, UNSPECIFIED WHETHER LONG TERM INSULIN USE: Primary | ICD-10-CM

## 2021-03-03 DIAGNOSIS — E66.01 SEVERE OBESITY (BMI 35.0-39.9) WITH COMORBIDITY: ICD-10-CM

## 2021-03-03 DIAGNOSIS — I10 ESSENTIAL HYPERTENSION: ICD-10-CM

## 2021-03-03 PROCEDURE — 99999 PR PBB SHADOW E&M-EST. PATIENT-LVL V: ICD-10-PCS | Mod: PBBFAC,,, | Performed by: INTERNAL MEDICINE

## 2021-03-03 PROCEDURE — 3288F PR FALLS RISK ASSESSMENT DOCUMENTED: ICD-10-PCS | Mod: CPTII,S$GLB,, | Performed by: INTERNAL MEDICINE

## 2021-03-03 PROCEDURE — 1126F AMNT PAIN NOTED NONE PRSNT: CPT | Mod: S$GLB,,, | Performed by: INTERNAL MEDICINE

## 2021-03-03 PROCEDURE — 1159F PR MEDICATION LIST DOCUMENTED IN MEDICAL RECORD: ICD-10-PCS | Mod: S$GLB,,, | Performed by: INTERNAL MEDICINE

## 2021-03-03 PROCEDURE — 1159F MED LIST DOCD IN RCRD: CPT | Mod: S$GLB,,, | Performed by: INTERNAL MEDICINE

## 2021-03-03 PROCEDURE — 3046F HEMOGLOBIN A1C LEVEL >9.0%: CPT | Mod: CPTII,S$GLB,, | Performed by: INTERNAL MEDICINE

## 2021-03-03 PROCEDURE — 3079F PR MOST RECENT DIASTOLIC BLOOD PRESSURE 80-89 MM HG: ICD-10-PCS | Mod: CPTII,S$GLB,, | Performed by: INTERNAL MEDICINE

## 2021-03-03 PROCEDURE — 3008F PR BODY MASS INDEX (BMI) DOCUMENTED: ICD-10-PCS | Mod: CPTII,S$GLB,, | Performed by: INTERNAL MEDICINE

## 2021-03-03 PROCEDURE — 3046F PR MOST RECENT HEMOGLOBIN A1C LEVEL > 9.0%: ICD-10-PCS | Mod: CPTII,S$GLB,, | Performed by: INTERNAL MEDICINE

## 2021-03-03 PROCEDURE — 3008F BODY MASS INDEX DOCD: CPT | Mod: CPTII,S$GLB,, | Performed by: INTERNAL MEDICINE

## 2021-03-03 PROCEDURE — 99214 PR OFFICE/OUTPT VISIT, EST, LEVL IV, 30-39 MIN: ICD-10-PCS | Mod: S$GLB,,, | Performed by: INTERNAL MEDICINE

## 2021-03-03 PROCEDURE — 99214 OFFICE O/P EST MOD 30 MIN: CPT | Mod: S$GLB,,, | Performed by: INTERNAL MEDICINE

## 2021-03-03 PROCEDURE — 99999 PR PBB SHADOW E&M-EST. PATIENT-LVL V: CPT | Mod: PBBFAC,,, | Performed by: INTERNAL MEDICINE

## 2021-03-03 PROCEDURE — 1101F PT FALLS ASSESS-DOCD LE1/YR: CPT | Mod: CPTII,S$GLB,, | Performed by: INTERNAL MEDICINE

## 2021-03-03 PROCEDURE — 3079F DIAST BP 80-89 MM HG: CPT | Mod: CPTII,S$GLB,, | Performed by: INTERNAL MEDICINE

## 2021-03-03 PROCEDURE — 3288F FALL RISK ASSESSMENT DOCD: CPT | Mod: CPTII,S$GLB,, | Performed by: INTERNAL MEDICINE

## 2021-03-03 PROCEDURE — 3077F SYST BP >= 140 MM HG: CPT | Mod: CPTII,S$GLB,, | Performed by: INTERNAL MEDICINE

## 2021-03-03 PROCEDURE — 99499 UNLISTED E&M SERVICE: CPT | Mod: S$GLB,,, | Performed by: INTERNAL MEDICINE

## 2021-03-03 PROCEDURE — 1101F PR PT FALLS ASSESS DOC 0-1 FALLS W/OUT INJ PAST YR: ICD-10-PCS | Mod: CPTII,S$GLB,, | Performed by: INTERNAL MEDICINE

## 2021-03-03 PROCEDURE — 99499 RISK ADDL DX/OHS AUDIT: ICD-10-PCS | Mod: S$GLB,,, | Performed by: INTERNAL MEDICINE

## 2021-03-03 PROCEDURE — 1126F PR PAIN SEVERITY QUANTIFIED, NO PAIN PRESENT: ICD-10-PCS | Mod: S$GLB,,, | Performed by: INTERNAL MEDICINE

## 2021-03-03 PROCEDURE — 3077F PR MOST RECENT SYSTOLIC BLOOD PRESSURE >= 140 MM HG: ICD-10-PCS | Mod: CPTII,S$GLB,, | Performed by: INTERNAL MEDICINE

## 2021-03-03 RX ORDER — LINAGLIPTIN 5 MG/1
5 TABLET, FILM COATED ORAL DAILY
Qty: 90 TABLET | Refills: 3 | Status: SHIPPED | OUTPATIENT
Start: 2021-03-03 | End: 2022-04-07 | Stop reason: SDUPTHER

## 2021-03-08 ENCOUNTER — PATIENT OUTREACH (OUTPATIENT)
Dept: ADMINISTRATIVE | Facility: HOSPITAL | Age: 71
End: 2021-03-08

## 2021-03-26 ENCOUNTER — PATIENT MESSAGE (OUTPATIENT)
Dept: PHARMACY | Facility: CLINIC | Age: 71
End: 2021-03-26

## 2021-03-26 DIAGNOSIS — I10 ESSENTIAL HYPERTENSION: ICD-10-CM

## 2021-03-26 RX ORDER — METOPROLOL SUCCINATE 50 MG/1
100 TABLET, EXTENDED RELEASE ORAL DAILY
Qty: 180 TABLET | Refills: 3 | Status: SHIPPED | OUTPATIENT
Start: 2021-03-26 | End: 2021-04-14

## 2021-03-29 DIAGNOSIS — E11.42 CONTROLLED TYPE 2 DIABETES MELLITUS WITH DIABETIC POLYNEUROPATHY, WITHOUT LONG-TERM CURRENT USE OF INSULIN: ICD-10-CM

## 2021-03-29 RX ORDER — METFORMIN HYDROCHLORIDE 1000 MG/1
1000 TABLET ORAL 2 TIMES DAILY WITH MEALS
Qty: 180 TABLET | Refills: 2 | Status: SHIPPED | OUTPATIENT
Start: 2021-03-29 | End: 2023-01-04 | Stop reason: SDUPTHER

## 2021-03-31 ENCOUNTER — PATIENT OUTREACH (OUTPATIENT)
Dept: ADMINISTRATIVE | Facility: OTHER | Age: 71
End: 2021-03-31

## 2021-03-31 ENCOUNTER — PATIENT OUTREACH (OUTPATIENT)
Dept: ADMINISTRATIVE | Facility: HOSPITAL | Age: 71
End: 2021-03-31

## 2021-04-05 ENCOUNTER — PATIENT MESSAGE (OUTPATIENT)
Dept: ADMINISTRATIVE | Facility: HOSPITAL | Age: 71
End: 2021-04-05

## 2021-04-05 ENCOUNTER — OFFICE VISIT (OUTPATIENT)
Dept: OPTOMETRY | Facility: CLINIC | Age: 71
End: 2021-04-05
Payer: MEDICARE

## 2021-04-05 DIAGNOSIS — H43.811 POSTERIOR VITREOUS DETACHMENT OF RIGHT EYE: ICD-10-CM

## 2021-04-05 DIAGNOSIS — E11.9 TYPE 2 DIABETES MELLITUS WITHOUT OPHTHALMIC MANIFESTATIONS: ICD-10-CM

## 2021-04-05 DIAGNOSIS — H25.13 NUCLEAR SCLEROSIS OF BOTH EYES: ICD-10-CM

## 2021-04-05 DIAGNOSIS — H40.053 BILATERAL OCULAR HYPERTENSION: Primary | ICD-10-CM

## 2021-04-05 DIAGNOSIS — H52.4 PRESBYOPIA: ICD-10-CM

## 2021-04-05 PROCEDURE — 99999 PR PBB SHADOW E&M-EST. PATIENT-LVL III: ICD-10-PCS | Mod: PBBFAC,,, | Performed by: OPTOMETRIST

## 2021-04-05 PROCEDURE — 2023F PR DILATED RETINAL EXAM W/O EVID OF RETINOPATHY: ICD-10-PCS | Mod: S$GLB,,, | Performed by: OPTOMETRIST

## 2021-04-05 PROCEDURE — 1101F PR PT FALLS ASSESS DOC 0-1 FALLS W/OUT INJ PAST YR: ICD-10-PCS | Mod: CPTII,S$GLB,, | Performed by: OPTOMETRIST

## 2021-04-05 PROCEDURE — 2023F DILAT RTA XM W/O RTNOPTHY: CPT | Mod: S$GLB,,, | Performed by: OPTOMETRIST

## 2021-04-05 PROCEDURE — 92133 POSTERIOR SEGMENT OCT OPTIC NERVE(OCULAR COHERENCE TOMOGRAPHY) - OU - BOTH EYES: ICD-10-PCS | Mod: S$GLB,,, | Performed by: OPTOMETRIST

## 2021-04-05 PROCEDURE — 3288F FALL RISK ASSESSMENT DOCD: CPT | Mod: CPTII,S$GLB,, | Performed by: OPTOMETRIST

## 2021-04-05 PROCEDURE — 92014 COMPRE OPH EXAM EST PT 1/>: CPT | Mod: S$GLB,,, | Performed by: OPTOMETRIST

## 2021-04-05 PROCEDURE — 92133 CPTRZD OPH DX IMG PST SGM ON: CPT | Mod: S$GLB,,, | Performed by: OPTOMETRIST

## 2021-04-05 PROCEDURE — 3288F PR FALLS RISK ASSESSMENT DOCUMENTED: ICD-10-PCS | Mod: CPTII,S$GLB,, | Performed by: OPTOMETRIST

## 2021-04-05 PROCEDURE — 99999 PR PBB SHADOW E&M-EST. PATIENT-LVL III: CPT | Mod: PBBFAC,,, | Performed by: OPTOMETRIST

## 2021-04-05 PROCEDURE — 99499 RISK ADDL DX/OHS AUDIT: ICD-10-PCS | Mod: S$GLB,,, | Performed by: OPTOMETRIST

## 2021-04-05 PROCEDURE — 1126F AMNT PAIN NOTED NONE PRSNT: CPT | Mod: S$GLB,,, | Performed by: OPTOMETRIST

## 2021-04-05 PROCEDURE — 99499 UNLISTED E&M SERVICE: CPT | Mod: S$GLB,,, | Performed by: OPTOMETRIST

## 2021-04-05 PROCEDURE — 1101F PT FALLS ASSESS-DOCD LE1/YR: CPT | Mod: CPTII,S$GLB,, | Performed by: OPTOMETRIST

## 2021-04-05 PROCEDURE — 1126F PR PAIN SEVERITY QUANTIFIED, NO PAIN PRESENT: ICD-10-PCS | Mod: S$GLB,,, | Performed by: OPTOMETRIST

## 2021-04-05 PROCEDURE — 92014 PR EYE EXAM, EST PATIENT,COMPREHESV: ICD-10-PCS | Mod: S$GLB,,, | Performed by: OPTOMETRIST

## 2021-04-07 ENCOUNTER — SPECIALTY PHARMACY (OUTPATIENT)
Dept: PHARMACY | Facility: CLINIC | Age: 71
End: 2021-04-07

## 2021-04-14 ENCOUNTER — CLINICAL SUPPORT (OUTPATIENT)
Dept: DIABETES | Facility: CLINIC | Age: 71
End: 2021-04-14
Attending: INTERNAL MEDICINE
Payer: MEDICARE

## 2021-04-14 ENCOUNTER — OFFICE VISIT (OUTPATIENT)
Dept: INTERNAL MEDICINE | Facility: CLINIC | Age: 71
End: 2021-04-14
Attending: INTERNAL MEDICINE
Payer: MEDICARE

## 2021-04-14 VITALS — WEIGHT: 284.81 LBS | BODY MASS INDEX: 40.77 KG/M2 | HEIGHT: 70 IN

## 2021-04-14 VITALS
SYSTOLIC BLOOD PRESSURE: 146 MMHG | DIASTOLIC BLOOD PRESSURE: 92 MMHG | HEART RATE: 98 BPM | OXYGEN SATURATION: 95 % | BODY MASS INDEX: 41.12 KG/M2 | HEIGHT: 70 IN | WEIGHT: 287.25 LBS

## 2021-04-14 DIAGNOSIS — I10 ESSENTIAL HYPERTENSION: ICD-10-CM

## 2021-04-14 DIAGNOSIS — E11.69 TYPE 2 DIABETES MELLITUS WITH OTHER SPECIFIED COMPLICATION, WITHOUT LONG-TERM CURRENT USE OF INSULIN: Primary | ICD-10-CM

## 2021-04-14 DIAGNOSIS — E11.9 TYPE 2 DIABETES MELLITUS WITHOUT COMPLICATION, UNSPECIFIED WHETHER LONG TERM INSULIN USE: ICD-10-CM

## 2021-04-14 DIAGNOSIS — E11.9 TYPE 2 DIABETES MELLITUS WITHOUT COMPLICATION, UNSPECIFIED WHETHER LONG TERM INSULIN USE: Primary | ICD-10-CM

## 2021-04-14 PROCEDURE — 3077F PR MOST RECENT SYSTOLIC BLOOD PRESSURE >= 140 MM HG: ICD-10-PCS | Mod: CPTII,S$GLB,, | Performed by: INTERNAL MEDICINE

## 2021-04-14 PROCEDURE — 1126F AMNT PAIN NOTED NONE PRSNT: CPT | Mod: S$GLB,,, | Performed by: INTERNAL MEDICINE

## 2021-04-14 PROCEDURE — 3077F SYST BP >= 140 MM HG: CPT | Mod: CPTII,S$GLB,, | Performed by: INTERNAL MEDICINE

## 2021-04-14 PROCEDURE — 99499 UNLISTED E&M SERVICE: CPT | Mod: S$GLB,,, | Performed by: INTERNAL MEDICINE

## 2021-04-14 PROCEDURE — 99214 PR OFFICE/OUTPT VISIT, EST, LEVL IV, 30-39 MIN: ICD-10-PCS | Mod: S$GLB,,, | Performed by: INTERNAL MEDICINE

## 2021-04-14 PROCEDURE — 99214 OFFICE O/P EST MOD 30 MIN: CPT | Mod: S$GLB,,, | Performed by: INTERNAL MEDICINE

## 2021-04-14 PROCEDURE — 99999 PR PBB SHADOW E&M-EST. PATIENT-LVL IV: ICD-10-PCS | Mod: PBBFAC,,, | Performed by: INTERNAL MEDICINE

## 2021-04-14 PROCEDURE — 99499 RISK ADDL DX/OHS AUDIT: ICD-10-PCS | Mod: S$GLB,,, | Performed by: INTERNAL MEDICINE

## 2021-04-14 PROCEDURE — 1159F PR MEDICATION LIST DOCUMENTED IN MEDICAL RECORD: ICD-10-PCS | Mod: S$GLB,,, | Performed by: INTERNAL MEDICINE

## 2021-04-14 PROCEDURE — G0108 DIAB MANAGE TRN  PER INDIV: HCPCS | Mod: S$GLB,,, | Performed by: FAMILY MEDICINE

## 2021-04-14 PROCEDURE — 3008F BODY MASS INDEX DOCD: CPT | Mod: CPTII,S$GLB,, | Performed by: INTERNAL MEDICINE

## 2021-04-14 PROCEDURE — 3080F DIAST BP >= 90 MM HG: CPT | Mod: CPTII,S$GLB,, | Performed by: INTERNAL MEDICINE

## 2021-04-14 PROCEDURE — G0108 PR DIAB MANAGE TRN  PER INDIV: ICD-10-PCS | Mod: S$GLB,,, | Performed by: FAMILY MEDICINE

## 2021-04-14 PROCEDURE — 3080F PR MOST RECENT DIASTOLIC BLOOD PRESSURE >= 90 MM HG: ICD-10-PCS | Mod: CPTII,S$GLB,, | Performed by: INTERNAL MEDICINE

## 2021-04-14 PROCEDURE — 99999 PR PBB SHADOW E&M-EST. PATIENT-LVL IV: CPT | Mod: PBBFAC,,, | Performed by: INTERNAL MEDICINE

## 2021-04-14 PROCEDURE — 99999 PR PBB SHADOW E&M-EST. PATIENT-LVL III: ICD-10-PCS | Mod: PBBFAC,,,

## 2021-04-14 PROCEDURE — 3046F PR MOST RECENT HEMOGLOBIN A1C LEVEL > 9.0%: ICD-10-PCS | Mod: CPTII,S$GLB,, | Performed by: INTERNAL MEDICINE

## 2021-04-14 PROCEDURE — 3046F HEMOGLOBIN A1C LEVEL >9.0%: CPT | Mod: CPTII,S$GLB,, | Performed by: INTERNAL MEDICINE

## 2021-04-14 PROCEDURE — 1159F MED LIST DOCD IN RCRD: CPT | Mod: S$GLB,,, | Performed by: INTERNAL MEDICINE

## 2021-04-14 PROCEDURE — 1126F PR PAIN SEVERITY QUANTIFIED, NO PAIN PRESENT: ICD-10-PCS | Mod: S$GLB,,, | Performed by: INTERNAL MEDICINE

## 2021-04-14 PROCEDURE — 3008F PR BODY MASS INDEX (BMI) DOCUMENTED: ICD-10-PCS | Mod: CPTII,S$GLB,, | Performed by: INTERNAL MEDICINE

## 2021-04-14 PROCEDURE — 99999 PR PBB SHADOW E&M-EST. PATIENT-LVL III: CPT | Mod: PBBFAC,,,

## 2021-04-14 RX ORDER — DAPAGLIFLOZIN 5 MG/1
5 TABLET, FILM COATED ORAL DAILY
Qty: 90 TABLET | Refills: 2 | Status: SHIPPED | OUTPATIENT
Start: 2021-04-14 | End: 2022-09-28 | Stop reason: SDUPTHER

## 2021-04-14 RX ORDER — METOPROLOL SUCCINATE 200 MG/1
200 TABLET, EXTENDED RELEASE ORAL DAILY
Qty: 90 TABLET | Refills: 2 | Status: SHIPPED | OUTPATIENT
Start: 2021-04-14 | End: 2021-10-19 | Stop reason: SDUPTHER

## 2021-04-30 ENCOUNTER — TELEPHONE (OUTPATIENT)
Dept: INTERNAL MEDICINE | Facility: CLINIC | Age: 71
End: 2021-04-30

## 2021-05-04 ENCOUNTER — LAB VISIT (OUTPATIENT)
Dept: LAB | Facility: HOSPITAL | Age: 71
End: 2021-05-04
Attending: INTERNAL MEDICINE
Payer: MEDICARE

## 2021-05-04 DIAGNOSIS — Z13.220 ENCOUNTER FOR LIPID SCREENING FOR CARDIOVASCULAR DISEASE: ICD-10-CM

## 2021-05-04 DIAGNOSIS — T46.6X5A STATIN MYOPATHY: ICD-10-CM

## 2021-05-04 DIAGNOSIS — Z13.6 ENCOUNTER FOR LIPID SCREENING FOR CARDIOVASCULAR DISEASE: ICD-10-CM

## 2021-05-04 DIAGNOSIS — G72.0 STATIN MYOPATHY: ICD-10-CM

## 2021-05-04 LAB
ALBUMIN SERPL BCP-MCNC: 4.1 G/DL (ref 3.5–5.2)
ALP SERPL-CCNC: 87 U/L (ref 55–135)
ALT SERPL W/O P-5'-P-CCNC: 55 U/L (ref 10–44)
ANION GAP SERPL CALC-SCNC: 10 MMOL/L (ref 8–16)
AST SERPL-CCNC: 38 U/L (ref 10–40)
BASOPHILS # BLD AUTO: 0.02 K/UL (ref 0–0.2)
BASOPHILS NFR BLD: 0.4 % (ref 0–1.9)
BILIRUB SERPL-MCNC: 0.4 MG/DL (ref 0.1–1)
BUN SERPL-MCNC: 15 MG/DL (ref 8–23)
CALCIUM SERPL-MCNC: 10.3 MG/DL (ref 8.7–10.5)
CHLORIDE SERPL-SCNC: 101 MMOL/L (ref 95–110)
CHOLEST SERPL-MCNC: 193 MG/DL (ref 120–199)
CHOLEST/HDLC SERPL: 4.8 {RATIO} (ref 2–5)
CK SERPL-CCNC: 1264 U/L (ref 20–200)
CO2 SERPL-SCNC: 32 MMOL/L (ref 23–29)
CREAT SERPL-MCNC: 0.9 MG/DL (ref 0.5–1.4)
CRP SERPL-MCNC: 15.4 MG/L (ref 0–8.2)
DIFFERENTIAL METHOD: ABNORMAL
EOSINOPHIL # BLD AUTO: 0.1 K/UL (ref 0–0.5)
EOSINOPHIL NFR BLD: 1.2 % (ref 0–8)
ERYTHROCYTE [DISTWIDTH] IN BLOOD BY AUTOMATED COUNT: 14.1 % (ref 11.5–14.5)
ERYTHROCYTE [SEDIMENTATION RATE] IN BLOOD BY WESTERGREN METHOD: 9 MM/HR (ref 0–23)
EST. GFR  (AFRICAN AMERICAN): >60 ML/MIN/1.73 M^2
EST. GFR  (NON AFRICAN AMERICAN): >60 ML/MIN/1.73 M^2
GLUCOSE SERPL-MCNC: 127 MG/DL (ref 70–110)
HCT VFR BLD AUTO: 44.9 % (ref 40–54)
HDLC SERPL-MCNC: 40 MG/DL (ref 40–75)
HDLC SERPL: 20.7 % (ref 20–50)
HGB BLD-MCNC: 14.2 G/DL (ref 14–18)
IMM GRANULOCYTES # BLD AUTO: 0.01 K/UL (ref 0–0.04)
IMM GRANULOCYTES NFR BLD AUTO: 0.2 % (ref 0–0.5)
LDLC SERPL CALC-MCNC: 128.8 MG/DL (ref 63–159)
LYMPHOCYTES # BLD AUTO: 1.8 K/UL (ref 1–4.8)
LYMPHOCYTES NFR BLD: 37.1 % (ref 18–48)
MCH RBC QN AUTO: 27.8 PG (ref 27–31)
MCHC RBC AUTO-ENTMCNC: 31.6 G/DL (ref 32–36)
MCV RBC AUTO: 88 FL (ref 82–98)
MONOCYTES # BLD AUTO: 0.3 K/UL (ref 0.3–1)
MONOCYTES NFR BLD: 5.9 % (ref 4–15)
NEUTROPHILS # BLD AUTO: 2.7 K/UL (ref 1.8–7.7)
NEUTROPHILS NFR BLD: 55.2 % (ref 38–73)
NONHDLC SERPL-MCNC: 153 MG/DL
NRBC BLD-RTO: 0 /100 WBC
PLATELET # BLD AUTO: 296 K/UL (ref 150–450)
PMV BLD AUTO: 11.2 FL (ref 9.2–12.9)
POTASSIUM SERPL-SCNC: 4.7 MMOL/L (ref 3.5–5.1)
PROT SERPL-MCNC: 7.8 G/DL (ref 6–8.4)
RBC # BLD AUTO: 5.1 M/UL (ref 4.6–6.2)
SODIUM SERPL-SCNC: 143 MMOL/L (ref 136–145)
TRIGL SERPL-MCNC: 121 MG/DL (ref 30–150)
WBC # BLD AUTO: 4.91 K/UL (ref 3.9–12.7)

## 2021-05-04 PROCEDURE — 86140 C-REACTIVE PROTEIN: CPT | Performed by: INTERNAL MEDICINE

## 2021-05-04 PROCEDURE — 80053 COMPREHEN METABOLIC PANEL: CPT | Performed by: INTERNAL MEDICINE

## 2021-05-04 PROCEDURE — 80061 LIPID PANEL: CPT | Performed by: INTERNAL MEDICINE

## 2021-05-04 PROCEDURE — 85652 RBC SED RATE AUTOMATED: CPT | Performed by: INTERNAL MEDICINE

## 2021-05-04 PROCEDURE — 82085 ASSAY OF ALDOLASE: CPT | Performed by: INTERNAL MEDICINE

## 2021-05-04 PROCEDURE — 82550 ASSAY OF CK (CPK): CPT | Performed by: INTERNAL MEDICINE

## 2021-05-04 PROCEDURE — 85025 COMPLETE CBC W/AUTO DIFF WBC: CPT | Performed by: INTERNAL MEDICINE

## 2021-05-04 PROCEDURE — 36415 COLL VENOUS BLD VENIPUNCTURE: CPT | Performed by: INTERNAL MEDICINE

## 2021-05-05 LAB — ALDOLASE SERPL-CCNC: 11.8 U/L (ref 1.2–7.6)

## 2021-05-06 DIAGNOSIS — E11.9 TYPE 2 DIABETES MELLITUS WITHOUT COMPLICATION: ICD-10-CM

## 2021-05-17 ENCOUNTER — PATIENT OUTREACH (OUTPATIENT)
Dept: ADMINISTRATIVE | Facility: OTHER | Age: 71
End: 2021-05-17

## 2021-05-17 ENCOUNTER — LAB VISIT (OUTPATIENT)
Dept: LAB | Facility: OTHER | Age: 71
End: 2021-05-17
Payer: MEDICARE

## 2021-05-17 DIAGNOSIS — G72.0 STATIN MYOPATHY: ICD-10-CM

## 2021-05-17 DIAGNOSIS — E11.9 TYPE 2 DIABETES MELLITUS WITHOUT COMPLICATION, UNSPECIFIED WHETHER LONG TERM INSULIN USE: ICD-10-CM

## 2021-05-17 DIAGNOSIS — Z12.11 ENCOUNTER FOR COLORECTAL CANCER SCREENING: Primary | ICD-10-CM

## 2021-05-17 DIAGNOSIS — T46.6X5A STATIN MYOPATHY: ICD-10-CM

## 2021-05-17 DIAGNOSIS — Z12.12 ENCOUNTER FOR COLORECTAL CANCER SCREENING: Primary | ICD-10-CM

## 2021-05-17 LAB
ALBUMIN SERPL BCP-MCNC: 4 G/DL (ref 3.5–5.2)
ALP SERPL-CCNC: 82 U/L (ref 55–135)
ALT SERPL W/O P-5'-P-CCNC: 52 U/L (ref 10–44)
ANION GAP SERPL CALC-SCNC: 9 MMOL/L (ref 8–16)
AST SERPL-CCNC: 38 U/L (ref 10–40)
BASOPHILS # BLD AUTO: 0.02 K/UL (ref 0–0.2)
BASOPHILS NFR BLD: 0.4 % (ref 0–1.9)
BILIRUB SERPL-MCNC: 0.3 MG/DL (ref 0.1–1)
BUN SERPL-MCNC: 12 MG/DL (ref 8–23)
CALCIUM SERPL-MCNC: 9.2 MG/DL (ref 8.7–10.5)
CHLORIDE SERPL-SCNC: 101 MMOL/L (ref 95–110)
CK SERPL-CCNC: 1362 U/L (ref 20–200)
CO2 SERPL-SCNC: 33 MMOL/L (ref 23–29)
CREAT SERPL-MCNC: 0.9 MG/DL (ref 0.5–1.4)
CRP SERPL-MCNC: 14 MG/L (ref 0–8.2)
DIFFERENTIAL METHOD: ABNORMAL
EOSINOPHIL # BLD AUTO: 0.1 K/UL (ref 0–0.5)
EOSINOPHIL NFR BLD: 1.3 % (ref 0–8)
ERYTHROCYTE [DISTWIDTH] IN BLOOD BY AUTOMATED COUNT: 13.8 % (ref 11.5–14.5)
ERYTHROCYTE [SEDIMENTATION RATE] IN BLOOD: 5 MM/HR (ref 0–10)
EST. GFR  (AFRICAN AMERICAN): >60 ML/MIN/1.73 M^2
EST. GFR  (NON AFRICAN AMERICAN): >60 ML/MIN/1.73 M^2
ESTIMATED AVG GLUCOSE: 192 MG/DL (ref 68–131)
GLUCOSE SERPL-MCNC: 163 MG/DL (ref 70–110)
HBA1C MFR BLD: 8.3 % (ref 4–5.6)
HCT VFR BLD AUTO: 44 % (ref 40–54)
HGB BLD-MCNC: 13.8 G/DL (ref 14–18)
IMM GRANULOCYTES # BLD AUTO: 0.01 K/UL (ref 0–0.04)
IMM GRANULOCYTES NFR BLD AUTO: 0.2 % (ref 0–0.5)
LYMPHOCYTES # BLD AUTO: 1.8 K/UL (ref 1–4.8)
LYMPHOCYTES NFR BLD: 33.5 % (ref 18–48)
MCH RBC QN AUTO: 27.3 PG (ref 27–31)
MCHC RBC AUTO-ENTMCNC: 31.4 G/DL (ref 32–36)
MCV RBC AUTO: 87 FL (ref 82–98)
MONOCYTES # BLD AUTO: 0.4 K/UL (ref 0.3–1)
MONOCYTES NFR BLD: 7.1 % (ref 4–15)
NEUTROPHILS # BLD AUTO: 3.1 K/UL (ref 1.8–7.7)
NEUTROPHILS NFR BLD: 57.5 % (ref 38–73)
NRBC BLD-RTO: 0 /100 WBC
PLATELET # BLD AUTO: 282 K/UL (ref 150–450)
PMV BLD AUTO: 11.4 FL (ref 9.2–12.9)
POTASSIUM SERPL-SCNC: 4.7 MMOL/L (ref 3.5–5.1)
PROT SERPL-MCNC: 7.4 G/DL (ref 6–8.4)
RBC # BLD AUTO: 5.05 M/UL (ref 4.6–6.2)
SODIUM SERPL-SCNC: 143 MMOL/L (ref 136–145)
WBC # BLD AUTO: 5.38 K/UL (ref 3.9–12.7)

## 2021-05-17 PROCEDURE — 82550 ASSAY OF CK (CPK): CPT | Performed by: INTERNAL MEDICINE

## 2021-05-17 PROCEDURE — 85025 COMPLETE CBC W/AUTO DIFF WBC: CPT | Performed by: INTERNAL MEDICINE

## 2021-05-17 PROCEDURE — 83036 HEMOGLOBIN GLYCOSYLATED A1C: CPT | Performed by: INTERNAL MEDICINE

## 2021-05-17 PROCEDURE — 82085 ASSAY OF ALDOLASE: CPT | Performed by: INTERNAL MEDICINE

## 2021-05-17 PROCEDURE — 86140 C-REACTIVE PROTEIN: CPT | Performed by: INTERNAL MEDICINE

## 2021-05-17 PROCEDURE — 36415 COLL VENOUS BLD VENIPUNCTURE: CPT | Performed by: INTERNAL MEDICINE

## 2021-05-17 PROCEDURE — 80053 COMPREHEN METABOLIC PANEL: CPT | Performed by: INTERNAL MEDICINE

## 2021-05-17 PROCEDURE — 85651 RBC SED RATE NONAUTOMATED: CPT | Performed by: INTERNAL MEDICINE

## 2021-05-18 ENCOUNTER — CLINICAL SUPPORT (OUTPATIENT)
Dept: DIABETES | Facility: CLINIC | Age: 71
End: 2021-05-18
Attending: INTERNAL MEDICINE
Payer: MEDICARE

## 2021-05-18 ENCOUNTER — CLINICAL SUPPORT (OUTPATIENT)
Dept: INTERNAL MEDICINE | Facility: CLINIC | Age: 71
End: 2021-05-18
Payer: MEDICARE

## 2021-05-18 VITALS
HEIGHT: 70 IN | HEART RATE: 80 BPM | BODY MASS INDEX: 40.87 KG/M2 | SYSTOLIC BLOOD PRESSURE: 130 MMHG | DIASTOLIC BLOOD PRESSURE: 78 MMHG | OXYGEN SATURATION: 94 % | WEIGHT: 285.5 LBS

## 2021-05-18 DIAGNOSIS — E11.69 TYPE 2 DIABETES MELLITUS WITH OTHER SPECIFIED COMPLICATION, WITHOUT LONG-TERM CURRENT USE OF INSULIN: ICD-10-CM

## 2021-05-18 PROCEDURE — 99999 PR PBB SHADOW E&M-EST. PATIENT-LVL I: ICD-10-PCS | Mod: PBBFAC,,,

## 2021-05-18 PROCEDURE — 99999 PR PBB SHADOW E&M-EST. PATIENT-LVL I: CPT | Mod: PBBFAC,,,

## 2021-05-18 PROCEDURE — 99999 PR PBB SHADOW E&M-EST. PATIENT-LVL III: CPT | Mod: PBBFAC,,,

## 2021-05-18 PROCEDURE — G0108 PR DIAB MANAGE TRN  PER INDIV: ICD-10-PCS | Mod: S$GLB,,, | Performed by: FAMILY MEDICINE

## 2021-05-18 PROCEDURE — 99999 PR PBB SHADOW E&M-EST. PATIENT-LVL III: ICD-10-PCS | Mod: PBBFAC,,,

## 2021-05-18 PROCEDURE — G0108 DIAB MANAGE TRN  PER INDIV: HCPCS | Mod: S$GLB,,, | Performed by: FAMILY MEDICINE

## 2021-05-19 LAB — ALDOLASE SERPL-CCNC: 11.4 U/L (ref 1.2–7.6)

## 2021-05-24 DIAGNOSIS — I10 ESSENTIAL HYPERTENSION: ICD-10-CM

## 2021-05-24 RX ORDER — BENAZEPRIL HYDROCHLORIDE 40 MG/1
40 TABLET ORAL DAILY
Qty: 90 TABLET | Refills: 3 | Status: SHIPPED | OUTPATIENT
Start: 2021-05-24 | End: 2022-06-13

## 2021-06-10 NOTE — PROGRESS NOTES
Lm wishing patient nothing but the best   [facility] Hospital Medicine  Progress Note    Patient Name: Serafin Tapia  MRN: [id number]  Patient Class: IP- Inpatient   Admission Date: 10/29/2017  Length of Stay: 12 days  Attending Physician: Henrietta Currie MD  Primary Care Provider: John Vargas MD        Subjective:     Principal Problem:Non-traumatic rhabdomyolysis    HPI:  Patient is a 66 year-old male with hypertension, diabetes mellitus type II, dyslipidemia who presents to the emergency department by the emergency medical services after he was found to be confused with capillary blood glucose measurement of 40 mg/dL.  Patient was given an ampule of dextrose with resolution of hypoglycemia and normalization of his mental status.  However while in the emergency department he had recurrence of hypoglycemia requiring further intervenous dextrose.  He denies any recent changes in his medications or recent illnesses.  He reports no changes in his diet.  He reports that he has not been checking his blood glucose as his home glucometer is broken.    Hospital Course:  Patient admitted to the hospital for treatment of acute renal failure and refractory hypoglycemia secondary to sulfonylurea use.  Patient given isotonic intravenous with dextrose fluids with improvement in serum creatinine.  The patient also has suspected rhabdomyolysis with initial CPK >5000.  Aggressive hydration not tolerated well and patient clinically has signs of fluid overload with pulmonary and peripheral edema.  Suspect decompensated diastolic heart failure, so fluids discontinued and patient will continue IV Lasix.  CPK remains elevated, but is slightly less today at 5549 from 5974 .        Interval History: The patient has improved respirations today    Review of Systems   Constitutional: Positive for fatigue.   Respiratory: Positive for cough, choking and shortness of breath.    Cardiovascular: Positive for leg swelling.     Objective:     Vital Signs (Most  Recent):  Temp: 98.3 °F (36.8 °C) (11/10/17 1559)  Pulse: 73 (11/10/17 1559)  Resp: 18 (11/10/17 1133)  BP: 124/62 (11/10/17 1559)  SpO2: 96 % (11/10/17 1559) Vital Signs (24h Range):  Temp:  [97.6 °F (36.4 °C)-98.4 °F (36.9 °C)] 98.3 °F (36.8 °C)  Pulse:  [61-76] 73  Resp:  [16-18] 18  SpO2:  [93 %-98 %] 96 %  BP: (109-131)/(54-69) 124/62     Weight: 120.9 kg (266 lb 9.6 oz)  Body mass index is 38.25 kg/m².    Intake/Output Summary (Last 24 hours) at 11/10/17 1631  Last data filed at 11/10/17 1400   Gross per 24 hour   Intake             2020 ml   Output             2001 ml   Net               19 ml      Physical Exam   Constitutional: He is oriented to person, place, and time. He appears well-developed and well-nourished.   Morbidly obese male with breathlessness and SOB   HENT:   Head: Normocephalic and atraumatic.   Eyes: Conjunctivae are normal. Pupils are equal, round, and reactive to light.   Neck: Normal range of motion. Neck supple.   Unable to assess for the presence of JVD as the patient is obese   Cardiovascular: Normal rate, regular rhythm, normal heart sounds and intact distal pulses.  Exam reveals no gallop and no friction rub.    No murmur heard.  Pulmonary/Chest: Breath sounds normal. He has no rales.   Diminished BS at the bases   Abdominal: Soft. Bowel sounds are normal.   Musculoskeletal: He exhibits edema.   Upper extremity edema bilaterally and 2+ pitting edema of legs bilaterally   Neurological: He is alert and oriented to person, place, and time.   Proximal muscular weakness shoulder and hip bilat.   Skin: Skin is warm and dry.   Psychiatric: He has a normal mood and affect. His behavior is normal. Judgment and thought content normal.       Significant Labs:   CBC: No results for input(s): WBC, HGB, HCT, PLT in the last 48 hours.  CMP:   Recent Labs  Lab 11/09/17  0512 11/10/17  0422    141   K 3.7 3.9    96   CO2 35* 39*    112*   BUN 7* 9   CREATININE 0.6 0.6   CALCIUM  8.9 9.1   ANIONGAP 7* 6*   EGFRNONAA >60 >60       Significant Imaging: I have reviewed and interpreted all pertinent imaging results/findings within the past 24 hours.    Assessment/Plan:      * Non-traumatic rhabdomyolysis    - Has proximal muscle weakness.  - CPK elevated more today.  -Discontinued IV fluids  -Question this diagnosis, with elevated ESR and CRP checked HALLE for other etiologies of elevated CPK and this is still pending  -                Adverse reaction to oral hypoglycemic drug, initial encounter    - Hypoglycemia due to decreased clearance due to renal failure.   - Rehydrated and renal function at baseline.   - Discontinued sulfonylurea.    - Resolved.         Acute diastolic heart failure    Clinical signs of fluid overload are improving  Decrease Lasix 40mg IV  CXR improved       Hypomagnesemia    Oral replacement of magnesium and continue to monitor.             Vitamin D deficiency    Continue Vitamin D supplementation          Folate deficiency    - Replace.          Anemia due to vitamin B12 deficiency    - Ferritin elevated.    - Also replacing folic acid.   -No need for transfusion        Vitamin B 12 deficiency    - MMA WNL.  Homocysteine WNL.   - B-12 1000 mcg daily for 5 days, then stop.            Hypothyroidism (acquired)    - Continue levothyroxine 25mcg       History of pulmonary embolism    - Pulmonary embolus in 2014.    - Held anticoagulation in the setting of renal failure, now resolved.   - Xarelto.   - Follow up with PCP.         Morbid obesity with BMI of 40.0-44.9, adult    Address the importance of a prudent 2000 ADA, 2 gram sodium diet upon discharge with some aerobic exercise          Controlled type 2 diabetes mellitus with diabetic polyneuropathy, without long-term current use of insulin    - A1c = 5.5%.  - Well controlled.    - Discontinue Metformin             Dyslipidemia    - Atorvastatin discontinued for elevated CPK.    -Continue to hold          Essential  hypertension    Discontinued HCTZ / spironolactone.  Good control on current regimen.          VTE Risk Mitigation         Ordered     rivaroxaban tablet 20 mg  With dinner     Route:  Oral        10/31/17 1303     Medium Risk of VTE  Once      10/29/17 1529              Henrietta Currie MD  Department of Hospital Medicine   [facility]

## 2021-06-29 ENCOUNTER — TELEPHONE (OUTPATIENT)
Dept: PHARMACY | Facility: CLINIC | Age: 71
End: 2021-06-29

## 2021-06-30 NOTE — BRIEF OP NOTE
Left message to call back [facility]  Brief Operative Note    SUMMARY     Surgery Date: 9/10/2019     Surgeon(s) and Role:     * Nhan Ruth MD - Primary    Assisting Surgeon: None    Pre-op Diagnosis: Phimosis [N47.1]    Post-op Diagnosis:  Post-Op Diagnosis Codes:     * Phimosis [N47.1]    Procedure(s) (LRB):  CIRCUMCISION (N/A)    Anesthesia: General    Description of Procedure: Circumcision successfully performed.    Description of the findings of the procedure: Patient had tight phimosis preventing retraction of foreskin. This required a dorsal slit to be performed in order to retract foreskin. Circumcision then successfully performed.    Estimated Blood Loss: * No values recorded between 9/10/2019  8:13 AM and 9/10/2019  9:20 AM *         Specimens:   Specimen (12h ago, onward)    Start     Ordered    09/10/19 0837  Specimen to Pathology - Surgery  Once     Comments:  1.  Foreskin     Start Status     09/10/19 0837 Collected (09/10/19 0837) Order ID: [id number]       09/10/19 0836

## 2021-07-01 ENCOUNTER — TELEPHONE (OUTPATIENT)
Dept: INTERNAL MEDICINE | Facility: CLINIC | Age: 71
End: 2021-07-01

## 2021-07-01 DIAGNOSIS — E78.5 HYPERLIPIDEMIA, UNSPECIFIED HYPERLIPIDEMIA TYPE: ICD-10-CM

## 2021-07-02 ENCOUNTER — PATIENT MESSAGE (OUTPATIENT)
Dept: RHEUMATOLOGY | Facility: CLINIC | Age: 71
End: 2021-07-02

## 2021-07-02 ENCOUNTER — PATIENT OUTREACH (OUTPATIENT)
Dept: ADMINISTRATIVE | Facility: OTHER | Age: 71
End: 2021-07-02

## 2021-07-06 ENCOUNTER — LAB VISIT (OUTPATIENT)
Dept: LAB | Facility: HOSPITAL | Age: 71
End: 2021-07-06
Attending: INTERNAL MEDICINE
Payer: MEDICARE

## 2021-07-06 ENCOUNTER — OFFICE VISIT (OUTPATIENT)
Dept: RHEUMATOLOGY | Facility: CLINIC | Age: 71
End: 2021-07-06
Payer: MEDICARE

## 2021-07-06 VITALS
BODY MASS INDEX: 42.42 KG/M2 | DIASTOLIC BLOOD PRESSURE: 91 MMHG | HEART RATE: 75 BPM | SYSTOLIC BLOOD PRESSURE: 186 MMHG | HEIGHT: 70 IN | WEIGHT: 296.31 LBS

## 2021-07-06 DIAGNOSIS — G72.0 STATIN MYOPATHY: Primary | ICD-10-CM

## 2021-07-06 DIAGNOSIS — T46.6X5A STATIN MYOPATHY: ICD-10-CM

## 2021-07-06 DIAGNOSIS — M62.82 NON-TRAUMATIC RHABDOMYOLYSIS: ICD-10-CM

## 2021-07-06 DIAGNOSIS — T46.6X5A STATIN MYOPATHY: Primary | ICD-10-CM

## 2021-07-06 DIAGNOSIS — G72.0 STATIN MYOPATHY: ICD-10-CM

## 2021-07-06 LAB
ALBUMIN SERPL BCP-MCNC: 4 G/DL (ref 3.5–5.2)
ALP SERPL-CCNC: 86 U/L (ref 55–135)
ALT SERPL W/O P-5'-P-CCNC: 44 U/L (ref 10–44)
ANION GAP SERPL CALC-SCNC: 6 MMOL/L (ref 8–16)
AST SERPL-CCNC: 32 U/L (ref 10–40)
BASOPHILS # BLD AUTO: 0.02 K/UL (ref 0–0.2)
BASOPHILS NFR BLD: 0.5 % (ref 0–1.9)
BILIRUB SERPL-MCNC: 0.4 MG/DL (ref 0.1–1)
BUN SERPL-MCNC: 13 MG/DL (ref 8–23)
CALCIUM SERPL-MCNC: 10 MG/DL (ref 8.7–10.5)
CHLORIDE SERPL-SCNC: 103 MMOL/L (ref 95–110)
CK SERPL-CCNC: 1309 U/L (ref 20–200)
CO2 SERPL-SCNC: 36 MMOL/L (ref 23–29)
CREAT SERPL-MCNC: 0.9 MG/DL (ref 0.5–1.4)
CRP SERPL-MCNC: 15.3 MG/L (ref 0–8.2)
DIFFERENTIAL METHOD: ABNORMAL
EOSINOPHIL # BLD AUTO: 0.1 K/UL (ref 0–0.5)
EOSINOPHIL NFR BLD: 2 % (ref 0–8)
ERYTHROCYTE [DISTWIDTH] IN BLOOD BY AUTOMATED COUNT: 13.9 % (ref 11.5–14.5)
ERYTHROCYTE [SEDIMENTATION RATE] IN BLOOD BY WESTERGREN METHOD: 8 MM/HR (ref 0–23)
EST. GFR  (AFRICAN AMERICAN): >60 ML/MIN/1.73 M^2
EST. GFR  (NON AFRICAN AMERICAN): >60 ML/MIN/1.73 M^2
GLUCOSE SERPL-MCNC: 167 MG/DL (ref 70–110)
HCT VFR BLD AUTO: 45.3 % (ref 40–54)
HGB BLD-MCNC: 14.4 G/DL (ref 14–18)
IMM GRANULOCYTES # BLD AUTO: 0.01 K/UL (ref 0–0.04)
IMM GRANULOCYTES NFR BLD AUTO: 0.2 % (ref 0–0.5)
LYMPHOCYTES # BLD AUTO: 1.7 K/UL (ref 1–4.8)
LYMPHOCYTES NFR BLD: 39.1 % (ref 18–48)
MCH RBC QN AUTO: 28.1 PG (ref 27–31)
MCHC RBC AUTO-ENTMCNC: 31.8 G/DL (ref 32–36)
MCV RBC AUTO: 88 FL (ref 82–98)
MONOCYTES # BLD AUTO: 0.3 K/UL (ref 0.3–1)
MONOCYTES NFR BLD: 7.7 % (ref 4–15)
NEUTROPHILS # BLD AUTO: 2.2 K/UL (ref 1.8–7.7)
NEUTROPHILS NFR BLD: 50.5 % (ref 38–73)
NRBC BLD-RTO: 0 /100 WBC
PLATELET # BLD AUTO: 260 K/UL (ref 150–450)
PMV BLD AUTO: 11.6 FL (ref 9.2–12.9)
POTASSIUM SERPL-SCNC: 4.9 MMOL/L (ref 3.5–5.1)
PROT SERPL-MCNC: 7.5 G/DL (ref 6–8.4)
RBC # BLD AUTO: 5.13 M/UL (ref 4.6–6.2)
SODIUM SERPL-SCNC: 145 MMOL/L (ref 136–145)
WBC # BLD AUTO: 4.42 K/UL (ref 3.9–12.7)

## 2021-07-06 PROCEDURE — 1159F PR MEDICATION LIST DOCUMENTED IN MEDICAL RECORD: ICD-10-PCS | Mod: S$GLB,,, | Performed by: INTERNAL MEDICINE

## 2021-07-06 PROCEDURE — 99999 PR PBB SHADOW E&M-EST. PATIENT-LVL III: CPT | Mod: PBBFAC,,, | Performed by: INTERNAL MEDICINE

## 2021-07-06 PROCEDURE — 3008F BODY MASS INDEX DOCD: CPT | Mod: CPTII,S$GLB,, | Performed by: INTERNAL MEDICINE

## 2021-07-06 PROCEDURE — 3008F PR BODY MASS INDEX (BMI) DOCUMENTED: ICD-10-PCS | Mod: CPTII,S$GLB,, | Performed by: INTERNAL MEDICINE

## 2021-07-06 PROCEDURE — 1159F MED LIST DOCD IN RCRD: CPT | Mod: S$GLB,,, | Performed by: INTERNAL MEDICINE

## 2021-07-06 PROCEDURE — 85025 COMPLETE CBC W/AUTO DIFF WBC: CPT | Performed by: INTERNAL MEDICINE

## 2021-07-06 PROCEDURE — 80053 COMPREHEN METABOLIC PANEL: CPT | Performed by: INTERNAL MEDICINE

## 2021-07-06 PROCEDURE — 86140 C-REACTIVE PROTEIN: CPT | Performed by: INTERNAL MEDICINE

## 2021-07-06 PROCEDURE — 99499 UNLISTED E&M SERVICE: CPT | Mod: S$GLB,,, | Performed by: INTERNAL MEDICINE

## 2021-07-06 PROCEDURE — 99214 OFFICE O/P EST MOD 30 MIN: CPT | Mod: S$GLB,,, | Performed by: INTERNAL MEDICINE

## 2021-07-06 PROCEDURE — 82550 ASSAY OF CK (CPK): CPT | Performed by: INTERNAL MEDICINE

## 2021-07-06 PROCEDURE — 85652 RBC SED RATE AUTOMATED: CPT | Performed by: INTERNAL MEDICINE

## 2021-07-06 PROCEDURE — 36415 COLL VENOUS BLD VENIPUNCTURE: CPT | Performed by: INTERNAL MEDICINE

## 2021-07-06 PROCEDURE — 99499 RISK ADDL DX/OHS AUDIT: ICD-10-PCS | Mod: S$GLB,,, | Performed by: INTERNAL MEDICINE

## 2021-07-06 PROCEDURE — 99999 PR PBB SHADOW E&M-EST. PATIENT-LVL III: ICD-10-PCS | Mod: PBBFAC,,, | Performed by: INTERNAL MEDICINE

## 2021-07-06 PROCEDURE — 82085 ASSAY OF ALDOLASE: CPT | Performed by: INTERNAL MEDICINE

## 2021-07-06 PROCEDURE — 1126F AMNT PAIN NOTED NONE PRSNT: CPT | Mod: S$GLB,,, | Performed by: INTERNAL MEDICINE

## 2021-07-06 PROCEDURE — 99214 PR OFFICE/OUTPT VISIT, EST, LEVL IV, 30-39 MIN: ICD-10-PCS | Mod: S$GLB,,, | Performed by: INTERNAL MEDICINE

## 2021-07-06 PROCEDURE — 1126F PR PAIN SEVERITY QUANTIFIED, NO PAIN PRESENT: ICD-10-PCS | Mod: S$GLB,,, | Performed by: INTERNAL MEDICINE

## 2021-07-06 RX ORDER — METHOTREXATE 2.5 MG/1
TABLET ORAL
Qty: 40 TABLET | Refills: 5 | Status: SHIPPED | OUTPATIENT
Start: 2021-07-06 | End: 2022-11-14 | Stop reason: SDUPTHER

## 2021-07-06 RX ORDER — FOLIC ACID 1 MG/1
1000 TABLET ORAL DAILY
Qty: 90 TABLET | Refills: 3 | Status: SHIPPED | OUTPATIENT
Start: 2021-07-06

## 2021-07-07 LAB — ALDOLASE SERPL-CCNC: 10.3 U/L (ref 1.2–7.6)

## 2021-09-17 ENCOUNTER — SPECIALTY PHARMACY (OUTPATIENT)
Dept: PHARMACY | Facility: CLINIC | Age: 71
End: 2021-09-17

## 2021-10-04 ENCOUNTER — PATIENT MESSAGE (OUTPATIENT)
Dept: ADMINISTRATIVE | Facility: HOSPITAL | Age: 71
End: 2021-10-04

## 2021-10-14 ENCOUNTER — OFFICE VISIT (OUTPATIENT)
Dept: INTERNAL MEDICINE | Facility: CLINIC | Age: 71
End: 2021-10-14
Attending: INTERNAL MEDICINE
Payer: MEDICARE

## 2021-10-14 VITALS
WEIGHT: 283.94 LBS | HEIGHT: 70 IN | BODY MASS INDEX: 40.65 KG/M2 | DIASTOLIC BLOOD PRESSURE: 88 MMHG | OXYGEN SATURATION: 95 % | HEART RATE: 69 BPM | SYSTOLIC BLOOD PRESSURE: 160 MMHG

## 2021-10-14 DIAGNOSIS — E55.9 VITAMIN D INSUFFICIENCY: ICD-10-CM

## 2021-10-14 DIAGNOSIS — T46.6X5A STATIN MYOPATHY: ICD-10-CM

## 2021-10-14 DIAGNOSIS — Z80.42 FAMILY HISTORY OF PROSTATE CANCER: ICD-10-CM

## 2021-10-14 DIAGNOSIS — E53.8 VITAMIN B 12 DEFICIENCY: ICD-10-CM

## 2021-10-14 DIAGNOSIS — E78.00 PURE HYPERCHOLESTEROLEMIA: ICD-10-CM

## 2021-10-14 DIAGNOSIS — G72.0 STATIN MYOPATHY: ICD-10-CM

## 2021-10-14 DIAGNOSIS — E03.9 HYPOTHYROIDISM (ACQUIRED): ICD-10-CM

## 2021-10-14 DIAGNOSIS — E11.42 CONTROLLED TYPE 2 DIABETES MELLITUS WITH DIABETIC POLYNEUROPATHY, WITHOUT LONG-TERM CURRENT USE OF INSULIN: Primary | ICD-10-CM

## 2021-10-14 DIAGNOSIS — Z12.5 ENCOUNTER FOR SCREENING FOR MALIGNANT NEOPLASM OF PROSTATE: ICD-10-CM

## 2021-10-14 PROCEDURE — 1126F PR PAIN SEVERITY QUANTIFIED, NO PAIN PRESENT: ICD-10-PCS | Mod: CPTII,S$GLB,, | Performed by: INTERNAL MEDICINE

## 2021-10-14 PROCEDURE — 3077F PR MOST RECENT SYSTOLIC BLOOD PRESSURE >= 140 MM HG: ICD-10-PCS | Mod: CPTII,S$GLB,, | Performed by: INTERNAL MEDICINE

## 2021-10-14 PROCEDURE — 3288F PR FALLS RISK ASSESSMENT DOCUMENTED: ICD-10-PCS | Mod: CPTII,S$GLB,, | Performed by: INTERNAL MEDICINE

## 2021-10-14 PROCEDURE — 3052F PR MOST RECENT HEMOGLOBIN A1C LEVEL 8.0 - < 9.0%: ICD-10-PCS | Mod: CPTII,S$GLB,, | Performed by: INTERNAL MEDICINE

## 2021-10-14 PROCEDURE — 99214 OFFICE O/P EST MOD 30 MIN: CPT | Mod: S$GLB,,, | Performed by: INTERNAL MEDICINE

## 2021-10-14 PROCEDURE — 4010F ACE/ARB THERAPY RXD/TAKEN: CPT | Mod: CPTII,S$GLB,, | Performed by: INTERNAL MEDICINE

## 2021-10-14 PROCEDURE — 99499 RISK ADDL DX/OHS AUDIT: ICD-10-PCS | Mod: S$GLB,,, | Performed by: INTERNAL MEDICINE

## 2021-10-14 PROCEDURE — 99999 PR PBB SHADOW E&M-EST. PATIENT-LVL IV: CPT | Mod: PBBFAC,,, | Performed by: INTERNAL MEDICINE

## 2021-10-14 PROCEDURE — 99499 UNLISTED E&M SERVICE: CPT | Mod: S$GLB,,, | Performed by: INTERNAL MEDICINE

## 2021-10-14 PROCEDURE — 3288F FALL RISK ASSESSMENT DOCD: CPT | Mod: CPTII,S$GLB,, | Performed by: INTERNAL MEDICINE

## 2021-10-14 PROCEDURE — 3052F HG A1C>EQUAL 8.0%<EQUAL 9.0%: CPT | Mod: CPTII,S$GLB,, | Performed by: INTERNAL MEDICINE

## 2021-10-14 PROCEDURE — 3079F DIAST BP 80-89 MM HG: CPT | Mod: CPTII,S$GLB,, | Performed by: INTERNAL MEDICINE

## 2021-10-14 PROCEDURE — 1101F PT FALLS ASSESS-DOCD LE1/YR: CPT | Mod: CPTII,S$GLB,, | Performed by: INTERNAL MEDICINE

## 2021-10-14 PROCEDURE — 1101F PR PT FALLS ASSESS DOC 0-1 FALLS W/OUT INJ PAST YR: ICD-10-PCS | Mod: CPTII,S$GLB,, | Performed by: INTERNAL MEDICINE

## 2021-10-14 PROCEDURE — 99214 PR OFFICE/OUTPT VISIT, EST, LEVL IV, 30-39 MIN: ICD-10-PCS | Mod: S$GLB,,, | Performed by: INTERNAL MEDICINE

## 2021-10-14 PROCEDURE — 3077F SYST BP >= 140 MM HG: CPT | Mod: CPTII,S$GLB,, | Performed by: INTERNAL MEDICINE

## 2021-10-14 PROCEDURE — 3079F PR MOST RECENT DIASTOLIC BLOOD PRESSURE 80-89 MM HG: ICD-10-PCS | Mod: CPTII,S$GLB,, | Performed by: INTERNAL MEDICINE

## 2021-10-14 PROCEDURE — 4010F PR ACE/ARB THEARPY RXD/TAKEN: ICD-10-PCS | Mod: CPTII,S$GLB,, | Performed by: INTERNAL MEDICINE

## 2021-10-14 PROCEDURE — 3008F BODY MASS INDEX DOCD: CPT | Mod: CPTII,S$GLB,, | Performed by: INTERNAL MEDICINE

## 2021-10-14 PROCEDURE — 1126F AMNT PAIN NOTED NONE PRSNT: CPT | Mod: CPTII,S$GLB,, | Performed by: INTERNAL MEDICINE

## 2021-10-14 PROCEDURE — 3008F PR BODY MASS INDEX (BMI) DOCUMENTED: ICD-10-PCS | Mod: CPTII,S$GLB,, | Performed by: INTERNAL MEDICINE

## 2021-10-14 PROCEDURE — 99999 PR PBB SHADOW E&M-EST. PATIENT-LVL IV: ICD-10-PCS | Mod: PBBFAC,,, | Performed by: INTERNAL MEDICINE

## 2021-10-14 RX ORDER — CHLORTHALIDONE 25 MG/1
25 TABLET ORAL DAILY
Qty: 30 TABLET | Refills: 11 | Status: SHIPPED | OUTPATIENT
Start: 2021-10-14 | End: 2021-11-11 | Stop reason: SDUPTHER

## 2021-10-14 RX ORDER — ALIROCUMAB 150 MG/ML
150 INJECTION, SOLUTION SUBCUTANEOUS
Qty: 6 ML | Refills: 3 | Status: SHIPPED | OUTPATIENT
Start: 2021-10-14 | End: 2021-10-15 | Stop reason: SDUPTHER

## 2021-10-15 ENCOUNTER — SPECIALTY PHARMACY (OUTPATIENT)
Dept: PHARMACY | Facility: CLINIC | Age: 71
End: 2021-10-15
Payer: MEDICARE

## 2021-10-19 DIAGNOSIS — I10 ESSENTIAL HYPERTENSION: ICD-10-CM

## 2021-10-19 RX ORDER — METOPROLOL SUCCINATE 200 MG/1
200 TABLET, EXTENDED RELEASE ORAL DAILY
Qty: 90 TABLET | Refills: 2 | Status: SHIPPED | OUTPATIENT
Start: 2021-10-19 | End: 2022-11-14 | Stop reason: SDUPTHER

## 2021-10-25 ENCOUNTER — PATIENT OUTREACH (OUTPATIENT)
Dept: ADMINISTRATIVE | Facility: HOSPITAL | Age: 71
End: 2021-10-25
Payer: MEDICARE

## 2021-10-25 ENCOUNTER — IMMUNIZATION (OUTPATIENT)
Dept: PHARMACY | Facility: CLINIC | Age: 71
End: 2021-10-25
Payer: MEDICARE

## 2021-10-25 DIAGNOSIS — Z12.12 ENCOUNTER FOR COLORECTAL CANCER SCREENING: Primary | ICD-10-CM

## 2021-10-25 DIAGNOSIS — Z12.11 ENCOUNTER FOR COLORECTAL CANCER SCREENING: Primary | ICD-10-CM

## 2021-11-11 ENCOUNTER — LAB VISIT (OUTPATIENT)
Dept: LAB | Facility: OTHER | Age: 71
End: 2021-11-11
Attending: INTERNAL MEDICINE
Payer: MEDICARE

## 2021-11-11 ENCOUNTER — TELEPHONE (OUTPATIENT)
Dept: PHARMACY | Facility: CLINIC | Age: 71
End: 2021-11-11
Payer: MEDICARE

## 2021-11-11 ENCOUNTER — OFFICE VISIT (OUTPATIENT)
Dept: INTERNAL MEDICINE | Facility: CLINIC | Age: 71
End: 2021-11-11
Attending: INTERNAL MEDICINE
Payer: MEDICARE

## 2021-11-11 VITALS
SYSTOLIC BLOOD PRESSURE: 165 MMHG | WEIGHT: 272.06 LBS | HEART RATE: 72 BPM | OXYGEN SATURATION: 94 % | HEIGHT: 70 IN | DIASTOLIC BLOOD PRESSURE: 98 MMHG | BODY MASS INDEX: 38.95 KG/M2

## 2021-11-11 DIAGNOSIS — T46.6X5A STATIN MYOPATHY: ICD-10-CM

## 2021-11-11 DIAGNOSIS — G72.0 STATIN MYOPATHY: ICD-10-CM

## 2021-11-11 DIAGNOSIS — Z12.11 COLON CANCER SCREENING: ICD-10-CM

## 2021-11-11 DIAGNOSIS — E11.42 CONTROLLED TYPE 2 DIABETES MELLITUS WITH DIABETIC POLYNEUROPATHY, WITHOUT LONG-TERM CURRENT USE OF INSULIN: ICD-10-CM

## 2021-11-11 DIAGNOSIS — I10 ESSENTIAL HYPERTENSION: Primary | ICD-10-CM

## 2021-11-11 PROCEDURE — 3080F DIAST BP >= 90 MM HG: CPT | Mod: CPTII,S$GLB,, | Performed by: INTERNAL MEDICINE

## 2021-11-11 PROCEDURE — 99214 PR OFFICE/OUTPT VISIT, EST, LEVL IV, 30-39 MIN: ICD-10-PCS | Mod: S$GLB,,, | Performed by: INTERNAL MEDICINE

## 2021-11-11 PROCEDURE — 3008F PR BODY MASS INDEX (BMI) DOCUMENTED: ICD-10-PCS | Mod: CPTII,S$GLB,, | Performed by: INTERNAL MEDICINE

## 2021-11-11 PROCEDURE — 1126F PR PAIN SEVERITY QUANTIFIED, NO PAIN PRESENT: ICD-10-PCS | Mod: CPTII,S$GLB,, | Performed by: INTERNAL MEDICINE

## 2021-11-11 PROCEDURE — 3008F BODY MASS INDEX DOCD: CPT | Mod: CPTII,S$GLB,, | Performed by: INTERNAL MEDICINE

## 2021-11-11 PROCEDURE — 3077F PR MOST RECENT SYSTOLIC BLOOD PRESSURE >= 140 MM HG: ICD-10-PCS | Mod: CPTII,S$GLB,, | Performed by: INTERNAL MEDICINE

## 2021-11-11 PROCEDURE — 4010F PR ACE/ARB THEARPY RXD/TAKEN: ICD-10-PCS | Mod: CPTII,S$GLB,, | Performed by: INTERNAL MEDICINE

## 2021-11-11 PROCEDURE — 99999 PR PBB SHADOW E&M-EST. PATIENT-LVL V: CPT | Mod: PBBFAC,,, | Performed by: INTERNAL MEDICINE

## 2021-11-11 PROCEDURE — 99214 OFFICE O/P EST MOD 30 MIN: CPT | Mod: S$GLB,,, | Performed by: INTERNAL MEDICINE

## 2021-11-11 PROCEDURE — 82570 ASSAY OF URINE CREATININE: CPT | Performed by: INTERNAL MEDICINE

## 2021-11-11 PROCEDURE — 3077F SYST BP >= 140 MM HG: CPT | Mod: CPTII,S$GLB,, | Performed by: INTERNAL MEDICINE

## 2021-11-11 PROCEDURE — 99999 PR PBB SHADOW E&M-EST. PATIENT-LVL V: ICD-10-PCS | Mod: PBBFAC,,, | Performed by: INTERNAL MEDICINE

## 2021-11-11 PROCEDURE — 1159F MED LIST DOCD IN RCRD: CPT | Mod: CPTII,S$GLB,, | Performed by: INTERNAL MEDICINE

## 2021-11-11 PROCEDURE — 1126F AMNT PAIN NOTED NONE PRSNT: CPT | Mod: CPTII,S$GLB,, | Performed by: INTERNAL MEDICINE

## 2021-11-11 PROCEDURE — 1101F PT FALLS ASSESS-DOCD LE1/YR: CPT | Mod: CPTII,S$GLB,, | Performed by: INTERNAL MEDICINE

## 2021-11-11 PROCEDURE — 1159F PR MEDICATION LIST DOCUMENTED IN MEDICAL RECORD: ICD-10-PCS | Mod: CPTII,S$GLB,, | Performed by: INTERNAL MEDICINE

## 2021-11-11 PROCEDURE — 1101F PR PT FALLS ASSESS DOC 0-1 FALLS W/OUT INJ PAST YR: ICD-10-PCS | Mod: CPTII,S$GLB,, | Performed by: INTERNAL MEDICINE

## 2021-11-11 PROCEDURE — 3288F FALL RISK ASSESSMENT DOCD: CPT | Mod: CPTII,S$GLB,, | Performed by: INTERNAL MEDICINE

## 2021-11-11 PROCEDURE — 1160F PR REVIEW ALL MEDS BY PRESCRIBER/CLIN PHARMACIST DOCUMENTED: ICD-10-PCS | Mod: CPTII,S$GLB,, | Performed by: INTERNAL MEDICINE

## 2021-11-11 PROCEDURE — 3288F PR FALLS RISK ASSESSMENT DOCUMENTED: ICD-10-PCS | Mod: CPTII,S$GLB,, | Performed by: INTERNAL MEDICINE

## 2021-11-11 PROCEDURE — 1160F RVW MEDS BY RX/DR IN RCRD: CPT | Mod: CPTII,S$GLB,, | Performed by: INTERNAL MEDICINE

## 2021-11-11 PROCEDURE — 3052F PR MOST RECENT HEMOGLOBIN A1C LEVEL 8.0 - < 9.0%: ICD-10-PCS | Mod: CPTII,S$GLB,, | Performed by: INTERNAL MEDICINE

## 2021-11-11 PROCEDURE — 3052F HG A1C>EQUAL 8.0%<EQUAL 9.0%: CPT | Mod: CPTII,S$GLB,, | Performed by: INTERNAL MEDICINE

## 2021-11-11 PROCEDURE — 3080F PR MOST RECENT DIASTOLIC BLOOD PRESSURE >= 90 MM HG: ICD-10-PCS | Mod: CPTII,S$GLB,, | Performed by: INTERNAL MEDICINE

## 2021-11-11 PROCEDURE — 4010F ACE/ARB THERAPY RXD/TAKEN: CPT | Mod: CPTII,S$GLB,, | Performed by: INTERNAL MEDICINE

## 2021-11-11 RX ORDER — FUROSEMIDE 40 MG/1
40 TABLET ORAL 2 TIMES DAILY
COMMUNITY
End: 2021-11-11

## 2021-11-11 RX ORDER — CHLORTHALIDONE 25 MG/1
25 TABLET ORAL DAILY
Qty: 90 TABLET | Refills: 2 | Status: SHIPPED | OUTPATIENT
Start: 2021-11-11 | End: 2022-04-07 | Stop reason: SDUPTHER

## 2021-11-11 RX ORDER — ZINC SULFATE 50(220)MG
220 CAPSULE ORAL DAILY
COMMUNITY
End: 2021-12-21 | Stop reason: SDUPTHER

## 2021-11-12 ENCOUNTER — TELEPHONE (OUTPATIENT)
Dept: ADMINISTRATIVE | Facility: OTHER | Age: 71
End: 2021-11-12
Payer: MEDICARE

## 2021-11-12 ENCOUNTER — TELEPHONE (OUTPATIENT)
Dept: INTERNAL MEDICINE | Facility: CLINIC | Age: 71
End: 2021-11-12
Payer: MEDICARE

## 2021-11-12 LAB
ALBUMIN/CREAT UR: 42.3 UG/MG (ref 0–30)
CREAT UR-MCNC: 129.9 MG/DL (ref 23–375)
MICROALBUMIN UR DL<=1MG/L-MCNC: 55 UG/ML

## 2021-11-13 ENCOUNTER — TELEPHONE (OUTPATIENT)
Dept: INTERNAL MEDICINE | Facility: CLINIC | Age: 71
End: 2021-11-13
Payer: MEDICARE

## 2021-11-16 ENCOUNTER — PATIENT OUTREACH (OUTPATIENT)
Dept: ADMINISTRATIVE | Facility: HOSPITAL | Age: 71
End: 2021-11-16
Payer: MEDICARE

## 2021-11-16 ENCOUNTER — PES CALL (OUTPATIENT)
Dept: ADMINISTRATIVE | Facility: CLINIC | Age: 71
End: 2021-11-16
Payer: MEDICARE

## 2021-11-17 ENCOUNTER — TELEPHONE (OUTPATIENT)
Dept: INTERNAL MEDICINE | Facility: CLINIC | Age: 71
End: 2021-11-17
Payer: MEDICARE

## 2021-11-18 ENCOUNTER — CLINICAL SUPPORT (OUTPATIENT)
Dept: INTERNAL MEDICINE | Facility: CLINIC | Age: 71
End: 2021-11-18
Payer: MEDICARE

## 2021-11-18 VITALS — OXYGEN SATURATION: 92 % | HEART RATE: 80 BPM | DIASTOLIC BLOOD PRESSURE: 74 MMHG | SYSTOLIC BLOOD PRESSURE: 130 MMHG

## 2021-11-18 PROCEDURE — 99999 PR PBB SHADOW E&M-EST. PATIENT-LVL III: ICD-10-PCS | Mod: PBBFAC,,,

## 2021-11-18 PROCEDURE — 99999 PR PBB SHADOW E&M-EST. PATIENT-LVL III: CPT | Mod: PBBFAC,,,

## 2021-11-19 ENCOUNTER — TELEPHONE (OUTPATIENT)
Dept: INTERNAL MEDICINE | Facility: CLINIC | Age: 71
End: 2021-11-19
Payer: MEDICARE

## 2021-11-22 ENCOUNTER — PATIENT MESSAGE (OUTPATIENT)
Dept: PHARMACY | Facility: CLINIC | Age: 71
End: 2021-11-22
Payer: MEDICARE

## 2021-12-01 ENCOUNTER — TELEPHONE (OUTPATIENT)
Dept: ADMINISTRATIVE | Facility: CLINIC | Age: 71
End: 2021-12-01
Payer: MEDICARE

## 2021-12-21 RX ORDER — ZINC SULFATE 50(220)MG
220 CAPSULE ORAL DAILY
Qty: 90 CAPSULE | Refills: 1 | Status: SHIPPED | OUTPATIENT
Start: 2021-12-21

## 2022-01-07 DIAGNOSIS — E03.9 HYPOTHYROIDISM (ACQUIRED): Primary | ICD-10-CM

## 2022-01-07 RX ORDER — LEVOTHYROXINE SODIUM 50 UG/1
50 TABLET ORAL
Qty: 90 TABLET | Refills: 2 | Status: SHIPPED | OUTPATIENT
Start: 2022-01-07 | End: 2023-02-07 | Stop reason: SDUPTHER

## 2022-01-07 NOTE — TELEPHONE ENCOUNTER
----- Message from Kasia Wright sent at 1/7/2022 11:55 AM CST -----  Regarding: Refill request  Type:  RX Refill Request    Who Called: VALDO VEGA [[id number]]    Refill or New Rx: Refill     RX Name and Strength: levothyroxine (SYNTHROID) 50 MCG tablet    How is the patient currently taking it? (ex. 1XDay) 1xday     Is this a 30 day or 90 day RX: 90 days     Preferred Pharmacy with phone number: [id number] [address]    Local or Mail Order: local     Ordering Provider: Alicia Winn Call Back Number: [phone]    Additional Information:

## 2022-01-07 NOTE — TELEPHONE ENCOUNTER
No new care gaps identified.  Powered by [employer] by [employer]. Reference number: [id number].   1/07/2022 12:14:19 PM CST

## 2022-02-16 DIAGNOSIS — E11.42 CONTROLLED TYPE 2 DIABETES MELLITUS WITH DIABETIC POLYNEUROPATHY, WITHOUT LONG-TERM CURRENT USE OF INSULIN: Primary | ICD-10-CM

## 2022-02-16 DIAGNOSIS — E11.65 UNCONTROLLED TYPE 2 DIABETES MELLITUS WITH HYPERGLYCEMIA: ICD-10-CM

## 2022-02-16 RX ORDER — GLIMEPIRIDE 2 MG/1
2 TABLET ORAL
Qty: 90 TABLET | Refills: 1 | Status: SHIPPED | OUTPATIENT
Start: 2022-02-16 | End: 2022-09-28 | Stop reason: SDUPTHER

## 2022-02-16 NOTE — TELEPHONE ENCOUNTER
Care Due:                  Date            Visit Type   Department     Provider  --------------------------------------------------------------------------------                                EP -                              PRIMARY      [facility] INTERNAL  Last Visit: 11-      CARE ([facility])   MEDICINE       John Vargas                              EP -                              PRIMARY      [facility] INTERNAL  Next Visit: 03-      CARE ([facility])   MEDICINE       John Vargas                                                            Last  Test          Frequency    Reason                     Performed    Due Date  --------------------------------------------------------------------------------    HBA1C.......  6 months...  dapagliflozin,             11- 05-                             glimepiride, linaGLIPtin,                             metFORMIN................    TSH.........  12 months..  levothyroxine............  Not Found    Overdue    Powered by [employer] by [employer]. Reference number: [id number].   2/16/2022 9:23:23 AM CST

## 2022-02-16 NOTE — TELEPHONE ENCOUNTER
----- Message from Kd Garcia sent at 2/16/2022  9:24 AM CST -----  Can the clinic reply in [facility]: No         Please refill the medication(s) listed below. Please call the patient when the prescription(s) is ready for  at this phone number    [phone]         Medication #1 glimepiride (AMARYL) 2 MG tablet                Preferred Pharmacy: [id number] - [address]

## 2022-02-16 NOTE — TELEPHONE ENCOUNTER
Refill Routing Note   Medication(s) are not appropriate for processing by [facility] for the following reason(s):      - Medication is a new start (<3 months)    [facility] action(s):  Defer          --->Care Gap information included in message below if applicable.   Medication reconciliation completed: No   Automatic Epic Generated Protocol Data:        Requested Prescriptions   Pending Prescriptions Disp Refills    glimepiride (AMARYL) 2 MG tablet [Pharmacy Med Name: GLIMEPIRIDE 2 MG         TAB] 90 tablet 0     Sig: TAKE 1 TABLET (2 MG TOTAL) BY MOUTH BEFORE BREAKFAST.       Endocrinology:  Diabetes - Sulfonylureas Passed - 2/16/2022  9:52 AM        Passed - Patient is at least 18 years old        Passed - Valid encounter within last 15 months     Recent Visits  Date Type Provider Dept   11/11/21 Office Visit John Vargas MD [facility] Internal Medicine   10/14/21 Office Visit John Vargas MD [facility] Internal Medicine   04/14/21 Office Visit John Vargas MD [facility] Internal Medicine   03/03/21 Office Visit John Vargas MD [facility] Internal Medicine   Showing recent visits within past 720 days and meeting all other requirements  Future Appointments  No visits were found meeting these conditions.  Showing future appointments within next 150 days and meeting all other requirements      Future Appointments              In 1 month John Vargas MD [facility] - Internal Medicine, [facility]                Passed - HBA1C within 180 days     Lab Results   Component Value Date    HGBA1C 9.1 (H) 11/11/2021    HGBA1C 8.3 (H) 05/17/2021    HGBA1C 10.9 (H) 02/03/2021              Passed - Cr is 1.39 or below and within 360 days     Lab Results   Component Value Date    CREATININE 0.9 07/06/2021    CREATININE 0.9 05/17/2021    CREATININE 0.9 05/04/2021              Passed - eGFR is 59 or above and within 360 days     Lab Results   Component Value Date    EGFRNONAA >60.0 07/06/2021    EGFRNONAA >60  05/17/2021    EGFRNONAA >60.0 05/04/2021                      Appointments  past 12m or future 3m with PCP    Date Provider   Last Visit   11/11/2021 John Vargas MD   Next Visit   3/21/2022 John Vargas MD   ED visits in past 90 days: 0        Note composed:11:24 AM 02/16/2022

## 2022-02-22 ENCOUNTER — PES CALL (OUTPATIENT)
Dept: ADMINISTRATIVE | Facility: CLINIC | Age: 72
End: 2022-02-22
Payer: MEDICARE

## 2022-03-14 DIAGNOSIS — I10 ESSENTIAL HYPERTENSION: ICD-10-CM

## 2022-03-14 NOTE — TELEPHONE ENCOUNTER
No new care gaps identified.  Powered by [employer]. Reference number: [id number].   3/14/2022 2:12:57 PM CDT

## 2022-03-15 RX ORDER — NIFEDIPINE 90 MG/1
90 TABLET, EXTENDED RELEASE ORAL DAILY
Qty: 90 TABLET | Refills: 3 | Status: SHIPPED | OUTPATIENT
Start: 2022-03-15 | End: 2023-03-15

## 2022-03-21 ENCOUNTER — OFFICE VISIT (OUTPATIENT)
Dept: INTERNAL MEDICINE | Facility: CLINIC | Age: 72
End: 2022-03-21
Attending: INTERNAL MEDICINE
Payer: MEDICARE

## 2022-03-21 ENCOUNTER — TELEPHONE (OUTPATIENT)
Dept: PHARMACY | Facility: CLINIC | Age: 72
End: 2022-03-21
Payer: MEDICARE

## 2022-03-21 ENCOUNTER — LAB VISIT (OUTPATIENT)
Dept: LAB | Facility: OTHER | Age: 72
End: 2022-03-21
Attending: INTERNAL MEDICINE
Payer: MEDICARE

## 2022-03-21 VITALS
DIASTOLIC BLOOD PRESSURE: 110 MMHG | SYSTOLIC BLOOD PRESSURE: 174 MMHG | OXYGEN SATURATION: 95 % | HEART RATE: 71 BPM | WEIGHT: 290.81 LBS | BODY MASS INDEX: 41.63 KG/M2 | HEIGHT: 70 IN

## 2022-03-21 DIAGNOSIS — E66.01 SEVERE OBESITY (BMI 35.0-39.9) WITH COMORBIDITY: ICD-10-CM

## 2022-03-21 DIAGNOSIS — E11.65 UNCONTROLLED TYPE 2 DIABETES MELLITUS WITH HYPERGLYCEMIA: ICD-10-CM

## 2022-03-21 DIAGNOSIS — G72.49 AUTOIMMUNE NECROTIZING MYOPATHY: ICD-10-CM

## 2022-03-21 DIAGNOSIS — E53.8 B12 DEFICIENCY: ICD-10-CM

## 2022-03-21 DIAGNOSIS — R74.8 ELEVATED LIVER ENZYMES: ICD-10-CM

## 2022-03-21 DIAGNOSIS — E53.8 VITAMIN B 12 DEFICIENCY: ICD-10-CM

## 2022-03-21 DIAGNOSIS — I50.9 HEART FAILURE, UNSPECIFIED HF CHRONICITY, UNSPECIFIED HEART FAILURE TYPE: ICD-10-CM

## 2022-03-21 DIAGNOSIS — Z91.199 NON-ADHERENCE TO MEDICAL TREATMENT: ICD-10-CM

## 2022-03-21 DIAGNOSIS — G72.0 STATIN MYOPATHY: ICD-10-CM

## 2022-03-21 DIAGNOSIS — T46.6X5A STATIN MYOPATHY: ICD-10-CM

## 2022-03-21 DIAGNOSIS — E03.9 HYPOTHYROIDISM (ACQUIRED): ICD-10-CM

## 2022-03-21 DIAGNOSIS — D51.9 ANEMIA DUE TO VITAMIN B12 DEFICIENCY, UNSPECIFIED B12 DEFICIENCY TYPE: ICD-10-CM

## 2022-03-21 DIAGNOSIS — I51.7 LVH (LEFT VENTRICULAR HYPERTROPHY): ICD-10-CM

## 2022-03-21 DIAGNOSIS — E11.42 CONTROLLED TYPE 2 DIABETES MELLITUS WITH DIABETIC POLYNEUROPATHY, WITHOUT LONG-TERM CURRENT USE OF INSULIN: Primary | ICD-10-CM

## 2022-03-21 DIAGNOSIS — I10 ESSENTIAL HYPERTENSION: ICD-10-CM

## 2022-03-21 DIAGNOSIS — Z86.711 HISTORY OF PULMONARY EMBOLISM: ICD-10-CM

## 2022-03-21 LAB
BNP SERPL-MCNC: 62 PG/ML (ref 0–99)
ESTIMATED AVG GLUCOSE: 189 MG/DL (ref 68–131)
HBA1C MFR BLD: 8.2 % (ref 4–5.6)
TSH SERPL DL<=0.005 MIU/L-ACNC: 3.15 UIU/ML (ref 0.4–4)
VIT B12 SERPL-MCNC: 551 PG/ML (ref 210–950)

## 2022-03-21 PROCEDURE — 3077F PR MOST RECENT SYSTOLIC BLOOD PRESSURE >= 140 MM HG: ICD-10-PCS | Mod: CPTII,S$GLB,, | Performed by: INTERNAL MEDICINE

## 2022-03-21 PROCEDURE — 1101F PR PT FALLS ASSESS DOC 0-1 FALLS W/OUT INJ PAST YR: ICD-10-PCS | Mod: CPTII,S$GLB,, | Performed by: INTERNAL MEDICINE

## 2022-03-21 PROCEDURE — 82607 VITAMIN B-12: CPT | Performed by: INTERNAL MEDICINE

## 2022-03-21 PROCEDURE — 84443 ASSAY THYROID STIM HORMONE: CPT | Performed by: INTERNAL MEDICINE

## 2022-03-21 PROCEDURE — 3072F PR LOW RISK FOR RETINOPATHY: ICD-10-PCS | Mod: CPTII,S$GLB,, | Performed by: INTERNAL MEDICINE

## 2022-03-21 PROCEDURE — 4010F ACE/ARB THERAPY RXD/TAKEN: CPT | Mod: CPTII,S$GLB,, | Performed by: INTERNAL MEDICINE

## 2022-03-21 PROCEDURE — 3080F DIAST BP >= 90 MM HG: CPT | Mod: CPTII,S$GLB,, | Performed by: INTERNAL MEDICINE

## 2022-03-21 PROCEDURE — 3288F PR FALLS RISK ASSESSMENT DOCUMENTED: ICD-10-PCS | Mod: CPTII,S$GLB,, | Performed by: INTERNAL MEDICINE

## 2022-03-21 PROCEDURE — 3008F PR BODY MASS INDEX (BMI) DOCUMENTED: ICD-10-PCS | Mod: CPTII,S$GLB,, | Performed by: INTERNAL MEDICINE

## 2022-03-21 PROCEDURE — 99499 UNLISTED E&M SERVICE: CPT | Mod: S$GLB,,, | Performed by: INTERNAL MEDICINE

## 2022-03-21 PROCEDURE — 4010F PR ACE/ARB THEARPY RXD/TAKEN: ICD-10-PCS | Mod: CPTII,S$GLB,, | Performed by: INTERNAL MEDICINE

## 2022-03-21 PROCEDURE — 3072F LOW RISK FOR RETINOPATHY: CPT | Mod: CPTII,S$GLB,, | Performed by: INTERNAL MEDICINE

## 2022-03-21 PROCEDURE — 3077F SYST BP >= 140 MM HG: CPT | Mod: CPTII,S$GLB,, | Performed by: INTERNAL MEDICINE

## 2022-03-21 PROCEDURE — 83036 HEMOGLOBIN GLYCOSYLATED A1C: CPT | Performed by: INTERNAL MEDICINE

## 2022-03-21 PROCEDURE — 99999 PR PBB SHADOW E&M-EST. PATIENT-LVL V: ICD-10-PCS | Mod: PBBFAC,,, | Performed by: INTERNAL MEDICINE

## 2022-03-21 PROCEDURE — 36415 COLL VENOUS BLD VENIPUNCTURE: CPT | Performed by: INTERNAL MEDICINE

## 2022-03-21 PROCEDURE — 99499 RISK ADDL DX/OHS AUDIT: ICD-10-PCS | Mod: S$GLB,,, | Performed by: INTERNAL MEDICINE

## 2022-03-21 PROCEDURE — 3288F FALL RISK ASSESSMENT DOCD: CPT | Mod: CPTII,S$GLB,, | Performed by: INTERNAL MEDICINE

## 2022-03-21 PROCEDURE — 1126F AMNT PAIN NOTED NONE PRSNT: CPT | Mod: CPTII,S$GLB,, | Performed by: INTERNAL MEDICINE

## 2022-03-21 PROCEDURE — 3080F PR MOST RECENT DIASTOLIC BLOOD PRESSURE >= 90 MM HG: ICD-10-PCS | Mod: CPTII,S$GLB,, | Performed by: INTERNAL MEDICINE

## 2022-03-21 PROCEDURE — 99214 PR OFFICE/OUTPT VISIT, EST, LEVL IV, 30-39 MIN: ICD-10-PCS | Mod: S$GLB,,, | Performed by: INTERNAL MEDICINE

## 2022-03-21 PROCEDURE — 1159F PR MEDICATION LIST DOCUMENTED IN MEDICAL RECORD: ICD-10-PCS | Mod: CPTII,S$GLB,, | Performed by: INTERNAL MEDICINE

## 2022-03-21 PROCEDURE — 1101F PT FALLS ASSESS-DOCD LE1/YR: CPT | Mod: CPTII,S$GLB,, | Performed by: INTERNAL MEDICINE

## 2022-03-21 PROCEDURE — 83880 ASSAY OF NATRIURETIC PEPTIDE: CPT | Performed by: INTERNAL MEDICINE

## 2022-03-21 PROCEDURE — 99214 OFFICE O/P EST MOD 30 MIN: CPT | Mod: S$GLB,,, | Performed by: INTERNAL MEDICINE

## 2022-03-21 PROCEDURE — 99999 PR PBB SHADOW E&M-EST. PATIENT-LVL V: CPT | Mod: PBBFAC,,, | Performed by: INTERNAL MEDICINE

## 2022-03-21 PROCEDURE — 3008F BODY MASS INDEX DOCD: CPT | Mod: CPTII,S$GLB,, | Performed by: INTERNAL MEDICINE

## 2022-03-21 PROCEDURE — 1159F MED LIST DOCD IN RCRD: CPT | Mod: CPTII,S$GLB,, | Performed by: INTERNAL MEDICINE

## 2022-03-21 PROCEDURE — 1126F PR PAIN SEVERITY QUANTIFIED, NO PAIN PRESENT: ICD-10-PCS | Mod: CPTII,S$GLB,, | Performed by: INTERNAL MEDICINE

## 2022-03-21 NOTE — TELEPHONE ENCOUNTER
I called and reminded the patient I need a copy of his Social Security Award Letter and pharmacy Print Out to submit an application  Pharmacy Patient Assistance.  This was the 3rd time we contacted the patient offering assistance.

## 2022-03-21 NOTE — PROGRESS NOTES
Subjective:       Patient ID: Serafin Tapia is a 71 y.o. male.    Chief Complaint: Hypertension and 3mth f/u    Here for f/u    Patient presents today for routine evaluation, physical, and labs. Patient has no major concerns or complaints today.     3/21/22 Chart review of pharmacy refill rates shows pt has low medication adherence. WIll not take all meds for months at a time. MTX is the most consistently filled. Risk of this behavuor and progression of disease processes discussed.    ### DM ###  Last visit reported taking metformin 1000mg QD instead of Rx BID. He has continued this. Hx of myopathy so statin avoided.   Discussion reveals he likes sweet tea.  11/11/2021 Started on glimepirife  3/21/22 reports starting glimepiride. Denies lows           HGBA1C                   9.1 (H)             11/11/2021 02:02 PM        HGBA1C                   8.3 (H)             05/17/2021 10:42 AM        HGBA1C                   10.9 (H)            02/03/2021 11:21 AM        HGBA1C                   7.6 (H)             09/25/2019 11:00 AM        HGBA1C                   7.5 (H)             09/10/2019 06:10 PM        HGBA1C                   7.6 (H)             02/19/2019 10:15 AM        HGBA1C                   7.1 (H)             04/10/2018 03:53 PM        HGBA1C                   5.7 (H)             12/12/2017 04:51 AM        HGBA1C                   5.5                 10/29/2017 02:09 PM        HGBA1C                   6.7 (H)             05/04/2017 11:55 AM        ### HTN ###  Rx nifedipine 90mg, lasix 40, benazepril 40, metoprolol XL 50.   Hx of medication non adherence in taking meds without any consistency or schedule.  02/2021 BP elevated today. His adherence to medications today varies with response.   04/14/21 BP much improved but remains elevated. Increase BB to 200mg.   3/21/22 BP very elevated. medication non adherence continues       ### Asthma ###   no acute issues. Denies frequent SOB, FLOWERS, chest tightness  "limited by LE weakness.      ### Hypothyroidism ###  Reported adherence with levothyroxine 100mcg  Pt denies unexplained lyn gain/loss, palpitations, tremors, diarrhea, constipation, changes to hair/skin, heat/cold intolerance, or dysphagia.        ### Myopathy ###  Required long course of high dose prednisone. He is off the prednisone since 01/2019  Mtx started : now at 8 tabs weekly and folic acid   Denies myalgias or weakness     ### HM ###  Colonoscopy done by Dr mario 5/6/19  Several wide mouth diverticuli (non-bleeding) in sigmoid, 5mm and 2mm polyp proximal ascending          Review of Systems   Constitutional: Negative for appetite change, chills, fever and unexpected weight change.   HENT: Negative for hearing loss, sore throat and trouble swallowing.    Eyes: Negative for visual disturbance.   Respiratory: Negative for cough, chest tightness and shortness of breath.    Cardiovascular: Negative for chest pain and leg swelling.   Gastrointestinal: Negative for abdominal pain, blood in stool, constipation, diarrhea, nausea and vomiting.   Endocrine: Negative for polydipsia and polyuria.   Genitourinary: Negative for decreased urine volume, difficulty urinating, dysuria, frequency and urgency.   Musculoskeletal: Negative for gait problem.   Skin: Negative for rash.   Neurological: Negative for dizziness and numbness.   Psychiatric/Behavioral: The patient is not nervous/anxious.        Objective:      Vitals:    03/21/22 0820   BP: (!) 174/110   Pulse: 71   SpO2: 95%   Weight: 131.9 kg (290 lb 12.6 oz)   Height: 5' 10" (1.778 m)      Physical Exam  Constitutional:       General: He is not in acute distress.     Appearance: He is well-developed.   HENT:      Head: Normocephalic and atraumatic.      Mouth/Throat:      Pharynx: No oropharyngeal exudate.   Eyes:      General: No scleral icterus.     Conjunctiva/sclera: Conjunctivae normal.      Pupils: Pupils are equal, round, and reactive to light.   Neck:      " Thyroid: No thyromegaly.   Cardiovascular:      Rate and Rhythm: Normal rate and regular rhythm.      Heart sounds: Normal heart sounds. No murmur heard.  Pulmonary:      Effort: Pulmonary effort is normal.      Breath sounds: Normal breath sounds. No wheezing or rales.   Abdominal:      General: There is no distension.      Palpations: Abdomen is soft.      Tenderness: There is no abdominal tenderness.   Musculoskeletal:         General: No tenderness.   Lymphadenopathy:      Cervical: No cervical adenopathy.   Skin:     General: Skin is warm and dry.   Neurological:      Mental Status: He is alert and oriented to person, place, and time.   Psychiatric:         Behavior: Behavior normal.         Assessment:       1. Controlled type 2 diabetes mellitus with diabetic polyneuropathy, without long-term current use of insulin    2. History of pulmonary embolism    3. Vitamin B 12 deficiency    4. Elevated liver enzymes    5. Statin myopathy    6. B12 deficiency    7. LVH (left ventricular hypertrophy)    8. Essential hypertension    9. Severe obesity (BMI 35.0-39.9) with comorbidity    10. Anemia due to vitamin B12 deficiency, unspecified B12 deficiency type    11. Heart failure, unspecified HF chronicity, unspecified heart failure type    12. Hypothyroidism (acquired)    13. 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 Non-adherence to medical treatment        Plan:       Serafin was seen today for hypertension and 3mth f/u.    Diagnoses and all orders for this visit:    History of pulmonary embolism    Vitamin B 12 deficiency    Elevated liver enzymes    Statin myopathy    B12 deficiency  -     Vitamin B12; Future    LVH (left ventricular hypertrophy)    Essential hypertension   Take meds as prescribed and follow up for nurse visit.    Severe obesity (BMI 35.0-39.9) with comorbidity  -     TSH; Future    Anemia due to vitamin B12 deficiency, unspecified B12  deficiency type    Heart failure, unspecified HF chronicity, unspecified heart failure type  -     BNP; Future    Hypothyroidism (acquired)  -     TSH; Future    Uncontrolled type 2 diabetes mellitus with hyperglycemia  -     Ambulatory referral/consult to Diabetes Education; Future  -     Hemoglobin A1C; Future  -     Ambulatory referral/consult to Pharmacy Assistance; Future  -     Ambulatory referral/consult to Optometry; Future    Autoimmune necrotizing myopathy    Non-adherence to medical treatment    F/u in clinic in 6 weeks to review BG log while taking meds as Rx. Pt should let me know ASAP if he has any trouble of any kind with any medication. Pt voiced understanding.         John Chavis MD  Internal Medicine-[facility]        Side effects of medication(s) were discussed in detail and patient voiced understanding.  Patient will call back for any issues or complications.

## 2022-03-23 NOTE — PROGRESS NOTES
Your diabetes is un-controlled, defined by a hemoglobin A1c greater than 7%. This definition was made because those who keep their A1c consistently below 7% have a much, much less chance of developing the complications of diabetes or progression of their diabetes one day.  Complications include but are not limited to increased risk of heart attacks, strokes, blindness, kidney dysfunction, chronic stomach problems, and a permanent, painful neuropathy. Please make sure you are looking at all nutritional labels and please continue to reduce your consumption of simple sugars and processed complex carbohydrates. Weight loss is a good recommendation for most of my patients. If you take your medications as I prescribe them this could be controlled. Let's start there. We need to repeat your A1c in 2-3 months with an office visit. If you have any questions or concerns please do not hesitate to notify me via [facility] messaging.

## 2022-03-30 ENCOUNTER — PES CALL (OUTPATIENT)
Dept: ADMINISTRATIVE | Facility: CLINIC | Age: 72
End: 2022-03-30
Payer: MEDICARE

## 2022-04-05 ENCOUNTER — TELEPHONE (OUTPATIENT)
Dept: ADMINISTRATIVE | Facility: OTHER | Age: 72
End: 2022-04-05
Payer: MEDICARE

## 2022-04-05 NOTE — TELEPHONE ENCOUNTER
LM for patient to contact our scheduling office to discuss rescheduling appointment of 3-31-22 with Diabetes Educator. Contact number provided.

## 2022-04-07 DIAGNOSIS — I10 ESSENTIAL HYPERTENSION: ICD-10-CM

## 2022-04-07 DIAGNOSIS — E11.42 CONTROLLED TYPE 2 DIABETES MELLITUS WITH DIABETIC POLYNEUROPATHY, WITHOUT LONG-TERM CURRENT USE OF INSULIN: Primary | ICD-10-CM

## 2022-04-07 DIAGNOSIS — M62.82 NON-TRAUMATIC RHABDOMYOLYSIS: ICD-10-CM

## 2022-04-07 NOTE — TELEPHONE ENCOUNTER
Care Due:                  Date            Visit Type   Department     Provider  --------------------------------------------------------------------------------                                EP -                              PRIMARY      [facility] INTERNAL  Last Visit: 03-      CARE ([facility])   MEDICINE       John Vargas                              EP -                              PRIMARY      [facility] INTERNAL  Next Visit: 05-      CARE ([facility])   MEDICINE       John Vargas                                                            Last  Test          Frequency    Reason                     Performed    Due Date  --------------------------------------------------------------------------------    CMP.........  12 months..  benazepriL,                07- 07-                             chlorthalidone,                             dapagliflozin,                             glimepiride, metFORMIN...    Powered by [employer]. Reference number: [id number].   4/07/2022 8:29:46 AM CDT

## 2022-04-07 NOTE — TELEPHONE ENCOUNTER
----- Message from [facility] sent at 4/7/2022  8:15 AM CDT -----  Who Called:  VALDO VEGA     Refill or New Rx: Refill    RX Name and Strength: chlorthalidone (HYGROTEN) 25 MG Tab, linaGLIPtin (TRADJENTA) 5 mg Tab tablet, and methotrexate 2.5 MG Tab    How is the patient currently taking it? (ex. 1XDay): 1xday and 1xweek (methotrexate)    Is this a 30 day or 90 day RX: 30 day    Preferred Pharmacy with phone number: [id number] - [place], [place] - [address]    Local or Mail Order: Local    Ordering Provider: John Vargas MD    Best Call Back Number: [phone]    Additional Information:

## 2022-04-08 RX ORDER — CHLORTHALIDONE 25 MG/1
25 TABLET ORAL DAILY
Qty: 90 TABLET | Refills: 3 | Status: SHIPPED | OUTPATIENT
Start: 2022-04-08 | End: 2022-09-28 | Stop reason: SDUPTHER

## 2022-04-08 RX ORDER — LINAGLIPTIN 5 MG/1
5 TABLET, FILM COATED ORAL DAILY
Qty: 90 TABLET | Refills: 1 | Status: SHIPPED | OUTPATIENT
Start: 2022-04-08 | End: 2022-11-14

## 2022-04-08 RX ORDER — METHOTREXATE 2.5 MG/1
TABLET ORAL
Qty: 40 TABLET | Refills: 5 | OUTPATIENT
Start: 2022-04-08

## 2022-04-08 NOTE — TELEPHONE ENCOUNTER
Refill Routing Note   Medication(s) are not appropriate for processing by [facility] for the following reason(s):      - Outside of protocol  - Required vitals are abnormal    [facility] action(s):  Defer  Route Medication-related problems identified: Requires labs     Medication Therapy Plan: Defer hygroten - high bp. Route methotrexate - OOS  Medication reconciliation completed: No     Appointments  past 12m or future 3m with PCP    Date Provider   Last Visit   3/21/2022 John Vargas MD   Next Visit   5/12/2022 John Vargas MD   ED visits in past 90 days: 0        Note composed:10:05 AM 04/08/2022

## 2022-04-11 ENCOUNTER — PATIENT OUTREACH (OUTPATIENT)
Dept: ADMINISTRATIVE | Facility: HOSPITAL | Age: 72
End: 2022-04-11
Payer: MEDICARE

## 2022-04-14 RX ORDER — METHOTREXATE 2.5 MG/1
TABLET ORAL
Qty: 40 TABLET | Refills: 5 | OUTPATIENT
Start: 2022-04-14

## 2022-04-20 DIAGNOSIS — E11.9 TYPE 2 DIABETES MELLITUS WITHOUT COMPLICATION, UNSPECIFIED WHETHER LONG TERM INSULIN USE: ICD-10-CM

## 2022-05-11 DIAGNOSIS — E11.9 TYPE 2 DIABETES MELLITUS WITHOUT COMPLICATION: ICD-10-CM

## 2022-05-12 ENCOUNTER — PES CALL (OUTPATIENT)
Dept: ADMINISTRATIVE | Facility: CLINIC | Age: 72
End: 2022-05-12
Payer: MEDICARE

## 2022-05-13 ENCOUNTER — PATIENT OUTREACH (OUTPATIENT)
Dept: ADMINISTRATIVE | Facility: HOSPITAL | Age: 72
End: 2022-05-13
Payer: MEDICARE

## 2022-05-30 ENCOUNTER — PATIENT MESSAGE (OUTPATIENT)
Dept: ADMINISTRATIVE | Facility: HOSPITAL | Age: 72
End: 2022-05-30
Payer: MEDICARE

## 2022-06-01 ENCOUNTER — PATIENT MESSAGE (OUTPATIENT)
Dept: OPTOMETRY | Facility: CLINIC | Age: 72
End: 2022-06-01
Payer: MEDICARE

## 2022-06-08 ENCOUNTER — PES CALL (OUTPATIENT)
Dept: ADMINISTRATIVE | Facility: CLINIC | Age: 72
End: 2022-06-08
Payer: MEDICARE

## 2022-06-14 ENCOUNTER — PATIENT OUTREACH (OUTPATIENT)
Dept: ADMINISTRATIVE | Facility: HOSPITAL | Age: 72
End: 2022-06-14
Payer: MEDICARE

## 2022-08-15 ENCOUNTER — PES CALL (OUTPATIENT)
Dept: ADMINISTRATIVE | Facility: CLINIC | Age: 72
End: 2022-08-15
Payer: MEDICARE

## 2022-08-24 ENCOUNTER — PATIENT MESSAGE (OUTPATIENT)
Dept: ADMINISTRATIVE | Facility: HOSPITAL | Age: 72
End: 2022-08-24
Payer: MEDICARE

## 2022-08-30 ENCOUNTER — PATIENT MESSAGE (OUTPATIENT)
Dept: ADMINISTRATIVE | Facility: HOSPITAL | Age: 72
End: 2022-08-30
Payer: MEDICARE

## 2022-09-11 NOTE — ASSESSMENT & PLAN NOTE
- Atorvastatin discontinued for elevated CPK.    - May consider pravastatin in future pending resolution of elevated CPK.     no

## 2022-09-19 ENCOUNTER — PATIENT OUTREACH (OUTPATIENT)
Dept: ADMINISTRATIVE | Facility: HOSPITAL | Age: 72
End: 2022-09-19
Payer: MEDICARE

## 2022-09-23 ENCOUNTER — TELEPHONE (OUTPATIENT)
Dept: ADMINISTRATIVE | Facility: HOSPITAL | Age: 72
End: 2022-09-23
Payer: MEDICARE

## 2022-09-28 ENCOUNTER — LAB VISIT (OUTPATIENT)
Dept: LAB | Facility: OTHER | Age: 72
End: 2022-09-28
Attending: INTERNAL MEDICINE
Payer: MEDICARE

## 2022-09-28 ENCOUNTER — IMMUNIZATION (OUTPATIENT)
Dept: INTERNAL MEDICINE | Facility: CLINIC | Age: 72
End: 2022-09-28
Payer: MEDICARE

## 2022-09-28 ENCOUNTER — OFFICE VISIT (OUTPATIENT)
Dept: INTERNAL MEDICINE | Facility: CLINIC | Age: 72
End: 2022-09-28
Attending: INTERNAL MEDICINE
Payer: MEDICARE

## 2022-09-28 VITALS
WEIGHT: 291.69 LBS | OXYGEN SATURATION: 91 % | SYSTOLIC BLOOD PRESSURE: 170 MMHG | DIASTOLIC BLOOD PRESSURE: 92 MMHG | HEIGHT: 70 IN | BODY MASS INDEX: 41.76 KG/M2 | HEART RATE: 82 BPM

## 2022-09-28 DIAGNOSIS — Z79.899 DRUG-INDUCED IMMUNODEFICIENCY: ICD-10-CM

## 2022-09-28 DIAGNOSIS — D68.69 OTHER THROMBOPHILIA: ICD-10-CM

## 2022-09-28 DIAGNOSIS — I26.99 OTHER PULMONARY EMBOLISM WITHOUT ACUTE COR PULMONALE, UNSPECIFIED CHRONICITY: ICD-10-CM

## 2022-09-28 DIAGNOSIS — M62.82 NON-TRAUMATIC RHABDOMYOLYSIS: ICD-10-CM

## 2022-09-28 DIAGNOSIS — M35.9 SYSTEMIC INVOLVEMENT OF CONNECTIVE TISSUE, UNSPECIFIED: ICD-10-CM

## 2022-09-28 DIAGNOSIS — E11.9 TYPE 2 DIABETES MELLITUS WITHOUT COMPLICATION: ICD-10-CM

## 2022-09-28 DIAGNOSIS — E11.65 UNCONTROLLED TYPE 2 DIABETES MELLITUS WITH HYPERGLYCEMIA: ICD-10-CM

## 2022-09-28 DIAGNOSIS — I10 ESSENTIAL HYPERTENSION: ICD-10-CM

## 2022-09-28 DIAGNOSIS — E11.9 TYPE 2 DIABETES MELLITUS WITHOUT COMPLICATION, UNSPECIFIED WHETHER LONG TERM INSULIN USE: Primary | ICD-10-CM

## 2022-09-28 DIAGNOSIS — E11.9 TYPE 2 DIABETES MELLITUS WITHOUT COMPLICATION, UNSPECIFIED WHETHER LONG TERM INSULIN USE: ICD-10-CM

## 2022-09-28 DIAGNOSIS — L97.921 ULCER OF LEFT LOWER EXTREMITY, LIMITED TO BREAKDOWN OF SKIN: ICD-10-CM

## 2022-09-28 DIAGNOSIS — Z23 NEED FOR VACCINATION: Primary | ICD-10-CM

## 2022-09-28 DIAGNOSIS — D84.821 DRUG-INDUCED IMMUNODEFICIENCY: ICD-10-CM

## 2022-09-28 DIAGNOSIS — E11.42 CONTROLLED TYPE 2 DIABETES MELLITUS WITH DIABETIC POLYNEUROPATHY, WITHOUT LONG-TERM CURRENT USE OF INSULIN: ICD-10-CM

## 2022-09-28 LAB
ALBUMIN SERPL BCP-MCNC: 3.9 G/DL (ref 3.5–5.2)
ALP SERPL-CCNC: 87 U/L (ref 55–135)
ALT SERPL W/O P-5'-P-CCNC: 81 U/L (ref 10–44)
ANION GAP SERPL CALC-SCNC: 7 MMOL/L (ref 8–16)
AST SERPL-CCNC: 54 U/L (ref 10–40)
BASOPHILS # BLD AUTO: 0.02 K/UL (ref 0–0.2)
BASOPHILS NFR BLD: 0.4 % (ref 0–1.9)
BILIRUB SERPL-MCNC: 0.3 MG/DL (ref 0.1–1)
BUN SERPL-MCNC: 12 MG/DL (ref 8–23)
CALCIUM SERPL-MCNC: 9.5 MG/DL (ref 8.7–10.5)
CHLORIDE SERPL-SCNC: 101 MMOL/L (ref 95–110)
CHOLEST SERPL-MCNC: 233 MG/DL (ref 120–199)
CHOLEST SERPL-MCNC: 233 MG/DL (ref 120–199)
CHOLEST/HDLC SERPL: 6 {RATIO} (ref 2–5)
CHOLEST/HDLC SERPL: 6 {RATIO} (ref 2–5)
CK SERPL-CCNC: 2372 U/L (ref 20–200)
CO2 SERPL-SCNC: 36 MMOL/L (ref 23–29)
CREAT SERPL-MCNC: 0.8 MG/DL (ref 0.5–1.4)
DIFFERENTIAL METHOD: ABNORMAL
EOSINOPHIL # BLD AUTO: 0.1 K/UL (ref 0–0.5)
EOSINOPHIL NFR BLD: 1.2 % (ref 0–8)
ERYTHROCYTE [DISTWIDTH] IN BLOOD BY AUTOMATED COUNT: 13.5 % (ref 11.5–14.5)
EST. GFR  (NO RACE VARIABLE): >60 ML/MIN/1.73 M^2
ESTIMATED AVG GLUCOSE: 226 MG/DL (ref 68–131)
GLUCOSE SERPL-MCNC: 171 MG/DL (ref 70–110)
HBA1C MFR BLD: 9.5 % (ref 4–5.6)
HCT VFR BLD AUTO: 47.9 % (ref 40–54)
HDLC SERPL-MCNC: 39 MG/DL (ref 40–75)
HDLC SERPL-MCNC: 39 MG/DL (ref 40–75)
HDLC SERPL: 16.7 % (ref 20–50)
HDLC SERPL: 16.7 % (ref 20–50)
HGB BLD-MCNC: 14.9 G/DL (ref 14–18)
IMM GRANULOCYTES # BLD AUTO: 0.01 K/UL (ref 0–0.04)
IMM GRANULOCYTES NFR BLD AUTO: 0.2 % (ref 0–0.5)
LDLC SERPL CALC-MCNC: 173.6 MG/DL (ref 63–159)
LDLC SERPL CALC-MCNC: 173.6 MG/DL (ref 63–159)
LYMPHOCYTES # BLD AUTO: 1.5 K/UL (ref 1–4.8)
LYMPHOCYTES NFR BLD: 29.4 % (ref 18–48)
MCH RBC QN AUTO: 27.2 PG (ref 27–31)
MCHC RBC AUTO-ENTMCNC: 31.1 G/DL (ref 32–36)
MCV RBC AUTO: 88 FL (ref 82–98)
MONOCYTES # BLD AUTO: 0.4 K/UL (ref 0.3–1)
MONOCYTES NFR BLD: 7.5 % (ref 4–15)
NEUTROPHILS # BLD AUTO: 3.2 K/UL (ref 1.8–7.7)
NEUTROPHILS NFR BLD: 61.3 % (ref 38–73)
NONHDLC SERPL-MCNC: 194 MG/DL
NONHDLC SERPL-MCNC: 194 MG/DL
NRBC BLD-RTO: 0 /100 WBC
PLATELET # BLD AUTO: 273 K/UL (ref 150–450)
PMV BLD AUTO: 11.4 FL (ref 9.2–12.9)
POTASSIUM SERPL-SCNC: 4.3 MMOL/L (ref 3.5–5.1)
PROT SERPL-MCNC: 7.3 G/DL (ref 6–8.4)
RBC # BLD AUTO: 5.47 M/UL (ref 4.6–6.2)
SODIUM SERPL-SCNC: 144 MMOL/L (ref 136–145)
TRIGL SERPL-MCNC: 102 MG/DL (ref 30–150)
TRIGL SERPL-MCNC: 102 MG/DL (ref 30–150)
TSH SERPL DL<=0.005 MIU/L-ACNC: 3.7 UIU/ML (ref 0.4–4)
WBC # BLD AUTO: 5.17 K/UL (ref 3.9–12.7)

## 2022-09-28 PROCEDURE — 3077F SYST BP >= 140 MM HG: CPT | Mod: CPTII,S$GLB,, | Performed by: INTERNAL MEDICINE

## 2022-09-28 PROCEDURE — 1159F PR MEDICATION LIST DOCUMENTED IN MEDICAL RECORD: ICD-10-PCS | Mod: CPTII,S$GLB,, | Performed by: INTERNAL MEDICINE

## 2022-09-28 PROCEDURE — 1160F PR REVIEW ALL MEDS BY PRESCRIBER/CLIN PHARMACIST DOCUMENTED: ICD-10-PCS | Mod: CPTII,S$GLB,, | Performed by: INTERNAL MEDICINE

## 2022-09-28 PROCEDURE — 1160F RVW MEDS BY RX/DR IN RCRD: CPT | Mod: CPTII,S$GLB,, | Performed by: INTERNAL MEDICINE

## 2022-09-28 PROCEDURE — 3080F DIAST BP >= 90 MM HG: CPT | Mod: CPTII,S$GLB,, | Performed by: INTERNAL MEDICINE

## 2022-09-28 PROCEDURE — 0124A PR IMMUNIZ ADMIN, SARS-COV- 2 COVID-19 VACCINE, 30MCG/0.3ML, BIVALENT, BOOSTER DOSE: CPT | Mod: S$GLB,,, | Performed by: INTERNAL MEDICINE

## 2022-09-28 PROCEDURE — 3072F PR LOW RISK FOR RETINOPATHY: ICD-10-PCS | Mod: CPTII,S$GLB,, | Performed by: INTERNAL MEDICINE

## 2022-09-28 PROCEDURE — 4010F ACE/ARB THERAPY RXD/TAKEN: CPT | Mod: CPTII,S$GLB,, | Performed by: INTERNAL MEDICINE

## 2022-09-28 PROCEDURE — 3008F BODY MASS INDEX DOCD: CPT | Mod: CPTII,S$GLB,, | Performed by: INTERNAL MEDICINE

## 2022-09-28 PROCEDURE — 3052F PR MOST RECENT HEMOGLOBIN A1C LEVEL 8.0 - < 9.0%: ICD-10-PCS | Mod: CPTII,S$GLB,, | Performed by: INTERNAL MEDICINE

## 2022-09-28 PROCEDURE — 4010F PR ACE/ARB THEARPY RXD/TAKEN: ICD-10-PCS | Mod: CPTII,S$GLB,, | Performed by: INTERNAL MEDICINE

## 2022-09-28 PROCEDURE — 80061 LIPID PANEL: CPT | Performed by: INTERNAL MEDICINE

## 2022-09-28 PROCEDURE — 91312 COVID-19, MRNA, LNP-S, BIVALENT BOOSTER, PF, 30 MCG/0.3 ML DOSE: CPT | Mod: S$GLB,,, | Performed by: INTERNAL MEDICINE

## 2022-09-28 PROCEDURE — 3077F PR MOST RECENT SYSTOLIC BLOOD PRESSURE >= 140 MM HG: ICD-10-PCS | Mod: CPTII,S$GLB,, | Performed by: INTERNAL MEDICINE

## 2022-09-28 PROCEDURE — 82550 ASSAY OF CK (CPK): CPT | Performed by: INTERNAL MEDICINE

## 2022-09-28 PROCEDURE — 0124A COVID-19, MRNA, LNP-S, BIVALENT BOOSTER, PF, 30 MCG/0.3 ML DOSE: CPT | Mod: PBBFAC | Performed by: INTERNAL MEDICINE

## 2022-09-28 PROCEDURE — 1126F AMNT PAIN NOTED NONE PRSNT: CPT | Mod: CPTII,S$GLB,, | Performed by: INTERNAL MEDICINE

## 2022-09-28 PROCEDURE — 99999 PR PBB SHADOW E&M-EST. PATIENT-LVL IV: CPT | Mod: PBBFAC,,, | Performed by: INTERNAL MEDICINE

## 2022-09-28 PROCEDURE — 3288F FALL RISK ASSESSMENT DOCD: CPT | Mod: CPTII,S$GLB,, | Performed by: INTERNAL MEDICINE

## 2022-09-28 PROCEDURE — 99214 OFFICE O/P EST MOD 30 MIN: CPT | Mod: S$GLB,,, | Performed by: INTERNAL MEDICINE

## 2022-09-28 PROCEDURE — 85025 COMPLETE CBC W/AUTO DIFF WBC: CPT | Performed by: INTERNAL MEDICINE

## 2022-09-28 PROCEDURE — 3072F LOW RISK FOR RETINOPATHY: CPT | Mod: CPTII,S$GLB,, | Performed by: INTERNAL MEDICINE

## 2022-09-28 PROCEDURE — 1101F PT FALLS ASSESS-DOCD LE1/YR: CPT | Mod: CPTII,S$GLB,, | Performed by: INTERNAL MEDICINE

## 2022-09-28 PROCEDURE — 99499 UNLISTED E&M SERVICE: CPT | Mod: S$GLB,,, | Performed by: INTERNAL MEDICINE

## 2022-09-28 PROCEDURE — 1159F MED LIST DOCD IN RCRD: CPT | Mod: CPTII,S$GLB,, | Performed by: INTERNAL MEDICINE

## 2022-09-28 PROCEDURE — 99499 RISK ADDL DX/OHS AUDIT: ICD-10-PCS | Mod: S$GLB,,, | Performed by: INTERNAL MEDICINE

## 2022-09-28 PROCEDURE — 36415 COLL VENOUS BLD VENIPUNCTURE: CPT | Performed by: INTERNAL MEDICINE

## 2022-09-28 PROCEDURE — 3052F HG A1C>EQUAL 8.0%<EQUAL 9.0%: CPT | Mod: CPTII,S$GLB,, | Performed by: INTERNAL MEDICINE

## 2022-09-28 PROCEDURE — 3008F PR BODY MASS INDEX (BMI) DOCUMENTED: ICD-10-PCS | Mod: CPTII,S$GLB,, | Performed by: INTERNAL MEDICINE

## 2022-09-28 PROCEDURE — 91312 COVID-19, MRNA, LNP-S, BIVALENT BOOSTER, PF, 30 MCG/0.3 ML DOSE: ICD-10-PCS | Mod: S$GLB,,, | Performed by: INTERNAL MEDICINE

## 2022-09-28 PROCEDURE — 99214 PR OFFICE/OUTPT VISIT, EST, LEVL IV, 30-39 MIN: ICD-10-PCS | Mod: S$GLB,,, | Performed by: INTERNAL MEDICINE

## 2022-09-28 PROCEDURE — 99999 PR PBB SHADOW E&M-EST. PATIENT-LVL IV: ICD-10-PCS | Mod: PBBFAC,,, | Performed by: INTERNAL MEDICINE

## 2022-09-28 PROCEDURE — 1126F PR PAIN SEVERITY QUANTIFIED, NO PAIN PRESENT: ICD-10-PCS | Mod: CPTII,S$GLB,, | Performed by: INTERNAL MEDICINE

## 2022-09-28 PROCEDURE — 1101F PR PT FALLS ASSESS DOC 0-1 FALLS W/OUT INJ PAST YR: ICD-10-PCS | Mod: CPTII,S$GLB,, | Performed by: INTERNAL MEDICINE

## 2022-09-28 PROCEDURE — 83036 HEMOGLOBIN GLYCOSYLATED A1C: CPT | Performed by: INTERNAL MEDICINE

## 2022-09-28 PROCEDURE — 3288F PR FALLS RISK ASSESSMENT DOCUMENTED: ICD-10-PCS | Mod: CPTII,S$GLB,, | Performed by: INTERNAL MEDICINE

## 2022-09-28 PROCEDURE — 0124A PR IMMUNIZ ADMIN, SARS-COV- 2 COVID-19 VACCINE, 30MCG/0.3ML, BIVALENT, BOOSTER DOSE: ICD-10-PCS | Mod: S$GLB,,, | Performed by: INTERNAL MEDICINE

## 2022-09-28 PROCEDURE — 3080F PR MOST RECENT DIASTOLIC BLOOD PRESSURE >= 90 MM HG: ICD-10-PCS | Mod: CPTII,S$GLB,, | Performed by: INTERNAL MEDICINE

## 2022-09-28 PROCEDURE — 84443 ASSAY THYROID STIM HORMONE: CPT | Performed by: INTERNAL MEDICINE

## 2022-09-28 PROCEDURE — 80053 COMPREHEN METABOLIC PANEL: CPT | Performed by: INTERNAL MEDICINE

## 2022-09-28 RX ORDER — DAPAGLIFLOZIN 5 MG/1
5 TABLET, FILM COATED ORAL DAILY
Qty: 90 TABLET | Refills: 2 | Status: SHIPPED | OUTPATIENT
Start: 2022-09-28 | End: 2022-10-10

## 2022-09-28 RX ORDER — DAPAGLIFLOZIN 5 MG/1
5 TABLET, FILM COATED ORAL DAILY
Qty: 90 TABLET | Refills: 2 | Status: CANCELLED | OUTPATIENT
Start: 2022-09-28

## 2022-09-28 RX ORDER — CHLORTHALIDONE 25 MG/1
25 TABLET ORAL DAILY
Qty: 90 TABLET | Refills: 3 | Status: SHIPPED | OUTPATIENT
Start: 2022-09-28 | End: 2022-11-14 | Stop reason: SDUPTHER

## 2022-09-28 RX ORDER — GLIMEPIRIDE 4 MG/1
4 TABLET ORAL
Qty: 90 TABLET | Refills: 1 | Status: SHIPPED | OUTPATIENT
Start: 2022-09-28 | End: 2022-10-10 | Stop reason: SDUPTHER

## 2022-09-28 RX ORDER — SULFAMETHOXAZOLE AND TRIMETHOPRIM 800; 160 MG/1; MG/1
1 TABLET ORAL 2 TIMES DAILY
Qty: 20 TABLET | Refills: 0 | Status: SHIPPED | OUTPATIENT
Start: 2022-09-28 | End: 2022-10-08

## 2022-09-28 RX ORDER — METHOTREXATE 2.5 MG/1
TABLET ORAL
Qty: 40 TABLET | Refills: 5 | Status: CANCELLED | OUTPATIENT
Start: 2022-09-28

## 2022-09-28 NOTE — PROGRESS NOTES
"Subjective:       Patient ID: Serafin Tapia is a 71 y.o. male.    Chief Complaint: Hypertension    Here for f/u    Rash on leg for 2 weeks. Does not know how it got there. Non painful. Minimal heeling.      3/21/2022Pharm D "I called and reminded the patient I need a copy of his Social Security Award Letter and pharmacy Print Out to submit an application  Pharmacy Patient Assistance.  This was the 3rd time we contacted the patient offering assistance. "    Requesting refill of Farxiga. Not certain pt was ever able to start this. He has meds with him today.     ### DM ###  Last visit reported taking metformin 1000mg QD instead of Rx BID. He has continued this. Hx of myopathy so statin avoided.   Discussion reveals he likes sweet tea.  11/11/2021 Started on glimepirife  3/21/22 CV reports starting glimepiride. Denies lows    9//28/22 reports 200s. Increase glimepiride    ### HTN ###  Rx nifedipine 90mg, lasix 40, benazepril 40, metoprolol XL 50.   Hx of medication non adherence in taking meds without any consistency or schedule.  02/2021 BP elevated today. His adherence to medications today varies with response.   04/14/21 BP much improved but remains elevated. Increase BB to 200mg.   3/21/22 CV BP very elevated. medication non adherence continues  9/28/22 out of chlorthalidone. F/u in 10 days with me and med bag        ### Asthma ###   no acute issues. Denies frequent SOB, FLOWERS, chest tightness limited by LE weakness.      ### Hypothyroidism ###  Reported adherence with levothyroxine 100mcg  Pt denies unexplained lyn gain/loss, palpitations, tremors, diarrhea, constipation, changes to hair/skin, heat/cold intolerance, or dysphagia.         ### Myopathy ###  Required long course of high dose prednisone. He is off the prednisone since 01/2019  Mtx started : now at 8 tabs weekly and folic acid   Denies myalgias or weakness  Due for f/u with rheum     ### HM ###  Colonoscopy done by Dr mario 5/6/19  Several wide mouth " "diverticuli (non-bleeding) in sigmoid, 5mm and 2mm polyp proximal ascending             Review of Systems   Constitutional:  Negative for appetite change, chills, fever and unexpected weight change.   HENT:  Negative for hearing loss, sore throat and trouble swallowing.    Eyes:  Negative for visual disturbance.   Respiratory:  Negative for cough, chest tightness and shortness of breath.    Cardiovascular:  Negative for chest pain and leg swelling.   Gastrointestinal:  Negative for abdominal pain, blood in stool, constipation, diarrhea, nausea and vomiting.   Endocrine: Negative for polydipsia and polyuria.   Genitourinary:  Negative for decreased urine volume, difficulty urinating, dysuria, frequency and urgency.   Musculoskeletal:  Negative for gait problem.   Skin:  Positive for rash.   Neurological:  Negative for dizziness and numbness.   Psychiatric/Behavioral:  The patient is not nervous/anxious.      Objective:      Vitals:    09/28/22 1125   BP: (!) 170/92   BP Location: Left arm   Pulse: 82   SpO2: (!) 91%   Weight: 132.3 kg (291 lb 10.7 oz)   Height: 5' 10" (1.778 m)      Physical Exam  Vitals and nursing note reviewed.   Constitutional:       General: He is not in acute distress.     Appearance: Normal appearance. He is well-developed.   HENT:      Head: Normocephalic and atraumatic.      Mouth/Throat:      Pharynx: No oropharyngeal exudate.   Eyes:      General: No scleral icterus.     Conjunctiva/sclera: Conjunctivae normal.      Pupils: Pupils are equal, round, and reactive to light.   Neck:      Thyroid: No thyromegaly.   Cardiovascular:      Rate and Rhythm: Normal rate and regular rhythm.      Heart sounds: Normal heart sounds. No murmur heard.  Pulmonary:      Effort: Pulmonary effort is normal.      Breath sounds: Normal breath sounds. No wheezing or rales.   Abdominal:      General: There is no distension.   Musculoskeletal:         General: No tenderness.   Lymphadenopathy:      Cervical: No " cervical adenopathy.   Skin:     General: Skin is warm and dry.   Neurological:      Mental Status: He is alert and oriented to person, place, and time.   Psychiatric:         Behavior: Behavior normal.         Assessment:       1. Type 2 diabetes mellitus without complication, unspecified whether long term insulin use    2. Non-traumatic rhabdomyolysis    3. Essential hypertension    4. Controlled type 2 diabetes mellitus with diabetic polyneuropathy, without long-term current use of insulin    5. Uncontrolled type 2 diabetes mellitus with hyperglycemia    6. Ulcer of left lower extremity, limited to breakdown of skin    7. Drug-induced immunodeficiency    8. Other thrombophilia    9. Systemic involvement of connective tissue, unspecified    10. Other pulmonary embolism without acute cor pulmonale, unspecified chronicity          Plan:       Serafin was seen today for hypertension.    Diagnoses and all orders for this visit:    Type 2 diabetes mellitus without complication, unspecified whether long term insulin use  -     Comprehensive Metabolic Panel; Future    Non-traumatic rhabdomyolysis  -     Comprehensive Metabolic Panel; Future  -     CK; Future    Essential hypertension  -     chlorthalidone (HYGROTEN) 25 MG Tab; Take 1 tablet (25 mg total) by mouth once daily.  -     TSH; Future  -     CBC Auto Differential; Future    Controlled type 2 diabetes mellitus with diabetic polyneuropathy, without long-term current use of insulin  -     glimepiride (AMARYL) 4 MG tablet; Take 1 tablet (4 mg total) by mouth before breakfast.    Uncontrolled type 2 diabetes mellitus with hyperglycemia  -     glimepiride (AMARYL) 4 MG tablet; Take 1 tablet (4 mg total) by mouth before breakfast.  -     Lipid Panel; Future  -     Hemoglobin A1C; Future    Ulcer of left lower extremity, limited to breakdown of skin  Local wound care discussed. F/u with me in 10 days to ensure heeling. Arterial u/s . No overwhelming signs of infection but,  start Bactrim due to duration and risk. Formal wound care if needed.   -     US Lower Extremity Arteries Left; Future    Drug-induced immunodeficiency  -     CBC Auto Differential; Future    Other thrombophilia  -     CBC Auto Differential; Future    Systemic involvement of connective tissue, unspecified    Other pulmonary embolism without acute cor pulmonale, unspecified chronicity   Denies CP at rest or with exertion, dizziness with exertion, shortness of breath out of proportion to level of activity, frequent or sustained palpitations, orthopnea, PND, or LE edema.       Other orders  -     sulfamethoxazole-trimethoprim 800-160mg (BACTRIM DS) 800-160 mg Tab; Take 1 tablet by mouth 2 (two) times daily. for 10 days  -     dapagliflozin (FARXIGA) 5 mg Tab tablet; Take 1 tablet (5 mg total) by mouth once daily.       RTC in 10 days for BP and wound check    John Chavis MD  Internal Medicine-[facility]        Side effects of medication(s) were discussed in detail and patient voiced understanding.  Patient will call back for any issues or complications.

## 2022-09-29 ENCOUNTER — IMMUNIZATION (OUTPATIENT)
Dept: PHARMACY | Facility: CLINIC | Age: 72
End: 2022-09-29
Payer: MEDICARE

## 2022-09-30 ENCOUNTER — TELEPHONE (OUTPATIENT)
Dept: INTERNAL MEDICINE | Facility: CLINIC | Age: 72
End: 2022-09-30
Payer: MEDICARE

## 2022-09-30 ENCOUNTER — HOSPITAL ENCOUNTER (OUTPATIENT)
Dept: RADIOLOGY | Facility: OTHER | Age: 72
Discharge: HOME | End: 2022-09-30
Attending: INTERNAL MEDICINE
Payer: MEDICARE

## 2022-09-30 DIAGNOSIS — L97.921 ULCER OF LEFT LOWER EXTREMITY, LIMITED TO BREAKDOWN OF SKIN: ICD-10-CM

## 2022-09-30 PROCEDURE — 93926 LOWER EXTREMITY STUDY: CPT | Mod: 26,LT,, | Performed by: RADIOLOGY

## 2022-09-30 PROCEDURE — 93926 US LOWER EXTREMITY ARTERIES LEFT: ICD-10-PCS | Mod: 26,LT,, | Performed by: RADIOLOGY

## 2022-09-30 PROCEDURE — 93926 LOWER EXTREMITY STUDY: CPT | Mod: TC,LT

## 2022-09-30 NOTE — TELEPHONE ENCOUNTER
Please notify patient of results:  Cholesterol remains high. When is last time had praluent injection?  DM remains very uncontrolled. Record sugars on paper and come back to clinic in 4 weeks. Let me know if unable t get any medicine for any reason.   Your diabetes is un-controlled, defined by a hemoglobin A1c greater than 7%. This definition was made because those who keep their A1c consistently below 7% have a much, much less chance of developing the complications of diabetes or progression of their diabetes one day.  Complications include but are not limited to increased risk of heart attacks, strokes, blindness, kidney dysfunction, chronic stomach problems, and a permanent, painful neuropathy.   Please make sure you are looking at all nutritional labels and please continue to reduce your consumption of simple sugars and processed complex carbohydrates. Weight loss is a good recommendation for most of my patients. We need to repeat your A1c in 2-3 months.

## 2022-09-30 NOTE — PROGRESS NOTES
Please let pt know the blood flow to his left leg is not very good and this is likely why he is not heeling well. I would like for him to see a vascular MD ASAP. Not addressing this can result in loss of limb if worsens.

## 2022-09-30 NOTE — TELEPHONE ENCOUNTER
----- Message from Zohaib Coppola MA sent at 9/30/2022  2:56 PM CDT -----  Rerouting for 2nd attempt  ----- Message -----  From: John Vargas MD  Sent: 9/30/2022   2:01 PM CDT  To: [facility] Staff    Please let pt know the blood flow to his left leg is not very good and this is likely why he is not heeling well. I would like for him to see a vascular MD ASAP. Not addressing this can result in loss of limb if worsens.

## 2022-09-30 NOTE — TELEPHONE ENCOUNTER
Called pt, read note. Pt understood, scheduled appointment at [facility] for October 14.

## 2022-10-10 ENCOUNTER — OFFICE VISIT (OUTPATIENT)
Dept: INTERNAL MEDICINE | Facility: CLINIC | Age: 72
End: 2022-10-10
Attending: INTERNAL MEDICINE
Payer: MEDICARE

## 2022-10-10 ENCOUNTER — PATIENT MESSAGE (OUTPATIENT)
Dept: ADMINISTRATIVE | Facility: HOSPITAL | Age: 72
End: 2022-10-10
Payer: MEDICARE

## 2022-10-10 VITALS
WEIGHT: 288.13 LBS | DIASTOLIC BLOOD PRESSURE: 72 MMHG | HEART RATE: 80 BPM | BODY MASS INDEX: 41.25 KG/M2 | HEIGHT: 70 IN | SYSTOLIC BLOOD PRESSURE: 134 MMHG | OXYGEN SATURATION: 86 %

## 2022-10-10 DIAGNOSIS — I73.9 PERIPHERAL VASCULAR DISEASE, UNSPECIFIED: ICD-10-CM

## 2022-10-10 DIAGNOSIS — R07.9 ACUTE CHEST PAIN: ICD-10-CM

## 2022-10-10 DIAGNOSIS — E11.42 CONTROLLED TYPE 2 DIABETES MELLITUS WITH DIABETIC POLYNEUROPATHY, WITHOUT LONG-TERM CURRENT USE OF INSULIN: ICD-10-CM

## 2022-10-10 DIAGNOSIS — I73.9 PAD (PERIPHERAL ARTERY DISEASE): ICD-10-CM

## 2022-10-10 DIAGNOSIS — S81.802S NON-HEALING WOUND OF LOWER EXTREMITY, LEFT, SEQUELA: ICD-10-CM

## 2022-10-10 DIAGNOSIS — E11.65 UNCONTROLLED TYPE 2 DIABETES MELLITUS WITH HYPERGLYCEMIA: ICD-10-CM

## 2022-10-10 DIAGNOSIS — E11.9 TYPE 2 DIABETES MELLITUS WITHOUT COMPLICATION, UNSPECIFIED WHETHER LONG TERM INSULIN USE: Primary | ICD-10-CM

## 2022-10-10 DIAGNOSIS — E66.2 OBESITY HYPOVENTILATION SYNDROME: ICD-10-CM

## 2022-10-10 DIAGNOSIS — R06.01 ORTHOPNEA: ICD-10-CM

## 2022-10-10 DIAGNOSIS — R09.02 HYPOXIA: ICD-10-CM

## 2022-10-10 DIAGNOSIS — Z86.711 HISTORY OF PULMONARY EMBOLISM: ICD-10-CM

## 2022-10-10 DIAGNOSIS — G72.49 AUTOIMMUNE NECROTIZING MYOPATHY: ICD-10-CM

## 2022-10-10 PROCEDURE — 99499 RISK ADDL DX/OHS AUDIT: ICD-10-PCS | Mod: S$GLB,,, | Performed by: INTERNAL MEDICINE

## 2022-10-10 PROCEDURE — 3072F PR LOW RISK FOR RETINOPATHY: ICD-10-PCS | Mod: CPTII,S$GLB,, | Performed by: INTERNAL MEDICINE

## 2022-10-10 PROCEDURE — 99999 PR PBB SHADOW E&M-EST. PATIENT-LVL V: ICD-10-PCS | Mod: PBBFAC,,, | Performed by: INTERNAL MEDICINE

## 2022-10-10 PROCEDURE — 99499 UNLISTED E&M SERVICE: CPT | Mod: S$GLB,,, | Performed by: INTERNAL MEDICINE

## 2022-10-10 PROCEDURE — 3066F NEPHROPATHY DOC TX: CPT | Mod: CPTII,S$GLB,, | Performed by: INTERNAL MEDICINE

## 2022-10-10 PROCEDURE — 3060F POS MICROALBUMINURIA REV: CPT | Mod: CPTII,S$GLB,, | Performed by: INTERNAL MEDICINE

## 2022-10-10 PROCEDURE — 1160F RVW MEDS BY RX/DR IN RCRD: CPT | Mod: CPTII,S$GLB,, | Performed by: INTERNAL MEDICINE

## 2022-10-10 PROCEDURE — 1126F AMNT PAIN NOTED NONE PRSNT: CPT | Mod: CPTII,S$GLB,, | Performed by: INTERNAL MEDICINE

## 2022-10-10 PROCEDURE — 1126F PR PAIN SEVERITY QUANTIFIED, NO PAIN PRESENT: ICD-10-PCS | Mod: CPTII,S$GLB,, | Performed by: INTERNAL MEDICINE

## 2022-10-10 PROCEDURE — 3288F FALL RISK ASSESSMENT DOCD: CPT | Mod: CPTII,S$GLB,, | Performed by: INTERNAL MEDICINE

## 2022-10-10 PROCEDURE — 3075F PR MOST RECENT SYSTOLIC BLOOD PRESS GE 130-139MM HG: ICD-10-PCS | Mod: CPTII,S$GLB,, | Performed by: INTERNAL MEDICINE

## 2022-10-10 PROCEDURE — 1160F PR REVIEW ALL MEDS BY PRESCRIBER/CLIN PHARMACIST DOCUMENTED: ICD-10-PCS | Mod: CPTII,S$GLB,, | Performed by: INTERNAL MEDICINE

## 2022-10-10 PROCEDURE — 4010F ACE/ARB THERAPY RXD/TAKEN: CPT | Mod: CPTII,S$GLB,, | Performed by: INTERNAL MEDICINE

## 2022-10-10 PROCEDURE — 1101F PT FALLS ASSESS-DOCD LE1/YR: CPT | Mod: CPTII,S$GLB,, | Performed by: INTERNAL MEDICINE

## 2022-10-10 PROCEDURE — 3052F HG A1C>EQUAL 8.0%<EQUAL 9.0%: CPT | Mod: CPTII,S$GLB,, | Performed by: INTERNAL MEDICINE

## 2022-10-10 PROCEDURE — 99999 PR PBB SHADOW E&M-EST. PATIENT-LVL V: CPT | Mod: PBBFAC,,, | Performed by: INTERNAL MEDICINE

## 2022-10-10 PROCEDURE — 3066F PR DOCUMENTATION OF TREATMENT FOR NEPHROPATHY: ICD-10-PCS | Mod: CPTII,S$GLB,, | Performed by: INTERNAL MEDICINE

## 2022-10-10 PROCEDURE — 1101F PR PT FALLS ASSESS DOC 0-1 FALLS W/OUT INJ PAST YR: ICD-10-PCS | Mod: CPTII,S$GLB,, | Performed by: INTERNAL MEDICINE

## 2022-10-10 PROCEDURE — 3078F DIAST BP <80 MM HG: CPT | Mod: CPTII,S$GLB,, | Performed by: INTERNAL MEDICINE

## 2022-10-10 PROCEDURE — 3075F SYST BP GE 130 - 139MM HG: CPT | Mod: CPTII,S$GLB,, | Performed by: INTERNAL MEDICINE

## 2022-10-10 PROCEDURE — 94640 AIRWAY INHALATION TREATMENT: CPT | Mod: S$GLB,,, | Performed by: INTERNAL MEDICINE

## 2022-10-10 PROCEDURE — 3288F PR FALLS RISK ASSESSMENT DOCUMENTED: ICD-10-PCS | Mod: CPTII,S$GLB,, | Performed by: INTERNAL MEDICINE

## 2022-10-10 PROCEDURE — 94640 PR INHAL RX, AIRWAY OBST/DX SPUTUM INDUCT: ICD-10-PCS | Mod: S$GLB,,, | Performed by: INTERNAL MEDICINE

## 2022-10-10 PROCEDURE — 1159F MED LIST DOCD IN RCRD: CPT | Mod: CPTII,S$GLB,, | Performed by: INTERNAL MEDICINE

## 2022-10-10 PROCEDURE — 99215 OFFICE O/P EST HI 40 MIN: CPT | Mod: 25,S$GLB,, | Performed by: INTERNAL MEDICINE

## 2022-10-10 PROCEDURE — 3008F PR BODY MASS INDEX (BMI) DOCUMENTED: ICD-10-PCS | Mod: CPTII,S$GLB,, | Performed by: INTERNAL MEDICINE

## 2022-10-10 PROCEDURE — 1159F PR MEDICATION LIST DOCUMENTED IN MEDICAL RECORD: ICD-10-PCS | Mod: CPTII,S$GLB,, | Performed by: INTERNAL MEDICINE

## 2022-10-10 PROCEDURE — 3008F BODY MASS INDEX DOCD: CPT | Mod: CPTII,S$GLB,, | Performed by: INTERNAL MEDICINE

## 2022-10-10 PROCEDURE — 4010F PR ACE/ARB THEARPY RXD/TAKEN: ICD-10-PCS | Mod: CPTII,S$GLB,, | Performed by: INTERNAL MEDICINE

## 2022-10-10 PROCEDURE — 3052F PR MOST RECENT HEMOGLOBIN A1C LEVEL 8.0 - < 9.0%: ICD-10-PCS | Mod: CPTII,S$GLB,, | Performed by: INTERNAL MEDICINE

## 2022-10-10 PROCEDURE — 3060F PR POS MICROALBUMINURIA RESULT DOCUMENTED/REVIEW: ICD-10-PCS | Mod: CPTII,S$GLB,, | Performed by: INTERNAL MEDICINE

## 2022-10-10 PROCEDURE — 3072F LOW RISK FOR RETINOPATHY: CPT | Mod: CPTII,S$GLB,, | Performed by: INTERNAL MEDICINE

## 2022-10-10 PROCEDURE — 3078F PR MOST RECENT DIASTOLIC BLOOD PRESSURE < 80 MM HG: ICD-10-PCS | Mod: CPTII,S$GLB,, | Performed by: INTERNAL MEDICINE

## 2022-10-10 PROCEDURE — 99215 PR OFFICE/OUTPT VISIT, EST, LEVL V, 40-54 MIN: ICD-10-PCS | Mod: 25,S$GLB,, | Performed by: INTERNAL MEDICINE

## 2022-10-10 RX ORDER — GLIMEPIRIDE 4 MG/1
4 TABLET ORAL
Qty: 180 TABLET | Refills: 2 | Status: SHIPPED | OUTPATIENT
Start: 2022-10-10 | End: 2023-01-04 | Stop reason: SDUPTHER

## 2022-10-10 RX ORDER — ALBUTEROL SULFATE 0.83 MG/ML
2.5 SOLUTION RESPIRATORY (INHALATION)
Status: COMPLETED | OUTPATIENT
Start: 2022-10-10 | End: 2022-10-10

## 2022-10-10 RX ADMIN — ALBUTEROL SULFATE 2.5 MG: 0.83 SOLUTION RESPIRATORY (INHALATION) at 03:10

## 2022-10-10 NOTE — PROGRESS NOTES
Pt received nebulizer treatment.     Confirmed pt did not have any allergies that contraindicated treatment.     Pt's O2 sat prior to treatment: 88    Medication used: Albuterol 2.5mg/3ml. Lot# [id number] Exp: 10/2023    Pt's O2 sat post treatment: 86    Pt tolerated well and had no questions or concerns.     Dr. Vargas advised

## 2022-10-10 NOTE — PROGRESS NOTES
Subjective:       Patient ID: Serafin Tapia is a 71 y.o. male.    Chief Complaint: Hypertension (2wk f/u)    Here for f/u for non heeling leg wound and uncontrolled DM and HTN    Here for f/u.    BP improved.  Unable to get ryblesus.  Wound debrided by patient and completed empiric Bactrim but no improvement in wound    Hypoxic. This is new. 88%RA at rest. Walks 800ft in clinic and maintains 88%. Exam normal. Hx of asthma. He can take deep breaths and bring it right back up to 97%. Witnessed in clinic. + orthopnea. Naps during day often. Former smoker. Hx of PE. BMI 41. Hx of asthma. Denies complaints. Sedentary lifestyle     ### PAD ###  09/30/2022 Arterial U/S: Hemodynamically significant stenosis at the level of the anterior tibial artery suspected.  Atherosclerotic plaque elsewhere with no evidence for hemodynamically significant stenosis.  Vascular appt scheduled.wound care ordered. Will get CTA with runoffs for them to move forward with plan.    ### Hx PE ###    ### DM ###  Last visit reported taking metformin 1000mg QD instead of Rx BID. He has continued this. Hx of myopathy so statin avoided.   Discussion reveals he likes sweet tea.  11/11/2021 Started on glimepiride  3/21/22 CV reports starting glimepiride. Denies lows    9/28/22 reports 200s.  10/10/22 Ryblesus too expensive. Increase glimepiride to 4mg.      ### HTN ###  Rx nifedipine 90mg, lasix 40, benazepril 40, metoprolol XL 50.   Hx of medication non adherence in taking meds without any consistency or schedule.  02/2021 BP elevated today. His adherence to medications today varies with response.   04/14/21 BP much improved but remains elevated. Increase BB to 200mg.   3/21/22 CV BP very elevated. medication non adherence continues  9/28/22 out of chlorthalidone. F/u in 10 days with me and med bag  10/10/22 improved        ### Asthma ###   no acute issues. Denies frequent SOB, FLOWERS, chest tightness limited by LE weakness.    10/10/22 hypoxia ta rest is  new.    ### Hypothyroidism ###  Reported adherence with levothyroxine 100mcg  Pt denies unexplained lyn gain/loss, palpitations, tremors, diarrhea, constipation, changes to hair/skin, heat/cold intolerance, or dysphagia.         ### Myopathy ###  Required long course of high dose prednisone. He is off the prednisone since 01/2019  Mtx started : now at 8 tabs weekly and folic acid   Denies myalgias or weakness  9/2022 Pt dealing with increased LE edema and non heeling wound LE                 CPK                      2372 (H)            09/28/2022 12:15 PM        CPK                      1309 (H)            07/06/2021 11:44 AM        CPK                      1362 (H)            05/17/2021 10:42 AM        CPK                      1264 (H)            05/04/2021 09:33 AM        CPK                      1608 (H)            02/03/2021 11:21 AM        CPK                      177                 09/23/2019 10:14 AM   Due for f/u with rheum       ### High risk meds ###  MTX: taking folic acid      ### HM ###  Colonoscopy done by Dr mario 5/6/19  Several wide mouth diverticuli (non-bleeding) in sigmoid, 5mm and 2mm polyp proximal ascending               Review of Systems   Constitutional:  Negative for appetite change, chills, fever and unexpected weight change.   HENT:  Negative for hearing loss, sore throat and trouble swallowing.    Eyes:  Negative for visual disturbance.   Respiratory:  Negative for cough, chest tightness and shortness of breath.    Cardiovascular:  Negative for chest pain and leg swelling.   Gastrointestinal:  Negative for abdominal pain, blood in stool, constipation, diarrhea, nausea and vomiting.   Endocrine: Negative for polydipsia and polyuria.   Genitourinary:  Negative for decreased urine volume, difficulty urinating, dysuria, frequency and urgency.   Musculoskeletal:  Negative for gait problem.   Skin:  Positive for wound. Negative for rash.   Neurological:  Negative for dizziness and  "numbness.   Psychiatric/Behavioral:  The patient is not nervous/anxious.      Objective:      Vitals:    10/10/22 1431   BP: 134/72   BP Location: Left arm   Pulse: 80   SpO2: (!) 86%   Weight: 130.7 kg (288 lb 2.3 oz)   Height: 5' 10" (1.778 m)      Physical Exam  Vitals and nursing note reviewed.   Constitutional:       General: He is not in acute distress.     Appearance: Normal appearance. He is well-developed.   HENT:      Head: Normocephalic and atraumatic.      Mouth/Throat:      Pharynx: No oropharyngeal exudate.   Eyes:      General: No scleral icterus.     Conjunctiva/sclera: Conjunctivae normal.      Pupils: Pupils are equal, round, and reactive to light.   Neck:      Thyroid: No thyromegaly.   Cardiovascular:      Rate and Rhythm: Normal rate and regular rhythm.      Heart sounds: Normal heart sounds. No murmur heard.  Pulmonary:      Effort: Pulmonary effort is normal.      Breath sounds: Normal breath sounds. No wheezing or rales.   Abdominal:      General: There is no distension.   Musculoskeletal:         General: No tenderness.   Lymphadenopathy:      Cervical: No cervical adenopathy.   Skin:     General: Skin is warm and dry.   Neurological:      Mental Status: He is alert and oriented to person, place, and time.   Psychiatric:         Behavior: Behavior normal.       Assessment:       1. Type 2 diabetes mellitus without complication, unspecified whether long term insulin use    2. PAD (peripheral artery disease)    3. Non-healing wound of lower extremity, left, sequela    4. Autoimmune necrotizing myopathy    5. Peripheral vascular disease, unspecified    6. Obesity hypoventilation syndrome    7. Hypoxia    8. Orthopnea    9. Controlled type 2 diabetes mellitus with diabetic polyneuropathy, without long-term current use of insulin    10. Uncontrolled type 2 diabetes mellitus with hyperglycemia    11. Acute chest pain    12. History of pulmonary embolism          Plan:       Serafin was seen today for " hypertension.    Diagnoses and all orders for this visit:    Type 2 diabetes mellitus without complication, unspecified whether long term insulin use  -     Ambulatory referral/consult to Wound Clinic; Future  -     Ambulatory referral/consult to Vascular Surgery; Future    PAD (peripheral artery disease)  -     Ambulatory referral/consult to Wound Clinic; Future  -     Ambulatory referral/consult to Vascular Surgery; Future    Non-healing wound of lower extremity, left, sequela  -     Ambulatory referral/consult to Wound Clinic; Future  -     Ambulatory referral/consult to Vascular Surgery; Future    Autoimmune necrotizing myopathy    Peripheral vascular disease, unspecified  -     CTA Runoff ABD PEL Bilat Lower Ext; Future    Obesity hypoventilation syndrome  -     Ambulatory referral/consult to Sleep Disorders; Future  -     Echo    Hypoxia   No improvement in RA oxygen after nebulizer    Suspect unTx SKYLA and obesity hypoventilation syndrome. R/O HF, PE, asthma, COPD, ILD, PF  -     Six Minute Walk Test to qualify for Home Oxygen; Future  -     Complete PFT W/O Bronchodilator and FeNO; Future  -     B-TYPE NATRIURETIC PEPTIDE; Future  -     D-DIMER, QUANTITATIVE; Future  -     Echo    PND (paroxysmal nocturnal dyspnea)  -     B-TYPE NATRIURETIC PEPTIDE; Future    Controlled type 2 diabetes mellitus with diabetic polyneuropathy, without long-term current use of insulin  -     glimepiride (AMARYL) 4 MG tablet; Take 1 tablet (4 mg total) by mouth daily with breakfast.    Uncontrolled type 2 diabetes mellitus with hyperglycemia  -     glimepiride (AMARYL) 4 MG tablet; Take 1 tablet (4 mg total) by mouth daily with breakfast.    Acute chest pain  -     CTA Chest Non-Coronary (PE Studies); Future    History of pulmonary embolism    Other orders  -     Discontinue: semaglutide (RYBELSUS) 7 mg tablet; Take 1 tablet (7 mg total) by mouth once daily.  -     albuterol nebulizer solution 2.5 mg       >50 minutes were spent  today reviewing patient chart.  Much of today's visit was spent discussing with patient my chart review, their concerns, health maintenance, my assessment, and recommendations.      John Chavis MD  Internal Medicine-[facility]        Side effects of medication(s) were discussed in detail and patient voiced understanding.  Patient will call back for any issues or complications.

## 2022-10-11 ENCOUNTER — HOSPITAL ENCOUNTER (OUTPATIENT)
Dept: PULMONOLOGY | Facility: OTHER | Age: 72
Discharge: HOME | End: 2022-10-11
Attending: INTERNAL MEDICINE
Payer: MEDICARE

## 2022-10-11 ENCOUNTER — TELEPHONE (OUTPATIENT)
Dept: INTERNAL MEDICINE | Facility: CLINIC | Age: 72
End: 2022-10-11
Payer: MEDICARE

## 2022-10-11 VITALS — HEIGHT: 69 IN | WEIGHT: 288.38 LBS | BODY MASS INDEX: 42.71 KG/M2

## 2022-10-11 DIAGNOSIS — R09.02 HYPOXIA: ICD-10-CM

## 2022-10-11 DIAGNOSIS — R06.01 ORTHOPNEA: Primary | ICD-10-CM

## 2022-10-11 PROCEDURE — 94727 PR PULM FUNCTION TEST BY GAS: ICD-10-PCS | Mod: 26,,, | Performed by: INTERNAL MEDICINE

## 2022-10-11 PROCEDURE — 94010 BREATHING CAPACITY TEST: ICD-10-PCS | Mod: 26,59,, | Performed by: INTERNAL MEDICINE

## 2022-10-11 PROCEDURE — 94727 GAS DIL/WSHOT DETER LNG VOL: CPT

## 2022-10-11 PROCEDURE — 94618 PULMONARY STRESS TESTING: CPT | Mod: 26,,, | Performed by: INTERNAL MEDICINE

## 2022-10-11 PROCEDURE — 94010 BREATHING CAPACITY TEST: CPT

## 2022-10-11 PROCEDURE — 94729 DIFFUSING CAPACITY: CPT | Mod: 26,,, | Performed by: INTERNAL MEDICINE

## 2022-10-11 PROCEDURE — 94729 DIFFUSING CAPACITY: CPT

## 2022-10-11 PROCEDURE — 94618 PULMONARY STRESS TESTING: CPT

## 2022-10-11 PROCEDURE — 94618 PULMONARY STRESS TESTING: ICD-10-PCS | Mod: 26,,, | Performed by: INTERNAL MEDICINE

## 2022-10-11 PROCEDURE — 95012 NITRIC OXIDE EXP GAS DETER: CPT

## 2022-10-11 PROCEDURE — 94010 BREATHING CAPACITY TEST: CPT | Mod: 26,59,, | Performed by: INTERNAL MEDICINE

## 2022-10-11 PROCEDURE — 94727 GAS DIL/WSHOT DETER LNG VOL: CPT | Mod: 26,,, | Performed by: INTERNAL MEDICINE

## 2022-10-11 PROCEDURE — 94729 PR C02/MEMBANE DIFFUSE CAPACITY: ICD-10-PCS | Mod: 26,,, | Performed by: INTERNAL MEDICINE

## 2022-10-11 NOTE — TELEPHONE ENCOUNTER
----- Message from David Guerra sent at 10/11/2022  1:15 PM CDT -----  Name of Who is Calling: [facility]ul dept [facility] on behalf of VALDO VEGA [[id number]]            What is the request in detail: Patient is requesting call back to inform pt quality for oxygen 2 L sat rate 90 walking               Can the clinic reply by [facility]: no              What Number to Call Back if not in [facility]:  pulm dept [phone]

## 2022-10-11 NOTE — PROCEDURES
FeNO results:      17ppb          FeNO performed with NIOX JUDAH.     FeNO levels and inflammation:   FeNO (ppb):   LOW   INTERMEDIATE  HIGH         ADULTS   <25          25-50   >50  CHILDREN (<12 years) <20          20-35   >35  Th2-driven inflammation Unlikely         Likely    Significant

## 2022-10-11 NOTE — PROCEDURES
"Instructions for measuring Oxygen Saturation  to qualify for Home Oxygen:    Please obtain and document the following for Home Oxygen:    This must be performed and documented within 2 days of discharge.    Pulse Oximetry:    88% SpO2 on room air at rest on 10/11/2022.                                 If 88% or less, STOP and document.     If 89% or more, measure and  document the following:    "Pulse Oximetry:   88%SpO2 on room air at rest on 10/11/22.                           86%SpO2  on room air with activity/exercise on 10/11/22.                      90% SpO2 on 2L oxgyen with activity/excercise on 10/11/22    (NOTE:  FOR OXYGEN WITH ACTIVITY - MEDICARE WANTS TO SEE THAT THE OXYGEN INCREASES ONCE A PATIENT HAS WALKED AND IS BACK ON THE OXYGEN)       "

## 2022-10-11 NOTE — PROGRESS NOTES
"   10/11/22 1223   6MW Test   Ordering Provider SARAH GILBERT   Performing nurse/tech/RT CONNIE STEWARD   Diagnosis Qualify for Oxygen   Height 5' 9" (1.753 m)   Weight 130.8 kg (288 lb 5.8 oz)   BMI (Calculated) 42.6   Predicted Distance Meters (Calculated) 431.39 meters   Patient Race    Patient Reported Leg pain   Was O2 used? Yes   Delivery Method Cannula   6MW Distance walked (feet) 755 feet   Distance walked (meters) 230.12 meters   Did patient stop? No   Type of assistive device(s) used? a cane   Pre-Exercise   Oxygen Saturation 88 %   Supplemental Oxygen Room Air   Heart Rate 81 bpm   Blood Pressure 155/82   Louise Dyspnea Rating  nothing at all   Post Exercise   Oxygen Saturation 92 %   Supplemental Oxygen 2 L/M   Heart Rate 92 bpm   Blood Pressure 170/84   Louise Dyspnea Rating  somewhat heavy   Recovery   Recovery Time (seconds) 180 seconds   Oxygen Saturation 92 %   Supplemental Oxygen 2 L/M   Heart Rate 79 bpm   Blood Pressure 154/79   Louise Dyspnea Rating  light   Interpretation   Is procedure ready for interpretation? Yes   Oxygen Qualification   Oxygen Qualification? Yes  (yes)     "

## 2022-10-12 ENCOUNTER — TELEPHONE (OUTPATIENT)
Dept: INTERNAL MEDICINE | Facility: CLINIC | Age: 72
End: 2022-10-12
Payer: MEDICARE

## 2022-10-12 ENCOUNTER — HOSPITAL ENCOUNTER (OUTPATIENT)
Dept: CARDIOLOGY | Facility: OTHER | Age: 72
Discharge: HOME | End: 2022-10-12
Attending: INTERNAL MEDICINE
Payer: MEDICARE

## 2022-10-12 VITALS
HEIGHT: 69 IN | WEIGHT: 288 LBS | DIASTOLIC BLOOD PRESSURE: 72 MMHG | SYSTOLIC BLOOD PRESSURE: 134 MMHG | BODY MASS INDEX: 42.65 KG/M2

## 2022-10-12 LAB
ASCENDING AORTA: 3.33 CM
AV INDEX (PROSTH): 0.35
AV MEAN GRADIENT: 30 MMHG
AV PEAK GRADIENT: 12 MMHG
AV VALVE AREA: 1.37 CM2
AV VELOCITY RATIO: 0.62
BSA FOR ECHO PROCEDURE: 2.52 M2
CV ECHO LV RWT: 0.43 CM
DOP CALC AO PEAK VEL: 1.72 M/S
DOP CALC AO VTI: 79.4 CM
DOP CALC LVOT AREA: 3.9 CM2
DOP CALC LVOT DIAMETER: 2.24 CM
DOP CALC LVOT PEAK VEL: 1.07 M/S
DOP CALC LVOT STROKE VOLUME: 108.71 CM3
DOP CALCLVOT PEAK VEL VTI: 27.6 CM
E WAVE DECELERATION TIME: 202.63 MSEC
E/A RATIO: 1.17
E/E' RATIO: 11.87 M/S
ECHO LV POSTERIOR WALL: 1.14 CM (ref 0.6–1.1)
EJECTION FRACTION: 62 %
FRACTIONAL SHORTENING: 34 % (ref 28–44)
INTERVENTRICULAR SEPTUM: 1.12 CM (ref 0.6–1.1)
IVC DIAMETER: 1.79 CM
IVRT: 69.2 MSEC
LA MAJOR: 6.04 CM
LA MINOR: 5.97 CM
LA WIDTH: 4.1 CM
LEFT ATRIUM SIZE: 4.31 CM
LEFT ATRIUM VOLUME INDEX MOD: 35.3 ML/M2
LEFT ATRIUM VOLUME INDEX: 37.4 ML/M2
LEFT ATRIUM VOLUME MOD: 85 CM3
LEFT ATRIUM VOLUME: 90.19 CM3
LEFT INTERNAL DIMENSION IN SYSTOLE: 3.54 CM (ref 2.1–4)
LEFT VENTRICLE DIASTOLIC VOLUME INDEX: 56.81 ML/M2
LEFT VENTRICLE DIASTOLIC VOLUME: 136.91 ML
LEFT VENTRICLE MASS INDEX: 99 G/M2
LEFT VENTRICLE SYSTOLIC VOLUME INDEX: 21.7 ML/M2
LEFT VENTRICLE SYSTOLIC VOLUME: 52.25 ML
LEFT VENTRICULAR INTERNAL DIMENSION IN DIASTOLE: 5.33 CM (ref 3.5–6)
LEFT VENTRICULAR MASS: 238.41 G
LV LATERAL E/E' RATIO: 11.13 M/S
LV SEPTAL E/E' RATIO: 12.71 M/S
LVOT MG: 2.73 MMHG
LVOT MV: 0.77 CM/S
MV PEAK A VEL: 0.76 M/S
MV PEAK E VEL: 0.89 M/S
MV STENOSIS PRESSURE HALF TIME: 58.76 MS
MV VALVE AREA P 1/2 METHOD: 3.74 CM2
PISA TR MAX VEL: 1.94 M/S
PV PEAK VELOCITY: 1.13 CM/S
RA MAJOR: 6.14 CM
RA WIDTH: 3.6 CM
RIGHT VENTRICULAR END-DIASTOLIC DIMENSION: 4.13 CM
SINUS: 3.3 CM
STJ: 2.98 CM
TDI LATERAL: 0.08 M/S
TDI SEPTAL: 0.07 M/S
TDI: 0.08 M/S
TR MAX PG: 15 MMHG
TRICUSPID ANNULAR PLANE SYSTOLIC EXCURSION: 2.9 CM

## 2022-10-12 PROCEDURE — 93306 TTE W/DOPPLER COMPLETE: CPT

## 2022-10-12 PROCEDURE — 93306 ECHO (CUPID ONLY): ICD-10-PCS | Mod: 26,,, | Performed by: INTERNAL MEDICINE

## 2022-10-12 PROCEDURE — 93306 TTE W/DOPPLER COMPLETE: CPT | Mod: 26,,, | Performed by: INTERNAL MEDICINE

## 2022-10-12 NOTE — TELEPHONE ENCOUNTER
Pt advised of results; he does have a CT scheduled for October 21, will make appointment with sleep.

## 2022-10-12 NOTE — TELEPHONE ENCOUNTER
----- Message from John Vargas MD sent at 10/12/2022  4:33 PM CDT -----  Please make sure pt gets results message and that we are working on his oxygen DME order using 6MWT results

## 2022-10-12 NOTE — TELEPHONE ENCOUNTER
Wound Care Appointment  Received: Yesterday  Sophie Dan RN sent to ALIYA Wakefield for the delayed response. I was out of the office yesterday. I called the patient. He did not answer, and I was unable to leave a message due to voice mailbox being full. I'll will try to reach out again later this week

## 2022-10-13 ENCOUNTER — TELEPHONE (OUTPATIENT)
Dept: INTERNAL MEDICINE | Facility: CLINIC | Age: 72
End: 2022-10-13
Payer: MEDICARE

## 2022-10-13 NOTE — TELEPHONE ENCOUNTER
----- Message from John Vargas MD sent at 10/11/2022 12:02 PM CDT -----    ----- Message -----  From: John Vargas MD  Sent: 10/10/2022   2:49 PM CDT  To: John Vargas MD    Please call pt and see if able to get rybelsus from pharmacy and if not why not. Cost? Thanks

## 2022-10-13 NOTE — TELEPHONE ENCOUNTER
Faxed oxygen order to [facility] fax# [phone]/office# varius. I  Have attached a confirmation sheet to this fax located in my file cabinet.

## 2022-10-18 ENCOUNTER — TELEPHONE (OUTPATIENT)
Dept: ADMINISTRATIVE | Facility: OTHER | Age: 72
End: 2022-10-18
Payer: MEDICARE

## 2022-10-18 ENCOUNTER — TELEPHONE (OUTPATIENT)
Dept: INTERNAL MEDICINE | Facility: CLINIC | Age: 72
End: 2022-10-18
Payer: MEDICARE

## 2022-10-18 NOTE — LETTER
October 19, 2022    Serafin Tapia  [address]  [facility] [address]             [facility]  [address]  [facility] [address]  Phone: [phone]  Fax: [phone] Hello,  Our office has made several attempts at contacting you. Please call us at [phone] to verify whether or not you were able to get Rybelsus from your pharmacy. If you have not gotten this medication, please explain why not and what was the cost?    Thanks for choosing [facility]!    ALIYA Penny

## 2022-10-18 NOTE — TELEPHONE ENCOUNTER
Wound Care Appointment  Received: Today  Sophie Dan RN sent to Tanna Logan MA  I'm unable to get in touch with him. I've called multiple times and am unable to leave a voicemail. Voicemail box is full.

## 2022-10-21 ENCOUNTER — INITIAL CONSULT (OUTPATIENT)
Dept: VASCULAR SURGERY | Facility: CLINIC | Age: 72
End: 2022-10-21
Attending: SURGERY
Payer: MEDICARE

## 2022-10-21 ENCOUNTER — HOSPITAL ENCOUNTER (OUTPATIENT)
Dept: RADIOLOGY | Facility: HOSPITAL | Age: 72
Discharge: HOME | End: 2022-10-21
Attending: INTERNAL MEDICINE
Payer: MEDICARE

## 2022-10-21 ENCOUNTER — HOSPITAL ENCOUNTER (OUTPATIENT)
Dept: VASCULAR SURGERY | Facility: CLINIC | Age: 72
Discharge: HOME | End: 2022-10-21
Attending: SURGERY
Payer: MEDICARE

## 2022-10-21 VITALS
WEIGHT: 286.38 LBS | DIASTOLIC BLOOD PRESSURE: 63 MMHG | HEIGHT: 70 IN | TEMPERATURE: 99 F | HEART RATE: 93 BPM | BODY MASS INDEX: 41 KG/M2 | SYSTOLIC BLOOD PRESSURE: 142 MMHG

## 2022-10-21 DIAGNOSIS — I73.9 PERIPHERAL VASCULAR DISEASE, UNSPECIFIED: ICD-10-CM

## 2022-10-21 DIAGNOSIS — I87.2 VENOUS INSUFFICIENCY OF BOTH LOWER EXTREMITIES: Primary | ICD-10-CM

## 2022-10-21 DIAGNOSIS — I73.9 PAD (PERIPHERAL ARTERY DISEASE): ICD-10-CM

## 2022-10-21 DIAGNOSIS — S81.802S NON-HEALING WOUND OF LOWER EXTREMITY, LEFT, SEQUELA: ICD-10-CM

## 2022-10-21 DIAGNOSIS — I73.9 PAD (PERIPHERAL ARTERY DISEASE): Primary | ICD-10-CM

## 2022-10-21 DIAGNOSIS — R07.9 ACUTE CHEST PAIN: ICD-10-CM

## 2022-10-21 PROCEDURE — 71275 CT ANGIOGRAPHY CHEST: CPT | Mod: 26,,, | Performed by: RADIOLOGY

## 2022-10-21 PROCEDURE — 71275 CTA CHEST NON CORONARY (PE STUDIES): ICD-10-PCS | Mod: 26,,, | Performed by: RADIOLOGY

## 2022-10-21 PROCEDURE — 93923 PR NON-INVASIVE PHYSIOLOGIC STUDY EXTREMITY 3 LEVELS: ICD-10-PCS | Mod: S$GLB,,, | Performed by: SURGERY

## 2022-10-21 PROCEDURE — 99203 OFFICE O/P NEW LOW 30 MIN: CPT | Mod: S$GLB,,, | Performed by: SURGERY

## 2022-10-21 PROCEDURE — 99203 PR OFFICE/OUTPT VISIT, NEW, LEVL III, 30-44 MIN: ICD-10-PCS | Mod: S$GLB,,, | Performed by: SURGERY

## 2022-10-21 PROCEDURE — 71275 CT ANGIOGRAPHY CHEST: CPT | Mod: TC

## 2022-10-21 PROCEDURE — 99999 PR PBB SHADOW E&M-EST. PATIENT-LVL IV: CPT | Mod: PBBFAC,,, | Performed by: SURGERY

## 2022-10-21 PROCEDURE — 25500020 PHARM REV CODE 255: Performed by: INTERNAL MEDICINE

## 2022-10-21 PROCEDURE — 93923 UPR/LXTR ART STDY 3+ LVLS: CPT | Mod: S$GLB,,, | Performed by: SURGERY

## 2022-10-21 PROCEDURE — 99999 PR PBB SHADOW E&M-EST. PATIENT-LVL IV: ICD-10-PCS | Mod: PBBFAC,,, | Performed by: SURGERY

## 2022-10-21 PROCEDURE — 99499 UNLISTED E&M SERVICE: CPT | Mod: S$GLB,,, | Performed by: SURGERY

## 2022-10-21 RX ADMIN — IOHEXOL 100 ML: 350 INJECTION, SOLUTION INTRAVENOUS at 12:10

## 2022-10-21 NOTE — PROGRESS NOTES
VASCULAR SURGERY NOTE    Patient ID: Serafin Tapia is a 71 y.o. male.    I. HISTORY     Chief Complaint: Wound to LLE     HPI: Serafin Tapia is a 71 y.o. obese male with DM (not on insulin), HTN, h/o provoked DVT and PE 2017 off blood thinners, who is here today for new patient initial appointment. He presents with non-healing left lower extremity wound and concern for peripheral arterial disease. He reports a wound present for a few weeks. He does not know how it started, but noticed it has been slowly growing. He has never had a problem like this before. He endorses chronically swollen lower extremities bilaterally that have been worse lately. He ambulates with a cane.  He used to wear compression socks for his lower extremity swelling but stopped wearing them a couple weeks before he noticed his wound.  He says that he was referred to Wound Care but has not been there yet.  He does have pertinent history of DVT and PE in 2017 and was treated medically for this.  It was provoked after a long car ride and so he was taken off anticoagulation eventually.      PCP Dr. Vargas.     ALLERGIES: Statins    MEDICATIONS:   Aspirin  Metoprolol, nifedipine, chlorthalidone   Methotrexate  Metformin, trajenta      Past Medical History:   Diagnosis Date    Asthma     childhood    Diabetes mellitus type 2 in obese 05/03/2014    Dyslipidemia 05/05/2014    Elevated troponin 05/03/2014    Gait instability 02/28/2018    Hyperlipidemia     Hypertension     Hypothyroidism (acquired) 11/01/2017    Obesity     Ocular hypertension     Ocular hypertension     Pulmonary embolism 05/2014    PVD (posterior vitreous detachment), right eye     Spondylosis of lumbar region without myelopathy or radiculopathy 08/25/2017    Statin myopathy 01/16/2018    Vertigo         Past Surgical History:   Procedure Laterality Date    CIRCUMCISION N/A 9/10/2019    Procedure: CIRCUMCISION;  Surgeon: Nhan Ruth MD;  Location: [facility];  Service: Urology;   Laterality: N/A;    TONSILLECTOMY         Social History     Occupational History    Occupation: Retired    Tobacco Use    Smoking status: Former     Types: Cigarettes    Smokeless tobacco: Never    Tobacco comments:     Quit smoking as a teenager   Substance and Sexual Activity    Alcohol use: Yes     Alcohol/week: 1.0 - 2.0 standard drink     Types: 1 - 2 Cans of beer per week     Comment: 1-2 drinks occasionally    Drug use: No    Sexual activity: Yes     Partners: Female     Birth control/protection: Condom         Review of Systems   Constitutional: Negative for weight loss.   HENT:  Negative for ear pain and nosebleeds.    Eyes:  Negative for discharge and pain.   Cardiovascular:  Positive for leg swelling. Negative for chest pain and palpitations.   Respiratory:  Negative for cough, shortness of breath and wheezing.    Endocrine: Negative for cold intolerance, heat intolerance and polyphagia.   Hematologic/Lymphatic: Negative for adenopathy. Does not bruise/bleed easily.   Skin:  Negative for itching and rash.   Musculoskeletal:  Negative for joint swelling and muscle cramps.   Gastrointestinal:  Negative for abdominal pain, diarrhea, nausea and vomiting.   Genitourinary:  Negative for dysuria and flank pain.   Neurological:  Negative for numbness and seizures.       II. PHYSICAL EXAM     Physical Exam  Vitals and nursing note reviewed.   Constitutional:       Appearance: Normal appearance. He is obese.      Comments: Cane     HENT:      Head: Normocephalic and atraumatic.   Cardiovascular:      Rate and Rhythm: Normal rate and regular rhythm.   Pulmonary:      Effort: Pulmonary effort is normal. No respiratory distress.   Abdominal:      General: Abdomen is flat.      Palpations: Abdomen is soft.   Musculoskeletal:         General: Normal range of motion.      Right lower leg: Edema (pitting) present.      Left lower leg: Edema (pitting) present.   Skin:     General: Skin is warm and dry.       Coloration: Skin is not jaundiced or pale.      Findings: No erythema or rash.      Comments: Left lower extremity with 6 x 5 cm ulceration to anterior shin; some dried fibrinous exudate overlying; no surrounding inflammation or skin changes, superficial. No exudate or drainage.    Neurological:      General: No focal deficit present.      Mental Status: He is alert and oriented to person, place, and time.   Psychiatric:         Mood and Affect: Mood normal.         Behavior: Behavior normal.   VASC:  RLE: Palpable DP, PT; pigmentation changes of chronic lymphedema;   LLE: biphasic DP/PT signals    III. ASSESSMENT & PLAN (MEDICAL DECISION MAKING)       Imaging Results: (I have personally reviewed all images and provided interpretation below)    MIHIR:  R L   0.93 0.70         Assessment/Diagnosis and Plan:    1. Venous insufficiency of both lower extremities    2. PAD (peripheral artery disease)    3. Non-healing wound of lower extremity, left, sequela        71 y.o. male with venous insufficiency and poorly healing venous ulcer to anterior left shin. Although he has some PAD and LLE MIHIR is decreased, he has adequate arterial blood flow for wound healing at the level of his wound. I suspect his ulceration is primarily due to venous insufficiency. Will refer to woundcare to assist with healing then evaluate venous ultrasound once his ulceration has healed.    Will set him up with wound care - 2 layer boot applied today and he will follow up in wound care clinic next week. Once his ulcer has healed recommend 20-30mmHg compression hose and will have him return to clinic for venous ultrasound to evaluate presence of venous reflux.    FABIAN Kapoor II, MD, [facility]  Vascular Surgery  [facility]

## 2022-10-25 ENCOUNTER — OFFICE VISIT (OUTPATIENT)
Dept: WOUND CARE | Facility: CLINIC | Age: 72
End: 2022-10-25
Payer: MEDICARE

## 2022-10-25 ENCOUNTER — HOSPITAL ENCOUNTER (OUTPATIENT)
Dept: RADIOLOGY | Facility: HOSPITAL | Age: 72
Discharge: HOME | End: 2022-10-25
Attending: INTERNAL MEDICINE
Payer: MEDICARE

## 2022-10-25 DIAGNOSIS — S81.802A OPEN WOUND OF LEFT LOWER EXTREMITY, INITIAL ENCOUNTER: Primary | ICD-10-CM

## 2022-10-25 DIAGNOSIS — I10 ESSENTIAL HYPERTENSION: ICD-10-CM

## 2022-10-25 DIAGNOSIS — I73.9 PAD (PERIPHERAL ARTERY DISEASE): ICD-10-CM

## 2022-10-25 DIAGNOSIS — E11.42 CONTROLLED TYPE 2 DIABETES MELLITUS WITH DIABETIC POLYNEUROPATHY, WITHOUT LONG-TERM CURRENT USE OF INSULIN: ICD-10-CM

## 2022-10-25 DIAGNOSIS — I87.2 VENOUS INSUFFICIENCY: ICD-10-CM

## 2022-10-25 DIAGNOSIS — I50.9 HEART FAILURE, UNSPECIFIED HF CHRONICITY, UNSPECIFIED HEART FAILURE TYPE: ICD-10-CM

## 2022-10-25 PROCEDURE — 75635 CT ANGIO ABDOMINAL ARTERIES: CPT | Mod: TC

## 2022-10-25 PROCEDURE — 11042 DEBRIDEMENT: ICD-10-PCS | Mod: S$GLB,,,

## 2022-10-25 PROCEDURE — 99204 OFFICE O/P NEW MOD 45 MIN: CPT | Mod: 25,S$GLB,,

## 2022-10-25 PROCEDURE — 4010F PR ACE/ARB THEARPY RXD/TAKEN: ICD-10-PCS | Mod: CPTII,S$GLB,,

## 2022-10-25 PROCEDURE — 99999 PR PBB SHADOW E&M-EST. PATIENT-LVL I: CPT | Mod: PBBFAC,,,

## 2022-10-25 PROCEDURE — 75635 CTA RUNOFF ABD PEL BILAT LOWER EXT: ICD-10-PCS | Mod: 26,,, | Performed by: RADIOLOGY

## 2022-10-25 PROCEDURE — 25500020 PHARM REV CODE 255: Performed by: INTERNAL MEDICINE

## 2022-10-25 PROCEDURE — 3072F LOW RISK FOR RETINOPATHY: CPT | Mod: CPTII,S$GLB,,

## 2022-10-25 PROCEDURE — 4010F ACE/ARB THERAPY RXD/TAKEN: CPT | Mod: CPTII,S$GLB,,

## 2022-10-25 PROCEDURE — 99204 PR OFFICE/OUTPT VISIT, NEW, LEVL IV, 45-59 MIN: ICD-10-PCS | Mod: 25,S$GLB,,

## 2022-10-25 PROCEDURE — 3046F PR MOST RECENT HEMOGLOBIN A1C LEVEL > 9.0%: ICD-10-PCS | Mod: CPTII,S$GLB,,

## 2022-10-25 PROCEDURE — 99999 PR PBB SHADOW E&M-EST. PATIENT-LVL I: ICD-10-PCS | Mod: PBBFAC,,,

## 2022-10-25 PROCEDURE — 11042 DBRDMT SUBQ TIS 1ST 20SQCM/<: CPT | Mod: S$GLB,,,

## 2022-10-25 PROCEDURE — 3046F HEMOGLOBIN A1C LEVEL >9.0%: CPT | Mod: CPTII,S$GLB,,

## 2022-10-25 PROCEDURE — 75635 CT ANGIO ABDOMINAL ARTERIES: CPT | Mod: 26,,, | Performed by: RADIOLOGY

## 2022-10-25 PROCEDURE — 99499 UNLISTED E&M SERVICE: CPT | Mod: S$GLB,,,

## 2022-10-25 PROCEDURE — 3072F PR LOW RISK FOR RETINOPATHY: ICD-10-PCS | Mod: CPTII,S$GLB,,

## 2022-10-25 RX ADMIN — IOHEXOL 150 ML: 350 INJECTION, SOLUTION INTRAVENOUS at 09:10

## 2022-10-25 NOTE — PROGRESS NOTES
Subjective:       Patient ID: Serafin Tapia is a 71 y.o. male.    Chief Complaint: Wound Consult    Patient presents for an evaluation of a left lower extremity wound. He states this started about a month ago. He does not know how the wound formed but he noticed that it has been enlarging in size. Patient follows with vascular surgery for PAD and venous insufficiency. Dr. Kapoor determined he has adequate arterial blood flow for wound healing. He was placed in a two layer wrap and instructed to follow up with wound care. No complaints at this time. Denies fever, chills, erythema, warmth, drainage, or pain.      10/21/22 MIHIR:  Right lower extremity: 0.93- minimal peripheral arterial occlusive disease  Left lower extremity: 0.70- moderate peripheral arterial occlusive disease    Review of Systems   Constitutional:  Negative for activity change, chills, diaphoresis, fatigue and fever.   Respiratory:  Negative for apnea, chest tightness and shortness of breath.    Cardiovascular:  Positive for leg swelling. Negative for chest pain and palpitations.   Musculoskeletal:  Negative for gait problem and joint swelling.   Skin:  Positive for wound. Negative for pallor and rash.   Neurological:  Negative for syncope, weakness and numbness.   Psychiatric/Behavioral:  Negative for agitation. The patient is not nervous/anxious.    All other systems reviewed and are negative.    Objective:      Physical Exam  Vitals reviewed.   Constitutional:       General: He is not in acute distress.     Appearance: Normal appearance. He is obese.   Cardiovascular:      Rate and Rhythm: Normal rate and regular rhythm.      Pulses: Normal pulses.   Pulmonary:      Effort: No respiratory distress.   Musculoskeletal:         General: No swelling.      Right lower leg: Pitting Edema present.      Left lower leg: Pitting Edema present.      Comments: Ambulates with cane   Skin:     General: Skin is warm and dry.      Capillary Refill: Capillary  refill takes less than 2 seconds.      Findings: Wound present. No erythema.          Neurological:      General: No focal deficit present.      Mental Status: He is alert and oriented to person, place, and time.   Psychiatric:         Mood and Affect: Mood normal.         Behavior: Behavior normal.         Thought Content: Thought content normal.         Judgment: Judgment normal.       Assessment:       1. Open wound of left lower extremity, initial encounter    2. Venous insufficiency    3. PAD (peripheral artery disease)    4. Essential hypertension    5. Heart failure, unspecified HF chronicity, unspecified heart failure type    6. Controlled type 2 diabetes mellitus with diabetic polyneuropathy, without long-term current use of insulin           Altered Skin Integrity Left anterior;lower Leg (Active)       Altered Skin Integrity Present on Admission:    Side: Left   Orientation: anterior;lower   Location: Leg   Wound Number:    Is this injury device related?:    Primary Wound Type:    Description of Altered Skin Integrity:    Ankle-Brachial Index:    Pulses:    Removal Indication and Assessment:    Wound Outcome:    (Retired) Wound Length (cm):    (Retired) Wound Width (cm):    (Retired) Depth (cm):    Wound Description (Comments):    Removal Indications:    Wound Image   10/25/22 1120   Dressing Appearance Dry;Intact;Clean;Moist drainage 10/25/22 1120   Drainage Amount Small 10/25/22 1120   Drainage Characteristics/Odor Johnson 10/25/22 1120   Black (%), Wound Tissue Color 20 % 10/25/22 1120   Yellow (%), Wound Tissue Color 80 % 10/25/22 1120   Periwound Area Intact;Edematous;Ramona 10/25/22 1120   Wound Length (cm) 4.5 cm 10/25/22 1120   Wound Width (cm) 2.8 cm 10/25/22 1120   Wound Depth (cm) 0.1 cm 10/25/22 1120   Wound Volume (cm^3) 1.26 cm^3 10/25/22 1120   Wound Surface Area (cm^2) 12.6 cm^2 10/25/22 1120   Care Cleansed with:;Soap and water 10/25/22 1120   Dressing Applied;Compression wrap;Honey;Calcium  alginate 10/25/22 1120   Periwound Care Skin barrier film applied 10/25/22 1120   Compression Two layer compression 10/25/22 1120       Sreafin was seen in the clinic room and placed in the supine position on the treatment table.  The unna boot was removed with scissors and the leg was cleansed with Easi-clense sponges and dried thoroughly. No odor, redness, or warmth noted from patient's baseline (when compared to photos in media). Placed topical 4% Lidocaine Hydrochloride to wound bed for 15 minutes. Sharp debridement with 5 mm curette, scissors, and pickups to remove non-viable tissue. Pt tolerated procedure well.      Plan of Care: Calmoseptine to periwound area, medihoney to wound bed, calcium alginate to cover, and a 2 layer zinc oxide wrap.  The patient's foot was positioned at a 90 degree angle.  A zinc oxide wrap followed by coban were applied using a spiral technique avoiding creases or folds.  The wrap was started behind the first metatarsal and ended below the tibial tubercle of the knee.  There was overlap of each turn half the width of the previous turn.  The compression wrap will be changed every 7 days.       Plan:       Orders Placed This Encounter   Procedures    Debridement     This order was created via procedure documentation       Left lower extremity was dressed as detailed above.  Patient was instructed to not get the dressings wet and to use cast covers for showering.  Should the dressing become wet, he is to remove it, place a moist dressing over the wound, cover with gauze and roll gauze and use ace wraps for compression and to secure bandages.  He should then notify this office as soon as possible to have a new dressing applied.    Discussed nutrition and the role of protein in wound healing with the patient. Instructed patient to optimize protein for wound healing. Gave a handout illustrating healthy protein options. Gave samples of Ensure and Harvey.     Instructed patient to elevate lower  extremities whenever sedentary.    Written and verbal instructions given to patient  RTC in 1 week    Rosalba Eaton PA-C

## 2022-10-25 NOTE — PROCEDURES
"Debridement    Date/Time: 10/25/2022 11:00 AM  Performed by: Rosalba Eaton PA-C  Authorized by: Rosalba Eaton PA-C     Time out: Immediately prior to procedure a "time out" was called to verify the correct patient, procedure, equipment, support staff and site/side marked as required.    Consent Done?:  Yes (Written)  Local anesthesia used?: Yes    Local anesthetic:  Topical anesthetic (Topical 4% Lidocaine Hydrochloride)    Wound Details:    Location:  Left leg    Type of Debridement:  Excisional       Length (cm):  4.5       Area (sq cm):  12.6       Width (cm):  2.8       Percent Debrided (%):  100       Depth (cm):  0.1       Total Area Debrided (sq cm):  12.6    Depth of debridement:  Subcutaneous tissue    Tissue debrided:  Subcutaneous    Devitalized tissue debrided:  Biofilm, Exudate, Fibrin, Necrotic/Eschar and Slough    Instruments:  Curette, Forceps and Scissors    Bleeding:  Minimal  Hemostasis Achieved: Yes    Method Used:  Pressure  Patient tolerance:  Patient tolerated the procedure well with no immediate complications  "

## 2022-11-01 ENCOUNTER — OFFICE VISIT (OUTPATIENT)
Dept: WOUND CARE | Facility: CLINIC | Age: 72
End: 2022-11-01
Payer: MEDICARE

## 2022-11-01 VITALS
BODY MASS INDEX: 40.97 KG/M2 | HEART RATE: 80 BPM | OXYGEN SATURATION: 89 % | TEMPERATURE: 98 F | HEIGHT: 70 IN | SYSTOLIC BLOOD PRESSURE: 181 MMHG | WEIGHT: 286.19 LBS | DIASTOLIC BLOOD PRESSURE: 87 MMHG

## 2022-11-01 VITALS
BODY MASS INDEX: 41.03 KG/M2 | WEIGHT: 286.63 LBS | HEART RATE: 84 BPM | TEMPERATURE: 99 F | SYSTOLIC BLOOD PRESSURE: 180 MMHG | HEIGHT: 70 IN | DIASTOLIC BLOOD PRESSURE: 76 MMHG

## 2022-11-01 DIAGNOSIS — E11.42 CONTROLLED TYPE 2 DIABETES MELLITUS WITH DIABETIC POLYNEUROPATHY, WITHOUT LONG-TERM CURRENT USE OF INSULIN: ICD-10-CM

## 2022-11-01 DIAGNOSIS — I10 ESSENTIAL HYPERTENSION: ICD-10-CM

## 2022-11-01 DIAGNOSIS — I50.9 HEART FAILURE, UNSPECIFIED HF CHRONICITY, UNSPECIFIED HEART FAILURE TYPE: ICD-10-CM

## 2022-11-01 DIAGNOSIS — I10 ELEVATED BLOOD PRESSURE READING IN OFFICE WITH DIAGNOSIS OF HYPERTENSION: ICD-10-CM

## 2022-11-01 DIAGNOSIS — I73.9 PAD (PERIPHERAL ARTERY DISEASE): ICD-10-CM

## 2022-11-01 DIAGNOSIS — S81.802A OPEN WOUND OF LEFT LOWER EXTREMITY, INITIAL ENCOUNTER: Primary | ICD-10-CM

## 2022-11-01 DIAGNOSIS — I87.2 VENOUS INSUFFICIENCY: ICD-10-CM

## 2022-11-01 PROCEDURE — 1126F PR PAIN SEVERITY QUANTIFIED, NO PAIN PRESENT: ICD-10-PCS | Mod: CPTII,S$GLB,,

## 2022-11-01 PROCEDURE — 99999 PR PBB SHADOW E&M-EST. PATIENT-LVL III: ICD-10-PCS | Mod: PBBFAC,,,

## 2022-11-01 PROCEDURE — 3072F LOW RISK FOR RETINOPATHY: CPT | Mod: CPTII,S$GLB,,

## 2022-11-01 PROCEDURE — 11042 DBRDMT SUBQ TIS 1ST 20SQCM/<: CPT | Mod: S$GLB,,,

## 2022-11-01 PROCEDURE — 3077F PR MOST RECENT SYSTOLIC BLOOD PRESSURE >= 140 MM HG: ICD-10-PCS | Mod: CPTII,S$GLB,,

## 2022-11-01 PROCEDURE — 3072F PR LOW RISK FOR RETINOPATHY: ICD-10-PCS | Mod: CPTII,S$GLB,,

## 2022-11-01 PROCEDURE — 3046F PR MOST RECENT HEMOGLOBIN A1C LEVEL > 9.0%: ICD-10-PCS | Mod: CPTII,S$GLB,,

## 2022-11-01 PROCEDURE — 99214 OFFICE O/P EST MOD 30 MIN: CPT | Mod: 25,S$GLB,,

## 2022-11-01 PROCEDURE — 3080F PR MOST RECENT DIASTOLIC BLOOD PRESSURE >= 90 MM HG: ICD-10-PCS | Mod: CPTII,S$GLB,,

## 2022-11-01 PROCEDURE — 99214 PR OFFICE/OUTPT VISIT, EST, LEVL IV, 30-39 MIN: ICD-10-PCS | Mod: 25,S$GLB,,

## 2022-11-01 PROCEDURE — 1126F AMNT PAIN NOTED NONE PRSNT: CPT | Mod: CPTII,S$GLB,,

## 2022-11-01 PROCEDURE — 3077F SYST BP >= 140 MM HG: CPT | Mod: CPTII,S$GLB,,

## 2022-11-01 PROCEDURE — 3046F HEMOGLOBIN A1C LEVEL >9.0%: CPT | Mod: CPTII,S$GLB,,

## 2022-11-01 PROCEDURE — 4010F PR ACE/ARB THEARPY RXD/TAKEN: ICD-10-PCS | Mod: CPTII,S$GLB,,

## 2022-11-01 PROCEDURE — 3080F DIAST BP >= 90 MM HG: CPT | Mod: CPTII,S$GLB,,

## 2022-11-01 PROCEDURE — 11042 DEBRIDEMENT: ICD-10-PCS | Mod: S$GLB,,,

## 2022-11-01 PROCEDURE — 4010F ACE/ARB THERAPY RXD/TAKEN: CPT | Mod: CPTII,S$GLB,,

## 2022-11-01 PROCEDURE — 99999 PR PBB SHADOW E&M-EST. PATIENT-LVL III: CPT | Mod: PBBFAC,,,

## 2022-11-01 NOTE — PROCEDURES
"Debridement    Date/Time: 11/1/2022 10:30 AM  Performed by: Rosalba Eaton PA-C  Authorized by: Rosalba Eaton PA-C     Time out: Immediately prior to procedure a "time out" was called to verify the correct patient, procedure, equipment, support staff and site/side marked as required.    Consent Done?:  Yes (Written)  Local anesthesia used?: Yes    Local anesthetic:  Topical anesthetic (Topical 4% Lidocaine Hydrochloride)    Wound Details:    Location:  Left leg    Type of Debridement:  Excisional       Length (cm):  4.3       Area (sq cm):  6.45       Width (cm):  1.5       Percent Debrided (%):  100       Depth (cm):  0.1       Total Area Debrided (sq cm):  6.45    Depth of debridement:  Subcutaneous tissue    Tissue debrided:  Subcutaneous    Devitalized tissue debrided:  Biofilm, Exudate, Fibrin and Slough    Instruments:  Curette    Bleeding:  Minimal  Hemostasis Achieved: Yes    Method Used:  Pressure  Patient tolerance:  Patient tolerated the procedure well with no immediate complications  "

## 2022-11-01 NOTE — PROGRESS NOTES
Subjective:       Patient ID: Serafin Tapia is a 71 y.o. male.    Chief Complaint: Wound Check    Patient presents for a reevaluation of a left lower extremity wound. Patient follows with vascular surgery for PAD and venous insufficiency. Dr. Kapoor determined he has adequate arterial blood flow for wound healing. He was placed in a two layer wrap and instructed to follow up with wound care. No complaints at this time. Denies fever, chills, erythema, warmth, drainage, or pain.      10/21/22 MIHIR:  Right lower extremity: 0.93- minimal peripheral arterial occlusive disease  Left lower extremity: 0.70- moderate peripheral arterial occlusive disease    Review of Systems   Constitutional:  Negative for activity change, chills, diaphoresis, fatigue and fever.   Eyes:  Negative for photophobia and visual disturbance.   Respiratory:  Negative for apnea, chest tightness and shortness of breath.    Cardiovascular:  Positive for leg swelling. Negative for chest pain and palpitations.   Musculoskeletal:  Negative for gait problem and joint swelling.   Skin:  Positive for wound. Negative for color change, pallor and rash.   Neurological:  Negative for dizziness, syncope, facial asymmetry, speech difficulty, weakness, light-headedness, numbness and headaches.   Psychiatric/Behavioral:  Negative for agitation and confusion. The patient is not nervous/anxious.    All other systems reviewed and are negative.    Objective:      Physical Exam  Vitals reviewed.   Constitutional:       General: He is not in acute distress.     Appearance: Normal appearance. He is obese.   Cardiovascular:      Rate and Rhythm: Normal rate and regular rhythm.      Pulses: Normal pulses.   Pulmonary:      Effort: No respiratory distress.   Musculoskeletal:         General: No swelling.      Right lower leg: Pitting Edema present.      Left lower leg: Pitting Edema present.      Comments: Ambulates with cane   Skin:     General: Skin is warm and dry.       Capillary Refill: Capillary refill takes less than 2 seconds.      Findings: Wound present. No erythema.          Neurological:      General: No focal deficit present.      Mental Status: He is alert and oriented to person, place, and time.   Psychiatric:         Mood and Affect: Mood normal.         Behavior: Behavior normal.         Thought Content: Thought content normal.         Judgment: Judgment normal.       Assessment:       1. Open wound of left lower extremity, initial encounter    2. Venous insufficiency    3. PAD (peripheral artery disease)    4. Essential hypertension    5. Heart failure, unspecified HF chronicity, unspecified heart failure type    6. Controlled type 2 diabetes mellitus with diabetic polyneuropathy, without long-term current use of insulin    7. Elevated blood pressure reading in office with diagnosis of hypertension           Altered Skin Integrity Left anterior;lower Leg (Active)       Altered Skin Integrity Present on Admission:    Side: Left   Orientation: anterior;lower   Location: Leg   Wound Number:    Is this injury device related?:    Primary Wound Type:    Description of Altered Skin Integrity:    Ankle-Brachial Index:    Pulses:    Removal Indication and Assessment:    Wound Outcome:    (Retired) Wound Length (cm):    (Retired) Wound Width (cm):    (Retired) Depth (cm):    Wound Description (Comments):    Removal Indications:    Wound Image    11/01/22 1031   Dressing Appearance Dry;Intact;Clean;Moist drainage 11/01/22 1031   Drainage Amount Moderate 11/01/22 1031   Drainage Characteristics/Odor Yellow;Tan;Creamy 11/01/22 1031   Yellow (%), Wound Tissue Color 100 % 11/01/22 1031   Periwound Area Intact;Edematous;Pink 11/01/22 1031   Wound Length (cm) 4.3 cm 11/01/22 1031   Wound Width (cm) 1.5 cm 11/01/22 1031   Wound Depth (cm) 0.1 cm 11/01/22 1031   Wound Volume (cm^3) 0.645 cm^3 11/01/22 1031   Wound Surface Area (cm^2) 6.45 cm^2 11/01/22 1031   Care Cleansed with:;Soap  and water 11/01/22 1031   Dressing Applied;Compression wrap;Calcium alginate;Honey;Silver 11/01/22 1031   Periwound Care Skin barrier film applied 11/01/22 1031   Compression Two layer compression 11/01/22 1031       Serafin was seen in the clinic room and placed in the supine position on the treatment table.  The unna boot was removed with scissors and the leg was cleansed with Easi-clense sponges and dried thoroughly. No odor, redness, or warmth noted from patient's baseline (when compared to photos in media). Placed topical 4% Lidocaine Hydrochloride to wound bed for 15 minutes. Sharp debridement with 5 mm curette to remove non-viable tissue. Pt tolerated procedure well.    Plan of Care: Calmoseptine to periwound area, medihoney to wound bed, silver alginate to cover, and a 2 layer zinc oxide wrap.  The patient's foot was positioned at a 90 degree angle.  A zinc oxide wrap followed by coban were applied using a spiral technique avoiding creases or folds.  The wrap was started behind the first metatarsal and ended below the tibial tubercle of the knee.  There was overlap of each turn half the width of the previous turn.  The compression wrap will be changed every 7 days.       Plan:       Orders Placed This Encounter   Procedures    Debridement     This order was created via procedure documentation     Discussed elevated BP readings with patient. Pt reports taking his BP medications this morning. Pt currently symptomatic. Discussed going to the ED. Pt declined. Instructed patient to contact PCP. ED precautions given.    Left lower extremity was dressed as detailed above.  Patient was instructed to not get the dressings wet and to use cast covers for showering.  Should the dressing become wet, he is to remove it, place a moist dressing over the wound, cover with gauze and roll gauze and use ace wraps for compression and to secure bandages.  He should then notify this office as soon as possible to have a new dressing  applied.    Discussed nutrition and the role of protein in wound healing with the patient. Instructed patient to continue optimizing protein for wound healing.     Instructed patient to elevate lower extremities whenever sedentary.    Instructed patient to keep follow up appointments with vascular surgery    Written and verbal instructions given to patient  RTC in 1 week    Rosalba Eaton PA-C

## 2022-11-02 ENCOUNTER — PATIENT OUTREACH (OUTPATIENT)
Dept: ADMINISTRATIVE | Facility: HOSPITAL | Age: 72
End: 2022-11-02
Payer: MEDICARE

## 2022-11-02 ENCOUNTER — TELEPHONE (OUTPATIENT)
Dept: INTERNAL MEDICINE | Facility: CLINIC | Age: 72
End: 2022-11-02
Payer: MEDICARE

## 2022-11-02 DIAGNOSIS — R09.02 HYPOXIA: Primary | ICD-10-CM

## 2022-11-02 DIAGNOSIS — E11.9 DIABETES MELLITUS WITHOUT COMPLICATION: Primary | ICD-10-CM

## 2022-11-02 DIAGNOSIS — I50.9 HEART FAILURE, UNSPECIFIED HF CHRONICITY, UNSPECIFIED HEART FAILURE TYPE: ICD-10-CM

## 2022-11-04 ENCOUNTER — TELEPHONE (OUTPATIENT)
Dept: INTERNAL MEDICINE | Facility: CLINIC | Age: 72
End: 2022-11-04
Payer: MEDICARE

## 2022-11-04 NOTE — TELEPHONE ENCOUNTER
----- Message from John Vargas MD sent at 11/2/2022 12:30 PM CDT -----    ----- Message -----  From: Berenice Martinez RRT  Sent: 10/11/2022  12:40 PM CDT  To: John Vargas MD

## 2022-11-04 NOTE — ASSESSMENT & PLAN NOTE
Continue Vitamin D supplementation     Subjective:    Patient ID: Charity Dunaway is a 59 y o  male  HPI Patient is s/p washout, excisional debridement, surgical site preparation of [id number], full thickness skin graft [id number] WA FULL THICK 2011 [facility] HEAD,FAC,HAND <20SQC N/A 10/21/2022    Procedure: SKIN GRAFT FULL THICKNESS CHIN;  Surgeon: Peter De MD;  Location: [facility];  

## 2022-11-08 ENCOUNTER — OFFICE VISIT (OUTPATIENT)
Dept: WOUND CARE | Facility: CLINIC | Age: 72
End: 2022-11-08
Payer: MEDICARE

## 2022-11-08 ENCOUNTER — TELEPHONE (OUTPATIENT)
Dept: INTERNAL MEDICINE | Facility: CLINIC | Age: 72
End: 2022-11-08
Payer: MEDICARE

## 2022-11-08 ENCOUNTER — OFFICE VISIT (OUTPATIENT)
Dept: PODIATRY | Facility: CLINIC | Age: 72
End: 2022-11-08
Attending: INTERNAL MEDICINE
Payer: MEDICARE

## 2022-11-08 VITALS
WEIGHT: 287.94 LBS | DIASTOLIC BLOOD PRESSURE: 86 MMHG | BODY MASS INDEX: 41.22 KG/M2 | HEIGHT: 70 IN | HEART RATE: 88 BPM | SYSTOLIC BLOOD PRESSURE: 184 MMHG | TEMPERATURE: 98 F

## 2022-11-08 VITALS
HEIGHT: 70 IN | WEIGHT: 287 LBS | HEART RATE: 82 BPM | BODY MASS INDEX: 41.09 KG/M2 | SYSTOLIC BLOOD PRESSURE: 182 MMHG | DIASTOLIC BLOOD PRESSURE: 98 MMHG

## 2022-11-08 DIAGNOSIS — Z87.898 HISTORY OF ULCERATION: ICD-10-CM

## 2022-11-08 DIAGNOSIS — E11.42 CONTROLLED TYPE 2 DIABETES MELLITUS WITH DIABETIC POLYNEUROPATHY, WITHOUT LONG-TERM CURRENT USE OF INSULIN: ICD-10-CM

## 2022-11-08 DIAGNOSIS — S81.802A OPEN WOUND OF LEFT LOWER EXTREMITY, INITIAL ENCOUNTER: Primary | ICD-10-CM

## 2022-11-08 DIAGNOSIS — I87.2 VENOUS INSUFFICIENCY: ICD-10-CM

## 2022-11-08 DIAGNOSIS — I50.9 HEART FAILURE, UNSPECIFIED HF CHRONICITY, UNSPECIFIED HEART FAILURE TYPE: ICD-10-CM

## 2022-11-08 DIAGNOSIS — I73.9 PVD (PERIPHERAL VASCULAR DISEASE): ICD-10-CM

## 2022-11-08 DIAGNOSIS — I73.9 PAD (PERIPHERAL ARTERY DISEASE): ICD-10-CM

## 2022-11-08 DIAGNOSIS — E11.42 CONTROLLED TYPE 2 DIABETES MELLITUS WITH DIABETIC POLYNEUROPATHY, WITHOUT LONG-TERM CURRENT USE OF INSULIN: Primary | ICD-10-CM

## 2022-11-08 DIAGNOSIS — I10 ESSENTIAL HYPERTENSION: ICD-10-CM

## 2022-11-08 PROCEDURE — 11042 DBRDMT SUBQ TIS 1ST 20SQCM/<: CPT | Mod: S$GLB,,,

## 2022-11-08 PROCEDURE — 3288F FALL RISK ASSESSMENT DOCD: CPT | Mod: CPTII,S$GLB,, | Performed by: PODIATRIST

## 2022-11-08 PROCEDURE — 99999 PR PBB SHADOW E&M-EST. PATIENT-LVL IV: CPT | Mod: PBBFAC,,,

## 2022-11-08 PROCEDURE — 99999 PR PBB SHADOW E&M-EST. PATIENT-LVL III: CPT | Mod: PBBFAC,,, | Performed by: PODIATRIST

## 2022-11-08 PROCEDURE — 3008F BODY MASS INDEX DOCD: CPT | Mod: CPTII,S$GLB,,

## 2022-11-08 PROCEDURE — 3046F PR MOST RECENT HEMOGLOBIN A1C LEVEL > 9.0%: ICD-10-PCS | Mod: CPTII,S$GLB,, | Performed by: PODIATRIST

## 2022-11-08 PROCEDURE — 3008F PR BODY MASS INDEX (BMI) DOCUMENTED: ICD-10-PCS | Mod: CPTII,S$GLB,, | Performed by: PODIATRIST

## 2022-11-08 PROCEDURE — 1101F PR PT FALLS ASSESS DOC 0-1 FALLS W/OUT INJ PAST YR: ICD-10-PCS | Mod: CPTII,S$GLB,,

## 2022-11-08 PROCEDURE — 3077F SYST BP >= 140 MM HG: CPT | Mod: CPTII,S$GLB,,

## 2022-11-08 PROCEDURE — 99214 PR OFFICE/OUTPT VISIT, EST, LEVL IV, 30-39 MIN: ICD-10-PCS | Mod: 25,S$GLB,,

## 2022-11-08 PROCEDURE — 99214 PR OFFICE/OUTPT VISIT, EST, LEVL IV, 30-39 MIN: ICD-10-PCS | Mod: S$GLB,,, | Performed by: PODIATRIST

## 2022-11-08 PROCEDURE — 3077F SYST BP >= 140 MM HG: CPT | Mod: CPTII,S$GLB,, | Performed by: PODIATRIST

## 2022-11-08 PROCEDURE — 4010F ACE/ARB THERAPY RXD/TAKEN: CPT | Mod: CPTII,S$GLB,, | Performed by: PODIATRIST

## 2022-11-08 PROCEDURE — 3077F PR MOST RECENT SYSTOLIC BLOOD PRESSURE >= 140 MM HG: ICD-10-PCS | Mod: CPTII,S$GLB,,

## 2022-11-08 PROCEDURE — 1125F PR PAIN SEVERITY QUANTIFIED, PAIN PRESENT: ICD-10-PCS | Mod: CPTII,S$GLB,,

## 2022-11-08 PROCEDURE — 3046F HEMOGLOBIN A1C LEVEL >9.0%: CPT | Mod: CPTII,S$GLB,,

## 2022-11-08 PROCEDURE — 3072F PR LOW RISK FOR RETINOPATHY: ICD-10-PCS | Mod: CPTII,S$GLB,, | Performed by: PODIATRIST

## 2022-11-08 PROCEDURE — 3079F DIAST BP 80-89 MM HG: CPT | Mod: CPTII,S$GLB,,

## 2022-11-08 PROCEDURE — 4010F PR ACE/ARB THEARPY RXD/TAKEN: ICD-10-PCS | Mod: CPTII,S$GLB,, | Performed by: PODIATRIST

## 2022-11-08 PROCEDURE — 1159F MED LIST DOCD IN RCRD: CPT | Mod: CPTII,S$GLB,,

## 2022-11-08 PROCEDURE — 1159F PR MEDICATION LIST DOCUMENTED IN MEDICAL RECORD: ICD-10-PCS | Mod: CPTII,S$GLB,,

## 2022-11-08 PROCEDURE — 4010F PR ACE/ARB THEARPY RXD/TAKEN: ICD-10-PCS | Mod: CPTII,S$GLB,,

## 2022-11-08 PROCEDURE — 3080F PR MOST RECENT DIASTOLIC BLOOD PRESSURE >= 90 MM HG: ICD-10-PCS | Mod: CPTII,S$GLB,, | Performed by: PODIATRIST

## 2022-11-08 PROCEDURE — 3008F PR BODY MASS INDEX (BMI) DOCUMENTED: ICD-10-PCS | Mod: CPTII,S$GLB,,

## 2022-11-08 PROCEDURE — 3072F PR LOW RISK FOR RETINOPATHY: ICD-10-PCS | Mod: CPTII,S$GLB,,

## 2022-11-08 PROCEDURE — 1101F PR PT FALLS ASSESS DOC 0-1 FALLS W/OUT INJ PAST YR: ICD-10-PCS | Mod: CPTII,S$GLB,, | Performed by: PODIATRIST

## 2022-11-08 PROCEDURE — 3072F LOW RISK FOR RETINOPATHY: CPT | Mod: CPTII,S$GLB,,

## 2022-11-08 PROCEDURE — 11042 DEBRIDEMENT: ICD-10-PCS | Mod: S$GLB,,,

## 2022-11-08 PROCEDURE — 1159F MED LIST DOCD IN RCRD: CPT | Mod: CPTII,S$GLB,, | Performed by: PODIATRIST

## 2022-11-08 PROCEDURE — 3288F PR FALLS RISK ASSESSMENT DOCUMENTED: ICD-10-PCS | Mod: CPTII,S$GLB,,

## 2022-11-08 PROCEDURE — 99214 OFFICE O/P EST MOD 30 MIN: CPT | Mod: 25,S$GLB,,

## 2022-11-08 PROCEDURE — 1126F PR PAIN SEVERITY QUANTIFIED, NO PAIN PRESENT: ICD-10-PCS | Mod: CPTII,S$GLB,, | Performed by: PODIATRIST

## 2022-11-08 PROCEDURE — 1126F AMNT PAIN NOTED NONE PRSNT: CPT | Mod: CPTII,S$GLB,, | Performed by: PODIATRIST

## 2022-11-08 PROCEDURE — 1101F PT FALLS ASSESS-DOCD LE1/YR: CPT | Mod: CPTII,S$GLB,,

## 2022-11-08 PROCEDURE — 99214 OFFICE O/P EST MOD 30 MIN: CPT | Mod: S$GLB,,, | Performed by: PODIATRIST

## 2022-11-08 PROCEDURE — 3072F LOW RISK FOR RETINOPATHY: CPT | Mod: CPTII,S$GLB,, | Performed by: PODIATRIST

## 2022-11-08 PROCEDURE — 3080F DIAST BP >= 90 MM HG: CPT | Mod: CPTII,S$GLB,, | Performed by: PODIATRIST

## 2022-11-08 PROCEDURE — 1125F AMNT PAIN NOTED PAIN PRSNT: CPT | Mod: CPTII,S$GLB,,

## 2022-11-08 PROCEDURE — 3046F PR MOST RECENT HEMOGLOBIN A1C LEVEL > 9.0%: ICD-10-PCS | Mod: CPTII,S$GLB,,

## 2022-11-08 PROCEDURE — 4010F ACE/ARB THERAPY RXD/TAKEN: CPT | Mod: CPTII,S$GLB,,

## 2022-11-08 PROCEDURE — 3079F PR MOST RECENT DIASTOLIC BLOOD PRESSURE 80-89 MM HG: ICD-10-PCS | Mod: CPTII,S$GLB,,

## 2022-11-08 PROCEDURE — 3288F PR FALLS RISK ASSESSMENT DOCUMENTED: ICD-10-PCS | Mod: CPTII,S$GLB,, | Performed by: PODIATRIST

## 2022-11-08 PROCEDURE — 99999 PR PBB SHADOW E&M-EST. PATIENT-LVL IV: ICD-10-PCS | Mod: PBBFAC,,,

## 2022-11-08 PROCEDURE — 3077F PR MOST RECENT SYSTOLIC BLOOD PRESSURE >= 140 MM HG: ICD-10-PCS | Mod: CPTII,S$GLB,, | Performed by: PODIATRIST

## 2022-11-08 PROCEDURE — 3046F HEMOGLOBIN A1C LEVEL >9.0%: CPT | Mod: CPTII,S$GLB,, | Performed by: PODIATRIST

## 2022-11-08 PROCEDURE — 1101F PT FALLS ASSESS-DOCD LE1/YR: CPT | Mod: CPTII,S$GLB,, | Performed by: PODIATRIST

## 2022-11-08 PROCEDURE — 3008F BODY MASS INDEX DOCD: CPT | Mod: CPTII,S$GLB,, | Performed by: PODIATRIST

## 2022-11-08 PROCEDURE — 99999 PR PBB SHADOW E&M-EST. PATIENT-LVL III: ICD-10-PCS | Mod: PBBFAC,,, | Performed by: PODIATRIST

## 2022-11-08 PROCEDURE — 1159F PR MEDICATION LIST DOCUMENTED IN MEDICAL RECORD: ICD-10-PCS | Mod: CPTII,S$GLB,, | Performed by: PODIATRIST

## 2022-11-08 PROCEDURE — 3288F FALL RISK ASSESSMENT DOCD: CPT | Mod: CPTII,S$GLB,,

## 2022-11-08 NOTE — PROGRESS NOTES
Subjective:      Patient ID: Serafin Tapia is a 72 y.o. male.    Chief Complaint: Diabetes Mellitus (Foot Exam/PCP John Vargas MD 10/10/22)    Diabetes, increased risk amputation needing evaluation/management/optomization of foot care.     No current foot complaints, patient currently under care of the Wound Care Department at [facility] [address] for stasis ulceration left with compression dressing and Unna boot in place today.      Casual shoes    Chief Complaint   Patient presents with    Diabetes Mellitus     Foot Exam/PCP John Vargas MD 10/10/22        Review of Systems   Constitutional: Negative for chills, diaphoresis, fever, malaise/fatigue and night sweats.   Cardiovascular:  Positive for leg swelling. Negative for claudication, cyanosis and syncope.   Skin:  Negative for color change, dry skin, nail changes, rash, suspicious lesions and unusual hair distribution.   Musculoskeletal:  Negative for falls, joint pain, joint swelling, muscle cramps, muscle weakness and stiffness.   Gastrointestinal:  Negative for constipation, diarrhea, nausea and vomiting.   Neurological:  Positive for sensory change. Negative for brief paralysis, disturbances in coordination, focal weakness, numbness, paresthesias and tremors.         Objective:      Physical Exam  Constitutional:       General: He is not in acute distress.     Appearance: He is well-developed. He is not diaphoretic.   Cardiovascular:      Pulses:           Popliteal pulses are 2+ on the right side and 2+ on the left side.        Dorsalis pedis pulses are 2+ on the right side and 2+ on the left side.        Posterior tibial pulses are 2+ on the right side and 2+ on the left side.      Comments: Capillary refill 3 seconds all toes/distal feet, all toes/both feet warm to touch.      Negative lymphadenopathy bilateral popliteal fossa and tarsal tunnel.     <2+ pitting lower extremity edema bilateral.    Musculoskeletal:      Right ankle: No swelling,  deformity, ecchymosis or lacerations. Normal range of motion. Normal pulse.      Right Achilles Tendon: Normal. No defects. Marquez's test negative.      Comments: Normal angle, base, station of gait. All ten toes without clubbing, cyanosis, or signs of ischemia.  No pain to palpation bilateral lower extremities.  Range of motion, stability, muscle strength, and muscle tone normal bilateral feet and legs.    Lymphadenopathy:      Lower Body: No right inguinal adenopathy. No left inguinal adenopathy.      Comments: Negative lymphadenopathy bilateral popliteal fossa and tarsal tunnel.    Negative lymphangitic streaking bilateral feet/ankles/legs.   Skin:     General: Skin is warm and dry.      Capillary Refill: Capillary refill takes 2 to 3 seconds.      Coloration: Skin is not pale.      Findings: No abrasion, bruising, burn, ecchymosis, erythema, laceration, lesion or rash.      Nails: There is no clubbing.      Comments: Ulceration left leg not evaluated - compression dressing intact.      Otherwise, Skin is normal age and health appropriate color, turgor, texture, and temperature bilateral lower extremities without ulceration, hyperpigmentation, discoloration, masses nodules or cords palpated.  No ecchymosis, erythema, edema, or cardinal signs of infection bilateral lower extremities.      All toenails normal color and trophic qualities.        Neurological:      Mental Status: He is alert and oriented to person, place, and time.      Motor: No tremor, atrophy or abnormal muscle tone.      Gait: Gait normal.      Deep Tendon Reflexes:      Reflex Scores:       Patellar reflexes are 2+ on the right side and 2+ on the left side.       Achilles reflexes are 2+ on the right side and 2+ on the left side.     Comments: Subjective numbness without loss of protective sensation to 10 g monofilament 10 sites tested 10 sites detected   Psychiatric:         Behavior: Behavior is cooperative.           Assessment:        Encounter Diagnoses   Name Primary?    Controlled type 2 diabetes mellitus with diabetic polyneuropathy, without long-term current use of insulin Yes    PVD (peripheral vascular disease)     History of ulceration          Plan:       Serafin was seen today for diabetes mellitus.    Diagnoses and all orders for this visit:    Controlled type 2 diabetes mellitus with diabetic polyneuropathy, without long-term current use of insulin    PVD (peripheral vascular disease)    History of ulceration      I counseled the patient on his conditions, their implications and medical management.        The patient has received literature on basic diabetic foot care.  Patient will inspect feet daily, wear protective shoe gear when ambulatory, and apply moisturizer to skin as needed to maintain elasticity and help prevent ulceration.    Discussed conservative treatment with shoes of adequate dimensions, material, and style to alleviate symptoms and delay or prevent surgical intervention.    Continue to follow with wound care until wound left leg is healed.      Continue all management follow-up with vascular surgery for PVD.          Follow up in about 1 year (around 11/8/2023).

## 2022-11-08 NOTE — TELEPHONE ENCOUNTER
Faxed [facility] Necessity Form along with Oxygen for Home Use order and chart notes from [facility] 10/10/2022 to [facility] by Soraida on yesterday

## 2022-11-08 NOTE — PROGRESS NOTES
Subjective:       Patient ID: Serafin Tapia is a 72 y.o. male.    Chief Complaint: Wound Check    Patient presents for a reevaluation of a left lower extremity wound. Patient follows with vascular surgery for PAD and venous insufficiency. Dr. Kapoor determined he has adequate arterial blood flow for wound healing. No complaints at this time. Denies fever, chills, erythema, warmth, drainage, or pain.      10/21/22 MIHIR:  Right lower extremity: 0.93- minimal peripheral arterial occlusive disease  Left lower extremity: 0.70- moderate peripheral arterial occlusive disease    Review of Systems   Constitutional:  Negative for activity change, chills, diaphoresis, fatigue and fever.   Eyes:  Negative for photophobia and visual disturbance.   Respiratory:  Negative for apnea, chest tightness and shortness of breath.    Cardiovascular:  Positive for leg swelling. Negative for chest pain and palpitations.   Musculoskeletal:  Negative for gait problem and joint swelling.   Skin:  Positive for wound. Negative for color change, pallor and rash.   Neurological:  Negative for dizziness, syncope, facial asymmetry, speech difficulty, weakness, light-headedness, numbness and headaches.   Psychiatric/Behavioral:  Negative for agitation and confusion. The patient is not nervous/anxious.    All other systems reviewed and are negative.    Objective:      Physical Exam  Vitals reviewed.   Constitutional:       General: He is not in acute distress.     Appearance: Normal appearance. He is obese.   Cardiovascular:      Rate and Rhythm: Normal rate and regular rhythm.      Pulses: Normal pulses.   Pulmonary:      Effort: No respiratory distress.   Musculoskeletal:         General: No swelling.      Right lower leg: Pitting Edema present.      Left lower leg: Pitting Edema present.      Comments: Ambulates with cane   Skin:     General: Skin is warm and dry.      Capillary Refill: Capillary refill takes less than 2 seconds.      Findings:  Wound present. No erythema.          Neurological:      General: No focal deficit present.      Mental Status: He is alert and oriented to person, place, and time.   Psychiatric:         Mood and Affect: Mood normal.         Behavior: Behavior normal.         Thought Content: Thought content normal.         Judgment: Judgment normal.       Assessment:       1. Open wound of left lower extremity, initial encounter    2. Venous insufficiency    3. PAD (peripheral artery disease)    4. Essential hypertension    5. Heart failure, unspecified HF chronicity, unspecified heart failure type    6. Controlled type 2 diabetes mellitus with diabetic polyneuropathy, without long-term current use of insulin           Altered Skin Integrity Left anterior;lower Leg (Active)       Altered Skin Integrity Present on Admission:    Side: Left   Orientation: anterior;lower   Location: Leg   Wound Number:    Is this injury device related?:    Primary Wound Type:    Description of Altered Skin Integrity:    Ankle-Brachial Index:    Pulses:    Removal Indication and Assessment:    Wound Outcome:    (Retired) Wound Length (cm):    (Retired) Wound Width (cm):    (Retired) Depth (cm):    Wound Description (Comments):    Removal Indications:    Wound Image    11/08/22 1000   Dressing Appearance Dry;Intact;Clean;Moist drainage 11/08/22 1000   Drainage Amount Moderate 11/08/22 1000   Drainage Characteristics/Odor Tan;Brown;Yellow;Creamy 11/08/22 1000   Yellow (%), Wound Tissue Color 100 % 11/08/22 1000   Periwound Area Intact;Edematous 11/08/22 1000   Wound Length (cm) 4.6 cm 11/08/22 1000   Wound Width (cm) 2.8 cm 11/08/22 1000   Wound Depth (cm) 0.1 cm 11/08/22 1000   Wound Volume (cm^3) 1.288 cm^3 11/08/22 1000   Wound Surface Area (cm^2) 12.88 cm^2 11/08/22 1000   Care Cleansed with:;Soap and water 11/08/22 1000   Dressing Applied;Compression wrap;Other (comment) 11/08/22 1000   Periwound Care Skin barrier film applied 11/08/22 1000    Compression Unna's Boot;Three layer compression 11/08/22 Minnie Betancourt was seen in the clinic room and placed in the supine position on the treatment table.  The unna boot was removed with scissors and the leg was cleansed with Easi-clense sponges and dried thoroughly. No odor, erythema, or warmth noted from patient's baseline. Placed topical 4% Lidocaine Hydrochloride to wound bed for 15 minutes. Sharp debridement with 5 mm curette to remove non-viable tissue. Pt tolerated procedure well.    Plan of Care: Calmoseptine to periwound area, polymem to wound bed, and a 3 layer zinc oxide wrap.  The patient's foot was positioned at a 90 degree angle.  A zinc oxide wrap was applied using a spiral technique avoiding creases or folds.  The wrap was started behind the first metatarsal and ended below the tibial tubercle of the knee.  There was overlap of each turn half the width of the previous turn.  The compression wrap will be changed every 7 days.       Plan:       Orders Placed This Encounter   Procedures    Debridement     This order was created via procedure documentation     Left lower extremity was dressed as detailed above.  Patient was instructed to not get the dressings wet and to use cast covers for showering.  Should the dressing become wet, he is to remove it, place a moist dressing over the wound, cover with gauze and roll gauze and use ace wraps for compression and to secure bandages.  He should then notify this office as soon as possible to have a new dressing applied.    Discussed nutrition and the role of protein in wound healing with the patient. Instructed patient to continue optimizing protein for wound healing.     Instructed patient to elevate lower extremities whenever sedentary.    Instructed patient to keep follow up appointments with vascular surgery    Written and verbal instructions given to patient  RTC in 1 week    Rosalba Eaton PA-C

## 2022-11-10 ENCOUNTER — OFFICE VISIT (OUTPATIENT)
Dept: INTERNAL MEDICINE | Facility: CLINIC | Age: 72
End: 2022-11-10
Attending: INTERNAL MEDICINE
Payer: MEDICARE

## 2022-11-10 ENCOUNTER — LAB VISIT (OUTPATIENT)
Dept: LAB | Facility: OTHER | Age: 72
End: 2022-11-10
Attending: INTERNAL MEDICINE
Payer: MEDICARE

## 2022-11-10 VITALS
HEART RATE: 83 BPM | DIASTOLIC BLOOD PRESSURE: 74 MMHG | HEIGHT: 70 IN | OXYGEN SATURATION: 97 % | SYSTOLIC BLOOD PRESSURE: 134 MMHG | BODY MASS INDEX: 41.16 KG/M2 | WEIGHT: 287.5 LBS

## 2022-11-10 DIAGNOSIS — I10 ESSENTIAL HYPERTENSION: ICD-10-CM

## 2022-11-10 DIAGNOSIS — E66.01 SEVERE OBESITY (BMI 35.0-39.9) WITH COMORBIDITY: ICD-10-CM

## 2022-11-10 DIAGNOSIS — E11.9 DIABETES MELLITUS WITHOUT COMPLICATION: ICD-10-CM

## 2022-11-10 DIAGNOSIS — S81.802S NON-HEALING WOUND OF LOWER EXTREMITY, LEFT, SEQUELA: ICD-10-CM

## 2022-11-10 DIAGNOSIS — I50.9 HEART FAILURE, UNSPECIFIED HF CHRONICITY, UNSPECIFIED HEART FAILURE TYPE: ICD-10-CM

## 2022-11-10 DIAGNOSIS — E11.9 TYPE 2 DIABETES MELLITUS WITHOUT COMPLICATION, UNSPECIFIED WHETHER LONG TERM INSULIN USE: Primary | ICD-10-CM

## 2022-11-10 LAB
ALBUMIN/CREAT UR: 39.5 UG/MG (ref 0–30)
CREAT UR-MCNC: 60.8 MG/DL (ref 23–375)
MICROALBUMIN UR DL<=1MG/L-MCNC: 24 UG/ML

## 2022-11-10 PROCEDURE — 82043 UR ALBUMIN QUANTITATIVE: CPT | Performed by: INTERNAL MEDICINE

## 2022-11-10 PROCEDURE — 3046F HEMOGLOBIN A1C LEVEL >9.0%: CPT | Mod: CPTII,S$GLB,, | Performed by: INTERNAL MEDICINE

## 2022-11-10 PROCEDURE — 1159F PR MEDICATION LIST DOCUMENTED IN MEDICAL RECORD: ICD-10-PCS | Mod: CPTII,S$GLB,, | Performed by: INTERNAL MEDICINE

## 2022-11-10 PROCEDURE — 3072F PR LOW RISK FOR RETINOPATHY: ICD-10-PCS | Mod: CPTII,S$GLB,, | Performed by: INTERNAL MEDICINE

## 2022-11-10 PROCEDURE — 99214 PR OFFICE/OUTPT VISIT, EST, LEVL IV, 30-39 MIN: ICD-10-PCS | Mod: S$GLB,,, | Performed by: INTERNAL MEDICINE

## 2022-11-10 PROCEDURE — 4010F ACE/ARB THERAPY RXD/TAKEN: CPT | Mod: CPTII,S$GLB,, | Performed by: INTERNAL MEDICINE

## 2022-11-10 PROCEDURE — 99999 PR PBB SHADOW E&M-EST. PATIENT-LVL III: CPT | Mod: PBBFAC,,, | Performed by: INTERNAL MEDICINE

## 2022-11-10 PROCEDURE — 3288F FALL RISK ASSESSMENT DOCD: CPT | Mod: CPTII,S$GLB,, | Performed by: INTERNAL MEDICINE

## 2022-11-10 PROCEDURE — 1101F PT FALLS ASSESS-DOCD LE1/YR: CPT | Mod: CPTII,S$GLB,, | Performed by: INTERNAL MEDICINE

## 2022-11-10 PROCEDURE — 3075F PR MOST RECENT SYSTOLIC BLOOD PRESS GE 130-139MM HG: ICD-10-PCS | Mod: CPTII,S$GLB,, | Performed by: INTERNAL MEDICINE

## 2022-11-10 PROCEDURE — 3075F SYST BP GE 130 - 139MM HG: CPT | Mod: CPTII,S$GLB,, | Performed by: INTERNAL MEDICINE

## 2022-11-10 PROCEDURE — 1159F MED LIST DOCD IN RCRD: CPT | Mod: CPTII,S$GLB,, | Performed by: INTERNAL MEDICINE

## 2022-11-10 PROCEDURE — 3288F PR FALLS RISK ASSESSMENT DOCUMENTED: ICD-10-PCS | Mod: CPTII,S$GLB,, | Performed by: INTERNAL MEDICINE

## 2022-11-10 PROCEDURE — 99999 PR PBB SHADOW E&M-EST. PATIENT-LVL III: ICD-10-PCS | Mod: PBBFAC,,, | Performed by: INTERNAL MEDICINE

## 2022-11-10 PROCEDURE — 1125F PR PAIN SEVERITY QUANTIFIED, PAIN PRESENT: ICD-10-PCS | Mod: CPTII,S$GLB,, | Performed by: INTERNAL MEDICINE

## 2022-11-10 PROCEDURE — 1160F RVW MEDS BY RX/DR IN RCRD: CPT | Mod: CPTII,S$GLB,, | Performed by: INTERNAL MEDICINE

## 2022-11-10 PROCEDURE — 3008F PR BODY MASS INDEX (BMI) DOCUMENTED: ICD-10-PCS | Mod: CPTII,S$GLB,, | Performed by: INTERNAL MEDICINE

## 2022-11-10 PROCEDURE — 3072F LOW RISK FOR RETINOPATHY: CPT | Mod: CPTII,S$GLB,, | Performed by: INTERNAL MEDICINE

## 2022-11-10 PROCEDURE — 99214 OFFICE O/P EST MOD 30 MIN: CPT | Mod: S$GLB,,, | Performed by: INTERNAL MEDICINE

## 2022-11-10 PROCEDURE — 82570 ASSAY OF URINE CREATININE: CPT | Performed by: INTERNAL MEDICINE

## 2022-11-10 PROCEDURE — 4010F PR ACE/ARB THEARPY RXD/TAKEN: ICD-10-PCS | Mod: CPTII,S$GLB,, | Performed by: INTERNAL MEDICINE

## 2022-11-10 PROCEDURE — 3078F DIAST BP <80 MM HG: CPT | Mod: CPTII,S$GLB,, | Performed by: INTERNAL MEDICINE

## 2022-11-10 PROCEDURE — 1125F AMNT PAIN NOTED PAIN PRSNT: CPT | Mod: CPTII,S$GLB,, | Performed by: INTERNAL MEDICINE

## 2022-11-10 PROCEDURE — 3008F BODY MASS INDEX DOCD: CPT | Mod: CPTII,S$GLB,, | Performed by: INTERNAL MEDICINE

## 2022-11-10 PROCEDURE — 3046F PR MOST RECENT HEMOGLOBIN A1C LEVEL > 9.0%: ICD-10-PCS | Mod: CPTII,S$GLB,, | Performed by: INTERNAL MEDICINE

## 2022-11-10 PROCEDURE — 1160F PR REVIEW ALL MEDS BY PRESCRIBER/CLIN PHARMACIST DOCUMENTED: ICD-10-PCS | Mod: CPTII,S$GLB,, | Performed by: INTERNAL MEDICINE

## 2022-11-10 PROCEDURE — 3078F PR MOST RECENT DIASTOLIC BLOOD PRESSURE < 80 MM HG: ICD-10-PCS | Mod: CPTII,S$GLB,, | Performed by: INTERNAL MEDICINE

## 2022-11-10 PROCEDURE — 1101F PR PT FALLS ASSESS DOC 0-1 FALLS W/OUT INJ PAST YR: ICD-10-PCS | Mod: CPTII,S$GLB,, | Performed by: INTERNAL MEDICINE

## 2022-11-10 RX ORDER — EXENATIDE 2 MG/.85ML
2 INJECTION, SUSPENSION, EXTENDED RELEASE SUBCUTANEOUS
Qty: 4 PEN | Refills: 11 | Status: SHIPPED | OUTPATIENT
Start: 2022-11-10 | End: 2022-11-14 | Stop reason: SDUPTHER

## 2022-11-10 RX ORDER — TRAMADOL HYDROCHLORIDE 50 MG/1
50 TABLET ORAL EVERY 6 HOURS PRN
Qty: 18 TABLET | Refills: 0 | Status: SHIPPED | OUTPATIENT
Start: 2022-11-10 | End: 2022-11-10

## 2022-11-10 RX ORDER — TRAMADOL HYDROCHLORIDE 50 MG/1
50 TABLET ORAL EVERY 6 HOURS PRN
Qty: 18 TABLET | Refills: 0 | Status: SHIPPED | OUTPATIENT
Start: 2022-11-10 | End: 2022-12-02 | Stop reason: SDUPTHER

## 2022-11-10 NOTE — PROGRESS NOTES
Subjective:       Patient ID: Serafin Tapia is a 72 y.o. male.    Chief Complaint: Hypertension (3wk f/u)    Here for f/u for BP check    When not in pain BP better.    Meds taking is unclear. BG numbers he does not know.    ### leg wound ##  Left leg. S/p Abx. Current wound care. Saw vascular Dr hitchcock. Pt does have chronic revascularized invivo PAD and poor heeling attributed to poor out flow on venous side.  09/30/2022 U/S Hemodynamically significant stenosis at the level of the anterior tibial artery suspected  .    Atherosclerotic plaque elsewhere with no evidence for hemodynamically significant stenosis.     ### DM ###  Last visit reported taking metformin 1000mg QD instead of Rx BID. He has continued this. Hx of myopathy so statin avoided.   Discussion reveals he likes sweet tea.  11/11/2021 Started on glimepirife  3/21/22 CV reports starting glimepiride. Denies lows    9//28/22 reports 200s. Increase glimepiride             HGBA1C                   9.5 (H)             09/28/2022 12:15 PM        HGBA1C                   8.2 (H)             03/21/2022 09:58 AM        HGBA1C                   9.1 (H)             11/11/2021 02:02 PM        HGBA1C                   8.3 (H)             05/17/2021 10:42 AM        HGBA1C                   10.9 (H)            02/03/2021 11:21 AM        HGBA1C                   7.6 (H)             09/25/2019 11:00 AM        HGBA1C                   7.5 (H)             09/10/2019 06:10 PM        HGBA1C                   7.6 (H)             02/19/2019 10:15 AM        HGBA1C                   7.1 (H)             04/10/2018 03:53 PM        HGBA1C                   5.7 (H)             12/12/2017 04:51 AM        HGBA1C                   5.5                 10/29/2017 02:09 PM        HGBA1C                   6.7 (H)             05/04/2017 11:55 AM        HGBA1C                   7.0 (H)             04/22/2016 12:34 PM        HGBA1C                   7.5 (H)             12/22/2015 11:48 AM         HGBA1C                   8.1 (H)             09/18/2015 11:57 AM        ### HTN ###  Rx nifedipine 90mg, lasix 40, benazepril 40, metoprolol XL 50.   Hx of medication non adherence in taking meds without any consistency or schedule.  02/2021 BP elevated today. His adherence to medications today varies with response.   04/14/21 BP much improved but remains elevated. Increase BB to 200mg.   3/21/22 CV BP very elevated. medication non adherence continues  9/28/22 out of chlorthalidone. F/u in 10 days with me and med bag        ### Asthma ###   no acute issues. Denies frequent SOB, FLOWERS, chest tightness limited by LE weakness.      ### Hypothyroidism ###  Reported adherence with levothyroxine 100mcg  Pt denies unexplained lyn gain/loss, palpitations, tremors, diarrhea, constipation, changes to hair/skin, heat/cold intolerance, or dysphagia.         ### Myopathy ###  Required long course of high dose prednisone. He is off the prednisone since 01/2019  Mtx started : now at 8 tabs weekly and folic acid   Denies myalgias or weakness  Due for f/u with rheum     ### HM ###  Colonoscopy done by Dr mario 5/6/19  Several wide mouth diverticuli (non-bleeding) in sigmoid, 5mm and 2mm polyp proximal ascending                 Review of Systems   Constitutional:  Negative for appetite change, chills, fever and unexpected weight change.   HENT:  Negative for hearing loss, sore throat and trouble swallowing.    Eyes:  Negative for visual disturbance.   Respiratory:  Negative for cough, chest tightness and shortness of breath.    Cardiovascular:  Negative for chest pain and leg swelling.   Gastrointestinal:  Negative for abdominal pain, blood in stool, constipation, diarrhea, nausea and vomiting.   Endocrine: Negative for polydipsia and polyuria.   Genitourinary:  Negative for decreased urine volume, difficulty urinating, dysuria, frequency and urgency.   Musculoskeletal:  Negative for gait problem.   Skin:  Negative for rash.  "  Neurological:  Negative for dizziness and numbness.   Psychiatric/Behavioral:  The patient is not nervous/anxious.      Objective:      Vitals:    11/10/22 1501   BP: 134/74   BP Location: Right arm   Pulse: 83   SpO2: 97%   Weight: 130.4 kg (287 lb 7.7 oz)   Height: 5' 10" (1.778 m)      Physical Exam  Vitals and nursing note reviewed.   Constitutional:       General: He is not in acute distress.     Appearance: Normal appearance. He is well-developed.   HENT:      Head: Normocephalic and atraumatic.      Mouth/Throat:      Pharynx: No oropharyngeal exudate.   Eyes:      General: No scleral icterus.     Conjunctiva/sclera: Conjunctivae normal.      Pupils: Pupils are equal, round, and reactive to light.   Neck:      Thyroid: No thyromegaly.   Cardiovascular:      Rate and Rhythm: Normal rate and regular rhythm.      Heart sounds: Normal heart sounds. No murmur heard.  Pulmonary:      Effort: Pulmonary effort is normal.      Breath sounds: Normal breath sounds. No wheezing or rales.   Abdominal:      General: There is no distension.   Musculoskeletal:         General: No tenderness.   Lymphadenopathy:      Cervical: No cervical adenopathy.   Skin:     General: Skin is warm and dry.   Neurological:      Mental Status: He is alert and oriented to person, place, and time.   Psychiatric:         Behavior: Behavior normal.       Assessment:       1. Type 2 diabetes mellitus without complication, unspecified whether long term insulin use    2. Heart failure, unspecified HF chronicity, unspecified heart failure type    3. Essential hypertension    4. Severe obesity (BMI 35.0-39.9) with comorbidity    5. Non-healing wound of lower extremity, left, sequela        Plan:       Serafin was seen today for hypertension.    Diagnoses and all orders for this visit:    Type 2 diabetes mellitus without complication, unspecified whether long term insulin use   Will have staff call in 48 hours for med crec and to f/u if pt able to get " new medication  -     Hemoglobin A1C; Future  -     Microalbumin/Creatinine Ratio, Urine; Future  -     exenatide microspheres (BYDUREON BCISE) 2 mg/0.85 mL AtIn; Inject 2 mg into the skin every 7 days.    Heart failure, unspecified HF chronicity, unspecified heart failure type  -     B-TYPE NATRIURETIC PEPTIDE; Future    Essential hypertension    Severe obesity (BMI 35.0-39.9) with comorbidity    Non-healing wound of lower extremity, left, sequela   Pt is aware of acute and chronic SE potential and pt has good insight and appreciation for these chemicals. LA  reviewed and is appropriate. controlled. Continue current regimen  -     traMADoL (ULTRAM) 50 mg tablet; Take 1 tablet (50 mg total) by mouth every 6 (six) hours as needed for Pain.         John Chavis MD  Internal Medicine-[facility]        Side effects of medication(s) were discussed in detail and patient voiced understanding.  Patient will call back for any issues or complications.

## 2022-11-11 ENCOUNTER — TELEPHONE (OUTPATIENT)
Dept: INTERNAL MEDICINE | Facility: CLINIC | Age: 72
End: 2022-11-11
Payer: MEDICARE

## 2022-11-11 NOTE — TELEPHONE ENCOUNTER
----- Message from John Vargas MD sent at 11/11/2022 10:21 AM CST -----  Please notify patient of results:  Your diabetes is un-controlled. IN addition to weekly bydureon injection and metformin 1 tablet BID is pt taking one tablet of 4mg glimepiride or two? Please calriofy with pt. If taking one then he should take this BID. Thanks

## 2022-11-14 DIAGNOSIS — T46.6X5A STATIN MYOPATHY: ICD-10-CM

## 2022-11-14 DIAGNOSIS — M62.82 NON-TRAUMATIC RHABDOMYOLYSIS: ICD-10-CM

## 2022-11-14 DIAGNOSIS — E78.00 PURE HYPERCHOLESTEROLEMIA: ICD-10-CM

## 2022-11-14 DIAGNOSIS — G72.0 STATIN MYOPATHY: ICD-10-CM

## 2022-11-14 DIAGNOSIS — I10 ESSENTIAL HYPERTENSION: ICD-10-CM

## 2022-11-14 DIAGNOSIS — E11.42 CONTROLLED TYPE 2 DIABETES MELLITUS WITH DIABETIC POLYNEUROPATHY, WITHOUT LONG-TERM CURRENT USE OF INSULIN: Primary | ICD-10-CM

## 2022-11-14 RX ORDER — METOPROLOL SUCCINATE 200 MG/1
200 TABLET, EXTENDED RELEASE ORAL DAILY
Qty: 90 TABLET | Refills: 2 | Status: SHIPPED | OUTPATIENT
Start: 2022-11-14 | End: 2023-11-14

## 2022-11-14 RX ORDER — CHLORTHALIDONE 25 MG/1
25 TABLET ORAL DAILY
Qty: 90 TABLET | Refills: 3 | Status: SHIPPED | OUTPATIENT
Start: 2022-11-14 | End: 2023-11-14

## 2022-11-14 RX ORDER — DAPAGLIFLOZIN 5 MG/1
5 TABLET, FILM COATED ORAL DAILY
Qty: 90 TABLET | Refills: 2 | Status: SHIPPED | OUTPATIENT
Start: 2022-11-14 | End: 2022-11-14 | Stop reason: SDUPTHER

## 2022-11-14 RX ORDER — ALIROCUMAB 150 MG/ML
150 INJECTION, SOLUTION SUBCUTANEOUS
Qty: 6 ML | Refills: 3 | Status: SHIPPED | OUTPATIENT
Start: 2022-11-14 | End: 2023-02-10

## 2022-11-14 RX ORDER — DAPAGLIFLOZIN 5 MG/1
5 TABLET, FILM COATED ORAL DAILY
Qty: 90 TABLET | Refills: 2 | Status: SHIPPED | OUTPATIENT
Start: 2022-11-14

## 2022-11-14 RX ORDER — EXENATIDE 2 MG/.85ML
2 INJECTION, SUSPENSION, EXTENDED RELEASE SUBCUTANEOUS
Qty: 4 PEN | Refills: 11 | Status: SHIPPED | OUTPATIENT
Start: 2022-11-14 | End: 2023-02-10

## 2022-11-14 RX ORDER — METHOTREXATE 2.5 MG/1
TABLET ORAL
Qty: 40 TABLET | Refills: 5 | Status: SHIPPED | OUTPATIENT
Start: 2022-11-14

## 2022-11-14 NOTE — TELEPHONE ENCOUNTER
No new care gaps identified.  [facility] Embedded Care Gaps. Reference number: [id number]. 11/14/2022   9:01:56 AM CST

## 2022-11-14 NOTE — TELEPHONE ENCOUNTER
Informed pt that medications was sent in and that Dr. Vargas would like him to record BG log (QAM and 2 hours after meal) and RTC in 4 weeks to review. NO point in coming to appt if does not have BG log. Pt verbalized understanding. Schedule pt for 12/20/22 with Dr. Vargas. Pt verbalized understanding.

## 2022-11-14 NOTE — TELEPHONE ENCOUNTER
Continue bydureon injection weekly, metformin 1 tablet BID, glimepiride 4mg BID and start Invokana and record BG log (QAM and 2 hours after meal) and RTC in 4 weeks to review. NO point in coming to appt if does not have BG log

## 2022-11-14 NOTE — TELEPHONE ENCOUNTER
Pt states that chloe [facility]o did not have the script for the Bydureon and he was not able to get the medication.     Med rec completed. Pt states that he does not have any Praluent pens, chlorthalidone, farxiga, methotrexate and is running low on metoprolol. Pt denies taking any other medications at this time.     Penned meds that need a refill.

## 2022-11-14 NOTE — TELEPHONE ENCOUNTER
----- Message from John Vargas MD sent at 11/10/2022  3:23 PM CST -----  Was pt able to get bydureon and also do med rec

## 2022-11-15 ENCOUNTER — OFFICE VISIT (OUTPATIENT)
Dept: WOUND CARE | Facility: CLINIC | Age: 72
End: 2022-11-15
Payer: MEDICARE

## 2022-11-15 VITALS
HEIGHT: 70 IN | SYSTOLIC BLOOD PRESSURE: 182 MMHG | HEART RATE: 87 BPM | BODY MASS INDEX: 41.35 KG/M2 | TEMPERATURE: 99 F | DIASTOLIC BLOOD PRESSURE: 85 MMHG | WEIGHT: 288.81 LBS

## 2022-11-15 DIAGNOSIS — I50.9 HEART FAILURE, UNSPECIFIED HF CHRONICITY, UNSPECIFIED HEART FAILURE TYPE: ICD-10-CM

## 2022-11-15 DIAGNOSIS — I73.9 PAD (PERIPHERAL ARTERY DISEASE): ICD-10-CM

## 2022-11-15 DIAGNOSIS — E11.42 CONTROLLED TYPE 2 DIABETES MELLITUS WITH DIABETIC POLYNEUROPATHY, WITHOUT LONG-TERM CURRENT USE OF INSULIN: ICD-10-CM

## 2022-11-15 DIAGNOSIS — S81.802A OPEN WOUND OF LEFT LOWER EXTREMITY, INITIAL ENCOUNTER: Primary | ICD-10-CM

## 2022-11-15 DIAGNOSIS — I10 ESSENTIAL HYPERTENSION: ICD-10-CM

## 2022-11-15 DIAGNOSIS — I87.2 VENOUS INSUFFICIENCY: ICD-10-CM

## 2022-11-15 PROCEDURE — 3066F PR DOCUMENTATION OF TREATMENT FOR NEPHROPATHY: ICD-10-PCS | Mod: CPTII,S$GLB,,

## 2022-11-15 PROCEDURE — 3052F HG A1C>EQUAL 8.0%<EQUAL 9.0%: CPT | Mod: CPTII,S$GLB,,

## 2022-11-15 PROCEDURE — 1159F PR MEDICATION LIST DOCUMENTED IN MEDICAL RECORD: ICD-10-PCS | Mod: CPTII,S$GLB,,

## 2022-11-15 PROCEDURE — 1101F PR PT FALLS ASSESS DOC 0-1 FALLS W/OUT INJ PAST YR: ICD-10-PCS | Mod: CPTII,S$GLB,,

## 2022-11-15 PROCEDURE — 3288F PR FALLS RISK ASSESSMENT DOCUMENTED: ICD-10-PCS | Mod: CPTII,S$GLB,,

## 2022-11-15 PROCEDURE — 1101F PT FALLS ASSESS-DOCD LE1/YR: CPT | Mod: CPTII,S$GLB,,

## 2022-11-15 PROCEDURE — 1125F AMNT PAIN NOTED PAIN PRSNT: CPT | Mod: CPTII,S$GLB,,

## 2022-11-15 PROCEDURE — 4010F ACE/ARB THERAPY RXD/TAKEN: CPT | Mod: CPTII,S$GLB,,

## 2022-11-15 PROCEDURE — 3052F PR MOST RECENT HEMOGLOBIN A1C LEVEL 8.0 - < 9.0%: ICD-10-PCS | Mod: CPTII,S$GLB,,

## 2022-11-15 PROCEDURE — 3072F PR LOW RISK FOR RETINOPATHY: ICD-10-PCS | Mod: CPTII,S$GLB,,

## 2022-11-15 PROCEDURE — 3008F PR BODY MASS INDEX (BMI) DOCUMENTED: ICD-10-PCS | Mod: CPTII,S$GLB,,

## 2022-11-15 PROCEDURE — 99999 PR PBB SHADOW E&M-EST. PATIENT-LVL IV: ICD-10-PCS | Mod: PBBFAC,,,

## 2022-11-15 PROCEDURE — 1125F PR PAIN SEVERITY QUANTIFIED, PAIN PRESENT: ICD-10-PCS | Mod: CPTII,S$GLB,,

## 2022-11-15 PROCEDURE — 99999 PR PBB SHADOW E&M-EST. PATIENT-LVL IV: CPT | Mod: PBBFAC,,,

## 2022-11-15 PROCEDURE — 3060F POS MICROALBUMINURIA REV: CPT | Mod: CPTII,S$GLB,,

## 2022-11-15 PROCEDURE — 3066F NEPHROPATHY DOC TX: CPT | Mod: CPTII,S$GLB,,

## 2022-11-15 PROCEDURE — 1159F MED LIST DOCD IN RCRD: CPT | Mod: CPTII,S$GLB,,

## 2022-11-15 PROCEDURE — 3077F PR MOST RECENT SYSTOLIC BLOOD PRESSURE >= 140 MM HG: ICD-10-PCS | Mod: CPTII,S$GLB,,

## 2022-11-15 PROCEDURE — 3079F PR MOST RECENT DIASTOLIC BLOOD PRESSURE 80-89 MM HG: ICD-10-PCS | Mod: CPTII,S$GLB,,

## 2022-11-15 PROCEDURE — 4010F PR ACE/ARB THEARPY RXD/TAKEN: ICD-10-PCS | Mod: CPTII,S$GLB,,

## 2022-11-15 PROCEDURE — 3077F SYST BP >= 140 MM HG: CPT | Mod: CPTII,S$GLB,,

## 2022-11-15 PROCEDURE — 3288F FALL RISK ASSESSMENT DOCD: CPT | Mod: CPTII,S$GLB,,

## 2022-11-15 PROCEDURE — 3072F LOW RISK FOR RETINOPATHY: CPT | Mod: CPTII,S$GLB,,

## 2022-11-15 PROCEDURE — 11042 DEBRIDEMENT: ICD-10-PCS | Mod: S$GLB,,,

## 2022-11-15 PROCEDURE — 3060F PR POS MICROALBUMINURIA RESULT DOCUMENTED/REVIEW: ICD-10-PCS | Mod: CPTII,S$GLB,,

## 2022-11-15 PROCEDURE — 3008F BODY MASS INDEX DOCD: CPT | Mod: CPTII,S$GLB,,

## 2022-11-15 PROCEDURE — 3079F DIAST BP 80-89 MM HG: CPT | Mod: CPTII,S$GLB,,

## 2022-11-15 PROCEDURE — 99214 OFFICE O/P EST MOD 30 MIN: CPT | Mod: 25,S$GLB,,

## 2022-11-15 PROCEDURE — 99214 PR OFFICE/OUTPT VISIT, EST, LEVL IV, 30-39 MIN: ICD-10-PCS | Mod: 25,S$GLB,,

## 2022-11-15 PROCEDURE — 11042 DBRDMT SUBQ TIS 1ST 20SQCM/<: CPT | Mod: S$GLB,,,

## 2022-11-15 NOTE — PROGRESS NOTES
Subjective:       Patient ID: Serafin Tapia is a 72 y.o. male.    Chief Complaint: Wound Check    Patient presents for a reevaluation of a left lower extremity wound. Patient follows with vascular surgery for PAD and venous insufficiency. Dr. Kapoor determined he has adequate arterial blood flow for wound healing. No complaints at this time. Denies fever, chills, erythema, warmth, drainage, or pain.      10/21/22 MIHIR:  Right lower extremity: 0.93- minimal peripheral arterial occlusive disease  Left lower extremity: 0.70- moderate peripheral arterial occlusive disease    Review of Systems   Constitutional:  Negative for activity change, chills, diaphoresis, fatigue and fever.   Eyes:  Negative for photophobia and visual disturbance.   Respiratory:  Negative for apnea, chest tightness and shortness of breath.    Cardiovascular:  Positive for leg swelling. Negative for chest pain and palpitations.   Musculoskeletal:  Negative for gait problem and joint swelling.   Skin:  Positive for wound. Negative for color change, pallor and rash.   Neurological:  Negative for dizziness, syncope, facial asymmetry, speech difficulty, weakness, light-headedness, numbness and headaches.   Psychiatric/Behavioral:  Negative for agitation and confusion. The patient is not nervous/anxious.    All other systems reviewed and are negative.    Objective:      Physical Exam  Vitals reviewed.   Constitutional:       General: He is not in acute distress.     Appearance: Normal appearance. He is obese.   Cardiovascular:      Rate and Rhythm: Normal rate and regular rhythm.      Pulses: Normal pulses.   Pulmonary:      Effort: No respiratory distress.   Musculoskeletal:         General: No swelling.      Right lower leg: Pitting Edema present.      Left lower leg: Pitting Edema present.      Comments: Ambulates with cane   Skin:     General: Skin is warm and dry.      Capillary Refill: Capillary refill takes less than 2 seconds.      Findings:  Wound present. No erythema.          Neurological:      General: No focal deficit present.      Mental Status: He is alert and oriented to person, place, and time.   Psychiatric:         Mood and Affect: Mood normal.         Behavior: Behavior normal.         Thought Content: Thought content normal.         Judgment: Judgment normal.       Assessment:       1. Open wound of left lower extremity, initial encounter    2. Venous insufficiency    3. PAD (peripheral artery disease)    4. Essential hypertension    5. Heart failure, unspecified HF chronicity, unspecified heart failure type    6. Controlled type 2 diabetes mellitus with diabetic polyneuropathy, without long-term current use of insulin           Altered Skin Integrity Left anterior;lower Leg (Active)       Altered Skin Integrity Present on Admission:    Side: Left   Orientation: anterior;lower   Location: Leg   Wound Number:    Is this injury device related?:    Primary Wound Type:    Description of Altered Skin Integrity:    Ankle-Brachial Index:    Pulses:    Removal Indication and Assessment:    Wound Outcome:    (Retired) Wound Length (cm):    (Retired) Wound Width (cm):    (Retired) Depth (cm):    Wound Description (Comments):    Removal Indications:    Wound Image   11/15/22 1022   Dressing Appearance Dry;Intact;Clean;Moist drainage 11/15/22 1022   Drainage Amount Large 11/15/22 1022   Drainage Characteristics/Odor [place];Yellow 11/15/22 1022   Yellow (%), Wound Tissue Color 100 % 11/15/22 1022   Periwound Area Intact;Edematous;Pink;Macerated 11/15/22 1022   Wound Length (cm) 4.4 cm 11/15/22 1022   Wound Width (cm) 3 cm 11/15/22 1022   Wound Depth (cm) 0.1 cm 11/15/22 1022   Wound Volume (cm^3) 1.32 cm^3 11/15/22 1022   Wound Surface Area (cm^2) 13.2 cm^2 11/15/22 1022   Care Cleansed with:;Soap and water 11/15/22 1022   Dressing Applied;Compression wrap;Other (comment) 11/15/22 1022   Periwound Care Skin barrier film applied 11/15/22 1022   Compression  Curtis's Boot;Three layer compression 11/15/22 1022       Serafin was seen in the clinic room and placed in the supine position on the treatment table.  The unna boot was removed with scissors and the leg was cleansed with Easi-clense sponges and dried thoroughly. No odor, erythema, or warmth noted from patient's baseline. Placed topical 4% Lidocaine Hydrochloride to wound bed for 15 minutes. Sharp debridement with 5 mm curette to remove non-viable tissue. Pt tolerated procedure well.    Plan of Care: Calmoseptine to periwound area, Drawtex to wound bed, and a 3 layer zinc oxide wrap.  The patient's foot was positioned at a 90 degree angle.  A zinc oxide wrap was applied using a spiral technique avoiding creases or folds.  The wrap was started behind the first metatarsal and ended below the tibial tubercle of the knee.  There was overlap of each turn half the width of the previous turn.  The compression wrap will be changed every 7 days.       Plan:       Orders Placed This Encounter   Procedures    Debridement     This order was created via procedure documentation       Left lower extremity was dressed as detailed above.  Patient was instructed to not get the dressings wet and to use cast covers for showering.  Should the dressing become wet, he is to remove it, place a moist dressing over the wound, cover with gauze and roll gauze and use ace wraps for compression and to secure bandages.  He should then notify this office as soon as possible to have a new dressing applied.  Instructed patient to remove wrap if he notices tingling or coldness to his left lower extremity or if his toes become white, blue, or cold.    Discussed nutrition and the role of protein in wound healing with the patient. Instructed patient to continue optimizing protein for wound healing.     Instructed patient to elevate lower extremities whenever sedentary.    Instructed patient to keep follow up appointments with vascular surgery    Written and  verbal instructions given to patient  RTC in 1 week    Rosalba Eaton PA-C

## 2022-11-15 NOTE — PROCEDURES
"Debridement    Date/Time: 11/15/2022 10:00 AM  Performed by: Rosalba Eaton PA-C  Authorized by: Rosalba Eaton PA-C     Time out: Immediately prior to procedure a "time out" was called to verify the correct patient, procedure, equipment, support staff and site/side marked as required.    Consent Done?:  Yes (Written)  Local anesthesia used?: Yes    Local anesthetic:  Topical anesthetic (Topical 4% Lidocaine Hydrochloride)    Wound Details:    Location:  Left leg    Type of Debridement:  Excisional       Length (cm):  4.4       Area (sq cm):  13.2       Width (cm):  3       Percent Debrided (%):  100       Depth (cm):  0.1       Total Area Debrided (sq cm):  13.2    Depth of debridement:  Subcutaneous tissue    Tissue debrided:  Subcutaneous    Devitalized tissue debrided:  Biofilm, Exudate, Fibrin and Slough    Instruments:  Curette    Bleeding:  Minimal  Hemostasis Achieved: Yes    Method Used:  Pressure  Patient tolerance:  Patient tolerated the procedure well with no immediate complications  "

## 2022-11-22 ENCOUNTER — OFFICE VISIT (OUTPATIENT)
Dept: WOUND CARE | Facility: CLINIC | Age: 72
End: 2022-11-22
Payer: MEDICARE

## 2022-11-22 VITALS
SYSTOLIC BLOOD PRESSURE: 184 MMHG | BODY MASS INDEX: 41.54 KG/M2 | WEIGHT: 290.13 LBS | DIASTOLIC BLOOD PRESSURE: 90 MMHG | TEMPERATURE: 98 F | HEIGHT: 70 IN | HEART RATE: 74 BPM

## 2022-11-22 DIAGNOSIS — S81.802A OPEN WOUND OF LEFT LOWER EXTREMITY, INITIAL ENCOUNTER: Primary | ICD-10-CM

## 2022-11-22 DIAGNOSIS — I10 ESSENTIAL HYPERTENSION: ICD-10-CM

## 2022-11-22 DIAGNOSIS — I50.9 HEART FAILURE, UNSPECIFIED HF CHRONICITY, UNSPECIFIED HEART FAILURE TYPE: ICD-10-CM

## 2022-11-22 DIAGNOSIS — L03.116 CELLULITIS OF LEFT LOWER EXTREMITY: ICD-10-CM

## 2022-11-22 DIAGNOSIS — I73.9 PAD (PERIPHERAL ARTERY DISEASE): ICD-10-CM

## 2022-11-22 DIAGNOSIS — I87.2 VENOUS INSUFFICIENCY: ICD-10-CM

## 2022-11-22 DIAGNOSIS — E11.42 CONTROLLED TYPE 2 DIABETES MELLITUS WITH DIABETIC POLYNEUROPATHY, WITHOUT LONG-TERM CURRENT USE OF INSULIN: ICD-10-CM

## 2022-11-22 PROCEDURE — 99214 OFFICE O/P EST MOD 30 MIN: CPT | Mod: 25,S$GLB,,

## 2022-11-22 PROCEDURE — 3072F PR LOW RISK FOR RETINOPATHY: ICD-10-PCS | Mod: CPTII,S$GLB,,

## 2022-11-22 PROCEDURE — 3060F PR POS MICROALBUMINURIA RESULT DOCUMENTED/REVIEW: ICD-10-PCS | Mod: CPTII,S$GLB,,

## 2022-11-22 PROCEDURE — 3080F PR MOST RECENT DIASTOLIC BLOOD PRESSURE >= 90 MM HG: ICD-10-PCS | Mod: CPTII,S$GLB,,

## 2022-11-22 PROCEDURE — 1125F AMNT PAIN NOTED PAIN PRSNT: CPT | Mod: CPTII,S$GLB,,

## 2022-11-22 PROCEDURE — 1125F PR PAIN SEVERITY QUANTIFIED, PAIN PRESENT: ICD-10-PCS | Mod: CPTII,S$GLB,,

## 2022-11-22 PROCEDURE — 11042 DEBRIDEMENT: ICD-10-PCS | Mod: S$GLB,,,

## 2022-11-22 PROCEDURE — 3066F PR DOCUMENTATION OF TREATMENT FOR NEPHROPATHY: ICD-10-PCS | Mod: CPTII,S$GLB,,

## 2022-11-22 PROCEDURE — 3080F DIAST BP >= 90 MM HG: CPT | Mod: CPTII,S$GLB,,

## 2022-11-22 PROCEDURE — 3077F SYST BP >= 140 MM HG: CPT | Mod: CPTII,S$GLB,,

## 2022-11-22 PROCEDURE — 3072F LOW RISK FOR RETINOPATHY: CPT | Mod: CPTII,S$GLB,,

## 2022-11-22 PROCEDURE — 3060F POS MICROALBUMINURIA REV: CPT | Mod: CPTII,S$GLB,,

## 2022-11-22 PROCEDURE — 4010F ACE/ARB THERAPY RXD/TAKEN: CPT | Mod: CPTII,S$GLB,,

## 2022-11-22 PROCEDURE — 3008F PR BODY MASS INDEX (BMI) DOCUMENTED: ICD-10-PCS | Mod: CPTII,S$GLB,,

## 2022-11-22 PROCEDURE — 1159F MED LIST DOCD IN RCRD: CPT | Mod: CPTII,S$GLB,,

## 2022-11-22 PROCEDURE — 3077F PR MOST RECENT SYSTOLIC BLOOD PRESSURE >= 140 MM HG: ICD-10-PCS | Mod: CPTII,S$GLB,,

## 2022-11-22 PROCEDURE — 3288F PR FALLS RISK ASSESSMENT DOCUMENTED: ICD-10-PCS | Mod: CPTII,S$GLB,,

## 2022-11-22 PROCEDURE — 3052F HG A1C>EQUAL 8.0%<EQUAL 9.0%: CPT | Mod: CPTII,S$GLB,,

## 2022-11-22 PROCEDURE — 99999 PR PBB SHADOW E&M-EST. PATIENT-LVL IV: CPT | Mod: PBBFAC,,,

## 2022-11-22 PROCEDURE — 3288F FALL RISK ASSESSMENT DOCD: CPT | Mod: CPTII,S$GLB,,

## 2022-11-22 PROCEDURE — 1101F PR PT FALLS ASSESS DOC 0-1 FALLS W/OUT INJ PAST YR: ICD-10-PCS | Mod: CPTII,S$GLB,,

## 2022-11-22 PROCEDURE — 3052F PR MOST RECENT HEMOGLOBIN A1C LEVEL 8.0 - < 9.0%: ICD-10-PCS | Mod: CPTII,S$GLB,,

## 2022-11-22 PROCEDURE — 4010F PR ACE/ARB THEARPY RXD/TAKEN: ICD-10-PCS | Mod: CPTII,S$GLB,,

## 2022-11-22 PROCEDURE — 1159F PR MEDICATION LIST DOCUMENTED IN MEDICAL RECORD: ICD-10-PCS | Mod: CPTII,S$GLB,,

## 2022-11-22 PROCEDURE — 11042 DBRDMT SUBQ TIS 1ST 20SQCM/<: CPT | Mod: S$GLB,,,

## 2022-11-22 PROCEDURE — 1101F PT FALLS ASSESS-DOCD LE1/YR: CPT | Mod: CPTII,S$GLB,,

## 2022-11-22 PROCEDURE — 3066F NEPHROPATHY DOC TX: CPT | Mod: CPTII,S$GLB,,

## 2022-11-22 PROCEDURE — 99999 PR PBB SHADOW E&M-EST. PATIENT-LVL IV: ICD-10-PCS | Mod: PBBFAC,,,

## 2022-11-22 PROCEDURE — 99214 PR OFFICE/OUTPT VISIT, EST, LEVL IV, 30-39 MIN: ICD-10-PCS | Mod: 25,S$GLB,,

## 2022-11-22 PROCEDURE — 3008F BODY MASS INDEX DOCD: CPT | Mod: CPTII,S$GLB,,

## 2022-11-22 RX ORDER — CIPROFLOXACIN 500 MG/1
500 TABLET ORAL EVERY 12 HOURS
Qty: 20 TABLET | Refills: 0 | Status: SHIPPED | OUTPATIENT
Start: 2022-11-22 | End: 2022-12-02

## 2022-11-22 NOTE — PROGRESS NOTES
Subjective:       Patient ID: Serafin Tapia is a 72 y.o. male.    Chief Complaint: Wound Check    Patient presents for a reevaluation of a left lower extremity wound. Patient follows with vascular surgery for PAD and venous insufficiency. Dr. Kapoor determined he has adequate arterial blood flow for wound healing. No complaints at this time. Denies fever, chills, erythema, warmth, drainage, or pain.      10/21/22 MIHIR:  Right lower extremity: 0.93- minimal peripheral arterial occlusive disease  Left lower extremity: 0.70- moderate peripheral arterial occlusive disease    Review of Systems   Constitutional:  Negative for activity change, chills, diaphoresis, fatigue and fever.   Eyes:  Negative for photophobia and visual disturbance.   Respiratory:  Negative for apnea, chest tightness and shortness of breath.    Cardiovascular:  Positive for leg swelling. Negative for chest pain and palpitations.   Musculoskeletal:  Negative for gait problem and joint swelling.   Skin:  Positive for wound. Negative for color change, pallor and rash.   Neurological:  Negative for dizziness, syncope, facial asymmetry, speech difficulty, weakness, light-headedness, numbness and headaches.   Psychiatric/Behavioral:  Negative for agitation and confusion. The patient is not nervous/anxious.    All other systems reviewed and are negative.    Objective:      Physical Exam  Vitals reviewed.   Constitutional:       General: He is not in acute distress.     Appearance: Normal appearance. He is obese.   Cardiovascular:      Rate and Rhythm: Normal rate and regular rhythm.      Pulses: Normal pulses.   Pulmonary:      Effort: No respiratory distress.   Musculoskeletal:         General: No swelling.      Right lower leg: Pitting Edema present.      Left lower leg: Pitting Edema present.      Comments: Ambulates with cane   Skin:     General: Skin is warm and dry.      Capillary Refill: Capillary refill takes less than 2 seconds.      Findings:  Wound present. No erythema.          Neurological:      General: No focal deficit present.      Mental Status: He is alert and oriented to person, place, and time.   Psychiatric:         Mood and Affect: Mood normal.         Behavior: Behavior normal.         Thought Content: Thought content normal.         Judgment: Judgment normal.       Assessment:       1. Open wound of left lower extremity, initial encounter    2. Venous insufficiency    3. Essential hypertension    4. PAD (peripheral artery disease)    5. Heart failure, unspecified HF chronicity, unspecified heart failure type    6. Controlled type 2 diabetes mellitus with diabetic polyneuropathy, without long-term current use of insulin    7. Cellulitis of left lower extremity           Altered Skin Integrity Left anterior;lower Leg (Active)       Altered Skin Integrity Present on Admission:    Side: Left   Orientation: anterior;lower   Location: Leg   Wound Number:    Is this injury device related?:    Primary Wound Type:    Description of Altered Skin Integrity:    Ankle-Brachial Index:    Pulses:    Removal Indication and Assessment:    Wound Outcome:    (Retired) Wound Length (cm):    (Retired) Wound Width (cm):    (Retired) Depth (cm):    Wound Description (Comments):    Removal Indications:    Wound Image   11/22/22 1005   Dressing Appearance Dry;Intact;Clean;Dried drainage 11/22/22 1005   Drainage Amount Moderate 11/22/22 1005   Drainage Characteristics/Odor Yellow;Green;Malodorous 11/22/22 1005   Red (%), Wound Tissue Color 10 % 11/22/22 1005   Yellow (%), Wound Tissue Color 90 % 11/22/22 1005   Periwound Area Intact;Edematous;[place] 11/22/22 1005   Wound Length (cm) 4.8 cm 11/22/22 1005   Wound Width (cm) 3.1 cm 11/22/22 1005   Wound Depth (cm) 0.1 cm 11/22/22 1005   Wound Volume (cm^3) 1.488 cm^3 11/22/22 1005   Wound Surface Area (cm^2) 14.88 cm^2 11/22/22 1005   Care Cleansed with:;Soap and water 11/22/22 1005   Dressing Applied;Compression  wrap;Calcium alginate;Silver;Absorptive Pad 11/22/22 1005   Periwound Care Skin barrier film applied 11/22/22 1005   Compression Unna's Boot;Three layer compression 11/22/22 1005       Serafin was seen in the clinic room and placed in the supine position on the treatment table.  The unna boot was removed with scissors and the leg was cleansed with Easi-clense sponges and dried thoroughly. Green drainage and odor noted. No erythema or warmth noted from patient's baseline. Placed topical 4% Lidocaine Hydrochloride to wound bed for 15 minutes. Sharp debridement with 5 mm curette to remove non-viable tissue. Pt tolerated procedure well.    Plan of Care: Calmoseptine to periwound area, Silver alginate to wound bed, and a 3 layer zinc oxide wrap.  The patient's foot was positioned at a 90 degree angle.  A zinc oxide wrap was applied using a spiral technique avoiding creases or folds.  The wrap was started behind the first metatarsal and ended below the tibial tubercle of the knee.  There was overlap of each turn half the width of the previous turn.  The compression wrap will be changed every 7 days.       Plan:       Orders Placed This Encounter   Procedures    Debridement     This order was created via procedure documentation     Discussed cellulitis with patient- likely pseudomonas. Prescribed ciprofloxacin. Discussed side effects of medication. Instructed patient to take medication as prescribed.     Left lower extremity was dressed as detailed above.  Patient was instructed to not get the dressings wet and to use cast covers for showering.  Should the dressing become wet, he is to remove it, place a moist dressing over the wound, cover with gauze and roll gauze and use ace wraps for compression and to secure bandages.  He should then notify this office as soon as possible to have a new dressing applied.  Instructed patient to remove wrap if he notices tingling or coldness to his left lower extremity or if his toes become  white, blue, or cold.    Discussed nutrition and the role of protein in wound healing with the patient. Instructed patient to continue optimizing protein for wound healing.     Instructed patient to elevate lower extremities whenever sedentary.    Instructed patient to keep follow up appointments with vascular surgery    Written and verbal instructions given to patient  RTC in 1 week    Rosalba Eaton PA-C

## 2022-11-22 NOTE — PROCEDURES
"Debridement    Date/Time: 11/22/2022 10:00 AM  Performed by: Rosalba Eaton PA-C  Authorized by: Rosalba Eaton PA-C     Time out: Immediately prior to procedure a "time out" was called to verify the correct patient, procedure, equipment, support staff and site/side marked as required.    Consent Done?:  Yes (Written)  Local anesthesia used?: Yes    Local anesthetic:  Topical anesthetic (Topical 4% Lidocaine Hydrochloride)    Wound Details:    Location:  Left leg    Type of Debridement:  Excisional       Length (cm):  4.8       Area (sq cm):  14.88       Width (cm):  3.1       Percent Debrided (%):  100       Depth (cm):  0.1       Total Area Debrided (sq cm):  14.88    Depth of debridement:  Subcutaneous tissue    Tissue debrided:  Subcutaneous    Devitalized tissue debrided:  Biofilm, Exudate, Fibrin and Slough    Instruments:  Curette    Bleeding:  Minimal  Hemostasis Achieved: Yes    Method Used:  Pressure  Patient tolerance:  Patient tolerated the procedure well with no immediate complications  "

## 2022-11-28 ENCOUNTER — TELEPHONE (OUTPATIENT)
Dept: WOUND CARE | Facility: CLINIC | Age: 72
End: 2022-11-28
Payer: MEDICARE

## 2022-11-28 NOTE — TELEPHONE ENCOUNTER
Returned call and spoke with patient who is out of town for family .  Appointment rescheduled for 1pm Wednesday, 22 per patient request.

## 2022-11-28 NOTE — TELEPHONE ENCOUNTER
----- Message from Magad Adams sent at 11/28/2022 10:13 AM CST -----  Contact: pt @ [phone]  Serafin Tapia calling regarding Patient Advice (message) for #pt will like to reschedule appt 11/29, for wed or later today if possible. Asking for call back

## 2022-11-30 ENCOUNTER — OFFICE VISIT (OUTPATIENT)
Dept: WOUND CARE | Facility: CLINIC | Age: 72
End: 2022-11-30
Payer: MEDICARE

## 2022-11-30 VITALS
DIASTOLIC BLOOD PRESSURE: 99 MMHG | TEMPERATURE: 98 F | SYSTOLIC BLOOD PRESSURE: 209 MMHG | HEART RATE: 81 BPM | HEIGHT: 70 IN | WEIGHT: 286.81 LBS | BODY MASS INDEX: 41.06 KG/M2

## 2022-11-30 DIAGNOSIS — E11.42 CONTROLLED TYPE 2 DIABETES MELLITUS WITH DIABETIC POLYNEUROPATHY, WITHOUT LONG-TERM CURRENT USE OF INSULIN: ICD-10-CM

## 2022-11-30 DIAGNOSIS — I87.2 VENOUS INSUFFICIENCY: ICD-10-CM

## 2022-11-30 DIAGNOSIS — I10 ELEVATED BLOOD PRESSURE READING IN OFFICE WITH DIAGNOSIS OF HYPERTENSION: ICD-10-CM

## 2022-11-30 DIAGNOSIS — I50.9 HEART FAILURE, UNSPECIFIED HF CHRONICITY, UNSPECIFIED HEART FAILURE TYPE: ICD-10-CM

## 2022-11-30 DIAGNOSIS — I10 ESSENTIAL HYPERTENSION: ICD-10-CM

## 2022-11-30 DIAGNOSIS — S81.802A OPEN WOUND OF LEFT LOWER EXTREMITY, INITIAL ENCOUNTER: Primary | ICD-10-CM

## 2022-11-30 DIAGNOSIS — I73.9 PAD (PERIPHERAL ARTERY DISEASE): ICD-10-CM

## 2022-11-30 PROCEDURE — 99214 OFFICE O/P EST MOD 30 MIN: CPT | Mod: 25,S$GLB,,

## 2022-11-30 PROCEDURE — 1159F MED LIST DOCD IN RCRD: CPT | Mod: CPTII,S$GLB,,

## 2022-11-30 PROCEDURE — 3080F PR MOST RECENT DIASTOLIC BLOOD PRESSURE >= 90 MM HG: ICD-10-PCS | Mod: CPTII,S$GLB,,

## 2022-11-30 PROCEDURE — 3288F PR FALLS RISK ASSESSMENT DOCUMENTED: ICD-10-PCS | Mod: CPTII,S$GLB,,

## 2022-11-30 PROCEDURE — 11042 DEBRIDEMENT: ICD-10-PCS | Mod: S$GLB,,,

## 2022-11-30 PROCEDURE — 3288F FALL RISK ASSESSMENT DOCD: CPT | Mod: CPTII,S$GLB,,

## 2022-11-30 PROCEDURE — 3060F PR POS MICROALBUMINURIA RESULT DOCUMENTED/REVIEW: ICD-10-PCS | Mod: CPTII,S$GLB,,

## 2022-11-30 PROCEDURE — 1101F PT FALLS ASSESS-DOCD LE1/YR: CPT | Mod: CPTII,S$GLB,,

## 2022-11-30 PROCEDURE — 3080F DIAST BP >= 90 MM HG: CPT | Mod: CPTII,S$GLB,,

## 2022-11-30 PROCEDURE — 3072F LOW RISK FOR RETINOPATHY: CPT | Mod: CPTII,S$GLB,,

## 2022-11-30 PROCEDURE — 3060F POS MICROALBUMINURIA REV: CPT | Mod: CPTII,S$GLB,,

## 2022-11-30 PROCEDURE — 1101F PR PT FALLS ASSESS DOC 0-1 FALLS W/OUT INJ PAST YR: ICD-10-PCS | Mod: CPTII,S$GLB,,

## 2022-11-30 PROCEDURE — 1159F PR MEDICATION LIST DOCUMENTED IN MEDICAL RECORD: ICD-10-PCS | Mod: CPTII,S$GLB,,

## 2022-11-30 PROCEDURE — 3008F PR BODY MASS INDEX (BMI) DOCUMENTED: ICD-10-PCS | Mod: CPTII,S$GLB,,

## 2022-11-30 PROCEDURE — 99214 PR OFFICE/OUTPT VISIT, EST, LEVL IV, 30-39 MIN: ICD-10-PCS | Mod: 25,S$GLB,,

## 2022-11-30 PROCEDURE — 4010F ACE/ARB THERAPY RXD/TAKEN: CPT | Mod: CPTII,S$GLB,,

## 2022-11-30 PROCEDURE — 3077F SYST BP >= 140 MM HG: CPT | Mod: CPTII,S$GLB,,

## 2022-11-30 PROCEDURE — 1125F PR PAIN SEVERITY QUANTIFIED, PAIN PRESENT: ICD-10-PCS | Mod: CPTII,S$GLB,,

## 2022-11-30 PROCEDURE — 3077F PR MOST RECENT SYSTOLIC BLOOD PRESSURE >= 140 MM HG: ICD-10-PCS | Mod: CPTII,S$GLB,,

## 2022-11-30 PROCEDURE — 11042 DBRDMT SUBQ TIS 1ST 20SQCM/<: CPT | Mod: S$GLB,,,

## 2022-11-30 PROCEDURE — 3066F PR DOCUMENTATION OF TREATMENT FOR NEPHROPATHY: ICD-10-PCS | Mod: CPTII,S$GLB,,

## 2022-11-30 PROCEDURE — 4010F PR ACE/ARB THEARPY RXD/TAKEN: ICD-10-PCS | Mod: CPTII,S$GLB,,

## 2022-11-30 PROCEDURE — 3066F NEPHROPATHY DOC TX: CPT | Mod: CPTII,S$GLB,,

## 2022-11-30 PROCEDURE — 3052F HG A1C>EQUAL 8.0%<EQUAL 9.0%: CPT | Mod: CPTII,S$GLB,,

## 2022-11-30 PROCEDURE — 3008F BODY MASS INDEX DOCD: CPT | Mod: CPTII,S$GLB,,

## 2022-11-30 PROCEDURE — 99999 PR PBB SHADOW E&M-EST. PATIENT-LVL IV: ICD-10-PCS | Mod: PBBFAC,,,

## 2022-11-30 PROCEDURE — 99999 PR PBB SHADOW E&M-EST. PATIENT-LVL IV: CPT | Mod: PBBFAC,,,

## 2022-11-30 PROCEDURE — 3072F PR LOW RISK FOR RETINOPATHY: ICD-10-PCS | Mod: CPTII,S$GLB,,

## 2022-11-30 PROCEDURE — 3052F PR MOST RECENT HEMOGLOBIN A1C LEVEL 8.0 - < 9.0%: ICD-10-PCS | Mod: CPTII,S$GLB,,

## 2022-11-30 PROCEDURE — 1125F AMNT PAIN NOTED PAIN PRSNT: CPT | Mod: CPTII,S$GLB,,

## 2022-11-30 NOTE — PROGRESS NOTES
Subjective:       Patient ID: Serafin Tapia is a 72 y.o. male.    Chief Complaint: Wound Check and Wound Infection    Patient presents for a reevaluation of a left lower extremity wound. Patient follows with vascular surgery for PAD and venous insufficiency. Dr. Kapoor determined he has adequate arterial blood flow for wound healing. Patient reports not picking up his antibiotics yet. No other complaints at this time. Denies fever, chills, erythema, warmth, drainage, or pain.      10/21/22 MIHIR:  Right lower extremity: 0.93- minimal peripheral arterial occlusive disease  Left lower extremity: 0.70- moderate peripheral arterial occlusive disease    Review of Systems   Constitutional:  Negative for activity change, chills, diaphoresis, fatigue and fever.   Eyes:  Negative for photophobia and visual disturbance.   Respiratory:  Negative for apnea, chest tightness and shortness of breath.    Cardiovascular:  Positive for leg swelling. Negative for chest pain and palpitations.   Musculoskeletal:  Negative for gait problem and joint swelling.   Skin:  Positive for wound. Negative for color change, pallor and rash.   Neurological:  Negative for dizziness, syncope, facial asymmetry, speech difficulty, weakness, light-headedness, numbness and headaches.   Psychiatric/Behavioral:  Negative for agitation and confusion. The patient is not nervous/anxious.    All other systems reviewed and are negative.    Objective:      Physical Exam  Vitals reviewed.   Constitutional:       General: He is not in acute distress.     Appearance: Normal appearance. He is obese.   Cardiovascular:      Rate and Rhythm: Normal rate and regular rhythm.      Pulses: Normal pulses.   Pulmonary:      Effort: No respiratory distress.   Musculoskeletal:         General: No swelling.      Right lower leg: Pitting Edema present.      Left lower leg: Pitting Edema present.      Comments: Ambulates with cane   Skin:     General: Skin is warm and dry.       Capillary Refill: Capillary refill takes less than 2 seconds.      Findings: Wound present. No erythema.          Neurological:      General: No focal deficit present.      Mental Status: He is alert and oriented to person, place, and time.   Psychiatric:         Mood and Affect: Mood normal.         Behavior: Behavior normal.         Thought Content: Thought content normal.         Judgment: Judgment normal.       Assessment:       1. Open wound of left lower extremity, initial encounter    2. Venous insufficiency    3. Essential hypertension    4. Heart failure, unspecified HF chronicity, unspecified heart failure type    5. Controlled type 2 diabetes mellitus with diabetic polyneuropathy, without long-term current use of insulin    6. PAD (peripheral artery disease)    7. Elevated blood pressure reading in office with diagnosis of hypertension           Altered Skin Integrity Left anterior;lower Leg (Active)       Altered Skin Integrity Present on Admission:    Side: Left   Orientation: anterior;lower   Location: Leg   Wound Number:    Is this injury device related?:    Primary Wound Type:    Description of Altered Skin Integrity:    Ankle-Brachial Index:    Pulses:    Removal Indication and Assessment:    Wound Outcome:    (Retired) Wound Length (cm):    (Retired) Wound Width (cm):    (Retired) Depth (cm):    Wound Description (Comments):    Removal Indications:    Wound Image   11/30/22 1313   Dressing Appearance Dry;Intact;Clean;Moist drainage 11/30/22 1313   Drainage Amount Moderate 11/30/22 1313   Drainage Characteristics/Odor Green;Malodorous 11/30/22 1313   Red (%), Wound Tissue Color 60 % 11/30/22 1313   Yellow (%), Wound Tissue Color 40 % 11/30/22 1313   Periwound Area Intact;Edematous;Pink 11/30/22 1313   Wound Length (cm) 4.4 cm 11/30/22 1313   Wound Width (cm) 2.9 cm 11/30/22 1313   Wound Depth (cm) 0.1 cm 11/30/22 1313   Wound Volume (cm^3) 1.276 cm^3 11/30/22 1313   Wound Surface Area (cm^2) 12.76  cm^2 11/30/22 1313   Care Cleansed with:;Soap and water 11/30/22 1313   Dressing Applied;Compression wrap;Calcium alginate;Silver;Absorptive Pad 11/30/22 1313   Periwound Care Skin barrier film applied 11/30/22 1313   Compression Two layer compression 11/30/22 1313       Serafin was seen in the clinic room and placed in the supine position on the treatment table.  The unna boot was removed with scissors and the leg was cleansed with Easi-clense sponges and dried thoroughly. Green drainage and odor noted. No erythema or warmth noted from patient's baseline. Placed topical 4% Lidocaine Hydrochloride to wound bed for 15 minutes. Sharp debridement with 5 mm curette to remove non-viable tissue. Pt tolerated procedure well.    Plan of Care: Calmoseptine to periwound area, Silver alginate to wound bed, and a 2 layer zinc oxide wrap.  The patient's foot was positioned at a 90 degree angle.  A zinc oxide wrap was applied using a spiral technique avoiding creases or folds.  The wrap was started behind the first metatarsal and ended below the tibial tubercle of the knee.  There was overlap of each turn half the width of the previous turn.  The compression wrap will be changed every 7 days.       Plan:       Orders Placed This Encounter   Procedures    Debridement     This order was created via procedure documentation     Discussed elevated BP reading with patient. Pt reports taking his BP medications this morning. Pt currently symptomatic. Discussed going to the ED. Pt declined. Instructed patient to contact PCP. ED precautions given. Patient has an appointment with PCP on 12/20/22. Directed messaged PCP to inquire about scheduling patient a sooner appointment.    Discussed cellulitis with patient- likely pseudomonas. Instructed patient to continue ciprofloxacin as prescribed. Discussed the importance of starting this medication.    Left lower extremity was dressed as detailed above- will resume the 3 layer compression once  antibiotics are complete/ cellulitis resolves  Patient was instructed to not get the dressings wet and to use cast covers for showering.  Should the dressing become wet, he is to remove it, place a moist dressing over the wound, cover with gauze and roll gauze and use ace wraps for compression and to secure bandages.  He should then notify this office as soon as possible to have a new dressing applied.  Instructed patient to remove wrap if he notices tingling or coldness to his left lower extremity or if his toes become white, blue, or cold.    Discussed nutrition and the role of protein in wound healing with the patient. Instructed patient to continue optimizing protein for wound healing.     Instructed patient to elevate lower extremities whenever sedentary.    Instructed patient to keep follow up appointments with vascular surgery    Written and verbal instructions given to patient  RTC in 1 week    Rosalba Eaton PA-C

## 2022-11-30 NOTE — PROCEDURES
"Debridement    Date/Time: 11/30/2022 1:00 PM  Performed by: Rosalba Eaton PA-C  Authorized by: Rosalba Eaton PA-C     Time out: Immediately prior to procedure a "time out" was called to verify the correct patient, procedure, equipment, support staff and site/side marked as required.    Consent Done?:  Yes (Written)  Local anesthesia used?: Yes    Local anesthetic:  Topical anesthetic (Topical 4% Lidocaine Hydrochloride)    Wound Details:    Location:  Left leg    Type of Debridement:  Excisional       Length (cm):  4.4       Area (sq cm):  12.76       Width (cm):  2.9       Percent Debrided (%):  100       Depth (cm):  0.1       Total Area Debrided (sq cm):  12.76    Depth of debridement:  Subcutaneous tissue    Tissue debrided:  Subcutaneous    Devitalized tissue debrided:  Biofilm, Exudate, Fibrin and Slough    Instruments:  Curette    Bleeding:  Minimal  Hemostasis Achieved: Yes    Method Used:  Pressure  Patient tolerance:  Patient tolerated the procedure well with no immediate complications  "

## 2022-12-02 DIAGNOSIS — M47.816 SPONDYLOSIS OF LUMBAR REGION WITHOUT MYELOPATHY OR RADICULOPATHY: Primary | ICD-10-CM

## 2022-12-02 RX ORDER — TRAMADOL HYDROCHLORIDE 50 MG/1
50 TABLET ORAL EVERY 6 HOURS PRN
Qty: 18 TABLET | Refills: 0 | Status: SHIPPED | OUTPATIENT
Start: 2022-12-02 | End: 2023-03-08

## 2022-12-02 NOTE — TELEPHONE ENCOUNTER
No new care gaps identified.  [facility] Embedded Care Gaps. Reference number: [id number]. 12/02/2022   2:18:25 PM CST

## 2022-12-02 NOTE — TELEPHONE ENCOUNTER
Spoke with pt and kept scheduled appt on 2/10/2023 and cancelled 12/20/2022 appt as Dr. Vargas will be out of the office that day

## 2022-12-02 NOTE — TELEPHONE ENCOUNTER
----- Message from David Guerra sent at 12/2/2022 12:55 PM CST -----  Regarding: REFILL  Who Called:VALDO VEGA [[id number]]          RX Name and Strength:traMADoL (ULTRAM) 50 mg tablet            Is this a 30 day or 90 day RX: 30          Preferred Pharmacy with phone number:  [id number] - [facility] [address] or Mail Order: [facility]

## 2022-12-05 ENCOUNTER — PES CALL (OUTPATIENT)
Dept: ADMINISTRATIVE | Facility: CLINIC | Age: 72
End: 2022-12-05
Payer: MEDICARE

## 2022-12-07 ENCOUNTER — TELEPHONE (OUTPATIENT)
Dept: WOUND CARE | Facility: CLINIC | Age: 72
End: 2022-12-07

## 2022-12-07 ENCOUNTER — OFFICE VISIT (OUTPATIENT)
Dept: WOUND CARE | Facility: CLINIC | Age: 72
End: 2022-12-07
Payer: MEDICARE

## 2022-12-07 VITALS
DIASTOLIC BLOOD PRESSURE: 72 MMHG | HEART RATE: 83 BPM | TEMPERATURE: 98 F | WEIGHT: 284.81 LBS | HEIGHT: 70 IN | SYSTOLIC BLOOD PRESSURE: 151 MMHG | BODY MASS INDEX: 40.77 KG/M2

## 2022-12-07 DIAGNOSIS — I87.2 VENOUS INSUFFICIENCY: ICD-10-CM

## 2022-12-07 DIAGNOSIS — E11.42 CONTROLLED TYPE 2 DIABETES MELLITUS WITH DIABETIC POLYNEUROPATHY, WITHOUT LONG-TERM CURRENT USE OF INSULIN: ICD-10-CM

## 2022-12-07 DIAGNOSIS — I73.9 PAD (PERIPHERAL ARTERY DISEASE): ICD-10-CM

## 2022-12-07 DIAGNOSIS — I10 ESSENTIAL HYPERTENSION: ICD-10-CM

## 2022-12-07 DIAGNOSIS — I50.9 HEART FAILURE, UNSPECIFIED HF CHRONICITY, UNSPECIFIED HEART FAILURE TYPE: ICD-10-CM

## 2022-12-07 DIAGNOSIS — S81.802A OPEN WOUND OF LEFT LOWER EXTREMITY, INITIAL ENCOUNTER: Primary | ICD-10-CM

## 2022-12-07 DIAGNOSIS — L03.116 CELLULITIS OF LEFT LOWER EXTREMITY: ICD-10-CM

## 2022-12-07 PROCEDURE — 1126F AMNT PAIN NOTED NONE PRSNT: CPT | Mod: CPTII,S$GLB,,

## 2022-12-07 PROCEDURE — 3072F PR LOW RISK FOR RETINOPATHY: ICD-10-PCS | Mod: CPTII,S$GLB,,

## 2022-12-07 PROCEDURE — 3066F NEPHROPATHY DOC TX: CPT | Mod: CPTII,S$GLB,,

## 2022-12-07 PROCEDURE — 3066F PR DOCUMENTATION OF TREATMENT FOR NEPHROPATHY: ICD-10-PCS | Mod: CPTII,S$GLB,,

## 2022-12-07 PROCEDURE — 11042 DBRDMT SUBQ TIS 1ST 20SQCM/<: CPT | Mod: S$GLB,,,

## 2022-12-07 PROCEDURE — 99999 PR PBB SHADOW E&M-EST. PATIENT-LVL IV: ICD-10-PCS | Mod: PBBFAC,,,

## 2022-12-07 PROCEDURE — 4010F PR ACE/ARB THEARPY RXD/TAKEN: ICD-10-PCS | Mod: CPTII,S$GLB,,

## 2022-12-07 PROCEDURE — 1101F PR PT FALLS ASSESS DOC 0-1 FALLS W/OUT INJ PAST YR: ICD-10-PCS | Mod: CPTII,S$GLB,,

## 2022-12-07 PROCEDURE — 1159F MED LIST DOCD IN RCRD: CPT | Mod: CPTII,S$GLB,,

## 2022-12-07 PROCEDURE — 99214 PR OFFICE/OUTPT VISIT, EST, LEVL IV, 30-39 MIN: ICD-10-PCS | Mod: 25,S$GLB,,

## 2022-12-07 PROCEDURE — 3078F DIAST BP <80 MM HG: CPT | Mod: CPTII,S$GLB,,

## 2022-12-07 PROCEDURE — 99214 OFFICE O/P EST MOD 30 MIN: CPT | Mod: 25,S$GLB,,

## 2022-12-07 PROCEDURE — 1159F PR MEDICATION LIST DOCUMENTED IN MEDICAL RECORD: ICD-10-PCS | Mod: CPTII,S$GLB,,

## 2022-12-07 PROCEDURE — 3052F PR MOST RECENT HEMOGLOBIN A1C LEVEL 8.0 - < 9.0%: ICD-10-PCS | Mod: CPTII,S$GLB,,

## 2022-12-07 PROCEDURE — 3078F PR MOST RECENT DIASTOLIC BLOOD PRESSURE < 80 MM HG: ICD-10-PCS | Mod: CPTII,S$GLB,,

## 2022-12-07 PROCEDURE — 1126F PR PAIN SEVERITY QUANTIFIED, NO PAIN PRESENT: ICD-10-PCS | Mod: CPTII,S$GLB,,

## 2022-12-07 PROCEDURE — 3052F HG A1C>EQUAL 8.0%<EQUAL 9.0%: CPT | Mod: CPTII,S$GLB,,

## 2022-12-07 PROCEDURE — 3060F PR POS MICROALBUMINURIA RESULT DOCUMENTED/REVIEW: ICD-10-PCS | Mod: CPTII,S$GLB,,

## 2022-12-07 PROCEDURE — 99999 PR PBB SHADOW E&M-EST. PATIENT-LVL IV: CPT | Mod: PBBFAC,,,

## 2022-12-07 PROCEDURE — 3008F PR BODY MASS INDEX (BMI) DOCUMENTED: ICD-10-PCS | Mod: CPTII,S$GLB,,

## 2022-12-07 PROCEDURE — 1101F PT FALLS ASSESS-DOCD LE1/YR: CPT | Mod: CPTII,S$GLB,,

## 2022-12-07 PROCEDURE — 3077F SYST BP >= 140 MM HG: CPT | Mod: CPTII,S$GLB,,

## 2022-12-07 PROCEDURE — 4010F ACE/ARB THERAPY RXD/TAKEN: CPT | Mod: CPTII,S$GLB,,

## 2022-12-07 PROCEDURE — 3060F POS MICROALBUMINURIA REV: CPT | Mod: CPTII,S$GLB,,

## 2022-12-07 PROCEDURE — 3288F PR FALLS RISK ASSESSMENT DOCUMENTED: ICD-10-PCS | Mod: CPTII,S$GLB,,

## 2022-12-07 PROCEDURE — 3077F PR MOST RECENT SYSTOLIC BLOOD PRESSURE >= 140 MM HG: ICD-10-PCS | Mod: CPTII,S$GLB,,

## 2022-12-07 PROCEDURE — 3008F BODY MASS INDEX DOCD: CPT | Mod: CPTII,S$GLB,,

## 2022-12-07 PROCEDURE — 11042 DEBRIDEMENT: ICD-10-PCS | Mod: S$GLB,,,

## 2022-12-07 PROCEDURE — 3072F LOW RISK FOR RETINOPATHY: CPT | Mod: CPTII,S$GLB,,

## 2022-12-07 PROCEDURE — 3288F FALL RISK ASSESSMENT DOCD: CPT | Mod: CPTII,S$GLB,,

## 2022-12-07 RX ORDER — CIPROFLOXACIN 500 MG/1
500 TABLET ORAL EVERY 12 HOURS
Qty: 20 TABLET | Refills: 0 | Status: SHIPPED | OUTPATIENT
Start: 2022-12-07 | End: 2022-12-17

## 2022-12-07 NOTE — PROCEDURES
"Debridement    Date/Time: 12/7/2022 1:00 PM  Performed by: Rosalba Eaton PA-C  Authorized by: Rosalba Eaton PA-C     Time out: Immediately prior to procedure a "time out" was called to verify the correct patient, procedure, equipment, support staff and site/side marked as required.    Consent Done?:  Yes (Written)  Local anesthesia used?: Yes    Local anesthetic:  Topical anesthetic (Topical 4% Lidocaine Hydrochloride)    Wound Details:    Location:  Left leg    Type of Debridement:  Excisional       Length (cm):  4.7       Area (sq cm):  14.1       Width (cm):  3       Percent Debrided (%):  100       Depth (cm):  0.2       Total Area Debrided (sq cm):  14.1    Depth of debridement:  Subcutaneous tissue    Tissue debrided:  Subcutaneous    Devitalized tissue debrided:  Biofilm, Exudate, Fibrin and Slough    Instruments:  Curette    Bleeding:  Minimal  Hemostasis Achieved: Yes    Method Used:  Pressure  Patient tolerance:  Patient tolerated the procedure well with no immediate complications  "

## 2022-12-07 NOTE — TELEPHONE ENCOUNTER
Called patient's brother (Negro) who came to his first wound care appointment. Discussed the importance of treating pseudomonas and taking ciprofloxacin as prescribed. Patient's brother states he will make sure that his brother takes his medication.      Rosalba Eaton PA-C

## 2022-12-07 NOTE — PROGRESS NOTES
Subjective:       Patient ID: Serafin Tapia is a 72 y.o. male.    Chief Complaint: Wound Check and Wound Infection    Patient presents for a reevaluation of a left lower extremity wound. Patient follows with vascular surgery for PAD and venous insufficiency. Dr. Kapoor determined he has adequate arterial blood flow for wound healing. Patient reports not taking his antibiotics since the start of the infection on 11/22/22. No other complaints at this time. Denies fever, chills, erythema, warmth, drainage, or pain.      10/21/22 MIHIR:  Right lower extremity: 0.93- minimal peripheral arterial occlusive disease  Left lower extremity: 0.70- moderate peripheral arterial occlusive disease    Review of Systems   Constitutional:  Negative for activity change, chills, diaphoresis, fatigue and fever.   Eyes:  Negative for photophobia and visual disturbance.   Respiratory:  Negative for apnea, chest tightness and shortness of breath.    Cardiovascular:  Positive for leg swelling. Negative for chest pain and palpitations.   Musculoskeletal:  Negative for gait problem and joint swelling.   Skin:  Positive for wound. Negative for color change, pallor and rash.   Neurological:  Negative for dizziness, syncope, facial asymmetry, speech difficulty, weakness, light-headedness, numbness and headaches.   Psychiatric/Behavioral:  Negative for agitation and confusion. The patient is not nervous/anxious.    All other systems reviewed and are negative.    Objective:      Physical Exam  Vitals reviewed.   Constitutional:       General: He is not in acute distress.     Appearance: Normal appearance. He is obese.   Cardiovascular:      Rate and Rhythm: Normal rate and regular rhythm.      Pulses: Normal pulses.   Pulmonary:      Effort: No respiratory distress.   Musculoskeletal:         General: No swelling.      Right lower leg: Pitting Edema present.      Left lower leg: Pitting Edema present.      Comments: Ambulates with cane   Skin:      General: Skin is warm and dry.      Capillary Refill: Capillary refill takes less than 2 seconds.      Findings: Wound present. No erythema.          Neurological:      General: No focal deficit present.      Mental Status: He is alert and oriented to person, place, and time.   Psychiatric:         Mood and Affect: Mood normal.         Behavior: Behavior normal.         Thought Content: Thought content normal.         Judgment: Judgment normal.       Assessment:       1. Open wound of left lower extremity, initial encounter    2. Cellulitis of left lower extremity    3. Venous insufficiency    4. Essential hypertension    5. Heart failure, unspecified HF chronicity, unspecified heart failure type    6. Controlled type 2 diabetes mellitus with diabetic polyneuropathy, without long-term current use of insulin    7. PAD (peripheral artery disease)           Altered Skin Integrity Left anterior;lower Leg (Active)       Altered Skin Integrity Present on Admission:    Side: Left   Orientation: anterior;lower   Location: Leg   Wound Number:    Is this injury device related?:    Primary Wound Type:    Description of Altered Skin Integrity:    Ankle-Brachial Index:    Pulses:    Removal Indication and Assessment:    Wound Outcome:    (Retired) Wound Length (cm):    (Retired) Wound Width (cm):    (Retired) Depth (cm):    Wound Description (Comments):    Removal Indications:    Wound Image   12/07/22 1345   Dressing Appearance Dry;Intact;Clean;Moist drainage;Saturated 12/07/22 1345   Drainage Amount Large 12/07/22 1345   Drainage Characteristics/Odor Yellow;Green;Malodorous 12/07/22 1345   Red (%), Wound Tissue Color 20 % 12/07/22 1345   Yellow (%), Wound Tissue Color 80 % 12/07/22 1345   Periwound Area Intact;Edematous;Warm;Redness 12/07/22 1345   Wound Length (cm) 4.7 cm 12/07/22 1345   Wound Width (cm) 3 cm 12/07/22 1345   Wound Depth (cm) 0.2 cm 12/07/22 1345   Wound Volume (cm^3) 2.82 cm^3 12/07/22 1345   Wound Surface  Area (cm^2) 14.1 cm^2 12/07/22 1345   Care Cleansed with:;Soap and water 12/07/22 1345   Dressing Applied;Calcium alginate;Silver;Absorptive Pad;Rolled gauze;Other (comment) 12/07/22 1345   Periwound Care Skin barrier film applied 12/07/22 1345       Serafin was seen in the clinic room and placed in the supine position on the treatment table.  The unna boot was removed with scissors and the leg was cleansed with Easi-clense sponges and dried thoroughly. Green drainage and odor noted. No erythema or warmth noted from patient's baseline. Placed topical 4% Lidocaine Hydrochloride to wound bed for 15 minutes. Sharp debridement with 5 mm curette to remove non-viable tissue. Pt tolerated procedure well.    Plan of Care: Calmoseptine to periwound area, Silver alginate to wound bed, Drawtex, mextra pad, kerlix, 1 loose ACE wrap to secure bandages, and flex-net to secure bandages.       Plan:       Orders Placed This Encounter   Procedures    Debridement     This order was created via procedure documentation       Discussed cellulitis with patient- likely pseudomonas. Discussed the importance of taking antibiotics to treat the infection and the importance of taking the medication as prescribed at length. Pt voiced understanding. Refilled ciprofloxacin. Discussed side effects of medication.    Left lower extremity was dressed as detailed above- will resume the 3 layer compression once antibiotics are complete/ cellulitis resolves  Patient was instructed to not get the dressings wet and to use cast covers for showering.  Should the dressing become wet, he is to remove it, place a moist dressing over the wound, cover with gauze and roll gauze and use ace wraps for compression and to secure bandages.  He should then notify this office as soon as possible to have a new dressing applied.    Discussed nutrition and the role of protein in wound healing with the patient. Instructed patient to continue optimizing protein for wound  healing.     Instructed patient to elevate lower extremities whenever sedentary.    Instructed patient to keep follow up appointments with vascular surgery    Written and verbal instructions given to patient  RTC in 1 week    Rosalba Eaton PA-C

## 2022-12-16 ENCOUNTER — OFFICE VISIT (OUTPATIENT)
Dept: WOUND CARE | Facility: CLINIC | Age: 72
End: 2022-12-16
Payer: MEDICARE

## 2022-12-16 VITALS
OXYGEN SATURATION: 91 % | SYSTOLIC BLOOD PRESSURE: 175 MMHG | HEIGHT: 70 IN | WEIGHT: 280 LBS | DIASTOLIC BLOOD PRESSURE: 81 MMHG | HEART RATE: 86 BPM | BODY MASS INDEX: 40.09 KG/M2 | RESPIRATION RATE: 20 BRPM

## 2022-12-16 DIAGNOSIS — S81.802A OPEN WOUND OF LEFT LOWER EXTREMITY, INITIAL ENCOUNTER: Primary | ICD-10-CM

## 2022-12-16 PROCEDURE — 3066F NEPHROPATHY DOC TX: CPT | Mod: CPTII,S$GLB,, | Performed by: SURGERY

## 2022-12-16 PROCEDURE — 99999 PR PBB SHADOW E&M-EST. PATIENT-LVL II: CPT | Mod: PBBFAC,,, | Performed by: SURGERY

## 2022-12-16 PROCEDURE — 3079F DIAST BP 80-89 MM HG: CPT | Mod: CPTII,S$GLB,, | Performed by: SURGERY

## 2022-12-16 PROCEDURE — 3077F PR MOST RECENT SYSTOLIC BLOOD PRESSURE >= 140 MM HG: ICD-10-PCS | Mod: CPTII,S$GLB,, | Performed by: SURGERY

## 2022-12-16 PROCEDURE — 4010F ACE/ARB THERAPY RXD/TAKEN: CPT | Mod: CPTII,S$GLB,, | Performed by: SURGERY

## 2022-12-16 PROCEDURE — 3008F PR BODY MASS INDEX (BMI) DOCUMENTED: ICD-10-PCS | Mod: CPTII,S$GLB,, | Performed by: SURGERY

## 2022-12-16 PROCEDURE — 3079F PR MOST RECENT DIASTOLIC BLOOD PRESSURE 80-89 MM HG: ICD-10-PCS | Mod: CPTII,S$GLB,, | Performed by: SURGERY

## 2022-12-16 PROCEDURE — 97597 PR DEBRIDEMENT OPEN WOUND 20 SQ CM<: ICD-10-PCS | Mod: S$GLB,,, | Performed by: SURGERY

## 2022-12-16 PROCEDURE — 3066F PR DOCUMENTATION OF TREATMENT FOR NEPHROPATHY: ICD-10-PCS | Mod: CPTII,S$GLB,, | Performed by: SURGERY

## 2022-12-16 PROCEDURE — 3008F BODY MASS INDEX DOCD: CPT | Mod: CPTII,S$GLB,, | Performed by: SURGERY

## 2022-12-16 PROCEDURE — 3052F HG A1C>EQUAL 8.0%<EQUAL 9.0%: CPT | Mod: CPTII,S$GLB,, | Performed by: SURGERY

## 2022-12-16 PROCEDURE — 4010F PR ACE/ARB THEARPY RXD/TAKEN: ICD-10-PCS | Mod: CPTII,S$GLB,, | Performed by: SURGERY

## 2022-12-16 PROCEDURE — 3072F LOW RISK FOR RETINOPATHY: CPT | Mod: CPTII,S$GLB,, | Performed by: SURGERY

## 2022-12-16 PROCEDURE — 3072F PR LOW RISK FOR RETINOPATHY: ICD-10-PCS | Mod: CPTII,S$GLB,, | Performed by: SURGERY

## 2022-12-16 PROCEDURE — 99999 PR PBB SHADOW E&M-EST. PATIENT-LVL II: ICD-10-PCS | Mod: PBBFAC,,, | Performed by: SURGERY

## 2022-12-16 PROCEDURE — 3077F SYST BP >= 140 MM HG: CPT | Mod: CPTII,S$GLB,, | Performed by: SURGERY

## 2022-12-16 PROCEDURE — 3052F PR MOST RECENT HEMOGLOBIN A1C LEVEL 8.0 - < 9.0%: ICD-10-PCS | Mod: CPTII,S$GLB,, | Performed by: SURGERY

## 2022-12-16 PROCEDURE — 3060F PR POS MICROALBUMINURIA RESULT DOCUMENTED/REVIEW: ICD-10-PCS | Mod: CPTII,S$GLB,, | Performed by: SURGERY

## 2022-12-16 PROCEDURE — 3060F POS MICROALBUMINURIA REV: CPT | Mod: CPTII,S$GLB,, | Performed by: SURGERY

## 2022-12-16 PROCEDURE — 97597 DBRDMT OPN WND 1ST 20 CM/<: CPT | Mod: S$GLB,,, | Performed by: SURGERY

## 2022-12-16 NOTE — PROGRESS NOTES
Patient has a wound on the anterior surface.  He is got a large amount of necrotic yellow fibrinous tissue on it.  The wound measurements will be made.  But it is approximately 5 x 4 cm.    It clearly needs debridement.  Debridement will be performed.      Procedure note.  The wound was irrigated with topical lidocaine and pressure was kept on the wound with topical lidocaine until anesthetic could be performed.      Area was debrided sharply and bluntly with a rongeur.  A curette was used to remove all the dead necrotic tissue.  Bleeding was controlled with pressure.  Dressing was placed over the wound and then compression was placed over that.    Surgeon was Theo Stern blood loss 5 cc

## 2022-12-21 ENCOUNTER — OFFICE VISIT (OUTPATIENT)
Dept: WOUND CARE | Facility: CLINIC | Age: 72
End: 2022-12-21
Payer: MEDICARE

## 2022-12-21 VITALS
WEIGHT: 281.06 LBS | SYSTOLIC BLOOD PRESSURE: 176 MMHG | BODY MASS INDEX: 40.24 KG/M2 | TEMPERATURE: 98 F | HEART RATE: 86 BPM | HEIGHT: 70 IN | DIASTOLIC BLOOD PRESSURE: 84 MMHG

## 2022-12-21 DIAGNOSIS — E11.42 CONTROLLED TYPE 2 DIABETES MELLITUS WITH DIABETIC POLYNEUROPATHY, WITHOUT LONG-TERM CURRENT USE OF INSULIN: ICD-10-CM

## 2022-12-21 DIAGNOSIS — I87.2 VENOUS INSUFFICIENCY: ICD-10-CM

## 2022-12-21 DIAGNOSIS — S81.802A OPEN WOUND OF LEFT LOWER EXTREMITY, INITIAL ENCOUNTER: Primary | ICD-10-CM

## 2022-12-21 DIAGNOSIS — I50.9 HEART FAILURE, UNSPECIFIED HF CHRONICITY, UNSPECIFIED HEART FAILURE TYPE: ICD-10-CM

## 2022-12-21 DIAGNOSIS — I73.9 PAD (PERIPHERAL ARTERY DISEASE): ICD-10-CM

## 2022-12-21 DIAGNOSIS — I10 ESSENTIAL HYPERTENSION: ICD-10-CM

## 2022-12-21 PROCEDURE — 4010F PR ACE/ARB THEARPY RXD/TAKEN: ICD-10-PCS | Mod: CPTII,S$GLB,,

## 2022-12-21 PROCEDURE — 3077F PR MOST RECENT SYSTOLIC BLOOD PRESSURE >= 140 MM HG: ICD-10-PCS | Mod: CPTII,S$GLB,,

## 2022-12-21 PROCEDURE — 3079F PR MOST RECENT DIASTOLIC BLOOD PRESSURE 80-89 MM HG: ICD-10-PCS | Mod: CPTII,S$GLB,,

## 2022-12-21 PROCEDURE — 3052F HG A1C>EQUAL 8.0%<EQUAL 9.0%: CPT | Mod: CPTII,S$GLB,,

## 2022-12-21 PROCEDURE — 3066F PR DOCUMENTATION OF TREATMENT FOR NEPHROPATHY: ICD-10-PCS | Mod: CPTII,S$GLB,,

## 2022-12-21 PROCEDURE — 3079F DIAST BP 80-89 MM HG: CPT | Mod: CPTII,S$GLB,,

## 2022-12-21 PROCEDURE — 11042 DBRDMT SUBQ TIS 1ST 20SQCM/<: CPT | Mod: S$GLB,,,

## 2022-12-21 PROCEDURE — 1101F PT FALLS ASSESS-DOCD LE1/YR: CPT | Mod: CPTII,S$GLB,,

## 2022-12-21 PROCEDURE — 3060F POS MICROALBUMINURIA REV: CPT | Mod: CPTII,S$GLB,,

## 2022-12-21 PROCEDURE — 99214 PR OFFICE/OUTPT VISIT, EST, LEVL IV, 30-39 MIN: ICD-10-PCS | Mod: 25,S$GLB,,

## 2022-12-21 PROCEDURE — 3288F FALL RISK ASSESSMENT DOCD: CPT | Mod: CPTII,S$GLB,,

## 2022-12-21 PROCEDURE — 3060F PR POS MICROALBUMINURIA RESULT DOCUMENTED/REVIEW: ICD-10-PCS | Mod: CPTII,S$GLB,,

## 2022-12-21 PROCEDURE — 3066F NEPHROPATHY DOC TX: CPT | Mod: CPTII,S$GLB,,

## 2022-12-21 PROCEDURE — 99999 PR PBB SHADOW E&M-EST. PATIENT-LVL IV: ICD-10-PCS | Mod: PBBFAC,,,

## 2022-12-21 PROCEDURE — 3072F PR LOW RISK FOR RETINOPATHY: ICD-10-PCS | Mod: CPTII,S$GLB,,

## 2022-12-21 PROCEDURE — 11042 DEBRIDEMENT: ICD-10-PCS | Mod: S$GLB,,,

## 2022-12-21 PROCEDURE — 3072F LOW RISK FOR RETINOPATHY: CPT | Mod: CPTII,S$GLB,,

## 2022-12-21 PROCEDURE — 3008F PR BODY MASS INDEX (BMI) DOCUMENTED: ICD-10-PCS | Mod: CPTII,S$GLB,,

## 2022-12-21 PROCEDURE — 3008F BODY MASS INDEX DOCD: CPT | Mod: CPTII,S$GLB,,

## 2022-12-21 PROCEDURE — 1159F MED LIST DOCD IN RCRD: CPT | Mod: CPTII,S$GLB,,

## 2022-12-21 PROCEDURE — 1101F PR PT FALLS ASSESS DOC 0-1 FALLS W/OUT INJ PAST YR: ICD-10-PCS | Mod: CPTII,S$GLB,,

## 2022-12-21 PROCEDURE — 3288F PR FALLS RISK ASSESSMENT DOCUMENTED: ICD-10-PCS | Mod: CPTII,S$GLB,,

## 2022-12-21 PROCEDURE — 3077F SYST BP >= 140 MM HG: CPT | Mod: CPTII,S$GLB,,

## 2022-12-21 PROCEDURE — 3052F PR MOST RECENT HEMOGLOBIN A1C LEVEL 8.0 - < 9.0%: ICD-10-PCS | Mod: CPTII,S$GLB,,

## 2022-12-21 PROCEDURE — 99999 PR PBB SHADOW E&M-EST. PATIENT-LVL IV: CPT | Mod: PBBFAC,,,

## 2022-12-21 PROCEDURE — 99214 OFFICE O/P EST MOD 30 MIN: CPT | Mod: 25,S$GLB,,

## 2022-12-21 PROCEDURE — 4010F ACE/ARB THERAPY RXD/TAKEN: CPT | Mod: CPTII,S$GLB,,

## 2022-12-21 PROCEDURE — 1125F PR PAIN SEVERITY QUANTIFIED, PAIN PRESENT: ICD-10-PCS | Mod: CPTII,S$GLB,,

## 2022-12-21 PROCEDURE — 1125F AMNT PAIN NOTED PAIN PRSNT: CPT | Mod: CPTII,S$GLB,,

## 2022-12-21 PROCEDURE — 1159F PR MEDICATION LIST DOCUMENTED IN MEDICAL RECORD: ICD-10-PCS | Mod: CPTII,S$GLB,,

## 2022-12-21 NOTE — PROCEDURES
"Debridement    Date/Time: 12/21/2022 1:00 PM  Performed by: Rosalba Eaton PA-C  Authorized by: Rosalba Eaton PA-C     Time out: Immediately prior to procedure a "time out" was called to verify the correct patient, procedure, equipment, support staff and site/side marked as required.    Consent Done?:  Yes (Written)  Local anesthesia used?: Yes    Local anesthetic:  Topical anesthetic (Topical 4% Lidocaine Hydrochloride)    Wound Details:    Location:  Left leg    Type of Debridement:  Excisional       Length (cm):  3.8       Area (sq cm):  11.4       Width (cm):  3       Percent Debrided (%):  100       Depth (cm):  0.1       Total Area Debrided (sq cm):  11.4    Depth of debridement:  Subcutaneous tissue    Tissue debrided:  Subcutaneous    Devitalized tissue debrided:  Biofilm, Exudate, Fibrin and Slough    Instruments:  Curette    Bleeding:  Minimal  Hemostasis Achieved: Yes    Method Used:  Pressure  Patient tolerance:  Patient tolerated the procedure well with no immediate complications  "

## 2022-12-21 NOTE — PROGRESS NOTES
Subjective:       Patient ID: Serafin Tapia is a 72 y.o. male.    Chief Complaint: Wound Check      Patient presents for a reevaluation of a left lower extremity wound. Patient follows with vascular surgery for PAD and venous insufficiency. Dr. Kapoor determined he has adequate arterial blood flow for wound healing. Patient reports compliance with antibiotics. He states that he has a few days left. Pt unsure if he picked the prescription up late or not. No other complaints at this time. Denies fever, chills, erythema, warmth, drainage, or pain.      10/21/22 MIHIR:  Right lower extremity: 0.93- minimal peripheral arterial occlusive disease  Left lower extremity: 0.70- moderate peripheral arterial occlusive disease    Review of Systems   Constitutional:  Negative for activity change, chills, diaphoresis, fatigue and fever.   Eyes:  Negative for photophobia and visual disturbance.   Respiratory:  Negative for apnea, chest tightness and shortness of breath.    Cardiovascular:  Positive for leg swelling. Negative for chest pain and palpitations.   Musculoskeletal:  Negative for gait problem and joint swelling.   Skin:  Positive for wound. Negative for color change, pallor and rash.   Neurological:  Negative for dizziness, syncope, facial asymmetry, speech difficulty, weakness, light-headedness, numbness and headaches.   Psychiatric/Behavioral:  Negative for agitation and confusion. The patient is not nervous/anxious.    All other systems reviewed and are negative.    Objective:      Physical Exam  Vitals reviewed.   Constitutional:       General: He is not in acute distress.     Appearance: Normal appearance. He is obese.   Cardiovascular:      Rate and Rhythm: Normal rate and regular rhythm.      Pulses: Normal pulses.   Pulmonary:      Effort: No respiratory distress.   Musculoskeletal:         General: No swelling.      Right lower leg: Pitting Edema present.      Left lower leg: Pitting Edema present.      Comments:  Ambulates with cane   Skin:     General: Skin is warm and dry.      Capillary Refill: Capillary refill takes less than 2 seconds.      Findings: Wound present. No erythema.          Neurological:      General: No focal deficit present.      Mental Status: He is alert and oriented to person, place, and time.   Psychiatric:         Mood and Affect: Mood normal.         Behavior: Behavior normal.         Thought Content: Thought content normal.         Judgment: Judgment normal.       Assessment:       1. Open wound of left lower extremity, initial encounter    2. Venous insufficiency    3. Essential hypertension    4. Heart failure, unspecified HF chronicity, unspecified heart failure type    5. Controlled type 2 diabetes mellitus with diabetic polyneuropathy, without long-term current use of insulin    6. PAD (peripheral artery disease)             Altered Skin Integrity Left anterior;lower Leg (Active)       Altered Skin Integrity Present on Admission:    Side: Left   Orientation: anterior;lower   Location: Leg   Wound Number:    Is this injury device related?:    Primary Wound Type:    Description of Altered Skin Integrity:    Ankle-Brachial Index:    Pulses:    Removal Indication and Assessment:    Wound Outcome:    (Retired) Wound Length (cm):    (Retired) Wound Width (cm):    (Retired) Depth (cm):    Wound Description (Comments):    Removal Indications:    Wound Image     12/21/22 1314   Dressing Appearance Dry;Intact;Clean;Moist drainage 12/21/22 1314   Drainage Amount Moderate 12/21/22 1314   Drainage Characteristics/Odor Creamy;Tan;Yellow 12/21/22 1314   Black (%), Wound Tissue Color 50 % 12/21/22 1314   Red (%), Wound Tissue Color 30 % 12/21/22 1314   Yellow (%), Wound Tissue Color 20 % 12/21/22 1314   Periwound Area Intact;Edematous;Pink 12/21/22 1314   Wound Length (cm) 3.8 cm 12/21/22 1314   Wound Width (cm) 3 cm 12/21/22 1314   Wound Depth (cm) 0.1 cm 12/21/22 1314   Wound Volume (cm^3) 1.14 cm^3  12/21/22 1314   Wound Surface Area (cm^2) 11.4 cm^2 12/21/22 1314   Care Cleansed with:;Soap and water 12/21/22 1314   Dressing Applied;Compression wrap;Calcium alginate;Silver;Absorptive Pad 12/21/22 1314   Periwound Care Skin barrier film applied 12/21/22 1314   Compression Unna's Boot;Three layer compression 12/21/22 1314       Serafin was seen in the clinic room and placed in the supine position on the treatment table.  The wound dressing was removed and the leg was cleansed with Easi-clense sponges and dried thoroughly. Green drainage and odor no longer noted. No erythema or warmth noted from patient's baseline. Placed topical 4% Lidocaine Hydrochloride to wound bed for 15 minutes. Sharp debridement with 5 mm curette to remove non-viable tissue. Pt tolerated procedure well.    Plan of Care: Calmoseptine to periwound area, Silver alginate to wound bed, mextra pad, and a 3 layer zinc unna wrap. The patient's foot was positioned at a 90 degree angle.  A zinc oxide wrap, followed by kerlix roll gauze and coban were applied using a spiral technique avoiding creases or folds.  The wrap was started behind the first metatarsal and ended below the tibial tubercle of the knee.  There was overlap of each turn half the width of the previous turn.  The compression wrap will be changed every 7 days.      Plan:       Orders Placed This Encounter   Procedures    Debridement     This order was created via procedure documentation       Left lower extremity was dressed as detailed above  Patient was instructed to not get the dressings wet and to use cast covers for showering.  Should the dressing become wet, he is to remove it, place a moist dressing over the wound, cover with gauze and roll gauze and use ace wraps for compression and to secure bandages.  He should then notify this office as soon as possible to have a new dressing applied.  Instructed patient to remove wrap if he notices tingling or coldness to his left lower  extremity or if his toes become white, blue, or cold.    Discussed nutrition and the role of protein in wound healing with the patient. Instructed patient to continue optimizing protein for wound healing.     Instructed patient to elevate lower extremities whenever sedentary.    Instructed patient to keep follow up appointments with vascular surgery    Written and verbal instructions given to patient  RTC in 1 week    Rosalba Eaton PA-C

## 2022-12-22 ENCOUNTER — TELEPHONE (OUTPATIENT)
Dept: INTERNAL MEDICINE | Facility: CLINIC | Age: 72
End: 2022-12-22
Payer: MEDICARE

## 2022-12-22 NOTE — TELEPHONE ENCOUNTER
----- Message from Javi Barnes sent at 12/22/2022 11:58 AM CST -----  Regarding: Pt Advice  Contact: Raj (daughter)  Name of Who is Calling: Raj (daughter)      What is the request in detail: Would like to speak with physician in regards to changes she noticed in patient. Did not wish to provide any other information. Please advise      Can the clinic reply by [facility]: yes      What Number to Call Back if not in [facility]: [phone]                                          wound culture pending  bactrim, warm soak and f/u w pmd in 1 week   Discussed results and outcome of testing with the patient.  Patient advised to please follow up with their primary care doctor within the next 24-48 hours and return to the Emergency Department for worsening symptoms or any other concerns.  Patient advised that their doctor may call  to follow up on the specific results of the tests performed today in the emergency department.

## 2022-12-22 NOTE — TELEPHONE ENCOUNTER
PT DAUGHTER HAS CONCERNS ABOUT EARLY SIGNS OF DEMENTIA AS PT HAS BEEN VERY FORGETFUL. PT PASSED UP HIS RESIDENCE WHILE HIS BROTHER WAS TRYING TO FLAG HIM DOWN. PT TOLD BROTHER THAT HE WAS ON HIS WAY TO GET HIS LITTLE BROTHER BUT PT LITTLE BROTHER DID NOT KNOW WHAT PT WAS TALKING ABOUT. PT ALSO ASKED HIS SISTER-IN-LAW FOR INGREDIENTS TO COOK A MEAL BUT COULD NOT REMEMBER WHAT IT WAS FOR. PT GIRLFRIEND ALSO NOTICED THAT PT KEPT TELLING HER TO LET'S GO(SOMEWHERE) AND THAT HE HAD TOLD HER 3X'S BUT PT GIRLFRIEND SAID THAT HE'D NEVER TOLD HER ANYTHING ABOUT GOING SOMEWHERE.    PT NOT TAKING MEDICATION FROM VASCULAR MD FOR INFECTION ON HIS LEG BUT NOW PT IS TAKING IT SINCE PT BROTHER IS AWARE. I ALSO TOLD HER THAT WHEN PT COMES TO HIS APPTS W/ DR. ELIZALDE HE FORGETS WHAT HE'S TAKING AND FORGETS TO BRING HIS MEDICATION WITH HIM TO SHOW WHAT HE'S TAKING.    PT DAUGHTER AWARE dR. Elizalde OUT THROUGH NEXT WEEK AND THAT WE HAVE A PROVIDER COVERING FOR HIM.

## 2022-12-28 ENCOUNTER — OFFICE VISIT (OUTPATIENT)
Dept: WOUND CARE | Facility: CLINIC | Age: 72
End: 2022-12-28
Payer: MEDICARE

## 2022-12-28 ENCOUNTER — TELEPHONE (OUTPATIENT)
Dept: WOUND CARE | Facility: CLINIC | Age: 72
End: 2022-12-28

## 2022-12-28 VITALS
HEART RATE: 72 BPM | SYSTOLIC BLOOD PRESSURE: 206 MMHG | TEMPERATURE: 98 F | OXYGEN SATURATION: 89 % | DIASTOLIC BLOOD PRESSURE: 91 MMHG | WEIGHT: 284.81 LBS | BODY MASS INDEX: 40.77 KG/M2 | HEIGHT: 70 IN

## 2022-12-28 DIAGNOSIS — I73.9 PAD (PERIPHERAL ARTERY DISEASE): ICD-10-CM

## 2022-12-28 DIAGNOSIS — I10 ESSENTIAL HYPERTENSION: ICD-10-CM

## 2022-12-28 DIAGNOSIS — E11.42 CONTROLLED TYPE 2 DIABETES MELLITUS WITH DIABETIC POLYNEUROPATHY, WITHOUT LONG-TERM CURRENT USE OF INSULIN: ICD-10-CM

## 2022-12-28 DIAGNOSIS — I10 ELEVATED BLOOD PRESSURE READING IN OFFICE WITH DIAGNOSIS OF HYPERTENSION: ICD-10-CM

## 2022-12-28 DIAGNOSIS — I87.2 VENOUS INSUFFICIENCY: ICD-10-CM

## 2022-12-28 DIAGNOSIS — I50.9 HEART FAILURE, UNSPECIFIED HF CHRONICITY, UNSPECIFIED HEART FAILURE TYPE: ICD-10-CM

## 2022-12-28 DIAGNOSIS — S81.802A OPEN WOUND OF LEFT LOWER EXTREMITY, INITIAL ENCOUNTER: Primary | ICD-10-CM

## 2022-12-28 PROCEDURE — 3052F HG A1C>EQUAL 8.0%<EQUAL 9.0%: CPT | Mod: CPTII,S$GLB,,

## 2022-12-28 PROCEDURE — 3060F PR POS MICROALBUMINURIA RESULT DOCUMENTED/REVIEW: ICD-10-PCS | Mod: CPTII,S$GLB,,

## 2022-12-28 PROCEDURE — 3077F PR MOST RECENT SYSTOLIC BLOOD PRESSURE >= 140 MM HG: ICD-10-PCS | Mod: CPTII,S$GLB,,

## 2022-12-28 PROCEDURE — 3080F PR MOST RECENT DIASTOLIC BLOOD PRESSURE >= 90 MM HG: ICD-10-PCS | Mod: CPTII,S$GLB,,

## 2022-12-28 PROCEDURE — 1159F PR MEDICATION LIST DOCUMENTED IN MEDICAL RECORD: ICD-10-PCS | Mod: CPTII,S$GLB,,

## 2022-12-28 PROCEDURE — 4010F PR ACE/ARB THEARPY RXD/TAKEN: ICD-10-PCS | Mod: CPTII,S$GLB,,

## 2022-12-28 PROCEDURE — 1126F PR PAIN SEVERITY QUANTIFIED, NO PAIN PRESENT: ICD-10-PCS | Mod: CPTII,S$GLB,,

## 2022-12-28 PROCEDURE — 1159F MED LIST DOCD IN RCRD: CPT | Mod: CPTII,S$GLB,,

## 2022-12-28 PROCEDURE — 99999 PR PBB SHADOW E&M-EST. PATIENT-LVL V: CPT | Mod: PBBFAC,,,

## 2022-12-28 PROCEDURE — 1126F AMNT PAIN NOTED NONE PRSNT: CPT | Mod: CPTII,S$GLB,,

## 2022-12-28 PROCEDURE — 3052F PR MOST RECENT HEMOGLOBIN A1C LEVEL 8.0 - < 9.0%: ICD-10-PCS | Mod: CPTII,S$GLB,,

## 2022-12-28 PROCEDURE — 3008F PR BODY MASS INDEX (BMI) DOCUMENTED: ICD-10-PCS | Mod: CPTII,S$GLB,,

## 2022-12-28 PROCEDURE — 3008F BODY MASS INDEX DOCD: CPT | Mod: CPTII,S$GLB,,

## 2022-12-28 PROCEDURE — 3077F SYST BP >= 140 MM HG: CPT | Mod: CPTII,S$GLB,,

## 2022-12-28 PROCEDURE — 99999 PR PBB SHADOW E&M-EST. PATIENT-LVL V: ICD-10-PCS | Mod: PBBFAC,,,

## 2022-12-28 PROCEDURE — 4010F ACE/ARB THERAPY RXD/TAKEN: CPT | Mod: CPTII,S$GLB,,

## 2022-12-28 PROCEDURE — 11042 DBRDMT SUBQ TIS 1ST 20SQCM/<: CPT | Mod: S$GLB,,,

## 2022-12-28 PROCEDURE — 3072F PR LOW RISK FOR RETINOPATHY: ICD-10-PCS | Mod: CPTII,S$GLB,,

## 2022-12-28 PROCEDURE — 3060F POS MICROALBUMINURIA REV: CPT | Mod: CPTII,S$GLB,,

## 2022-12-28 PROCEDURE — 99214 OFFICE O/P EST MOD 30 MIN: CPT | Mod: 25,S$GLB,,

## 2022-12-28 PROCEDURE — 3066F PR DOCUMENTATION OF TREATMENT FOR NEPHROPATHY: ICD-10-PCS | Mod: CPTII,S$GLB,,

## 2022-12-28 PROCEDURE — 99214 PR OFFICE/OUTPT VISIT, EST, LEVL IV, 30-39 MIN: ICD-10-PCS | Mod: 25,S$GLB,,

## 2022-12-28 PROCEDURE — 11042 DEBRIDEMENT: ICD-10-PCS | Mod: S$GLB,,,

## 2022-12-28 PROCEDURE — 3080F DIAST BP >= 90 MM HG: CPT | Mod: CPTII,S$GLB,,

## 2022-12-28 PROCEDURE — 3072F LOW RISK FOR RETINOPATHY: CPT | Mod: CPTII,S$GLB,,

## 2022-12-28 PROCEDURE — 3066F NEPHROPATHY DOC TX: CPT | Mod: CPTII,S$GLB,,

## 2022-12-28 NOTE — TELEPHONE ENCOUNTER
Called and spoke to patient's brother, Nik, in regards to elevated BP reading. He states he will get in touch with him and have him check it. He will call us back with the reading.      Rosalba Eaton PA-C

## 2022-12-28 NOTE — PROCEDURES
"Debridement    Date/Time: 12/28/2022 11:00 AM  Performed by: Rosalba Eaton PA-C  Authorized by: Rosalba Eaton PA-C     Time out: Immediately prior to procedure a "time out" was called to verify the correct patient, procedure, equipment, support staff and site/side marked as required.    Consent Done?:  Yes (Written)  Local anesthesia used?: No      Wound Details:    Location:  Left leg    Type of Debridement:  Excisional       Length (cm):  3.8       Area (sq cm):  9.12       Width (cm):  2.4       Percent Debrided (%):  100       Depth (cm):  0.1       Total Area Debrided (sq cm):  9.12    Depth of debridement:  Subcutaneous tissue    Tissue debrided:  Subcutaneous    Devitalized tissue debrided:  Biofilm, Exudate, Fibrin and Slough    Instruments:  Curette    Bleeding:  Minimal  Hemostasis Achieved: Yes    Method Used:  Pressure  Patient tolerance:  Patient tolerated the procedure well with no immediate complications  "

## 2022-12-28 NOTE — PROGRESS NOTES
Subjective:       Patient ID: Serafin Tapia is a 72 y.o. male.    Chief Complaint: Wound Check      Patient presents for a reevaluation of a left lower extremity wound. Patient follows with vascular surgery for PAD and venous insufficiency. Dr. Kapoor determined he has adequate arterial blood flow for wound healing. Patient reports completing antibiotics. No other complaints at this time. Denies fever, chills, erythema, warmth, drainage, or pain.      10/21/22 MIHIR:  Right lower extremity: 0.93- minimal peripheral arterial occlusive disease  Left lower extremity: 0.70- moderate peripheral arterial occlusive disease    Review of Systems   Constitutional:  Negative for activity change, chills, diaphoresis, fatigue and fever.   Eyes:  Negative for photophobia and visual disturbance.   Respiratory:  Negative for apnea, chest tightness and shortness of breath.    Cardiovascular:  Positive for leg swelling. Negative for chest pain and palpitations.   Musculoskeletal:  Negative for gait problem and joint swelling.   Skin:  Positive for wound. Negative for color change, pallor and rash.   Neurological:  Negative for dizziness, syncope, facial asymmetry, speech difficulty, weakness, light-headedness, numbness and headaches.   Psychiatric/Behavioral:  Negative for agitation and confusion. The patient is not nervous/anxious.    All other systems reviewed and are negative.    Objective:      Physical Exam  Vitals reviewed.   Constitutional:       General: He is not in acute distress.     Appearance: Normal appearance. He is obese.   Cardiovascular:      Rate and Rhythm: Normal rate and regular rhythm.      Pulses: Normal pulses.   Pulmonary:      Effort: No respiratory distress.   Musculoskeletal:         General: No swelling.      Right lower leg: Pitting Edema present.      Left lower leg: Pitting Edema present.      Comments: Ambulates with cane   Skin:     General: Skin is warm and dry.      Capillary Refill: Capillary  refill takes less than 2 seconds.      Findings: Wound present. No erythema.          Neurological:      General: No focal deficit present.      Mental Status: He is alert and oriented to person, place, and time.   Psychiatric:         Mood and Affect: Mood normal.         Behavior: Behavior normal.         Thought Content: Thought content normal.         Judgment: Judgment normal.       Assessment:       1. Open wound of left lower extremity, initial encounter    2. Venous insufficiency    3. Essential hypertension    4. Heart failure, unspecified HF chronicity, unspecified heart failure type    5. Controlled type 2 diabetes mellitus with diabetic polyneuropathy, without long-term current use of insulin    6. PAD (peripheral artery disease)               Altered Skin Integrity Left anterior;lower Leg (Active)       Altered Skin Integrity Present on Admission:    Side: Left   Orientation: anterior;lower   Location: Leg   Wound Number:    Is this injury device related?:    Primary Wound Type:    Description of Altered Skin Integrity:    Ankle-Brachial Index:    Pulses:    Removal Indication and Assessment:    Wound Outcome:    (Retired) Wound Length (cm):    (Retired) Wound Width (cm):    (Retired) Depth (cm):    Wound Description (Comments):    Removal Indications:    Wound Image      12/28/22 1122   Dressing Appearance Dry;Intact;Clean;Moist drainage 12/28/22 1122   Drainage Amount Moderate 12/28/22 1122   Drainage Characteristics/Odor Yellow;Creamy;Tan 12/28/22 1122   Periwound Area Intact;Edematous;Pink 12/28/22 1122   Wound Depth (cm) 0.1 cm 12/28/22 1122   Care Cleansed with:;Soap and water 12/28/22 1122   Dressing Applied;Compression wrap 12/28/22 1122   Compression Unna's Boot;Three layer compression 12/28/22 1122            Altered Skin Integrity Left anterior;lower;lateral Leg (Active)       Altered Skin Integrity Present on Admission:    Side: Left   Orientation: anterior;lower;lateral   Location: Leg    Wound Number:    Is this injury device related?:    Primary Wound Type:    Description of Altered Skin Integrity:    Ankle-Brachial Index:    Pulses:    Removal Indication and Assessment:    Wound Outcome:    (Retired) Wound Length (cm):    (Retired) Wound Width (cm):    (Retired) Depth (cm):    Wound Description (Comments):    Removal Indications:    Dressing Appearance Dry;Intact;Clean;Moist drainage 12/28/22 1233   Drainage Amount Small 12/28/22 1233   Drainage Characteristics/Odor Johnson 12/28/22 1233   Yellow (%), Wound Tissue Color 100 % 12/28/22 1233   Periwound Area Intact;Edematous;Pink 12/28/22 1233   Wound Length (cm) 0.4 cm 12/28/22 1233   Wound Width (cm) 0.6 cm 12/28/22 1233   Wound Depth (cm) 0.1 cm 12/28/22 1233   Wound Volume (cm^3) 0.024 cm^3 12/28/22 1233   Wound Surface Area (cm^2) 0.24 cm^2 12/28/22 1233   Care Cleansed with:;Soap and water 12/28/22 1233   Dressing Applied;Compression wrap;Other (comment) 12/28/22 1233   Periwound Care Skin barrier film applied 12/28/22 1233   Compression Unna's Boot;Three layer compression 12/28/22 1233            Altered Skin Integrity Left lower;lateral Leg (Active)       Altered Skin Integrity Present on Admission:    Side: Left   Orientation: lower;lateral   Location: Leg   Wound Number:    Is this injury device related?:    Primary Wound Type:    Description of Altered Skin Integrity:    Ankle-Brachial Index:    Pulses:    Removal Indication and Assessment:    Wound Outcome:    (Retired) Wound Length (cm):    (Retired) Wound Width (cm):    (Retired) Depth (cm):    Wound Description (Comments):    Removal Indications:    Wound Image    12/28/22 1233   Dressing Appearance Dry;Intact;Clean;Moist drainage 12/28/22 1233   Drainage Amount Small 12/28/22 1233   Drainage Characteristics/Odor Johnson 12/28/22 1233   Yellow (%), Wound Tissue Color 100 % 12/28/22 1233   Periwound Area Intact;Edematous;Pink 12/28/22 1233   Wound Length (cm) 0.5 cm 12/28/22 1233   Wound  Width (cm) 0.7 cm 12/28/22 1233   Wound Depth (cm) 0.1 cm 12/28/22 1233   Wound Volume (cm^3) 0.035 cm^3 12/28/22 1233   Wound Surface Area (cm^2) 0.35 cm^2 12/28/22 1233   Care Cleansed with:;Soap and water 12/28/22 1233   Dressing Applied;Compression wrap;Other (comment) 12/28/22 1233   Periwound Care Skin barrier film applied 12/28/22 1233   Compression Unna's Boot;Three layer compression 12/28/22 1233       Serafin was seen in the clinic room and placed in the supine position on the treatment table.  The wound dressing was removed and the leg was cleansed with Easi-clense sponges and dried thoroughly.  No erythema, odor, green drainage, or warmth noted from patient's baseline. Sharp debridement with 5 mm curette to anterior lower extremity wound remove non-viable tissue. Pt tolerated procedure well.    Plan of Care: Calmoseptine to periwound area, hydrofera blue to wound beds, and a 3 layer zinc unna wrap. The patient's foot was positioned at a 90 degree angle.  A zinc oxide wrap, followed by kerlix roll gauze and coban were applied using a spiral technique avoiding creases or folds.  The wrap was started behind the first metatarsal and ended below the tibial tubercle of the knee.  There was overlap of each turn half the width of the previous turn.  The compression wrap will be changed every 7 days.      Plan:       Orders Placed This Encounter   Procedures    Debridement     This order was created via procedure documentation       Left lower extremity was dressed as detailed above  Patient was instructed to not get the dressings wet and to use cast covers for showering.  Should the dressing become wet, he is to remove it, place a moist dressing over the wound, cover with gauze and roll gauze and use ace wraps for compression and to secure bandages.  He should then notify this office as soon as possible to have a new dressing applied.  Instructed patient to remove wrap if he notices tingling or coldness to his left  lower extremity or if his toes become white, blue, or cold.    Discussed nutrition and the role of protein in wound healing with the patient. Instructed patient to continue optimizing protein for wound healing.      Discussed bilateral lower extremity edema with patient. Instructed patient to elevate lower extremities whenever sedentary.    Discussed elevated BP reading with patient. Pt reports taking his BP medications this morning. Pt currently symptomatic. Discussed going to the ED. Pt declined. Instructed patient to contact PCP. ED precautions given. Patient has an appointment with PCP on 1/5/23.     Instructed patient to keep follow up appointments with vascular surgery    Written and verbal instructions given to patient  RTC in 1 week    Rosalba Eaton PA-C

## 2022-12-28 NOTE — TELEPHONE ENCOUNTER
Attempted to call patient to see if he re-checked his blood pressure when he got home. Unable to leave voicemail mailbox not set up.      Rosalba Eaton PA-C

## 2023-01-04 ENCOUNTER — OFFICE VISIT (OUTPATIENT)
Dept: WOUND CARE | Facility: CLINIC | Age: 73
End: 2023-01-04
Payer: MEDICARE

## 2023-01-04 VITALS
OXYGEN SATURATION: 87 % | TEMPERATURE: 98 F | HEART RATE: 74 BPM | WEIGHT: 280 LBS | SYSTOLIC BLOOD PRESSURE: 143 MMHG | DIASTOLIC BLOOD PRESSURE: 66 MMHG | BODY MASS INDEX: 40.09 KG/M2 | HEIGHT: 70 IN

## 2023-01-04 DIAGNOSIS — E11.42 CONTROLLED TYPE 2 DIABETES MELLITUS WITH DIABETIC POLYNEUROPATHY, WITHOUT LONG-TERM CURRENT USE OF INSULIN: ICD-10-CM

## 2023-01-04 DIAGNOSIS — S81.802A OPEN WOUND OF LEFT LOWER EXTREMITY, INITIAL ENCOUNTER: Primary | ICD-10-CM

## 2023-01-04 DIAGNOSIS — I10 ESSENTIAL HYPERTENSION: ICD-10-CM

## 2023-01-04 DIAGNOSIS — I73.9 PAD (PERIPHERAL ARTERY DISEASE): ICD-10-CM

## 2023-01-04 DIAGNOSIS — I87.2 VENOUS INSUFFICIENCY: ICD-10-CM

## 2023-01-04 DIAGNOSIS — I50.9 HEART FAILURE, UNSPECIFIED HF CHRONICITY, UNSPECIFIED HEART FAILURE TYPE: ICD-10-CM

## 2023-01-04 DIAGNOSIS — E11.65 UNCONTROLLED TYPE 2 DIABETES MELLITUS WITH HYPERGLYCEMIA: ICD-10-CM

## 2023-01-04 PROCEDURE — 1126F AMNT PAIN NOTED NONE PRSNT: CPT | Mod: CPTII,S$GLB,,

## 2023-01-04 PROCEDURE — 1126F PR PAIN SEVERITY QUANTIFIED, NO PAIN PRESENT: ICD-10-PCS | Mod: CPTII,S$GLB,,

## 2023-01-04 PROCEDURE — 3008F BODY MASS INDEX DOCD: CPT | Mod: CPTII,S$GLB,,

## 2023-01-04 PROCEDURE — 99999 PR PBB SHADOW E&M-EST. PATIENT-LVL IV: CPT | Mod: PBBFAC,,,

## 2023-01-04 PROCEDURE — 1159F PR MEDICATION LIST DOCUMENTED IN MEDICAL RECORD: ICD-10-PCS | Mod: CPTII,S$GLB,,

## 2023-01-04 PROCEDURE — 3077F SYST BP >= 140 MM HG: CPT | Mod: CPTII,S$GLB,,

## 2023-01-04 PROCEDURE — 3077F PR MOST RECENT SYSTOLIC BLOOD PRESSURE >= 140 MM HG: ICD-10-PCS | Mod: CPTII,S$GLB,,

## 2023-01-04 PROCEDURE — 3008F PR BODY MASS INDEX (BMI) DOCUMENTED: ICD-10-PCS | Mod: CPTII,S$GLB,,

## 2023-01-04 PROCEDURE — 29580 STRAPPING UNNA BOOT: CPT | Mod: LT,S$GLB,,

## 2023-01-04 PROCEDURE — 99214 PR OFFICE/OUTPT VISIT, EST, LEVL IV, 30-39 MIN: ICD-10-PCS | Mod: 25,S$GLB,,

## 2023-01-04 PROCEDURE — 3078F DIAST BP <80 MM HG: CPT | Mod: CPTII,S$GLB,,

## 2023-01-04 PROCEDURE — 1159F MED LIST DOCD IN RCRD: CPT | Mod: CPTII,S$GLB,,

## 2023-01-04 PROCEDURE — 99214 OFFICE O/P EST MOD 30 MIN: CPT | Mod: 25,S$GLB,,

## 2023-01-04 PROCEDURE — 29580 PR STRAPPING UNNA BOOT: ICD-10-PCS | Mod: LT,S$GLB,,

## 2023-01-04 PROCEDURE — 99999 PR PBB SHADOW E&M-EST. PATIENT-LVL IV: ICD-10-PCS | Mod: PBBFAC,,,

## 2023-01-04 PROCEDURE — 3078F PR MOST RECENT DIASTOLIC BLOOD PRESSURE < 80 MM HG: ICD-10-PCS | Mod: CPTII,S$GLB,,

## 2023-01-04 RX ORDER — GLIMEPIRIDE 4 MG/1
8 TABLET ORAL
Qty: 180 TABLET | Refills: 3 | Status: SHIPPED | OUTPATIENT
Start: 2023-01-04 | End: 2024-01-04

## 2023-01-04 RX ORDER — METFORMIN HYDROCHLORIDE 1000 MG/1
1000 TABLET ORAL 2 TIMES DAILY WITH MEALS
Qty: 180 TABLET | Refills: 2 | Status: SHIPPED | OUTPATIENT
Start: 2023-01-04

## 2023-01-04 NOTE — PROGRESS NOTES
Subjective:       Patient ID: Serafin Tapia is a 72 y.o. male.    Chief Complaint: Wound Check      Patient presents for a reevaluation of a left lower extremity wound. Patient follows with vascular surgery for PAD and venous insufficiency. Dr. Kapoor determined he has adequate arterial blood flow for wound healing. No other complaints at this time. Denies fever, chills, erythema, warmth, drainage, or pain.      10/21/22 MIHIR:  Right lower extremity: 0.93- minimal peripheral arterial occlusive disease  Left lower extremity: 0.70- moderate peripheral arterial occlusive disease    Review of Systems   Constitutional:  Negative for activity change, chills, diaphoresis, fatigue and fever.   Eyes:  Negative for photophobia and visual disturbance.   Respiratory:  Negative for apnea, chest tightness and shortness of breath.    Cardiovascular:  Positive for leg swelling. Negative for chest pain and palpitations.   Musculoskeletal:  Negative for gait problem and joint swelling.   Skin:  Positive for wound. Negative for color change, pallor and rash.   Neurological:  Negative for dizziness, syncope, facial asymmetry, speech difficulty, weakness, light-headedness, numbness and headaches.   Psychiatric/Behavioral:  Negative for agitation and confusion. The patient is not nervous/anxious.    All other systems reviewed and are negative.    Objective:      Physical Exam  Vitals reviewed.   Constitutional:       General: He is not in acute distress.     Appearance: Normal appearance. He is obese.   Cardiovascular:      Rate and Rhythm: Normal rate and regular rhythm.      Pulses: Normal pulses.   Pulmonary:      Effort: No respiratory distress.   Musculoskeletal:         General: No swelling.      Right lower leg: Pitting Edema present.      Left lower leg: Pitting Edema present.      Comments: Ambulates with cane   Skin:     General: Skin is warm and dry.      Capillary Refill: Capillary refill takes less than 2 seconds.       Findings: Wound present. No erythema.          Neurological:      General: No focal deficit present.      Mental Status: He is alert and oriented to person, place, and time.   Psychiatric:         Mood and Affect: Mood normal.         Behavior: Behavior normal.         Thought Content: Thought content normal.         Judgment: Judgment normal.       Assessment:       1. Open wound of left lower extremity, initial encounter    2. Venous insufficiency    3. Essential hypertension    4. Heart failure, unspecified HF chronicity, unspecified heart failure type    5. Controlled type 2 diabetes mellitus with diabetic polyneuropathy, without long-term current use of insulin    6. PAD (peripheral artery disease)               Altered Skin Integrity Left anterior;lower Leg (Active)       Altered Skin Integrity Present on Admission:    Side: Left   Orientation: anterior;lower   Location: Leg   Wound Number:    Is this injury device related?:    Primary Wound Type:    Description of Altered Skin Integrity:    Ankle-Brachial Index:    Pulses:    Removal Indication and Assessment:    Wound Outcome:    (Retired) Wound Length (cm):    (Retired) Wound Width (cm):    (Retired) Depth (cm):    Wound Description (Comments):    Removal Indications:    Wound Image    01/04/23 1245   Dressing Appearance Dry;Intact;Clean;Moist drainage 01/04/23 1245   Drainage Amount Small 01/04/23 1245   Drainage Characteristics/Odor Tan;Creamy;Yellow 01/04/23 1245   Red (%), Wound Tissue Color 100 % 01/04/23 1245   Periwound Area Intact;Edematous;Pink 01/04/23 1245   Wound Length (cm) 3.8 cm 01/04/23 1245   Wound Width (cm) 2.1 cm 01/04/23 1245   Wound Depth (cm) 0.1 cm 01/04/23 1245   Wound Volume (cm^3) 0.798 cm^3 01/04/23 1245   Wound Surface Area (cm^2) 7.98 cm^2 01/04/23 1245   Care Cleansed with:;Soap and water 01/04/23 1245   Dressing Applied;Compression wrap;Other (comment) 01/04/23 1245   Periwound Care Skin barrier film applied 01/04/23 1245    Compression Unna's Boot;Three layer compression 01/04/23 1245       Serafin was seen in the clinic room and placed in the supine position on the treatment table.  The wound dressing was removed and the leg was cleansed with Easi-clense sponges and dried thoroughly.  No erythema, odor, green drainage, or warmth noted from patient's baseline.     Plan of Care: Calmoseptine to periwound area, hydrofera blue to wound beds, and a 3 layer zinc unna wrap. The patient's foot was positioned at a 90 degree angle.  A zinc oxide wrap, followed by kerlix roll gauze and coban were applied using a spiral technique avoiding creases or folds.  The wrap was started behind the first metatarsal and ended below the tibial tubercle of the knee.  There was overlap of each turn half the width of the previous turn.  The compression wrap will be changed every 7 days.      Plan:       Left lower extremity was dressed as detailed above  Patient was instructed to not get the dressings wet and to use cast covers for showering.  Should the dressing become wet, he is to remove it, place a moist dressing over the wound, cover with gauze and roll gauze and use ace wraps for compression and to secure bandages.  He should then notify this office as soon as possible to have a new dressing applied.  Instructed patient to remove wrap if he notices tingling or coldness to his left lower extremity or if his toes become white, blue, or cold.    Discussed nutrition and the role of protein in wound healing with the patient. Instructed patient to continue optimizing protein for wound healing.      Discussed bilateral lower extremity edema with patient. Instructed patient to elevate lower extremities whenever sedentary.    Instructed patient to keep follow up appointments with vascular surgery    Written and verbal instructions given to patient  RTC in 1 week    Rosalba Eaton PA-C

## 2023-01-04 NOTE — TELEPHONE ENCOUNTER
----- Message from Best Claudio sent at 1/4/2023  8:53 AM CST -----  Can the clinic reply in [facility]: N              Please refill the medication(s) listed below. Please call the patient when the prescription(s) is ready for  at this phone number    [phone]           Medication #1 metFORMIN (GLUCOPHAGE) 1000 MG tablet 180 tablet          Medication #2           Preferred Pharmacy: [id number] - [address]

## 2023-01-04 NOTE — TELEPHONE ENCOUNTER
No new care gaps identified.  [facility] Embedded Care Gaps. Reference number: [id number]. 1/04/2023   11:50:42 AM CST

## 2023-01-04 NOTE — TELEPHONE ENCOUNTER
No new care gaps identified.  [facility] Embedded Care Gaps. Reference number: [id number]. 1/04/2023   11:49:26 AM CST

## 2023-01-04 NOTE — TELEPHONE ENCOUNTER
----- Message from Best Claudio sent at 1/4/2023  8:55 AM CST -----  Can the clinic reply in [facility]: N              Please refill the medication(s) listed below. Please call the patient when the prescription(s) is ready for  at this phone number   [phone]           Medication #1 glimepiride (AMARYL) 4 MG tablet 180 tablet          Medication #2           Preferred Pharmacy: [id number] - [address]

## 2023-01-05 ENCOUNTER — OFFICE VISIT (OUTPATIENT)
Dept: INTERNAL MEDICINE | Facility: CLINIC | Age: 73
End: 2023-01-05
Payer: MEDICARE

## 2023-01-05 VITALS
HEIGHT: 70 IN | OXYGEN SATURATION: 97 % | SYSTOLIC BLOOD PRESSURE: 128 MMHG | DIASTOLIC BLOOD PRESSURE: 70 MMHG | BODY MASS INDEX: 40.15 KG/M2 | HEART RATE: 71 BPM | WEIGHT: 280.44 LBS

## 2023-01-05 DIAGNOSIS — Z71.1 PHYSICALLY WELL BUT WORRIED: Primary | ICD-10-CM

## 2023-01-05 PROCEDURE — 3008F PR BODY MASS INDEX (BMI) DOCUMENTED: ICD-10-PCS | Mod: CPTII,S$GLB,, | Performed by: PHYSICIAN ASSISTANT

## 2023-01-05 PROCEDURE — 99213 OFFICE O/P EST LOW 20 MIN: CPT | Mod: S$GLB,,, | Performed by: PHYSICIAN ASSISTANT

## 2023-01-05 PROCEDURE — 3008F BODY MASS INDEX DOCD: CPT | Mod: CPTII,S$GLB,, | Performed by: PHYSICIAN ASSISTANT

## 2023-01-05 PROCEDURE — 99999 PR PBB SHADOW E&M-EST. PATIENT-LVL IV: ICD-10-PCS | Mod: PBBFAC,,, | Performed by: PHYSICIAN ASSISTANT

## 2023-01-05 PROCEDURE — 1126F PR PAIN SEVERITY QUANTIFIED, NO PAIN PRESENT: ICD-10-PCS | Mod: CPTII,S$GLB,, | Performed by: PHYSICIAN ASSISTANT

## 2023-01-05 PROCEDURE — 3288F PR FALLS RISK ASSESSMENT DOCUMENTED: ICD-10-PCS | Mod: CPTII,S$GLB,, | Performed by: PHYSICIAN ASSISTANT

## 2023-01-05 PROCEDURE — 99999 PR PBB SHADOW E&M-EST. PATIENT-LVL IV: CPT | Mod: PBBFAC,,, | Performed by: PHYSICIAN ASSISTANT

## 2023-01-05 PROCEDURE — 3288F FALL RISK ASSESSMENT DOCD: CPT | Mod: CPTII,S$GLB,, | Performed by: PHYSICIAN ASSISTANT

## 2023-01-05 PROCEDURE — 1101F PR PT FALLS ASSESS DOC 0-1 FALLS W/OUT INJ PAST YR: ICD-10-PCS | Mod: CPTII,S$GLB,, | Performed by: PHYSICIAN ASSISTANT

## 2023-01-05 PROCEDURE — 3074F PR MOST RECENT SYSTOLIC BLOOD PRESSURE < 130 MM HG: ICD-10-PCS | Mod: CPTII,S$GLB,, | Performed by: PHYSICIAN ASSISTANT

## 2023-01-05 PROCEDURE — 3078F DIAST BP <80 MM HG: CPT | Mod: CPTII,S$GLB,, | Performed by: PHYSICIAN ASSISTANT

## 2023-01-05 PROCEDURE — 1126F AMNT PAIN NOTED NONE PRSNT: CPT | Mod: CPTII,S$GLB,, | Performed by: PHYSICIAN ASSISTANT

## 2023-01-05 PROCEDURE — 1101F PT FALLS ASSESS-DOCD LE1/YR: CPT | Mod: CPTII,S$GLB,, | Performed by: PHYSICIAN ASSISTANT

## 2023-01-05 PROCEDURE — 99213 PR OFFICE/OUTPT VISIT, EST, LEVL III, 20-29 MIN: ICD-10-PCS | Mod: S$GLB,,, | Performed by: PHYSICIAN ASSISTANT

## 2023-01-05 PROCEDURE — 3078F PR MOST RECENT DIASTOLIC BLOOD PRESSURE < 80 MM HG: ICD-10-PCS | Mod: CPTII,S$GLB,, | Performed by: PHYSICIAN ASSISTANT

## 2023-01-05 PROCEDURE — 1159F MED LIST DOCD IN RCRD: CPT | Mod: CPTII,S$GLB,, | Performed by: PHYSICIAN ASSISTANT

## 2023-01-05 PROCEDURE — 3074F SYST BP LT 130 MM HG: CPT | Mod: CPTII,S$GLB,, | Performed by: PHYSICIAN ASSISTANT

## 2023-01-05 PROCEDURE — 1159F PR MEDICATION LIST DOCUMENTED IN MEDICAL RECORD: ICD-10-PCS | Mod: CPTII,S$GLB,, | Performed by: PHYSICIAN ASSISTANT

## 2023-01-05 NOTE — PROGRESS NOTES
INTERNAL MEDICINE URGENT VISIT NOTE    CHIEF COMPLAINT     Chief Complaint   Patient presents with    Follow-up       HPI     Serafin Tapia is a 72 y.o. male who presents for an urgent visit today.    PCP is John Vargas MD, patient is new to me.     Patient presents on request of his family members to get checked for memory deficits. Patient and his accompanying son report that family members find that his memory is failing. Patient and his son disagree with this. Patient denies significant memory changes. He is able to drive himself, takes care of personal hygiene, medications and finances. Pt denies any headaches, vision changes, or any symptoms at all. He reports that he feels well.     Past Medical History:  Past Medical History:   Diagnosis Date    Asthma     childhood    Diabetes mellitus type 2 in obese 05/03/2014    Dyslipidemia 05/05/2014    Elevated troponin 05/03/2014    Gait instability 02/28/2018    Hyperlipidemia     Hypertension     Hypothyroidism (acquired) 11/01/2017    Obesity     Ocular hypertension     Ocular hypertension     Pulmonary embolism 05/2014    PVD (posterior vitreous detachment), right eye     Spondylosis of lumbar region without myelopathy or radiculopathy 08/25/2017    Statin myopathy 01/16/2018    Vertigo        Home Medications:  Prior to Admission medications    Medication Sig Start Date End Date Taking? Authorizing Provider   ascorbic acid, vitamin C, (VITAMIN C) 500 MG tablet Take 1 tablet (500 mg total) by mouth 2 (two) times daily. 12/15/17  Yes Pk Rosario MD   aspirin (ECOTRIN) 81 MG EC tablet Take 81 mg by mouth once daily.   Yes Historical Provider   benazepriL (LOTENSIN) 40 MG tablet TAKE ONE TABLET BY MOUTH DAILY 6/13/22  Yes John Vargas MD   calcium-vitamin D3 (CALCIUM 500 + D) 500 mg(1,250mg) -200 unit per tablet Take 1 tablet by mouth 2 (two) times daily with meals. 2/21/18 1/5/23 Yes Meghan Davies MD   chlorthalidone (HYGROTEN) 25 MG Tab Take  1 tablet (25 mg total) by mouth once daily. 11/14/22 11/14/23 Yes John Vargas MD   dapagliflozin (FARXIGA) 5 mg Tab tablet Take 1 tablet (5 mg total) by mouth once daily. 11/14/22  Yes John Vargas MD   exenatide microspheres (BYDUREON BCISE) 2 mg/0.85 mL AtIn Inject 2 mg into the skin every 7 days. 11/14/22 11/14/23 Yes John Vargas MD   folic acid (FOLVITE) 1 MG tablet Take 1 tablet (1,000 mcg total) by mouth once daily. 7/6/21  Yes Samson Chiang MD   glimepiride (AMARYL) 4 MG tablet Take 2 tablets (8 mg total) by mouth daily with breakfast. 1/4/23 1/4/24 Yes John Vargas MD   levothyroxine (SYNTHROID) 50 MCG tablet Take 1 tablet (50 mcg total) by mouth before breakfast. 1/7/22  Yes John Vargas MD   metFORMIN (GLUCOPHAGE) 1000 MG tablet Take 1 tablet (1,000 mg total) by mouth 2 (two) times daily with meals. 1/4/23  Yes John Vargas MD   methotrexate 2.5 MG Tab TAKE 8 TABLETS BY MOUTH WEEKLY 11/14/22  Yes John Vargas MD   metoprolol succinate (TOPROL-XL) 200 MG 24 hr tablet Take 1 tablet (200 mg total) by mouth once daily. 11/14/22 11/14/23 Yes John Vargas MD   multivitamin (THERAGRAN) per tablet Take 1 tablet by mouth once daily. PER [facility]   Yes Historical Provider   NIFEdipine (PROCARDIA-XL) 90 MG (OSM) 24 hr tablet Take 1 tablet (90 mg total) by mouth once daily. 3/15/22 3/15/23 Yes John Vargas MD   traMADoL (ULTRAM) 50 mg tablet Take 1 tablet (50 mg total) by mouth every 6 (six) hours as needed for Pain. 12/2/22  Yes John Vargas MD   tumeric-ging-olive-oreg-capryl 100 mg-150 mg- 50 mg-150 mg Cap Take by mouth.   Yes Historical Provider   zinc sulfate (ZINCATE) 50 mg zinc (220 mg) capsule Take 1 capsule (220 mg total) by mouth once daily. 12/21/21  Yes John Vargas MD   alirocumab (PRALUENT PEN) 150 mg/mL Saurav Inject 1 mL (150 mg total) into the skin every 14 (fourteen) days.  Patient not taking: Reported  "on 12/21/2022 11/14/22 11/14/24  John Vargas MD       Review of Systems:  Review of Systems   Constitutional:  Negative for chills and fever.   HENT:  Negative for sore throat and trouble swallowing.    Eyes:  Negative for visual disturbance.   Respiratory:  Negative for cough and shortness of breath.    Cardiovascular:  Negative for chest pain.   Gastrointestinal:  Negative for abdominal pain, constipation, diarrhea, nausea and vomiting.   Genitourinary:  Negative for dysuria and flank pain.   Musculoskeletal:  Negative for back pain, neck pain and neck stiffness.   Skin:  Negative for rash.   Neurological:  Negative for dizziness, syncope, weakness and headaches.   Psychiatric/Behavioral:  Negative for confusion.      Health Maintainence:   Immunizations:  Health Maintenance         Date Due Completion Date    Eye Exam 04/05/2022 4/5/2021    Hemoglobin A1c 02/10/2023 11/10/2022    Lipid Panel 09/28/2023 9/28/2022    Foot Exam 11/08/2023 11/8/2022    Override on 11/20/2019: Done    Override on 2/5/2018: Done    Override on 5/18/2017: Done    Override on 9/23/2015: Done    Diabetes Urine Screening 11/10/2023 11/10/2022    Colorectal Cancer Screening 05/06/2024 5/6/2019    TETANUS VACCINE 05/04/2027 5/4/2017             PHYSICAL EXAM     /70   Pulse 71   Ht 5' 10" (1.778 m)   Wt 127.2 kg (280 lb 6.8 oz)   SpO2 97%   BMI 40.24 kg/m²     Physical Exam  Vitals and nursing note reviewed.   Constitutional:       Appearance: Normal appearance.      Comments: Elderly male in NAD or apparent pain. He makes good eye contact, speaks in clear full sentences and ambulates with cane from home.    HENT:      Head: Normocephalic and atraumatic.      Nose: Nose normal.      Mouth/Throat:      Pharynx: Oropharynx is clear.   Eyes:      Conjunctiva/sclera: Conjunctivae normal.   Cardiovascular:      Rate and Rhythm: Normal rate and regular rhythm.      Pulses: Normal pulses.   Pulmonary:      Effort: No respiratory " distress.   Abdominal:      Tenderness: There is no abdominal tenderness.   Musculoskeletal:         General: Normal range of motion.      Cervical back: No rigidity.   Skin:     General: Skin is warm and dry.      Capillary Refill: Capillary refill takes less than 2 seconds.      Findings: No rash.   Neurological:      General: No focal deficit present.      Mental Status: He is alert and oriented to person, place, and time.      Cranial Nerves: No cranial nerve deficit.      Sensory: No sensory deficit.      Motor: No weakness.      Coordination: Coordination normal.      Gait: Gait normal.   Psychiatric:         Mood and Affect: Mood normal.       LABS     Lab Results   Component Value Date    HGBA1C 8.0 (H) 11/10/2022     CMP  Sodium   Date Value Ref Range Status   09/28/2022 144 136 - 145 mmol/L Final     Potassium   Date Value Ref Range Status   09/28/2022 4.3 3.5 - 5.1 mmol/L Final     Chloride   Date Value Ref Range Status   09/28/2022 101 95 - 110 mmol/L Final     CO2   Date Value Ref Range Status   09/28/2022 36 (H) 23 - 29 mmol/L Final     Glucose   Date Value Ref Range Status   09/28/2022 171 (H) 70 - 110 mg/dL Final     BUN   Date Value Ref Range Status   09/28/2022 12 8 - 23 mg/dL Final     Creatinine   Date Value Ref Range Status   09/28/2022 0.8 0.5 - 1.4 mg/dL Final     Calcium   Date Value Ref Range Status   09/28/2022 9.5 8.7 - 10.5 mg/dL Final     Total Protein   Date Value Ref Range Status   09/28/2022 7.3 6.0 - 8.4 g/dL Final     Albumin   Date Value Ref Range Status   09/28/2022 3.9 3.5 - 5.2 g/dL Final     Total Bilirubin   Date Value Ref Range Status   09/28/2022 0.3 0.1 - 1.0 mg/dL Final     Comment:     For infants and newborns, interpretation of results should be based  on gestational age, weight and in agreement with clinical  observations.    Premature Infant recommended reference ranges:  Up to 24 hours.............<8.0 mg/dL  Up to 48 hours............<12.0 mg/dL  3-5  days..................<15.0 mg/dL  6-29 days.................<15.0 mg/dL       Alkaline Phosphatase   Date Value Ref Range Status   09/28/2022 87 55 - 135 U/L Final     AST   Date Value Ref Range Status   09/28/2022 54 (H) 10 - 40 U/L Final     ALT   Date Value Ref Range Status   09/28/2022 81 (H) 10 - 44 U/L Final     Anion Gap   Date Value Ref Range Status   09/28/2022 7 (L) 8 - 16 mmol/L Final     eGFR if    Date Value Ref Range Status   07/06/2021 >60.0 >60 mL/min/1.73 m^2 Final     eGFR if non    Date Value Ref Range Status   07/06/2021 >60.0 >60 mL/min/1.73 m^2 Final     Comment:     Calculation used to obtain the estimated glomerular filtration  rate (eGFR) is the CKD-EPI equation.        Lab Results   Component Value Date    WBC 5.17 09/28/2022    HGB 14.9 09/28/2022    HCT 47.9 09/28/2022    MCV 88 09/28/2022     09/28/2022     Lab Results   Component Value Date    CHOL 233 (H) 09/28/2022    CHOL 233 (H) 09/28/2022    CHOL 193 05/04/2021     Lab Results   Component Value Date    HDL 39 (L) 09/28/2022    HDL 39 (L) 09/28/2022    HDL 40 05/04/2021     Lab Results   Component Value Date    LDLCALC 173.6 (H) 09/28/2022    LDLCALC 173.6 (H) 09/28/2022    LDLCALC 128.8 05/04/2021     Lab Results   Component Value Date    TRIG 102 09/28/2022    TRIG 102 09/28/2022    TRIG 121 05/04/2021     Lab Results   Component Value Date    CHOLHDL 16.7 (L) 09/28/2022    CHOLHDL 16.7 (L) 09/28/2022    CHOLHDL 20.7 05/04/2021     Lab Results   Component Value Date    TSH 3.702 09/28/2022       ASSESSMENT/PLAN     Serafin Tapia is a 72 y.o. male     Serafin was seen today for reassurance. No concerning findings on exam today. No diagnostics felt warranted. Pt ok to RTC to see PCP as previously planned for routine healthcare. He is aware of RTC prompts if symptoms develop or situation changes.     Diagnoses and all orders for this visit:    Physically well but worried     Patient was counseled on  when and how to seek emergent care.       Rosalba Mujica PA-C   Department of Internal Medicine - [facility]   9:30 AM

## 2023-01-11 ENCOUNTER — OFFICE VISIT (OUTPATIENT)
Dept: WOUND CARE | Facility: CLINIC | Age: 73
End: 2023-01-11
Payer: MEDICARE

## 2023-01-11 VITALS
TEMPERATURE: 98 F | HEIGHT: 70 IN | HEART RATE: 76 BPM | SYSTOLIC BLOOD PRESSURE: 168 MMHG | BODY MASS INDEX: 39.76 KG/M2 | DIASTOLIC BLOOD PRESSURE: 77 MMHG | WEIGHT: 277.75 LBS

## 2023-01-11 DIAGNOSIS — I87.2 VENOUS INSUFFICIENCY: ICD-10-CM

## 2023-01-11 DIAGNOSIS — S81.802A OPEN WOUND OF LEFT LOWER EXTREMITY, INITIAL ENCOUNTER: Primary | ICD-10-CM

## 2023-01-11 DIAGNOSIS — I73.9 PAD (PERIPHERAL ARTERY DISEASE): ICD-10-CM

## 2023-01-11 DIAGNOSIS — E11.42 CONTROLLED TYPE 2 DIABETES MELLITUS WITH DIABETIC POLYNEUROPATHY, WITHOUT LONG-TERM CURRENT USE OF INSULIN: ICD-10-CM

## 2023-01-11 DIAGNOSIS — I50.9 HEART FAILURE, UNSPECIFIED HF CHRONICITY, UNSPECIFIED HEART FAILURE TYPE: ICD-10-CM

## 2023-01-11 DIAGNOSIS — I10 ESSENTIAL HYPERTENSION: ICD-10-CM

## 2023-01-11 PROCEDURE — 3077F SYST BP >= 140 MM HG: CPT | Mod: CPTII,S$GLB,,

## 2023-01-11 PROCEDURE — 1159F PR MEDICATION LIST DOCUMENTED IN MEDICAL RECORD: ICD-10-PCS | Mod: CPTII,S$GLB,,

## 2023-01-11 PROCEDURE — 99999 PR PBB SHADOW E&M-EST. PATIENT-LVL IV: ICD-10-PCS | Mod: PBBFAC,,,

## 2023-01-11 PROCEDURE — 3078F DIAST BP <80 MM HG: CPT | Mod: CPTII,S$GLB,,

## 2023-01-11 PROCEDURE — 99214 PR OFFICE/OUTPT VISIT, EST, LEVL IV, 30-39 MIN: ICD-10-PCS | Mod: 25,S$GLB,,

## 2023-01-11 PROCEDURE — 1126F PR PAIN SEVERITY QUANTIFIED, NO PAIN PRESENT: ICD-10-PCS | Mod: CPTII,S$GLB,,

## 2023-01-11 PROCEDURE — 1101F PR PT FALLS ASSESS DOC 0-1 FALLS W/OUT INJ PAST YR: ICD-10-PCS | Mod: CPTII,S$GLB,,

## 2023-01-11 PROCEDURE — 29580 STRAPPING UNNA BOOT: CPT | Mod: LT,S$GLB,,

## 2023-01-11 PROCEDURE — 99214 OFFICE O/P EST MOD 30 MIN: CPT | Mod: 25,S$GLB,,

## 2023-01-11 PROCEDURE — 1101F PT FALLS ASSESS-DOCD LE1/YR: CPT | Mod: CPTII,S$GLB,,

## 2023-01-11 PROCEDURE — 29580 PR STRAPPING UNNA BOOT: ICD-10-PCS | Mod: LT,S$GLB,,

## 2023-01-11 PROCEDURE — 99999 PR PBB SHADOW E&M-EST. PATIENT-LVL IV: CPT | Mod: PBBFAC,,,

## 2023-01-11 PROCEDURE — 3008F BODY MASS INDEX DOCD: CPT | Mod: CPTII,S$GLB,,

## 2023-01-11 PROCEDURE — 1159F MED LIST DOCD IN RCRD: CPT | Mod: CPTII,S$GLB,,

## 2023-01-11 PROCEDURE — 3288F PR FALLS RISK ASSESSMENT DOCUMENTED: ICD-10-PCS | Mod: CPTII,S$GLB,,

## 2023-01-11 PROCEDURE — 3008F PR BODY MASS INDEX (BMI) DOCUMENTED: ICD-10-PCS | Mod: CPTII,S$GLB,,

## 2023-01-11 PROCEDURE — 1126F AMNT PAIN NOTED NONE PRSNT: CPT | Mod: CPTII,S$GLB,,

## 2023-01-11 PROCEDURE — 3288F FALL RISK ASSESSMENT DOCD: CPT | Mod: CPTII,S$GLB,,

## 2023-01-11 PROCEDURE — 3078F PR MOST RECENT DIASTOLIC BLOOD PRESSURE < 80 MM HG: ICD-10-PCS | Mod: CPTII,S$GLB,,

## 2023-01-11 PROCEDURE — 3077F PR MOST RECENT SYSTOLIC BLOOD PRESSURE >= 140 MM HG: ICD-10-PCS | Mod: CPTII,S$GLB,,

## 2023-01-11 NOTE — PROGRESS NOTES
Subjective:       Patient ID: Serafin Tapia is a 72 y.o. male.    Chief Complaint: Wound Check      Patient presents for a reevaluation of a left lower extremity wound. Patient follows with vascular surgery for PAD and venous insufficiency. Dr. Kapoor determined he has adequate arterial blood flow for wound healing. No other complaints at this time. Denies fever, chills, erythema, warmth, drainage, or pain.      10/21/22 MIHIR:  Right lower extremity: 0.93- minimal peripheral arterial occlusive disease  Left lower extremity: 0.70- moderate peripheral arterial occlusive disease    Review of Systems   Constitutional:  Negative for activity change, chills, diaphoresis, fatigue and fever.   Eyes:  Negative for photophobia and visual disturbance.   Respiratory:  Negative for apnea, chest tightness and shortness of breath.    Cardiovascular:  Positive for leg swelling. Negative for chest pain and palpitations.   Musculoskeletal:  Negative for gait problem and joint swelling.   Skin:  Positive for wound. Negative for color change, pallor and rash.   Neurological:  Negative for dizziness, syncope, facial asymmetry, speech difficulty, weakness, light-headedness, numbness and headaches.   Psychiatric/Behavioral:  Negative for agitation and confusion. The patient is not nervous/anxious.    All other systems reviewed and are negative.    Objective:      Physical Exam  Vitals reviewed.   Constitutional:       General: He is not in acute distress.     Appearance: Normal appearance. He is obese.   Cardiovascular:      Rate and Rhythm: Normal rate and regular rhythm.      Pulses: Normal pulses.   Pulmonary:      Effort: No respiratory distress.   Musculoskeletal:         General: No swelling.      Right lower leg: Pitting Edema present.      Left lower leg: Pitting Edema present.      Comments: Ambulates with cane   Skin:     General: Skin is warm and dry.      Capillary Refill: Capillary refill takes less than 2 seconds.       Findings: Wound present. No erythema.          Neurological:      General: No focal deficit present.      Mental Status: He is alert and oriented to person, place, and time.   Psychiatric:         Mood and Affect: Mood normal.         Behavior: Behavior normal.         Thought Content: Thought content normal.         Judgment: Judgment normal.       Assessment:       1. Open wound of left lower extremity, initial encounter    2. Venous insufficiency    3. Essential hypertension    4. Heart failure, unspecified HF chronicity, unspecified heart failure type    5. Controlled type 2 diabetes mellitus with diabetic polyneuropathy, without long-term current use of insulin    6. PAD (peripheral artery disease)               Altered Skin Integrity Left anterior;lower Leg (Active)       Altered Skin Integrity Present on Admission:    Side: Left   Orientation: anterior;lower   Location: Leg   Wound Number:    Is this injury device related?:    Primary Wound Type:    Description of Altered Skin Integrity:    Ankle-Brachial Index:    Pulses:    Removal Indication and Assessment:    Wound Outcome:    (Retired) Wound Length (cm):    (Retired) Wound Width (cm):    (Retired) Depth (cm):    Wound Description (Comments):    Removal Indications:    Wound Image   01/11/23 1059   Dressing Appearance Dry;Intact;Clean;Moist drainage 01/11/23 1059   Drainage Amount Small 01/11/23 1059   Drainage Characteristics/Odor Creamy;Tan;Yellow 01/11/23 1059   Red (%), Wound Tissue Color 100 % 01/11/23 1059   Periwound Area Intact;Edematous;Pink 01/11/23 1059   Wound Length (cm) 3.3 cm 01/11/23 1059   Wound Width (cm) 2.3 cm 01/11/23 1059   Wound Depth (cm) 0.1 cm 01/11/23 1059   Wound Volume (cm^3) 0.759 cm^3 01/11/23 1059   Wound Surface Area (cm^2) 7.59 cm^2 01/11/23 1059   Care Cleansed with:;Soap and water 01/11/23 1059   Dressing Applied;Compression wrap;Other (comment) 01/11/23 1059   Periwound Care Skin barrier film applied 01/11/23 1059    Compression Unna's Boot;Three layer compression 01/11/23 1059            Altered Skin Integrity Left anterior;lower;lateral Leg (Active)       Altered Skin Integrity Present on Admission:    Side: Left   Orientation: anterior;lower;lateral   Location: Leg   Wound Number:    Is this injury device related?:    Primary Wound Type:    Description of Altered Skin Integrity:    Ankle-Brachial Index:    Pulses:    Removal Indication and Assessment:    Wound Outcome:    (Retired) Wound Length (cm):    (Retired) Wound Width (cm):    (Retired) Depth (cm):    Wound Description (Comments):    Removal Indications:    Wound Image   01/11/23 1059   Dressing Appearance Dry;Intact;Clean;Moist drainage 01/11/23 1059   Drainage Amount Scant 01/11/23 1059   Drainage Characteristics/Odor Serous 01/11/23 1059   Yellow (%), Wound Tissue Color 100 % 01/11/23 1059   Periwound Area Intact;Edematous;Pink 01/11/23 1059   Wound Length (cm) 1.7 cm 01/11/23 1059   Wound Width (cm) 1.5 cm 01/11/23 1059   Wound Depth (cm) 0.1 cm 01/11/23 1059   Wound Volume (cm^3) 0.255 cm^3 01/11/23 1059   Wound Surface Area (cm^2) 2.55 cm^2 01/11/23 1059   Care Cleansed with:;Soap and water 01/11/23 1059   Dressing Applied;Compression wrap;Other (comment) 01/11/23 1059   Periwound Care Skin barrier film applied 01/11/23 1059   Compression Unna's Boot;Three layer compression 01/11/23 1059       Serafin was seen in the clinic room and placed in the supine position on the treatment table.  The wound dressing was removed and the leg was cleansed with Easi-clense sponges and dried thoroughly.  No erythema, odor, green drainage, or warmth noted from patient's baseline.     Plan of Care: Calmoseptine to periwound area, polymem to wound beds, and a 3 layer calamine (to alleviate itching) unna wrap. The patient's foot was positioned at a 90 degree angle.  A calamine wrap, followed by kerlix roll gauze and coban were applied using a spiral technique avoiding creases or  folds.  The wrap was started behind the first metatarsal and ended below the tibial tubercle of the knee.  There was overlap of each turn half the width of the previous turn.  The compression wrap will be changed every 7 days.      Plan:       Left lower extremity was dressed as detailed above  Patient was instructed to not get the dressings wet and to use cast covers for showering.  Should the dressing become wet, he is to remove it, place a moist dressing over the wound, cover with gauze and roll gauze and use ace wraps for compression and to secure bandages.  He should then notify this office as soon as possible to have a new dressing applied.  Instructed patient to remove wrap if he notices tingling or coldness to his left lower extremity or if his toes become white, blue, or cold.    Discussed nutrition and the role of protein in wound healing with the patient. Instructed patient to continue optimizing protein for wound healing.      Discussed bilateral lower extremity edema with patient. Instructed patient to elevate lower extremities whenever sedentary.    Instructed patient to keep follow up appointments with vascular surgery    Written and verbal instructions given to patient  RTC in 1 week    Rosalba Eaton PA-C

## 2023-01-17 ENCOUNTER — TELEPHONE (OUTPATIENT)
Dept: ADMINISTRATIVE | Facility: CLINIC | Age: 73
End: 2023-01-17
Payer: MEDICARE

## 2023-01-17 NOTE — TELEPHONE ENCOUNTER
Called pt, informed pt I was calling to remind pt of his in office EAWV on 1/19/23; clinic location provided to patient; pt confirmed appointment

## 2023-01-18 ENCOUNTER — PATIENT MESSAGE (OUTPATIENT)
Dept: ADMINISTRATIVE | Facility: HOSPITAL | Age: 73
End: 2023-01-18
Payer: MEDICARE

## 2023-01-18 ENCOUNTER — OFFICE VISIT (OUTPATIENT)
Dept: WOUND CARE | Facility: CLINIC | Age: 73
End: 2023-01-18
Payer: MEDICARE

## 2023-01-18 VITALS
WEIGHT: 277.75 LBS | DIASTOLIC BLOOD PRESSURE: 81 MMHG | HEIGHT: 70 IN | BODY MASS INDEX: 39.76 KG/M2 | HEART RATE: 68 BPM | TEMPERATURE: 99 F | SYSTOLIC BLOOD PRESSURE: 177 MMHG

## 2023-01-18 DIAGNOSIS — I10 ESSENTIAL HYPERTENSION: ICD-10-CM

## 2023-01-18 DIAGNOSIS — I50.9 HEART FAILURE, UNSPECIFIED HF CHRONICITY, UNSPECIFIED HEART FAILURE TYPE: ICD-10-CM

## 2023-01-18 DIAGNOSIS — I87.2 VENOUS INSUFFICIENCY: ICD-10-CM

## 2023-01-18 DIAGNOSIS — E11.42 CONTROLLED TYPE 2 DIABETES MELLITUS WITH DIABETIC POLYNEUROPATHY, WITHOUT LONG-TERM CURRENT USE OF INSULIN: ICD-10-CM

## 2023-01-18 DIAGNOSIS — S81.802A OPEN WOUND OF LEFT LOWER EXTREMITY, INITIAL ENCOUNTER: Primary | ICD-10-CM

## 2023-01-18 DIAGNOSIS — I73.9 PAD (PERIPHERAL ARTERY DISEASE): ICD-10-CM

## 2023-01-18 PROCEDURE — 11042 DBRDMT SUBQ TIS 1ST 20SQCM/<: CPT | Mod: LT,S$GLB,,

## 2023-01-18 PROCEDURE — 1126F PR PAIN SEVERITY QUANTIFIED, NO PAIN PRESENT: ICD-10-PCS | Mod: CPTII,S$GLB,,

## 2023-01-18 PROCEDURE — 3079F PR MOST RECENT DIASTOLIC BLOOD PRESSURE 80-89 MM HG: ICD-10-PCS | Mod: CPTII,S$GLB,,

## 2023-01-18 PROCEDURE — 1159F PR MEDICATION LIST DOCUMENTED IN MEDICAL RECORD: ICD-10-PCS | Mod: CPTII,S$GLB,,

## 2023-01-18 PROCEDURE — 99214 PR OFFICE/OUTPT VISIT, EST, LEVL IV, 30-39 MIN: ICD-10-PCS | Mod: 25,S$GLB,,

## 2023-01-18 PROCEDURE — 3008F BODY MASS INDEX DOCD: CPT | Mod: CPTII,S$GLB,,

## 2023-01-18 PROCEDURE — 99214 OFFICE O/P EST MOD 30 MIN: CPT | Mod: 25,S$GLB,,

## 2023-01-18 PROCEDURE — 1101F PR PT FALLS ASSESS DOC 0-1 FALLS W/OUT INJ PAST YR: ICD-10-PCS | Mod: CPTII,S$GLB,,

## 2023-01-18 PROCEDURE — 3079F DIAST BP 80-89 MM HG: CPT | Mod: CPTII,S$GLB,,

## 2023-01-18 PROCEDURE — 3077F PR MOST RECENT SYSTOLIC BLOOD PRESSURE >= 140 MM HG: ICD-10-PCS | Mod: CPTII,S$GLB,,

## 2023-01-18 PROCEDURE — 3288F PR FALLS RISK ASSESSMENT DOCUMENTED: ICD-10-PCS | Mod: CPTII,S$GLB,,

## 2023-01-18 PROCEDURE — 3288F FALL RISK ASSESSMENT DOCD: CPT | Mod: CPTII,S$GLB,,

## 2023-01-18 PROCEDURE — 99999 PR PBB SHADOW E&M-EST. PATIENT-LVL IV: ICD-10-PCS | Mod: PBBFAC,,,

## 2023-01-18 PROCEDURE — 11042 DEBRIDEMENT: ICD-10-PCS | Mod: LT,S$GLB,,

## 2023-01-18 PROCEDURE — 1126F AMNT PAIN NOTED NONE PRSNT: CPT | Mod: CPTII,S$GLB,,

## 2023-01-18 PROCEDURE — 1159F MED LIST DOCD IN RCRD: CPT | Mod: CPTII,S$GLB,,

## 2023-01-18 PROCEDURE — 3008F PR BODY MASS INDEX (BMI) DOCUMENTED: ICD-10-PCS | Mod: CPTII,S$GLB,,

## 2023-01-18 PROCEDURE — 3077F SYST BP >= 140 MM HG: CPT | Mod: CPTII,S$GLB,,

## 2023-01-18 PROCEDURE — 1101F PT FALLS ASSESS-DOCD LE1/YR: CPT | Mod: CPTII,S$GLB,,

## 2023-01-18 PROCEDURE — 99999 PR PBB SHADOW E&M-EST. PATIENT-LVL IV: CPT | Mod: PBBFAC,,,

## 2023-01-18 NOTE — PROGRESS NOTES
Subjective:       Patient ID: Serafin Tapia is a 72 y.o. male.    Chief Complaint: Wound Check      Patient presents for a reevaluation of a left lower extremity wound. Patient follows with vascular surgery for PAD and venous insufficiency. Dr. Kapoor determined he has adequate arterial blood flow for wound healing. No other complaints at this time. Denies fever, chills, erythema, warmth, drainage, or pain.      10/21/22 MIHIR:  Right lower extremity: 0.93- minimal peripheral arterial occlusive disease  Left lower extremity: 0.70- moderate peripheral arterial occlusive disease    Review of Systems   Constitutional:  Negative for activity change, chills, diaphoresis, fatigue and fever.   Eyes:  Negative for photophobia and visual disturbance.   Respiratory:  Negative for apnea, chest tightness and shortness of breath.    Cardiovascular:  Positive for leg swelling. Negative for chest pain and palpitations.   Musculoskeletal:  Negative for gait problem and joint swelling.   Skin:  Positive for wound. Negative for color change, pallor and rash.   Neurological:  Negative for dizziness, syncope, facial asymmetry, speech difficulty, weakness, light-headedness, numbness and headaches.   Psychiatric/Behavioral:  Negative for agitation and confusion. The patient is not nervous/anxious.    All other systems reviewed and are negative.    Objective:      Physical Exam  Vitals reviewed.   Constitutional:       General: He is not in acute distress.     Appearance: Normal appearance. He is obese.   Cardiovascular:      Rate and Rhythm: Normal rate and regular rhythm.      Pulses: Normal pulses.   Pulmonary:      Effort: No respiratory distress.   Musculoskeletal:         General: No swelling.      Right lower leg: Pitting Edema present.      Left lower leg: Pitting Edema present.      Comments: Ambulates with cane   Skin:     General: Skin is warm and dry.      Capillary Refill: Capillary refill takes less than 2 seconds.       Findings: Wound present. No erythema.          Neurological:      General: No focal deficit present.      Mental Status: He is alert and oriented to person, place, and time.   Psychiatric:         Mood and Affect: Mood normal.         Behavior: Behavior normal.         Thought Content: Thought content normal.         Judgment: Judgment normal.       Assessment:       1. Open wound of left lower extremity, initial encounter    2. Venous insufficiency    3. Essential hypertension    4. Heart failure, unspecified HF chronicity, unspecified heart failure type    5. Controlled type 2 diabetes mellitus with diabetic polyneuropathy, without long-term current use of insulin    6. PAD (peripheral artery disease)               Altered Skin Integrity Left anterior;lower;medial Leg (Active)       Altered Skin Integrity Present on Admission:    Side: Left   Orientation: anterior;lower;medial   Location: Leg   Wound Number:    Is this injury device related?:    Primary Wound Type:    Description of Altered Skin Integrity:    Ankle-Brachial Index:    Pulses:    Removal Indication and Assessment:    Wound Outcome:    (Retired) Wound Length (cm):    (Retired) Wound Width (cm):    (Retired) Depth (cm):    Wound Description (Comments):    Removal Indications:    Dressing Appearance Dry;Intact;Clean;Moist drainage 01/18/23 1120   Drainage Amount Moderate 01/18/23 1120   Drainage Characteristics/Odor Tan;Creamy;Yellow 01/18/23 1120   Yellow (%), Wound Tissue Color 100 % 01/18/23 1120   Periwound Area Intact;Edematous;Pink 01/18/23 1120   Wound Length (cm) 2.6 cm 01/18/23 1120   Wound Width (cm) 1.3 cm 01/18/23 1120   Wound Depth (cm) 0.1 cm 01/18/23 1120   Wound Volume (cm^3) 0.338 cm^3 01/18/23 1120   Wound Surface Area (cm^2) 3.38 cm^2 01/18/23 1120   Care Cleansed with:;Soap and water 01/18/23 1120   Dressing Applied;Compression wrap;Other (comment) 01/18/23 1120   Periwound Care Skin barrier film applied 01/18/23 1120   Compression  Unna's Boot;Three layer compression 01/18/23 1120            Altered Skin Integrity Left anterior;lower;lateral Leg (Active)       Altered Skin Integrity Present on Admission:    Side: Left   Orientation: anterior;lower;lateral   Location: Leg   Wound Number:    Is this injury device related?:    Primary Wound Type:    Description of Altered Skin Integrity:    Ankle-Brachial Index:    Pulses:    Removal Indication and Assessment:    Wound Outcome:    (Retired) Wound Length (cm):    (Retired) Wound Width (cm):    (Retired) Depth (cm):    Wound Description (Comments):    Removal Indications:    Wound Image    01/18/23 1120   Dressing Appearance Dry;Intact;Clean;Moist drainage 01/18/23 1120   Drainage Amount Moderate 01/18/23 1120   Drainage Characteristics/Odor Tan;Creamy;Yellow 01/18/23 1120   Yellow (%), Wound Tissue Color 100 % 01/18/23 1120   Periwound Area Intact;Pink;Edematous 01/18/23 1120   Wound Length (cm) 2 cm 01/18/23 1120   Wound Width (cm) 0.7 cm 01/18/23 1120   Wound Depth (cm) 0.1 cm 01/18/23 1120   Wound Volume (cm^3) 0.14 cm^3 01/18/23 1120   Wound Surface Area (cm^2) 1.4 cm^2 01/18/23 1120   Care Cleansed with:;Soap and water 01/18/23 1120   Dressing Applied;Compression wrap;Other (comment) 01/18/23 1120   Periwound Care Skin barrier film applied 01/18/23 1120   Compression Unna's Boot;Three layer compression 01/18/23 1120       Serafin was seen in the clinic room and placed in the supine position on the treatment table.  The wound dressing was removed and the leg was cleansed with Easi-clense sponges and dried thoroughly.  No erythema, odor, green drainage, or warmth noted from patient's baseline. Placed topical 4% Lidocaine Hydrochloride to wound beds for 15 minutes. Sharp debridement with 5 mm curette to remove non-viable tissue. Pt tolerated procedure well.      Plan of Care: Calmoseptine to periwound area, polymem to wound beds, and a 3 layer calamine (to alleviate itching) unna wrap. The  patient's foot was positioned at a 90 degree angle.  A calamine wrap, followed by kerlix roll gauze and coban were applied using a spiral technique avoiding creases or folds.  The wrap was started behind the first metatarsal and ended below the tibial tubercle of the knee.  There was overlap of each turn half the width of the previous turn.  The compression wrap will be changed every 7 days.      Plan:       Left lower extremity was dressed as detailed above  Patient was instructed to not get the dressings wet and to use cast covers for showering.  Should the dressing become wet, he is to remove it, place a moist dressing over the wound, cover with gauze and roll gauze and use ace wraps for compression and to secure bandages.  He should then notify this office as soon as possible to have a new dressing applied.  Instructed patient to remove wrap if he notices tingling or coldness to his left lower extremity or if his toes become white, blue, or cold.    Discussed nutrition and the role of protein in wound healing with the patient. Instructed patient to continue optimizing protein for wound healing.      Discussed bilateral lower extremity edema with patient. Instructed patient to elevate lower extremities whenever sedentary.    Instructed patient to keep follow up appointments with vascular surgery    Written and verbal instructions given to patient  RTC in 1 week    Rosalba Eaton PA-C

## 2023-01-18 NOTE — PROCEDURES
"Debridement    Date/Time: 1/18/2023 10:30 AM  Performed by: Rosalba Eaton PA-C  Authorized by: Rosalba Eaton PA-C     Time out: Immediately prior to procedure a "time out" was called to verify the correct patient, procedure, equipment, support staff and site/side marked as required.    Consent Done?:  Yes (Written)  Local anesthesia used?: Yes    Local anesthetic:  Topical anesthetic (Topical 4% Lidocaine Hydrochloride)    Wound Details:    Location:  Left leg (medial)    Type of Debridement:  Excisional       Length (cm):  2.6       Area (sq cm):  3.38       Width (cm):  1.3       Percent Debrided (%):  100       Depth (cm):  0.1       Total Area Debrided (sq cm):  3.38    Depth of debridement:  Subcutaneous tissue    Tissue debrided:  Subcutaneous    Devitalized tissue debrided:  Biofilm, Exudate, Fibrin and Slough    Instruments:  Curette    Additional wounds:  1    2nd Wound Details:     Location:  Left leg (lateral)    Type of Debridement:  Excisional       Length (cm):  2       Area (sq cm):  1.4       Width (cm):  0.7       Percent Debrided (%):  100       Depth (cm):  0.1       Total Area Debrided (sq cm):  1.4    Depth of debridement:  Subcutaneous tissue    Tissue debrided:  Subcutaneous    Devitalized tissue debrided:  Biofilm, Exudate, Fibrin and Slough    Instruments:  Curette    Bleeding:  Minimal  Hemostasis Achieved: Yes    Method Used:  Pressure  Patient tolerance:  Patient tolerated the procedure well with no immediate complications  "

## 2023-01-19 ENCOUNTER — OFFICE VISIT (OUTPATIENT)
Dept: INTERNAL MEDICINE | Facility: CLINIC | Age: 73
End: 2023-01-19
Payer: MEDICARE

## 2023-01-19 VITALS
WEIGHT: 281.94 LBS | HEIGHT: 69 IN | HEART RATE: 72 BPM | SYSTOLIC BLOOD PRESSURE: 138 MMHG | OXYGEN SATURATION: 98 % | BODY MASS INDEX: 41.76 KG/M2 | DIASTOLIC BLOOD PRESSURE: 80 MMHG

## 2023-01-19 DIAGNOSIS — M35.9 SYSTEMIC INVOLVEMENT OF CONNECTIVE TISSUE, UNSPECIFIED: ICD-10-CM

## 2023-01-19 DIAGNOSIS — I50.9 HEART FAILURE, UNSPECIFIED HF CHRONICITY, UNSPECIFIED HEART FAILURE TYPE: ICD-10-CM

## 2023-01-19 DIAGNOSIS — D84.821 DRUG-INDUCED IMMUNODEFICIENCY: ICD-10-CM

## 2023-01-19 DIAGNOSIS — Z79.899 DRUG-INDUCED IMMUNODEFICIENCY: ICD-10-CM

## 2023-01-19 DIAGNOSIS — I26.99 OTHER PULMONARY EMBOLISM WITHOUT ACUTE COR PULMONALE, UNSPECIFIED CHRONICITY: ICD-10-CM

## 2023-01-19 DIAGNOSIS — I51.7 LVH (LEFT VENTRICULAR HYPERTROPHY): ICD-10-CM

## 2023-01-19 DIAGNOSIS — G72.0 STATIN MYOPATHY: ICD-10-CM

## 2023-01-19 DIAGNOSIS — E03.9 HYPOTHYROIDISM (ACQUIRED): ICD-10-CM

## 2023-01-19 DIAGNOSIS — K63.5 POLYP OF COLON, UNSPECIFIED PART OF COLON, UNSPECIFIED TYPE: ICD-10-CM

## 2023-01-19 DIAGNOSIS — Z00.00 ENCOUNTER FOR PREVENTIVE HEALTH EXAMINATION: Primary | ICD-10-CM

## 2023-01-19 DIAGNOSIS — R29.898 WEAKNESS OF EXTREMITY: ICD-10-CM

## 2023-01-19 DIAGNOSIS — Z80.42 FAMILY HISTORY OF PROSTATE CANCER: ICD-10-CM

## 2023-01-19 DIAGNOSIS — R53.1 WEAKNESS: ICD-10-CM

## 2023-01-19 DIAGNOSIS — T46.6X5A STATIN MYOPATHY: ICD-10-CM

## 2023-01-19 DIAGNOSIS — Z98.890 POSTOPERATIVE HYPOXIA: ICD-10-CM

## 2023-01-19 DIAGNOSIS — M47.816 SPONDYLOSIS OF LUMBAR REGION WITHOUT MYELOPATHY OR RADICULOPATHY: ICD-10-CM

## 2023-01-19 DIAGNOSIS — E66.01 MORBID OBESITY WITH BMI OF 40.0-44.9, ADULT: ICD-10-CM

## 2023-01-19 DIAGNOSIS — R05.9 COUGH, UNSPECIFIED TYPE: ICD-10-CM
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
## 2023-01-19 DIAGNOSIS — E53.8 FOLATE DEFICIENCY: ICD-10-CM

## 2023-01-19 DIAGNOSIS — K57.90 DIVERTICULOSIS: ICD-10-CM

## 2023-01-19 DIAGNOSIS — I73.9 PAD (PERIPHERAL ARTERY DISEASE): ICD-10-CM

## 2023-01-19 DIAGNOSIS — E55.9 VITAMIN D INSUFFICIENCY: ICD-10-CM

## 2023-01-19 DIAGNOSIS — E87.70 HYPERVOLEMIA, UNSPECIFIED HYPERVOLEMIA TYPE: ICD-10-CM

## 2023-01-19 DIAGNOSIS — Z86.711 HISTORY OF PULMONARY EMBOLISM: ICD-10-CM

## 2023-01-19 DIAGNOSIS — E11.42 CONTROLLED TYPE 2 DIABETES MELLITUS WITH DIABETIC POLYNEUROPATHY, WITHOUT LONG-TERM CURRENT USE OF INSULIN: ICD-10-CM

## 2023-01-19 DIAGNOSIS — R74.8 ELEVATED LIVER ENZYMES: ICD-10-CM

## 2023-01-19 DIAGNOSIS — M62.82 NON-TRAUMATIC RHABDOMYOLYSIS: ICD-10-CM

## 2023-01-19 DIAGNOSIS — D51.8 OTHER VITAMIN B12 DEFICIENCY ANEMIA: ICD-10-CM

## 2023-01-19 DIAGNOSIS — R26.9 ABNORMALITY OF GAIT AND MOBILITY: ICD-10-CM

## 2023-01-19 DIAGNOSIS — N47.1 PHIMOSIS: ICD-10-CM

## 2023-01-19 DIAGNOSIS — H91.90 HEARING LOSS, UNSPECIFIED HEARING LOSS TYPE, UNSPECIFIED LATERALITY: ICD-10-CM

## 2023-01-19 DIAGNOSIS — I10 ESSENTIAL HYPERTENSION: ICD-10-CM

## 2023-01-19 DIAGNOSIS — E79.0 HYPERURICEMIA: ICD-10-CM

## 2023-01-19 DIAGNOSIS — D68.69 OTHER THROMBOPHILIA: ICD-10-CM

## 2023-01-19 DIAGNOSIS — R09.02 POSTOPERATIVE HYPOXIA: ICD-10-CM

## 2023-01-19 DIAGNOSIS — E53.8 VITAMIN B 12 DEFICIENCY: ICD-10-CM

## 2023-01-19 DIAGNOSIS — G72.49 AUTOIMMUNE NECROTIZING MYOPATHY: ICD-10-CM

## 2023-01-19 DIAGNOSIS — M79.89 SWELLING OF RIGHT LOWER EXTREMITY: ICD-10-CM

## 2023-01-19 PROCEDURE — 1160F RVW MEDS BY RX/DR IN RCRD: CPT | Mod: CPTII,S$GLB,, | Performed by: NURSE PRACTITIONER

## 2023-01-19 PROCEDURE — 3288F PR FALLS RISK ASSESSMENT DOCUMENTED: ICD-10-PCS | Mod: CPTII,S$GLB,, | Performed by: NURSE PRACTITIONER

## 2023-01-19 PROCEDURE — 1101F PT FALLS ASSESS-DOCD LE1/YR: CPT | Mod: CPTII,S$GLB,, | Performed by: NURSE PRACTITIONER

## 2023-01-19 PROCEDURE — 99999 PR PBB SHADOW E&M-EST. PATIENT-LVL V: ICD-10-PCS | Mod: PBBFAC,,, | Performed by: NURSE PRACTITIONER

## 2023-01-19 PROCEDURE — 1101F PR PT FALLS ASSESS DOC 0-1 FALLS W/OUT INJ PAST YR: ICD-10-PCS | Mod: CPTII,S$GLB,, | Performed by: NURSE PRACTITIONER

## 2023-01-19 PROCEDURE — 99499 UNLISTED E&M SERVICE: CPT | Mod: S$GLB,,, | Performed by: NURSE PRACTITIONER

## 2023-01-19 PROCEDURE — G0439 PPPS, SUBSEQ VISIT: HCPCS | Mod: S$GLB,,, | Performed by: NURSE PRACTITIONER

## 2023-01-19 PROCEDURE — 1159F MED LIST DOCD IN RCRD: CPT | Mod: CPTII,S$GLB,, | Performed by: NURSE PRACTITIONER

## 2023-01-19 PROCEDURE — 99999 PR PBB SHADOW E&M-EST. PATIENT-LVL V: CPT | Mod: PBBFAC,,, | Performed by: NURSE PRACTITIONER

## 2023-01-19 PROCEDURE — 3079F DIAST BP 80-89 MM HG: CPT | Mod: CPTII,S$GLB,, | Performed by: NURSE PRACTITIONER

## 2023-01-19 PROCEDURE — 3008F PR BODY MASS INDEX (BMI) DOCUMENTED: ICD-10-PCS | Mod: CPTII,S$GLB,, | Performed by: NURSE PRACTITIONER

## 2023-01-19 PROCEDURE — 3079F PR MOST RECENT DIASTOLIC BLOOD PRESSURE 80-89 MM HG: ICD-10-PCS | Mod: CPTII,S$GLB,, | Performed by: NURSE PRACTITIONER

## 2023-01-19 PROCEDURE — 1170F PR FUNCTIONAL STATUS ASSESSED: ICD-10-PCS | Mod: CPTII,S$GLB,, | Performed by: NURSE PRACTITIONER

## 2023-01-19 PROCEDURE — 3288F FALL RISK ASSESSMENT DOCD: CPT | Mod: CPTII,S$GLB,, | Performed by: NURSE PRACTITIONER

## 2023-01-19 PROCEDURE — G0439 PR MEDICARE ANNUAL WELLNESS SUBSEQUENT VISIT: ICD-10-PCS | Mod: S$GLB,,, | Performed by: NURSE PRACTITIONER

## 2023-01-19 PROCEDURE — 3008F BODY MASS INDEX DOCD: CPT | Mod: CPTII,S$GLB,, | Performed by: NURSE PRACTITIONER

## 2023-01-19 PROCEDURE — 1159F PR MEDICATION LIST DOCUMENTED IN MEDICAL RECORD: ICD-10-PCS | Mod: CPTII,S$GLB,, | Performed by: NURSE PRACTITIONER

## 2023-01-19 PROCEDURE — 99499 RISK ADDL DX/OHS AUDIT: ICD-10-PCS | Mod: S$GLB,,, | Performed by: NURSE PRACTITIONER

## 2023-01-19 PROCEDURE — 3075F SYST BP GE 130 - 139MM HG: CPT | Mod: CPTII,S$GLB,, | Performed by: NURSE PRACTITIONER

## 2023-01-19 PROCEDURE — 1170F FXNL STATUS ASSESSED: CPT | Mod: CPTII,S$GLB,, | Performed by: NURSE PRACTITIONER

## 2023-01-19 PROCEDURE — 1160F PR REVIEW ALL MEDS BY PRESCRIBER/CLIN PHARMACIST DOCUMENTED: ICD-10-PCS | Mod: CPTII,S$GLB,, | Performed by: NURSE PRACTITIONER

## 2023-01-19 PROCEDURE — 3075F PR MOST RECENT SYSTOLIC BLOOD PRESS GE 130-139MM HG: ICD-10-PCS | Mod: CPTII,S$GLB,, | Performed by: NURSE PRACTITIONER

## 2023-01-19 NOTE — PATIENT INSTRUCTIONS
Counseling and Referral of Other Preventative  (Italic type indicates deductible and co-insurance are waived)    Patient Name: Serafin Tapia  Today's Date: 1/19/2023    Health Maintenance       Date Due Completion Date    Eye Exam 04/05/2022 4/5/2021    Hemoglobin A1c 02/10/2023 11/10/2022    Lipid Panel 09/28/2023 9/28/2022    Foot Exam 11/08/2023 11/8/2022    Override on 11/20/2019: Done    Override on 2/5/2018: Done    Override on 5/18/2017: Done    Override on 9/23/2015: Done    Diabetes Urine Screening 11/10/2023 11/10/2022    Colorectal Cancer Screening 05/06/2024 5/6/2019    TETANUS VACCINE 05/04/2027 5/4/2017        Orders Placed This Encounter   Procedures    Ambulatory referral/consult to ENT    Ambulatory referral/consult to Audiology       The following information is provided to all patients.  This information is to help you find resources for any of the problems found today that may be affecting your health:                Living healthy guide: www.[facility].[place].gov      Understanding Diabetes: www.diabetes.org      Eating healthy: www.cdc.gov/healthyweight      CDC home safety checklist: www.cdc.gov/steadi/patient.html      Agency on Aging: www.goea.[place].gov      Alcoholics anonymous (AA): www.aa.org      Physical Activity: www.cherry.nih.gov/vw2pokw      Tobacco use: www.quitwithusla.org

## 2023-01-19 NOTE — PROGRESS NOTES
"  Serafin Tapia presented for a  Medicare AWV and comprehensive Health Risk Assessment today. The following components were reviewed and updated:    Medical history  Family History  Social history  Allergies and Current Medications  Health Risk Assessment  Health Maintenance  Care Team         ** See Completed Assessments for Annual Wellness Visit within the encounter summary.**         The following assessments were completed:  Living Situation  CAGE  Depression Screening  Timed Get Up and Go  Whisper Test  Cognitive Function Screening  Nutrition Screening  ADL Screening  PAQ Screening          Vitals:    01/19/23 1159   BP: 138/80   BP Location: Left arm   Patient Position: Sitting   Pulse: 72   SpO2: 98%   Weight: 127.9 kg (281 lb 15.5 oz)   Height: 5' 9" (1.753 m)     Body mass index is 41.64 kg/m².    Physical Exam  Vitals reviewed.   Constitutional:       Appearance: He is well-developed. He is obese.   HENT:      Head: Normocephalic and atraumatic. Not macrocephalic and not microcephalic. No raccoon eyes, Hoyt's sign, abrasion, contusion, right periorbital erythema, left periorbital erythema or laceration. Hair is normal.      Right Ear: No decreased hearing noted. No laceration, drainage, swelling or tenderness. No middle ear effusion. No foreign body. No mastoid tenderness. No hemotympanum. Tympanic membrane is not injected, scarred, perforated, erythematous, retracted or bulging. Tympanic membrane has normal mobility.      Left Ear: No decreased hearing noted. No laceration, drainage, swelling or tenderness.  No middle ear effusion. No foreign body. No mastoid tenderness. No hemotympanum. Tympanic membrane is not injected, scarred, perforated, erythematous, retracted or bulging. Tympanic membrane has normal mobility.      Nose: Nose normal. No nasal deformity, laceration or mucosal edema.      Mouth/Throat:      Pharynx: Uvula midline.   Eyes:      General: Lids are normal. No scleral icterus.     " Conjunctiva/sclera: Conjunctivae normal.   Neck:      Thyroid: No thyroid mass or thyromegaly.      Trachea: Trachea normal.   Cardiovascular:      Rate and Rhythm: Normal rate and regular rhythm.   Pulmonary:      Effort: Pulmonary effort is normal. No respiratory distress.      Breath sounds: Normal breath sounds.   Abdominal:      Palpations: Abdomen is soft.   Musculoskeletal:         General: Normal range of motion.      Cervical back: Neck supple. No edema or erythema. No spinous process tenderness or muscular tenderness. Normal range of motion.   Lymphadenopathy:      Head:      Right side of head: No submental, submandibular, tonsillar, preauricular or posterior auricular adenopathy.      Left side of head: No submental, submandibular, tonsillar, preauricular, posterior auricular or occipital adenopathy.      Cervical: No cervical adenopathy.   Skin:     General: Skin is warm and dry.   Neurological:      Mental Status: He is alert and oriented to person, place, and time.      Cranial Nerves: No cranial nerve deficit.      Sensory: No sensory deficit.   Psychiatric:         Behavior: Behavior normal.         Thought Content: Thought content normal.         Judgment: Judgment normal.           Diagnoses and health risks identified today and associated recommendations/orders:    1. Encounter for preventive health examination  Annual Health Risk Assessment (HRA) visit today.  Counseling and referral of health maintenance and preventative health measures performed.  Patient given annual wellness paperwork to take home.  Encouraged to return in 1 year for subsequent HRA visit.     2. Hearing loss, unspecified hearing loss type, unspecified laterality  Chronic. Stable. Continue current treatment plan as previously prescribed by PCP.  - Ambulatory referral/consult to ENT; Future  - Ambulatory referral/consult to Audiology; Future    3. Abnormality of gait and mobility  Chronic. Stable. Continue current treatment  plan as previously prescribed by PCP.    4. Autoimmune necrotizing myopathy  Chronic. Stable. Continue current treatment plan as previously prescribed by PCP.    5. Weakness of extremity  Chronic. Stable. Continue current treatment plan as previously prescribed by PCP.    6. Statin myopathy  Chronic. Stable. Continue current treatment plan as previously prescribed by PCP.    7. Swelling of right lower extremity  Chronic. Stable. Continue current treatment plan as previously prescribed by PCP.    8. Non-traumatic rhabdomyolysis  Chronic. Stable. Continue current treatment plan as previously prescribed by PCP.    9. Hyperuricemia  Chronic. Stable. Continue current treatment plan as previously prescribed by PCP.    10. Fall, initial encounter  Chronic. Stable. Continue current treatment plan as previously prescribed by PCP.    11. Vitamin D insufficiency  Chronic. Stable. Continue current treatment plan as previously prescribed by PCP.    12. Folate deficiency  Chronic. Stable. Continue current treatment plan as previously prescribed by PCP.    13. Vitamin B 12 deficiency  Chronic. Stable. Continue current treatment plan as previously prescribed by PCP.    14. Hypothyroidism (acquired)  Chronic. Stable. Continue current treatment plan as previously prescribed by PCP.    15. Controlled type 2 diabetes mellitus with diabetic polyneuropathy, without long-term current use of insulin  Chronic. Stable. Uncontrolled. Last Hgb A1c=8.0 from 11/10/22. Continue current treatment plan as previously prescribed by PCP.    16. Morbid obesity with BMI of 40.0-44.9, adult  Chronic. Stable. Encouraged to increase exercise as tolerated and improve diet to heart healthy, low sodium diet. Continue current treatment plan as previously prescribed by PCP.    17. Other vitamin B12 deficiency anemia  Chronic. Stable. Continue current treatment plan as previously prescribed by PCP.    18. Spondylosis of lumbar region without myelopathy or  radiculopathy  Chronic. Stable. Continue current treatment plan as previously prescribed by PCP.    19. Cough, unspecified type  Chronic. Stable. Continue current treatment plan as previously prescribed by PCP.    20. Postoperative hypoxia  Chronic. Stable. Continue current treatment plan as previously prescribed by PCP.    21. Essential hypertension  Chronic. Stable. Controlled. Encouraged to increase exercise as tolerated (moderate-intensity aerobic activity and muscle-strengthening activities) improve diet to heart healthy, low sodium diet. Continue current treatment plan as previously prescribed by PCP.    22. LVH (left ventricular hypertrophy)  Chronic. Stable. Continue current treatment plan as previously prescribed by PCP.    23. Heart failure, unspecified HF chronicity, unspecified heart failure type  Chronic. Stable. Continue current treatment plan as previously prescribed by PCP.    24. PAD (peripheral artery disease)  Chronic. Stable. Continue current treatment plan as previously prescribed by PCP.    25. Phimosis  Chronic. Stable. Continue current treatment plan as previously prescribed by PCP.    26. Hypervolemia, unspecified hypervolemia type  Chronic. Stable. Continue current treatment plan as previously prescribed by PCP.    27. Systemic involvement of connective tissue, unspecified  Chronic. Stable. Continue current treatment plan as previously prescribed by PCP.    28. Drug-induced immunodeficiency  Chronic. Stable. Continue current treatment plan as previously prescribed by PCP.    29. History of pulmonary embolism  Chronic. Stable. Continue current treatment plan as previously prescribed by PCP.    30. Other pulmonary embolism without acute cor pulmonale, unspecified chronicity  Chronic. Stable. Continue current treatment plan as previously prescribed by PCP.    31. Other thrombophilia  Chronic. Stable. Continue current treatment plan as previously prescribed by PCP.    32. Family history of  prostate cancer  Chronic. Stable. Continue current treatment plan as previously prescribed by PCP.    33. Diverticulosis  Chronic. Stable. Continue current treatment plan as previously prescribed by PCP.    34. Polyp of colon, unspecified part of colon, unspecified type  Chronic. Stable. Continue current treatment plan as previously prescribed by PCP.    35. Elevated liver enzymes  Chronic. Stable. Continue current treatment plan as previously prescribed by PCP.    36. Weakness  Chronic. Stable. Continue current treatment plan as previously prescribed by PCP.      Provided Serafin with a 5-10 year written screening schedule and personal prevention plan. Recommendations were developed using the USPSTF age appropriate recommendations. Education, counseling, and referrals were provided as needed. After Visit Summary printed and given to patient which includes a list of additional screenings\tests needed.      I offered to discuss end of life issues, including information on how to make advance directives that the patient could use to name someone who would make medical decisions on their behalf if they became too ill to make themselves.    ___Patient declined  _X_Patient is interested, I provided paper work and offered to discuss.    Follow up in about 1 year (around 1/19/2024).    Grover Argueta NP    I offered to discuss advanced care planning, including how to pick a person who would make decisions for you if you were unable to make them for yourself, called a health care power of , and what kind of decisions you might make such as use of life sustaining treatments such as ventilators and tube feeding when faced with a life limiting illness recorded on a living will that they will need to know. (How you want to be cared for as you near the end of your natural life)     X Patient is interested in learning more about how to make advanced directives.  I provided them paperwork and offered to discuss this with  them.

## 2023-01-25 ENCOUNTER — OFFICE VISIT (OUTPATIENT)
Dept: WOUND CARE | Facility: CLINIC | Age: 73
End: 2023-01-25
Payer: MEDICARE

## 2023-01-25 VITALS
DIASTOLIC BLOOD PRESSURE: 75 MMHG | BODY MASS INDEX: 41.9 KG/M2 | HEIGHT: 69 IN | TEMPERATURE: 98 F | HEART RATE: 71 BPM | SYSTOLIC BLOOD PRESSURE: 143 MMHG | WEIGHT: 282.88 LBS

## 2023-01-25 DIAGNOSIS — I50.9 HEART FAILURE, UNSPECIFIED HF CHRONICITY, UNSPECIFIED HEART FAILURE TYPE: ICD-10-CM

## 2023-01-25 DIAGNOSIS — I10 ESSENTIAL HYPERTENSION: ICD-10-CM

## 2023-01-25 DIAGNOSIS — S81.802A OPEN WOUND OF LEFT LOWER EXTREMITY, INITIAL ENCOUNTER: Primary | ICD-10-CM

## 2023-01-25 DIAGNOSIS — I87.2 VENOUS INSUFFICIENCY: ICD-10-CM

## 2023-01-25 DIAGNOSIS — E11.42 CONTROLLED TYPE 2 DIABETES MELLITUS WITH DIABETIC POLYNEUROPATHY, WITHOUT LONG-TERM CURRENT USE OF INSULIN: ICD-10-CM

## 2023-01-25 DIAGNOSIS — I73.9 PAD (PERIPHERAL ARTERY DISEASE): ICD-10-CM

## 2023-01-25 PROCEDURE — 3008F BODY MASS INDEX DOCD: CPT | Mod: CPTII,S$GLB,,

## 2023-01-25 PROCEDURE — 11042 DBRDMT SUBQ TIS 1ST 20SQCM/<: CPT | Mod: S$GLB,,,

## 2023-01-25 PROCEDURE — 99214 OFFICE O/P EST MOD 30 MIN: CPT | Mod: 25,S$GLB,,

## 2023-01-25 PROCEDURE — 3008F PR BODY MASS INDEX (BMI) DOCUMENTED: ICD-10-PCS | Mod: CPTII,S$GLB,,

## 2023-01-25 PROCEDURE — 1101F PR PT FALLS ASSESS DOC 0-1 FALLS W/OUT INJ PAST YR: ICD-10-PCS | Mod: CPTII,S$GLB,,

## 2023-01-25 PROCEDURE — 1159F MED LIST DOCD IN RCRD: CPT | Mod: CPTII,S$GLB,,

## 2023-01-25 PROCEDURE — 99999 PR PBB SHADOW E&M-EST. PATIENT-LVL IV: CPT | Mod: PBBFAC,,,

## 2023-01-25 PROCEDURE — 99214 PR OFFICE/OUTPT VISIT, EST, LEVL IV, 30-39 MIN: ICD-10-PCS | Mod: 25,S$GLB,,

## 2023-01-25 PROCEDURE — 3077F PR MOST RECENT SYSTOLIC BLOOD PRESSURE >= 140 MM HG: ICD-10-PCS | Mod: CPTII,S$GLB,,

## 2023-01-25 PROCEDURE — 3078F PR MOST RECENT DIASTOLIC BLOOD PRESSURE < 80 MM HG: ICD-10-PCS | Mod: CPTII,S$GLB,,

## 2023-01-25 PROCEDURE — 1126F PR PAIN SEVERITY QUANTIFIED, NO PAIN PRESENT: ICD-10-PCS | Mod: CPTII,S$GLB,,

## 2023-01-25 PROCEDURE — 3288F FALL RISK ASSESSMENT DOCD: CPT | Mod: CPTII,S$GLB,,

## 2023-01-25 PROCEDURE — 99999 PR PBB SHADOW E&M-EST. PATIENT-LVL IV: ICD-10-PCS | Mod: PBBFAC,,,

## 2023-01-25 PROCEDURE — 3288F PR FALLS RISK ASSESSMENT DOCUMENTED: ICD-10-PCS | Mod: CPTII,S$GLB,,

## 2023-01-25 PROCEDURE — 1126F AMNT PAIN NOTED NONE PRSNT: CPT | Mod: CPTII,S$GLB,,

## 2023-01-25 PROCEDURE — 11042 DEBRIDEMENT: ICD-10-PCS | Mod: S$GLB,,,

## 2023-01-25 PROCEDURE — 3077F SYST BP >= 140 MM HG: CPT | Mod: CPTII,S$GLB,,

## 2023-01-25 PROCEDURE — 1101F PT FALLS ASSESS-DOCD LE1/YR: CPT | Mod: CPTII,S$GLB,,

## 2023-01-25 PROCEDURE — 1159F PR MEDICATION LIST DOCUMENTED IN MEDICAL RECORD: ICD-10-PCS | Mod: CPTII,S$GLB,,

## 2023-01-25 PROCEDURE — 3078F DIAST BP <80 MM HG: CPT | Mod: CPTII,S$GLB,,

## 2023-01-25 NOTE — PROGRESS NOTES
Subjective:       Patient ID: Serafin Tapia is a 72 y.o. male.    Chief Complaint: Wound Check      Patient presents for a reevaluation of a left lower extremity wound. Patient follows with vascular surgery for PAD and venous insufficiency. Dr. Kapoor determined he has adequate arterial blood flow for wound healing. No other complaints at this time. Denies fever, chills, erythema, warmth, drainage, or pain.      10/21/22 MIHIR:  Right lower extremity: 0.93- minimal peripheral arterial occlusive disease  Left lower extremity: 0.70- moderate peripheral arterial occlusive disease    Review of Systems   Constitutional:  Negative for activity change, chills, diaphoresis, fatigue and fever.   Eyes:  Negative for photophobia and visual disturbance.   Respiratory:  Negative for apnea, chest tightness and shortness of breath.    Cardiovascular:  Positive for leg swelling. Negative for chest pain and palpitations.   Musculoskeletal:  Negative for gait problem and joint swelling.   Skin:  Positive for wound. Negative for color change, pallor and rash.   Neurological:  Negative for dizziness, syncope, facial asymmetry, speech difficulty, weakness, light-headedness, numbness and headaches.   Psychiatric/Behavioral:  Negative for agitation and confusion. The patient is not nervous/anxious.    All other systems reviewed and are negative.    Objective:      Physical Exam  Vitals reviewed.   Constitutional:       General: He is not in acute distress.     Appearance: Normal appearance. He is obese.   Cardiovascular:      Rate and Rhythm: Normal rate and regular rhythm.      Pulses: Normal pulses.   Pulmonary:      Effort: No respiratory distress.   Musculoskeletal:         General: No swelling.      Right lower leg: Pitting Edema present.      Left lower leg: Pitting Edema present.      Comments: Ambulates with cane   Skin:     General: Skin is warm and dry.      Capillary Refill: Capillary refill takes less than 2 seconds.       Findings: Wound present. No erythema.          Neurological:      General: No focal deficit present.      Mental Status: He is alert and oriented to person, place, and time.   Psychiatric:         Mood and Affect: Mood normal.         Behavior: Behavior normal.         Thought Content: Thought content normal.         Judgment: Judgment normal.       Assessment:       1. Open wound of left lower extremity, initial encounter    2. Venous insufficiency    3. Essential hypertension    4. Heart failure, unspecified HF chronicity, unspecified heart failure type    5. Controlled type 2 diabetes mellitus with diabetic polyneuropathy, without long-term current use of insulin    6. PAD (peripheral artery disease)               Altered Skin Integrity Left anterior;lower;medial Leg (Active)       Altered Skin Integrity Present on Admission:    Side: Left   Orientation: anterior;lower;medial   Location: Leg   Wound Number:    Is this injury device related?:    Primary Wound Type:    Description of Altered Skin Integrity:    Ankle-Brachial Index:    Pulses:    Removal Indication and Assessment:    Wound Outcome:    (Retired) Wound Length (cm):    (Retired) Wound Width (cm):    (Retired) Depth (cm):    Wound Description (Comments):    Removal Indications:    Dressing Appearance Dry;Intact;Clean;Moist drainage 01/25/23 1124   Drainage Amount Small 01/25/23 1124   Drainage Characteristics/Odor Johnson;Yellow 01/25/23 1124   Yellow (%), Wound Tissue Color 100 % 01/25/23 1124   Periwound Area Intact;Edematous;Pink 01/25/23 1124   Wound Length (cm) 2.4 cm 01/25/23 1124   Wound Width (cm) 1 cm 01/25/23 1124   Wound Depth (cm) 0.1 cm 01/25/23 1124   Wound Volume (cm^3) 0.24 cm^3 01/25/23 1124   Wound Surface Area (cm^2) 2.4 cm^2 01/25/23 1124   Care Cleansed with:;Soap and water 01/25/23 1124   Dressing Applied;Compression wrap;Other (comment) 01/25/23 1124   Periwound Care Skin barrier film applied 01/25/23 1124   Compression Unna's  Boot;Three layer compression 01/25/23 1124            Altered Skin Integrity Left anterior;lower Leg (Active)       Altered Skin Integrity Present on Admission:    Side: Left   Orientation: anterior;lower   Location: Leg   Wound Number:    Is this injury device related?:    Primary Wound Type:    Description of Altered Skin Integrity:    Ankle-Brachial Index:    Pulses:    Removal Indication and Assessment:    Wound Outcome:    (Retired) Wound Length (cm):    (Retired) Wound Width (cm):    (Retired) Depth (cm):    Wound Description (Comments):    Removal Indications:    Wound Image    01/25/23 1124   Dressing Appearance Dry;Intact;Clean;Moist drainage 01/25/23 1124   Drainage Amount Small 01/25/23 1124   Drainage Characteristics/Odor Johnson;Yellow 01/25/23 1124   Yellow (%), Wound Tissue Color 100 % 01/25/23 1124   Periwound Area Intact;Edematous;Pink 01/25/23 1124   Wound Length (cm) 0.8 cm 01/25/23 1124   Wound Width (cm) 0.6 cm 01/25/23 1124   Wound Depth (cm) 0.1 cm 01/25/23 1124   Wound Volume (cm^3) 0.048 cm^3 01/25/23 1124   Wound Surface Area (cm^2) 0.48 cm^2 01/25/23 1124   Care Cleansed with:;Soap and water 01/25/23 1124   Dressing Applied;Compression wrap;Other (comment) 01/25/23 1124   Periwound Care Skin barrier film applied 01/25/23 1124   Compression Unna's Boot;Three layer compression 01/25/23 1124            Altered Skin Integrity Left anterior;lower;lateral Leg (Active)       Altered Skin Integrity Present on Admission:    Side: Left   Orientation: anterior;lower;lateral   Location: Leg   Wound Number:    Is this injury device related?:    Primary Wound Type:    Description of Altered Skin Integrity:    Ankle-Brachial Index:    Pulses:    Removal Indication and Assessment:    Wound Outcome:    (Retired) Wound Length (cm):    (Retired) Wound Width (cm):    (Retired) Depth (cm):    Wound Description (Comments):    Removal Indications:    Wound Image    01/25/23 1124   Dressing Appearance  Dry;Intact;Clean;Moist drainage 01/25/23 1124   Drainage Amount Small 01/25/23 1124   Drainage Characteristics/Odor Yellow 01/25/23 1124   Yellow (%), Wound Tissue Color 100 % 01/25/23 1124   Periwound Area Intact;Pink;Edematous 01/25/23 1124   Wound Length (cm) 0.3 cm 01/25/23 1124   Wound Width (cm) 0.7 cm 01/25/23 1124   Wound Depth (cm) 0.1 cm 01/25/23 1124   Wound Volume (cm^3) 0.021 cm^3 01/25/23 1124   Wound Surface Area (cm^2) 0.21 cm^2 01/25/23 1124   Care Cleansed with:;Soap and water 01/25/23 1124   Dressing Applied;Compression wrap;Other (comment) 01/25/23 1124   Periwound Care Skin barrier film applied 01/25/23 1124   Compression Unna's Boot;Three layer compression 01/25/23 1124       Serafin was seen in the clinic room and placed in the supine position on the treatment table.  The wound dressing was removed and the leg was cleansed with Easi-clense sponges and dried thoroughly.  No erythema, odor, green drainage, or warmth noted from patient's baseline. Placed topical 4% Lidocaine Hydrochloride to wound beds for 15 minutes. Sharp debridement with 5 mm curette to remove non-viable tissue. Pt tolerated procedure well.      Plan of Care: Calmoseptine to periwound area, hydrofera blue to wound beds, and a 3 layer calamine (to alleviate itching) unna wrap. The patient's foot was positioned at a 90 degree angle.  A calamine wrap, followed by kerlix roll gauze and coban were applied using a spiral technique avoiding creases or folds.  The wrap was started behind the first metatarsal and ended below the tibial tubercle of the knee.  There was overlap of each turn half the width of the previous turn.  The compression wrap will be changed every 7 days.      Plan:       Left lower extremity was dressed as detailed above  Patient was instructed to not get the dressings wet and to use cast covers for showering.  Should the dressing become wet, he is to remove it, place a moist dressing over the wound, cover with gauze  and roll gauze and use ace wraps for compression and to secure bandages.  He should then notify this office as soon as possible to have a new dressing applied.  Instructed patient to remove wrap if he notices tingling or coldness to his left lower extremity or if his toes become white, blue, or cold.    Discussed nutrition and the role of protein in wound healing with the patient. Instructed patient to continue optimizing protein for wound healing.      Discussed bilateral lower extremity edema with patient. Instructed patient to elevate lower extremities whenever sedentary.    Instructed patient to keep follow up appointments with vascular surgery    Written and verbal instructions given to patient  RTC in 1 week    Rosalba Eaton PA-C

## 2023-01-25 NOTE — PROCEDURES
"Debridement    Date/Time: 1/25/2023 10:30 AM  Performed by: Rosalba Eaton PA-C  Authorized by: Rosalba Eaton PA-C     Time out: Immediately prior to procedure a "time out" was called to verify the correct patient, procedure, equipment, support staff and site/side marked as required.    Consent Done?:  Yes (Written)  Local anesthesia used?: Yes    Local anesthetic:  Topical anesthetic (Topical 4% Lidocaine Hydrochloride)    Wound Details:    Location:  Left leg (medial)    Type of Debridement:  Excisional       Length (cm):  2.4       Area (sq cm):  2.4       Width (cm):  1       Percent Debrided (%):  100       Depth (cm):  0.1       Total Area Debrided (sq cm):  2.4    Depth of debridement:  Subcutaneous tissue    Tissue debrided:  Subcutaneous    Devitalized tissue debrided:  Biofilm, Exudate, Fibrin and Slough    Instruments:  Curette    Additional wounds:  2    2nd Wound Details:     Location:  Left leg (anterior)    Type of Debridement:  Excisional       Length (cm):  0.8       Area (sq cm):  0.48       Width (cm):  0.6       Percent Debrided (%):  100       Depth (cm):  0.1       Total Area Debrided (sq cm):  0.48    Depth of debridement:  Subcutaneous tissue    Tissue debrided:  Subcutaneous    Devitalized tissue debrided:  Biofilm, Exudate, Fibrin and Slough    Instruments:  Curette    3rd Wound Details:     Location:  Left leg (lateral)    Type of Debridement:  Excisional       Length (cm):  0.3       Area (sq cm):  0.21       Width (cm):  0.7       Percent Debrided (%):  100       Depth (cm):  0.1       Total Area Debrided (sq cm):  0.21    Depth of debridement:  Subcutaneous tissue    Tissue debrided:  Subcutaneous    Devitalized tissue debrided:  Biofilm, Exudate, Fibrin and Slough    Instruments:  Curette    Bleeding:  Minimal  Hemostasis Achieved: Yes    Method Used:  Pressure  Patient tolerance:  Patient tolerated the procedure well with no immediate complications  "

## 2023-02-01 ENCOUNTER — OFFICE VISIT (OUTPATIENT)
Dept: WOUND CARE | Facility: CLINIC | Age: 73
End: 2023-02-01
Payer: MEDICARE

## 2023-02-01 VITALS
BODY MASS INDEX: 42.03 KG/M2 | TEMPERATURE: 98 F | HEART RATE: 79 BPM | HEIGHT: 69 IN | SYSTOLIC BLOOD PRESSURE: 168 MMHG | DIASTOLIC BLOOD PRESSURE: 76 MMHG | WEIGHT: 283.75 LBS

## 2023-02-01 DIAGNOSIS — S81.802A MULTIPLE OPEN WOUNDS OF LEFT LOWER EXTREMITY, INITIAL ENCOUNTER: Primary | ICD-10-CM

## 2023-02-01 DIAGNOSIS — I73.9 PAD (PERIPHERAL ARTERY DISEASE): ICD-10-CM

## 2023-02-01 DIAGNOSIS — I50.9 HEART FAILURE, UNSPECIFIED HF CHRONICITY, UNSPECIFIED HEART FAILURE TYPE: ICD-10-CM

## 2023-02-01 DIAGNOSIS — E11.42 CONTROLLED TYPE 2 DIABETES MELLITUS WITH DIABETIC POLYNEUROPATHY, WITHOUT LONG-TERM CURRENT USE OF INSULIN: ICD-10-CM

## 2023-02-01 DIAGNOSIS — I10 ESSENTIAL HYPERTENSION: ICD-10-CM

## 2023-02-01 DIAGNOSIS — I87.2 VENOUS INSUFFICIENCY: ICD-10-CM

## 2023-02-01 PROCEDURE — 1159F MED LIST DOCD IN RCRD: CPT | Mod: CPTII,S$GLB,,

## 2023-02-01 PROCEDURE — 1126F PR PAIN SEVERITY QUANTIFIED, NO PAIN PRESENT: ICD-10-PCS | Mod: CPTII,S$GLB,,

## 2023-02-01 PROCEDURE — 3077F PR MOST RECENT SYSTOLIC BLOOD PRESSURE >= 140 MM HG: ICD-10-PCS | Mod: CPTII,S$GLB,,

## 2023-02-01 PROCEDURE — 11042 DEBRIDEMENT: ICD-10-PCS | Mod: S$GLB,,,

## 2023-02-01 PROCEDURE — 99214 PR OFFICE/OUTPT VISIT, EST, LEVL IV, 30-39 MIN: ICD-10-PCS | Mod: 25,S$GLB,,

## 2023-02-01 PROCEDURE — 3008F PR BODY MASS INDEX (BMI) DOCUMENTED: ICD-10-PCS | Mod: CPTII,S$GLB,,

## 2023-02-01 PROCEDURE — 3008F BODY MASS INDEX DOCD: CPT | Mod: CPTII,S$GLB,,

## 2023-02-01 PROCEDURE — 3078F DIAST BP <80 MM HG: CPT | Mod: CPTII,S$GLB,,

## 2023-02-01 PROCEDURE — 1159F PR MEDICATION LIST DOCUMENTED IN MEDICAL RECORD: ICD-10-PCS | Mod: CPTII,S$GLB,,

## 2023-02-01 PROCEDURE — 3078F PR MOST RECENT DIASTOLIC BLOOD PRESSURE < 80 MM HG: ICD-10-PCS | Mod: CPTII,S$GLB,,

## 2023-02-01 PROCEDURE — 99999 PR PBB SHADOW E&M-EST. PATIENT-LVL IV: CPT | Mod: PBBFAC,,,

## 2023-02-01 PROCEDURE — 99214 OFFICE O/P EST MOD 30 MIN: CPT | Mod: 25,S$GLB,,

## 2023-02-01 PROCEDURE — 11042 DBRDMT SUBQ TIS 1ST 20SQCM/<: CPT | Mod: S$GLB,,,

## 2023-02-01 PROCEDURE — 1126F AMNT PAIN NOTED NONE PRSNT: CPT | Mod: CPTII,S$GLB,,

## 2023-02-01 PROCEDURE — 1101F PR PT FALLS ASSESS DOC 0-1 FALLS W/OUT INJ PAST YR: ICD-10-PCS | Mod: CPTII,S$GLB,,

## 2023-02-01 PROCEDURE — 3077F SYST BP >= 140 MM HG: CPT | Mod: CPTII,S$GLB,,

## 2023-02-01 PROCEDURE — 3288F PR FALLS RISK ASSESSMENT DOCUMENTED: ICD-10-PCS | Mod: CPTII,S$GLB,,

## 2023-02-01 PROCEDURE — 3288F FALL RISK ASSESSMENT DOCD: CPT | Mod: CPTII,S$GLB,,

## 2023-02-01 PROCEDURE — 1101F PT FALLS ASSESS-DOCD LE1/YR: CPT | Mod: CPTII,S$GLB,,

## 2023-02-01 PROCEDURE — 99999 PR PBB SHADOW E&M-EST. PATIENT-LVL IV: ICD-10-PCS | Mod: PBBFAC,,,

## 2023-02-01 NOTE — PROCEDURES
"Debridement    Date/Time: 2/1/2023 10:00 AM  Performed by: Rosalba Eaton PA-C  Authorized by: Rosalba Eaton PA-C     Time out: Immediately prior to procedure a "time out" was called to verify the correct patient, procedure, equipment, support staff and site/side marked as required.    Consent Done?:  Yes (Written)  Local anesthesia used?: Yes    Local anesthetic:  Topical anesthetic (Topical 4% Lidocaine Hydrochloride)    Wound Details:    Location:  Left leg (lateral proximal)    Type of Debridement:  Excisional       Length (cm):  0.7       Area (sq cm):  0.35       Width (cm):  0.5       Percent Debrided (%):  100       Depth (cm):  0.1       Total Area Debrided (sq cm):  0.35    Depth of debridement:  Subcutaneous tissue    Tissue debrided:  Subcutaneous    Devitalized tissue debrided:  Biofilm, Exudate, Fibrin and Slough    Instruments:  Curette    Additional wounds:  1    2nd Wound Details:     Location:  Left leg (lateral distal)    Type of Debridement:  Excisional       Length (cm):  0.2       Area (sq cm):  0.14       Width (cm):  0.7       Percent Debrided (%):  100       Depth (cm):  0.1       Total Area Debrided (sq cm):  0.14    Depth of debridement:  Subcutaneous tissue    Tissue debrided:  Subcutaneous    Devitalized tissue debrided:  Biofilm, Exudate, Fibrin and Slough    Instruments:  Curette    Bleeding:  Minimal  Hemostasis Achieved: Yes    Method Used:  Pressure  Patient tolerance:  Patient tolerated the procedure well with no immediate complications  "

## 2023-02-01 NOTE — PROGRESS NOTES
Subjective:       Patient ID: Serafin Tapia is a 72 y.o. male.    Chief Complaint: Wound Check      Patient presents for a reevaluation of a left lower extremity wound. Patient follows with vascular surgery for PAD and venous insufficiency. Dr. Kapoor determined he has adequate arterial blood flow for wound healing. No other complaints at this time. Denies fever, chills, erythema, warmth, drainage, or pain.      10/21/22 MIHIR:  Right lower extremity: 0.93- minimal peripheral arterial occlusive disease  Left lower extremity: 0.70- moderate peripheral arterial occlusive disease    Review of Systems   Constitutional:  Negative for activity change, chills, diaphoresis, fatigue and fever.   Eyes:  Negative for photophobia and visual disturbance.   Respiratory:  Negative for apnea, chest tightness and shortness of breath.    Cardiovascular:  Positive for leg swelling. Negative for chest pain and palpitations.   Musculoskeletal:  Negative for gait problem and joint swelling.   Skin:  Positive for wound. Negative for color change, pallor and rash.   Neurological:  Negative for dizziness, syncope, facial asymmetry, speech difficulty, weakness, light-headedness, numbness and headaches.   Psychiatric/Behavioral:  Negative for agitation and confusion. The patient is not nervous/anxious.    All other systems reviewed and are negative.    Objective:      Physical Exam  Vitals reviewed.   Constitutional:       General: He is not in acute distress.     Appearance: Normal appearance. He is obese.   Cardiovascular:      Rate and Rhythm: Normal rate and regular rhythm.      Pulses: Normal pulses.   Pulmonary:      Effort: No respiratory distress.   Musculoskeletal:         General: No swelling.      Right lower leg: Pitting Edema present.      Left lower leg: Pitting Edema present.      Comments: Ambulates with cane   Skin:     General: Skin is warm and dry.      Capillary Refill: Capillary refill takes less than 2 seconds.       Findings: Wound present. No erythema.          Neurological:      General: No focal deficit present.      Mental Status: He is alert and oriented to person, place, and time.   Psychiatric:         Mood and Affect: Mood normal.         Behavior: Behavior normal.         Thought Content: Thought content normal.         Judgment: Judgment normal.       Assessment:       1. Multiple open wounds of left lower extremity, initial encounter    2. Venous insufficiency    3. Essential hypertension    4. Heart failure, unspecified HF chronicity, unspecified heart failure type    5. Controlled type 2 diabetes mellitus with diabetic polyneuropathy, without long-term current use of insulin    6. PAD (peripheral artery disease)               Altered Skin Integrity Left anterior;lower;medial Leg (Active)       Altered Skin Integrity Present on Admission:    Side: Left   Orientation: anterior;lower;medial   Location: Leg   Wound Number:    Is this injury device related?:    Primary Wound Type:    Description of Altered Skin Integrity:    Ankle-Brachial Index:    Pulses:    Removal Indication and Assessment:    Wound Outcome:    (Retired) Wound Length (cm):    (Retired) Wound Width (cm):    (Retired) Depth (cm):    Wound Description (Comments):    Removal Indications:    Wound Image    02/01/23 1030   Dressing Appearance Dry;Intact;Clean;Moist drainage 02/01/23 1030   Drainage Amount Small 02/01/23 1030   Drainage Characteristics/Odor Serous 02/01/23 1030   Red (%), Wound Tissue Color 100 % 02/01/23 1030   Periwound Area Intact;Edematous;Pink 02/01/23 1030   Wound Length (cm) 0.8 cm 02/01/23 1030   Wound Width (cm) 0.5 cm 02/01/23 1030   Wound Depth (cm) 0.1 cm 02/01/23 1030   Wound Volume (cm^3) 0.04 cm^3 02/01/23 1030   Wound Surface Area (cm^2) 0.4 cm^2 02/01/23 1030   Care Cleansed with:;Soap and water 02/01/23 1030   Dressing Applied;Compression wrap;Other (comment) 02/01/23 1030   Compression Unna's Boot;Three layer compression  02/01/23 1030            Altered Skin Integrity Left lower;lateral;proximal Leg (Active)       Altered Skin Integrity Present on Admission:    Side: Left   Orientation: lower;lateral;proximal   Location: Leg   Wound Number:    Is this injury device related?:    Primary Wound Type:    Description of Altered Skin Integrity:    Ankle-Brachial Index:    Pulses:    Removal Indication and Assessment:    Wound Outcome:    (Retired) Wound Length (cm):    (Retired) Wound Width (cm):    (Retired) Depth (cm):    Wound Description (Comments):    Removal Indications:    Dressing Appearance Dry;Intact;Clean;Moist drainage 02/01/23 1030   Drainage Amount Small 02/01/23 1030   Drainage Characteristics/Odor Yellow 02/01/23 1030   Yellow (%), Wound Tissue Color 100 % 02/01/23 1030   Periwound Area Intact;Edematous;Pink 02/01/23 1030   Wound Length (cm) 0.7 cm 02/01/23 1030   Wound Width (cm) 0.5 cm 02/01/23 1030   Wound Depth (cm) 0.1 cm 02/01/23 1030   Wound Volume (cm^3) 0.035 cm^3 02/01/23 1030   Wound Surface Area (cm^2) 0.35 cm^2 02/01/23 1030   Care Cleansed with:;Soap and water 02/01/23 1030   Dressing Applied;Compression wrap;Other (comment) 02/01/23 1030   Compression Unna's Boot;Three layer compression 02/01/23 1030            Altered Skin Integrity Left distal;lower;lateral Leg (Active)       Altered Skin Integrity Present on Admission:    Side: Left   Orientation: distal;lower;lateral   Location: Leg   Wound Number:    Is this injury device related?:    Primary Wound Type:    Description of Altered Skin Integrity:    Ankle-Brachial Index:    Pulses:    Removal Indication and Assessment:    Wound Outcome:    (Retired) Wound Length (cm):    (Retired) Wound Width (cm):    (Retired) Depth (cm):    Wound Description (Comments):    Removal Indications:    Wound Image    02/01/23 1030   Dressing Appearance Dry;Intact;Clean;Dried drainage 02/01/23 1030   Drainage Amount Small 02/01/23 1030   Drainage Characteristics/Odor Yellow  02/01/23 1030   Black (%), Wound Tissue Color 100 % 02/01/23 1030   Periwound Area Intact;Pink;Edematous 02/01/23 1030   Wound Length (cm) 0.2 cm 02/01/23 1030   Wound Width (cm) 0.7 cm 02/01/23 1030   Wound Depth (cm) 0.1 cm 02/01/23 1030   Wound Volume (cm^3) 0.014 cm^3 02/01/23 1030   Wound Surface Area (cm^2) 0.14 cm^2 02/01/23 1030   Care Cleansed with:;Soap and water 02/01/23 1030   Dressing Applied;Compression wrap;Other (comment) 02/01/23 1030   Compression Unna's Boot;Three layer compression 02/01/23 1030         Serafin was seen in the clinic room and placed in the supine position on the treatment table.  The wound dressing was removed and the leg was cleansed with Easi-clense sponges and dried thoroughly.  No erythema, odor, green drainage, or warmth noted from patient's baseline. Placed topical 4% Lidocaine Hydrochloride to lateral wound beds for 15 minutes. Sharp debridement with 5 mm curette to remove non-viable tissue. Pt tolerated procedure well.      Plan of Care: Calmoseptine to periwound area, hydrofera blue to wound beds, and a 3 layer calamine (to alleviate itching) unna wrap. The patient's foot was positioned at a 90 degree angle.  A calamine wrap, followed by kerlix roll gauze and coban were applied using a spiral technique avoiding creases or folds.  The wrap was started behind the first metatarsal and ended below the tibial tubercle of the knee.  There was overlap of each turn half the width of the previous turn.  The compression wrap will be changed every 7 days.      Plan:       Left lower extremity was dressed as detailed above  Patient was instructed to not get the dressings wet and to use cast covers for showering.  Should the dressing become wet, he is to remove it, place a moist dressing over the wound, cover with gauze and roll gauze and use ace wraps for compression and to secure bandages.  He should then notify this office as soon as possible to have a new dressing  applied.  Instructed patient to remove wrap if he notices tingling or coldness to his left lower extremity or if his toes become white, blue, or cold.    Discussed nutrition and the role of protein in wound healing with the patient. Instructed patient to continue optimizing protein for wound healing.      Discussed bilateral lower extremity edema with patient. Instructed patient to elevate lower extremities whenever sedentary.    Instructed patient to keep follow up appointments with vascular surgery    Written and verbal instructions given to patient  RTC in 1 week    Rosalba Eaton PA-C

## 2023-02-07 DIAGNOSIS — E03.9 HYPOTHYROIDISM (ACQUIRED): ICD-10-CM

## 2023-02-07 DIAGNOSIS — M62.82 NON-TRAUMATIC RHABDOMYOLYSIS: ICD-10-CM

## 2023-02-07 RX ORDER — LEVOTHYROXINE SODIUM 50 UG/1
50 TABLET ORAL
Qty: 90 TABLET | Refills: 2 | Status: SHIPPED | OUTPATIENT
Start: 2023-02-07

## 2023-02-07 RX ORDER — METHOTREXATE 2.5 MG/1
TABLET ORAL
Qty: 40 TABLET | Refills: 5 | OUTPATIENT
Start: 2023-02-07

## 2023-02-07 NOTE — TELEPHONE ENCOUNTER
MARIEL Mujica 01/05/2023 MD 09/28/2022        ----- Message from Amanda Calero sent at 2/7/2023 10:00 AM CST -----  Who Called:    Refill or New Rx: refill     RX Name and Strength:  levothyroxine (SYNTHROID) 50 MCG tablet / methotrexate 2.5 MG Tab          Is this a 30 day or 90 day RX        Preferred Pharmacy with phone number:  [id number] - [place] [phone] [facility]        Local or Mail Order: local         Would the patient rather a call back or a response via [facility]? Call back         Best Call Back Number:        Additional Information:

## 2023-02-07 NOTE — TELEPHONE ENCOUNTER
No new care gaps identified.  [facility] Embedded Care Gaps. Reference number: [id number]. 2/07/2023   3:15:42 PM CST

## 2023-02-08 ENCOUNTER — OFFICE VISIT (OUTPATIENT)
Dept: WOUND CARE | Facility: CLINIC | Age: 73
End: 2023-02-08
Payer: MEDICARE

## 2023-02-08 VITALS
TEMPERATURE: 98 F | HEIGHT: 69 IN | DIASTOLIC BLOOD PRESSURE: 70 MMHG | HEART RATE: 71 BPM | BODY MASS INDEX: 41.24 KG/M2 | WEIGHT: 278.44 LBS | SYSTOLIC BLOOD PRESSURE: 142 MMHG

## 2023-02-08 DIAGNOSIS — I87.2 VENOUS INSUFFICIENCY: ICD-10-CM

## 2023-02-08 DIAGNOSIS — E11.42 CONTROLLED TYPE 2 DIABETES MELLITUS WITH DIABETIC POLYNEUROPATHY, WITHOUT LONG-TERM CURRENT USE OF INSULIN: ICD-10-CM

## 2023-02-08 DIAGNOSIS — I50.9 HEART FAILURE, UNSPECIFIED HF CHRONICITY, UNSPECIFIED HEART FAILURE TYPE: ICD-10-CM

## 2023-02-08 DIAGNOSIS — I73.9 PAD (PERIPHERAL ARTERY DISEASE): ICD-10-CM

## 2023-02-08 DIAGNOSIS — I10 ESSENTIAL HYPERTENSION: ICD-10-CM

## 2023-02-08 DIAGNOSIS — S81.802A MULTIPLE OPEN WOUNDS OF LEFT LOWER EXTREMITY, INITIAL ENCOUNTER: Primary | ICD-10-CM

## 2023-02-08 PROCEDURE — 3077F SYST BP >= 140 MM HG: CPT | Mod: CPTII,S$GLB,,

## 2023-02-08 PROCEDURE — 1126F AMNT PAIN NOTED NONE PRSNT: CPT | Mod: CPTII,S$GLB,,

## 2023-02-08 PROCEDURE — 1101F PR PT FALLS ASSESS DOC 0-1 FALLS W/OUT INJ PAST YR: ICD-10-PCS | Mod: CPTII,S$GLB,,

## 2023-02-08 PROCEDURE — 3008F PR BODY MASS INDEX (BMI) DOCUMENTED: ICD-10-PCS | Mod: CPTII,S$GLB,,

## 2023-02-08 PROCEDURE — 3078F DIAST BP <80 MM HG: CPT | Mod: CPTII,S$GLB,,

## 2023-02-08 PROCEDURE — 3077F PR MOST RECENT SYSTOLIC BLOOD PRESSURE >= 140 MM HG: ICD-10-PCS | Mod: CPTII,S$GLB,,

## 2023-02-08 PROCEDURE — 3008F BODY MASS INDEX DOCD: CPT | Mod: CPTII,S$GLB,,

## 2023-02-08 PROCEDURE — 29580 PR STRAPPING UNNA BOOT: ICD-10-PCS | Mod: LT,S$GLB,,

## 2023-02-08 PROCEDURE — 1159F PR MEDICATION LIST DOCUMENTED IN MEDICAL RECORD: ICD-10-PCS | Mod: CPTII,S$GLB,,

## 2023-02-08 PROCEDURE — 99214 OFFICE O/P EST MOD 30 MIN: CPT | Mod: 25,S$GLB,,

## 2023-02-08 PROCEDURE — 3288F PR FALLS RISK ASSESSMENT DOCUMENTED: ICD-10-PCS | Mod: CPTII,S$GLB,,

## 2023-02-08 PROCEDURE — 1159F MED LIST DOCD IN RCRD: CPT | Mod: CPTII,S$GLB,,

## 2023-02-08 PROCEDURE — 99999 PR PBB SHADOW E&M-EST. PATIENT-LVL IV: CPT | Mod: PBBFAC,,,

## 2023-02-08 PROCEDURE — 1126F PR PAIN SEVERITY QUANTIFIED, NO PAIN PRESENT: ICD-10-PCS | Mod: CPTII,S$GLB,,

## 2023-02-08 PROCEDURE — 99999 PR PBB SHADOW E&M-EST. PATIENT-LVL IV: ICD-10-PCS | Mod: PBBFAC,,,

## 2023-02-08 PROCEDURE — 1101F PT FALLS ASSESS-DOCD LE1/YR: CPT | Mod: CPTII,S$GLB,,

## 2023-02-08 PROCEDURE — 99214 PR OFFICE/OUTPT VISIT, EST, LEVL IV, 30-39 MIN: ICD-10-PCS | Mod: 25,S$GLB,,

## 2023-02-08 PROCEDURE — 29580 STRAPPING UNNA BOOT: CPT | Mod: LT,S$GLB,,

## 2023-02-08 PROCEDURE — 3078F PR MOST RECENT DIASTOLIC BLOOD PRESSURE < 80 MM HG: ICD-10-PCS | Mod: CPTII,S$GLB,,

## 2023-02-08 PROCEDURE — 3288F FALL RISK ASSESSMENT DOCD: CPT | Mod: CPTII,S$GLB,,

## 2023-02-08 NOTE — PROGRESS NOTES
Subjective:       Patient ID: Serafin Tapia is a 72 y.o. male.    Chief Complaint: Wound Check    Patient presents for a reevaluation of a left lower extremity wound. Patient follows with vascular surgery for PAD and venous insufficiency. Dr. Kapoor determined he has adequate arterial blood flow for wound healing. No other complaints at this time. Denies fever, chills, erythema, warmth, drainage, or pain.      10/21/22 MIHIR:  Right lower extremity: 0.93- minimal peripheral arterial occlusive disease  Left lower extremity: 0.70- moderate peripheral arterial occlusive disease    Review of Systems   Constitutional:  Negative for activity change, chills, diaphoresis, fatigue and fever.   Eyes:  Negative for photophobia and visual disturbance.   Respiratory:  Negative for apnea, chest tightness and shortness of breath.    Cardiovascular:  Positive for leg swelling. Negative for chest pain and palpitations.   Musculoskeletal:  Negative for gait problem and joint swelling.   Skin:  Positive for wound. Negative for color change, pallor and rash.   Neurological:  Negative for dizziness, syncope, facial asymmetry, speech difficulty, weakness, light-headedness, numbness and headaches.   Psychiatric/Behavioral:  Negative for agitation and confusion. The patient is not nervous/anxious.    All other systems reviewed and are negative.    Objective:      Physical Exam  Vitals reviewed.   Constitutional:       General: He is not in acute distress.     Appearance: Normal appearance. He is obese.   Cardiovascular:      Rate and Rhythm: Normal rate and regular rhythm.      Pulses: Normal pulses.   Pulmonary:      Effort: No respiratory distress.   Musculoskeletal:         General: No swelling.      Right lower leg: Pitting Edema present.      Left lower leg: Pitting Edema present.      Comments: Ambulates with cane   Skin:     General: Skin is warm and dry.      Capillary Refill: Capillary refill takes less than 2 seconds.       Findings: Wound present. No erythema.          Neurological:      General: No focal deficit present.      Mental Status: He is alert and oriented to person, place, and time.   Psychiatric:         Mood and Affect: Mood normal.         Behavior: Behavior normal.         Thought Content: Thought content normal.         Judgment: Judgment normal.       Assessment:       1. Multiple open wounds of left lower extremity, initial encounter    2. Venous insufficiency    3. Essential hypertension    4. Heart failure, unspecified HF chronicity, unspecified heart failure type    5. Controlled type 2 diabetes mellitus with diabetic polyneuropathy, without long-term current use of insulin    6. PAD (peripheral artery disease)               Altered Skin Integrity Left anterior;lower;medial Leg (Active)       Altered Skin Integrity Present on Admission:    Side: Left   Orientation: anterior;lower;medial   Location: Leg   Wound Number:    Is this injury device related?:    Primary Wound Type:    Description of Altered Skin Integrity:    Ankle-Brachial Index:    Pulses:    Removal Indication and Assessment:    Wound Outcome:    (Retired) Wound Length (cm):    (Retired) Wound Width (cm):    (Retired) Depth (cm):    Wound Description (Comments):    Removal Indications:    Wound Image   02/08/23 0955   Dressing Appearance Dry;Intact;Clean;Moist drainage 02/08/23 0955   Drainage Amount Small 02/08/23 0955   Drainage Characteristics/Odor Johnson 02/08/23 0955   Red (%), Wound Tissue Color 100 % 02/08/23 0955   Periwound Area Intact;Pink;Edematous 02/08/23 0955   Wound Length (cm) 0.5 cm 02/08/23 0955   Wound Width (cm) 0.3 cm 02/08/23 0955   Wound Depth (cm) 0.1 cm 02/08/23 0955   Wound Volume (cm^3) 0.015 cm^3 02/08/23 0955   Wound Surface Area (cm^2) 0.15 cm^2 02/08/23 0955   Care Cleansed with:;Soap and water 02/08/23 0955   Dressing Applied;Compression wrap;Other (comment) 02/08/23 0955   Periwound Care Skin barrier film applied 02/08/23  0955   Compression Unna's Boot;Three layer compression 02/08/23 0955            Altered Skin Integrity Left lower;lateral;proximal Leg (Active)       Altered Skin Integrity Present on Admission:    Side: Left   Orientation: lower;lateral;proximal   Location: Leg   Wound Number:    Is this injury device related?:    Primary Wound Type:    Description of Altered Skin Integrity:    Ankle-Brachial Index:    Pulses:    Removal Indication and Assessment:    Wound Outcome:    (Retired) Wound Length (cm):    (Retired) Wound Width (cm):    (Retired) Depth (cm):    Wound Description (Comments):    Removal Indications:    Dressing Appearance Dry;Intact;Clean;Moist drainage 02/08/23 0955   Drainage Amount Small 02/08/23 0955   Drainage Characteristics/Odor Johnson 02/08/23 0955   Red (%), Wound Tissue Color 100 % 02/08/23 0955   Periwound Area Intact;Edematous 02/08/23 0955   Wound Length (cm) 0.4 cm 02/08/23 0955   Wound Width (cm) 0.3 cm 02/08/23 0955   Wound Depth (cm) 0.1 cm 02/08/23 0955   Wound Volume (cm^3) 0.012 cm^3 02/08/23 0955   Wound Surface Area (cm^2) 0.12 cm^2 02/08/23 0955   Care Cleansed with:;Soap and water 02/08/23 0955   Dressing Applied;Compression wrap;Other (comment) 02/08/23 0955   Periwound Care Skin barrier film applied 02/08/23 0955   Compression Unna's Boot;Three layer compression 02/08/23 0955            Altered Skin Integrity Left distal;lower;lateral Leg (Active)       Altered Skin Integrity Present on Admission:    Side: Left   Orientation: distal;lower;lateral   Location: Leg   Wound Number:    Is this injury device related?:    Primary Wound Type:    Description of Altered Skin Integrity:    Ankle-Brachial Index:    Pulses:    Removal Indication and Assessment:    Wound Outcome:    (Retired) Wound Length (cm):    (Retired) Wound Width (cm):    (Retired) Depth (cm):    Wound Description (Comments):    Removal Indications:    Wound Image   02/08/23 0955   Dressing Appearance Dry;Intact;Clean;Moist  drainage 02/08/23 0955   Drainage Amount Small 02/08/23 0955   Drainage Characteristics/Odor Johnson 02/08/23 0955   Red (%), Wound Tissue Color 100 % 02/08/23 0955   Periwound Area Intact;Edematous 02/08/23 0955   Wound Length (cm) 0.1 cm 02/08/23 0955   Wound Width (cm) 0.3 cm 02/08/23 0955   Wound Depth (cm) 0.1 cm 02/08/23 0955   Wound Volume (cm^3) 0.003 cm^3 02/08/23 0955   Wound Surface Area (cm^2) 0.03 cm^2 02/08/23 0955   Care Cleansed with:;Soap and water 02/08/23 0955   Dressing Applied;Compression wrap;Other (comment) 02/08/23 0955   Periwound Care Skin barrier film applied 02/08/23 0955   Compression Unna's Boot;Three layer compression 02/08/23 0955       Serafin was seen in the clinic room and placed in the supine position on the treatment table.  The wound dressing was removed and the leg was cleansed with Easi-clense sponges and dried thoroughly.  No erythema, odor, green drainage, or warmth noted from patient's baseline.     Plan of Care: Calmoseptine to periwound area, polymem to wound beds, and a 3 layer calamine (to alleviate itching) unna wrap. The patient's foot was positioned at a 90 degree angle.  A calamine wrap, followed by kerlix roll gauze and coban were applied using a spiral technique avoiding creases or folds.  The wrap was started behind the first metatarsal and ended below the tibial tubercle of the knee.  There was overlap of each turn half the width of the previous turn.  The compression wrap will be changed every 7 days.      Plan:       Left lower extremity was dressed as detailed above  Patient was instructed to not get the dressings wet and to use cast covers for showering.  Should the dressing become wet, he is to remove it, place a moist dressing over the wound, cover with gauze and roll gauze and use ace wraps for compression and to secure bandages.  He should then notify this office as soon as possible to have a new dressing applied.  Instructed patient to remove wrap if he  notices tingling or coldness to his left lower extremity or if his toes become white, blue, or cold.    Discussed nutrition and the role of protein in wound healing with the patient. Instructed patient to continue optimizing protein for wound healing.      Discussed bilateral lower extremity edema with patient. Instructed patient to elevate lower extremities whenever sedentary.    Instructed patient to keep follow up appointments with vascular surgery    Written and verbal instructions given to patient  RTC in 1 week    Rosalba Eaton PA-C

## 2023-02-10 ENCOUNTER — LAB VISIT (OUTPATIENT)
Dept: LAB | Facility: OTHER | Age: 73
End: 2023-02-10
Attending: INTERNAL MEDICINE
Payer: MEDICARE

## 2023-02-10 ENCOUNTER — OFFICE VISIT (OUTPATIENT)
Dept: INTERNAL MEDICINE | Facility: CLINIC | Age: 73
End: 2023-02-10
Attending: INTERNAL MEDICINE
Payer: MEDICARE

## 2023-02-10 VITALS
BODY MASS INDEX: 41.28 KG/M2 | WEIGHT: 278.69 LBS | SYSTOLIC BLOOD PRESSURE: 190 MMHG | DIASTOLIC BLOOD PRESSURE: 100 MMHG | HEART RATE: 82 BPM | HEIGHT: 69 IN | OXYGEN SATURATION: 93 %

## 2023-02-10 DIAGNOSIS — Z79.4 DIABETES MELLITUS DUE TO UNDERLYING CONDITION, CONTROLLED, WITH COMPLICATION, WITH LONG-TERM CURRENT USE OF INSULIN: Primary | ICD-10-CM

## 2023-02-10 DIAGNOSIS — I10 ESSENTIAL HYPERTENSION: ICD-10-CM

## 2023-02-10 DIAGNOSIS — E08.8 DIABETES MELLITUS DUE TO UNDERLYING CONDITION, CONTROLLED, WITH COMPLICATION, WITH LONG-TERM CURRENT USE OF INSULIN: Primary | ICD-10-CM

## 2023-02-10 DIAGNOSIS — E11.51 DIABETES MELLITUS WITH PERIPHERAL VASCULAR DISEASE: ICD-10-CM

## 2023-02-10 DIAGNOSIS — I73.9 PAD (PERIPHERAL ARTERY DISEASE): ICD-10-CM

## 2023-02-10 DIAGNOSIS — E11.65 UNCONTROLLED TYPE 2 DIABETES MELLITUS WITH HYPERGLYCEMIA: ICD-10-CM

## 2023-02-10 DIAGNOSIS — L97.921 ULCER OF LEFT LOWER EXTREMITY, LIMITED TO BREAKDOWN OF SKIN: ICD-10-CM

## 2023-02-10 DIAGNOSIS — R41.3 MEMORY LOSS: ICD-10-CM

## 2023-02-10 LAB
ESTIMATED AVG GLUCOSE: 166 MG/DL (ref 68–131)
HBA1C MFR BLD: 7.4 % (ref 4–5.6)

## 2023-02-10 PROCEDURE — 3080F PR MOST RECENT DIASTOLIC BLOOD PRESSURE >= 90 MM HG: ICD-10-PCS | Mod: CPTII,S$GLB,, | Performed by: INTERNAL MEDICINE

## 2023-02-10 PROCEDURE — 1101F PR PT FALLS ASSESS DOC 0-1 FALLS W/OUT INJ PAST YR: ICD-10-PCS | Mod: CPTII,S$GLB,, | Performed by: INTERNAL MEDICINE

## 2023-02-10 PROCEDURE — 36415 COLL VENOUS BLD VENIPUNCTURE: CPT | Performed by: INTERNAL MEDICINE

## 2023-02-10 PROCEDURE — 3077F SYST BP >= 140 MM HG: CPT | Mod: CPTII,S$GLB,, | Performed by: INTERNAL MEDICINE

## 2023-02-10 PROCEDURE — 1101F PT FALLS ASSESS-DOCD LE1/YR: CPT | Mod: CPTII,S$GLB,, | Performed by: INTERNAL MEDICINE

## 2023-02-10 PROCEDURE — 99214 PR OFFICE/OUTPT VISIT, EST, LEVL IV, 30-39 MIN: ICD-10-PCS | Mod: S$GLB,,, | Performed by: INTERNAL MEDICINE

## 2023-02-10 PROCEDURE — 3051F HG A1C>EQUAL 7.0%<8.0%: CPT | Mod: CPTII,S$GLB,, | Performed by: INTERNAL MEDICINE

## 2023-02-10 PROCEDURE — 1160F PR REVIEW ALL MEDS BY PRESCRIBER/CLIN PHARMACIST DOCUMENTED: ICD-10-PCS | Mod: CPTII,S$GLB,, | Performed by: INTERNAL MEDICINE

## 2023-02-10 PROCEDURE — 99214 OFFICE O/P EST MOD 30 MIN: CPT | Mod: S$GLB,,, | Performed by: INTERNAL MEDICINE

## 2023-02-10 PROCEDURE — 1126F PR PAIN SEVERITY QUANTIFIED, NO PAIN PRESENT: ICD-10-PCS | Mod: CPTII,S$GLB,, | Performed by: INTERNAL MEDICINE

## 2023-02-10 PROCEDURE — 3288F FALL RISK ASSESSMENT DOCD: CPT | Mod: CPTII,S$GLB,, | Performed by: INTERNAL MEDICINE

## 2023-02-10 PROCEDURE — 99999 PR PBB SHADOW E&M-EST. PATIENT-LVL V: ICD-10-PCS | Mod: PBBFAC,,, | Performed by: INTERNAL MEDICINE

## 2023-02-10 PROCEDURE — 3080F DIAST BP >= 90 MM HG: CPT | Mod: CPTII,S$GLB,, | Performed by: INTERNAL MEDICINE

## 2023-02-10 PROCEDURE — 99999 PR PBB SHADOW E&M-EST. PATIENT-LVL V: CPT | Mod: PBBFAC,,, | Performed by: INTERNAL MEDICINE

## 2023-02-10 PROCEDURE — 3008F BODY MASS INDEX DOCD: CPT | Mod: CPTII,S$GLB,, | Performed by: INTERNAL MEDICINE

## 2023-02-10 PROCEDURE — 99499 RISK ADDL DX/OHS AUDIT: ICD-10-PCS | Mod: S$GLB,,, | Performed by: INTERNAL MEDICINE

## 2023-02-10 PROCEDURE — 3077F PR MOST RECENT SYSTOLIC BLOOD PRESSURE >= 140 MM HG: ICD-10-PCS | Mod: CPTII,S$GLB,, | Performed by: INTERNAL MEDICINE

## 2023-02-10 PROCEDURE — 3051F PR MOST RECENT HEMOGLOBIN A1C LEVEL 7.0 - < 8.0%: ICD-10-PCS | Mod: CPTII,S$GLB,, | Performed by: INTERNAL MEDICINE

## 2023-02-10 PROCEDURE — 3288F PR FALLS RISK ASSESSMENT DOCUMENTED: ICD-10-PCS | Mod: CPTII,S$GLB,, | Performed by: INTERNAL MEDICINE

## 2023-02-10 PROCEDURE — 1160F RVW MEDS BY RX/DR IN RCRD: CPT | Mod: CPTII,S$GLB,, | Performed by: INTERNAL MEDICINE

## 2023-02-10 PROCEDURE — 83036 HEMOGLOBIN GLYCOSYLATED A1C: CPT | Performed by: INTERNAL MEDICINE

## 2023-02-10 PROCEDURE — 1159F MED LIST DOCD IN RCRD: CPT | Mod: CPTII,S$GLB,, | Performed by: INTERNAL MEDICINE

## 2023-02-10 PROCEDURE — 1126F AMNT PAIN NOTED NONE PRSNT: CPT | Mod: CPTII,S$GLB,, | Performed by: INTERNAL MEDICINE

## 2023-02-10 PROCEDURE — 99499 UNLISTED E&M SERVICE: CPT | Mod: S$GLB,,, | Performed by: INTERNAL MEDICINE

## 2023-02-10 PROCEDURE — 1159F PR MEDICATION LIST DOCUMENTED IN MEDICAL RECORD: ICD-10-PCS | Mod: CPTII,S$GLB,, | Performed by: INTERNAL MEDICINE

## 2023-02-10 PROCEDURE — 3008F PR BODY MASS INDEX (BMI) DOCUMENTED: ICD-10-PCS | Mod: CPTII,S$GLB,, | Performed by: INTERNAL MEDICINE

## 2023-02-10 RX ORDER — DULAGLUTIDE 0.75 MG/.5ML
0.75 INJECTION, SOLUTION SUBCUTANEOUS
Qty: 4 PEN | Refills: 0 | Status: SHIPPED | OUTPATIENT
Start: 2023-02-10 | End: 2023-03-13

## 2023-02-10 NOTE — Clinical Note
Please schedule nurse visit for BP check on same day pt is coming to see sleep provider and ask him to bring his home BP log and we will see if see ty to adjust meds further. thanks

## 2023-02-10 NOTE — PROGRESS NOTES
Subjective:       Patient ID: Serafin Tapia is a 72 y.o. male.    Chief Complaint: Follow-up (HTN)    Here for f/u of HTN and DM    Continues to f/u with wound care. S/p vascular eval. Hx of PVD but non contributory an account of collateral flow. Heeling well. He has no complaints today.       ### HTN ###  Rx nifedipine 90mg, lasix 40, benazepril 40, metoprolol XL 50.   Hx of medication non adherence in taking meds without any consistency or schedule.  02/2021 BP elevated today. His adherence to medications today varies with response.   04/14/21 BP much improved but remains elevated. Increase BB to 200mg.   3/21/22 CV BP very elevated. medication non adherence continues  9/28/22 out of chlorthalidone. F/u in 10 days with me and med bag        ### Asthma ###   no acute issues. Denies frequent SOB, FLOWERS, chest tightness limited by LE weakness.      ### Hypothyroidism ###  Reported adherence with levothyroxine 100mcg  Pt denies unexplained lyn gain/loss, palpitations, tremors, diarrhea, constipation, changes to hair/skin, heat/cold intolerance, or dysphagia.         ### Myopathy ###  Required long course of high dose prednisone. He is off the prednisone since 01/2019  Mtx started : now at 8 tabs weekly and folic acid   Denies myalgias or weakness  Due for f/u with rheum       ### leg wound ##  -Left leg. S/p Abx. Current wound care. Saw vascular Dr hitchcock. Pt does have chronic revascularized invivo PAD and poor heeling attributed to poor out flow on venous side.  -09/30/2022 U/S Hemodynamically significant stenosis at the level of the anterior tibial artery suspected  -CTA c/ runoff (10/25/2022)Prominent calcific atherosclerosis in the lower extremities with multifocal moderate/ high-grade narrowing throughout the KAMRAN, PTA, and peroneal arteries.  The distal posterior tibial arteries are occluded bilaterally with reconstitution by collateral flow from the peroneal arteries. Multifocal mild/ moderate stenosis of the  femoropopliteal arteries    Atherosclerotic plaque elsewhere with no evidence for hemodynamically significant stenosis.      ### DM ###  Last visit reported taking metformin 1000mg QD instead of Rx BID. He has continued this. Hx of myopathy so statin avoided.   Discussion reveals he likes sweet tea.  11/11/2021 Started on glimepirife  3/21/22 CV reports starting glimepiride. Denies lows    9//28/22 reports 200s. Increase glimepiride        11/10/2022 Start bydureon and increase glimepiride to 4mg                HGBA1C                   8.0 (H)             11/10/2022 03:42 PM        HGBA1C                   9.5 (H)             09/28/2022 12:15 PM        HGBA1C                   8.2 (H)             03/21/2022 09:58 AM        HGBA1C                   9.1 (H)             11/11/2021 02:02 PM        HGBA1C                   8.3 (H)             05/17/2021 10:42 AM        HGBA1C                   10.9 (H)            02/03/2021 11:21 AM        HGBA1C                   7.6 (H)             09/25/2019 11:00 AM        HGBA1C                   7.5 (H)             09/10/2019 06:10 PM        HGBA1C                   7.6 (H)             02/19/2019 10:15 AM        HGBA1C                   7.1 (H)             04/10/2018 03:53 PM        HGBA1C                   5.7 (H)             12/12/2017 04:51 AM        HGBA1C                   5.5                 10/29/2017 02:09 PM        HGBA1C                   6.7 (H)             05/04/2017 11:55 AM        HGBA1C                   7.0 (H)             04/22/2016 12:34 PM        HGBA1C                   7.5 (H)             12/22/2015 11:48 AM        HGBA1C                   8.1 (H)             09/18/2015 11:57 AM        HGBA1C                   7.9 (H)             02/19/2015 04:33 PM        HGBA1C                   7.6 (H)             11/25/2014 08:05 AM        HGBA1C                   8.9 (H)             05/02/2014 07:44 PM     ### HM ###  Colonoscopy done by Dr mario 5/6/19  Several wide  "mouth diverticuli (non-bleeding) in sigmoid, 5mm and 2mm polyp proximal ascending           Review of Systems   Constitutional:  Negative for appetite change, chills, fever and unexpected weight change.   HENT:  Negative for hearing loss, sore throat and trouble swallowing.    Eyes:  Negative for visual disturbance.   Respiratory:  Negative for cough, chest tightness and shortness of breath.    Cardiovascular:  Negative for chest pain and leg swelling.   Gastrointestinal:  Negative for abdominal pain, blood in stool, constipation, diarrhea, nausea and vomiting.   Endocrine: Negative for polydipsia and polyuria.   Genitourinary:  Negative for decreased urine volume, difficulty urinating, dysuria, frequency and urgency.   Musculoskeletal:  Negative for gait problem.   Skin:  Negative for rash.   Neurological:  Negative for dizziness and numbness.   Psychiatric/Behavioral:  The patient is not nervous/anxious.      Objective:      Vitals:    02/10/23 1349   BP: (!) 190/100   BP Location: Left arm   Patient Position: Sitting   Pulse: 82   SpO2: (!) 93%   Weight: 126.4 kg (278 lb 10.6 oz)   Height: 5' 9" (1.753 m)      Physical Exam  Vitals and nursing note reviewed.   Constitutional:       General: He is not in acute distress.     Appearance: Normal appearance. He is well-developed.   HENT:      Head: Normocephalic and atraumatic.      Mouth/Throat:      Pharynx: No oropharyngeal exudate.   Eyes:      General: No scleral icterus.     Conjunctiva/sclera: Conjunctivae normal.      Pupils: Pupils are equal, round, and reactive to light.   Neck:      Thyroid: No thyromegaly.   Cardiovascular:      Rate and Rhythm: Normal rate and regular rhythm.      Heart sounds: Normal heart sounds. No murmur heard.  Pulmonary:      Effort: Pulmonary effort is normal.      Breath sounds: Normal breath sounds. No wheezing or rales.   Abdominal:      General: There is no distension.   Musculoskeletal:         General: No tenderness. "   Lymphadenopathy:      Cervical: No cervical adenopathy.   Skin:     General: Skin is warm and dry.   Neurological:      Mental Status: He is alert and oriented to person, place, and time.   Psychiatric:         Behavior: Behavior normal.       Assessment:       1. Diabetes mellitus due to underlying condition, controlled, with complication, with long-term current use of insulin    2. Memory loss    3. PAD (peripheral artery disease)    4. Ulcer of left lower extremity, limited to breakdown of skin    5. Diabetes mellitus with peripheral vascular disease    6. Essential hypertension        Plan:       Serafin was seen today for follow-up.    Diagnoses and all orders for this visit:    Diabetes mellitus due to underlying condition, controlled, with complication, with long-term current use of insulin  -     Hemoglobin A1C; Future    Memory loss  -     Cancel: Ambulatory referral/consult to Adult Neuropsychology; Future    PAD (peripheral artery disease)    Ulcer of left lower extremity, limited to breakdown of skin    Diabetes mellitus with peripheral vascular disease    Essential hypertension   Uncertainty to which meds are being taken and BP uncharacteristically high. f/u in 7-10 days for nurse visit for BP check    Other orders  -     dulaglutide (TRULICITY) 0.75 mg/0.5 mL pen injector; Inject 0.75 mg into the skin every 7 days. After 1 month if tolerating will increase to higher dose           John Chavis MD  Internal Medicine-[facility]        Side effects of medication(s) were discussed in detail and patient voiced understanding.  Patient will call back for any issues or complications.

## 2023-02-10 NOTE — PATIENT INSTRUCTIONS
If you are able to get and start the once a week injectio called Trulicity then you may need less of the glimepiride. If you start seeing sugars below 80 then stop glimepiride and let me know.

## 2023-02-13 ENCOUNTER — TELEPHONE (OUTPATIENT)
Dept: INTERNAL MEDICINE | Facility: CLINIC | Age: 73
End: 2023-02-13
Payer: MEDICARE

## 2023-02-13 NOTE — TELEPHONE ENCOUNTER
M for Mr. Tapia to call . Need to inform he is almost there in his DM management, and ask if he obtained the ordered Trulicity    MD FAIZA York Staff  Please let pt know is DM is almost there. Keep going. Was pt able to get trulicity?

## 2023-02-13 NOTE — TELEPHONE ENCOUNTER
----- Message from John Vargas MD sent at 2/13/2023  8:38 AM CST -----  Please let pt know is DM is almost there. Keep going. Was pt able to get trulicity?

## 2023-02-15 ENCOUNTER — OFFICE VISIT (OUTPATIENT)
Dept: WOUND CARE | Facility: CLINIC | Age: 73
End: 2023-02-15
Payer: MEDICARE

## 2023-02-15 VITALS
HEIGHT: 69 IN | DIASTOLIC BLOOD PRESSURE: 77 MMHG | HEART RATE: 71 BPM | TEMPERATURE: 98 F | SYSTOLIC BLOOD PRESSURE: 169 MMHG | BODY MASS INDEX: 41.6 KG/M2 | WEIGHT: 280.88 LBS

## 2023-02-15 DIAGNOSIS — I87.2 VENOUS INSUFFICIENCY: ICD-10-CM

## 2023-02-15 DIAGNOSIS — I50.9 HEART FAILURE, UNSPECIFIED HF CHRONICITY, UNSPECIFIED HEART FAILURE TYPE: ICD-10-CM

## 2023-02-15 DIAGNOSIS — I73.9 PAD (PERIPHERAL ARTERY DISEASE): ICD-10-CM

## 2023-02-15 DIAGNOSIS — I10 ESSENTIAL HYPERTENSION: ICD-10-CM

## 2023-02-15 DIAGNOSIS — E11.42 CONTROLLED TYPE 2 DIABETES MELLITUS WITH DIABETIC POLYNEUROPATHY, WITHOUT LONG-TERM CURRENT USE OF INSULIN: ICD-10-CM

## 2023-02-15 DIAGNOSIS — S81.802A OPEN WOUND OF LEFT LOWER EXTREMITY, INITIAL ENCOUNTER: Primary | ICD-10-CM

## 2023-02-15 PROCEDURE — 29580 STRAPPING UNNA BOOT: CPT | Mod: LT,S$GLB,,

## 2023-02-15 PROCEDURE — 1126F PR PAIN SEVERITY QUANTIFIED, NO PAIN PRESENT: ICD-10-PCS | Mod: CPTII,S$GLB,,

## 2023-02-15 PROCEDURE — 1159F PR MEDICATION LIST DOCUMENTED IN MEDICAL RECORD: ICD-10-PCS | Mod: CPTII,S$GLB,,

## 2023-02-15 PROCEDURE — 3051F PR MOST RECENT HEMOGLOBIN A1C LEVEL 7.0 - < 8.0%: ICD-10-PCS | Mod: CPTII,S$GLB,,

## 2023-02-15 PROCEDURE — 1101F PR PT FALLS ASSESS DOC 0-1 FALLS W/OUT INJ PAST YR: ICD-10-PCS | Mod: CPTII,S$GLB,,

## 2023-02-15 PROCEDURE — 1159F MED LIST DOCD IN RCRD: CPT | Mod: CPTII,S$GLB,,

## 2023-02-15 PROCEDURE — 3077F PR MOST RECENT SYSTOLIC BLOOD PRESSURE >= 140 MM HG: ICD-10-PCS | Mod: CPTII,S$GLB,,

## 2023-02-15 PROCEDURE — 3078F DIAST BP <80 MM HG: CPT | Mod: CPTII,S$GLB,,

## 2023-02-15 PROCEDURE — 29580 PR STRAPPING UNNA BOOT: ICD-10-PCS | Mod: LT,S$GLB,,

## 2023-02-15 PROCEDURE — 3288F FALL RISK ASSESSMENT DOCD: CPT | Mod: CPTII,S$GLB,,

## 2023-02-15 PROCEDURE — 3008F PR BODY MASS INDEX (BMI) DOCUMENTED: ICD-10-PCS | Mod: CPTII,S$GLB,,

## 2023-02-15 PROCEDURE — 3288F PR FALLS RISK ASSESSMENT DOCUMENTED: ICD-10-PCS | Mod: CPTII,S$GLB,,

## 2023-02-15 PROCEDURE — 3077F SYST BP >= 140 MM HG: CPT | Mod: CPTII,S$GLB,,

## 2023-02-15 PROCEDURE — 99999 PR PBB SHADOW E&M-EST. PATIENT-LVL IV: CPT | Mod: PBBFAC,,,

## 2023-02-15 PROCEDURE — 3051F HG A1C>EQUAL 7.0%<8.0%: CPT | Mod: CPTII,S$GLB,,

## 2023-02-15 PROCEDURE — 1126F AMNT PAIN NOTED NONE PRSNT: CPT | Mod: CPTII,S$GLB,,

## 2023-02-15 PROCEDURE — 3078F PR MOST RECENT DIASTOLIC BLOOD PRESSURE < 80 MM HG: ICD-10-PCS | Mod: CPTII,S$GLB,,

## 2023-02-15 PROCEDURE — 1101F PT FALLS ASSESS-DOCD LE1/YR: CPT | Mod: CPTII,S$GLB,,

## 2023-02-15 PROCEDURE — 99214 PR OFFICE/OUTPT VISIT, EST, LEVL IV, 30-39 MIN: ICD-10-PCS | Mod: 25,S$GLB,,

## 2023-02-15 PROCEDURE — 99214 OFFICE O/P EST MOD 30 MIN: CPT | Mod: 25,S$GLB,,

## 2023-02-15 PROCEDURE — 3008F BODY MASS INDEX DOCD: CPT | Mod: CPTII,S$GLB,,

## 2023-02-15 PROCEDURE — 99999 PR PBB SHADOW E&M-EST. PATIENT-LVL IV: ICD-10-PCS | Mod: PBBFAC,,,

## 2023-02-15 NOTE — PROGRESS NOTES
Subjective:       Patient ID: Serafin Tapia is a 72 y.o. male.    Chief Complaint: Wound Check    Patient presents for a reevaluation of a left lower extremity wound. Patient follows with vascular surgery for PAD and venous insufficiency. Dr. Kapoor determined he has adequate arterial blood flow for wound healing. No other complaints at this time. Denies fever, chills, erythema, warmth, drainage, or pain.      10/21/22 MIHIR:  Right lower extremity: 0.93- minimal peripheral arterial occlusive disease  Left lower extremity: 0.70- moderate peripheral arterial occlusive disease    Review of Systems   Constitutional:  Negative for activity change, chills, diaphoresis, fatigue and fever.   Eyes:  Negative for photophobia and visual disturbance.   Respiratory:  Negative for apnea, chest tightness and shortness of breath.    Cardiovascular:  Positive for leg swelling. Negative for chest pain and palpitations.   Musculoskeletal:  Negative for gait problem and joint swelling.   Skin:  Positive for wound. Negative for color change, pallor and rash.   Neurological:  Negative for dizziness, syncope, facial asymmetry, speech difficulty, weakness, light-headedness, numbness and headaches.   Psychiatric/Behavioral:  Negative for agitation and confusion. The patient is not nervous/anxious.    All other systems reviewed and are negative.    Objective:      Physical Exam  Vitals reviewed.   Constitutional:       General: He is not in acute distress.     Appearance: Normal appearance. He is obese.   Cardiovascular:      Rate and Rhythm: Normal rate and regular rhythm.      Pulses: Normal pulses.   Pulmonary:      Effort: No respiratory distress.   Musculoskeletal:         General: No swelling.      Right lower leg: Pitting Edema present.      Left lower leg: Pitting Edema present.      Comments: Ambulates with cane   Skin:     General: Skin is warm and dry.      Capillary Refill: Capillary refill takes less than 2 seconds.       Findings: Wound present. No erythema.          Neurological:      General: No focal deficit present.      Mental Status: He is alert and oriented to person, place, and time.   Psychiatric:         Mood and Affect: Mood normal.         Behavior: Behavior normal.         Thought Content: Thought content normal.         Judgment: Judgment normal.       Assessment:       1. Open wound of left lower extremity, initial encounter    2. Venous insufficiency    3. Essential hypertension    4. Heart failure, unspecified HF chronicity, unspecified heart failure type    5. Controlled type 2 diabetes mellitus with diabetic polyneuropathy, without long-term current use of insulin    6. PAD (peripheral artery disease)               Altered Skin Integrity Left anterior;lower;medial Leg (Active)       Altered Skin Integrity Present on Admission:    Side: Left   Orientation: anterior;lower;medial   Location: Leg   Wound Number:    Is this injury device related?:    Primary Wound Type:    Description of Altered Skin Integrity:    Ankle-Brachial Index:    Pulses:    Removal Indication and Assessment:    Wound Outcome:    (Retired) Wound Length (cm):    (Retired) Wound Width (cm):    (Retired) Depth (cm):    Wound Description (Comments):    Removal Indications:    Wound Image   02/15/23 1018   Dressing Appearance Dry;Intact;Clean;Moist drainage 02/15/23 1018   Drainage Amount Small 02/15/23 1018   Drainage Characteristics/Odor Serous 02/15/23 1018   Red (%), Wound Tissue Color 100 % 02/15/23 1018   Periwound Area Intact;Edematous;[place] 02/15/23 1018   Wound Length (cm) 0.8 cm 02/15/23 1018   Wound Width (cm) 0.8 cm 02/15/23 1018   Wound Depth (cm) 0.1 cm 02/15/23 1018   Wound Volume (cm^3) 0.064 cm^3 02/15/23 1018   Wound Surface Area (cm^2) 0.64 cm^2 02/15/23 1018   Care Cleansed with:;Soap and water 02/15/23 1018   Dressing Applied;Compression wrap;Other (comment) 02/15/23 1018   Periwound Care Skin barrier film applied 02/15/23 1018    Compression Unna's Boot;Three layer compression 02/15/23 1018            Altered Skin Integrity Left lower;lateral;proximal Leg (Active)       Altered Skin Integrity Present on Admission:    Side: Left   Orientation: lower;lateral;proximal   Location: Leg   Wound Number:    Is this injury device related?:    Primary Wound Type:    Description of Altered Skin Integrity:    Ankle-Brachial Index:    Pulses:    Removal Indication and Assessment:    Wound Outcome:    (Retired) Wound Length (cm):    (Retired) Wound Width (cm):    (Retired) Depth (cm):    Wound Description (Comments):    Removal Indications:    Dressing Appearance Dry;Intact;Clean;Moist drainage 02/15/23 1018   Drainage Amount Small 02/15/23 1018   Drainage Characteristics/Odor Yellow 02/15/23 1018   Care Cleansed with:;Soap and water 02/15/23 1018            Altered Skin Integrity Left distal;lower;lateral Leg (Active)       Altered Skin Integrity Present on Admission:    Side: Left   Orientation: distal;lower;lateral   Location: Leg   Wound Number:    Is this injury device related?:    Primary Wound Type:    Description of Altered Skin Integrity:    Ankle-Brachial Index:    Pulses:    Removal Indication and Assessment:    Wound Outcome:    (Retired) Wound Length (cm):    (Retired) Wound Width (cm):    (Retired) Depth (cm):    Wound Description (Comments):    Removal Indications:    Wound Image   02/15/23 1018   Dressing Appearance Dry;Intact;Clean;Moist drainage 02/15/23 1018   Drainage Amount Small 02/15/23 1018   Drainage Characteristics/Odor Yellow 02/15/23 1018   Care Cleansed with:;Soap and water 02/15/23 1018       Serafin was seen in the clinic room and placed in the supine position on the treatment table.  The wound dressing was removed and the leg was cleansed with Easi-clense sponges and dried thoroughly.  No erythema, odor, green drainage, or warmth noted from patient's baseline.  Lateral wounds noted to be healed.     Plan of Care:  Calmoseptine to periwound area, polymem to wound bed, and a 3 layer calamine (to alleviate itching) unna wrap. The patient's foot was positioned at a 90 degree angle.  A calamine wrap, followed by kerlix roll gauze and coban were applied using a spiral technique avoiding creases or folds.  The wrap was started behind the first metatarsal and ended below the tibial tubercle of the knee.  There was overlap of each turn half the width of the previous turn.  The compression wrap will be changed every 7 days.      Plan:       Left lower extremity was dressed as detailed above  Patient was instructed to not get the dressings wet and to use cast covers for showering.  Should the dressing become wet, he is to remove it, place a moist dressing over the wound, cover with gauze and roll gauze and use ace wraps for compression and to secure bandages.  He should then notify this office as soon as possible to have a new dressing applied.  Instructed patient to remove wrap if he notices tingling or coldness to his left lower extremity or if his toes become white, blue, or cold.    Discussed nutrition and the role of protein in wound healing with the patient. Instructed patient to continue optimizing protein for wound healing.      Discussed bilateral lower extremity edema with patient. Instructed patient to elevate lower extremities whenever sedentary.    Instructed patient to keep follow up appointments with vascular surgery    Written and verbal instructions given to patient  RTC in 1 week    Rosalba Eaton PA-C

## 2023-02-16 NOTE — TELEPHONE ENCOUNTER
Called and spoke to Ms. Tapia. The listed message from Dr. Vargas was given to him. States he was able to get the Trulicity which he started on Monday.

## 2023-02-16 NOTE — TELEPHONE ENCOUNTER
M for Mr. Tapia to call . Need to give him the listed message and find out if he was able to get his trulicity,      MD FAIZA [place] Staff  Please let pt know is DM is almost there. Keep going. Was pt able to get trulicity?

## 2023-02-17 ENCOUNTER — CLINICAL SUPPORT (OUTPATIENT)
Dept: INTERNAL MEDICINE | Facility: CLINIC | Age: 73
End: 2023-02-17
Payer: MEDICARE

## 2023-02-17 ENCOUNTER — TELEPHONE (OUTPATIENT)
Dept: INTERNAL MEDICINE | Facility: CLINIC | Age: 73
End: 2023-02-17

## 2023-02-17 VITALS — HEART RATE: 72 BPM | SYSTOLIC BLOOD PRESSURE: 145 MMHG | OXYGEN SATURATION: 92 % | DIASTOLIC BLOOD PRESSURE: 65 MMHG

## 2023-02-17 PROCEDURE — 99999 PR PBB SHADOW E&M-EST. PATIENT-LVL IV: CPT | Mod: PBBFAC,,,

## 2023-02-17 PROCEDURE — 99999 PR PBB SHADOW E&M-EST. PATIENT-LVL IV: ICD-10-PCS | Mod: PBBFAC,,,

## 2023-02-17 RX ORDER — LINAGLIPTIN 5 MG/1
5 TABLET, FILM COATED ORAL DAILY
COMMUNITY
End: 2023-04-10 | Stop reason: SDUPTHER

## 2023-02-17 NOTE — TELEPHONE ENCOUNTER
Serafin Tapia 72 y.o. male is here for Blood Pressure check. in person    Manual Blood pressure reading was  120/60, Pulse 70. (Checked at the end of the visit) Dinamap 148/70 Pulse 74    If high, was it repeated after 15 minutes? [id number] Pulse 78 Dinamap 145/65 Pulse 72    Pt's Home blood pressure machine read in office NO Pulse No    Diagnosed with Hypertension yes.  Patient took blood pressure medication today yes.  Last dose of blood pressure medication was taken at 1030. Patient took 1. .Chlorthalidone 25 mg daily 2. Metoprolol 200 mg daily 3. Nifedipine  90 mg daily 4. Benazepril  40 mg daily  All Medications and OTC medication updated yes  Does patient have record of home blood pressure readings / Blood Pressure Log no. States has not been taking BP at home  Does the pt have any complaints today in regards to their blood pressure medication? no. Complains of None. Patient is asymptomatic.   Were you sitting still for 5-10 minutes prior to taking your Blood pressure? yes In clinic  Has your blood pressure monitor ever been checked? no When was last time we checked your blood pressure monitor? NO  Given log sheet with information on BP ranged. Advised to take and document BP at least weekly  Updated vitals yes  Follow up date is No follow up appointment  Dr. Vargas notified.     Creatinine   Date Value Ref Range Status   09/28/2022 0.8 0.5 - 1.4 mg/dL Final     Sodium   Date Value Ref Range Status   09/28/2022 144 136 - 145 mmol/L Final     Potassium   Date Value Ref Range Status   09/28/2022 4.3 3.5 - 5.1 mmol/L Final

## 2023-02-17 NOTE — PROGRESS NOTES
Serafin Tapia 72 y.o. male is here for Blood Pressure check. in person    Manual Blood pressure reading was  120/60, Pulse 70. (Checked at the end of the visit) Dinamap 148/70 Pulse 74    If high, was it repeated after 15 minutes? [id number] Pulse 78 Dinamap 145/65 Pulse 72    Pt's Home blood pressure machine read in office NO Pulse No    Diagnosed with Hypertension yes.  Patient took blood pressure medication today yes.  Last dose of blood pressure medication was taken at 1030. Patient took 1. .Chlorthalidone 25 mg daily 2. Metoprolol 200 mg daily 3. Nifedipine  90 mg daily 4. Benazepril  40 mg daily  All Medications and OTC medication updated yes  Does patient have record of home blood pressure readings / Blood Pressure Log no. States has not been taking BP at home  Does the pt have any complaints today in regards to their blood pressure medication? no. Complains of None. Patient is asymptomatic.   Were you sitting still for 5-10 minutes prior to taking your Blood pressure? yes In clinic  Has your blood pressure monitor ever been checked? no When was last time we checked your blood pressure monitor? NO  Given log sheet with information on BP ranged. Advised to take and document BP at least weekly  Updated vitals yes  Follow up date is No follow up appointment  Dr. Vargas notified.     Creatinine   Date Value Ref Range Status   09/28/2022 0.8 0.5 - 1.4 mg/dL Final     Sodium   Date Value Ref Range Status   09/28/2022 144 136 - 145 mmol/L Final     Potassium   Date Value Ref Range Status   09/28/2022 4.3 3.5 - 5.1 mmol/L Final

## 2023-02-22 ENCOUNTER — OFFICE VISIT (OUTPATIENT)
Dept: WOUND CARE | Facility: CLINIC | Age: 73
End: 2023-02-22
Payer: MEDICARE

## 2023-02-22 DIAGNOSIS — E11.42 CONTROLLED TYPE 2 DIABETES MELLITUS WITH DIABETIC POLYNEUROPATHY, WITHOUT LONG-TERM CURRENT USE OF INSULIN: ICD-10-CM

## 2023-02-22 DIAGNOSIS — S81.802A OPEN WOUND OF LEFT LOWER EXTREMITY, INITIAL ENCOUNTER: Primary | ICD-10-CM

## 2023-02-22 DIAGNOSIS — I87.2 VENOUS INSUFFICIENCY: ICD-10-CM

## 2023-02-22 DIAGNOSIS — I50.9 HEART FAILURE, UNSPECIFIED HF CHRONICITY, UNSPECIFIED HEART FAILURE TYPE: ICD-10-CM

## 2023-02-22 DIAGNOSIS — I73.9 PAD (PERIPHERAL ARTERY DISEASE): ICD-10-CM

## 2023-02-22 DIAGNOSIS — I10 ESSENTIAL HYPERTENSION: ICD-10-CM

## 2023-02-22 PROCEDURE — 3051F HG A1C>EQUAL 7.0%<8.0%: CPT | Mod: CPTII,S$GLB,,

## 2023-02-22 PROCEDURE — 99999 PR PBB SHADOW E&M-EST. PATIENT-LVL I: CPT | Mod: PBBFAC,,,

## 2023-02-22 PROCEDURE — 29580 PR STRAPPING UNNA BOOT: ICD-10-PCS | Mod: LT,S$GLB,,

## 2023-02-22 PROCEDURE — 99214 PR OFFICE/OUTPT VISIT, EST, LEVL IV, 30-39 MIN: ICD-10-PCS | Mod: 25,S$GLB,,

## 2023-02-22 PROCEDURE — 29580 STRAPPING UNNA BOOT: CPT | Mod: LT,S$GLB,,

## 2023-02-22 PROCEDURE — 99214 OFFICE O/P EST MOD 30 MIN: CPT | Mod: 25,S$GLB,,

## 2023-02-22 PROCEDURE — 3051F PR MOST RECENT HEMOGLOBIN A1C LEVEL 7.0 - < 8.0%: ICD-10-PCS | Mod: CPTII,S$GLB,,

## 2023-02-22 PROCEDURE — 99999 PR PBB SHADOW E&M-EST. PATIENT-LVL I: ICD-10-PCS | Mod: PBBFAC,,,

## 2023-02-22 NOTE — PROGRESS NOTES
Subjective:       Patient ID: Serafin Tapia is a 72 y.o. male.    Chief Complaint: Wound Check    Patient presents for a reevaluation of a left lower extremity wound. Patient follows with vascular surgery for PAD and venous insufficiency. Dr. Kapoor determined he has adequate arterial blood flow for wound healing. No other complaints at this time. Denies fever, chills, erythema, warmth, drainage, or pain.      10/21/22 MIHIR:  Right lower extremity: 0.93- minimal peripheral arterial occlusive disease  Left lower extremity: 0.70- moderate peripheral arterial occlusive disease    Review of Systems   Constitutional:  Negative for activity change, chills, diaphoresis, fatigue and fever.   Eyes:  Negative for photophobia and visual disturbance.   Respiratory:  Negative for apnea, chest tightness and shortness of breath.    Cardiovascular:  Positive for leg swelling. Negative for chest pain and palpitations.   Musculoskeletal:  Negative for gait problem and joint swelling.   Skin:  Positive for wound. Negative for color change, pallor and rash.   Neurological:  Negative for dizziness, syncope, facial asymmetry, speech difficulty, weakness, light-headedness, numbness and headaches.   Psychiatric/Behavioral:  Negative for agitation and confusion. The patient is not nervous/anxious.    All other systems reviewed and are negative.    Objective:      Physical Exam  Vitals reviewed.   Constitutional:       General: He is not in acute distress.     Appearance: Normal appearance. He is obese.   Cardiovascular:      Rate and Rhythm: Normal rate and regular rhythm.      Pulses: Normal pulses.   Pulmonary:      Effort: No respiratory distress.   Musculoskeletal:         General: No swelling.      Right lower leg: Pitting Edema present.      Left lower leg: Pitting Edema present.      Comments: Ambulates with cane   Skin:     General: Skin is warm and dry.      Capillary Refill: Capillary refill takes less than 2 seconds.       Findings: Wound present. No erythema.          Neurological:      General: No focal deficit present.      Mental Status: He is alert and oriented to person, place, and time.   Psychiatric:         Mood and Affect: Mood normal.         Behavior: Behavior normal.         Thought Content: Thought content normal.         Judgment: Judgment normal.       Assessment:       1. Open wound of left lower extremity, initial encounter    2. Venous insufficiency    3. Essential hypertension    4. Heart failure, unspecified HF chronicity, unspecified heart failure type    5. Controlled type 2 diabetes mellitus with diabetic polyneuropathy, without long-term current use of insulin    6. PAD (peripheral artery disease)               Altered Skin Integrity Left lower;lateral Leg (Active)       Altered Skin Integrity Present on Admission:    Side: Left   Orientation: lower;lateral   Location: Leg   Wound Number:    Is this injury device related?:    Primary Wound Type:    Description of Altered Skin Integrity:    Ankle-Brachial Index:    Pulses:    Removal Indication and Assessment:    Wound Outcome:    (Retired) Wound Length (cm):    (Retired) Wound Width (cm):    (Retired) Depth (cm):    Wound Description (Comments):    Removal Indications:    Wound Image    02/22/23 1012   Dressing Appearance Dry;Intact;Clean;Moist drainage 02/22/23 1012   Drainage Amount Scant 02/22/23 1012   Drainage Characteristics/Odor Serous 02/22/23 1012   Red (%), Wound Tissue Color 100 % 02/22/23 1012   Periwound Area Intact;Edematous;[place] 02/22/23 1012   Wound Length (cm) 0.3 cm 02/22/23 1012   Wound Width (cm) 0.2 cm 02/22/23 1012   Wound Depth (cm) 0.1 cm 02/22/23 1012   Wound Volume (cm^3) 0.006 cm^3 02/22/23 1012   Wound Surface Area (cm^2) 0.06 cm^2 02/22/23 1012   Care Cleansed with:;Soap and water 02/22/23 1012   Dressing Applied;Compression wrap;Silicone 02/22/23 1012   Periwound Care Skin barrier film applied 02/22/23 1012   Compression Unna's  Boot;Three layer compression 02/22/23 1012       Serafin was seen in the clinic room and placed in the supine position on the treatment table.  The wound dressing was removed and the leg was cleansed with Easi-clense sponges and dried thoroughly.  No erythema, odor, green drainage, or warmth noted from patient's baseline.  New wound noted to left lateral lower extremity.    Plan of Care: Mepilex lite to wound bed, and a 3 layer calamine (to alleviate itching) unna wrap. The patient's foot was positioned at a 90 degree angle.  A calamine wrap, followed by kerlix roll gauze and coban were applied using a spiral technique avoiding creases or folds.  The wrap was started behind the first metatarsal and ended below the tibial tubercle of the knee.  There was overlap of each turn half the width of the previous turn.  The compression wrap will be changed every 7 days.      Plan:       Left lower extremity was dressed as detailed above  Patient was instructed to not get the dressings wet and to use cast covers for showering.  Should the dressing become wet, he is to remove it, place a moist dressing over the wound, cover with gauze and roll gauze and use ace wraps for compression and to secure bandages.  He should then notify this office as soon as possible to have a new dressing applied.  Instructed patient to remove wrap if he notices tingling or coldness to his left lower extremity or if his toes become white, blue, or cold.    Discussed nutrition and the role of protein in wound healing with the patient. Instructed patient to continue optimizing protein for wound healing.      Discussed bilateral lower extremity edema with patient. Instructed patient to elevate lower extremities whenever sedentary.    Instructed patient to keep follow up appointments with vascular surgery    Written and verbal instructions given to patient  RTC in 1 week    Rosalba Eaton PA-C

## 2023-02-24 ENCOUNTER — TELEPHONE (OUTPATIENT)
Dept: INTERNAL MEDICINE | Facility: CLINIC | Age: 73
End: 2023-02-24
Payer: MEDICARE

## 2023-02-24 PROBLEM — E11.51 DIABETES MELLITUS WITH PERIPHERAL VASCULAR DISEASE: Status: ACTIVE | Noted: 2017-05-04

## 2023-02-24 NOTE — TELEPHONE ENCOUNTER
Lvm on pt daughter phones stating the following below:    ----- Message from John Vargas MD sent at 2/24/2023  8:07 AM CST -----  Please schedule nurse visit for BP check on same day pt is coming to see sleep provider and ask him to bring his home BP log and we will see if see ty to adjust meds further. Thanks    Rerouting for 2nd attempt

## 2023-02-24 NOTE — TELEPHONE ENCOUNTER
Given appointment for NV on same day as Sleep study on 03/08/2023 1020. Asked to bring log of home BP's to the visit.

## 2023-02-24 NOTE — TELEPHONE ENCOUNTER
----- Message from John Vargas MD sent at 2/24/2023  8:07 AM CST -----  Please schedule nurse visit for BP check on same day pt is coming to see sleep provider and ask him to bring his home BP log and we will see if see ty to adjust meds further. thanks

## 2023-03-01 ENCOUNTER — OFFICE VISIT (OUTPATIENT)
Dept: WOUND CARE | Facility: CLINIC | Age: 73
End: 2023-03-01
Payer: MEDICARE

## 2023-03-01 VITALS
SYSTOLIC BLOOD PRESSURE: 174 MMHG | BODY MASS INDEX: 41.24 KG/M2 | TEMPERATURE: 98 F | WEIGHT: 278.44 LBS | DIASTOLIC BLOOD PRESSURE: 81 MMHG | HEART RATE: 75 BPM | HEIGHT: 69 IN

## 2023-03-01 DIAGNOSIS — I87.2 VENOUS INSUFFICIENCY: Primary | ICD-10-CM

## 2023-03-01 PROCEDURE — 1159F MED LIST DOCD IN RCRD: CPT | Mod: CPTII,S$GLB,,

## 2023-03-01 PROCEDURE — 1101F PR PT FALLS ASSESS DOC 0-1 FALLS W/OUT INJ PAST YR: ICD-10-PCS | Mod: CPTII,S$GLB,,

## 2023-03-01 PROCEDURE — 1101F PT FALLS ASSESS-DOCD LE1/YR: CPT | Mod: CPTII,S$GLB,,

## 2023-03-01 PROCEDURE — 3077F SYST BP >= 140 MM HG: CPT | Mod: CPTII,S$GLB,,

## 2023-03-01 PROCEDURE — 1126F PR PAIN SEVERITY QUANTIFIED, NO PAIN PRESENT: ICD-10-PCS | Mod: CPTII,S$GLB,,

## 2023-03-01 PROCEDURE — 99214 OFFICE O/P EST MOD 30 MIN: CPT | Mod: S$GLB,,,

## 2023-03-01 PROCEDURE — 99214 PR OFFICE/OUTPT VISIT, EST, LEVL IV, 30-39 MIN: ICD-10-PCS | Mod: S$GLB,,,

## 2023-03-01 PROCEDURE — 1126F AMNT PAIN NOTED NONE PRSNT: CPT | Mod: CPTII,S$GLB,,

## 2023-03-01 PROCEDURE — 99999 PR PBB SHADOW E&M-EST. PATIENT-LVL III: ICD-10-PCS | Mod: PBBFAC,,,

## 2023-03-01 PROCEDURE — 3051F PR MOST RECENT HEMOGLOBIN A1C LEVEL 7.0 - < 8.0%: ICD-10-PCS | Mod: CPTII,S$GLB,,

## 2023-03-01 PROCEDURE — 3008F BODY MASS INDEX DOCD: CPT | Mod: CPTII,S$GLB,,

## 2023-03-01 PROCEDURE — 3008F PR BODY MASS INDEX (BMI) DOCUMENTED: ICD-10-PCS | Mod: CPTII,S$GLB,,

## 2023-03-01 PROCEDURE — 3288F PR FALLS RISK ASSESSMENT DOCUMENTED: ICD-10-PCS | Mod: CPTII,S$GLB,,

## 2023-03-01 PROCEDURE — 3079F DIAST BP 80-89 MM HG: CPT | Mod: CPTII,S$GLB,,

## 2023-03-01 PROCEDURE — 3288F FALL RISK ASSESSMENT DOCD: CPT | Mod: CPTII,S$GLB,,

## 2023-03-01 PROCEDURE — 1159F PR MEDICATION LIST DOCUMENTED IN MEDICAL RECORD: ICD-10-PCS | Mod: CPTII,S$GLB,,

## 2023-03-01 PROCEDURE — 99999 PR PBB SHADOW E&M-EST. PATIENT-LVL III: CPT | Mod: PBBFAC,,,

## 2023-03-01 PROCEDURE — 3079F PR MOST RECENT DIASTOLIC BLOOD PRESSURE 80-89 MM HG: ICD-10-PCS | Mod: CPTII,S$GLB,,

## 2023-03-01 PROCEDURE — 3077F PR MOST RECENT SYSTOLIC BLOOD PRESSURE >= 140 MM HG: ICD-10-PCS | Mod: CPTII,S$GLB,,

## 2023-03-01 PROCEDURE — 3051F HG A1C>EQUAL 7.0%<8.0%: CPT | Mod: CPTII,S$GLB,,

## 2023-03-01 NOTE — PROGRESS NOTES
Subjective:       Patient ID: Serafin Tapia is a 72 y.o. male.    Chief Complaint: Wound Check    Patient presents for a reevaluation of a left lower extremity wound. Patient follows with vascular surgery for PAD and venous insufficiency. Dr. Kapoor determined he has adequate arterial blood flow for wound healing. No other complaints at this time. Denies fever, chills, erythema, warmth, drainage, or pain.      10/21/22 MIHIR:  Right lower extremity: 0.93- minimal peripheral arterial occlusive disease  Left lower extremity: 0.70- moderate peripheral arterial occlusive disease    Review of Systems   Constitutional:  Negative for activity change, chills, diaphoresis, fatigue and fever.   Eyes:  Negative for photophobia and visual disturbance.   Respiratory:  Negative for apnea, chest tightness and shortness of breath.    Cardiovascular:  Positive for leg swelling. Negative for chest pain and palpitations.   Musculoskeletal:  Negative for gait problem and joint swelling.   Skin:  Positive for wound. Negative for color change, pallor and rash.   Neurological:  Negative for dizziness, syncope, facial asymmetry, speech difficulty, weakness, light-headedness, numbness and headaches.   Psychiatric/Behavioral:  Negative for agitation and confusion. The patient is not nervous/anxious.    All other systems reviewed and are negative.    Objective:      Physical Exam  Vitals reviewed.   Constitutional:       General: He is not in acute distress.     Appearance: Normal appearance. He is obese.   Cardiovascular:      Rate and Rhythm: Normal rate and regular rhythm.      Pulses: Normal pulses.   Pulmonary:      Effort: No respiratory distress.   Musculoskeletal:         General: No swelling.      Right lower leg: Pitting Edema present.      Left lower leg: Pitting Edema present.      Comments: Ambulates with cane   Skin:     General: Skin is warm and dry.      Capillary Refill: Capillary refill takes less than 2 seconds.       Findings: Wound present. No erythema.          Neurological:      General: No focal deficit present.      Mental Status: He is alert and oriented to person, place, and time.   Psychiatric:         Mood and Affect: Mood normal.         Behavior: Behavior normal.         Thought Content: Thought content normal.         Judgment: Judgment normal.       Assessment:       1. Venous insufficiency               Altered Skin Integrity Left anterior;lower;medial;lateral Leg (Active)       Altered Skin Integrity Present on Admission:    Side: Left   Orientation: anterior;lower;medial;lateral   Location: Leg   Wound Number:    Is this injury device related?:    Primary Wound Type:    Description of Altered Skin Integrity:    Ankle-Brachial Index:    Pulses:    Removal Indication and Assessment:    Wound Outcome:    (Retired) Wound Length (cm):    (Retired) Wound Width (cm):    (Retired) Depth (cm):    Wound Description (Comments):    Removal Indications:    Wound Image   03/01/23 1050   Dressing Appearance Dry;Intact;Clean 03/01/23 1050   Drainage Amount None 03/01/23 1050   Care Cleansed with:;Soap and water 03/01/23 1050       Serafin was seen in the clinic room and placed in the supine position on the treatment table.  The wound dressing was removed and the leg was cleansed with Easi-clense sponges and dried thoroughly.  No erythema, odor, green drainage, or warmth noted from patient's baseline.  No open wounds    Plan of Care: Compression stockings      Plan:       No open wounds noted.  Precautions given to prevent wounds from re-opening. Discussed how area is extremely fragile. Protect area from friction, wash area gently, and pat dry. If the wound should re-open, instructed patient to contact the office.    Discussed bilateral lower extremity edema. Instructed patient to utilize compression stockings to bilateral lower extremities. Place on first thing in the morning and remove before bed.      Written and verbal  instructions given to patient  RTC PRN    Rosalba Eaton PA-C

## 2023-03-08 ENCOUNTER — OFFICE VISIT (OUTPATIENT)
Dept: SLEEP MEDICINE | Facility: CLINIC | Age: 73
End: 2023-03-08
Attending: INTERNAL MEDICINE
Payer: MEDICARE

## 2023-03-08 ENCOUNTER — TELEPHONE (OUTPATIENT)
Dept: INTERNAL MEDICINE | Facility: CLINIC | Age: 73
End: 2023-03-08

## 2023-03-08 ENCOUNTER — CLINICAL SUPPORT (OUTPATIENT)
Dept: INTERNAL MEDICINE | Facility: CLINIC | Age: 73
End: 2023-03-08
Payer: MEDICARE

## 2023-03-08 VITALS
BODY MASS INDEX: 41.14 KG/M2 | DIASTOLIC BLOOD PRESSURE: 84 MMHG | HEART RATE: 75 BPM | HEIGHT: 69 IN | WEIGHT: 277.75 LBS | SYSTOLIC BLOOD PRESSURE: 146 MMHG

## 2023-03-08 VITALS — SYSTOLIC BLOOD PRESSURE: 150 MMHG | OXYGEN SATURATION: 99 % | HEART RATE: 69 BPM | DIASTOLIC BLOOD PRESSURE: 70 MMHG

## 2023-03-08 DIAGNOSIS — G47.30 SLEEP APNEA, UNSPECIFIED TYPE: ICD-10-CM

## 2023-03-08 DIAGNOSIS — I10 ESSENTIAL HYPERTENSION: Primary | ICD-10-CM

## 2023-03-08 DIAGNOSIS — E66.2 OBESITY HYPOVENTILATION SYNDROME: ICD-10-CM

## 2023-03-08 DIAGNOSIS — M62.82 NON-TRAUMATIC RHABDOMYOLYSIS: ICD-10-CM

## 2023-03-08 PROCEDURE — 99204 OFFICE O/P NEW MOD 45 MIN: CPT | Mod: S$GLB,,, | Performed by: NURSE PRACTITIONER

## 2023-03-08 PROCEDURE — 99999 PR PBB SHADOW E&M-EST. PATIENT-LVL III: ICD-10-PCS | Mod: PBBFAC,,,

## 2023-03-08 PROCEDURE — 3051F HG A1C>EQUAL 7.0%<8.0%: CPT | Mod: CPTII,S$GLB,, | Performed by: NURSE PRACTITIONER

## 2023-03-08 PROCEDURE — 3077F PR MOST RECENT SYSTOLIC BLOOD PRESSURE >= 140 MM HG: ICD-10-PCS | Mod: CPTII,S$GLB,, | Performed by: NURSE PRACTITIONER

## 2023-03-08 PROCEDURE — 3079F PR MOST RECENT DIASTOLIC BLOOD PRESSURE 80-89 MM HG: ICD-10-PCS | Mod: CPTII,S$GLB,, | Performed by: NURSE PRACTITIONER

## 2023-03-08 PROCEDURE — 99204 PR OFFICE/OUTPT VISIT, NEW, LEVL IV, 45-59 MIN: ICD-10-PCS | Mod: S$GLB,,, | Performed by: NURSE PRACTITIONER

## 2023-03-08 PROCEDURE — 3008F BODY MASS INDEX DOCD: CPT | Mod: CPTII,S$GLB,, | Performed by: NURSE PRACTITIONER

## 2023-03-08 PROCEDURE — 99999 PR PBB SHADOW E&M-EST. PATIENT-LVL III: ICD-10-PCS | Mod: PBBFAC,,, | Performed by: NURSE PRACTITIONER

## 2023-03-08 PROCEDURE — 1159F MED LIST DOCD IN RCRD: CPT | Mod: CPTII,S$GLB,, | Performed by: NURSE PRACTITIONER

## 2023-03-08 PROCEDURE — 1101F PR PT FALLS ASSESS DOC 0-1 FALLS W/OUT INJ PAST YR: ICD-10-PCS | Mod: CPTII,S$GLB,, | Performed by: NURSE PRACTITIONER

## 2023-03-08 PROCEDURE — 3077F SYST BP >= 140 MM HG: CPT | Mod: CPTII,S$GLB,, | Performed by: NURSE PRACTITIONER

## 2023-03-08 PROCEDURE — 3079F DIAST BP 80-89 MM HG: CPT | Mod: CPTII,S$GLB,, | Performed by: NURSE PRACTITIONER

## 2023-03-08 PROCEDURE — 1159F PR MEDICATION LIST DOCUMENTED IN MEDICAL RECORD: ICD-10-PCS | Mod: CPTII,S$GLB,, | Performed by: NURSE PRACTITIONER

## 2023-03-08 PROCEDURE — 3051F PR MOST RECENT HEMOGLOBIN A1C LEVEL 7.0 - < 8.0%: ICD-10-PCS | Mod: CPTII,S$GLB,, | Performed by: NURSE PRACTITIONER

## 2023-03-08 PROCEDURE — 1101F PT FALLS ASSESS-DOCD LE1/YR: CPT | Mod: CPTII,S$GLB,, | Performed by: NURSE PRACTITIONER

## 2023-03-08 PROCEDURE — 3288F PR FALLS RISK ASSESSMENT DOCUMENTED: ICD-10-PCS | Mod: CPTII,S$GLB,, | Performed by: NURSE PRACTITIONER

## 2023-03-08 PROCEDURE — 99999 PR PBB SHADOW E&M-EST. PATIENT-LVL III: CPT | Mod: PBBFAC,,,

## 2023-03-08 PROCEDURE — 99999 PR PBB SHADOW E&M-EST. PATIENT-LVL III: CPT | Mod: PBBFAC,,, | Performed by: NURSE PRACTITIONER

## 2023-03-08 PROCEDURE — 3288F FALL RISK ASSESSMENT DOCD: CPT | Mod: CPTII,S$GLB,, | Performed by: NURSE PRACTITIONER

## 2023-03-08 PROCEDURE — 3008F PR BODY MASS INDEX (BMI) DOCUMENTED: ICD-10-PCS | Mod: CPTII,S$GLB,, | Performed by: NURSE PRACTITIONER

## 2023-03-08 NOTE — PROGRESS NOTES
"Referred by Dr Vargas    CHIEF COMPLAINT: Sleep evaluation  HISTORY OF PRESENT ILLNESS:He has never had a sleep study.Taking 4 antihypertensive med and BP remains suboptimal. Took meds this morning. Sleeps alone, denies being told he snores or having witnessed apneic pauses. Sleep is not refreshing, feels OK in am but not rested. Persistent disrupted sleep/nocturia 4x. Former . Denies am headache or sinus congestion or RLS.   Denies teeth grinding or jaw clenching    On todays [place] Sleepiness Scale the patient scores a 1/24.     HgBA1c 7.4 (2/2023)  DM with PVD    FH: sister had SKYLA, 2 cousins SKYLA  SOCIAL HISTORY: single, retired    BP (!) 146/84 (BP Location: Right arm, Patient Position: Sitting, BP Method: Medium (Automatic))   Pulse 75   Ht 5' 9" (1.753 m)   Wt 126 kg (277 lb 12.5 oz)   BMI 41.02 kg/m²   Neck circumference 18"      ASSESSMENT:   Unspecified Sleep Apnea, with symptoms of questionable snoring,un-refreshing disrupted sleep and mild daytime sleepiness,with medical comorbidities of morbid obesity, hypertension, DM2 with PVD, non-traumatic rhabdomyolysis chronic immunosuppressed. Warrants further investigation for untreated sleep apnea.     PLAN:   1. Polysomnogram discussed plan of care (if  + plan PAP setup with close adherence monitoring)   2. Discussed etiology of SKYLA and potential ramifications of untreated SKYLA, including stroke, heart disease, HTN.  We discussed potential treatment options, which could include continuous positive airway pressure (CPAP-definitive)  3.see pcp re HTN mgt/continue meds, notify if persistent >140/90       Thank you for allowing me the opportunity to participate in the care of your patient      "

## 2023-03-08 NOTE — TELEPHONE ENCOUNTER
Serafin Tapia 72 y.o. male is here for Blood Pressure check. in person    Manual Blood pressure reading was  160/80, Pulse 74. (Checked at the end of the visit)    If high, was it repeated after 15 minutes? yes 150/70 Pulse 69  Pt's Home blood pressure machine read in office NO, Pulse N/A.   Diagnosed with Hypertension yes.  Patient took blood pressure medication today yes.  Last dose of blood pressure medication was taken at 0730. Patient took 1. Benazepril 40 mg daily 2. Chlorthalidone 25 mg daily 3. Metoprolol 200 mg daily.   All Medications and OTC medication updated yes  Does patient have record of home blood pressure readings / Blood Pressure Log yes. 02/22 148/77, 02/24 168/101, 02/25 125/83, 02/26 146/73, 02/27 162/86, 02/28 151/85, 03/01 172/81, 03/03 190/100, 03/04 168/84, 03/05 150/80, 03/06 143/84, 03/07 156/82  Does the pt have any complaints today in regards to their blood pressure medication? no. Complains of None. Patient is asymptomatic.   Were you sitting still for 5-10 minutes prior to taking your Blood pressure? yes   Has your blood pressure monitor ever been checked? no When was last time we checked your blood pressure monitor? No  Given BP Log sheet with information on BP ranges  Updated vitals yes  Follow up date is No Follow Up Appointment.     Dr. Vargas notified.     Creatinine   Date Value Ref Range Status   09/28/2022 0.8 0.5 - 1.4 mg/dL Final     Sodium   Date Value Ref Range Status   09/28/2022 144 136 - 145 mmol/L Final     Potassium   Date Value Ref Range Status   09/28/2022 4.3 3.5 - 5.1 mmol/L Final

## 2023-03-10 ENCOUNTER — TELEPHONE (OUTPATIENT)
Dept: SLEEP MEDICINE | Facility: OTHER | Age: 73
End: 2023-03-10
Payer: MEDICARE

## 2023-03-13 RX ORDER — DULAGLUTIDE 1.5 MG/.5ML
1.5 INJECTION, SOLUTION SUBCUTANEOUS
Qty: 4 PEN | Refills: 0 | Status: SHIPPED | OUTPATIENT
Start: 2023-03-13 | End: 2023-04-24 | Stop reason: SDUPTHER

## 2023-03-13 RX ORDER — DULAGLUTIDE 0.75 MG/.5ML
0.75 INJECTION, SOLUTION SUBCUTANEOUS
Qty: 4 PEN | Refills: 0 | OUTPATIENT
Start: 2023-03-13

## 2023-03-13 RX ORDER — DULAGLUTIDE 0.75 MG/.5ML
0.75 INJECTION, SOLUTION SUBCUTANEOUS
OUTPATIENT
Start: 2023-03-13

## 2023-03-13 NOTE — TELEPHONE ENCOUNTER
No new care gaps identified.  [facility] Embedded Care Gaps. Reference number: [id number]. 3/13/2023   9:19:26 AM LACEYT

## 2023-03-13 NOTE — TELEPHONE ENCOUNTER
Refill Decision Note   Serafin Tapia  is requesting a refill authorization.  Brief Assessment and Rationale for Refill:  Quick Discontinue     Medication Therapy Plan:  Receipt confirmed by pharmacy (3/13/2023  1:57 PM CDT)    Medication Reconciliation Completed: No   Comments:     No Care Gaps recommended.     Note composed:5:24 PM 03/13/2023

## 2023-03-13 NOTE — TELEPHONE ENCOUNTER
No new care gaps identified.  [facility] Embedded Care Gaps. Reference number: [id number]. 3/13/2023   1:10:14 PM CDT

## 2023-03-13 NOTE — TELEPHONE ENCOUNTER
----- Message from Rc Baca sent at 3/13/2023  9:04 AM CDT -----      Can the clinic reply in [facility]:N        Please refill the medication(s) listed below. Please call the patient when the prescription(s) is ready for  at this phone number         Medication #1 dulaglutide (TRULICITY) 0.75 mg/0.5 mL pen injector    Medication #2       Pref[facility]acy: [id number] - [facility] [address] [facility]

## 2023-03-15 RX ORDER — LABETALOL 200 MG/1
200 TABLET, FILM COATED ORAL 2 TIMES DAILY
Qty: 180 TABLET | Refills: 2 | Status: SHIPPED | OUTPATIENT
Start: 2023-03-15 | End: 2024-03-14

## 2023-03-15 NOTE — TELEPHONE ENCOUNTER
Spoke with pt stating message below:    MD Alicia York Staff 1 hour ago (9:42 AM)       Please add labetalol 200mg twice a day to current meds and f/u in 7-10 days for nurse visit for BP check      Pt informed to  medication below:    labetaloL (NORMODYNE) 200 MG tablet 180 tablet 2 3/15/2023 3/14/2024 No   Sig - Route: Take 1 tablet (200 mg total) by mouth 2 (two) times daily. - Oral   Sent to pharmacy as: labetaloL (NORMODYNE) 200 MG tablet   Class: Normal   Notes to Pharmacy: .   Order: [id number]   Date/Time Signed: 3/15/2023 09:43       E-Prescribing Status: Receipt confirmed by pharmacy (3/15/2023  9:43 AM CDT)     Pharmacy    [id number] - [place], [place] - [address] [facility] [address]          Pt scheduled for NV for BP check for 3/22/2023 at 1pm.

## 2023-03-15 NOTE — TELEPHONE ENCOUNTER
Lvm stating message below:      MD Alicia York Staff 1 hour ago (9:42 AM)       Please add labetalol 200mg twice a day to current meds and f/u in 7-10 days for nurse visit for BP check      Rerouting for 2nd attempt

## 2023-03-16 ENCOUNTER — TELEPHONE (OUTPATIENT)
Dept: INTERNAL MEDICINE | Facility: CLINIC | Age: 73
End: 2023-03-16
Payer: MEDICARE

## 2023-03-16 NOTE — TELEPHONE ENCOUNTER
PT INFORMED AGAIN:  Please add labetalol 200mg twice a day to current meds     Received: Today   Pt Advice  Sophie Lopez Staff  Caller: VALDO VEGA [[id number]] (Today,  1:43 PM)  Type: Call Back       Who called: VEGA, VALDO [[id number]]       What is the request in detail: Patient is requesting a call back.   In regard to his medication. He states he was speaking with someone in the office.   Please advise.     Can the clinic reply by [facility]? no       Would the patient rather a call back or a response via [facility]? Call back       Best call back number: [phone] (home)       Additional Information: labetaloL (NORMODYNE) 200 MG tablet

## 2023-03-16 NOTE — TELEPHONE ENCOUNTER
----- Message from Sophie Infante sent at 3/16/2023  1:43 PM CDT -----  Contact: VALDO VEGA [[id number]]  Type: Call Back      Who called: VALDO VEGA [[id number]]      What is the request in detail: Patient is requesting a call back.   In regard to his medication. He states he was speaking with someone in the office.   Please advise.     Can the clinic reply by [facility]? no      Would the patient rather a call back or a response via [facility]? Call back       Best call back number: [phone] (home)       Additional Information: labetaloL (NORMODYNE) 200 MG tablet

## 2023-03-22 ENCOUNTER — TELEPHONE (OUTPATIENT)
Dept: INTERNAL MEDICINE | Facility: CLINIC | Age: 73
End: 2023-03-22

## 2023-03-22 ENCOUNTER — CLINICAL SUPPORT (OUTPATIENT)
Dept: INTERNAL MEDICINE | Facility: CLINIC | Age: 73
End: 2023-03-22
Payer: MEDICARE

## 2023-03-22 VITALS — SYSTOLIC BLOOD PRESSURE: 140 MMHG | HEART RATE: 78 BPM | OXYGEN SATURATION: 97 % | DIASTOLIC BLOOD PRESSURE: 80 MMHG

## 2023-03-22 PROCEDURE — 99999 PR PBB SHADOW E&M-EST. PATIENT-LVL III: CPT | Mod: PBBFAC,,,

## 2023-03-22 PROCEDURE — 99999 PR PBB SHADOW E&M-EST. PATIENT-LVL III: ICD-10-PCS | Mod: PBBFAC,,,

## 2023-03-22 NOTE — PROGRESS NOTES
Serafin Tapia 72 y.o. male is here for Blood Pressure check. in person    Manual Blood pressure reading was  152/80, Pulse 75. (Checked at the end of the visit)  15 min after 140/80, pulse 78    If high, was it repeated after 15 minutes? yes    Diagnosed with Hypertension yes.    Patient took blood pressure medication today yes.  Last dose of blood pressure medication was taken at 0930am. Patient took Nifedipine 90mg, Metoprolol 200mg, Hygroten 25 mg.     All Medications and OTC medication updated yes    Does patient have record of home blood pressure readings / Blood Pressure Log no.     Does the pt have any complaints today in regards to their blood pressure medication? no. Complains of NA. Patient is asymptomatic.     Were you sitting still for 5-10 minutes prior to taking your Blood pressure? yes     Has your blood pressure monitor ever been checked? no When was last time we checked your blood pressure monitor? NA    Updated vitals yes    Follow up date is NA.     Dr. Vargas notified.     Creatinine   Date Value Ref Range Status   09/28/2022 0.8 0.5 - 1.4 mg/dL Final     Sodium   Date Value Ref Range Status   09/28/2022 144 136 - 145 mmol/L Final     Potassium   Date Value Ref Range Status   09/28/2022 4.3 3.5 - 5.1 mmol/L Final

## 2023-04-07 NOTE — CONSULTS
Consult Note  Nephrology    Consult Requested By: Eleno Bradley MD  Reason for Consult: CLARE    SUBJECTIVE:     History of Present Illness:  Patient is a 66 y.o. male presents with AMS secondary to hypoglycemia.  Found to have CBG 44 and brought to ED for eval.  Continued to drop secondary to amaryl and poor oral intake.  Started on D5NS and admitted for further evaluation.  Also found to have mild CLARE on essentially normal kidney hx likely secondary to NSAIDS (Ibuprofen 800 mg over past week for knee pain) while on ACE, diuretics with poor oral intake.  Renal fxn improved overnight but I/O's not documented.  Did have some labile hypotension last night requiring saline boluses.      Pt seen and examined and feeling better this am.  Denies CP/SOB/N/V/D/F/C so far.      EPIC reviewed.      Past Medical History:   Diagnosis Date    Asthma     Diabetes mellitus type 2 in obese 5/3/2014    Elevated troponin 5/3/2014    Hyperlipidemia     Hypertension     Obesity     Pulmonary embolism 05/2014    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     Past Surgical History:   Procedure Laterality Date    TONSILLECTOMY       Family History   Problem Relation Age of Onset    Diabetes Mother     Heart disease Mother     Hypertension Mother     Stroke Mother     Heart disease Father     Hypertension Father     Diabetes Father     Cancer Father     Cataracts Sister     Hypertension Sister     Diabetes Sister     Glaucoma Maternal Aunt     Glaucoma Paternal Aunt     Hypertension Brother     Diabetes Brother     No Known Problems Maternal Grandmother     No Known Problems Maternal Grandfather     No Known Problems Paternal Grandmother     No Known Problems Paternal Grandfather     Hypertension Son     Hypertension Daughter      Social History   Substance Use Topics    Smoking status: Former Smoker     Types: Cigarettes    Smokeless tobacco: Never Used      Comment: Quit  smoking as a teenager    Alcohol use Yes      Comment: 1-2 drinks per week       Review of patient's allergies indicates:  No Known Allergies     Review of Systems:  Constitutional: No fever or chills  Respiratory: No cough or shortness of breath  Cardiovascular: No chest pain or palpitations  Gastrointestinal: No nausea or vomiting  Neurological: No confusion or weakness    OBJECTIVE:     Vital Signs (Most Recent)  Temp: 97.9 °F (36.6 °C) (10/30/17 0813)  Pulse: 74 (10/30/17 0813)  Resp: 18 (10/30/17 0813)  BP: 112/60 (10/30/17 0813)  SpO2: (!) 91 % (10/30/17 0813)    Vital Signs Range (Last 24H):  Temp:  [96.9 °F (36.1 °C)-98.8 °F (37.1 °C)]   Pulse:  [60-83]   Resp:  [13-21]   BP: ()/(47-97)   SpO2:  [90 %-98 %]     No intake or output data in the 24 hours ending 10/30/17 0912    Physical Exam:  General appearance: Well developed, well nourished.    Eyes:  Conjunctivae/corneas clear. PERRL.  Lungs: Normal respiratory effort,   clear to auscultation bilaterally   Heart: Regular rate and rhythm, S1, S2 normal, no murmur, rub or tj.  Abdomen: Soft, non-tender non-distended; bowel sounds normal; no masses,  no organomegaly, obese  Extremities: No cyanosis or clubbing. Trace edema.    Skin: Skin color, texture, turgor normal. No rashes or lesions  Neurologic: Normal strength and tone. No focal numbness or weakness       Laboratory:    Recent Labs  Lab 10/30/17  0432   WBC 6.33   RBC 4.14*   HGB 11.4*   HCT 35.2*      MCV 85   MCH 27.5   MCHC 32.4     BMP:   Recent Labs  Lab 10/30/17  0432   GLU 68*      K 4.6      CO2 30*   BUN 38*   CREATININE 1.7*   CALCIUM 8.9   MG 1.7     Lab Results   Component Value Date    CALCIUM 8.9 10/30/2017    PHOS 3.7 10/30/2017     POCT Glucose   Date Value Ref Range Status   10/29/2017 104 70 - 110 mg/dL Final   10/29/2017 80 70 - 110 mg/dL Final   10/29/2017 69 (L) 70 - 110 mg/dL Final   10/29/2017 99 70 - 110 mg/dL Final   10/29/2017 113 (H) 70 - 110  mg/dL Final   10/29/2017 88 70 - 110 mg/dL Final   10/29/2017 123 (H) 70 - 110 mg/dL Final   10/29/2017 180 (H) 70 - 110 mg/dL Final   10/29/2017 37 (LL) 70 - 110 mg/dL Final       Diagnostic Results:    Impression:          1. Slightly decreased perfusion to the bilateral kidneys with otherwise normal resistive indices, which could be artifactual versus sequela of nonspecific acute medical renal disease. Correlate clinically.    2. Otherwise, normal renal morphology bilaterally without hydronephrosis.       ASSESSMENT/PLAN:     1. Resolving multifactorial CLARE on essentially normal renal hx secondary to NSAIDS, hypoglycemia, labile BP, while on ACE/HCTZ/[place] (N17.9):  U/S and urine lytes noted.  Need to monitor I/O's.  Hold NSAIDS/HCTZ/Roberto/ACE for now.  Continue with current IVF's for now until CBG stable and pt tolerating sufficient oral intake.  Check uric acid.  Follow trends and make further recs based on findings.  Renally dose meds, avoid nephrotoxins, and monitor I/O's closely.  2. Hypoglycemia secondary to poor oral intake while on Amaryl (T38.3X5A):  See HPI.  Hold Metformin and would not use amaryl type agents in this age group.  A1C perfect so suspect can get away with gentle DDP4 only when needed.  Continue D5 infusion until tolerating adequate intake.  May take up to days due to degree of renal failure on presentation.  Defer.   3. Hx of HTN and now Hypotensive (I95.9):  Increase IVF's and monitor.  Hold BP meds.        Thanks for consult  See above orders/recs.  Will follow.    Normal rate, regular rhythm.  Heart sounds S1, S2.  No murmurs, rubs or gallops.

## 2023-04-10 DIAGNOSIS — Z79.4 DIABETES MELLITUS DUE TO UNDERLYING CONDITION, CONTROLLED, WITH COMPLICATION, WITH LONG-TERM CURRENT USE OF INSULIN: Primary | ICD-10-CM

## 2023-04-10 DIAGNOSIS — E08.8 DIABETES MELLITUS DUE TO UNDERLYING CONDITION, CONTROLLED, WITH COMPLICATION, WITH LONG-TERM CURRENT USE OF INSULIN: Primary | ICD-10-CM

## 2023-04-10 RX ORDER — LINAGLIPTIN 5 MG/1
5 TABLET, FILM COATED ORAL DAILY
Qty: 90 TABLET | Refills: 1 | Status: SHIPPED | OUTPATIENT
Start: 2023-04-10

## 2023-04-10 NOTE — TELEPHONE ENCOUNTER
Refill Routing Note   Medication(s) are not appropriate for processing by [facility] for the following reason(s):      No active prescription written by PCP    [facility] action(s):  Defer          Medication reconciliation completed: No     Appointments  past 12m or future 3m with PCP    Date Provider   Last Visit   2/10/2023 John Vargas MD   Next Visit   Visit date not found John Vargas MD   ED visits in past 90 days: 0        Note composed:4:14 PM 04/10/2023

## 2023-04-10 NOTE — TELEPHONE ENCOUNTER
No new care gaps identified.  [facility] Embedded Care Gaps. Reference number: [id number]. 4/10/2023   8:47:55 AM CDT

## 2023-04-10 NOTE — TELEPHONE ENCOUNTER
Refill Encounter    PCP Visits: Recent Visits  Date Type Provider Dept   02/10/23 Office Visit John Vargas MD [facility]   11/10/22 Office Visit John Vargas MD [facility]   10/10/22 Office Visit John Vargas MD [facility]   09/28/22 Office Visit John Vargas MD [facility]   Showing recent visits within past 360 days and meeting all other requirements  Future Appointments  No visits were found meeting these conditions.  Showing future appointments within next 720 days and meeting all other requirements     Last 3 Blood Pressure:   BP Readings from Last 3 Encounters:   03/22/23 (!) 140/80   03/08/23 (!) 150/70   03/08/23 (!) 146/84     Preferred Pharmacy:   [id number] [address] [address]  Phone: [phone] Fax: [phone]    Requested RX:  Requested Prescriptions     Pending Prescriptions Disp Refills    linaGLIPtin (TRADJENTA) 5 mg Tab tablet 90 tablet 3     Sig: Take 1 tablet (5 mg total) by mouth once daily.      RX Route: Normal      Pended to Centralized Refill Staff Pool after checking allergies.

## 2023-04-10 NOTE — TELEPHONE ENCOUNTER
----- Message from Sofy Bernstein sent at 4/10/2023  8:32 AM CDT -----  .Type: RX Refill Request    Who Called: Self     Have you contacted your pharmacy: No     Refill or New Rx: Refill     RX Name and Strength:linaGLIPtin (TRADJENTA) 5 mg Tab tablet    Preferred Pharmacy with phone number: [id number] [address] [address]  Phone: [phone] Fax: [phone]        Local or Mail Order: Local     Ordering Provider: Alicia    Would the patient rather a call back or a response [facility]? Call back     Best Call Back Number:.[phone] (home)       Additional Information:

## 2023-04-18 ENCOUNTER — CLINICAL SUPPORT (OUTPATIENT)
Dept: AUDIOLOGY | Facility: CLINIC | Age: 73
End: 2023-04-18
Payer: MEDICARE

## 2023-04-18 ENCOUNTER — OFFICE VISIT (OUTPATIENT)
Dept: OTOLARYNGOLOGY | Facility: CLINIC | Age: 73
End: 2023-04-18
Payer: MEDICARE

## 2023-04-18 DIAGNOSIS — H90.3 SENSORINEURAL HEARING LOSS (SNHL) OF BOTH EARS: Primary | ICD-10-CM

## 2023-04-18 DIAGNOSIS — R42 VERTIGO: ICD-10-CM

## 2023-04-18 DIAGNOSIS — H81.12 BPPV (BENIGN PAROXYSMAL POSITIONAL VERTIGO), LEFT: ICD-10-CM

## 2023-04-18 PROCEDURE — 3051F PR MOST RECENT HEMOGLOBIN A1C LEVEL 7.0 - < 8.0%: ICD-10-PCS | Mod: CPTII,S$GLB,, | Performed by: OTOLARYNGOLOGY

## 2023-04-18 PROCEDURE — 99999 PR PBB SHADOW E&M-EST. PATIENT-LVL I: ICD-10-PCS | Mod: PBBFAC,,, | Performed by: OTOLARYNGOLOGY

## 2023-04-18 PROCEDURE — 92567 PR TYMPA2METRY: ICD-10-PCS | Mod: S$GLB,,,

## 2023-04-18 PROCEDURE — 99999 PR PBB SHADOW E&M-EST. PATIENT-LVL I: CPT | Mod: PBBFAC,,, | Performed by: OTOLARYNGOLOGY

## 2023-04-18 PROCEDURE — 92557 COMPREHENSIVE HEARING TEST: CPT | Mod: S$GLB,,,

## 2023-04-18 PROCEDURE — 99999 PR PBB SHADOW E&M-EST. PATIENT-LVL I: ICD-10-PCS | Mod: PBBFAC,,,

## 2023-04-18 PROCEDURE — 92557 PR COMPREHENSIVE HEARING TEST: ICD-10-PCS | Mod: S$GLB,,,

## 2023-04-18 PROCEDURE — 99203 PR OFFICE/OUTPT VISIT, NEW, LEVL III, 30-44 MIN: ICD-10-PCS | Mod: S$GLB,,, | Performed by: OTOLARYNGOLOGY

## 2023-04-18 PROCEDURE — 3051F HG A1C>EQUAL 7.0%<8.0%: CPT | Mod: CPTII,S$GLB,, | Performed by: OTOLARYNGOLOGY

## 2023-04-18 PROCEDURE — 99203 OFFICE O/P NEW LOW 30 MIN: CPT | Mod: S$GLB,,, | Performed by: OTOLARYNGOLOGY

## 2023-04-18 PROCEDURE — 92567 TYMPANOMETRY: CPT | Mod: S$GLB,,,

## 2023-04-18 PROCEDURE — 99999 PR PBB SHADOW E&M-EST. PATIENT-LVL I: CPT | Mod: PBBFAC,,,

## 2023-04-18 NOTE — PROGRESS NOTES
Subjective:   Serafin Tapia is a 72 y.o. male who presents for a hearing evaluation. He is not bothered by an y hearing loss at this time. He denies any otalgia, otorrhea, ear fullness/pressure, or tinnitus. He does report intermittent vertigo episodes when he lays down in the bed (cannot tell if it is worse on the L or R). Describes a room spinning sensation that lasts for a few seconds. There is not a family history of hearing loss at a young age. There is not a prior history of ear surgery. There is not a prior history of ear infections. There is not a history of ear trauma. 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     Past Medical History  He has a past medical history of Asthma, Diabetes mellitus type 2 in obese, Dyslipidemia, Elevated troponin, Gait instability, Hyperlipidemia, Hypertension, Hypothyroidism (acquired), Obesity, Ocular hypertension, Ocular hypertension, Pulmonary embolism, PVD (posterior vitreous detachment), right eye, Spondylosis of lumbar region without myelopathy or radiculopathy, Statin myopathy, and Vertigo.    Past Surgical History  He has a past surgical history that includes Tonsillectomy and Circumcision (N/A, 09/10/2019).    Family History  His family history includes Cancer in his father; Cataracts in his sister; Diabetes in his brother, brother, father, mother, sister, and son; Glaucoma in his maternal aunt and paternal aunt; Heart disease in his father and mother; Hyperlipidemia in his brother and son; Hypertension in his brother, brother, brother, daughter, father, mother, sister, and son; No Known Problems in his maternal grandfather, maternal grandmother, paternal grandfather, and paternal grandmother; Stroke in his mother.    Social History  He reports that he has quit smoking. His smoking use included cigarettes. He has a 1.25 pack-year smoking history. He has never used smokeless tobacco. He reports current alcohol use of about 1.0  - 2.0 standard drink per week. He reports that he does not use drugs.    Allergies  He is allergic to statins-hmg-coa reductase inhibitors.    Medications  He has a current medication list which includes the following prescription(s): ascorbic acid (vitamin c), aspirin, benazepril, calcium-vitamin d3, chlorthalidone, dapagliflozin, trulicity, folic acid, glimepiride, labetalol, levothyroxine, tradjenta, metformin, methotrexate, metoprolol succinate, multivitamin, nifedipine, tumeric-ging-olive-oreg-capryl, and zinc sulfate.  Review of Systems     Constitutional: Negative for chills and fever.      HENT: Negative for ear discharge, ear infection, ear pain, hearing loss and ringing in the ears.      Neurological: Positive for dizziness. Negative for headaches.        Objective:     Constitutional:   He is oriented to person, place, and time. He appears well-developed and well-nourished. He appears alert. He is cooperative.  Non-toxic appearance. He does not have a sickly appearance. He does not appear ill. Normal speech.      Head:  Normocephalic and atraumatic. Not macrocephalic and not microcephalic. Head is without raccoon's eyes, without Hoyt's sign, without abrasion, without laceration, without right periorbital erythema, without left periorbital erythema and without TMJ tenderness.     Ears:    Right Ear: No lacerations. No drainage, swelling or tenderness. No foreign bodies. No mastoid tenderness. Tympanic membrane is not injected, not scarred, not perforated, not erythematous, not retracted and not bulging. No middle ear effusion. No hemotympanum.   Left Ear: No lacerations. No drainage, swelling or tenderness. No foreign bodies. No mastoid tenderness. Tympanic membrane is not injected, not scarred, not perforated, not erythematous, not retracted and not bulging.  No middle ear effusion. No hemotympanum.   [place]-Hallpike Left: Positive for torsional and up-beating nystagmus, however patient was not  symptomatic    [place]-Hallpike Right: Negative for torsional and up-beating nystagmus    Pulmonary/Chest:   Effort normal.     Psychiatric:   He has a normal mood and affect. His speech is normal and behavior is normal.     Neurological:   He is alert and oriented to person, place, and time. Gait (using cane) abnormal.   Procedure  None    Data Reviewed  WBC (K/uL)   Date Value   09/28/2022 5.17     Eosinophil % (%)   Date Value   09/28/2022 1.2     Eos # (K/uL)   Date Value   09/28/2022 0.1     Platelets (K/uL)   Date Value   09/28/2022 273     Glucose (mg/dL)   Date Value   09/28/2022 171 (H)     No results found for: IGE    Imaging  No pertinent imaging available.  Audiogram    I independently reviewed the tracings of the complete audiometric evaluation performed today.  I reviewed the audiogram with the patient as well.  Pertinent findings include  normal sloping to mild SNHL bilaterally . Type A tymps AU.  Assessment:     1. Sensorineural hearing loss (SNHL) of both ears    2. BPPV (benign paroxysmal positional vertigo), left      Plan:   Sensorineural hearing loss (SNHL) of both ears  Audiometric testing interpretation consistent with sensorineural hearing loss. Discussed the etiology of SNHL. He is not interested in hearing amplification at this time. RTC PRN.    BPPV (benign paroxysmal positional vertigo), left  HPI and physical exam consistent with BPPV. Given patients habitus and small exam chair, unable to perform canalith repositioning. Recommended vestibular PT, patient declined.     Follow-up PRN.

## 2023-04-18 NOTE — PROGRESS NOTES
Serafin Tapia, a 72 y.o. male, was seen today in the clinic for an audiologic evaluation.  The patient's main complaint was disequilibrium and vertigo.  Mr. Tapia reported frequently having difficulty maintaining his balance. He also mentioned experiencing episodes of vertigo that lasts for a few seconds at a time when first laying down in bed and occasionally when rolling over to either side. Mr. Tapia denied otalgia, aural pressure and tinnitus.     Tympanometry revealed Type A in the right ear and Type A in the left ear. Audiogram results revealed a mild sensorineural hearing loss in the right and left ears. Speech reception thresholds were noted at 20 dB in the right ear and 25 dB in the left ear.  Speech discrimination scores were 100% in the right ear and 100% in the left ear.    Recommendations:  Otologic evaluation  Hearing aid consultation, if patient perceives change in quality of life due to hearing loss  Annual audiogram  Hearing protection when in noise

## 2023-04-24 RX ORDER — DULAGLUTIDE 1.5 MG/.5ML
1.5 INJECTION, SOLUTION SUBCUTANEOUS
Qty: 4 PEN | Refills: 3 | Status: SHIPPED | OUTPATIENT
Start: 2023-04-24

## 2023-04-24 NOTE — TELEPHONE ENCOUNTER
----- Message from Madhavi Flynn sent at 4/20/2023  9:30 AM CDT -----  Regarding: refill  Who Called:VALDO VEGA [[id number]]      Refill or New Rx: Refill        RX Name and Strength  dulaglutide (TRULICITY) 1.5 mg/0.5 mL pen injector    Is this a 30 day or 90 day RX:      Preferred Pharmacy with phone number:  [id number] - [address]   Phone:  [phone]  Fax:  [phone]            Local or Mail Order:local       Would the patient rather a call back or a response via [facility]?      best Call Back Number: [phone]      Additional Information:

## 2023-05-30 ENCOUNTER — OFFICE VISIT (OUTPATIENT)
Dept: OPTOMETRY | Facility: CLINIC | Age: 73
End: 2023-05-30
Payer: MEDICARE

## 2023-05-30 DIAGNOSIS — E11.36 TYPE 2 DIABETES MELLITUS WITH DIABETIC CATARACT, WITHOUT LONG-TERM CURRENT USE OF INSULIN: Primary | ICD-10-CM

## 2023-05-30 DIAGNOSIS — H52.7 REFRACTIVE ERROR: ICD-10-CM

## 2023-05-30 DIAGNOSIS — H25.13 NUCLEAR SCLEROSIS OF BOTH EYES: ICD-10-CM

## 2023-05-30 PROCEDURE — 99999 PR PBB SHADOW E&M-EST. PATIENT-LVL III: ICD-10-PCS | Mod: PBBFAC,,, | Performed by: OPTOMETRIST

## 2023-05-30 PROCEDURE — 2023F PR DILATED RETINAL EXAM W/O EVID OF RETINOPATHY: ICD-10-PCS | Mod: CPTII,S$GLB,, | Performed by: OPTOMETRIST

## 2023-05-30 PROCEDURE — 92015 PR REFRACTION: ICD-10-PCS | Mod: S$GLB,,, | Performed by: OPTOMETRIST

## 2023-05-30 PROCEDURE — 3288F PR FALLS RISK ASSESSMENT DOCUMENTED: ICD-10-PCS | Mod: CPTII,S$GLB,, | Performed by: OPTOMETRIST

## 2023-05-30 PROCEDURE — 92014 COMPRE OPH EXAM EST PT 1/>: CPT | Mod: S$GLB,,, | Performed by: OPTOMETRIST

## 2023-05-30 PROCEDURE — 92015 DETERMINE REFRACTIVE STATE: CPT | Mod: S$GLB,,, | Performed by: OPTOMETRIST

## 2023-05-30 PROCEDURE — 1159F MED LIST DOCD IN RCRD: CPT | Mod: CPTII,S$GLB,, | Performed by: OPTOMETRIST

## 2023-05-30 PROCEDURE — 3051F PR MOST RECENT HEMOGLOBIN A1C LEVEL 7.0 - < 8.0%: ICD-10-PCS | Mod: CPTII,S$GLB,, | Performed by: OPTOMETRIST

## 2023-05-30 PROCEDURE — 3288F FALL RISK ASSESSMENT DOCD: CPT | Mod: CPTII,S$GLB,, | Performed by: OPTOMETRIST

## 2023-05-30 PROCEDURE — 1126F AMNT PAIN NOTED NONE PRSNT: CPT | Mod: CPTII,S$GLB,, | Performed by: OPTOMETRIST

## 2023-05-30 PROCEDURE — 3051F HG A1C>EQUAL 7.0%<8.0%: CPT | Mod: CPTII,S$GLB,, | Performed by: OPTOMETRIST

## 2023-05-30 PROCEDURE — 99999 PR PBB SHADOW E&M-EST. PATIENT-LVL III: CPT | Mod: PBBFAC,,, | Performed by: OPTOMETRIST

## 2023-05-30 PROCEDURE — 1160F PR REVIEW ALL MEDS BY PRESCRIBER/CLIN PHARMACIST DOCUMENTED: ICD-10-PCS | Mod: CPTII,S$GLB,, | Performed by: OPTOMETRIST

## 2023-05-30 PROCEDURE — 92014 PR EYE EXAM, EST PATIENT,COMPREHESV: ICD-10-PCS | Mod: S$GLB,,, | Performed by: OPTOMETRIST

## 2023-05-30 PROCEDURE — 1101F PR PT FALLS ASSESS DOC 0-1 FALLS W/OUT INJ PAST YR: ICD-10-PCS | Mod: CPTII,S$GLB,, | Performed by: OPTOMETRIST

## 2023-05-30 PROCEDURE — 1126F PR PAIN SEVERITY QUANTIFIED, NO PAIN PRESENT: ICD-10-PCS | Mod: CPTII,S$GLB,, | Performed by: OPTOMETRIST

## 2023-05-30 PROCEDURE — 2023F DILAT RTA XM W/O RTNOPTHY: CPT | Mod: CPTII,S$GLB,, | Performed by: OPTOMETRIST

## 2023-05-30 PROCEDURE — 1160F RVW MEDS BY RX/DR IN RCRD: CPT | Mod: CPTII,S$GLB,, | Performed by: OPTOMETRIST

## 2023-05-30 PROCEDURE — 1159F PR MEDICATION LIST DOCUMENTED IN MEDICAL RECORD: ICD-10-PCS | Mod: CPTII,S$GLB,, | Performed by: OPTOMETRIST

## 2023-05-30 PROCEDURE — 1101F PT FALLS ASSESS-DOCD LE1/YR: CPT | Mod: CPTII,S$GLB,, | Performed by: OPTOMETRIST

## 2023-05-30 NOTE — PROGRESS NOTES
Subjective:       Patient ID: Serafin Tapia is a 72 y.o. male      Chief Complaint   Patient presents with    Concerns About Ocular Health     History of Present Illness   Dls: 4/5/21 Dr. Osorio    71 y/o male presents today for diabetic eye exam.   Pt states no changes in vision. Pt wears otc readers.     LBS ?    + tearing  No itching  No burning  No pain  No ha's  + ou od  floaters    Eye meds  None     Hemoglobin A1C       Date                     Value               Ref Range             Status                02/10/2023               7.4 (H)             4.0 - 5.6 %           Final                  11/10/2022               8.0 (H)             4.0 - 5.6 %           Final                 09/28/2022               9.5 (H)             4.0 - 5.6 %           Final                 Assessment/Plan:     1. Type 2 diabetes mellitus with diabetic cataract, without long-term current use of insulin  No diabetic retinopathy. Discussed with pt the effects of diabetes on vision, importance of good blood sugar control, compliance with meds, and follow up care with PCP. Return in 1 year for dilated eye exam, sooner PRN.    2. Nuclear sclerosis of both eyes  Educated pt on presence of cataracts and effects on vision. No surgery at this time. Recheck in one year, sooner PRN.    3. Refractive error  Educated patient on refractive error and discussed lens options. Dispensed updated spectacle Rx. Educated about adaptation period to new specs.    Eyeglass Final Rx       Eyeglass Final Rx         Sphere Cylinder Axis Add    Right -0.50 +1.25 180 +2.50    Left -0.25 +0.75 105 +2.50      Expiration Date: 5/30/2024                      Follow up in about 1 year (around 5/30/2024) for Diabetic Eye Exam.

## 2023-06-21 ENCOUNTER — PATIENT MESSAGE (OUTPATIENT)
Dept: SLEEP MEDICINE | Facility: OTHER | Age: 73
End: 2023-06-21
Payer: MEDICARE

## 2023-07-24 NOTE — Clinical Note
RTC in 4 months [FreeTextEntry1] : bp check [de-identified] : SHERYL BADILLO is an 80 yo woman with hypertension, hypercholesterolemia, pre diabetes/diet controlled diabetes here for a bp check and labs. She has been well overall.  Medical problems stable on the current meds\par \par Pt wishes to hold off on gyn, mammogram.  Opthal scheduled. Derm due\par \par The patient is  with 4 children and 4 grandchildren.  She is a retired .  She would have no difficulty walking 4 to 6 blocks.  Had Pfizer booster and flu.  Was born in [place] but has lived here since she was 20.

## 2023-08-03 NOTE — SUBJECTIVE & OBJECTIVE
Interval History:   IVF held overnight for patient getting a little SOB.   He reports a little SOB.  Otherwise okay.       Review of Systems   Constitutional: Negative for appetite change and fever.   Respiratory: Positive for shortness of breath. Negative for cough.    Cardiovascular: Negative for chest pain and palpitations.   Gastrointestinal: Negative for nausea and vomiting.   Skin: Negative for color change and rash.   Neurological: Negative for dizziness and weakness.   Psychiatric/Behavioral: Negative for agitation and confusion.     Objective:     Vital Signs (Most Recent):  Temp: 98.2 °F (36.8 °C) (11/04/17 0705)  Pulse: 73 (11/04/17 0933)  Resp: 18 (11/04/17 0933)  BP: (!) 105/53 (11/04/17 0705)  SpO2: 95 % (11/04/17 0933) Vital Signs (24h Range):  Temp:  [97.4 °F (36.3 °C)-98.3 °F (36.8 °C)] 98.2 °F (36.8 °C)  Pulse:  [58-76] 73  Resp:  [16-18] 18  SpO2:  [92 %-97 %] 95 %  BP: (105-144)/(53-69) 105/53     Weight: 116.6 kg (257 lb 1.6 oz)  Body mass index is 36.89 kg/m².    Intake/Output Summary (Last 24 hours) at 11/04/17 1020  Last data filed at 11/04/17 0947   Gross per 24 hour   Intake             2100 ml   Output              975 ml   Net             1125 ml      Physical Exam   Constitutional: He is oriented to person, place, and time. He appears well-developed and well-nourished.   HENT:   Head: Normocephalic and atraumatic.   Eyes: Conjunctivae are normal. Pupils are equal, round, and reactive to light.   Neck: Normal range of motion. Neck supple.   Cardiovascular: Normal rate and regular rhythm.    Pulmonary/Chest: Effort normal. He has rales.   Mild rales at bases.   Abdominal: Soft.   Neurological: He is alert and oriented to person, place, and time.   Proximal muscular weakness shoulder and hip bilat.   Skin: Skin is warm and dry.       Significant Labs:   BMP:   Recent Labs  Lab 11/04/17  0318   GLU 99      K 4.5      CO2 27   BUN 12   CREATININE 0.6   CALCIUM 8.5*   MG 1.6      Cardiac Markers: No results for input(s): CKMB, MYOGLOBIN, BNP, TROPISTAT in the last 48 hours.    Significant Imaging: I have reviewed all pertinent imaging results/findings within the past 24 hours.   Temples.../Clavicles.../Shoulders...

## 2023-11-08 ENCOUNTER — PATIENT MESSAGE (OUTPATIENT)
Dept: ADMINISTRATIVE | Facility: HOSPITAL | Age: 73
End: 2023-11-08
Payer: MEDICARE

## 2023-12-14 ENCOUNTER — PATIENT OUTREACH (OUTPATIENT)
Dept: ADMINISTRATIVE | Facility: HOSPITAL | Age: 73
End: 2023-12-14
Payer: MEDICARE

## 2024-01-19 ENCOUNTER — PATIENT MESSAGE (OUTPATIENT)
Dept: ADMINISTRATIVE | Facility: HOSPITAL | Age: 74
End: 2024-01-19
Payer: MEDICARE

## 2024-01-19 ENCOUNTER — PATIENT OUTREACH (OUTPATIENT)
Dept: ADMINISTRATIVE | Facility: HOSPITAL | Age: 74
End: 2024-01-19
Payer: MEDICARE

## 2024-01-30 DIAGNOSIS — Z00.00 ENCOUNTER FOR MEDICARE ANNUAL WELLNESS EXAM: ICD-10-CM

## 2024-02-07 ENCOUNTER — PATIENT MESSAGE (OUTPATIENT)
Dept: ADMINISTRATIVE | Facility: HOSPITAL | Age: 74
End: 2024-02-07
Payer: MEDICARE

## 2024-04-16 ENCOUNTER — TELEPHONE (OUTPATIENT)
Dept: WOUND CARE | Facility: CLINIC | Age: 74
End: 2024-04-16
Payer: MEDICARE

## 2024-04-16 NOTE — TELEPHONE ENCOUNTER
----- Message from Mana Jiménez sent at 4/16/2024 12:23 PM CDT -----  Regarding: appt  Contact: [phone]  Pt requesting EP appt type for wound care needs of both legs. Pls call to discuss.

## 2024-04-16 NOTE — TELEPHONE ENCOUNTER
Returned call and spoke with patient - appointment scheduled tomorrow 4/17/24 at 830am.  Patient instructed to report to 5th floor clinic [facility] [place] Robert martinez

## 2024-04-17 ENCOUNTER — OFFICE VISIT (OUTPATIENT)
Dept: WOUND CARE | Facility: CLINIC | Age: 74
End: 2024-04-17
Payer: MEDICARE

## 2024-04-17 VITALS
WEIGHT: 286.19 LBS | TEMPERATURE: 98 F | SYSTOLIC BLOOD PRESSURE: 173 MMHG | HEART RATE: 75 BPM | DIASTOLIC BLOOD PRESSURE: 83 MMHG | BODY MASS INDEX: 42.39 KG/M2 | HEIGHT: 69 IN

## 2024-04-17 DIAGNOSIS — I87.2 VENOUS INSUFFICIENCY: ICD-10-CM

## 2024-04-17 DIAGNOSIS — I10 ESSENTIAL HYPERTENSION: ICD-10-CM

## 2024-04-17 DIAGNOSIS — S81.801A OPEN WOUND OF RIGHT LOWER EXTREMITY, INITIAL ENCOUNTER: ICD-10-CM

## 2024-04-17 DIAGNOSIS — I50.9 HEART FAILURE, UNSPECIFIED HF CHRONICITY, UNSPECIFIED HEART FAILURE TYPE: ICD-10-CM

## 2024-04-17 DIAGNOSIS — E11.42 CONTROLLED TYPE 2 DIABETES MELLITUS WITH DIABETIC POLYNEUROPATHY, WITHOUT LONG-TERM CURRENT USE OF INSULIN: ICD-10-CM

## 2024-04-17 DIAGNOSIS — S81.802A MULTIPLE OPEN WOUNDS OF LEFT LOWER EXTREMITY, INITIAL ENCOUNTER: Primary | ICD-10-CM

## 2024-04-17 PROCEDURE — 99214 OFFICE O/P EST MOD 30 MIN: CPT | Mod: 25,S$GLB,,

## 2024-04-17 PROCEDURE — 99999 PR PBB SHADOW E&M-EST. PATIENT-LVL V: CPT | Mod: PBBFAC,,,

## 2024-04-17 PROCEDURE — 3288F FALL RISK ASSESSMENT DOCD: CPT | Mod: CPTII,S$GLB,,

## 2024-04-17 PROCEDURE — 3008F BODY MASS INDEX DOCD: CPT | Mod: CPTII,S$GLB,,

## 2024-04-17 PROCEDURE — 29580 STRAPPING UNNA BOOT: CPT | Mod: 50,S$GLB,,

## 2024-04-17 PROCEDURE — 3077F SYST BP >= 140 MM HG: CPT | Mod: CPTII,S$GLB,,

## 2024-04-17 PROCEDURE — 3072F LOW RISK FOR RETINOPATHY: CPT | Mod: CPTII,S$GLB,,

## 2024-04-17 PROCEDURE — 3079F DIAST BP 80-89 MM HG: CPT | Mod: CPTII,S$GLB,,

## 2024-04-17 PROCEDURE — 1159F MED LIST DOCD IN RCRD: CPT | Mod: CPTII,S$GLB,,

## 2024-04-17 PROCEDURE — 1125F AMNT PAIN NOTED PAIN PRSNT: CPT | Mod: CPTII,S$GLB,,

## 2024-04-17 PROCEDURE — 1101F PT FALLS ASSESS-DOCD LE1/YR: CPT | Mod: CPTII,S$GLB,,

## 2024-04-17 NOTE — PROGRESS NOTES
"Subjective:       Patient ID: Serafin Tapia is a 73 y.o. male.    Chief Complaint: Wound Consult    Patient presents for an evaluation of a bilateral lower extremity wounds. He reports his wounds started as blisters about 2 months ago. He is unsure why he did not contact the office until now. Pt admits compliance with wearing compression stockings daily, but reports his stockings have lost elasticity. He reports that his wounds drain a large amount of clear fluid. Pt has been dressing his wounds with a wound "liquid" and occasionally covering his wounds with gauze. Pt has also been leaving his wounds open to air. Patient followed with vascular surgery for PAD and venous insufficiency. Dr. Kapoor determined he has adequate arterial blood flow for wound healing and cleared him for a 3 layer compression wrap. Pt previously followed with the wound care clinic in 2022 and 2023. Denies fever, chills, erythema, warmth, purulent drainage, or pain.      10/21/22 MIHIR:  Right lower extremity: 0.93- minimal peripheral arterial occlusive disease  Left lower extremity: 0.70- moderate peripheral arterial occlusive disease      Review of Systems   Constitutional:  Negative for activity change, chills, diaphoresis, fatigue and fever.   Eyes:  Negative for photophobia and visual disturbance.   Respiratory:  Negative for apnea, chest tightness and shortness of breath.    Cardiovascular:  Positive for leg swelling. Negative for chest pain and palpitations.   Musculoskeletal:  Negative for gait problem and joint swelling.   Skin:  Positive for wound. Negative for color change, pallor and rash.   Neurological:  Negative for dizziness, syncope, facial asymmetry, speech difficulty, weakness, light-headedness, numbness and headaches.   Psychiatric/Behavioral:  Negative for agitation and confusion. The patient is not nervous/anxious.    All other systems reviewed and are negative.      Objective:      Physical Exam  Vitals reviewed. "   Constitutional:       General: He is not in acute distress.     Appearance: Normal appearance. He is obese.   Cardiovascular:      Rate and Rhythm: Normal rate and regular rhythm.      Pulses: Normal pulses.   Pulmonary:      Effort: No respiratory distress.   Musculoskeletal:         General: No swelling.      Right lower leg: Pitting Edema present.      Left lower leg: Pitting Edema present.      Comments: Ambulates with cane   Skin:     General: Skin is warm and dry.      Capillary Refill: Capillary refill takes less than 2 seconds.      Findings: Wound present. No erythema.          Neurological:      General: No focal deficit present.      Mental Status: He is alert and oriented to person, place, and time.   Psychiatric:         Mood and Affect: Mood normal.         Behavior: Behavior normal.         Thought Content: Thought content normal.         Judgment: Judgment normal.         Assessment:       1. Multiple open wounds of left lower extremity, initial encounter    2. Open wound of right lower extremity, initial encounter    3. Venous insufficiency    4. Essential hypertension    5. Heart failure, unspecified HF chronicity, unspecified heart failure type    6. Controlled type 2 diabetes mellitus with diabetic polyneuropathy, without long-term current use of insulin               Wound Venous Ulcer Right lower;medial Leg (Active)       Present on Original Admission:    Primary Wound Type: Venous ulcer   Side: Right   Orientation: lower;medial   Location: Leg   Wound Approximate Age at First Assessment (Weeks):    Wound Number:    Is this injury device related?:    Incision Type:    Closure Method:    Wound Description (Comments):    Type:    Additional Comments:    Ankle-Brachial Index:    Pulses:    Removal Indication and Assessment:    Wound Outcome:    Wound Image   04/17/24 0904   Dressing Appearance Dry;Intact;Clean;Moist drainage 04/17/24 0904   Drainage Amount Moderate 04/17/24 0904   Drainage  Characteristics/Odor Serous 04/17/24 0904   Red (%), Wound Tissue Color 50 % 04/17/24 0904   Yellow (%), Wound Tissue Color 50 % 04/17/24 0904   Periwound Area Intact;Edematous 04/17/24 0904   Wound Length (cm) 1 cm 04/17/24 0904   Wound Width (cm) 0.8 cm 04/17/24 0904   Wound Depth (cm) 0.1 cm 04/17/24 0904   Wound Volume (cm^3) 0.08 cm^3 04/17/24 0904   Wound Surface Area (cm^2) 0.8 cm^2 04/17/24 0904   Care Cleansed with:;Soap and water 04/17/24 0904   Dressing Applied;Foam;Compression wrap 04/17/24 0904   Periwound Care Skin barrier film applied 04/17/24 0904   Compression Three layer compression;Unna's Boot 04/17/24 0904            Wound Venous Ulcer Left lower;medial Leg (Active)       Present on Original Admission:    Primary Wound Type: Venous ulcer   Side: Left   Orientation: lower;medial   Location: Leg   Wound Approximate Age at First Assessment (Weeks):    Wound Number:    Is this injury device related?:    Incision Type:    Closure Method:    Wound Description (Comments):    Type:    Additional Comments:    Ankle-Brachial Index:    Pulses:    Removal Indication and Assessment:    Wound Outcome:    Wound Image    04/17/24 0904   Dressing Appearance Dry;Intact;Clean;Moist drainage 04/17/24 0904   Drainage Amount Large 04/17/24 0904   Drainage Characteristics/Odor Serous 04/17/24 0904   Red (%), Wound Tissue Color 100 % 04/17/24 0904   Periwound Area Intact;Macerated;Edematous;Pink 04/17/24 0904   Wound Length (cm) 8 cm 04/17/24 0904   Wound Width (cm) 6 cm 04/17/24 0904   Wound Depth (cm) 0.1 cm 04/17/24 0904   Wound Volume (cm^3) 4.8 cm^3 04/17/24 0904   Wound Surface Area (cm^2) 48 cm^2 04/17/24 0904   Care Cleansed with:;Soap and water 04/17/24 0904   Dressing Applied;Calcium alginate;Absorptive Pad;Compression wrap;Other (comment) 04/17/24 0904   Periwound Care Skin barrier film applied 04/17/24 0904   Compression Three layer compression;Unna's Boot 04/17/24 0904            Wound Venous Ulcer Left  lower;lateral;posterior Leg (Active)       Present on Original Admission:    Primary Wound Type: Venous ulcer   Side: Left   Orientation: lower;lateral;posterior   Location: Leg   Wound Approximate Age at First Assessment (Weeks):    Wound Number:    Is this injury device related?:    Incision Type:    Closure Method:    Wound Description (Comments):    Type:    Additional Comments:    Ankle-Brachial Index:    Pulses:    Removal Indication and Assessment:    Wound Outcome:    Wound Image    04/17/24 0904   Dressing Appearance Dry;Intact;Clean;Moist drainage 04/17/24 0904   Drainage Amount Large 04/17/24 0904   Drainage Characteristics/Odor Serous 04/17/24 0904   Red (%), Wound Tissue Color 100 % 04/17/24 0904   Periwound Area Intact;Edematous;Pink 04/17/24 0904   Wound Length (cm) 11 cm 04/17/24 0904   Wound Width (cm) 14.8 cm 04/17/24 0904   Wound Depth (cm) 0.1 cm 04/17/24 0904   Wound Volume (cm^3) 16.28 cm^3 04/17/24 0904   Wound Surface Area (cm^2) 162.8 cm^2 04/17/24 0904   Care Cleansed with:;Soap and water 04/17/24 0904   Dressing Applied;Calcium alginate;Absorptive Pad;Compression wrap;Other (comment) 04/17/24 0904   Periwound Care Skin barrier film applied 04/17/24 0904   Compression Unna's Boot;Three layer compression 04/17/24 0904         Serafin was seen in the clinic room and placed in the supine position on the treatment table.  The wound dressings were removed and the legs were cleansed with Easi-clense sponges and dried thoroughly.  No erythema, odor, green drainage, or warmth noted from patient's baseline.  Deferred sharp debridement.      Plan of Care: Right lower extremity: hydrofera blue to wound bed and covered with a 3 layer zinc compression wrap.  Left lower extremity: drawtex, aquacel, mextra pad, and a 3 layer zinc coflex compression wrap.   The patient's feet were positioned at a 90 degree angle. The wraps were applied using a spiral technique avoiding creases or folds.  The wraps were  started behind the first metatarsal and ended below the tibial tubercle of the knee.  There was overlap of each turn half the width of the previous turn.  The compression wrap will be changed every 7 days.      Plan:       Bilateral lower extremities were dressed as detailed above  Patient was instructed to not get the dressings wet and to use cast covers for showering.  Should the dressing become wet, he is to remove it, place a moist dressing over the wound, cover with gauze and roll gauze and use ace wraps for compression and to secure bandages.  He should then notify this office as soon as possible to have a new dressing applied.  Instructed patient to remove wrap if he notices tingling or coldness to his bilateral lower extremities or if his toes become white, blue, or cold.    Discussed nutrition and the role of protein in wound healing with the patient. Instructed patient to optimize protein for wound healing.     Discussed bilateral lower extremity edema with patient. Instructed patient to elevate legs whenever sedentary and follow a low sodium diet.  Discussed replacing compression stockings every 3-6 months.    Instructed patient to keep follow up appointments with vascular surgery    Written and verbal instructions given to patient  RTC in 1 week    Rosalba Eaton PA-C

## 2024-04-24 ENCOUNTER — OFFICE VISIT (OUTPATIENT)
Dept: WOUND CARE | Facility: CLINIC | Age: 74
End: 2024-04-24
Payer: MEDICARE

## 2024-04-24 VITALS
BODY MASS INDEX: 41.63 KG/M2 | WEIGHT: 281.06 LBS | HEART RATE: 72 BPM | SYSTOLIC BLOOD PRESSURE: 131 MMHG | TEMPERATURE: 98 F | HEIGHT: 69 IN | DIASTOLIC BLOOD PRESSURE: 58 MMHG

## 2024-04-24 DIAGNOSIS — E11.42 CONTROLLED TYPE 2 DIABETES MELLITUS WITH DIABETIC POLYNEUROPATHY, WITHOUT LONG-TERM CURRENT USE OF INSULIN: ICD-10-CM

## 2024-04-24 DIAGNOSIS — I87.2 VENOUS INSUFFICIENCY: ICD-10-CM

## 2024-04-24 DIAGNOSIS — S81.802D MULTIPLE OPEN WOUNDS OF LEFT LOWER EXTREMITY, SUBSEQUENT ENCOUNTER: Primary | ICD-10-CM

## 2024-04-24 DIAGNOSIS — I50.9 HEART FAILURE, UNSPECIFIED HF CHRONICITY, UNSPECIFIED HEART FAILURE TYPE: ICD-10-CM

## 2024-04-24 DIAGNOSIS — I10 ESSENTIAL HYPERTENSION: ICD-10-CM

## 2024-04-24 DIAGNOSIS — S81.801D OPEN WOUND OF RIGHT LOWER EXTREMITY, SUBSEQUENT ENCOUNTER: ICD-10-CM

## 2024-04-24 PROCEDURE — 99999 PR PBB SHADOW E&M-EST. PATIENT-LVL V: CPT | Mod: PBBFAC,,,

## 2024-04-24 PROCEDURE — 99499 UNLISTED E&M SERVICE: CPT | Mod: S$GLB,,,

## 2024-04-24 PROCEDURE — 29580 STRAPPING UNNA BOOT: CPT | Mod: 50,S$GLB,,

## 2024-04-24 NOTE — PROGRESS NOTES
"Subjective:       Patient ID: Serafin Tapia is a 73 y.o. male.    Chief Complaint: Wound Check    Patient presents for a re-evaluation of a bilateral lower extremity wounds. He reports his wounds started as blisters about 2 months ago. He is unsure why he did not contact the office until now. Pt admits compliance with wearing compression stockings daily, but reports his stockings have lost elasticity. He reports that his wounds drain a large amount of clear fluid. Pt has been dressing his wounds with a wound "liquid" and occasionally covering his wounds with gauze. Pt has also been leaving his wounds open to air. Patient followed with vascular surgery for PAD and venous insufficiency. Dr. Kapoor determined he has adequate arterial blood flow for wound healing and cleared him for a 3 layer compression wrap. Pt previously followed with the wound care clinic in 2022 and 2023. No complaints at this time. Denies fever, chills, erythema, warmth, purulent drainage, or pain.      10/21/22 MIHIR:  Right lower extremity: 0.93- minimal peripheral arterial occlusive disease  Left lower extremity: 0.70- moderate peripheral arterial occlusive disease      Review of Systems   Constitutional:  Negative for activity change, chills, diaphoresis, fatigue and fever.   Eyes:  Negative for photophobia and visual disturbance.   Respiratory:  Negative for apnea, chest tightness and shortness of breath.    Cardiovascular:  Positive for leg swelling. Negative for chest pain and palpitations.   Musculoskeletal:  Negative for gait problem and joint swelling.   Skin:  Positive for wound. Negative for color change, pallor and rash.   Neurological:  Negative for dizziness, syncope, facial asymmetry, speech difficulty, weakness, light-headedness, numbness and headaches.   Psychiatric/Behavioral:  Negative for agitation and confusion. The patient is not nervous/anxious.    All other systems reviewed and are negative.      Objective:      Physical " Exam  Vitals reviewed.   Constitutional:       General: He is not in acute distress.     Appearance: Normal appearance. He is obese.   Cardiovascular:      Rate and Rhythm: Normal rate and regular rhythm.      Pulses: Normal pulses.   Pulmonary:      Effort: No respiratory distress.   Musculoskeletal:         General: No swelling.      Right lower leg: Pitting Edema present.      Left lower leg: Pitting Edema present.      Comments: Ambulates with cane   Skin:     General: Skin is warm and dry.      Capillary Refill: Capillary refill takes less than 2 seconds.      Findings: Wound present. No erythema.          Neurological:      General: No focal deficit present.      Mental Status: He is alert and oriented to person, place, and time.   Psychiatric:         Mood and Affect: Mood normal.         Behavior: Behavior normal.         Thought Content: Thought content normal.         Judgment: Judgment normal.         Assessment:       1. Multiple open wounds of left lower extremity, subsequent encounter    2. Open wound of right lower extremity, subsequent encounter    3. Venous insufficiency    4. Essential hypertension    5. Heart failure, unspecified HF chronicity, unspecified heart failure type    6. Controlled type 2 diabetes mellitus with diabetic polyneuropathy, without long-term current use of insulin               Wound Venous Ulcer Right lower;medial Leg (Active)       Present on Original Admission:    Primary Wound Type: Venous ulcer   Side: Right   Orientation: lower;medial   Location: Leg   Wound Approximate Age at First Assessment (Weeks):    Wound Number:    Is this injury device related?:    Incision Type:    Closure Method:    Wound Description (Comments):    Type:    Additional Comments:    Ankle-Brachial Index:    Pulses:    Removal Indication and Assessment:    Wound Outcome:    Wound Image   04/24/24 0803   Dressing Appearance Dry;Intact;Clean;Moist drainage 04/24/24 0803   Drainage Amount Small  04/24/24 0803   Drainage Characteristics/Odor Serosanguineous 04/24/24 0803   Red (%), Wound Tissue Color 70 % 04/24/24 0803   Yellow (%), Wound Tissue Color 30 % 04/24/24 0803   Periwound Area Intact;Edematous;Pink 04/24/24 0803   Wound Length (cm) 0.9 cm 04/24/24 0803   Wound Width (cm) 0.4 cm 04/24/24 0803   Wound Depth (cm) 0.1 cm 04/24/24 0803   Wound Volume (cm^3) 0.036 cm^3 04/24/24 0803   Wound Surface Area (cm^2) 0.36 cm^2 04/24/24 0803   Care Cleansed with:;Soap and water 04/24/24 0803   Dressing Applied;Foam;Compression wrap 04/24/24 0803   Periwound Care Skin barrier film applied 04/24/24 0803   Compression Three layer compression;Unna's Boot 04/24/24 0803            Wound Venous Ulcer Left lower;medial Leg (Active)       Present on Original Admission:    Primary Wound Type: Venous ulcer   Side: Left   Orientation: lower;medial   Location: Leg   Wound Approximate Age at First Assessment (Weeks):    Wound Number:    Is this injury device related?:    Incision Type:    Closure Method:    Wound Description (Comments):    Type:    Additional Comments:    Ankle-Brachial Index:    Pulses:    Removal Indication and Assessment:    Wound Outcome:    Wound Image   04/24/24 0803   Dressing Appearance Dry;Intact;Clean;Moist drainage 04/24/24 0803   Drainage Amount Large 04/24/24 0803   Drainage Characteristics/Odor Serosanguineous 04/24/24 0803   Red (%), Wound Tissue Color 70 % 04/24/24 0803   Yellow (%), Wound Tissue Color 30 % 04/24/24 0803   Periwound Area Intact;Edematous;Pink 04/24/24 0803   Wound Length (cm) 10 cm 04/24/24 0803   Wound Width (cm) 6.2 cm 04/24/24 0803   Wound Depth (cm) 0.1 cm 04/24/24 0803   Wound Volume (cm^3) 6.2 cm^3 04/24/24 0803   Wound Surface Area (cm^2) 62 cm^2 04/24/24 0803   Care Cleansed with:;Soap and water 04/24/24 0803   Dressing Applied;Calcium alginate;Absorptive Pad;Compression wrap;Other (comment) 04/24/24 0803   Periwound Care Skin barrier film applied 04/24/24 0803    Compression Three layer compression;Unna's Boot 04/24/24 0803            Wound Venous Ulcer Left lower;lateral;posterior Leg (Active)       Present on Original Admission:    Primary Wound Type: Venous ulcer   Side: Left   Orientation: lower;lateral;posterior   Location: Leg   Wound Approximate Age at First Assessment (Weeks):    Wound Number:    Is this injury device related?:    Incision Type:    Closure Method:    Wound Description (Comments):    Type:    Additional Comments:    Ankle-Brachial Index:    Pulses:    Removal Indication and Assessment:    Wound Outcome:    Wound Image    04/24/24 0803   Dressing Appearance Dry;Intact;Clean;Moist drainage 04/24/24 0803   Drainage Amount Large 04/24/24 0803   Drainage Characteristics/Odor Serosanguineous 04/24/24 0803   Red (%), Wound Tissue Color 50 % 04/24/24 0803   Yellow (%), Wound Tissue Color 50 % 04/24/24 0803   Periwound Area Intact;Edematous;Pink 04/24/24 0803   Wound Length (cm) 8.9 cm 04/24/24 0803   Wound Width (cm) 14 cm 04/24/24 0803   Wound Depth (cm) 0.1 cm 04/24/24 0803   Wound Volume (cm^3) 12.46 cm^3 04/24/24 0803   Wound Surface Area (cm^2) 124.6 cm^2 04/24/24 0803   Care Cleansed with:;Soap and water 04/24/24 0803   Dressing Applied;Calcium alginate;Absorptive Pad;Compression wrap;Other (comment) 04/24/24 0803   Periwound Care Skin barrier film applied 04/24/24 0803   Compression Unna's Boot;Three layer compression 04/24/24 0803         Serafin was seen in the clinic room and placed in the supine position on the treatment table.  The wound dressings were removed and the legs were cleansed with Easi-clense sponges and dried thoroughly.  No erythema, odor, green drainage, or warmth noted from patient's baseline.  Declined sharp debridement.      Plan of Care: Right lower extremity: hydrofera blue to wound bed and covered with a 3 layer zinc compression wrap.  Left lower extremity: drawtex, aquacel, mextra pad, and a 3 layer zinc coflex compression  wrap.   The patient's feet were positioned at a 90 degree angle. The wraps were applied using a spiral technique avoiding creases or folds.  The wraps were started behind the first metatarsal and ended below the tibial tubercle of the knee.  There was overlap of each turn half the width of the previous turn.  The compression wrap will be changed every 7 days.      Plan:       Bilateral lower extremities were dressed as detailed above  Patient was instructed to not get the dressings wet and to use cast covers for showering.  Should the dressing become wet, he is to remove it, place a moist dressing over the wound, cover with gauze and roll gauze and use ace wraps for compression and to secure bandages.  He should then notify this office as soon as possible to have a new dressing applied.  Instructed patient to remove wrap if he notices tingling or coldness to his bilateral lower extremities or if his toes become white, blue, or cold.    Discussed nutrition and the role of protein in wound healing with the patient. Instructed patient to optimize protein for wound healing.     Discussed bilateral lower extremity edema with patient. Instructed patient to elevate legs whenever sedentary and follow a low sodium diet.  Discussed replacing compression stockings every 3-6 months.    Instructed patient to keep follow up appointments with vascular surgery.    Discussed drainage control. Will re-evaluate if patient needs to follow 2x a week for drainage control.    Written and verbal instructions given to patient  RTC in 1 week    Rosalba Eaton PA-C

## 2024-05-01 ENCOUNTER — OFFICE VISIT (OUTPATIENT)
Dept: WOUND CARE | Facility: CLINIC | Age: 74
End: 2024-05-01
Payer: MEDICARE

## 2024-05-01 VITALS
SYSTOLIC BLOOD PRESSURE: 145 MMHG | BODY MASS INDEX: 40.69 KG/M2 | TEMPERATURE: 98 F | HEART RATE: 79 BPM | DIASTOLIC BLOOD PRESSURE: 87 MMHG | HEIGHT: 69 IN | WEIGHT: 274.69 LBS

## 2024-05-01 DIAGNOSIS — I10 ESSENTIAL HYPERTENSION: ICD-10-CM

## 2024-05-01 DIAGNOSIS — Z87.828 HEALED WOUND: ICD-10-CM

## 2024-05-01 DIAGNOSIS — S81.802D MULTIPLE OPEN WOUNDS OF LEFT LOWER EXTREMITY, SUBSEQUENT ENCOUNTER: Primary | ICD-10-CM

## 2024-05-01 DIAGNOSIS — I50.9 HEART FAILURE, UNSPECIFIED HF CHRONICITY, UNSPECIFIED HEART FAILURE TYPE: ICD-10-CM

## 2024-05-01 DIAGNOSIS — L03.90 WOUND CELLULITIS: ICD-10-CM

## 2024-05-01 DIAGNOSIS — E11.42 CONTROLLED TYPE 2 DIABETES MELLITUS WITH DIABETIC POLYNEUROPATHY, WITHOUT LONG-TERM CURRENT USE OF INSULIN: ICD-10-CM

## 2024-05-01 DIAGNOSIS — I87.2 VENOUS INSUFFICIENCY: ICD-10-CM

## 2024-05-01 PROCEDURE — 29581 APPL MULTLAYER CMPRN SYS LEG: CPT | Mod: 59,51,RT,S$GLB

## 2024-05-01 PROCEDURE — 99499 UNLISTED E&M SERVICE: CPT | Mod: S$GLB,,,

## 2024-05-01 PROCEDURE — 29580 STRAPPING UNNA BOOT: CPT | Mod: LT,S$GLB,,

## 2024-05-01 PROCEDURE — 99999 PR PBB SHADOW E&M-EST. PATIENT-LVL V: CPT | Mod: PBBFAC,,,

## 2024-05-01 NOTE — PROGRESS NOTES
"Subjective:       Patient ID: Serafin Tapia is a 73 y.o. male.    Chief Complaint: Wound Check    Patient presents for a re-evaluation of a bilateral lower extremity wounds. He reports his wounds started as blisters about 2 months ago. He is unsure why he did not contact the office until now. Pt admits compliance with wearing compression stockings daily, but reports his stockings have lost elasticity. He reports that his wounds drain a large amount of clear fluid. Pt has been dressing his wounds with a wound "liquid" and occasionally covering his wounds with gauze. Pt has also been leaving his wounds open to air. Patient followed with vascular surgery for PAD and venous insufficiency. Dr. Kapoor determined he has adequate arterial blood flow for wound healing and cleared him for a 3 layer compression wrap. Pt previously followed with the wound care clinic in 2022 and 2023. No complaints at this time. Denies fever, chills, erythema, warmth, purulent drainage, or pain.      10/21/22 MIHIR:  Right lower extremity: 0.93- minimal peripheral arterial occlusive disease  Left lower extremity: 0.70- moderate peripheral arterial occlusive disease      Review of Systems   Constitutional:  Negative for activity change, chills, diaphoresis, fatigue and fever.   Eyes:  Negative for photophobia and visual disturbance.   Respiratory:  Negative for apnea, chest tightness and shortness of breath.    Cardiovascular:  Positive for leg swelling. Negative for chest pain and palpitations.   Musculoskeletal:  Negative for gait problem and joint swelling.   Skin:  Positive for wound. Negative for color change, pallor and rash.   Neurological:  Negative for dizziness, syncope, facial asymmetry, speech difficulty, weakness, light-headedness, numbness and headaches.   Psychiatric/Behavioral:  Negative for agitation and confusion. The patient is not nervous/anxious.    All other systems reviewed and are negative.      Objective:      Physical " Exam  Vitals reviewed.   Constitutional:       General: He is not in acute distress.     Appearance: Normal appearance. He is obese.   Cardiovascular:      Rate and Rhythm: Normal rate and regular rhythm.      Pulses: Normal pulses.   Pulmonary:      Effort: No respiratory distress.   Musculoskeletal:         General: No swelling.      Right lower leg: Pitting Edema present.      Left lower leg: Pitting Edema present.      Comments: Ambulates with cane   Skin:     General: Skin is warm and dry.      Capillary Refill: Capillary refill takes less than 2 seconds.      Findings: Wound present. No erythema.          Neurological:      General: No focal deficit present.      Mental Status: He is alert and oriented to person, place, and time.   Psychiatric:         Mood and Affect: Mood normal.         Behavior: Behavior normal.         Thought Content: Thought content normal.         Judgment: Judgment normal.         Assessment:       1. Multiple open wounds of left lower extremity, subsequent encounter    2. Venous insufficiency    3. Essential hypertension    4. Heart failure, unspecified HF chronicity, unspecified heart failure type    5. Controlled type 2 diabetes mellitus with diabetic polyneuropathy, without long-term current use of insulin    6. Wound cellulitis    7. Healed wound               Wound Venous Ulcer Right lower;medial Leg (Active)       Present on Original Admission:    Primary Wound Type: Venous ulcer   Side: Right   Orientation: lower;medial   Location: Leg   Wound Approximate Age at First Assessment (Weeks):    Wound Number:    Is this injury device related?:    Incision Type:    Closure Method:    Wound Description (Comments):    Type:    Additional Comments:    Ankle-Brachial Index:    Pulses:    Removal Indication and Assessment:    Wound Outcome:    Wound Image   05/01/24 0746   Dressing Appearance Dry;Intact;Clean;Moist drainage 05/01/24 0746   Drainage Amount Moderate 05/01/24 0746    Drainage Characteristics/Odor Serosanguineous 05/01/24 0746   Care Cleansed with:;Soap and water 05/01/24 0746   Dressing Applied;Compression wrap 05/01/24 0746   Compression Two layer compression 05/01/24 0746            Wound Venous Ulcer Left lower;medial Leg (Active)       Present on Original Admission:    Primary Wound Type: Venous ulcer   Side: Left   Orientation: lower;medial   Location: Leg   Wound Approximate Age at First Assessment (Weeks):    Wound Number:    Is this injury device related?:    Incision Type:    Closure Method:    Wound Description (Comments):    Type:    Additional Comments:    Ankle-Brachial Index:    Pulses:    Removal Indication and Assessment:    Wound Outcome:    Wound Image    05/01/24 0746   Dressing Appearance Dry;Intact;Clean;Moist drainage;Dried drainage 05/01/24 0746   Drainage Amount Large 05/01/24 0746   Drainage Characteristics/Odor Serosanguineous;Green;Malodorous 05/01/24 0746   Red (%), Wound Tissue Color 20 % 05/01/24 0746   Yellow (%), Wound Tissue Color 80 % 05/01/24 0746   Periwound Area Intact;Edematous;Pink 05/01/24 0746   Wound Length (cm) 8 cm 05/01/24 0746   Wound Width (cm) 3.3 cm 05/01/24 0746   Wound Depth (cm) 0.1 cm 05/01/24 0746   Wound Volume (cm^3) 2.64 cm^3 05/01/24 0746   Wound Surface Area (cm^2) 26.4 cm^2 05/01/24 0746   Care Cleansed with:;Soap and water 05/01/24 0746   Dressing Applied;Calcium alginate;Absorptive Pad;Compression wrap;Other (comment) 05/01/24 0746   Periwound Care Skin barrier film applied 05/01/24 0746   Compression Three layer compression;Unna's Boot 05/01/24 0746            Wound Venous Ulcer Left lower;lateral;posterior Leg (Active)       Present on Original Admission:    Primary Wound Type: Venous ulcer   Side: Left   Orientation: lower;lateral;posterior   Location: Leg   Wound Approximate Age at First Assessment (Weeks):    Wound Number:    Is this injury device related?:    Incision Type:    Closure Method:    Wound  Description (Comments):    Type:    Additional Comments:    Ankle-Brachial Index:    Pulses:    Removal Indication and Assessment:    Wound Outcome:    Wound Image   05/01/24 0746   Dressing Appearance Dry;Intact;Clean;Moist drainage;Dried drainage 05/01/24 0746   Drainage Amount Large 05/01/24 0746   Drainage Characteristics/Odor Serosanguineous;Green;Malodorous 05/01/24 0746   Red (%), Wound Tissue Color 30 % 05/01/24 0746   Yellow (%), Wound Tissue Color 70 % 05/01/24 0746   Periwound Area Intact;Edematous;Pink 05/01/24 0746   Wound Length (cm) 12 cm 05/01/24 0746   Wound Width (cm) 13 cm 05/01/24 0746   Wound Depth (cm) 0.1 cm 05/01/24 0746   Wound Volume (cm^3) 15.6 cm^3 05/01/24 0746   Wound Surface Area (cm^2) 156 cm^2 05/01/24 0746   Care Cleansed with:;Soap and water 05/01/24 0746   Dressing Applied;Calcium alginate;Absorptive Pad;Compression wrap;Other (comment) 05/01/24 0746   Periwound Care Skin barrier film applied 05/01/24 0746   Compression Unna's Boot;Three layer compression 05/01/24 0746         Serafin was seen in the clinic room and placed in the supine position on the treatment table.  The wound dressings were removed and the legs were cleansed with Easi-clense sponges and dried thoroughly.  No erythema or warmth noted from patient's baseline.  Odor and green drainage noted to left lower extremity. Cleansed wounds with acetic acid (1:1 with water) to treat pseudomonas. Declined sharp debridement. Right lower extremity noted to be healed- pt does not have compression stockings.      Plan of Care: Right lower extremity: 2 layer compression wrap.  Left lower extremity: drawtex, aquacel, mextra pad, and a 3 layer zinc coflex compression wrap.   The patient's feet were positioned at a 90 degree angle. The wraps were applied using a spiral technique avoiding creases or folds.  The wraps were started behind the first metatarsal and ended below the tibial tubercle of the knee.  There was overlap of each  turn half the width of the previous turn.  The compression wrap will be changed every 7 days.      Plan:       Bilateral lower extremities were dressed as detailed above  Patient was instructed to not get the dressings wet and to use cast covers for showering.  Should the dressing become wet, he is to remove it, place a moist dressing over the wound, cover with gauze and roll gauze and use ace wraps for compression and to secure bandages.  He should then notify this office as soon as possible to have a new dressing applied.  Instructed patient to remove wrap if he notices tingling or coldness to his bilateral lower extremities or if his toes become white, blue, or cold.    Discussed nutrition and the role of protein in wound healing with the patient. Instructed patient to optimize protein for wound healing.     Discussed bilateral lower extremity edema with patient. Instructed patient to elevate legs whenever sedentary and follow a low sodium diet.  Discussed replacing compression stockings every 3-6 months.  Instructed patient to bring compression stockings for right lower extremity next visit.    Instructed patient to keep follow up appointments with vascular surgery.    Discussed drainage control. Will re-evaluate if patient needs to follow 2x a week for drainage control.    Discussed cellulitis- likely pseudomonas. Treated topically with acetic acetic.    Written and verbal instructions given to patient  RTC in 1 week    Rosalba Eaton PA-C

## 2024-05-08 ENCOUNTER — OFFICE VISIT (OUTPATIENT)
Dept: WOUND CARE | Facility: CLINIC | Age: 74
End: 2024-05-08
Payer: MEDICARE

## 2024-05-08 VITALS
HEART RATE: 83 BPM | BODY MASS INDEX: 40.75 KG/M2 | TEMPERATURE: 98 F | WEIGHT: 275.13 LBS | SYSTOLIC BLOOD PRESSURE: 181 MMHG | HEIGHT: 69 IN | DIASTOLIC BLOOD PRESSURE: 80 MMHG

## 2024-05-08 DIAGNOSIS — I10 ESSENTIAL HYPERTENSION: ICD-10-CM

## 2024-05-08 DIAGNOSIS — I87.2 VENOUS INSUFFICIENCY: ICD-10-CM

## 2024-05-08 DIAGNOSIS — E11.42 CONTROLLED TYPE 2 DIABETES MELLITUS WITH DIABETIC POLYNEUROPATHY, WITHOUT LONG-TERM CURRENT USE OF INSULIN: ICD-10-CM

## 2024-05-08 DIAGNOSIS — I50.9 HEART FAILURE, UNSPECIFIED HF CHRONICITY, UNSPECIFIED HEART FAILURE TYPE: ICD-10-CM

## 2024-05-08 DIAGNOSIS — L03.90 WOUND CELLULITIS: ICD-10-CM

## 2024-05-08 DIAGNOSIS — S81.802D MULTIPLE OPEN WOUNDS OF LEFT LOWER EXTREMITY, SUBSEQUENT ENCOUNTER: Primary | ICD-10-CM

## 2024-05-08 PROCEDURE — 3008F BODY MASS INDEX DOCD: CPT | Mod: CPTII,S$GLB,,

## 2024-05-08 PROCEDURE — 3077F SYST BP >= 140 MM HG: CPT | Mod: CPTII,S$GLB,,

## 2024-05-08 PROCEDURE — 99213 OFFICE O/P EST LOW 20 MIN: CPT | Mod: 25,S$GLB,,

## 2024-05-08 PROCEDURE — 3288F FALL RISK ASSESSMENT DOCD: CPT | Mod: CPTII,S$GLB,,

## 2024-05-08 PROCEDURE — 29580 STRAPPING UNNA BOOT: CPT | Mod: LT,S$GLB,,

## 2024-05-08 PROCEDURE — 3072F LOW RISK FOR RETINOPATHY: CPT | Mod: CPTII,S$GLB,,

## 2024-05-08 PROCEDURE — 99999 PR PBB SHADOW E&M-EST. PATIENT-LVL IV: CPT | Mod: PBBFAC,,,

## 2024-05-08 PROCEDURE — 3079F DIAST BP 80-89 MM HG: CPT | Mod: CPTII,S$GLB,,

## 2024-05-08 PROCEDURE — 1159F MED LIST DOCD IN RCRD: CPT | Mod: CPTII,S$GLB,,

## 2024-05-08 PROCEDURE — 29581 APPL MULTLAYER CMPRN SYS LEG: CPT | Mod: 59,51,RT,S$GLB

## 2024-05-08 PROCEDURE — 1125F AMNT PAIN NOTED PAIN PRSNT: CPT | Mod: CPTII,S$GLB,,

## 2024-05-08 PROCEDURE — 1101F PT FALLS ASSESS-DOCD LE1/YR: CPT | Mod: CPTII,S$GLB,,

## 2024-05-08 RX ORDER — CIPROFLOXACIN 750 MG/1
750 TABLET, FILM COATED ORAL 2 TIMES DAILY
Qty: 28 TABLET | Refills: 0 | Status: SHIPPED | OUTPATIENT
Start: 2024-05-08 | End: 2024-05-22

## 2024-05-08 NOTE — PROGRESS NOTES
"Subjective:       Patient ID: Serafin Tapia is a 73 y.o. male.    Chief Complaint: Wound Check    Patient presents for a re-evaluation of a bilateral lower extremity wounds. He reports his wounds started as blisters about 2 months ago. He is unsure why he did not contact the office until now. Pt admits compliance with wearing compression stockings daily, but reports his stockings have lost elasticity. He reports that his wounds drain a large amount of clear fluid. Pt has been dressing his wounds with a wound "liquid" and occasionally covering his wounds with gauze. Pt has also been leaving his wounds open to air. Patient followed with vascular surgery for PAD and venous insufficiency. Dr. Kapoor determined he has adequate arterial blood flow for wound healing and cleared him for a 3 layer compression wrap. Pt previously followed with the wound care clinic in 2022 and 2023.     Interval history: Pt drove to [place] over the weekend. Denies compliance with a low sodium diet, leg elevation, and following a high protein diet. Pt still did not obtain compression stockings for his right lower extremity. Denies fever, chills, erythema, warmth, purulent drainage, or pain.      10/21/22 MIHIR:  Right lower extremity: 0.93- minimal peripheral arterial occlusive disease  Left lower extremity: 0.70- moderate peripheral arterial occlusive disease      Review of Systems   Constitutional:  Negative for activity change, chills, diaphoresis, fatigue and fever.   Eyes:  Negative for photophobia and visual disturbance.   Respiratory:  Negative for apnea, chest tightness and shortness of breath.    Cardiovascular:  Positive for leg swelling. Negative for chest pain and palpitations.   Musculoskeletal:  Negative for gait problem and joint swelling.   Skin:  Positive for wound. Negative for color change, pallor and rash.   Neurological:  Negative for dizziness, syncope, facial asymmetry, speech difficulty, weakness, light-headedness, " numbness and headaches.   Psychiatric/Behavioral:  Negative for agitation and confusion. The patient is not nervous/anxious.    All other systems reviewed and are negative.      Objective:      Physical Exam  Vitals reviewed.   Constitutional:       General: He is not in acute distress.     Appearance: Normal appearance. He is obese.   Cardiovascular:      Rate and Rhythm: Normal rate and regular rhythm.      Pulses: Normal pulses.   Pulmonary:      Effort: No respiratory distress.   Musculoskeletal:         General: No swelling.      Right lower leg: Pitting Edema present.      Left lower leg: Pitting Edema present.      Comments: Ambulates with cane   Skin:     General: Skin is warm and dry.      Capillary Refill: Capillary refill takes less than 2 seconds.      Findings: Wound present. No erythema.          Neurological:      General: No focal deficit present.      Mental Status: He is alert and oriented to person, place, and time.   Psychiatric:         Mood and Affect: Mood normal.         Behavior: Behavior normal.         Thought Content: Thought content normal.         Judgment: Judgment normal.         Assessment:       1. Multiple open wounds of left lower extremity, subsequent encounter    2. Wound cellulitis    3. Venous insufficiency    4. Essential hypertension    5. Heart failure, unspecified HF chronicity, unspecified heart failure type    6. Controlled type 2 diabetes mellitus with diabetic polyneuropathy, without long-term current use of insulin               Wound Venous Ulcer Left lower;medial Leg (Active)       Present on Original Admission:    Primary Wound Type: Venous ulcer   Side: Left   Orientation: lower;medial   Location: Leg   Wound Approximate Age at First Assessment (Weeks):    Wound Number:    Is this injury device related?:    Incision Type:    Closure Method:    Wound Description (Comments):    Type:    Additional Comments:    Ankle-Brachial Index:    Pulses:    Removal Indication  and Assessment:    Wound Outcome:    Wound Image   05/08/24 0846   Dressing Appearance Dry;Intact;Clean;Moist drainage 05/08/24 0846   Drainage Amount Large 05/08/24 0846   Drainage Characteristics/Odor Serosanguineous 05/08/24 0846   Red (%), Wound Tissue Color 50 % 05/08/24 0846   Yellow (%), Wound Tissue Color 50 % 05/08/24 0846   Periwound Area Intact;Edematous;Pink 05/08/24 0846   Wound Length (cm) 0.5 cm 05/08/24 0846   Wound Width (cm) 0.9 cm 05/08/24 0846   Wound Depth (cm) 0.1 cm 05/08/24 0846   Wound Volume (cm^3) 0.045 cm^3 05/08/24 0846   Wound Surface Area (cm^2) 0.45 cm^2 05/08/24 0846   Care Cleansed with:;Soap and water 05/08/24 0846   Dressing Applied;Calcium alginate;Silver;Compression wrap;Absorptive Pad;Other (comment) 05/08/24 0846   Periwound Care Skin barrier film applied 05/08/24 0846   Compression Unna's Boot;Three layer compression 05/08/24 0846            Wound Venous Ulcer Left lower;lateral;posterior Leg (Active)       Present on Original Admission:    Primary Wound Type: Venous ulcer   Side: Left   Orientation: lower;lateral;posterior   Location: Leg   Wound Approximate Age at First Assessment (Weeks):    Wound Number:    Is this injury device related?:    Incision Type:    Closure Method:    Wound Description (Comments):    Type:    Additional Comments:    Ankle-Brachial Index:    Pulses:    Removal Indication and Assessment:    Wound Outcome:    Wound Image    05/08/24 0846   Dressing Appearance Dry;Intact;Clean;Moist drainage 05/08/24 0846   Drainage Amount Large 05/08/24 0846   Drainage Characteristics/Odor Green;Malodorous;Serosanguineous 05/08/24 0846   Red (%), Wound Tissue Color 40 % 05/08/24 0846   Yellow (%), Wound Tissue Color 60 % 05/08/24 0846   Periwound Area Intact;Edematous 05/08/24 0846   Wound Length (cm) 13 cm 05/08/24 0846   Wound Width (cm) 15 cm 05/08/24 0846   Wound Depth (cm) 0.1 cm 05/08/24 0846   Wound Volume (cm^3) 19.5 cm^3 05/08/24 0846   Wound Surface Area  (cm^2) 195 cm^2 05/08/24 0846   Care Cleansed with:;Soap and water 05/08/24 0846   Dressing Applied;Calcium alginate;Silver;Absorptive Pad;Compression wrap;Other (comment) 05/08/24 0846   Periwound Care Skin barrier film applied 05/08/24 0846   Compression Unna's Boot;Three layer compression 05/08/24 0846         Serafin was seen in the clinic room and placed in the supine position on the treatment table.  The wound dressings were removed and the legs were cleansed with Easi-clense sponges and dried thoroughly.  No erythema or warmth noted from patient's baseline.  Odor and green drainage noted to left lower extremity. Declined sharp debridement. Right lower extremity noted to be healed- pt does not have compression stockings.      Plan of Care: Right lower extremity: 2 layer compression wrap.  Left lower extremity: drawtex, aquacel Ag, mextra pad, and a 3 layer zinc coflex compression wrap.   The patient's feet were positioned at a 90 degree angle. The wraps were applied using a spiral technique avoiding creases or folds.  The wraps were started behind the first metatarsal and ended below the tibial tubercle of the knee.  There was overlap of each turn half the width of the previous turn.  The compression wrap will be changed every 7 days.      Plan:       Bilateral lower extremities were dressed as detailed above  Patient was instructed to not get the dressings wet and to use cast covers for showering.  Should the dressing become wet, he is to remove it, place a moist dressing over the wound, cover with gauze and roll gauze and use ace wraps for compression and to secure bandages.  He should then notify this office as soon as possible to have a new dressing applied.  Instructed patient to remove wrap if he notices tingling or coldness to his bilateral lower extremities or if his toes become white, blue, or cold.    Discussed nutrition and the role of protein in wound healing with the patient. Instructed patient to  optimize protein for wound healing.     Discussed bilateral lower extremity edema with patient. Instructed patient to elevate legs whenever sedentary and follow a low sodium diet.  Discussed replacing compression stockings every 3-6 months.  Instructed patient to bring compression stockings for right lower extremity next visit.    Instructed patient to keep follow up appointments with vascular surgery.    Discussed drainage control. Will re-evaluate if patient needs to follow 2x a week for drainage control.    Discussed cellulitis- likely pseudomonas. Treated topically with aquacel ag.  Prescribed ciprofloxacin. Discussed side effects of medication. Instructed patient to take medication as prescribed.       Written and verbal instructions given to patient  RTC in 1 week    Rosalba Eaton PA-C

## 2024-05-15 ENCOUNTER — OFFICE VISIT (OUTPATIENT)
Dept: WOUND CARE | Facility: CLINIC | Age: 74
End: 2024-05-15
Payer: MEDICARE

## 2024-05-15 VITALS
TEMPERATURE: 98 F | WEIGHT: 274.25 LBS | HEART RATE: 68 BPM | DIASTOLIC BLOOD PRESSURE: 89 MMHG | SYSTOLIC BLOOD PRESSURE: 189 MMHG | BODY MASS INDEX: 40.62 KG/M2 | HEIGHT: 69 IN

## 2024-05-15 DIAGNOSIS — I10 ESSENTIAL HYPERTENSION: ICD-10-CM

## 2024-05-15 DIAGNOSIS — I87.2 VENOUS INSUFFICIENCY: ICD-10-CM

## 2024-05-15 DIAGNOSIS — I50.9 HEART FAILURE, UNSPECIFIED HF CHRONICITY, UNSPECIFIED HEART FAILURE TYPE: ICD-10-CM

## 2024-05-15 DIAGNOSIS — L03.90 WOUND CELLULITIS: ICD-10-CM

## 2024-05-15 DIAGNOSIS — S81.802D MULTIPLE OPEN WOUNDS OF LEFT LOWER EXTREMITY, SUBSEQUENT ENCOUNTER: Primary | ICD-10-CM

## 2024-05-15 DIAGNOSIS — E11.42 CONTROLLED TYPE 2 DIABETES MELLITUS WITH DIABETIC POLYNEUROPATHY, WITHOUT LONG-TERM CURRENT USE OF INSULIN: ICD-10-CM

## 2024-05-15 PROCEDURE — 99999 PR PBB SHADOW E&M-EST. PATIENT-LVL V: CPT | Mod: PBBFAC,,,

## 2024-05-15 PROCEDURE — 3008F BODY MASS INDEX DOCD: CPT | Mod: CPTII,S$GLB,,

## 2024-05-15 PROCEDURE — 11045 DBRDMT SUBQ TISS EACH ADDL: CPT | Mod: S$GLB,,,

## 2024-05-15 PROCEDURE — 3288F FALL RISK ASSESSMENT DOCD: CPT | Mod: CPTII,S$GLB,,

## 2024-05-15 PROCEDURE — 3072F LOW RISK FOR RETINOPATHY: CPT | Mod: CPTII,S$GLB,,

## 2024-05-15 PROCEDURE — 11042 DBRDMT SUBQ TIS 1ST 20SQCM/<: CPT | Mod: S$GLB,,,

## 2024-05-15 PROCEDURE — 99213 OFFICE O/P EST LOW 20 MIN: CPT | Mod: 25,S$GLB,,

## 2024-05-15 PROCEDURE — 1125F AMNT PAIN NOTED PAIN PRSNT: CPT | Mod: CPTII,S$GLB,,

## 2024-05-15 PROCEDURE — 1159F MED LIST DOCD IN RCRD: CPT | Mod: CPTII,S$GLB,,

## 2024-05-15 PROCEDURE — 1101F PT FALLS ASSESS-DOCD LE1/YR: CPT | Mod: CPTII,S$GLB,,

## 2024-05-15 PROCEDURE — 3079F DIAST BP 80-89 MM HG: CPT | Mod: CPTII,S$GLB,,

## 2024-05-15 PROCEDURE — 3077F SYST BP >= 140 MM HG: CPT | Mod: CPTII,S$GLB,,

## 2024-05-15 NOTE — PROCEDURES
"Debridement    Date/Time: 5/15/2024 8:00 AM    Performed by: Rosalba Eaton PA-C  Authorized by: Rosalba Eaton PA-C    Time out: Immediately prior to procedure a "time out" was called to verify the correct patient, procedure, equipment, support staff and site/side marked as required.    Consent Done?:  Yes (Written)  Local anesthesia used?: Yes    Local anesthetic:  Topical anesthetic (Topical 4% Lidocaine Hydrochloride)    Wound Details:    Location:  Left leg (lateral posterior)    Type of Debridement:  Excisional       Length (cm):  13       Area (sq cm):  195       Width (cm):  15       Percent Debrided (%):  100       Depth (cm):  0.1       Total Area Debrided (sq cm):  195    Depth of debridement:  Subcutaneous tissue    Tissue debrided:  Subcutaneous    Devitalized tissue debrided:  Biofilm, Exudate, Fibrin, Necrotic/Eschar and Slough    Instruments:  Curette, Blade and Forceps  Bleeding:  Minimal  Hemostasis Achieved: Yes  Method Used:  Pressure  Patient tolerance:  Patient tolerated the procedure well with no immediate complications    "

## 2024-05-15 NOTE — PROGRESS NOTES
"Subjective:       Patient ID: Serafin Tapia is a 73 y.o. male.    Chief Complaint: Wound Check and Wound Infection    Patient presents for a re-evaluation of a left lower extremity wounds. He reports his wounds started as blisters about 2 months ago. He is unsure why he did not contact the office until now. Pt admits compliance with wearing compression stockings daily, but reports his stockings have lost elasticity. He reports that his wounds drain a large amount of clear fluid. Pt has been dressing his wounds with a wound "liquid" and occasionally covering his wounds with gauze. Pt has also been leaving his wounds open to air. Patient followed with vascular surgery for PAD and venous insufficiency. Dr. Kapoor determined he has adequate arterial blood flow for wound healing and cleared him for a 3 layer compression wrap. Pt previously followed with the wound care clinic in 2022 and 2023.     Interval history:  Denies compliance with a low sodium diet, leg elevation, and following a high protein diet. He reports going to a Hydrocision boil this weekend. Admits compliance with ciprofloxacin- denies side effects. Denies fever, chills, erythema, warmth, purulent drainage, or pain.      10/21/22 MIHIR:  Right lower extremity: 0.93- minimal peripheral arterial occlusive disease  Left lower extremity: 0.70- moderate peripheral arterial occlusive disease      Review of Systems   Constitutional:  Negative for activity change, chills, diaphoresis, fatigue and fever.   Eyes:  Negative for photophobia and visual disturbance.   Respiratory:  Negative for apnea, chest tightness and shortness of breath.    Cardiovascular:  Positive for leg swelling. Negative for chest pain and palpitations.   Musculoskeletal:  Negative for gait problem and joint swelling.   Skin:  Positive for wound. Negative for color change, pallor and rash.   Neurological:  Negative for dizziness, syncope, facial asymmetry, speech difficulty, weakness, " light-headedness, numbness and headaches.   Psychiatric/Behavioral:  Negative for agitation and confusion. The patient is not nervous/anxious.    All other systems reviewed and are negative.      Objective:      Physical Exam  Vitals reviewed.   Constitutional:       General: He is not in acute distress.     Appearance: Normal appearance. He is obese.   Cardiovascular:      Rate and Rhythm: Normal rate and regular rhythm.      Pulses: Normal pulses.   Pulmonary:      Effort: No respiratory distress.   Musculoskeletal:         General: No swelling.      Right lower leg: Pitting Edema present.      Left lower leg: Pitting Edema present.      Comments: Ambulates with cane   Skin:     General: Skin is warm and dry.      Capillary Refill: Capillary refill takes less than 2 seconds.      Findings: Wound present. No erythema.          Neurological:      General: No focal deficit present.      Mental Status: He is alert and oriented to person, place, and time.   Psychiatric:         Mood and Affect: Mood normal.         Behavior: Behavior normal.         Thought Content: Thought content normal.         Judgment: Judgment normal.         Assessment:       1. Multiple open wounds of left lower extremity, subsequent encounter    2. Wound cellulitis    3. Venous insufficiency    4. Essential hypertension    5. Heart failure, unspecified HF chronicity, unspecified heart failure type    6. Controlled type 2 diabetes mellitus with diabetic polyneuropathy, without long-term current use of insulin               Wound Venous Ulcer Left lower;medial Leg (Active)       Present on Original Admission:    Primary Wound Type: Venous ulcer   Side: Left   Orientation: lower;medial   Location: Leg   Wound Approximate Age at First Assessment (Weeks):    Wound Number:    Is this injury device related?:    Incision Type:    Closure Method:    Wound Description (Comments):    Type:    Additional Comments:    Ankle-Brachial Index:    Pulses:     Removal Indication and Assessment:    Wound Outcome:    Dressing Appearance Dry;Intact;Clean;Moist drainage 05/15/24 0815   Drainage Amount Large 05/15/24 0815   Drainage Characteristics/Odor Serosanguineous 05/15/24 0815   Red (%), Wound Tissue Color 100 % 05/15/24 0815   Periwound Area Intact;Edematous;Pink 05/15/24 0815   Wound Length (cm) 0.1 cm 05/15/24 0815   Wound Width (cm) 0.5 cm 05/15/24 0815   Wound Depth (cm) 0.1 cm 05/15/24 0815   Wound Volume (cm^3) 0.005 cm^3 05/15/24 0815   Wound Surface Area (cm^2) 0.05 cm^2 05/15/24 0815   Care Cleansed with:;Soap and water 05/15/24 0815   Dressing Applied;Calcium alginate;Absorptive Pad;Compression wrap;Other (comment) 05/15/24 0815   Periwound Care Skin barrier film applied 05/15/24 0815   Compression Unna's Boot;Three layer compression 05/15/24 0815            Wound Venous Ulcer Left lower;lateral;posterior Leg (Active)       Present on Original Admission:    Primary Wound Type: Venous ulcer   Side: Left   Orientation: lower;lateral;posterior   Location: Leg   Wound Approximate Age at First Assessment (Weeks):    Wound Number:    Is this injury device related?:    Incision Type:    Closure Method:    Wound Description (Comments):    Type:    Additional Comments:    Ankle-Brachial Index:    Pulses:    Removal Indication and Assessment:    Wound Outcome:    Wound Image      05/15/24 0815   Dressing Appearance Dry;Intact;Clean;Moist drainage;Saturated 05/15/24 0815   Drainage Amount Copious 05/15/24 0815   Drainage Characteristics/Odor Serosanguineous;Green;Malodorous 05/15/24 0815   Black (%), Wound Tissue Color 30 % 05/15/24 0815   Red (%), Wound Tissue Color 30 % 05/15/24 0815   Yellow (%), Wound Tissue Color 40 % 05/15/24 0815   Periwound Area Intact;Edematous;Pink 05/15/24 0815   Wound Length (cm) 13 cm 05/15/24 0815   Wound Width (cm) 15 cm 05/15/24 0815   Wound Depth (cm) 0.1 cm 05/15/24 0815   Wound Volume (cm^3) 19.5 cm^3 05/15/24 0815   Wound Surface  Area (cm^2) 195 cm^2 05/15/24 0815   Care Cleansed with:;Soap and water 05/15/24 0815   Dressing Applied;Calcium alginate;Absorptive Pad;Compression wrap;Other (comment) 05/15/24 0815   Periwound Care Skin barrier film applied 05/15/24 0815   Compression Unna's Boot;Three layer compression 05/15/24 0815         Serafin was seen in the clinic room and placed in the supine position on the treatment table.  The wound dressings were removed and the legs were cleansed with Easi-clense sponges and dried thoroughly.  No erythema or warmth noted from patient's baseline.  Odor and green drainage noted to left lower extremity. Cleansed wounds with acetic acid (1:1 with water) to treat pseudomonas. Placed topical 4% Lidocaine Hydrochloride to wound bed for 15 minutes. Sharp debridement with 5 mm curette, pickups, and blade to remove non-viable tissue. Pt tolerated procedure well.        Plan of Care:  drawtex, aquacel Ag, mextra pad, and a 3 layer zinc coflex compression wrap. The patient's foot was positioned at a 90 degree angle. The wrap was applied using a spiral technique avoiding creases or folds.  The wrap was started behind the first metatarsal and ended below the tibial tubercle of the knee.  There was overlap of each turn half the width of the previous turn.  The compression wrap will be changed every 7 days.      Plan:       Left lower extremity was dressed as detailed above  Patient was instructed to not get the dressings wet and to use cast covers for showering.  Should the dressing become wet, he is to remove it, place a moist dressing over the wound, cover with gauze and roll gauze and use ace wraps for compression and to secure bandages.  He should then notify this office as soon as possible to have a new dressing applied.  Instructed patient to remove wrap if he notices tingling or coldness to his bilateral lower extremities or if his toes become white, blue, or cold.    Discussed nutrition and the role of  protein in wound healing with the patient. Instructed patient to optimize protein for wound healing.     Discussed bilateral lower extremity edema with patient. Instructed patient to elevate legs whenever sedentary and follow a low sodium diet.  Discussed replacing compression stockings every 3-6 months.  Instructed patient to bring compression stockings for right lower extremity next visit.    Instructed patient to keep follow up appointments with vascular surgery.    Discussed drainage control. Will re-evaluate if patient needs to follow 2x a week for drainage control.    Discussed cellulitis- likely pseudomonas. Treated topically with acetic acid.  Instructed patient to continue ciprofloxacin as prescribed.      Written and verbal instructions given to patient  RTC in 1 week    Rosalba Eaton PA-C

## 2024-05-22 ENCOUNTER — OFFICE VISIT (OUTPATIENT)
Dept: WOUND CARE | Facility: CLINIC | Age: 74
End: 2024-05-22
Payer: MEDICARE

## 2024-05-22 VITALS
WEIGHT: 276.25 LBS | HEART RATE: 72 BPM | BODY MASS INDEX: 40.91 KG/M2 | SYSTOLIC BLOOD PRESSURE: 150 MMHG | TEMPERATURE: 98 F | HEIGHT: 69 IN | DIASTOLIC BLOOD PRESSURE: 66 MMHG

## 2024-05-22 DIAGNOSIS — I87.2 VENOUS INSUFFICIENCY: ICD-10-CM

## 2024-05-22 DIAGNOSIS — L03.90 WOUND CELLULITIS: ICD-10-CM

## 2024-05-22 DIAGNOSIS — E11.42 CONTROLLED TYPE 2 DIABETES MELLITUS WITH DIABETIC POLYNEUROPATHY, WITHOUT LONG-TERM CURRENT USE OF INSULIN: ICD-10-CM

## 2024-05-22 DIAGNOSIS — I50.9 HEART FAILURE, UNSPECIFIED HF CHRONICITY, UNSPECIFIED HEART FAILURE TYPE: ICD-10-CM

## 2024-05-22 DIAGNOSIS — S81.802D MULTIPLE OPEN WOUNDS OF LEFT LOWER EXTREMITY, SUBSEQUENT ENCOUNTER: Primary | ICD-10-CM

## 2024-05-22 DIAGNOSIS — I10 ESSENTIAL HYPERTENSION: ICD-10-CM

## 2024-05-22 PROCEDURE — 99999 PR PBB SHADOW E&M-EST. PATIENT-LVL IV: CPT | Mod: PBBFAC,,,

## 2024-05-22 PROCEDURE — 3072F LOW RISK FOR RETINOPATHY: CPT | Mod: CPTII,S$GLB,,

## 2024-05-22 PROCEDURE — 1125F AMNT PAIN NOTED PAIN PRSNT: CPT | Mod: CPTII,S$GLB,,

## 2024-05-22 PROCEDURE — 3008F BODY MASS INDEX DOCD: CPT | Mod: CPTII,S$GLB,,

## 2024-05-22 PROCEDURE — 1159F MED LIST DOCD IN RCRD: CPT | Mod: CPTII,S$GLB,,

## 2024-05-22 PROCEDURE — 3077F SYST BP >= 140 MM HG: CPT | Mod: CPTII,S$GLB,,

## 2024-05-22 PROCEDURE — 1101F PT FALLS ASSESS-DOCD LE1/YR: CPT | Mod: CPTII,S$GLB,,

## 2024-05-22 PROCEDURE — 29580 STRAPPING UNNA BOOT: CPT | Mod: LT,S$GLB,,

## 2024-05-22 PROCEDURE — 99213 OFFICE O/P EST LOW 20 MIN: CPT | Mod: 25,S$GLB,,

## 2024-05-22 PROCEDURE — 3288F FALL RISK ASSESSMENT DOCD: CPT | Mod: CPTII,S$GLB,,

## 2024-05-22 PROCEDURE — 3078F DIAST BP <80 MM HG: CPT | Mod: CPTII,S$GLB,,

## 2024-05-22 NOTE — PROGRESS NOTES
"Subjective:       Patient ID: Serafin Tapia is a 73 y.o. male.    Chief Complaint: Wound Check and Wound Infection    Patient presents for a re-evaluation of a left lower extremity wounds. He reports his wounds started as blisters about 2 months ago. He is unsure why he did not contact the office until now. Pt admits compliance with wearing compression stockings daily, but reports his stockings have lost elasticity. He reports that his wounds drain a large amount of clear fluid. Pt has been dressing his wounds with a wound "liquid" and occasionally covering his wounds with gauze. Pt has also been leaving his wounds open to air. Patient followed with vascular surgery for PAD and venous insufficiency. Dr. Kapoor determined he has adequate arterial blood flow for wound healing and cleared him for a 3 layer compression wrap. Pt previously followed with the wound care clinic in 2022 and 2023.     Interval history:  Denies compliance with a low sodium diet, leg elevation, and following a high protein diet. Admits compliance with ciprofloxacin- denies side effects. Denies fever, chills, erythema, warmth, or pain.      10/21/22 MIHIR:  Right lower extremity: 0.93- minimal peripheral arterial occlusive disease  Left lower extremity: 0.70- moderate peripheral arterial occlusive disease      Review of Systems   Constitutional:  Negative for activity change, chills, diaphoresis, fatigue and fever.   Eyes:  Negative for photophobia and visual disturbance.   Respiratory:  Negative for apnea, chest tightness and shortness of breath.    Cardiovascular:  Positive for leg swelling. Negative for chest pain and palpitations.   Musculoskeletal:  Negative for gait problem and joint swelling.   Skin:  Positive for wound. Negative for color change, pallor and rash.   Neurological:  Negative for dizziness, syncope, facial asymmetry, speech difficulty, weakness, light-headedness, numbness and headaches.   Psychiatric/Behavioral:  Negative for " agitation and confusion. The patient is not nervous/anxious.    All other systems reviewed and are negative.      Objective:      Physical Exam  Vitals reviewed.   Constitutional:       General: He is not in acute distress.     Appearance: Normal appearance. He is obese.   Cardiovascular:      Rate and Rhythm: Normal rate and regular rhythm.      Pulses: Normal pulses.   Pulmonary:      Effort: No respiratory distress.   Musculoskeletal:         General: No swelling.      Right lower leg: Pitting Edema present.      Left lower leg: Pitting Edema present.      Comments: Ambulates with cane   Skin:     General: Skin is warm and dry.      Capillary Refill: Capillary refill takes less than 2 seconds.      Findings: Wound present. No erythema.          Neurological:      General: No focal deficit present.      Mental Status: He is alert and oriented to person, place, and time.   Psychiatric:         Mood and Affect: Mood normal.         Behavior: Behavior normal.         Thought Content: Thought content normal.         Judgment: Judgment normal.         Assessment:       1. Multiple open wounds of left lower extremity, subsequent encounter    2. Wound cellulitis    3. Venous insufficiency    4. Essential hypertension    5. Heart failure, unspecified HF chronicity, unspecified heart failure type    6. Controlled type 2 diabetes mellitus with diabetic polyneuropathy, without long-term current use of insulin               Wound Venous Ulcer Left lower;lateral;posterior Leg (Active)       Present on Original Admission:    Primary Wound Type: Venous ulcer   Side: Left   Orientation: lower;lateral;posterior   Location: Leg   Wound Approximate Age at First Assessment (Weeks):    Wound Number:    Is this injury device related?:    Incision Type:    Closure Method:    Wound Description (Comments):    Type:    Additional Comments:    Ankle-Brachial Index:    Pulses:    Removal Indication and Assessment:    Wound Outcome:    Wound  Image     05/22/24 0753   Dressing Appearance Dry;Intact;Clean;Moist drainage;Dried drainage 05/22/24 0753   Drainage Amount Large 05/22/24 0753   Drainage Characteristics/Odor Green;Malodorous;Serosanguineous 05/22/24 0753   Red (%), Wound Tissue Color 40 % 05/22/24 0753   Yellow (%), Wound Tissue Color 60 % 05/22/24 0753   Periwound Area Intact;Edematous;Pink 05/22/24 0753   Wound Length (cm) 14 cm 05/22/24 0753   Wound Width (cm) 10.2 cm 05/22/24 0753   Wound Depth (cm) 0.1 cm 05/22/24 0753   Wound Volume (cm^3) 14.28 cm^3 05/22/24 0753   Wound Surface Area (cm^2) 142.8 cm^2 05/22/24 0753   Care Cleansed with:;Soap and water 05/22/24 0753   Dressing Applied;Calcium alginate;Absorptive Pad;Compression wrap;Other (comment) 05/22/24 0753   Periwound Care Skin barrier film applied 05/22/24 0753   Compression Two layer compression 05/22/24 0753         Serafin was seen in the clinic room and placed in the supine position on the treatment table.  The wound dressings were removed and the legs were cleansed with Easi-clense sponges and dried thoroughly.  No erythema or warmth noted from patient's baseline.  Odor and green drainage noted to left lower extremity. Cleansed wounds with acetic acid (1:1 with water) to treat pseudomonas. Declined sharp debridement.        Plan of Care:  drawtex, aquacel, mextra pad, and a 3 layer zinc coflex compression wrap. The patient's foot was positioned at a 90 degree angle. The wrap was applied using a spiral technique avoiding creases or folds.  The wrap was started behind the first metatarsal and ended below the tibial tubercle of the knee.  There was overlap of each turn half the width of the previous turn.  The compression wrap will be changed every 7 days.      Plan:       Left lower extremity was dressed as detailed above  Patient was instructed to not get the dressings wet and to use cast covers for showering.  Should the dressing become wet, he is to remove it, place a moist  dressing over the wound, cover with gauze and roll gauze and use ace wraps for compression and to secure bandages.  He should then notify this office as soon as possible to have a new dressing applied.  Instructed patient to remove wrap if he notices tingling or coldness to his bilateral lower extremities or if his toes become white, blue, or cold.    Discussed nutrition and the role of protein in wound healing with the patient. Instructed patient to optimize protein for wound healing.     Discussed bilateral lower extremity edema with patient. Instructed patient to elevate legs whenever sedentary and follow a low sodium diet.  Discussed replacing compression stockings every 3-6 months.  Instructed patient to bring compression stockings for right lower extremity next visit.    Instructed patient to keep follow up appointments with vascular surgery.    Discussed drainage control. Will re-evaluate if patient needs to follow 2x a week for drainage control.    Discussed cellulitis- likely pseudomonas. Treated topically with acetic acid.  Instructed patient to continue ciprofloxacin as prescribed.      Written and verbal instructions given to patient  RTC in 1 week    Rosalba Eaton PA-C

## 2024-05-29 ENCOUNTER — OFFICE VISIT (OUTPATIENT)
Dept: WOUND CARE | Facility: CLINIC | Age: 74
End: 2024-05-29
Payer: MEDICARE

## 2024-05-29 VITALS
HEIGHT: 69 IN | WEIGHT: 279.75 LBS | SYSTOLIC BLOOD PRESSURE: 114 MMHG | DIASTOLIC BLOOD PRESSURE: 68 MMHG | TEMPERATURE: 99 F | BODY MASS INDEX: 41.43 KG/M2 | HEART RATE: 64 BPM

## 2024-05-29 DIAGNOSIS — I10 ESSENTIAL HYPERTENSION: ICD-10-CM

## 2024-05-29 DIAGNOSIS — I50.9 HEART FAILURE, UNSPECIFIED HF CHRONICITY, UNSPECIFIED HEART FAILURE TYPE: ICD-10-CM

## 2024-05-29 DIAGNOSIS — L03.90 WOUND CELLULITIS: ICD-10-CM

## 2024-05-29 DIAGNOSIS — E11.42 CONTROLLED TYPE 2 DIABETES MELLITUS WITH DIABETIC POLYNEUROPATHY, WITHOUT LONG-TERM CURRENT USE OF INSULIN: ICD-10-CM

## 2024-05-29 DIAGNOSIS — I87.2 VENOUS INSUFFICIENCY: ICD-10-CM

## 2024-05-29 DIAGNOSIS — S81.802D OPEN WOUND OF LEFT LOWER EXTREMITY, SUBSEQUENT ENCOUNTER: Primary | ICD-10-CM

## 2024-05-29 PROCEDURE — 99999 PR PBB SHADOW E&M-EST. PATIENT-LVL I: CPT | Mod: PBBFAC,,,

## 2024-05-29 PROCEDURE — 11045 DBRDMT SUBQ TISS EACH ADDL: CPT | Mod: S$GLB,,,

## 2024-05-29 PROCEDURE — 3072F LOW RISK FOR RETINOPATHY: CPT | Mod: CPTII,S$GLB,,

## 2024-05-29 PROCEDURE — 99212 OFFICE O/P EST SF 10 MIN: CPT | Mod: 25,S$GLB,,

## 2024-05-29 PROCEDURE — 11042 DBRDMT SUBQ TIS 1ST 20SQCM/<: CPT | Mod: S$GLB,,,

## 2024-05-29 NOTE — PROCEDURES
"Debridement    Date/Time: 5/29/2024 8:00 AM    Performed by: Rosalba Eaton PA-C  Authorized by: Rosalba Eaton PA-C    Time out: Immediately prior to procedure a "time out" was called to verify the correct patient, procedure, equipment, support staff and site/side marked as required.    Consent Done?:  Yes (Written)  Local anesthesia used?: Yes    Local anesthetic:  Topical anesthetic (Topical 4% Lidocaine Hydrochloride)    Wound Details:    Location:  Left leg    Type of Debridement:  Excisional       Length (cm):  13.4       Area (sq cm):  194.3       Width (cm):  14.5       Percent Debrided (%):  100       Depth (cm):  0.1       Total Area Debrided (sq cm):  194.3    Depth of debridement:  Subcutaneous tissue    Tissue debrided:  Subcutaneous    Devitalized tissue debrided:  Biofilm, Exudate, Fibrin and Slough    Instruments:  Curette, Blade, Forceps and Scissors  Bleeding:  Minimal  Hemostasis Achieved: Yes  Method Used:  Pressure  Patient tolerance:  Patient tolerated the procedure well with no immediate complications    "

## 2024-05-29 NOTE — PROGRESS NOTES
"Subjective:       Patient ID: Serafin Tapia is a 73 y.o. male.    Chief Complaint: Wound Check    Patient presents for a re-evaluation of a left lower extremity wounds. He reports his wounds started as blisters about 2 months ago. He is unsure why he did not contact the office until now. Pt admits compliance with wearing compression stockings daily, but reports his stockings have lost elasticity. He reports that his wounds drain a large amount of clear fluid. Pt has been dressing his wounds with a wound "liquid" and occasionally covering his wounds with gauze. Pt has also been leaving his wounds open to air. Patient followed with vascular surgery for PAD and venous insufficiency. Dr. Kapoor determined he has adequate arterial blood flow for wound healing and cleared him for a 3 layer compression wrap. Pt previously followed with the wound care clinic in 2022 and 2023.     Interval history:  Denies compliance with a low sodium diet, leg elevation, and following a high protein diet. Reports completing ciprofloxacin. Pt notes an odor. Denies fever, chills, erythema, warmth, or pain.      10/21/22 MIHIR:  Right lower extremity: 0.93- minimal peripheral arterial occlusive disease  Left lower extremity: 0.70- moderate peripheral arterial occlusive disease      Review of Systems   Constitutional:  Negative for activity change, chills, diaphoresis, fatigue and fever.   Eyes:  Negative for photophobia and visual disturbance.   Respiratory:  Negative for apnea, chest tightness and shortness of breath.    Cardiovascular:  Positive for leg swelling. Negative for chest pain and palpitations.   Musculoskeletal:  Negative for gait problem and joint swelling.   Skin:  Positive for wound. Negative for color change, pallor and rash.   Neurological:  Negative for dizziness, syncope, facial asymmetry, speech difficulty, weakness, light-headedness, numbness and headaches.   Psychiatric/Behavioral:  Negative for agitation and confusion. " The patient is not nervous/anxious.    All other systems reviewed and are negative.      Objective:      Physical Exam  Vitals reviewed.   Constitutional:       General: He is not in acute distress.     Appearance: Normal appearance. He is obese.   Cardiovascular:      Rate and Rhythm: Normal rate and regular rhythm.      Pulses: Normal pulses.   Pulmonary:      Effort: No respiratory distress.   Musculoskeletal:         General: No swelling.      Right lower leg: Pitting Edema present.      Left lower leg: Pitting Edema present.      Comments: Ambulates with cane   Skin:     General: Skin is warm and dry.      Capillary Refill: Capillary refill takes less than 2 seconds.      Findings: Wound present. No erythema.          Neurological:      General: No focal deficit present.      Mental Status: He is alert and oriented to person, place, and time.   Psychiatric:         Mood and Affect: Mood normal.         Behavior: Behavior normal.         Thought Content: Thought content normal.         Judgment: Judgment normal.         Assessment:       1. Multiple open wounds of left lower extremity, subsequent encounter    2. Venous insufficiency    3. Wound cellulitis    4. Essential hypertension    5. Heart failure, unspecified HF chronicity, unspecified heart failure type    6. Controlled type 2 diabetes mellitus with diabetic polyneuropathy, without long-term current use of insulin               Wound Venous Ulcer Left lower;lateral;posterior Leg (Active)       Present on Original Admission:    Primary Wound Type: Venous ulcer   Side: Left   Orientation: lower;lateral;posterior   Location: Leg   Wound Approximate Age at First Assessment (Weeks):    Wound Number:    Is this injury device related?:    Incision Type:    Closure Method:    Wound Description (Comments):    Type:    Additional Comments:    Ankle-Brachial Index:    Pulses:    Removal Indication and Assessment:    Wound Outcome:    Wound Image    05/29/24 0751    Dressing Appearance Dry;Intact;Clean;Moist drainage;Saturated 05/29/24 0751   Drainage Amount Copious 05/29/24 0751   Drainage Characteristics/Odor Green;Malodorous 05/29/24 0751   Black (%), Wound Tissue Color 30 % 05/29/24 0751   Red (%), Wound Tissue Color 20 % 05/29/24 0751   Yellow (%), Wound Tissue Color 50 % 05/29/24 0751   Periwound Area Intact;Edematous;Pink 05/29/24 0751   Wound Length (cm) 13.4 cm 05/29/24 0751   Wound Width (cm) 14.5 cm 05/29/24 0751   Wound Depth (cm) 0.1 cm 05/29/24 0751   Wound Volume (cm^3) 19.43 cm^3 05/29/24 0751   Wound Surface Area (cm^2) 194.3 cm^2 05/29/24 0751   Care Cleansed with:;Soap and water 05/29/24 0751   Dressing Applied;Calcium alginate;Absorptive Pad;Compression wrap;Other (comment) 05/29/24 0751   Periwound Care Skin barrier film applied 05/29/24 0751   Compression Unna's Boot;Three layer compression 05/29/24 0751         Serafin was seen in the clinic room and placed in the supine position on the treatment table.  The wound dressings were removed and the legs were cleansed with Easi-clense sponges and dried thoroughly.  No erythema or warmth noted from patient's baseline.  Odor and green drainage noted to left lower extremity. Cleansed wounds with acetic acid (1:1 with water) to treat pseudomonas. Placed topical 4% Lidocaine Hydrochloride to wound bed for 15 minutes. Sharp debridement with 5 mm curette, blade, and curette to remove non-viable tissue. Pt tolerated procedure well.           Plan of Care:  drawtex, aquacel, mextra pad, and a 3 layer zinc coflex compression wrap. The patient's foot was positioned at a 90 degree angle. The wrap was applied using a spiral technique avoiding creases or folds.  The wrap was started behind the first metatarsal and ended below the tibial tubercle of the knee.  There was overlap of each turn half the width of the previous turn.  The compression wrap will be changed every 7 days.      Plan:       Left lower extremity was  dressed as detailed above  Patient was instructed to not get the dressings wet and to use cast covers for showering.  Should the dressing become wet, he is to remove it, place a moist dressing over the wound, cover with gauze and roll gauze and use ace wraps for compression and to secure bandages.  He should then notify this office as soon as possible to have a new dressing applied.  Instructed patient to remove wrap if he notices tingling or coldness to his bilateral lower extremities or if his toes become white, blue, or cold.    Discussed nutrition and the role of protein in wound healing with the patient. Instructed patient to optimize protein for wound healing.     Discussed bilateral lower extremity edema with patient. Instructed patient to elevate legs whenever sedentary and follow a low sodium diet.  Discussed replacing compression stockings every 3-6 months.  Instructed patient to bring compression stockings for right lower extremity next visit.    Instructed patient to keep follow up appointments with vascular surgery.    Discussed drainage control- Will see patient 2x a week.    Discussed cellulitis- likely pseudomonas. Treated topically with acetic acid.        Written and verbal instructions given to patient  RTC Monday    Rosalba Eaton PA-C

## 2024-05-29 NOTE — PROCEDURES
"Debridement    Date/Time: 5/29/2024 8:00 AM    Performed by: Rosalba Eaton PA-C  Authorized by: Rosalba Eaton PA-C    Time out: Immediately prior to procedure a "time out" was called to verify the correct patient, procedure, equipment, support staff and site/side marked as required.    Consent Done?:  Yes (Written)  Local anesthesia used?: Yes    Local anesthetic:  Topical anesthetic (Topical 4% Lidocaine Hydrochloride)    Wound Details:    Location:  Left leg    Type of Debridement:  Excisional       Length (cm):  13.4       Area (sq cm):  194.3       Width (cm):  14.5       Percent Debrided (%):  100       Depth (cm):  0.1       Total Area Debrided (sq cm):  194.3    Depth of debridement:  Subcutaneous tissue    Tissue debrided:  Subcutaneous    Devitalized tissue debrided:  Biofilm, Exudate, Fibrin, Necrotic/Eschar and Slough    Instruments:  Blade, Curette and Forceps  Bleeding:  Minimal  Hemostasis Achieved: Yes  Method Used:  Pressure  Patient tolerance:  Patient tolerated the procedure well with no immediate complications    "

## 2024-06-03 ENCOUNTER — OFFICE VISIT (OUTPATIENT)
Dept: WOUND CARE | Facility: CLINIC | Age: 74
End: 2024-06-03
Payer: MEDICARE

## 2024-06-03 VITALS
OXYGEN SATURATION: 92 % | HEIGHT: 69 IN | DIASTOLIC BLOOD PRESSURE: 67 MMHG | HEART RATE: 78 BPM | SYSTOLIC BLOOD PRESSURE: 150 MMHG | BODY MASS INDEX: 41.31 KG/M2

## 2024-06-03 DIAGNOSIS — S81.802D MULTIPLE OPEN WOUNDS OF LEFT LOWER EXTREMITY, SUBSEQUENT ENCOUNTER: Primary | ICD-10-CM

## 2024-06-03 DIAGNOSIS — I50.9 HEART FAILURE, UNSPECIFIED HF CHRONICITY, UNSPECIFIED HEART FAILURE TYPE: ICD-10-CM

## 2024-06-03 DIAGNOSIS — E11.42 CONTROLLED TYPE 2 DIABETES MELLITUS WITH DIABETIC POLYNEUROPATHY, WITHOUT LONG-TERM CURRENT USE OF INSULIN: ICD-10-CM

## 2024-06-03 DIAGNOSIS — I10 ESSENTIAL HYPERTENSION: ICD-10-CM

## 2024-06-03 DIAGNOSIS — I87.2 VENOUS INSUFFICIENCY: ICD-10-CM

## 2024-06-03 PROCEDURE — 29580 STRAPPING UNNA BOOT: CPT | Mod: LT,S$GLB,,

## 2024-06-03 PROCEDURE — 99999 PR PBB SHADOW E&M-EST. PATIENT-LVL III: CPT | Mod: PBBFAC,,,

## 2024-06-03 PROCEDURE — 99499 UNLISTED E&M SERVICE: CPT | Mod: S$GLB,,,

## 2024-06-03 NOTE — PROGRESS NOTES
"Subjective:       Patient ID: Serafin Tapia is a 73 y.o. male.    Chief Complaint: Wound Check    Patient presents for a re-evaluation of a left lower extremity wounds. He reports his wounds started as blisters about 2 months ago. He is unsure why he did not contact the office until now. Pt admits compliance with wearing compression stockings daily, but reports his stockings have lost elasticity. He reports that his wounds drain a large amount of clear fluid. Pt has been dressing his wounds with a wound "liquid" and occasionally covering his wounds with gauze. Pt has also been leaving his wounds open to air. Patient followed with vascular surgery for PAD and venous insufficiency. Dr. Kapoor determined he has adequate arterial blood flow for wound healing and cleared him for a 3 layer compression wrap. Pt previously followed with the wound care clinic in 2022 and 2023.  Denies compliance with a low sodium diet, leg elevation, and following a high protein diet. Denies fever, chills, erythema, warmth, or pain.      10/21/22 MIHIR:  Right lower extremity: 0.93- minimal peripheral arterial occlusive disease  Left lower extremity: 0.70- moderate peripheral arterial occlusive disease      Review of Systems   Constitutional:  Negative for activity change, chills, diaphoresis, fatigue and fever.   Eyes:  Negative for photophobia and visual disturbance.   Respiratory:  Negative for apnea, chest tightness and shortness of breath.    Cardiovascular:  Positive for leg swelling. Negative for chest pain and palpitations.   Musculoskeletal:  Negative for gait problem and joint swelling.   Skin:  Positive for wound. Negative for color change, pallor and rash.   Neurological:  Negative for dizziness, syncope, facial asymmetry, speech difficulty, weakness, light-headedness, numbness and headaches.   Psychiatric/Behavioral:  Negative for agitation and confusion. The patient is not nervous/anxious.    All other systems reviewed and are " negative.      Objective:      Physical Exam  Vitals reviewed.   Constitutional:       General: He is not in acute distress.     Appearance: Normal appearance. He is obese.   Cardiovascular:      Rate and Rhythm: Normal rate and regular rhythm.      Pulses: Normal pulses.   Pulmonary:      Effort: No respiratory distress.   Musculoskeletal:         General: No swelling.      Right lower leg: Pitting Edema present.      Left lower leg: Pitting Edema present.      Comments: Ambulates with cane   Skin:     General: Skin is warm and dry.      Capillary Refill: Capillary refill takes less than 2 seconds.      Findings: Wound present. No erythema.          Neurological:      General: No focal deficit present.      Mental Status: He is alert and oriented to person, place, and time.   Psychiatric:         Mood and Affect: Mood normal.         Behavior: Behavior normal.         Thought Content: Thought content normal.         Judgment: Judgment normal.         Assessment:       1. Multiple open wounds of left lower extremity, subsequent encounter    2. Venous insufficiency    3. Essential hypertension    4. Heart failure, unspecified HF chronicity, unspecified heart failure type    5. Controlled type 2 diabetes mellitus with diabetic polyneuropathy, without long-term current use of insulin               Wound Venous Ulcer Left lower;lateral;posterior Leg (Active)       Present on Original Admission:    Primary Wound Type: Venous ulcer   Side: Left   Orientation: lower;lateral;posterior   Location: Leg   Wound Approximate Age at First Assessment (Weeks):    Wound Number:    Is this injury device related?:    Incision Type:    Closure Method:    Wound Description (Comments):    Type:    Additional Comments:    Ankle-Brachial Index:    Pulses:    Removal Indication and Assessment:    Wound Outcome:    Wound Image     06/03/24 0901   Dressing Appearance Dry;Intact;Clean;Moist drainage 06/03/24 0901   Drainage Amount Large  06/03/24 0901   Drainage Characteristics/Odor Serosanguineous 06/03/24 0901   Red (%), Wound Tissue Color 50 % 06/03/24 0901   Yellow (%), Wound Tissue Color 50 % 06/03/24 0901   Periwound Area Intact;Edematous;Pink 06/03/24 0901   Wound Length (cm) 14 cm 06/03/24 0901   Wound Width (cm) 12.9 cm 06/03/24 0901   Wound Depth (cm) 0.1 cm 06/03/24 0901   Wound Volume (cm^3) 18.06 cm^3 06/03/24 0901   Wound Surface Area (cm^2) 180.6 cm^2 06/03/24 0901   Care Cleansed with:;Soap and water 06/03/24 0901   Dressing Applied;Calcium alginate;Absorptive Pad;Compression wrap;Other (comment) 06/03/24 0901   Periwound Care Skin barrier film applied 06/03/24 0901   Compression Unna's Boot;Three layer compression 06/03/24 0901         Serafin was seen in the clinic room and placed in the supine position on the treatment table.  The wound dressings were removed and the legs were cleansed with Easi-clense sponges and dried thoroughly.  No erythema, odor, green drainage, or warmth noted from patient's baseline.  Declined sharp debridement.          Plan of Care:  drawtex, aquacel, mextra pad, and a 3 layer zinc coflex compression wrap. The patient's foot was positioned at a 90 degree angle. The wrap was applied using a spiral technique avoiding creases or folds.  The wrap was started behind the first metatarsal and ended below the tibial tubercle of the knee.  There was overlap of each turn half the width of the previous turn.  The compression wrap will be changed every 7 days.      Plan:       Left lower extremity was dressed as detailed above  Patient was instructed to not get the dressings wet and to use cast covers for showering.  Should the dressing become wet, he is to remove it, place a moist dressing over the wound, cover with gauze and roll gauze and use ace wraps for compression and to secure bandages.  He should then notify this office as soon as possible to have a new dressing applied.  Instructed patient to remove wrap if he  notices tingling or coldness to his bilateral lower extremities or if his toes become white, blue, or cold.    Discussed nutrition and the role of protein in wound healing with the patient. Instructed patient to optimize protein for wound healing.     Discussed bilateral lower extremity edema with patient. Instructed patient to elevate legs whenever sedentary and follow a low sodium diet.  Discussed replacing compression stockings every 3-6 months.  Instructed patient to bring compression stockings for right lower extremity next visit.    Instructed patient to keep follow up appointments with vascular surgery.    Discussed drainage control- Will see patient 2x a week.      Written and verbal instructions given to patient  RTC Thursday    Rosalba Eaton PA-C

## 2024-06-06 ENCOUNTER — OFFICE VISIT (OUTPATIENT)
Dept: WOUND CARE | Facility: CLINIC | Age: 74
End: 2024-06-06
Payer: MEDICARE

## 2024-06-06 DIAGNOSIS — E11.42 CONTROLLED TYPE 2 DIABETES MELLITUS WITH DIABETIC POLYNEUROPATHY, WITHOUT LONG-TERM CURRENT USE OF INSULIN: ICD-10-CM

## 2024-06-06 DIAGNOSIS — S81.802D MULTIPLE OPEN WOUNDS OF LEFT LOWER EXTREMITY, SUBSEQUENT ENCOUNTER: Primary | ICD-10-CM

## 2024-06-06 DIAGNOSIS — I10 ESSENTIAL HYPERTENSION: ICD-10-CM

## 2024-06-06 DIAGNOSIS — I50.9 HEART FAILURE, UNSPECIFIED HF CHRONICITY, UNSPECIFIED HEART FAILURE TYPE: ICD-10-CM

## 2024-06-06 DIAGNOSIS — I87.2 VENOUS INSUFFICIENCY: ICD-10-CM

## 2024-06-06 PROCEDURE — 99999 PR PBB SHADOW E&M-EST. PATIENT-LVL II: CPT | Mod: PBBFAC,,,

## 2024-06-06 PROCEDURE — 11042 DBRDMT SUBQ TIS 1ST 20SQCM/<: CPT | Mod: S$GLB,,,

## 2024-06-06 PROCEDURE — 11045 DBRDMT SUBQ TISS EACH ADDL: CPT | Mod: S$GLB,,,

## 2024-06-06 PROCEDURE — 3072F LOW RISK FOR RETINOPATHY: CPT | Mod: CPTII,S$GLB,,

## 2024-06-06 PROCEDURE — 99212 OFFICE O/P EST SF 10 MIN: CPT | Mod: 25,S$GLB,,

## 2024-06-06 RX ORDER — GABAPENTIN 100 MG/1
100 CAPSULE ORAL 3 TIMES DAILY
Qty: 90 CAPSULE | Refills: 3 | Status: SHIPPED | OUTPATIENT
Start: 2024-06-06

## 2024-06-06 NOTE — PROGRESS NOTES
"Subjective:       Patient ID: Serafin Tapia is a 73 y.o. male.    Chief Complaint: Wound Check    Patient presents for a re-evaluation of a left lower extremity wounds. He reports his wounds started as blisters about 2 months ago. He is unsure why he did not contact the office until now. Pt admits compliance with wearing compression stockings daily, but reports his stockings have lost elasticity. He reports that his wounds drain a large amount of clear fluid. Pt has been dressing his wounds with a wound "liquid" and occasionally covering his wounds with gauze. Pt has also been leaving his wounds open to air. Patient followed with vascular surgery for PAD and venous insufficiency. Dr. Kapoor determined he has adequate arterial blood flow for wound healing and cleared him for a 3 layer compression wrap. Pt previously followed with the wound care clinic in 2022 and 2023.  Denies compliance with a low sodium diet, leg elevation, and following a high protein diet. Pt reports a throbbing stabbing pain- nothing makes it better or worse. Denies fever, chills, erythema, or warmth.      10/21/22 MIHIR:  Right lower extremity: 0.93- minimal peripheral arterial occlusive disease  Left lower extremity: 0.70- moderate peripheral arterial occlusive disease      Review of Systems   Constitutional:  Negative for activity change, chills, diaphoresis, fatigue and fever.   Eyes:  Negative for photophobia and visual disturbance.   Respiratory:  Negative for apnea, chest tightness and shortness of breath.    Cardiovascular:  Positive for leg swelling. Negative for chest pain and palpitations.   Musculoskeletal:  Negative for gait problem and joint swelling.   Skin:  Positive for wound. Negative for color change, pallor and rash.   Neurological:  Negative for dizziness, syncope, facial asymmetry, speech difficulty, weakness, light-headedness, numbness and headaches.   Psychiatric/Behavioral:  Negative for agitation and confusion. The " patient is not nervous/anxious.    All other systems reviewed and are negative.      Objective:      Physical Exam  Vitals reviewed.   Constitutional:       General: He is not in acute distress.     Appearance: Normal appearance. He is obese.   Cardiovascular:      Rate and Rhythm: Normal rate and regular rhythm.      Pulses: Normal pulses.   Pulmonary:      Effort: No respiratory distress.   Musculoskeletal:         General: No swelling.      Right lower leg: Pitting Edema present.      Left lower leg: Pitting Edema present.      Comments: Ambulates with cane   Skin:     General: Skin is warm and dry.      Capillary Refill: Capillary refill takes less than 2 seconds.      Findings: Wound present. No erythema.          Neurological:      General: No focal deficit present.      Mental Status: He is alert and oriented to person, place, and time.   Psychiatric:         Mood and Affect: Mood normal.         Behavior: Behavior normal.         Thought Content: Thought content normal.         Judgment: Judgment normal.         Assessment:       1. Multiple open wounds of left lower extremity, subsequent encounter    2. Venous insufficiency    3. Essential hypertension    4. Heart failure, unspecified HF chronicity, unspecified heart failure type    5. Controlled type 2 diabetes mellitus with diabetic polyneuropathy, without long-term current use of insulin               Wound Venous Ulcer Left lower;lateral;posterior Leg (Active)       Present on Original Admission:    Primary Wound Type: Venous ulcer   Side: Left   Orientation: lower;lateral;posterior   Location: Leg   Wound Approximate Age at First Assessment (Weeks):    Wound Number:    Is this injury device related?:    Incision Type:    Closure Method:    Wound Description (Comments):    Type:    Additional Comments:    Ankle-Brachial Index:    Pulses:    Removal Indication and Assessment:    Wound Outcome:    Wound Image     06/06/24 0909   Dressing Appearance  Dry;Intact;Clean;Moist drainage 06/06/24 0909   Drainage Amount Large 06/06/24 0909   Drainage Characteristics/Odor Serosanguineous 06/06/24 0909   Red (%), Wound Tissue Color 20 % 06/06/24 0909   Yellow (%), Wound Tissue Color 80 % 06/06/24 0909   Periwound Area Intact;Edematous;Hemosiderin Staining;Pink 06/06/24 0909   Wound Length (cm) 13 cm 06/06/24 0909   Wound Width (cm) 10.1 cm 06/06/24 0909   Wound Depth (cm) 0.1 cm 06/06/24 0909   Wound Volume (cm^3) 13.13 cm^3 06/06/24 0909   Wound Surface Area (cm^2) 131.3 cm^2 06/06/24 0909   Care Cleansed with:;Soap and water 06/06/24 0909   Dressing Applied;Calcium alginate;Absorptive Pad;Compression wrap;Other (comment) 06/06/24 0909   Periwound Care Skin barrier film applied 06/06/24 0909   Compression Unna's Boot;Three layer compression 06/06/24 0909         Serafin was seen in the clinic room and placed in the supine position on the treatment table.  The wound dressings were removed and the legs were cleansed with Easi-clense sponges and dried thoroughly.  No erythema, odor, green drainage, or warmth noted from patient's baseline.  Placed topical 4% Lidocaine Hydrochloride to wound bed for 15 minutes. Sharp debridement with 5 mm curette to remove non-viable tissue. Pt tolerated procedure well. See procedure note.            Plan of Care:  drawtex, aquacel, mextra pad, and a 3 layer zinc coflex compression wrap. The patient's foot was positioned at a 90 degree angle. The wrap was applied using a spiral technique avoiding creases or folds.  The wrap was started behind the first metatarsal and ended below the tibial tubercle of the knee.  There was overlap of each turn half the width of the previous turn.  The compression wrap will be changed every 7 days.      Plan:       Left lower extremity was dressed as detailed above  Patient was instructed to not get the dressings wet and to use cast covers for showering.  Should the dressing become wet, he is to remove it, place  a moist dressing over the wound, cover with gauze and roll gauze and use ace wraps for compression and to secure bandages.  He should then notify this office as soon as possible to have a new dressing applied.  Instructed patient to remove wrap if he notices tingling or coldness to his bilateral lower extremities or if his toes become white, blue, or cold.    Discussed nutrition and the role of protein in wound healing with the patient. Instructed patient to optimize protein for wound healing.     Discussed bilateral lower extremity edema with patient. Instructed patient to elevate legs whenever sedentary and follow a low sodium diet.  Discussed replacing compression stockings every 3-6 months.  Instructed patient to bring compression stockings for right lower extremity next visit.    Instructed patient to keep follow up appointments with vascular surgery.    Discussed drainage control- Will see patient 2x a week.    Discussed pain. Prescribed gabapentin. Discussed side effects of medication. Instructed patient to take medication as prescribed.      Written and verbal instructions given to patient  RTC Monday    Rosalba Eaton PA-C

## 2024-06-06 NOTE — PROCEDURES
"Debridement    Date/Time: 6/6/2024 9:00 AM    Performed by: Rosalba Eaton PA-C  Authorized by: Rosalba Eaton PA-C    Time out: Immediately prior to procedure a "time out" was called to verify the correct patient, procedure, equipment, support staff and site/side marked as required.    Consent Done?:  Yes (Written)  Local anesthesia used?: Yes    Local anesthetic:  Topical anesthetic (Topical 4% Lidocaine Hydrochloride)    Wound Details:    Location:  Left leg    Type of Debridement:  Excisional       Length (cm):  13       Area (sq cm):  131.3       Width (cm):  10.1       Percent Debrided (%):  100       Depth (cm):  0.1       Total Area Debrided (sq cm):  131.3    Depth of debridement:  Subcutaneous tissue    Tissue debrided:  Subcutaneous    Devitalized tissue debrided:  Biofilm, Exudate, Fibrin and Slough    Instruments:  Curette  Bleeding:  Minimal  Hemostasis Achieved: Yes  Method Used:  Pressure  Patient tolerance:  Patient tolerated the procedure well with no immediate complications    "

## 2024-06-10 ENCOUNTER — OFFICE VISIT (OUTPATIENT)
Dept: WOUND CARE | Facility: CLINIC | Age: 74
End: 2024-06-10
Payer: MEDICARE

## 2024-06-10 VITALS
WEIGHT: 276.44 LBS | BODY MASS INDEX: 40.94 KG/M2 | SYSTOLIC BLOOD PRESSURE: 160 MMHG | HEART RATE: 80 BPM | HEIGHT: 69 IN | DIASTOLIC BLOOD PRESSURE: 90 MMHG

## 2024-06-10 DIAGNOSIS — S81.802D MULTIPLE OPEN WOUNDS OF LEFT LOWER EXTREMITY, SUBSEQUENT ENCOUNTER: Primary | ICD-10-CM

## 2024-06-10 DIAGNOSIS — E11.42 CONTROLLED TYPE 2 DIABETES MELLITUS WITH DIABETIC POLYNEUROPATHY, WITHOUT LONG-TERM CURRENT USE OF INSULIN: ICD-10-CM

## 2024-06-10 DIAGNOSIS — I50.9 HEART FAILURE, UNSPECIFIED HF CHRONICITY, UNSPECIFIED HEART FAILURE TYPE: ICD-10-CM

## 2024-06-10 DIAGNOSIS — S81.801A OPEN WOUND OF RIGHT LOWER EXTREMITY, INITIAL ENCOUNTER: ICD-10-CM

## 2024-06-10 DIAGNOSIS — I87.2 VENOUS INSUFFICIENCY: ICD-10-CM

## 2024-06-10 DIAGNOSIS — I10 ESSENTIAL HYPERTENSION: ICD-10-CM

## 2024-06-10 PROCEDURE — 99999 PR PBB SHADOW E&M-EST. PATIENT-LVL IV: CPT | Mod: PBBFAC,,,

## 2024-06-10 NOTE — PROGRESS NOTES
"Subjective:       Patient ID: Serafin Tapia is a 73 y.o. male.    Chief Complaint: Wound Check    Patient presents for a re-evaluation of a left lower extremity wounds. He reports his wounds started as blisters about 2 months ago. He is unsure why he did not contact the office until now. Pt admits compliance with wearing compression stockings daily, but reports his stockings have lost elasticity. He reports that his wounds drain a large amount of clear fluid. Pt has been dressing his wounds with a wound "liquid" and occasionally covering his wounds with gauze. Pt has also been leaving his wounds open to air. Patient followed with vascular surgery for PAD and venous insufficiency. Dr. Kapoor determined he has adequate arterial blood flow for wound healing and cleared him for a 3 layer compression wrap. Pt previously followed with the wound care clinic in 2022 and 2023.  Denies compliance with a low sodium diet, leg elevation, and following a high protein diet.     Interval history: Pt started taking gabapentin. Notices improvement in pain. Denies side effects. Pt reports a new wound to his right lower extremity. He noticed it on 6/8/24. Pt reports it started as a blister. Patient has not been wearing compression stockings regularly. Pt has been leaving the wound open to air and not dressing it. Denies fever, chills, erythema, or warmth.      10/21/22 MIHIR:  Right lower extremity: 0.93- minimal peripheral arterial occlusive disease  Left lower extremity: 0.70- moderate peripheral arterial occlusive disease      Review of Systems   Constitutional:  Negative for activity change, chills, diaphoresis, fatigue and fever.   Eyes:  Negative for photophobia and visual disturbance.   Respiratory:  Negative for apnea, chest tightness and shortness of breath.    Cardiovascular:  Positive for leg swelling. Negative for chest pain and palpitations.   Musculoskeletal:  Negative for gait problem and joint swelling.   Skin:  Positive " for wound. Negative for color change, pallor and rash.   Neurological:  Negative for dizziness, syncope, facial asymmetry, speech difficulty, weakness, light-headedness, numbness and headaches.   Psychiatric/Behavioral:  Negative for agitation and confusion. The patient is not nervous/anxious.    All other systems reviewed and are negative.      Objective:      Physical Exam  Vitals reviewed.   Constitutional:       General: He is not in acute distress.     Appearance: Normal appearance. He is obese.   Cardiovascular:      Rate and Rhythm: Normal rate and regular rhythm.      Pulses: Normal pulses.   Pulmonary:      Effort: No respiratory distress.   Musculoskeletal:         General: No swelling.      Right lower leg: Pitting Edema present.      Left lower leg: Pitting Edema present.      Comments: Ambulates with cane   Skin:     General: Skin is warm and dry.      Capillary Refill: Capillary refill takes less than 2 seconds.      Findings: Wound present. No erythema.          Neurological:      General: No focal deficit present.      Mental Status: He is alert and oriented to person, place, and time.   Psychiatric:         Mood and Affect: Mood normal.         Behavior: Behavior normal.         Thought Content: Thought content normal.         Judgment: Judgment normal.         Assessment:       1. Multiple open wounds of left lower extremity, subsequent encounter    2. Open wound of right lower extremity, initial encounter    3. Venous insufficiency    4. Essential hypertension    5. Heart failure, unspecified HF chronicity, unspecified heart failure type    6. Controlled type 2 diabetes mellitus with diabetic polyneuropathy, without long-term current use of insulin               Wound Venous Ulcer Right lower;medial Leg (Active)       Present on Original Admission:    Primary Wound Type: Venous ulcer   Side: Right   Orientation: lower;medial   Location: Leg   Wound Approximate Age at First Assessment (Weeks):     Wound Number:    Is this injury device related?:    Incision Type:    Closure Method:    Wound Description (Comments):    Type:    Additional Comments:    Ankle-Brachial Index:    Pulses:    Removal Indication and Assessment:    Wound Outcome:    Wound Image     06/10/24 0921   Dressing Appearance Open to air 06/10/24 0921   Black (%), Wound Tissue Color 100 % 06/10/24 0921   Periwound Area Intact;Edematous 06/10/24 0921   Wound Length (cm) 2.6 cm 06/10/24 0921   Wound Width (cm) 2.9 cm 06/10/24 0921   Wound Depth (cm) 0.1 cm 06/10/24 0921   Wound Volume (cm^3) 0.754 cm^3 06/10/24 0921   Wound Surface Area (cm^2) 7.54 cm^2 06/10/24 0921   Care Cleansed with:;Soap and water 06/10/24 0921   Dressing Applied;Foam;Absorptive Pad;Compression wrap 06/10/24 0921   Periwound Care Skin barrier film applied 06/10/24 0921   Compression Three layer compression;Unna's Boot 06/10/24 0921            Wound Venous Ulcer Left lower;lateral;posterior Leg (Active)       Present on Original Admission:    Primary Wound Type: Venous ulcer   Side: Left   Orientation: lower;lateral;posterior   Location: Leg   Wound Approximate Age at First Assessment (Weeks):    Wound Number:    Is this injury device related?:    Incision Type:    Closure Method:    Wound Description (Comments):    Type:    Additional Comments:    Ankle-Brachial Index:    Pulses:    Removal Indication and Assessment:    Wound Outcome:    Wound Image    06/10/24 0830   Dressing Appearance Dry;Intact;Clean;Moist drainage 06/10/24 0830   Drainage Amount Large 06/10/24 0830   Drainage Characteristics/Odor Serosanguineous 06/10/24 0830   Red (%), Wound Tissue Color 100 % 06/10/24 0830   Periwound Area Intact;Edematous;Pink 06/10/24 0830   Wound Length (cm) 12.9 cm 06/10/24 0830   Wound Width (cm) 11.2 cm 06/10/24 0830   Wound Depth (cm) 0.1 cm 06/10/24 0830   Wound Volume (cm^3) 14.448 cm^3 06/10/24 0830   Wound Surface Area (cm^2) 144.48 cm^2 06/10/24 0830   Care Cleansed  with:;Soap and water 06/10/24 0830   Dressing Applied;Calcium alginate;Absorptive Pad;Compression wrap;Other (comment) 06/10/24 0830   Periwound Care Skin barrier film applied 06/10/24 0830   Compression Unna's Boot;Three layer compression 06/10/24 0830         Serafin was seen in the clinic room and placed in the supine position on the treatment table.  The wound dressings were removed and the legs were cleansed with Easi-clense sponges and dried thoroughly.  No erythema, odor, green drainage, or warmth noted from patient's baseline.  New right lower extremity wound noted. Sharp debridement with 5 mm curette to remove non-viable tissue. Pt tolerated procedure well. See procedure note.            Plan of Care:    Right lower extremity: Hydrofera blue to wound bed and a 3 layer zinc compression wrap.  Left lower extremity: drawtex, aquacel, mextra pad, and a 3 layer zinc compression wrap.   The patient's feet were positioned at a 90 degree angle. The wraps ere applied using a spiral technique avoiding creases or folds.  The wraps were started behind the first metatarsal and ended below the tibial tubercle of the knee.  There was overlap of each turn half the width of the previous turn.  The compression wraps will be changed every 7 days.      Plan:       Bilateral lower extremity was dressed as detailed above  Patient was instructed to not get the dressings wet and to use cast covers for showering.  Should the dressing become wet, he is to remove it, place a moist dressing over the wound, cover with gauze and roll gauze and use ace wraps for compression and to secure bandages.  He should then notify this office as soon as possible to have a new dressing applied.  Instructed patient to remove wrap if he notices tingling or coldness to his bilateral lower extremities or if his toes become white, blue, or cold.    Discussed nutrition and the role of protein in wound healing with the patient. Instructed patient to optimize  protein for wound healing.     Discussed bilateral lower extremity edema with patient. Instructed patient to elevate legs whenever sedentary and follow a low sodium diet.  Discussed replacing compression stockings every 3-6 months.    Instructed patient to keep follow up appointments with vascular surgery.    Discussed drainage control- Will see patient 2x a week.    Instructed patient to continue to take gabapentin as prescribed for nerve pain.    Discussed the importance of utilizing compression stockings to prevent recurrent wounds.      Written and verbal instructions given to patient  RTC Thursday    Rosalba Eaton PA-C

## 2024-06-10 NOTE — PROCEDURES
"Debridement    Date/Time: 6/10/2024 8:30 AM    Performed by: Rosalba Etaon PA-C  Authorized by: Rosalba Eaton PA-C    Time out: Immediately prior to procedure a "time out" was called to verify the correct patient, procedure, equipment, support staff and site/side marked as required.    Consent Done?:  Yes (Written)  Local anesthesia used?: No      Wound Details:    Location:  Right leg    Type of Debridement:  Excisional       Length (cm):  2.6       Area (sq cm):  7.54       Width (cm):  2.9       Percent Debrided (%):  100       Depth (cm):  0.1       Total Area Debrided (sq cm):  7.54    Depth of debridement:  Subcutaneous tissue    Tissue debrided:  Subcutaneous    Devitalized tissue debrided:  Biofilm, Necrotic/Eschar, Fibrin, Slough and Exudate    Instruments:  Curette  Bleeding:  Minimal  Hemostasis Achieved: Yes  Method Used:  Pressure  Patient tolerance:  Patient tolerated the procedure well with no immediate complications    "

## 2024-06-13 ENCOUNTER — OFFICE VISIT (OUTPATIENT)
Dept: WOUND CARE | Facility: CLINIC | Age: 74
End: 2024-06-13
Payer: MEDICARE

## 2024-06-13 VITALS
DIASTOLIC BLOOD PRESSURE: 75 MMHG | SYSTOLIC BLOOD PRESSURE: 165 MMHG | HEART RATE: 85 BPM | HEIGHT: 69 IN | BODY MASS INDEX: 39.61 KG/M2 | TEMPERATURE: 98 F | WEIGHT: 267.44 LBS

## 2024-06-13 DIAGNOSIS — S81.801D OPEN WOUND OF RIGHT LOWER EXTREMITY, SUBSEQUENT ENCOUNTER: ICD-10-CM

## 2024-06-13 DIAGNOSIS — I50.9 HEART FAILURE, UNSPECIFIED HF CHRONICITY, UNSPECIFIED HEART FAILURE TYPE: ICD-10-CM

## 2024-06-13 DIAGNOSIS — S81.802D MULTIPLE OPEN WOUNDS OF LEFT LOWER EXTREMITY, SUBSEQUENT ENCOUNTER: Primary | ICD-10-CM

## 2024-06-13 DIAGNOSIS — I87.2 VENOUS INSUFFICIENCY: ICD-10-CM

## 2024-06-13 DIAGNOSIS — I10 ESSENTIAL HYPERTENSION: ICD-10-CM

## 2024-06-13 DIAGNOSIS — E11.42 CONTROLLED TYPE 2 DIABETES MELLITUS WITH DIABETIC POLYNEUROPATHY, WITHOUT LONG-TERM CURRENT USE OF INSULIN: ICD-10-CM

## 2024-06-13 PROCEDURE — 99999 PR PBB SHADOW E&M-EST. PATIENT-LVL V: CPT | Mod: PBBFAC,,,

## 2024-06-13 NOTE — PROCEDURES
"Debridement    Date/Time: 6/13/2024 8:30 AM    Performed by: Rosalba Eaton PA-C  Authorized by: Rosalba Eaton PA-C    Time out: Immediately prior to procedure a "time out" was called to verify the correct patient, procedure, equipment, support staff and site/side marked as required.    Consent Done?:  Yes (Written)  Local anesthesia used?: Yes    Local anesthetic:  Topical anesthetic (Topical 4% Lidocaine Hydrochloride)    Wound Details:    Location:  Right leg    Type of Debridement:  Excisional       Length (cm):  1.9       Area (sq cm):  3.42       Width (cm):  1.8       Percent Debrided (%):  100       Depth (cm):  0.1       Total Area Debrided (sq cm):  3.42    Depth of debridement:  Subcutaneous tissue    Tissue debrided:  Subcutaneous    Devitalized tissue debrided:  Biofilm, Exudate, Fibrin, Slough and Necrotic/Eschar    Instruments:  Curette  Bleeding:  Minimal  Hemostasis Achieved: Yes  Method Used:  Pressure  Patient tolerance:  Patient tolerated the procedure well with no immediate complications    "

## 2024-06-13 NOTE — PROGRESS NOTES
"Subjective:       Patient ID: Serafin Tapia is a 73 y.o. male.    Chief Complaint: Wound Check    Patient presents for a re-evaluation of a left lower extremity wounds. He reports his wounds started as blisters about 2 months ago. He is unsure why he did not contact the office until now. Pt admits compliance with wearing compression stockings daily, but reports his stockings have lost elasticity. He reports that his wounds drain a large amount of clear fluid. Pt has been dressing his wounds with a wound "liquid" and occasionally covering his wounds with gauze. Pt has also been leaving his wounds open to air. Patient followed with vascular surgery for PAD and venous insufficiency. Dr. Kapoor determined he has adequate arterial blood flow for wound healing and cleared him for a 3 layer compression wrap. Pt previously followed with the wound care clinic in 2022 and 2023.  Denies compliance with a low sodium diet, leg elevation, and following a high protein diet. Admits compliance with gabapentin. Notices improvement in pain. Denies side effects. No complaints at this time Denies fever, chills, purulent drainage, erythema, or warmth.      10/21/22 MIHIR:  Right lower extremity: 0.93- minimal peripheral arterial occlusive disease  Left lower extremity: 0.70- moderate peripheral arterial occlusive disease      Review of Systems   Constitutional:  Negative for activity change, chills, diaphoresis, fatigue and fever.   Eyes:  Negative for photophobia and visual disturbance.   Respiratory:  Negative for apnea, chest tightness and shortness of breath.    Cardiovascular:  Positive for leg swelling. Negative for chest pain and palpitations.   Musculoskeletal:  Negative for gait problem and joint swelling.   Skin:  Positive for wound. Negative for color change, pallor and rash.   Neurological:  Negative for dizziness, syncope, facial asymmetry, speech difficulty, weakness, light-headedness, numbness and headaches. "   Psychiatric/Behavioral:  Negative for agitation and confusion. The patient is not nervous/anxious.    All other systems reviewed and are negative.      Objective:      Physical Exam  Vitals reviewed.   Constitutional:       General: He is not in acute distress.     Appearance: Normal appearance. He is obese.   Cardiovascular:      Rate and Rhythm: Normal rate and regular rhythm.      Pulses: Normal pulses.   Pulmonary:      Effort: No respiratory distress.   Musculoskeletal:         General: No swelling.      Right lower leg: Pitting Edema present.      Left lower leg: Pitting Edema present.      Comments: Ambulates with cane   Skin:     General: Skin is warm and dry.      Capillary Refill: Capillary refill takes less than 2 seconds.      Findings: Wound present. No erythema.          Neurological:      General: No focal deficit present.      Mental Status: He is alert and oriented to person, place, and time.   Psychiatric:         Mood and Affect: Mood normal.         Behavior: Behavior normal.         Thought Content: Thought content normal.         Judgment: Judgment normal.         Assessment:       1. Multiple open wounds of left lower extremity, subsequent encounter    2. Open wound of right lower extremity, subsequent encounter    3. Venous insufficiency    4. Essential hypertension    5. Heart failure, unspecified HF chronicity, unspecified heart failure type    6. Controlled type 2 diabetes mellitus with diabetic polyneuropathy, without long-term current use of insulin               Wound Venous Ulcer Right lower;medial Leg (Active)       Present on Original Admission:    Primary Wound Type: Venous ulcer   Side: Right   Orientation: lower;medial   Location: Leg   Wound Approximate Age at First Assessment (Weeks):    Wound Number:    Is this injury device related?:    Incision Type:    Closure Method:    Wound Description (Comments):    Type:    Additional Comments:    Ankle-Brachial Index:    Pulses:     Removal Indication and Assessment:    Wound Outcome:    Wound Image    06/13/24 0846   Dressing Appearance Dry;Intact;Clean;Dried drainage 06/13/24 0846   Drainage Amount Moderate 06/13/24 0846   Drainage Characteristics/Odor Serosanguineous 06/13/24 0846   Black (%), Wound Tissue Color 80 % 06/13/24 0846   Yellow (%), Wound Tissue Color 20 % 06/13/24 0846   Periwound Area Intact;Edematous 06/13/24 0846   Wound Length (cm) 1.9 cm 06/13/24 0846   Wound Width (cm) 1.8 cm 06/13/24 0846   Wound Depth (cm) 0.1 cm 06/13/24 0846   Wound Volume (cm^3) 0.342 cm^3 06/13/24 0846   Wound Surface Area (cm^2) 3.42 cm^2 06/13/24 0846   Care Cleansed with:;Soap and water 06/13/24 0846   Dressing Applied;Compression wrap;Foam 06/13/24 0846   Periwound Care Skin barrier film applied 06/13/24 0846   Compression Unna's Boot;Three layer compression 06/13/24 0846            Wound Venous Ulcer Left lower;lateral;posterior Leg (Active)       Present on Original Admission:    Primary Wound Type: Venous ulcer   Side: Left   Orientation: lower;lateral;posterior   Location: Leg   Wound Approximate Age at First Assessment (Weeks):    Wound Number:    Is this injury device related?:    Incision Type:    Closure Method:    Wound Description (Comments):    Type:    Additional Comments:    Ankle-Brachial Index:    Pulses:    Removal Indication and Assessment:    Wound Outcome:    Wound Image    06/13/24 0846   Dressing Appearance Dry;Intact;Clean;Moist drainage 06/13/24 0846   Drainage Amount Large 06/13/24 0846   Drainage Characteristics/Odor Serosanguineous 06/13/24 0846   Red (%), Wound Tissue Color 100 % 06/13/24 0846   Periwound Area Intact;Edematous;Pink 06/13/24 0846   Wound Length (cm) 12.9 cm 06/13/24 0846   Wound Width (cm) 8.3 cm 06/13/24 0846   Wound Depth (cm) 0.1 cm 06/13/24 0846   Wound Volume (cm^3) 10.707 cm^3 06/13/24 0846   Wound Surface Area (cm^2) 107.07 cm^2 06/13/24 0846   Care Cleansed with:;Soap and water 06/13/24 0846    Dressing Applied;Calcium alginate;Compression wrap;Absorptive Pad;Other (comment) 06/13/24 0846   Periwound Care Skin barrier film applied 06/13/24 0846   Compression Unna's Boot;Three layer compression 06/13/24 0846         Serafin was seen in the clinic room and placed in the supine position on the treatment table.  The wound dressings were removed and the legs were cleansed with Easi-clense sponges and dried thoroughly.  No erythema, odor, green drainage, or warmth noted from patient's baseline.   Sharp debridement with 5 mm curette to remove non-viable tissue from right lower extremity wound. Pt tolerated procedure well. See procedure note.            Plan of Care:    Right lower extremity: Mepilex foam to wound bed and a 3 layer zinc compression wrap.  Left lower extremity: drawtex, aquacel, mextra pad, and a 3 layer zinc compression wrap.   The patient's feet were positioned at a 90 degree angle. The wraps ere applied using a spiral technique avoiding creases or folds.  The wraps were started behind the first metatarsal and ended below the tibial tubercle of the knee.  There was overlap of each turn half the width of the previous turn.  The compression wraps will be changed every 7 days.      Plan:       Bilateral lower extremity was dressed as detailed above  Patient was instructed to not get the dressings wet and to use cast covers for showering.  Should the dressing become wet, he is to remove it, place a moist dressing over the wound, cover with gauze and roll gauze and use ace wraps for compression and to secure bandages.  He should then notify this office as soon as possible to have a new dressing applied.  Instructed patient to remove wrap if he notices tingling or coldness to his bilateral lower extremities or if his toes become white, blue, or cold.    Discussed nutrition and the role of protein in wound healing with the patient. Instructed patient to optimize protein for wound healing.     Discussed  bilateral lower extremity edema with patient. Instructed patient to elevate legs whenever sedentary and follow a low sodium diet.  Discussed replacing compression stockings every 3-6 months.    Instructed patient to keep follow up appointments with vascular surgery.    Discussed drainage control- Will see patient 2x a week.    Instructed patient to continue to take gabapentin as prescribed for nerve pain.    Discussed the importance of utilizing compression stockings to prevent recurrent wounds.      Written and verbal instructions given to patient  RTC Monday    Rosalba Eaton PA-C

## 2024-06-17 ENCOUNTER — OFFICE VISIT (OUTPATIENT)
Dept: WOUND CARE | Facility: CLINIC | Age: 74
End: 2024-06-17
Payer: MEDICARE

## 2024-06-17 VITALS
BODY MASS INDEX: 39.31 KG/M2 | HEIGHT: 69 IN | WEIGHT: 265.44 LBS | RESPIRATION RATE: 18 BRPM | SYSTOLIC BLOOD PRESSURE: 152 MMHG | HEART RATE: 80 BPM | DIASTOLIC BLOOD PRESSURE: 76 MMHG | OXYGEN SATURATION: 97 %

## 2024-06-17 DIAGNOSIS — S81.801D OPEN WOUND OF RIGHT LOWER EXTREMITY, SUBSEQUENT ENCOUNTER: ICD-10-CM

## 2024-06-17 DIAGNOSIS — S81.802D MULTIPLE OPEN WOUNDS OF LEFT LOWER EXTREMITY, SUBSEQUENT ENCOUNTER: Primary | ICD-10-CM

## 2024-06-17 DIAGNOSIS — I87.2 VENOUS INSUFFICIENCY: ICD-10-CM

## 2024-06-17 DIAGNOSIS — E11.42 CONTROLLED TYPE 2 DIABETES MELLITUS WITH DIABETIC POLYNEUROPATHY, WITHOUT LONG-TERM CURRENT USE OF INSULIN: ICD-10-CM

## 2024-06-17 DIAGNOSIS — I50.9 HEART FAILURE, UNSPECIFIED HF CHRONICITY, UNSPECIFIED HEART FAILURE TYPE: ICD-10-CM

## 2024-06-17 DIAGNOSIS — I10 ESSENTIAL HYPERTENSION: ICD-10-CM

## 2024-06-17 PROCEDURE — 11042 DBRDMT SUBQ TIS 1ST 20SQCM/<: CPT | Mod: S$GLB,,,

## 2024-06-17 PROCEDURE — 99499 UNLISTED E&M SERVICE: CPT | Mod: S$GLB,,,

## 2024-06-17 PROCEDURE — 29581 APPL MULTLAYER CMPRN SYS LEG: CPT | Mod: LT,S$GLB,,

## 2024-06-17 PROCEDURE — 99999 PR PBB SHADOW E&M-EST. PATIENT-LVL V: CPT | Mod: PBBFAC,,,

## 2024-06-17 NOTE — PROGRESS NOTES
"Subjective:       Patient ID: Serafin Tapia is a 73 y.o. male.    Chief Complaint: Wound Check    Patient presents for a re-evaluation of a left lower extremity wounds. He reports his wounds started as blisters about 2 months ago. He is unsure why he did not contact the office until now. Pt admits compliance with wearing compression stockings daily, but reports his stockings have lost elasticity. He reports that his wounds drain a large amount of clear fluid. Pt has been dressing his wounds with a wound "liquid" and occasionally covering his wounds with gauze. Pt has also been leaving his wounds open to air. Patient followed with vascular surgery for PAD and venous insufficiency. Dr. Kapoor determined he has adequate arterial blood flow for wound healing and cleared him for a 3 layer compression wrap. Pt previously followed with the wound care clinic in 2022 and 2023.  Denies compliance with a low sodium diet, leg elevation, and following a high protein diet. Admits compliance with gabapentin. Notices improvement in pain. Denies side effects. No complaints at this time Denies fever, chills, purulent drainage, erythema, or warmth.      10/21/22 MIHIR:  Right lower extremity: 0.93- minimal peripheral arterial occlusive disease  Left lower extremity: 0.70- moderate peripheral arterial occlusive disease      Review of Systems   Constitutional:  Negative for activity change, chills, diaphoresis, fatigue and fever.   Eyes:  Negative for photophobia and visual disturbance.   Respiratory:  Negative for apnea, chest tightness and shortness of breath.    Cardiovascular:  Positive for leg swelling. Negative for chest pain and palpitations.   Musculoskeletal:  Negative for gait problem and joint swelling.   Skin:  Positive for wound. Negative for color change, pallor and rash.   Neurological:  Negative for dizziness, syncope, facial asymmetry, speech difficulty, weakness, light-headedness, numbness and headaches. "   Psychiatric/Behavioral:  Negative for agitation and confusion. The patient is not nervous/anxious.    All other systems reviewed and are negative.      Objective:      Physical Exam  Vitals reviewed.   Constitutional:       General: He is not in acute distress.     Appearance: Normal appearance. He is obese.   Cardiovascular:      Rate and Rhythm: Normal rate and regular rhythm.      Pulses: Normal pulses.   Pulmonary:      Effort: No respiratory distress.   Musculoskeletal:         General: No swelling.      Right lower leg: Pitting Edema present.      Left lower leg: Pitting Edema present.      Comments: Ambulates with cane   Skin:     General: Skin is warm and dry.      Capillary Refill: Capillary refill takes less than 2 seconds.      Findings: Wound present. No erythema.          Neurological:      General: No focal deficit present.      Mental Status: He is alert and oriented to person, place, and time.   Psychiatric:         Mood and Affect: Mood normal.         Behavior: Behavior normal.         Thought Content: Thought content normal.         Judgment: Judgment normal.         Assessment:       1. Multiple open wounds of left lower extremity, subsequent encounter    2. Open wound of right lower extremity, subsequent encounter    3. Venous insufficiency    4. Essential hypertension    5. Heart failure, unspecified HF chronicity, unspecified heart failure type    6. Controlled type 2 diabetes mellitus with diabetic polyneuropathy, without long-term current use of insulin               Wound Venous Ulcer Right lower;medial Leg (Active)       Present on Original Admission:    Primary Wound Type: Venous ulcer   Side: Right   Orientation: lower;medial   Location: Leg   Wound Approximate Age at First Assessment (Weeks):    Wound Number:    Is this injury device related?:    Incision Type:    Closure Method:    Wound Description (Comments):    Type:    Additional Comments:    Ankle-Brachial Index:    Pulses:     Removal Indication and Assessment:    Wound Outcome:    Wound Image    06/17/24 0846   Dressing Appearance Dry;Intact;Clean;Moist drainage 06/17/24 0846   Drainage Amount Moderate 06/17/24 0846   Drainage Characteristics/Odor Serosanguineous 06/17/24 0846   Yellow (%), Wound Tissue Color 100 % 06/17/24 0846   Periwound Area Intact;Edematous 06/17/24 0846   Wound Length (cm) 2 cm 06/17/24 0846   Wound Width (cm) 2.7 cm 06/17/24 0846   Wound Depth (cm) 0.1 cm 06/17/24 0846   Wound Volume (cm^3) 0.54 cm^3 06/17/24 0846   Wound Surface Area (cm^2) 5.4 cm^2 06/17/24 0846   Care Cleansed with:;Soap and water 06/17/24 0846   Dressing Applied;Compression wrap;Foam 06/17/24 0846   Periwound Care Skin barrier film applied 06/17/24 0846   Compression Unna's Boot;Three layer compression 06/17/24 0846            Wound Venous Ulcer Left lower;lateral;posterior Leg (Active)       Present on Original Admission:    Primary Wound Type: Venous ulcer   Side: Left   Orientation: lower;lateral;posterior   Location: Leg   Wound Approximate Age at First Assessment (Weeks):    Wound Number:    Is this injury device related?:    Incision Type:    Closure Method:    Wound Description (Comments):    Type:    Additional Comments:    Ankle-Brachial Index:    Pulses:    Removal Indication and Assessment:    Wound Outcome:    Wound Image     06/17/24 0846   Dressing Appearance Dry;Intact;Clean;Moist drainage;Dried drainage 06/17/24 0846   Drainage Amount Large 06/17/24 0846   Drainage Characteristics/Odor Serosanguineous 06/17/24 0846   Red (%), Wound Tissue Color 90 % 06/17/24 0846   Yellow (%), Wound Tissue Color 10 % 06/17/24 0846   Periwound Area Intact;Edematous;Pink 06/17/24 0846   Wound Length (cm) 11.5 cm 06/17/24 0846   Wound Width (cm) 10.9 cm 06/17/24 0846   Wound Depth (cm) 0.1 cm 06/17/24 0846   Wound Volume (cm^3) 12.535 cm^3 06/17/24 0846   Wound Surface Area (cm^2) 125.35 cm^2 06/17/24 0846   Care Cleansed with:;Soap and water  06/17/24 0846   Dressing Applied;Compression wrap;Calcium alginate;Absorptive Pad;Other (comment) 06/17/24 0846   Periwound Care Skin barrier film applied 06/17/24 0846   Compression Unna's Boot;Three layer compression 06/17/24 0846         Serafin was seen in the clinic room and placed in the supine position on the treatment table.  The wound dressings were removed and the legs were cleansed with Easi-clense sponges and dried thoroughly.  No erythema, odor, green drainage, or warmth noted from patient's baseline.   Sharp debridement with 5 mm curette to remove non-viable tissue from right lower extremity wound. Pt tolerated procedure well. See procedure note.            Plan of Care:    Right lower extremity: Mepilex foam to wound bed and a 3 layer zinc compression wrap.  Left lower extremity: vaseline impregnated gauze, drawtex, aquacel, mextra pad, and a 3 layer zinc compression wrap.   The patient's feet were positioned at a 90 degree angle. The wraps ere applied using a spiral technique avoiding creases or folds.  The wraps were started behind the first metatarsal and ended below the tibial tubercle of the knee.  There was overlap of each turn half the width of the previous turn.  The compression wraps will be changed every 7 days.      Plan:       Bilateral lower extremity was dressed as detailed above  Patient was instructed to not get the dressings wet and to use cast covers for showering.  Should the dressing become wet, he is to remove it, place a moist dressing over the wound, cover with gauze and roll gauze and use ace wraps for compression and to secure bandages.  He should then notify this office as soon as possible to have a new dressing applied.  Instructed patient to remove wrap if he notices tingling or coldness to his bilateral lower extremities or if his toes become white, blue, or cold.    Discussed nutrition and the role of protein in wound healing with the patient. Instructed patient to optimize  protein for wound healing.     Discussed bilateral lower extremity edema with patient. Instructed patient to elevate legs whenever sedentary and follow a low sodium diet.  Discussed replacing compression stockings every 3-6 months.    Instructed patient to keep follow up appointments with vascular surgery.    Discussed drainage control- Will see patient 2x a week.    Instructed patient to continue to take gabapentin as prescribed for nerve pain.    Discussed the importance of utilizing compression stockings to prevent recurrent wounds.      Written and verbal instructions given to patient  RTC Thursday    Rosalba Eaton PA-C

## 2024-06-17 NOTE — PROCEDURES
"Debridement    Date/Time: 6/17/2024 8:00 AM    Performed by: Rosalba Eaton PA-C  Authorized by: Rosalba Eaton PA-C    Time out: Immediately prior to procedure a "time out" was called to verify the correct patient, procedure, equipment, support staff and site/side marked as required.    Consent Done?:  Yes (Written)  Local anesthesia used?: Yes    Local anesthetic:  Topical anesthetic (Topical 4% Lidocaine Hydrochloride)    Wound Details:    Location:  Right leg    Type of Debridement:  Excisional       Length (cm):  2       Area (sq cm):  5.4       Width (cm):  2.7       Percent Debrided (%):  100       Depth (cm):  0.1       Total Area Debrided (sq cm):  5.4    Depth of debridement:  Subcutaneous tissue    Tissue debrided:  Subcutaneous    Devitalized tissue debrided:  Biofilm, Exudate, Fibrin and Slough    Instruments:  Curette  Bleeding:  Minimal  Hemostasis Achieved: Yes  Method Used:  Pressure  Patient tolerance:  Patient tolerated the procedure well with no immediate complications    "

## 2024-06-20 ENCOUNTER — TELEPHONE (OUTPATIENT)
Dept: INTERNAL MEDICINE | Facility: CLINIC | Age: 74
End: 2024-06-20
Payer: MEDICARE

## 2024-06-20 ENCOUNTER — OFFICE VISIT (OUTPATIENT)
Dept: WOUND CARE | Facility: CLINIC | Age: 74
End: 2024-06-20
Payer: MEDICARE

## 2024-06-20 DIAGNOSIS — I10 ESSENTIAL HYPERTENSION: ICD-10-CM

## 2024-06-20 DIAGNOSIS — L03.90 WOUND CELLULITIS: ICD-10-CM

## 2024-06-20 DIAGNOSIS — E11.42 CONTROLLED TYPE 2 DIABETES MELLITUS WITH DIABETIC POLYNEUROPATHY, WITHOUT LONG-TERM CURRENT USE OF INSULIN: ICD-10-CM

## 2024-06-20 DIAGNOSIS — S81.802D MULTIPLE OPEN WOUNDS OF LEFT LOWER EXTREMITY, SUBSEQUENT ENCOUNTER: Primary | ICD-10-CM

## 2024-06-20 DIAGNOSIS — I50.9 HEART FAILURE, UNSPECIFIED HF CHRONICITY, UNSPECIFIED HEART FAILURE TYPE: ICD-10-CM

## 2024-06-20 DIAGNOSIS — I87.2 VENOUS INSUFFICIENCY: ICD-10-CM

## 2024-06-20 DIAGNOSIS — S81.801D MULTIPLE OPEN WOUNDS OF RIGHT LOWER EXTREMITY, SUBSEQUENT ENCOUNTER: ICD-10-CM

## 2024-06-20 PROCEDURE — 99999 PR PBB SHADOW E&M-EST. PATIENT-LVL II: CPT | Mod: PBBFAC,,,

## 2024-06-20 NOTE — TELEPHONE ENCOUNTER
Pt msg and said blood sugar is elevated and asking for sooner apt. Pt new apt is 06/28/24 from July 18th.

## 2024-06-20 NOTE — PROCEDURES
"Debridement    Date/Time: 6/20/2024 8:00 AM    Performed by: Rosalba Eaton PA-C  Authorized by: Rosalba Eaton PA-C    Time out: Immediately prior to procedure a "time out" was called to verify the correct patient, procedure, equipment, support staff and site/side marked as required.    Consent Done?:  Yes (Written)  Local anesthesia used?: Yes    Local anesthetic:  Topical anesthetic (Topical 4% Lidocaine Hydrochloride)    Wound Details:    Location:  Right leg (proximal)    Type of Debridement:  Excisional       Length (cm):  0.4       Area (sq cm):  0.12       Width (cm):  0.3       Percent Debrided (%):  100       Depth (cm):  0.1       Total Area Debrided (sq cm):  0.12    Depth of debridement:  Subcutaneous tissue    Tissue debrided:  Subcutaneous    Devitalized tissue debrided:  Biofilm, Exudate, Fibrin and Slough    Instruments:  Curette  Bleeding:  Minimal  Hemostasis Achieved: Yes  Method Used:  Pressure    Additional wounds:  1    2nd Wound Details:     Location:  Right leg (distal)    Type of Debridement:  Excisional       Length (cm):  2       Area (sq cm):  5.8       Width (cm):  2.9       Percent Debrided (%):  100       Depth (cm):  0.1       Total Area Debrided (sq cm):  5.8    Depth of debridement:  Subcutaneous tissue    Tissue debrided:  Subcutaneous    Devitalized tissue debrided:  Biofilm, Exudate, Fibrin and Slough    Instruments:  Curette  Bleeding:  Minimal  Hemostasis Achieved: Yes    Method Used:  Pressure  Patient tolerance:  Patient tolerated the procedure well with no immediate complications    "

## 2024-06-20 NOTE — TELEPHONE ENCOUNTER
----- Message from Elizabeth Priest sent at 6/20/2024 10:13 AM CDT -----  Name of Who is Calling:VALDO VEGA [[id number]]                   What is the request in detail:PT has an appt that I could find but wants a sooner appt his blood sugar is acting up really bad please assist                    Can the clinic reply by [facility]: No                   What Number to Call Back if not in [facility]:[phone]

## 2024-06-20 NOTE — PROGRESS NOTES
"Subjective:       Patient ID: Serafin Tapia is a 73 y.o. male.    Chief Complaint: Wound Check    Patient presents for a re-evaluation of bilateral lower extremity wounds. He reports his wounds started as blisters about 2 months ago. He is unsure why he did not contact the office until now. Pt admits compliance with wearing compression stockings daily, but reports his stockings have lost elasticity. He reports that his wounds drain a large amount of clear fluid. Pt has been dressing his wounds with a wound "liquid" and occasionally covering his wounds with gauze. Pt has also been leaving his wounds open to air. Patient followed with vascular surgery for PAD and venous insufficiency. Dr. Kapoor determined he has adequate arterial blood flow for wound healing and cleared him for a 3 layer compression wrap. Pt previously followed with the wound care clinic in 2022 and 2023.  Denies compliance with a low sodium diet, leg elevation, and following a high protein diet. Admits compliance with gabapentin. Notices improvement in pain. Denies side effects. No complaints at this time Denies fever, chills, purulent drainage, erythema, or warmth.      10/21/22 MIHIR:  Right lower extremity: 0.93- minimal peripheral arterial occlusive disease  Left lower extremity: 0.70- moderate peripheral arterial occlusive disease      Review of Systems   Constitutional:  Negative for activity change, chills, diaphoresis, fatigue and fever.   Eyes:  Negative for photophobia and visual disturbance.   Respiratory:  Negative for apnea, chest tightness and shortness of breath.    Cardiovascular:  Positive for leg swelling. Negative for chest pain and palpitations.   Musculoskeletal:  Negative for gait problem and joint swelling.   Skin:  Positive for wound. Negative for color change, pallor and rash.   Neurological:  Negative for dizziness, syncope, facial asymmetry, speech difficulty, weakness, light-headedness, numbness and headaches. "   Psychiatric/Behavioral:  Negative for agitation and confusion. The patient is not nervous/anxious.    All other systems reviewed and are negative.      Objective:      Physical Exam  Vitals reviewed.   Constitutional:       General: He is not in acute distress.     Appearance: Normal appearance. He is obese.   Cardiovascular:      Rate and Rhythm: Normal rate and regular rhythm.      Pulses: Normal pulses.   Pulmonary:      Effort: No respiratory distress.   Musculoskeletal:         General: No swelling.      Right lower leg: Pitting Edema present.      Left lower leg: Pitting Edema present.      Comments: Ambulates with cane   Skin:     General: Skin is warm and dry.      Capillary Refill: Capillary refill takes less than 2 seconds.      Findings: Wound present. No erythema.          Neurological:      General: No focal deficit present.      Mental Status: He is alert and oriented to person, place, and time.   Psychiatric:         Mood and Affect: Mood normal.         Behavior: Behavior normal.         Thought Content: Thought content normal.         Judgment: Judgment normal.         Assessment:       1. Multiple open wounds of left lower extremity, subsequent encounter    2. Multiple open wounds of right lower extremity, subsequent encounter    3. Venous insufficiency    4. Essential hypertension    5. Heart failure, unspecified HF chronicity, unspecified heart failure type    6. Controlled type 2 diabetes mellitus with diabetic polyneuropathy, without long-term current use of insulin    7. Wound cellulitis               Wound Venous Ulcer Right lower;medial;distal Leg (Active)       Present on Original Admission:    Primary Wound Type: Venous ulcer   Side: Right   Orientation: lower;medial;distal   Location: Leg   Wound Approximate Age at First Assessment (Weeks):    Wound Number:    Is this injury device related?:    Incision Type:    Closure Method:    Wound Description (Comments):    Type:    Additional  Comments:    Ankle-Brachial Index:    Pulses:    Removal Indication and Assessment:    Wound Outcome:    Wound Image   06/20/24 0743   Dressing Appearance Dry;Intact;Clean;Moist drainage 06/20/24 0743   Drainage Amount Small 06/20/24 0743   Drainage Characteristics/Odor Serosanguineous;Green;Malodorous 06/20/24 0743   Yellow (%), Wound Tissue Color 100 % 06/20/24 0743   Periwound Area Intact;Edematous;Pink 06/20/24 0743   Wound Length (cm) 2 cm 06/20/24 0743   Wound Width (cm) 2.9 cm 06/20/24 0743   Wound Depth (cm) 0.1 cm 06/20/24 0743   Wound Volume (cm^3) 0.58 cm^3 06/20/24 0743   Wound Surface Area (cm^2) 5.8 cm^2 06/20/24 0743   Care Cleansed with:;Soap and water 06/20/24 0743   Dressing Applied;Compression wrap;Foam 06/20/24 0743   Periwound Care Skin barrier film applied 06/20/24 0743   Compression Unna's Boot;Three layer compression 06/20/24 0743            Wound Venous Ulcer Left lower;lateral;posterior Leg (Active)       Present on Original Admission:    Primary Wound Type: Venous ulcer   Side: Left   Orientation: lower;lateral;posterior   Location: Leg   Wound Approximate Age at First Assessment (Weeks):    Wound Number:    Is this injury device related?:    Incision Type:    Closure Method:    Wound Description (Comments):    Type:    Additional Comments:    Ankle-Brachial Index:    Pulses:    Removal Indication and Assessment:    Wound Outcome:    Wound Image    06/20/24 0743   Dressing Appearance Dry;Intact;Clean;Moist drainage 06/20/24 0743   Drainage Amount Large 06/20/24 0743   Drainage Characteristics/Odor Serosanguineous 06/20/24 0743   Red (%), Wound Tissue Color 80 % 06/20/24 0743   Yellow (%), Wound Tissue Color 20 % 06/20/24 0743   Periwound Area Intact;Edematous;Pink 06/20/24 0743   Wound Length (cm) 12 cm 06/20/24 0743   Wound Width (cm) 11.2 cm 06/20/24 0743   Wound Depth (cm) 0.1 cm 06/20/24 0743   Wound Volume (cm^3) 13.44 cm^3 06/20/24 0743   Wound Surface Area (cm^2) 134.4 cm^2 06/20/24  0743   Care Cleansed with:;Soap and water 06/20/24 0743   Dressing Applied;Compression wrap;Absorptive Pad;Calcium alginate;Other (comment) 06/20/24 0743   Periwound Care Skin barrier film applied 06/20/24 0743   Compression Unna's Boot;Three layer compression 06/20/24 0743            Wound Ulceration Right lower;medial;proximal Leg (Active)       Present on Original Admission:    Primary Wound Type: Ulceration   Side: Right   Orientation: lower;medial;proximal   Location: Leg   Wound Approximate Age at First Assessment (Weeks):    Wound Number:    Is this injury device related?:    Incision Type:    Closure Method:    Wound Description (Comments):    Type:    Additional Comments:    Ankle-Brachial Index:    Pulses:    Removal Indication and Assessment:    Wound Outcome:    Dressing Appearance Dry;Intact;Clean;Moist drainage 06/20/24 0743   Drainage Amount Small 06/20/24 0743   Drainage Characteristics/Odor Serosanguineous 06/20/24 0743   Yellow (%), Wound Tissue Color 100 % 06/20/24 0743   Periwound Area Intact;Edematous;Pink 06/20/24 0743   Wound Length (cm) 0.4 cm 06/20/24 0743   Wound Width (cm) 0.3 cm 06/20/24 0743   Wound Depth (cm) 0.1 cm 06/20/24 0743   Wound Volume (cm^3) 0.012 cm^3 06/20/24 0743   Wound Surface Area (cm^2) 0.12 cm^2 06/20/24 0743   Care Cleansed with:;Soap and water 06/20/24 0743   Dressing Applied;Foam;Compression wrap 06/20/24 0743   Periwound Care Skin barrier film applied 06/20/24 0743   Compression Unna's Boot;Three layer compression 06/20/24 0743         Serafin was seen in the clinic room and placed in the supine position on the treatment table.  The wound dressings were removed and the legs were cleansed with Easi-clense sponges and dried thoroughly.  No erythema or warmth noted from patient's baseline.  Green drainage and odor noted to right lower extremity wounds. Cleansed wounds with acetic acid (1:1 with water) to treat pseudomonas. Sharp debridement with 5 mm curette to remove  non-viable tissue from right lower extremity wound. Pt tolerated procedure well. See procedure note.            Plan of Care:    Right lower extremity: Mepilex foam to wound bed and a 3 layer zinc compression wrap.  Left lower extremity: vaseline impregnated gauze, drawtex, aquacel, mextra pad, and a 3 layer zinc compression wrap.   The patient's feet were positioned at a 90 degree angle. The wraps ere applied using a spiral technique avoiding creases or folds.  The wraps were started behind the first metatarsal and ended below the tibial tubercle of the knee.  There was overlap of each turn half the width of the previous turn.  The compression wraps will be changed every 7 days.      Plan:       Bilateral lower extremity was dressed as detailed above  Patient was instructed to not get the dressings wet and to use cast covers for showering.  Should the dressing become wet, he is to remove it, place a moist dressing over the wound, cover with gauze and roll gauze and use ace wraps for compression and to secure bandages.  He should then notify this office as soon as possible to have a new dressing applied.  Instructed patient to remove wrap if he notices tingling or coldness to his bilateral lower extremities or if his toes become white, blue, or cold.    Discussed nutrition and the role of protein in wound healing with the patient. Instructed patient to optimize protein for wound healing.     Discussed bilateral lower extremity edema with patient. Instructed patient to elevate legs whenever sedentary and follow a low sodium diet.  Discussed replacing compression stockings every 3-6 months.    Instructed patient to keep follow up appointments with vascular surgery.    Discussed drainage control- Will see patient 2x a week.    Instructed patient to continue to take gabapentin as prescribed for nerve pain.    Discussed the importance of utilizing compression stockings to prevent recurrent wounds.    Discussed  cellulitis- likely pseudomonas. Treated topically with acetic acid.    Written and verbal instructions given to patient  RTC Monday    Rosalba Eaton PA-C

## 2024-06-24 ENCOUNTER — OFFICE VISIT (OUTPATIENT)
Dept: WOUND CARE | Facility: CLINIC | Age: 74
End: 2024-06-24
Payer: MEDICARE

## 2024-06-24 VITALS
TEMPERATURE: 98 F | WEIGHT: 270.94 LBS | HEART RATE: 70 BPM | BODY MASS INDEX: 40.13 KG/M2 | DIASTOLIC BLOOD PRESSURE: 66 MMHG | SYSTOLIC BLOOD PRESSURE: 148 MMHG | HEIGHT: 69 IN

## 2024-06-24 DIAGNOSIS — I10 ESSENTIAL HYPERTENSION: ICD-10-CM

## 2024-06-24 DIAGNOSIS — S81.801D MULTIPLE OPEN WOUNDS OF RIGHT LOWER EXTREMITY, SUBSEQUENT ENCOUNTER: ICD-10-CM

## 2024-06-24 DIAGNOSIS — S81.802D MULTIPLE OPEN WOUNDS OF LEFT LOWER EXTREMITY, SUBSEQUENT ENCOUNTER: Primary | ICD-10-CM

## 2024-06-24 DIAGNOSIS — I87.2 VENOUS INSUFFICIENCY: ICD-10-CM

## 2024-06-24 DIAGNOSIS — I50.9 HEART FAILURE, UNSPECIFIED HF CHRONICITY, UNSPECIFIED HEART FAILURE TYPE: ICD-10-CM

## 2024-06-24 DIAGNOSIS — E11.42 CONTROLLED TYPE 2 DIABETES MELLITUS WITH DIABETIC POLYNEUROPATHY, WITHOUT LONG-TERM CURRENT USE OF INSULIN: ICD-10-CM

## 2024-06-24 DIAGNOSIS — L03.90 WOUND CELLULITIS: ICD-10-CM

## 2024-06-24 PROCEDURE — 29580 STRAPPING UNNA BOOT: CPT | Mod: LT,S$GLB,,

## 2024-06-24 PROCEDURE — 3072F LOW RISK FOR RETINOPATHY: CPT | Mod: CPTII,S$GLB,,

## 2024-06-24 PROCEDURE — 99212 OFFICE O/P EST SF 10 MIN: CPT | Mod: 25,S$GLB,,

## 2024-06-24 PROCEDURE — 11042 DBRDMT SUBQ TIS 1ST 20SQCM/<: CPT | Mod: S$GLB,,,

## 2024-06-24 PROCEDURE — 99999 PR PBB SHADOW E&M-EST. PATIENT-LVL II: CPT | Mod: PBBFAC,,,

## 2024-06-24 NOTE — PROCEDURES
"Debridement    Date/Time: 6/24/2024 8:00 AM    Performed by: Rosalba Eaton PA-C  Authorized by: Rosalba Eaton PA-C    Time out: Immediately prior to procedure a "time out" was called to verify the correct patient, procedure, equipment, support staff and site/side marked as required.    Consent Done?:  Yes (Written)  Local anesthesia used?: Yes    Local anesthetic:  Topical anesthetic (Topical 4% Lidocaine Hydrochloride)    Wound Details:    Location:  Right leg (distal)    Type of Debridement:  Excisional       Length (cm):  2.4       Area (sq cm):  7.44       Width (cm):  3.1       Percent Debrided (%):  100       Depth (cm):  0.1       Total Area Debrided (sq cm):  7.44    Depth of debridement:  Subcutaneous tissue    Tissue debrided:  Subcutaneous    Devitalized tissue debrided:  Biofilm, Exudate, Fibrin and Slough    Instruments:  Curette  Bleeding:  Minimal  Hemostasis Achieved: Yes  Method Used:  Pressure  Patient tolerance:  Patient tolerated the procedure well with no immediate complications    "

## 2024-06-24 NOTE — PROGRESS NOTES
"Subjective:       Patient ID: Serafin Tapia is a 73 y.o. male.    Chief Complaint: Wound Check    Patient presents for a re-evaluation of bilateral lower extremity wounds. He reports his wounds started as blisters about 2 months ago. He is unsure why he did not contact the office until now. Pt admits compliance with wearing compression stockings daily, but reports his stockings have lost elasticity. He reports that his wounds drain a large amount of clear fluid. Pt has been dressing his wounds with a wound "liquid" and occasionally covering his wounds with gauze. Pt has also been leaving his wounds open to air. Patient followed with vascular surgery for PAD and venous insufficiency. Dr. Kapoor determined he has adequate arterial blood flow for wound healing and cleared him for a 3 layer compression wrap. Pt previously followed with the wound care clinic in 2022 and 2023.  Denies compliance with a low sodium diet, leg elevation, and following a high protein diet. Admits compliance with gabapentin. Notices improvement in pain. Denies side effects. No complaints at this time Denies fever, chills, purulent drainage, erythema, or warmth.      10/21/22 MIHIR:  Right lower extremity: 0.93- minimal peripheral arterial occlusive disease  Left lower extremity: 0.70- moderate peripheral arterial occlusive disease      Review of Systems   Constitutional:  Negative for activity change, chills, diaphoresis, fatigue and fever.   Eyes:  Negative for photophobia and visual disturbance.   Respiratory:  Negative for apnea, chest tightness and shortness of breath.    Cardiovascular:  Positive for leg swelling. Negative for chest pain and palpitations.   Musculoskeletal:  Negative for gait problem and joint swelling.   Skin:  Positive for wound. Negative for color change, pallor and rash.   Neurological:  Negative for dizziness, syncope, facial asymmetry, speech difficulty, weakness, light-headedness, numbness and headaches. "   Psychiatric/Behavioral:  Negative for agitation and confusion. The patient is not nervous/anxious.    All other systems reviewed and are negative.      Objective:      Physical Exam  Vitals reviewed.   Constitutional:       General: He is not in acute distress.     Appearance: Normal appearance. He is obese.   Cardiovascular:      Rate and Rhythm: Normal rate and regular rhythm.      Pulses: Normal pulses.   Pulmonary:      Effort: No respiratory distress.   Musculoskeletal:         General: No swelling.      Right lower leg: Pitting Edema present.      Left lower leg: Pitting Edema present.      Comments: Ambulates with cane   Skin:     General: Skin is warm and dry.      Capillary Refill: Capillary refill takes less than 2 seconds.      Findings: Wound present. No erythema.          Neurological:      General: No focal deficit present.      Mental Status: He is alert and oriented to person, place, and time.   Psychiatric:         Mood and Affect: Mood normal.         Behavior: Behavior normal.         Thought Content: Thought content normal.         Judgment: Judgment normal.         Assessment:       1. Multiple open wounds of left lower extremity, subsequent encounter    2. Multiple open wounds of right lower extremity, subsequent encounter    3. Venous insufficiency    4. Essential hypertension    5. Heart failure, unspecified HF chronicity, unspecified heart failure type    6. Controlled type 2 diabetes mellitus with diabetic polyneuropathy, without long-term current use of insulin    7. Wound cellulitis               Wound Venous Ulcer Right lower;medial;distal Leg (Active)       Present on Original Admission:    Primary Wound Type: Venous ulcer   Side: Right   Orientation: lower;medial;distal   Location: Leg   Wound Approximate Age at First Assessment (Weeks):    Wound Number:    Is this injury device related?:    Incision Type:    Closure Method:    Wound Description (Comments):    Type:    Additional  Comments:    Ankle-Brachial Index:    Pulses:    Removal Indication and Assessment:    Wound Outcome:    Dressing Appearance Dry;Intact;Clean;Dried drainage 06/24/24 0850   Drainage Amount Small 06/24/24 0850   Drainage Characteristics/Odor Serosanguineous 06/24/24 0850   Black (%), Wound Tissue Color 30 % 06/24/24 0850   Yellow (%), Wound Tissue Color 70 % 06/24/24 0850   Periwound Area Intact;Edematous 06/24/24 0850   Wound Length (cm) 2.4 cm 06/24/24 0850   Wound Width (cm) 3.1 cm 06/24/24 0850   Wound Depth (cm) 0.1 cm 06/24/24 0850   Wound Volume (cm^3) 0.744 cm^3 06/24/24 0850   Wound Surface Area (cm^2) 7.44 cm^2 06/24/24 0850   Care Cleansed with:;Soap and water 06/24/24 0850   Dressing Applied;Compression wrap;Foam 06/24/24 0850   Periwound Care Skin barrier film applied 06/24/24 0850   Compression Unna's Boot;Three layer compression 06/24/24 0850            Wound Venous Ulcer Left lower;lateral;posterior Leg (Active)       Present on Original Admission:    Primary Wound Type: Venous ulcer   Side: Left   Orientation: lower;lateral;posterior   Location: Leg   Wound Approximate Age at First Assessment (Weeks):    Wound Number:    Is this injury device related?:    Incision Type:    Closure Method:    Wound Description (Comments):    Type:    Additional Comments:    Ankle-Brachial Index:    Pulses:    Removal Indication and Assessment:    Wound Outcome:    Wound Image    06/24/24 0850   Dressing Appearance Dry;Intact;Clean;Moist drainage 06/24/24 0850   Drainage Amount Large 06/24/24 0850   Drainage Characteristics/Odor Serosanguineous 06/24/24 0850   Red (%), Wound Tissue Color 100 % 06/24/24 0850   Periwound Area Intact;Edematous;Pink 06/24/24 0850   Wound Length (cm) 13 cm 06/24/24 0850   Wound Width (cm) 10.1 cm 06/24/24 0850   Wound Depth (cm) 0.1 cm 06/24/24 0850   Wound Volume (cm^3) 13.13 cm^3 06/24/24 0850   Wound Surface Area (cm^2) 131.3 cm^2 06/24/24 0850   Care Cleansed with:;Soap and water  06/24/24 0850   Dressing Applied;Calcium alginate;Absorptive Pad;Compression wrap;Other (comment) 06/24/24 0850   Periwound Care Skin barrier film applied 06/24/24 0850   Compression Unna's Boot;Three layer compression 06/24/24 0850            Wound Ulceration Right lower;medial;proximal Leg (Active)       Present on Original Admission:    Primary Wound Type: Ulceration   Side: Right   Orientation: lower;medial;proximal   Location: Leg   Wound Approximate Age at First Assessment (Weeks):    Wound Number:    Is this injury device related?:    Incision Type:    Closure Method:    Wound Description (Comments):    Type:    Additional Comments:    Ankle-Brachial Index:    Pulses:    Removal Indication and Assessment:    Wound Outcome:    Wound Image    06/24/24 0850   Dressing Appearance Dry;Intact;Clean;Dried drainage 06/24/24 0850   Drainage Amount Small 06/24/24 0850   Drainage Characteristics/Odor Serosanguineous 06/24/24 0850   Red (%), Wound Tissue Color 100 % 06/24/24 0850   Periwound Area Intact;Edematous;Pink 06/24/24 0850   Wound Length (cm) 0.3 cm 06/24/24 0850   Wound Width (cm) 0.4 cm 06/24/24 0850   Wound Depth (cm) 0.1 cm 06/24/24 0850   Wound Volume (cm^3) 0.012 cm^3 06/24/24 0850   Wound Surface Area (cm^2) 0.12 cm^2 06/24/24 0850   Care Cleansed with:;Soap and water 06/24/24 0850   Dressing Applied;Compression wrap;Foam 06/24/24 0850   Periwound Care Skin barrier film applied 06/24/24 0850   Compression Unna's Boot;Three layer compression 06/24/24 0850         Serafin was seen in the clinic room and placed in the supine position on the treatment table.  The wound dressings were removed and the legs were cleansed with Easi-clense sponges and dried thoroughly.  No erythema or warmth noted from patient's baseline.  Green drainage and odor noted to right lower extremity wounds. Cleansed wounds with acetic acid (1:1 with water) to treat pseudomonas. Sharp debridement with 5 mm curette to remove non-viable  28-Feb-2023 19:51 tissue from right lower extremity wound. Pt tolerated procedure well. See procedure note.            Plan of Care:    Right lower extremity: Mepilex foam to wound bed and a 3 layer zinc compression wrap.  Left lower extremity: vaseline impregnated gauze, drawtex, aquacel, mextra pad, and a 3 layer zinc compression wrap.   The patient's feet were positioned at a 90 degree angle. The wraps ere applied using a spiral technique avoiding creases or folds.  The wraps were started behind the first metatarsal and ended below the tibial tubercle of the knee.  There was overlap of each turn half the width of the previous turn.  The compression wraps will be changed every 7 days.      Plan:       Bilateral lower extremity was dressed as detailed above  Patient was instructed to not get the dressings wet and to use cast covers for showering.  Should the dressing become wet, he is to remove it, place a moist dressing over the wound, cover with gauze and roll gauze and use ace wraps for compression and to secure bandages.  He should then notify this office as soon as possible to have a new dressing applied.  Instructed patient to remove wrap if he notices tingling or coldness to his bilateral lower extremities or if his toes become white, blue, or cold.    Discussed nutrition and the role of protein in wound healing with the patient. Instructed patient to optimize protein for wound healing.     Discussed bilateral lower extremity edema with patient. Instructed patient to elevate legs whenever sedentary and follow a low sodium diet.  Discussed replacing compression stockings every 3-6 months.    Instructed patient to keep follow up appointments with vascular surgery.    Discussed drainage control- Will see patient 2x a week.    Instructed patient to continue to take gabapentin as prescribed for nerve pain.    Discussed the importance of utilizing compression stockings to prevent recurrent wounds.    Discussed cellulitis- likely  pseudomonas. Treated topically with acetic acid.    Written and verbal instructions given to patient  RTC Thursday    Rosalba Eaton PA-C

## 2024-06-27 ENCOUNTER — OFFICE VISIT (OUTPATIENT)
Dept: WOUND CARE | Facility: CLINIC | Age: 74
End: 2024-06-27
Payer: MEDICARE

## 2024-06-27 VITALS
HEIGHT: 69 IN | DIASTOLIC BLOOD PRESSURE: 63 MMHG | SYSTOLIC BLOOD PRESSURE: 134 MMHG | BODY MASS INDEX: 39.83 KG/M2 | WEIGHT: 268.94 LBS | TEMPERATURE: 99 F | HEART RATE: 68 BPM

## 2024-06-27 DIAGNOSIS — E11.42 CONTROLLED TYPE 2 DIABETES MELLITUS WITH DIABETIC POLYNEUROPATHY, WITHOUT LONG-TERM CURRENT USE OF INSULIN: ICD-10-CM

## 2024-06-27 DIAGNOSIS — S81.801D MULTIPLE OPEN WOUNDS OF RIGHT LOWER EXTREMITY, SUBSEQUENT ENCOUNTER: ICD-10-CM

## 2024-06-27 DIAGNOSIS — L03.90 WOUND CELLULITIS: ICD-10-CM

## 2024-06-27 DIAGNOSIS — I50.9 HEART FAILURE, UNSPECIFIED HF CHRONICITY, UNSPECIFIED HEART FAILURE TYPE: ICD-10-CM

## 2024-06-27 DIAGNOSIS — I10 ESSENTIAL HYPERTENSION: ICD-10-CM

## 2024-06-27 DIAGNOSIS — S81.802D MULTIPLE OPEN WOUNDS OF LEFT LOWER EXTREMITY, SUBSEQUENT ENCOUNTER: Primary | ICD-10-CM

## 2024-06-27 DIAGNOSIS — I87.2 VENOUS INSUFFICIENCY: ICD-10-CM

## 2024-06-27 PROCEDURE — 1125F AMNT PAIN NOTED PAIN PRSNT: CPT | Mod: CPTII,S$GLB,,

## 2024-06-27 PROCEDURE — 1101F PT FALLS ASSESS-DOCD LE1/YR: CPT | Mod: CPTII,S$GLB,,

## 2024-06-27 PROCEDURE — 3072F LOW RISK FOR RETINOPATHY: CPT | Mod: CPTII,S$GLB,,

## 2024-06-27 PROCEDURE — 99999 PR PBB SHADOW E&M-EST. PATIENT-LVL V: CPT | Mod: PBBFAC,,,

## 2024-06-27 PROCEDURE — 11042 DBRDMT SUBQ TIS 1ST 20SQCM/<: CPT | Mod: S$GLB,,,

## 2024-06-27 PROCEDURE — 3008F BODY MASS INDEX DOCD: CPT | Mod: CPTII,S$GLB,,

## 2024-06-27 PROCEDURE — 1159F MED LIST DOCD IN RCRD: CPT | Mod: CPTII,S$GLB,,

## 2024-06-27 PROCEDURE — 3075F SYST BP GE 130 - 139MM HG: CPT | Mod: CPTII,S$GLB,,

## 2024-06-27 PROCEDURE — 3078F DIAST BP <80 MM HG: CPT | Mod: CPTII,S$GLB,,

## 2024-06-27 PROCEDURE — 29580 STRAPPING UNNA BOOT: CPT | Mod: LT,S$GLB,,

## 2024-06-27 PROCEDURE — 3288F FALL RISK ASSESSMENT DOCD: CPT | Mod: CPTII,S$GLB,,

## 2024-06-27 PROCEDURE — 99212 OFFICE O/P EST SF 10 MIN: CPT | Mod: 25,S$GLB,,

## 2024-06-27 NOTE — PROCEDURES
"Debridement    Date/Time: 6/27/2024 8:30 AM    Performed by: Rosalba Eaton PA-C  Authorized by: Rosalba Eaton PA-C    Time out: Immediately prior to procedure a "time out" was called to verify the correct patient, procedure, equipment, support staff and site/side marked as required.    Consent Done?:  Yes (Written)  Local anesthesia used?: Yes    Local anesthetic:  Topical anesthetic (Topical 4% Lidocaine Hydrochloride)    Wound Details:    Location:  Right leg (medial distal)    Type of Debridement:  Excisional       Length (cm):  3       Area (sq cm):  12       Width (cm):  4       Percent Debrided (%):  100       Depth (cm):  0.1       Total Area Debrided (sq cm):  12    Depth of debridement:  Subcutaneous tissue    Tissue debrided:  Subcutaneous    Devitalized tissue debrided:  Biofilm, Exudate, Fibrin and Slough    Instruments:  Curette, Blade, Forceps and Scissors  Bleeding:  Minimal  Hemostasis Achieved: Yes  Method Used:  Pressure  Patient tolerance:  Patient tolerated the procedure well with no immediate complications    "

## 2024-06-27 NOTE — PROGRESS NOTES
"Subjective:       Patient ID: Serafin Tapia is a 73 y.o. male.    Chief Complaint: Wound Check    Patient presents for a re-evaluation of bilateral lower extremity wounds. He reports his wounds started as blisters about 2 months ago. He is unsure why he did not contact the office until now. Pt admits compliance with wearing compression stockings daily, but reports his stockings have lost elasticity. He reports that his wounds drain a large amount of clear fluid. Pt has been dressing his wounds with a wound "liquid" and occasionally covering his wounds with gauze. Pt has also been leaving his wounds open to air. Patient followed with vascular surgery for PAD and venous insufficiency. Dr. Kapoor determined he has adequate arterial blood flow for wound healing and cleared him for a 3 layer compression wrap. Pt previously followed with the wound care clinic in 2022 and 2023.  Denies compliance with a low sodium diet, leg elevation, and following a high protein diet. Admits compliance with gabapentin. Notices improvement in pain. Denies side effects. No complaints at this time. Denies fever, chills, purulent drainage, erythema, or warmth.      10/21/22 MIHIR:  Right lower extremity: 0.93- minimal peripheral arterial occlusive disease  Left lower extremity: 0.70- moderate peripheral arterial occlusive disease      Review of Systems   Constitutional:  Negative for activity change, chills, diaphoresis, fatigue and fever.   Eyes:  Negative for photophobia and visual disturbance.   Respiratory:  Negative for apnea, chest tightness and shortness of breath.    Cardiovascular:  Positive for leg swelling. Negative for chest pain and palpitations.   Musculoskeletal:  Negative for gait problem and joint swelling.   Skin:  Positive for wound. Negative for color change, pallor and rash.   Neurological:  Negative for dizziness, syncope, facial asymmetry, speech difficulty, weakness, light-headedness, numbness and headaches. "   Psychiatric/Behavioral:  Negative for agitation and confusion. The patient is not nervous/anxious.    All other systems reviewed and are negative.      Objective:      Physical Exam  Vitals reviewed.   Constitutional:       General: He is not in acute distress.     Appearance: Normal appearance. He is obese.   Cardiovascular:      Rate and Rhythm: Normal rate and regular rhythm.      Pulses: Normal pulses.   Pulmonary:      Effort: No respiratory distress.   Musculoskeletal:         General: No swelling.      Right lower leg: Pitting Edema present.      Left lower leg: Pitting Edema present.      Comments: Ambulates with cane   Skin:     General: Skin is warm and dry.      Capillary Refill: Capillary refill takes less than 2 seconds.      Findings: Wound present. No erythema.          Neurological:      General: No focal deficit present.      Mental Status: He is alert and oriented to person, place, and time.   Psychiatric:         Mood and Affect: Mood normal.         Behavior: Behavior normal.         Thought Content: Thought content normal.         Judgment: Judgment normal.         Assessment:       1. Multiple open wounds of left lower extremity, subsequent encounter    2. Multiple open wounds of right lower extremity, subsequent encounter    3. Venous insufficiency    4. Essential hypertension    5. Heart failure, unspecified HF chronicity, unspecified heart failure type    6. Controlled type 2 diabetes mellitus with diabetic polyneuropathy, without long-term current use of insulin    7. Wound cellulitis               Wound Venous Ulcer Right lower;medial;distal Leg (Active)       Present on Original Admission:    Primary Wound Type: Venous ulcer   Side: Right   Orientation: lower;medial;distal   Location: Leg   Wound Approximate Age at First Assessment (Weeks):    Wound Number:    Is this injury device related?:    Incision Type:    Closure Method:    Wound Description (Comments):    Type:    Additional  Comments:    Ankle-Brachial Index:    Pulses:    Removal Indication and Assessment:    Wound Outcome:    Wound Image    06/27/24 0815   Dressing Appearance Dry;Intact;Clean;Moist drainage 06/27/24 0815   Drainage Amount Large 06/27/24 0815   Drainage Characteristics/Odor Green;Malodorous 06/27/24 0815   Yellow (%), Wound Tissue Color 100 % 06/27/24 0815   Periwound Area Intact;Edematous;Pink 06/27/24 0815   Wound Length (cm) 3 cm 06/27/24 0815   Wound Width (cm) 4 cm 06/27/24 0815   Wound Depth (cm) 0.1 cm 06/27/24 0815   Wound Volume (cm^3) 1.2 cm^3 06/27/24 0815   Wound Surface Area (cm^2) 12 cm^2 06/27/24 0815   Care Cleansed with:;Soap and water 06/27/24 0815   Dressing Applied;Compression wrap;Calcium alginate;Silver;Absorptive Pad;Other (comment) 06/27/24 0815   Periwound Care Skin barrier film applied 06/27/24 0815   Compression Unna's Boot;Three layer compression 06/27/24 0815            Wound Venous Ulcer Left lower;lateral;posterior Leg (Active)       Present on Original Admission:    Primary Wound Type: Venous ulcer   Side: Left   Orientation: lower;lateral;posterior   Location: Leg   Wound Approximate Age at First Assessment (Weeks):    Wound Number:    Is this injury device related?:    Incision Type:    Closure Method:    Wound Description (Comments):    Type:    Additional Comments:    Ankle-Brachial Index:    Pulses:    Removal Indication and Assessment:    Wound Outcome:    Wound Image    06/27/24 0815   Dressing Appearance Dry;Intact;Clean;Moist drainage 06/27/24 0815   Drainage Amount Large 06/27/24 0815   Drainage Characteristics/Odor Serosanguineous 06/27/24 0815   Red (%), Wound Tissue Color 100 % 06/27/24 0815   Periwound Area Intact;Edematous;Pink 06/27/24 0815   Wound Length (cm) 12 cm 06/27/24 0815   Wound Width (cm) 7.5 cm 06/27/24 0815   Wound Depth (cm) 0.1 cm 06/27/24 0815   Wound Volume (cm^3) 9 cm^3 06/27/24 0815   Wound Surface Area (cm^2) 90 cm^2 06/27/24 0815   Care Cleansed  with:;Soap and water 06/27/24 0815   Dressing Applied;Calcium alginate;Absorptive Pad;Compression wrap;Other (comment) 06/27/24 0815   Periwound Care Skin barrier film applied 06/27/24 0815   Compression Unna's Boot;Three layer compression 06/27/24 0815            Wound Ulceration Right lower;medial;proximal Leg (Active)       Present on Original Admission:    Primary Wound Type: Ulceration   Side: Right   Orientation: lower;medial;proximal   Location: Leg   Wound Approximate Age at First Assessment (Weeks):    Wound Number:    Is this injury device related?:    Incision Type:    Closure Method:    Wound Description (Comments):    Type:    Additional Comments:    Ankle-Brachial Index:    Pulses:    Removal Indication and Assessment:    Wound Outcome:    Dressing Appearance Dry;Intact;Clean;Moist drainage 06/27/24 0815   Drainage Amount Large 06/27/24 0815   Drainage Characteristics/Odor Serosanguineous 06/27/24 0815   Red (%), Wound Tissue Color 100 % 06/27/24 0815   Periwound Area Intact;Edematous;Pink 06/27/24 0815   Wound Length (cm) 0.7 cm 06/27/24 0815   Wound Width (cm) 0.5 cm 06/27/24 0815   Wound Depth (cm) 0.1 cm 06/27/24 0815   Wound Volume (cm^3) 0.035 cm^3 06/27/24 0815   Wound Surface Area (cm^2) 0.35 cm^2 06/27/24 0815   Care Cleansed with:;Soap and water 06/27/24 0815   Dressing Applied;Compression wrap;Calcium alginate;Absorptive Pad;Other (comment) 06/27/24 0815   Periwound Care Skin barrier film applied 06/27/24 0815   Compression Unna's Boot;Three layer compression 06/27/24 0815         Serafin was seen in the clinic room and placed in the supine position on the treatment table.  The wound dressings were removed and the legs were cleansed with Easi-clense sponges and dried thoroughly.  No erythema or warmth noted from patient's baseline.  Green drainage and odor noted to right lower extremity wounds. Sharp debridement with 5 mm curette to remove non-viable tissue from right lower extremity wound. Pt  tolerated procedure well. See procedure note. Cleansed wounds with acetic acid (1:1 with water) to treat pseudomonas.       Plan of Care:  vaseline impregnated gauze, drawtex, aquacel (aquacel ag to right medial lower extremity wound), mextra pad, and a 3 layer zinc compression wrap. The patient's feet were positioned at a 90 degree angle. The wraps ere applied using a spiral technique avoiding creases or folds.  The wraps were started behind the first metatarsal and ended below the tibial tubercle of the knee.  There was overlap of each turn half the width of the previous turn.  The compression wraps will be changed every 7 days.      Plan:       Bilateral lower extremity was dressed as detailed above  Patient was instructed to not get the dressings wet and to use cast covers for showering.  Should the dressing become wet, he is to remove it, place a moist dressing over the wound, cover with gauze and roll gauze and use ace wraps for compression and to secure bandages.  He should then notify this office as soon as possible to have a new dressing applied.  Instructed patient to remove wrap if he notices tingling or coldness to his bilateral lower extremities or if his toes become white, blue, or cold.    Discussed nutrition and the role of protein in wound healing with the patient. Instructed patient to optimize protein for wound healing.     Discussed bilateral lower extremity edema with patient. Instructed patient to elevate legs whenever sedentary and follow a low sodium diet.  Discussed replacing compression stockings every 3-6 months.    Instructed patient to keep follow up appointments with vascular surgery.    Discussed drainage control- Will see patient 2x a week.    Instructed patient to continue to take gabapentin as prescribed for nerve pain.    Discussed the importance of utilizing compression stockings to prevent recurrent wounds.    Discussed cellulitis- likely pseudomonas. Treated topically with  acetic acid and aquacel Ag.    Written and verbal instructions given to patient  RTC Monday Amleroy Eaton PA-C

## 2024-06-28 ENCOUNTER — OFFICE VISIT (OUTPATIENT)
Dept: INTERNAL MEDICINE | Facility: CLINIC | Age: 74
End: 2024-06-28
Attending: INTERNAL MEDICINE
Payer: MEDICARE

## 2024-06-28 ENCOUNTER — LAB VISIT (OUTPATIENT)
Dept: LAB | Facility: OTHER | Age: 74
End: 2024-06-28
Attending: INTERNAL MEDICINE
Payer: MEDICARE

## 2024-06-28 VITALS
HEIGHT: 69 IN | HEART RATE: 76 BPM | DIASTOLIC BLOOD PRESSURE: 76 MMHG | WEIGHT: 270.06 LBS | BODY MASS INDEX: 40 KG/M2 | OXYGEN SATURATION: 90 % | SYSTOLIC BLOOD PRESSURE: 142 MMHG

## 2024-06-28 DIAGNOSIS — L97.921 ULCER OF LEFT LOWER EXTREMITY, LIMITED TO BREAKDOWN OF SKIN: ICD-10-CM

## 2024-06-28 DIAGNOSIS — E79.0 HYPERURICEMIA: ICD-10-CM

## 2024-06-28 DIAGNOSIS — E53.8 VITAMIN B 12 DEFICIENCY: ICD-10-CM

## 2024-06-28 DIAGNOSIS — E66.01 MORBID OBESITY WITH BMI OF 40.0-44.9, ADULT: ICD-10-CM

## 2024-06-28 DIAGNOSIS — M62.82 NON-TRAUMATIC RHABDOMYOLYSIS: ICD-10-CM

## 2024-06-28 DIAGNOSIS — Z98.890 POSTOPERATIVE HYPOXIA: ICD-10-CM

## 2024-06-28 DIAGNOSIS — D84.821 DRUG-INDUCED IMMUNODEFICIENCY: ICD-10-CM

## 2024-06-28 DIAGNOSIS — G72.0 STATIN MYOPATHY: ICD-10-CM

## 2024-06-28 DIAGNOSIS — I10 ESSENTIAL HYPERTENSION: ICD-10-CM

## 2024-06-28 DIAGNOSIS — D51.8 OTHER VITAMIN B12 DEFICIENCY ANEMIA: ICD-10-CM

## 2024-06-28 DIAGNOSIS — Z79.899 DRUG-INDUCED IMMUNODEFICIENCY: ICD-10-CM

## 2024-06-28 DIAGNOSIS — G72.49 AUTOIMMUNE NECROTIZING MYOPATHY: ICD-10-CM

## 2024-06-28 DIAGNOSIS — I26.99 OTHER PULMONARY EMBOLISM WITHOUT ACUTE COR PULMONALE, UNSPECIFIED CHRONICITY: ICD-10-CM

## 2024-06-28 DIAGNOSIS — I73.9 PAD (PERIPHERAL ARTERY DISEASE): ICD-10-CM

## 2024-06-28 DIAGNOSIS — E53.8 FOLATE DEFICIENCY: ICD-10-CM

## 2024-06-28 DIAGNOSIS — T46.6X5A STATIN MYOPATHY: ICD-10-CM

## 2024-06-28 DIAGNOSIS — D68.69 OTHER THROMBOPHILIA: ICD-10-CM

## 2024-06-28 DIAGNOSIS — E03.9 HYPOTHYROIDISM (ACQUIRED): ICD-10-CM

## 2024-06-28 DIAGNOSIS — I51.7 LVH (LEFT VENTRICULAR HYPERTROPHY): ICD-10-CM

## 2024-06-28 DIAGNOSIS — R79.9 ABNORMAL FINDING OF BLOOD CHEMISTRY, UNSPECIFIED: ICD-10-CM

## 2024-06-28 DIAGNOSIS — Z86.711 HISTORY OF PULMONARY EMBOLISM: ICD-10-CM

## 2024-06-28 DIAGNOSIS — M35.9 SYSTEMIC INVOLVEMENT OF CONNECTIVE TISSUE, UNSPECIFIED: ICD-10-CM

## 2024-06-28 DIAGNOSIS — I50.9 HEART FAILURE, UNSPECIFIED HF CHRONICITY, UNSPECIFIED HEART FAILURE TYPE: ICD-10-CM

## 2024-06-28 DIAGNOSIS — J45.20 MILD INTERMITTENT ASTHMA WITHOUT COMPLICATION: ICD-10-CM

## 2024-06-28 DIAGNOSIS — E11.51 DIABETES MELLITUS WITH PERIPHERAL VASCULAR DISEASE: ICD-10-CM

## 2024-06-28 DIAGNOSIS — R09.02 POSTOPERATIVE HYPOXIA: ICD-10-CM

## 2024-06-28 DIAGNOSIS — I10 ESSENTIAL HYPERTENSION: Primary | ICD-10-CM

## 2024-06-28 DIAGNOSIS — Z80.42 FAMILY HISTORY OF PROSTATE CANCER: ICD-10-CM

## 2024-06-28 LAB
ALBUMIN SERPL BCP-MCNC: 3.6 G/DL (ref 3.5–5.2)
ALP SERPL-CCNC: 76 U/L (ref 55–135)
ALT SERPL W/O P-5'-P-CCNC: 29 U/L (ref 10–44)
ANION GAP SERPL CALC-SCNC: 10 MMOL/L (ref 8–16)
AST SERPL-CCNC: 40 U/L (ref 10–40)
BASOPHILS # BLD AUTO: 0.02 K/UL (ref 0–0.2)
BASOPHILS NFR BLD: 0.3 % (ref 0–1.9)
BILIRUB SERPL-MCNC: 0.3 MG/DL (ref 0.1–1)
BNP SERPL-MCNC: 88 PG/ML (ref 0–99)
BUN SERPL-MCNC: 13 MG/DL (ref 8–23)
CALCIUM SERPL-MCNC: 10 MG/DL (ref 8.7–10.5)
CHLORIDE SERPL-SCNC: 101 MMOL/L (ref 95–110)
CHOLEST SERPL-MCNC: 223 MG/DL (ref 120–199)
CHOLEST/HDLC SERPL: 5.3 {RATIO} (ref 2–5)
CK SERPL-CCNC: 2313 U/L (ref 20–200)
CO2 SERPL-SCNC: 34 MMOL/L (ref 23–29)
CREAT SERPL-MCNC: 0.9 MG/DL (ref 0.5–1.4)
CRP SERPL-MCNC: 83.4 MG/L (ref 0–8.2)
DIFFERENTIAL METHOD BLD: ABNORMAL
EOSINOPHIL # BLD AUTO: 0.1 K/UL (ref 0–0.5)
EOSINOPHIL NFR BLD: 1.4 % (ref 0–8)
ERYTHROCYTE [DISTWIDTH] IN BLOOD BY AUTOMATED COUNT: 15 % (ref 11.5–14.5)
ERYTHROCYTE [SEDIMENTATION RATE] IN BLOOD BY PHOTOMETRIC METHOD: 81 MM/HR (ref 0–23)
EST. GFR  (NO RACE VARIABLE): >60 ML/MIN/1.73 M^2
ESTIMATED AVG GLUCOSE: 154 MG/DL (ref 68–131)
FOLATE SERPL-MCNC: 4.6 NG/ML (ref 4–24)
GLUCOSE SERPL-MCNC: 87 MG/DL (ref 70–110)
HBA1C MFR BLD: 7 % (ref 4–5.6)
HCT VFR BLD AUTO: 38.8 % (ref 40–54)
HDLC SERPL-MCNC: 42 MG/DL (ref 40–75)
HDLC SERPL: 18.8 % (ref 20–50)
HGB BLD-MCNC: 11.6 G/DL (ref 14–18)
IMM GRANULOCYTES # BLD AUTO: 0.02 K/UL (ref 0–0.04)
IMM GRANULOCYTES NFR BLD AUTO: 0.3 % (ref 0–0.5)
LDLC SERPL CALC-MCNC: 163.4 MG/DL (ref 63–159)
LYMPHOCYTES # BLD AUTO: 1.7 K/UL (ref 1–4.8)
LYMPHOCYTES NFR BLD: 25.7 % (ref 18–48)
MCH RBC QN AUTO: 25.7 PG (ref 27–31)
MCHC RBC AUTO-ENTMCNC: 29.9 G/DL (ref 32–36)
MCV RBC AUTO: 86 FL (ref 82–98)
MONOCYTES # BLD AUTO: 0.4 K/UL (ref 0.3–1)
MONOCYTES NFR BLD: 6.8 % (ref 4–15)
NEUTROPHILS # BLD AUTO: 4.3 K/UL (ref 1.8–7.7)
NEUTROPHILS NFR BLD: 65.5 % (ref 38–73)
NONHDLC SERPL-MCNC: 181 MG/DL
NRBC BLD-RTO: 0 /100 WBC
PLATELET # BLD AUTO: 368 K/UL (ref 150–450)
PMV BLD AUTO: 10.2 FL (ref 9.2–12.9)
POTASSIUM SERPL-SCNC: 4.3 MMOL/L (ref 3.5–5.1)
PROT SERPL-MCNC: 7.4 G/DL (ref 6–8.4)
RBC # BLD AUTO: 4.52 M/UL (ref 4.6–6.2)
SODIUM SERPL-SCNC: 145 MMOL/L (ref 136–145)
TRIGL SERPL-MCNC: 88 MG/DL (ref 30–150)
TSH SERPL DL<=0.005 MIU/L-ACNC: 3.4 UIU/ML (ref 0.4–4)
URATE SERPL-MCNC: 7.6 MG/DL (ref 3.4–7)
VIT B12 SERPL-MCNC: 381 PG/ML (ref 210–950)
WBC # BLD AUTO: 6.51 K/UL (ref 3.9–12.7)

## 2024-06-28 PROCEDURE — 82550 ASSAY OF CK (CPK): CPT | Performed by: INTERNAL MEDICINE

## 2024-06-28 PROCEDURE — 85025 COMPLETE CBC W/AUTO DIFF WBC: CPT | Performed by: INTERNAL MEDICINE

## 2024-06-28 PROCEDURE — 99999 PR PBB SHADOW E&M-EST. PATIENT-LVL V: CPT | Mod: PBBFAC,,, | Performed by: INTERNAL MEDICINE

## 2024-06-28 PROCEDURE — 80053 COMPREHEN METABOLIC PANEL: CPT | Performed by: INTERNAL MEDICINE

## 2024-06-28 PROCEDURE — 84443 ASSAY THYROID STIM HORMONE: CPT | Performed by: INTERNAL MEDICINE

## 2024-06-28 PROCEDURE — 83880 ASSAY OF NATRIURETIC PEPTIDE: CPT | Performed by: INTERNAL MEDICINE

## 2024-06-28 PROCEDURE — 36415 COLL VENOUS BLD VENIPUNCTURE: CPT | Performed by: INTERNAL MEDICINE

## 2024-06-28 PROCEDURE — 82607 VITAMIN B-12: CPT | Performed by: INTERNAL MEDICINE

## 2024-06-28 PROCEDURE — 84550 ASSAY OF BLOOD/URIC ACID: CPT | Performed by: INTERNAL MEDICINE

## 2024-06-28 PROCEDURE — 80061 LIPID PANEL: CPT | Performed by: INTERNAL MEDICINE

## 2024-06-28 PROCEDURE — 82746 ASSAY OF FOLIC ACID SERUM: CPT | Performed by: INTERNAL MEDICINE

## 2024-06-28 PROCEDURE — 86140 C-REACTIVE PROTEIN: CPT | Performed by: INTERNAL MEDICINE

## 2024-06-28 PROCEDURE — 85652 RBC SED RATE AUTOMATED: CPT | Performed by: INTERNAL MEDICINE

## 2024-06-28 PROCEDURE — 83036 HEMOGLOBIN GLYCOSYLATED A1C: CPT | Performed by: INTERNAL MEDICINE

## 2024-06-28 RX ORDER — ALBUTEROL SULFATE 90 UG/1
2 AEROSOL, METERED RESPIRATORY (INHALATION) EVERY 6 HOURS PRN
Qty: 18 G | Refills: 5 | Status: SHIPPED | OUTPATIENT
Start: 2024-06-28

## 2024-06-28 RX ORDER — ALBUTEROL SULFATE 0.83 MG/ML
2.5 SOLUTION RESPIRATORY (INHALATION)
Status: COMPLETED | OUTPATIENT
Start: 2024-06-28 | End: 2024-06-28

## 2024-06-28 RX ADMIN — ALBUTEROL SULFATE 2.5 MG: 0.83 SOLUTION RESPIRATORY (INHALATION) at 08:06

## 2024-06-28 NOTE — PROGRESS NOTES
Albuterol neb administer per Dr. Vargas order. Pt's SPO2 is 91% on RA at beginning of treatment. Pt's SpO2 is 89% on RA at end of treatment.

## 2024-06-28 NOTE — PROGRESS NOTES
Subjective:       Patient ID: Serafin Tapia is a 73 y.o. male.    Chief Complaint: Diabetes (Elevated blood sugars)    Here for f/u    LCV 03/2023 Not clear what medications pt is taking. Rx are old. May be taking 2 BB. States uses a pill case. Not checking BG. Reports chronic 3 pillow orthopnea and FLOWERS with walking 15-20ft. Ankle pain restricts his mobility. Continues to f/u with wound care. S/p vascular eval. Hx of PVD but non contributory an account of collateral flow. Oxygen 92% at rest and 90 with exertion of walking 200ft in clinic. Lungs clear with poor airway movement. He denies wheezing, CP at rest or with exertion. No new cough or F/C.      ### HTN ###  Rx nifedipine 90mg, lasix 40, benazepril 40, metoprolol XL 50.   Hx of medication non adherence in taking meds without any consistency or schedule.  02/2021 BP elevated today. His adherence to medications today varies with response.   04/14/21 BP much improved but remains elevated. Increase BB to 200mg.   3/21/22 CV BP very elevated. medication non adherence continues  9/28/22 out of chlorthalidone. F/u in 10 days with me and med bag  6/2024 unclear which meds he is taking. Staff to contact once he gets home        ### Asthma ###   Denies frequent SOB, FLOWERS, chest tightness limited by LE weakness.      ### Hypothyroidism ###  Reported adherence with levothyroxine 100mcg  Pt denies unexplained lyn gain/loss, palpitations, tremors, diarrhea, constipation, changes to hair/skin, heat/cold intolerance, or dysphagia.         ### Myopathy ###  Required long course of high dose prednisone. He is off the prednisone since 01/2019  Mtx started : now at 8 tabs weekly and folic acid   Denies myalgias or weakness  Due for f/u with rheum        ### leg wound ##  -Left leg. S/p Abx. Current wound care. Saw vascular Dr hitchcock. Pt does have chronic revascularized invivo PAD and poor heeling attributed to poor out flow on venous side.  -09/30/2022 U/S Hemodynamically  significant stenosis at the level of the anterior tibial artery suspected  -CTA c/ runoff (10/25/2022)Prominent calcific atherosclerosis in the lower extremities with multifocal moderate/ high-grade narrowing throughout the KAMRAN, PTA, and peroneal arteries.  The distal posterior tibial arteries are occluded bilaterally with reconstitution by collateral flow from the peroneal arteries. Multifocal mild/ moderate stenosis of the femoropopliteal arteries     Atherosclerotic plaque elsewhere with no evidence for hemodynamically significant stenosis.      ### DM ###  Last visit reported taking metformin 1000mg QD instead of Rx BID. He has continued this. Hx of myopathy so statin avoided.   Discussion reveals he likes sweet tea.  11/11/2021 Started on glimepiride  3/21/22 CV reports starting glimepiride. Denies lows    9//28/22 reports 200s. Increase glimepiride        11/10/2022 Start bydureon and increase glimepiride to 4mg                   HGBA1C                   7.4 (H)             02/10/2023 02:30 PM        HGBA1C                   8.0 (H)             11/10/2022 03:42 PM        HGBA1C                   9.5 (H)             09/28/2022 12:15 PM        HGBA1C                   8.2 (H)             03/21/2022 09:58 AM        HGBA1C                   9.1 (H)             11/11/2021 02:02 PM        HGBA1C                   8.3 (H)             05/17/2021 10:42 AM        HGBA1C                   10.9 (H)            02/03/2021 11:21 AM        HGBA1C                   7.6 (H)             09/25/2019 11:00 AM        HGBA1C                   7.5 (H)             09/10/2019 06:10 PM        HGBA1C                   7.6 (H)             02/19/2019 10:15 AM        ### HM ###  Colonoscopy done by Dr mario 5/6/19  Several wide mouth diverticuli (non-bleeding) in sigmoid, 5mm and 2mm polyp proximal ascending        Review of Systems   Constitutional:  Negative for appetite change, chills, fever and unexpected weight change.   HENT:   "Negative for hearing loss, sore throat and trouble swallowing.    Eyes:  Negative for visual disturbance.   Respiratory:  Positive for shortness of breath. Negative for cough and chest tightness.    Cardiovascular:  Negative for chest pain and leg swelling.   Gastrointestinal:  Negative for abdominal pain, blood in stool, constipation, diarrhea, nausea and vomiting.   Endocrine: Negative for polydipsia and polyuria.   Genitourinary:  Negative for decreased urine volume, difficulty urinating, dysuria, frequency and urgency.   Musculoskeletal:  Positive for arthralgias. Negative for gait problem.   Skin:  Negative for rash.   Neurological:  Negative for dizziness and numbness.   Psychiatric/Behavioral:  The patient is not nervous/anxious.        Objective:      Vitals:    06/28/24 0750   BP: (!) 142/76   BP Location: Left arm   Patient Position: Sitting   Pulse: 76   SpO2: (!) 90%   Weight: 122.5 kg (270 lb 1 oz)   Height: 5' 9" (1.753 m)      Physical Exam  Vitals and nursing note reviewed.   Constitutional:       General: He is not in acute distress.     Appearance: Normal appearance. He is well-developed.   HENT:      Head: Normocephalic and atraumatic.      Mouth/Throat:      Pharynx: No oropharyngeal exudate.   Eyes:      General: No scleral icterus.     Conjunctiva/sclera: Conjunctivae normal.      Pupils: Pupils are equal, round, and reactive to light.   Neck:      Thyroid: No thyromegaly.   Cardiovascular:      Rate and Rhythm: Normal rate and regular rhythm.      Heart sounds: Normal heart sounds. No murmur heard.  Pulmonary:      Effort: Pulmonary effort is normal.      Breath sounds: Normal breath sounds. No wheezing or rales.   Abdominal:      General: There is no distension.   Musculoskeletal:         General: No tenderness.   Lymphadenopathy:      Cervical: No cervical adenopathy.   Skin:     General: Skin is warm and dry.   Neurological:      Mental Status: He is alert and oriented to person, place, and " time.   Psychiatric:         Behavior: Behavior normal.         Assessment:       1. Essential hypertension    2. Postoperative hypoxia    3. Drug-induced immunodeficiency    4. PAD (peripheral artery disease)    5. Heart failure, unspecified HF chronicity, unspecified heart failure type    6. Vitamin B 12 deficiency    7. Family history of prostate cancer    8. Other vitamin B12 deficiency anemia    9. History of pulmonary embolism    10. LVH (left ventricular hypertrophy)    11. Hypothyroidism (acquired)    12. Morbid obesity with BMI of 40.0-44.9, adult    13. 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 Other thrombophilia    16. Abnormal finding of blood chemistry, unspecified    17. Ulcer of left lower extremity, limited to breakdown of skin    18. Systemic involvement of connective tissue, unspecified    19. Other pulmonary embolism without acute cor pulmonale, unspecified chronicity    20. Non-traumatic rhabdomyolysis    21. Statin myopathy    22. Mild intermittent asthma without complication    23. Hyperuricemia    24. Folate deficiency        Plan:       Serafin was seen today for diabetes.    Diagnoses and all orders for this visit:    Essential hypertension  We need to see what he is taking before we make recs.   -     Comprehensive Metabolic Panel; Future  -     Lipid Panel; Future  -     TSH; Future  -     CBC Auto Differential; Future  -     Hemoglobin A1C; Future    Hypoxia   No symptomatic or lung exam improvement after nebulizer. Update labs and ECHO. Office and Emergency Department prompts discussed.    Drug-induced immunodeficiency    PAD (peripheral artery disease)    Heart failure, unspecified HF chronicity, unspecified heart failure type  -     B-TYPE NATRIURETIC PEPTIDE; Future  -     Echo; Future  -     Six Minute Walk Test to qualify for Home Oxygen; Future    Vitamin B 12 deficiency  -     Vitamin B12; Future    Family history of prostate  cancer    Other vitamin B12 deficiency anemia    History of pulmonary embolism    LVH (left ventricular hypertrophy)  -     B-TYPE NATRIURETIC PEPTIDE; Future    Hypothyroidism (acquired)   Clinically euthyroid. Update TSH.    Morbid obesity with BMI of 40.0-44.9, adult    Diabetes mellitus with peripheral vascular disease  -     Ambulatory referral/consult to Optometry; Future    Autoimmune necrotizing myopathy  -     C-REACTIVE PROTEIN; Future  -     Sedimentation rate; Future    Other thrombophilia  -     Cancel: Ambulatory referral/consult to Endo Procedure ; Future    Abnormal finding of blood chemistry, unspecified  -     Hemoglobin A1C; Future    Ulcer of left lower extremity, limited to breakdown of skin    Systemic involvement of connective tissue, unspecified    Other pulmonary embolism without acute cor pulmonale, unspecified chronicity    Non-traumatic rhabdomyolysis  -     CK; Future    Statin myopathy  -     CK; Future    Mild intermittent asthma without complication  -     albuterol nebulizer solution 2.5 mg  -     albuterol (PROVENTIL/VENTOLIN HFA) 90 mcg/actuation inhaler; Inhale 2 puffs into the lungs every 6 (six) hours as needed for Wheezing.    Hyperuricemia  -     URIC ACID; Future    Folate deficiency  -     Folate; Future       > 40 minutes were spent today reviewing patient chart.  Much of today's visit was spent discussing with patient my chart review, their concerns, health maintenance, my assessment, and recommendations.    Visit today is associated with current or anticipated ongoing medical care related to this patient's single serious condition/complex condition of DM, HTN, asthma, heart failure. The patient will return to see me as these issues will be followed longitudinally.        John Chavis MD  Internal Medicine-[facility]        Side effects of medication(s) were discussed in detail and patient voiced understanding.  Patient will call back for any issues or  complications.

## 2024-07-03 ENCOUNTER — HOSPITAL ENCOUNTER (OUTPATIENT)
Dept: PULMONOLOGY | Facility: CLINIC | Age: 74
Discharge: HOME | End: 2024-07-03
Payer: MEDICARE

## 2024-07-03 ENCOUNTER — TELEPHONE (OUTPATIENT)
Dept: WOUND CARE | Facility: CLINIC | Age: 74
End: 2024-07-03

## 2024-07-03 ENCOUNTER — HOSPITAL ENCOUNTER (OUTPATIENT)
Dept: CARDIOLOGY | Facility: HOSPITAL | Age: 74
Discharge: HOME | End: 2024-07-03
Attending: INTERNAL MEDICINE
Payer: MEDICARE

## 2024-07-03 ENCOUNTER — OFFICE VISIT (OUTPATIENT)
Dept: WOUND CARE | Facility: CLINIC | Age: 74
End: 2024-07-03
Payer: MEDICARE

## 2024-07-03 VITALS
BODY MASS INDEX: 40 KG/M2 | DIASTOLIC BLOOD PRESSURE: 95 MMHG | TEMPERATURE: 98 F | SYSTOLIC BLOOD PRESSURE: 216 MMHG | WEIGHT: 270.06 LBS | HEART RATE: 91 BPM | HEIGHT: 69 IN

## 2024-07-03 VITALS
HEIGHT: 69 IN | WEIGHT: 270.06 LBS | BODY MASS INDEX: 40 KG/M2 | SYSTOLIC BLOOD PRESSURE: 182 MMHG | DIASTOLIC BLOOD PRESSURE: 118 MMHG | HEART RATE: 84 BPM

## 2024-07-03 DIAGNOSIS — I50.9 HEART FAILURE, UNSPECIFIED HF CHRONICITY, UNSPECIFIED HEART FAILURE TYPE: ICD-10-CM

## 2024-07-03 DIAGNOSIS — I10 ELEVATED BLOOD PRESSURE READING IN OFFICE WITH DIAGNOSIS OF HYPERTENSION: ICD-10-CM

## 2024-07-03 DIAGNOSIS — I10 ESSENTIAL HYPERTENSION: ICD-10-CM

## 2024-07-03 DIAGNOSIS — I87.2 VENOUS INSUFFICIENCY: ICD-10-CM

## 2024-07-03 DIAGNOSIS — S81.802D MULTIPLE OPEN WOUNDS OF LEFT LOWER EXTREMITY, SUBSEQUENT ENCOUNTER: Primary | ICD-10-CM

## 2024-07-03 DIAGNOSIS — L03.90 WOUND CELLULITIS: ICD-10-CM

## 2024-07-03 DIAGNOSIS — S81.801D MULTIPLE OPEN WOUNDS OF RIGHT LOWER EXTREMITY, SUBSEQUENT ENCOUNTER: ICD-10-CM

## 2024-07-03 DIAGNOSIS — E11.42 CONTROLLED TYPE 2 DIABETES MELLITUS WITH DIABETIC POLYNEUROPATHY, WITHOUT LONG-TERM CURRENT USE OF INSULIN: ICD-10-CM

## 2024-07-03 DIAGNOSIS — I73.9 PAD (PERIPHERAL ARTERY DISEASE): ICD-10-CM

## 2024-07-03 LAB
ASCENDING AORTA: 3.53 CM
AV INDEX (PROSTH): 0.28
AV MEAN GRADIENT: 29 MMHG
AV PEAK GRADIENT: 48 MMHG
AV VALVE AREA BY VELOCITY RATIO: 1.08 CM²
AV VALVE AREA: 1.15 CM²
AV VELOCITY RATIO: 0.26
BSA FOR ECHO PROCEDURE: 2.44 M2
CV ECHO LV RWT: 0.53 CM
DOP CALC AO PEAK VEL: 3.45 M/S
DOP CALC AO VTI: 80 CM
DOP CALC LVOT AREA: 4.2 CM2
DOP CALC LVOT DIAMETER: 2.3 CM
DOP CALC LVOT PEAK VEL: 0.9 M/S
DOP CALC LVOT STROKE VOLUME: 91.94 CM3
DOP CALCLVOT PEAK VEL VTI: 22.14 CM
E WAVE DECELERATION TIME: 231.97 MSEC
E/A RATIO: 0.65
E/E' RATIO: 11.17 M/S
ECHO LV POSTERIOR WALL: 1.26 CM (ref 0.6–1.1)
FRACTIONAL SHORTENING: 34 % (ref 28–44)
INTERVENTRICULAR SEPTUM: 1.22 CM (ref 0.6–1.1)
LA MAJOR: 5.05 CM
LA MINOR: 5.15 CM
LA WIDTH: 4.4 CM
LEFT ATRIUM SIZE: 4.54 CM
LEFT ATRIUM VOLUME INDEX MOD: 36.2 ML/M2
LEFT ATRIUM VOLUME INDEX: 36.8 ML/M2
LEFT ATRIUM VOLUME MOD: 85 CM3
LEFT ATRIUM VOLUME: 86.59 CM3
LEFT INTERNAL DIMENSION IN SYSTOLE: 3.15 CM (ref 2.1–4)
LEFT VENTRICLE DIASTOLIC VOLUME INDEX: 45.54 ML/M2
LEFT VENTRICLE DIASTOLIC VOLUME: 107.01 ML
LEFT VENTRICLE MASS INDEX: 97 G/M2
LEFT VENTRICLE SYSTOLIC VOLUME INDEX: 16.8 ML/M2
LEFT VENTRICLE SYSTOLIC VOLUME: 39.54 ML
LEFT VENTRICULAR INTERNAL DIMENSION IN DIASTOLE: 4.79 CM (ref 3.5–6)
LEFT VENTRICULAR MASS: 228.85 G
LV LATERAL E/E' RATIO: 8.38 M/S
LV SEPTAL E/E' RATIO: 16.75 M/S
MV PEAK A VEL: 1.03 M/S
MV PEAK E VEL: 0.67 M/S
MV STENOSIS PRESSURE HALF TIME: 67.27 MS
MV VALVE AREA P 1/2 METHOD: 3.27 CM2
PISA TR MAX VEL: 2.1 M/S
RA MAJOR: 4.76 CM
RA PRESSURE ESTIMATED: 3 MMHG
RA WIDTH: 3.68 CM
RIGHT VENTRICLE DIASTOLIC BASEL DIMENSION: 3.6 CM
RV TB RVSP: 5 MMHG
SINUS: 3.21 CM
STJ: 3.1 CM
TDI LATERAL: 0.08 M/S
TDI SEPTAL: 0.04 M/S
TDI: 0.06 M/S
TR MAX PG: 18 MMHG
TRICUSPID ANNULAR PLANE SYSTOLIC EXCURSION: 1.76 CM
TV REST PULMONARY ARTERY PRESSURE: 21 MMHG
Z-SCORE OF LEFT VENTRICULAR DIMENSION IN END DIASTOLE: -7.19
Z-SCORE OF LEFT VENTRICULAR DIMENSION IN END SYSTOLE: -4.97

## 2024-07-03 PROCEDURE — 3008F BODY MASS INDEX DOCD: CPT | Mod: CPTII,S$GLB,,

## 2024-07-03 PROCEDURE — 3051F HG A1C>EQUAL 7.0%<8.0%: CPT | Mod: CPTII,S$GLB,,

## 2024-07-03 PROCEDURE — 3288F FALL RISK ASSESSMENT DOCD: CPT | Mod: CPTII,S$GLB,,

## 2024-07-03 PROCEDURE — C8929 TTE W OR WO FOL WCON,DOPPLER: HCPCS

## 2024-07-03 PROCEDURE — 29580 STRAPPING UNNA BOOT: CPT | Mod: LT,S$GLB,,

## 2024-07-03 PROCEDURE — 11043 DBRDMT MUSC&/FSCA 1ST 20/<: CPT | Mod: S$GLB,,,

## 2024-07-03 PROCEDURE — 93306 TTE W/DOPPLER COMPLETE: CPT | Mod: 26,,, | Performed by: INTERNAL MEDICINE

## 2024-07-03 PROCEDURE — 1101F PT FALLS ASSESS-DOCD LE1/YR: CPT | Mod: CPTII,S$GLB,,

## 2024-07-03 PROCEDURE — 3077F SYST BP >= 140 MM HG: CPT | Mod: CPTII,S$GLB,,

## 2024-07-03 PROCEDURE — 1125F AMNT PAIN NOTED PAIN PRSNT: CPT | Mod: CPTII,S$GLB,,

## 2024-07-03 PROCEDURE — 1159F MED LIST DOCD IN RCRD: CPT | Mod: CPTII,S$GLB,,

## 2024-07-03 PROCEDURE — 3072F LOW RISK FOR RETINOPATHY: CPT | Mod: CPTII,S$GLB,,

## 2024-07-03 PROCEDURE — 99999 PR PBB SHADOW E&M-EST. PATIENT-LVL V: CPT | Mod: PBBFAC,,,

## 2024-07-03 PROCEDURE — 11046 DBRDMT MUSC&/FSCA EA ADDL: CPT | Mod: S$GLB,,,

## 2024-07-03 PROCEDURE — 3080F DIAST BP >= 90 MM HG: CPT | Mod: CPTII,S$GLB,,

## 2024-07-03 PROCEDURE — 99214 OFFICE O/P EST MOD 30 MIN: CPT | Mod: 25,S$GLB,,

## 2024-07-03 RX ORDER — GABAPENTIN 100 MG/1
300 CAPSULE ORAL 3 TIMES DAILY
Qty: 90 CAPSULE | Refills: 3 | Status: SHIPPED | OUTPATIENT
Start: 2024-07-03

## 2024-07-03 RX ORDER — CIPROFLOXACIN 750 MG/1
750 TABLET, FILM COATED ORAL 2 TIMES DAILY
Qty: 28 TABLET | Refills: 0 | Status: SHIPPED | OUTPATIENT
Start: 2024-07-03 | End: 2024-07-17

## 2024-07-03 NOTE — PROCEDURES
"Debridement    Date/Time: 7/3/2024 8:30 AM    Performed by: Rosalba Eaton PA-C  Authorized by: Rosalba Eaton PA-C    Time out: Immediately prior to procedure a "time out" was called to verify the correct patient, procedure, equipment, support staff and site/side marked as required.    Consent Done?:  Yes (Written)  Local anesthesia used?: Yes    Local anesthetic:  Topical anesthetic (Topical 4% Lidocaine Hydrochloride)    Wound Details:    Location:  Right leg (medial distal)    Type of Debridement:  Excisional       Length (cm):  3.3       Area (sq cm):  22.77       Width (cm):  6.9       Percent Debrided (%):  100       Depth (cm):  0.1       Total Area Debrided (sq cm):  22.77    Depth of debridement:  Muscle/fascia/tendon    Tissue debrided:  Dermis, Epidermis and Subcutaneous    Devitalized tissue debrided:  Biofilm, Exudate, Fibrin, Slough and Necrotic/Eschar    Instruments:  Curette, Blade, Forceps and Scissors  Bleeding:  Minimal  Hemostasis Achieved: Yes  Method Used:  Pressure    Additional wounds:  1    2nd Wound Details:     Location:  Right leg (medial proximal)    Type of Debridement:  Excisional       Length (cm):  0.5       Area (sq cm):  0.25       Width (cm):  0.5       Percent Debrided (%):  100       Depth (cm):  0.1       Total Area Debrided (sq cm):  0.25    Depth of debridement:  Subcutaneous tissue    Tissue debrided:  Subcutaneous    Devitalized tissue debrided:  Biofilm, Exudate, Fibrin and Slough    Instruments:  Curette  Bleeding:  Minimal  Hemostasis Achieved: Yes    Method Used:  Pressure  Patient tolerance:  Patient tolerated the procedure well with no immediate complications    "

## 2024-07-03 NOTE — PROGRESS NOTES
"Subjective:       Patient ID: Serafin Tapia is a 73 y.o. male.    Chief Complaint: Wound Check    Patient presents with his brother for a re-evaluation of bilateral lower extremity wounds. He reports his wounds started as blisters about 2 months ago. He is unsure why he did not contact the office until now. Pt admits compliance with wearing compression stockings daily, but reports his stockings have lost elasticity. He reports that his wounds drain a large amount of clear fluid. Pt has been dressing his wounds with a wound "liquid" and occasionally covering his wounds with gauze. Pt has also been leaving his wounds open to air. Patient followed with vascular surgery for PAD and venous insufficiency. Dr. Kapoor determined he has adequate arterial blood flow for wound healing and cleared him for a 3 layer compression wrap. Pt previously followed with the wound care clinic in 2022 and 2023.  Denies compliance with a low sodium diet, leg elevation, and following a high protein diet. Admits compliance with gabapentin. Notices improvement in pain. Denies side effects.       Interval history: Pt reports increasing gabapentin dose from 100 mg to 300 mg for pain. He requests a new prescription. Denies fever, chills, purulent drainage, erythema, or warmth.      10/21/22 MIHIR:  Right lower extremity: 0.93- minimal peripheral arterial occlusive disease  Left lower extremity: 0.70- moderate peripheral arterial occlusive disease      Review of Systems   Constitutional:  Negative for activity change, chills, diaphoresis, fatigue and fever.   Eyes:  Negative for photophobia and visual disturbance.   Respiratory:  Negative for apnea, chest tightness and shortness of breath.    Cardiovascular:  Positive for leg swelling. Negative for chest pain and palpitations.   Musculoskeletal:  Negative for gait problem and joint swelling.   Skin:  Positive for wound. Negative for color change, pallor and rash.   Neurological:  Negative for " dizziness, syncope, facial asymmetry, speech difficulty, weakness, light-headedness, numbness and headaches.   Psychiatric/Behavioral:  Negative for agitation and confusion. The patient is not nervous/anxious.    All other systems reviewed and are negative.      Objective:      Physical Exam  Vitals reviewed.   Constitutional:       General: He is not in acute distress.     Appearance: Normal appearance. He is obese.   Cardiovascular:      Rate and Rhythm: Normal rate and regular rhythm.      Pulses: Normal pulses.   Pulmonary:      Effort: No respiratory distress.   Musculoskeletal:         General: No swelling.      Right lower leg: Pitting Edema present.      Left lower leg: Pitting Edema present.      Comments: Ambulates with cane   Skin:     General: Skin is warm and dry.      Capillary Refill: Capillary refill takes less than 2 seconds.      Findings: Wound present. No erythema.          Neurological:      General: No focal deficit present.      Mental Status: He is alert and oriented to person, place, and time.   Psychiatric:         Mood and Affect: Mood normal.         Behavior: Behavior normal.         Thought Content: Thought content normal.         Judgment: Judgment normal.         Assessment:       1. Multiple open wounds of left lower extremity, subsequent encounter    2. Multiple open wounds of right lower extremity, subsequent encounter    3. Venous insufficiency    4. Essential hypertension    5. Heart failure, unspecified HF chronicity, unspecified heart failure type    6. Controlled type 2 diabetes mellitus with diabetic polyneuropathy, without long-term current use of insulin    7. Wound cellulitis    8. Elevated blood pressure reading in office with diagnosis of hypertension               Wound Venous Ulcer Right lower;medial;distal Leg (Active)       Present on Original Admission:    Primary Wound Type: Venous ulcer   Side: Right   Orientation: lower;medial;distal   Location: Leg   Wound  Approximate Age at First Assessment (Weeks):    Wound Number:    Is this injury device related?:    Incision Type:    Closure Method:    Wound Description (Comments):    Type:    Additional Comments:    Ankle-Brachial Index:    Pulses:    Removal Indication and Assessment:    Wound Outcome:    Wound Image    07/03/24 0749   Dressing Appearance Dry;Intact;Clean;Moist drainage 07/03/24 0749   Drainage Amount Large 07/03/24 0749   Drainage Characteristics/Odor Green;Malodorous 07/03/24 0749   Appearance Tendon 07/03/24 0749   Yellow (%), Wound Tissue Color 100 % 07/03/24 0749   Periwound Area Intact;Edematous 07/03/24 0749   Wound Length (cm) 3.3 cm 07/03/24 0749   Wound Width (cm) 6.9 cm 07/03/24 0749   Wound Depth (cm) 0.1 cm 07/03/24 0749   Wound Volume (cm^3) 2.277 cm^3 07/03/24 0749   Wound Surface Area (cm^2) 22.77 cm^2 07/03/24 0749   Care Cleansed with:;Soap and water 07/03/24 0749   Dressing Applied;Calcium alginate;Compression wrap;Other (comment);Silver 07/03/24 0749   Periwound Care Skin barrier film applied 07/03/24 0749   Compression Unna's Boot;Three layer compression 07/03/24 0749            Wound Venous Ulcer Left lower;lateral;posterior Leg (Active)       Present on Original Admission:    Primary Wound Type: Venous ulcer   Side: Left   Orientation: lower;lateral;posterior   Location: Leg   Wound Approximate Age at First Assessment (Weeks):    Wound Number:    Is this injury device related?:    Incision Type:    Closure Method:    Wound Description (Comments):    Type:    Additional Comments:    Ankle-Brachial Index:    Pulses:    Removal Indication and Assessment:    Wound Outcome:    Wound Image    07/03/24 0749   Dressing Appearance Dry;Intact;Clean;Moist drainage 07/03/24 0749   Drainage Amount Large 07/03/24 0749   Drainage Characteristics/Odor Serosanguineous 07/03/24 0749   Red (%), Wound Tissue Color 100 % 07/03/24 0749   Periwound Area Intact;Edematous;Pink 07/03/24 0749   Wound Length (cm)  11.9 cm 07/03/24 0749   Wound Width (cm) 19.2 cm 07/03/24 0749   Wound Depth (cm) 0.1 cm 07/03/24 0749   Wound Volume (cm^3) 22.848 cm^3 07/03/24 0749   Wound Surface Area (cm^2) 228.48 cm^2 07/03/24 0749   Care Cleansed with:;Soap and water 07/03/24 0749   Dressing Applied;Calcium alginate;Absorptive Pad;Compression wrap;Other (comment) 07/03/24 0749   Periwound Care Skin barrier film applied 07/03/24 0749   Compression Unna's Boot;Three layer compression 07/03/24 0749            Wound Ulceration Right lower;medial;proximal Leg (Active)       Present on Original Admission:    Primary Wound Type: Ulceration   Side: Right   Orientation: lower;medial;proximal   Location: Leg   Wound Approximate Age at First Assessment (Weeks):    Wound Number:    Is this injury device related?:    Incision Type:    Closure Method:    Wound Description (Comments):    Type:    Additional Comments:    Ankle-Brachial Index:    Pulses:    Removal Indication and Assessment:    Wound Outcome:    Dressing Appearance Dry;Intact;Clean;Moist drainage 07/03/24 0749   Drainage Amount Large 07/03/24 0749   Drainage Characteristics/Odor Serosanguineous 07/03/24 0749   Yellow (%), Wound Tissue Color 100 % 07/03/24 0749   Periwound Area Intact;Edematous;Pink 07/03/24 0749   Wound Length (cm) 0.5 cm 07/03/24 0749   Wound Width (cm) 0.5 cm 07/03/24 0749   Wound Depth (cm) 0.1 cm 07/03/24 0749   Wound Volume (cm^3) 0.025 cm^3 07/03/24 0749   Wound Surface Area (cm^2) 0.25 cm^2 07/03/24 0749   Care Cleansed with:;Soap and water 07/03/24 0749   Dressing Applied;Compression wrap;Calcium alginate 07/03/24 0749   Periwound Care Skin barrier film applied 07/03/24 0749   Compression Unna's Boot;Three layer compression 07/03/24 0749         Serafin was seen in the clinic room and placed in the supine position on the treatment table.  The wound dressings were removed and the legs were cleansed with Easi-clense sponges and dried thoroughly.  No erythema or warmth  noted from patient's baseline.  Green drainage and odor noted to right lower extremity wounds. Placed topical 4% Lidocaine Hydrochloride to wound bed for 15 minutes. Sharp debridement with 5 mm curette, scissors, pickups, and blade to remove non-viable tissue from right lower extremity wound. Pt tolerated procedure well. See procedure note. Tendon noted for the first time. Cleansed wounds with acetic acid (1:1 with water) to treat pseudomonas.       Plan of Care:  vaseline impregnated gauze, drawtex, aquacel (aquacel ag to right medial lower extremity wound), mextra pad, and a 3 layer zinc compression wrap. The patient's feet were positioned at a 90 degree angle. The wraps ere applied using a spiral technique avoiding creases or folds.  The wraps were started behind the first metatarsal and ended below the tibial tubercle of the knee.  There was overlap of each turn half the width of the previous turn.  The compression wraps will be changed every 7 days.      Plan:       Bilateral lower extremity was dressed as detailed above  Patient was instructed to not get the dressings wet and to use cast covers for showering.  Should the dressing become wet, he is to remove it, place a moist dressing over the wound, cover with gauze and roll gauze and use ace wraps for compression and to secure bandages.  He should then notify this office as soon as possible to have a new dressing applied.  Instructed patient to remove wrap if he notices tingling or coldness to his bilateral lower extremities or if his toes become white, blue, or cold.    Discussed nutrition and the role of protein in wound healing with the patient. Instructed patient to optimize protein for wound healing.     Discussed bilateral lower extremity edema with patient. Instructed patient to elevate legs whenever sedentary and follow a low sodium diet.  Discussed replacing compression stockings every 3-6 months.    Instructed patient to keep follow up appointments  with vascular surgery.    Discussed drainage control- Will see patient 2x a week.    Instructed patient to continue to take gabapentin as prescribed for nerve pain.    Discussed the importance of utilizing compression stockings to prevent recurrent wounds.    Discussed cellulitis- likely pseudomonas. Treated topically with acetic acid and aquacel Ag. Prescribed ciprofloxacin. Discussed side effects of medication. Instructed patient to take medication as prescribed.     Discussed elevated blood pressure reading. Pt did not take blood pressure medication yet today. He is currently asymptomatic- denies CP, SOB, HA, vision changes, syncope, pre-syncope, or lethargy. Instructed patient to go home, take his medication, re-check his blood pressure, and call the office with the new reading. Pt voiced understanding.    Prescribed gabapentin for nerve pain. Discussed side effects of medication. Instructed patient to take medication as prescribed.    Discussed new exposed tendon and adequate blood flow with Dr. Kapoor. Recommended ordering new ABIs with toe pressures. Order placed.      Written and verbal instructions given to patient  RTC Monday    oRsalba Eaton PA-C

## 2024-07-03 NOTE — TELEPHONE ENCOUNTER
Called patient regarding blood pressure check. Pt reports having two other diagnostic imaging appointments at [facility] today. He has still not taken his medication and still does not have symptoms. Instructed him to contact office once he gets home and takes his medication to report his blood pressure reading.       Rosalba Eaton PA-C

## 2024-07-05 RX ORDER — PREDNISONE 10 MG/1
TABLET ORAL
Qty: 180 TABLET | Refills: 0 | Status: SHIPPED | OUTPATIENT
Start: 2024-07-05

## 2024-07-08 ENCOUNTER — OFFICE VISIT (OUTPATIENT)
Dept: WOUND CARE | Facility: CLINIC | Age: 74
End: 2024-07-08
Payer: MEDICARE

## 2024-07-08 ENCOUNTER — HOSPITAL ENCOUNTER (OUTPATIENT)
Dept: VASCULAR SURGERY | Facility: CLINIC | Age: 74
Discharge: HOME | End: 2024-07-08
Payer: MEDICARE

## 2024-07-08 VITALS — HEART RATE: 70 BPM | DIASTOLIC BLOOD PRESSURE: 73 MMHG | SYSTOLIC BLOOD PRESSURE: 173 MMHG | TEMPERATURE: 98 F

## 2024-07-08 DIAGNOSIS — I50.9 HEART FAILURE, UNSPECIFIED HF CHRONICITY, UNSPECIFIED HEART FAILURE TYPE: ICD-10-CM

## 2024-07-08 DIAGNOSIS — I87.2 VENOUS INSUFFICIENCY: ICD-10-CM

## 2024-07-08 DIAGNOSIS — I73.9 PAD (PERIPHERAL ARTERY DISEASE): ICD-10-CM

## 2024-07-08 DIAGNOSIS — S81.802D MULTIPLE OPEN WOUNDS OF LEFT LOWER EXTREMITY, SUBSEQUENT ENCOUNTER: ICD-10-CM

## 2024-07-08 DIAGNOSIS — I10 ESSENTIAL HYPERTENSION: ICD-10-CM

## 2024-07-08 DIAGNOSIS — S81.801D MULTIPLE OPEN WOUNDS OF RIGHT LOWER EXTREMITY, SUBSEQUENT ENCOUNTER: ICD-10-CM

## 2024-07-08 DIAGNOSIS — S81.802D MULTIPLE OPEN WOUNDS OF LEFT LOWER EXTREMITY, SUBSEQUENT ENCOUNTER: Primary | ICD-10-CM

## 2024-07-08 DIAGNOSIS — E11.42 CONTROLLED TYPE 2 DIABETES MELLITUS WITH DIABETIC POLYNEUROPATHY, WITHOUT LONG-TERM CURRENT USE OF INSULIN: ICD-10-CM

## 2024-07-08 DIAGNOSIS — L03.90 WOUND CELLULITIS: ICD-10-CM

## 2024-07-08 PROBLEM — S81.801A MULTIPLE OPEN WOUNDS OF RIGHT LOWER EXTREMITY: Status: ACTIVE | Noted: 2024-07-08

## 2024-07-08 PROBLEM — S81.802A MULTIPLE OPEN WOUNDS OF LEFT LOWER EXTREMITY: Status: ACTIVE | Noted: 2024-07-08

## 2024-07-08 PROCEDURE — 3078F DIAST BP <80 MM HG: CPT | Mod: CPTII,S$GLB,,

## 2024-07-08 PROCEDURE — 3077F SYST BP >= 140 MM HG: CPT | Mod: CPTII,S$GLB,,

## 2024-07-08 PROCEDURE — 1159F MED LIST DOCD IN RCRD: CPT | Mod: CPTII,S$GLB,,

## 2024-07-08 PROCEDURE — 99213 OFFICE O/P EST LOW 20 MIN: CPT | Mod: 25,S$GLB,,

## 2024-07-08 PROCEDURE — 1125F AMNT PAIN NOTED PAIN PRSNT: CPT | Mod: CPTII,S$GLB,,

## 2024-07-08 PROCEDURE — 3072F LOW RISK FOR RETINOPATHY: CPT | Mod: CPTII,S$GLB,,

## 2024-07-08 PROCEDURE — 29580 STRAPPING UNNA BOOT: CPT | Mod: 50,S$GLB,,

## 2024-07-08 PROCEDURE — 99999 PR PBB SHADOW E&M-EST. PATIENT-LVL IV: CPT | Mod: PBBFAC,,,

## 2024-07-08 PROCEDURE — 93923 UPR/LXTR ART STDY 3+ LVLS: CPT | Mod: S$GLB,,, | Performed by: SURGERY

## 2024-07-08 PROCEDURE — 3051F HG A1C>EQUAL 7.0%<8.0%: CPT | Mod: CPTII,S$GLB,,

## 2024-07-08 NOTE — PROGRESS NOTES
"Subjective:       Patient ID: Serafin Tapia is a 73 y.o. male.    Chief Complaint: Wound Check    Patient presents with his brother for a re-evaluation of bilateral lower extremity wounds. He reports his wounds started as blisters about 2 months ago. He is unsure why he did not contact the office until now. Pt admits compliance with wearing compression stockings daily, but reports his stockings have lost elasticity. He reports that his wounds drain a large amount of clear fluid. Pt has been dressing his wounds with a wound "liquid" and occasionally covering his wounds with gauze. Pt has also been leaving his wounds open to air. Patient followed with vascular surgery for PAD and venous insufficiency. Dr. Kapoor determined he has adequate arterial blood flow for wound healing and cleared him for a 3 layer compression wrap. Pt previously followed with the wound care clinic in 2022 and 2023.  Denies compliance with a low sodium diet, leg elevation, and following a high protein diet. Admits compliance with gabapentin. Notices improvement in pain. Denies side effects.       Interval history: Pt admits compliance with ciprofloxacin- denies side effects. He reports that he got the right leg compression wrap wet in the shower Saturday or Sunday- he did not remove the wrap or contact the office. Denies fever, chills, purulent drainage, erythema, or warmth.        7/8/24 ABIs and TBIs:  Right MIHIR- 0.89  Right TBI-0.50  Left MIHIR- 0.82  Left TBI-0.50  Right leg: Segmental pressures and PVR waveforms suggest minimal to mild peripheral arterial occlusive disease. Rt Great Toe: The PPG waveform- mildly dampened with a pressure of 94 mmHG is noted.  Left leg: Segmental pressures and PVR waveforms suggest moderate tibial peripheral arterial occlusive disease.Lt Great Toe: The PPG waveform-moderately dampened with a pressure of 94 mmHG is noted.    10/21/22 MIHIR:  Right lower extremity: 0.93- minimal peripheral arterial occlusive " disease  Left lower extremity: 0.70- moderate peripheral arterial occlusive disease      Review of Systems   Constitutional:  Negative for activity change, chills, diaphoresis, fatigue and fever.   Eyes:  Negative for photophobia and visual disturbance.   Respiratory:  Negative for apnea, chest tightness and shortness of breath.    Cardiovascular:  Positive for leg swelling. Negative for chest pain and palpitations.   Musculoskeletal:  Negative for gait problem and joint swelling.   Skin:  Positive for wound. Negative for color change, pallor and rash.   Neurological:  Negative for dizziness, syncope, facial asymmetry, speech difficulty, weakness, light-headedness, numbness and headaches.   Psychiatric/Behavioral:  Negative for agitation and confusion. The patient is not nervous/anxious.    All other systems reviewed and are negative.      Objective:      Physical Exam  Vitals reviewed.   Constitutional:       General: He is not in acute distress.     Appearance: Normal appearance. He is obese.   Cardiovascular:      Rate and Rhythm: Normal rate and regular rhythm.      Pulses: Normal pulses.   Pulmonary:      Effort: No respiratory distress.   Musculoskeletal:         General: No swelling.      Right lower leg: Pitting Edema present.      Left lower leg: Pitting Edema present.      Comments: Ambulates with cane   Skin:     General: Skin is warm and dry.      Capillary Refill: Capillary refill takes less than 2 seconds.      Findings: Wound present. No erythema.          Neurological:      General: No focal deficit present.      Mental Status: He is alert and oriented to person, place, and time.   Psychiatric:         Mood and Affect: Mood normal.         Behavior: Behavior normal.         Thought Content: Thought content normal.         Judgment: Judgment normal.         Assessment:       1. Multiple open wounds of left lower extremity, subsequent encounter    2. Multiple open wounds of right lower extremity,  subsequent encounter    3. Venous insufficiency    4. Essential hypertension    5. Heart failure, unspecified HF chronicity, unspecified heart failure type    6. Controlled type 2 diabetes mellitus with diabetic polyneuropathy, without long-term current use of insulin    7. Wound cellulitis               Wound Venous Ulcer Right lower;medial;distal Leg (Active)       Present on Original Admission:    Primary Wound Type: Venous ulcer   Side: Right   Orientation: lower;medial;distal   Location: Leg   Wound Approximate Age at First Assessment (Weeks):    Wound Number:    Is this injury device related?:    Incision Type:    Closure Method:    Wound Description (Comments):    Type:    Additional Comments:    Ankle-Brachial Index:    Pulses:    Removal Indication and Assessment:    Wound Outcome:    Wound Image   07/08/24 0917   Dressing Appearance Dry;Intact;Clean;Moist drainage 07/08/24 0917   Drainage Amount Large 07/08/24 0917   Drainage Characteristics/Odor Serosanguineous;Malodorous;Green 07/08/24 0917   Appearance Tendon 07/08/24 0917   Black (%), Wound Tissue Color 20 % 07/08/24 0917   Red (%), Wound Tissue Color 10 % 07/08/24 0917   Yellow (%), Wound Tissue Color 70 % 07/08/24 0917   Periwound Area Intact;Edematous;Pink 07/08/24 0917   Wound Length (cm) 3.9 cm 07/08/24 0917   Wound Width (cm) 7 cm 07/08/24 0917   Wound Depth (cm) 0.1 cm 07/08/24 0917   Wound Volume (cm^3) 2.73 cm^3 07/08/24 0917   Wound Surface Area (cm^2) 27.3 cm^2 07/08/24 0917   Care Cleansed with:;Soap and water 07/08/24 0917   Dressing Applied;Calcium alginate;Absorptive Pad;Compression wrap;Other (comment) 07/08/24 0917   Periwound Care Skin barrier film applied 07/08/24 0917   Compression Unna's Boot;Three layer compression 07/08/24 0917            Wound Venous Ulcer Left lower;lateral;posterior Leg (Active)       Present on Original Admission:    Primary Wound Type: Venous ulcer   Side: Left   Orientation: lower;lateral;posterior    Location: Leg   Wound Approximate Age at First Assessment (Weeks):    Wound Number:    Is this injury device related?:    Incision Type:    Closure Method:    Wound Description (Comments):    Type:    Additional Comments:    Ankle-Brachial Index:    Pulses:    Removal Indication and Assessment:    Wound Outcome:    Wound Image    07/08/24 0917   Dressing Appearance Dry;Intact;Clean;Moist drainage 07/08/24 0917   Drainage Amount Large 07/08/24 0917   Drainage Characteristics/Odor Serosanguineous 07/08/24 0917   Red (%), Wound Tissue Color 100 % 07/08/24 0917   Periwound Area Intact;Edematous;Pink 07/08/24 0917   Wound Length (cm) 11 cm 07/08/24 0917   Wound Width (cm) 7.2 cm 07/08/24 0917   Wound Depth (cm) 0.1 cm 07/08/24 0917   Wound Volume (cm^3) 7.92 cm^3 07/08/24 0917   Wound Surface Area (cm^2) 79.2 cm^2 07/08/24 0917   Care Cleansed with:;Soap and water 07/08/24 0917   Dressing Applied;Calcium alginate;Compression wrap;Absorptive Pad;Other (comment) 07/08/24 0917   Periwound Care Skin barrier film applied 07/08/24 0917   Compression Unna's Boot;Three layer compression 07/08/24 0917            Wound Ulceration Right lower;medial;proximal Leg (Active)       Present on Original Admission:    Primary Wound Type: Ulceration   Side: Right   Orientation: lower;medial;proximal   Location: Leg   Wound Approximate Age at First Assessment (Weeks):    Wound Number:    Is this injury device related?:    Incision Type:    Closure Method:    Wound Description (Comments):    Type:    Additional Comments:    Ankle-Brachial Index:    Pulses:    Removal Indication and Assessment:    Wound Outcome:    Dressing Appearance Dry;Intact;Clean;Moist drainage 07/08/24 0917   Drainage Amount Large 07/08/24 0917   Drainage Characteristics/Odor Serosanguineous 07/08/24 0917   Red (%), Wound Tissue Color 100 % 07/08/24 0917   Periwound Area Intact;Edematous;Pink 07/08/24 0917   Wound Length (cm) 0.2 cm 07/08/24 0917   Wound Width (cm) 0.2  cm 07/08/24 0917   Wound Depth (cm) 0.1 cm 07/08/24 0917   Wound Volume (cm^3) 0.004 cm^3 07/08/24 0917   Wound Surface Area (cm^2) 0.04 cm^2 07/08/24 0917   Care Cleansed with:;Soap and water 07/08/24 0917   Dressing Applied;Compression wrap;Absorptive Pad;Calcium alginate;Other (comment) 07/08/24 0917   Periwound Care Skin barrier film applied 07/08/24 0917   Compression Unna's Boot;Three layer compression 07/08/24 0917         Serafin was seen in the clinic room and placed in the supine position on the treatment table.  The wound dressings were removed and the legs were cleansed with Easi-clense sponges and dried thoroughly.  No erythema or warmth noted from patient's baseline.  Green drainage and odor noted to right lower extremity wounds. Tendon still noted. Cleansed wounds with acetic acid (1:1 with water) to treat pseudomonas. Pt declined sharp debridement.      Plan of Care:  gentain violet to periwound, vaseline impregnated gauze, drawtex, aquacel , mextra pad, and a 3 layer zinc compression wrap. The patient's feet were positioned at a 90 degree angle. The wraps ere applied using a spiral technique avoiding creases or folds.  The wraps were started behind the first metatarsal and ended below the tibial tubercle of the knee.  There was overlap of each turn half the width of the previous turn.  The compression wraps will be changed every 7 days.      Plan:       Bilateral lower extremity was dressed as detailed above  Patient was instructed to not get the dressings wet and to use cast covers for showering.  Should the dressing become wet, he is to remove it, place a moist dressing over the wound, cover with gauze and roll gauze and use ace wraps for compression and to secure bandages.  He should then notify this office as soon as possible to have a new dressing applied.  Instructed patient to remove wrap if he notices tingling or coldness to his bilateral lower extremities or if his toes become white, blue, or  cold.    Discussed nutrition and the role of protein in wound healing with the patient. Instructed patient to optimize protein for wound healing.     Discussed bilateral lower extremity edema with patient. Instructed patient to elevate legs whenever sedentary and follow a low sodium diet.  Discussed replacing compression stockings every 3-6 months.    Instructed patient to keep follow up appointments with vascular surgery.    Discussed drainage control- Will see patient 2x a week.    Instructed patient to continue to take gabapentin as prescribed for nerve pain.    Discussed the importance of utilizing compression stockings to prevent recurrent wounds.    Discussed cellulitis- likely pseudomonas. Treated topically with acetic acid. Instructed patient to continue ciprofloxacin as prescribed.     Discussed MIHIR and TBI results regarding right lower extremity and exposed tendon. Pt has minimal to mild PAOD to the right leg and right great toe pressure is midly dampened. Will monitor response. Will send patient to vascular surgery if wound is not improving or if patient loses mobility/functionality of right foot.      Written and verbal instructions given to patient  RTC Thursday    Rosalba Eaton PA-C

## 2024-07-09 ENCOUNTER — TELEPHONE (OUTPATIENT)
Dept: INTERNAL MEDICINE | Facility: CLINIC | Age: 74
End: 2024-07-09
Payer: MEDICARE

## 2024-07-09 DIAGNOSIS — T46.6X5A STATIN MYOPATHY: Primary | ICD-10-CM

## 2024-07-09 DIAGNOSIS — M60.80 NECROTIZING MYOSITIS: ICD-10-CM

## 2024-07-09 DIAGNOSIS — G72.0 STATIN MYOPATHY: Primary | ICD-10-CM

## 2024-07-09 NOTE — TELEPHONE ENCOUNTER
Called and spoke to Mr. Tapia. Given the message from Dr. Vargas regarding RX for steroids and referral to Dr. Jil Foster  in Rheumatology ( Last seen 07/06/21). Called this department  and they will work on getting an appointment. States they will need a referral as it has been over 3 years since visit.       From Dr. Vargas    Please let pt know labs are overall okay in that his diabetes his improving and liver and kidneys are fine. That being said his inflammatory markers and muscle enzymes are quite elevated and needs to follow up with Dr PATEL in rheumatology ASAP and he is way overdue. Messaging Dr patel and her staff as well to assist. Also sending in course of steroids.

## 2024-07-09 NOTE — TELEPHONE ENCOUNTER
----- Message from John Vargas MD sent at 7/9/2024  3:11 PM CDT -----  Please make sure pt received recent message about labs please

## 2024-07-10 NOTE — TELEPHONE ENCOUNTER
Please see if rheumatology can expedite despite his lack of visits. Afraid we have medical urgency here.

## 2024-07-11 ENCOUNTER — OFFICE VISIT (OUTPATIENT)
Dept: WOUND CARE | Facility: CLINIC | Age: 74
End: 2024-07-11
Payer: MEDICARE

## 2024-07-11 DIAGNOSIS — S81.802D MULTIPLE OPEN WOUNDS OF LEFT LOWER EXTREMITY, SUBSEQUENT ENCOUNTER: Primary | ICD-10-CM

## 2024-07-11 DIAGNOSIS — I87.2 VENOUS INSUFFICIENCY: ICD-10-CM

## 2024-07-11 DIAGNOSIS — S81.801D OPEN WOUND OF RIGHT LOWER EXTREMITY, SUBSEQUENT ENCOUNTER: ICD-10-CM

## 2024-07-11 DIAGNOSIS — I50.9 HEART FAILURE, UNSPECIFIED HF CHRONICITY, UNSPECIFIED HEART FAILURE TYPE: ICD-10-CM

## 2024-07-11 DIAGNOSIS — L03.90 WOUND CELLULITIS: ICD-10-CM

## 2024-07-11 DIAGNOSIS — E11.42 CONTROLLED TYPE 2 DIABETES MELLITUS WITH DIABETIC POLYNEUROPATHY, WITHOUT LONG-TERM CURRENT USE OF INSULIN: ICD-10-CM

## 2024-07-11 DIAGNOSIS — I10 ESSENTIAL HYPERTENSION: ICD-10-CM

## 2024-07-11 PROCEDURE — 3051F HG A1C>EQUAL 7.0%<8.0%: CPT | Mod: CPTII,S$GLB,,

## 2024-07-11 PROCEDURE — 11043 DBRDMT MUSC&/FSCA 1ST 20/<: CPT | Mod: S$GLB,,,

## 2024-07-11 PROCEDURE — 99212 OFFICE O/P EST SF 10 MIN: CPT | Mod: 25,S$GLB,,

## 2024-07-11 PROCEDURE — 3072F LOW RISK FOR RETINOPATHY: CPT | Mod: CPTII,S$GLB,,

## 2024-07-11 PROCEDURE — 29580 STRAPPING UNNA BOOT: CPT | Mod: LT,S$GLB,,

## 2024-07-11 PROCEDURE — 99999 PR PBB SHADOW E&M-EST. PATIENT-LVL I: CPT | Mod: PBBFAC,,,

## 2024-07-11 NOTE — PROCEDURES
"Debridement    Date/Time: 7/11/2024 8:30 AM    Performed by: Rosalba Eaton PA-C  Authorized by: Rosalba Eaton PA-C    Time out: Immediately prior to procedure a "time out" was called to verify the correct patient, procedure, equipment, support staff and site/side marked as required.    Consent Done?:  Yes (Written)  Local anesthesia used?: Yes    Local anesthetic:  Topical anesthetic (Topical 4% Lidocaine Hydrochloride)    Wound Details:    Location:  Right leg    Type of Debridement:  Excisional       Length (cm):  3.4       Area (sq cm):  17       Width (cm):  5       Percent Debrided (%):  100       Depth (cm):  0.1       Total Area Debrided (sq cm):  17    Depth of debridement:  Muscle/fascia/tendon    Tissue debrided:  Subcutaneous    Devitalized tissue debrided:  Biofilm, Exudate, Fibrin and Slough    Instruments:  Curette, Blade, Forceps and Scissors  Bleeding:  Minimal  Hemostasis Achieved: Yes  Method Used:  Pressure  Patient tolerance:  Patient tolerated the procedure well with no immediate complications    "

## 2024-07-11 NOTE — PROGRESS NOTES
"Subjective:       Patient ID: Serafin Tapia is a 73 y.o. male.    Chief Complaint: Wound Check    Patient presents with his brother for a re-evaluation of bilateral lower extremity wounds. He reports his wounds started as blisters about 2 months ago. He is unsure why he did not contact the office until now. Pt admits compliance with wearing compression stockings daily, but reports his stockings have lost elasticity. He reports that his wounds drain a large amount of clear fluid. Pt has been dressing his wounds with a wound "liquid" and occasionally covering his wounds with gauze. Pt has also been leaving his wounds open to air. Patient followed with vascular surgery for PAD and venous insufficiency. Dr. Kapoor determined he has adequate arterial blood flow for wound healing and cleared him for a 3 layer compression wrap. Pt previously followed with the wound care clinic in 2022 and 2023.  Denies compliance with a low sodium diet, leg elevation, and following a high protein diet. Admits compliance with gabapentin. Notices improvement in pain. Denies side effects.       Interval history: Pt admits compliance with ciprofloxacin- denies side effects. Denies fever, chills, purulent drainage, erythema, or warmth.        7/8/24 ABIs and TBIs:  Right MIHIR- 0.89  Right TBI-0.50  Left MIHIR- 0.82  Left TBI-0.50  Right leg: Segmental pressures and PVR waveforms suggest minimal to mild peripheral arterial occlusive disease. Rt Great Toe: The PPG waveform- mildly dampened with a pressure of 94 mmHG is noted.  Left leg: Segmental pressures and PVR waveforms suggest moderate tibial peripheral arterial occlusive disease.Lt Great Toe: The PPG waveform-moderately dampened with a pressure of 94 mmHG is noted.    10/21/22 MIHIR:  Right lower extremity: 0.93- minimal peripheral arterial occlusive disease  Left lower extremity: 0.70- moderate peripheral arterial occlusive disease      Review of Systems   Constitutional:  Negative for " activity change, chills, diaphoresis, fatigue and fever.   Eyes:  Negative for photophobia and visual disturbance.   Respiratory:  Negative for apnea, chest tightness and shortness of breath.    Cardiovascular:  Positive for leg swelling. Negative for chest pain and palpitations.   Musculoskeletal:  Negative for gait problem and joint swelling.   Skin:  Positive for wound. Negative for color change, pallor and rash.   Neurological:  Negative for dizziness, syncope, facial asymmetry, speech difficulty, weakness, light-headedness, numbness and headaches.   Psychiatric/Behavioral:  Negative for agitation and confusion. The patient is not nervous/anxious.    All other systems reviewed and are negative.      Objective:      Physical Exam  Vitals reviewed.   Constitutional:       General: He is not in acute distress.     Appearance: Normal appearance. He is obese.   Cardiovascular:      Rate and Rhythm: Normal rate and regular rhythm.      Pulses: Normal pulses.   Pulmonary:      Effort: No respiratory distress.   Musculoskeletal:         General: No swelling.      Right lower leg: Pitting Edema present.      Left lower leg: Pitting Edema present.      Comments: Ambulates with cane   Skin:     General: Skin is warm and dry.      Capillary Refill: Capillary refill takes less than 2 seconds.      Findings: Wound present. No erythema.          Neurological:      General: No focal deficit present.      Mental Status: He is alert and oriented to person, place, and time.   Psychiatric:         Mood and Affect: Mood normal.         Behavior: Behavior normal.         Thought Content: Thought content normal.         Judgment: Judgment normal.         Assessment:       1. Multiple open wounds of left lower extremity, subsequent encounter    2. Open wound of right lower extremity, subsequent encounter    3. Venous insufficiency    4. Essential hypertension    5. Heart failure, unspecified HF chronicity, unspecified heart failure  type    6. Controlled type 2 diabetes mellitus with diabetic polyneuropathy, without long-term current use of insulin    7. Wound cellulitis               Wound Venous Ulcer Right lower;medial;distal Leg (Active)       Present on Original Admission:    Primary Wound Type: Venous ulcer   Side: Right   Orientation: lower;medial;distal   Location: Leg   Wound Approximate Age at First Assessment (Weeks):    Wound Number:    Is this injury device related?:    Incision Type:    Closure Method:    Wound Description (Comments):    Type:    Additional Comments:    Ankle-Brachial Index:    Pulses:    Removal Indication and Assessment:    Wound Outcome:    Wound Image   07/11/24 0817   Dressing Appearance Dry;Intact;Clean;Moist drainage 07/11/24 0817   Drainage Amount Large 07/11/24 0817   Drainage Characteristics/Odor Serosanguineous;Malodorous;Green 07/11/24 0817   Appearance Tendon 07/11/24 0817   Red (%), Wound Tissue Color 10 % 07/11/24 0817   Yellow (%), Wound Tissue Color 90 % 07/11/24 0817   Periwound Area Intact;Edematous;Pink 07/11/24 0817   Wound Length (cm) 3.4 cm 07/11/24 0817   Wound Width (cm) 5 cm 07/11/24 0817   Wound Depth (cm) 0.1 cm 07/11/24 0817   Wound Volume (cm^3) 1.7 cm^3 07/11/24 0817   Wound Surface Area (cm^2) 17 cm^2 07/11/24 0817   Care Cleansed with:;Soap and water 07/11/24 0817   Dressing Applied;Compression wrap;Calcium alginate;Absorptive Pad;Other (comment) 07/11/24 0817   Periwound Care Skin barrier film applied 07/11/24 0817   Compression Unna's Boot;Three layer compression 07/11/24 0817            Wound Venous Ulcer Left lower;lateral;posterior Leg (Active)       Present on Original Admission:    Primary Wound Type: Venous ulcer   Side: Left   Orientation: lower;lateral;posterior   Location: Leg   Wound Approximate Age at First Assessment (Weeks):    Wound Number:    Is this injury device related?:    Incision Type:    Closure Method:    Wound Description (Comments):    Type:    Additional  Comments:    Ankle-Brachial Index:    Pulses:    Removal Indication and Assessment:    Wound Outcome:    Wound Image    07/11/24 0818   Dressing Appearance Dry;Intact;Clean;Moist drainage 07/11/24 0818   Drainage Amount Large 07/11/24 0818   Drainage Characteristics/Odor Serosanguineous 07/11/24 0818   Red (%), Wound Tissue Color 100 % 07/11/24 0818   Periwound Area Intact;Edematous;Pink 07/11/24 0818   Wound Length (cm) 1.2 cm 07/11/24 0818   Wound Width (cm) 2.3 cm 07/11/24 0818   Wound Depth (cm) 0.1 cm 07/11/24 0818   Wound Volume (cm^3) 0.276 cm^3 07/11/24 0818   Wound Surface Area (cm^2) 2.76 cm^2 07/11/24 0818   Care Cleansed with:;Soap and water 07/11/24 0818   Dressing Applied;Calcium alginate;Absorptive Pad;Compression wrap;Other (comment) 07/11/24 0818   Periwound Care Skin barrier film applied 07/11/24 0818   Compression Unna's Boot;Three layer compression 07/11/24 0818            Wound Ulceration Right lower;medial;proximal Leg (Active)       Present on Original Admission:    Primary Wound Type: Ulceration   Side: Right   Orientation: lower;medial;proximal   Location: Leg   Wound Approximate Age at First Assessment (Weeks):    Wound Number:    Is this injury device related?:    Incision Type:    Closure Method:    Wound Description (Comments):    Type:    Additional Comments:    Ankle-Brachial Index:    Pulses:    Removal Indication and Assessment:    Wound Outcome:    Dressing Appearance Dry;Intact;Clean;Moist drainage 07/11/24 0817   Drainage Amount Large 07/11/24 0817   Drainage Characteristics/Odor Serous 07/11/24 0817   Care Cleansed with:;Soap and water 07/11/24 0817         Serafin was seen in the clinic room and placed in the supine position on the treatment table.  The wound dressings were removed and the legs were cleansed with Easi-clense sponges and dried thoroughly.  No erythema or warmth noted from patient's baseline.  Green drainage and odor noted to right lower extremity wound- but improved  since last visit. Tendon still noted. Placed topical 4% Lidocaine Hydrochloride to right medial lower extremity wound bed for 15 minutes. Sharp debridement with 5 mm curette to remove non-viable tissue. Pt tolerated procedure well. See procedure note. Cleansed right medial lower extremity wound bed with acetic acid (1:1 with water) to treat pseudomonas.       Plan of Care:  gentain violet to periwound, vaseline impregnated gauze, drawtex, aquacel , mextra pad, and a 3 layer zinc compression wrap. The patient's feet were positioned at a 90 degree angle. The wraps ere applied using a spiral technique avoiding creases or folds.  The wraps were started behind the first metatarsal and ended below the tibial tubercle of the knee.  There was overlap of each turn half the width of the previous turn.  The compression wraps will be changed every 7 days.      Plan:       Bilateral lower extremity was dressed as detailed above  Patient was instructed to not get the dressings wet and to use cast covers for showering.  Should the dressing become wet, he is to remove it, place a moist dressing over the wound, cover with gauze and roll gauze and use ace wraps for compression and to secure bandages.  He should then notify this office as soon as possible to have a new dressing applied.  Instructed patient to remove wrap if he notices tingling or coldness to his bilateral lower extremities or if his toes become white, blue, or cold.    Discussed nutrition and the role of protein in wound healing with the patient. Instructed patient to optimize protein for wound healing.     Discussed bilateral lower extremity edema with patient. Instructed patient to elevate legs whenever sedentary and follow a low sodium diet.  Discussed replacing compression stockings every 3-6 months.    Instructed patient to keep follow up appointments with vascular surgery.    Discussed drainage control- Will see patient 2x a week.    Instructed patient to  continue to take gabapentin as prescribed for nerve pain.    Discussed the importance of utilizing compression stockings to prevent recurrent wounds.    Discussed cellulitis- likely pseudomonas. Treated topically with acetic acid. Instructed patient to continue ciprofloxacin as prescribed.     Discussed MIHIR and TBI results regarding right lower extremity and exposed tendon. Pt has minimal to mild PAOD to the right leg and right great toe pressure is midly dampened. Will monitor response. Will send patient to vascular surgery if wound is not improving or if patient loses mobility/functionality of right foot.      Written and verbal instructions given to patient  RTC Monday    Rosalba Eaton PA-C

## 2024-07-15 ENCOUNTER — OFFICE VISIT (OUTPATIENT)
Dept: WOUND CARE | Facility: CLINIC | Age: 74
End: 2024-07-15
Payer: MEDICARE

## 2024-07-15 VITALS
BODY MASS INDEX: 37.87 KG/M2 | TEMPERATURE: 98 F | HEIGHT: 70 IN | HEART RATE: 63 BPM | DIASTOLIC BLOOD PRESSURE: 93 MMHG | SYSTOLIC BLOOD PRESSURE: 203 MMHG | WEIGHT: 264.56 LBS | OXYGEN SATURATION: 96 %

## 2024-07-15 DIAGNOSIS — S81.801D OPEN WOUND OF RIGHT LOWER EXTREMITY, SUBSEQUENT ENCOUNTER: ICD-10-CM

## 2024-07-15 DIAGNOSIS — I87.2 VENOUS INSUFFICIENCY: ICD-10-CM

## 2024-07-15 DIAGNOSIS — I10 ELEVATED BLOOD PRESSURE READING IN OFFICE WITH DIAGNOSIS OF HYPERTENSION: ICD-10-CM

## 2024-07-15 DIAGNOSIS — S81.802D OPEN WOUND OF LEFT LOWER EXTREMITY, SUBSEQUENT ENCOUNTER: Primary | ICD-10-CM

## 2024-07-15 DIAGNOSIS — I50.9 HEART FAILURE, UNSPECIFIED HF CHRONICITY, UNSPECIFIED HEART FAILURE TYPE: ICD-10-CM

## 2024-07-15 DIAGNOSIS — E11.42 CONTROLLED TYPE 2 DIABETES MELLITUS WITH DIABETIC POLYNEUROPATHY, WITHOUT LONG-TERM CURRENT USE OF INSULIN: ICD-10-CM

## 2024-07-15 DIAGNOSIS — I10 ESSENTIAL HYPERTENSION: ICD-10-CM

## 2024-07-15 PROCEDURE — 1159F MED LIST DOCD IN RCRD: CPT | Mod: CPTII,S$GLB,,

## 2024-07-15 PROCEDURE — 99212 OFFICE O/P EST SF 10 MIN: CPT | Mod: 25,S$GLB,,

## 2024-07-15 PROCEDURE — 3072F LOW RISK FOR RETINOPATHY: CPT | Mod: CPTII,S$GLB,,

## 2024-07-15 PROCEDURE — 29580 STRAPPING UNNA BOOT: CPT | Mod: LT,S$GLB,,

## 2024-07-15 PROCEDURE — 3008F BODY MASS INDEX DOCD: CPT | Mod: CPTII,S$GLB,,

## 2024-07-15 PROCEDURE — 3077F SYST BP >= 140 MM HG: CPT | Mod: CPTII,S$GLB,,

## 2024-07-15 PROCEDURE — 99999 PR PBB SHADOW E&M-EST. PATIENT-LVL V: CPT | Mod: PBBFAC,,,

## 2024-07-15 PROCEDURE — 3080F DIAST BP >= 90 MM HG: CPT | Mod: CPTII,S$GLB,,

## 2024-07-15 PROCEDURE — 1125F AMNT PAIN NOTED PAIN PRSNT: CPT | Mod: CPTII,S$GLB,,

## 2024-07-15 PROCEDURE — 3051F HG A1C>EQUAL 7.0%<8.0%: CPT | Mod: CPTII,S$GLB,,

## 2024-07-15 NOTE — PROGRESS NOTES
"Subjective:       Patient ID: Serafin Tapia is a 73 y.o. male.    Chief Complaint: Wound Check    Patient presents with his brother for a re-evaluation of bilateral lower extremity wounds. He reports his wounds started as blisters about 2 months ago. He is unsure why he did not contact the office until now. Pt admits compliance with wearing compression stockings daily, but reports his stockings have lost elasticity. He reports that his wounds drain a large amount of clear fluid. Pt has been dressing his wounds with a wound "liquid" and occasionally covering his wounds with gauze. Pt has also been leaving his wounds open to air. Patient followed with vascular surgery for PAD and venous insufficiency. Dr. Kapoor determined he has adequate arterial blood flow for wound healing and cleared him for a 3 layer compression wrap. Pt previously followed with the wound care clinic in 2022 and 2023.  Denies compliance with a low sodium diet, leg elevation, and following a high protein diet. Admits compliance with gabapentin. Notices improvement in pain. Denies side effects.       Interval history: Pt admits compliance with ciprofloxacin- denies side effects. Denies fever, chills, purulent drainage, erythema, or warmth.        7/8/24 ABIs and TBIs:  Right MIHIR- 0.89  Right TBI-0.50  Left MIHIR- 0.82  Left TBI-0.50  Right leg: Segmental pressures and PVR waveforms suggest minimal to mild peripheral arterial occlusive disease. Rt Great Toe: The PPG waveform- mildly dampened with a pressure of 94 mmHG is noted.  Left leg: Segmental pressures and PVR waveforms suggest moderate tibial peripheral arterial occlusive disease.Lt Great Toe: The PPG waveform-moderately dampened with a pressure of 94 mmHG is noted.    10/21/22 MIHIR:  Right lower extremity: 0.93- minimal peripheral arterial occlusive disease  Left lower extremity: 0.70- moderate peripheral arterial occlusive disease      Review of Systems   Constitutional:  Negative for " activity change, chills, diaphoresis, fatigue and fever.   Eyes:  Negative for photophobia and visual disturbance.   Respiratory:  Negative for apnea, chest tightness and shortness of breath.    Cardiovascular:  Positive for leg swelling. Negative for chest pain and palpitations.   Musculoskeletal:  Negative for gait problem and joint swelling.   Skin:  Positive for wound. Negative for color change, pallor and rash.   Neurological:  Negative for dizziness, syncope, facial asymmetry, speech difficulty, weakness, light-headedness, numbness and headaches.   Psychiatric/Behavioral:  Negative for agitation and confusion. The patient is not nervous/anxious.    All other systems reviewed and are negative.      Objective:      Physical Exam  Vitals reviewed.   Constitutional:       General: He is not in acute distress.     Appearance: Normal appearance. He is obese.   Cardiovascular:      Rate and Rhythm: Normal rate and regular rhythm.      Pulses: Normal pulses.   Pulmonary:      Effort: No respiratory distress.   Musculoskeletal:         General: No swelling.      Right lower leg: Pitting Edema present.      Left lower leg: Pitting Edema present.      Comments: Ambulates with cane   Skin:     General: Skin is warm and dry.      Capillary Refill: Capillary refill takes less than 2 seconds.      Findings: Wound present. No erythema.          Neurological:      General: No focal deficit present.      Mental Status: He is alert and oriented to person, place, and time.   Psychiatric:         Mood and Affect: Mood normal.         Behavior: Behavior normal.         Thought Content: Thought content normal.         Judgment: Judgment normal.         Assessment:       1. Multiple open wounds of left lower extremity, subsequent encounter    2. Open wound of right lower extremity, subsequent encounter    3. Venous insufficiency    4. Essential hypertension    5. Heart failure, unspecified HF chronicity, unspecified heart failure  type    6. Controlled type 2 diabetes mellitus with diabetic polyneuropathy, without long-term current use of insulin               Wound Venous Ulcer Right lower;medial;distal Leg (Active)       Present on Original Admission:    Primary Wound Type: Venous ulcer   Side: Right   Orientation: lower;medial;distal   Location: Leg   Wound Approximate Age at First Assessment (Weeks):    Wound Number:    Is this injury device related?:    Incision Type:    Closure Method:    Wound Description (Comments):    Type:    Additional Comments:    Ankle-Brachial Index:    Pulses:    Removal Indication and Assessment:    Wound Outcome:    Wound Image   07/15/24 0822   Dressing Appearance Dry;Intact;Clean;Moist drainage 07/15/24 0822   Drainage Amount Large 07/15/24 0822   Appearance Tendon 07/15/24 0822   Red (%), Wound Tissue Color 10 % 07/15/24 0822   Yellow (%), Wound Tissue Color 90 % 07/15/24 0822   Periwound Area Intact;Edematous;Pink 07/15/24 0822   Wound Length (cm) 3.9 cm 07/15/24 0822   Wound Width (cm) 3.9 cm 07/15/24 0822   Wound Depth (cm) 0.1 cm 07/15/24 0822   Wound Volume (cm^3) 1.521 cm^3 07/15/24 0822   Wound Surface Area (cm^2) 15.21 cm^2 07/15/24 0822   Care Cleansed with:;Soap and water 07/15/24 0822   Dressing Applied;Calcium alginate;Absorptive Pad;Compression wrap;Other (comment) 07/15/24 0822   Periwound Care Skin barrier film applied 07/15/24 0822   Compression Unna's Boot;Three layer compression 07/15/24 0822            Wound Venous Ulcer Left lower;lateral;posterior Leg (Active)       Present on Original Admission:    Primary Wound Type: Venous ulcer   Side: Left   Orientation: lower;lateral;posterior   Location: Leg   Wound Approximate Age at First Assessment (Weeks):    Wound Number:    Is this injury device related?:    Incision Type:    Closure Method:    Wound Description (Comments):    Type:    Additional Comments:    Ankle-Brachial Index:    Pulses:    Removal Indication and Assessment:    Wound  Outcome:    Wound Image   07/15/24 0822   Dressing Appearance Dry;Intact;Clean;Moist drainage 07/15/24 0822   Drainage Amount Large 07/15/24 0822   Drainage Characteristics/Odor Serosanguineous 07/15/24 0822   Red (%), Wound Tissue Color 100 % 07/15/24 0822   Periwound Area Intact;Edematous;Pink 07/15/24 0822   Wound Length (cm) 0.4 cm 07/15/24 0822   Wound Width (cm) 0.2 cm 07/15/24 0822   Wound Depth (cm) 0.1 cm 07/15/24 0822   Wound Volume (cm^3) 0.008 cm^3 07/15/24 0822   Wound Surface Area (cm^2) 0.08 cm^2 07/15/24 0822   Care Cleansed with:;Soap and water 07/15/24 0822   Dressing Applied;Calcium alginate;Compression wrap;Other (comment) 07/15/24 0822   Periwound Care Skin barrier film applied 07/15/24 0822   Compression Unna's Boot;Three layer compression 07/15/24 0822         Serafin was seen in the clinic room and placed in the supine position on the treatment table.  The wound dressings were removed and the legs were cleansed with Easi-clense sponges and dried thoroughly.  No erythema, green drainage, odor, or warmth noted from patient's baseline. Tendon still noted. Deferred sharp debridement.      Plan of Care:    Left lower extremity- vaseline impregnated gauze to wound bed, aquacel, and a 3 layer zinc compression wrap.  Right lower extremity- gentian violet to periwound, vaseline impregnated gauze, drawtex, aquacel , mextra pad, and a 3 layer zinc compression wrap.   The patient's feet were positioned at a 90 degree angle. The wraps ere applied using a spiral technique avoiding creases or folds.  The wraps were started behind the first metatarsal and ended below the tibial tubercle of the knee.  There was overlap of each turn half the width of the previous turn.  The compression wraps will be changed every 7 days.      Plan:       Bilateral lower extremity was dressed as detailed above  Patient was instructed to not get the dressings wet and to use cast covers for showering.  Should the dressing become  wet, he is to remove it, place a moist dressing over the wound, cover with gauze and roll gauze and use ace wraps for compression and to secure bandages.  He should then notify this office as soon as possible to have a new dressing applied.  Instructed patient to remove wrap if he notices tingling or coldness to his bilateral lower extremities or if his toes become white, blue, or cold.    Discussed nutrition and the role of protein in wound healing with the patient. Instructed patient to optimize protein for wound healing.     Discussed bilateral lower extremity edema with patient. Instructed patient to elevate legs whenever sedentary and follow a low sodium diet.  Discussed replacing compression stockings every 3-6 months.    Instructed patient to keep follow up appointments with vascular surgery.    Instructed patient to continue to take gabapentin as prescribed for nerve pain.    Discussed the importance of utilizing compression stockings to prevent recurrent wounds.    Instructed patient to continue ciprofloxacin as prescribed.     Discussed drainage control- improved. Will resume seeing patient once a week.    Discussed MIHIR and TBI results regarding right lower extremity and exposed tendon. Pt has minimal to mild PAOD to the right leg and right great toe pressure is midly dampened. Will monitor response. Will send patient to vascular surgery if wound is not improving or if patient loses mobility/functionality of right foot.    Discussed elevated blood pressure. Pt reports taking his medication today. Pt is asymptomatic. ED precautions given. Direct messaged PCP regarding this.      Written and verbal instructions given to patient  RTC in 1 week    Rosalba Eaton PA-C

## 2024-07-16 ENCOUNTER — OFFICE VISIT (OUTPATIENT)
Dept: RHEUMATOLOGY | Facility: CLINIC | Age: 74
End: 2024-07-16
Payer: MEDICARE

## 2024-07-16 ENCOUNTER — DOCUMENTATION ONLY (OUTPATIENT)
Dept: RHEUMATOLOGY | Facility: CLINIC | Age: 74
End: 2024-07-16

## 2024-07-16 ENCOUNTER — LAB VISIT (OUTPATIENT)
Dept: LAB | Facility: HOSPITAL | Age: 74
End: 2024-07-16
Attending: INTERNAL MEDICINE
Payer: MEDICARE

## 2024-07-16 VITALS
SYSTOLIC BLOOD PRESSURE: 145 MMHG | HEIGHT: 70 IN | DIASTOLIC BLOOD PRESSURE: 77 MMHG | HEART RATE: 60 BPM | WEIGHT: 265.44 LBS | BODY MASS INDEX: 38 KG/M2

## 2024-07-16 DIAGNOSIS — E53.8 FOLATE DEFICIENCY: Primary | ICD-10-CM

## 2024-07-16 DIAGNOSIS — M60.80 NECROTIZING MYOSITIS: ICD-10-CM

## 2024-07-16 DIAGNOSIS — M81.8 OTHER OSTEOPOROSIS WITHOUT CURRENT PATHOLOGICAL FRACTURE: ICD-10-CM

## 2024-07-16 DIAGNOSIS — I73.9 PAD (PERIPHERAL ARTERY DISEASE): ICD-10-CM

## 2024-07-16 DIAGNOSIS — M60.9 MYOSITIS OF MULTIPLE SITES, UNSPECIFIED MYOSITIS TYPE: Primary | ICD-10-CM

## 2024-07-16 DIAGNOSIS — G72.0 STATIN MYOPATHY: ICD-10-CM

## 2024-07-16 DIAGNOSIS — T46.6X5A STATIN MYOPATHY: ICD-10-CM

## 2024-07-16 DIAGNOSIS — E08.8 DIABETES MELLITUS DUE TO UNDERLYING CONDITION, CONTROLLED, WITH COMPLICATION, WITH LONG-TERM CURRENT USE OF INSULIN: ICD-10-CM

## 2024-07-16 DIAGNOSIS — M60.9 MYOSITIS OF MULTIPLE SITES, UNSPECIFIED MYOSITIS TYPE: ICD-10-CM

## 2024-07-16 DIAGNOSIS — E03.9 HYPOTHYROIDISM (ACQUIRED): ICD-10-CM

## 2024-07-16 DIAGNOSIS — Z13.820 SCREENING FOR OSTEOPOROSIS: ICD-10-CM

## 2024-07-16 DIAGNOSIS — I10 ESSENTIAL HYPERTENSION: ICD-10-CM

## 2024-07-16 DIAGNOSIS — Z79.4 DIABETES MELLITUS DUE TO UNDERLYING CONDITION, CONTROLLED, WITH COMPLICATION, WITH LONG-TERM CURRENT USE OF INSULIN: ICD-10-CM

## 2024-07-16 LAB
25(OH)D3+25(OH)D2 SERPL-MCNC: 11 NG/ML (ref 30–96)
CK SERPL-CCNC: 1125 U/L (ref 20–200)
HAV IGG SER QL IA: NORMAL
HBV CORE AB SERPL QL IA: NORMAL
HBV SURFACE AB SER-ACNC: <3 MIU/ML
HBV SURFACE AB SER-ACNC: NORMAL M[IU]/ML
HBV SURFACE AG SERPL QL IA: NORMAL
HCV AB SERPL QL IA: NORMAL
HIV 1+2 AB+HIV1 P24 AG SERPL QL IA: NORMAL
TREPONEMA PALLIDUM IGG+IGM AB [PRESENCE] IN SERUM OR PLASMA BY IMMUNOASSAY: NONREACTIVE

## 2024-07-16 PROCEDURE — 3078F DIAST BP <80 MM HG: CPT | Mod: CPTII,S$GLB,, | Performed by: INTERNAL MEDICINE

## 2024-07-16 PROCEDURE — 87340 HEPATITIS B SURFACE AG IA: CPT | Performed by: INTERNAL MEDICINE

## 2024-07-16 PROCEDURE — 86803 HEPATITIS C AB TEST: CPT | Performed by: INTERNAL MEDICINE

## 2024-07-16 PROCEDURE — 1160F RVW MEDS BY RX/DR IN RCRD: CPT | Mod: CPTII,S$GLB,, | Performed by: INTERNAL MEDICINE

## 2024-07-16 PROCEDURE — 99205 OFFICE O/P NEW HI 60 MIN: CPT | Mod: S$GLB,,, | Performed by: INTERNAL MEDICINE

## 2024-07-16 PROCEDURE — 1101F PT FALLS ASSESS-DOCD LE1/YR: CPT | Mod: CPTII,S$GLB,, | Performed by: INTERNAL MEDICINE

## 2024-07-16 PROCEDURE — 3288F FALL RISK ASSESSMENT DOCD: CPT | Mod: CPTII,S$GLB,, | Performed by: INTERNAL MEDICINE

## 2024-07-16 PROCEDURE — 1159F MED LIST DOCD IN RCRD: CPT | Mod: CPTII,S$GLB,, | Performed by: INTERNAL MEDICINE

## 2024-07-16 PROCEDURE — 86787 VARICELLA-ZOSTER ANTIBODY: CPT | Performed by: INTERNAL MEDICINE

## 2024-07-16 PROCEDURE — 82306 VITAMIN D 25 HYDROXY: CPT | Performed by: INTERNAL MEDICINE

## 2024-07-16 PROCEDURE — 82085 ASSAY OF ALDOLASE: CPT | Performed by: INTERNAL MEDICINE

## 2024-07-16 PROCEDURE — 86704 HEP B CORE ANTIBODY TOTAL: CPT | Performed by: INTERNAL MEDICINE

## 2024-07-16 PROCEDURE — 86593 SYPHILIS TEST NON-TREP QUANT: CPT | Performed by: INTERNAL MEDICINE

## 2024-07-16 PROCEDURE — 3072F LOW RISK FOR RETINOPATHY: CPT | Mod: CPTII,S$GLB,, | Performed by: INTERNAL MEDICINE

## 2024-07-16 PROCEDURE — 86706 HEP B SURFACE ANTIBODY: CPT | Mod: 91 | Performed by: INTERNAL MEDICINE

## 2024-07-16 PROCEDURE — 3008F BODY MASS INDEX DOCD: CPT | Mod: CPTII,S$GLB,, | Performed by: INTERNAL MEDICINE

## 2024-07-16 PROCEDURE — 3077F SYST BP >= 140 MM HG: CPT | Mod: CPTII,S$GLB,, | Performed by: INTERNAL MEDICINE

## 2024-07-16 PROCEDURE — 86682 HELMINTH ANTIBODY: CPT | Performed by: INTERNAL MEDICINE

## 2024-07-16 PROCEDURE — 1125F AMNT PAIN NOTED PAIN PRSNT: CPT | Mod: CPTII,S$GLB,, | Performed by: INTERNAL MEDICINE

## 2024-07-16 PROCEDURE — 82550 ASSAY OF CK (CPK): CPT | Performed by: INTERNAL MEDICINE

## 2024-07-16 PROCEDURE — 86790 VIRUS ANTIBODY NOS: CPT | Performed by: INTERNAL MEDICINE

## 2024-07-16 PROCEDURE — 3051F HG A1C>EQUAL 7.0%<8.0%: CPT | Mod: CPTII,S$GLB,, | Performed by: INTERNAL MEDICINE

## 2024-07-16 PROCEDURE — 99999 PR PBB SHADOW E&M-EST. PATIENT-LVL V: CPT | Mod: PBBFAC,,, | Performed by: INTERNAL MEDICINE

## 2024-07-16 PROCEDURE — 87389 HIV-1 AG W/HIV-1&-2 AB AG IA: CPT | Performed by: INTERNAL MEDICINE

## 2024-07-16 RX ORDER — FOLIC ACID 1 MG/1
1000 TABLET ORAL DAILY
Qty: 90 TABLET | Refills: 3 | Status: SHIPPED | OUTPATIENT
Start: 2024-07-16

## 2024-07-16 RX ORDER — DULAGLUTIDE 1.5 MG/.5ML
1.5 INJECTION, SOLUTION SUBCUTANEOUS
Qty: 4 PEN | Refills: 3 | Status: SHIPPED | OUTPATIENT
Start: 2024-07-16

## 2024-07-16 RX ORDER — BENAZEPRIL HYDROCHLORIDE 40 MG/1
40 TABLET ORAL DAILY
Qty: 90 TABLET | Refills: 3 | Status: SHIPPED | OUTPATIENT
Start: 2024-07-16

## 2024-07-16 RX ORDER — LEVOTHYROXINE SODIUM 50 UG/1
50 TABLET ORAL
Qty: 90 TABLET | Refills: 2 | Status: SHIPPED | OUTPATIENT
Start: 2024-07-16

## 2024-07-16 RX ORDER — NIFEDIPINE 90 MG/1
90 TABLET, EXTENDED RELEASE ORAL DAILY
Qty: 90 TABLET | Refills: 3 | Status: SHIPPED | OUTPATIENT
Start: 2024-07-16 | End: 2025-07-16

## 2024-07-16 RX ORDER — ZINC SULFATE 50(220)MG
220 CAPSULE ORAL DAILY
Qty: 90 CAPSULE | Refills: 1 | OUTPATIENT
Start: 2024-07-16

## 2024-07-16 RX ORDER — LINAGLIPTIN 5 MG/1
5 TABLET, FILM COATED ORAL DAILY
Qty: 90 TABLET | Refills: 1 | Status: SHIPPED | OUTPATIENT
Start: 2024-07-16

## 2024-07-16 RX ORDER — METOPROLOL SUCCINATE 200 MG/1
200 TABLET, EXTENDED RELEASE ORAL DAILY
Qty: 90 TABLET | Refills: 2 | Status: SHIPPED | OUTPATIENT
Start: 2024-07-16 | End: 2025-07-16

## 2024-07-16 NOTE — TELEPHONE ENCOUNTER
----- Message from Fariba Russell sent at 7/15/2024  1:02 PM CDT -----  Regarding: P/t advice  Type: Patient Call Back    Who called:    What is the request in detail: p/t would like a call from nurse regarding his medications. Please call to assist.     Can the clinic reply by [facility]?    Would the patient rather a call back or a response via [facility]?     Best call back number: [phone] ([place])       Additional Information:

## 2024-07-16 NOTE — PROGRESS NOTES
Chief Complaint   Patient presents with    Disease Management       Patient with inflammatory myositis to re-establish care    History of presenting illness    73 year old black male with HTN,HLD,obesity,LVH,DM,PE,LS spine disease,hypothyroidism, necrotising immune mediated myopathy for a follow up    He did well on 60 mg prednisone  We started to taper prednisone    He is off the prednisone since jan 2019    mtx started : now at 8 tabs weekly and folic acid     dexa normal    He is doing great    Foot continues to be infection free    5/2019    CK,aldolase nml    ESR 46  CRP 12.9    CMP,CBC stable    Muscle biopsy : necrotising myopathy    7/2021    On mtx 8 tabs weekly/folic acid     He is doing well  No muscle weakness  No rashes    No evidence of malignancy    5/2021    Ck 1362  Prior to that it was 1608/1264  Aldolase 11.8/11.4  CRP 14  ESR nml    7/2024    Unfortunately stopped all medications around 2021- lost to follow up  Gets tired quickly doing chores around the house  Can't get up from a low chair  Can raise arms,can shampoo  No dysphagia  No SOB  No diplopia   Weight loss from 330 to 260 pounds-over few years  No fever,chills,night sweats,weight loss    He has become sedentary this year  He has venous insufficiency and has healing wounds     No rash    No arthralgias and arthritis  No myalgia    Vit b12 381- insufficient- takes oral supplements  Folate 4.6    Uric acid 7.6-no gout    CK 2313  ESR 81  CRP 83.4  H/H 11.6/38.8  NML WHITE COUNT AND plts  AST 40/ALT 29 DOWN FROM 54 /81    HBA1C 7    It appears that he started prednisone 60 mg daily on 7/5/2024    He has blistering wounds on the legs for 2 months    Initial evaluation :    Patient with long standing :  HTN, HLD, diabetes mellitus and obesity,asthma,hypothyroidism    He was seen in the hospital due to worsening shortness of breath    Prior to that he was at [facility] for hypoglycemia due to sulfonylurea and he was noted to have CK of  4000 to 5000.He got hydration and he developed anasarca and pulmonary edema and then got diuresed.  He was diagnosed to have rhabdomyolysis, His CPK at the time of discharge was 5553.   He had elevated ESR,CRP  HALLE negative     Since March 2017 he has been weak  Prior to the hospitalisations he was very mobile,no weakness,ambulated with no asistance  During this admission he couldn't walk,he couldn't stand,he couldn't lift thighs,couldnt climb stairs    He denies any fevers, chills, rashes, mucosal ulcers, hematuria, dysphagia, vision changes, Raynaud's, n/v/d.     Pt reports a 60-70 pound weight loss since March.       In addition, Mr Willie suffered a work related accident 15 yrs ago after a construction platform fell across his b/l feet. He developed a wound on the right foot which he reports healed. He sts that after his swelling while hospitalized caused the wound to flare up.      He had MRI inpatient and that didn't support myositis  Cks trended down to 2513     Rheumatology team sent off : myomarker panel and HMG co-a reductase antibodies and asked him to come back  EMG and muscle biopsy planned but didn't happen  His sob was attributed to heart failure  Necrotising myopathy due to statins considered  Deconditioning considered  PT suggested      Myomarker panel negative  APLAS negative  HMG co-a reductase ab > 200 units     Muscle biopsy as detailed above    He sees hematology   Had PE in 2014  Off xarelto  Clot was attributed to his trip to [place]      Past history : HTN, HLD, diabetes mellitus and obesity,asthma,hypothyroidism    Family history:RA (brother) and SLE (cousins).    Social history : not a current smoker and alcoholic    Review of Systems   Constitutional:  Negative for activity change, appetite change, chills, diaphoresis, fatigue, fever and unexpected weight change.   HENT:  Negative for congestion, dental problem, drooling, ear discharge, ear pain, facial swelling, hearing loss, mouth  sores, nosebleeds, postnasal drip, rhinorrhea, sinus pressure, sinus pain, sneezing, sore throat, tinnitus, trouble swallowing and voice change.    Eyes:  Negative for photophobia, pain, discharge, redness, itching and visual disturbance.   Respiratory:  Negative for apnea, cough, choking, chest tightness, shortness of breath, wheezing and stridor.    Cardiovascular:  Negative for chest pain, palpitations and leg swelling.   Gastrointestinal:  Negative for abdominal distention, abdominal pain, anal bleeding, blood in stool, constipation, diarrhea, nausea, rectal pain and vomiting.   Endocrine: Negative for cold intolerance, heat intolerance, polydipsia, polyphagia and polyuria.   Genitourinary:  Negative for decreased urine volume, difficulty urinating, dysuria, enuresis, flank pain, frequency, genital sores, hematuria and urgency.   Musculoskeletal:  Negative for arthralgias, back pain, gait problem, joint swelling, myalgias, neck pain and neck stiffness.   Skin:  Negative for color change, pallor, rash and wound.   Allergic/Immunologic: Negative for environmental allergies, food allergies and immunocompromised state.   Neurological:  Positive for weakness. Negative for dizziness, tremors, seizures, syncope, facial asymmetry, speech difficulty, light-headedness, numbness and headaches.   Hematological:  Negative for adenopathy. Does not bruise/bleed easily.   Psychiatric/Behavioral:  Negative for agitation, behavioral problems, confusion, decreased concentration, dysphoric mood, hallucinations, self-injury, sleep disturbance and suicidal ideas. The patient is not nervous/anxious and is not hyperactive.         Physical Exam   5/5 strength all extremities     Physical Exam   Constitutional: He is oriented to person, place, and time. No distress.   HENT:   Head: Normocephalic.   Mouth/Throat: Oropharynx is clear and moist.   Eyes: Pupils are equal, round, and reactive to light. Conjunctivae are normal. Right eye  exhibits no discharge. Left eye exhibits no discharge. No scleral icterus.   Neck: No thyromegaly present.   Cardiovascular: Normal rate, regular rhythm and normal heart sounds.   Pulmonary/Chest: Effort normal and breath sounds normal. No stridor.   Abdominal: Soft. Bowel sounds are normal.   Musculoskeletal:         General: Normal range of motion.      Cervical back: Normal range of motion.   Lymphadenopathy:     He has no cervical adenopathy.   Neurological: He is alert and oriented to person, place, and time.   Skin: Skin is warm. No rash noted. He is not diaphoretic.   Psychiatric: Affect and judgment normal.     Muscle strength nml    Assessment     73 year old black male with HTN,HLD,obesity,LVH,DM,PE,LS spine disease,hypothyroidism, necrotising immune mediated myopathy  He presented with : weakness in the proximal and distal upper and lower extremities with sob     HALLE and armaan-1 negative,myomarker panel negative  He has very high titres of HMG-COA reductase antibodies positivity  Muscle biopsy positive for necrotising myopathy  He has been on statins for > 10 years with no change in dose of the medication,no change in type of statin    He has immune mediated necrotising myopathy  Drug/toxin induced myopathy on the differential as per path report    He took steroids prednisone 60 mg for a very long time  This was not the plan    Finally when we saw him we started to taper the prednisone    We have tapered off the prednisone  He is now on mtx 8 tabs weekly and folic acid    7./2021  Noted abnl ck,aldolase on 5/2021 labs  But clinically no weakness     7/2024    Unfortunately stopped all medications around 2021- lost to follow up  Gets tired quickly doing chores around the house  Can't get up from a low chair  Can raise arms,can shampoo  No dysphagia  No SOB  No diplopia   Weight loss from 330 to 260 pounds-over few years  No fever,chills,night sweats,weight loss    He has become sedentary this year  He has  venous insufficiency and has healing wounds     No rash    No arthralgias and arthritis  No myalgia    Vit b12 381- insufficient- takes oral supplements  Folate 4.6    Uric acid 7.6-no gout    Echo 7/2024    Left Ventricle: The left ventricle is normal in size. Increased wall thickness. There is concentric remodeling. There is normal systolic function with a visually estimated ejection fraction of 55 - 60%. There is normal diastolic function.    Right Ventricle: Normal right ventricular cavity size. Wall thickness is normal. Systolic function is normal.    Left Atrium: Left atrium is mildly dilated.    Aortic Valve: The aortic valve is a trileaflet valve. There is moderate aortic valve sclerosis. Moderately restricted motion. There is moderate stenosis. Aortic valve area by VTI is 1.15 cm². Aortic valve peak velocity is 3.45 m/s. Mean gradient is 29 mmHg. The dimensionless index is 0.28.    Tricuspid Valve: There is mild regurgitation.    Pulmonary Artery: The estimated pulmonary artery systolic pressure is 21 mmHg.    IVC/SVC: Normal venous pressure at 3 mmHg.    VAS MIHIR    Right Leg: Segmental pressures and PVR waveforms suggest minimal to mild peripheral arterial occlusive disease.   - Rt Great Toe: The PPG waveform as described above. - TBI of 0.5 with a great toe pressure of 94 mmHg is noted.     Left Leg: Segmental pressures and PVR waveforms suggest moderate tibial peripheral arterial occlusive disease.   - Lt Great Toe: The PPG waveform as described above. - TBI of 0.5 with a great toe pressure of 94 mmHg is noted.            CK 2313  ESR 81  CRP 83.4  H/H 11.6/38.8  NML WHITE COUNT AND plts  AST 40/ALT 29 DOWN FROM 54 /81    HBA1C 7    It appears that he started prednisone 60 mg daily on 7/5/2024    He has blistering wounds on the legs for 2 months    On exam    Right proximal UE- 5/5  Right distal UE- 5/5  Left proximal UE- 5/5  Left distal UE - 5/5    Right proximal LE-4/5  Right distal LE -4/5  Left  proximal -5/5  Left distal -4/5    Did the strength improve with prednisone? Its very hard to tell      1. Myositis of multiple sites, unspecified myositis type    2. Screening for osteoporosis    3. Other osteoporosis without current pathological fracture    4. PAD (peripheral artery disease)            Plan    Message PCP- ask why prednisone 60 mg was suggested    Immediately taper off prednisone  50 mg for 2 days  40 mg for 2 days  30 mg for 2 days  20 mg for 2 days  10 mg for 2 days    Message wound care- and ask about the status of the wounds      My message to his PCP and wound care :    I saw the gentleman today after 3 years in my clinic    Looks like he had stopped his mtx and was lost to follow up    When asked about weakness - he gives very vague answers  He initially said he was weak and hence he became sedentary  He then said he doesn't know when prednisone was started  He feels prednisone didn't help him with weakness  So I am not sure if we need to keep him on 60 mg prednisone especially with such big wounds on his legs    I will taper off the steroids    I will send labs to start back methotrexate    Rosalba- how long do you think we should wait before we start immunosuppressants?     does  he need vascular medicine referral    I also requested that he does home PT to build back strength and he defers to a later date    I have asked him to send me the list of medications to delete the ones which might be causing fluid retention        My plan  :    Prepare to start mtx  Labs   Ck,aldolase today    Vascular medicine referral- PLACED it     Asked patient to send me a list of medications so that I can delete the ones that are causing fluid retention    Dexa needs to be rpted  2021 was nml  VIT D - He takes 1000 IU daily- increase to 2000 IU daily    DM,HTN,HLD management     Today's BP high,counselled     Lose weight     Vaccines : utd with covid vaccine,PCV,PPSV,TDAP AND ZOSTER    Malignancy  screening-  Colonoscopy this year    Rtc 3 months     Serafin was seen today for disease management.    Diagnoses and all orders for this visit:    Myositis of multiple sites, unspecified myositis type  -     HIV 1/2 Ag/Ab (4th Gen); Future  -     Hepatitis B surface antigen; Future  -     HBcAB; Future  -     Hepatitis B surface antibody; Future  -     Hepatitis C antibody; Future  -     Hepatitis A antibody, IgG; Future  -     Strongyloides IgG Antibodies; Future  -     Quantiferon Gold TB; Future  -     Treponema Pallidium Antibodies IgG, IgM; Future  -     Varicella zoster antibody, IgG; Future  -     Aldolase; Future  -     Vitamin D; Future  -     CK; Future    Screening for osteoporosis  -     DXA Bone Density Axial Skeleton 1 or more sites; Future  -     Vitamin D; Future    Other osteoporosis without current pathological fracture  -     DXA Bone Density Axial Skeleton 1 or more sites; Future    PAD (peripheral artery disease)  -     Ambulatory referral/consult to Vascular Medicine; Future

## 2024-07-16 NOTE — PROGRESS NOTES
NOTE FROM WOUND CARE    I think he is having memory problems. I have been having to communicate with his brother to make sure he is taking his antibiotics appropriately. He will just not take them.   I also don't think he's taking his blood pressure medications because his readings are always very high in clinic.     His right lower extremity wound is worse than the left. His tendon is exposed. I have been discussing with his vascular surgeon- who reccommended an amputation if the tendon does not granulate. I talked to him and his family about it at length.   I am not sure how long to wait before starting immunosuppressants. He currently has a pseudomonas infection and is taking ciprofloxacin for this. He should be finished the antibiotics tomorrow.       He also does not follow a low sodium diet at all which could be contributing to his fluid retention. I have talked to him and his family about it every visit.

## 2024-07-16 NOTE — PATIENT INSTRUCTIONS
Immediately taper off prednisone    Please take     50 mg for 2 days- 5 pills daily for 2 days  40 mg for 2 days- 4 pills daily for 2 days  30 mg for 2 days- 3 pills daily for 2 days  20 mg for 2 days- 2 pills daily for 2 days  10 mg for 2 days- 1 pill daily for 2 days

## 2024-07-16 NOTE — TELEPHONE ENCOUNTER
Care Due:                  Date            Visit Type   Department     Provider  --------------------------------------------------------------------------------                                EP -                              PRIMARY      [facility] INTERNAL  Last Visit: 06-      CARE ([facility])   MEDICINE       John Vargas  Next Visit: None Scheduled  None         None Found                                                            Last  Test          Frequency    Reason                     Performed    Due Date  --------------------------------------------------------------------------------    CBC.........  3 months...  methotrexate.............  06- 09-    CMP.........  3 months...  methotrexate.............  06- 09-    [employer] Embedded Care Due Messages. Reference number: [id number].   7/16/2024 11:50:07 AM CDT

## 2024-07-17 LAB
ALDOLASE SERPL-CCNC: 15.6 U/L (ref 1.2–7.6)
STRONGYLOIDES ANTIBODY IGG: NEGATIVE
VARICELLA INTERPRETATION: POSITIVE
VARICELLA ZOSTER IGG: >4000

## 2024-07-18 ENCOUNTER — TELEPHONE (OUTPATIENT)
Dept: INTERNAL MEDICINE | Facility: CLINIC | Age: 74
End: 2024-07-18
Payer: MEDICARE

## 2024-07-18 NOTE — TELEPHONE ENCOUNTER
Attempted to call and discuss a virtual appointment for Mr. Tapia. His family member needs to be present with this visit. There are no available slots today. Would offer tomorrow in a daily change  slot., and this is okay with  the PCP. Unable to reach and unable to leave a message as the mailbox is full.

## 2024-07-18 NOTE — TELEPHONE ENCOUNTER
----- Message from Lyric Moreno sent at 7/18/2024 10:32 AM CDT -----  Name of Who is calling :VALDO VEGA [[id number]]    What is the request in detail:pt would like a virtual appt today. Please assist      Can the clinic reply by [facility]:no            What number to call back if not in [facility]: [phone]

## 2024-07-18 NOTE — TELEPHONE ENCOUNTER
Called and spoke to Mr. Tapia. He would like to come in to see the MD and not use a virtual visit. Aware that he must have a relative with him/ Given tomorrow at 0940 . Sent to [place].

## 2024-07-19 ENCOUNTER — OFFICE VISIT (OUTPATIENT)
Dept: INTERNAL MEDICINE | Facility: CLINIC | Age: 74
End: 2024-07-19
Attending: INTERNAL MEDICINE
Payer: MEDICARE

## 2024-07-19 VITALS
HEART RATE: 73 BPM | SYSTOLIC BLOOD PRESSURE: 134 MMHG | WEIGHT: 265.63 LBS | DIASTOLIC BLOOD PRESSURE: 64 MMHG | HEIGHT: 70 IN | BODY MASS INDEX: 38.03 KG/M2 | OXYGEN SATURATION: 94 %

## 2024-07-19 DIAGNOSIS — I10 ESSENTIAL HYPERTENSION: Primary | ICD-10-CM

## 2024-07-19 DIAGNOSIS — D84.821 DRUG-INDUCED IMMUNODEFICIENCY: ICD-10-CM

## 2024-07-19 DIAGNOSIS — M35.9 SYSTEMIC INVOLVEMENT OF CONNECTIVE TISSUE, UNSPECIFIED: ICD-10-CM

## 2024-07-19 DIAGNOSIS — Z79.899 DRUG-INDUCED IMMUNODEFICIENCY: ICD-10-CM

## 2024-07-19 PROCEDURE — 3051F HG A1C>EQUAL 7.0%<8.0%: CPT | Mod: CPTII,S$GLB,, | Performed by: INTERNAL MEDICINE

## 2024-07-19 PROCEDURE — 99214 OFFICE O/P EST MOD 30 MIN: CPT | Mod: S$GLB,,, | Performed by: INTERNAL MEDICINE

## 2024-07-19 PROCEDURE — 3008F BODY MASS INDEX DOCD: CPT | Mod: CPTII,S$GLB,, | Performed by: INTERNAL MEDICINE

## 2024-07-19 PROCEDURE — 3078F DIAST BP <80 MM HG: CPT | Mod: CPTII,S$GLB,, | Performed by: INTERNAL MEDICINE

## 2024-07-19 PROCEDURE — 4010F ACE/ARB THERAPY RXD/TAKEN: CPT | Mod: CPTII,S$GLB,, | Performed by: INTERNAL MEDICINE

## 2024-07-19 PROCEDURE — G2211 COMPLEX E/M VISIT ADD ON: HCPCS | Mod: S$GLB,,, | Performed by: INTERNAL MEDICINE

## 2024-07-19 PROCEDURE — 3075F SYST BP GE 130 - 139MM HG: CPT | Mod: CPTII,S$GLB,, | Performed by: INTERNAL MEDICINE

## 2024-07-19 PROCEDURE — 3072F LOW RISK FOR RETINOPATHY: CPT | Mod: CPTII,S$GLB,, | Performed by: INTERNAL MEDICINE

## 2024-07-19 PROCEDURE — 1125F AMNT PAIN NOTED PAIN PRSNT: CPT | Mod: CPTII,S$GLB,, | Performed by: INTERNAL MEDICINE

## 2024-07-19 PROCEDURE — 1101F PT FALLS ASSESS-DOCD LE1/YR: CPT | Mod: CPTII,S$GLB,, | Performed by: INTERNAL MEDICINE

## 2024-07-19 PROCEDURE — 3288F FALL RISK ASSESSMENT DOCD: CPT | Mod: CPTII,S$GLB,, | Performed by: INTERNAL MEDICINE

## 2024-07-19 PROCEDURE — 99999 PR PBB SHADOW E&M-EST. PATIENT-LVL IV: CPT | Mod: PBBFAC,,, | Performed by: INTERNAL MEDICINE

## 2024-07-19 NOTE — PROGRESS NOTES
"Subjective:       Patient ID: Serafin Tapia is a 73 y.o. male.    Chief Complaint: Follow-up        Pt reports strict adherence to medicaiton regimen but is unable to confidently confirm his med list. His brother is present today. He denies any changes in his older brother aside from more sedentary as of last several months. No behavioral or mood changes. Pt denies depression. Cruised through the MMSE, 30/30 without any hiccups and was witty and creative in his responses. Fatigue and myalgias stable. Has reconnected with Rheum. Bp above goal. We need to clarify regimen first.   .        Review of Systems   Constitutional:  Negative for appetite change, chills, fever and unexpected weight change.   HENT:  Negative for hearing loss, sore throat and trouble swallowing.    Eyes:  Negative for visual disturbance.   Respiratory:  Negative for cough, chest tightness and shortness of breath.    Cardiovascular:  Negative for chest pain and leg swelling.   Gastrointestinal:  Negative for abdominal pain, blood in stool, constipation, diarrhea, nausea and vomiting.   Endocrine: Negative for polydipsia and polyuria.   Genitourinary:  Negative for decreased urine volume, difficulty urinating, dysuria, frequency and urgency.   Musculoskeletal:  Negative for gait problem.   Skin:  Negative for rash.   Neurological:  Negative for dizziness and numbness.   Psychiatric/Behavioral:  The patient is not nervous/anxious.        Objective:      Vitals:    07/19/24 0946   BP: 134/64   BP Location: Right arm   Patient Position: Sitting   BP Method: Large (Manual)   Pulse: 73   SpO2: (!) 94%   Weight: 120.5 kg (265 lb 10.5 oz)   Height: 5' 10" (1.778 m)      Physical Exam  Vitals and nursing note reviewed.   Constitutional:       General: He is not in acute distress.     Appearance: Normal appearance. He is well-developed.   HENT:      Head: Normocephalic and atraumatic.      Mouth/Throat:      Pharynx: No oropharyngeal exudate.   Eyes:      " General: No scleral icterus.     Conjunctiva/sclera: Conjunctivae normal.      Pupils: Pupils are equal, round, and reactive to light.   Neck:      Thyroid: No thyromegaly.   Cardiovascular:      Rate and Rhythm: Normal rate and regular rhythm.      Heart sounds: Normal heart sounds. No murmur heard.  Pulmonary:      Effort: Pulmonary effort is normal.      Breath sounds: Normal breath sounds. No wheezing or rales.   Abdominal:      General: There is no distension.   Musculoskeletal:         General: No tenderness.   Lymphadenopathy:      Cervical: No cervical adenopathy.   Skin:     General: Skin is warm and dry.   Neurological:      Mental Status: He is alert and oriented to person, place, and time.   Psychiatric:         Behavior: Behavior normal.         Assessment:       1. Essential hypertension    2. Drug-induced immunodeficiency    3. Systemic involvement of connective tissue, unspecified        Plan:       Serafin was seen today for follow-up.    Diagnoses and all orders for this visit:    Essential hypertension   Staff to contact pt this afternoon and get good med rec then we go from there.     Drug-induced immunodeficiency    Systemic involvement of connective tissue, unspecified   I have no concern for dementia has cause of discordant care. Adherence and communication discussed.         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 The patient will return to see me as these issues will be followed longitudinally.    John Chavis MD  Internal Medicine-[facility]        Side effects of medication(s) were discussed in detail and patient voiced understanding.  Patient will call back for any issues or complications.

## 2024-07-21 RX ORDER — ERGOCALCIFEROL 1.25 MG/1
50000 CAPSULE ORAL
Qty: 12 CAPSULE | Refills: 0 | Status: SHIPPED | OUTPATIENT
Start: 2024-07-21 | End: 2024-10-19

## 2024-07-22 ENCOUNTER — OFFICE VISIT (OUTPATIENT)
Dept: WOUND CARE | Facility: CLINIC | Age: 74
End: 2024-07-22
Payer: MEDICARE

## 2024-07-22 VITALS
HEIGHT: 70 IN | DIASTOLIC BLOOD PRESSURE: 67 MMHG | HEART RATE: 68 BPM | SYSTOLIC BLOOD PRESSURE: 142 MMHG | BODY MASS INDEX: 38.13 KG/M2 | TEMPERATURE: 98 F | WEIGHT: 266.31 LBS

## 2024-07-22 DIAGNOSIS — I87.2 VENOUS INSUFFICIENCY: ICD-10-CM

## 2024-07-22 DIAGNOSIS — S81.802D OPEN WOUND OF LEFT LOWER EXTREMITY, SUBSEQUENT ENCOUNTER: Primary | ICD-10-CM

## 2024-07-22 DIAGNOSIS — E11.42 CONTROLLED TYPE 2 DIABETES MELLITUS WITH DIABETIC POLYNEUROPATHY, WITHOUT LONG-TERM CURRENT USE OF INSULIN: ICD-10-CM

## 2024-07-22 DIAGNOSIS — S81.801D OPEN WOUND OF RIGHT LOWER EXTREMITY, SUBSEQUENT ENCOUNTER: ICD-10-CM

## 2024-07-22 DIAGNOSIS — I50.9 HEART FAILURE, UNSPECIFIED HF CHRONICITY, UNSPECIFIED HEART FAILURE TYPE: ICD-10-CM

## 2024-07-22 DIAGNOSIS — I10 ESSENTIAL HYPERTENSION: ICD-10-CM

## 2024-07-22 PROCEDURE — 29580 STRAPPING UNNA BOOT: CPT | Mod: 50,S$GLB,,

## 2024-07-22 PROCEDURE — 99499 UNLISTED E&M SERVICE: CPT | Mod: S$GLB,,,

## 2024-07-22 PROCEDURE — 99999 PR PBB SHADOW E&M-EST. PATIENT-LVL IV: CPT | Mod: PBBFAC,,,

## 2024-07-22 NOTE — PROGRESS NOTES
"Subjective:       Patient ID: Serafin Tapia is a 73 y.o. male.    Chief Complaint: Wound Check    Patient presents with his brother for a re-evaluation of bilateral lower extremity wounds. He reports his wounds started as blisters about 2 months ago. He is unsure why he did not contact the office until now. Pt admits compliance with wearing compression stockings daily, but reports his stockings have lost elasticity. He reports that his wounds drain a large amount of clear fluid. Pt has been dressing his wounds with a wound "liquid" and occasionally covering his wounds with gauze. Pt has also been leaving his wounds open to air. Patient followed with vascular surgery for PAD and venous insufficiency. Dr. Kapoor determined he has adequate arterial blood flow for wound healing and cleared him for a 3 layer compression wrap. Pt previously followed with the wound care clinic in 2022 and 2023.  Denies compliance with a low sodium diet, leg elevation, and following a high protein diet. Admits compliance with gabapentin. Notices improvement in pain. Denies side effects. No complaints at this time. Denies fever, chills, purulent drainage, erythema, or warmth.        7/8/24 ABIs and TBIs:  Right MIHIR- 0.89  Right TBI-0.50  Left MIHIR- 0.82  Left TBI-0.50  Right leg: Segmental pressures and PVR waveforms suggest minimal to mild peripheral arterial occlusive disease. Rt Great Toe: The PPG waveform- mildly dampened with a pressure of 94 mmHG is noted.  Left leg: Segmental pressures and PVR waveforms suggest moderate tibial peripheral arterial occlusive disease.Lt Great Toe: The PPG waveform-moderately dampened with a pressure of 94 mmHG is noted.    10/21/22 MIHIR:  Right lower extremity: 0.93- minimal peripheral arterial occlusive disease  Left lower extremity: 0.70- moderate peripheral arterial occlusive disease      Review of Systems   Constitutional:  Negative for activity change, chills, diaphoresis, fatigue and fever.   Eyes:  " Negative for photophobia and visual disturbance.   Respiratory:  Negative for apnea, chest tightness and shortness of breath.    Cardiovascular:  Positive for leg swelling. Negative for chest pain and palpitations.   Musculoskeletal:  Negative for gait problem and joint swelling.   Skin:  Positive for wound. Negative for color change, pallor and rash.   Neurological:  Negative for dizziness, syncope, facial asymmetry, speech difficulty, weakness, light-headedness, numbness and headaches.   Psychiatric/Behavioral:  Negative for agitation and confusion. The patient is not nervous/anxious.    All other systems reviewed and are negative.      Objective:      Physical Exam  Vitals reviewed.   Constitutional:       General: He is not in acute distress.     Appearance: Normal appearance. He is obese.   Cardiovascular:      Rate and Rhythm: Normal rate and regular rhythm.      Pulses: Normal pulses.   Pulmonary:      Effort: No respiratory distress.   Musculoskeletal:         General: No swelling.      Right lower leg: Pitting Edema present.      Left lower leg: Pitting Edema present.      Comments: Ambulates with cane   Skin:     General: Skin is warm and dry.      Capillary Refill: Capillary refill takes less than 2 seconds.      Findings: Wound present. No erythema.          Neurological:      General: No focal deficit present.      Mental Status: He is alert and oriented to person, place, and time.   Psychiatric:         Mood and Affect: Mood normal.         Behavior: Behavior normal.         Thought Content: Thought content normal.         Judgment: Judgment normal.         Assessment:       1. Open wound of left lower extremity, subsequent encounter    2. Open wound of right lower extremity, subsequent encounter    3. Venous insufficiency    4. Essential hypertension    5. Heart failure, unspecified HF chronicity, unspecified heart failure type    6. Controlled type 2 diabetes mellitus with diabetic polyneuropathy,  without long-term current use of insulin               Wound Venous Ulcer Right lower;medial;distal Leg (Active)       Present on Original Admission:    Primary Wound Type: Venous ulcer   Side: Right   Orientation: lower;medial;distal   Location: Leg   Wound Approximate Age at First Assessment (Weeks):    Wound Number:    Is this injury device related?:    Incision Type:    Closure Method:    Wound Description (Comments):    Type:    Additional Comments:    Ankle-Brachial Index:    Pulses:    Removal Indication and Assessment:    Wound Outcome:    Wound Image   07/22/24 0846   Dressing Appearance Dry;Intact;Clean;Moist drainage 07/22/24 0846   Drainage Amount Large 07/22/24 0846   Drainage Characteristics/Odor Serosanguineous 07/22/24 0846   Appearance Tendon 07/22/24 0846   Red (%), Wound Tissue Color 20 % 07/22/24 0846   Yellow (%), Wound Tissue Color 80 % 07/22/24 0846   Periwound Area Intact;Edematous 07/22/24 0846   Wound Length (cm) 4 cm 07/22/24 0846   Wound Width (cm) 5.2 cm 07/22/24 0846   Wound Depth (cm) 0.1 cm 07/22/24 0846   Wound Volume (cm^3) 2.08 cm^3 07/22/24 0846   Wound Surface Area (cm^2) 20.8 cm^2 07/22/24 0846   Care Cleansed with:;Soap and water 07/22/24 0846   Dressing Applied;Calcium alginate;Compression wrap;Other (comment) 07/22/24 0846   Periwound Care Skin barrier film applied 07/22/24 0846   Compression Unna's Boot;Three layer compression 07/22/24 0846            Wound Venous Ulcer Left lower;lateral;posterior Leg (Active)       Present on Original Admission:    Primary Wound Type: Venous ulcer   Side: Left   Orientation: lower;lateral;posterior   Location: Leg   Wound Approximate Age at First Assessment (Weeks):    Wound Number:    Is this injury device related?:    Incision Type:    Closure Method:    Wound Description (Comments):    Type:    Additional Comments:    Ankle-Brachial Index:    Pulses:    Removal Indication and Assessment:    Wound Outcome:    Wound Image   07/22/24 0846    Dressing Appearance Dry;Intact;Clean;Moist drainage 07/22/24 0846   Drainage Amount Small 07/22/24 0846   Drainage Characteristics/Odor Serosanguineous 07/22/24 0846   Red (%), Wound Tissue Color 100 % 07/22/24 0846   Periwound Area Intact;Edematous;Pink 07/22/24 0846   Wound Length (cm) 0.2 cm 07/22/24 0846   Wound Width (cm) 0.3 cm 07/22/24 0846   Wound Depth (cm) 0.1 cm 07/22/24 0846   Wound Volume (cm^3) 0.006 cm^3 07/22/24 0846   Wound Surface Area (cm^2) 0.06 cm^2 07/22/24 0846   Care Cleansed with:;Soap and water 07/22/24 0846   Dressing Applied;Foam;Compression wrap 07/22/24 0846   Periwound Care Skin barrier film applied 07/22/24 0846   Compression Unna's Boot;Three layer compression 07/22/24 0846         Serafin was seen in the clinic room and placed in the supine position on the treatment table.  The wound dressings were removed and the legs were cleansed with Easi-clense sponges and dried thoroughly.  No erythema, green drainage, odor, or warmth noted from patient's baseline. Tendon still noted. Deferred sharp debridement.      Plan of Care:    Left lower extremity- hydrofera blue and a 3 layer zinc compression wrap.  Right lower extremity- gentian violet to periwound, vaseline impregnated gauze, drawtex, aquacel, and a 3 layer zinc compression wrap.   The patient's feet were positioned at a 90 degree angle. The wraps ere applied using a spiral technique avoiding creases or folds.  The wraps were started behind the first metatarsal and ended below the tibial tubercle of the knee.  There was overlap of each turn half the width of the previous turn.  The compression wraps will be changed every 7 days.      Plan:       Bilateral lower extremity was dressed as detailed above  Patient was instructed to not get the dressings wet and to use cast covers for showering.  Should the dressing become wet, he is to remove it, place a moist dressing over the wound, cover with gauze and roll gauze and use ace wraps for  compression and to secure bandages.  He should then notify this office as soon as possible to have a new dressing applied.  Instructed patient to remove wrap if he notices tingling or coldness to his bilateral lower extremities or if his toes become white, blue, or cold.    Discussed nutrition and the role of protein in wound healing with the patient. Instructed patient to optimize protein for wound healing.     Discussed bilateral lower extremity edema with patient. Instructed patient to elevate legs whenever sedentary and follow a low sodium diet.  Discussed replacing compression stockings every 3-6 months.    Instructed patient to keep follow up appointments with vascular surgery.    Instructed patient to continue to take gabapentin as prescribed for nerve pain.    Discussed the importance of utilizing compression stockings to prevent recurrent wounds.    Instructed patient to continue ciprofloxacin as prescribed.     Discussed drainage control- improved. Will resume seeing patient once a week.    Discussed MIHIR and TBI results regarding right lower extremity and exposed tendon. Pt has minimal to mild PAOD to the right leg and right great toe pressure is midly dampened. Will monitor response to wound care products. Will send patient to vascular surgery if wound is not improving, tendon becomes desiccate, or if patient loses mobility/functionality of right foot.      Written and verbal instructions given to patient  RTC in 1 week    Rosalba Eaton PA-C

## 2024-07-29 ENCOUNTER — OFFICE VISIT (OUTPATIENT)
Dept: VASCULAR SURGERY | Facility: CLINIC | Age: 74
End: 2024-07-29
Payer: MEDICARE

## 2024-07-29 VITALS
DIASTOLIC BLOOD PRESSURE: 63 MMHG | TEMPERATURE: 98 F | HEIGHT: 70 IN | HEART RATE: 70 BPM | BODY MASS INDEX: 38.38 KG/M2 | SYSTOLIC BLOOD PRESSURE: 135 MMHG | WEIGHT: 268.06 LBS

## 2024-07-29 DIAGNOSIS — S81.802D MULTIPLE OPEN WOUNDS OF LEFT LOWER EXTREMITY, SUBSEQUENT ENCOUNTER: Primary | ICD-10-CM

## 2024-07-29 LAB
GRAM STN SPEC: NORMAL

## 2024-07-29 PROCEDURE — 3008F BODY MASS INDEX DOCD: CPT | Mod: CPTII,S$GLB,, | Performed by: SURGERY

## 2024-07-29 PROCEDURE — 3051F HG A1C>EQUAL 7.0%<8.0%: CPT | Mod: CPTII,S$GLB,, | Performed by: SURGERY

## 2024-07-29 PROCEDURE — 97597 DBRDMT OPN WND 1ST 20 CM/<: CPT | Mod: S$GLB,,, | Performed by: SURGERY

## 2024-07-29 PROCEDURE — 99999 PR PBB SHADOW E&M-EST. PATIENT-LVL IV: CPT | Mod: PBBFAC,,, | Performed by: SURGERY

## 2024-07-29 PROCEDURE — 87186 SC STD MICRODIL/AGAR DIL: CPT

## 2024-07-29 PROCEDURE — 3075F SYST BP GE 130 - 139MM HG: CPT | Mod: CPTII,S$GLB,, | Performed by: SURGERY

## 2024-07-29 PROCEDURE — 1125F AMNT PAIN NOTED PAIN PRSNT: CPT | Mod: CPTII,S$GLB,, | Performed by: SURGERY

## 2024-07-29 PROCEDURE — 87205 SMEAR GRAM STAIN: CPT

## 2024-07-29 PROCEDURE — 3078F DIAST BP <80 MM HG: CPT | Mod: CPTII,S$GLB,, | Performed by: SURGERY

## 2024-07-29 PROCEDURE — 3072F LOW RISK FOR RETINOPATHY: CPT | Mod: CPTII,S$GLB,, | Performed by: SURGERY

## 2024-07-29 PROCEDURE — 1159F MED LIST DOCD IN RCRD: CPT | Mod: CPTII,S$GLB,, | Performed by: SURGERY

## 2024-07-29 PROCEDURE — 87075 CULTR BACTERIA EXCEPT BLOOD: CPT

## 2024-07-29 PROCEDURE — 4010F ACE/ARB THERAPY RXD/TAKEN: CPT | Mod: CPTII,S$GLB,, | Performed by: SURGERY

## 2024-07-29 PROCEDURE — 87077 CULTURE AEROBIC IDENTIFY: CPT

## 2024-07-29 PROCEDURE — 87070 CULTURE OTHR SPECIMN AEROBIC: CPT

## 2024-07-29 NOTE — PROGRESS NOTES
Patient is being followed by wound care.  See the full note by the wound care nurses.  I examined the patient and saw his exposed Achilles tendon which was grossly infected.  Cultures were obtained.  The area was not of good tissue it was quite inflamed and it was boggy with significant edema.  It had a small amount of what looks like purulence between the fibers.  Therefore a decision was made to do a sharp debridement.      Procedure note     Local anesthetic 1% lidocaine injected     Sharp debridement of Achilles tendon.  Portions of the Achilles tendon were removed.  A proximally 4-5 cm of tendon was removed.  Bleeding was controlled with silver nitrate sticks.  Patient tolerated the procedure well.    Surgeon was Theo guzman.    Cultures obtained.  Wound was dressed as per Wound Care see their note.  We will see the patient back on Thursday just to see how this is doing.  It may need a further debridement if it does we will take him to the OR if we can not do it in the outpatient clinic.

## 2024-07-30 ENCOUNTER — HOSPITAL ENCOUNTER (OUTPATIENT)
Dept: RADIOLOGY | Facility: CLINIC | Age: 74
Discharge: HOME | End: 2024-07-30
Attending: INTERNAL MEDICINE
Payer: MEDICARE

## 2024-07-30 DIAGNOSIS — Z13.820 SCREENING FOR OSTEOPOROSIS: ICD-10-CM

## 2024-07-30 DIAGNOSIS — M81.8 OTHER OSTEOPOROSIS WITHOUT CURRENT PATHOLOGICAL FRACTURE: ICD-10-CM

## 2024-07-30 PROCEDURE — 77080 DXA BONE DENSITY AXIAL: CPT | Mod: 26,,, | Performed by: INTERNAL MEDICINE

## 2024-07-30 PROCEDURE — 77080 DXA BONE DENSITY AXIAL: CPT | Mod: TC,PO

## 2024-07-31 LAB — BACTERIA SPEC AEROBE CULT: ABNORMAL

## 2024-08-01 ENCOUNTER — OFFICE VISIT (OUTPATIENT)
Dept: VASCULAR SURGERY | Facility: CLINIC | Age: 74
End: 2024-08-01
Payer: MEDICARE

## 2024-08-01 VITALS
HEART RATE: 86 BPM | WEIGHT: 262.56 LBS | BODY MASS INDEX: 37.59 KG/M2 | DIASTOLIC BLOOD PRESSURE: 68 MMHG | HEIGHT: 70 IN | SYSTOLIC BLOOD PRESSURE: 155 MMHG | TEMPERATURE: 98 F

## 2024-08-01 DIAGNOSIS — S81.802D MULTIPLE OPEN WOUNDS OF LEFT LOWER EXTREMITY, SUBSEQUENT ENCOUNTER: Primary | ICD-10-CM

## 2024-08-01 LAB — BACTERIA SPEC ANAEROBE CULT: NORMAL

## 2024-08-01 PROCEDURE — 1125F AMNT PAIN NOTED PAIN PRSNT: CPT | Mod: CPTII,S$GLB,, | Performed by: SURGERY

## 2024-08-01 PROCEDURE — 3008F BODY MASS INDEX DOCD: CPT | Mod: CPTII,S$GLB,, | Performed by: SURGERY

## 2024-08-01 PROCEDURE — 3288F FALL RISK ASSESSMENT DOCD: CPT | Mod: CPTII,S$GLB,, | Performed by: SURGERY

## 2024-08-01 PROCEDURE — 99999 PR PBB SHADOW E&M-EST. PATIENT-LVL IV: CPT | Mod: PBBFAC,,, | Performed by: SURGERY

## 2024-08-01 PROCEDURE — 3078F DIAST BP <80 MM HG: CPT | Mod: CPTII,S$GLB,, | Performed by: SURGERY

## 2024-08-01 PROCEDURE — 4010F ACE/ARB THERAPY RXD/TAKEN: CPT | Mod: CPTII,S$GLB,, | Performed by: SURGERY

## 2024-08-01 PROCEDURE — 99214 OFFICE O/P EST MOD 30 MIN: CPT | Mod: S$GLB,,, | Performed by: SURGERY

## 2024-08-01 PROCEDURE — 3077F SYST BP >= 140 MM HG: CPT | Mod: CPTII,S$GLB,, | Performed by: SURGERY

## 2024-08-01 PROCEDURE — 1101F PT FALLS ASSESS-DOCD LE1/YR: CPT | Mod: CPTII,S$GLB,, | Performed by: SURGERY

## 2024-08-01 PROCEDURE — 3051F HG A1C>EQUAL 7.0%<8.0%: CPT | Mod: CPTII,S$GLB,, | Performed by: SURGERY

## 2024-08-01 PROCEDURE — 1159F MED LIST DOCD IN RCRD: CPT | Mod: CPTII,S$GLB,, | Performed by: SURGERY

## 2024-08-01 PROCEDURE — 3072F LOW RISK FOR RETINOPATHY: CPT | Mod: CPTII,S$GLB,, | Performed by: SURGERY

## 2024-08-01 RX ORDER — AMOXICILLIN AND CLAVULANATE POTASSIUM 875; 125 MG/1; MG/1
1 TABLET, FILM COATED ORAL 2 TIMES DAILY
Qty: 7 TABLET | Refills: 0 | Status: SHIPPED | OUTPATIENT
Start: 2024-08-01

## 2024-08-01 NOTE — PROGRESS NOTES
Patient returns.  He needs more debridement.  Using 1% lidocaine and a sharp scalpel scissors I debrided the area remove his significant portion of the tendon that was grossly infected.  I do not know what that will be sufficient.  We then that know where the infection goes higher or lower.  We will see him back on Monday and we will see if he needs to go to the operating room and really had more this excised.  In addition to that I have started him on some Augmentin.  I have given him 3-1/2 days if that has I will see him back Monday and we will see what is going on from there.  I spent significant time debriding this wound.  At least 20 minutes to 30 minutes of time debriding the wound and preparation.  We will pack it and we will see him back on Monday

## 2024-08-05 ENCOUNTER — OFFICE VISIT (OUTPATIENT)
Dept: VASCULAR SURGERY | Facility: CLINIC | Age: 74
End: 2024-08-05
Payer: MEDICARE

## 2024-08-05 VITALS
HEIGHT: 70 IN | TEMPERATURE: 98 F | WEIGHT: 253.5 LBS | BODY MASS INDEX: 36.29 KG/M2 | HEART RATE: 61 BPM | SYSTOLIC BLOOD PRESSURE: 112 MMHG | DIASTOLIC BLOOD PRESSURE: 64 MMHG

## 2024-08-05 DIAGNOSIS — S81.802D MULTIPLE OPEN WOUNDS OF LEFT LOWER EXTREMITY, SUBSEQUENT ENCOUNTER: ICD-10-CM

## 2024-08-05 DIAGNOSIS — S81.801D MULTIPLE OPEN WOUNDS OF RIGHT LOWER EXTREMITY, SUBSEQUENT ENCOUNTER: ICD-10-CM

## 2024-08-05 PROCEDURE — 3288F FALL RISK ASSESSMENT DOCD: CPT | Mod: CPTII,S$GLB,, | Performed by: SURGERY

## 2024-08-05 PROCEDURE — 1125F AMNT PAIN NOTED PAIN PRSNT: CPT | Mod: CPTII,S$GLB,, | Performed by: SURGERY

## 2024-08-05 PROCEDURE — 3051F HG A1C>EQUAL 7.0%<8.0%: CPT | Mod: CPTII,S$GLB,, | Performed by: SURGERY

## 2024-08-05 PROCEDURE — 3074F SYST BP LT 130 MM HG: CPT | Mod: CPTII,S$GLB,, | Performed by: SURGERY

## 2024-08-05 PROCEDURE — 99999 PR PBB SHADOW E&M-EST. PATIENT-LVL IV: CPT | Mod: PBBFAC,,, | Performed by: SURGERY

## 2024-08-05 PROCEDURE — 3072F LOW RISK FOR RETINOPATHY: CPT | Mod: CPTII,S$GLB,, | Performed by: SURGERY

## 2024-08-05 PROCEDURE — 99213 OFFICE O/P EST LOW 20 MIN: CPT | Mod: S$GLB,,, | Performed by: SURGERY

## 2024-08-05 PROCEDURE — 3008F BODY MASS INDEX DOCD: CPT | Mod: CPTII,S$GLB,, | Performed by: SURGERY

## 2024-08-05 PROCEDURE — 1159F MED LIST DOCD IN RCRD: CPT | Mod: CPTII,S$GLB,, | Performed by: SURGERY

## 2024-08-05 PROCEDURE — 1101F PT FALLS ASSESS-DOCD LE1/YR: CPT | Mod: CPTII,S$GLB,, | Performed by: SURGERY

## 2024-08-05 PROCEDURE — 3078F DIAST BP <80 MM HG: CPT | Mod: CPTII,S$GLB,, | Performed by: SURGERY

## 2024-08-05 PROCEDURE — 4010F ACE/ARB THERAPY RXD/TAKEN: CPT | Mod: CPTII,S$GLB,, | Performed by: SURGERY

## 2024-08-05 RX ORDER — GABAPENTIN 100 MG/1
300 CAPSULE ORAL 3 TIMES DAILY
Qty: 90 CAPSULE | Refills: 3 | Status: SHIPPED | OUTPATIENT
Start: 2024-08-05

## 2024-08-05 RX ORDER — AMOXICILLIN AND CLAVULANATE POTASSIUM 500; 125 MG/1; MG/1
1 TABLET, FILM COATED ORAL 3 TIMES DAILY
Status: ACTIVE | OUTPATIENT
Start: 2024-08-05 | End: 2024-08-12

## 2024-08-06 ENCOUNTER — TELEPHONE (OUTPATIENT)
Dept: WOUND CARE | Facility: CLINIC | Age: 74
End: 2024-08-06
Payer: MEDICARE

## 2024-08-07 ENCOUNTER — OFFICE VISIT (OUTPATIENT)
Dept: WOUND CARE | Facility: CLINIC | Age: 74
End: 2024-08-07
Payer: MEDICARE

## 2024-08-07 VITALS
HEIGHT: 70 IN | HEART RATE: 62 BPM | BODY MASS INDEX: 36.89 KG/M2 | TEMPERATURE: 99 F | DIASTOLIC BLOOD PRESSURE: 65 MMHG | SYSTOLIC BLOOD PRESSURE: 145 MMHG | WEIGHT: 257.69 LBS

## 2024-08-07 DIAGNOSIS — E11.42 CONTROLLED TYPE 2 DIABETES MELLITUS WITH DIABETIC POLYNEUROPATHY, WITHOUT LONG-TERM CURRENT USE OF INSULIN: ICD-10-CM

## 2024-08-07 DIAGNOSIS — S81.801D OPEN WOUND OF RIGHT LOWER EXTREMITY, SUBSEQUENT ENCOUNTER: ICD-10-CM

## 2024-08-07 DIAGNOSIS — I87.2 VENOUS INSUFFICIENCY: ICD-10-CM

## 2024-08-07 DIAGNOSIS — I10 ESSENTIAL HYPERTENSION: ICD-10-CM

## 2024-08-07 DIAGNOSIS — L03.90 WOUND CELLULITIS: ICD-10-CM

## 2024-08-07 DIAGNOSIS — S81.802D OPEN WOUND OF LEFT LOWER EXTREMITY, SUBSEQUENT ENCOUNTER: Primary | ICD-10-CM

## 2024-08-07 DIAGNOSIS — L60.3 DYSTROPHIC NAIL: ICD-10-CM

## 2024-08-07 DIAGNOSIS — I50.9 HEART FAILURE, UNSPECIFIED HF CHRONICITY, UNSPECIFIED HEART FAILURE TYPE: ICD-10-CM

## 2024-08-07 PROCEDURE — 3077F SYST BP >= 140 MM HG: CPT | Mod: CPTII,S$GLB,,

## 2024-08-07 PROCEDURE — 4010F ACE/ARB THERAPY RXD/TAKEN: CPT | Mod: CPTII,S$GLB,,

## 2024-08-07 PROCEDURE — 1159F MED LIST DOCD IN RCRD: CPT | Mod: CPTII,S$GLB,,

## 2024-08-07 PROCEDURE — 1101F PT FALLS ASSESS-DOCD LE1/YR: CPT | Mod: CPTII,S$GLB,,

## 2024-08-07 PROCEDURE — 11045 DBRDMT SUBQ TISS EACH ADDL: CPT | Mod: S$GLB,,,

## 2024-08-07 PROCEDURE — 99999 PR PBB SHADOW E&M-EST. PATIENT-LVL V: CPT | Mod: PBBFAC,,,

## 2024-08-07 PROCEDURE — 1125F AMNT PAIN NOTED PAIN PRSNT: CPT | Mod: CPTII,S$GLB,,

## 2024-08-07 PROCEDURE — 3008F BODY MASS INDEX DOCD: CPT | Mod: CPTII,S$GLB,,

## 2024-08-07 PROCEDURE — 11042 DBRDMT SUBQ TIS 1ST 20SQCM/<: CPT | Mod: S$GLB,,,

## 2024-08-07 PROCEDURE — 3051F HG A1C>EQUAL 7.0%<8.0%: CPT | Mod: CPTII,S$GLB,,

## 2024-08-07 PROCEDURE — 3078F DIAST BP <80 MM HG: CPT | Mod: CPTII,S$GLB,,

## 2024-08-07 PROCEDURE — 3288F FALL RISK ASSESSMENT DOCD: CPT | Mod: CPTII,S$GLB,,

## 2024-08-07 PROCEDURE — 3072F LOW RISK FOR RETINOPATHY: CPT | Mod: CPTII,S$GLB,,

## 2024-08-07 PROCEDURE — 99213 OFFICE O/P EST LOW 20 MIN: CPT | Mod: 25,S$GLB,,

## 2024-08-12 ENCOUNTER — OFFICE VISIT (OUTPATIENT)
Dept: WOUND CARE | Facility: CLINIC | Age: 74
End: 2024-08-12
Payer: MEDICARE

## 2024-08-12 VITALS
HEIGHT: 70 IN | WEIGHT: 254.44 LBS | HEART RATE: 95 BPM | DIASTOLIC BLOOD PRESSURE: 88 MMHG | TEMPERATURE: 98 F | SYSTOLIC BLOOD PRESSURE: 192 MMHG | BODY MASS INDEX: 36.43 KG/M2

## 2024-08-12 DIAGNOSIS — E11.42 CONTROLLED TYPE 2 DIABETES MELLITUS WITH DIABETIC POLYNEUROPATHY, WITHOUT LONG-TERM CURRENT USE OF INSULIN: ICD-10-CM

## 2024-08-12 DIAGNOSIS — I87.2 VENOUS INSUFFICIENCY: ICD-10-CM

## 2024-08-12 DIAGNOSIS — I50.9 HEART FAILURE, UNSPECIFIED HF CHRONICITY, UNSPECIFIED HEART FAILURE TYPE: ICD-10-CM

## 2024-08-12 DIAGNOSIS — I10 ESSENTIAL HYPERTENSION: ICD-10-CM

## 2024-08-12 DIAGNOSIS — S81.801D OPEN WOUND OF RIGHT LOWER EXTREMITY, SUBSEQUENT ENCOUNTER: ICD-10-CM

## 2024-08-12 DIAGNOSIS — S81.802D OPEN WOUND OF LEFT LOWER EXTREMITY, SUBSEQUENT ENCOUNTER: Primary | ICD-10-CM

## 2024-08-12 DIAGNOSIS — L03.90 WOUND CELLULITIS: ICD-10-CM

## 2024-08-12 PROCEDURE — 29580 STRAPPING UNNA BOOT: CPT | Mod: LT,S$GLB,,

## 2024-08-12 PROCEDURE — 1125F AMNT PAIN NOTED PAIN PRSNT: CPT | Mod: CPTII,S$GLB,,

## 2024-08-12 PROCEDURE — 99999 PR PBB SHADOW E&M-EST. PATIENT-LVL V: CPT | Mod: PBBFAC,,,

## 2024-08-12 PROCEDURE — 11042 DBRDMT SUBQ TIS 1ST 20SQCM/<: CPT | Mod: S$GLB,,,

## 2024-08-12 PROCEDURE — 3008F BODY MASS INDEX DOCD: CPT | Mod: CPTII,S$GLB,,

## 2024-08-12 PROCEDURE — 99213 OFFICE O/P EST LOW 20 MIN: CPT | Mod: 25,S$GLB,,

## 2024-08-12 PROCEDURE — 3288F FALL RISK ASSESSMENT DOCD: CPT | Mod: CPTII,S$GLB,,

## 2024-08-12 PROCEDURE — 3072F LOW RISK FOR RETINOPATHY: CPT | Mod: CPTII,S$GLB,,

## 2024-08-12 PROCEDURE — 11045 DBRDMT SUBQ TISS EACH ADDL: CPT | Mod: S$GLB,,,

## 2024-08-12 PROCEDURE — 3079F DIAST BP 80-89 MM HG: CPT | Mod: CPTII,S$GLB,,

## 2024-08-12 PROCEDURE — 1101F PT FALLS ASSESS-DOCD LE1/YR: CPT | Mod: CPTII,S$GLB,,

## 2024-08-12 PROCEDURE — 3077F SYST BP >= 140 MM HG: CPT | Mod: CPTII,S$GLB,,

## 2024-08-12 PROCEDURE — 1159F MED LIST DOCD IN RCRD: CPT | Mod: CPTII,S$GLB,,

## 2024-08-12 PROCEDURE — 4010F ACE/ARB THERAPY RXD/TAKEN: CPT | Mod: CPTII,S$GLB,,

## 2024-08-12 PROCEDURE — 3051F HG A1C>EQUAL 7.0%<8.0%: CPT | Mod: CPTII,S$GLB,,

## 2024-08-12 NOTE — PROGRESS NOTES
"Subjective:       Patient ID: Serafin Tapia is a 73 y.o. male.    Chief Complaint: Wound Check        Patient presents with his brother for a re-evaluation of bilateral lower extremity wounds. He reports his wounds started as blisters about 2 months ago. He is unsure why he did not contact the office until now. Pt admits compliance with wearing compression stockings daily, but reports his stockings have lost elasticity. He reports that his wounds drain a large amount of clear fluid. Pt has been dressing his wounds with a wound "liquid" and occasionally covering his wounds with gauze. Pt has also been leaving his wounds open to air. Patient followed with vascular surgery for PAD and venous insufficiency. Dr. Kapoor determined he has adequate arterial blood flow for wound healing and cleared him for a 3 layer compression wrap. Pt previously followed with the wound care clinic in 2022 and 2023.  Denies compliance with a low sodium diet, leg elevation, and following a high protein diet. Admits compliance with gabapentin. Notices improvement in pain. Denies side effects. Pt followed with Dr. Stern while I was on vacation. On 7/29/24, sharp debridement was performed and part of the patient's achilles tendon was removed. His wound was cultured. Pt followed back on 8/1. His wound was debrided further and was prescribed Augmentin. On 8/5, his wound was debrided further. His Augmentin dose was refilled and sent for 7 days. Pt reported never picking up the Augmentin prescription.      Interval history: Pt reports stopping the antibiotics on Saturday. He reports that he is getting a colonoscopy at [facility] on Wednesday and the MD told him to stop antibiotics until Thursday. Denies fever, chills, purulent drainage, erythema, or warmth.      7/29/24 anaerobic culture: presumptive Prevotella species  7/29/24 aerobic culture: enterococcus faecalis    7/8/24 ABIs and TBIs:  Right MIHIR- 0.89  Right TBI-0.50  Left MIHIR- 0.82  Left " TBI-0.50  Right leg: Segmental pressures and PVR waveforms suggest minimal to mild peripheral arterial occlusive disease. Rt Great Toe: The PPG waveform- mildly dampened with a pressure of 94 mmHG is noted.  Left leg: Segmental pressures and PVR waveforms suggest moderate tibial peripheral arterial occlusive disease.Lt Great Toe: The PPG waveform-moderately dampened with a pressure of 94 mmHG is noted.    10/21/22 MIHIR:  Right lower extremity: 0.93- minimal peripheral arterial occlusive disease  Left lower extremity: 0.70- moderate peripheral arterial occlusive disease      Review of Systems   Constitutional:  Negative for activity change, chills, diaphoresis, fatigue and fever.   Eyes:  Negative for photophobia and visual disturbance.   Respiratory:  Negative for apnea, chest tightness and shortness of breath.    Cardiovascular:  Positive for leg swelling. Negative for chest pain and palpitations.   Musculoskeletal:  Negative for gait problem and joint swelling.   Skin:  Positive for wound. Negative for color change, pallor and rash.   Neurological:  Negative for dizziness, syncope, facial asymmetry, speech difficulty, weakness, light-headedness, numbness and headaches.   Psychiatric/Behavioral:  Negative for agitation and confusion. The patient is not nervous/anxious.    All other systems reviewed and are negative.      Objective:      Physical Exam  Vitals reviewed.   Constitutional:       General: He is not in acute distress.     Appearance: Normal appearance. He is obese.   Cardiovascular:      Rate and Rhythm: Normal rate and regular rhythm.      Pulses: Normal pulses.   Pulmonary:      Effort: No respiratory distress.   Musculoskeletal:         General: No swelling.      Right lower leg: Pitting Edema present.      Left lower leg: Pitting Edema present.      Comments: Ambulates with cane   Skin:     General: Skin is warm and dry.      Capillary Refill: Capillary refill takes less than 2 seconds.      Findings:  Wound present. No erythema.             Comments: Dystrophic nails noted   Neurological:      General: No focal deficit present.      Mental Status: He is alert and oriented to person, place, and time.   Psychiatric:         Mood and Affect: Mood normal.         Behavior: Behavior normal.         Thought Content: Thought content normal.         Judgment: Judgment normal.         Assessment:       1. Open wound of left lower extremity, subsequent encounter    2. Open wound of right lower extremity, subsequent encounter    3. Venous insufficiency    4. Essential hypertension    5. Heart failure, unspecified HF chronicity, unspecified heart failure type    6. Controlled type 2 diabetes mellitus with diabetic polyneuropathy, without long-term current use of insulin    7. Wound cellulitis               Wound Venous Ulcer Right lower;medial;distal Leg (Active)       Present on Original Admission:    Primary Wound Type: Venous ulcer   Side: Right   Orientation: lower;medial;distal   Location: Leg   Wound Approximate Age at First Assessment (Weeks):    Wound Number:    Is this injury device related?:    Incision Type:    Closure Method:    Wound Description (Comments):    Type:    Additional Comments:    Ankle-Brachial Index:    Pulses:    Removal Indication and Assessment:    Wound Outcome:    Wound Image    08/12/24 0840   Dressing Appearance Dry;Intact;Clean;Moist drainage 08/12/24 0840   Drainage Amount Large 08/12/24 0840   Drainage Characteristics/Odor Serosanguineous;Green;Malodorous 08/12/24 0840   Black (%), Wound Tissue Color 30 % 08/12/24 0840   Red (%), Wound Tissue Color 40 % 08/12/24 0840   Yellow (%), Wound Tissue Color 30 % 08/12/24 0840   Periwound Area Intact;[place] 08/12/24 0840   Wound Length (cm) 4.2 cm 08/12/24 0840   Wound Width (cm) 5.9 cm 08/12/24 0840   Wound Depth (cm) 0.1 cm 08/12/24 0840   Wound Volume (cm^3) 2.478 cm^3 08/12/24 0840   Wound Surface Area (cm^2) 24.78 cm^2 08/12/24 0840    Undermining (depth (cm)/location) 0.9 cm/ 6 o'clock and 0.7 cm/ 12 o'clock 08/12/24 0840   Care Cleansed with:;Soap and water 08/12/24 0840   Dressing Applied;Calcium alginate;Silver;Absorptive Pad;Compression wrap;Other (comment) 08/12/24 0840   Packing packed with;hydrofiber/alginate 08/12/24 0840   Periwound Care Skin barrier film applied 08/12/24 0840   Compression Unna's Boot;Three layer compression 08/12/24 0840            Wound Venous Ulcer Left lower;lateral;posterior Leg (Active)       Present on Original Admission:    Primary Wound Type: Venous ulcer   Side: Left   Orientation: lower;lateral;posterior   Location: Leg   Wound Approximate Age at First Assessment (Weeks):    Wound Number:    Is this injury device related?:    Incision Type:    Closure Method:    Wound Description (Comments):    Type:    Additional Comments:    Ankle-Brachial Index:    Pulses:    Removal Indication and Assessment:    Wound Outcome:    Wound Image   08/12/24 0840   Dressing Appearance Dry;Intact;Clean;Moist drainage 08/12/24 0840   Drainage Amount Small 08/12/24 0840   Drainage Characteristics/Odor Serosanguineous 08/12/24 0840   Red (%), Wound Tissue Color 100 % 08/12/24 0840   Periwound Area Intact;[place] 08/12/24 0840   Wound Length (cm) 0.2 cm 08/12/24 0840   Wound Width (cm) 0.2 cm 08/12/24 0840   Wound Depth (cm) 0.1 cm 08/12/24 0840   Wound Volume (cm^3) 0.004 cm^3 08/12/24 0840   Wound Surface Area (cm^2) 0.04 cm^2 08/12/24 0840   Care Cleansed with:;Soap and water 08/12/24 0840   Dressing Applied;Foam;Compression wrap 08/12/24 0840   Periwound Care Skin barrier film applied 08/12/24 0840   Compression Unna's Boot;Three layer compression 08/12/24 0840         Serafin was seen in the clinic room and placed in the supine position on the treatment table.  The wound dressings were removed and the legs were cleansed with Easi-clense sponges and dried thoroughly.  No erythema or warmth noted from patient's baseline. Green  drainage and odor noted to right lower extremity. Placed topical 4% Lidocaine Hydrochloride to right lower extremity wound bed for 15 minutes. Sharp debridement with 5 mm curette, pickups, and scissors to remove non-viable tissue. Pt tolerated procedure well. See procedure note. Cleansed right lower extremity wounds with acetic acid (1:1 with water).          Plan of Care:    Left lower extremity- polymem and a 3 layer zinc compression wrap.  Right lower extremity- gentian violet to periwound, packed with aquacel ag, drawtex, aquacel ag, mextra pad, and a 3 layer zinc compression wrap.  The patient's feet were positioned at a 90 degree angle. The wraps were applied using a spiral technique avoiding creases or folds.  The wraps were started behind the first metatarsal and ended below the tibial tubercle of the knee.  There was overlap of each turn half the width of the previous turn.  The compression wraps will be changed every 7 days.        Plan:       Bilateral lower extremity was dressed as detailed above  Patient was instructed to not get the dressings wet and to use cast covers for showering.  Should the dressing become wet, he is to remove it, place a moist dressing over the wound, cover with gauze and roll gauze and use ace wraps for compression and to secure bandages.  He should then notify this office as soon as possible to have a new dressing applied.  Instructed patient to remove wrap if he notices tingling or coldness to his left lower extremities or if his toes become white, blue, or cold.    Discussed nutrition and the role of protein in wound healing with the patient. Instructed patient to optimize protein for wound healing.     Discussed bilateral lower extremity edema with patient. Instructed patient to elevate legs whenever sedentary and follow a low sodium diet.  Discussed replacing compression stockings every 3-6 months.    Instructed patient to keep follow up appointments with vascular  surgery.    Instructed patient to continue to take gabapentin as prescribed for nerve pain.    Discussed the importance of utilizing compression stockings to prevent recurrent wounds.    Discussed cellulitis with patient. There is no appointment scheduled in [facility] for a colonoscopy. Instructed patient to follow up with MD who said to pause antibiotics to confirm that he should pause them. Treated topically with acetic acid and aquacel Ag.        Written and verbal instructions given to patient  RTC in 1 week    Rosalba Eaton PA-C

## 2024-08-12 NOTE — PROCEDURES
"Debridement    Date/Time: 8/12/2024 7:30 AM    Performed by: Rosalba Eaton PA-C  Authorized by: Rosalba Eaton PA-C    Time out: Immediately prior to procedure a "time out" was called to verify the correct patient, procedure, equipment, support staff and site/side marked as required.    Consent Done?:  Yes (Written)  Local anesthesia used?: Yes    Local anesthetic:  Topical anesthetic (Topical 4% Lidocaine Hydrochloride)    Wound Details:    Location:  Right leg    Type of Debridement:  Excisional       Length (cm):  4.2       Area (sq cm):  24.78       Width (cm):  5.9       Percent Debrided (%):  100       Depth (cm):  0.1       Total Area Debrided (sq cm):  24.78    Depth of debridement:  Subcutaneous tissue    Tissue debrided:  Subcutaneous    Devitalized tissue debrided:  Biofilm, Exudate, Fibrin, Slough and Necrotic/Eschar    Instruments:  Curette, Forceps and Scissors  Bleeding:  Minimal  Hemostasis Achieved: Yes  Method Used:  Pressure  Patient tolerance:  Patient tolerated the procedure well with no immediate complications    "

## 2024-08-13 ENCOUNTER — OFFICE VISIT (OUTPATIENT)
Dept: PODIATRY | Facility: CLINIC | Age: 74
End: 2024-08-13
Payer: MEDICARE

## 2024-08-13 VITALS
DIASTOLIC BLOOD PRESSURE: 83 MMHG | HEART RATE: 105 BPM | HEIGHT: 70 IN | BODY MASS INDEX: 36.43 KG/M2 | WEIGHT: 254.44 LBS | SYSTOLIC BLOOD PRESSURE: 175 MMHG

## 2024-08-13 DIAGNOSIS — B35.1 ONYCHOMYCOSIS DUE TO DERMATOPHYTE: ICD-10-CM

## 2024-08-13 DIAGNOSIS — Z87.898 HISTORY OF ULCERATION: ICD-10-CM

## 2024-08-13 DIAGNOSIS — L60.3 DYSTROPHIC NAIL: ICD-10-CM

## 2024-08-13 DIAGNOSIS — I73.9 PVD (PERIPHERAL VASCULAR DISEASE): ICD-10-CM

## 2024-08-13 DIAGNOSIS — E11.42 CONTROLLED TYPE 2 DIABETES MELLITUS WITH DIABETIC POLYNEUROPATHY, WITHOUT LONG-TERM CURRENT USE OF INSULIN: Primary | ICD-10-CM

## 2024-08-13 PROCEDURE — 1160F RVW MEDS BY RX/DR IN RCRD: CPT | Mod: CPTII,,, | Performed by: PODIATRIST

## 2024-08-13 PROCEDURE — 1126F AMNT PAIN NOTED NONE PRSNT: CPT | Mod: CPTII,,, | Performed by: PODIATRIST

## 2024-08-13 PROCEDURE — 3077F SYST BP >= 140 MM HG: CPT | Mod: CPTII,,, | Performed by: PODIATRIST

## 2024-08-13 PROCEDURE — 99214 OFFICE O/P EST MOD 30 MIN: CPT | Mod: ,,, | Performed by: PODIATRIST

## 2024-08-13 PROCEDURE — 1159F MED LIST DOCD IN RCRD: CPT | Mod: CPTII,,, | Performed by: PODIATRIST

## 2024-08-13 PROCEDURE — 3072F LOW RISK FOR RETINOPATHY: CPT | Mod: CPTII,,, | Performed by: PODIATRIST

## 2024-08-13 PROCEDURE — 3288F FALL RISK ASSESSMENT DOCD: CPT | Mod: CPTII,,, | Performed by: PODIATRIST

## 2024-08-13 PROCEDURE — 17999 UNLISTD PX SKN MUC MEMB SUBQ: CPT | Mod: CSM,,, | Performed by: PODIATRIST

## 2024-08-13 PROCEDURE — 3079F DIAST BP 80-89 MM HG: CPT | Mod: CPTII,,, | Performed by: PODIATRIST

## 2024-08-13 PROCEDURE — 1101F PT FALLS ASSESS-DOCD LE1/YR: CPT | Mod: CPTII,,, | Performed by: PODIATRIST

## 2024-08-13 PROCEDURE — 4010F ACE/ARB THERAPY RXD/TAKEN: CPT | Mod: CPTII,,, | Performed by: PODIATRIST

## 2024-08-13 PROCEDURE — 99999 PR PBB SHADOW E&M-EST. PATIENT-LVL V: CPT | Mod: PBBFAC,,, | Performed by: PODIATRIST

## 2024-08-13 PROCEDURE — 3051F HG A1C>EQUAL 7.0%<8.0%: CPT | Mod: CPTII,,, | Performed by: PODIATRIST

## 2024-08-13 PROCEDURE — 3008F BODY MASS INDEX DOCD: CPT | Mod: CPTII,,, | Performed by: PODIATRIST

## 2024-08-13 RX ORDER — CICLOPIROX 80 MG/ML
SOLUTION TOPICAL NIGHTLY
Qty: 6.6 ML | Refills: 11 | Status: SHIPPED | OUTPATIENT
Start: 2024-08-13

## 2024-08-13 RX ORDER — POLYETHYLENE GLYCOL 3350, SODIUM SULFATE, POTASSIUM CHLORIDE, MAGNESIUM SULFATE, AND SODIUM CHLORIDE FOR ORAL SOLUTION 178.7-7.3G
KIT ORAL
COMMUNITY
Start: 2024-08-07

## 2024-08-13 NOTE — PROGRESS NOTES
Subjective:      Patient ID: Serafin Tapia is a 73 y.o. male.    Chief Complaint: Nail Problem (Dystrophic nail)    Diabetes, increased risk amputation needing evaluation/management/optomization of foot care.     No current foot complaints, patient currently under care of the Wound Care Department at [facility] [address] for stasis ulceration left with compression dressing and Unna boot in place today.      Casual shoes    Chief Complaint   Patient presents with    Nail Problem     Dystrophic nail        Review of Systems   Constitutional: Negative for chills, diaphoresis, fever, malaise/fatigue and night sweats.   Cardiovascular:  Positive for leg swelling. Negative for claudication, cyanosis and syncope.   Skin:  Positive for nail changes. Negative for color change, dry skin, rash, suspicious lesions and unusual hair distribution.   Musculoskeletal:  Negative for falls, joint pain, joint swelling, muscle cramps, muscle weakness and stiffness.   Gastrointestinal:  Negative for constipation, diarrhea, nausea and vomiting.   Neurological:  Positive for sensory change. Negative for brief paralysis, disturbances in coordination, focal weakness, numbness, paresthesias and tremors.           Objective:      Physical Exam  Constitutional:       General: He is not in acute distress.     Appearance: He is well-developed. He is not diaphoretic.   Cardiovascular:      Pulses:           Popliteal pulses are 2+ on the right side and 2+ on the left side.        Dorsalis pedis pulses are 1+ on the right side and 1+ on the left side.        Posterior tibial pulses are 1+ on the right side and 1+ on the left side.      Comments: Capillary refill 3 seconds all toes/distal feet, all toes/both feet warm to touch.      Negative lymphadenopathy bilateral popliteal fossa and tarsal tunnel.     <2+ pitting lower extremity edema bilateral.    Musculoskeletal:      Right ankle: No swelling, deformity, ecchymosis or lacerations. Normal range of  motion. Normal pulse.      Right Achilles Tendon: Normal. No defects. Marquez's test negative.      Comments: Entirely propulsive gait with walker for gait assist and to prevent falls.  Increased station and gait increased base.  All ten toes without clubbing, cyanosis, or signs of ischemia.  No pain to palpation bilateral lower extremities.  Range of motion, stability, muscle strength, and muscle tone normal bilateral feet and legs.    Lymphadenopathy:      Lower Body: No right inguinal adenopathy. No left inguinal adenopathy.      Comments: Negative lymphadenopathy bilateral popliteal fossa and tarsal tunnel.    Negative lymphangitic streaking bilateral feet/ankles/legs.   Skin:     General: Skin is warm and dry.      Capillary Refill: Capillary refill takes 2 to 3 seconds.      Coloration: Skin is not pale.      Findings: No abrasion, bruising, burn, ecchymosis, erythema, laceration, lesion or rash.      Nails: There is no clubbing.      Comments: Ulceration left leg not evaluated - compression dressing intact.      Otherwise, Skin is normal age and health appropriate color, turgor, texture, and temperature bilateral lower extremities without ulceration, hyperpigmentation, discoloration, masses nodules or cords palpated.  No ecchymosis, erythema, edema, or cardinal signs of infection bilateral lower extremities.      Toenails 1st, 2nd, 3rd, 4th, 5th  bilateral are hypertrophic thickened 2-3 mm, dystrophic, discolored tanish brown with tan, gray crumbly subungual debris.  Tender to distal nail plate pressure, without periungual skin abnormality of each.         Neurological:      Mental Status: He is alert and oriented to person, place, and time.      Sensory: No sensory deficit.      Motor: No tremor, atrophy or abnormal muscle tone.      Gait: Gait normal.      Deep Tendon Reflexes:      Reflex Scores:       Patellar reflexes are 2+ on the right side and 2+ on the left side.       Achilles reflexes are 2+ on  the right side and 2+ on the left side.     Comments: Subjective numbness without loss of protective sensation to 10 g monofilament 10 sites tested 10 sites detected   Psychiatric:         Behavior: Behavior is cooperative.             Assessment:       Encounter Diagnoses   Name Primary?    Dystrophic nail     Controlled type 2 diabetes mellitus with diabetic polyneuropathy, without long-term current use of insulin Yes    PVD (peripheral vascular disease)     History of ulceration     Onychomycosis due to dermatophyte          Plan:       Serafin was seen today for nail problem.    Diagnoses and all orders for this visit:    Controlled type 2 diabetes mellitus with diabetic polyneuropathy, without long-term current use of insulin    Dystrophic nail  -     Ambulatory referral/consult to Podiatry    PVD (peripheral vascular disease)    History of ulceration    Onychomycosis due to dermatophyte      I counseled the patient on his conditions, their implications and medical management.        The patient has received literature on basic diabetic foot care.  Patient will inspect feet daily, wear protective shoe gear when ambulatory, and apply moisturizer to skin as needed to maintain elasticity and help prevent ulceration.    Discussed conservative treatment with shoes of adequate dimensions, material, and style to alleviate symptoms and delay or prevent surgical intervention.    Continue to follow with wound care until wound left leg is healed.      Continue all management follow-up with vascular surgery for PVD.    We discussed that with the current findings and diagnoses the trimming of nails is not covered by insurance.  Patient verbalizes understanding and willingness to pay 42 dollars for non-covered foot care.      Non-covered foot care:     .With the patient's permission, I debrided all ten toenails with a sterile nipper and curette, removing all offending nail and debris.  Patient tolerated the procedure well and  related significant relief.     penlac          No follow-ups on file.

## 2024-08-19 ENCOUNTER — OFFICE VISIT (OUTPATIENT)
Dept: WOUND CARE | Facility: CLINIC | Age: 74
End: 2024-08-19
Payer: MEDICARE

## 2024-08-19 VITALS
TEMPERATURE: 98 F | HEART RATE: 68 BPM | WEIGHT: 258.63 LBS | SYSTOLIC BLOOD PRESSURE: 119 MMHG | DIASTOLIC BLOOD PRESSURE: 59 MMHG | BODY MASS INDEX: 37.02 KG/M2 | HEIGHT: 70 IN

## 2024-08-19 DIAGNOSIS — E11.42 CONTROLLED TYPE 2 DIABETES MELLITUS WITH DIABETIC POLYNEUROPATHY, WITHOUT LONG-TERM CURRENT USE OF INSULIN: ICD-10-CM

## 2024-08-19 DIAGNOSIS — L03.90 WOUND CELLULITIS: ICD-10-CM

## 2024-08-19 DIAGNOSIS — I50.9 HEART FAILURE, UNSPECIFIED HF CHRONICITY, UNSPECIFIED HEART FAILURE TYPE: ICD-10-CM

## 2024-08-19 DIAGNOSIS — I10 ESSENTIAL HYPERTENSION: ICD-10-CM

## 2024-08-19 DIAGNOSIS — I87.2 VENOUS INSUFFICIENCY: ICD-10-CM

## 2024-08-19 DIAGNOSIS — Z87.828 HEALED WOUND: ICD-10-CM

## 2024-08-19 DIAGNOSIS — S81.801D OPEN WOUND OF RIGHT LOWER EXTREMITY, SUBSEQUENT ENCOUNTER: Primary | ICD-10-CM

## 2024-08-19 PROCEDURE — 29580 STRAPPING UNNA BOOT: CPT | Mod: RT,S$GLB,,

## 2024-08-19 PROCEDURE — 3051F HG A1C>EQUAL 7.0%<8.0%: CPT | Mod: CPTII,S$GLB,,

## 2024-08-19 PROCEDURE — 99214 OFFICE O/P EST MOD 30 MIN: CPT | Mod: 25,S$GLB,,

## 2024-08-19 PROCEDURE — 1101F PT FALLS ASSESS-DOCD LE1/YR: CPT | Mod: CPTII,S$GLB,,

## 2024-08-19 PROCEDURE — 99999 PR PBB SHADOW E&M-EST. PATIENT-LVL V: CPT | Mod: PBBFAC,,,

## 2024-08-19 PROCEDURE — 3008F BODY MASS INDEX DOCD: CPT | Mod: CPTII,S$GLB,,

## 2024-08-19 PROCEDURE — 3078F DIAST BP <80 MM HG: CPT | Mod: CPTII,S$GLB,,

## 2024-08-19 PROCEDURE — 3288F FALL RISK ASSESSMENT DOCD: CPT | Mod: CPTII,S$GLB,,

## 2024-08-19 PROCEDURE — 3074F SYST BP LT 130 MM HG: CPT | Mod: CPTII,S$GLB,,

## 2024-08-19 PROCEDURE — 1159F MED LIST DOCD IN RCRD: CPT | Mod: CPTII,S$GLB,,

## 2024-08-19 PROCEDURE — 1126F AMNT PAIN NOTED NONE PRSNT: CPT | Mod: CPTII,S$GLB,,

## 2024-08-19 PROCEDURE — 4010F ACE/ARB THERAPY RXD/TAKEN: CPT | Mod: CPTII,S$GLB,,

## 2024-08-19 PROCEDURE — 3072F LOW RISK FOR RETINOPATHY: CPT | Mod: CPTII,S$GLB,,

## 2024-08-19 NOTE — PROGRESS NOTES
"Subjective:       Patient ID: Serafin Tapia is a 73 y.o. male.    Chief Complaint: Wound Check        Patient presents with his brother for a re-evaluation of bilateral lower extremity wounds. He reports his wounds started as blisters about 2 months ago. He is unsure why he did not contact the office until now. Pt admits compliance with wearing compression stockings daily, but reports his stockings have lost elasticity. He reports that his wounds drain a large amount of clear fluid. Pt has been dressing his wounds with a wound "liquid" and occasionally covering his wounds with gauze. Pt has also been leaving his wounds open to air. Patient followed with vascular surgery for PAD and venous insufficiency. Dr. Kapoor determined he has adequate arterial blood flow for wound healing and cleared him for a 3 layer compression wrap. Pt previously followed with the wound care clinic in 2022 and 2023.  Denies compliance with a low sodium diet, leg elevation, and following a high protein diet. Admits compliance with gabapentin. Notices improvement in pain. Denies side effects. Pt followed with Dr. Stern while I was on vacation. On 7/29/24, sharp debridement was performed and part of the patient's achilles tendon was removed. His wound was cultured. Pt followed back on 8/1. His wound was debrided further and was prescribed Augmentin. On 8/5, his wound was debrided further. His Augmentin dose was refilled and sent for 7 days. Pt reported never picking up the Augmentin prescription.  Pt reported stopping the antibiotics early. He reports that he is getting a colonoscopy at [facility] on Wednesday and the MD told him to stop antibiotics until one day after the procedure. No complaints at this time. Denies fever, chills, purulent drainage, erythema, or warmth.      7/29/24 anaerobic culture: presumptive Prevotella species  7/29/24 aerobic culture: enterococcus faecalis    7/8/24 ABIs and TBIs:  Right MIHIR- 0.89  Right TBI-0.50  Left " MIHIR- 0.82  Left TBI-0.50  Right leg: Segmental pressures and PVR waveforms suggest minimal to mild peripheral arterial occlusive disease. Rt Great Toe: The PPG waveform- mildly dampened with a pressure of 94 mmHG is noted.  Left leg: Segmental pressures and PVR waveforms suggest moderate tibial peripheral arterial occlusive disease.Lt Great Toe: The PPG waveform-moderately dampened with a pressure of 94 mmHG is noted.    10/21/22 MIHIR:  Right lower extremity: 0.93- minimal peripheral arterial occlusive disease  Left lower extremity: 0.70- moderate peripheral arterial occlusive disease      Review of Systems   Constitutional:  Negative for activity change, chills, diaphoresis, fatigue and fever.   Eyes:  Negative for photophobia and visual disturbance.   Respiratory:  Negative for apnea, chest tightness and shortness of breath.    Cardiovascular:  Positive for leg swelling. Negative for chest pain and palpitations.   Musculoskeletal:  Negative for gait problem and joint swelling.   Skin:  Positive for wound. Negative for color change, pallor and rash.   Neurological:  Negative for dizziness, syncope, facial asymmetry, speech difficulty, weakness, light-headedness, numbness and headaches.   Psychiatric/Behavioral:  Negative for agitation and confusion. The patient is not nervous/anxious.    All other systems reviewed and are negative.      Objective:      Physical Exam  Vitals reviewed.   Constitutional:       General: He is not in acute distress.     Appearance: Normal appearance. He is obese.   Cardiovascular:      Rate and Rhythm: Normal rate and regular rhythm.      Pulses: Normal pulses.   Pulmonary:      Effort: No respiratory distress.   Musculoskeletal:         General: No swelling.      Right lower leg: Pitting Edema present.      Left lower leg: Pitting Edema present.      Comments: Ambulates with cane   Skin:     General: Skin is warm and dry.      Capillary Refill: Capillary refill takes less than 2  seconds.      Findings: Wound present. No erythema.          Neurological:      General: No focal deficit present.      Mental Status: He is alert and oriented to person, place, and time.   Psychiatric:         Mood and Affect: Mood normal.         Behavior: Behavior normal.         Thought Content: Thought content normal.         Judgment: Judgment normal.         Assessment:       1. Open wound of right lower extremity, subsequent encounter    2. Venous insufficiency    3. Essential hypertension    4. Heart failure, unspecified HF chronicity, unspecified heart failure type    5. Controlled type 2 diabetes mellitus with diabetic polyneuropathy, without long-term current use of insulin    6. Healed wound    7. Wound cellulitis               Wound Venous Ulcer Right lower;medial;distal Leg (Active)       Present on Original Admission:    Primary Wound Type: Venous ulcer   Side: Right   Orientation: lower;medial;distal   Location: Leg   Wound Approximate Age at First Assessment (Weeks):    Wound Number:    Is this injury device related?:    Incision Type:    Closure Method:    Wound Description (Comments):    Type:    Additional Comments:    Ankle-Brachial Index:    Pulses:    Removal Indication and Assessment:    Wound Outcome:    Wound Image   08/19/24 0753   Dressing Appearance Dry;Intact;Clean;Moist drainage 08/19/24 0753   Drainage Amount Large 08/19/24 0753   Drainage Characteristics/Odor Serosanguineous;Green;Malodorous 08/19/24 0753   Red (%), Wound Tissue Color 70 % 08/19/24 0753   Yellow (%), Wound Tissue Color 30 % 08/19/24 0753   Periwound Area Intact;Edematous;Pink 08/19/24 0753   Wound Length (cm) 3.8 cm 08/19/24 0753   Wound Width (cm) 6.2 cm 08/19/24 0753   Wound Depth (cm) 0.1 cm 08/19/24 0753   Wound Volume (cm^3) 2.356 cm^3 08/19/24 0753   Wound Surface Area (cm^2) 23.56 cm^2 08/19/24 0753   Undermining (depth (cm)/location) 2 cm/ 12 o'clock and 0.6 cm/ 6 o'clock 08/19/24 0753   Care Cleansed  with:;Soap and water 08/19/24 0753   Dressing Applied;Compression wrap;Calcium alginate;Silver;Absorptive Pad;Other (comment) 08/19/24 0753   Packing packed with;hydrofiber/alginate 08/19/24 0753   Periwound Care Skin barrier film applied 08/19/24 0753   Compression Unna's Boot;Three layer compression 08/19/24 0753            Wound Venous Ulcer Left lower;lateral;posterior Leg (Active)       Present on Original Admission:    Primary Wound Type: Venous ulcer   Side: Left   Orientation: lower;lateral;posterior   Location: Leg   Wound Approximate Age at First Assessment (Weeks):    Wound Number:    Is this injury device related?:    Incision Type:    Closure Method:    Wound Description (Comments):    Type:    Additional Comments:    Ankle-Brachial Index:    Pulses:    Removal Indication and Assessment:    Wound Outcome:    Wound Image   08/19/24 0753   Dressing Appearance Dry;Intact;Clean;Moist drainage 08/19/24 0753   Drainage Amount Small 08/19/24 0753   Drainage Characteristics/Odor Serosanguineous 08/19/24 0753   Care Cleansed with:;Soap and water 08/19/24 0753         Serafin was seen in the clinic room and placed in the supine position on the treatment table.  The wound dressings were removed and the legs were cleansed with Easi-clense sponges and dried thoroughly.  No erythema or warmth noted from patient's baseline. Green drainage and odor noted to right lower extremity- but improved since the last visit. Cleansed right lower extremity wounds with acetic acid (1:1 with water). No open wound noted to left lower extremity.          Plan of Care:    Left lower extremity- Compression stockings daily  Right lower extremity- gentian violet to periwound, packed with aquacel ag, drawtex, aquacel ag, mextra pad, and a 3 layer zinc compression wrap.  The patient's foot was positioned at a 90 degree angle. The wrap was applied using a spiral technique avoiding creases or folds.  The wrap was started behind the first  metatarsal and ended below the tibial tubercle of the knee.  There was overlap of each turn half the width of the previous turn.  The compression wraps will be changed every 7 days.        Plan:       Right lower extremity was dressed as detailed above  Patient was instructed to not get the dressings wet and to use cast covers for showering.  Should the dressing become wet, he is to remove it, place a moist dressing over the wound, cover with gauze and roll gauze and use ace wraps for compression and to secure bandages.  He should then notify this office as soon as possible to have a new dressing applied.  Instructed patient to remove wrap if he notices tingling or coldness to his right lower extremities or if his toes become white, blue, or cold.    Discussed nutrition and the role of protein in wound healing with the patient. Instructed patient to optimize protein for wound healing.     Discussed bilateral lower extremity edema with patient. Instructed patient to elevate legs whenever sedentary and follow a low sodium diet.  Discussed replacing compression stockings every 3-6 months.    Instructed patient to keep follow up appointments with vascular surgery.    Instructed patient to continue to take gabapentin as prescribed for nerve pain.    Discussed the importance of utilizing compression stockings to prevent recurrent wounds.    Discussed cellulitis with patient. Treated topically with acetic acid and aquacel Ag. Improvement noted since last visit.    No open wounds noted to left lower extremity.  Precautions given to prevent wounds from re-opening. Discussed how area is extremely fragile. Protect area from friction, wash area gently, and pat dry. If the wound should re-open, instructed patient to contact the office.   Instructed patient to utilize compression stockings to bilateral lower extremities. Place on first thing in the morning and remove before bed.    Discussed drainage control- will reassess next  week if patient needs to follow 2x a week.      Written and verbal instructions given to patient  RTC in 1 week    Rosalba Eaton PA-C

## 2024-08-22 ENCOUNTER — PATIENT OUTREACH (OUTPATIENT)
Dept: ADMINISTRATIVE | Facility: HOSPITAL | Age: 74
End: 2024-08-22
Payer: MEDICARE

## 2024-08-22 DIAGNOSIS — E11.9 DIABETES MELLITUS WITHOUT COMPLICATION: Primary | ICD-10-CM

## 2024-08-26 ENCOUNTER — OFFICE VISIT (OUTPATIENT)
Dept: WOUND CARE | Facility: CLINIC | Age: 74
End: 2024-08-26
Payer: MEDICARE

## 2024-08-26 ENCOUNTER — LAB VISIT (OUTPATIENT)
Dept: LAB | Facility: HOSPITAL | Age: 74
End: 2024-08-26
Attending: INTERNAL MEDICINE
Payer: MEDICARE

## 2024-08-26 VITALS
WEIGHT: 254.63 LBS | HEIGHT: 70 IN | DIASTOLIC BLOOD PRESSURE: 56 MMHG | HEART RATE: 74 BPM | SYSTOLIC BLOOD PRESSURE: 116 MMHG | TEMPERATURE: 98 F | BODY MASS INDEX: 36.45 KG/M2

## 2024-08-26 DIAGNOSIS — I87.2 VENOUS INSUFFICIENCY: ICD-10-CM

## 2024-08-26 DIAGNOSIS — L03.90 WOUND CELLULITIS: ICD-10-CM

## 2024-08-26 DIAGNOSIS — E11.42 CONTROLLED TYPE 2 DIABETES MELLITUS WITH DIABETIC POLYNEUROPATHY, WITHOUT LONG-TERM CURRENT USE OF INSULIN: ICD-10-CM

## 2024-08-26 DIAGNOSIS — S81.801D OPEN WOUND OF RIGHT LOWER EXTREMITY, SUBSEQUENT ENCOUNTER: Primary | ICD-10-CM

## 2024-08-26 DIAGNOSIS — E11.9 DIABETES MELLITUS WITHOUT COMPLICATION: ICD-10-CM

## 2024-08-26 DIAGNOSIS — I10 ESSENTIAL HYPERTENSION: ICD-10-CM

## 2024-08-26 DIAGNOSIS — I50.9 HEART FAILURE, UNSPECIFIED HF CHRONICITY, UNSPECIFIED HEART FAILURE TYPE: ICD-10-CM

## 2024-08-26 DIAGNOSIS — L92.9 HYPERGRANULATION: ICD-10-CM

## 2024-08-26 LAB
ALBUMIN/CREAT UR: 6.4 UG/MG (ref 0–30)
CREAT UR-MCNC: 78 MG/DL (ref 23–375)
MICROALBUMIN UR DL<=1MG/L-MCNC: 5 UG/ML

## 2024-08-26 PROCEDURE — 1101F PT FALLS ASSESS-DOCD LE1/YR: CPT | Mod: CPTII,S$GLB,,

## 2024-08-26 PROCEDURE — 82570 ASSAY OF URINE CREATININE: CPT | Performed by: INTERNAL MEDICINE

## 2024-08-26 PROCEDURE — 3074F SYST BP LT 130 MM HG: CPT | Mod: CPTII,S$GLB,,

## 2024-08-26 PROCEDURE — 17250 CHEM CAUT OF GRANLTJ TISSUE: CPT | Mod: S$GLB,,,

## 2024-08-26 PROCEDURE — 99999 PR PBB SHADOW E&M-EST. PATIENT-LVL V: CPT | Mod: PBBFAC,,,

## 2024-08-26 PROCEDURE — 3288F FALL RISK ASSESSMENT DOCD: CPT | Mod: CPTII,S$GLB,,

## 2024-08-26 PROCEDURE — 3078F DIAST BP <80 MM HG: CPT | Mod: CPTII,S$GLB,,

## 2024-08-26 PROCEDURE — 3072F LOW RISK FOR RETINOPATHY: CPT | Mod: CPTII,S$GLB,,

## 2024-08-26 PROCEDURE — 3008F BODY MASS INDEX DOCD: CPT | Mod: CPTII,S$GLB,,

## 2024-08-26 PROCEDURE — 3051F HG A1C>EQUAL 7.0%<8.0%: CPT | Mod: CPTII,S$GLB,,

## 2024-08-26 PROCEDURE — 4010F ACE/ARB THERAPY RXD/TAKEN: CPT | Mod: CPTII,S$GLB,,

## 2024-08-26 PROCEDURE — 1159F MED LIST DOCD IN RCRD: CPT | Mod: CPTII,S$GLB,,

## 2024-08-26 PROCEDURE — 11042 DBRDMT SUBQ TIS 1ST 20SQCM/<: CPT | Mod: S$GLB,,,

## 2024-08-26 PROCEDURE — 99212 OFFICE O/P EST SF 10 MIN: CPT | Mod: 25,S$GLB,,

## 2024-08-26 PROCEDURE — 11045 DBRDMT SUBQ TISS EACH ADDL: CPT | Mod: S$GLB,,,

## 2024-08-26 PROCEDURE — 1125F AMNT PAIN NOTED PAIN PRSNT: CPT | Mod: CPTII,S$GLB,,

## 2024-08-26 PROCEDURE — 82043 UR ALBUMIN QUANTITATIVE: CPT | Performed by: INTERNAL MEDICINE

## 2024-08-26 RX ORDER — ERGOCALCIFEROL 1.25 MG/1
50000 CAPSULE ORAL
Qty: 12 CAPSULE | Refills: 0 | Status: SHIPPED | OUTPATIENT
Start: 2024-08-26

## 2024-08-26 NOTE — PROGRESS NOTES
"Subjective:       Patient ID: Serafin Tapia is a 73 y.o. male.    Chief Complaint: Wound Check        Patient presents with his brother for a re-evaluation of bilateral lower extremity wounds. He reports his wounds started as blisters about 2 months ago. He is unsure why he did not contact the office until now. Pt admits compliance with wearing compression stockings daily, but reports his stockings have lost elasticity. He reports that his wounds drain a large amount of clear fluid. Pt has been dressing his wounds with a wound "liquid" and occasionally covering his wounds with gauze. Pt has also been leaving his wounds open to air. Patient followed with vascular surgery for PAD and venous insufficiency. Dr. Kapoor determined he has adequate arterial blood flow for wound healing and cleared him for a 3 layer compression wrap. Pt previously followed with the wound care clinic in 2022 and 2023.  Denies compliance with a low sodium diet, leg elevation, and following a high protein diet. Admits compliance with gabapentin. Notices improvement in pain. Denies side effects. Pt followed with Dr. Stern while I was on vacation. On 7/29/24, sharp debridement was performed and part of the patient's achilles tendon was removed. His wound was cultured. Pt followed back on 8/1. His wound was debrided further and was prescribed Augmentin. On 8/5, his wound was debrided further. His Augmentin dose was refilled and sent for 7 days. Pt reported never picking up the Augmentin prescription.  Pt reported stopping the antibiotics early. He reports that he is getting a colonoscopy at [facility] on Wednesday and the MD told him to stop antibiotics until one day after the procedure. Pt completed antibiotics. No complaints at this time. Denies fever, chills, purulent drainage, erythema, or warmth.      7/29/24 anaerobic culture: presumptive Prevotella species  7/29/24 aerobic culture: enterococcus faecalis    7/8/24 ABIs and TBIs:  Right MIHIR- " 0.89  Right TBI-0.50  Left MIHIR- 0.82  Left TBI-0.50  Right leg: Segmental pressures and PVR waveforms suggest minimal to mild peripheral arterial occlusive disease. Rt Great Toe: The PPG waveform- mildly dampened with a pressure of 94 mmHG is noted.  Left leg: Segmental pressures and PVR waveforms suggest moderate tibial peripheral arterial occlusive disease.Lt Great Toe: The PPG waveform-moderately dampened with a pressure of 94 mmHG is noted.    10/21/22 MIHIR:  Right lower extremity: 0.93- minimal peripheral arterial occlusive disease  Left lower extremity: 0.70- moderate peripheral arterial occlusive disease      Review of Systems   Constitutional:  Negative for activity change, chills, diaphoresis, fatigue and fever.   Eyes:  Negative for photophobia and visual disturbance.   Respiratory:  Negative for apnea, chest tightness and shortness of breath.    Cardiovascular:  Positive for leg swelling. Negative for chest pain and palpitations.   Musculoskeletal:  Negative for gait problem and joint swelling.   Skin:  Positive for wound. Negative for color change, pallor and rash.   Neurological:  Negative for dizziness, syncope, facial asymmetry, speech difficulty, weakness, light-headedness, numbness and headaches.   Psychiatric/Behavioral:  Negative for agitation and confusion. The patient is not nervous/anxious.    All other systems reviewed and are negative.      Objective:      Physical Exam  Vitals reviewed.   Constitutional:       General: He is not in acute distress.     Appearance: Normal appearance. He is obese.   Cardiovascular:      Rate and Rhythm: Normal rate and regular rhythm.      Pulses: Normal pulses.   Pulmonary:      Effort: No respiratory distress.   Musculoskeletal:         General: No swelling.      Right lower leg: Pitting Edema present.      Left lower leg: Pitting Edema present.      Comments: Ambulates with cane   Skin:     General: Skin is warm and dry.      Capillary Refill: Capillary  refill takes less than 2 seconds.      Findings: Wound present. No erythema.          Neurological:      General: No focal deficit present.      Mental Status: He is alert and oriented to person, place, and time.   Psychiatric:         Mood and Affect: Mood normal.         Behavior: Behavior normal.         Thought Content: Thought content normal.         Judgment: Judgment normal.         Assessment:       1. Open wound of right lower extremity, subsequent encounter    2. Venous insufficiency    3. Essential hypertension    4. Heart failure, unspecified HF chronicity, unspecified heart failure type    5. Controlled type 2 diabetes mellitus with diabetic polyneuropathy, without long-term current use of insulin    6. Wound cellulitis    7. Hypergranulation               Wound Venous Ulcer Right lower;medial;distal Leg (Active)       Present on Original Admission:    Primary Wound Type: Venous ulcer   Side: Right   Orientation: lower;medial;distal   Location: Leg   Wound Approximate Age at First Assessment (Weeks):    Wound Number:    Is this injury device related?:    Incision Type:    Closure Method:    Wound Description (Comments):    Type:    Additional Comments:    Ankle-Brachial Index:    Pulses:    Removal Indication and Assessment:    Wound Outcome:    Wound Image    08/26/24 0806   Dressing Appearance Dry;Intact;Clean;Moist drainage 08/26/24 0806   Drainage Amount Large 08/26/24 0806   Drainage Characteristics/Odor Serosanguineous;Malodorous 08/26/24 0806   Appearance Hypergranulation 08/26/24 0806   Black (%), Wound Tissue Color 30 % 08/26/24 0806   Red (%), Wound Tissue Color 60 % 08/26/24 0806   Yellow (%), Wound Tissue Color 10 % 08/26/24 0806   Periwound Area Intact;Pink;Edematous 08/26/24 0806   Wound Length (cm) 3.9 cm 08/26/24 0806   Wound Width (cm) 5.9 cm 08/26/24 0806   Wound Depth (cm) 0.1 cm 08/26/24 0806   Wound Volume (cm^3) 2.301 cm^3 08/26/24 0806   Wound Surface Area (cm^2) 23.01 cm^2  08/26/24 0806   Undermining (depth (cm)/location) 1.6 cm/ 12 o'clock and 0.9 cm/ 6 o'clock 08/26/24 0806   Care Cleansed with:;Soap and water 08/26/24 0806   Dressing Applied;Calcium alginate;Silver;Absorptive Pad;Compression wrap;Other (comment) 08/26/24 0806   Packing packed with;hydrofiber/alginate 08/26/24 0806   Periwound Care Skin barrier film applied 08/26/24 0806   Compression Unna's Boot;Three layer compression 08/26/24 0806         Serafin was seen in the clinic room and placed in the supine position on the treatment table.  The wound dressings were removed and the legs were cleansed with Easi-clense sponges and dried thoroughly.  No erythema, green drainage, or warmth noted from patient's baseline. Odor noted to right lower extremity- but improved since the last visit. Cleansed right lower extremity wounds with acetic acid (1:1 with water). Hypergranulation noted- 1 silver nitrate needed. Pt tolerated well.           Plan of Care:    Left lower extremity- Compression stockings daily  Right lower extremity- gentian violet to periwound, packed with aquacel, drawtex, aquacel, mextra pad, and a 3 layer zinc compression wrap.  The patient's foot was positioned at a 90 degree angle. The wrap was applied using a spiral technique avoiding creases or folds.  The wrap was started behind the first metatarsal and ended below the tibial tubercle of the knee.  There was overlap of each turn half the width of the previous turn.  The compression wraps will be changed every 7 days.        Plan:       Right lower extremity was dressed as detailed above  Patient was instructed to not get the dressings wet and to use cast covers for showering.  Should the dressing become wet, he is to remove it, place a moist dressing over the wound, cover with gauze and roll gauze and use ace wraps for compression and to secure bandages.  He should then notify this office as soon as possible to have a new dressing applied.  Instructed patient  to remove wrap if he notices tingling or coldness to his right lower extremities or if his toes become white, blue, or cold.    Discussed nutrition and the role of protein in wound healing with the patient. Instructed patient to optimize protein for wound healing.     Discussed bilateral lower extremity edema with patient. Instructed patient to elevate legs whenever sedentary and follow a low sodium diet.  Discussed replacing compression stockings every 3-6 months.    Instructed patient to keep follow up appointments with vascular surgery.    Instructed patient to continue to take gabapentin as prescribed for nerve pain.    Discussed the importance of utilizing compression stockings to prevent recurrent wounds.    Discussed cellulitis with patient. Treated topically with acetic acid. Improvement noted since last visit.    Discussed drainage control- will reassess next week if patient needs to follow 2x a week.      Written and verbal instructions given to patient  RTC in 1 week    Rosalba Eaton PA-C

## 2024-08-26 NOTE — PROCEDURES
"Debridement    Date/Time: 8/26/2024 7:30 AM    Performed by: Rosalba Eaton PA-C  Authorized by: Rosalba Eaton PA-C    Time out: Immediately prior to procedure a "time out" was called to verify the correct patient, procedure, equipment, support staff and site/side marked as required.    Consent Done?:  Yes (Written)  Local anesthesia used?: Yes    Local anesthetic:  Topical anesthetic (Topical 4% Lidocaine Hydrochloride)    Wound Details:    Location:  Right leg    Type of Debridement:  Excisional       Length (cm):  3.9       Area (sq cm):  23.01       Width (cm):  5.9       Percent Debrided (%):  100       Depth (cm):  0.1       Total Area Debrided (sq cm):  23.01    Depth of debridement:  Subcutaneous tissue    Tissue debrided:  Subcutaneous    Devitalized tissue debrided:  Biofilm, Exudate, Fibrin and Slough    Instruments:  Curette  Bleeding:  Minimal  Hemostasis Achieved: Yes  Method Used:  Pressure  Patient tolerance:  Patient tolerated the procedure well with no immediate complications    "

## 2024-09-03 ENCOUNTER — OFFICE VISIT (OUTPATIENT)
Dept: WOUND CARE | Facility: CLINIC | Age: 74
End: 2024-09-03
Payer: MEDICARE

## 2024-09-03 VITALS
DIASTOLIC BLOOD PRESSURE: 57 MMHG | BODY MASS INDEX: 36.68 KG/M2 | WEIGHT: 256.19 LBS | SYSTOLIC BLOOD PRESSURE: 112 MMHG | HEART RATE: 68 BPM | HEIGHT: 70 IN

## 2024-09-03 DIAGNOSIS — E11.42 CONTROLLED TYPE 2 DIABETES MELLITUS WITH DIABETIC POLYNEUROPATHY, WITHOUT LONG-TERM CURRENT USE OF INSULIN: ICD-10-CM

## 2024-09-03 DIAGNOSIS — L03.90 WOUND CELLULITIS: ICD-10-CM

## 2024-09-03 DIAGNOSIS — I87.2 VENOUS INSUFFICIENCY: ICD-10-CM

## 2024-09-03 DIAGNOSIS — S81.801D OPEN WOUND OF RIGHT LOWER EXTREMITY, SUBSEQUENT ENCOUNTER: Primary | ICD-10-CM

## 2024-09-03 DIAGNOSIS — I50.9 HEART FAILURE, UNSPECIFIED HF CHRONICITY, UNSPECIFIED HEART FAILURE TYPE: ICD-10-CM

## 2024-09-03 DIAGNOSIS — I10 ESSENTIAL HYPERTENSION: ICD-10-CM

## 2024-09-03 PROCEDURE — 99999 PR PBB SHADOW E&M-EST. PATIENT-LVL IV: CPT | Mod: PBBFAC,,,

## 2024-09-03 NOTE — PROGRESS NOTES
"Subjective:       Patient ID: Serafin Tapia is a 73 y.o. male.    Chief Complaint: Wound Check        Patient presents with his brother for a re-evaluation of bilateral lower extremity wounds. He reports his wounds started as blisters about 2 months ago. He is unsure why he did not contact the office until now. Pt admits compliance with wearing compression stockings daily, but reports his stockings have lost elasticity. He reports that his wounds drain a large amount of clear fluid. Pt has been dressing his wounds with a wound "liquid" and occasionally covering his wounds with gauze. Pt has also been leaving his wounds open to air. Patient followed with vascular surgery for PAD and venous insufficiency. Dr. Kapoor determined he has adequate arterial blood flow for wound healing and cleared him for a 3 layer compression wrap. Pt previously followed with the wound care clinic in 2022 and 2023.  Denies compliance with a low sodium diet, leg elevation, and following a high protein diet. Admits compliance with gabapentin. Notices improvement in pain. Denies side effects. Pt followed with Dr. Stern while I was on vacation. On 7/29/24, sharp debridement was performed and part of the patient's achilles tendon was removed. His wound was cultured. Pt followed back on 8/1. His wound was debrided further and was prescribed Augmentin. On 8/5, his wound was debrided further. His Augmentin dose was refilled and sent for 7 days. Pt reported never picking up the Augmentin prescription.  Pt reported stopping the antibiotics early. He reports that he is getting a colonoscopy at [facility] on Wednesday and the MD told him to stop antibiotics until one day after the procedure. Pt completed antibiotics. No complaints at this time. Denies fever, chills, purulent drainage, erythema, or warmth.      7/29/24 anaerobic culture: presumptive Prevotella species  7/29/24 aerobic culture: enterococcus faecalis    7/8/24 ABIs and TBIs:  Right MIHIR- " 0.89  Right TBI-0.50  Left MIHIR- 0.82  Left TBI-0.50  Right leg: Segmental pressures and PVR waveforms suggest minimal to mild peripheral arterial occlusive disease. Rt Great Toe: The PPG waveform- mildly dampened with a pressure of 94 mmHG is noted.  Left leg: Segmental pressures and PVR waveforms suggest moderate tibial peripheral arterial occlusive disease.Lt Great Toe: The PPG waveform-moderately dampened with a pressure of 94 mmHG is noted.    10/21/22 MIHIR:  Right lower extremity: 0.93- minimal peripheral arterial occlusive disease  Left lower extremity: 0.70- moderate peripheral arterial occlusive disease      Review of Systems   Constitutional:  Negative for activity change, chills, diaphoresis, fatigue and fever.   Eyes:  Negative for photophobia and visual disturbance.   Respiratory:  Negative for apnea, chest tightness and shortness of breath.    Cardiovascular:  Positive for leg swelling. Negative for chest pain and palpitations.   Musculoskeletal:  Negative for gait problem and joint swelling.   Skin:  Positive for wound. Negative for color change, pallor and rash.   Neurological:  Negative for dizziness, syncope, facial asymmetry, speech difficulty, weakness, light-headedness, numbness and headaches.   Psychiatric/Behavioral:  Negative for agitation and confusion. The patient is not nervous/anxious.    All other systems reviewed and are negative.      Objective:      Physical Exam  Vitals reviewed.   Constitutional:       General: He is not in acute distress.     Appearance: Normal appearance. He is obese.   Cardiovascular:      Rate and Rhythm: Normal rate and regular rhythm.      Pulses: Normal pulses.   Pulmonary:      Effort: No respiratory distress.   Musculoskeletal:         General: No swelling.      Right lower leg: Pitting Edema present.      Left lower leg: Pitting Edema present.      Comments: Ambulates with cane   Skin:     General: Skin is warm and dry.      Capillary Refill: Capillary  refill takes less than 2 seconds.      Findings: Wound present. No erythema.          Neurological:      General: No focal deficit present.      Mental Status: He is alert and oriented to person, place, and time.   Psychiatric:         Mood and Affect: Mood normal.         Behavior: Behavior normal.         Thought Content: Thought content normal.         Judgment: Judgment normal.         Assessment:       1. Open wound of right lower extremity, subsequent encounter    2. Venous insufficiency    3. Essential hypertension    4. Heart failure, unspecified HF chronicity, unspecified heart failure type    5. Controlled type 2 diabetes mellitus with diabetic polyneuropathy, without long-term current use of insulin    6. Wound cellulitis               Wound Venous Ulcer Right lower;medial;distal Leg (Active)       Present on Original Admission:    Primary Wound Type: Venous ulcer   Side: Right   Orientation: lower;medial;distal   Location: Leg   Wound Approximate Age at First Assessment (Weeks):    Wound Number:    Is this injury device related?:    Incision Type:    Closure Method:    Wound Description (Comments):    Type:    Additional Comments:    Ankle-Brachial Index:    Pulses:    Removal Indication and Assessment:    Wound Outcome:    Wound Image    09/03/24 0829   Dressing Appearance Dry;Intact;Clean;Moist drainage;Saturated 09/03/24 0829   Drainage Amount Large 09/03/24 0829   Drainage Characteristics/Odor Green;Malodorous;Serosanguineous 09/03/24 0829   Black (%), Wound Tissue Color 30 % 09/03/24 0829   Red (%), Wound Tissue Color 50 % 09/03/24 0829   Yellow (%), Wound Tissue Color 20 % 09/03/24 0829   Periwound Area Intact;Pink;Edematous 09/03/24 0829   Wound Length (cm) 3.9 cm 09/03/24 0829   Wound Width (cm) 5.3 cm 09/03/24 0829   Wound Depth (cm) 0.1 cm 09/03/24 0829   Wound Volume (cm^3) 2.067 cm^3 09/03/24 0829   Wound Surface Area (cm^2) 20.67 cm^2 09/03/24 0829   Undermining (depth (cm)/location) 1.5 cm/  12 o'clock and 1 cm/ 6 o'clock 09/03/24 0829   Care Cleansed with:;Soap and water 09/03/24 0829   Dressing Applied;Calcium alginate;Silver;Absorptive Pad;Compression wrap;Other (comment) 09/03/24 0829   Periwound Care Skin barrier film applied 09/03/24 0829   Compression Unna's Boot;Three layer compression 09/03/24 0829         Serafin was seen in the clinic room and placed in the supine position on the treatment table.  The wound dressings were removed and the legs were cleansed with Easi-clense sponges and dried thoroughly.  No erythema or warmth noted from patient's baseline. Odor and green drainage noted to right lower extremity. Cleansed right lower extremity wounds with acetic acid (1:1 with water). Pt tolerated well.           Plan of Care:    Left lower extremity- Compression stockings daily  Right lower extremity- gentian violet to periwound, packed with aquacel Ag, drawtex, aquacel, mextra pad, and a 3 layer zinc compression wrap.  The patient's foot was positioned at a 90 degree angle. The wrap was applied using a spiral technique avoiding creases or folds.  The wrap was started behind the first metatarsal and ended below the tibial tubercle of the knee.  There was overlap of each turn half the width of the previous turn.  The compression wraps will be changed every 7 days.        Plan:       Right lower extremity was dressed as detailed above  Patient was instructed to not get the dressings wet and to use cast covers for showering.  Should the dressing become wet, he is to remove it, place a moist dressing over the wound, cover with gauze and roll gauze and use ace wraps for compression and to secure bandages.  He should then notify this office as soon as possible to have a new dressing applied.  Instructed patient to remove wrap if he notices tingling or coldness to his right lower extremities or if his toes become white, blue, or cold.    Discussed nutrition and the role of protein in wound healing with  the patient. Instructed patient to optimize protein for wound healing.     Discussed bilateral lower extremity edema with patient. Instructed patient to elevate legs whenever sedentary and follow a low sodium diet.  Discussed replacing compression stockings every 3-6 months.    Instructed patient to keep follow up appointments with vascular surgery.    Instructed patient to continue to take gabapentin as prescribed for nerve pain.    Discussed the importance of utilizing compression stockings to prevent recurrent wounds.    Discussed cellulitis with patient. Treated topically with acetic acid and aquacel Ag.     Discussed drainage control- pt needs to be seen 2x a week.      Written and verbal instructions given to patient  RTC in 1 week    Rosalba Eaton PA-C

## 2024-09-03 NOTE — PROCEDURES
"Debridement    Date/Time: 9/3/2024 8:00 AM    Performed by: Rosalba Eaton PA-C  Authorized by: Rosalba Eaton PA-C    Time out: Immediately prior to procedure a "time out" was called to verify the correct patient, procedure, equipment, support staff and site/side marked as required.    Consent Done?:  Yes (Written)  Local anesthesia used?: Yes    Local anesthetic:  Topical anesthetic (Topical 4% Lidocaine Hydrochloride)    Wound Details:    Location:  Right ankle    Type of Debridement:  Excisional       Length (cm):  3.9       Area (sq cm):  20.67       Width (cm):  5.3       Percent Debrided (%):  100       Depth (cm):  0.1       Total Area Debrided (sq cm):  20.67    Depth of debridement:  Subcutaneous tissue    Tissue debrided:  Subcutaneous    Devitalized tissue debrided:  Biofilm, Exudate, Fibrin and Slough    Instruments:  Curette  Bleeding:  Minimal  Hemostasis Achieved: Yes  Method Used:  Pressure  Patient tolerance:  Patient tolerated the procedure well with no immediate complications    "

## 2024-09-09 ENCOUNTER — OFFICE VISIT (OUTPATIENT)
Dept: WOUND CARE | Facility: CLINIC | Age: 74
End: 2024-09-09
Payer: MEDICARE

## 2024-09-09 VITALS
HEIGHT: 70 IN | HEART RATE: 73 BPM | SYSTOLIC BLOOD PRESSURE: 116 MMHG | TEMPERATURE: 98 F | DIASTOLIC BLOOD PRESSURE: 55 MMHG | BODY MASS INDEX: 36.26 KG/M2 | WEIGHT: 253.31 LBS

## 2024-09-09 DIAGNOSIS — E11.42 CONTROLLED TYPE 2 DIABETES MELLITUS WITH DIABETIC POLYNEUROPATHY, WITHOUT LONG-TERM CURRENT USE OF INSULIN: ICD-10-CM

## 2024-09-09 DIAGNOSIS — I87.2 VENOUS INSUFFICIENCY: ICD-10-CM

## 2024-09-09 DIAGNOSIS — L03.90 WOUND CELLULITIS: ICD-10-CM

## 2024-09-09 DIAGNOSIS — S81.801D OPEN WOUND OF RIGHT LOWER EXTREMITY, SUBSEQUENT ENCOUNTER: Primary | ICD-10-CM

## 2024-09-09 DIAGNOSIS — I50.9 HEART FAILURE, UNSPECIFIED HF CHRONICITY, UNSPECIFIED HEART FAILURE TYPE: ICD-10-CM

## 2024-09-09 DIAGNOSIS — I10 ESSENTIAL HYPERTENSION: ICD-10-CM

## 2024-09-09 PROCEDURE — 99999 PR PBB SHADOW E&M-EST. PATIENT-LVL V: CPT | Mod: PBBFAC,,,

## 2024-09-09 NOTE — PROGRESS NOTES
"Subjective:       Patient ID: Serafin Tapia is a 73 y.o. male.    Chief Complaint: Wound Check        Patient presents with his brother for a re-evaluation of bilateral lower extremity wounds. He reports his wounds started as blisters about 2 months ago. He is unsure why he did not contact the office until now. Pt admits compliance with wearing compression stockings daily, but reports his stockings have lost elasticity. He reports that his wounds drain a large amount of clear fluid. Pt has been dressing his wounds with a wound "liquid" and occasionally covering his wounds with gauze. Pt has also been leaving his wounds open to air. Patient followed with vascular surgery for PAD and venous insufficiency. Dr. Kapoor determined he has adequate arterial blood flow for wound healing and cleared him for a 3 layer compression wrap. Pt previously followed with the wound care clinic in 2022 and 2023.  Denies compliance with a low sodium diet, leg elevation, and following a high protein diet. Admits compliance with gabapentin. Notices improvement in pain. Denies side effects. Pt followed with Dr. Stern while I was on vacation. On 7/29/24, sharp debridement was performed and part of the patient's achilles tendon was removed. His wound was cultured. Pt followed back on 8/1. His wound was debrided further and was prescribed Augmentin. On 8/5, his wound was debrided further. His Augmentin dose was refilled and sent for 7 days. Pt reported never picking up the Augmentin prescription.  Pt reported stopping the antibiotics early. He reports that he is getting a colonoscopy at [facility] on Wednesday and the MD told him to stop antibiotics until one day after the procedure. Pt completed antibiotics. No complaints at this time. Denies fever, chills, purulent drainage, erythema, or warmth.      7/29/24 anaerobic culture: presumptive Prevotella species  7/29/24 aerobic culture: enterococcus faecalis    7/8/24 ABIs and TBIs:  Right MIHIR- " 0.89  Right TBI-0.50  Left MIHIR- 0.82  Left TBI-0.50  Right leg: Segmental pressures and PVR waveforms suggest minimal to mild peripheral arterial occlusive disease. Rt Great Toe: The PPG waveform- mildly dampened with a pressure of 94 mmHG is noted.  Left leg: Segmental pressures and PVR waveforms suggest moderate tibial peripheral arterial occlusive disease. Lt Great Toe: The PPG waveform-moderately dampened with a pressure of 94 mmHG is noted.    10/21/22 MIHIR:  Right lower extremity: 0.93- minimal peripheral arterial occlusive disease  Left lower extremity: 0.70- moderate peripheral arterial occlusive disease      Review of Systems   Constitutional:  Negative for activity change, chills, diaphoresis, fatigue and fever.   Eyes:  Negative for photophobia and visual disturbance.   Respiratory:  Negative for apnea, chest tightness and shortness of breath.    Cardiovascular:  Positive for leg swelling. Negative for chest pain and palpitations.   Musculoskeletal:  Negative for gait problem and joint swelling.   Skin:  Positive for wound. Negative for color change, pallor and rash.   Neurological:  Negative for dizziness, syncope, facial asymmetry, speech difficulty, weakness, light-headedness, numbness and headaches.   Psychiatric/Behavioral:  Negative for agitation and confusion. The patient is not nervous/anxious.    All other systems reviewed and are negative.      Objective:      Physical Exam  Vitals reviewed.   Constitutional:       General: He is not in acute distress.     Appearance: Normal appearance. He is obese.   Cardiovascular:      Rate and Rhythm: Normal rate and regular rhythm.      Pulses: Normal pulses.   Pulmonary:      Effort: No respiratory distress.   Musculoskeletal:         General: No swelling.      Right lower leg: Pitting Edema present.      Left lower leg: Pitting Edema present.      Comments: Ambulates with cane   Skin:     General: Skin is warm and dry.      Capillary Refill: Capillary  refill takes less than 2 seconds.      Findings: Wound present. No erythema.          Neurological:      General: No focal deficit present.      Mental Status: He is alert and oriented to person, place, and time.   Psychiatric:         Mood and Affect: Mood normal.         Behavior: Behavior normal.         Thought Content: Thought content normal.         Judgment: Judgment normal.         Assessment:       1. Open wound of right lower extremity, subsequent encounter    2. Venous insufficiency    3. Essential hypertension    4. Heart failure, unspecified HF chronicity, unspecified heart failure type    5. Controlled type 2 diabetes mellitus with diabetic polyneuropathy, without long-term current use of insulin    6. Wound cellulitis               Wound Venous Ulcer Right lower;medial;distal Leg (Active)       Present on Original Admission:    Primary Wound Type: Venous ulcer   Side: Right   Orientation: lower;medial;distal   Location: Leg   Wound Approximate Age at First Assessment (Weeks):    Wound Number:    Is this injury device related?:    Incision Type:    Closure Method:    Wound Description (Comments):    Type:    Additional Comments:    Ankle-Brachial Index:    Pulses:    Removal Indication and Assessment:    Wound Outcome:    Wound Image    09/09/24 0757   Dressing Appearance Dry;Intact;Clean;Moist drainage 09/09/24 0757   Drainage Amount Large 09/09/24 0757   Drainage Characteristics/Odor Bleeding controlled;Serosanguineous;Malodorous 09/09/24 0757   Black (%), Wound Tissue Color 50 % 09/09/24 0757   Red (%), Wound Tissue Color 20 % 09/09/24 0757   Yellow (%), Wound Tissue Color 30 % 09/09/24 0757   Periwound Area Intact;Pink;Edematous 09/09/24 0757   Wound Length (cm) 3.8 cm 09/09/24 0757   Wound Width (cm) 5.3 cm 09/09/24 0757   Wound Depth (cm) 0.1 cm 09/09/24 0757   Wound Volume (cm^3) 2.014 cm^3 09/09/24 0757   Wound Surface Area (cm^2) 20.14 cm^2 09/09/24 0757   Undermining (depth (cm)/location) 1.3  cm/ 12 o'clock and 1 cm/ 6 o'clock 09/09/24 0757   Care Cleansed with:;Soap and water 09/09/24 0757   Dressing Applied;Calcium alginate;Absorptive Pad;Compression wrap;Other (comment) 09/09/24 0757   Periwound Care Skin barrier film applied 09/09/24 0757   Compression Unna's Boot;Three layer compression 09/09/24 0757         Serafin was seen in the clinic room and placed in the supine position on the treatment table.  The wound dressings were removed and the legs were cleansed with Easi-clense sponges and dried thoroughly.  No erythema, green drainge, or warmth noted from patient's baseline. Odor noted to right lower extremity- but improved since last week. Placed topical 4% Lidocaine Hydrochloride to wound bed for 15 minutes. Sharp debridement with 5 mm curette to remove non-viable tissue. Pt tolerated procedure well. See procedure note.  Cleansed right lower extremity wounds with acetic acid (1:1 with water). Pt tolerated well.           Plan of Care:    Left lower extremity- Compression stockings daily  Right lower extremity- gentian violet to periwound, packed with aquacel, drawtex, aquacel, mextra pad, and a 3 layer zinc compression wrap.  The patient's foot was positioned at a 90 degree angle. The wrap was applied using a spiral technique avoiding creases or folds.  The wrap was started behind the first metatarsal and ended below the tibial tubercle of the knee.  There was overlap of each turn half the width of the previous turn.  The compression wraps will be changed every 7 days.        Plan:       Right lower extremity was dressed as detailed above  Patient was instructed to not get the dressings wet and to use cast covers for showering.  Should the dressing become wet, he is to remove it, place a moist dressing over the wound, cover with gauze and roll gauze and use ace wraps for compression and to secure bandages.  He should then notify this office as soon as possible to have a new dressing  applied.  Instructed patient to remove wrap if he notices tingling or coldness to his right lower extremities or if his toes become white, blue, or cold.    Discussed nutrition and the role of protein in wound healing with the patient. Instructed patient to optimize protein for wound healing.     Discussed bilateral lower extremity edema with patient. Instructed patient to elevate legs whenever sedentary and follow a low sodium diet.  Discussed replacing compression stockings every 3-6 months.    Instructed patient to keep follow up appointments with vascular surgery.    Instructed patient to continue to take gabapentin as prescribed for nerve pain.    Discussed the importance of utilizing compression stockings to prevent recurrent wounds.    Discussed cellulitis with patient- improved since last visit. Treated topically with acetic acid.     Discussed drainage control- pt needs to be seen 2x a week.      Written and verbal instructions given to patient  RTC Thursday    Rosalba Eaton PA-C

## 2024-09-10 ENCOUNTER — TELEPHONE (OUTPATIENT)
Dept: WOUND CARE | Facility: CLINIC | Age: 74
End: 2024-09-10
Payer: MEDICARE

## 2024-09-10 ENCOUNTER — HOSPITAL ENCOUNTER (OUTPATIENT)
Facility: HOSPITAL | Age: 74
Discharge: STILL ADMITTED OR ADMINISTRATIVE | End: 2024-09-12
Attending: EMERGENCY MEDICINE | Admitting: EMERGENCY MEDICINE
Payer: MEDICARE

## 2024-09-10 DIAGNOSIS — R53.1 WEAKNESS: ICD-10-CM

## 2024-09-10 DIAGNOSIS — R63.8 DECREASED ORAL INTAKE: ICD-10-CM

## 2024-09-10 DIAGNOSIS — R07.9 CHEST PAIN: ICD-10-CM

## 2024-09-10 DIAGNOSIS — E16.2 HYPOGLYCEMIA: Primary | ICD-10-CM

## 2024-09-10 LAB
ALBUMIN SERPL BCP-MCNC: 3.5 G/DL (ref 3.5–5.2)
ALP SERPL-CCNC: 90 U/L (ref 55–135)
ALT SERPL W/O P-5'-P-CCNC: 19 U/L (ref 10–44)
ANION GAP SERPL CALC-SCNC: 10 MMOL/L (ref 8–16)
AST SERPL-CCNC: 31 U/L (ref 10–40)
BASOPHILS # BLD AUTO: 0.02 K/UL (ref 0–0.2)
BASOPHILS NFR BLD: 0.3 % (ref 0–1.9)
BILIRUB SERPL-MCNC: 0.2 MG/DL (ref 0.1–1)
BILIRUB UR QL STRIP: NEGATIVE
BUN SERPL-MCNC: 21 MG/DL (ref 8–23)
CALCIUM SERPL-MCNC: 10.4 MG/DL (ref 8.7–10.5)
CHLORIDE SERPL-SCNC: 101 MMOL/L (ref 95–110)
CLARITY UR REFRACT.AUTO: CLEAR
CO2 SERPL-SCNC: 29 MMOL/L (ref 23–29)
COLOR UR AUTO: YELLOW
CREAT SERPL-MCNC: 0.8 MG/DL (ref 0.5–1.4)
DIFFERENTIAL METHOD BLD: ABNORMAL
EOSINOPHIL # BLD AUTO: 0.1 K/UL (ref 0–0.5)
EOSINOPHIL NFR BLD: 0.8 % (ref 0–8)
ERYTHROCYTE [DISTWIDTH] IN BLOOD BY AUTOMATED COUNT: 15.5 % (ref 11.5–14.5)
EST. GFR  (NO RACE VARIABLE): >60 ML/MIN/1.73 M^2
GLUCOSE SERPL-MCNC: 100 MG/DL (ref 70–110)
GLUCOSE SERPL-MCNC: 56 MG/DL (ref 70–110)
GLUCOSE SERPL-MCNC: 69 MG/DL (ref 70–110)
GLUCOSE SERPL-MCNC: 89 MG/DL (ref 70–110)
GLUCOSE SERPL-MCNC: 92 MG/DL (ref 70–110)
GLUCOSE UR QL STRIP: NEGATIVE
HCT VFR BLD AUTO: 42 % (ref 40–54)
HGB BLD-MCNC: 13.8 G/DL (ref 14–18)
HGB UR QL STRIP: NEGATIVE
IMM GRANULOCYTES # BLD AUTO: 0.02 K/UL (ref 0–0.04)
IMM GRANULOCYTES NFR BLD AUTO: 0.3 % (ref 0–0.5)
KETONES UR QL STRIP: NEGATIVE
LEUKOCYTE ESTERASE UR QL STRIP: NEGATIVE
LYMPHOCYTES # BLD AUTO: 1.8 K/UL (ref 1–4.8)
LYMPHOCYTES NFR BLD: 23.5 % (ref 18–48)
MCH RBC QN AUTO: 26 PG (ref 27–31)
MCHC RBC AUTO-ENTMCNC: 32.9 G/DL (ref 32–36)
MCV RBC AUTO: 79 FL (ref 82–98)
MONOCYTES # BLD AUTO: 0.4 K/UL (ref 0.3–1)
MONOCYTES NFR BLD: 5.6 % (ref 4–15)
NEUTROPHILS # BLD AUTO: 5.2 K/UL (ref 1.8–7.7)
NEUTROPHILS NFR BLD: 69.5 % (ref 38–73)
NITRITE UR QL STRIP: NEGATIVE
NRBC BLD-RTO: 0 /100 WBC
PH UR STRIP: 6 [PH] (ref 5–8)
PLATELET # BLD AUTO: 373 K/UL (ref 150–450)
PMV BLD AUTO: 11.2 FL (ref 9.2–12.9)
POCT GLUCOSE: 130 MG/DL (ref 70–110)
POTASSIUM SERPL-SCNC: 3.5 MMOL/L (ref 3.5–5.1)
PROT SERPL-MCNC: 7.7 G/DL (ref 6–8.4)
PROT UR QL STRIP: NEGATIVE
RBC # BLD AUTO: 5.3 M/UL (ref 4.6–6.2)
SODIUM SERPL-SCNC: 140 MMOL/L (ref 136–145)
SP GR UR STRIP: 1.01 (ref 1–1.03)
URN SPEC COLLECT METH UR: NORMAL
WBC # BLD AUTO: 7.48 K/UL (ref 3.9–12.7)

## 2024-09-10 PROCEDURE — 82962 GLUCOSE BLOOD TEST: CPT

## 2024-09-10 PROCEDURE — 84145 PROCALCITONIN (PCT): CPT | Performed by: FAMILY MEDICINE

## 2024-09-10 PROCEDURE — 99285 EMERGENCY DEPT VISIT HI MDM: CPT

## 2024-09-10 PROCEDURE — 84443 ASSAY THYROID STIM HORMONE: CPT | Performed by: EMERGENCY MEDICINE

## 2024-09-10 PROCEDURE — 85025 COMPLETE CBC W/AUTO DIFF WBC: CPT | Performed by: EMERGENCY MEDICINE

## 2024-09-10 PROCEDURE — 80053 COMPREHEN METABOLIC PANEL: CPT | Performed by: EMERGENCY MEDICINE

## 2024-09-10 PROCEDURE — 83036 HEMOGLOBIN GLYCOSYLATED A1C: CPT | Performed by: EMERGENCY MEDICINE

## 2024-09-10 PROCEDURE — 81003 URINALYSIS AUTO W/O SCOPE: CPT | Performed by: EMERGENCY MEDICINE

## 2024-09-10 PROCEDURE — G0378 HOSPITAL OBSERVATION PER HR: HCPCS

## 2024-09-10 RX ORDER — ACETAMINOPHEN 500 MG
1000 TABLET ORAL EVERY 8 HOURS PRN
Status: DISCONTINUED | OUTPATIENT
Start: 2024-09-10 | End: 2024-09-12 | Stop reason: HOSPADM

## 2024-09-10 RX ORDER — LISINOPRIL 20 MG/1
20 TABLET ORAL DAILY
Status: DISCONTINUED | OUTPATIENT
Start: 2024-09-11 | End: 2024-09-12

## 2024-09-10 RX ORDER — IBUPROFEN 200 MG
16 TABLET ORAL
Status: DISCONTINUED | OUTPATIENT
Start: 2024-09-10 | End: 2024-09-12 | Stop reason: SDUPTHER

## 2024-09-10 RX ORDER — GLUCAGON 1 MG
1 KIT INJECTION
Status: DISCONTINUED | OUTPATIENT
Start: 2024-09-10 | End: 2024-09-12 | Stop reason: SDUPTHER

## 2024-09-10 RX ORDER — ASPIRIN 81 MG/1
81 TABLET ORAL DAILY
Status: DISCONTINUED | OUTPATIENT
Start: 2024-09-11 | End: 2024-09-12 | Stop reason: HOSPADM

## 2024-09-10 RX ORDER — INSULIN ASPART 100 [IU]/ML
0-10 INJECTION, SOLUTION INTRAVENOUS; SUBCUTANEOUS
Status: DISCONTINUED | OUTPATIENT
Start: 2024-09-10 | End: 2024-09-10

## 2024-09-10 RX ORDER — ENOXAPARIN SODIUM 100 MG/ML
40 INJECTION SUBCUTANEOUS EVERY 24 HOURS
Status: DISCONTINUED | OUTPATIENT
Start: 2024-09-11 | End: 2024-09-12 | Stop reason: HOSPADM

## 2024-09-10 RX ORDER — PREDNISONE 20 MG/1
60 TABLET ORAL DAILY
Status: DISCONTINUED | OUTPATIENT
Start: 2024-09-11 | End: 2024-09-12

## 2024-09-10 RX ORDER — SODIUM CHLORIDE 0.9 % (FLUSH) 0.9 %
10 SYRINGE (ML) INJECTION EVERY 12 HOURS PRN
Status: DISCONTINUED | OUTPATIENT
Start: 2024-09-10 | End: 2024-09-12 | Stop reason: HOSPADM

## 2024-09-10 RX ORDER — ALUMINUM HYDROXIDE, MAGNESIUM HYDROXIDE, AND SIMETHICONE 1200; 120; 1200 MG/30ML; MG/30ML; MG/30ML
30 SUSPENSION ORAL 4 TIMES DAILY PRN
Status: DISCONTINUED | OUTPATIENT
Start: 2024-09-10 | End: 2024-09-12 | Stop reason: HOSPADM

## 2024-09-10 RX ORDER — ALBUTEROL SULFATE 90 UG/1
2 INHALANT RESPIRATORY (INHALATION) EVERY 4 HOURS PRN
Status: DISCONTINUED | OUTPATIENT
Start: 2024-09-10 | End: 2024-09-12 | Stop reason: HOSPADM

## 2024-09-10 RX ORDER — IBUPROFEN 200 MG
24 TABLET ORAL
Status: DISCONTINUED | OUTPATIENT
Start: 2024-09-10 | End: 2024-09-12 | Stop reason: SDUPTHER

## 2024-09-10 RX ORDER — TALC
6 POWDER (GRAM) TOPICAL NIGHTLY PRN
Status: DISCONTINUED | OUTPATIENT
Start: 2024-09-10 | End: 2024-09-12 | Stop reason: HOSPADM

## 2024-09-10 RX ORDER — ONDANSETRON HYDROCHLORIDE 2 MG/ML
4 INJECTION, SOLUTION INTRAVENOUS EVERY 8 HOURS PRN
Status: DISCONTINUED | OUTPATIENT
Start: 2024-09-10 | End: 2024-09-12 | Stop reason: HOSPADM

## 2024-09-10 RX ORDER — NIFEDIPINE 30 MG/1
90 TABLET, EXTENDED RELEASE ORAL DAILY
Status: DISCONTINUED | OUTPATIENT
Start: 2024-09-11 | End: 2024-09-12 | Stop reason: HOSPADM

## 2024-09-10 RX ORDER — METOPROLOL SUCCINATE 100 MG/1
200 TABLET, EXTENDED RELEASE ORAL DAILY
Status: DISCONTINUED | OUTPATIENT
Start: 2024-09-11 | End: 2024-09-12 | Stop reason: HOSPADM

## 2024-09-10 RX ORDER — NALOXONE HCL 0.4 MG/ML
0.02 VIAL (ML) INJECTION
Status: DISCONTINUED | OUTPATIENT
Start: 2024-09-10 | End: 2024-09-12 | Stop reason: HOSPADM

## 2024-09-10 RX ORDER — GABAPENTIN 300 MG/1
300 CAPSULE ORAL 3 TIMES DAILY
Status: DISCONTINUED | OUTPATIENT
Start: 2024-09-11 | End: 2024-09-12 | Stop reason: HOSPADM

## 2024-09-10 RX ORDER — LEVOTHYROXINE SODIUM 50 UG/1
50 TABLET ORAL
Status: DISCONTINUED | OUTPATIENT
Start: 2024-09-11 | End: 2024-09-12 | Stop reason: HOSPADM

## 2024-09-10 NOTE — FIRST PROVIDER EVALUATION
"Medical screening examination initiated.  I have conducted a focused provider triage encounter, findings are as follows:    Brief history of present illness:  72 yo M w/ slurred speech. Did not eat today CBG 26    Vitals:    09/10/24 1646   BP: (!) 204/106   Pulse: 82   Resp: 16   TempSrc: Oral   SpO2: 98%   Weight: 114.8 kg (253 lb)   Height: 5' 10" (1.778 m)       Pertinent physical exam:  Cranial nerves 2-12 intact    Brief workup plan:  Presentation consistent with hypoglycemia.  All symptoms resolved.  Code stroke activation not clinically indicated    Preliminary workup initiated; this workup will be continued and followed by the physician or advanced practice provider that is assigned to the patient when roomed.  "
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
## 2024-09-11 ENCOUNTER — TELEPHONE (OUTPATIENT)
Dept: WOUND CARE | Facility: CLINIC | Age: 74
End: 2024-09-11
Payer: MEDICARE

## 2024-09-11 PROBLEM — E16.2 HYPOGLYCEMIA: Status: ACTIVE | Noted: 2024-09-11

## 2024-09-11 LAB
ALBUMIN SERPL BCP-MCNC: 3.3 G/DL (ref 3.5–5.2)
ALP SERPL-CCNC: 85 U/L (ref 55–135)
ALT SERPL W/O P-5'-P-CCNC: 16 U/L (ref 10–44)
ANION GAP SERPL CALC-SCNC: 12 MMOL/L (ref 8–16)
AST SERPL-CCNC: 27 U/L (ref 10–40)
BASOPHILS # BLD AUTO: 0.02 K/UL (ref 0–0.2)
BASOPHILS NFR BLD: 0.3 % (ref 0–1.9)
BILIRUB SERPL-MCNC: 0.2 MG/DL (ref 0.1–1)
BUN SERPL-MCNC: 25 MG/DL (ref 8–23)
CALCIUM SERPL-MCNC: 10.1 MG/DL (ref 8.7–10.5)
CHLORIDE SERPL-SCNC: 103 MMOL/L (ref 95–110)
CO2 SERPL-SCNC: 26 MMOL/L (ref 23–29)
CREAT SERPL-MCNC: 1.2 MG/DL (ref 0.5–1.4)
DIFFERENTIAL METHOD BLD: ABNORMAL
EOSINOPHIL # BLD AUTO: 0.1 K/UL (ref 0–0.5)
EOSINOPHIL NFR BLD: 1.5 % (ref 0–8)
ERYTHROCYTE [DISTWIDTH] IN BLOOD BY AUTOMATED COUNT: 15.7 % (ref 11.5–14.5)
EST. GFR  (NO RACE VARIABLE): >60 ML/MIN/1.73 M^2
ESTIMATED AVG GLUCOSE: 146 MG/DL (ref 68–131)
GLUCOSE SERPL-MCNC: 70 MG/DL (ref 70–110)
GLUCOSE SERPL-MCNC: 81 MG/DL (ref 70–110)
HBA1C MFR BLD: 6.7 % (ref 4–5.6)
HCT VFR BLD AUTO: 38.5 % (ref 40–54)
HGB BLD-MCNC: 12.4 G/DL (ref 14–18)
IMM GRANULOCYTES # BLD AUTO: 0.02 K/UL (ref 0–0.04)
IMM GRANULOCYTES NFR BLD AUTO: 0.3 % (ref 0–0.5)
LYMPHOCYTES # BLD AUTO: 2.2 K/UL (ref 1–4.8)
LYMPHOCYTES NFR BLD: 30.1 % (ref 18–48)
MAGNESIUM SERPL-MCNC: 2 MG/DL (ref 1.6–2.6)
MCH RBC QN AUTO: 25.5 PG (ref 27–31)
MCHC RBC AUTO-ENTMCNC: 32.2 G/DL (ref 32–36)
MCV RBC AUTO: 79 FL (ref 82–98)
MONOCYTES # BLD AUTO: 0.6 K/UL (ref 0.3–1)
MONOCYTES NFR BLD: 8.1 % (ref 4–15)
NEUTROPHILS # BLD AUTO: 4.4 K/UL (ref 1.8–7.7)
NEUTROPHILS NFR BLD: 59.7 % (ref 38–73)
NRBC BLD-RTO: 0 /100 WBC
PHOSPHATE SERPL-MCNC: 3 MG/DL (ref 2.7–4.5)
PLATELET # BLD AUTO: 331 K/UL (ref 150–450)
PMV BLD AUTO: 11.2 FL (ref 9.2–12.9)
POCT GLUCOSE: 100 MG/DL (ref 70–110)
POCT GLUCOSE: 202 MG/DL (ref 70–110)
POCT GLUCOSE: 307 MG/DL (ref 70–110)
POCT GLUCOSE: 360 MG/DL (ref 70–110)
POCT GLUCOSE: 69 MG/DL (ref 70–110)
POCT GLUCOSE: 79 MG/DL (ref 70–110)
POCT GLUCOSE: 81 MG/DL (ref 70–110)
POCT GLUCOSE: 89 MG/DL (ref 70–110)
POCT GLUCOSE: 92 MG/DL (ref 70–110)
POTASSIUM SERPL-SCNC: 4.4 MMOL/L (ref 3.5–5.1)
PROCALCITONIN SERPL IA-MCNC: 0.04 NG/ML
PROT SERPL-MCNC: 7.2 G/DL (ref 6–8.4)
RBC # BLD AUTO: 4.87 M/UL (ref 4.6–6.2)
SODIUM SERPL-SCNC: 141 MMOL/L (ref 136–145)
TSH SERPL DL<=0.005 MIU/L-ACNC: 1.64 UIU/ML (ref 0.4–4)
WBC # BLD AUTO: 7.4 K/UL (ref 3.9–12.7)

## 2024-09-11 PROCEDURE — 63600175 PHARM REV CODE 636 W HCPCS: Performed by: FAMILY MEDICINE

## 2024-09-11 PROCEDURE — 36415 COLL VENOUS BLD VENIPUNCTURE: CPT | Performed by: FAMILY MEDICINE

## 2024-09-11 PROCEDURE — 25000003 PHARM REV CODE 250: Performed by: FAMILY MEDICINE

## 2024-09-11 PROCEDURE — 96372 THER/PROPH/DIAG INJ SC/IM: CPT | Performed by: FAMILY MEDICINE

## 2024-09-11 PROCEDURE — 83735 ASSAY OF MAGNESIUM: CPT | Performed by: FAMILY MEDICINE

## 2024-09-11 PROCEDURE — 63600175 PHARM REV CODE 636 W HCPCS: Performed by: STUDENT IN AN ORGANIZED HEALTH CARE EDUCATION/TRAINING PROGRAM

## 2024-09-11 PROCEDURE — G0378 HOSPITAL OBSERVATION PER HR: HCPCS

## 2024-09-11 PROCEDURE — 85025 COMPLETE CBC W/AUTO DIFF WBC: CPT | Performed by: FAMILY MEDICINE

## 2024-09-11 PROCEDURE — 80053 COMPREHEN METABOLIC PANEL: CPT | Performed by: FAMILY MEDICINE

## 2024-09-11 PROCEDURE — 84100 ASSAY OF PHOSPHORUS: CPT | Performed by: FAMILY MEDICINE

## 2024-09-11 PROCEDURE — 96372 THER/PROPH/DIAG INJ SC/IM: CPT | Performed by: STUDENT IN AN ORGANIZED HEALTH CARE EDUCATION/TRAINING PROGRAM

## 2024-09-11 RX ORDER — GLUCAGON 1 MG
1 KIT INJECTION
Status: DISCONTINUED | OUTPATIENT
Start: 2024-09-11 | End: 2024-09-12 | Stop reason: HOSPADM

## 2024-09-11 RX ORDER — INSULIN ASPART 100 [IU]/ML
0-5 INJECTION, SOLUTION INTRAVENOUS; SUBCUTANEOUS
Status: DISCONTINUED | OUTPATIENT
Start: 2024-09-11 | End: 2024-09-12 | Stop reason: HOSPADM

## 2024-09-11 RX ORDER — IBUPROFEN 200 MG
16 TABLET ORAL
Status: DISCONTINUED | OUTPATIENT
Start: 2024-09-11 | End: 2024-09-12 | Stop reason: HOSPADM

## 2024-09-11 RX ORDER — IBUPROFEN 200 MG
24 TABLET ORAL
Status: DISCONTINUED | OUTPATIENT
Start: 2024-09-11 | End: 2024-09-12 | Stop reason: HOSPADM

## 2024-09-11 RX ADMIN — GABAPENTIN 300 MG: 300 CAPSULE ORAL at 08:09

## 2024-09-11 RX ADMIN — ASPIRIN 81 MG: 81 TABLET, COATED ORAL at 10:09

## 2024-09-11 RX ADMIN — METOPROLOL SUCCINATE 200 MG: 100 TABLET, EXTENDED RELEASE ORAL at 10:09

## 2024-09-11 RX ADMIN — GABAPENTIN 300 MG: 300 CAPSULE ORAL at 10:09

## 2024-09-11 RX ADMIN — INSULIN ASPART 4 UNITS: 100 INJECTION, SOLUTION INTRAVENOUS; SUBCUTANEOUS at 05:09

## 2024-09-11 RX ADMIN — LEVOTHYROXINE SODIUM 50 MCG: 50 TABLET ORAL at 05:09

## 2024-09-11 RX ADMIN — PREDNISONE 60 MG: 20 TABLET ORAL at 10:09

## 2024-09-11 RX ADMIN — INSULIN ASPART 3 UNITS: 100 INJECTION, SOLUTION INTRAVENOUS; SUBCUTANEOUS at 09:09

## 2024-09-11 RX ADMIN — LISINOPRIL 20 MG: 20 TABLET ORAL at 10:09

## 2024-09-11 RX ADMIN — NIFEDIPINE 90 MG: 30 TABLET, FILM COATED, EXTENDED RELEASE ORAL at 10:09

## 2024-09-11 RX ADMIN — ENOXAPARIN SODIUM 40 MG: 40 INJECTION SUBCUTANEOUS at 05:09

## 2024-09-11 RX ADMIN — GABAPENTIN 300 MG: 300 CAPSULE ORAL at 03:09

## 2024-09-11 NOTE — ED NOTES
Nurses Note -- 4 Eyes      9/10/2024   9:41 PM      Skin assessed during: Admit      [] No Altered Skin Integrity Present    []Prevention Measures Documented      [x] Yes- Altered Skin Integrity Present or Discovered   [] LDA Added if Not in Epic (Describe Wound)   [] New Altered Skin Integrity was Present on Admit and Documented in LDA   [x] Wound Image Taken    Wound Care Consulted? No    Attending Nurse:  Nasrin Vo RN     Second RN/Staff Member:  anibal

## 2024-09-11 NOTE — HPI
Serafin Tapia is a 73 y.o. male who has a past medical history of Asthma, Diabetes mellitus type 2 in obese , Gait instability, Hyperlipidemia, Hypertension, Hypothyroidism, Obesity, Pulmonary embolism (05/2014), and HTN who presents to the ED with chief compliant of AMS and hypoglycemia. Patient is a limited historian and unable to state his exact diabetic meds. Takes trulicity and metformin and potentially others. Family found patient altered today and called EMS who noted patient BG 26 and with slurred speech and weakness. Symptoms improved on arrival to ED but continued to have recurrent hypoglycemia despite initial dextrose infusion. Family report similar episodes of symptomatic hypoglycemia in the past and state that patient takes his DM meds in am but doesn't always get out of bed to eat. Patient admitted to the care of medicine for further evaluation and management.

## 2024-09-11 NOTE — ASSESSMENT & PLAN NOTE
- unclear complete home med list  - continue metoprolol, nifedipine, benazepril at this time  - holding HCTZ, labetolol  - pharmacy consulted to assist with complete med rec

## 2024-09-11 NOTE — SUBJECTIVE & OBJECTIVE
Past Medical History:   Diagnosis Date    Asthma     childhood    Diabetes mellitus type 2 in obese 05/03/2014    Dyslipidemia 05/05/2014    Elevated troponin 05/03/2014    Gait instability 02/28/2018    Hyperlipidemia     Hypertension     Hypothyroidism (acquired) 11/01/2017    Obesity     Ocular hypertension     Ocular hypertension     Pulmonary embolism 05/2014    PVD (posterior vitreous detachment), right eye     Spondylosis of lumbar region without myelopathy or radiculopathy 08/25/2017    Statin myopathy 01/16/2018    Vertigo        Past Surgical History:   Procedure Laterality Date    CIRCUMCISION N/A 09/10/2019    Procedure: CIRCUMCISION;  Surgeon: Nhan Ruth MD;  Location: [facility];  Service: Urology;  Laterality: N/A;    TONSILLECTOMY         Review of patient's allergies indicates:   Allergen Reactions    Statins-hmg-coa reductase inhibitors Other (See Comments)     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     Current Outpatient Medications on File Prior to Encounter   Medication Sig    albuterol (PROVENTIL/VENTOLIN HFA) 90 mcg/actuation inhaler Inhale 2 puffs into the lungs every 6 (six) hours as needed for Wheezing.    ascorbic acid, vitamin C, (VITAMIN C) 500 MG tablet Take 1 tablet (500 mg total) by mouth 2 (two) times daily.    aspirin (ECOTRIN) 81 MG EC tablet Take 81 mg by mouth once daily.    benazepriL (LOTENSIN) 40 MG tablet Take 1 tablet (40 mg total) by mouth once daily.    calcium-vitamin D3 (CALCIUM 500 + D) 500 mg(1,250mg) -200 unit per tablet Take 1 tablet by mouth 2 (two) times daily with meals.    chlorthalidone (HYGROTEN) 25 MG Tab Take 1 tablet (25 mg total) by mouth once daily.    ciclopirox (PENLAC) 8 % Soln Apply topically nightly.    dapagliflozin (FARXIGA) 5 mg Tab tablet Take 1 tablet (5 mg total) by mouth once daily.    dulaglutide (TRULICITY) 1.5 mg/0.5 mL pen injector Inject 1.5 mg into  the skin every 7 days.    ergocalciferol (ERGOCALCIFEROL) 50,000 unit Cap TAKE 1 CAPSULE BY MOUTH ONE TIME PER WEEK    folic acid (FOLVITE) 1 MG tablet Take 1 tablet (1,000 mcg total) by mouth once daily.    gabapentin (NEURONTIN) 100 MG capsule Take 3 capsules (300 mg total) by mouth 3 (three) times daily.    glimepiride (AMARYL) 4 MG tablet Take 2 tablets (8 mg total) by mouth daily with breakfast.    labetaloL (NORMODYNE) 200 MG tablet Take 1 tablet (200 mg total) by mouth 2 (two) times daily.    levothyroxine (SYNTHROID) 50 MCG tablet Take 1 tablet (50 mcg total) by mouth before breakfast.    linaGLIPtin (TRADJENTA) 5 mg Tab tablet Take 1 tablet (5 mg total) by mouth once daily.    metFORMIN (GLUCOPHAGE) 1000 MG tablet Take 1 tablet (1,000 mg total) by mouth 2 (two) times daily with meals.    methotrexate 2.5 MG Tab TAKE 8 TABLETS BY MOUTH WEEKLY    metoprolol succinate (TOPROL-XL) 200 MG 24 hr tablet Take 1 tablet (200 mg total) by mouth once daily.    multivitamin (THERAGRAN) per tablet Take 1 tablet by mouth once daily. PER [facility]    NIFEdipine (PROCARDIA-XL) 90 MG (OSM) 24 hr tablet Take 1 tablet (90 mg total) by mouth once daily.    predniSONE (DELTASONE) 10 MG tablet 6 tabs daily    SUFLAVE 178.7-7.3-0.5 gram SolR SMARTSI Bottle(s) By Mouth Twice Daily    tumeric-ging-olive-oreg-capryl 100 mg-150 mg- 50 mg-150 mg Cap Take by mouth.    zinc sulfate (ZINCATE) 50 mg zinc (220 mg) capsule Take 1 capsule (220 mg total) by mouth once daily.     Family History       Problem Relation (Age of Onset)    Cancer Father    Cataracts Sister    Diabetes Mother, Father, Sister, Brother, Brother, Son    Glaucoma Maternal Aunt, Paternal Aunt    Heart disease Mother, Father    Hyperlipidemia Brother, Son    Hypertension Mother, Father, Sister, Brother, Brother, Brother, Daughter, Son    No Known Problems Maternal Grandmother, Maternal Grandfather, Paternal Grandmother, Paternal Grandfather    Stroke Mother           Tobacco Use    Smoking status: Former     Current packs/day: 0.25     Average packs/day: 0.3 packs/day for 5.0 years (1.3 ttl pk-yrs)     Types: Cigarettes    Smokeless tobacco: Never    Tobacco comments:     Quit smoking as a teenager   Substance and Sexual Activity    Alcohol use: Yes     Alcohol/week: 1.0 - 2.0 standard drink of alcohol     Types: 1 - 2 Cans of beer per week     Comment: 1-2 drinks occasionally    Drug use: No    Sexual activity: Yes     Partners: Female     Birth control/protection: Condom     Review of Systems   Constitutional:  Positive for appetite change. Negative for chills, diaphoresis and fever.   HENT:  Negative for sore throat and trouble swallowing.    Eyes:  Negative for photophobia and visual disturbance.   Respiratory:  Negative for cough, shortness of breath and wheezing.    Cardiovascular:  Negative for chest pain, palpitations and leg swelling.   Gastrointestinal:  Negative for abdominal distention, abdominal pain, diarrhea, nausea and vomiting.   Genitourinary:  Negative for dysuria and hematuria.   Musculoskeletal:  Negative for neck pain and neck stiffness.   Skin:  Negative for rash and wound.   Neurological:  Negative for seizures, syncope, weakness, light-headedness, numbness and headaches.   Psychiatric/Behavioral:  Negative for confusion and decreased concentration.      Objective:     Vital Signs (Most Recent):  Temp: 98.2 °F (36.8 °C) (09/10/24 2315)  Pulse: 72 (09/10/24 2315)  Resp: (!) 25 (09/10/24 2205)  BP: (!) 148/79 (09/10/24 2202)  SpO2: 96 % (09/10/24 2315) Vital Signs (24h Range):  Temp:  [98.1 °F (36.7 °C)-98.2 °F (36.8 °C)] 98.2 °F (36.8 °C)  Pulse:  [70-82] 72  Resp:  [16-26] 25  SpO2:  [94 %-98 %] 96 %  BP: (148-210)/() 148/79     Weight: 114.8 kg (253 lb)  Body mass index is 36.3 kg/m².     Physical Exam  Constitutional:       General: He is not in acute distress.     Appearance: He is obese. He is not toxic-appearing or diaphoretic.   HENT:       Head: Normocephalic and atraumatic.      Nose: Nose normal.   Eyes:      General: No scleral icterus.     Extraocular Movements: Extraocular movements intact.      Pupils: Pupils are equal, round, and reactive to light.   Cardiovascular:      Rate and Rhythm: Normal rate and regular rhythm.   Pulmonary:      Effort: Pulmonary effort is normal. No respiratory distress.      Breath sounds: No wheezing or rales.   Abdominal:      General: Abdomen is flat. There is no distension.      Palpations: Abdomen is soft.      Tenderness: There is no abdominal tenderness. There is no guarding.   Musculoskeletal:         General: Normal range of motion.      Cervical back: Normal range of motion and neck supple. No rigidity.      Right lower leg: No edema.      Left lower leg: No edema.   Skin:     General: Skin is warm and dry.      Coloration: Skin is not jaundiced.   Neurological:      General: No focal deficit present.      Mental Status: He is alert and oriented to person, place, and time.      Cranial Nerves: No cranial nerve deficit.   Psychiatric:         Mood and Affect: Mood normal.         Behavior: Behavior normal.              CRANIAL NERVES     CN III, IV, VI   Pupils are equal, round, and reactive to light.       Significant Labs: All pertinent labs within the past 24 hours have been reviewed.  CBC:   Recent Labs   Lab 09/10/24  1956   WBC 7.48   HGB 13.8*   HCT 42.0        CMP:   Recent Labs   Lab 09/10/24  1956      K 3.5      CO2 29   GLU 56*   BUN 21   CREATININE 0.8   CALCIUM 10.4   PROT 7.7   ALBUMIN 3.5   BILITOT 0.2   ALKPHOS 90   AST 31   ALT 19   ANIONGAP 10     Urine Studies:   Recent Labs   Lab 09/10/24  2106   COLORU Yellow   APPEARANCEUA Clear   PHUR 6.0   SPECGRAV 1.010   PROTEINUA Negative   GLUCUA Negative   KETONESU Negative   BILIRUBINUA Negative   OCCULTUA Negative   NITRITE Negative   LEUKOCYTESUR Negative       Significant Imaging: I have reviewed all pertinent imaging  results/findings within the past 24 hours.

## 2024-09-11 NOTE — NURSING
Patient Transferred to [facility]PU Room 946       Upon arrival to the floor, patient greeted and oriented to room. Complete head to toe assessment completed per protocol. VSS, see flowsheet for details. Neuro assessment completed. Primary team notified of patient's transfer to floor. All current and transfer orders reviewed/reconciled per protocol. All emergency equipment set up in patient's room. Fall/seizure precautions initiated per providers orders. 4 Eyes skin assessment performed, see below for details. Reviewed assessment and rounding frequency with patient and family. Questions were encouraged and addressed. Repositioned patient for comfort with bed locked in lowest position, side rails up x 3, bed alarm set, and call light within reach. Instructed patient to call staff for mobility/assistance, verbalized understanding. No acute signs of distress noted at this time.     Nurses Note -- 4 Eyes      9/11/2024   2:58 AM      Skin assessed during: Admit      [] No Altered Skin Integrity Present    []Prevention Measures Documented      [x] Yes- Altered Skin Integrity Present or Discovered   [x] LDA Added if Not in Epic (Describe Wound)   [] New Altered Skin Integrity was Present on Admit and Documented in LDA   [] Wound Image Taken    Wound Care Consulted? Yes    Attending Nurse:  TELLO Dewitt    Second RN/Staff Member:  TELLO Joe
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
## 2024-09-11 NOTE — H&P
[facility] - Emergency Dept  [facility]  History & Physical    Patient Name: Serafin Tapia  MRN: [id number]  Patient Class: OP- Observation  Admission Date: 9/10/2024  Attending Physician: Murray Swain MD   Primary Care Provider: John Vargas MD         Patient information was obtained from patient, past medical records, and ER records.     Subjective:     Principal Problem:Hypoglycemia    Chief Complaint:   Chief Complaint   Patient presents with    Speech Problem     Slurred speech onset 330p, LSN 2pm by family, CBG with EMS 26, treated by EMS with 15g oral glucose, 1mg glucagon IM, 250cc D10 IV, repeat CBG with , slurred speech resolved and pt back to baseline, ems also reports pt with slight left arm drift on scene initially, resolved        HPI: Serafin Tapia is a 73 y.o. male who has a past medical history of Asthma, Diabetes mellitus type 2 in obese , Gait instability, Hyperlipidemia, Hypertension, Hypothyroidism, Obesity, Pulmonary embolism (05/2014), and HTN who presents to the ED with chief compliant of AMS and hypoglycemia. Patient is a limited historian and unable to state his exact diabetic meds. Takes trulicity and metformin and potentially others. Family found patient altered today and called EMS who noted patient BG 26 and with slurred speech and weakness. Symptoms improved on arrival to ED but continued to have recurrent hypoglycemia despite initial dextrose infusion. Family report similar episodes of symptomatic hypoglycemia in the past and state that patient takes his DM meds in am but doesn't always get out of bed to eat. Patient admitted to the care of medicine for further evaluation and management.     Past Medical History:   Diagnosis Date    Asthma     childhood    Diabetes mellitus type 2 in obese 05/03/2014    Dyslipidemia 05/05/2014    Elevated troponin 05/03/2014    Gait instability 02/28/2018    Hyperlipidemia     Hypertension     Hypothyroidism (acquired)  11/01/2017    Obesity     Ocular hypertension     Ocular hypertension     Pulmonary embolism 05/2014    PVD (posterior vitreous detachment), right eye     Spondylosis of lumbar region without myelopathy or radiculopathy 08/25/2017    Statin myopathy 01/16/2018    Vertigo        Past Surgical History:   Procedure Laterality Date    CIRCUMCISION N/A 09/10/2019    Procedure: CIRCUMCISION;  Surgeon: Nhan Ruth MD;  Location: [facility];  Service: Urology;  Laterality: N/A;    TONSILLECTOMY         Review of patient's allergies indicates:   Allergen Reactions    Statins-hmg-coa reductase inhibitors Other (See Comments)     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     Current Outpatient Medications on File Prior to Encounter   Medication Sig    albuterol (PROVENTIL/VENTOLIN HFA) 90 mcg/actuation inhaler Inhale 2 puffs into the lungs every 6 (six) hours as needed for Wheezing.    ascorbic acid, vitamin C, (VITAMIN C) 500 MG tablet Take 1 tablet (500 mg total) by mouth 2 (two) times daily.    aspirin (ECOTRIN) 81 MG EC tablet Take 81 mg by mouth once daily.    benazepriL (LOTENSIN) 40 MG tablet Take 1 tablet (40 mg total) by mouth once daily.    calcium-vitamin D3 (CALCIUM 500 + D) 500 mg(1,250mg) -200 unit per tablet Take 1 tablet by mouth 2 (two) times daily with meals.    chlorthalidone (HYGROTEN) 25 MG Tab Take 1 tablet (25 mg total) by mouth once daily.    ciclopirox (PENLAC) 8 % Soln Apply topically nightly.    dapagliflozin (FARXIGA) 5 mg Tab tablet Take 1 tablet (5 mg total) by mouth once daily.    dulaglutide (TRULICITY) 1.5 mg/0.5 mL pen injector Inject 1.5 mg into the skin every 7 days.    ergocalciferol (ERGOCALCIFEROL) 50,000 unit Cap TAKE 1 CAPSULE BY MOUTH ONE TIME PER WEEK    folic acid (FOLVITE) 1 MG tablet Take 1 tablet (1,000 mcg total) by mouth once daily.    gabapentin (NEURONTIN) 100 MG capsule Take 3 capsules (300  mg total) by mouth 3 (three) times daily.    glimepiride (AMARYL) 4 MG tablet Take 2 tablets (8 mg total) by mouth daily with breakfast.    labetaloL (NORMODYNE) 200 MG tablet Take 1 tablet (200 mg total) by mouth 2 (two) times daily.    levothyroxine (SYNTHROID) 50 MCG tablet Take 1 tablet (50 mcg total) by mouth before breakfast.    linaGLIPtin (TRADJENTA) 5 mg Tab tablet Take 1 tablet (5 mg total) by mouth once daily.    metFORMIN (GLUCOPHAGE) 1000 MG tablet Take 1 tablet (1,000 mg total) by mouth 2 (two) times daily with meals.    methotrexate 2.5 MG Tab TAKE 8 TABLETS BY MOUTH WEEKLY    metoprolol succinate (TOPROL-XL) 200 MG 24 hr tablet Take 1 tablet (200 mg total) by mouth once daily.    multivitamin (THERAGRAN) per tablet Take 1 tablet by mouth once daily. PER [facility]    NIFEdipine (PROCARDIA-XL) 90 MG (OSM) 24 hr tablet Take 1 tablet (90 mg total) by mouth once daily.    predniSONE (DELTASONE) 10 MG tablet 6 tabs daily    SUFLAVE 178.7-7.3-0.5 gram SolR SMARTSI Bottle(s) By Mouth Twice Daily    tumeric-ging-olive-oreg-capryl 100 mg-150 mg- 50 mg-150 mg Cap Take by mouth.    zinc sulfate (ZINCATE) 50 mg zinc (220 mg) capsule Take 1 capsule (220 mg total) by mouth once daily.     Family History       Problem Relation (Age of Onset)    Cancer Father    Cataracts Sister    Diabetes Mother, Father, Sister, Brother, Brother, Son    Glaucoma Maternal Aunt, Paternal Aunt    Heart disease Mother, Father    Hyperlipidemia Brother, Son    Hypertension Mother, Father, Sister, Brother, Brother, Brother, Daughter, Son    No Known Problems Maternal Grandmother, Maternal Grandfather, Paternal Grandmother, Paternal Grandfather    Stroke Mother          Tobacco Use    Smoking status: Former     Current packs/day: 0.25     Average packs/day: 0.3 packs/day for 5.0 years (1.3 ttl pk-yrs)     Types: Cigarettes    Smokeless tobacco: Never    Tobacco comments:     Quit smoking as a teenager   Substance and Sexual  Activity    Alcohol use: Yes     Alcohol/week: 1.0 - 2.0 standard drink of alcohol     Types: 1 - 2 Cans of beer per week     Comment: 1-2 drinks occasionally    Drug use: No    Sexual activity: Yes     Partners: Female     Birth control/protection: Condom     Review of Systems   Constitutional:  Positive for appetite change. Negative for chills, diaphoresis and fever.   HENT:  Negative for sore throat and trouble swallowing.    Eyes:  Negative for photophobia and visual disturbance.   Respiratory:  Negative for cough, shortness of breath and wheezing.    Cardiovascular:  Negative for chest pain, palpitations and leg swelling.   Gastrointestinal:  Negative for abdominal distention, abdominal pain, diarrhea, nausea and vomiting.   Genitourinary:  Negative for dysuria and hematuria.   Musculoskeletal:  Negative for neck pain and neck stiffness.   Skin:  Negative for rash and wound.   Neurological:  Negative for seizures, syncope, weakness, light-headedness, numbness and headaches.   Psychiatric/Behavioral:  Negative for confusion and decreased concentration.      Objective:     Vital Signs (Most Recent):  Temp: 98.2 °F (36.8 °C) (09/10/24 2315)  Pulse: 72 (09/10/24 2315)  Resp: (!) 25 (09/10/24 2205)  BP: (!) 148/79 (09/10/24 2202)  SpO2: 96 % (09/10/24 2315) Vital Signs (24h Range):  Temp:  [98.1 °F (36.7 °C)-98.2 °F (36.8 °C)] 98.2 °F (36.8 °C)  Pulse:  [70-82] 72  Resp:  [16-26] 25  SpO2:  [94 %-98 %] 96 %  BP: (148-210)/() 148/79     Weight: 114.8 kg (253 lb)  Body mass index is 36.3 kg/m².     Physical Exam  Constitutional:       General: He is not in acute distress.     Appearance: He is obese. He is not toxic-appearing or diaphoretic.   HENT:      Head: Normocephalic and atraumatic.      Nose: Nose normal.   Eyes:      General: No scleral icterus.     Extraocular Movements: Extraocular movements intact.      Pupils: Pupils are equal, round, and reactive to light.   Cardiovascular:      Rate and Rhythm:  Normal rate and regular rhythm.   Pulmonary:      Effort: Pulmonary effort is normal. No respiratory distress.      Breath sounds: No wheezing or rales.   Abdominal:      General: Abdomen is flat. There is no distension.      Palpations: Abdomen is soft.      Tenderness: There is no abdominal tenderness. There is no guarding.   Musculoskeletal:         General: Normal range of motion.      Cervical back: Normal range of motion and neck supple. No rigidity.      Right lower leg: No edema.      Left lower leg: No edema.   Skin:     General: Skin is warm and dry.      Coloration: Skin is not jaundiced.   Neurological:      General: No focal deficit present.      Mental Status: He is alert and oriented to person, place, and time.      Cranial Nerves: No cranial nerve deficit.   Psychiatric:         Mood and Affect: Mood normal.         Behavior: Behavior normal.              CRANIAL NERVES     CN III, IV, VI   Pupils are equal, round, and reactive to light.       Significant Labs: All pertinent labs within the past 24 hours have been reviewed.  CBC:   Recent Labs   Lab 09/10/24  1956   WBC 7.48   HGB 13.8*   HCT 42.0        CMP:   Recent Labs   Lab 09/10/24  1956      K 3.5      CO2 29   GLU 56*   BUN 21   CREATININE 0.8   CALCIUM 10.4   PROT 7.7   ALBUMIN 3.5   BILITOT 0.2   ALKPHOS 90   AST 31   ALT 19   ANIONGAP 10     Urine Studies:   Recent Labs   Lab 09/10/24  2106   COLORU Yellow   APPEARANCEUA Clear   PHUR 6.0   SPECGRAV 1.010   PROTEINUA Negative   GLUCUA Negative   KETONESU Negative   BILIRUBINUA Negative   OCCULTUA Negative   NITRITE Negative   LEUKOCYTESUR Negative       Significant Imaging: I have reviewed all pertinent imaging results/findings within the past 24 hours.  Assessment/Plan:     * Hypoglycemia  - suspect secondary to taking home meds without eating also potentially accidental misuse of home meds as not clear if patient knows medication regimen  - BG 26 per EMS ; improving  following several dextrose doses  - BG Q3h  - hypoglycemic protocol  - holding home meds and insulin  - A1C pending  - pharmacy consulted to assist with complete med rec  - further management pending clinical course and future study review      Multiple open wounds of right lower extremity  - follows with wound care  - wound care consulted to follow and assist while IP      Multiple open wounds of left lower extremity  - follows with wound care  - wound care consulted to follow and assist while IP      Hypothyroidism (acquired)  - continue home synthroid      Essential hypertension  - unclear complete home med list  - continue metoprolol, nifedipine, benazepril at this time  - holding HCTZ, labetolol  - pharmacy consulted to assist with complete med rec      VTE Risk Mitigation (From admission, onward)           Ordered     enoxaparin injection 40 mg  Daily         09/10/24 2248     IP VTE HIGH RISK PATIENT  Once         09/10/24 2248     Place sequential compression device  Until discontinued         09/10/24 2248                       On 09/11/2024, patient should be placed in hospital observation services under my care.             Murray Swain MD  Department of Hospital Medicine  [facility]

## 2024-09-11 NOTE — ED PROVIDER NOTES
Emergency Department Encounter  Provider Note    Serafin Tapia  [id number]  9/10/2024    Evaluation:    History Acquisition:     Chief Complaint   Patient presents with    Speech Problem     Slurred speech onset 330p, LSN 2pm by family, CBG with EMS 26, treated by EMS with 15g oral glucose, 1mg glucagon IM, 250cc D10 IV, repeat CBG with , slurred speech resolved and pt back to baseline, ems also reports pt with slight left arm drift on scene initially, resolved       History of Present Illness:  Serafin Tapia is a 73 y.o. male who has a past medical history of Asthma, Diabetes mellitus type 2 in obese (05/03/2014), Dyslipidemia (05/05/2014), Elevated troponin (05/03/2014), Gait instability (02/28/2018), Hyperlipidemia, Hypertension, Hypothyroidism (acquired) (11/01/2017), Obesity, Ocular hypertension, Ocular hypertension, Pulmonary embolism (05/2014), PVD (posterior vitreous detachment), right eye, Spondylosis of lumbar region without myelopathy or radiculopathy (08/25/2017), Statin myopathy (01/16/2018), and Vertigo.    The patient presents to the ED due to hypoglycemia.     On arrival to ED, patient is awake and alert, and denies any complaints.    Additional historians utilized:  EMS report - initial glucose 26, patient given 15 g oral glucose, IM glucagon, and D10 IV. Repeat glucose 106.  Initially patient had slurred speech and arm weakness, but on arrival deficits have resolved.  Family member at bedside, states he has had episodes like this is in the past. He has DM and takes his medications in the AM. He did not eat anything today. Family member states he has chronic R leg pain and often does not want to get out of bed or stand up due to pain, so he does not eat.     Prior medical records were reviewed:   Wound care visit 9/9 for BLE wounds  Podiatry visit 8/13 for diabetic foot exam  IM visit 7/19 for f/u HTN    The patient's list of active medical history, family/social history, medications, and allergies  as documented has been reviewed.     Past Medical History:   Diagnosis Date    Asthma     childhood    Diabetes mellitus type 2 in obese 05/03/2014    Dyslipidemia 05/05/2014    Elevated troponin 05/03/2014    Gait instability 02/28/2018    Hyperlipidemia     Hypertension     Hypothyroidism (acquired) 11/01/2017    Obesity     Ocular hypertension     Ocular hypertension     Pulmonary embolism 05/2014    PVD (posterior vitreous detachment), right eye     Spondylosis of lumbar region without myelopathy or radiculopathy 08/25/2017    Statin myopathy 01/16/2018    Vertigo      Past Surgical History:   Procedure Laterality Date    CIRCUMCISION N/A 09/10/2019    Procedure: CIRCUMCISION;  Surgeon: Nhan Ruth MD;  Location: [facility];  Service: Urology;  Laterality: N/A;    TONSILLECTOMY       Family History   Problem Relation Name Age of Onset    Diabetes Mother      Heart disease Mother      Hypertension Mother      Stroke Mother      Heart disease Father      Hypertension Father      Diabetes Father      Cancer Father      Cataracts Sister Magnolia     Hypertension Sister Magnolia     Diabetes Sister Magnolia     Hyperlipidemia Brother Jose Raul     Hypertension Brother Jose Raul     Diabetes Brother Jose Raul     Diabetes Brother Justen     Hypertension Brother Justen     Hypertension Brother Nik     Hypertension Daughter Raj     Diabetes Son Serafin Jr.     Hyperlipidemia Son Serafin Jr.     Hypertension Son Serafin Jr.     Glaucoma Maternal Aunt      Glaucoma Paternal Aunt      No Known Problems Maternal Grandmother      No Known Problems Maternal Grandfather      No Known Problems Paternal Grandmother      No Known Problems Paternal Grandfather      Cirrhosis Neg Hx       Social History     Socioeconomic History    Marital status:    Occupational History    Occupation: Retired    Tobacco Use    Smoking status: Former     Current packs/day: 0.25     Average packs/day: 0.3 packs/day for 5.0 years (1.3 ttl  pk-yrs)     Types: Cigarettes    Smokeless tobacco: Never    Tobacco comments:     Quit smoking as a teenager   Substance and Sexual Activity    Alcohol use: Yes     Alcohol/week: 1.0 - 2.0 standard drink of alcohol     Types: 1 - 2 Cans of beer per week     Comment: 1-2 drinks occasionally    Drug use: No    Sexual activity: Yes     Partners: Female     Birth control/protection: Condom   Social History Narrative    Lives w/daughter.        Medications:  Current Discharge Medication List        CONTINUE these medications which have NOT CHANGED    Details   albuterol (PROVENTIL/VENTOLIN HFA) 90 mcg/actuation inhaler Inhale 2 puffs into the lungs every 6 (six) hours as needed for Wheezing.  Qty: 18 g, Refills: 5    Associated Diagnoses: Mild intermittent asthma without complication      ascorbic acid, vitamin C, (VITAMIN C) 500 MG tablet Take 1 tablet (500 mg total) by mouth 2 (two) times daily.      aspirin (ECOTRIN) 81 MG EC tablet Take 81 mg by mouth once daily.      benazepriL (LOTENSIN) 40 MG tablet Take 1 tablet (40 mg total) by mouth once daily.  Qty: 90 tablet, Refills: 3    Comments: .  Associated Diagnoses: Essential hypertension      calcium-vitamin D3 (CALCIUM 500 + D) 500 mg(1,250mg) -200 unit per tablet Take 1 tablet by mouth 2 (two) times daily with meals.  Qty: 60 tablet, Refills: 3      chlorthalidone (HYGROTEN) 25 MG Tab Take 1 tablet (25 mg total) by mouth once daily.  Qty: 90 tablet, Refills: 3    Comments: .  Associated Diagnoses: Essential hypertension      ciclopirox (PENLAC) 8 % Soln Apply topically nightly.  Qty: 6.6 mL, Refills: 11      dapagliflozin (FARXIGA) 5 mg Tab tablet Take 1 tablet (5 mg total) by mouth once daily.  Qty: 90 tablet, Refills: 2    Associated Diagnoses: Controlled type 2 diabetes mellitus with diabetic polyneuropathy, without long-term current use of insulin      dulaglutide (TRULICITY) 1.5 mg/0.5 mL pen injector Inject 1.5 mg into the skin every 7 days.  Qty: 4 pen ,  Refills: 3    Associated Diagnoses: Diabetes mellitus due to underlying condition, controlled, with complication, with long-term current use of insulin      ergocalciferol (ERGOCALCIFEROL) 50,000 unit Cap TAKE 1 CAPSULE BY MOUTH ONE TIME PER WEEK  Qty: 12 capsule, Refills: 0      folic acid (FOLVITE) 1 MG tablet Take 1 tablet (1,000 mcg total) by mouth once daily.  Qty: 90 tablet, Refills: 3    Associated Diagnoses: Folate deficiency      gabapentin (NEURONTIN) 100 MG capsule Take 3 capsules (300 mg total) by mouth 3 (three) times daily.  Qty: 90 capsule, Refills: 3    Associated Diagnoses: Multiple open wounds of left lower extremity, subsequent encounter; Multiple open wounds of right lower extremity, subsequent encounter      glimepiride (AMARYL) 4 MG tablet Take 2 tablets (8 mg total) by mouth daily with breakfast.  Qty: 180 tablet, Refills: 3    Associated Diagnoses: Controlled type 2 diabetes mellitus with diabetic polyneuropathy, without long-term current use of insulin; Uncontrolled type 2 diabetes mellitus with hyperglycemia      labetaloL (NORMODYNE) 200 MG tablet Take 1 tablet (200 mg total) by mouth 2 (two) times daily.  Qty: 180 tablet, Refills: 2    Comments: .      levothyroxine (SYNTHROID) 50 MCG tablet Take 1 tablet (50 mcg total) by mouth before breakfast.  Qty: 90 tablet, Refills: 2    Associated Diagnoses: Hypothyroidism (acquired)      linaGLIPtin (TRADJENTA) 5 mg Tab tablet Take 1 tablet (5 mg total) by mouth once daily.  Qty: 90 tablet, Refills: 1    Associated Diagnoses: Diabetes mellitus due to underlying condition, controlled, with complication, with long-term current use of insulin      metFORMIN (GLUCOPHAGE) 1000 MG tablet Take 1 tablet (1,000 mg total) by mouth 2 (two) times daily with meals.  Qty: 180 tablet, Refills: 2    Associated Diagnoses: Controlled type 2 diabetes mellitus with diabetic polyneuropathy, without long-term current use of insulin      methotrexate 2.5 MG Tab TAKE 8  TABLETS BY MOUTH WEEKLY  Qty: 40 tablet, Refills: 5    Associated Diagnoses: Non-traumatic rhabdomyolysis      metoprolol succinate (TOPROL-XL) 200 MG 24 hr tablet Take 1 tablet (200 mg total) by mouth once daily.  Qty: 90 tablet, Refills: 2    Comments: .  Associated Diagnoses: Essential hypertension      multivitamin (THERAGRAN) per tablet Take 1 tablet by mouth once daily. PER [facility]      NIFEdipine (PROCARDIA-XL) 90 MG (OSM) 24 hr tablet Take 1 tablet (90 mg total) by mouth once daily.  Qty: 90 tablet, Refills: 3    Comments: .  Associated Diagnoses: Essential hypertension      predniSONE (DELTASONE) 10 MG tablet 6 tabs daily  Qty: 180 tablet, Refills: 0      SUFLAVE 178.7-7.3-0.5 gram SolR SMARTSI Bottle(s) By Mouth Twice Daily      tumeric-ging-olive-oreg-capryl 100 mg-150 mg- 50 mg-150 mg Cap Take by mouth.      zinc sulfate (ZINCATE) 50 mg zinc (220 mg) capsule Take 1 capsule (220 mg total) by mouth once daily.  Qty: 90 capsule, Refills: 1             Allergies:  Review of patient's allergies indicates:   Allergen Reactions    Statins-hmg-coa reductase inhibitors Other (See Comments)     Possible statin induced autoimmune myopathy       Review of Systems   Neurological:  Positive for weakness.         Physical Exam:     Initial Vitals   BP Pulse Resp Temp SpO2   09/10/24 1646 09/10/24 1646 09/10/24 1646 09/10/24 1656 09/10/24 1646   (!) 204/106 82 16 98.1 °F (36.7 °C) 98 %      MAP       --                Physical Exam    Nursing note and vitals reviewed.  Constitutional: He appears well-developed and well-nourished. He is not diaphoretic. No distress.   HENT:   Head: Normocephalic and atraumatic.   Mouth/Throat: Oropharynx is clear and moist.   Eyes: EOM are normal. Pupils are equal, round, and reactive to light.   Neck: No tracheal deviation present.   Cardiovascular:  Normal rate, regular rhythm, normal heart sounds and intact distal pulses.           Pulmonary/Chest: Breath sounds normal.  No stridor. No respiratory distress.   Abdominal: Abdomen is soft. He exhibits no distension and no mass. There is no abdominal tenderness.   Musculoskeletal:         General: No edema. Normal range of motion.     Neurological: He is alert and oriented to person, place, and time. No cranial nerve deficit or sensory deficit.   Skin: Skin is warm and dry. Capillary refill takes less than 2 seconds. No rash noted.   Psychiatric: He has a normal mood and affect. His behavior is normal. Thought content normal.         Differential Diagnoses:   Based on available information and initial assessment, Differential Diagnosis includes, but is not limited to:  Decreased PO intake, nausea/vomiting, dehydration, metabolic derangement, infection/sepsis, UTI, pneumonia, medication side effect, medication non-compliance, device malfunction, DKA, ACS/MI, alcohol/drug abuse.      ED Management:   Procedures    Orders Placed This Encounter    CBC auto differential    Comprehensive metabolic panel    Urinalysis, Reflex to Urine Culture Urine, Clean Catch    Procalcitonin    TSH    Hemoglobin A1c    TSH    Hemoglobin A1C    Iron and TIBC    Ferritin    Vital signs    Notify Physician    Place sequential compression device    Recheck Blood Glucose:    Strict intake and output    Weigh patient    Neuro checks:  LOC/AVPU, Orientation, GCS if LOC altered, Pupils if LOC altered or GCS <10, Speech/Language, Facial Symmetry, Motor    Notify Physician - Potential Need of Opioid Reversal    Full code    Hospital Medicine PharmD Consult    POCT Glucose, Hand-Held Device    POCT Glucose, Hand-Held Device    POCT Glucose, Hand-Held Device    POCT Glucose, Hand-Held Device    POCT Glucose, Hand-Held Device    EKG 12-lead    Saline lock IV    Place in Observation    Fall precautions    POCT glucose    POCT glucose    POCT glucose    POCT glucose    POCT glucose    POCT glucose    POCT glucose    POCT glucose    POCT glucose    POCT glucose    dextrose  10% bolus 250 mL 250 mL    aspirin EC tablet 81 mg    lisinopriL tablet 20 mg    gabapentin capsule 300 mg    levothyroxine tablet 50 mcg    metoprolol succinate (TOPROL-XL) 24 hr tablet 200 mg    NIFEdipine 24 hr tablet 90 mg    predniSONE tablet 60 mg    sodium chloride 0.9% flush 10 mL    naloxone 0.4 mg/mL injection 0.02 mg    glucose chewable tablet 16 g    glucose chewable tablet 24 g    glucagon (human recombinant) injection 1 mg    enoxaparin injection 40 mg    acetaminophen tablet 1,000 mg    ondansetron injection 4 mg    albuterol inhaler 2 puff    melatonin tablet 6 mg    aluminum-magnesium hydroxide-simethicone 200-200-20 mg/5 mL suspension 30 mL    dextrose 10% bolus 125 mL 125 mL    dextrose 10% bolus 250 mL 250 mL    IP VTE HIGH RISK PATIENT          EKG:   EKG interpretation by ED attending physician:  NSR, rate 77, occasional PVCs, no ST changes, no ischemia, normal intervals.  Compared with prior EKG dated 12/2017, grossly stable without significant change.      Labs:     Labs Reviewed   CBC W/ AUTO DIFFERENTIAL - Abnormal       Result Value    WBC 7.48      RBC 5.30      Hemoglobin 13.8 (*)     Hematocrit 42.0      MCV 79 (*)     MCH 26.0 (*)     MCHC 32.9      RDW 15.5 (*)     Platelets 373      MPV 11.2      Immature Granulocytes 0.3      Gran # (ANC) 5.2      Immature Grans (Abs) 0.02      Lymph # 1.8      Mono # 0.4      Eos # 0.1      Baso # 0.02      nRBC 0      Gran % 69.5      Lymph % 23.5      Mono % 5.6      Eosinophil % 0.8      Basophil % 0.3      Differential Method Automated     COMPREHENSIVE METABOLIC PANEL - Abnormal    Sodium 140      Potassium 3.5      Chloride 101      CO2 29      Glucose 56 (*)     BUN 21      Creatinine 0.8      Calcium 10.4      Total Protein 7.7      Albumin 3.5      Total Bilirubin 0.2      Alkaline Phosphatase 90      AST 31      ALT 19      eGFR >60.0      Anion Gap 10     HEMOGLOBIN A1C - Abnormal    Hemoglobin A1C 6.7 (*)     Estimated Avg Glucose 146  (*)     Narrative:     add on Hemoglobin A1c per Murray Swain MD/order#[id number]   on  09/11/2024 @ 01:34.    Add on TSH per Dr. Swain @ 22:59 pm to order # [id number]   POCT GLUCOSE - Abnormal    POCT Glucose 130 (*)    URINALYSIS, REFLEX TO URINE CULTURE    Specimen UA Urine, Clean Catch      Color, UA Yellow      Appearance, UA Clear      pH, UA 6.0      Specific Gravity, UA 1.010      Protein, UA Negative      Glucose, UA Negative      Ketones, UA Negative      Bilirubin (UA) Negative      Occult Blood UA Negative      Nitrite, UA Negative      Leukocytes, UA Negative      Narrative:     Specimen Source->Urine   PROCALCITONIN    Procalcitonin 0.04     TSH   HEMOGLOBIN A1C   TSH    TSH 1.638      Narrative:     Add on TSH per Dr. Swain @ 22:59 pm to order # [id number]   POCT GLUCOSE, HAND-HELD DEVICE   POCT GLUCOSE MONITORING CONTINUOUS   POCT GLUCOSE MONITORING CONTINUOUS     Independent review of the labs ordered include:   See ED course    Imaging:     Imaging Results    None            Medications Given:     Medications   dextrose 10% bolus 250 mL 250 mL (has no administration in time range)   aspirin EC tablet 81 mg (81 mg Oral Given 9/11/24 1000)   lisinopriL tablet 20 mg (20 mg Oral Given 9/11/24 1000)   gabapentin capsule 300 mg (300 mg Oral Given 9/11/24 1554)   levothyroxine tablet 50 mcg (50 mcg Oral Given 9/11/24 0550)   metoprolol succinate (TOPROL-XL) 24 hr tablet 200 mg (200 mg Oral Given 9/11/24 1000)   NIFEdipine 24 hr tablet 90 mg (90 mg Oral Given 9/11/24 1000)   predniSONE tablet 60 mg (60 mg Oral Given 9/11/24 1000)   sodium chloride 0.9% flush 10 mL (has no administration in time range)   naloxone 0.4 mg/mL injection 0.02 mg (has no administration in time range)   glucose chewable tablet 16 g (has no administration in time range)   glucose chewable tablet 24 g (has no administration in time range)   glucagon (human recombinant) injection 1 mg (has no administration in time  range)   enoxaparin injection 40 mg (has no administration in time range)   acetaminophen tablet 1,000 mg (has no administration in time range)   ondansetron injection 4 mg (has no administration in time range)   albuterol inhaler 2 puff (has no administration in time range)   melatonin tablet 6 mg (has no administration in time range)   aluminum-magnesium hydroxide-simethicone 200-200-20 mg/5 mL suspension 30 mL (has no administration in time range)   dextrose 10% bolus 125 mL 125 mL (has no administration in time range)   dextrose 10% bolus 250 mL 250 mL (has no administration in time range)        Medical Decision Making:    Additional Consideration:   Additional testing considered during clinical course: none    Social determinants of health considered during development of treatment plan include: poor access to care    Current co-morbidities considered which impacted clinical decision making: HTN, DM, morbid obesity, PE, CHF, PAD    Case discussed with additional provider:   At time of shift change, patient's ED workup incomplete. Oncoming ED physician to continue care. All relevant details were discussed, including pending workup and planned disposition. See summary below.    Pending: CMP, UA  Disposition: anticipate DC back home if labs unremarkable, glucose stable      ED Course as of 09/11/24 1624   Tue Sep 10, 2024   1939 SpO2: 98 % [SS]   1939 Resp: 16 [SS]   1939 Pulse: 82 [SS]   1939 Temp Source: Oral [SS]   1939 Temp: 98.1 °F (36.7 °C) [SS]   1939 BP(!): 204/106  Hypertensive, vitals otherwise reassuring  [SS]   1939 POCT glucose(!)  Glucose improved  [SS]   2054 CBC auto differential(!)  Unremarkable  [SS]   2116 Comprehensive metabolic panel(!)  Glucose 50, will give additional D10 [SS]   2117 Comprehensive metabolic panel(!) [PW]      ED Course User Index  [PW] Jackie Silva MD  [SS] Randolph Stearns MD            Medical Decision Making  Problems Addressed:  Decreased oral intake: acute illness  or injury  Hypoglycemia: acute illness or injury  Weakness: acute illness or injury    Amount and/or Complexity of Data Reviewed  Labs: ordered. Decision-making details documented in ED Course.  ECG/medicine tests: ordered and independent interpretation performed. Decision-making details documented in ED Course.    Risk  Diagnosis or treatment significantly limited by social determinants of health.        Clinical Impression:       ICD-10-CM ICD-9-CM   1. Hypoglycemia  E16.2 251.2   2. Weakness  R53.1 780.79   3. Decreased oral intake  R63.8 783.9   4. Chest pain  R07.9 786.50         Follow-up Information    None          ED Disposition Condition    Observation                  Randolph Stearns MD  09/10/24 2105       Randolph Stearns MD  09/11/24 3921

## 2024-09-11 NOTE — PLAN OF CARE
Problem: Diabetes Comorbidity  Goal: Blood Glucose Level Within Targeted Range  Outcome: Progressing  Intervention: Monitor and Manage Glycemia  Flowsheets (Taken 9/11/2024 1843)  Glycemic Management: blood glucose monitored

## 2024-09-12 ENCOUNTER — OFFICE VISIT (OUTPATIENT)
Dept: WOUND CARE | Facility: CLINIC | Age: 74
End: 2024-09-12
Payer: MEDICARE

## 2024-09-12 VITALS
WEIGHT: 253 LBS | HEART RATE: 80 BPM | DIASTOLIC BLOOD PRESSURE: 65 MMHG | RESPIRATION RATE: 18 BRPM | SYSTOLIC BLOOD PRESSURE: 134 MMHG | BODY MASS INDEX: 36.22 KG/M2 | OXYGEN SATURATION: 94 % | TEMPERATURE: 98 F | HEIGHT: 70 IN

## 2024-09-12 VITALS
OXYGEN SATURATION: 95 % | WEIGHT: 252.44 LBS | HEIGHT: 70 IN | BODY MASS INDEX: 36.14 KG/M2 | DIASTOLIC BLOOD PRESSURE: 69 MMHG | SYSTOLIC BLOOD PRESSURE: 155 MMHG | HEART RATE: 64 BPM

## 2024-09-12 DIAGNOSIS — I10 ESSENTIAL HYPERTENSION: ICD-10-CM

## 2024-09-12 DIAGNOSIS — I50.9 HEART FAILURE, UNSPECIFIED HF CHRONICITY, UNSPECIFIED HEART FAILURE TYPE: ICD-10-CM

## 2024-09-12 DIAGNOSIS — I87.2 VENOUS INSUFFICIENCY: ICD-10-CM

## 2024-09-12 DIAGNOSIS — S81.801D OPEN WOUND OF RIGHT LOWER EXTREMITY, SUBSEQUENT ENCOUNTER: Primary | ICD-10-CM

## 2024-09-12 DIAGNOSIS — L03.90 WOUND CELLULITIS: ICD-10-CM

## 2024-09-12 DIAGNOSIS — E11.42 CONTROLLED TYPE 2 DIABETES MELLITUS WITH DIABETIC POLYNEUROPATHY, WITHOUT LONG-TERM CURRENT USE OF INSULIN: ICD-10-CM

## 2024-09-12 PROBLEM — G93.41 ENCEPHALOPATHY, METABOLIC: Status: ACTIVE | Noted: 2024-09-12

## 2024-09-12 LAB
ANION GAP SERPL CALC-SCNC: 8 MMOL/L (ref 8–16)
BUN SERPL-MCNC: 26 MG/DL (ref 8–23)
CALCIUM SERPL-MCNC: 9.7 MG/DL (ref 8.7–10.5)
CHLORIDE SERPL-SCNC: 103 MMOL/L (ref 95–110)
CO2 SERPL-SCNC: 27 MMOL/L (ref 23–29)
CREAT SERPL-MCNC: 1 MG/DL (ref 0.5–1.4)
EST. GFR  (NO RACE VARIABLE): >60 ML/MIN/1.73 M^2
FERRITIN SERPL-MCNC: 195 NG/ML (ref 20–300)
GLUCOSE SERPL-MCNC: 119 MG/DL (ref 70–110)
IRON SERPL-MCNC: 72 UG/DL (ref 45–160)
POCT GLUCOSE: 133 MG/DL (ref 70–110)
POCT GLUCOSE: 285 MG/DL (ref 70–110)
POTASSIUM SERPL-SCNC: 3.7 MMOL/L (ref 3.5–5.1)
SATURATED IRON: 25 % (ref 20–50)
SODIUM SERPL-SCNC: 138 MMOL/L (ref 136–145)
TOTAL IRON BINDING CAPACITY: 286 UG/DL (ref 250–450)
TRANSFERRIN SERPL-MCNC: 193 MG/DL (ref 200–375)

## 2024-09-12 PROCEDURE — 3288F FALL RISK ASSESSMENT DOCD: CPT | Mod: CPTII,S$GLB,,

## 2024-09-12 PROCEDURE — 82728 ASSAY OF FERRITIN: CPT | Performed by: STUDENT IN AN ORGANIZED HEALTH CARE EDUCATION/TRAINING PROGRAM

## 2024-09-12 PROCEDURE — 3061F NEG MICROALBUMINURIA REV: CPT | Mod: CPTII,S$GLB,,

## 2024-09-12 PROCEDURE — 1159F MED LIST DOCD IN RCRD: CPT | Mod: CPTII,S$GLB,,

## 2024-09-12 PROCEDURE — 3078F DIAST BP <80 MM HG: CPT | Mod: CPTII,S$GLB,,

## 2024-09-12 PROCEDURE — 1101F PT FALLS ASSESS-DOCD LE1/YR: CPT | Mod: CPTII,S$GLB,,

## 2024-09-12 PROCEDURE — 3077F SYST BP >= 140 MM HG: CPT | Mod: CPTII,S$GLB,,

## 2024-09-12 PROCEDURE — 97530 THERAPEUTIC ACTIVITIES: CPT

## 2024-09-12 PROCEDURE — 97161 PT EVAL LOW COMPLEX 20 MIN: CPT

## 2024-09-12 PROCEDURE — 36415 COLL VENOUS BLD VENIPUNCTURE: CPT | Performed by: STUDENT IN AN ORGANIZED HEALTH CARE EDUCATION/TRAINING PROGRAM

## 2024-09-12 PROCEDURE — 25000003 PHARM REV CODE 250: Performed by: FAMILY MEDICINE

## 2024-09-12 PROCEDURE — 80048 BASIC METABOLIC PNL TOTAL CA: CPT | Performed by: HOSPITALIST

## 2024-09-12 PROCEDURE — 36415 COLL VENOUS BLD VENIPUNCTURE: CPT | Performed by: HOSPITALIST

## 2024-09-12 PROCEDURE — 1125F AMNT PAIN NOTED PAIN PRSNT: CPT | Mod: CPTII,S$GLB,,

## 2024-09-12 PROCEDURE — 29580 STRAPPING UNNA BOOT: CPT | Mod: RT,S$GLB,,

## 2024-09-12 PROCEDURE — 3066F NEPHROPATHY DOC TX: CPT | Mod: CPTII,S$GLB,,

## 2024-09-12 PROCEDURE — 3072F LOW RISK FOR RETINOPATHY: CPT | Mod: CPTII,S$GLB,,

## 2024-09-12 PROCEDURE — 99213 OFFICE O/P EST LOW 20 MIN: CPT | Mod: 25,S$GLB,,

## 2024-09-12 PROCEDURE — 97165 OT EVAL LOW COMPLEX 30 MIN: CPT

## 2024-09-12 PROCEDURE — 4010F ACE/ARB THERAPY RXD/TAKEN: CPT | Mod: CPTII,S$GLB,,

## 2024-09-12 PROCEDURE — G0378 HOSPITAL OBSERVATION PER HR: HCPCS

## 2024-09-12 PROCEDURE — 3008F BODY MASS INDEX DOCD: CPT | Mod: CPTII,S$GLB,,

## 2024-09-12 PROCEDURE — 99999 PR PBB SHADOW E&M-EST. PATIENT-LVL IV: CPT | Mod: PBBFAC,,,

## 2024-09-12 PROCEDURE — 3044F HG A1C LEVEL LT 7.0%: CPT | Mod: CPTII,S$GLB,,

## 2024-09-12 PROCEDURE — 83540 ASSAY OF IRON: CPT | Performed by: STUDENT IN AN ORGANIZED HEALTH CARE EDUCATION/TRAINING PROGRAM

## 2024-09-12 RX ADMIN — NIFEDIPINE 90 MG: 30 TABLET, FILM COATED, EXTENDED RELEASE ORAL at 08:09

## 2024-09-12 RX ADMIN — LEVOTHYROXINE SODIUM 50 MCG: 50 TABLET ORAL at 05:09

## 2024-09-12 RX ADMIN — ASPIRIN 81 MG: 81 TABLET, COATED ORAL at 08:09

## 2024-09-12 RX ADMIN — METOPROLOL SUCCINATE 200 MG: 100 TABLET, EXTENDED RELEASE ORAL at 08:09

## 2024-09-12 RX ADMIN — GABAPENTIN 300 MG: 300 CAPSULE ORAL at 08:09

## 2024-09-12 NOTE — CARE UPDATE
I have reviewed the chart of Serafin Tapia who is hospitalized for the following:    Active Hospital Problems    Diagnosis    *Hypoglycemia    Encephalopathy, metabolic     AMS on admission due to hypoglycemia  Blood glucose closely monitored and replaced       Multiple open wounds of left lower extremity    Multiple open wounds of right lower extremity    Hypothyroidism (acquired)    Essential hypertension    Severe obesity with body mass index (BMI) of 36.0 to 36.9 with serious comorbidity        Bel Denney NP  Unit Based [facility]

## 2024-09-12 NOTE — DISCHARGE SUMMARY
[facility] - Neurosurgery ([facility])  [facility] Medicine  Discharge Summary      Patient Name: Serafin Tapia  MRN: [id number]  ELVIA: [id number]  Patient Class: OP- Observation  Admission Date: 9/10/2024  Hospital Length of Stay: 2 days  Discharge Date and Time:  09/12/2024 11:08 AM  Attending Physician: Ana Rosa Hernandez MD   Discharging Provider: Ana Rosa Hernandez MD  Primary Care Provider: John Vargas MD  [facility] Medicine Team: [facility] MED O Ana Rosa Hernandez MD  Primary Care Team: [facility] MED O    HPI:   Serafin Tapia is a 73 y.o. male who has a past medical history of Asthma, Diabetes mellitus type 2 in obese , Gait instability, Hyperlipidemia, Hypertension, Hypothyroidism, Obesity, Pulmonary embolism (05/2014), and HTN who presents to the ED with chief compliant of AMS and hypoglycemia. Patient is a limited historian and unable to state his exact diabetic meds. Takes trulicity and metformin and potentially others. Family found patient altered today and called EMS who noted patient BG 26 and with slurred speech and weakness. Symptoms improved on arrival to ED but continued to have recurrent hypoglycemia despite initial dextrose infusion. Family report similar episodes of symptomatic hypoglycemia in the past and state that patient takes his DM meds in am but doesn't always get out of bed to eat. Patient admitted to the care of medicine for further evaluation and management.     * No surgery found *      Hospital Course:   AMS 2/2 hypoglycemia from not eating all day. Hypoglycemia resolved. PTOT evaluated patient. Stable for discharge. Discontinuing Tradjenta. Close PCP f/u encouraged. HH ordered.      Goals of Care Treatment Preferences:  Code Status: Full Code         Consults:     No new Assessment & Plan notes have been filed under this hospital service since the last note was generated.  Service: Hospital Medicine    Final Active Diagnoses:    Diagnosis Date Noted POA    PRINCIPAL PROBLEM:  Hypoglycemia [E16.2]  09/11/2024 Unknown    Multiple open wounds of left lower extremity [S81.802A] 07/08/2024 Yes    Multiple open wounds of right lower extremity [S81.801A] 07/08/2024 Yes    Hypothyroidism (acquired) [E03.9] 11/01/2017 Yes    Essential hypertension [I10] 05/03/2014 Yes      Problems Resolved During this Admission:       Discharged Condition: stable    Disposition:     Follow Up:   Follow-up Information       John Vargas MD. Schedule an appointment as soon as possible for a visit.    Specialty: Internal Medicine  Contact information:  [address]  [address]  [facility] [address]  [phone]                           Patient Instructions:   No discharge procedures on file.    Significant Diagnostic Studies: N/A    Pending Diagnostic Studies:       None           Medications:  Reconciled Home Medications:      Medication List        CONTINUE taking these medications      albuterol 90 mcg/actuation inhaler  Commonly known as: PROVENTIL/VENTOLIN HFA  Inhale 2 puffs into the lungs every 6 (six) hours as needed for Wheezing.     benazepriL 40 MG tablet  Commonly known as: LOTENSIN  Take 1 tablet (40 mg total) by mouth once daily.     ciclopirox 8 % Soln  Commonly known as: PENLAC  Apply topically nightly.     ergocalciferol 50,000 unit Cap  Commonly known as: ERGOCALCIFEROL  TAKE 1 CAPSULE BY MOUTH ONE TIME PER WEEK     folic acid 1 MG tablet  Commonly known as: FOLVITE  Take 1 tablet (1,000 mcg total) by mouth once daily.     gabapentin 100 MG capsule  Commonly known as: NEURONTIN  Take 3 capsules (300 mg total) by mouth 3 (three) times daily.     levothyroxine 50 MCG tablet  Commonly known as: SYNTHROID  Take 1 tablet (50 mcg total) by mouth before breakfast.     metoprolol succinate 200 MG 24 hr tablet  Commonly known as: TOPROL-XL  Take 1 tablet (200 mg total) by mouth once daily.     NIFEdipine 90 MG (OSM) 24 hr tablet  Commonly known as: PROCARDIA-XL  Take 1 tablet (90 mg total) by mouth once daily.      TRULICITY 1.5 mg/0.5 mL pen injector  Generic drug: dulaglutide  Inject 1.5 mg into the skin every 7 days.            STOP taking these medications      TRADJENTA 5 mg Tab tablet  Generic drug: linaGLIPtin              Indwelling Lines/Drains at time of discharge:   Lines/Drains/Airways       None                   Time spent on the discharge of patient: 35 minutes of time spent on discharge, including examining the patient, providing discharge instructions, arranging follow up, and documentation.            Ana Rosa Hernandez MD  Department of Hospital Medicine  [facility])

## 2024-09-12 NOTE — PLAN OF CARE
[facility] scheduled pcp f/u   Future Appointments   Date Time Provider Department Center   9/16/2024  7:30 AM Rosalba Eaton PA-C [facility]   9/17/2024  9:00 AM Leonel Horowitz, NP [facility]   9/19/2024  7:30 AM Rosalba Eaton PA-C [facility]   9/23/2024  7:30 AM Rosalba Eaton PA-C [facility]   9/26/2024  7:30 AM Rosalba Eaton PA-C [facility]   10/28/2024  8:00 AM Trinidad Diaz, OD [facility]   11/6/2024  1:00 PM Scott Andujar MD PhD [facility] [place]

## 2024-09-12 NOTE — PLAN OF CARE
[facility] - Neurosurgery (Hospital)  Discharge Final Note    Primary Care Provider: John Vargas MD    Expected Discharge Date: 9/12/2024    Patient to be discharged home.  Referral sent for home health.  Family to provide transportation home.    [facility] will provide services to patient.    Future Appointments   Date Time Provider Department Center   9/12/2024  1:30 PM Rosalba Eaton, PA-C [facility]   9/16/2024  7:30 AM AngelitoRosalba alvarez N, PA-C [facility]   9/17/2024  9:00 AM Leonel Horowitz, NP [facility]   9/19/2024  7:30 AM Angelito, Rosalba N, PA-C [facility]   9/23/2024  7:30 AM Angelito Rosalba N, PA-C [facility]   9/26/2024  7:30 AM Angelito Rosalba N, PA-C [facility]   10/28/2024  8:00 AM Trinidad Diaz, OD [facility]   11/6/2024  1:00 PM Scott Andujar MD PhD [facility] [place]         Final Discharge Note (most recent)       Final Note - 09/12/24 1237          Final Note    Assessment Type Final Discharge Note     Anticipated Discharge Disposition Home-Health Care Svc        Post-Acute Status    Post-Acute Authorization Home Health     Home Health Status Referrals Sent     Discharge Delays None known at this time                     Important Message from Medicare             Contact Info       John Vargas MD   Specialty: Internal Medicine   Relationship: PCP - General    [address] [place] [id number]   Phone: [phone]       Next Steps: Schedule an appointment as soon as possible for a visit

## 2024-09-12 NOTE — PT/OT/SLP EVAL
"Occupational Therapy   Evaluation and Discharge Note    Name: Serafin Tapia  MRN: [id number]  Admitting Diagnosis: Hypoglycemia  Recent Surgery: * No surgery found *      Recommendations:     Discharge Recommendations: No Therapy Indicated  Discharge Equipment Recommendations: none  Barriers to discharge:  None    Assessment:     Serafin Tapia is a 73 y.o. male with a medical diagnosis of Hypoglycemia. At this time, patient is functioning at their prior level of function and does not require further acute OT services. If further OT needs arise, OT will plan to reassess.     Plan:     During this hospitalization, patient does not require further acute OT services.  Please re-consult if situation changes.    Plan of Care Reviewed with: patient    Subjective     Chief Complaint: "I have equipment to help me do that" (Lower body dressing)  Patient/Family Comments/goals: n/a    Occupational Profile:  Living Environment: Pt resides with sister in [facility], ramp to enter  Previous level of function: Independent  Roles and Routines: Drives, Retired , [place]  Equipment Used at home: walker, rolling, cane, straight  Assistance upon Discharge: Unknown    Pain/Comfort:  Pain Rating 1:  (Pt w/ subjective c/o leg pain, unrated)  Location - Side 1: Left  Location 1: leg  Pain Addressed 1: Reposition, Distraction    Patients cultural, spiritual, Pentecostalism conflicts given the current situation: no    Objective:     Communicated with: Nursing prior to session.  Patient found up in chair with telemetry upon OT entry to room.    General Precautions: Standard, fall, diabetic  Orthopedic Precautions: N/A  Braces: N/A  Respiratory Status: Room air     Occupational Performance:    Bed Mobility:    Pt found [facility]    Functional Mobility/Transfers:  Patient completed Sit <> Stand Transfer with supervision  with  no assistive device   Patient completed Bed <> Chair Transfer using Step Transfer technique with " supervision with no assistive device  Functional Mobility: Pt engaged in functional mobility to simulate household/community distances in room w/ SBA and no AD in room x20 feet in order to maximize functional endurance and standing balance required for engagement in occupations of choice. Pt w/ decreased step length and gait speed though no LOB noted during trial.     Activities of Daily Living:  Pt reporting no ADL needs this date  Set up assistance for self-feeding    Cognitive/Visual Perceptual:  Cognitive/Psychosocial Skills:     -       Oriented to: Person, Place, Time, and Situation   -       Follows Commands/attention:Follows multistep  commands  -       Communication: clear/fluent  -       Memory: No Deficits noted  -       Safety awareness/insight to disability: intact     Physical Exam:  Balance: -       Good static/dynamic sitting balance; fair to good standing balance  Upper Extremity Range of Motion:     -       Right Upper Extremity: WNL  -       Left Upper Extremity: WNL  Upper Extremity Strength:    -       Right Upper Extremity: WNL  -       Left Upper Extremity: WNL   Strength:    -       Right Upper Extremity: WNL  -       Left Upper Extremity: WNL  Fine Motor Coordination:    -       Intact  Gross motor coordination:   WFL    AMPAC 6 Click ADL:  AMPAC Total Score: 23    Treatment & Education:  - Session this date included initial evaluation as well as functional mobility activities to improve patients independence and decrease functional decline  - Pt educated on OT roles and POC  -Pt educated on d/c recs  -Pt educated on call button use for assistance    Patient left up in chair with all lines intact and call button in reach    GOALS:   Multidisciplinary Problems       Occupational Therapy Goals          Problem: Occupational Therapy    Goal Priority Disciplines Outcome Interventions   Occupational Therapy Goal     OT, PT/OT Progressing    Description: Goals to be met by: 9/12/24     Patient  will increase functional independence with ADLs by performing:    LE Dressing with [place].  Toileting from toilet with [place] for hygiene and clothing management.   Toilet transfer to toilet with [place].                         History:     Past Medical History:   Diagnosis Date    Asthma     childhood    Diabetes mellitus type 2 in obese 05/03/2014    Dyslipidemia 05/05/2014    Elevated troponin 05/03/2014    Gait instability 02/28/2018    Hyperlipidemia     Hypertension     Hypothyroidism (acquired) 11/01/2017    Obesity     Ocular hypertension     Ocular hypertension     Pulmonary embolism 05/2014    PVD (posterior vitreous detachment), right eye     Spondylosis of lumbar region without myelopathy or radiculopathy 08/25/2017    Statin myopathy 01/16/2018    Vertigo          Past Surgical History:   Procedure Laterality Date    CIRCUMCISION N/A 09/10/2019    Procedure: CIRCUMCISION;  Surgeon: Nhan Ruth MD;  Location: [facility];  Service: Urology;  Laterality: N/A;    TONSILLECTOMY         Time Tracking:     OT Date of Treatment: 09/12/24  OT Start Time: 1155  OT Stop Time: 1211  OT Total Time (min): 16 min    Billable Minutes:Evaluation 16    9/12/2024

## 2024-09-12 NOTE — PT/OT/SLP EVAL
Physical Therapy Evaluation & Discharge    Patient Name:  Serafin Tapia   MRN:  [id number]    Recommendations:     Discharge Recommendations: Low Intensity Therapy   Discharge Equipment Recommendations: none   Barriers to discharge: None    Assessment:     Serafin Tapia is a 73 y.o. male admitted with a medical diagnosis of Hypoglycemia. Patient presented with increased motivation to participate in evaluation, with good response to completed activities and provided education. Based upon information gained, PT recommends low intensity skilled physical therapy services post-acutely. Provided recommendation based upon needed intensity to not only directly address patient's previously listed functional impairments, discrepancy between functional baseline & current mobility status, and increased falls risk, but to also positively impact patient's quality of life & intervene on high risk for caregiver burnout. . Performance deficits impacting function include impaired skin, weakness, impaired functional mobility, decreased lower extremity function, pain, impaired coordination, edema.    Rehab Prognosis: Good;   Recent Surgery: * No surgery found *      Plan:     During this hospitalization, patient does not require further skilled physical therapy services acutely. PT to be re-consulted if change in functional status noted.      Subjective     Chief Complaint: RLE pain  Patient/Family Comments/goals: Patient reporting that he recently started using a RW for ambulation  Pain/Comfort:  Pain Rating 1: Intensity not provided  Location - Side 1: Right  Location - Orientation 1: lower  Location 1: leg  Pain Rating Post-Intervention 1: Intensity not provided    Patients cultural, spiritual, Yazidi conflicts given the current situation: no    Social History:  Residence: Patient lives with their sister in a single story house with  a TTE . Patient's bathroom has a WIS w/ built-in bench.  Equipment Owned: walker, rolling, cane,  "straight, bath bench  Prior level of function:  At baseline, patient was independent for ambulation and all other mobility.  Assistance Upon Discharge:  Sister 24/7    Objective:     Communicated with Nsg prior to session.  Patient found HOB elevated with bed alarm, SCD  upon PT entry to room.    General Precautions: Standard, fall, diabetic   Orthopedic Precautions:N/A   Braces: N/A   Body mass index is 36.3 kg/m².  Oxygen Device: Room Air  Vitals: /65 (Patient Position: Lying)   Pulse 80   Temp 97.5 °F (36.4 °C) (Oral)   Resp 18   Ht 5' 10" (1.778 m)   Wt 114.8 kg (253 lb)   SpO2 (!) 94%   BMI 36.30 kg/m²     Exams:  Cognition:   Alert and Cooperative   Patient is oriented to Person, Place, Time, Situation  Command following: Follows multistep verbal commands  Postural Assessment: rounded shoulders, forward head, and FFP  Physical Exam:    Left LE Right LE   Edema present present   Sensation intact to light touch intact to light touch   Coordination normal impaired     LLE ROM: WFL  RLE ROM: WFL    LLE Strength: 5/5 globally  RLE Strength: 5/5 globally    Functional Mobility:  Bed Mobility:     EOB Scooting:   Anterior: Stand-By Assistance  Supine>Sit: x1, Minimal Assistance with HOB Elevated  *Facilitation of trunk/LE management    Transfers:     Sit<>Stand:   x1, Contact Guard Assistance from Edge of Bed with No AD  x1, Contact Guard Assistance from Edge of Bed with Rolling Walker  Chair Transfer: x1, Stand-By Assistance with Rolling Walker using step transfer technique  *VC/TC for UE placement, AD management    Gait:  x2ft fwd/bwd, Minimal Assistance with No AD  x100ft, Contact Guard Assistance progressing to Stand-By Assistance with Rolling Walker  Gait Assessment: decreased step length, decreased step height, decreased kadeem, decreased gait speed, antalgic gait pattern, decreased heel strike, decreased toe push-off, step-to pattern  Total Distance: 104ft    Balance:   Static Sitting: " Supervision  Dynamic Sitting: Stand-By Assistance    Static Standing: Contact Guard Assistance progressing to Stand-By Assistance  Dynamic Standing: Stand-By Assistance - Minimal Assistance    AM-PAC 6 CLICK MOBILITY  Total Score:21     Treatment & Education:  Patient Education Provided on:  The role of physical therapy and how the patient can benefit from skilled services  The negative effects of prolonged bed rest/sedentary behavior, along with the importance of OOB activity & patient participation with PT  The importance of contacting RN, via call light, for mobility throughout the day  Pt white board updated with current therapists name and level of mobility assistance needed.     Patient Verbalized understanding of all topics touched on this date. All patient questions answered within the PT scope of practice    Patient left up in chair with all lines intact, call button in reach, and RN, MD, and CM notified.    GOALS: Not established      History:     Past Medical History:   Diagnosis Date    Asthma     childhood    Diabetes mellitus type 2 in obese 05/03/2014    Dyslipidemia 05/05/2014    Elevated troponin 05/03/2014    Gait instability 02/28/2018    Hyperlipidemia     Hypertension     Hypothyroidism (acquired) 11/01/2017    Obesity     Ocular hypertension     Ocular hypertension     Pulmonary embolism 05/2014    PVD (posterior vitreous detachment), right eye     Spondylosis of lumbar region without myelopathy or radiculopathy 08/25/2017    Statin myopathy 01/16/2018    Vertigo        Past Surgical History:   Procedure Laterality Date    CIRCUMCISION N/A 09/10/2019    Procedure: CIRCUMCISION;  Surgeon: Nhan Ruth MD;  Location: [facility];  Service: Urology;  Laterality: N/A;    TONSILLECTOMY         Time Tracking:     PT Received On: 09/12/24  PT Start Time: 1027     PT Stop Time: 1052  PT Total Time (min): 25 min     Billable Minutes: Evaluation 10 and Therapeutic Activity 15      09/12/2024

## 2024-09-12 NOTE — CONSULTS
"Admission Medication History     The home medication history was taken by Vance Willson, PharmD, BCPS.    You may go to "Admission" then "Reconcile Home Medications" tabs to review and/or act upon these items.     The home medication list has been updated by the Pharmacy department.   Please read ALL comments highlighted in yellow.   Please address this information as you see fit.    Feel free to contact us if you have any questions or require assistance.      Noted throughout patients chart that he is a poor historian with poor compliance. Patients fill history and outpatient progress notes were used for the medication history. Concerning the patients DM medications, appears he only utilizes trulicity and tradjenta. Per a rheumatology note from July 2024, pt was lost to f/u and stopped mtx. Their plan was to send labs to restart mtx, however it appears it has not been restarted yet. Per those notes, patient was not supposed to be on steroids. It's difficult to ascertain how patient was actually taking his medications (i.e. actually taking as prescribed?) d/t pt being a poor historian.    PTA Medications   Medication Sig    albuterol (PROVENTIL/VENTOLIN HFA) 90 mcg/actuation inhaler Inhale 2 puffs into the lungs every 6 (six) hours as needed for Wheezing.    benazepriL (LOTENSIN) 40 MG tablet Take 1 tablet (40 mg total) by mouth once daily.    ciclopirox (PENLAC) 8 % Soln Apply topically nightly.    dulaglutide (TRULICITY) 1.5 mg/0.5 mL pen injector Inject 1.5 mg into the skin every 7 days.    ergocalciferol (ERGOCALCIFEROL) 50,000 unit Cap TAKE 1 CAPSULE BY MOUTH ONE TIME PER WEEK    folic acid (FOLVITE) 1 MG tablet Take 1 tablet (1,000 mcg total) by mouth once daily.    gabapentin (NEURONTIN) 100 MG capsule Take 3 capsules (300 mg total) by mouth 3 (three) times daily.    levothyroxine (SYNTHROID) 50 MCG tablet Take 1 tablet (50 mcg total) by mouth before breakfast.    linaGLIPtin (TRADJENTA) 5 mg Tab tablet " Take 1 tablet (5 mg total) by mouth once daily.    metoprolol succinate (TOPROL-XL) 200 MG 24 hr tablet Take 1 tablet (200 mg total) by mouth once daily.    NIFEdipine (PROCARDIA-XL) 90 MG (OSM) 24 hr tablet Take 1 tablet (90 mg total) by mouth once daily.    [DISCONTINUED] ascorbic acid, vitamin C, (VITAMIN C) 500 MG tablet Take 1 tablet (500 mg total) by mouth 2 (two) times daily.    [DISCONTINUED] aspirin (ECOTRIN) 81 MG EC tablet Take 81 mg by mouth once daily.    [DISCONTINUED] calcium-vitamin D3 (CALCIUM 500 + D) 500 mg(1,250mg) -200 unit per tablet Take 1 tablet by mouth 2 (two) times daily with meals.    [DISCONTINUED] chlorthalidone (HYGROTEN) 25 MG Tab Take 1 tablet (25 mg total) by mouth once daily.    [DISCONTINUED] dapagliflozin (FARXIGA) 5 mg Tab tablet Take 1 tablet (5 mg total) by mouth once daily.    [DISCONTINUED] glimepiride (AMARYL) 4 MG tablet Take 2 tablets (8 mg total) by mouth daily with breakfast.    [DISCONTINUED] labetaloL (NORMODYNE) 200 MG tablet Take 1 tablet (200 mg total) by mouth 2 (two) times daily.    [DISCONTINUED] metFORMIN (GLUCOPHAGE) 1000 MG tablet Take 1 tablet (1,000 mg total) by mouth 2 (two) times daily with meals.    [DISCONTINUED] methotrexate 2.5 MG Tab TAKE 8 TABLETS BY MOUTH WEEKLY    [DISCONTINUED] multivitamin (THERAGRAN) per tablet Take 1 tablet by mouth once daily. PER [facility]    [DISCONTINUED] predniSONE (DELTASONE) 10 MG tablet 6 tabs daily    [DISCONTINUED] SUFLAVE 178.7-7.3-0.5 gram SolR SMARTSI Bottle(s) By Mouth Twice Daily    [DISCONTINUED] tumeric-ging-olive-oreg-capryl 100 mg-150 mg- 50 mg-150 mg Cap Take by mouth.    [DISCONTINUED] zinc sulfate (ZINCATE) 50 mg zinc (220 mg) capsule Take 1 capsule (220 mg total) by mouth once daily.       Potential issues to be addressed PRIOR TO DISCHARGE  Please discuss with the patient barriers to adherence with medication treatment plans    Vance Willson, PharmD, BCPS   Ext. [phone]

## 2024-09-12 NOTE — PLAN OF CARE
Problem: Occupational Therapy  Goal: Occupational Therapy Goal  Description: Goals to be met by: 9/12/24     Patient will increase functional independence with ADLs by performing:    Sit to stand transfer with moderate assistance.    Outcome: Met

## 2024-09-12 NOTE — PLAN OF CARE
Problem: Occupational Therapy  Goal: Occupational Therapy Goal  Description: Goals to be met by: 9/12/24     Patient will increase functional independence with ADLs by performing:    Sit to stand transfer with moderate assistance    9/12/2024 1543 by Neftali Norwood OT  Outcome: Met

## 2024-09-12 NOTE — PROGRESS NOTES
"Subjective:       Patient ID: Serafin Tapia is a 73 y.o. male.    Chief Complaint: Wound Check        Patient presents with his daughter for a re-evaluation of a right lower extremity woundy. He reports his wounds started as blisters about 2 months ago. He is unsure why he did not contact the office until now. Pt admits compliance with wearing compression stockings daily, but reports his stockings have lost elasticity. He reports that his wounds drain a large amount of clear fluid. Pt has been dressing his wounds with a wound "liquid" and occasionally covering his wounds with gauze. Pt has also been leaving his wounds open to air. Patient followed with vascular surgery for PAD and venous insufficiency. Dr. Kapoor determined he has adequate arterial blood flow for wound healing and cleared him for a 3 layer compression wrap. Pt previously followed with the wound care clinic in 2022 and 2023.  Denies compliance with a low sodium diet, leg elevation, and following a high protein diet. Admits compliance with gabapentin. Notices improvement in pain. Denies side effects. Pt followed with Dr. Stern while I was on vacation. On 7/29/24, sharp debridement was performed and part of the patient's achilles tendon was removed. His wound was cultured. Pt followed back on 8/1. His wound was debrided further and was prescribed Augmentin. On 8/5, his wound was debrided further. His Augmentin dose was refilled and sent for 7 days. Pt reported never picking up the Augmentin prescription.  Pt reported stopping the antibiotics early. He reports that he is getting a colonoscopy at [facility] on Wednesday and the MD told him to stop antibiotics until one day after the procedure. Pt completed antibiotics. No complaints at this time. Denies fever, chills, purulent drainage, erythema, or warmth.      7/29/24 anaerobic culture: presumptive Prevotella species  7/29/24 aerobic culture: enterococcus faecalis    7/8/24 ABIs and TBIs:  Right MIHIR- " 0.89  Right TBI-0.50  Left MIHIR- 0.82  Left TBI-0.50  Right leg: Segmental pressures and PVR waveforms suggest minimal to mild peripheral arterial occlusive disease. Rt Great Toe: The PPG waveform- mildly dampened with a pressure of 94 mmHG is noted.  Left leg: Segmental pressures and PVR waveforms suggest moderate tibial peripheral arterial occlusive disease. Lt Great Toe: The PPG waveform-moderately dampened with a pressure of 94 mmHG is noted.    10/21/22 MIHIR:  Right lower extremity: 0.93- minimal peripheral arterial occlusive disease  Left lower extremity: 0.70- moderate peripheral arterial occlusive disease      Review of Systems   Constitutional:  Negative for activity change, chills, diaphoresis, fatigue and fever.   Eyes:  Negative for photophobia and visual disturbance.   Respiratory:  Negative for apnea, chest tightness and shortness of breath.    Cardiovascular:  Positive for leg swelling. Negative for chest pain and palpitations.   Musculoskeletal:  Negative for gait problem and joint swelling.   Skin:  Positive for wound. Negative for color change, pallor and rash.   Neurological:  Negative for dizziness, syncope, facial asymmetry, speech difficulty, weakness, light-headedness, numbness and headaches.   Psychiatric/Behavioral:  Negative for agitation and confusion. The patient is not nervous/anxious.    All other systems reviewed and are negative.      Objective:      Physical Exam  Vitals reviewed.   Constitutional:       General: He is not in acute distress.     Appearance: Normal appearance. He is obese.   Cardiovascular:      Rate and Rhythm: Normal rate and regular rhythm.      Pulses: Normal pulses.   Pulmonary:      Effort: No respiratory distress.   Musculoskeletal:         General: No swelling.      Right lower leg: Pitting Edema present.      Left lower leg: Pitting Edema present.      Comments: Ambulates with cane   Skin:     General: Skin is warm and dry.      Capillary Refill: Capillary  refill takes less than 2 seconds.      Findings: Wound present. No erythema.          Neurological:      General: No focal deficit present.      Mental Status: He is alert and oriented to person, place, and time.   Psychiatric:         Mood and Affect: Mood normal.         Behavior: Behavior normal.         Thought Content: Thought content normal.         Judgment: Judgment normal.         Assessment:       1. Open wound of right lower extremity, subsequent encounter    2. Venous insufficiency    3. Essential hypertension    4. Heart failure, unspecified HF chronicity, unspecified heart failure type    5. Controlled type 2 diabetes mellitus with diabetic polyneuropathy, without long-term current use of insulin    6. Wound cellulitis               Wound Venous Ulcer Right lower;medial;distal Leg (Active)       Present on Original Admission:    Primary Wound Type: Venous ulcer   Side: Right   Orientation: lower;medial;distal   Location: Leg   Wound Approximate Age at First Assessment (Weeks):    Wound Number:    Is this injury device related?:    Incision Type:    Closure Method:    Wound Description (Comments):    Type:    Additional Comments:    Ankle-Brachial Index:    Pulses:    Removal Indication and Assessment:    Wound Outcome:    Wound Image   09/12/24 1320   Dressing Appearance Dry;Intact;Clean;Moist drainage 09/12/24 1320   Drainage Amount Large 09/12/24 1320   Drainage Characteristics/Odor Serosanguineous;Bleeding controlled 09/12/24 1320   Red (%), Wound Tissue Color 100 % 09/12/24 1320   Periwound Area Intact;Pink;Edematous 09/12/24 1320   Wound Length (cm) 3.9 cm 09/12/24 1320   Wound Width (cm) 4 cm 09/12/24 1320   Wound Depth (cm) 0.1 cm 09/12/24 1320   Wound Volume (cm^3) 1.56 cm^3 09/12/24 1320   Wound Surface Area (cm^2) 15.6 cm^2 09/12/24 1320   Undermining (depth (cm)/location) 2 cm/ 12o'clock and 0.8 cm/ 6 o'clock 09/12/24 1320   Care Cleansed with:;Soap and water 09/12/24 1320   Dressing  Applied;Reinforced;Compression wrap;Other (comment);Absorptive Pad 09/12/24 1320   Packing packed with;hydrofiber/alginate 09/12/24 1320   Periwound Care Skin barrier film applied 09/12/24 1320   Compression Unna's Boot;Three layer compression 09/12/24 1320         Serafin was seen in the clinic room and placed in the supine position on the treatment table.  The wound dressings were removed and the legs were cleansed with Easi-clense sponges and dried thoroughly.  No erythema, green drainge, or warmth noted from patient's baseline. Odor noted to right lower extremity- but improved since last week. Cleansed right lower extremity wounds with acetic acid (1:1 with water). Pt tolerated well.           Plan of Care:    Left lower extremity- Compression stockings daily  Right lower extremity- gentian violet to periwound, packed with aquacel Ag, drawtex, aquacel, mextra pad, and a 3 layer zinc compression wrap.  The patient's foot was positioned at a 90 degree angle. The wrap was applied using a spiral technique avoiding creases or folds.  The wrap was started behind the first metatarsal and ended below the tibial tubercle of the knee.  There was overlap of each turn half the width of the previous turn.  The compression wraps will be changed every 7 days.        Plan:       Right lower extremity was dressed as detailed above  Patient was instructed to not get the dressings wet and to use cast covers for showering.  Should the dressing become wet, he is to remove it, place a moist dressing over the wound, cover with gauze and roll gauze and use ace wraps for compression and to secure bandages.  He should then notify this office as soon as possible to have a new dressing applied.  Instructed patient to remove wrap if he notices tingling or coldness to his right lower extremities or if his toes become white, blue, or cold.    Discussed nutrition and the role of protein in wound healing with the patient. Instructed patient to  optimize protein for wound healing.     Discussed bilateral lower extremity edema with patient. Instructed patient to elevate legs whenever sedentary and follow a low sodium diet.  Discussed replacing compression stockings every 3-6 months.    Instructed patient to keep follow up appointments with vascular surgery.    Instructed patient to continue to take gabapentin as prescribed for nerve pain.    Discussed the importance of utilizing compression stockings to prevent recurrent wounds.    Discussed cellulitis with patient- improved since last visit. Treated topically with acetic acid and aquacel ag.     Discussed drainage control- pt needs to be seen 2x a week.      Written and verbal instructions given to patient  RTC Monday    Rosalba Eaton PA-C

## 2024-09-12 NOTE — PLAN OF CARE
Problem: Diabetes Comorbidity  Goal: Blood Glucose Level Within Targeted Range  Outcome: Progressing  Intervention: Monitor and Manage Glycemia  Flowsheets (Taken 9/12/2024 5667)  Glycemic Management: blood glucose monitored

## 2024-09-12 NOTE — HOSPITAL COURSE
AMS 2/2 hypoglycemia from not eating all day. Hypoglycemia resolved. PTOT evaluated patient. Stable for discharge. Discontinuing Tradjenta. Close PCP f/u encouraged. HH ordered.

## 2024-09-12 NOTE — NURSING
Nurses Note -- 4 Eyes      9/11/2024   9:54 PM      Skin assessed during: Q Shift Change      [] No Altered Skin Integrity Present    []Prevention Measures Documented      [x] Yes- Altered Skin Integrity Present or Discovered   [x] LDA Added if Not in Epic (Describe Wound)   [] New Altered Skin Integrity was Present on Admit and Documented in LDA   [] Wound Image Taken    Wound Care Consulted? Yes    Attending Nurse:  TELLO Dewitt    Second RN/Staff Member:  TELLO Biswas

## 2024-09-12 NOTE — CONSULTS
Hospital Medicine Pharmacy Consult Note    PharmD Consult Received For:     Pharmacy to perform an admission medication history and reconciliation. Please refer to separate note,    Pharmacy will sign-off, please re-consult as needed    Thank you for the consult,  Vance Willson, PharmD, [facility]   [facility] [phone]    **Note: Consults are reviewed Monday-Friday 7:00am-3:30pm. The above recommendations are only suggested. The recommendations should be considered in conjunction with all patient factors.**

## 2024-09-12 NOTE — PLAN OF CARE
[facility] - Neurosurgery ([facility])      HOME HEALTH ORDERS  FACE TO FACE ENCOUNTER    Patient Name: Serafin Tapia  YOB: 1950    PCP: John Vargas MD   PCP Address: [address] [address] / [facility] [address]  PCP Phone Number: [phone]  PCP Fax: [phone]    Encounter Date: 9/10/24    Admit to Home Health    Diagnoses:  Active Hospital Problems    Diagnosis  POA    *Hypoglycemia [E16.2]  Unknown    Multiple open wounds of left lower extremity [S81.802A]  Yes    Multiple open wounds of right lower extremity [S81.801A]  Yes    Hypothyroidism (acquired) [E03.9]  Yes    Essential hypertension [I10]  Yes      Resolved Hospital Problems   No resolved problems to display.       Follow Up Appointments:  Future Appointments   Date Time Provider Department Center   9/16/2024  7:30 AM Rosalba Eaton PA-C [facility]   9/19/2024  7:30 AM Rosalba Eaton PA-C [facility]   9/23/2024  7:30 AM Rosalba Eaton PA-C [facility]   9/26/2024  7:30 AM Rosalba Eaton PA-C [facility]   10/28/2024  8:00 AM Trinidad Diaz, MELI [facility]   11/6/2024  1:00 PM Scott Andujar MD PhD [facility] [place]       Allergies:  Review of patient's allergies indicates:   Allergen Reactions    Statins-hmg-coa reductase inhibitors Other (See Comments)     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    Current Facility-Administered Medications   Medication Dose Route Frequency Provider Last Rate Last Admin    acetaminophen tablet 1,000 mg  1,000 mg Oral Q8H PRN Murray Swain MD        albuterol inhaler 2 puff  2 puff Inhalation Q4H PRN Murray Swain MD        aluminum-magnesium hydroxide-simethicone 200-200-20 mg/5 mL suspension 30 mL  30 mL Oral QID PRN Murray Swain MD        aspirin EC tablet 81 mg  81 mg Oral Daily Murray Swain MD   81 mg  at 09/12/24 0836    dextrose 10% bolus 125 mL 125 mL  12.5 g Intravenous PRN Naa Greco MD        dextrose 10% bolus 250 mL 250 mL  250 mL Intravenous PRN Jackie Silva MD        dextrose 10% bolus 250 mL 250 mL  25 g Intravenous PRN Naa Greco MD        enoxaparin injection 40 mg  40 mg Subcutaneous Daily Murray Swain MD   40 mg at 09/11/24 1756    gabapentin capsule 300 mg  300 mg Oral TID Murray Swain MD   300 mg at 09/12/24 0836    glucagon (human recombinant) injection 1 mg  1 mg Intramuscular PRN Naa Greco MD        glucose chewable tablet 16 g  16 g Oral PRN Naa Greco MD        glucose chewable tablet 24 g  24 g Oral PRN Naa Greco MD        insulin aspart U-100 pen 0-5 Units  0-5 Units Subcutaneous QID (AC + HS) PRN Naa Greco MD   3 Units at 09/11/24 2123    levothyroxine tablet 50 mcg  50 mcg Oral Before breakfast Murray Swain MD   50 mcg at 09/12/24 0541    melatonin tablet 6 mg  6 mg Oral Nightly PRN Murray Swain MD        metoprolol succinate (TOPROL-XL) 24 hr tablet 200 mg  200 mg Oral Daily Murray Swain MD   200 mg at 09/12/24 0836    naloxone 0.4 mg/mL injection 0.02 mg  0.02 mg Intravenous PRN Murray Swain MD        NIFEdipine 24 hr tablet 90 mg  90 mg Oral Daily Murray Swain MD   90 mg at 09/12/24 0837    ondansetron injection 4 mg  4 mg Intravenous Q8H PRN Murray Swain MD        sodium chloride 0.9% flush 10 mL  10 mL Intravenous Q12H PRN Murray Swain MD            Medication List        CONTINUE taking these medications      albuterol 90 mcg/actuation inhaler  Commonly known as: PROVENTIL/VENTOLIN HFA  Inhale 2 puffs into the lungs every 6 (six) hours as needed for Wheezing.     benazepriL 40 MG tablet  Commonly known as: LOTENSIN  Take 1 tablet (40 mg total) by mouth once daily.     ciclopirox 8 % Soln  Commonly known as: PENLAC  Apply topically nightly.     ergocalciferol 50,000 unit Cap  Commonly known as:  ERGOCALCIFEROL  TAKE 1 CAPSULE BY MOUTH ONE TIME PER WEEK     folic acid 1 MG tablet  Commonly known as: FOLVITE  Take 1 tablet (1,000 mcg total) by mouth once daily.     gabapentin 100 MG capsule  Commonly known as: NEURONTIN  Take 3 capsules (300 mg total) by mouth 3 (three) times daily.     levothyroxine 50 MCG tablet  Commonly known as: SYNTHROID  Take 1 tablet (50 mcg total) by mouth before breakfast.     metoprolol succinate 200 MG 24 hr tablet  Commonly known as: TOPROL-XL  Take 1 tablet (200 mg total) by mouth once daily.     NIFEdipine 90 MG (OSM) 24 hr tablet  Commonly known as: PROCARDIA-XL  Take 1 tablet (90 mg total) by mouth once daily.     TRULICITY 1.5 mg/0.5 mL pen injector  Generic drug: dulaglutide  Inject 1.5 mg into the skin every 7 days.            STOP taking these medications      TRADJENTA 5 mg Tab tablet  Generic drug: linaGLIPtin                I have seen and examined this patient within the last 30 days. My clinical findings that support the need for the home health skilled services and home bound status are the following:no   Weakness/numbness causing balance and gait disturbance due to Weakness/Debility making it taxing to leave home.  Patient with medication mismanagement issues requiring home bound status as evidenced by  Poor understanding of medication regimen/dosage, Poor adherence to medication regimen/dosage, and Uncontrolled hyperglycemic/hypoglycemic events.     Diet:   diabetic diet 2000 calorie and 2 gram sodium diet        Referrals/ Consults  Physical Therapy to evaluate and treat. Evaluate for home safety and equipment needs; Establish/upgrade home exercise program. Perform / instruct on therapeutic exercises, gait training, transfer training, and Range of Motion.  Occupational Therapy to evaluate and treat. Evaluate home environment for safety and equipment needs. Perform/Instruct on transfers, ADL training, ROM, and therapeutic exercises.    Activities:   activity as  tolerated    Nursing:   Agency to admit patient within 24 hours of hospital discharge unless specified on physician order or at patient request    SN to complete comprehensive assessment including routine vital signs. Instruct on disease process and s/s of complications to report to MD. Review/verify medication list sent home with the patient at time of discharge  and instruct patient/caregiver as needed. Frequency may be adjusted depending on start of care date.     Skilled nurse to perform up to 3 visits PRN for symptoms related to diagnosis    Notify MD if SBP > 160 or < 90; DBP > 90 or < 50; HR > 120 or < 50; Temp > 101; O2 < 88%; Other:       Ok to schedule additional visits based on staff availability and patient request on consecutive days within the home health episode.    When multiple disciplines ordered:    Start of Care occurs on Sunday - Wednesday schedule remaining discipline evaluations as ordered on separate consecutive days following the start of care.    Thursday SOC -schedule subsequent evaluations Friday and Monday the following week.     Friday - Saturday SOC - schedule subsequent discipline evaluations on consecutive days starting Monday of the following week.    For all post-discharge communication and subsequent orders please contact patient's primary care physician. If unable to reach primary care physician or do not receive response within 30 minutes, please contact  for clinical staff order clarification    Miscellaneous   Diabetic Care:   SN to perform and educate Diabetic management with blood glucose monitoring:, Fingerstick blood sugar AC and HS, and Report CBG < 60 or > 350 to physician.    Home Health Aide:  Nursing Weekly, Physical Therapy Three times weekly, and Occupational Therapy Three times weekly    Wound Care Orders  Standard wound care to bilateral lower leg wounds    I certify that this patient is confined to his home and needs intermittent skilled nursing care, physical  therapy, and occupational therapy.

## 2024-09-12 NOTE — PLAN OF CARE
[facility] (Hospital)  Initial Discharge Assessment       Primary Care Provider: John Vargas MD    Admission Diagnosis: Weakness [R53.1]  Hypoglycemia [E16.2]  Decreased oral intake [R63.8]  Chest pain [R07.9]    Admission Date: 9/10/2024  Expected Discharge Date: 9/12/2024    Transition of Care Barriers: None    Payor: A2B MGD [facility] / Plan: A2B CHOICES / Product Type: Medicare Advantage /     Extended Emergency Contact Information  Primary Emergency Contact: Raj Tapia   [facility]  Home Phone: [phone]  Relation: Daughter  Secondary Emergency Contact: Serafin Tapia Jr   [place]  Mobile Phone: [phone]  Relation: Son    Discharge Plan A: Home Health  Discharge Plan B: Home      [id number] - [place], [place] - [address]  [facility]  [address] [facility] [address]  Phone: [phone] Fax: [phone]    [id number] - [place], [place] - [address]  [address]  [facility] [address]  Phone: [phone] Fax: [phone]    CM obtained discharge planning assessment with patient.  Initial Assessment (most recent)       Adult Discharge Assessment - 09/12/24 1209          Discharge Assessment    Assessment Type Discharge Planning Assessment     Confirmed/corrected address, phone number and insurance Yes     Confirmed Demographics Correct on Facesheet     Source of Information patient     Communicated POORNIMA with patient/caregiver Yes     Reason For Admission Hypoglycemia     People in Home alone     Do you expect to return to your current living situation? Yes     Do you have help at home or someone to help you manage your care at home? No     Prior to hospitilization cognitive status: Alert/Oriented     Current cognitive status: Alert/Oriented     Walking or Climbing Stairs Difficulty yes     Walking or Climbing Stairs ambulation difficulty, requires equipment     Mobility Management rolling  walker and straight cane     Dressing/Bathing Difficulty no     Equipment Currently Used at Home walker, rolling;cane, straight     Readmission within 30 days? No     Patient currently being followed by outpatient case management? No     Do you currently have service(s) that help you manage your care at home? No     Do you take prescription medications? Yes     Do you have prescription coverage? Yes     [facility] [facility] CHOICES -     Do you have any problems affording any of your prescribed medications? No     Is the patient taking medications as prescribed? yes     Who is going to help you get home at discharge? family     How do you get to doctors appointments? family or friend will provide     Are you on dialysis? No     Discharge Plan A Home Health     Discharge Plan B Home     DME Needed Upon Discharge  none     Discharge Plan discussed with: Patient     Transition of Care Barriers None        Physical Activity    On average, how many days per week do you engage in moderate to strenuous exercise (like a brisk walk)? 0 days        Financial Resource Strain    How hard is it for you to pay for the very basics like food, housing, medical care, and heating? Not hard at all        Housing Stability    In the last 12 months, was there a time when you were not able to pay the mortgage or rent on time? No     At any time in the past 12 months, were you homeless or living in a shelter (including now)? No        Transportation Needs    Has the lack of transportation kept you from medical appointments, meetings, work or from getting things needed for daily living? No        Food Insecurity    Within the past 12 months, you worried that your food would run out before you got the money to buy more. Never true     Within the past 12 months, the food you bought just didn't last and you didn't have money to get more. Never true        Stress    Do you feel stress - tense, restless,  nervous, or anxious, or unable to sleep at night because your mind is troubled all the time - these days? Not at all        Social Isolation    How often do you feel lonely or isolated from those around you?  Never        Alcohol Use    Q1: How often do you have a drink containing alcohol? 2-4 times a month     Q2: How many drinks containing alcohol do you have on a typical day when you are drinking? 1 or 2     Q3: How often do you have six or more drinks on one occasion? Never        Utilities    In the past 12 months has the electric, gas, oil, or water company threatened to shut off services in your home? No        Health Literacy    How often do you need to have someone help you when you read instructions, pamphlets, or other written material from your doctor or pharmacy? Never

## 2024-09-13 ENCOUNTER — PATIENT OUTREACH (OUTPATIENT)
Dept: ADMINISTRATIVE | Facility: CLINIC | Age: 74
End: 2024-09-13
Payer: MEDICARE

## 2024-09-13 NOTE — TELEPHONE ENCOUNTER
@ 1150 call to [facility], advised status pending. Informed of patient d/c date, provided tcc nurse's contact information and request to contact pt directly when able to schedule. Staff gatito. Advised pt, he gatito.

## 2024-09-13 NOTE — PROGRESS NOTES
C3 nurse spoke with Serafin Tapia for a TCC post hospital discharge follow up call. The patient has a scheduled HOSFU appointment with Leonel Horowitz NP on 09/17/24 @ 0900.           I have sent a prescription to [facility] as they have previously had this medication available.  Please inform the patient.  Thank you.

## 2024-09-13 NOTE — TELEPHONE ENCOUNTER
Spoked with patient and rescheduled hospital follow-up to reflect hospital follow-up appointment type. Patient now scheduled from 9am to 11:30am with Dr. Roy below:    Appointment Information   Name: VALDO VEGA MRN: [id number]   Date: 9/17/2024 Status: Rafiq   Appt Time: 11:30 AM Length: 30   Visit Type: HOSPITAL FOLLOW UP [6899] Copay: $0.00   Provider: Mariely Roy MD Dept: [facility], Internal Medicine       Dept Address: [address] [place] [id number]       Dept Phone Number: [phone]   Referring Provider:   SHEY: [id number]   Appt Phone:     Notes: hospital f/u   Made On: 9/13/2024 12:22 PM By: LETY REA [[id number]] (ES)

## 2024-09-16 ENCOUNTER — OFFICE VISIT (OUTPATIENT)
Dept: WOUND CARE | Facility: CLINIC | Age: 74
End: 2024-09-16
Payer: MEDICARE

## 2024-09-16 VITALS
DIASTOLIC BLOOD PRESSURE: 63 MMHG | SYSTOLIC BLOOD PRESSURE: 140 MMHG | BODY MASS INDEX: 36.93 KG/M2 | HEIGHT: 70 IN | WEIGHT: 257.94 LBS | HEART RATE: 61 BPM | TEMPERATURE: 99 F

## 2024-09-16 DIAGNOSIS — I87.2 VENOUS INSUFFICIENCY: ICD-10-CM

## 2024-09-16 DIAGNOSIS — I10 ESSENTIAL HYPERTENSION: ICD-10-CM

## 2024-09-16 DIAGNOSIS — L92.9 HYPERGRANULATION: ICD-10-CM

## 2024-09-16 DIAGNOSIS — S81.801D OPEN WOUND OF RIGHT LOWER EXTREMITY, SUBSEQUENT ENCOUNTER: Primary | ICD-10-CM

## 2024-09-16 DIAGNOSIS — I50.9 HEART FAILURE, UNSPECIFIED HF CHRONICITY, UNSPECIFIED HEART FAILURE TYPE: ICD-10-CM

## 2024-09-16 DIAGNOSIS — E11.42 CONTROLLED TYPE 2 DIABETES MELLITUS WITH DIABETIC POLYNEUROPATHY, WITHOUT LONG-TERM CURRENT USE OF INSULIN: ICD-10-CM

## 2024-09-16 PROCEDURE — 29580 STRAPPING UNNA BOOT: CPT | Mod: 51,RT,S$GLB,

## 2024-09-16 PROCEDURE — 99999 PR PBB SHADOW E&M-EST. PATIENT-LVL III: CPT | Mod: PBBFAC,,,

## 2024-09-16 PROCEDURE — 99499 UNLISTED E&M SERVICE: CPT | Mod: S$GLB,,,

## 2024-09-16 PROCEDURE — 17250 CHEM CAUT OF GRANLTJ TISSUE: CPT | Mod: S$GLB,,,

## 2024-09-16 NOTE — PROGRESS NOTES
"Subjective:       Patient ID: Serafin Tapia is a 73 y.o. male.    Chief Complaint: Wound Check        Patient presents for a re-evaluation of a right lower extremity woundy. He reports his wounds started as blisters about 2 months ago. He is unsure why he did not contact the office until now. Pt admits compliance with wearing compression stockings daily, but reports his stockings have lost elasticity. He reports that his wounds drain a large amount of clear fluid. Pt has been dressing his wounds with a wound "liquid" and occasionally covering his wounds with gauze. Pt has also been leaving his wounds open to air. Patient followed with vascular surgery for PAD and venous insufficiency. Dr. Kapoor determined he has adequate arterial blood flow for wound healing and cleared him for a 3 layer compression wrap. Pt previously followed with the wound care clinic in 2022 and 2023.  Denies compliance with a low sodium diet, leg elevation, and following a high protein diet. Admits compliance with gabapentin. Notices improvement in pain. Denies side effects. Pt followed with Dr. Stern while I was on vacation. On 7/29/24, sharp debridement was performed and part of the patient's achilles tendon was removed. His wound was cultured. Pt followed back on 8/1. His wound was debrided further and was prescribed Augmentin. On 8/5, his wound was debrided further. His Augmentin dose was refilled and sent for 7 days. Pt reported never picking up the Augmentin prescription.  Pt reported stopping the antibiotics early. He reports that he is getting a colonoscopy at [facility] on Wednesday and the MD told him to stop antibiotics until one day after the procedure. Pt completed antibiotics.     Interval history: Pt's daughter reports that the patient eats out for every meal. He does not cook at home. Denies fever, chills, purulent drainage, erythema, or warmth.      7/29/24 anaerobic culture: presumptive Prevotella species  7/29/24 aerobic " culture: enterococcus faecalis    7/8/24 ABIs and TBIs:  Right MIHIR- 0.89  Right TBI-0.50  Left MIHIR- 0.82  Left TBI-0.50  Right leg: Segmental pressures and PVR waveforms suggest minimal to mild peripheral arterial occlusive disease. Rt Great Toe: The PPG waveform- mildly dampened with a pressure of 94 mmHG is noted.  Left leg: Segmental pressures and PVR waveforms suggest moderate tibial peripheral arterial occlusive disease. Lt Great Toe: The PPG waveform-moderately dampened with a pressure of 94 mmHG is noted.    10/21/22 MIHIR:  Right lower extremity: 0.93- minimal peripheral arterial occlusive disease  Left lower extremity: 0.70- moderate peripheral arterial occlusive disease      Review of Systems   Constitutional:  Negative for activity change, chills, diaphoresis, fatigue and fever.   Eyes:  Negative for photophobia and visual disturbance.   Respiratory:  Negative for apnea, chest tightness and shortness of breath.    Cardiovascular:  Positive for leg swelling. Negative for chest pain and palpitations.   Musculoskeletal:  Negative for gait problem and joint swelling.   Skin:  Positive for wound. Negative for color change, pallor and rash.   Neurological:  Negative for dizziness, syncope, facial asymmetry, speech difficulty, weakness, light-headedness, numbness and headaches.   Psychiatric/Behavioral:  Negative for agitation and confusion. The patient is not nervous/anxious.    All other systems reviewed and are negative.      Objective:      Physical Exam  Vitals reviewed.   Constitutional:       General: He is not in acute distress.     Appearance: Normal appearance. He is obese.   Cardiovascular:      Rate and Rhythm: Normal rate and regular rhythm.      Pulses: Normal pulses.   Pulmonary:      Effort: No respiratory distress.   Musculoskeletal:         General: No swelling.      Right lower leg: Pitting Edema present.      Left lower leg: Pitting Edema present.      Comments: Ambulates with cane   Skin:      General: Skin is warm and dry.      Capillary Refill: Capillary refill takes less than 2 seconds.      Findings: Wound present. No erythema.          Neurological:      General: No focal deficit present.      Mental Status: He is alert and oriented to person, place, and time.   Psychiatric:         Mood and Affect: Mood normal.         Behavior: Behavior normal.         Thought Content: Thought content normal.         Judgment: Judgment normal.         Assessment:       1. Open wound of right lower extremity, subsequent encounter    2. Venous insufficiency    3. Essential hypertension    4. Heart failure, unspecified HF chronicity, unspecified heart failure type    5. Controlled type 2 diabetes mellitus with diabetic polyneuropathy, without long-term current use of insulin    6. Hypergranulation               Wound Venous Ulcer Right lower;medial;distal Leg (Active)       Present on Original Admission:    Primary Wound Type: Venous ulcer   Side: Right   Orientation: lower;medial;distal   Location: Leg   Wound Approximate Age at First Assessment (Weeks):    Wound Number:    Is this injury device related?:    Incision Type:    Closure Method:    Wound Description (Comments):    Type:    Additional Comments:    Ankle-Brachial Index:    Pulses:    Removal Indication and Assessment:    Wound Outcome:    Wound Image   09/16/24 0804   Dressing Appearance Dry;Intact;Clean;Moist drainage;Saturated 09/16/24 0804   Drainage Amount Large 09/16/24 0804   Drainage Characteristics/Odor Serosanguineous;Bleeding controlled 09/16/24 0804   Appearance Hypergranulation 09/16/24 0804   Red (%), Wound Tissue Color 100 % 09/16/24 0804   Periwound Area Intact;Edematous;Pink 09/16/24 0804   Wound Length (cm) 4 cm 09/16/24 0804   Wound Width (cm) 3.3 cm 09/16/24 0804   Wound Depth (cm) 0.1 cm 09/16/24 0804   Wound Volume (cm^3) 1.32 cm^3 09/16/24 0804   Wound Surface Area (cm^2) 13.2 cm^2 09/16/24 0804   Undermining (depth (cm)/location) 1.7  cm/ 12 o'clock 09/16/24 0804   Care Cleansed with:;Soap and water 09/16/24 0804   Dressing Applied;Calcium alginate;Absorptive Pad;Compression wrap;Other (comment) 09/16/24 0804   Packing packed with;hydrofiber/alginate 09/16/24 0804   Periwound Care Skin barrier film applied 09/16/24 0804   Compression Unna's Boot;Three layer compression 09/16/24 0804         Serafin was seen in the clinic room and placed in the supine position on the treatment table.  The wound dressings were removed and the legs were cleansed with Easi-clense sponges and dried thoroughly.  No erythema, green drainge, odor, or warmth noted from patient's baseline. Hypergranulation noted- 2 silver nitrates needed. Pt tolerated well.          Plan of Care:    Left lower extremity- Compression stockings daily  Right lower extremity- gentian violet to periwound, packed with aquacel, drawtex, aquacel, mextra pad, and a 3 layer zinc compression wrap.  The patient's foot was positioned at a 90 degree angle. The wrap was applied using a spiral technique avoiding creases or folds.  The wrap was started behind the first metatarsal and ended below the tibial tubercle of the knee.  There was overlap of each turn half the width of the previous turn.  The compression wraps will be changed every 7 days.        Plan:       Right lower extremity was dressed as detailed above  Patient was instructed to not get the dressings wet and to use cast covers for showering.  Should the dressing become wet, he is to remove it, place a moist dressing over the wound, cover with gauze and roll gauze and use ace wraps for compression and to secure bandages.  He should then notify this office as soon as possible to have a new dressing applied.  Instructed patient to remove wrap if he notices tingling or coldness to his right lower extremities or if his toes become white, blue, or cold.    Discussed nutrition and the role of protein in wound healing with the patient. Instructed  patient to optimize protein for wound healing.     Discussed bilateral lower extremity edema with patient. Instructed patient to elevate legs whenever sedentary and follow a low sodium diet.  Discussed replacing compression stockings every 3-6 months.    Instructed patient to keep follow up appointments with vascular surgery.    Instructed patient to continue to take gabapentin as prescribed for nerve pain.    Discussed the importance of utilizing compression stockings to prevent recurrent wounds.    Discussed drainage control- pt needs to be seen 2x a week.      Written and verbal instructions given to patient  RTC Thursday    Rosalba Eaton PA-C

## 2024-09-17 ENCOUNTER — OFFICE VISIT (OUTPATIENT)
Dept: INTERNAL MEDICINE | Facility: CLINIC | Age: 74
End: 2024-09-17
Payer: MEDICARE

## 2024-09-17 VITALS
HEIGHT: 70 IN | HEART RATE: 60 BPM | BODY MASS INDEX: 37.18 KG/M2 | WEIGHT: 259.69 LBS | OXYGEN SATURATION: 95 % | SYSTOLIC BLOOD PRESSURE: 118 MMHG | DIASTOLIC BLOOD PRESSURE: 64 MMHG

## 2024-09-17 DIAGNOSIS — E03.9 HYPOTHYROIDISM (ACQUIRED): ICD-10-CM

## 2024-09-17 DIAGNOSIS — T14.8XXA CHRONIC WOUND: ICD-10-CM

## 2024-09-17 DIAGNOSIS — I10 ESSENTIAL HYPERTENSION: ICD-10-CM

## 2024-09-17 DIAGNOSIS — E11.649 CONTROLLED TYPE 2 DIABETES MELLITUS WITH HYPOGLYCEMIA, WITHOUT LONG-TERM CURRENT USE OF INSULIN: ICD-10-CM

## 2024-09-17 DIAGNOSIS — Z09 HOSPITAL DISCHARGE FOLLOW-UP: Primary | ICD-10-CM

## 2024-09-17 DIAGNOSIS — I35.0 AORTIC VALVE STENOSIS, ETIOLOGY OF CARDIAC VALVE DISEASE UNSPECIFIED: ICD-10-CM

## 2024-09-17 PROCEDURE — 3061F NEG MICROALBUMINURIA REV: CPT | Mod: CPTII,S$GLB,, | Performed by: STUDENT IN AN ORGANIZED HEALTH CARE EDUCATION/TRAINING PROGRAM

## 2024-09-17 PROCEDURE — 3072F LOW RISK FOR RETINOPATHY: CPT | Mod: CPTII,S$GLB,, | Performed by: STUDENT IN AN ORGANIZED HEALTH CARE EDUCATION/TRAINING PROGRAM

## 2024-09-17 PROCEDURE — 99999 PR PBB SHADOW E&M-EST. PATIENT-LVL III: CPT | Mod: PBBFAC,,, | Performed by: STUDENT IN AN ORGANIZED HEALTH CARE EDUCATION/TRAINING PROGRAM

## 2024-09-17 PROCEDURE — 1101F PT FALLS ASSESS-DOCD LE1/YR: CPT | Mod: CPTII,S$GLB,, | Performed by: STUDENT IN AN ORGANIZED HEALTH CARE EDUCATION/TRAINING PROGRAM

## 2024-09-17 PROCEDURE — 4010F ACE/ARB THERAPY RXD/TAKEN: CPT | Mod: CPTII,S$GLB,, | Performed by: STUDENT IN AN ORGANIZED HEALTH CARE EDUCATION/TRAINING PROGRAM

## 2024-09-17 PROCEDURE — 3078F DIAST BP <80 MM HG: CPT | Mod: CPTII,S$GLB,, | Performed by: STUDENT IN AN ORGANIZED HEALTH CARE EDUCATION/TRAINING PROGRAM

## 2024-09-17 PROCEDURE — 99496 TRANSJ CARE MGMT HIGH F2F 7D: CPT | Mod: S$GLB,,, | Performed by: STUDENT IN AN ORGANIZED HEALTH CARE EDUCATION/TRAINING PROGRAM

## 2024-09-17 PROCEDURE — 1125F AMNT PAIN NOTED PAIN PRSNT: CPT | Mod: CPTII,S$GLB,, | Performed by: STUDENT IN AN ORGANIZED HEALTH CARE EDUCATION/TRAINING PROGRAM

## 2024-09-17 PROCEDURE — 3066F NEPHROPATHY DOC TX: CPT | Mod: CPTII,S$GLB,, | Performed by: STUDENT IN AN ORGANIZED HEALTH CARE EDUCATION/TRAINING PROGRAM

## 2024-09-17 PROCEDURE — 3074F SYST BP LT 130 MM HG: CPT | Mod: CPTII,S$GLB,, | Performed by: STUDENT IN AN ORGANIZED HEALTH CARE EDUCATION/TRAINING PROGRAM

## 2024-09-17 PROCEDURE — 3044F HG A1C LEVEL LT 7.0%: CPT | Mod: CPTII,S$GLB,, | Performed by: STUDENT IN AN ORGANIZED HEALTH CARE EDUCATION/TRAINING PROGRAM

## 2024-09-17 PROCEDURE — 3288F FALL RISK ASSESSMENT DOCD: CPT | Mod: CPTII,S$GLB,, | Performed by: STUDENT IN AN ORGANIZED HEALTH CARE EDUCATION/TRAINING PROGRAM

## 2024-09-17 NOTE — PROGRESS NOTES
Subjective:       Patient ID: Serafin Tapia is a 73 y.o. male.    Chief Complaint: Hospital discharge follow-up [Z09]    Patient is new to me, PCP is Dr. Chavis. Here today for the following:    Admitted to hospital 10SEP2024 due to AMS  Was found to be hypoglycemic after not eating that day  BG 26 per EMS check PTA  Discontinued Tradjenta prior to discharge  AMS resolved with resolution of hypoglycemia   Discharged 12SEP2024    Lives at home with sister  Uses cane/walker for ambulation   Uses wheelchair for long distances  Denies recent falls  Has home health coming by, PT has not yet started  Endorses does not have much of an appetite so doesn't eat very often     T2DM -   Taking Trulicity 1.5 mg weekly  Discontinued Tradjenta on hospital discharge  Checking blood glucose a few times per week  Denies blood sugars < 70 mg/dL  UTD on foot exam.  Due for eye exam, has appointment next month.   Lab Results       Component                Value               Date                       HGBA1C                   6.7 (H)             09/10/2024                 HGBA1C                   7.0 (H)             06/28/2024                 HGBA1C                   7.4 (H)             02/10/2023            Lab Results       Component                Value               Date                       MICALBCREAT              6.4                 08/26/2024              Followed with BETO Eaton for wound care yesterday to evaluate RLE wound    HTN -   Currently prescribed benazepril, metoprolol, nifedipine.  Patient endorses taking medication as directed.  Denies side effects or concerns while taking medication.  Lab Results       Component                Value               Date                       MICALBCREAT              6.4                 08/26/2024            BP Readings from Last 5 Encounters:  09/16/24 : (!) 140/63  09/12/24 : 134/65  09/12/24 : (!) 155/69  09/09/24 : (!) 116/55  09/03/24 : (!) 112/57    Hypothyroidism -   Taking  levothyroxine as directed.  UTD on TSH.  Lab Results       Component                Value               Date                       TSH                      1.638               09/10/2024                 TSH                      3.395               06/28/2024                 TSH                      3.702               09/28/2022              Has follow up with Dr. Andujar for cardiology in NOV2024  Echo JULY2024 with EF 55-60%, no DD, aortic valve with moderate sclerosis and stenosis      Transitional Care Note    Family and/or Caretaker present at visit?  Yes.  Diagnostic tests reviewed/disposition: I have reviewed all completed as well as pending diagnostic tests at the time of discharge.  Disease/illness education: yes  Home health/community services discussion/referrals: Patient has home health established at [facility].   Establishment or re-establishment of referral orders for community resources: No other necessary community resources.   Discussion with other health care providers: No discussion with other health care providers necessary.             Review of Systems   Constitutional:  Negative for chills and fever.   Respiratory:  Negative for cough and shortness of breath.    Cardiovascular:  Positive for leg swelling. Negative for chest pain and palpitations.   Neurological:  Positive for weakness. Negative for dizziness and syncope.         Current Outpatient Medications   Medication Instructions    albuterol (PROVENTIL/VENTOLIN HFA) 90 mcg/actuation inhaler 2 puffs, Inhalation, Every 6 hours PRN    benazepriL (LOTENSIN) 40 mg, Oral, Daily    ciclopirox (PENLAC) 8 % Soln Topical (Top), Nightly    ergocalciferol (ERGOCALCIFEROL) 50,000 Units, Oral, Every 7 days    folic acid (FOLVITE) 1,000 mcg, Oral, Daily    gabapentin (NEURONTIN) 300 mg, Oral, 3 times daily    levothyroxine (SYNTHROID) 50 mcg, Oral, Before breakfast    metoprolol succinate (TOPROL-XL) 200 mg, Oral, Daily    NIFEdipine (PROCARDIA-XL) 90  "mg, Oral, Daily    TRULICITY 1.5 mg, Subcutaneous, Every 7 days     Objective:      Vitals:    24 1152   BP: 118/64   Pulse: 60   SpO2: 95%   Weight: 117.8 kg (259 lb 11.2 oz)   Height: 5' 10" (1.778 m)   PainSc:   6   PainLoc: Ankle     Body mass index is 37.26 kg/m².    Physical Exam  Vitals reviewed.   Constitutional:       General: He is not in acute distress.     Appearance: He is not ill-appearing or diaphoretic.   Cardiovascular:      Rate and Rhythm: Normal rate and regular rhythm.      Heart sounds: Murmur heard.      Systolic murmur is present with a grade of 3/6.      No friction rub. No gallop.   Pulmonary:      Effort: Pulmonary effort is normal. No respiratory distress.      Breath sounds: Normal breath sounds. No stridor. No wheezing, rhonchi or rales.   Musculoskeletal:      Right lower le+ Edema present.      Left lower le+ Edema present.   Skin:     General: Skin is warm and dry.      Comments: Color WNL   Neurological:      Mental Status: He is alert. Mental status is at baseline.   Psychiatric:         Mood and Affect: Mood normal.         Behavior: Behavior normal.         Assessment:       1. Hospital discharge follow-up    2. Controlled type 2 diabetes mellitus with hypoglycemia, without long-term current use of insulin    3. Chronic wound    4. Essential hypertension    5. Hypothyroidism (acquired)    6. Aortic valve stenosis, etiology of cardiac valve disease unspecified        Plan:       Hospital discharge follow-up  Controlled type 2 diabetes mellitus with hypoglycemia, without long-term current use of insulin  Continue current medications.  RTC in 3 months for follow up.  -     glucagon 3 mg/actuation Spry; 1 Dose by Nasal route once as needed (hypoglycemia).    Chronic wound  Continue medication, evaluation, and management per wound care.    Essential hypertension  Continue current medications.  RTC in 3 months for follow up.    Hypothyroidism (acquired)  Continue current " medications.  RTC in 3 months for follow up.    Aortic valve stenosis, etiology of cardiac valve disease unspecified  Continue medication, evaluation, and management per cardiologist.      Mariely Roes MD  9/17/2024

## 2024-09-19 ENCOUNTER — OFFICE VISIT (OUTPATIENT)
Dept: WOUND CARE | Facility: CLINIC | Age: 74
End: 2024-09-19
Payer: MEDICARE

## 2024-09-19 VITALS
HEART RATE: 55 BPM | SYSTOLIC BLOOD PRESSURE: 123 MMHG | TEMPERATURE: 98 F | WEIGHT: 259.69 LBS | HEIGHT: 70 IN | BODY MASS INDEX: 37.18 KG/M2 | DIASTOLIC BLOOD PRESSURE: 60 MMHG

## 2024-09-19 DIAGNOSIS — S81.801D OPEN WOUND OF RIGHT LOWER EXTREMITY, SUBSEQUENT ENCOUNTER: Primary | ICD-10-CM

## 2024-09-19 DIAGNOSIS — I87.2 VENOUS INSUFFICIENCY: ICD-10-CM

## 2024-09-19 DIAGNOSIS — I50.9 HEART FAILURE, UNSPECIFIED HF CHRONICITY, UNSPECIFIED HEART FAILURE TYPE: ICD-10-CM

## 2024-09-19 DIAGNOSIS — E11.42 CONTROLLED TYPE 2 DIABETES MELLITUS WITH DIABETIC POLYNEUROPATHY, WITHOUT LONG-TERM CURRENT USE OF INSULIN: ICD-10-CM

## 2024-09-19 DIAGNOSIS — I10 ESSENTIAL HYPERTENSION: ICD-10-CM

## 2024-09-19 PROBLEM — I35.0 AORTIC VALVE STENOSIS: Status: ACTIVE | Noted: 2024-09-19

## 2024-09-19 PROBLEM — E11.649 CONTROLLED TYPE 2 DIABETES MELLITUS WITH HYPOGLYCEMIA, WITHOUT LONG-TERM CURRENT USE OF INSULIN: Status: ACTIVE | Noted: 2017-05-04

## 2024-09-19 PROCEDURE — 99499 UNLISTED E&M SERVICE: CPT | Mod: S$GLB,,,

## 2024-09-19 PROCEDURE — 29580 STRAPPING UNNA BOOT: CPT | Mod: RT,S$GLB,,

## 2024-09-19 PROCEDURE — 99999 PR PBB SHADOW E&M-EST. PATIENT-LVL III: CPT | Mod: PBBFAC,,,

## 2024-09-19 NOTE — PROGRESS NOTES
"Subjective:       Patient ID: Serafin Tapia is a 73 y.o. male.    Chief Complaint: Wound Check        Patient presents for a re-evaluation of a right lower extremity woundy. He reports his wounds started as blisters about 2 months ago. He is unsure why he did not contact the office until now. Pt admits compliance with wearing compression stockings daily, but reports his stockings have lost elasticity. He reports that his wounds drain a large amount of clear fluid. Pt has been dressing his wounds with a wound "liquid" and occasionally covering his wounds with gauze. Pt has also been leaving his wounds open to air. Patient followed with vascular surgery for PAD and venous insufficiency. Dr. Kapoor determined he has adequate arterial blood flow for wound healing and cleared him for a 3 layer compression wrap. Pt previously followed with the wound care clinic in 2022 and 2023.  Denies compliance with a low sodium diet, leg elevation, and following a high protein diet. Admits compliance with gabapentin. Notices improvement in pain. Denies side effects. Pt followed with Dr. Stern while I was on vacation. On 7/29/24, sharp debridement was performed and part of the patient's achilles tendon was removed. His wound was cultured. Pt followed back on 8/1. His wound was debrided further and was prescribed Augmentin. On 8/5, his wound was debrided further. His Augmentin dose was refilled and sent for 7 days. Pt reported never picking up the Augmentin prescription.  Pt reported stopping the antibiotics early. He reports that he is getting a colonoscopy at [facility] on Wednesday and the MD told him to stop antibiotics until one day after the procedure. Pt completed antibiotics. Pt's daughter reports that the patient eats out for every meal. He does not cook at home. No complaints at this time. Denies fever, chills, purulent drainage, erythema, or warmth.      7/29/24 anaerobic culture: presumptive Prevotella species  7/29/24 aerobic " culture: enterococcus faecalis    7/8/24 ABIs and TBIs:  Right MIHIR- 0.89  Right TBI-0.50  Left MIHIR- 0.82  Left TBI-0.50  Right leg: Segmental pressures and PVR waveforms suggest minimal to mild peripheral arterial occlusive disease. Rt Great Toe: The PPG waveform- mildly dampened with a pressure of 94 mmHG is noted.  Left leg: Segmental pressures and PVR waveforms suggest moderate tibial peripheral arterial occlusive disease. Lt Great Toe: The PPG waveform-moderately dampened with a pressure of 94 mmHG is noted.    10/21/22 MIHIR:  Right lower extremity: 0.93- minimal peripheral arterial occlusive disease  Left lower extremity: 0.70- moderate peripheral arterial occlusive disease      Review of Systems   Constitutional:  Negative for activity change, chills, diaphoresis, fatigue and fever.   Eyes:  Negative for photophobia and visual disturbance.   Respiratory:  Negative for apnea, chest tightness and shortness of breath.    Cardiovascular:  Positive for leg swelling. Negative for chest pain and palpitations.   Musculoskeletal:  Negative for gait problem and joint swelling.   Skin:  Positive for wound. Negative for color change, pallor and rash.   Neurological:  Negative for dizziness, syncope, facial asymmetry, speech difficulty, weakness, light-headedness, numbness and headaches.   Psychiatric/Behavioral:  Negative for agitation and confusion. The patient is not nervous/anxious.    All other systems reviewed and are negative.      Objective:      Physical Exam  Vitals reviewed.   Constitutional:       General: He is not in acute distress.     Appearance: Normal appearance. He is obese.   Cardiovascular:      Rate and Rhythm: Normal rate and regular rhythm.      Pulses: Normal pulses.   Pulmonary:      Effort: No respiratory distress.   Musculoskeletal:         General: No swelling.      Right lower leg: Pitting Edema present.      Left lower leg: Pitting Edema present.      Comments: Ambulates with cane   Skin:      General: Skin is warm and dry.      Capillary Refill: Capillary refill takes less than 2 seconds.      Findings: Wound present. No erythema.          Neurological:      General: No focal deficit present.      Mental Status: He is alert and oriented to person, place, and time.   Psychiatric:         Mood and Affect: Mood normal.         Behavior: Behavior normal.         Thought Content: Thought content normal.         Judgment: Judgment normal.         Assessment:       1. Open wound of right lower extremity, subsequent encounter    2. Venous insufficiency    3. Essential hypertension    4. Heart failure, unspecified HF chronicity, unspecified heart failure type    5. Controlled type 2 diabetes mellitus with diabetic polyneuropathy, without long-term current use of insulin               Wound Venous Ulcer Right lower;medial;distal Leg (Active)       Present on Original Admission:    Primary Wound Type: Venous ulcer   Side: Right   Orientation: lower;medial;distal   Location: Leg   Wound Approximate Age at First Assessment (Weeks):    Wound Number:    Is this injury device related?:    Incision Type:    Closure Method:    Wound Description (Comments):    Type:    Additional Comments:    Ankle-Brachial Index:    Pulses:    Removal Indication and Assessment:    Wound Outcome:    Wound Image   09/19/24 0805   Dressing Appearance Dry;Intact;Clean;Moist drainage 09/19/24 0805   Drainage Amount Large 09/19/24 0805   Drainage Characteristics/Odor Serosanguineous;Bleeding controlled 09/19/24 0805   Red (%), Wound Tissue Color 100 % 09/19/24 0805   Periwound Area Intact;[place] 09/19/24 0805   Wound Length (cm) 4.5 cm 09/19/24 0805   Wound Width (cm) 3.6 cm 09/19/24 0805   Wound Depth (cm) 1 cm 09/19/24 0805   Wound Volume (cm^3) 16.2 cm^3 09/19/24 0805   Wound Surface Area (cm^2) 16.2 cm^2 09/19/24 0805   Undermining (depth (cm)/location) 2 cm/ 12 o'clock 09/19/24 0805   Care Cleansed with:;Soap and water 09/19/24 0805    Dressing Applied;Calcium alginate;Absorptive Pad;Compression wrap;Other (comment) 09/19/24 0805   Packing packed with;hydrofiber/alginate 09/19/24 0805   Periwound Care Skin barrier film applied 09/19/24 0805   Compression Unna's Boot;Three layer compression 09/19/24 0805         Serafin was seen in the clinic room and placed in the supine position on the treatment table.  The wound dressings were removed and the legs were cleansed with Easi-clense sponges and dried thoroughly.  No erythema, green drainge, odor, or warmth noted from patient's baseline.         Plan of Care:    Left lower extremity- Compression stockings daily  Right lower extremity- gentian violet to periwound, packed with aquacel, drawtex, aquacel, mextra pad, and a 3 layer zinc compression wrap.  The patient's foot was positioned at a 90 degree angle. The wrap was applied using a spiral technique avoiding creases or folds.  The wrap was started behind the first metatarsal and ended below the tibial tubercle of the knee.  There was overlap of each turn half the width of the previous turn.  The compression wraps will be changed every 7 days.        Plan:       Right lower extremity was dressed as detailed above  Patient was instructed to not get the dressings wet and to use cast covers for showering.  Should the dressing become wet, he is to remove it, place a moist dressing over the wound, cover with gauze and roll gauze and use ace wraps for compression and to secure bandages.  He should then notify this office as soon as possible to have a new dressing applied.  Instructed patient to remove wrap if he notices tingling or coldness to his right lower extremities or if his toes become white, blue, or cold.    Discussed nutrition and the role of protein in wound healing with the patient. Instructed patient to optimize protein for wound healing.     Discussed bilateral lower extremity edema with patient. Instructed patient to elevate legs whenever  sedentary and follow a low sodium diet.  Discussed replacing compression stockings every 3-6 months.    Instructed patient to keep follow up appointments with vascular surgery.    Instructed patient to continue to take gabapentin as prescribed for nerve pain.    Discussed the importance of utilizing compression stockings to prevent recurrent wounds.    Discussed drainage control- pt needs to be seen 2x a week.      Written and verbal instructions given to patient  RTC Monday    Rosalba Eaton PA-C

## 2024-09-23 ENCOUNTER — OFFICE VISIT (OUTPATIENT)
Dept: WOUND CARE | Facility: CLINIC | Age: 74
End: 2024-09-23
Payer: MEDICARE

## 2024-09-23 VITALS
BODY MASS INDEX: 36.36 KG/M2 | DIASTOLIC BLOOD PRESSURE: 71 MMHG | TEMPERATURE: 98 F | WEIGHT: 254 LBS | HEART RATE: 74 BPM | SYSTOLIC BLOOD PRESSURE: 173 MMHG | HEIGHT: 70 IN

## 2024-09-23 DIAGNOSIS — I10 ESSENTIAL HYPERTENSION: ICD-10-CM

## 2024-09-23 DIAGNOSIS — I50.9 HEART FAILURE, UNSPECIFIED HF CHRONICITY, UNSPECIFIED HEART FAILURE TYPE: ICD-10-CM

## 2024-09-23 DIAGNOSIS — S81.801D OPEN WOUND OF RIGHT LOWER EXTREMITY, SUBSEQUENT ENCOUNTER: Primary | ICD-10-CM

## 2024-09-23 DIAGNOSIS — E11.42 CONTROLLED TYPE 2 DIABETES MELLITUS WITH DIABETIC POLYNEUROPATHY, WITHOUT LONG-TERM CURRENT USE OF INSULIN: ICD-10-CM

## 2024-09-23 DIAGNOSIS — I87.2 VENOUS INSUFFICIENCY: ICD-10-CM

## 2024-09-23 PROCEDURE — 99499 UNLISTED E&M SERVICE: CPT | Mod: S$GLB,,,

## 2024-09-23 PROCEDURE — 99999 PR PBB SHADOW E&M-EST. PATIENT-LVL III: CPT | Mod: PBBFAC,,,

## 2024-09-23 PROCEDURE — 97597 DBRDMT OPN WND 1ST 20 CM/<: CPT | Mod: S$GLB,,,

## 2024-09-23 NOTE — PROGRESS NOTES
"Subjective:       Patient ID: Serafin Tapia is a 73 y.o. male.    Chief Complaint: Wound Check        Patient presents for a re-evaluation of a right lower extremity woundy. He reports his wounds started as blisters about 2 months ago. He is unsure why he did not contact the office until now. Pt admits compliance with wearing compression stockings daily, but reports his stockings have lost elasticity. He reports that his wounds drain a large amount of clear fluid. Pt has been dressing his wounds with a wound "liquid" and occasionally covering his wounds with gauze. Pt has also been leaving his wounds open to air. Patient followed with vascular surgery for PAD and venous insufficiency. Dr. Kapoor determined he has adequate arterial blood flow for wound healing and cleared him for a 3 layer compression wrap. Pt previously followed with the wound care clinic in 2022 and 2023.  Denies compliance with a low sodium diet, leg elevation, and following a high protein diet. Admits compliance with gabapentin. Notices improvement in pain. Denies side effects. Pt followed with Dr. Stern while I was on vacation. On 7/29/24, sharp debridement was performed and part of the patient's achilles tendon was removed. His wound was cultured. Pt followed back on 8/1. His wound was debrided further and was prescribed Augmentin. On 8/5, his wound was debrided further. His Augmentin dose was refilled and sent for 7 days. Pt reported never picking up the Augmentin prescription.  Pt reported stopping the antibiotics early. He reports that he is getting a colonoscopy at [facility] on Wednesday and the MD told him to stop antibiotics until one day after the procedure. Pt completed antibiotics. Pt's daughter reports that the patient eats out for every meal. He does not cook at home. No complaints at this time. Denies fever, chills, purulent drainage, erythema, or warmth.      7/29/24 anaerobic culture: presumptive Prevotella species  7/29/24 aerobic " culture: enterococcus faecalis    7/8/24 ABIs and TBIs:  Right MIHIR- 0.89  Right TBI-0.50  Left MIHIR- 0.82  Left TBI-0.50  Right leg: Segmental pressures and PVR waveforms suggest minimal to mild peripheral arterial occlusive disease. Rt Great Toe: The PPG waveform- mildly dampened with a pressure of 94 mmHG is noted.  Left leg: Segmental pressures and PVR waveforms suggest moderate tibial peripheral arterial occlusive disease. Lt Great Toe: The PPG waveform-moderately dampened with a pressure of 94 mmHG is noted.    10/21/22 MIHIR:  Right lower extremity: 0.93- minimal peripheral arterial occlusive disease  Left lower extremity: 0.70- moderate peripheral arterial occlusive disease      Review of Systems   Constitutional:  Negative for activity change, chills, diaphoresis, fatigue and fever.   Eyes:  Negative for photophobia and visual disturbance.   Respiratory:  Negative for apnea, chest tightness and shortness of breath.    Cardiovascular:  Positive for leg swelling. Negative for chest pain and palpitations.   Musculoskeletal:  Negative for gait problem and joint swelling.   Skin:  Positive for wound. Negative for color change, pallor and rash.   Neurological:  Negative for dizziness, syncope, facial asymmetry, speech difficulty, weakness, light-headedness, numbness and headaches.   Psychiatric/Behavioral:  Negative for agitation and confusion. The patient is not nervous/anxious.    All other systems reviewed and are negative.      Objective:      Physical Exam  Vitals reviewed.   Constitutional:       General: He is not in acute distress.     Appearance: Normal appearance. He is obese.   Cardiovascular:      Rate and Rhythm: Normal rate and regular rhythm.      Pulses: Normal pulses.   Pulmonary:      Effort: No respiratory distress.   Musculoskeletal:         General: No swelling.      Right lower leg: Pitting Edema present.      Left lower leg: Pitting Edema present.      Comments: Ambulates with cane   Skin:      General: Skin is warm and dry.      Capillary Refill: Capillary refill takes less than 2 seconds.      Findings: Wound present. No erythema.          Neurological:      General: No focal deficit present.      Mental Status: He is alert and oriented to person, place, and time.   Psychiatric:         Mood and Affect: Mood normal.         Behavior: Behavior normal.         Thought Content: Thought content normal.         Judgment: Judgment normal.         Assessment:       1. Open wound of right lower extremity, subsequent encounter    2. Venous insufficiency    3. Essential hypertension    4. Heart failure, unspecified HF chronicity, unspecified heart failure type    5. Controlled type 2 diabetes mellitus with diabetic polyneuropathy, without long-term current use of insulin               Wound Venous Ulcer Right lower;medial;distal Leg (Active)       Present on Original Admission:    Primary Wound Type: Venous ulcer   Side: Right   Orientation: lower;medial;distal   Location: Leg   Wound Approximate Age at First Assessment (Weeks):    Wound Number:    Is this injury device related?:    Incision Type:    Closure Method:    Wound Description (Comments):    Type:    Additional Comments:    Ankle-Brachial Index:    Pulses:    Removal Indication and Assessment:    Wound Outcome:    Wound Image    09/23/24 0853   Dressing Appearance Dry;Intact;Clean;Moist drainage 09/23/24 0853   Drainage Amount Large 09/23/24 0853   Drainage Characteristics/Odor Serosanguineous;Malodorous;Bleeding controlled 09/23/24 0853   Black (%), Wound Tissue Color 20 % 09/23/24 0853   Red (%), Wound Tissue Color 80 % 09/23/24 0853   Periwound Area Intact;Edematous;Pink 09/23/24 0853   Wound Length (cm) 4.2 cm 09/23/24 0853   Wound Width (cm) 2.5 cm 09/23/24 0853   Wound Depth (cm) 1 cm 09/23/24 0853   Wound Volume (cm^3) 10.5 cm^3 09/23/24 0853   Wound Surface Area (cm^2) 10.5 cm^2 09/23/24 0853   Undermining (depth (cm)/location) 2.1 cm/ 12 o'clock  09/23/24 0853   Care Cleansed with:;Soap and water 09/23/24 0853   Dressing Applied;Calcium alginate;Absorptive Pad;Compression wrap;Other (comment) 09/23/24 0853   Packing packed with;hydrofiber/alginate 09/23/24 0853   Periwound Care Skin barrier film applied 09/23/24 0853   Compression Unna's Boot;Three layer compression 09/23/24 0853         Serafin was seen in the clinic room and placed in the supine position on the treatment table.  The wound dressings were removed and the legs were cleansed with Easi-clense sponges and dried thoroughly.  No erythema, green drainge, odor, or warmth noted from patient's baseline. Sharp debridement with 5 mm curette to remove non-viable tissue. Pt tolerated procedure well. See procedure note.          Plan of Care:    Left lower extremity- Compression stockings daily  Right lower extremity- gentian violet to periwound, packed with aquacel, drawtex, aquacel, mextra pad, and a 3 layer zinc compression wrap.  The patient's foot was positioned at a 90 degree angle. The wrap was applied using a spiral technique avoiding creases or folds.  The wrap was started behind the first metatarsal and ended below the tibial tubercle of the knee.  There was overlap of each turn half the width of the previous turn.  The compression wraps will be changed every 7 days.        Plan:       Right lower extremity was dressed as detailed above  Patient was instructed to not get the dressings wet and to use cast covers for showering.  Should the dressing become wet, he is to remove it, place a moist dressing over the wound, cover with gauze and roll gauze and use ace wraps for compression and to secure bandages.  He should then notify this office as soon as possible to have a new dressing applied.  Instructed patient to remove wrap if he notices tingling or coldness to his right lower extremities or if his toes become white, blue, or cold.    Discussed nutrition and the role of protein in wound healing with  the patient. Instructed patient to optimize protein for wound healing.     Discussed bilateral lower extremity edema with patient. Instructed patient to elevate legs whenever sedentary and follow a low sodium diet.  Discussed replacing compression stockings every 3-6 months.    Instructed patient to keep follow up appointments with vascular surgery.    Instructed patient to continue to take gabapentin as prescribed for nerve pain.    Discussed the importance of utilizing compression stockings to prevent recurrent wounds.    Discussed drainage control- pt needs to be seen 2x a week.      Written and verbal instructions given to patient  RTC Thursday    Rosalba Eaton PA-C

## 2024-09-23 NOTE — PROCEDURES
"Debridement    Date/Time: 9/23/2024 7:30 AM    Performed by: Rosalba Eaton PA-C  Authorized by: Rosalba Eaton PA-C    Time out: Immediately prior to procedure a "time out" was called to verify the correct patient, procedure, equipment, support staff and site/side marked as required.    Consent Done?:  Yes (Written)  Local anesthesia used?: No      Wound Details:    Location:  Right leg    Type of Debridement:  Excisional       Length (cm):  4.2       Area (sq cm):  10.5       Width (cm):  2.5       Percent Debrided (%):  100       Depth (cm):  1       Total Area Debrided (sq cm):  10.5    Depth of debridement:  Epidermis/Dermis    Devitalized tissue debrided:  Biofilm, Exudate, Fibrin, Necrotic/Eschar and Slough    Instruments:  Curette  Bleeding:  Minimal  Hemostasis Achieved: Yes  Method Used:  Pressure  Patient tolerance:  Patient tolerated the procedure well with no immediate complications    "

## 2024-09-26 ENCOUNTER — OFFICE VISIT (OUTPATIENT)
Dept: WOUND CARE | Facility: CLINIC | Age: 74
End: 2024-09-26
Payer: MEDICARE

## 2024-09-26 VITALS
HEART RATE: 61 BPM | DIASTOLIC BLOOD PRESSURE: 68 MMHG | SYSTOLIC BLOOD PRESSURE: 154 MMHG | BODY MASS INDEX: 36.61 KG/M2 | WEIGHT: 255.75 LBS | TEMPERATURE: 98 F | HEIGHT: 70 IN

## 2024-09-26 DIAGNOSIS — S81.801D OPEN WOUND OF RIGHT LOWER EXTREMITY, SUBSEQUENT ENCOUNTER: Primary | ICD-10-CM

## 2024-09-26 DIAGNOSIS — I10 ESSENTIAL HYPERTENSION: ICD-10-CM

## 2024-09-26 DIAGNOSIS — I87.2 VENOUS INSUFFICIENCY: ICD-10-CM

## 2024-09-26 DIAGNOSIS — I50.9 HEART FAILURE, UNSPECIFIED HF CHRONICITY, UNSPECIFIED HEART FAILURE TYPE: ICD-10-CM

## 2024-09-26 DIAGNOSIS — E11.42 CONTROLLED TYPE 2 DIABETES MELLITUS WITH DIABETIC POLYNEUROPATHY, WITHOUT LONG-TERM CURRENT USE OF INSULIN: ICD-10-CM

## 2024-09-26 PROCEDURE — 99999 PR PBB SHADOW E&M-EST. PATIENT-LVL III: CPT | Mod: PBBFAC,,,

## 2024-09-26 PROCEDURE — 99499 UNLISTED E&M SERVICE: CPT | Mod: S$GLB,,,

## 2024-09-26 PROCEDURE — 29580 STRAPPING UNNA BOOT: CPT | Mod: RT,S$GLB,,

## 2024-09-26 NOTE — PROGRESS NOTES
"Subjective:       Patient ID: Serafin Tapia is a 73 y.o. male.    Chief Complaint: No chief complaint on file.        Patient presents for a re-evaluation of a right lower extremity woundy. He reports his wounds started as blisters about 2 months ago. He is unsure why he did not contact the office until now. Pt admits compliance with wearing compression stockings daily, but reports his stockings have lost elasticity. He reports that his wounds drain a large amount of clear fluid. Pt has been dressing his wounds with a wound "liquid" and occasionally covering his wounds with gauze. Pt has also been leaving his wounds open to air. Patient followed with vascular surgery for PAD and venous insufficiency. Dr. Kapoor determined he has adequate arterial blood flow for wound healing and cleared him for a 3 layer compression wrap. Pt previously followed with the wound care clinic in 2022 and 2023.  Denies compliance with a low sodium diet, leg elevation, and following a high protein diet. Admits compliance with gabapentin. Notices improvement in pain. Denies side effects. Pt followed with Dr. Stern while I was on vacation. On 7/29/24, sharp debridement was performed and part of the patient's achilles tendon was removed. His wound was cultured. Pt followed back on 8/1. His wound was debrided further and was prescribed Augmentin. On 8/5, his wound was debrided further. His Augmentin dose was refilled and sent for 7 days. Pt reported never picking up the Augmentin prescription.  Pt reported stopping the antibiotics early. He reports that he is getting a colonoscopy at [facility] on Wednesday and the MD told him to stop antibiotics until one day after the procedure. Pt completed antibiotics. Pt's daughter reports that the patient eats out for every meal. He does not cook at home. No complaints at this time. Denies fever, chills, purulent drainage, erythema, or warmth.      7/29/24 anaerobic culture: presumptive Prevotella " species  7/29/24 aerobic culture: enterococcus faecalis    7/8/24 ABIs and TBIs:  Right MIHIR- 0.89  Right TBI-0.50  Left MIHIR- 0.82  Left TBI-0.50  Right leg: Segmental pressures and PVR waveforms suggest minimal to mild peripheral arterial occlusive disease. Rt Great Toe: The PPG waveform- mildly dampened with a pressure of 94 mmHG is noted.  Left leg: Segmental pressures and PVR waveforms suggest moderate tibial peripheral arterial occlusive disease. Lt Great Toe: The PPG waveform-moderately dampened with a pressure of 94 mmHG is noted.    10/21/22 MIHIR:  Right lower extremity: 0.93- minimal peripheral arterial occlusive disease  Left lower extremity: 0.70- moderate peripheral arterial occlusive disease      Review of Systems   Constitutional:  Negative for activity change, chills, diaphoresis, fatigue and fever.   Eyes:  Negative for photophobia and visual disturbance.   Respiratory:  Negative for apnea, chest tightness and shortness of breath.    Cardiovascular:  Positive for leg swelling. Negative for chest pain and palpitations.   Musculoskeletal:  Negative for gait problem and joint swelling.   Skin:  Positive for wound. Negative for color change, pallor and rash.   Neurological:  Negative for dizziness, syncope, facial asymmetry, speech difficulty, weakness, light-headedness, numbness and headaches.   Psychiatric/Behavioral:  Negative for agitation and confusion. The patient is not nervous/anxious.    All other systems reviewed and are negative.      Objective:      Physical Exam  Vitals reviewed.   Constitutional:       General: He is not in acute distress.     Appearance: Normal appearance. He is obese.   Cardiovascular:      Rate and Rhythm: Normal rate and regular rhythm.      Pulses: Normal pulses.   Pulmonary:      Effort: No respiratory distress.   Musculoskeletal:         General: No swelling.      Right lower leg: Pitting Edema present.      Left lower leg: Pitting Edema present.      Comments:  Ambulates with cane   Skin:     General: Skin is warm and dry.      Capillary Refill: Capillary refill takes less than 2 seconds.      Findings: Wound present. No erythema.          Neurological:      General: No focal deficit present.      Mental Status: He is alert and oriented to person, place, and time.   Psychiatric:         Mood and Affect: Mood normal.         Behavior: Behavior normal.         Thought Content: Thought content normal.         Judgment: Judgment normal.         Assessment:       1. Open wound of right lower extremity, subsequent encounter    2. Venous insufficiency    3. Essential hypertension    4. Heart failure, unspecified HF chronicity, unspecified heart failure type    5. Controlled type 2 diabetes mellitus with diabetic polyneuropathy, without long-term current use of insulin               Wound Venous Ulcer Right lower;medial;distal Leg (Active)       Present on Original Admission:    Primary Wound Type: Venous ulcer   Side: Right   Orientation: lower;medial;distal   Location: Leg   Wound Approximate Age at First Assessment (Weeks):    Wound Number:    Is this injury device related?:    Incision Type:    Closure Method:    Wound Description (Comments):    Type:    Additional Comments:    Ankle-Brachial Index:    Pulses:    Removal Indication and Assessment:    Wound Outcome:    Wound Image   09/26/24 0810   Dressing Appearance Dry;Intact;Clean;Moist drainage 09/26/24 0810   Drainage Amount Large 09/26/24 0810   Drainage Characteristics/Odor Serosanguineous 09/26/24 0810   Red (%), Wound Tissue Color 100 % 09/26/24 0810   Periwound Area Intact;Pink;Edematous 09/26/24 0810   Wound Length (cm) 4 cm 09/26/24 0810   Wound Width (cm) 2.1 cm 09/26/24 0810   Wound Depth (cm) 1 cm 09/26/24 0810   Wound Volume (cm^3) 8.4 cm^3 09/26/24 0810   Wound Surface Area (cm^2) 8.4 cm^2 09/26/24 0810   Undermining (depth (cm)/location) 3 cm/ 12 o'clock 09/26/24 0810   Care Cleansed with:;Soap and water  09/26/24 0810   Dressing Applied;Calcium alginate;Absorptive Pad;Compression wrap;Other (comment) 09/26/24 0810   Packing packed with;hydrofiber/alginate 09/26/24 0810   Periwound Care Skin barrier film applied 09/26/24 0810   Compression Unna's Boot;Three layer compression 09/26/24 0810         Serafin was seen in the clinic room and placed in the supine position on the treatment table.  The wound dressings were removed and the legs were cleansed with Easi-clense sponges and dried thoroughly.  No erythema, green drainge, odor, or warmth noted from patient's baseline.       Plan of Care:    Left lower extremity- Compression stockings daily  Right lower extremity- gentian violet to periwound, packed with aquacel, drawtex, aquacel, mextra pad, and a 3 layer zinc compression wrap.  The patient's foot was positioned at a 90 degree angle. The wrap was applied using a spiral technique avoiding creases or folds.  The wrap was started behind the first metatarsal and ended below the tibial tubercle of the knee.  There was overlap of each turn half the width of the previous turn.  The compression wraps will be changed every 7 days.        Plan:       Right lower extremity was dressed as detailed above  Patient was instructed to not get the dressings wet and to use cast covers for showering.  Should the dressing become wet, he is to remove it, place a moist dressing over the wound, cover with gauze and roll gauze and use ace wraps for compression and to secure bandages.  He should then notify this office as soon as possible to have a new dressing applied.  Instructed patient to remove wrap if he notices tingling or coldness to his right lower extremities or if his toes become white, blue, or cold.    Discussed nutrition and the role of protein in wound healing with the patient. Instructed patient to optimize protein for wound healing.     Discussed bilateral lower extremity edema with patient. Instructed patient to elevate legs  whenever sedentary and follow a low sodium diet.  Discussed replacing compression stockings every 3-6 months.    Instructed patient to keep follow up appointments with vascular surgery.    Instructed patient to continue to take gabapentin as prescribed for nerve pain.    Discussed the importance of utilizing compression stockings to prevent recurrent wounds.    Discussed drainage control- pt needs to be seen 2x a week.      Written and verbal instructions given to patient  RTC Monday    Rosalba Eaton PA-C

## 2024-09-30 ENCOUNTER — OFFICE VISIT (OUTPATIENT)
Dept: WOUND CARE | Facility: CLINIC | Age: 74
End: 2024-09-30
Payer: MEDICARE

## 2024-09-30 VITALS
BODY MASS INDEX: 36.65 KG/M2 | HEART RATE: 60 BPM | WEIGHT: 256 LBS | SYSTOLIC BLOOD PRESSURE: 172 MMHG | DIASTOLIC BLOOD PRESSURE: 75 MMHG | HEIGHT: 70 IN

## 2024-09-30 DIAGNOSIS — E11.42 CONTROLLED TYPE 2 DIABETES MELLITUS WITH DIABETIC POLYNEUROPATHY, WITHOUT LONG-TERM CURRENT USE OF INSULIN: ICD-10-CM

## 2024-09-30 DIAGNOSIS — I10 ESSENTIAL HYPERTENSION: ICD-10-CM

## 2024-09-30 DIAGNOSIS — I87.2 VENOUS INSUFFICIENCY: ICD-10-CM

## 2024-09-30 DIAGNOSIS — L92.9 HYPERGRANULATION: ICD-10-CM

## 2024-09-30 DIAGNOSIS — S81.801D OPEN WOUND OF RIGHT LOWER EXTREMITY, SUBSEQUENT ENCOUNTER: Primary | ICD-10-CM

## 2024-09-30 DIAGNOSIS — I50.9 HEART FAILURE, UNSPECIFIED HF CHRONICITY, UNSPECIFIED HEART FAILURE TYPE: ICD-10-CM

## 2024-09-30 PROCEDURE — 99999 PR PBB SHADOW E&M-EST. PATIENT-LVL III: CPT | Mod: PBBFAC,,,

## 2024-09-30 NOTE — PROGRESS NOTES
"Subjective:       Patient ID: Serafin Tapia is a 73 y.o. male.    Chief Complaint: Wound Check        Patient presents for a re-evaluation of a right lower extremity woundy. He reports his wounds started as blisters about 2 months ago. He is unsure why he did not contact the office until now. Pt admits compliance with wearing compression stockings daily, but reports his stockings have lost elasticity. He reports that his wounds drain a large amount of clear fluid. Pt has been dressing his wounds with a wound "liquid" and occasionally covering his wounds with gauze. Pt has also been leaving his wounds open to air. Patient followed with vascular surgery for PAD and venous insufficiency. Dr. Kapoor determined he has adequate arterial blood flow for wound healing and cleared him for a 3 layer compression wrap. Pt previously followed with the wound care clinic in 2022 and 2023.  Denies compliance with a low sodium diet, leg elevation, and following a high protein diet. Admits compliance with gabapentin. Notices improvement in pain. Denies side effects. Pt followed with Dr. Stern while I was on vacation. On 7/29/24, sharp debridement was performed and part of the patient's achilles tendon was removed. His wound was cultured. Pt followed back on 8/1. His wound was debrided further and was prescribed Augmentin. On 8/5, his wound was debrided further. His Augmentin dose was refilled and sent for 7 days. Pt reported never picking up the Augmentin prescription.  Pt reported stopping the antibiotics early. He reports that he is getting a colonoscopy at [facility] on Wednesday and the MD told him to stop antibiotics until one day after the procedure. Pt completed antibiotics. Pt's daughter reports that the patient eats out for every meal. He does not cook at home. No complaints at this time. Denies fever, chills, purulent drainage, erythema, or warmth.      7/29/24 anaerobic culture: presumptive Prevotella species  7/29/24 aerobic " culture: enterococcus faecalis    7/8/24 ABIs and TBIs:  Right MIHIR- 0.89  Right TBI-0.50  Left MIHIR- 0.82  Left TBI-0.50  Right leg: Segmental pressures and PVR waveforms suggest minimal to mild peripheral arterial occlusive disease. Rt Great Toe: The PPG waveform- mildly dampened with a pressure of 94 mmHG is noted.  Left leg: Segmental pressures and PVR waveforms suggest moderate tibial peripheral arterial occlusive disease. Lt Great Toe: The PPG waveform-moderately dampened with a pressure of 94 mmHG is noted.    10/21/22 MIHIR:  Right lower extremity: 0.93- minimal peripheral arterial occlusive disease  Left lower extremity: 0.70- moderate peripheral arterial occlusive disease      Review of Systems   Constitutional:  Negative for activity change, chills, diaphoresis, fatigue and fever.   Eyes:  Negative for photophobia and visual disturbance.   Respiratory:  Negative for apnea, chest tightness and shortness of breath.    Cardiovascular:  Positive for leg swelling. Negative for chest pain and palpitations.   Musculoskeletal:  Negative for gait problem and joint swelling.   Skin:  Positive for wound. Negative for color change, pallor and rash.   Neurological:  Negative for dizziness, syncope, facial asymmetry, speech difficulty, weakness, light-headedness, numbness and headaches.   Psychiatric/Behavioral:  Negative for agitation and confusion. The patient is not nervous/anxious.    All other systems reviewed and are negative.      Objective:      Physical Exam  Vitals reviewed.   Constitutional:       General: He is not in acute distress.     Appearance: Normal appearance. He is obese.   Cardiovascular:      Rate and Rhythm: Normal rate and regular rhythm.      Pulses: Normal pulses.   Pulmonary:      Effort: No respiratory distress.   Musculoskeletal:         General: No swelling.      Right lower leg: Pitting Edema present.      Left lower leg: Pitting Edema present.      Comments: Ambulates with cane   Skin:      General: Skin is warm and dry.      Capillary Refill: Capillary refill takes less than 2 seconds.      Findings: Wound present. No erythema.          Neurological:      General: No focal deficit present.      Mental Status: He is alert and oriented to person, place, and time.   Psychiatric:         Mood and Affect: Mood normal.         Behavior: Behavior normal.         Thought Content: Thought content normal.         Judgment: Judgment normal.         Assessment:       1. Open wound of right lower extremity, subsequent encounter    2. Venous insufficiency    3. Essential hypertension    4. Heart failure, unspecified HF chronicity, unspecified heart failure type    5. Controlled type 2 diabetes mellitus with diabetic polyneuropathy, without long-term current use of insulin    6. Hypergranulation               Wound Venous Ulcer Right lower;medial;distal Leg (Active)       Present on Original Admission:    Primary Wound Type: Venous ulcer   Side: Right   Orientation: lower;medial;distal   Location: Leg   Wound Approximate Age at First Assessment (Weeks):    Wound Number:    Is this injury device related?:    Incision Type:    Closure Method:    Wound Description (Comments):    Type:    Additional Comments:    Ankle-Brachial Index:    Pulses:    Removal Indication and Assessment:    Wound Outcome:    Dressing Appearance Dry;Intact;Clean;Moist drainage 09/30/24 0837   Drainage Amount Large 09/30/24 0837   Drainage Characteristics/Odor Serosanguineous 09/30/24 0837   Appearance Hypergranulation 09/30/24 0837   Red (%), Wound Tissue Color 100 % 09/30/24 0837   Periwound Area Intact;Pink;Edematous 09/30/24 0837   Wound Length (cm) 4 cm 09/30/24 0837   Wound Width (cm) 1.9 cm 09/30/24 0837   Wound Depth (cm) 1.2 cm 09/30/24 0837   Wound Volume (cm^3) 9.12 cm^3 09/30/24 0837   Wound Surface Area (cm^2) 7.6 cm^2 09/30/24 0837   Undermining (depth (cm)/location) 1.5 cm/ 12-1 o'clock 09/30/24 0837   Care Cleansed with:;Soap  and water 09/30/24 0837   Dressing Applied;Calcium alginate;Compression wrap;Absorptive Pad;Silver;Other (comment) 09/30/24 0837   Periwound Care Skin barrier film applied 09/30/24 0837   Compression Unna's Boot;Three layer compression 09/30/24 0837   *picture unable to upload*      Serafin was seen in the clinic room and placed in the supine position on the treatment table.  The wound dressings were removed and the legs were cleansed with Easi-clense sponges and dried thoroughly.  No erythema, green drainge, odor, or warmth noted from patient's baseline. Hypergranulation noted- 2 silver nitrates needed. Pt tolerated well.      Plan of Care:    Left lower extremity- Compression stockings daily  Right lower extremity- gentian violet to periwound, packed with aquacel, drawtex, aquacel, mextra pad, and a 3 layer zinc compression wrap.  The patient's foot was positioned at a 90 degree angle. The wrap was applied using a spiral technique avoiding creases or folds.  The wrap was started behind the first metatarsal and ended below the tibial tubercle of the knee.  There was overlap of each turn half the width of the previous turn.  The compression wraps will be changed every 7 days.        Plan:       Right lower extremity was dressed as detailed above  Patient was instructed to not get the dressings wet and to use cast covers for showering.  Should the dressing become wet, he is to remove it, place a moist dressing over the wound, cover with gauze and roll gauze and use ace wraps for compression and to secure bandages.  He should then notify this office as soon as possible to have a new dressing applied.  Instructed patient to remove wrap if he notices tingling or coldness to his right lower extremities or if his toes become white, blue, or cold.    Discussed nutrition and the role of protein in wound healing with the patient. Instructed patient to optimize protein for wound healing.     Discussed bilateral lower extremity  edema with patient. Instructed patient to elevate legs whenever sedentary and follow a low sodium diet.  Discussed replacing compression stockings every 3-6 months.    Instructed patient to keep follow up appointments with vascular surgery.    Instructed patient to continue to take gabapentin as prescribed for nerve pain.    Discussed the importance of utilizing compression stockings to prevent recurrent wounds.    Discussed drainage control- pt needs to be seen 2x a week.      Written and verbal instructions given to patient  RTC Thursday    Rosalba Eaton PA-C

## 2024-10-03 ENCOUNTER — OFFICE VISIT (OUTPATIENT)
Dept: WOUND CARE | Facility: CLINIC | Age: 74
End: 2024-10-03
Payer: MEDICARE

## 2024-10-03 VITALS
DIASTOLIC BLOOD PRESSURE: 60 MMHG | HEIGHT: 70 IN | HEART RATE: 58 BPM | BODY MASS INDEX: 36.89 KG/M2 | TEMPERATURE: 98 F | WEIGHT: 257.69 LBS | SYSTOLIC BLOOD PRESSURE: 126 MMHG

## 2024-10-03 DIAGNOSIS — I10 ESSENTIAL HYPERTENSION: ICD-10-CM

## 2024-10-03 DIAGNOSIS — E11.42 CONTROLLED TYPE 2 DIABETES MELLITUS WITH DIABETIC POLYNEUROPATHY, WITHOUT LONG-TERM CURRENT USE OF INSULIN: ICD-10-CM

## 2024-10-03 DIAGNOSIS — S81.801D OPEN WOUND OF RIGHT LOWER EXTREMITY, SUBSEQUENT ENCOUNTER: Primary | ICD-10-CM

## 2024-10-03 DIAGNOSIS — I50.9 HEART FAILURE, UNSPECIFIED HF CHRONICITY, UNSPECIFIED HEART FAILURE TYPE: ICD-10-CM

## 2024-10-03 DIAGNOSIS — I87.2 VENOUS INSUFFICIENCY: ICD-10-CM

## 2024-10-03 PROCEDURE — 29580 STRAPPING UNNA BOOT: CPT | Mod: RT,S$GLB,,

## 2024-10-03 PROCEDURE — 99499 UNLISTED E&M SERVICE: CPT | Mod: S$GLB,,,

## 2024-10-03 PROCEDURE — 99999 PR PBB SHADOW E&M-EST. PATIENT-LVL III: CPT | Mod: PBBFAC,,,

## 2024-10-03 NOTE — PROGRESS NOTES
"Subjective:       Patient ID: Serafin Tapia is a 73 y.o. male.    Chief Complaint: Wound Check        Patient presents for a re-evaluation of a right lower extremity woundy. He reports his wounds started as blisters about 2 months ago. He is unsure why he did not contact the office until now. Pt admits compliance with wearing compression stockings daily, but reports his stockings have lost elasticity. He reports that his wounds drain a large amount of clear fluid. Pt has been dressing his wounds with a wound "liquid" and occasionally covering his wounds with gauze. Pt has also been leaving his wounds open to air. Patient followed with vascular surgery for PAD and venous insufficiency. Dr. Kapoor determined he has adequate arterial blood flow for wound healing and cleared him for a 3 layer compression wrap. Pt previously followed with the wound care clinic in 2022 and 2023.  Denies compliance with a low sodium diet, leg elevation, and following a high protein diet. Admits compliance with gabapentin. Notices improvement in pain. Denies side effects. Pt followed with Dr. Stern while I was on vacation. On 7/29/24, sharp debridement was performed and part of the patient's achilles tendon was removed. His wound was cultured. Pt followed back on 8/1. His wound was debrided further and was prescribed Augmentin. On 8/5, his wound was debrided further. His Augmentin dose was refilled and sent for 7 days. Pt reported never picking up the Augmentin prescription.  Pt reported stopping the antibiotics early. He reports that he is getting a colonoscopy at [facility] on Wednesday and the MD told him to stop antibiotics until one day after the procedure. Pt completed antibiotics. Pt's daughter reports that the patient eats out for every meal. He does not cook at home. No complaints at this time. Denies fever, chills, purulent drainage, erythema, or warmth.      7/29/24 anaerobic culture: presumptive Prevotella species  7/29/24 aerobic " culture: enterococcus faecalis    7/8/24 ABIs and TBIs:  Right MIHIR- 0.89  Right TBI-0.50  Left MIHIR- 0.82  Left TBI-0.50  Right leg: Segmental pressures and PVR waveforms suggest minimal to mild peripheral arterial occlusive disease. Rt Great Toe: The PPG waveform- mildly dampened with a pressure of 94 mmHG is noted.  Left leg: Segmental pressures and PVR waveforms suggest moderate tibial peripheral arterial occlusive disease. Lt Great Toe: The PPG waveform-moderately dampened with a pressure of 94 mmHG is noted.    10/21/22 MIHIR:  Right lower extremity: 0.93- minimal peripheral arterial occlusive disease  Left lower extremity: 0.70- moderate peripheral arterial occlusive disease      Review of Systems   Constitutional:  Negative for activity change, chills, diaphoresis, fatigue and fever.   Eyes:  Negative for photophobia and visual disturbance.   Respiratory:  Negative for apnea, chest tightness and shortness of breath.    Cardiovascular:  Positive for leg swelling. Negative for chest pain and palpitations.   Musculoskeletal:  Negative for gait problem and joint swelling.   Skin:  Positive for wound. Negative for color change, pallor and rash.   Neurological:  Negative for dizziness, syncope, facial asymmetry, speech difficulty, weakness, light-headedness, numbness and headaches.   Psychiatric/Behavioral:  Negative for agitation and confusion. The patient is not nervous/anxious.    All other systems reviewed and are negative.      Objective:      Physical Exam  Vitals reviewed.   Constitutional:       General: He is not in acute distress.     Appearance: Normal appearance. He is obese.   Cardiovascular:      Rate and Rhythm: Normal rate and regular rhythm.      Pulses: Normal pulses.   Pulmonary:      Effort: No respiratory distress.   Musculoskeletal:         General: No swelling.      Right lower leg: Pitting Edema present.      Left lower leg: Pitting Edema present.      Comments: Ambulates with cane   Skin:      General: Skin is warm and dry.      Capillary Refill: Capillary refill takes less than 2 seconds.      Findings: Wound present. No erythema.          Neurological:      General: No focal deficit present.      Mental Status: He is alert and oriented to person, place, and time.   Psychiatric:         Mood and Affect: Mood normal.         Behavior: Behavior normal.         Thought Content: Thought content normal.         Judgment: Judgment normal.         Assessment:       1. Open wound of right lower extremity, subsequent encounter    2. Venous insufficiency    3. Essential hypertension    4. Heart failure, unspecified HF chronicity, unspecified heart failure type    5. Controlled type 2 diabetes mellitus with diabetic polyneuropathy, without long-term current use of insulin               Wound Venous Ulcer Right lower;medial;distal Leg (Active)       Present on Original Admission:    Primary Wound Type: Venous ulcer   Side: Right   Orientation: lower;medial;distal   Location: Leg   Wound Approximate Age at First Assessment (Weeks):    Wound Number:    Is this injury device related?:    Incision Type:    Closure Method:    Wound Description (Comments):    Type:    Additional Comments:    Ankle-Brachial Index:    Pulses:    Removal Indication and Assessment:    Wound Outcome:    Dressing Appearance Dry;Intact;Clean;Moist drainage 10/03/24 0808   Drainage Amount Large 10/03/24 0808   Drainage Characteristics/Odor Serosanguineous;Bleeding controlled 10/03/24 0808   Red (%), Wound Tissue Color 100 % 10/03/24 0808   Periwound Area Intact;Artemus 10/03/24 0808   Wound Length (cm) 4.4 cm 10/03/24 0808   Wound Width (cm) 1.8 cm 10/03/24 0808   Wound Depth (cm) 1.2 cm 10/03/24 0808   Wound Volume (cm^3) 9.504 cm^3 10/03/24 0808   Wound Surface Area (cm^2) 7.92 cm^2 10/03/24 0808   Undermining (depth (cm)/location) 2.2 cm/ 12 o'clock 10/03/24 0808   Care Cleansed with:;Soap and water 10/03/24 0808   Dressing Applied;Calcium  alginate;Silver;Absorptive Pad;Compression wrap;Other (comment) 10/03/24 0808   Packing packed with;hydrofiber/alginate 10/03/24 0808   Periwound Care Skin barrier film applied 10/03/24 0808   Compression Unna's Boot;Three layer compression 10/03/24 0808   *picture unable to upload*      Serafin was seen in the clinic room and placed in the supine position on the treatment table.  The wound dressings were removed and the legs were cleansed with Easi-clense sponges and dried thoroughly.  No erythema, green drainge, odor, or warmth noted from patient's baseline.       Plan of Care:    Left lower extremity- Compression stockings daily  Right lower extremity- gentian violet to periwound, packed with aquacel Ag, drawtex, aquacel Ag, mextra pad, and a 3 layer zinc compression wrap.  The patient's foot was positioned at a 90 degree angle. The wrap was applied using a spiral technique avoiding creases or folds.  The wrap was started behind the first metatarsal and ended below the tibial tubercle of the knee.  There was overlap of each turn half the width of the previous turn.  The compression wraps will be changed every 7 days.        Plan:       Right lower extremity was dressed as detailed above  Patient was instructed to not get the dressings wet and to use cast covers for showering.  Should the dressing become wet, he is to remove it, place a moist dressing over the wound, cover with gauze and roll gauze and use ace wraps for compression and to secure bandages.  He should then notify this office as soon as possible to have a new dressing applied.  Instructed patient to remove wrap if he notices tingling or coldness to his right lower extremities or if his toes become white, blue, or cold.    Discussed nutrition and the role of protein in wound healing with the patient. Instructed patient to optimize protein for wound healing.     Discussed bilateral lower extremity edema with patient. Instructed patient to elevate legs  whenever sedentary and follow a low sodium diet.  Discussed replacing compression stockings every 3-6 months.    Instructed patient to keep follow up appointments with vascular surgery.    Instructed patient to continue to take gabapentin as prescribed for nerve pain.    Discussed the importance of utilizing compression stockings to prevent recurrent wounds.    Discussed drainage control- pt needs to be seen 2x a week.      Written and verbal instructions given to patient  RTC Monday    Rosalba Eaton PA-C

## 2024-10-07 ENCOUNTER — OFFICE VISIT (OUTPATIENT)
Dept: WOUND CARE | Facility: CLINIC | Age: 74
End: 2024-10-07
Payer: MEDICARE

## 2024-10-07 VITALS
TEMPERATURE: 98 F | DIASTOLIC BLOOD PRESSURE: 65 MMHG | BODY MASS INDEX: 36.55 KG/M2 | HEIGHT: 70 IN | SYSTOLIC BLOOD PRESSURE: 149 MMHG | HEART RATE: 63 BPM | WEIGHT: 255.31 LBS

## 2024-10-07 DIAGNOSIS — I10 ESSENTIAL HYPERTENSION: ICD-10-CM

## 2024-10-07 DIAGNOSIS — S81.801D OPEN WOUND OF RIGHT LOWER EXTREMITY, SUBSEQUENT ENCOUNTER: Primary | ICD-10-CM

## 2024-10-07 DIAGNOSIS — I50.9 HEART FAILURE, UNSPECIFIED HF CHRONICITY, UNSPECIFIED HEART FAILURE TYPE: ICD-10-CM

## 2024-10-07 DIAGNOSIS — L92.9 HYPERGRANULATION: ICD-10-CM

## 2024-10-07 DIAGNOSIS — I87.2 VENOUS INSUFFICIENCY: ICD-10-CM

## 2024-10-07 DIAGNOSIS — E11.42 CONTROLLED TYPE 2 DIABETES MELLITUS WITH DIABETIC POLYNEUROPATHY, WITHOUT LONG-TERM CURRENT USE OF INSULIN: ICD-10-CM

## 2024-10-07 PROCEDURE — 29580 STRAPPING UNNA BOOT: CPT | Mod: 51,RT,S$GLB,

## 2024-10-07 PROCEDURE — 99999 PR PBB SHADOW E&M-EST. PATIENT-LVL III: CPT | Mod: PBBFAC,,,

## 2024-10-07 PROCEDURE — 99499 UNLISTED E&M SERVICE: CPT | Mod: S$GLB,,,

## 2024-10-07 PROCEDURE — 17250 CHEM CAUT OF GRANLTJ TISSUE: CPT | Mod: S$GLB,,,

## 2024-10-07 NOTE — PROGRESS NOTES
"Subjective:       Patient ID: Serafin Tapia is a 73 y.o. male.    Chief Complaint: Wound Check        Patient presents for a re-evaluation of a right lower extremity wound. He reports his wounds started as blisters about 2 months ago. He is unsure why he did not contact the office until now. Pt admits compliance with wearing compression stockings daily, but reports his stockings have lost elasticity. He reports that his wounds drain a large amount of clear fluid. Pt has been dressing his wounds with a wound "liquid" and occasionally covering his wounds with gauze. Pt has also been leaving his wounds open to air. Patient followed with vascular surgery for PAD and venous insufficiency. Dr. Kapoor determined he has adequate arterial blood flow for wound healing and cleared him for a 3 layer compression wrap. Pt previously followed with the wound care clinic in 2022 and 2023.  Denies compliance with a low sodium diet, leg elevation, and following a high protein diet. Admits compliance with gabapentin. Notices improvement in pain. Denies side effects. Pt followed with Dr. Stern while I was on vacation. On 7/29/24, sharp debridement was performed and part of the patient's achilles tendon was removed. His wound was cultured. Pt followed back on 8/1. His wound was debrided further and was prescribed Augmentin. On 8/5, his wound was debrided further. His Augmentin dose was refilled and sent for 7 days. Pt reported never picking up the Augmentin prescription.  Pt reported stopping the antibiotics early. He reports that he is getting a colonoscopy at [facility] on Wednesday and the MD told him to stop antibiotics until one day after the procedure. Pt completed antibiotics. Pt's daughter reports that the patient eats out for every meal. He does not cook at home.     Interval history: Pt reports eating boiled seafood/shrimp this weekend. Denies fever, chills, purulent drainage, erythema, or warmth.      7/29/24 anaerobic culture: " presumptive Prevotella species  7/29/24 aerobic culture: enterococcus faecalis    7/8/24 ABIs and TBIs:  Right MIHIR- 0.89  Right TBI-0.50  Left MIHIR- 0.82  Left TBI-0.50  Right leg: Segmental pressures and PVR waveforms suggest minimal to mild peripheral arterial occlusive disease. Rt Great Toe: The PPG waveform- mildly dampened with a pressure of 94 mmHG is noted.  Left leg: Segmental pressures and PVR waveforms suggest moderate tibial peripheral arterial occlusive disease. Lt Great Toe: The PPG waveform-moderately dampened with a pressure of 94 mmHG is noted.    10/21/22 MIHIR:  Right lower extremity: 0.93- minimal peripheral arterial occlusive disease  Left lower extremity: 0.70- moderate peripheral arterial occlusive disease      Review of Systems   Constitutional:  Negative for activity change, chills, diaphoresis, fatigue and fever.   Eyes:  Negative for photophobia and visual disturbance.   Respiratory:  Negative for apnea, chest tightness and shortness of breath.    Cardiovascular:  Positive for leg swelling. Negative for chest pain and palpitations.   Musculoskeletal:  Negative for gait problem and joint swelling.   Skin:  Positive for wound. Negative for color change, pallor and rash.   Neurological:  Negative for dizziness, syncope, facial asymmetry, speech difficulty, weakness, light-headedness, numbness and headaches.   Psychiatric/Behavioral:  Negative for agitation and confusion. The patient is not nervous/anxious.    All other systems reviewed and are negative.      Objective:      Physical Exam  Vitals reviewed.   Constitutional:       General: He is not in acute distress.     Appearance: Normal appearance. He is obese.   Cardiovascular:      Rate and Rhythm: Normal rate and regular rhythm.      Pulses: Normal pulses.   Pulmonary:      Effort: No respiratory distress.   Musculoskeletal:         General: No swelling.      Right lower leg: Pitting Edema present.      Left lower leg: Pitting Edema present.       Comments: Ambulates with cane   Skin:     General: Skin is warm and dry.      Capillary Refill: Capillary refill takes less than 2 seconds.      Findings: Wound present. No erythema.          Neurological:      General: No focal deficit present.      Mental Status: He is alert and oriented to person, place, and time.   Psychiatric:         Mood and Affect: Mood normal.         Behavior: Behavior normal.         Thought Content: Thought content normal.         Judgment: Judgment normal.         Assessment:       1. Open wound of right lower extremity, subsequent encounter    2. Venous insufficiency    3. Essential hypertension    4. Heart failure, unspecified HF chronicity, unspecified heart failure type    5. Controlled type 2 diabetes mellitus with diabetic polyneuropathy, without long-term current use of insulin    6. Hypergranulation               Wound Venous Ulcer Right lower;medial;distal Leg (Active)       Present on Original Admission:    Primary Wound Type: Venous ulcer   Side: Right   Orientation: lower;medial;distal   Location: Leg   Wound Approximate Age at First Assessment (Weeks):    Wound Number:    Is this injury device related?:    Incision Type:    Closure Method:    Wound Description (Comments):    Type:    Additional Comments:    Ankle-Brachial Index:    Pulses:    Removal Indication and Assessment:    Wound Outcome:    Wound Image   10/07/24 0809   Dressing Appearance Dry;Intact;Clean;Moist drainage 10/07/24 0809   Drainage Amount Large 10/07/24 0809   Drainage Characteristics/Odor Bleeding controlled;Serosanguineous 10/07/24 0809   Appearance Hypergranulation 10/07/24 0809   Red (%), Wound Tissue Color 100 % 10/07/24 0809   Periwound Area Intact;Edematous;Pink 10/07/24 0809   Wound Length (cm) 3.9 cm 10/07/24 0809   Wound Width (cm) 1.9 cm 10/07/24 0809   Wound Depth (cm) 1.5 cm 10/07/24 0809   Wound Volume (cm^3) 11.115 cm^3 10/07/24 0809   Wound Surface Area (cm^2) 7.41 cm^2 10/07/24  0809   Undermining (depth (cm)/location) 2.9 cm/ 12 o'clock 10/07/24 0809   Care Cleansed with:;Soap and water 10/07/24 0809   Dressing Applied;Calcium alginate;Absorptive Pad;Compression wrap;Other (comment) 10/07/24 0809   Periwound Care Skin barrier film applied 10/07/24 0809   Compression Unna's Boot;Three layer compression 10/07/24 0809         Serafin was seen in the clinic room and placed in the supine position on the treatment table.  The wound dressings were removed and the legs were cleansed with Easi-clense sponges and dried thoroughly.  No erythema, green drainge, odor, or warmth noted from patient's baseline. Hypergranulation noted- 1 silver nitrated needed. Pt tolerated well.      Plan of Care:    Left lower extremity- Compression stockings daily  Right lower extremity- gentian violet to periwound, packed with aquacel, drawtex, aquacel, mextra pad, and a 3 layer zinc compression wrap.  The patient's foot was positioned at a 90 degree angle. The wrap was applied using a spiral technique avoiding creases or folds.  The wrap was started behind the first metatarsal and ended below the tibial tubercle of the knee.  There was overlap of each turn half the width of the previous turn.  The compression wraps will be changed every 7 days.        Plan:       Right lower extremity was dressed as detailed above  Patient was instructed to not get the dressings wet and to use cast covers for showering.  Should the dressing become wet, he is to remove it, place a moist dressing over the wound, cover with gauze and roll gauze and use ace wraps for compression and to secure bandages.  He should then notify this office as soon as possible to have a new dressing applied.  Instructed patient to remove wrap if he notices tingling or coldness to his right lower extremities or if his toes become white, blue, or cold.    Discussed nutrition and the role of protein in wound healing with the patient. Instructed patient to optimize  protein for wound healing.     Discussed bilateral lower extremity edema with patient. Instructed patient to elevate legs whenever sedentary and follow a low sodium diet.  Discussed replacing compression stockings every 3-6 months.    Instructed patient to keep follow up appointments with vascular surgery.    Instructed patient to continue to take gabapentin as prescribed for nerve pain.    Discussed the importance of utilizing compression stockings to prevent recurrent wounds.    Discussed drainage control- pt needs to be seen 2x a week.      Written and verbal instructions given to patient  RTC Thursday    Rosalba Eaton PA-C

## 2024-10-09 ENCOUNTER — EXTERNAL HOME HEALTH (OUTPATIENT)
Dept: HOME HEALTH SERVICES | Facility: HOSPITAL | Age: 74
End: 2024-10-09
Payer: MEDICARE

## 2024-10-10 ENCOUNTER — OFFICE VISIT (OUTPATIENT)
Dept: WOUND CARE | Facility: CLINIC | Age: 74
End: 2024-10-10
Payer: MEDICARE

## 2024-10-10 VITALS
HEART RATE: 61 BPM | SYSTOLIC BLOOD PRESSURE: 121 MMHG | DIASTOLIC BLOOD PRESSURE: 58 MMHG | WEIGHT: 257.69 LBS | HEIGHT: 70 IN | TEMPERATURE: 98 F | BODY MASS INDEX: 36.89 KG/M2

## 2024-10-10 DIAGNOSIS — E11.42 CONTROLLED TYPE 2 DIABETES MELLITUS WITH DIABETIC POLYNEUROPATHY, WITHOUT LONG-TERM CURRENT USE OF INSULIN: ICD-10-CM

## 2024-10-10 DIAGNOSIS — S81.801D OPEN WOUND OF RIGHT LOWER EXTREMITY, SUBSEQUENT ENCOUNTER: Primary | ICD-10-CM

## 2024-10-10 DIAGNOSIS — I50.9 HEART FAILURE, UNSPECIFIED HF CHRONICITY, UNSPECIFIED HEART FAILURE TYPE: ICD-10-CM

## 2024-10-10 DIAGNOSIS — I87.2 VENOUS INSUFFICIENCY: ICD-10-CM

## 2024-10-10 DIAGNOSIS — I10 ESSENTIAL HYPERTENSION: ICD-10-CM

## 2024-10-10 PROCEDURE — 29580 STRAPPING UNNA BOOT: CPT | Mod: RT,S$GLB,,

## 2024-10-10 PROCEDURE — 99499 UNLISTED E&M SERVICE: CPT | Mod: S$GLB,,,

## 2024-10-10 PROCEDURE — 99999 PR PBB SHADOW E&M-EST. PATIENT-LVL III: CPT | Mod: PBBFAC,,,

## 2024-10-10 NOTE — PROGRESS NOTES
"Subjective:       Patient ID: Serafin Tapia is a 73 y.o. male.    Chief Complaint: Wound Check        Patient presents for a re-evaluation of a right lower extremity wound. He reports his wounds started as blisters about 2 months ago. He is unsure why he did not contact the office until now. Pt admits compliance with wearing compression stockings daily, but reports his stockings have lost elasticity. He reports that his wounds drain a large amount of clear fluid. Pt has been dressing his wounds with a wound "liquid" and occasionally covering his wounds with gauze. Pt has also been leaving his wounds open to air. Patient followed with vascular surgery for PAD and venous insufficiency. Dr. Kapoor determined he has adequate arterial blood flow for wound healing and cleared him for a 3 layer compression wrap. Pt previously followed with the wound care clinic in 2022 and 2023.  Denies compliance with a low sodium diet, leg elevation, and following a high protein diet. Admits compliance with gabapentin. Notices improvement in pain. Denies side effects. Pt followed with Dr. Stern while I was on vacation. On 7/29/24, sharp debridement was performed and part of the patient's achilles tendon was removed. His wound was cultured. Pt followed back on 8/1. His wound was debrided further and was prescribed Augmentin. On 8/5, his wound was debrided further. His Augmentin dose was refilled and sent for 7 days. Pt reported never picking up the Augmentin prescription.  Pt reported stopping the antibiotics early. He reports that he is getting a colonoscopy at [facility] on Wednesday and the MD told him to stop antibiotics until one day after the procedure. Pt completed antibiotics. Pt's daughter reports that the patient eats out for every meal. He does not cook at home. No complaints at this time. Denies fever, chills, purulent drainage, erythema, or warmth.      7/29/24 anaerobic culture: presumptive Prevotella species  7/29/24 aerobic " culture: enterococcus faecalis    7/8/24 ABIs and TBIs:  Right MIHIR- 0.89  Right TBI-0.50  Left MIHIR- 0.82  Left TBI-0.50  Right leg: Segmental pressures and PVR waveforms suggest minimal to mild peripheral arterial occlusive disease. Rt Great Toe: The PPG waveform- mildly dampened with a pressure of 94 mmHG is noted.  Left leg: Segmental pressures and PVR waveforms suggest moderate tibial peripheral arterial occlusive disease. Lt Great Toe: The PPG waveform-moderately dampened with a pressure of 94 mmHG is noted.    10/21/22 MIHIR:  Right lower extremity: 0.93- minimal peripheral arterial occlusive disease  Left lower extremity: 0.70- moderate peripheral arterial occlusive disease      Review of Systems   Constitutional:  Negative for activity change, chills, diaphoresis, fatigue and fever.   Eyes:  Negative for photophobia and visual disturbance.   Respiratory:  Negative for apnea, chest tightness and shortness of breath.    Cardiovascular:  Positive for leg swelling. Negative for chest pain and palpitations.   Musculoskeletal:  Negative for gait problem and joint swelling.   Skin:  Positive for wound. Negative for color change, pallor and rash.   Neurological:  Negative for dizziness, syncope, facial asymmetry, speech difficulty, weakness, light-headedness, numbness and headaches.   Psychiatric/Behavioral:  Negative for agitation and confusion. The patient is not nervous/anxious.    All other systems reviewed and are negative.      Objective:      Physical Exam  Vitals reviewed.   Constitutional:       General: He is not in acute distress.     Appearance: Normal appearance. He is obese.   Cardiovascular:      Rate and Rhythm: Normal rate and regular rhythm.      Pulses: Normal pulses.   Pulmonary:      Effort: No respiratory distress.   Musculoskeletal:         General: No swelling.      Right lower leg: Pitting Edema present.      Left lower leg: Pitting Edema present.      Comments: Ambulates with cane   Skin:      General: Skin is warm and dry.      Capillary Refill: Capillary refill takes less than 2 seconds.      Findings: Wound present. No erythema.          Neurological:      General: No focal deficit present.      Mental Status: He is alert and oriented to person, place, and time.   Psychiatric:         Mood and Affect: Mood normal.         Behavior: Behavior normal.         Thought Content: Thought content normal.         Judgment: Judgment normal.         Assessment:       1. Open wound of right lower extremity, subsequent encounter    2. Venous insufficiency    3. Essential hypertension    4. Heart failure, unspecified HF chronicity, unspecified heart failure type    5. Controlled type 2 diabetes mellitus with diabetic polyneuropathy, without long-term current use of insulin               Wound Venous Ulcer Right lower;medial;distal Leg (Active)       Present on Original Admission:    Primary Wound Type: Venous ulcer   Side: Right   Orientation: lower;medial;distal   Location: Leg   Wound Approximate Age at First Assessment (Weeks):    Wound Number:    Is this injury device related?:    Incision Type:    Closure Method:    Wound Description (Comments):    Type:    Additional Comments:    Ankle-Brachial Index:    Pulses:    Removal Indication and Assessment:    Wound Outcome:    Wound Image   10/10/24 0805   Dressing Appearance Dry;Intact;Clean;Moist drainage 10/10/24 0805   Drainage Amount Large 10/10/24 0805   Drainage Characteristics/Odor Serosanguineous;Bleeding controlled 10/10/24 0805   Red (%), Wound Tissue Color 100 % 10/10/24 0805   Periwound Area Intact;Edematous;Pink 10/10/24 0805   Wound Length (cm) 4.7 cm 10/10/24 0805   Wound Width (cm) 1.9 cm 10/10/24 0805   Wound Depth (cm) 1.2 cm 10/10/24 0805   Wound Volume (cm^3) 10.716 cm^3 10/10/24 0805   Wound Surface Area (cm^2) 8.93 cm^2 10/10/24 0805   Undermining (depth (cm)/location) 3.4 cm/ 12 o'clock 10/10/24 0805   Care Cleansed with:;Soap and water  10/10/24 0805   Dressing Applied;Calcium alginate;Absorptive Pad;Compression wrap;Other (comment) 10/10/24 0805   Packing packed with;hydrofiber/alginate 10/10/24 0805   Periwound Care Skin barrier film applied 10/10/24 0805   Compression Unna's Boot;Three layer compression 10/10/24 0805         Serafin was seen in the clinic room and placed in the supine position on the treatment table.  The wound dressings were removed and the legs were cleansed with Easi-clense sponges and dried thoroughly.  No erythema, green drainge, odor, or warmth noted from patient's baseline.       Plan of Care:    Left lower extremity- Compression stockings daily  Right lower extremity- gentian violet to periwound, packed with aquacel, drawtex, aquacel, mextra pad, and a 3 layer zinc compression wrap.  The patient's foot was positioned at a 90 degree angle. The wrap was applied using a spiral technique avoiding creases or folds.  The wrap was started behind the first metatarsal and ended below the tibial tubercle of the knee.  There was overlap of each turn half the width of the previous turn.  The compression wraps will be changed every 7 days.        Plan:       Right lower extremity was dressed as detailed above  Patient was instructed to not get the dressings wet and to use cast covers for showering.  Should the dressing become wet, he is to remove it, place a moist dressing over the wound, cover with gauze and roll gauze and use ace wraps for compression and to secure bandages.  He should then notify this office as soon as possible to have a new dressing applied.  Instructed patient to remove wrap if he notices tingling or coldness to his right lower extremities or if his toes become white, blue, or cold.    Discussed nutrition and the role of protein in wound healing with the patient. Instructed patient to optimize protein for wound healing.     Discussed bilateral lower extremity edema with patient. Instructed patient to elevate legs  whenever sedentary and follow a low sodium diet.  Discussed replacing compression stockings every 3-6 months.    Instructed patient to keep follow up appointments with vascular surgery.    Instructed patient to continue to take gabapentin as prescribed for nerve pain.    Discussed the importance of utilizing compression stockings to prevent recurrent wounds.    Discussed drainage control- pt needs to be seen 2x a week.      Written and verbal instructions given to patient  RTC in 1 week    Rosalba Eaton PA-C

## 2024-10-17 ENCOUNTER — OFFICE VISIT (OUTPATIENT)
Dept: WOUND CARE | Facility: CLINIC | Age: 74
End: 2024-10-17
Payer: MEDICARE

## 2024-10-17 VITALS
HEART RATE: 65 BPM | OXYGEN SATURATION: 94 % | BODY MASS INDEX: 36.1 KG/M2 | DIASTOLIC BLOOD PRESSURE: 60 MMHG | HEIGHT: 70 IN | SYSTOLIC BLOOD PRESSURE: 102 MMHG | WEIGHT: 252.19 LBS | TEMPERATURE: 98 F

## 2024-10-17 DIAGNOSIS — S81.801D OPEN WOUND OF RIGHT LOWER EXTREMITY, SUBSEQUENT ENCOUNTER: Primary | ICD-10-CM

## 2024-10-17 DIAGNOSIS — S81.801A OPEN WOUND OF RIGHT LOWER LEG, INITIAL ENCOUNTER: Primary | ICD-10-CM

## 2024-10-17 DIAGNOSIS — S81.801A OPEN WOUND OF RIGHT LOWER LEG, INITIAL ENCOUNTER: ICD-10-CM

## 2024-10-17 DIAGNOSIS — S81.801A OPEN WOUND OF RIGHT LOWER EXTREMITY, INITIAL ENCOUNTER: Primary | ICD-10-CM

## 2024-10-17 PROCEDURE — 99999 PR PBB SHADOW E&M-EST. PATIENT-LVL III: CPT | Mod: PBBFAC,,, | Performed by: SURGERY

## 2024-10-17 PROCEDURE — 87070 CULTURE OTHR SPECIMN AEROBIC: CPT | Performed by: SURGERY

## 2024-10-17 PROCEDURE — 87075 CULTR BACTERIA EXCEPT BLOOD: CPT

## 2024-10-17 RX ORDER — AMOXICILLIN AND CLAVULANATE POTASSIUM 500; 125 MG/1; MG/1
1 TABLET, FILM COATED ORAL 3 TIMES DAILY
Status: SHIPPED | OUTPATIENT
Start: 2024-10-17 | End: 2024-10-24

## 2024-10-17 NOTE — PROGRESS NOTES
Patient was examined.  The wound had a foul odor to it.  I explored the wound with a Q-tip and there was an abscess higher up in the leg.  Therefore a decision was made to perform an I and D and to drain the area of the abscess and to debride the necrotic muscle.  Procedure note     1% lidocaine was used.  An incision of approximately 3 cm was made superior to the open wound in the right leg.  It was open cultures were obtained.  Necrotic purulent tissue was found there.  Using a curette the area was debrided.  I removed a small amount of muscle and necrotic tissue.  We packed the wound with silver rope.  The patient will return in 1 week.  Bleeding was controlled by packing.    Patient tolerated the procedure well.      This was Theo guzman I performed the procedure and dictated this note

## 2024-10-18 LAB — BACTERIA SPEC ANAEROBE CULT: NORMAL

## 2024-10-19 LAB — BACTERIA SPEC AEROBE CULT: NORMAL

## 2024-10-21 ENCOUNTER — TELEPHONE (OUTPATIENT)
Dept: VASCULAR SURGERY | Facility: CLINIC | Age: 74
End: 2024-10-21
Payer: MEDICARE

## 2024-10-21 RX ORDER — SULFAMETHOXAZOLE AND TRIMETHOPRIM 800; 160 MG/1; MG/1
1 TABLET ORAL 2 TIMES DAILY
Qty: 10 TABLET | Refills: 0 | Status: SHIPPED | OUTPATIENT
Start: 2024-10-21 | End: 2024-10-26

## 2024-10-21 NOTE — TELEPHONE ENCOUNTER
Per Dr. Stern's request nurse contacted pt to explain wound C&S shows bacteria is not sensitive to previously prescribed amoxicillin and that Dr. Stern is prescribing Bactrim DS. Explained pt must stop taking amoxicillin and begin taking Bactrim DS today and that this should be picked up from preferred pharmacy on file. Pt verbalized understanding.

## 2024-10-25 ENCOUNTER — OFFICE VISIT (OUTPATIENT)
Dept: WOUND CARE | Facility: CLINIC | Age: 74
End: 2024-10-25
Payer: MEDICARE

## 2024-10-25 VITALS
BODY MASS INDEX: 35.32 KG/M2 | DIASTOLIC BLOOD PRESSURE: 58 MMHG | OXYGEN SATURATION: 93 % | SYSTOLIC BLOOD PRESSURE: 116 MMHG | WEIGHT: 246.69 LBS | HEART RATE: 64 BPM | HEIGHT: 70 IN

## 2024-10-25 DIAGNOSIS — S81.801A OPEN WOUND OF RIGHT LOWER EXTREMITY, INITIAL ENCOUNTER: Primary | ICD-10-CM

## 2024-10-25 PROCEDURE — 99999 PR PBB SHADOW E&M-EST. PATIENT-LVL III: CPT | Mod: PBBFAC,,, | Performed by: SURGERY

## 2024-10-25 NOTE — PROGRESS NOTES
Returns.  The wound was examined and explored with Q-tips.  There is a small amount of necrotic tissue but overall the base of the wound looks good.  However there is a foul smell emanating from the patient.  I am not certain if it is from the wound we will possibly from him.  However I have explored the wound and I see nothing there.  He is on Bactrim DS which is what the bacteria was sensitive to.  We will continue twice a week packing of the wound.  Please see the wound care note for pictures and for an explanation of how they are packed it

## 2024-10-28 ENCOUNTER — OFFICE VISIT (OUTPATIENT)
Dept: WOUND CARE | Facility: CLINIC | Age: 74
End: 2024-10-28
Payer: MEDICARE

## 2024-10-28 ENCOUNTER — OFFICE VISIT (OUTPATIENT)
Dept: OPTOMETRY | Facility: CLINIC | Age: 74
End: 2024-10-28
Payer: MEDICARE

## 2024-10-28 VITALS
WEIGHT: 251 LBS | DIASTOLIC BLOOD PRESSURE: 63 MMHG | HEART RATE: 58 BPM | SYSTOLIC BLOOD PRESSURE: 139 MMHG | BODY MASS INDEX: 36.01 KG/M2

## 2024-10-28 DIAGNOSIS — H25.13 NUCLEAR SCLEROSIS OF BOTH EYES: ICD-10-CM

## 2024-10-28 DIAGNOSIS — H52.7 REFRACTIVE ERROR: ICD-10-CM

## 2024-10-28 DIAGNOSIS — E11.9 TYPE 2 DIABETES MELLITUS WITHOUT RETINOPATHY: Primary | ICD-10-CM

## 2024-10-28 DIAGNOSIS — I50.9 HEART FAILURE, UNSPECIFIED HF CHRONICITY, UNSPECIFIED HEART FAILURE TYPE: ICD-10-CM

## 2024-10-28 DIAGNOSIS — I87.2 VENOUS INSUFFICIENCY: ICD-10-CM

## 2024-10-28 DIAGNOSIS — S81.801D OPEN WOUND OF RIGHT LOWER EXTREMITY, SUBSEQUENT ENCOUNTER: Primary | ICD-10-CM

## 2024-10-28 DIAGNOSIS — E11.51 DIABETES MELLITUS WITH PERIPHERAL VASCULAR DISEASE: ICD-10-CM

## 2024-10-28 DIAGNOSIS — I10 ESSENTIAL HYPERTENSION: ICD-10-CM

## 2024-10-28 DIAGNOSIS — E11.42 CONTROLLED TYPE 2 DIABETES MELLITUS WITH DIABETIC POLYNEUROPATHY, WITHOUT LONG-TERM CURRENT USE OF INSULIN: ICD-10-CM

## 2024-10-28 PROCEDURE — 3061F NEG MICROALBUMINURIA REV: CPT | Mod: CPTII,S$GLB,, | Performed by: OPTOMETRIST

## 2024-10-28 PROCEDURE — 92014 COMPRE OPH EXAM EST PT 1/>: CPT | Mod: S$GLB,,, | Performed by: OPTOMETRIST

## 2024-10-28 PROCEDURE — 29580 STRAPPING UNNA BOOT: CPT | Mod: RT,S$GLB,,

## 2024-10-28 PROCEDURE — 92015 DETERMINE REFRACTIVE STATE: CPT | Mod: S$GLB,,, | Performed by: OPTOMETRIST

## 2024-10-28 PROCEDURE — 99499 UNLISTED E&M SERVICE: CPT | Mod: S$GLB,,,

## 2024-10-28 PROCEDURE — 99999 PR PBB SHADOW E&M-EST. PATIENT-LVL III: CPT | Mod: PBBFAC,,, | Performed by: OPTOMETRIST

## 2024-10-28 PROCEDURE — 3044F HG A1C LEVEL LT 7.0%: CPT | Mod: CPTII,S$GLB,, | Performed by: OPTOMETRIST

## 2024-10-28 PROCEDURE — 1126F AMNT PAIN NOTED NONE PRSNT: CPT | Mod: CPTII,S$GLB,, | Performed by: OPTOMETRIST

## 2024-10-28 PROCEDURE — 3066F NEPHROPATHY DOC TX: CPT | Mod: CPTII,S$GLB,, | Performed by: OPTOMETRIST

## 2024-10-28 PROCEDURE — 99999 PR PBB SHADOW E&M-EST. PATIENT-LVL III: CPT | Mod: PBBFAC,,,

## 2024-10-28 PROCEDURE — 1101F PT FALLS ASSESS-DOCD LE1/YR: CPT | Mod: CPTII,S$GLB,, | Performed by: OPTOMETRIST

## 2024-10-28 PROCEDURE — 4010F ACE/ARB THERAPY RXD/TAKEN: CPT | Mod: CPTII,S$GLB,, | Performed by: OPTOMETRIST

## 2024-10-28 PROCEDURE — 3288F FALL RISK ASSESSMENT DOCD: CPT | Mod: CPTII,S$GLB,, | Performed by: OPTOMETRIST

## 2024-10-31 ENCOUNTER — OFFICE VISIT (OUTPATIENT)
Dept: WOUND CARE | Facility: CLINIC | Age: 74
End: 2024-10-31
Payer: MEDICARE

## 2024-10-31 DIAGNOSIS — I10 ESSENTIAL HYPERTENSION: ICD-10-CM

## 2024-10-31 DIAGNOSIS — I50.9 HEART FAILURE, UNSPECIFIED HF CHRONICITY, UNSPECIFIED HEART FAILURE TYPE: ICD-10-CM

## 2024-10-31 DIAGNOSIS — E11.42 CONTROLLED TYPE 2 DIABETES MELLITUS WITH DIABETIC POLYNEUROPATHY, WITHOUT LONG-TERM CURRENT USE OF INSULIN: ICD-10-CM

## 2024-10-31 DIAGNOSIS — I87.2 VENOUS INSUFFICIENCY: ICD-10-CM

## 2024-10-31 DIAGNOSIS — S81.801D OPEN WOUND OF RIGHT LOWER EXTREMITY, SUBSEQUENT ENCOUNTER: Primary | ICD-10-CM

## 2024-10-31 PROCEDURE — 29580 STRAPPING UNNA BOOT: CPT | Mod: RT,S$GLB,,

## 2024-10-31 PROCEDURE — 99499 UNLISTED E&M SERVICE: CPT | Mod: S$GLB,,,

## 2024-11-04 ENCOUNTER — OFFICE VISIT (OUTPATIENT)
Dept: WOUND CARE | Facility: CLINIC | Age: 74
End: 2024-11-04
Payer: MEDICARE

## 2024-11-04 VITALS
BODY MASS INDEX: 36.17 KG/M2 | HEIGHT: 70 IN | HEART RATE: 71 BPM | WEIGHT: 252.63 LBS | SYSTOLIC BLOOD PRESSURE: 133 MMHG | TEMPERATURE: 98 F | DIASTOLIC BLOOD PRESSURE: 60 MMHG

## 2024-11-04 DIAGNOSIS — I10 ESSENTIAL HYPERTENSION: ICD-10-CM

## 2024-11-04 DIAGNOSIS — S81.801D OPEN WOUND OF RIGHT LOWER EXTREMITY, SUBSEQUENT ENCOUNTER: Primary | ICD-10-CM

## 2024-11-04 DIAGNOSIS — I50.9 HEART FAILURE, UNSPECIFIED HF CHRONICITY, UNSPECIFIED HEART FAILURE TYPE: ICD-10-CM

## 2024-11-04 DIAGNOSIS — I87.2 VENOUS INSUFFICIENCY: ICD-10-CM

## 2024-11-04 DIAGNOSIS — E11.42 CONTROLLED TYPE 2 DIABETES MELLITUS WITH DIABETIC POLYNEUROPATHY, WITHOUT LONG-TERM CURRENT USE OF INSULIN: ICD-10-CM

## 2024-11-04 PROCEDURE — 99999 PR PBB SHADOW E&M-EST. PATIENT-LVL III: CPT | Mod: PBBFAC,,,

## 2024-11-04 PROCEDURE — 29580 STRAPPING UNNA BOOT: CPT | Mod: RT,S$GLB,,

## 2024-11-04 PROCEDURE — 99499 UNLISTED E&M SERVICE: CPT | Mod: S$GLB,,,

## 2024-11-04 NOTE — PROGRESS NOTES
"Subjective:       Patient ID: Serafin Tapia is a 74 y.o. male.    Chief Complaint: Wound Consult      Patient presents for a re-evaluation of a right lower extremity wound. He reports his wounds started as blisters about 2 months ago. He is unsure why he did not contact the office until now. Pt admits compliance with wearing compression stockings daily, but reports his stockings have lost elasticity. He reports that his wounds drain a large amount of clear fluid. Pt has been dressing his wounds with a wound "liquid" and occasionally covering his wounds with gauze. Pt has also been leaving his wounds open to air. Patient followed with vascular surgery for PAD and venous insufficiency. Dr. Kapoor determined he has adequate arterial blood flow for wound healing and cleared him for a 3 layer compression wrap. Pt previously followed with the wound care clinic in 2022 and 2023.  Denies compliance with a low sodium diet, leg elevation, and following a high protein diet. Admits compliance with gabapentin. Notices improvement in pain. Denies side effects. Pt followed with Dr. Stern while I was on vacation. On 7/29/24, sharp debridement was performed and part of the patient's achilles tendon was removed. His wound was cultured. Pt followed back on 8/1. His wound was debrided further and was prescribed Augmentin. On 8/5, his wound was debrided further. His Augmentin dose was refilled and sent for 7 days. Pt reported never picking up the Augmentin prescription.  Pt reported stopping the antibiotics early. He reports that he is getting a colonoscopy at [facility] on Wednesday and the MD told him to stop antibiotics until one day after the procedure. Pt completed antibiotics. Pt's daughter reports that the patient eats out for every meal. He does not cook at home.  On 10/17, an abscess was noted. An I&D was performed and his necrotic muscle was removed. He wound was cultured and he was prescribed Augmentin. His antibiotic was " changed once his culture resulted. He was prescribed bactrim DS. No complaints at this time. Denies fever, chills, purulent drainage, erythema, or warmth.    10/17/24 aerobic culture: Staphylococcus aureus, skin jessica also present    7/29/24 anaerobic culture: presumptive Prevotella species  7/29/24 aerobic culture: enterococcus faecalis    7/8/24 ABIs and TBIs:  Right MIHIR- 0.89  Right TBI-0.50  Left MIHIR- 0.82  Left TBI-0.50  Right leg: Segmental pressures and PVR waveforms suggest minimal to mild peripheral arterial occlusive disease. Rt Great Toe: The PPG waveform- mildly dampened with a pressure of 94 mmHG is noted.  Left leg: Segmental pressures and PVR waveforms suggest moderate tibial peripheral arterial occlusive disease. Lt Great Toe: The PPG waveform-moderately dampened with a pressure of 94 mmHG is noted.    10/21/22 MIHIR:  Right lower extremity: 0.93- minimal peripheral arterial occlusive disease  Left lower extremity: 0.70- moderate peripheral arterial occlusive disease      Review of Systems   Constitutional:  Negative for activity change, chills, diaphoresis, fatigue and fever.   Eyes:  Negative for photophobia and visual disturbance.   Respiratory:  Negative for apnea, chest tightness and shortness of breath.    Cardiovascular:  Positive for leg swelling. Negative for chest pain and palpitations.   Musculoskeletal:  Negative for gait problem and joint swelling.   Skin:  Positive for wound. Negative for color change, pallor and rash.   Neurological:  Negative for dizziness, syncope, facial asymmetry, speech difficulty, weakness, light-headedness, numbness and headaches.   Psychiatric/Behavioral:  Negative for agitation and confusion. The patient is not nervous/anxious.    All other systems reviewed and are negative.      Objective:      Physical Exam  Vitals reviewed.   Constitutional:       General: He is not in acute distress.     Appearance: Normal appearance. He is obese.   Cardiovascular:      Rate  and Rhythm: Normal rate and regular rhythm.      Pulses: Normal pulses.   Pulmonary:      Effort: No respiratory distress.   Musculoskeletal:         General: No swelling.      Right lower leg: Pitting Edema present.      Left lower leg: Pitting Edema present.      Comments: Ambulates with cane   Skin:     General: Skin is warm and dry.      Capillary Refill: Capillary refill takes less than 2 seconds.      Findings: Wound present. No erythema.          Neurological:      General: No focal deficit present.      Mental Status: He is alert and oriented to person, place, and time.   Psychiatric:         Mood and Affect: Mood normal.         Behavior: Behavior normal.         Thought Content: Thought content normal.         Judgment: Judgment normal.         Assessment:       1. Open wound of right lower extremity, subsequent encounter    2. Venous insufficiency    3. Essential hypertension    4. Heart failure, unspecified HF chronicity, unspecified heart failure type    5. Controlled type 2 diabetes mellitus with diabetic polyneuropathy, without long-term current use of insulin               Wound Venous Ulcer Right lower;medial;distal;posterior Leg (Active)     Leg   Present on Original Admission:    Primary Wound Type: Venous ulcer   Side: Right   Orientation: lower;medial;distal;posterior   Wound Approximate Age at First Assessment (Weeks):    Wound Number:    Is this injury device related?:    Incision Type:    Closure Method:    Wound Description (Comments):    Type:    Additional Comments:    Ankle-Brachial Index:    Pulses:    Removal Indication and Assessment:    Wound Outcome:    Wound Image   11/04/24 0815   Dressing Appearance Dry;Intact;Clean;Moist drainage 11/04/24 0815   Drainage Amount Large 11/04/24 0815   Drainage Characteristics/Odor Serosanguineous 11/04/24 0815   Red (%), Wound Tissue Color 100 % 11/04/24 0815   Periwound Area Intact;Edematous;Pink 11/04/24 0815   Wound Length (cm) 4.3 cm 11/04/24  0815   Wound Width (cm) 1.3 cm 11/04/24 0815   Wound Depth (cm) 1.2 cm 11/04/24 0815   Wound Volume (cm^3) 6.708 cm^3 11/04/24 0815   Wound Surface Area (cm^2) 5.59 cm^2 11/04/24 0815   Undermining (depth (cm)/location) 0.9 cm/ 12 o'clock 11/04/24 0815   Care Cleansed with:;Soap and water 11/04/24 0815   Dressing Applied;Calcium alginate;Absorptive Pad;Compression wrap;Other (comment) 11/04/24 0815   Packing packed with;hydrofiber/alginate 11/04/24 0815   Periwound Care Skin barrier film applied 11/04/24 0815   Compression Unna's Boot;Three layer compression 11/04/24 0815         Serafin was seen in the clinic room and placed in the supine position on the treatment table.  The wound dressings were removed and the legs were cleansed with Easi-clense sponges and dried thoroughly.  No erythema, green drainge, odor, or warmth noted from patient's baseline.       Plan of Care:    Left lower extremity- Compression stockings daily  Right lower extremity- gentian violet to periwound, packed with aquacel, drawtex, aquacel, mextra pad, and a 3 layer zinc compression wrap.  The patient's foot was positioned at a 90 degree angle. The wrap was applied using a spiral technique avoiding creases or folds.  The wrap was started behind the first metatarsal and ended below the tibial tubercle of the knee.  There was overlap of each turn half the width of the previous turn.  The compression wraps will be changed every 7 days.        Plan:       Right lower extremity was dressed as detailed above  Patient was instructed to not get the dressings wet and to use cast covers for showering.  Should the dressing become wet, he is to remove it, place a moist dressing over the wound, cover with gauze and roll gauze and use ace wraps for compression and to secure bandages.  He should then notify this office as soon as possible to have a new dressing applied.  Instructed patient to remove wrap if he notices tingling or coldness to his right lower  extremities or if his toes become white, blue, or cold.    Discussed nutrition and the role of protein in wound healing with the patient. Instructed patient to optimize protein for wound healing.     Discussed bilateral lower extremity edema with patient. Instructed patient to elevate legs whenever sedentary and follow a low sodium diet.  Discussed replacing compression stockings every 3-6 months.    Instructed patient to keep follow up appointments with vascular surgery.    Instructed patient to continue to take gabapentin as prescribed for nerve pain.    Discussed the importance of utilizing compression stockings to prevent recurrent wounds.    Discussed drainage control- pt can return to being seen once a week.      Written and verbal instructions given to patient  RTC in 1 week      Rosalba Eaton PA-C

## 2024-11-06 ENCOUNTER — OFFICE VISIT (OUTPATIENT)
Dept: CARDIOLOGY | Facility: CLINIC | Age: 74
End: 2024-11-06
Payer: MEDICARE

## 2024-11-06 VITALS
HEART RATE: 80 BPM | WEIGHT: 252.44 LBS | HEIGHT: 70 IN | BODY MASS INDEX: 36.14 KG/M2 | DIASTOLIC BLOOD PRESSURE: 74 MMHG | SYSTOLIC BLOOD PRESSURE: 125 MMHG

## 2024-11-06 DIAGNOSIS — I35.0 AORTIC VALVE STENOSIS, ETIOLOGY OF CARDIAC VALVE DISEASE UNSPECIFIED: ICD-10-CM

## 2024-11-06 DIAGNOSIS — E78.2 MIXED HYPERLIPIDEMIA: ICD-10-CM

## 2024-11-06 DIAGNOSIS — I10 ESSENTIAL HYPERTENSION: Primary | ICD-10-CM

## 2024-11-06 DIAGNOSIS — G72.0 STATIN MYOPATHY: ICD-10-CM

## 2024-11-06 DIAGNOSIS — S81.801D MULTIPLE OPEN WOUNDS OF RIGHT LOWER EXTREMITY, SUBSEQUENT ENCOUNTER: ICD-10-CM

## 2024-11-06 DIAGNOSIS — E11.649 CONTROLLED TYPE 2 DIABETES MELLITUS WITH HYPOGLYCEMIA, WITHOUT LONG-TERM CURRENT USE OF INSULIN: ICD-10-CM

## 2024-11-06 DIAGNOSIS — T46.6X5A STATIN MYOPATHY: ICD-10-CM

## 2024-11-06 DIAGNOSIS — I73.9 PAD (PERIPHERAL ARTERY DISEASE): ICD-10-CM

## 2024-11-06 DIAGNOSIS — E66.01 SEVERE OBESITY WITH BODY MASS INDEX (BMI) OF 36.0 TO 36.9 WITH SERIOUS COMORBIDITY: ICD-10-CM

## 2024-11-06 DIAGNOSIS — M79.89 SWELLING OF RIGHT LOWER EXTREMITY: ICD-10-CM

## 2024-11-06 DIAGNOSIS — S81.801D WOUND OF RIGHT LOWER EXTREMITY, SUBSEQUENT ENCOUNTER: ICD-10-CM

## 2024-11-06 PROBLEM — S81.801A WOUND OF RIGHT LOWER EXTREMITY: Status: ACTIVE | Noted: 2024-11-06

## 2024-11-06 PROCEDURE — 3008F BODY MASS INDEX DOCD: CPT | Mod: CPTII,S$GLB,, | Performed by: INTERNAL MEDICINE

## 2024-11-06 PROCEDURE — 3078F DIAST BP <80 MM HG: CPT | Mod: CPTII,S$GLB,, | Performed by: INTERNAL MEDICINE

## 2024-11-06 PROCEDURE — 1101F PT FALLS ASSESS-DOCD LE1/YR: CPT | Mod: CPTII,S$GLB,, | Performed by: INTERNAL MEDICINE

## 2024-11-06 PROCEDURE — 3074F SYST BP LT 130 MM HG: CPT | Mod: CPTII,S$GLB,, | Performed by: INTERNAL MEDICINE

## 2024-11-06 PROCEDURE — 1125F AMNT PAIN NOTED PAIN PRSNT: CPT | Mod: CPTII,S$GLB,, | Performed by: INTERNAL MEDICINE

## 2024-11-06 PROCEDURE — 3066F NEPHROPATHY DOC TX: CPT | Mod: CPTII,S$GLB,, | Performed by: INTERNAL MEDICINE

## 2024-11-06 PROCEDURE — 4010F ACE/ARB THERAPY RXD/TAKEN: CPT | Mod: CPTII,S$GLB,, | Performed by: INTERNAL MEDICINE

## 2024-11-06 PROCEDURE — 99999 PR PBB SHADOW E&M-EST. PATIENT-LVL IV: CPT | Mod: PBBFAC,,, | Performed by: INTERNAL MEDICINE

## 2024-11-06 PROCEDURE — 1159F MED LIST DOCD IN RCRD: CPT | Mod: CPTII,S$GLB,, | Performed by: INTERNAL MEDICINE

## 2024-11-06 PROCEDURE — 3288F FALL RISK ASSESSMENT DOCD: CPT | Mod: CPTII,S$GLB,, | Performed by: INTERNAL MEDICINE

## 2024-11-06 PROCEDURE — 99204 OFFICE O/P NEW MOD 45 MIN: CPT | Mod: S$GLB,,, | Performed by: INTERNAL MEDICINE

## 2024-11-06 PROCEDURE — 3061F NEG MICROALBUMINURIA REV: CPT | Mod: CPTII,S$GLB,, | Performed by: INTERNAL MEDICINE

## 2024-11-06 PROCEDURE — 3044F HG A1C LEVEL LT 7.0%: CPT | Mod: CPTII,S$GLB,, | Performed by: INTERNAL MEDICINE

## 2024-11-06 RX ORDER — EVOLOCUMAB 140 MG/ML
140 INJECTION, SOLUTION SUBCUTANEOUS
Qty: 6 EACH | Refills: 6 | Status: ACTIVE | OUTPATIENT
Start: 2024-11-06

## 2024-11-06 NOTE — PATIENT INSTRUCTIONS
Assessment/Plan:  Serafin Tapia is a 74 y.o. male with PAD, HTN, HLD, DM2, hypothyroidism, severe obesity, history of PE, who presents for an initial appointment.    PAD with RLE CLI- Right toe pressure is 94 mmHg, which is suggestive of adequate arterial blood flow for wound healing. Continue wound care. Start Repatha given statin intolerance. Continue ASA 81 mg daily.    2. HTN- Continue current medications.    3. HLD-  Start Repatha given statin intolerance. Continue ASA 81 mg daily.    4. Severe obesity- Encourage diet, exercise, and weight loss.    Follow up in 2 months

## 2024-11-06 NOTE — PROGRESS NOTES
"[facility]      Chief Complaint   Patient presents with    Peripheral Arterial Disease       Patient ID: Serafin Tapia is a 74 y.o. male with PAD, HTN, HLD, DM2, hypothyroidism, severe obesity, history of PE, who presents for an initial appointment. Pertinent history/events are as follows:     -Pt kindly referred by Dr. Chiang for evaluation of PAD.     HPI:  Mr. Tapia is accompanied by his brother.  He reports a wound a lower aspect of right leg which started "several months ago". He is unsure how the wound occurred. He does not ambulate much and has no claudication symptoms. The wound is being managed by wound care. He is intolerant to statins. MIHIR Study on 7/8/2024 shows RLE segmental pressures and PVR waveforms suggest minimal to mild peripheral arterial occlusive disease. TBI of 0.5 with a great toe pressure of 94 mmHg is noted.  Left Leg: Segmental pressures and PVR waveforms suggest moderate tibial peripheral arterial occlusive disease. TBI of 0.5 with a great toe pressure of 94 mmHg is noted. CTA Abd/Pelvis with Iliofemoral Runoff on 10/25/2022 showed prominent calcific atherosclerosis in the lower extremities with multifocal moderate/high-grade narrowing throughout the KAMRAN, PTA, and peroneal arteries.  The distal posterior tibial arteries are occluded bilaterally with reconstitution by collateral flow from the peroneal arteries. Multifocal mild/ moderate stenosis of the femoropopliteal arteries. Mild narrowing of the aortoiliac vessels.    Past Medical History:   Diagnosis Date    Asthma     childhood    Diabetes mellitus type 2 in obese 05/03/2014    Dyslipidemia 05/05/2014    Elevated troponin 05/03/2014    Gait instability 02/28/2018    Hyperlipidemia     Hypertension     Hypothyroidism (acquired) 11/01/2017    Obesity     Ocular hypertension     Ocular hypertension     Pulmonary embolism 05/2014    PVD (posterior vitreous detachment), right eye     Spondylosis of lumbar region " without myelopathy or radiculopathy 08/25/2017    Statin myopathy 01/16/2018    Vertigo      Past Surgical History:   Procedure Laterality Date    CIRCUMCISION N/A 09/10/2019    Procedure: CIRCUMCISION;  Surgeon: Nhan Ruth MD;  Location: [facility];  Service: Urology;  Laterality: N/A;    TONSILLECTOMY       Social History     Socioeconomic History    Marital status:    Occupational History    Occupation: Retired    Tobacco Use    Smoking status: Former     Current packs/day: 0.25     Average packs/day: 0.3 packs/day for 5.0 years (1.3 ttl pk-yrs)     Types: Cigarettes    Smokeless tobacco: Never    Tobacco comments:     Quit smoking as a teenager   Substance and Sexual Activity    Alcohol use: Yes     Alcohol/week: 1.0 - 2.0 standard drink of alcohol     Types: 1 - 2 Cans of beer per week     Comment: 1-2 drinks occasionally    Drug use: No    Sexual activity: Yes     Partners: Female     Birth control/protection: Condom   Social History Narrative    Lives w/daughter.      Social Drivers of Health     Financial Resource Strain: Low Risk  (9/12/2024)    Overall Financial Resource Strain (CARDIA)     Difficulty of Paying Living Expenses: Not hard at all   Food Insecurity: No Food Insecurity (9/12/2024)    Hunger Vital Sign     Worried About Running Out of Food in the Last Year: Never true     Ran Out of Food in the Last Year: Never true   Transportation Needs: No Transportation Needs (9/12/2024)    TRANSPORTATION NEEDS     Transportation : No   Physical Activity: Unknown (9/12/2024)    Exercise Vital Sign     Days of Exercise per Week: 0 days   Stress: No Stress Concern Present (9/12/2024)    [employer] - Occupational Stress Questionnaire     Feeling of Stress : Not at all   Housing Stability: Low Risk  (9/12/2024)    Housing Stability Vital Sign     Unable to Pay for Housing in the Last Year: No     Homeless in the Last Year: No     Family History    Problem Relation Name Age of Onset    Diabetes Mother      Heart disease Mother      Hypertension Mother      Stroke Mother      Heart disease Father      Hypertension Father      Diabetes Father      Cancer Father      Cataracts Sister Magnolia     Hypertension Sister Magnolia     Diabetes Sister Magnolia     Hyperlipidemia Brother Jose Raul     Hypertension Brother Jose Raul     Diabetes Brother Jose Raul     Diabetes Brother Justen     Hypertension Brother Justen     Hypertension Brother Nik     Hypertension Daughter Raj     Diabetes Son Serafin Jr.     Hyperlipidemia Son Serafin Jackson.     Hypertension Son Serafin Jr.     Glaucoma Maternal Aunt      Glaucoma Paternal Aunt      No Known Problems Maternal Grandmother      No Known Problems Maternal Grandfather      No Known Problems Paternal Grandmother      No Known Problems Paternal Grandfather      Cirrhosis Neg Hx         Review of patient's allergies indicates:   Allergen Reactions    Statins-hmg-coa reductase inhibitors Other (See Comments)     Possible statin induced autoimmune myopathy       Medication List with Changes/Refills   Current Medications    ALBUTEROL (PROVENTIL/VENTOLIN HFA) 90 MCG/ACTUATION INHALER    Inhale 2 puffs into the lungs every 6 (six) hours as needed for Wheezing.    BENAZEPRIL (LOTENSIN) 40 MG TABLET    Take 1 tablet (40 mg total) by mouth once daily.    CICLOPIROX (PENLAC) 8 % SOLN    Apply topically nightly.    DULAGLUTIDE (TRULICITY) 1.5 MG/0.5 ML PEN INJECTOR    Inject 1.5 mg into the skin every 7 days.    ERGOCALCIFEROL (ERGOCALCIFEROL) 50,000 UNIT CAP    TAKE 1 CAPSULE BY MOUTH ONE TIME PER WEEK    FOLIC ACID (FOLVITE) 1 MG TABLET    Take 1 tablet (1,000 mcg total) by mouth once daily.    GABAPENTIN (NEURONTIN) 100 MG CAPSULE    Take 3 capsules (300 mg total) by mouth 3 (three) times daily.    LEVOTHYROXINE (SYNTHROID) 50 MCG TABLET    Take 1 tablet (50 mcg total) by mouth before breakfast.    METOPROLOL SUCCINATE (TOPROL-XL) 200 MG 24 HR TABLET    " Take 1 tablet (200 mg total) by mouth once daily.    NIFEDIPINE (PROCARDIA-XL) 90 MG (OSM) 24 HR TABLET    Take 1 tablet (90 mg total) by mouth once daily.       Review of Systems  Constitution: Denies chills, fever, and sweats.  HENT: Denies headaches or blurry vision.  Cardiovascular: Denies chest pain or irregular heart beat.  Respiratory: Denies cough or shortness of breath.  Gastrointestinal: Denies abdominal pain, nausea, or vomiting.  Musculoskeletal: Denies muscle cramps.  Neurological: Denies dizziness or focal weakness.  Psychiatric/Behavioral: Normal mental status.  Hematologic/Lymphatic: Denies bleeding problem or easy bruising/bleeding.  Skin: Denies rash or suspicious lesions    Physical Examination  /74 (BP Location: Right arm, Patient Position: Sitting)   Pulse 80   Ht 5' 10" (1.778 m)   Wt 114.5 kg (252 lb 6.8 oz)   BMI 36.22 kg/m²     Constitutional: No acute distress, conversant  HEENT: Sclera anicteric, Pupils equal, round and reactive to light, extraocular motions intact, Oropharynx clear  Neck: No JVD, no carotid bruits  Cardiovascular: regular rate and rhythm, no murmur, rubs or gallops, normal S1/S2  Pulmonary: Clear to auscultation bilaterally  Abdominal: Abdomen soft, nontender, nondistended, positive bowel sounds  Extremities: Mild bilateral lower extremity edema  RLE with bandage in place at lower leg  Pulses:  Carotid pulses are 2+ on the right side, and 2+ on the left side.  Radial pulses are 2+ on the right side, and 2+ on the left side.   Femoral pulses are 2+ on the right side, and 2+ on the left side.  Popliteal pulses are 2+ on the right side, and 2+ on the left side.   Dorsalis pedis pulses are 2+ on the right side, and 2+ on the left side.   Posterior tibial pulses are 2+ on the right side, and 2+ on the left side.    Skin: No ecchymosis, erythema, or ulcers  Psych: Alert and oriented x 3, appropriate affect  Neuro: CNII-XII intact, no focal deficits    Labs:  Most " Recent Data  CBC:   Lab Results   Component Value Date    WBC 7.40 09/11/2024    HGB 12.4 (L) 09/11/2024    HCT 38.5 (L) 09/11/2024     09/11/2024    MCV 79 (L) 09/11/2024    RDW 15.7 (H) 09/11/2024     BMP:   Lab Results   Component Value Date     09/12/2024    K 3.7 09/12/2024     09/12/2024    CO2 27 09/12/2024    BUN 26 (H) 09/12/2024    CREATININE 1.0 09/12/2024     (H) 09/12/2024    CALCIUM 9.7 09/12/2024    MG 2.0 09/11/2024    PHOS 3.0 09/11/2024     LFTS;   Lab Results   Component Value Date    PROT 7.2 09/11/2024    ALBUMIN 3.3 (L) 09/11/2024    BILITOT 0.2 09/11/2024    AST 27 09/11/2024    ALKPHOS 85 09/11/2024    ALT 16 09/11/2024     COAGS:   Lab Results   Component Value Date    INR 1.1 01/15/2018     FLP:   Lab Results   Component Value Date    CHOL 223 (H) 06/28/2024    HDL 42 06/28/2024    LDLCALC 163.4 (H) 06/28/2024    TRIG 88 06/28/2024    CHOLHDL 18.8 (L) 06/28/2024     CARDIAC:   Lab Results   Component Value Date    TROPONINI 0.018 09/11/2019    BNP 88 06/28/2024       Imaging:    MIHIR Study 7/8/2024:  Right Leg: Segmental pressures and PVR waveforms suggest minimal to mild peripheral arterial occlusive disease.   - Rt Great Toe: The PPG waveform as described above. - TBI of 0.5 with a great toe pressure of 94 mmHg is noted.   Left Leg: Segmental pressures and PVR waveforms suggest moderate tibial peripheral arterial occlusive disease.   - Lt Great Toe: The PPG waveform as described above. - TBI of 0.5 with a great toe pressure of 94 mmHg is noted.     CTA Abd/Pelvis with Iliofemoral Runoff 10/25/2022:  Prominent calcific atherosclerosis in the lower extremities with multifocal moderate/ high-grade narrowing throughout the KAMRAN, PTA, and peroneal arteries.  The distal posterior tibial arteries are occluded bilaterally with reconstitution by collateral flow from the peroneal arteries.   Multifocal mild/ moderate stenosis of the femoropopliteal arteries.   Mild narrowing  of the aortoiliac vessels.    Echo 6/28/2024:    Left Ventricle: The left ventricle is normal in size. Increased wall thickness. There is concentric remodeling. There is normal systolic function with a visually estimated ejection fraction of 55 - 60%. There is normal diastolic function.    Right Ventricle: Normal right ventricular cavity size. Wall thickness is normal. Systolic function is normal.    Left Atrium: Left atrium is mildly dilated.    Aortic Valve: The aortic valve is a trileaflet valve. There is moderate aortic valve sclerosis. Moderately restricted motion. There is moderate stenosis. Aortic valve area by VTI is 1.15 cm². Aortic valve peak velocity is 3.45 m/s. Mean gradient is 29 mmHg. The dimensionless index is 0.28.    Tricuspid Valve: There is mild regurgitation.    Pulmonary Artery: The estimated pulmonary artery systolic pressure is 21 mmHg.    IVC/SVC: Normal venous pressure at 3 mmHg.    Assessment/Plan:  Serafin Tapia is a 74 y.o. male with PAD, HTN, HLD, DM2, hypothyroidism, severe obesity, history of PE, who presents for an initial appointment.    PAD with RLE CLI- Right toe pressure from 7/8/2024 is 94 mmHg, which is suggestive of adequate arterial blood flow for wound healing. Continue wound care. Start Repatha given statin intolerance. Continue ASA 81 mg daily.    2. HTN- Continue current medications.    3. HLD-  Start Repatha given statin intolerance. Continue ASA 81 mg daily.    4. Severe obesity- Encourage diet, exercise, and weight loss.    Follow up in 2 months     Total duration of face to face visit time 45 minutes.  Total time spent counseling greater than fifty percent of total visit time.  Counseling included discussion regarding imaging findings, diagnosis, possibilities, treatment options, risks and benefits.  The patient had many questions regarding the options and long-term effects.    Scott Andujar MD, PhD  Interventional Cardiology

## 2024-11-07 ENCOUNTER — OFFICE VISIT (OUTPATIENT)
Dept: WOUND CARE | Facility: CLINIC | Age: 74
End: 2024-11-07
Payer: MEDICARE

## 2024-11-07 VITALS
BODY MASS INDEX: 36.23 KG/M2 | SYSTOLIC BLOOD PRESSURE: 108 MMHG | DIASTOLIC BLOOD PRESSURE: 56 MMHG | HEART RATE: 72 BPM | HEIGHT: 70 IN | WEIGHT: 253.06 LBS | TEMPERATURE: 98 F

## 2024-11-07 DIAGNOSIS — I87.2 VENOUS INSUFFICIENCY: ICD-10-CM

## 2024-11-07 DIAGNOSIS — I10 ESSENTIAL HYPERTENSION: ICD-10-CM

## 2024-11-07 DIAGNOSIS — E11.42 CONTROLLED TYPE 2 DIABETES MELLITUS WITH DIABETIC POLYNEUROPATHY, WITHOUT LONG-TERM CURRENT USE OF INSULIN: ICD-10-CM

## 2024-11-07 DIAGNOSIS — S81.801D OPEN WOUND OF RIGHT LOWER EXTREMITY, SUBSEQUENT ENCOUNTER: Primary | ICD-10-CM

## 2024-11-07 DIAGNOSIS — I50.9 HEART FAILURE, UNSPECIFIED HF CHRONICITY, UNSPECIFIED HEART FAILURE TYPE: ICD-10-CM

## 2024-11-07 PROCEDURE — 99499 UNLISTED E&M SERVICE: CPT | Mod: S$GLB,,,

## 2024-11-07 PROCEDURE — 99999 PR PBB SHADOW E&M-EST. PATIENT-LVL III: CPT | Mod: PBBFAC,,,

## 2024-11-07 PROCEDURE — 29580 STRAPPING UNNA BOOT: CPT | Mod: RT,S$GLB,,

## 2024-11-07 NOTE — PROGRESS NOTES
"Subjective:       Patient ID: Serafin Tapia is a 74 y.o. male.    Chief Complaint: Wound Check      Patient presents for a re-evaluation of a right lower extremity wound. He reports his wounds started as blisters about 2 months ago. He is unsure why he did not contact the office until now. Pt admits compliance with wearing compression stockings daily, but reports his stockings have lost elasticity. He reports that his wounds drain a large amount of clear fluid. Pt has been dressing his wounds with a wound "liquid" and occasionally covering his wounds with gauze. Pt has also been leaving his wounds open to air. Patient followed with vascular surgery for PAD and venous insufficiency. Dr. Kapoor determined he has adequate arterial blood flow for wound healing and cleared him for a 3 layer compression wrap. Pt previously followed with the wound care clinic in 2022 and 2023.  Denies compliance with a low sodium diet, leg elevation, and following a high protein diet. Admits compliance with gabapentin. Notices improvement in pain. Denies side effects. Pt followed with Dr. Stern while I was on vacation. On 7/29/24, sharp debridement was performed and part of the patient's achilles tendon was removed. His wound was cultured. Pt followed back on 8/1. His wound was debrided further and was prescribed Augmentin. On 8/5, his wound was debrided further. His Augmentin dose was refilled and sent for 7 days. Pt reported never picking up the Augmentin prescription.  Pt reported stopping the antibiotics early. He reports that he is getting a colonoscopy at [facility] on Wednesday and the MD told him to stop antibiotics until one day after the procedure. Pt completed antibiotics. Pt's daughter reports that the patient eats out for every meal. He does not cook at home.  On 10/17, an abscess was noted. An I&D was performed and his necrotic muscle was removed. He wound was cultured and he was prescribed Augmentin. His antibiotic was changed " once his culture resulted. He was prescribed bactrim DS. No complaints at this time. Denies fever, chills, purulent drainage, erythema, or warmth.    10/17/24 aerobic culture: Staphylococcus aureus, skin jessica also present    7/29/24 anaerobic culture: presumptive Prevotella species  7/29/24 aerobic culture: enterococcus faecalis    7/8/24 ABIs and TBIs:  Right MIHIR- 0.89  Right TBI-0.50  Left MIHIR- 0.82  Left TBI-0.50  Right leg: Segmental pressures and PVR waveforms suggest minimal to mild peripheral arterial occlusive disease. Rt Great Toe: The PPG waveform- mildly dampened with a pressure of 94 mmHG is noted.  Left leg: Segmental pressures and PVR waveforms suggest moderate tibial peripheral arterial occlusive disease. Lt Great Toe: The PPG waveform-moderately dampened with a pressure of 94 mmHG is noted.    10/21/22 MIHIR:  Right lower extremity: 0.93- minimal peripheral arterial occlusive disease  Left lower extremity: 0.70- moderate peripheral arterial occlusive disease      Review of Systems   Constitutional:  Negative for activity change, chills, diaphoresis, fatigue and fever.   Eyes:  Negative for photophobia and visual disturbance.   Respiratory:  Negative for apnea, chest tightness and shortness of breath.    Cardiovascular:  Positive for leg swelling. Negative for chest pain and palpitations.   Musculoskeletal:  Negative for gait problem and joint swelling.   Skin:  Positive for wound. Negative for color change, pallor and rash.   Neurological:  Negative for dizziness, syncope, facial asymmetry, speech difficulty, weakness, light-headedness, numbness and headaches.   Psychiatric/Behavioral:  Negative for agitation and confusion. The patient is not nervous/anxious.    All other systems reviewed and are negative.      Objective:      Physical Exam  Vitals reviewed.   Constitutional:       General: He is not in acute distress.     Appearance: Normal appearance. He is obese.   Cardiovascular:      Rate and  Rhythm: Normal rate and regular rhythm.      Pulses: Normal pulses.   Pulmonary:      Effort: No respiratory distress.   Musculoskeletal:         General: No swelling.      Right lower leg: Pitting Edema present.      Left lower leg: Pitting Edema present.      Comments: Ambulates with cane   Skin:     General: Skin is warm and dry.      Capillary Refill: Capillary refill takes less than 2 seconds.      Findings: Wound present. No erythema.          Neurological:      General: No focal deficit present.      Mental Status: He is alert and oriented to person, place, and time.   Psychiatric:         Mood and Affect: Mood normal.         Behavior: Behavior normal.         Thought Content: Thought content normal.         Judgment: Judgment normal.         Assessment:       1. Open wound of right lower extremity, subsequent encounter    2. Venous insufficiency    3. Essential hypertension    4. Heart failure, unspecified HF chronicity, unspecified heart failure type    5. Controlled type 2 diabetes mellitus with diabetic polyneuropathy, without long-term current use of insulin               Wound Venous Ulcer Right lower;medial;distal;posterior Leg (Active)     Leg   Present on Original Admission:    Primary Wound Type: Venous ulcer   Side: Right   Orientation: lower;medial;distal;posterior   Wound Approximate Age at First Assessment (Weeks):    Wound Number:    Is this injury device related?:    Incision Type:    Closure Method:    Wound Description (Comments):    Type:    Additional Comments:    Ankle-Brachial Index:    Pulses:    Removal Indication and Assessment:    Wound Outcome:    Wound Image   11/07/24 0741   Dressing Appearance Dry;Intact;Clean;Moist drainage 11/07/24 0741   Drainage Amount Large 11/07/24 0741   Drainage Characteristics/Odor Serosanguineous 11/07/24 0741   Red (%), Wound Tissue Color 100 % 11/07/24 0741   Periwound Area Intact;Edematous;Pink 11/07/24 0741   Wound Length (cm) 4.1 cm 11/07/24 0741    Wound Width (cm) 0.7 cm 11/07/24 0741   Wound Depth (cm) 0.4 cm 11/07/24 0741   Wound Volume (cm^3) 1.148 cm^3 11/07/24 0741   Wound Surface Area (cm^2) 2.87 cm^2 11/07/24 0741   Undermining (depth (cm)/location) 0.4 cm/ 12o'clock 11/07/24 0741   Care Cleansed with:;Soap and water 11/07/24 0741   Dressing Applied;Calcium alginate;Absorptive Pad;Compression wrap;Other (comment) 11/07/24 0741   Packing packed with;hydrofiber/alginate 11/07/24 0741   Periwound Care Skin barrier film applied 11/07/24 0741   Compression Unna's Boot;Three layer compression 11/07/24 0741         Serafin was seen in the clinic room and placed in the supine position on the treatment table.  The wound dressings were removed and the legs were cleansed with Easi-clense sponges and dried thoroughly.  No erythema, green drainge, odor, or warmth noted from patient's baseline.       Plan of Care:    Left lower extremity- Compression stockings daily  Right lower extremity- gentian violet to periwound, packed with aquacel, drawtex, aquacel, mextra pad, and a 3 layer zinc compression wrap.  The patient's foot was positioned at a 90 degree angle. The wrap was applied using a spiral technique avoiding creases or folds.  The wrap was started behind the first metatarsal and ended below the tibial tubercle of the knee.  There was overlap of each turn half the width of the previous turn.  The compression wraps will be changed every 7 days.        Plan:       Right lower extremity was dressed as detailed above  Patient was instructed to not get the dressings wet and to use cast covers for showering.  Should the dressing become wet, he is to remove it, place a moist dressing over the wound, cover with gauze and roll gauze and use ace wraps for compression and to secure bandages.  He should then notify this office as soon as possible to have a new dressing applied.  Instructed patient to remove wrap if he notices tingling or coldness to his right lower  extremities or if his toes become white, blue, or cold.    Discussed nutrition and the role of protein in wound healing with the patient. Instructed patient to optimize protein for wound healing.     Discussed bilateral lower extremity edema with patient. Instructed patient to elevate legs whenever sedentary and follow a low sodium diet.  Discussed replacing compression stockings every 3-6 months.    Instructed patient to keep follow up appointments with vascular surgery.    Instructed patient to continue to take gabapentin as prescribed for nerve pain.    Discussed the importance of utilizing compression stockings to prevent recurrent wounds.    Discussed drainage control- pt can return to being seen once a week.      Written and verbal instructions given to patient  RTC in 1 week      Rosalba Eaton PA-C

## 2024-11-14 ENCOUNTER — OFFICE VISIT (OUTPATIENT)
Dept: WOUND CARE | Facility: CLINIC | Age: 74
End: 2024-11-14
Payer: MEDICARE

## 2024-11-14 VITALS
HEIGHT: 70 IN | WEIGHT: 254 LBS | DIASTOLIC BLOOD PRESSURE: 63 MMHG | TEMPERATURE: 98 F | BODY MASS INDEX: 36.36 KG/M2 | SYSTOLIC BLOOD PRESSURE: 136 MMHG | HEART RATE: 66 BPM

## 2024-11-14 DIAGNOSIS — I10 ESSENTIAL HYPERTENSION: ICD-10-CM

## 2024-11-14 DIAGNOSIS — I87.2 VENOUS INSUFFICIENCY: ICD-10-CM

## 2024-11-14 DIAGNOSIS — E11.42 CONTROLLED TYPE 2 DIABETES MELLITUS WITH DIABETIC POLYNEUROPATHY, WITHOUT LONG-TERM CURRENT USE OF INSULIN: ICD-10-CM

## 2024-11-14 DIAGNOSIS — S81.801D OPEN WOUND OF RIGHT LOWER EXTREMITY, SUBSEQUENT ENCOUNTER: Primary | ICD-10-CM

## 2024-11-14 DIAGNOSIS — I50.9 HEART FAILURE, UNSPECIFIED HF CHRONICITY, UNSPECIFIED HEART FAILURE TYPE: ICD-10-CM

## 2024-11-14 PROCEDURE — 11042 DBRDMT SUBQ TIS 1ST 20SQCM/<: CPT | Mod: S$GLB,,,

## 2024-11-14 PROCEDURE — 99499 UNLISTED E&M SERVICE: CPT | Mod: S$GLB,,,

## 2024-11-14 PROCEDURE — 99999 PR PBB SHADOW E&M-EST. PATIENT-LVL III: CPT | Mod: PBBFAC,,,

## 2024-11-14 NOTE — PROCEDURES
"Debridement    Date/Time: 11/14/2024 7:30 AM    Performed by: Rosalba Eaton PA-C  Authorized by: Rosalba Eaton PA-C    Time out: Immediately prior to procedure a "time out" was called to verify the correct patient, procedure, equipment, support staff and site/side marked as required.    Consent Done?:  Yes (Written)  Local anesthesia used?: Yes    Local anesthetic:  Topical anesthetic (Topical 4% Lidocaine Hydrochloride)    Wound Details:    Location:  Right leg    Type of Debridement:  Excisional       Length (cm):  4.1       Width (cm):  0.8       Depth (cm):  0.6       Area (sq cm):  3.28       Percent Debrided (%):  100       Total Area Debrided (sq cm):  3.28    Depth of debridement:  Subcutaneous tissue    Tissue debrided:  Subcutaneous    Devitalized tissue debrided:  Biofilm, Exudate, Fibrin and Slough    Instruments:  Curette  Bleeding:  Minimal  Hemostasis Achieved: Yes  Method Used:  Pressure  Patient tolerance:  Patient tolerated the procedure well with no immediate complications    "

## 2024-11-14 NOTE — PROGRESS NOTES
"Subjective:       Patient ID: Serafin Tapia is a 74 y.o. male.    Chief Complaint: Wound Check      Patient presents for a re-evaluation of a right lower extremity wound. He reports his wounds started as blisters about 2 months ago. He is unsure why he did not contact the office until now. Pt admits compliance with wearing compression stockings daily, but reports his stockings have lost elasticity. He reports that his wounds drain a large amount of clear fluid. Pt has been dressing his wounds with a wound "liquid" and occasionally covering his wounds with gauze. Pt has also been leaving his wounds open to air. Patient followed with vascular surgery for PAD and venous insufficiency. Dr. Kapoor determined he has adequate arterial blood flow for wound healing and cleared him for a 3 layer compression wrap. Pt previously followed with the wound care clinic in 2022 and 2023.  Denies compliance with a low sodium diet, leg elevation, and following a high protein diet. Admits compliance with gabapentin. Notices improvement in pain. Denies side effects. Pt followed with Dr. Stern while I was on vacation. On 7/29/24, sharp debridement was performed and part of the patient's achilles tendon was removed. His wound was cultured. Pt followed back on 8/1. His wound was debrided further and was prescribed Augmentin. On 8/5, his wound was debrided further. His Augmentin dose was refilled and sent for 7 days. Pt reported never picking up the Augmentin prescription.  Pt reported stopping the antibiotics early. He reports that he is getting a colonoscopy at [facility] on Wednesday and the MD told him to stop antibiotics until one day after the procedure. Pt completed antibiotics. Pt's daughter reports that the patient eats out for every meal. He does not cook at home.  On 10/17, an abscess was noted. An I&D was performed and his necrotic muscle was removed. He wound was cultured and he was prescribed Augmentin. His antibiotic was changed " once his culture resulted. He was prescribed bactrim DS. No complaints at this time. Denies fever, chills, purulent drainage, erythema, or warmth.    10/17/24 aerobic culture: Staphylococcus aureus, skin jessica also present    7/29/24 anaerobic culture: presumptive Prevotella species  7/29/24 aerobic culture: enterococcus faecalis    7/8/24 ABIs and TBIs:  Right MIHIR- 0.89  Right TBI-0.50  Left MIHIR- 0.82  Left TBI-0.50  Right leg: Segmental pressures and PVR waveforms suggest minimal to mild peripheral arterial occlusive disease. Rt Great Toe: The PPG waveform- mildly dampened with a pressure of 94 mmHG is noted.  Left leg: Segmental pressures and PVR waveforms suggest moderate tibial peripheral arterial occlusive disease. Lt Great Toe: The PPG waveform-moderately dampened with a pressure of 94 mmHG is noted.    10/21/22 MIHIR:  Right lower extremity: 0.93- minimal peripheral arterial occlusive disease  Left lower extremity: 0.70- moderate peripheral arterial occlusive disease      Review of Systems   Constitutional:  Negative for activity change, chills, diaphoresis, fatigue and fever.   Eyes:  Negative for photophobia and visual disturbance.   Respiratory:  Negative for apnea, chest tightness and shortness of breath.    Cardiovascular:  Positive for leg swelling. Negative for chest pain and palpitations.   Musculoskeletal:  Negative for gait problem and joint swelling.   Skin:  Positive for wound. Negative for color change, pallor and rash.   Neurological:  Negative for dizziness, syncope, facial asymmetry, speech difficulty, weakness, light-headedness, numbness and headaches.   Psychiatric/Behavioral:  Negative for agitation and confusion. The patient is not nervous/anxious.    All other systems reviewed and are negative.      Objective:      Physical Exam  Vitals reviewed.   Constitutional:       General: He is not in acute distress.     Appearance: Normal appearance. He is obese.   Cardiovascular:      Rate and  Rhythm: Normal rate and regular rhythm.      Pulses: Normal pulses.   Pulmonary:      Effort: No respiratory distress.   Musculoskeletal:         General: No swelling.      Right lower leg: Pitting Edema present.      Left lower leg: Pitting Edema present.      Comments: Ambulates with cane   Skin:     General: Skin is warm and dry.      Capillary Refill: Capillary refill takes less than 2 seconds.      Findings: Wound present. No erythema.          Neurological:      General: No focal deficit present.      Mental Status: He is alert and oriented to person, place, and time.   Psychiatric:         Mood and Affect: Mood normal.         Behavior: Behavior normal.         Thought Content: Thought content normal.         Judgment: Judgment normal.         Assessment:       1. Open wound of right lower extremity, subsequent encounter    2. Venous insufficiency    3. Essential hypertension    4. Heart failure, unspecified HF chronicity, unspecified heart failure type    5. Controlled type 2 diabetes mellitus with diabetic polyneuropathy, without long-term current use of insulin               Wound Venous Ulcer Right lower;medial;distal;posterior Leg (Active)     Leg   Present on Original Admission:    Primary Wound Type: Venous ulcer   Side: Right   Orientation: lower;medial;distal;posterior   Wound Approximate Age at First Assessment (Weeks):    Wound Number:    Is this injury device related?:    Incision Type:    Closure Method:    Wound Description (Comments):    Type:    Additional Comments:    Ankle-Brachial Index:    Pulses:    Removal Indication and Assessment:    Wound Outcome:    Wound Image    11/14/24 0738   Dressing Appearance Dry;Intact;Clean;Moist drainage 11/14/24 0738   Drainage Amount Large 11/14/24 0738   Drainage Characteristics/Odor Serosanguineous 11/14/24 0738   Red (%), Wound Tissue Color 50 % 11/14/24 0738   Yellow (%), Wound Tissue Color 50 % 11/14/24 0738   Periwound Area Intact;Edematous;Pink  11/14/24 0738   Wound Length (cm) 4.1 cm 11/14/24 0738   Wound Width (cm) 0.8 cm 11/14/24 0738   Wound Depth (cm) 0.6 cm 11/14/24 0738   Wound Volume (cm^3) 1.968 cm^3 11/14/24 0738   Wound Surface Area (cm^2) 3.28 cm^2 11/14/24 0738   Care Cleansed with:;Soap and water 11/14/24 0738   Dressing Applied;Calcium alginate;Absorptive Pad;Compression wrap;Other (comment) 11/14/24 0738   Packing packed with;hydrofiber/alginate 11/14/24 0738   Periwound Care Skin barrier film applied 11/14/24 0738   Compression Unna's Boot;Three layer compression 11/14/24 0738         Serafin was seen in the clinic room and placed in the supine position on the treatment table.  The wound dressings were removed and the legs were cleansed with Easi-clense sponges and dried thoroughly.  No erythema, green drainge, odor, or warmth noted from patient's baseline. Placed topical 4% Lidocaine Hydrochloride to wound bed for 15 minutes. Sharp debridement with 5 mm curette to remove non-viable tissue. Pt tolerated procedure well. See procedure note.        Plan of Care:    Left lower extremity- Compression stockings daily  Right lower extremity- gentian violet to periwound, packed with aquacel, drawtex, aquacel, mextra pad, and a 3 layer zinc compression wrap.  The patient's foot was positioned at a 90 degree angle. The wrap was applied using a spiral technique avoiding creases or folds.  The wrap was started behind the first metatarsal and ended below the tibial tubercle of the knee.  There was overlap of each turn half the width of the previous turn.  The compression wraps will be changed every 7 days.        Plan:       Right lower extremity was dressed as detailed above  Patient was instructed to not get the dressings wet and to use cast covers for showering.  Should the dressing become wet, he is to remove it, place a moist dressing over the wound, cover with gauze and roll gauze and use ace wraps for compression and to secure bandages.  He should  then notify this office as soon as possible to have a new dressing applied.  Instructed patient to remove wrap if he notices tingling or coldness to his right lower extremities or if his toes become white, blue, or cold.    Discussed nutrition and the role of protein in wound healing with the patient. Instructed patient to optimize protein for wound healing.     Discussed bilateral lower extremity edema with patient. Instructed patient to elevate legs whenever sedentary and follow a low sodium diet.  Discussed replacing compression stockings every 3-6 months.    Instructed patient to keep follow up appointments with vascular surgery.    Instructed patient to continue to take gabapentin as prescribed for nerve pain.    Discussed the importance of utilizing compression stockings to prevent recurrent wounds.        Written and verbal instructions given to patient  RTC in 1 week      Rosalba Eaton PA-C

## 2024-11-21 ENCOUNTER — OFFICE VISIT (OUTPATIENT)
Dept: WOUND CARE | Facility: CLINIC | Age: 74
End: 2024-11-21
Payer: MEDICARE

## 2024-11-21 DIAGNOSIS — I87.2 VENOUS INSUFFICIENCY: ICD-10-CM

## 2024-11-21 DIAGNOSIS — I10 ESSENTIAL HYPERTENSION: ICD-10-CM

## 2024-11-21 DIAGNOSIS — S81.801D OPEN WOUND OF RIGHT LOWER EXTREMITY, SUBSEQUENT ENCOUNTER: Primary | ICD-10-CM

## 2024-11-21 DIAGNOSIS — E11.42 CONTROLLED TYPE 2 DIABETES MELLITUS WITH DIABETIC POLYNEUROPATHY, WITHOUT LONG-TERM CURRENT USE OF INSULIN: ICD-10-CM

## 2024-11-21 DIAGNOSIS — I50.9 HEART FAILURE, UNSPECIFIED HF CHRONICITY, UNSPECIFIED HEART FAILURE TYPE: ICD-10-CM

## 2024-11-21 DIAGNOSIS — I10 ESSENTIAL HYPERTENSION: Primary | ICD-10-CM

## 2024-11-21 PROCEDURE — 99499 UNLISTED E&M SERVICE: CPT | Mod: S$GLB,,,

## 2024-11-21 PROCEDURE — 29580 STRAPPING UNNA BOOT: CPT | Mod: RT,S$GLB,,

## 2024-11-21 NOTE — TELEPHONE ENCOUNTER
No care due was identified.  [facility] Embedded Care Due Messages. Reference number: [id number].   11/21/2024 2:15:14 PM CST

## 2024-11-21 NOTE — TELEPHONE ENCOUNTER
Refill Encounter    PCP Visits: Recent Visits  Date Type Provider Dept   07/19/24 Office Visit John Vargas MD [facility]   06/28/24 Office Visit John Vargas MD [facility]   Showing recent visits within past 360 days and meeting all other requirements  Future Appointments  Date Type Provider Dept   01/06/25 Appointment John Vargas MD [facility]   Showing future appointments within next 720 days and meeting all other requirements     Last 3 Blood Pressure:   BP Readings from Last 3 Encounters:   11/14/24 136/63   11/07/24 (!) 108/56   11/06/24 125/74     Preferred Pharmacy:     [facility]/pharmacy #[id number] - [place], [place] - [address]  [address] [place] [address]  Phone: [phone] Fax: [phone]    Requested RX:  Requested Prescriptions      No prescriptions requested or ordered in this encounter      RX Route: Normal

## 2024-11-21 NOTE — TELEPHONE ENCOUNTER
Patient requesting refill of chlorthalidone. According to chart, this medication was discontinued by a pharmacist. Is the patient supposed to be taking this?     Pended in case.

## 2024-11-21 NOTE — TELEPHONE ENCOUNTER
----- Message from Berna sent at 11/21/2024 11:52 AM CST -----  Type: RX Refill Request     Who Called:   Pt  Have you contacted your pharmacy:     Refill     RX Name and Strength:   chlorthalidone (HYGROTEN) 25 MG Tab  Preferred Pharmacy with phone number:     [facility]/pharmacy #[id number] - [place] - [address] [facility]  [address] [facility] [address]  Phone: [phone] Fax: [phone]    Local or Mail Order:local     Would the patient rather a call back or a response via [facility]?   [facility] portal  Best Call Back Number:   Telephone Information:  Additional Information:     Thank you.

## 2024-11-21 NOTE — PROGRESS NOTES
"Subjective:       Patient ID: Serafin Tapia is a 74 y.o. male.    Chief Complaint: Wound Check      Patient presents for a re-evaluation of a right lower extremity wound. He reports his wounds started as blisters about 2 months ago. He is unsure why he did not contact the office until now. Pt admits compliance with wearing compression stockings daily, but reports his stockings have lost elasticity. He reports that his wounds drain a large amount of clear fluid. Pt has been dressing his wounds with a wound "liquid" and occasionally covering his wounds with gauze. Pt has also been leaving his wounds open to air. Patient followed with vascular surgery for PAD and venous insufficiency. Dr. Kapoor determined he has adequate arterial blood flow for wound healing and cleared him for a 3 layer compression wrap. Pt previously followed with the wound care clinic in 2022 and 2023.  Denies compliance with a low sodium diet, leg elevation, and following a high protein diet. Admits compliance with gabapentin. Notices improvement in pain. Denies side effects. Pt followed with Dr. Stern while I was on vacation. On 7/29/24, sharp debridement was performed and part of the patient's achilles tendon was removed. His wound was cultured. Pt followed back on 8/1. His wound was debrided further and was prescribed Augmentin. On 8/5, his wound was debrided further. His Augmentin dose was refilled and sent for 7 days. Pt reported never picking up the Augmentin prescription.  Pt reported stopping the antibiotics early. He reports that he is getting a colonoscopy at [facility] on Wednesday and the MD told him to stop antibiotics until one day after the procedure. Pt completed antibiotics. Pt's daughter reports that the patient eats out for every meal. He does not cook at home.  On 10/17, an abscess was noted. An I&D was performed and his necrotic muscle was removed. He wound was cultured and he was prescribed Augmentin. His antibiotic was changed " once his culture resulted. He was prescribed bactrim DS. No complaints at this time. Denies fever, chills, purulent drainage, erythema, or warmth.    10/17/24 aerobic culture: Staphylococcus aureus, skin jessica also present    7/29/24 anaerobic culture: presumptive Prevotella species  7/29/24 aerobic culture: enterococcus faecalis    7/8/24 ABIs and TBIs:  Right MIHIR- 0.89  Right TBI-0.50  Left MIHIR- 0.82  Left TBI-0.50  Right leg: Segmental pressures and PVR waveforms suggest minimal to mild peripheral arterial occlusive disease. Rt Great Toe: The PPG waveform- mildly dampened with a pressure of 94 mmHG is noted.  Left leg: Segmental pressures and PVR waveforms suggest moderate tibial peripheral arterial occlusive disease. Lt Great Toe: The PPG waveform-moderately dampened with a pressure of 94 mmHG is noted.    10/21/22 MIHIR:  Right lower extremity: 0.93- minimal peripheral arterial occlusive disease  Left lower extremity: 0.70- moderate peripheral arterial occlusive disease      Review of Systems   Constitutional:  Negative for activity change, chills, diaphoresis, fatigue and fever.   Eyes:  Negative for photophobia and visual disturbance.   Respiratory:  Negative for apnea, chest tightness and shortness of breath.    Cardiovascular:  Positive for leg swelling. Negative for chest pain and palpitations.   Musculoskeletal:  Negative for gait problem and joint swelling.   Skin:  Positive for wound. Negative for color change, pallor and rash.   Neurological:  Negative for dizziness, syncope, facial asymmetry, speech difficulty, weakness, light-headedness, numbness and headaches.   Psychiatric/Behavioral:  Negative for agitation and confusion. The patient is not nervous/anxious.    All other systems reviewed and are negative.      Objective:      Physical Exam  Vitals reviewed.   Constitutional:       General: He is not in acute distress.     Appearance: Normal appearance. He is obese.   Cardiovascular:      Rate and  Rhythm: Normal rate and regular rhythm.      Pulses: Normal pulses.   Pulmonary:      Effort: No respiratory distress.   Musculoskeletal:         General: No swelling.      Right lower leg: Pitting Edema present.      Left lower leg: Pitting Edema present.      Comments: Ambulates with cane   Skin:     General: Skin is warm and dry.      Capillary Refill: Capillary refill takes less than 2 seconds.      Findings: Wound present. No erythema.          Neurological:      General: No focal deficit present.      Mental Status: He is alert and oriented to person, place, and time.   Psychiatric:         Mood and Affect: Mood normal.         Behavior: Behavior normal.         Thought Content: Thought content normal.         Judgment: Judgment normal.         Assessment:       1. Open wound of right lower extremity, subsequent encounter    2. Venous insufficiency    3. Essential hypertension    4. Heart failure, unspecified HF chronicity, unspecified heart failure type    5. Controlled type 2 diabetes mellitus with diabetic polyneuropathy, without long-term current use of insulin               Wound Venous Ulcer Right lower;medial;distal;posterior Leg (Active)     Leg   Present on Original Admission:    Primary Wound Type: Venous ulcer   Side: Right   Orientation: lower;medial;distal;posterior   Wound Approximate Age at First Assessment (Weeks):    Wound Number:    Is this injury device related?:    Incision Type:    Closure Method:    Wound Description (Comments):    Type:    Additional Comments:    Ankle-Brachial Index:    Pulses:    Removal Indication and Assessment:    Wound Outcome:    Wound Image   11/21/24 0754   Dressing Appearance Dry;Intact;Clean;Moist drainage 11/21/24 0754   Drainage Amount Large 11/21/24 0754   Drainage Characteristics/Odor Serosanguineous 11/21/24 0754   Red (%), Wound Tissue Color 100 % 11/21/24 0754   Periwound Area Intact;Edematous;Pink 11/21/24 0754   Wound Length (cm) 3.3 cm 11/21/24 0754    Wound Width (cm) 0.7 cm 11/21/24 0754   Wound Depth (cm) 0.8 cm 11/21/24 0754   Wound Volume (cm^3) 1.848 cm^3 11/21/24 0754   Wound Surface Area (cm^2) 2.31 cm^2 11/21/24 0754   Care Cleansed with:;Soap and water 11/21/24 0754   Dressing Applied;Calcium alginate;Absorptive Pad;Compression wrap;Other (comment) 11/21/24 0754   Packing packed with;hydrofiber/alginate 11/21/24 0754   Periwound Care Skin barrier film applied 11/21/24 0754   Compression Unna's Boot;Three layer compression 11/21/24 0754         Serafin was seen in the clinic room and placed in the supine position on the treatment table.  The wound dressings were removed and the legs were cleansed with Easi-clense sponges and dried thoroughly.  No erythema, green drainge, odor, or warmth noted from patient's baseline.       Plan of Care:    Left lower extremity- Compression stockings daily  Right lower extremity- gentian violet to periwound, packed with aquacel, drawtex, aquacel, mextra pad, and a 3 layer zinc compression wrap.  The patient's foot was positioned at a 90 degree angle. The wrap was applied using a spiral technique avoiding creases or folds.  The wrap was started behind the first metatarsal and ended below the tibial tubercle of the knee.  There was overlap of each turn half the width of the previous turn.  The compression wraps will be changed every 7 days.        Plan:       Right lower extremity was dressed as detailed above  Patient was instructed to not get the dressings wet and to use cast covers for showering.  Should the dressing become wet, he is to remove it, place a moist dressing over the wound, cover with gauze and roll gauze and use ace wraps for compression and to secure bandages.  He should then notify this office as soon as possible to have a new dressing applied.  Instructed patient to remove wrap if he notices tingling or coldness to his right lower extremities or if his toes become white, blue, or cold.    Discussed  nutrition and the role of protein in wound healing with the patient. Instructed patient to optimize protein for wound healing.     Discussed bilateral lower extremity edema with patient. Instructed patient to elevate legs whenever sedentary and follow a low sodium diet.  Discussed replacing compression stockings every 3-6 months.    Instructed patient to keep follow up appointments with vascular surgery.    Instructed patient to continue to take gabapentin as prescribed for nerve pain.    Discussed the importance of utilizing compression stockings to prevent recurrent wounds.        Written and verbal instructions given to patient  RTC in 1 week      Rosalba Eaton PA-C

## 2024-11-22 RX ORDER — CHLORTHALIDONE 25 MG/1
25 TABLET ORAL DAILY
Qty: 90 TABLET | Refills: 3 | Status: SHIPPED | OUTPATIENT
Start: 2024-11-22 | End: 2025-11-22

## 2024-11-27 ENCOUNTER — OFFICE VISIT (OUTPATIENT)
Dept: WOUND CARE | Facility: CLINIC | Age: 74
End: 2024-11-27
Payer: MEDICARE

## 2024-11-27 DIAGNOSIS — S81.801D OPEN WOUND OF RIGHT LOWER EXTREMITY, SUBSEQUENT ENCOUNTER: Primary | ICD-10-CM

## 2024-11-27 DIAGNOSIS — E11.42 CONTROLLED TYPE 2 DIABETES MELLITUS WITH DIABETIC POLYNEUROPATHY, WITHOUT LONG-TERM CURRENT USE OF INSULIN: ICD-10-CM

## 2024-11-27 DIAGNOSIS — I50.9 HEART FAILURE, UNSPECIFIED HF CHRONICITY, UNSPECIFIED HEART FAILURE TYPE: ICD-10-CM

## 2024-11-27 DIAGNOSIS — I10 ESSENTIAL HYPERTENSION: ICD-10-CM

## 2024-11-27 DIAGNOSIS — I87.2 VENOUS INSUFFICIENCY: ICD-10-CM

## 2024-11-27 PROCEDURE — 99499 UNLISTED E&M SERVICE: CPT | Mod: S$GLB,,,

## 2024-11-27 PROCEDURE — 29580 STRAPPING UNNA BOOT: CPT | Mod: RT,S$GLB,,

## 2024-11-27 NOTE — PROGRESS NOTES
"Subjective:       Patient ID: Serafin Tapia is a 74 y.o. male.    Chief Complaint: Wound Check      Patient presents for a re-evaluation of a right lower extremity wound. He reports his wounds started as blisters about 2 months ago. He is unsure why he did not contact the office until now. Pt admits compliance with wearing compression stockings daily, but reports his stockings have lost elasticity. He reports that his wounds drain a large amount of clear fluid. Pt has been dressing his wounds with a wound "liquid" and occasionally covering his wounds with gauze. Pt has also been leaving his wounds open to air. Patient followed with vascular surgery for PAD and venous insufficiency. Dr. Kapoor determined he has adequate arterial blood flow for wound healing and cleared him for a 3 layer compression wrap. Pt previously followed with the wound care clinic in 2022 and 2023.  Denies compliance with a low sodium diet, leg elevation, and following a high protein diet. Admits compliance with gabapentin. Notices improvement in pain. Denies side effects. Pt followed with Dr. Stern while I was on vacation. On 7/29/24, sharp debridement was performed and part of the patient's achilles tendon was removed. His wound was cultured. Pt followed back on 8/1. His wound was debrided further and was prescribed Augmentin. On 8/5, his wound was debrided further. His Augmentin dose was refilled and sent for 7 days. Pt reported never picking up the Augmentin prescription.  Pt reported stopping the antibiotics early. He reports that he is getting a colonoscopy at [facility] on Wednesday and the MD told him to stop antibiotics until one day after the procedure. Pt completed antibiotics. Pt's daughter reports that the patient eats out for every meal. He does not cook at home.  On 10/17, an abscess was noted. An I&D was performed and his necrotic muscle was removed. He wound was cultured and he was prescribed Augmentin. His antibiotic was changed " once his culture resulted. He was prescribed bactrim DS. No complaints at this time. Denies fever, chills, purulent drainage, erythema, or warmth.    10/17/24 aerobic culture: Staphylococcus aureus, skin jessica also present    7/29/24 anaerobic culture: presumptive Prevotella species  7/29/24 aerobic culture: enterococcus faecalis    7/8/24 ABIs and TBIs:  Right MIHIR- 0.89  Right TBI-0.50  Left MIHIR- 0.82  Left TBI-0.50  Right leg: Segmental pressures and PVR waveforms suggest minimal to mild peripheral arterial occlusive disease. Rt Great Toe: The PPG waveform- mildly dampened with a pressure of 94 mmHG is noted.  Left leg: Segmental pressures and PVR waveforms suggest moderate tibial peripheral arterial occlusive disease. Lt Great Toe: The PPG waveform-moderately dampened with a pressure of 94 mmHG is noted.    10/21/22 MIHIR:  Right lower extremity: 0.93- minimal peripheral arterial occlusive disease  Left lower extremity: 0.70- moderate peripheral arterial occlusive disease      Review of Systems   Constitutional:  Negative for activity change, chills, diaphoresis, fatigue and fever.   Eyes:  Negative for photophobia and visual disturbance.   Respiratory:  Negative for apnea, chest tightness and shortness of breath.    Cardiovascular:  Positive for leg swelling. Negative for chest pain and palpitations.   Musculoskeletal:  Negative for gait problem and joint swelling.   Skin:  Positive for wound. Negative for color change, pallor and rash.   Neurological:  Negative for dizziness, syncope, facial asymmetry, speech difficulty, weakness, light-headedness, numbness and headaches.   Psychiatric/Behavioral:  Negative for agitation and confusion. The patient is not nervous/anxious.    All other systems reviewed and are negative.      Objective:      Physical Exam  Vitals reviewed.   Constitutional:       General: He is not in acute distress.     Appearance: Normal appearance. He is obese.   Cardiovascular:      Rate and  Rhythm: Normal rate and regular rhythm.      Pulses: Normal pulses.   Pulmonary:      Effort: No respiratory distress.   Musculoskeletal:         General: No swelling.      Right lower leg: Pitting Edema present.      Left lower leg: Pitting Edema present.      Comments: Ambulates with cane   Skin:     General: Skin is warm and dry.      Capillary Refill: Capillary refill takes less than 2 seconds.      Findings: Wound present. No erythema.          Neurological:      General: No focal deficit present.      Mental Status: He is alert and oriented to person, place, and time.   Psychiatric:         Mood and Affect: Mood normal.         Behavior: Behavior normal.         Thought Content: Thought content normal.         Judgment: Judgment normal.         Assessment:       1. Open wound of right lower extremity, subsequent encounter    2. Venous insufficiency    3. Essential hypertension    4. Heart failure, unspecified HF chronicity, unspecified heart failure type    5. Controlled type 2 diabetes mellitus with diabetic polyneuropathy, without long-term current use of insulin               Wound Venous Ulcer Right lower;medial;distal;posterior Leg (Active)     Leg   Present on Original Admission:    Primary Wound Type: Venous ulcer   Side: Right   Orientation: lower;medial;distal;posterior   Wound Approximate Age at First Assessment (Weeks):    Wound Number:    Is this injury device related?:    Incision Type:    Closure Method:    Wound Description (Comments):    Type:    Additional Comments:    Ankle-Brachial Index:    Pulses:    Removal Indication and Assessment:    Wound Outcome:    Wound Image   11/27/24 0801   Dressing Appearance Dry;Intact;Clean;Moist drainage 11/27/24 0801   Drainage Amount Large 11/27/24 0801   Drainage Characteristics/Odor Serosanguineous 11/27/24 0801   Red (%), Wound Tissue Color 100 % 11/27/24 0801   Periwound Area Intact;Edematous;Pink;Dry 11/27/24 0801   Wound Length (cm) 2.1 cm 11/27/24  0801   Wound Width (cm) 0.2 cm 11/27/24 0801   Wound Depth (cm) 0.4 cm 11/27/24 0801   Wound Volume (cm^3) 0.168 cm^3 11/27/24 0801   Wound Surface Area (cm^2) 0.42 cm^2 11/27/24 0801   Care Cleansed with:;Soap and water 11/27/24 0801   Dressing Applied;Calcium alginate;Absorptive Pad;Compression wrap;Other (comment) 11/27/24 0801   Packing packed with;hydrofiber/alginate 11/27/24 0801   Periwound Care Skin barrier film applied 11/27/24 0801   Compression Unna's Boot;Three layer compression 11/27/24 0801         Serafin was seen in the clinic room and placed in the supine position on the treatment table.  The wound dressings were removed and the legs were cleansed with Easi-clense sponges and dried thoroughly.  No erythema, green drainge, odor, or warmth noted from patient's baseline.       Plan of Care:    Left lower extremity- Compression stockings daily  Right lower extremity- gentian violet to periwound, packed with aquacel, drawtex, aquacel, mextra pad, and a 3 layer zinc compression wrap.  The patient's foot was positioned at a 90 degree angle. The wrap was applied using a spiral technique avoiding creases or folds.  The wrap was started behind the first metatarsal and ended below the tibial tubercle of the knee.  There was overlap of each turn half the width of the previous turn.  The compression wraps will be changed every 7 days.        Plan:       Right lower extremity was dressed as detailed above  Patient was instructed to not get the dressings wet and to use cast covers for showering.  Should the dressing become wet, he is to remove it, place a moist dressing over the wound, cover with gauze and roll gauze and use ace wraps for compression and to secure bandages.  He should then notify this office as soon as possible to have a new dressing applied.  Instructed patient to remove wrap if he notices tingling or coldness to his right lower extremities or if his toes become white, blue, or cold.    Discussed  nutrition and the role of protein in wound healing with the patient. Instructed patient to optimize protein for wound healing.     Discussed bilateral lower extremity edema with patient. Instructed patient to elevate legs whenever sedentary and follow a low sodium diet.  Discussed replacing compression stockings every 3-6 months.    Instructed patient to keep follow up appointments with vascular surgery.    Instructed patient to continue to take gabapentin as prescribed for nerve pain.    Discussed the importance of utilizing compression stockings to prevent recurrent wounds.        Written and verbal instructions given to patient  RTC in 1 week      Rosalba Eaton PA-C

## 2024-12-05 ENCOUNTER — OFFICE VISIT (OUTPATIENT)
Dept: WOUND CARE | Facility: CLINIC | Age: 74
End: 2024-12-05
Payer: MEDICARE

## 2024-12-05 DIAGNOSIS — I87.2 VENOUS INSUFFICIENCY: ICD-10-CM

## 2024-12-05 DIAGNOSIS — I10 ESSENTIAL HYPERTENSION: ICD-10-CM

## 2024-12-05 DIAGNOSIS — E11.42 CONTROLLED TYPE 2 DIABETES MELLITUS WITH DIABETIC POLYNEUROPATHY, WITHOUT LONG-TERM CURRENT USE OF INSULIN: ICD-10-CM

## 2024-12-05 DIAGNOSIS — I50.9 HEART FAILURE, UNSPECIFIED HF CHRONICITY, UNSPECIFIED HEART FAILURE TYPE: ICD-10-CM

## 2024-12-05 DIAGNOSIS — S81.801D OPEN WOUND OF RIGHT LOWER EXTREMITY, SUBSEQUENT ENCOUNTER: Primary | ICD-10-CM

## 2024-12-05 PROCEDURE — 99499 UNLISTED E&M SERVICE: CPT | Mod: S$GLB,,,

## 2024-12-05 PROCEDURE — 29580 STRAPPING UNNA BOOT: CPT | Mod: RT,S$GLB,,

## 2024-12-05 NOTE — PROGRESS NOTES
"Subjective:       Patient ID: Serafin Tapia is a 74 y.o. male.    Chief Complaint: Wound Check      Patient presents for a re-evaluation of a right lower extremity wound. He reports his wounds started as blisters about 2 months ago. He is unsure why he did not contact the office until now. Pt admits compliance with wearing compression stockings daily, but reports his stockings have lost elasticity. He reports that his wounds drain a large amount of clear fluid. Pt has been dressing his wounds with a wound "liquid" and occasionally covering his wounds with gauze. Pt has also been leaving his wounds open to air. Patient followed with vascular surgery for PAD and venous insufficiency. Dr. Kapoor determined he has adequate arterial blood flow for wound healing and cleared him for a 3 layer compression wrap. Pt previously followed with the wound care clinic in 2022 and 2023.  Denies compliance with a low sodium diet, leg elevation, and following a high protein diet. Admits compliance with gabapentin. Notices improvement in pain. Denies side effects. Pt followed with Dr. Stern while I was on vacation. On 7/29/24, sharp debridement was performed and part of the patient's achilles tendon was removed. His wound was cultured. Pt followed back on 8/1. His wound was debrided further and was prescribed Augmentin. On 8/5, his wound was debrided further. His Augmentin dose was refilled and sent for 7 days. Pt reported never picking up the Augmentin prescription.  Pt reported stopping the antibiotics early. He reports that he is getting a colonoscopy at [facility] on Wednesday and the MD told him to stop antibiotics until one day after the procedure. Pt completed antibiotics. Pt's daughter reports that the patient eats out for every meal. He does not cook at home.  On 10/17, an abscess was noted. An I&D was performed and his necrotic muscle was removed. He wound was cultured and he was prescribed Augmentin. His antibiotic was changed " once his culture resulted. He was prescribed bactrim DS. No complaints at this time. Denies fever, chills, purulent drainage, erythema, or warmth.    10/17/24 aerobic culture: Staphylococcus aureus, skin jessica also present    7/29/24 anaerobic culture: presumptive Prevotella species  7/29/24 aerobic culture: enterococcus faecalis    7/8/24 ABIs and TBIs:  Right MIHIR- 0.89  Right TBI-0.50  Left MIHIR- 0.82  Left TBI-0.50  Right leg: Segmental pressures and PVR waveforms suggest minimal to mild peripheral arterial occlusive disease. Rt Great Toe: The PPG waveform- mildly dampened with a pressure of 94 mmHG is noted.  Left leg: Segmental pressures and PVR waveforms suggest moderate tibial peripheral arterial occlusive disease. Lt Great Toe: The PPG waveform-moderately dampened with a pressure of 94 mmHG is noted.    10/21/22 MIHIR:  Right lower extremity: 0.93- minimal peripheral arterial occlusive disease  Left lower extremity: 0.70- moderate peripheral arterial occlusive disease      Review of Systems   Constitutional:  Negative for activity change, chills, diaphoresis, fatigue and fever.   Eyes:  Negative for photophobia and visual disturbance.   Respiratory:  Negative for apnea, chest tightness and shortness of breath.    Cardiovascular:  Positive for leg swelling. Negative for chest pain and palpitations.   Musculoskeletal:  Negative for gait problem and joint swelling.   Skin:  Positive for wound. Negative for color change, pallor and rash.   Neurological:  Negative for dizziness, syncope, facial asymmetry, speech difficulty, weakness, light-headedness, numbness and headaches.   Psychiatric/Behavioral:  Negative for agitation and confusion. The patient is not nervous/anxious.    All other systems reviewed and are negative.      Objective:      Physical Exam  Vitals reviewed.   Constitutional:       General: He is not in acute distress.     Appearance: Normal appearance. He is obese.   Cardiovascular:      Rate and  Rhythm: Normal rate and regular rhythm.      Pulses: Normal pulses.   Pulmonary:      Effort: No respiratory distress.   Musculoskeletal:         General: No swelling.      Right lower leg: Pitting Edema present.      Left lower leg: Pitting Edema present.      Comments: Ambulates with cane   Skin:     General: Skin is warm and dry.      Capillary Refill: Capillary refill takes less than 2 seconds.      Findings: Wound present. No erythema.          Neurological:      General: No focal deficit present.      Mental Status: He is alert and oriented to person, place, and time.   Psychiatric:         Mood and Affect: Mood normal.         Behavior: Behavior normal.         Thought Content: Thought content normal.         Judgment: Judgment normal.         Assessment:       1. Open wound of right lower extremity, subsequent encounter    2. Venous insufficiency    3. Essential hypertension    4. Heart failure, unspecified HF chronicity, unspecified heart failure type    5. Controlled type 2 diabetes mellitus with diabetic polyneuropathy, without long-term current use of insulin               Wound Venous Ulcer Right lower;medial;distal;posterior Leg (Active)     Leg   Present on Original Admission:    Primary Wound Type: Venous ulcer   Side: Right   Orientation: lower;medial;distal;posterior   Wound Approximate Age at First Assessment (Weeks):    Wound Number:    Is this injury device related?:    Incision Type:    Closure Method:    Wound Description (Comments):    Type:    Additional Comments:    Ankle-Brachial Index:    Pulses:    Removal Indication and Assessment:    Wound Outcome:    Wound Image   12/05/24 0750   Dressing Appearance Dry;Intact;Clean;Moist drainage 12/05/24 0750   Drainage Amount Large 12/05/24 0750   Drainage Characteristics/Odor Serosanguineous 12/05/24 0750   Red (%), Wound Tissue Color 100 % 12/05/24 0750   Periwound Area Intact;Edematous;Pink 12/05/24 0750   Wound Length (cm) 1.4 cm 12/05/24 0750    Wound Width (cm) 0.5 cm 12/05/24 0750   Wound Depth (cm) 0.1 cm 12/05/24 0750   Wound Volume (cm^3) 0.07 cm^3 12/05/24 0750   Wound Surface Area (cm^2) 0.7 cm^2 12/05/24 0750   Care Cleansed with:;Soap and water 12/05/24 0750   Dressing Applied;Calcium alginate;Absorptive Pad;Compression wrap 12/05/24 0750   Periwound Care Skin barrier film applied 12/05/24 0750   Compression Unna's Boot;Three layer compression 12/05/24 0750         Serafin was seen in the clinic room and placed in the supine position on the treatment table.  The wound dressings were removed and the legs were cleansed with Easi-clense sponges and dried thoroughly.  No erythema, green drainge, odor, or warmth noted from patient's baseline.       Plan of Care:    Left lower extremity- Compression stockings daily  Right lower extremity- gentian violet to periwound, aquacel, mextra pad, and a 3 layer zinc compression wrap.  The patient's foot was positioned at a 90 degree angle. The wrap was applied using a spiral technique avoiding creases or folds.  The wrap was started behind the first metatarsal and ended below the tibial tubercle of the knee.  There was overlap of each turn half the width of the previous turn.  The compression wraps will be changed every 7 days.        Plan:       Right lower extremity was dressed as detailed above  Patient was instructed to not get the dressings wet and to use cast covers for showering.  Should the dressing become wet, he is to remove it, place a moist dressing over the wound, cover with gauze and roll gauze and use ace wraps for compression and to secure bandages.  He should then notify this office as soon as possible to have a new dressing applied.  Instructed patient to remove wrap if he notices tingling or coldness to his right lower extremities or if his toes become white, blue, or cold.    Discussed nutrition and the role of protein in wound healing with the patient. Instructed patient to optimize protein for  wound healing.     Discussed bilateral lower extremity edema with patient. Instructed patient to elevate legs whenever sedentary and follow a low sodium diet.  Discussed replacing compression stockings every 3-6 months.    Instructed patient to keep follow up appointments with vascular surgery.    Instructed patient to continue to take gabapentin as prescribed for nerve pain.    Discussed the importance of utilizing compression stockings to prevent recurrent wounds.        Written and verbal instructions given to patient  RTC in 1 week      Rosalba Eaton PA-C

## 2024-12-12 ENCOUNTER — OFFICE VISIT (OUTPATIENT)
Dept: WOUND CARE | Facility: CLINIC | Age: 74
End: 2024-12-12
Payer: MEDICARE

## 2024-12-12 VITALS
HEART RATE: 63 BPM | TEMPERATURE: 98 F | HEIGHT: 70 IN | BODY MASS INDEX: 36.7 KG/M2 | DIASTOLIC BLOOD PRESSURE: 56 MMHG | SYSTOLIC BLOOD PRESSURE: 118 MMHG | WEIGHT: 256.38 LBS

## 2024-12-12 DIAGNOSIS — I50.9 HEART FAILURE, UNSPECIFIED HF CHRONICITY, UNSPECIFIED HEART FAILURE TYPE: ICD-10-CM

## 2024-12-12 DIAGNOSIS — I10 ESSENTIAL HYPERTENSION: ICD-10-CM

## 2024-12-12 DIAGNOSIS — I87.2 VENOUS INSUFFICIENCY: ICD-10-CM

## 2024-12-12 DIAGNOSIS — E11.42 CONTROLLED TYPE 2 DIABETES MELLITUS WITH DIABETIC POLYNEUROPATHY, WITHOUT LONG-TERM CURRENT USE OF INSULIN: ICD-10-CM

## 2024-12-12 DIAGNOSIS — S81.801D OPEN WOUND OF RIGHT LOWER EXTREMITY, SUBSEQUENT ENCOUNTER: Primary | ICD-10-CM

## 2024-12-12 PROCEDURE — 29580 STRAPPING UNNA BOOT: CPT | Mod: RT,S$GLB,,

## 2024-12-12 PROCEDURE — 99999 PR PBB SHADOW E&M-EST. PATIENT-LVL III: CPT | Mod: PBBFAC,,,

## 2024-12-12 PROCEDURE — 99499 UNLISTED E&M SERVICE: CPT | Mod: S$GLB,,,

## 2024-12-12 NOTE — PROGRESS NOTES
"Subjective:       Patient ID: Serafin Tapia is a 74 y.o. male.    Chief Complaint: Wound Check      Patient presents for a re-evaluation of a right lower extremity wound. He reports his wounds started as blisters about 2 months ago. He is unsure why he did not contact the office until now. Pt admits compliance with wearing compression stockings daily, but reports his stockings have lost elasticity. He reports that his wounds drain a large amount of clear fluid. Pt has been dressing his wounds with a wound "liquid" and occasionally covering his wounds with gauze. Pt has also been leaving his wounds open to air. Patient followed with vascular surgery for PAD and venous insufficiency. Dr. Kapoor determined he has adequate arterial blood flow for wound healing and cleared him for a 3 layer compression wrap. Pt previously followed with the wound care clinic in 2022 and 2023.  Denies compliance with a low sodium diet, leg elevation, and following a high protein diet. Admits compliance with gabapentin. Notices improvement in pain. Denies side effects. Pt followed with Dr. Stern while I was on vacation. On 7/29/24, sharp debridement was performed and part of the patient's achilles tendon was removed. His wound was cultured. Pt followed back on 8/1. His wound was debrided further and was prescribed Augmentin. On 8/5, his wound was debrided further. His Augmentin dose was refilled and sent for 7 days. Pt reported never picking up the Augmentin prescription.  Pt reported stopping the antibiotics early. He reports that he is getting a colonoscopy at [facility] on Wednesday and the MD told him to stop antibiotics until one day after the procedure. Pt completed antibiotics. Pt's daughter reports that the patient eats out for every meal. He does not cook at home.  On 10/17, an abscess was noted. An I&D was performed and his necrotic muscle was removed. He wound was cultured and he was prescribed Augmentin. His antibiotic was changed " once his culture resulted. He was prescribed bactrim DS. No complaints at this time. Denies fever, chills, purulent drainage, erythema, or warmth.    10/17/24 aerobic culture: Staphylococcus aureus, skin jessica also present    7/29/24 anaerobic culture: presumptive Prevotella species  7/29/24 aerobic culture: enterococcus faecalis    7/8/24 ABIs and TBIs:  Right MIHIR- 0.89  Right TBI-0.50  Left MIHIR- 0.82  Left TBI-0.50  Right leg: Segmental pressures and PVR waveforms suggest minimal to mild peripheral arterial occlusive disease. Rt Great Toe: The PPG waveform- mildly dampened with a pressure of 94 mmHG is noted.  Left leg: Segmental pressures and PVR waveforms suggest moderate tibial peripheral arterial occlusive disease. Lt Great Toe: The PPG waveform-moderately dampened with a pressure of 94 mmHG is noted.    10/21/22 MIHIR:  Right lower extremity: 0.93- minimal peripheral arterial occlusive disease  Left lower extremity: 0.70- moderate peripheral arterial occlusive disease      Review of Systems   Constitutional:  Negative for activity change, chills, diaphoresis, fatigue and fever.   Eyes:  Negative for photophobia and visual disturbance.   Respiratory:  Negative for apnea, chest tightness and shortness of breath.    Cardiovascular:  Positive for leg swelling. Negative for chest pain and palpitations.   Musculoskeletal:  Negative for gait problem and joint swelling.   Skin:  Positive for wound. Negative for color change, pallor and rash.   Neurological:  Negative for dizziness, syncope, facial asymmetry, speech difficulty, weakness, light-headedness, numbness and headaches.   Psychiatric/Behavioral:  Negative for agitation and confusion. The patient is not nervous/anxious.    All other systems reviewed and are negative.      Objective:      Physical Exam  Vitals reviewed.   Constitutional:       General: He is not in acute distress.     Appearance: Normal appearance. He is obese.   Cardiovascular:      Rate and  Rhythm: Normal rate and regular rhythm.      Pulses: Normal pulses.   Pulmonary:      Effort: No respiratory distress.   Musculoskeletal:         General: No swelling.      Right lower leg: Pitting Edema present.      Left lower leg: Pitting Edema present.      Comments: Ambulates with cane   Skin:     General: Skin is warm and dry.      Capillary Refill: Capillary refill takes less than 2 seconds.      Findings: Wound present. No erythema.          Neurological:      General: No focal deficit present.      Mental Status: He is alert and oriented to person, place, and time.   Psychiatric:         Mood and Affect: Mood normal.         Behavior: Behavior normal.         Thought Content: Thought content normal.         Judgment: Judgment normal.         Assessment:       1. Open wound of right lower extremity, subsequent encounter    2. Venous insufficiency    3. Essential hypertension    4. Heart failure, unspecified HF chronicity, unspecified heart failure type    5. Controlled type 2 diabetes mellitus with diabetic polyneuropathy, without long-term current use of insulin               Wound Venous Ulcer Right lower;medial;distal;posterior Leg (Active)     Leg   Present on Original Admission:    Primary Wound Type: Venous ulcer   Side: Right   Orientation: lower;medial;distal;posterior   Wound Approximate Age at First Assessment (Weeks):    Wound Number:    Is this injury device related?:    Incision Type:    Closure Method:    Wound Description (Comments):    Type:    Additional Comments:    Ankle-Brachial Index:    Pulses:    Removal Indication and Assessment:    Wound Outcome:    Wound Image   12/12/24 0748   Dressing Appearance Dry;Intact;Clean;Dried drainage 12/12/24 0748   Drainage Amount Moderate 12/12/24 0748   Drainage Characteristics/Odor Serosanguineous 12/12/24 0748   Red (%), Wound Tissue Color 100 % 12/12/24 0748   Periwound Area Intact;Edematous;Pink 12/12/24 0748   Wound Length (cm) 0.5 cm 12/12/24  0748   Wound Width (cm) 0.3 cm 12/12/24 0748   Wound Depth (cm) 0.1 cm 12/12/24 0748   Wound Volume (cm^3) 0.015 cm^3 12/12/24 0748   Wound Surface Area (cm^2) 0.15 cm^2 12/12/24 0748   Care Cleansed with:;Soap and water 12/12/24 0748   Dressing Applied;Foam;Absorptive Pad;Compression wrap 12/12/24 0748   Periwound Care Skin barrier film applied 12/12/24 0748   Compression Unna's Boot;Three layer compression 12/12/24 0748         Serafin was seen in the clinic room and placed in the supine position on the treatment table.  The wound dressings were removed and the legs were cleansed with Easi-clense sponges and dried thoroughly.  No erythema, green drainge, odor, or warmth noted from patient's baseline.       Plan of Care:    Left lower extremity- Compression stockings daily  Right lower extremity- gentian violet to periwound, hydrofera blue, mextra pad, and a 3 layer zinc compression wrap.  The patient's foot was positioned at a 90 degree angle. The wrap was applied using a spiral technique avoiding creases or folds.  The wrap was started behind the first metatarsal and ended below the tibial tubercle of the knee.  There was overlap of each turn half the width of the previous turn.  The compression wraps will be changed every 7 days.        Plan:       Right lower extremity was dressed as detailed above  Patient was instructed to not get the dressings wet and to use cast covers for showering.  Should the dressing become wet, he is to remove it, place a moist dressing over the wound, cover with gauze and roll gauze and use ace wraps for compression and to secure bandages.  He should then notify this office as soon as possible to have a new dressing applied.  Instructed patient to remove wrap if he notices tingling or coldness to his right lower extremities or if his toes become white, blue, or cold.    Discussed nutrition and the role of protein in wound healing with the patient. Instructed patient to optimize protein  for wound healing.     Discussed bilateral lower extremity edema with patient. Instructed patient to elevate legs whenever sedentary and follow a low sodium diet.  Discussed replacing compression stockings every 3-6 months.    Instructed patient to keep follow up appointments with vascular surgery.    Instructed patient to continue to take gabapentin as prescribed for nerve pain.    Discussed the importance of utilizing compression stockings to prevent recurrent wounds.        Written and verbal instructions given to patient  RTC in 1 week      Rosalba Eaton PA-C

## 2024-12-19 ENCOUNTER — OFFICE VISIT (OUTPATIENT)
Dept: WOUND CARE | Facility: CLINIC | Age: 74
End: 2024-12-19
Payer: MEDICARE

## 2024-12-19 VITALS
BODY MASS INDEX: 36.33 KG/M2 | HEART RATE: 80 BPM | SYSTOLIC BLOOD PRESSURE: 128 MMHG | DIASTOLIC BLOOD PRESSURE: 61 MMHG | HEIGHT: 70 IN | WEIGHT: 253.75 LBS | TEMPERATURE: 98 F

## 2024-12-19 DIAGNOSIS — Z87.828 HEALED WOUND: ICD-10-CM

## 2024-12-19 DIAGNOSIS — I87.2 VENOUS INSUFFICIENCY: Primary | ICD-10-CM

## 2024-12-19 DIAGNOSIS — R60.0 LEG EDEMA: ICD-10-CM

## 2024-12-19 PROCEDURE — 3066F NEPHROPATHY DOC TX: CPT | Mod: CPTII,S$GLB,,

## 2024-12-19 PROCEDURE — 1101F PT FALLS ASSESS-DOCD LE1/YR: CPT | Mod: CPTII,S$GLB,,

## 2024-12-19 PROCEDURE — 3061F NEG MICROALBUMINURIA REV: CPT | Mod: CPTII,S$GLB,,

## 2024-12-19 PROCEDURE — 3044F HG A1C LEVEL LT 7.0%: CPT | Mod: CPTII,S$GLB,,

## 2024-12-19 PROCEDURE — 1159F MED LIST DOCD IN RCRD: CPT | Mod: CPTII,S$GLB,,

## 2024-12-19 PROCEDURE — 3008F BODY MASS INDEX DOCD: CPT | Mod: CPTII,S$GLB,,

## 2024-12-19 PROCEDURE — 3288F FALL RISK ASSESSMENT DOCD: CPT | Mod: CPTII,S$GLB,,

## 2024-12-19 PROCEDURE — 99214 OFFICE O/P EST MOD 30 MIN: CPT | Mod: S$GLB,,,

## 2024-12-19 PROCEDURE — 99999 PR PBB SHADOW E&M-EST. PATIENT-LVL III: CPT | Mod: PBBFAC,,,

## 2024-12-19 PROCEDURE — 3078F DIAST BP <80 MM HG: CPT | Mod: CPTII,S$GLB,,

## 2024-12-19 PROCEDURE — 3074F SYST BP LT 130 MM HG: CPT | Mod: CPTII,S$GLB,,

## 2024-12-19 PROCEDURE — 1126F AMNT PAIN NOTED NONE PRSNT: CPT | Mod: CPTII,S$GLB,,

## 2024-12-19 PROCEDURE — 4010F ACE/ARB THERAPY RXD/TAKEN: CPT | Mod: CPTII,S$GLB,,

## 2024-12-19 NOTE — PROGRESS NOTES
"Subjective:       Patient ID: Serafin Tapia is a 74 y.o. male.    Chief Complaint: Wound Check      Patient presents for a re-evaluation of a right lower extremity wound. He reports his wounds started as blisters about 2 months ago. He is unsure why he did not contact the office until now. Pt admits compliance with wearing compression stockings daily, but reports his stockings have lost elasticity. He reports that his wounds drain a large amount of clear fluid. Pt has been dressing his wounds with a wound "liquid" and occasionally covering his wounds with gauze. Pt has also been leaving his wounds open to air. Patient followed with vascular surgery for PAD and venous insufficiency. Dr. Kapoor determined he has adequate arterial blood flow for wound healing and cleared him for a 3 layer compression wrap. Pt previously followed with the wound care clinic in 2022 and 2023.  Denies compliance with a low sodium diet, leg elevation, and following a high protein diet. Admits compliance with gabapentin. Notices improvement in pain. Denies side effects. Pt followed with Dr. Stern while I was on vacation. On 7/29/24, sharp debridement was performed and part of the patient's achilles tendon was removed. His wound was cultured. Pt followed back on 8/1. His wound was debrided further and was prescribed Augmentin. On 8/5, his wound was debrided further. His Augmentin dose was refilled and sent for 7 days. Pt reported never picking up the Augmentin prescription.  Pt reported stopping the antibiotics early. He reports that he is getting a colonoscopy at [facility] on Wednesday and the MD told him to stop antibiotics until one day after the procedure. Pt completed antibiotics. Pt's daughter reports that the patient eats out for every meal. He does not cook at home.  On 10/17, an abscess was noted. An I&D was performed and his necrotic muscle was removed. He wound was cultured and he was prescribed Augmentin. His antibiotic was changed " once his culture resulted. He was prescribed bactrim DS. No complaints at this time. Denies fever, chills, purulent drainage, erythema, or warmth.    10/17/24 aerobic culture: Staphylococcus aureus, skin jessica also present    7/29/24 anaerobic culture: presumptive Prevotella species  7/29/24 aerobic culture: enterococcus faecalis    7/8/24 ABIs and TBIs:  Right MIHIR- 0.89  Right TBI-0.50  Left MIHIR- 0.82  Left TBI-0.50  Right leg: Segmental pressures and PVR waveforms suggest minimal to mild peripheral arterial occlusive disease. Rt Great Toe: The PPG waveform- mildly dampened with a pressure of 94 mmHG is noted.  Left leg: Segmental pressures and PVR waveforms suggest moderate tibial peripheral arterial occlusive disease. Lt Great Toe: The PPG waveform-moderately dampened with a pressure of 94 mmHG is noted.    10/21/22 MIHIR:  Right lower extremity: 0.93- minimal peripheral arterial occlusive disease  Left lower extremity: 0.70- moderate peripheral arterial occlusive disease      Review of Systems   Constitutional:  Negative for activity change, chills, diaphoresis, fatigue and fever.   Eyes:  Negative for photophobia and visual disturbance.   Respiratory:  Negative for apnea, chest tightness and shortness of breath.    Cardiovascular:  Positive for leg swelling. Negative for chest pain and palpitations.   Musculoskeletal:  Negative for gait problem and joint swelling.   Skin:  Positive for wound. Negative for color change, pallor and rash.   Neurological:  Negative for dizziness, syncope, facial asymmetry, speech difficulty, weakness, light-headedness, numbness and headaches.   Psychiatric/Behavioral:  Negative for agitation and confusion. The patient is not nervous/anxious.    All other systems reviewed and are negative.      Objective:      Physical Exam  Vitals reviewed.   Constitutional:       General: He is not in acute distress.     Appearance: Normal appearance. He is obese.   Cardiovascular:      Rate and  Rhythm: Normal rate and regular rhythm.      Pulses: Normal pulses.   Pulmonary:      Effort: No respiratory distress.   Musculoskeletal:         General: No swelling.      Right lower leg: Pitting Edema present.      Left lower leg: Pitting Edema present.      Comments: Ambulates with cane   Skin:     General: Skin is warm and dry.      Capillary Refill: Capillary refill takes less than 2 seconds.      Findings: No erythema or wound.          Neurological:      General: No focal deficit present.      Mental Status: He is alert and oriented to person, place, and time.   Psychiatric:         Mood and Affect: Mood normal.         Behavior: Behavior normal.         Thought Content: Thought content normal.         Judgment: Judgment normal.         Assessment:       1. Venous insufficiency    2. Leg edema    3. Healed wound               Wound Venous Ulcer Right lower;medial;distal;posterior Leg (Active)     Leg   Present on Original Admission:    Primary Wound Type: Venous ulcer   Side: Right   Orientation: lower;medial;distal;posterior   Wound Approximate Age at First Assessment (Weeks):    Wound Number:    Is this injury device related?:    Incision Type:    Closure Method:    Wound Description (Comments):    Type:    Additional Comments:    Ankle-Brachial Index:    Pulses:    Removal Indication and Assessment:    Wound Outcome:    Wound Image    12/19/24 0811   Dressing Appearance Dry;Intact;Clean 12/19/24 0811   Drainage Amount None 12/19/24 0811   Care Cleansed with:;Soap and water 12/19/24 0811         Serafin was seen in the clinic room and placed in the supine position on the treatment table.  The wound dressings were removed and the legs were cleansed with Easi-clense sponges and dried thoroughly.  No erythema, green drainge, odor, or warmth noted from patient's baseline. Placed topical 4% Lidocaine Hydrochloride to wound bed for 15 minutes. Sharp debridement with 5 mm curette to remove non-viable tissue. Pt  tolerated procedure well. No open wounds noted.      Plan of Care:  Compression stockings daily to bilateral lower extremities.        Plan:       No open wounds noted.  Precautions given to prevent wounds from re-opening. Discussed how area is extremely fragile. Protect area from friction, wash area gently, and pat dry. If the wound should re-open, instructed patient to contact the office.    Discussed bilateral lower extremity edema. Instructed patient to utilize compression stockings to bilateral lower extremities. Place on first thing in the morning and remove before bed.    Discussed replacing compression stockings every 3-6 months.        Written and verbal instructions given to patient  RTC PRN      Rosalba Eaton PA-C

## 2025-01-06 ENCOUNTER — OFFICE VISIT (OUTPATIENT)
Dept: INTERNAL MEDICINE | Facility: CLINIC | Age: 75
End: 2025-01-06
Attending: INTERNAL MEDICINE
Payer: MEDICARE

## 2025-01-06 ENCOUNTER — LAB VISIT (OUTPATIENT)
Dept: LAB | Facility: OTHER | Age: 75
End: 2025-01-06
Attending: INTERNAL MEDICINE
Payer: MEDICARE

## 2025-01-06 VITALS
DIASTOLIC BLOOD PRESSURE: 76 MMHG | BODY MASS INDEX: 37.71 KG/M2 | SYSTOLIC BLOOD PRESSURE: 150 MMHG | OXYGEN SATURATION: 93 % | WEIGHT: 263.44 LBS | HEIGHT: 70 IN | HEART RATE: 85 BPM

## 2025-01-06 DIAGNOSIS — Z79.4 DIABETES MELLITUS DUE TO UNDERLYING CONDITION, CONTROLLED, WITH COMPLICATION, WITH LONG-TERM CURRENT USE OF INSULIN: ICD-10-CM

## 2025-01-06 DIAGNOSIS — E55.9 VITAMIN D DEFICIENCY: ICD-10-CM

## 2025-01-06 DIAGNOSIS — T46.6X5A STATIN MYOPATHY: ICD-10-CM

## 2025-01-06 DIAGNOSIS — G72.49 AUTOIMMUNE NECROTIZING MYOPATHY: ICD-10-CM

## 2025-01-06 DIAGNOSIS — I73.9 PAD (PERIPHERAL ARTERY DISEASE): ICD-10-CM

## 2025-01-06 DIAGNOSIS — D84.821 DRUG-INDUCED IMMUNODEFICIENCY: ICD-10-CM

## 2025-01-06 DIAGNOSIS — M35.9 SYSTEMIC INVOLVEMENT OF CONNECTIVE TISSUE, UNSPECIFIED: ICD-10-CM

## 2025-01-06 DIAGNOSIS — E53.8 VITAMIN B 12 DEFICIENCY: ICD-10-CM

## 2025-01-06 DIAGNOSIS — E11.42 CONTROLLED TYPE 2 DIABETES MELLITUS WITH DIABETIC POLYNEUROPATHY, WITHOUT LONG-TERM CURRENT USE OF INSULIN: ICD-10-CM

## 2025-01-06 DIAGNOSIS — R79.9 ABNORMAL FINDING OF BLOOD CHEMISTRY, UNSPECIFIED: ICD-10-CM

## 2025-01-06 DIAGNOSIS — I10 ESSENTIAL HYPERTENSION: Primary | ICD-10-CM

## 2025-01-06 DIAGNOSIS — E03.9 HYPOTHYROIDISM (ACQUIRED): ICD-10-CM

## 2025-01-06 DIAGNOSIS — L97.921 ULCER OF LEFT LOWER EXTREMITY, LIMITED TO BREAKDOWN OF SKIN: ICD-10-CM

## 2025-01-06 DIAGNOSIS — E11.649 CONTROLLED TYPE 2 DIABETES MELLITUS WITH HYPOGLYCEMIA, WITHOUT LONG-TERM CURRENT USE OF INSULIN: ICD-10-CM

## 2025-01-06 DIAGNOSIS — I50.9 HEART FAILURE, UNSPECIFIED HF CHRONICITY, UNSPECIFIED HEART FAILURE TYPE: ICD-10-CM

## 2025-01-06 DIAGNOSIS — E66.01 SEVERE OBESITY WITH BODY MASS INDEX (BMI) OF 36.0 TO 36.9 WITH SERIOUS COMORBIDITY: ICD-10-CM

## 2025-01-06 DIAGNOSIS — G72.0 STATIN MYOPATHY: ICD-10-CM

## 2025-01-06 DIAGNOSIS — E08.8 DIABETES MELLITUS DUE TO UNDERLYING CONDITION, CONTROLLED, WITH COMPLICATION, WITH LONG-TERM CURRENT USE OF INSULIN: ICD-10-CM

## 2025-01-06 DIAGNOSIS — Z79.899 DRUG-INDUCED IMMUNODEFICIENCY: ICD-10-CM

## 2025-01-06 DIAGNOSIS — I35.0 AORTIC VALVE STENOSIS, ETIOLOGY OF CARDIAC VALVE DISEASE UNSPECIFIED: ICD-10-CM

## 2025-01-06 DIAGNOSIS — I10 ESSENTIAL HYPERTENSION: ICD-10-CM

## 2025-01-06 PROBLEM — R05.9 COUGH: Status: RESOLVED | Noted: 2017-12-11 | Resolved: 2025-01-06

## 2025-01-06 PROBLEM — H52.7 REFRACTIVE ERROR: Status: RESOLVED | Noted: 2023-05-30 | Resolved: 2025-01-06

## 2025-01-06 PROBLEM — G93.41 ENCEPHALOPATHY, METABOLIC: Status: RESOLVED | Noted: 2024-09-12 | Resolved: 2025-01-06

## 2025-01-06 LAB
25(OH)D3+25(OH)D2 SERPL-MCNC: 19 NG/ML (ref 30–96)
ALBUMIN SERPL BCP-MCNC: 3.7 G/DL (ref 3.5–5.2)
ALP SERPL-CCNC: 78 U/L (ref 40–150)
ALT SERPL W/O P-5'-P-CCNC: 23 U/L (ref 10–44)
ANION GAP SERPL CALC-SCNC: 10 MMOL/L (ref 8–16)
AST SERPL-CCNC: 30 U/L (ref 10–40)
BASOPHILS # BLD AUTO: 0.02 K/UL (ref 0–0.2)
BASOPHILS NFR BLD: 0.3 % (ref 0–1.9)
BILIRUB SERPL-MCNC: 0.2 MG/DL (ref 0.1–1)
BNP SERPL-MCNC: 57 PG/ML (ref 0–99)
BUN SERPL-MCNC: 21 MG/DL (ref 8–23)
CALCIUM SERPL-MCNC: 10.4 MG/DL (ref 8.7–10.5)
CHLORIDE SERPL-SCNC: 104 MMOL/L (ref 95–110)
CHOLEST SERPL-MCNC: 149 MG/DL (ref 120–199)
CHOLEST/HDLC SERPL: 3.5 {RATIO} (ref 2–5)
CK SERPL-CCNC: 1693 U/L (ref 20–200)
CO2 SERPL-SCNC: 29 MMOL/L (ref 23–29)
CREAT SERPL-MCNC: 0.9 MG/DL (ref 0.5–1.4)
CRP SERPL-MCNC: 25.6 MG/L (ref 0–8.2)
DIFFERENTIAL METHOD BLD: ABNORMAL
EOSINOPHIL # BLD AUTO: 0.2 K/UL (ref 0–0.5)
EOSINOPHIL NFR BLD: 4.1 % (ref 0–8)
ERYTHROCYTE [DISTWIDTH] IN BLOOD BY AUTOMATED COUNT: 14.1 % (ref 11.5–14.5)
ERYTHROCYTE [SEDIMENTATION RATE] IN BLOOD BY PHOTOMETRIC METHOD: 13 MM/HR (ref 0–23)
EST. GFR  (NO RACE VARIABLE): >60 ML/MIN/1.73 M^2
ESTIMATED AVG GLUCOSE: 137 MG/DL (ref 68–131)
GLUCOSE SERPL-MCNC: 137 MG/DL (ref 70–110)
HBA1C MFR BLD: 6.4 % (ref 4–5.6)
HCT VFR BLD AUTO: 40.3 % (ref 40–54)
HDLC SERPL-MCNC: 42 MG/DL (ref 40–75)
HDLC SERPL: 28.2 % (ref 20–50)
HGB BLD-MCNC: 12.5 G/DL (ref 14–18)
IMM GRANULOCYTES # BLD AUTO: 0.01 K/UL (ref 0–0.04)
IMM GRANULOCYTES NFR BLD AUTO: 0.2 % (ref 0–0.5)
LDLC SERPL CALC-MCNC: 80.6 MG/DL (ref 63–159)
LYMPHOCYTES # BLD AUTO: 1.7 K/UL (ref 1–4.8)
LYMPHOCYTES NFR BLD: 28.8 % (ref 18–48)
MCH RBC QN AUTO: 26.5 PG (ref 27–31)
MCHC RBC AUTO-ENTMCNC: 31 G/DL (ref 32–36)
MCV RBC AUTO: 86 FL (ref 82–98)
MONOCYTES # BLD AUTO: 0.4 K/UL (ref 0.3–1)
MONOCYTES NFR BLD: 6.6 % (ref 4–15)
NEUTROPHILS # BLD AUTO: 3.6 K/UL (ref 1.8–7.7)
NEUTROPHILS NFR BLD: 60 % (ref 38–73)
NONHDLC SERPL-MCNC: 107 MG/DL
NRBC BLD-RTO: 0 /100 WBC
PLATELET # BLD AUTO: 280 K/UL (ref 150–450)
PMV BLD AUTO: 10.9 FL (ref 9.2–12.9)
POTASSIUM SERPL-SCNC: 5.1 MMOL/L (ref 3.5–5.1)
PROT SERPL-MCNC: 7.5 G/DL (ref 6–8.4)
RBC # BLD AUTO: 4.71 M/UL (ref 4.6–6.2)
SODIUM SERPL-SCNC: 143 MMOL/L (ref 136–145)
T4 FREE SERPL-MCNC: 0.93 NG/DL (ref 0.71–1.51)
TRIGL SERPL-MCNC: 132 MG/DL (ref 30–150)
TSH SERPL DL<=0.005 MIU/L-ACNC: 9.03 UIU/ML (ref 0.4–4)
WBC # BLD AUTO: 5.91 K/UL (ref 3.9–12.7)

## 2025-01-06 PROCEDURE — 84443 ASSAY THYROID STIM HORMONE: CPT | Performed by: INTERNAL MEDICINE

## 2025-01-06 PROCEDURE — 1101F PT FALLS ASSESS-DOCD LE1/YR: CPT | Mod: CPTII,S$GLB,, | Performed by: INTERNAL MEDICINE

## 2025-01-06 PROCEDURE — 84439 ASSAY OF FREE THYROXINE: CPT | Performed by: INTERNAL MEDICINE

## 2025-01-06 PROCEDURE — 3077F SYST BP >= 140 MM HG: CPT | Mod: CPTII,S$GLB,, | Performed by: INTERNAL MEDICINE

## 2025-01-06 PROCEDURE — 85025 COMPLETE CBC W/AUTO DIFF WBC: CPT | Performed by: INTERNAL MEDICINE

## 2025-01-06 PROCEDURE — 86140 C-REACTIVE PROTEIN: CPT | Performed by: INTERNAL MEDICINE

## 2025-01-06 PROCEDURE — 82550 ASSAY OF CK (CPK): CPT | Performed by: INTERNAL MEDICINE

## 2025-01-06 PROCEDURE — 99999 PR PBB SHADOW E&M-EST. PATIENT-LVL III: CPT | Mod: PBBFAC,,, | Performed by: INTERNAL MEDICINE

## 2025-01-06 PROCEDURE — 3044F HG A1C LEVEL LT 7.0%: CPT | Mod: CPTII,S$GLB,, | Performed by: INTERNAL MEDICINE

## 2025-01-06 PROCEDURE — 3288F FALL RISK ASSESSMENT DOCD: CPT | Mod: CPTII,S$GLB,, | Performed by: INTERNAL MEDICINE

## 2025-01-06 PROCEDURE — 3008F BODY MASS INDEX DOCD: CPT | Mod: CPTII,S$GLB,, | Performed by: INTERNAL MEDICINE

## 2025-01-06 PROCEDURE — 83036 HEMOGLOBIN GLYCOSYLATED A1C: CPT | Performed by: INTERNAL MEDICINE

## 2025-01-06 PROCEDURE — 3078F DIAST BP <80 MM HG: CPT | Mod: CPTII,S$GLB,, | Performed by: INTERNAL MEDICINE

## 2025-01-06 PROCEDURE — 36415 COLL VENOUS BLD VENIPUNCTURE: CPT | Performed by: INTERNAL MEDICINE

## 2025-01-06 PROCEDURE — 80061 LIPID PANEL: CPT | Performed by: INTERNAL MEDICINE

## 2025-01-06 PROCEDURE — 99214 OFFICE O/P EST MOD 30 MIN: CPT | Mod: S$GLB,,, | Performed by: INTERNAL MEDICINE

## 2025-01-06 PROCEDURE — 82306 VITAMIN D 25 HYDROXY: CPT | Performed by: INTERNAL MEDICINE

## 2025-01-06 PROCEDURE — 80053 COMPREHEN METABOLIC PANEL: CPT | Performed by: INTERNAL MEDICINE

## 2025-01-06 PROCEDURE — 85652 RBC SED RATE AUTOMATED: CPT | Performed by: INTERNAL MEDICINE

## 2025-01-06 PROCEDURE — 1160F RVW MEDS BY RX/DR IN RCRD: CPT | Mod: CPTII,S$GLB,, | Performed by: INTERNAL MEDICINE

## 2025-01-06 PROCEDURE — 1159F MED LIST DOCD IN RCRD: CPT | Mod: CPTII,S$GLB,, | Performed by: INTERNAL MEDICINE

## 2025-01-06 PROCEDURE — G2211 COMPLEX E/M VISIT ADD ON: HCPCS | Mod: S$GLB,,, | Performed by: INTERNAL MEDICINE

## 2025-01-06 PROCEDURE — 83880 ASSAY OF NATRIURETIC PEPTIDE: CPT | Performed by: INTERNAL MEDICINE

## 2025-01-06 RX ORDER — ERGOCALCIFEROL 1.25 MG/1
50000 CAPSULE ORAL
Qty: 12 CAPSULE | Refills: 3 | Status: SHIPPED | OUTPATIENT
Start: 2025-01-06

## 2025-01-06 NOTE — PROGRESS NOTES
Subjective:       Patient ID: Serafin Tapia is a 74 y.o. male.    Chief Complaint: Hypertension (6m f/u.)    Here for routine f/u    74 y.o. male with PAD, HTN, HLD, DM2, hypothyroidism, severe statin induced myopathy, severe obesity, history of PE    Recently discharged from wound care 2 weeks prior.    Echo (6/28/24) with EF 55-60%, no DD, aortic valve with moderate sclerosis and stenosis  PAD with RLE CLI- Right toe pressure from 7/8/2024 is 94 mmHg, which is suggestive of adequate arterial blood flow for wound healing. Continue wound care. Start Repatha given statin intolerance. Continue ASA 81 mg daily.      ### HTN ###  Rx nifedipine 90mg, lasix 40, benazepril 40, metoprolol XL 50.   Hx of medication non adherence in taking meds without any consistency or schedule.  02/2021 BP elevated today. His adherence to medications today varies with response.   04/14/21 BP much improved but remains elevated. Increase BB to 200mg.   3/21/22 CV BP very elevated. medication non adherence continues  9/28/22 out of chlorthalidone. F/u in 10 days with me and med bag  6/2024 unclear which meds he is taking. Staff to contact once he gets home        ### Asthma ###   Denies frequent SOB, FLOWERS, chest tightness limited by LE weakness.      ### Hypothyroidism ###  Reported adherence with levothyroxine 100mcg  Pt denies unexplained lyn gain/loss, palpitations, tremors, diarrhea, constipation, changes to hair/skin, heat/cold intolerance, or dysphagia.         ### Myopathy ###  Required long course of high dose prednisone. He is off the prednisone since 01/2019  Mtx started : now at 8 tabs weekly and folic acid   Denies myalgias or weakness  Due for f/u with rheum        ### leg wound ##  -Left leg. S/p Abx. Current wound care. Saw vascular Dr hitchcock. Pt does have chronic revascularized invivo PAD and poor heeling attributed to poor out flow on venous side.  -09/30/2022 U/S Hemodynamically significant stenosis at the level of the  anterior tibial artery suspected  -CTA c/ runoff (10/25/2022)Prominent calcific atherosclerosis in the lower extremities with multifocal moderate/ high-grade narrowing throughout the KAMRAN, PTA, and peroneal arteries.  The distal posterior tibial arteries are occluded bilaterally with reconstitution by collateral flow from the peroneal arteries. Multifocal mild/ moderate stenosis of the femoropopliteal arteries   -  Atherosclerotic plaque elsewhere with no evidence for hemodynamically significant stenosis.      ### DM ###  Last visit reported taking metformin 1000mg QD instead of Rx BID. He has continued this. Hx of myopathy so statin avoided.   Discussion reveals he likes sweet tea.  11/11/2021 Started on glimepiride  3/21/22 CV reports starting glimepiride. Denies lows    9//28/22 reports 200s. Increase glimepiride        11/10/2022 Start bydureon and increase glimepiride to 4mg              02/2023 started on trulicity        ### HM ###  Colonoscopy done by Dr mario 5/6/19  Several wide mouth diverticuli (non-bleeding) in sigmoid, 5mm and 2mm polyp proximal ascending             Review of Systems   Constitutional:  Negative for appetite change, chills, fever and unexpected weight change.   HENT:  Negative for hearing loss, sore throat and trouble swallowing.    Eyes:  Negative for visual disturbance.   Respiratory:  Negative for cough, chest tightness and shortness of breath.    Cardiovascular:  Negative for chest pain and leg swelling.   Gastrointestinal:  Negative for abdominal pain, blood in stool, constipation, diarrhea, nausea and vomiting.   Endocrine: Negative for polydipsia and polyuria.   Genitourinary:  Negative for decreased urine volume, difficulty urinating, dysuria, frequency and urgency.   Musculoskeletal:  Negative for gait problem.   Skin:  Negative for rash.   Neurological:  Negative for dizziness and numbness.   Psychiatric/Behavioral:  The patient is not nervous/anxious.        Past Medical  History:   Diagnosis Date    Asthma     childhood    Diabetes mellitus type 2 in obese 05/03/2014    Dyslipidemia 05/05/2014    Elevated troponin 05/03/2014    Gait instability 02/28/2018    Hyperlipidemia     Hypertension     Hypothyroidism (acquired) 11/01/2017    Obesity     Ocular hypertension     Ocular hypertension     Pulmonary embolism 05/2014    PVD (posterior vitreous detachment), right eye     Spondylosis of lumbar region without myelopathy or radiculopathy 08/25/2017    Statin myopathy 01/16/2018    Vertigo      Past Surgical History:   Procedure Laterality Date    CIRCUMCISION N/A 09/10/2019    Procedure: CIRCUMCISION;  Surgeon: Nhan Ruth MD;  Location: [facility];  Service: Urology;  Laterality: N/A;    TONSILLECTOMY       Family History   Problem Relation Name Age of Onset    Diabetes Mother      Heart disease Mother      Hypertension Mother      Stroke Mother      Heart disease Father      Hypertension Father      Diabetes Father      Cancer Father      Cataracts Sister Magnolia     Hypertension Sister Magnolia     Diabetes Sister Magnolia     Hyperlipidemia Brother Jose Raul     Hypertension Brother Jose Raul     Diabetes Brother Jose Raul     Diabetes Brother Justen     Hypertension Brother Justen     Hypertension Brother Nik     Hypertension Daughter Raj     Diabetes Son Serafin Jr.     Hyperlipidemia Son Serafin Jr.     Hypertension Son Serafin Jr.     Glaucoma Maternal Aunt      Glaucoma Paternal Aunt      No Known Problems Maternal Grandmother      No Known Problems Maternal Grandfather      No Known Problems Paternal Grandmother      No Known Problems Paternal Grandfather      Cirrhosis Neg Hx       Social History     Socioeconomic History    Marital status:    Occupational History    Occupation: Retired    Tobacco Use    Smoking status: Former     Current packs/day: 0.25     Average packs/day: 0.3 packs/day for 5.0 years (1.3 ttl pk-yrs)     Types: Cigarettes    Smokeless tobacco:  "Never    Tobacco comments:     Quit smoking as a teenager   Substance and Sexual Activity    Alcohol use: Yes     Alcohol/week: 1.0 - 2.0 standard drink of alcohol     Types: 1 - 2 Cans of beer per week     Comment: 1-2 drinks occasionally    Drug use: No    Sexual activity: Yes     Partners: Female     Birth control/protection: Condom   Social History Narrative    Lives w/daughter.      Social Drivers of Health     Financial Resource Strain: Low Risk  (9/12/2024)    Overall Financial Resource Strain (CARDIA)     Difficulty of Paying Living Expenses: Not hard at all   Food Insecurity: No Food Insecurity (9/12/2024)    Hunger Vital Sign     Worried About Running Out of Food in the Last Year: Never true     Ran Out of Food in the Last Year: Never true   Transportation Needs: No Transportation Needs (9/12/2024)    TRANSPORTATION NEEDS     Transportation : No   Physical Activity: Unknown (9/12/2024)    Exercise Vital Sign     Days of Exercise per Week: 0 days   Stress: No Stress Concern Present (9/12/2024)    [employer] - Occupational Stress Questionnaire     Feeling of Stress : Not at all   Housing Stability: Low Risk  (9/12/2024)    Housing Stability Vital Sign     Unable to Pay for Housing in the Last Year: No     Homeless in the Last Year: No         Objective:      Vitals:    01/06/25 1053   BP: (!) 150/76   BP Location: Right arm   Patient Position: Sitting   Pulse: 85   SpO2: (!) 93%   Weight: 119.5 kg (263 lb 7.2 oz)   Height: 5' 10" (1.778 m)      Physical Exam  Vitals and nursing note reviewed.   Constitutional:       General: He is not in acute distress.     Appearance: Normal appearance. He is well-developed.   HENT:      Head: Normocephalic and atraumatic.      Mouth/Throat:      Pharynx: No oropharyngeal exudate.   Eyes:      General: No scleral icterus.     Conjunctiva/sclera: Conjunctivae normal.      Pupils: Pupils are equal, round, and reactive to light.   Neck:      " Thyroid: No thyromegaly.   Cardiovascular:      Rate and Rhythm: Normal rate and regular rhythm.      Heart sounds: Normal heart sounds. No murmur heard.  Pulmonary:      Effort: Pulmonary effort is normal.      Breath sounds: Normal breath sounds. No wheezing or rales.   Abdominal:      General: There is no distension.   Musculoskeletal:         General: No tenderness.   Lymphadenopathy:      Cervical: No cervical adenopathy.   Skin:     General: Skin is warm and dry.   Neurological:      Mental Status: He is alert and oriented to person, place, and time.   Psychiatric:         Behavior: Behavior normal.         Assessment:       1. Essential hypertension    2. Controlled type 2 diabetes mellitus with hypoglycemia, without long-term current use of insulin    3. Hypothyroidism (acquired)    4. Aortic valve stenosis, etiology of cardiac valve disease unspecified    5. Drug-induced immunodeficiency    6. Systemic involvement of connective tissue, unspecified    7. Diabetes mellitus due to underlying condition, controlled, with complication, with long-term current use of insulin    8. Heart failure, unspecified HF chronicity, unspecified heart failure type    9. PAD (peripheral artery disease)    10. 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 Vitamin D deficiency    13. Abnormal finding of blood chemistry, unspecified    14. Controlled type 2 diabetes mellitus with diabetic polyneuropathy, without long-term current use of insulin    15. Severe obesity with body mass index (BMI) of 36.0 to 36.9 with serious comorbidity    16. Ulcer of left lower extremity, limited to breakdown of skin    17. Statin myopathy        Plan:       Serafin was seen today for hypertension.    Diagnoses and all orders for this visit:    Essential hypertension   f/u in 7-10 days for nurse visit for BP check  -     Comprehensive Metabolic Panel; Future    Controlled type 2 diabetes mellitus with hypoglycemia, without  long-term current use of insulin   Controlled and asymptomatic.  Continue current Rx regimen.  -     Hemoglobin A1C; Future    Hypothyroidism (acquired)   Clinically euthyroid. Update TSH.  -     TSH; Future    Aortic valve stenosis, etiology of cardiac valve disease unspecified   Controlled and asymptomatic.  Continue current Rx regimen.    Drug-induced immunodeficiency  -     C-REACTIVE PROTEIN; Future  -     Sedimentation rate; Future    Systemic involvement of connective tissue, unspecified  -     C-REACTIVE PROTEIN; Future  -     Sedimentation rate; Future    Diabetes mellitus due to underlying condition, controlled, with complication, with long-term current use of insulin  -     Lipid Panel; Future    Heart failure, unspecified HF chronicity, unspecified heart failure type  -     B-TYPE NATRIURETIC PEPTIDE; Future    PAD (peripheral artery disease)   Controlled and asymptomatic.  Continue current Rx regimen.    -     CBC Auto Differential; Future    Vitamin B 12 deficiency    Autoimmune necrotizing myopathy   Needs new rheumatologist. Asymptomatic.  -     CK; Future  -     Ambulatory referral/consult to Rheumatology; Future  -     C-REACTIVE PROTEIN; Future  -     Sedimentation rate; Future    Vitamin D deficiency  -     Vitamin D; Future    Abnormal finding of blood chemistry, unspecified  -     CBC Auto Differential; Future    Controlled type 2 diabetes mellitus with diabetic polyneuropathy, without long-term current use of insulin    Severe obesity with body mass index (BMI) of 36.0 to 36.9 with serious comorbidity    Ulcer of left lower extremity, limited to breakdown of skin   Resolved for now. Continue maintenance of LE to avoid risk. Stressed need for daily self monitoring and early reporting of changes to office.    Statin myopathy    Other orders  -     ergocalciferol (ERGOCALCIFEROL) 50,000 unit Cap; Take 1 capsule (50,000 Units total) by mouth every 7 days.       Visit today is associated with  current or anticipated ongoing medical care related to this patient's single serious condition/complex condition of DM. The patient will return to see me as these issues will be followed longitudinally.      Side effects of medication(s) were discussed in detail and patient voiced understanding.  Patient will call back for any issues or complications.

## 2025-01-07 ENCOUNTER — TELEPHONE (OUTPATIENT)
Dept: INTERNAL MEDICINE | Facility: CLINIC | Age: 75
End: 2025-01-07
Payer: MEDICARE

## 2025-01-07 NOTE — TELEPHONE ENCOUNTER
----- Message from John Vargas MD sent at 1/6/2025  1:04 PM CST -----  Please schedule f/u in 7-10 days for nurse visit for BP check

## 2025-01-08 ENCOUNTER — OFFICE VISIT (OUTPATIENT)
Dept: CARDIOLOGY | Facility: CLINIC | Age: 75
End: 2025-01-08
Payer: MEDICARE

## 2025-01-08 VITALS
BODY MASS INDEX: 36.23 KG/M2 | WEIGHT: 253.06 LBS | HEIGHT: 70 IN | DIASTOLIC BLOOD PRESSURE: 86 MMHG | SYSTOLIC BLOOD PRESSURE: 132 MMHG | HEART RATE: 80 BPM

## 2025-01-08 DIAGNOSIS — Z86.711 HISTORY OF PULMONARY EMBOLISM: ICD-10-CM

## 2025-01-08 DIAGNOSIS — E78.2 MIXED HYPERLIPIDEMIA: ICD-10-CM

## 2025-01-08 DIAGNOSIS — R79.9 ABNORMAL FINDING OF BLOOD CHEMISTRY, UNSPECIFIED: ICD-10-CM

## 2025-01-08 DIAGNOSIS — E11.42 CONTROLLED TYPE 2 DIABETES MELLITUS WITH DIABETIC POLYNEUROPATHY, WITHOUT LONG-TERM CURRENT USE OF INSULIN: ICD-10-CM

## 2025-01-08 DIAGNOSIS — E11.649 CONTROLLED TYPE 2 DIABETES MELLITUS WITH HYPOGLYCEMIA, WITHOUT LONG-TERM CURRENT USE OF INSULIN: ICD-10-CM

## 2025-01-08 DIAGNOSIS — S81.801S WOUND OF RIGHT LOWER EXTREMITY, SEQUELA: ICD-10-CM

## 2025-01-08 DIAGNOSIS — E66.01 SEVERE OBESITY WITH BODY MASS INDEX (BMI) OF 36.0 TO 36.9 WITH SERIOUS COMORBIDITY: ICD-10-CM

## 2025-01-08 DIAGNOSIS — I73.9 PAD (PERIPHERAL ARTERY DISEASE): Primary | ICD-10-CM

## 2025-01-08 DIAGNOSIS — I35.0 AORTIC VALVE STENOSIS, ETIOLOGY OF CARDIAC VALVE DISEASE UNSPECIFIED: ICD-10-CM

## 2025-01-08 DIAGNOSIS — Z78.9 STATIN INTOLERANCE: ICD-10-CM

## 2025-01-08 PROCEDURE — 3079F DIAST BP 80-89 MM HG: CPT | Mod: CPTII,S$GLB,, | Performed by: INTERNAL MEDICINE

## 2025-01-08 PROCEDURE — 99212 OFFICE O/P EST SF 10 MIN: CPT | Mod: S$GLB,,, | Performed by: INTERNAL MEDICINE

## 2025-01-08 PROCEDURE — 3288F FALL RISK ASSESSMENT DOCD: CPT | Mod: CPTII,S$GLB,, | Performed by: INTERNAL MEDICINE

## 2025-01-08 PROCEDURE — 99999 PR PBB SHADOW E&M-EST. PATIENT-LVL III: CPT | Mod: PBBFAC,,, | Performed by: INTERNAL MEDICINE

## 2025-01-08 PROCEDURE — 3075F SYST BP GE 130 - 139MM HG: CPT | Mod: CPTII,S$GLB,, | Performed by: INTERNAL MEDICINE

## 2025-01-08 PROCEDURE — 3044F HG A1C LEVEL LT 7.0%: CPT | Mod: CPTII,S$GLB,, | Performed by: INTERNAL MEDICINE

## 2025-01-08 PROCEDURE — 3008F BODY MASS INDEX DOCD: CPT | Mod: CPTII,S$GLB,, | Performed by: INTERNAL MEDICINE

## 2025-01-08 PROCEDURE — 1126F AMNT PAIN NOTED NONE PRSNT: CPT | Mod: CPTII,S$GLB,, | Performed by: INTERNAL MEDICINE

## 2025-01-08 PROCEDURE — 1101F PT FALLS ASSESS-DOCD LE1/YR: CPT | Mod: CPTII,S$GLB,, | Performed by: INTERNAL MEDICINE

## 2025-01-08 PROCEDURE — 1159F MED LIST DOCD IN RCRD: CPT | Mod: CPTII,S$GLB,, | Performed by: INTERNAL MEDICINE

## 2025-01-08 NOTE — PROGRESS NOTES
"[facility]      Chief Complaint   Patient presents with    Peripheral Arterial Disease    Hypertension       Patient ID: Serafin Tapia is a 74 y.o. male with PAD, moderate aortic stenosis, HTN, HLD, DM2, hypothyroidism, severe obesity, history of PE, who presents for a follow up appointment. Pertinent history/events are as follows:     -Pt kindly referred by Dr. Chiang for evaluation of PAD.     -At our initial clinic visit on 11/6/2024, Mr. Tapia was accompanied by his brother.  He reported a wound a lower aspect of right leg which started "several months ago". He is unsure how the wound occurred. He does not ambulate much and has no claudication symptoms. The wound is being managed by wound care. He is intolerant to statins. MIHIR Study on 7/8/2024 shows RLE segmental pressures and PVR waveforms suggest minimal to mild peripheral arterial occlusive disease. TBI of 0.5 with a great toe pressure of 94 mmHg is noted.  Left Leg: Segmental pressures and PVR waveforms suggest moderate tibial peripheral arterial occlusive disease. TBI of 0.5 with a great toe pressure of 94 mmHg is noted. CTA Abd/Pelvis with Iliofemoral Runoff on 10/25/2022 showed prominent calcific atherosclerosis in the lower extremities with multifocal moderate/high-grade narrowing throughout the KAMRAN, PTA, and peroneal arteries.  The distal posterior tibial arteries are occluded bilaterally with reconstitution by collateral flow from the peroneal arteries. Multifocal mild/ moderate stenosis of the femoropopliteal arteries. Mild narrowing of the aortoiliac vessels.  Plan:  PAD with RLE CLI- Right toe pressure from 7/8/2024 is 94 mmHg, which is suggestive of adequate arterial blood flow for wound healing. Continue wound care. Start Repatha given statin intolerance. Continue ASA 81 mg daily.  HTN- Continue current medications.  HLD-  Start Repatha given statin intolerance. Continue ASA 81 mg daily.  Severe obesity- Encourage diet, " exercise, and weight loss.    HPI:  Mr. Tapia reports wound at lower aspect of right leg has now healed, which consistent with photos in the media section. He is now on Repatha with LDL 81 on 1/6/2024.     Past Medical History:   Diagnosis Date    Asthma     childhood    Diabetes mellitus type 2 in obese 05/03/2014    Dyslipidemia 05/05/2014    Elevated troponin 05/03/2014    Gait instability 02/28/2018    Hyperlipidemia     Hypertension     Hypothyroidism (acquired) 11/01/2017    Obesity     Ocular hypertension     Ocular hypertension     Pulmonary embolism 05/2014    PVD (posterior vitreous detachment), right eye     Spondylosis of lumbar region without myelopathy or radiculopathy 08/25/2017    Statin myopathy 01/16/2018    Vertigo      Past Surgical History:   Procedure Laterality Date    CIRCUMCISION N/A 09/10/2019    Procedure: CIRCUMCISION;  Surgeon: Nhan Ruth MD;  Location: [facility];  Service: Urology;  Laterality: N/A;    TONSILLECTOMY       Social History     Socioeconomic History    Marital status:    Occupational History    Occupation: Retired    Tobacco Use    Smoking status: Former     Current packs/day: 0.25     Average packs/day: 0.3 packs/day for 5.0 years (1.3 ttl pk-yrs)     Types: Cigarettes    Smokeless tobacco: Never    Tobacco comments:     Quit smoking as a teenager   Substance and Sexual Activity    Alcohol use: Yes     Alcohol/week: 1.0 - 2.0 standard drink of alcohol     Types: 1 - 2 Cans of beer per week     Comment: 1-2 drinks occasionally    Drug use: No    Sexual activity: Yes     Partners: Female     Birth control/protection: Condom   Social History Narrative    Lives w/daughter.      Social Drivers of Health     Financial Resource Strain: Low Risk  (9/12/2024)    Overall Financial Resource Strain (CARDIA)     Difficulty of Paying Living Expenses: Not hard at all   Food Insecurity: No Food Insecurity (9/12/2024)    Hunger Vital Sign     Worried About  Running Out of Food in the Last Year: Never true     Ran Out of Food in the Last Year: Never true   Transportation Needs: No Transportation Needs (9/12/2024)    TRANSPORTATION NEEDS     Transportation : No   Physical Activity: Unknown (9/12/2024)    Exercise Vital Sign     Days of Exercise per Week: 0 days   Stress: No Stress Concern Present (9/12/2024)    [employer] - Occupational Stress Questionnaire     Feeling of Stress : Not at all   Housing Stability: Low Risk  (9/12/2024)    Housing Stability Vital Sign     Unable to Pay for Housing in the Last Year: No     Homeless in the Last Year: No     Family History   Problem Relation Name Age of Onset    Diabetes Mother      Heart disease Mother      Hypertension Mother      Stroke Mother      Heart disease Father      Hypertension Father      Diabetes Father      Cancer Father      Cataracts Sister Magnolia     Hypertension Sister Magnolia     Diabetes Sister Magnolia     Hyperlipidemia Brother Jose Raul     Hypertension Brother Jose Raul     Diabetes Brother Jose Raul     Diabetes Brother Justen     Hypertension Brother Justen     Hypertension Brother Nik     Hypertension Daughter Arj     Diabetes Son Serafin Jr.     Hyperlipidemia Son Serafin Jr.     Hypertension Son Serafin Jr.     Glaucoma Maternal Aunt      Glaucoma Paternal Aunt      No Known Problems Maternal Grandmother      No Known Problems Maternal Grandfather      No Known Problems Paternal Grandmother      No Known Problems Paternal Grandfather      Cirrhosis Neg Hx         Review of patient's allergies indicates:   Allergen Reactions    Statins-hmg-coa reductase inhibitors Other (See Comments)     Possible statin induced autoimmune myopathy       Medication List with Changes/Refills   Current Medications    ALBUTEROL (PROVENTIL/VENTOLIN HFA) 90 MCG/ACTUATION INHALER    Inhale 2 puffs into the lungs every 6 (six) hours as needed for Wheezing.    BENAZEPRIL (LOTENSIN) 40 MG TABLET    Take 1  "tablet (40 mg total) by mouth once daily.    CHLORTHALIDONE (HYGROTEN) 25 MG TAB    Take 1 tablet (25 mg total) by mouth once daily.    CICLOPIROX (PENLAC) 8 % SOLN    Apply topically nightly.    DULAGLUTIDE (TRULICITY) 1.5 MG/0.5 ML PEN INJECTOR    Inject 1.5 mg into the skin every 7 days.    ERGOCALCIFEROL (ERGOCALCIFEROL) 50,000 UNIT CAP    Take 1 capsule (50,000 Units total) by mouth every 7 days.    EVOLOCUMAB (REPATHA SURECLICK) 140 MG/ML PNIJ    Inject 1 mL (140 mg total) into the skin every 14 (fourteen) days.    FOLIC ACID (FOLVITE) 1 MG TABLET    Take 1 tablet (1,000 mcg total) by mouth once daily.    GABAPENTIN (NEURONTIN) 100 MG CAPSULE    Take 3 capsules (300 mg total) by mouth 3 (three) times daily.    LEVOTHYROXINE (SYNTHROID) 50 MCG TABLET    Take 1 tablet (50 mcg total) by mouth before breakfast.    METOPROLOL SUCCINATE (TOPROL-XL) 200 MG 24 HR TABLET    Take 1 tablet (200 mg total) by mouth once daily.    NIFEDIPINE (PROCARDIA-XL) 90 MG (OSM) 24 HR TABLET    Take 1 tablet (90 mg total) by mouth once daily.       Review of Systems  Constitution: Denies chills, fever, and sweats.  HENT: Denies headaches or blurry vision.  Cardiovascular: Denies chest pain or irregular heart beat.  Respiratory: Denies cough or shortness of breath.  Gastrointestinal: Denies abdominal pain, nausea, or vomiting.  Musculoskeletal: Denies muscle cramps.  Neurological: Denies dizziness or focal weakness.  Psychiatric/Behavioral: Normal mental status.  Hematologic/Lymphatic: Denies bleeding problem or easy bruising/bleeding.  Skin: Denies rash or suspicious lesions    Physical Examination  /86   Pulse 80   Ht 5' 10" (1.778 m)   Wt 114.8 kg (253 lb 1.4 oz)   BMI 36.31 kg/m²     Constitutional: No acute distress, conversant  HEENT: Sclera anicteric, Pupils equal, round and reactive to light, extraocular motions intact, Oropharynx clear  Neck: No JVD, no carotid bruits  Cardiovascular: regular rate and rhythm, no " murmur, rubs or gallops, normal S1/S2  Pulmonary: Clear to auscultation bilaterally  Abdominal: Abdomen soft, nontender, nondistended, positive bowel sounds  Extremities: Mild bilateral lower extremity edema  RLE with bandage in place at lower leg  Pulses:  Carotid pulses are 2+ on the right side, and 2+ on the left side.  Radial pulses are 2+ on the right side, and 2+ on the left side.   Femoral pulses are 2+ on the right side, and 2+ on the left side.  Popliteal pulses are 2+ on the right side, and 2+ on the left side.   Dorsalis pedis pulses are 2+ on the right side, and 2+ on the left side.   Posterior tibial pulses are 2+ on the right side, and 2+ on the left side.    Skin: No ecchymosis, erythema, or ulcers  Psych: Alert and oriented x 3, appropriate affect  Neuro: CNII-XII intact, no focal deficits    Labs:  Most Recent Data  CBC:   Lab Results   Component Value Date    WBC 5.91 01/06/2025    HGB 12.5 (L) 01/06/2025    HCT 40.3 01/06/2025     01/06/2025    MCV 86 01/06/2025    RDW 14.1 01/06/2025     BMP:   Lab Results   Component Value Date     01/06/2025    K 5.1 01/06/2025     01/06/2025    CO2 29 01/06/2025    BUN 21 01/06/2025    CREATININE 0.9 01/06/2025     (H) 01/06/2025    CALCIUM 10.4 01/06/2025    MG 2.0 09/11/2024    PHOS 3.0 09/11/2024     LFTS;   Lab Results   Component Value Date    PROT 7.5 01/06/2025    ALBUMIN 3.7 01/06/2025    BILITOT 0.2 01/06/2025    AST 30 01/06/2025    ALKPHOS 78 01/06/2025    ALT 23 01/06/2025     COAGS:   Lab Results   Component Value Date    INR 1.1 01/15/2018     FLP:   Lab Results   Component Value Date    CHOL 149 01/06/2025    HDL 42 01/06/2025    LDLCALC 80.6 01/06/2025    TRIG 132 01/06/2025    CHOLHDL 28.2 01/06/2025     CARDIAC:   Lab Results   Component Value Date    TROPONINI 0.018 09/11/2019    BNP 57 01/06/2025       Imaging:    MIHIR Study 7/8/2024:  Right Leg: Segmental pressures and PVR waveforms suggest minimal to mild  peripheral arterial occlusive disease.   - Rt Great Toe: The PPG waveform as described above. - TBI of 0.5 with a great toe pressure of 94 mmHg is noted.   Left Leg: Segmental pressures and PVR waveforms suggest moderate tibial peripheral arterial occlusive disease.   - Lt Great Toe: The PPG waveform as described above. - TBI of 0.5 with a great toe pressure of 94 mmHg is noted.     Echo 7/3/2024:    Left Ventricle: The left ventricle is normal in size. Increased wall thickness. There is concentric remodeling. There is normal systolic function with a visually estimated ejection fraction of 55 - 60%. There is normal diastolic function.    Right Ventricle: Normal right ventricular cavity size. Wall thickness is normal. Systolic function is normal.    Left Atrium: Left atrium is mildly dilated.    Aortic Valve: The aortic valve is a trileaflet valve. There is moderate aortic valve sclerosis. Moderately restricted motion. There is moderate stenosis. Aortic valve area by VTI is 1.15 cm². Aortic valve peak velocity is 3.45 m/s. Mean gradient is 29 mmHg. The dimensionless index is 0.28.    Tricuspid Valve: There is mild regurgitation.    Pulmonary Artery: The estimated pulmonary artery systolic pressure is 21 mmHg.    IVC/SVC: Normal venous pressure at 3 mmHg.       CTA Abd/Pelvis with Iliofemoral Runoff 10/25/2022:  Prominent calcific atherosclerosis in the lower extremities with multifocal moderate/ high-grade narrowing throughout the KAMRAN, PTA, and peroneal arteries.  The distal posterior tibial arteries are occluded bilaterally with reconstitution by collateral flow from the peroneal arteries.   Multifocal mild/ moderate stenosis of the femoropopliteal arteries.   Mild narrowing of the aortoiliac vessels.    Echo 6/28/2024:    Left Ventricle: The left ventricle is normal in size. Increased wall thickness. There is concentric remodeling. There is normal systolic function with a visually estimated ejection fraction of 55  - 60%. There is normal diastolic function.    Right Ventricle: Normal right ventricular cavity size. Wall thickness is normal. Systolic function is normal.    Left Atrium: Left atrium is mildly dilated.    Aortic Valve: The aortic valve is a trileaflet valve. There is moderate aortic valve sclerosis. Moderately restricted motion. There is moderate stenosis. Aortic valve area by VTI is 1.15 cm². Aortic valve peak velocity is 3.45 m/s. Mean gradient is 29 mmHg. The dimensionless index is 0.28.    Tricuspid Valve: There is mild regurgitation.    Pulmonary Artery: The estimated pulmonary artery systolic pressure is 21 mmHg.    IVC/SVC: Normal venous pressure at 3 mmHg.    Assessment/Plan:  Serafin Tapia is a 74 y.o. male with PAD, moderate aortic stenosis, HTN, HLD, DM2, hypothyroidism, severe obesity, history of PE, who presents for a follow up appointment.    PAD with RLE CLI- Mr. Tapia reports wound at lower aspect of right leg has now healed, which consistent with photos in the media section. He is now on Repatha with LDL 81 on 1/6/2024. He has no claudication symptoms. Continue medical management of PAD with repatha and ASA 81 mg daily.    2. HTN- Continue current medications.    3. HLD-  He is now on Repatha with LDL 81 on 1/6/2024  Continue Repatha and ASA 81 mg daily.    4. Severe obesity- Encourage diet, exercise, and weight loss.    5. Moderate Aortic Stenosis- Pt is asymptomatic. Check echo prior to next visit.     Follow up in 6 months with lipids and echo prior    Total duration of face to face visit time 15 minutes.  Total time spent counseling greater than fifty percent of total visit time.  Counseling included discussion regarding imaging findings, diagnosis, possibilities, treatment options, risks and benefits.  The patient had many questions regarding the options and long-term effects.    Scott Andujar MD, PhD  Interventional Cardiology

## 2025-01-08 NOTE — PATIENT INSTRUCTIONS
Assessment/Plan:  Serafin Tapia is a 74 y.o. male with PAD, moderate aortic stenosis, HTN, HLD, DM2, hypothyroidism, severe obesity, history of PE, who presents for a follow up appointment.    PAD with RLE CLI- Mr. Tapia reports wound at lower aspect of right leg has now healed, which consistent with photos in the media section. He is now on Repatha with LDL 81 on 1/6/2024. He has no claudication symptoms. Continue medical management of PAD with repatha and ASA 81 mg daily.    2. HTN- Continue current medications.    3. HLD-  He is now on Repatha with LDL 81 on 1/6/2024  Continue Repatha and ASA 81 mg daily.    4. Severe obesity- Encourage diet, exercise, and weight loss.    5. Moderate Aortic Stenosis- Pt is asymptomatic. Check echo prior to next visit.     Follow up in 6 months with lipids and echo prior

## 2025-01-14 ENCOUNTER — CLINICAL SUPPORT (OUTPATIENT)
Dept: INTERNAL MEDICINE | Facility: CLINIC | Age: 75
End: 2025-01-14
Payer: MEDICARE

## 2025-01-14 ENCOUNTER — TELEPHONE (OUTPATIENT)
Dept: INTERNAL MEDICINE | Facility: CLINIC | Age: 75
End: 2025-01-14

## 2025-01-14 VITALS — HEART RATE: 77 BPM | OXYGEN SATURATION: 98 % | DIASTOLIC BLOOD PRESSURE: 70 MMHG | SYSTOLIC BLOOD PRESSURE: 136 MMHG

## 2025-01-14 DIAGNOSIS — Z01.30 BP CHECK: Primary | ICD-10-CM

## 2025-01-14 PROCEDURE — 99999 PR PBB SHADOW E&M-EST. PATIENT-LVL II: CPT | Mod: PBBFAC,,,

## 2025-01-14 NOTE — PROGRESS NOTES
"    Serafin Tapia 74 y.o. male is here for Blood Pressure check. in person    Manual Blood pressure reading was  136 /70, Pulse 77. (Checked at the end of the visit)    If high, was it repeated after 15 minutes? no    Pt's Home blood pressure machine read in office NO, Pulse N/A.     Diagnosed with Hypertension yes.    Patient took blood pressure medication today yes.  Last dose of blood pressure medication was taken at 0700. Patient took 1. Benazepril 40 mg daily 2. Chlorthalidone 25 mg daily 3. Metoprolol 200 mg daily 4. Nifedipine  90 mg daily.     All Medications and OTC medication updated yes    Does patient have record of home blood pressure readings / Blood Pressure Log no. 01/12  "this sunday 139/75 ". Given BP Log sheet with information on BP ranges. Advised to record BP    Does the pt have any complaints today in regards to their blood pressure medication? no. Complains of None. Patient is asymptomatic.     Were you sitting still for 5-10 minutes prior to taking your Blood pressure? yes     Has your blood pressure monitor ever been checked? no When was last time we checked your blood pressure monitor? No    Updated vitals yes    Follow up date is No FU appointment.     Dr. Vargas  notified.     Creatinine   Date Value Ref Range Status   01/06/2025 0.9 0.5 - 1.4 mg/dL Final     Sodium   Date Value Ref Range Status   01/06/2025 143 136 - 145 mmol/L Final     Potassium   Date Value Ref Range Status   01/06/2025 5.1 3.5 - 5.1 mmol/L Final         "

## 2025-01-14 NOTE — TELEPHONE ENCOUNTER
"Serafin Tapia 74 y.o. male is here for Blood Pressure check. in person    Manual Blood pressure reading was  136 /70, Pulse 77. (Checked at the end of the visit)    If high, was it repeated after 15 minutes? no    Pt's Home blood pressure machine read in office NO, Pulse N/A.     Diagnosed with Hypertension yes.    Patient took blood pressure medication today yes.  Last dose of blood pressure medication was taken at 0700. Patient took 1. Benazepril 40 mg daily 2. Chlorthalidone 25 mg daily 3. Metoprolol 200 mg daily 4. Nifedipine  90 mg daily.     All Medications and OTC medication updated yes    Does patient have record of home blood pressure readings / Blood Pressure Log no. 01/12  "this sunday 139/75 ". Given BP Log sheet with information on BP ranges. Advised to record BP    Does the pt have any complaints today in regards to their blood pressure medication? no. Complains of None. Patient is asymptomatic.     Were you sitting still for 5-10 minutes prior to taking your Blood pressure? yes     Has your blood pressure monitor ever been checked? no When was last time we checked your blood pressure monitor? No    Updated vitals yes    Follow up date is No FU appointment.     Dr. Vargas  notified.     Creatinine   Date Value Ref Range Status   01/06/2025 0.9 0.5 - 1.4 mg/dL Final     Sodium   Date Value Ref Range Status   01/06/2025 143 136 - 145 mmol/L Final     Potassium   Date Value Ref Range Status   01/06/2025 5.1 3.5 - 5.1 mmol/L Final         "

## 2025-01-16 ENCOUNTER — TELEPHONE (OUTPATIENT)
Dept: RHEUMATOLOGY | Facility: CLINIC | Age: 75
End: 2025-01-16
Payer: MEDICARE

## 2025-01-16 NOTE — TELEPHONE ENCOUNTER
----- Message from John Chavis MD sent at 1/16/2025  3:16 PM CST -----  WOW.     Respectfully,  John Chavis  ----- Message -----  From: Esthela Hussein LPN  Sent: 1/16/2025  11:09 AM CST  To: John Chavis MD    Hi Dr. Lopez,  The earliest  I can get him in is in May.  But I can put him on the waiting list also.  ----- Message -----  From: John Chavis MD  Sent: 1/16/2025  10:42 AM CST  To: [facility] Staff    Pt is scheduled to establish with new rheum MD, prior pt of Dr BOYD. He is active and we could use some Rheum help so we are reaching out for earlier appt. Thanks for your time and help.

## 2025-01-16 NOTE — PROGRESS NOTES
Pt is scheduled to establish with new rheum MD, prior pt of Dr BOYD. He is active and we could use some Rheum help so we are reaching out for earlier appt. Thanks for your time and help.

## 2025-03-06 ENCOUNTER — APPOINTMENT (OUTPATIENT)
Dept: RADIOLOGY | Facility: OTHER | Age: 75
End: 2025-03-06
Attending: PODIATRIST
Payer: MEDICARE

## 2025-03-06 ENCOUNTER — OFFICE VISIT (OUTPATIENT)
Dept: PODIATRY | Facility: CLINIC | Age: 75
End: 2025-03-06
Payer: MEDICARE

## 2025-03-06 VITALS
HEIGHT: 70 IN | SYSTOLIC BLOOD PRESSURE: 166 MMHG | DIASTOLIC BLOOD PRESSURE: 83 MMHG | HEART RATE: 73 BPM | WEIGHT: 253.06 LBS | BODY MASS INDEX: 36.23 KG/M2

## 2025-03-06 DIAGNOSIS — L60.2 LONG TOENAIL: ICD-10-CM

## 2025-03-06 DIAGNOSIS — I73.9 PAD (PERIPHERAL ARTERY DISEASE): ICD-10-CM

## 2025-03-06 DIAGNOSIS — E11.42 TYPE 2 DIABETES MELLITUS WITH DIABETIC POLYNEUROPATHY, UNSPECIFIED WHETHER LONG TERM INSULIN USE: ICD-10-CM

## 2025-03-06 DIAGNOSIS — L97.313: Primary | ICD-10-CM

## 2025-03-06 DIAGNOSIS — L97.313: ICD-10-CM

## 2025-03-06 PROCEDURE — 1160F RVW MEDS BY RX/DR IN RCRD: CPT | Mod: CPTII,,, | Performed by: PODIATRIST

## 2025-03-06 PROCEDURE — 99214 OFFICE O/P EST MOD 30 MIN: CPT | Mod: 25,,, | Performed by: PODIATRIST

## 2025-03-06 PROCEDURE — 3077F SYST BP >= 140 MM HG: CPT | Mod: CPTII,,, | Performed by: PODIATRIST

## 2025-03-06 PROCEDURE — 73610 X-RAY EXAM OF ANKLE: CPT | Mod: TC,PN,RT

## 2025-03-06 PROCEDURE — 11042 DBRDMT SUBQ TIS 1ST 20SQCM/<: CPT | Mod: ,,, | Performed by: PODIATRIST

## 2025-03-06 PROCEDURE — 3288F FALL RISK ASSESSMENT DOCD: CPT | Mod: CPTII,,, | Performed by: PODIATRIST

## 2025-03-06 PROCEDURE — 99999 PR PBB SHADOW E&M-EST. PATIENT-LVL III: CPT | Mod: PBBFAC,,, | Performed by: PODIATRIST

## 2025-03-06 PROCEDURE — 87075 CULTR BACTERIA EXCEPT BLOOD: CPT | Performed by: PODIATRIST

## 2025-03-06 PROCEDURE — 1126F AMNT PAIN NOTED NONE PRSNT: CPT | Mod: CPTII,,, | Performed by: PODIATRIST

## 2025-03-06 PROCEDURE — 73610 X-RAY EXAM OF ANKLE: CPT | Mod: 26,RT,, | Performed by: RADIOLOGY

## 2025-03-06 PROCEDURE — 3008F BODY MASS INDEX DOCD: CPT | Mod: CPTII,,, | Performed by: PODIATRIST

## 2025-03-06 PROCEDURE — 87070 CULTURE OTHR SPECIMN AEROBIC: CPT | Performed by: PODIATRIST

## 2025-03-06 PROCEDURE — 1159F MED LIST DOCD IN RCRD: CPT | Mod: CPTII,,, | Performed by: PODIATRIST

## 2025-03-06 PROCEDURE — 4010F ACE/ARB THERAPY RXD/TAKEN: CPT | Mod: CPTII,,, | Performed by: PODIATRIST

## 2025-03-06 PROCEDURE — 1101F PT FALLS ASSESS-DOCD LE1/YR: CPT | Mod: CPTII,,, | Performed by: PODIATRIST

## 2025-03-06 PROCEDURE — 3079F DIAST BP 80-89 MM HG: CPT | Mod: CPTII,,, | Performed by: PODIATRIST

## 2025-03-06 PROCEDURE — 3044F HG A1C LEVEL LT 7.0%: CPT | Mod: CPTII,,, | Performed by: PODIATRIST

## 2025-03-06 PROCEDURE — 17999 UNLISTD PX SKN MUC MEMB SUBQ: CPT | Mod: CSM,,, | Performed by: PODIATRIST

## 2025-03-06 RX ORDER — CLINDAMYCIN HYDROCHLORIDE 300 MG/1
300 CAPSULE ORAL 3 TIMES DAILY
Qty: 30 CAPSULE | Refills: 0 | Status: SHIPPED | OUTPATIENT
Start: 2025-03-06 | End: 2025-03-16

## 2025-03-06 NOTE — PROCEDURES
"Wound Debridement    Date/Time: 3/6/2025 9:28 AM    Performed by: Silviano Ellison DPM  Authorized by: Silviano Ellison DPM    Time out: Immediately prior to procedure a "time out" was called to verify the correct patient, procedure, equipment, support staff and site/side marked as required.    Consent Done?:  Yes (Verbal)  Local anesthesia used?: No      Wound Details:    Location:  Right ankle    Type of Debridement:  Excisional       Length (cm):  2       Width (cm):  1.5       Depth (cm):  0.3       Area (sq cm):  2.36       Percent Debrided (%):  100       Total Area Debrided (sq cm):  2.36    Depth of debridement:  Subcutaneous tissue    Tissue debrided:  Dermis, Epidermis, Subcutaneous, Fascia and Tendon    Devitalized tissue debrided:  Biofilm, Callus, Necrotic/Eschar, Slough and Exudate    Instruments:  Curette and Nippers  Bleeding:  Minimal  Hemostasis Achieved: Yes  Method Used:  Pressure  Patient tolerance:  Patient tolerated the procedure well with no immediate complications  1st Wound Pain Assessment: 0  Specimen Collected: Specimen sent to microbiology    "

## 2025-03-06 NOTE — PROGRESS NOTES
Subjective:      Patient ID: Serafin Tapia is a 74 y.o. male.    Chief Complaint: Diabetic Foot Exam (PCP John Vargas MD 01/06/2025/[facility]/)    Diabetes, increased risk amputation needing evaluation/management/optomization of foot care.     Cc2  Long toenails, would like them cut, understands not covered by insurance and we will custom 80 dollars.      Cc3  patient unaware of it discovered during his evaluation today was a foul smelling clear serous fluid draining scar which is undermined and got ulceration beneath in the back of his right ankle.  Unknown onset, chronic wound present for months-he thought it was healed.  Denies trauma.  Relates prior surgical debridement.  Relates prior revascularization.  Follows with Dr. Stern.  Last visit December 2024.          Casual shoes    Chief Complaint   Patient presents with    Diabetic Foot Exam     PCP John Vargas MD 01/06/2025  [facility]          Review of Systems   Constitutional: Negative for chills, diaphoresis, fever, malaise/fatigue and night sweats.   Cardiovascular:  Positive for leg swelling. Negative for claudication, cyanosis and syncope.   Skin:  Positive for nail changes. Negative for color change, dry skin, rash, suspicious lesions and unusual hair distribution.   Musculoskeletal:  Negative for falls, joint pain, joint swelling, muscle cramps, muscle weakness and stiffness.   Gastrointestinal:  Negative for constipation, diarrhea, nausea and vomiting.   Neurological:  Positive for sensory change. Negative for brief paralysis, disturbances in coordination, focal weakness, numbness, paresthesias and tremors.           Objective:      Physical Exam  Constitutional:       General: He is not in acute distress.     Appearance: He is well-developed. He is not diaphoretic.   Cardiovascular:      Pulses:           Popliteal pulses are 2+ on the right side and 2+ on the left side.        Dorsalis pedis pulses are 1+ on the right side and 1+ on  the left side.        Posterior tibial pulses are 1+ on the right side and 1+ on the left side.      Comments: Capillary refill 3 seconds all toes/distal feet, all toes/both feet warm to touch.      Negative lymphadenopathy bilateral popliteal fossa and tarsal tunnel.     <2+ pitting lower extremity edema bilateral.    Musculoskeletal:      Right ankle: No swelling, deformity, ecchymosis or lacerations. Normal range of motion. Normal pulse.      Right Achilles Tendon: Normal. No defects. Marquez's test negative.      Comments: Entirely propulsive gait with walker for gait assist and to prevent falls.  Increased station and gait increased base.  All ten toes without clubbing, cyanosis, or signs of ischemia.  No pain to palpation bilateral lower extremities.  Range of motion, stability, muscle strength, and muscle tone normal bilateral feet and legs.    Lymphadenopathy:      Lower Body: No right inguinal adenopathy. No left inguinal adenopathy.      Comments: Negative lymphadenopathy bilateral popliteal fossa and tarsal tunnel.    Negative lymphangitic streaking bilateral feet/ankles/legs.   Skin:     General: Skin is warm and dry.      Capillary Refill: Capillary refill takes 2 to 3 seconds.      Coloration: Skin is not pale.      Findings: No abrasion, bruising, burn, ecchymosis, erythema, laceration, lesion or rash.      Nails: There is no clubbing.      Comments: Wound:  Posterior right ankle    Size:    Pre:  0 x 0 x 0 mmloose thick scar and eschar with foul smell  Post:  20 x 15 by 3 mm    Base:  Granular, pink with moderate serous/serosanaguinous drainage only.  No pus, tracking, fluctuance, malodor, or cardinal signs infection.    Borders:  Hyperkeratotic, hypertrophic scar, debriding to flat, pink, scarred indurated and abnormal skin edges without undermining.         Otherwise, Skin thin, shiny, atrophic, with decreased density and distribution of pedal hair bilateral, but without hyperpigmentation, giovanni  discoloration,  ulcers, masses, nodules or cords palpated bilateral feet and legs.       Toenails 1st, 2nd, 3rd, 4th, 5th  bilateral are hypertrophic thickened 2-3 mm, dystrophic, discolored tanish brown with tan, gray crumbly subungual debris.  Tender to distal nail plate pressure, without periungual skin abnormality of each.         Neurological:      Mental Status: He is alert and oriented to person, place, and time.      Sensory: No sensory deficit.      Motor: No tremor, atrophy or abnormal muscle tone.      Gait: Gait normal.      Deep Tendon Reflexes:      Reflex Scores:       Patellar reflexes are 2+ on the right side and 2+ on the left side.       Achilles reflexes are 2+ on the right side and 2+ on the left side.     Comments: Subjective numbness without loss of protective sensation to 10 g monofilament 10 sites tested 10 sites detected   Psychiatric:         Behavior: Behavior is cooperative.             Assessment:       Encounter Diagnoses   Name Primary?    Ulcer of ankle, right, with necrosis of muscle Yes    PAD (peripheral artery disease)     Type 2 diabetes mellitus with diabetic polyneuropathy, unspecified whether long term insulin use     Long toenail            Plan:       Serafin was seen today for diabetic foot exam.    Diagnoses and all orders for this visit:    Ulcer of ankle, right, with necrosis of muscle  -     Aerobic culture  -     Culture, Anaerobic  -     X-Ray Ankle Complete Right; Future    PAD (peripheral artery disease)  -     Aerobic culture  -     Culture, Anaerobic  -     X-Ray Ankle Complete Right; Future    Type 2 diabetes mellitus with diabetic polyneuropathy, unspecified whether long term insulin use  -     Aerobic culture  -     Culture, Anaerobic  -     X-Ray Ankle Complete Right; Future    Long toenail  -     X-Ray Ankle Complete Right; Future    Other orders  -     clindamycin (CLEOCIN) 300 MG capsule; Take 1 capsule (300 mg total) by mouth 3 (three) times daily. for 10  days  -     collagenase (SANTYL) ointment; Apply topically once daily.        I counseled the patient on his conditions, their implications and medical management.        The patient has received literature on basic diabetic foot care.  Patient will inspect feet daily, wear protective shoe gear when ambulatory, and apply moisturizer to skin as needed to maintain elasticity and help prevent ulceration.    Discussed conservative treatment with shoes of adequate dimensions, material, and style to alleviate symptoms and delay or prevent surgical intervention.    Debrided wound right ankle.    Dressed wound right ankle Hydrofera blue light compression dressing toes to mid calf.  Do not change or wet.    Cultures ulcer right ankle.      Clindamycin, Santyl.          Continue all management follow-up with vascular surgery for PVD.        We discussed that with the current findings and diagnoses the trimming of nails is not covered by insurance.  Patient verbalizes understanding and willingness to pay 80 dollars for non-covered foot care.      Non-covered foot care:     .With the patient's permission, I debrided all ten toenails with a sterile nipper and curette, removing all offending nail and debris.  Patient tolerated the procedure well and related significant relief.     Declines penlac          Follow up in about 1 week (around 3/13/2025).

## 2025-03-07 LAB — BACTERIA SPEC ANAEROBE CULT: NORMAL

## 2025-03-08 LAB — BACTERIA SPEC AEROBE CULT: NORMAL

## 2025-03-13 ENCOUNTER — OFFICE VISIT (OUTPATIENT)
Dept: PODIATRY | Facility: CLINIC | Age: 75
End: 2025-03-13
Payer: MEDICARE

## 2025-03-13 VITALS
HEART RATE: 74 BPM | WEIGHT: 253.06 LBS | BODY MASS INDEX: 36.23 KG/M2 | DIASTOLIC BLOOD PRESSURE: 95 MMHG | SYSTOLIC BLOOD PRESSURE: 178 MMHG | HEIGHT: 70 IN

## 2025-03-13 DIAGNOSIS — I73.9 PAD (PERIPHERAL ARTERY DISEASE): ICD-10-CM

## 2025-03-13 DIAGNOSIS — L97.313: Primary | ICD-10-CM

## 2025-03-13 DIAGNOSIS — E11.42 TYPE 2 DIABETES MELLITUS WITH DIABETIC POLYNEUROPATHY, UNSPECIFIED WHETHER LONG TERM INSULIN USE: ICD-10-CM

## 2025-03-13 PROCEDURE — 3077F SYST BP >= 140 MM HG: CPT | Mod: CPTII,S$GLB,, | Performed by: PODIATRIST

## 2025-03-13 PROCEDURE — 99213 OFFICE O/P EST LOW 20 MIN: CPT | Mod: 25,S$GLB,, | Performed by: PODIATRIST

## 2025-03-13 PROCEDURE — 4010F ACE/ARB THERAPY RXD/TAKEN: CPT | Mod: CPTII,S$GLB,, | Performed by: PODIATRIST

## 2025-03-13 PROCEDURE — 3080F DIAST BP >= 90 MM HG: CPT | Mod: CPTII,S$GLB,, | Performed by: PODIATRIST

## 2025-03-13 PROCEDURE — 1159F MED LIST DOCD IN RCRD: CPT | Mod: CPTII,S$GLB,, | Performed by: PODIATRIST

## 2025-03-13 PROCEDURE — 1101F PT FALLS ASSESS-DOCD LE1/YR: CPT | Mod: CPTII,S$GLB,, | Performed by: PODIATRIST

## 2025-03-13 PROCEDURE — 1126F AMNT PAIN NOTED NONE PRSNT: CPT | Mod: CPTII,S$GLB,, | Performed by: PODIATRIST

## 2025-03-13 PROCEDURE — 11042 DBRDMT SUBQ TIS 1ST 20SQCM/<: CPT | Mod: S$GLB,,, | Performed by: PODIATRIST

## 2025-03-13 PROCEDURE — 99999 PR PBB SHADOW E&M-EST. PATIENT-LVL III: CPT | Mod: PBBFAC,,, | Performed by: PODIATRIST

## 2025-03-13 PROCEDURE — 3008F BODY MASS INDEX DOCD: CPT | Mod: CPTII,S$GLB,, | Performed by: PODIATRIST

## 2025-03-13 PROCEDURE — 3288F FALL RISK ASSESSMENT DOCD: CPT | Mod: CPTII,S$GLB,, | Performed by: PODIATRIST

## 2025-03-13 PROCEDURE — 3044F HG A1C LEVEL LT 7.0%: CPT | Mod: CPTII,S$GLB,, | Performed by: PODIATRIST

## 2025-03-13 NOTE — PROGRESS NOTES
Subjective:      Patient ID: Serafin Tapia is a 74 y.o. male.    Chief Complaint: Foot Ulcer    Ulcer posterior ankle over the Achilles tendon right foot.  X-rays last visit show no gas in soft tissue foreign body or apparent complication to the bone joint or soft tissue posterior right ankle.    Dressing last visit clean dry intact.  Patient denies pain, trauma, signs infection since last visit.  Ambulating in surgical shoe right, casual shoe left.    Review of Systems   Constitutional: Negative for chills, diaphoresis, fever, malaise/fatigue and night sweats.   Cardiovascular:  Positive for leg swelling. Negative for claudication, cyanosis and syncope.   Skin:  Positive for poor wound healing. Negative for rash and suspicious lesions.   Musculoskeletal:  Negative for falls, joint pain, joint swelling, muscle cramps, muscle weakness and stiffness.   Gastrointestinal:  Negative for constipation, diarrhea, nausea and vomiting.   Neurological:  Positive for sensory change. Negative for brief paralysis, disturbances in coordination, focal weakness and tremors.           Objective:      Physical Exam  Constitutional:       General: He is not in acute distress.     Appearance: He is well-developed. He is not diaphoretic.   Cardiovascular:      Pulses:           Popliteal pulses are 2+ on the right side and 2+ on the left side.        Dorsalis pedis pulses are 1+ on the right side and 1+ on the left side.        Posterior tibial pulses are 1+ on the right side and 1+ on the left side.      Comments: Capillary refill 3 seconds all toes/distal feet, all toes/both feet warm to touch.      Negative lymphadenopathy bilateral popliteal fossa and tarsal tunnel.     <2+ pitting lower extremity edema bilateral.    Musculoskeletal:      Right ankle: No swelling, deformity, ecchymosis or lacerations. Normal range of motion. Normal pulse.      Right Achilles Tendon: Normal. No defects. Marquez's test negative.      Comments: Entirely  a-propulsive gait with walker for gait assist and to prevent falls.  Increased station and gait increased base.  All ten toes without clubbing, cyanosis, or signs of ischemia.  No pain to palpation bilateral lower extremities.  Range of motion, stability, muscle strength, and muscle tone normal bilateral feet and legs.    Lymphadenopathy:      Lower Body: No right inguinal adenopathy. No left inguinal adenopathy.      Comments: Negative lymphadenopathy bilateral popliteal fossa and tarsal tunnel.    Negative lymphangitic streaking bilateral feet/ankles/legs.   Skin:     General: Skin is warm and dry.      Capillary Refill: Capillary refill takes 2 to 3 seconds.      Coloration: Skin is not pale.      Findings: No abrasion, bruising, burn, ecchymosis, erythema, laceration, lesion or rash.      Nails: There is no clubbing.      Comments: Wound:  Posterior right ankle    Size:    Pre:   20 x 15 by 3 mm    Post:  [id number] mm    Base:  Granular, pink with moderate serous/serosanaguinous drainage only.  No pus, tracking, fluctuance, malodor, or cardinal signs infection.    Borders:  Hyperkeratotic, hypertrophic scar, debriding to flat, pink, scarred indurated and abnormal skin edges without undermining.         Otherwise, Skin thin, shiny, atrophic, with decreased density and distribution of pedal hair bilateral, but without hyperpigmentation, giovanni discoloration,  ulcers, masses, nodules or cords palpated bilateral feet and legs.       Toenails 1st, 2nd, 3rd, 4th, 5th  bilateral are hypertrophic thickened 2-3 mm, dystrophic, discolored tanish brown with tan, gray crumbly subungual debris.  Tender to distal nail plate pressure, without periungual skin abnormality of each.         Neurological:      Mental Status: He is alert and oriented to person, place, and time.      Motor: No tremor, atrophy or abnormal muscle tone.      Gait: Gait normal.      Comments: Subjective numbness without loss of protective sensation to 10 g  monofilament 10 sites tested 10 sites detected   Psychiatric:         Behavior: Behavior is cooperative.               Assessment:       Encounter Diagnoses   Name Primary?    Ulcer of ankle, right, with necrosis of muscle Yes    PAD (peripheral artery disease)     Type 2 diabetes mellitus with diabetic polyneuropathy, unspecified whether long term insulin use          Plan:       Serafin was seen today for foot ulcer.    Diagnoses and all orders for this visit:    Ulcer of ankle, right, with necrosis of muscle    PAD (peripheral artery disease)    Type 2 diabetes mellitus with diabetic polyneuropathy, unspecified whether long term insulin use      I counseled the patient on his conditions, their implications and medical management.        Debrided wound right ankle.      Dressed wound right ankle with Ina, football style dressing.      Dispense fracture boot right keep the foot 90° of the ankle so that the wound in the posterior aspect of the Achilles is not having constant tension and motion across it.      Ambulate to tolerance in the boot.      One-week, sooner p.r.n.          No follow-ups on file.

## 2025-03-13 NOTE — PROCEDURES
"Wound Debridement    Date/Time: 3/13/2025 7:42 AM    Performed by: Silviano Ellison DPM  Authorized by: Silviano Ellison DPM    Time out: Immediately prior to procedure a "time out" was called to verify the correct patient, procedure, equipment, support staff and site/side marked as required.    Consent Done?:  Yes (Verbal)  Local anesthesia used?: No      Wound Details:    Location:  Right ankle    Type of Debridement:  Excisional       Length (cm):  3       Width (cm):  2.5       Depth (cm):  0.3       Area (sq cm):  5.89       Percent Debrided (%):  100       Total Area Debrided (sq cm):  5.89    Depth of debridement:  Subcutaneous tissue    Tissue debrided:  Dermis, Epidermis and Subcutaneous    Devitalized tissue debrided:  Biofilm, Necrotic/Eschar and Other (Scar tissue)    Instruments:  Blade  Bleeding:  Minimal  Hemostasis Achieved: Yes  Method Used:  Pressure  Patient tolerance:  Patient tolerated the procedure well with no immediate complications  1st Wound Pain Assessment: 0    "

## 2025-03-20 ENCOUNTER — OFFICE VISIT (OUTPATIENT)
Dept: PODIATRY | Facility: CLINIC | Age: 75
End: 2025-03-20
Payer: MEDICARE

## 2025-03-20 VITALS — HEIGHT: 70 IN | WEIGHT: 253.06 LBS | BODY MASS INDEX: 36.23 KG/M2

## 2025-03-20 DIAGNOSIS — E11.42 TYPE 2 DIABETES MELLITUS WITH DIABETIC POLYNEUROPATHY, UNSPECIFIED WHETHER LONG TERM INSULIN USE: ICD-10-CM

## 2025-03-20 DIAGNOSIS — L97.313: Primary | ICD-10-CM

## 2025-03-20 DIAGNOSIS — I73.9 PAD (PERIPHERAL ARTERY DISEASE): ICD-10-CM

## 2025-03-20 PROCEDURE — 3288F FALL RISK ASSESSMENT DOCD: CPT | Mod: CPTII,S$GLB,, | Performed by: PODIATRIST

## 2025-03-20 PROCEDURE — 1101F PT FALLS ASSESS-DOCD LE1/YR: CPT | Mod: CPTII,S$GLB,, | Performed by: PODIATRIST

## 2025-03-20 PROCEDURE — 99999 PR PBB SHADOW E&M-EST. PATIENT-LVL III: CPT | Mod: PBBFAC,,, | Performed by: PODIATRIST

## 2025-03-20 PROCEDURE — 4010F ACE/ARB THERAPY RXD/TAKEN: CPT | Mod: CPTII,S$GLB,, | Performed by: PODIATRIST

## 2025-03-20 PROCEDURE — 3008F BODY MASS INDEX DOCD: CPT | Mod: CPTII,S$GLB,, | Performed by: PODIATRIST

## 2025-03-20 PROCEDURE — 3044F HG A1C LEVEL LT 7.0%: CPT | Mod: CPTII,S$GLB,, | Performed by: PODIATRIST

## 2025-03-20 PROCEDURE — 99214 OFFICE O/P EST MOD 30 MIN: CPT | Mod: S$GLB,,, | Performed by: PODIATRIST

## 2025-03-20 PROCEDURE — 1126F AMNT PAIN NOTED NONE PRSNT: CPT | Mod: CPTII,S$GLB,, | Performed by: PODIATRIST

## 2025-03-20 PROCEDURE — 1159F MED LIST DOCD IN RCRD: CPT | Mod: CPTII,S$GLB,, | Performed by: PODIATRIST

## 2025-03-20 NOTE — PROGRESS NOTES
Subjective:      Patient ID: Serafin Tapia is a 74 y.o. male.    Chief Complaint: Foot Ulcer    Ulcer posterior ankle over the Achilles tendon right foot.  X-rays last visit show no gas in soft tissue foreign body or apparent complication to the bone joint or soft tissue posterior right ankle.    Dressing last visit clean dry intact.  Patient denies pain, trauma, signs infection since last visit.  Ambulating in surgical shoe right, casual shoe left.    Review of Systems   Constitutional: Negative for chills, diaphoresis, fever, malaise/fatigue and night sweats.   Cardiovascular:  Positive for leg swelling. Negative for claudication, cyanosis and syncope.   Skin:  Positive for poor wound healing. Negative for rash and suspicious lesions.   Musculoskeletal:  Negative for falls, joint pain, joint swelling, muscle cramps, muscle weakness and stiffness.   Gastrointestinal:  Negative for constipation, diarrhea, nausea and vomiting.   Neurological:  Positive for sensory change. Negative for brief paralysis, disturbances in coordination, focal weakness and tremors.           Objective:      Physical Exam  Constitutional:       General: He is not in acute distress.     Appearance: He is well-developed. He is not diaphoretic.   Cardiovascular:      Pulses:           Popliteal pulses are 2+ on the right side and 2+ on the left side.        Dorsalis pedis pulses are 1+ on the right side and 1+ on the left side.        Posterior tibial pulses are 1+ on the right side and 1+ on the left side.      Comments: Capillary refill 3 seconds all toes/distal feet, all toes/both feet warm to touch.      Negative lymphadenopathy bilateral popliteal fossa and tarsal tunnel.     <2+ pitting lower extremity edema bilateral.    Musculoskeletal:      Right ankle: No swelling, deformity, ecchymosis or lacerations. Normal range of motion. Normal pulse.      Right Achilles Tendon: Normal. No defects. Marquez's test negative.      Comments: Entirely  a-propulsive gait with walker for gait assist and to prevent falls.  Increased station and gait increased base.  All ten toes without clubbing, cyanosis, or signs of ischemia.  No pain to palpation bilateral lower extremities.  Range of motion, stability, muscle strength, and muscle tone normal bilateral feet and legs.    Lymphadenopathy:      Lower Body: No right inguinal adenopathy. No left inguinal adenopathy.      Comments: Negative lymphadenopathy bilateral popliteal fossa and tarsal tunnel.    Negative lymphangitic streaking bilateral feet/ankles/legs.   Skin:     General: Skin is warm and dry.      Capillary Refill: Capillary refill takes 2 to 3 seconds.      Coloration: Skin is not pale.      Findings: No abrasion, bruising, burn, ecchymosis, erythema, laceration, lesion or rash.      Nails: There is no clubbing.      Comments: Wound:  Posterior right ankle    Size:    Pre:     [id number] mm    Base:  Granular, pink gray-tan fibrotic mix with moderate serous/serosanaguinous drainage only.  No pus, tracking, fluctuance, malodor, or cardinal signs infection.    Borders:  Atrophic relatively avascular, hypertrophic scar, debriding to flat, pink, scarred indurated and abnormal skin edges without undermining.         Otherwise, Skin thin, shiny, atrophic, with decreased density and distribution of pedal hair bilateral, but without hyperpigmentation, giovanni discoloration,  ulcers, masses, nodules or cords palpated bilateral feet and legs.       Toenails 1st, 2nd, 3rd, 4th, 5th  bilateral are hypertrophic thickened 2-3 mm, dystrophic, discolored tanish brown with tan, gray crumbly subungual debris.  Tender to distal nail plate pressure, without periungual skin abnormality of each.         Neurological:      Mental Status: He is alert and oriented to person, place, and time.      Motor: No tremor, atrophy or abnormal muscle tone.      Gait: Gait normal.      Comments: Subjective numbness without loss of protective  sensation to 10 g monofilament 10 sites tested 10 sites detected   Psychiatric:         Behavior: Behavior is cooperative.               Assessment:       Encounter Diagnoses   Name Primary?    Ulcer of ankle, right, with necrosis of muscle Yes    PAD (peripheral artery disease)     Type 2 diabetes mellitus with diabetic polyneuropathy, unspecified whether long term insulin use          Plan:       Serafin was seen today for foot ulcer.    Diagnoses and all orders for this visit:    Ulcer of ankle, right, with necrosis of muscle  -     Ambulatory referral/consult to Wound Clinic; Future    PAD (peripheral artery disease)  -     Ambulatory referral/consult to Wound Clinic; Future    Type 2 diabetes mellitus with diabetic polyneuropathy, unspecified whether long term insulin use  -     Ambulatory referral/consult to Wound Clinic; Future    Other orders  -     collagenase (SANTYL) ointment; Apply topically once daily.      I counseled the patient on his conditions, their implications and medical management.        Debrided wound right ankle.      Dressed wound right ankle with Ina, football style dressing.      Dispense fracture boot right keep the foot 90° of the ankle so that the wound in the posterior aspect of the Achilles is not having constant tension and motion across it.      Ambulate to tolerance in the boot.      One-week, sooner p.r.n.          No follow-ups on file.

## 2025-03-24 DIAGNOSIS — Z00.00 ENCOUNTER FOR MEDICARE ANNUAL WELLNESS EXAM: ICD-10-CM

## 2025-03-27 ENCOUNTER — TELEPHONE (OUTPATIENT)
Dept: WOUND CARE | Facility: CLINIC | Age: 75
End: 2025-03-27
Payer: MEDICARE

## 2025-03-27 ENCOUNTER — OFFICE VISIT (OUTPATIENT)
Dept: PODIATRY | Facility: CLINIC | Age: 75
End: 2025-03-27
Payer: MEDICARE

## 2025-03-27 VITALS
HEART RATE: 76 BPM | SYSTOLIC BLOOD PRESSURE: 157 MMHG | WEIGHT: 253.06 LBS | BODY MASS INDEX: 36.23 KG/M2 | HEIGHT: 70 IN | DIASTOLIC BLOOD PRESSURE: 81 MMHG

## 2025-03-27 DIAGNOSIS — L97.313: Primary | ICD-10-CM

## 2025-03-27 DIAGNOSIS — I73.9 PAD (PERIPHERAL ARTERY DISEASE): ICD-10-CM

## 2025-03-27 DIAGNOSIS — E11.42 TYPE 2 DIABETES MELLITUS WITH DIABETIC POLYNEUROPATHY, UNSPECIFIED WHETHER LONG TERM INSULIN USE: ICD-10-CM

## 2025-03-27 PROCEDURE — 11042 DBRDMT SUBQ TIS 1ST 20SQCM/<: CPT | Mod: S$GLB,,, | Performed by: PODIATRIST

## 2025-03-27 PROCEDURE — 99499 UNLISTED E&M SERVICE: CPT | Mod: S$GLB,,, | Performed by: PODIATRIST

## 2025-03-27 PROCEDURE — 99999 PR PBB SHADOW E&M-EST. PATIENT-LVL IV: CPT | Mod: PBBFAC,,, | Performed by: PODIATRIST

## 2025-03-27 NOTE — PROCEDURES
"Wound Debridement    Date/Time: 3/27/2025 7:51 AM    Performed by: Silviano Ellison DPM  Authorized by: Silviano Ellison DPM    Time out: Immediately prior to procedure a "time out" was called to verify the correct patient, procedure, equipment, support staff and site/side marked as required.    Consent Done?:  Yes (Verbal)  Local anesthesia used?: No      Wound Details:    Location:  Right ankle    Type of Debridement:  Excisional       Length (cm):  3.8       Width (cm):  2.2       Depth (cm):  0.3       Area (sq cm):  6.57       Percent Debrided (%):  100       Total Area Debrided (sq cm):  6.57    Depth of debridement:  Subcutaneous tissue    Tissue debrided:  Dermis, Epidermis and Subcutaneous    Devitalized tissue debrided:  Biofilm and Callus    Instruments:  Curette  Bleeding:  Minimal  Hemostasis Achieved: Yes  Method Used:  Pressure and Alginate  Patient tolerance:  Patient tolerated the procedure well with no immediate complications  1st Wound Pain Assessment: 0    "

## 2025-03-27 NOTE — PROGRESS NOTES
Subjective:      Patient ID: Serafin Tapia is a 74 y.o. male.    Chief Complaint: Foot Ulcer    Ulcer posterior ankle over the Achilles tendon right foot.  X-rays last visit show no gas in soft tissue foreign body or apparent complication to the bone joint or soft tissue posterior right ankle.    Dressing last visit clean dry intact.  Patient denies pain, trauma, signs infection since last visit.  Ambulating in surgical shoe right, casual shoe left.  States that the surgical boot makes him unstable and he is fearful of falling.    Review of Systems   Constitutional: Negative for chills, diaphoresis, fever, malaise/fatigue and night sweats.   Cardiovascular:  Positive for leg swelling. Negative for claudication, cyanosis and syncope.   Skin:  Positive for poor wound healing. Negative for rash and suspicious lesions.   Musculoskeletal:  Negative for falls, joint pain, joint swelling, muscle cramps, muscle weakness and stiffness.   Gastrointestinal:  Negative for constipation, diarrhea, nausea and vomiting.   Neurological:  Positive for sensory change. Negative for brief paralysis, disturbances in coordination, focal weakness and tremors.           Objective:      Physical Exam  Constitutional:       General: He is not in acute distress.     Appearance: He is well-developed. He is not diaphoretic.   Cardiovascular:      Pulses:           Popliteal pulses are 2+ on the right side and 2+ on the left side.        Dorsalis pedis pulses are 1+ on the right side and 1+ on the left side.        Posterior tibial pulses are 1+ on the right side and 1+ on the left side.      Comments: Capillary refill 3 seconds all toes/distal feet, all toes/both feet warm to touch.      Negative lymphadenopathy bilateral popliteal fossa and tarsal tunnel.     <2+ pitting lower extremity edema bilateral.    Musculoskeletal:      Right ankle: No swelling, deformity, ecchymosis or lacerations. Normal range of motion. Normal pulse.      Right  Achilles Tendon: Normal. No defects. Marquez's test negative.      Comments: Entirely a-propulsive gait with walker for gait assist and to prevent falls.  Increased station and gait increased base.  All ten toes without clubbing, cyanosis, or signs of ischemia.  No pain to palpation bilateral lower extremities.  Range of motion, stability, muscle strength, and muscle tone normal bilateral feet and legs.    Lymphadenopathy:      Lower Body: No right inguinal adenopathy. No left inguinal adenopathy.      Comments: Negative lymphadenopathy bilateral popliteal fossa and tarsal tunnel.    Negative lymphangitic streaking bilateral feet/ankles/legs.   Skin:     General: Skin is warm and dry.      Capillary Refill: Capillary refill takes 2 to 3 seconds.      Coloration: Skin is not pale.      Findings: No abrasion, bruising, burn, ecchymosis, erythema, laceration, lesion or rash.      Nails: There is no clubbing.      Comments: Wound:  Posterior right ankle    Size:    Pre:    19w75f0 mm   Post:  42o10v9 mm    Base:  Granular, pink gray-tan fibrotic mix with moderate serous/serosanaguinous drainage only.  No pus, tracking, fluctuance, malodor, or cardinal signs infection.    Borders:  Atrophic relatively avascular, hypertrophic scar, debriding to flat, pink, scarred indurated and abnormal skin edges without undermining.         Otherwise, Skin thin, shiny, atrophic, with decreased density and distribution of pedal hair bilateral, but without hyperpigmentation, giovanni discoloration,  ulcers, masses, nodules or cords palpated bilateral feet and legs.       Toenails 1st, 2nd, 3rd, 4th, 5th  bilateral are hypertrophic thickened 2-3 mm, dystrophic, discolored tanish brown with tan, gray crumbly subungual debris.  Tender to distal nail plate pressure, without periungual skin abnormality of each.         Neurological:      Mental Status: He is alert and oriented to person, place, and time.      Sensory: Sensory deficit present.       Motor: No tremor, atrophy or abnormal muscle tone.      Gait: Gait normal.   Psychiatric:         Behavior: Behavior is cooperative.               Assessment:       Encounter Diagnoses   Name Primary?    Ulcer of ankle, right, with necrosis of muscle Yes    PAD (peripheral artery disease)     Type 2 diabetes mellitus with diabetic polyneuropathy, unspecified whether long term insulin use          Plan:       Serafin was seen today for foot ulcer.    Diagnoses and all orders for this visit:    Ulcer of ankle, right, with necrosis of muscle  -     Ambulatory referral/consult to Wound Clinic; Future    PAD (peripheral artery disease)  -     Ambulatory referral/consult to Wound Clinic; Future    Type 2 diabetes mellitus with diabetic polyneuropathy, unspecified whether long term insulin use  -     Ambulatory referral/consult to Wound Clinic; Future      I counseled the patient on his conditions, their implications and medical management.        Debrided wound right ankle.      Dressed wound right ankle with hydrofera blue, alginate, football style dressing estended to upper leg.      Ambulate to tolerance in sx shoe right, DM Shoe left.      One-week, sooner p.r.n.          No follow-ups on file.

## 2025-04-03 ENCOUNTER — OFFICE VISIT (OUTPATIENT)
Dept: PODIATRY | Facility: CLINIC | Age: 75
End: 2025-04-03
Payer: MEDICARE

## 2025-04-03 VITALS
DIASTOLIC BLOOD PRESSURE: 81 MMHG | WEIGHT: 253.06 LBS | BODY MASS INDEX: 36.23 KG/M2 | HEART RATE: 71 BPM | HEIGHT: 70 IN | SYSTOLIC BLOOD PRESSURE: 128 MMHG

## 2025-04-03 DIAGNOSIS — L97.313: Primary | ICD-10-CM

## 2025-04-03 DIAGNOSIS — I73.9 PAD (PERIPHERAL ARTERY DISEASE): ICD-10-CM

## 2025-04-03 DIAGNOSIS — E11.42 TYPE 2 DIABETES MELLITUS WITH DIABETIC POLYNEUROPATHY, UNSPECIFIED WHETHER LONG TERM INSULIN USE: ICD-10-CM

## 2025-04-03 PROCEDURE — 99213 OFFICE O/P EST LOW 20 MIN: CPT | Mod: S$GLB,,, | Performed by: PODIATRIST

## 2025-04-03 PROCEDURE — 1159F MED LIST DOCD IN RCRD: CPT | Mod: CPTII,S$GLB,, | Performed by: PODIATRIST

## 2025-04-03 PROCEDURE — 3008F BODY MASS INDEX DOCD: CPT | Mod: CPTII,S$GLB,, | Performed by: PODIATRIST

## 2025-04-03 PROCEDURE — 1101F PT FALLS ASSESS-DOCD LE1/YR: CPT | Mod: CPTII,S$GLB,, | Performed by: PODIATRIST

## 2025-04-03 PROCEDURE — 4010F ACE/ARB THERAPY RXD/TAKEN: CPT | Mod: CPTII,S$GLB,, | Performed by: PODIATRIST

## 2025-04-03 PROCEDURE — 3079F DIAST BP 80-89 MM HG: CPT | Mod: CPTII,S$GLB,, | Performed by: PODIATRIST

## 2025-04-03 PROCEDURE — 3288F FALL RISK ASSESSMENT DOCD: CPT | Mod: CPTII,S$GLB,, | Performed by: PODIATRIST

## 2025-04-03 PROCEDURE — 3044F HG A1C LEVEL LT 7.0%: CPT | Mod: CPTII,S$GLB,, | Performed by: PODIATRIST

## 2025-04-03 PROCEDURE — 1126F AMNT PAIN NOTED NONE PRSNT: CPT | Mod: CPTII,S$GLB,, | Performed by: PODIATRIST

## 2025-04-03 PROCEDURE — 99999 PR PBB SHADOW E&M-EST. PATIENT-LVL III: CPT | Mod: PBBFAC,,, | Performed by: PODIATRIST

## 2025-04-03 PROCEDURE — 3074F SYST BP LT 130 MM HG: CPT | Mod: CPTII,S$GLB,, | Performed by: PODIATRIST

## 2025-04-03 NOTE — PROGRESS NOTES
Subjective:      Patient ID: Serafin Tapia is a 74 y.o. male.    Chief Complaint: Foot Ulcer    Ulcer posterior ankle over the Achilles tendon right foot.  X-rays last visit show no gas in soft tissue foreign body or apparent complication to the bone joint or soft tissue posterior right ankle.  Patient is a appointed with [facility] wound care center this morning.    Dressing last visit clean dry intact.  Patient denies pain, trauma, signs infection since last visit.  Ambulating in surgical shoe right, casual shoe left.    Review of Systems   Constitutional: Negative for chills, diaphoresis, fever, malaise/fatigue and night sweats.   Cardiovascular:  Positive for leg swelling. Negative for claudication, cyanosis and syncope.   Skin:  Positive for poor wound healing. Negative for rash and suspicious lesions.   Musculoskeletal:  Negative for falls, joint pain, joint swelling, muscle cramps, muscle weakness and stiffness.   Gastrointestinal:  Negative for constipation, diarrhea, nausea and vomiting.   Neurological:  Positive for sensory change. Negative for brief paralysis, disturbances in coordination, focal weakness and tremors.           Objective:      Physical Exam  Constitutional:       General: He is not in acute distress.     Appearance: He is well-developed. He is not diaphoretic.   Cardiovascular:      Pulses:           Popliteal pulses are 2+ on the right side and 2+ on the left side.        Dorsalis pedis pulses are 1+ on the right side and 1+ on the left side.        Posterior tibial pulses are 1+ on the right side and 1+ on the left side.      Comments: Capillary refill 3 seconds all toes/distal feet, all toes/both feet warm to touch.      Negative lymphadenopathy bilateral popliteal fossa and tarsal tunnel.     <2+ pitting lower extremity edema bilateral.    Musculoskeletal:      Right ankle: No swelling, deformity, ecchymosis or lacerations. Normal range of motion. Normal pulse.      Right Achilles Tendon:  Normal. No defects. Marquez's test negative.      Comments: Entirely a-propulsive gait with walker for gait assist and to prevent falls.  Increased station and gait increased base.  All ten toes without clubbing, cyanosis, or signs of ischemia.  No pain to palpation bilateral lower extremities.  Range of motion, stability, muscle strength, and muscle tone normal bilateral feet and legs.    Lymphadenopathy:      Lower Body: No right inguinal adenopathy. No left inguinal adenopathy.      Comments: Negative lymphadenopathy bilateral popliteal fossa and tarsal tunnel.    Negative lymphangitic streaking bilateral feet/ankles/legs.   Skin:     General: Skin is warm and dry.      Capillary Refill: Capillary refill takes 2 to 3 seconds.      Coloration: Skin is not pale.      Findings: No abrasion, bruising, burn, ecchymosis, erythema, laceration, lesion or rash.      Nails: There is no clubbing.      Comments: Wound:  Posterior right ankle    Size:    Pre:    13d99s0 mm    Base:  Granular, pink gray-tan fibrotic mix with moderate serous/serosanaguinous drainage only.  No pus, tracking, fluctuance, malodor, or cardinal signs infection.    Borders:  Atrophic relatively avascular, hypertrophic scar, debriding to flat, pink, scarred indurated and abnormal skin edges without undermining.         Otherwise, Skin thin, shiny, atrophic, with decreased density and distribution of pedal hair bilateral, but without hyperpigmentation, giovanni discoloration,  ulcers, masses, nodules or cords palpated bilateral feet and legs.       Toenails 1st, 2nd, 3rd, 4th, 5th  bilateral are hypertrophic thickened 2-3 mm, dystrophic, discolored tanish brown with tan, gray crumbly subungual debris.  Tender to distal nail plate pressure, without periungual skin abnormality of each.         Neurological:      Mental Status: He is alert and oriented to person, place, and time.      Sensory: Sensory deficit present.      Motor: No tremor, atrophy or  abnormal muscle tone.      Gait: Gait normal.   Psychiatric:         Behavior: Behavior is cooperative.               Assessment:       Encounter Diagnoses   Name Primary?    Ulcer of ankle, right, with necrosis of muscle Yes    PAD (peripheral artery disease)     Type 2 diabetes mellitus with diabetic polyneuropathy, unspecified whether long term insulin use          Plan:       Serafin was seen today for foot ulcer.    Diagnoses and all orders for this visit:    Ulcer of ankle, right, with necrosis of muscle    PAD (peripheral artery disease)    Type 2 diabetes mellitus with diabetic polyneuropathy, unspecified whether long term insulin use      I counseled the patient on his conditions, their implications and medical management.    Keep appointment with [facility] wound care-hoping they can get him biological grafts possibly, growth factors, HBOT, vacuum assisted closure, etc. that may help him closest wound more quickly.      Dressed wound right ankle with hydrofera blue, alginate, football style dressing estended to upper leg.      Ambulate to tolerance in sx shoe right, DM Shoe left.       p.r.n.          No follow-ups on file.

## 2025-05-12 ENCOUNTER — OFFICE VISIT (OUTPATIENT)
Dept: INTERNAL MEDICINE | Facility: CLINIC | Age: 75
End: 2025-05-12
Payer: MEDICARE

## 2025-05-12 VITALS
BODY MASS INDEX: 37.09 KG/M2 | WEIGHT: 259.06 LBS | OXYGEN SATURATION: 94 % | DIASTOLIC BLOOD PRESSURE: 62 MMHG | SYSTOLIC BLOOD PRESSURE: 124 MMHG | HEIGHT: 70 IN | HEART RATE: 78 BPM

## 2025-05-12 DIAGNOSIS — R26.9 ABNORMALITY OF GAIT AND MOBILITY: ICD-10-CM

## 2025-05-12 DIAGNOSIS — E11.42 CONTROLLED TYPE 2 DIABETES MELLITUS WITH DIABETIC POLYNEUROPATHY, WITHOUT LONG-TERM CURRENT USE OF INSULIN: Chronic | ICD-10-CM

## 2025-05-12 DIAGNOSIS — Z99.89 DEPENDENCE ON OTHER ENABLING MACHINES AND DEVICES: ICD-10-CM

## 2025-05-12 DIAGNOSIS — E78.2 MIXED HYPERLIPIDEMIA: Chronic | ICD-10-CM

## 2025-05-12 DIAGNOSIS — Z98.890 POSTOPERATIVE HYPOXIA: ICD-10-CM

## 2025-05-12 DIAGNOSIS — I50.9 HEART FAILURE, UNSPECIFIED HF CHRONICITY, UNSPECIFIED HEART FAILURE TYPE: ICD-10-CM

## 2025-05-12 DIAGNOSIS — H25.13 NUCLEAR SCLEROSIS OF BOTH EYES: Chronic | ICD-10-CM

## 2025-05-12 DIAGNOSIS — I70.221 ATHEROSCLEROSIS OF NATIVE ARTERIES OF EXTREMITIES WITH REST PAIN, RIGHT LEG: ICD-10-CM

## 2025-05-12 DIAGNOSIS — I26.99 OTHER PULMONARY EMBOLISM WITHOUT ACUTE COR PULMONALE, UNSPECIFIED CHRONICITY: ICD-10-CM

## 2025-05-12 DIAGNOSIS — M47.816 SPONDYLOSIS OF LUMBAR REGION WITHOUT MYELOPATHY OR RADICULOPATHY: Chronic | ICD-10-CM

## 2025-05-12 DIAGNOSIS — I73.9 PAD (PERIPHERAL ARTERY DISEASE): Chronic | ICD-10-CM

## 2025-05-12 DIAGNOSIS — L97.913 ULCER OF RIGHT LOWER EXTREMITY WITH NECROSIS OF MUSCLE: ICD-10-CM

## 2025-05-12 DIAGNOSIS — I10 ESSENTIAL HYPERTENSION: Chronic | ICD-10-CM

## 2025-05-12 DIAGNOSIS — R09.02 POSTOPERATIVE HYPOXIA: ICD-10-CM

## 2025-05-12 DIAGNOSIS — E03.9 HYPOTHYROIDISM (ACQUIRED): Chronic | ICD-10-CM

## 2025-05-12 DIAGNOSIS — Z74.09 OTHER REDUCED MOBILITY: ICD-10-CM

## 2025-05-12 DIAGNOSIS — I35.0 AORTIC VALVE STENOSIS, ETIOLOGY OF CARDIAC VALVE DISEASE UNSPECIFIED: ICD-10-CM

## 2025-05-12 DIAGNOSIS — E66.01 SEVERE OBESITY WITH BODY MASS INDEX (BMI) OF 36.0 TO 36.9 WITH SERIOUS COMORBIDITY: ICD-10-CM

## 2025-05-12 DIAGNOSIS — Z00.00 ENCOUNTER FOR MEDICARE ANNUAL WELLNESS EXAM: Primary | ICD-10-CM

## 2025-05-12 PROCEDURE — 3078F DIAST BP <80 MM HG: CPT | Mod: CPTII,S$GLB,, | Performed by: COUNSELOR

## 2025-05-12 PROCEDURE — 3074F SYST BP LT 130 MM HG: CPT | Mod: CPTII,S$GLB,, | Performed by: COUNSELOR

## 2025-05-12 PROCEDURE — 1170F FXNL STATUS ASSESSED: CPT | Mod: CPTII,S$GLB,, | Performed by: COUNSELOR

## 2025-05-12 PROCEDURE — 1101F PT FALLS ASSESS-DOCD LE1/YR: CPT | Mod: CPTII,S$GLB,, | Performed by: COUNSELOR

## 2025-05-12 PROCEDURE — 1158F ADVNC CARE PLAN TLK DOCD: CPT | Mod: CPTII,S$GLB,, | Performed by: COUNSELOR

## 2025-05-12 PROCEDURE — G0439 PPPS, SUBSEQ VISIT: HCPCS | Mod: S$GLB,,, | Performed by: COUNSELOR

## 2025-05-12 PROCEDURE — 3288F FALL RISK ASSESSMENT DOCD: CPT | Mod: CPTII,S$GLB,, | Performed by: COUNSELOR

## 2025-05-12 PROCEDURE — 4010F ACE/ARB THERAPY RXD/TAKEN: CPT | Mod: CPTII,S$GLB,, | Performed by: COUNSELOR

## 2025-05-12 PROCEDURE — 3044F HG A1C LEVEL LT 7.0%: CPT | Mod: CPTII,S$GLB,, | Performed by: COUNSELOR

## 2025-05-12 PROCEDURE — 1125F AMNT PAIN NOTED PAIN PRSNT: CPT | Mod: CPTII,S$GLB,, | Performed by: COUNSELOR

## 2025-05-12 PROCEDURE — 1159F MED LIST DOCD IN RCRD: CPT | Mod: CPTII,S$GLB,, | Performed by: COUNSELOR

## 2025-05-12 PROCEDURE — 99999 PR PBB SHADOW E&M-EST. PATIENT-LVL IV: CPT | Mod: PBBFAC,,, | Performed by: COUNSELOR

## 2025-05-12 RX ORDER — GENTAMICIN SULFATE 1 MG/G
OINTMENT TOPICAL
COMMUNITY
Start: 2025-05-08 | End: 2026-05-08

## 2025-05-12 NOTE — PATIENT INSTRUCTIONS
Counseling and Referral of Other Preventative  (Italic type indicates deductible and co-insurance are waived)    Patient Name: Serafin Tapia  Today's Date: 5/12/2025    Health Maintenance       Date Due Completion Date    RSV Vaccine (Age 60+ and Pregnant patients) (1 - Risk 60-74 years 1-dose series) Never done ---    COVID-19 Vaccine (8 - 2024-25 season) 09/01/2024 9/28/2022    Hemoglobin A1c 07/06/2025 1/6/2025    Diabetes Urine Screening 08/26/2025 8/26/2024    Influenza Vaccine (Season Ended) 09/01/2025 9/28/2022    Diabetic Eye Exam 10/28/2025 10/28/2024    Lipid Panel 01/06/2026 1/6/2025    Foot Exam 03/06/2026 3/6/2025    Override on 11/20/2019: Done    Override on 2/5/2018: Done    Override on 5/18/2017: Done    Override on 9/23/2015: Done    TETANUS VACCINE 05/04/2027 5/4/2017    Colorectal Cancer Screening 08/14/2027 8/14/2024        No orders of the defined types were placed in this encounter.      The following information is provided to all patients.  This information is to help you find resources for any of the problems found today that may be affecting your health:                  Living healthy guide: www.[facility].[place].gov      Understanding Diabetes: www.diabetes.org      Eating healthy: www.cdc.gov/healthyweight      CDC home safety checklist: www.cdc.gov/steadi/patient.html      Agency on Aging: www.goea.[place].gov      Alcoholics anonymous (AA): www.aa.org      Physical Activity: www.cherry.nih.gov/cg3uagc      Tobacco use: www.quitwithusla.org         Counseling and Referral of Other Preventative  (Italic type indicates deductible and co-insurance are waived)    Patient Name: Serafin Tapia  Today's Date: 5/12/2025    Health Maintenance       Date Due Completion Date    RSV Vaccine (Age 60+ and Pregnant patients) (1 - Risk 60-74 years 1-dose series) Never done ---    COVID-19 Vaccine (8 - 2024-25 season) 09/01/2024 9/28/2022    Hemoglobin A1c 07/06/2025 1/6/2025    Diabetes Urine Screening  08/26/2025 8/26/2024    Influenza Vaccine (Season Ended) 09/01/2025 9/28/2022    Diabetic Eye Exam 10/28/2025 10/28/2024    Lipid Panel 01/06/2026 1/6/2025    Foot Exam 03/06/2026 3/6/2025    Override on 11/20/2019: Done    Override on 2/5/2018: Done    Override on 5/18/2017: Done    Override on 9/23/2015: Done    TETANUS VACCINE 05/04/2027 5/4/2017    Colorectal Cancer Screening 08/14/2027 8/14/2024        No orders of the defined types were placed in this encounter.      The following information is provided to all patients.  This information is to help you find resources for any of the problems found today that may be affecting your health:                  Living healthy guide: www.[facility].[place].[facility]      Understanding Diabetes: www.diabetes.org      Eating healthy: www.cdc.gov/healthyweight      CDC home safety checklist: www.cdc.gov/steadi/patient.html      Agency on Aging: www.goea.[place].[facility]      Alcoholics anonymous (AA): www.aa.org      Physical Activity: www.cherry.nih.gov/wk4whrt      Tobacco use: www.quitwithusla.org

## 2025-05-12 NOTE — PROGRESS NOTES
"  Serafin Tapia presented for a follow-up Medicare AWV today. The following components were reviewed and updated:    Medical history  Family History  Social history  Allergies and Current Medications  Health Risk Assessment  Health Maintenance  Care Team    **See Completed Assessments for Annual Wellness visit with in the encounter summary    The following assessments were completed:  Depression Screening  Cognitive function Screening  Timed Get Up Test  Whisper Test        Opioid documentation:      Patient does not have a current opioid prescription.          Vitals:    05/12/25 1112   BP: 124/62   BP Location: Left arm   Patient Position: Sitting   Pulse: 78   SpO2: (S) (!) 94%   Weight: 117.5 kg (259 lb 0.7 oz)   Height: 5' 10" (1.778 m)     Body mass index is 37.17 kg/m².       Physical Exam  Constitutional:       General: He is not in acute distress.     Appearance: Normal appearance. He is obese. He is not ill-appearing, toxic-appearing or diaphoretic.      Comments: Appeared sleepy at times during appointment.   HENT:      Head: Normocephalic and atraumatic.   Eyes:      Extraocular Movements: Extraocular movements intact.      Conjunctiva/sclera: Conjunctivae normal.   Cardiovascular:      Rate and Rhythm: Normal rate and regular rhythm.      Pulses: Normal pulses.      Heart sounds: Murmur heard.      No friction rub. No gallop.      Comments: Loud systolic murmur heard best at RUSB  Pulmonary:      Effort: Pulmonary effort is normal. No respiratory distress.      Breath sounds: Normal breath sounds. No stridor. No wheezing, rhonchi or rales.   Musculoskeletal:      Right lower leg: No edema.      Left lower leg: No edema.   Skin:     General: Skin is warm and dry.      Findings: Lesion present.      Comments: Right lower extremity wrapped in clean dressing, toes exposed without skin breakdown or other deformities.   Neurological:      General: No focal deficit present.      Mental Status: He is alert and " oriented to person, place, and time. Mental status is at baseline.      Comments: Walks with cane   Psychiatric:         Mood and Affect: Mood normal.         Behavior: Behavior normal.         Thought Content: Thought content normal.         Judgment: Judgment normal.           Diagnoses and health risks identified today and associated recommendations/orders:  Serafin was seen today for medicare awv.    Encounter for Medicare annual wellness exam  Annual Health Risk Assessment (HRA) visit today.  Counseling and referral of health maintenance and preventative health measures performed.  Patient given annual wellness paperwork to take home.  Encouraged to return in 1 year for subsequent HRA visit.   -     Referral to Enhanced Annual Wellness Visit (eAWV) W+1    Spondylosis of lumbar region without myelopathy or radiculopathy  Comments:  Followed by PCP.   No red flag symptoms today.  Continue to monitor and treat PRN.    Nuclear sclerosis of both eyes  Comments:  Follows with ophthalmology.  Stable.  Continue to follow their recommendations.    Essential hypertension  Comments:  Follows with PCP and cardiology.  Compliant with benazepril, chlorthalidone, toprol, nifedipine.  No adverse effects.  BP well controlled.  Continue to monitor.    PAD (peripheral artery disease)  Comments:  Follows with cardiology.  Currently has wound managed by wound care.  Monitor and follow their recommendation.    Aortic valve stenosis, etiology of cardiac valve disease unspecified  Comments:  Follows with cardiology.   No concerning symptoms.  Stable.  Continue to monitor.    Mixed hyperlipidemia  Comments:  Stable on repatha.  Follows with PCP and cardiology.  Continue to monitor and follow recommendation.    Other pulmonary embolism without acute cor pulmonale, unspecified chronicity  Comments:  No SOB reported with slight hypoxia today, stable compared to baseline.  Continue to monitor.  Follows with PCP.    Atherosclerosis of native  arteries of extremities with rest pain, right leg  Comments:  Follows with cardiology.   Risk factors well controlled.   Continue current regimen and monitor.    Controlled type 2 diabetes mellitus with diabetic polyneuropathy, without long-term current use of insulin  Comments:  Follows with PCP.   Stable.  Continue trulicity and monitor as recommended.    Severe obesity with body mass index (BMI) of 36.0 to 36.9 with serious comorbidity  Comments:  Encouraged healthy diet and exercise as tolerated.  Continue treatment plan as recommended by PCP.    Hypothyroidism (acquired)  Comments:  Stable. Followed by PCP  Continue synthroid and monitor.    Ulcer of right lower extremity with necrosis of muscle  Comments:  Followed by wound care and podiatry.  Continue to follow their recommendations.    Postoperative hypoxia  Comments:  Hypoxia noted today.  No acute signs of distress or home O2.  Continue to monitor and pt advised of return precautions.    Dependence on other enabling machines and devices  Comments:  Pt utilizes cane without problem.  Offered PT for gait training, declined.  Continue to monitor and gave precautions for falls.    Abnormality of gait and mobility  Comments:  Pt utilizes cane without problem.  Offered PT for gait training, declined.  Continue to monitor and gave precautions for falls.    Other reduced mobility  Comments:  Pt utilizes cane without problem.  Offered PT for gait training, declined.  Continue to monitor and gave precautions for falls.    Heart failure, unspecified HF chronicity, unspecified heart failure type  Comments:  Patient reports no related symptoms.  Followed by PCP and cardiology.   Continue to monitor as needed.    Provided Serafin with a 5-10 year written screening schedule and personal prevention plan. Recommendations were developed using the USPSTF age appropriate recommendations. Education, counseling, and referrals were provided as needed.  After Visit Summary printed  and given to patient which includes a list of additional screenings\tests needed.    Follow up in about 1 year (around 5/12/2026) for your next annual wellness visit.      Sammy Murphy PA-C

## 2025-05-27 ENCOUNTER — PATIENT OUTREACH (OUTPATIENT)
Dept: ADMINISTRATIVE | Facility: HOSPITAL | Age: 75
End: 2025-05-27
Payer: MEDICARE

## 2025-05-27 DIAGNOSIS — E11.9 DIABETES MELLITUS WITHOUT COMPLICATION: Primary | ICD-10-CM

## 2025-08-11 ENCOUNTER — TELEPHONE (OUTPATIENT)
Dept: INTERNAL MEDICINE | Facility: CLINIC | Age: 75
End: 2025-08-11
Payer: MEDICARE

## 2025-08-12 DIAGNOSIS — E08.8 DIABETES MELLITUS DUE TO UNDERLYING CONDITION, CONTROLLED, WITH COMPLICATION, WITH LONG-TERM CURRENT USE OF INSULIN: ICD-10-CM

## 2025-08-12 DIAGNOSIS — Z79.4 DIABETES MELLITUS DUE TO UNDERLYING CONDITION, CONTROLLED, WITH COMPLICATION, WITH LONG-TERM CURRENT USE OF INSULIN: ICD-10-CM

## 2025-08-13 RX ORDER — DULAGLUTIDE 1.5 MG/.5ML
1.5 INJECTION, SOLUTION SUBCUTANEOUS
Qty: 4 PEN | Refills: 3 | Status: SHIPPED | OUTPATIENT
Start: 2025-08-13

## 2025-08-27 ENCOUNTER — LAB VISIT (OUTPATIENT)
Dept: LAB | Facility: HOSPITAL | Age: 75
End: 2025-08-27
Attending: STUDENT IN AN ORGANIZED HEALTH CARE EDUCATION/TRAINING PROGRAM
Payer: MEDICARE

## 2025-08-27 ENCOUNTER — OFFICE VISIT (OUTPATIENT)
Dept: RHEUMATOLOGY | Facility: CLINIC | Age: 75
End: 2025-08-27
Payer: MEDICARE

## 2025-08-27 VITALS
TEMPERATURE: 99 F | BODY MASS INDEX: 38.5 KG/M2 | HEIGHT: 70 IN | HEART RATE: 85 BPM | SYSTOLIC BLOOD PRESSURE: 158 MMHG | OXYGEN SATURATION: 95 % | DIASTOLIC BLOOD PRESSURE: 82 MMHG | WEIGHT: 268.94 LBS

## 2025-08-27 DIAGNOSIS — M60.9 MYOSITIS OF MULTIPLE SITES, UNSPECIFIED MYOSITIS TYPE: ICD-10-CM

## 2025-08-27 DIAGNOSIS — G72.0 STATIN MYOPATHY: ICD-10-CM

## 2025-08-27 DIAGNOSIS — Z79.899 OTHER LONG TERM (CURRENT) DRUG THERAPY: ICD-10-CM

## 2025-08-27 DIAGNOSIS — M60.80 NECROTIZING MYOSITIS: Primary | ICD-10-CM

## 2025-08-27 DIAGNOSIS — T46.6X5A STATIN MYOPATHY: ICD-10-CM

## 2025-08-27 LAB
CK SERPL-CCNC: 659 U/L (ref 20–200)
CRP SERPL-MCNC: 11.2 MG/L
ERYTHROCYTE [SEDIMENTATION RATE] IN BLOOD BY PHOTOMETRIC METHOD: 17 MM/HR

## 2025-08-27 PROCEDURE — 83516 IMMUNOASSAY NONANTIBODY: CPT

## 2025-08-27 PROCEDURE — 4010F ACE/ARB THERAPY RXD/TAKEN: CPT | Mod: CPTII,S$GLB,, | Performed by: STUDENT IN AN ORGANIZED HEALTH CARE EDUCATION/TRAINING PROGRAM

## 2025-08-27 PROCEDURE — 1159F MED LIST DOCD IN RCRD: CPT | Mod: CPTII,S$GLB,, | Performed by: STUDENT IN AN ORGANIZED HEALTH CARE EDUCATION/TRAINING PROGRAM

## 2025-08-27 PROCEDURE — 86480 TB TEST CELL IMMUN MEASURE: CPT

## 2025-08-27 PROCEDURE — 86140 C-REACTIVE PROTEIN: CPT

## 2025-08-27 PROCEDURE — 99999 PR PBB SHADOW E&M-EST. PATIENT-LVL V: CPT | Mod: PBBFAC,,, | Performed by: STUDENT IN AN ORGANIZED HEALTH CARE EDUCATION/TRAINING PROGRAM

## 2025-08-27 PROCEDURE — 3044F HG A1C LEVEL LT 7.0%: CPT | Mod: CPTII,S$GLB,, | Performed by: STUDENT IN AN ORGANIZED HEALTH CARE EDUCATION/TRAINING PROGRAM

## 2025-08-27 PROCEDURE — 87340 HEPATITIS B SURFACE AG IA: CPT

## 2025-08-27 PROCEDURE — 86803 HEPATITIS C AB TEST: CPT

## 2025-08-27 PROCEDURE — 82397 CHEMILUMINESCENT ASSAY: CPT

## 2025-08-27 PROCEDURE — 3008F BODY MASS INDEX DOCD: CPT | Mod: CPTII,S$GLB,, | Performed by: STUDENT IN AN ORGANIZED HEALTH CARE EDUCATION/TRAINING PROGRAM

## 2025-08-27 PROCEDURE — 86706 HEP B SURFACE ANTIBODY: CPT | Mod: 59

## 2025-08-27 PROCEDURE — 1160F RVW MEDS BY RX/DR IN RCRD: CPT | Mod: CPTII,S$GLB,, | Performed by: STUDENT IN AN ORGANIZED HEALTH CARE EDUCATION/TRAINING PROGRAM

## 2025-08-27 PROCEDURE — 36415 COLL VENOUS BLD VENIPUNCTURE: CPT

## 2025-08-27 PROCEDURE — G2211 COMPLEX E/M VISIT ADD ON: HCPCS | Mod: S$GLB,,, | Performed by: STUDENT IN AN ORGANIZED HEALTH CARE EDUCATION/TRAINING PROGRAM

## 2025-08-27 PROCEDURE — 82550 ASSAY OF CK (CPK): CPT

## 2025-08-27 PROCEDURE — 83516 IMMUNOASSAY NONANTIBODY: CPT | Mod: 59

## 2025-08-27 PROCEDURE — 86431 RHEUMATOID FACTOR QUANT: CPT

## 2025-08-27 PROCEDURE — 1126F AMNT PAIN NOTED NONE PRSNT: CPT | Mod: CPTII,S$GLB,, | Performed by: STUDENT IN AN ORGANIZED HEALTH CARE EDUCATION/TRAINING PROGRAM

## 2025-08-27 PROCEDURE — 3077F SYST BP >= 140 MM HG: CPT | Mod: CPTII,S$GLB,, | Performed by: STUDENT IN AN ORGANIZED HEALTH CARE EDUCATION/TRAINING PROGRAM

## 2025-08-27 PROCEDURE — 86036 ANCA SCREEN EACH ANTIBODY: CPT

## 2025-08-27 PROCEDURE — 99215 OFFICE O/P EST HI 40 MIN: CPT | Mod: S$GLB,,, | Performed by: STUDENT IN AN ORGANIZED HEALTH CARE EDUCATION/TRAINING PROGRAM

## 2025-08-27 PROCEDURE — 3288F FALL RISK ASSESSMENT DOCD: CPT | Mod: CPTII,S$GLB,, | Performed by: STUDENT IN AN ORGANIZED HEALTH CARE EDUCATION/TRAINING PROGRAM

## 2025-08-27 PROCEDURE — 3079F DIAST BP 80-89 MM HG: CPT | Mod: CPTII,S$GLB,, | Performed by: STUDENT IN AN ORGANIZED HEALTH CARE EDUCATION/TRAINING PROGRAM

## 2025-08-27 PROCEDURE — 86704 HEP B CORE ANTIBODY TOTAL: CPT

## 2025-08-27 PROCEDURE — 86038 ANTINUCLEAR ANTIBODIES: CPT

## 2025-08-27 PROCEDURE — 86200 CCP ANTIBODY: CPT

## 2025-08-27 PROCEDURE — 82085 ASSAY OF ALDOLASE: CPT

## 2025-08-27 PROCEDURE — 1101F PT FALLS ASSESS-DOCD LE1/YR: CPT | Mod: CPTII,S$GLB,, | Performed by: STUDENT IN AN ORGANIZED HEALTH CARE EDUCATION/TRAINING PROGRAM

## 2025-08-27 PROCEDURE — 85652 RBC SED RATE AUTOMATED: CPT

## 2025-08-27 PROCEDURE — 87389 HIV-1 AG W/HIV-1&-2 AB AG IA: CPT

## 2025-08-28 LAB
ALDOLASE (OHS): 6.5 U/L (ref 1.2–7.6)
ANA (OHS): NORMAL
CYCLIC CITRULLINATED PEPTIDE (CCP) (OHS): 0.9 U/ML
HBV CORE AB SERPL QL IA: NORMAL
HBV SURFACE AB SER-ACNC: <3 MIU/ML
HBV SURFACE AB SERPL IA-ACNC: NORMAL M[IU]/ML
HBV SURFACE AG SERPL QL IA: NORMAL
HCV AB SERPL QL IA: NORMAL
HIV 1+2 AB+HIV1 P24 AG SERPL QL IA: NORMAL
RHEUMATOID FACT SERPL-ACNC: <13 IU/ML

## 2025-08-29 LAB
MITOGEN MINUS NIL (OHS): 9.99
NIL TB SYNCED (OHS): 0.01
PROTEINASE3 IGG SERPL IA-ACNC: <0.2 U
QUANTIFERON GOLD INTERP (OHS): NEGATIVE
TB1 AG MINUS NIL (OHS): 0.02
TB2 AG MINUS NIL (OHS): 0.01

## 2025-08-30 LAB — HISTONE IGG SER IA-ACNC: 0.5 UNITS

## 2025-09-02 LAB
W C-ANCA: NORMAL TITER
W MYELOPEROXIDASE (MPO) AB: 0.3 U/ML
W P-ANCA: NORMAL TITER

## 2025-09-05 LAB
EJ AB SER QL: NEGATIVE
ENA JO1 IGG SER-ACNC: <0.2 U
HMGCR IGG SER IA-ACNC: 112 CU
IMMUNOLOGIST REVIEW: ABNORMAL
Lab: NEGATIVE
Lab: POSITIVE
MI2 AB SER QL: NEGATIVE
OJ AB SER QL: NEGATIVE
PL12 AB SER QL: NEGATIVE
PL7 AB SER QL: NEGATIVE
SRP IGG SERPL QL IF: NEGATIVE

## (undated) DEVICE — STRIP STERI REIN CLSR 1/2X2IN

## (undated) DEVICE — ELECTRODE REM PLYHSV RETURN 9

## (undated) DEVICE — SUT D SPECIAL VICRYL 2-0

## (undated) DEVICE — SEE MEDLINE ITEM 152561

## (undated) DEVICE — BANDAGE CONFORM 3IN STRL

## (undated) DEVICE — TRAY MINOR GEN SURG

## (undated) DEVICE — SUT CHROMIC 3-0 SH 27IN GUT

## (undated) DEVICE — TRAY DRY SKIN SCRUB PREP

## (undated) DEVICE — SEE MEDLINE ITEM 152529

## (undated) DEVICE — SYR B-D DISP CONTROL 10CC100/C

## (undated) DEVICE — SEE MEDLINE ITEM 146298

## (undated) DEVICE — NDL HYPO REG 25G X 1 1/2

## (undated) DEVICE — SOL 9P NACL IRR PIC IL

## (undated) DEVICE — SEE MEDLINE ITEM 156930

## (undated) DEVICE — SUT CHROMIC 2-0 SH 27IN BRN

## (undated) DEVICE — SEE MEDLINE ITEM 152622

## (undated) DEVICE — GLOVE BIOGEL SKINSENSE PI 7.5

## (undated) DEVICE — ELECTRODE NEEDLE 1IN

## (undated) DEVICE — APPLICATOR CHLORAPREP ORN 26ML

## (undated) DEVICE — SEE MEDLINE ITEM 146417

## (undated) DEVICE — DRESSING ABSRBNT ISLAND 3.6X8

## (undated) DEVICE — SEE MEDLINE ITEM 157148

## (undated) DEVICE — DRESSING TELFA N ADH 3X8

## (undated) DEVICE — SEE MEDLINE ITEM 157117

## (undated) DEVICE — GOWN SURGICAL X-LARGE

## (undated) DEVICE — SUT MCRYL PLUS 4-0 PS2 27IN

## (undated) DEVICE — SUT VICRYL PLUS 3-0 SH 18IN